# Patient Record
Sex: MALE | Race: BLACK OR AFRICAN AMERICAN | Employment: UNEMPLOYED | URBAN - METROPOLITAN AREA
[De-identification: names, ages, dates, MRNs, and addresses within clinical notes are randomized per-mention and may not be internally consistent; named-entity substitution may affect disease eponyms.]

---

## 2018-12-05 ENCOUNTER — APPOINTMENT (OUTPATIENT)
Dept: GENERAL RADIOLOGY | Age: 48
DRG: 871 | End: 2018-12-05
Attending: EMERGENCY MEDICINE
Payer: COMMERCIAL

## 2018-12-05 ENCOUNTER — HOSPITAL ENCOUNTER (INPATIENT)
Age: 48
LOS: 9 days | Discharge: HOME | DRG: 871 | End: 2018-12-14
Attending: EMERGENCY MEDICINE | Admitting: INTERNAL MEDICINE
Payer: COMMERCIAL

## 2018-12-05 DIAGNOSIS — N18.6 END STAGE RENAL DISEASE (HCC): ICD-10-CM

## 2018-12-05 DIAGNOSIS — I48.91 ATRIAL FIBRILLATION WITH RAPID VENTRICULAR RESPONSE (HCC): Primary | ICD-10-CM

## 2018-12-05 LAB
ALBUMIN SERPL-MCNC: 2.2 G/DL (ref 3.4–5)
ALBUMIN/GLOB SERPL: 0.5 {RATIO} (ref 0.8–1.7)
ALP SERPL-CCNC: 97 U/L (ref 45–117)
ALT SERPL-CCNC: 19 U/L (ref 16–61)
ANION GAP SERPL CALC-SCNC: 16 MMOL/L (ref 3–18)
AST SERPL-CCNC: 31 U/L (ref 15–37)
ATRIAL RATE: 133 BPM
BASOPHILS # BLD: 0 K/UL (ref 0–0.06)
BASOPHILS NFR BLD: 0 % (ref 0–3)
BILIRUB SERPL-MCNC: 0.5 MG/DL (ref 0.2–1)
BUN SERPL-MCNC: 99 MG/DL (ref 7–18)
BUN/CREAT SERPL: 7 (ref 12–20)
CALCIUM SERPL-MCNC: 7.2 MG/DL (ref 8.5–10.1)
CALCIUM SERPL-MCNC: 7.2 MG/DL (ref 8.5–10.1)
CALCULATED R AXIS, ECG10: -29 DEGREES
CALCULATED T AXIS, ECG11: 165 DEGREES
CHLORIDE SERPL-SCNC: 109 MMOL/L (ref 100–108)
CO2 SERPL-SCNC: 11 MMOL/L (ref 21–32)
CREAT SERPL-MCNC: 14.5 MG/DL (ref 0.6–1.3)
DIAGNOSIS, 93000: NORMAL
DIFFERENTIAL METHOD BLD: ABNORMAL
EOSINOPHIL # BLD: 0 K/UL (ref 0–0.4)
EOSINOPHIL NFR BLD: 0 % (ref 0–5)
ERYTHROCYTE [DISTWIDTH] IN BLOOD BY AUTOMATED COUNT: 15.6 % (ref 11.6–14.5)
FERRITIN SERPL-MCNC: 295 NG/ML (ref 8–388)
GLOBULIN SER CALC-MCNC: 4.2 G/DL (ref 2–4)
GLUCOSE BLD STRIP.AUTO-MCNC: 151 MG/DL (ref 70–110)
GLUCOSE SERPL-MCNC: 147 MG/DL (ref 74–99)
HBA1C MFR BLD: 6.3 % (ref 4.2–5.6)
HCT VFR BLD AUTO: 30 % (ref 36–48)
HGB BLD-MCNC: 10.2 G/DL (ref 13–16)
IRON SATN MFR SERPL: 10 %
IRON SERPL-MCNC: 20 UG/DL (ref 50–175)
LACTATE BLD-SCNC: 3.5 MMOL/L (ref 0.4–2)
LACTATE BLD-SCNC: 3.8 MMOL/L (ref 0.4–2)
LYMPHOCYTES # BLD: 0.3 K/UL (ref 0.8–3.5)
LYMPHOCYTES NFR BLD: 1 % (ref 20–51)
MAGNESIUM SERPL-MCNC: 1.7 MG/DL (ref 1.6–2.6)
MCH RBC QN AUTO: 27.9 PG (ref 24–34)
MCHC RBC AUTO-ENTMCNC: 34 G/DL (ref 31–37)
MCV RBC AUTO: 82 FL (ref 74–97)
METAMYELOCYTES NFR BLD MANUAL: 1 %
MONOCYTES # BLD: 1 K/UL (ref 0–1)
MONOCYTES NFR BLD: 4 % (ref 2–9)
NEUTS BAND NFR BLD MANUAL: 3 % (ref 0–5)
NEUTS SEG # BLD: 24.4 K/UL (ref 1.8–8)
NEUTS SEG NFR BLD: 91 % (ref 42–75)
PHOSPHATE SERPL-MCNC: 7.8 MG/DL (ref 2.5–4.9)
PLATELET # BLD AUTO: 237 K/UL (ref 135–420)
PLATELET COMMENTS,PCOM: ABNORMAL
PMV BLD AUTO: 10 FL (ref 9.2–11.8)
POTASSIUM SERPL-SCNC: 5.6 MMOL/L (ref 3.5–5.5)
PROT SERPL-MCNC: 6.4 G/DL (ref 6.4–8.2)
PTH-INTACT SERPL-MCNC: 723.8 PG/ML (ref 18.4–88)
Q-T INTERVAL, ECG07: 264 MS
QRS DURATION, ECG06: 82 MS
QTC CALCULATION (BEZET), ECG08: 455 MS
RBC # BLD AUTO: 3.66 M/UL (ref 4.7–5.5)
RBC MORPH BLD: ABNORMAL
RBC MORPH BLD: ABNORMAL
SODIUM SERPL-SCNC: 136 MMOL/L (ref 136–145)
TIBC SERPL-MCNC: 195 UG/DL (ref 250–450)
VENTRICULAR RATE, ECG03: 179 BPM
WBC # BLD AUTO: 26 K/UL (ref 4.6–13.2)

## 2018-12-05 PROCEDURE — 99285 EMERGENCY DEPT VISIT HI MDM: CPT

## 2018-12-05 PROCEDURE — 87340 HEPATITIS B SURFACE AG IA: CPT

## 2018-12-05 PROCEDURE — 83735 ASSAY OF MAGNESIUM: CPT

## 2018-12-05 PROCEDURE — 74011250636 HC RX REV CODE- 250/636: Performed by: INTERNAL MEDICINE

## 2018-12-05 PROCEDURE — 74011636637 HC RX REV CODE- 636/637: Performed by: INTERNAL MEDICINE

## 2018-12-05 PROCEDURE — 74011000258 HC RX REV CODE- 258: Performed by: EMERGENCY MEDICINE

## 2018-12-05 PROCEDURE — 86706 HEP B SURFACE ANTIBODY: CPT

## 2018-12-05 PROCEDURE — 93005 ELECTROCARDIOGRAM TRACING: CPT

## 2018-12-05 PROCEDURE — 83540 ASSAY OF IRON: CPT

## 2018-12-05 PROCEDURE — 96365 THER/PROPH/DIAG IV INF INIT: CPT

## 2018-12-05 PROCEDURE — 74011250637 HC RX REV CODE- 250/637: Performed by: INTERNAL MEDICINE

## 2018-12-05 PROCEDURE — 87040 BLOOD CULTURE FOR BACTERIA: CPT

## 2018-12-05 PROCEDURE — 86803 HEPATITIS C AB TEST: CPT

## 2018-12-05 PROCEDURE — 85025 COMPLETE CBC W/AUTO DIFF WBC: CPT

## 2018-12-05 PROCEDURE — 87186 SC STD MICRODIL/AGAR DIL: CPT

## 2018-12-05 PROCEDURE — 71045 X-RAY EXAM CHEST 1 VIEW: CPT

## 2018-12-05 PROCEDURE — 82728 ASSAY OF FERRITIN: CPT

## 2018-12-05 PROCEDURE — 86704 HEP B CORE ANTIBODY TOTAL: CPT

## 2018-12-05 PROCEDURE — 74011000250 HC RX REV CODE- 250: Performed by: EMERGENCY MEDICINE

## 2018-12-05 PROCEDURE — 83970 ASSAY OF PARATHORMONE: CPT

## 2018-12-05 PROCEDURE — 84100 ASSAY OF PHOSPHORUS: CPT

## 2018-12-05 PROCEDURE — 83605 ASSAY OF LACTIC ACID: CPT

## 2018-12-05 PROCEDURE — 65660000004 HC RM CVT STEPDOWN

## 2018-12-05 PROCEDURE — 83036 HEMOGLOBIN GLYCOSYLATED A1C: CPT

## 2018-12-05 PROCEDURE — 74011250636 HC RX REV CODE- 250/636: Performed by: EMERGENCY MEDICINE

## 2018-12-05 PROCEDURE — 82962 GLUCOSE BLOOD TEST: CPT

## 2018-12-05 PROCEDURE — 74011250637 HC RX REV CODE- 250/637: Performed by: HOSPITALIST

## 2018-12-05 PROCEDURE — 80053 COMPREHEN METABOLIC PANEL: CPT

## 2018-12-05 PROCEDURE — 87077 CULTURE AEROBIC IDENTIFY: CPT

## 2018-12-05 RX ORDER — DILTIAZEM HYDROCHLORIDE 5 MG/ML
5 INJECTION INTRAVENOUS
Status: COMPLETED | OUTPATIENT
Start: 2018-12-05 | End: 2018-12-05

## 2018-12-05 RX ORDER — MONTELUKAST SODIUM 10 MG/1
10 TABLET ORAL DAILY
Status: DISCONTINUED | OUTPATIENT
Start: 2018-12-06 | End: 2018-12-14 | Stop reason: HOSPADM

## 2018-12-05 RX ORDER — HEPARIN SODIUM 5000 [USP'U]/ML
5000 INJECTION, SOLUTION INTRAVENOUS; SUBCUTANEOUS EVERY 8 HOURS
Status: DISCONTINUED | OUTPATIENT
Start: 2018-12-05 | End: 2018-12-06

## 2018-12-05 RX ORDER — ASPIRIN 81 MG/1
81 TABLET ORAL DAILY
Status: DISCONTINUED | OUTPATIENT
Start: 2018-12-06 | End: 2018-12-14 | Stop reason: HOSPADM

## 2018-12-05 RX ORDER — MAGNESIUM SULFATE 100 %
16 CRYSTALS MISCELLANEOUS AS NEEDED
Status: DISCONTINUED | OUTPATIENT
Start: 2018-12-05 | End: 2018-12-14 | Stop reason: HOSPADM

## 2018-12-05 RX ORDER — ATORVASTATIN CALCIUM 40 MG/1
40 TABLET, FILM COATED ORAL DAILY
Status: DISCONTINUED | OUTPATIENT
Start: 2018-12-06 | End: 2018-12-14 | Stop reason: HOSPADM

## 2018-12-05 RX ORDER — ONDANSETRON 2 MG/ML
4 INJECTION INTRAMUSCULAR; INTRAVENOUS
Status: DISCONTINUED | OUTPATIENT
Start: 2018-12-05 | End: 2018-12-14 | Stop reason: HOSPADM

## 2018-12-05 RX ORDER — ACETAMINOPHEN 325 MG/1
650 TABLET ORAL
Status: DISCONTINUED | OUTPATIENT
Start: 2018-12-05 | End: 2018-12-12

## 2018-12-05 RX ORDER — SODIUM CHLORIDE 9 MG/ML
100 INJECTION, SOLUTION INTRAVENOUS
Status: DISCONTINUED | OUTPATIENT
Start: 2018-12-05 | End: 2018-12-14 | Stop reason: HOSPADM

## 2018-12-05 RX ORDER — CALCIUM CARBONATE 200(500)MG
400 TABLET,CHEWABLE ORAL
Status: DISCONTINUED | OUTPATIENT
Start: 2018-12-05 | End: 2018-12-06

## 2018-12-05 RX ORDER — DEXTROSE 50 % IN WATER (D50W) INTRAVENOUS SYRINGE
25-50 AS NEEDED
Status: DISCONTINUED | OUTPATIENT
Start: 2018-12-05 | End: 2018-12-14 | Stop reason: HOSPADM

## 2018-12-05 RX ORDER — INSULIN LISPRO 100 [IU]/ML
INJECTION, SOLUTION INTRAVENOUS; SUBCUTANEOUS
Status: DISCONTINUED | OUTPATIENT
Start: 2018-12-05 | End: 2018-12-14 | Stop reason: HOSPADM

## 2018-12-05 RX ORDER — SODIUM BICARBONATE 650 MG/1
650 TABLET ORAL 3 TIMES DAILY
Status: DISCONTINUED | OUTPATIENT
Start: 2018-12-05 | End: 2018-12-07

## 2018-12-05 RX ADMIN — DILTIAZEM HYDROCHLORIDE 5 MG: 5 INJECTION INTRAVENOUS at 14:59

## 2018-12-05 RX ADMIN — ACETAMINOPHEN 650 MG: 325 TABLET ORAL at 17:52

## 2018-12-05 RX ADMIN — ACETAMINOPHEN 650 MG: 325 TABLET ORAL at 22:13

## 2018-12-05 RX ADMIN — HEPARIN SODIUM 5000 UNITS: 5000 INJECTION INTRAVENOUS; SUBCUTANEOUS at 18:27

## 2018-12-05 RX ADMIN — SODIUM CHLORIDE 5 MG/HR: 900 INJECTION, SOLUTION INTRAVENOUS at 15:06

## 2018-12-05 RX ADMIN — INSULIN LISPRO 2 UNITS: 100 INJECTION, SOLUTION INTRAVENOUS; SUBCUTANEOUS at 22:11

## 2018-12-05 RX ADMIN — SODIUM BICARBONATE 650 MG TABLET 650 MG: at 21:49

## 2018-12-05 RX ADMIN — AZITHROMYCIN MONOHYDRATE 500 MG: 500 INJECTION, POWDER, LYOPHILIZED, FOR SOLUTION INTRAVENOUS at 16:35

## 2018-12-05 RX ADMIN — ONDANSETRON 4 MG: 2 INJECTION INTRAMUSCULAR; INTRAVENOUS at 17:51

## 2018-12-05 RX ADMIN — CALCIUM GLUCONATE 2 G: 98 INJECTION, SOLUTION INTRAVENOUS at 18:18

## 2018-12-05 RX ADMIN — SODIUM BICARBONATE 650 MG TABLET 650 MG: at 17:09

## 2018-12-05 RX ADMIN — CEFTRIAXONE SODIUM 2 G: 2 INJECTION, POWDER, FOR SOLUTION INTRAMUSCULAR; INTRAVENOUS at 16:24

## 2018-12-05 RX ADMIN — CALCIUM CARBONATE (ANTACID) CHEW TAB 500 MG 400 MG: 500 CHEW TAB at 21:00

## 2018-12-05 NOTE — ED NOTES
Dr. Kemar Anderson in to assess pt. And talk with family regarding low BP of 76/52. BP cuff repositioned-BP now 96/57 per Dr. Kemar Anderson. Order also to pause Cardizem drip due to low BP.

## 2018-12-05 NOTE — ED NOTES
Pt. C/o \"not feeling right\" BP-76/52 also c/o lower back pain. Paged Dr. Jg Braga; returned phone call. Stated to speak with ER doctor regarding Cardizem drip

## 2018-12-05 NOTE — H&P
History and Physical      NAME:  Sandee Olszewski   :   1970   MRN:   [id number]     Date/Time:  2018     CHIEF COMPLAINT: weakness and fatigue     HISTORY OF PRESENT ILLNESS:     Mr. Carmina Hernandez is a 41 y.o.   male with a PMH of ESRD s/p AVF placement but not on dialysis yet, HTN, chronic diastolic CHF, DM-2, CAD s/p stent and pulmonary HTN who presents with c/c of weakness and fatigue. He also has increased shortness of breathing on exertion. He denies chest pain or palpitation. He has no fever or chills. He has cough, non productive. Here in ED he was found to have atrial fibrillation with RVR which is new onset. He follows with Dr Ricky Veras nephrology. He had AVF placed about 2 years ago but never started on dialysis yet. Here  in ED he is started on IV cardizem drip. CXR also showed suspected infiltrate with leukocytosis and started on IV antibiotics. Cardiology has been consulted and admitted for further evaluation and management. Past Medical History:   Diagnosis Date    Abnormal nuclear cardiac imaging test 10/08/2012    Fixed anteroseptal, anteroapical defect c/w prior infarction. No ischemia. Mid & distal anteroseptal, anteroapical WMA. LVE. EF 44%.  Anemia     dr. Javier Blunt doubt amyloid or multiple myeloma. candidate for procirt if Hg <10    Benign hypertensive     Blindness of right eye 2013    legally blind    CAD (coronary artery disease)     Cardiomyopathy ejection fraction of 40%     CKD (chronic kidney disease), stage IV ([address]     to see Dr. Ricky Veras 12    Congestive heart failure ([address]     Diabetes mellitus ([address]     Diabetic retin[facility]     Diabetic retinopathy ([address]     Dr. Mary Cheng- injections    Heart failure ([facility]     History of echocardiogram 2014    LVE. EF 55% (prev 40-45% on echo of 12). No WMA. Mild-mod conc LVH. Gr 3 DDfx. Marked LAE. Mild SHANTEL.   Mild MR.    Hypertension     Pulmonary hypertension ([address]     Renal Ultrasound 1/3/12    Right kidney isoechoic w/respect to liver. Sm left-sided pleural effusion    S/P cardiac cath 10/16/2012    LM patent. pLAD 95% (3.5 x 12-mm Jackson stent, resid 0$%). LCX mild. Past Surgical History:   Procedure Laterality Date    HX CORONARY STENT PLACEMENT      one    HX LAPAROTOMY  2/2012    bowel obstruction with removal segment of small bowel. Done by Riblet.  HX LAPAROTOMY  infancy    whole in colon and had repair at that time.  HX OTHER SURGICAL  06/20/2013    left eye retna attatchment    HX RETINAL DETACHMENT REPAIR      august 2012       Social History     Tobacco Use    Smoking status: Never Smoker    Smokeless tobacco: Never Used   Substance Use Topics    Alcohol use: Yes     Alcohol/week: 2.5 oz     Types: 5 Cans of beer per week     Comment: occassionally        Family History   Problem Relation Age of Onset    Diabetes Mother     Hypertension Mother     Kidney Disease Mother     High Cholesterol Father     Diabetes Brother         pre diabetic        No Known Allergies     Prior to Admission medications    Medication Sig Start Date End Date Taking? Authorizing Provider   amLODIPine (NORVASC) 10 mg tablet TAKE ONE TABLET BY MOUTH EVERY DAY 9/8/16   Herlinda Sparks MD   glimepiride (AMARYL) 4 mg tablet TAKE 1 TABLET BY MOUTH EVERY MORNING 6/2/16   Herlinda Sparks MD   calcitRIOL (ROCALTROL) 0.25 mcg capsule  3/16/16   Provider, Amy   metolazone (ZAROXOLYN) 5 mg tablet TAKE ONE TABLET BY MOUTH EVERY FRIDAY 30 MINUTES PRIOR TO MORNING LASIX DOSE  Patient taking differently: Take 5 mg by mouth as needed. TAKE ONE TABLET BY MOUTH EVERY FRIDAY 30 MINUTES PRIOR TO MORNING LASIX DOSE 4/26/16   Mckenzie Holcomb, NP   atorvastatin (LIPITOR) 40 mg tablet Take 1 Tab by mouth daily.  4/14/16   Herlinda Sparks MD   hydrALAZINE (APRESOLINE) 100 mg tablet TAKE 1 TABLET BY MOUTH THREE TIMES A DAY 1/26/16   Tanvi Holcomb NP   carvedilol (COREG) 25 mg tablet TAKE TWO TABLETS BY MOUTH TWICE DAILY 1/26/16   Tucker Holcomb NP   b complex-vitamin c-folic acid 1mg (NAHED-MOON RX) 1 mg tablet TAKE ONE TABLET BY MOUTH EVERY DAY 1/25/16   Pily Church MD   montelukast (SINGULAIR) 10 mg tablet Take 10 mg by mouth daily. Provider, Historical   sodium bicarbonate 325 mg tablet Take 325 mg by mouth two (2) times a day. Provider, Historical   sildenafil citrate (VIAGRA) 50 mg tablet Take 1 Tab by mouth as needed. [address] 05194-507-12, lot #[id number]  Exp 12/16, 1 box, # 2 tabs 12/15/15   Teo LAI MD   furosemide (LASIX) 40 mg tablet TAKE 2 TABLETS BY MOUTH TWO TIMES A DAY 11/17/15   Francisco Malin MD   tadalafil (CIALIS) 20 mg tablet Take 1 Tab by mouth as needed. 6/17/15   Andria Agee MD   aspirin delayed-release 81 mg tablet Take 81 mg by mouth daily. Provider, Historical   EPOETIN SEBLE (PROCRIT IJ) by Injection route. Provider, Historical   cholecalciferol, vitamin D3, (VITAMIN D3) 2,000 unit Tab Take 1 Tab by mouth daily.     Provider, Historical   glucose blood VI test strips (FREESTYLE LITE STRIPS) strip Use as directed 8/15/12   Dalton Watters MD       REVIEW OF SYSTEMS:     CONSTITUTIONAL: No Fever, No chills, No weight loss, No Night sweats  HEENT:  No epistaxis, No diff in swallowing  CVS: No chest pain, No palpitations, No syncope, No peripheral edema, No PND, No orthopnea  RS: shortness of breath, No cough, No hemoptysis, No pleuritic chest pain  GI: No abd pain, No vomitting, No diarrhea, No hematemesis, No rectal bleeding, No acid reflux or heartburn  NEURO: No focal weakness, No headaches, No seizures  PSYCH: No anxiety, No depression  MUSCULOSKLETAL: No joint pain or swelling  : No hematuria or dysuria  SKIN: No rash      Physical Exam:    VITALS:    Vital signs reviewed; most recent are:    Visit Vitals  BP 99/70   Pulse (!) 101   Resp 24   SpO2 97% SpO2 Readings from Last 6 Encounters:   12/05/18 97%   06/21/16 98%   05/06/16 98%   12/15/15 96%   06/12/15 98%   05/12/15 98%        No intake or output data in the 24 hours ending 12/05/18 1708       GENERAL: Not in acute distress  HEENT: pink conjunctiva, un icteric sclera,   NECK: No lymphadenopthy or thyroid swelling, JVD not seen  LYMPH: No supraclavicular or cervical or axillary nodes on both sides  CVS: S1S2, No murmurs, No gallop or rub  RS: CTA, No wheezing or crackles  Abd: Soft, non tender, not distended, No guarding, No rigidity  NEURO:  No focal neurologic deficits   Extrm: no leg edema or swelling   Skin: No rash      Labs:  Recent Results (from the past 24 hour(s))   EKG, 12 LEAD, INITIAL    Collection Time: 12/05/18  2:02 PM   Result Value Ref Range    Ventricular Rate 179 BPM    Atrial Rate 133 BPM    QRS Duration 82 ms    Q-T Interval 264 ms    QTC Calculation (Bezet) 455 ms    Calculated R Axis -29 degrees    Calculated T Axis 165 degrees    Diagnosis       Atrial fibrillation with rapid ventricular response  Septal infarct (cited on or before 10-APR-2013)  Marked ST abnormality, possible lateral subendocardial injury  Abnormal ECG  When compared with ECG of 06-JAN-2016 18:48,  Atrial fibrillation has replaced Sinus rhythm  Vent.  rate has increased  BPM  ST now depressed in Lateral leads  T wave amplitude has decreased in Inferior leads  T wave inversion now evident in Lateral leads     CBC WITH AUTOMATED DIFF    Collection Time: 12/05/18  2:21 PM   Result Value Ref Range    WBC 26.0 (H) 4.6 - 13.2 K/uL    RBC 3.66 (L) 4.70 - 5.50 M/uL    HGB 10.2 (L) 13.0 - 16.0 g/dL    HCT 30.0 (L) 36.0 - 48.0 %    MCV 82.0 74.0 - 97.0 FL    MCH 27.9 24.0 - 34.0 PG    MCHC 34.0 31.0 - 37.0 g/dL    RDW 15.6 (H) 11.6 - 14.5 %    PLATELET 833 768 - 953 K/uL    MPV 10.0 9.2 - 11.8 FL    NEUTROPHILS 91 (H) 42 - 75 %    BAND NEUTROPHILS 3 0 - 5 %    LYMPHOCYTES 1 (L) 20 - 51 %    MONOCYTES 4 2 - 9 % EOSINOPHILS 0 0 - 5 %    BASOPHILS 0 0 - 3 %    METAMYELOCYTES 1 (H) 0 %    ABS. NEUTROPHILS 24.4 (H) 1.8 - 8.0 K/UL    ABS. LYMPHOCYTES 0.3 (L) 0.8 - 3.5 K/UL    ABS. MONOCYTES 1.0 0 - 1.0 K/UL    ABS. EOSINOPHILS 0.0 0.0 - 0.4 K/UL    ABS. BASOPHILS 0.0 0.0 - 0.06 K/UL    DF MANUAL      PLATELET COMMENTS ADEQUATE PLATELETS      RBC COMMENTS ANURAG CELLS  3+        RBC COMMENTS SCHISTOCYTES  FEW       MAGNESIUM    Collection Time: 12/05/18  2:50 PM   Result Value Ref Range    Magnesium 1.7 1.6 - 2.6 mg/dL   METABOLIC PANEL, COMPREHENSIVE    Collection Time: 12/05/18  2:50 PM   Result Value Ref Range    Sodium 136 136 - 145 mmol/L    Potassium 5.6 (H) 3.5 - 5.5 mmol/L    Chloride 109 (H) 100 - 108 mmol/L    CO2 11 (L) 21 - 32 mmol/L    Anion gap 16 3.0 - 18 mmol/L    Glucose 147 (H) 74 - 99 mg/dL    BUN 99 (H) 7.0 - 18 MG/DL    Creatinine 14.50 (H) 0.6 - 1.3 MG/DL    BUN/Creatinine ratio 7 (L) 12 - 20      GFR est AA 4 (L) >60 ml/min/1.73m2    GFR est non-AA 4 (L) >60 ml/min/1.73m2    Calcium 7.2 (L) 8.5 - 10.1 MG/DL    Bilirubin, total 0.5 0.2 - 1.0 MG/DL    ALT (SGPT) 19 16 - 61 U/L    AST (SGOT) 31 15 - 37 U/L    Alk. phosphatase 97 45 - 117 U/L    Protein, total 6.4 6.4 - 8.2 g/dL    Albumin 2.2 (L) 3.4 - 5.0 g/dL    Globulin 4.2 (H) 2.0 - 4.0 g/dL    A-G Ratio 0.5 (L) 0.8 - 1.7     POC LACTIC ACID    Collection Time: 12/05/18  4:14 PM   Result Value Ref Range    Lactic Acid (POC) 3.50 (HH) 0.40 - 2.00 mmol/L         Active Problems:    Atrial fibrillation with rapid ventricular response (HCC) (12/5/2018)        Assessment:       1. Atrial fibrillation with RVR, new onset  2. Suspected pneumonia  3. ESRD s/p AVF placement but not on dialysis yet,   4. Hyperkalemia, mild  5. HTN, controlled  6. Chronic diastolic CHF,   7. DM-2, controlled  8. CAD s/p stent   9.  H/o pulmonary HTN     Plan:       · Patient being admitted   · Continue IV Cardizem drip  · Cardiology already consulted  · Will order echo  · Nephrology also consulted,  · Ordered AVF duplex. Planning to start him on HD tomorrow  · Monitor volume status and electrolytes  · Continue antibiotics to cover for PNA. On Ceft/zithromax. Monitor cultures  · Monitor blood glucose, SSI  · Full code  · D/w patient, family and Dr Johnson Fuentes    Total time:  64 minutes             _______________________________________________________________________        Attending Physician:  Mark Cardenas MD       Copies: Car Ureña MD

## 2018-12-05 NOTE — CONSULTS
Consult Note  Consult requested by: Dr. Deborah Reynolds is a 41 y.o. male [address] who is being seen on consult for ESRD  Chief Complaint   Patient presents with    Lethargy     Admission diagnosis: <principal problem not specified>     HPI: 40 yo AA male seen in the ER with his wife and family friend. Patient c/o of increasing SOB, and cough , weakness, Nausea and decrease urine output in the last few weeks. Per wife he has not been taking any meds. He was last seen by my partner Dr. Jorge Wiggins in March 2016 and at that time he had a crea of 6. He was sent to [facility] for AVF creation ( done by Dr. Teresa Mcneill in April of 2016 and was last seen by her in 11/2016 and permission to use was given). Pt did not follow up with us and per wife he apparently has not seen any of his doctors for over a year. He does get allergy shots  Dr. Bret Arreguin office on a weekly basis. Pt denies any urinary complaints, fever, CP. Has LE edema chronic but not on meds  Also has DM 2 but on no meds. No NSAIDS  Denies any SZ, wt loss  Right eye blind  Denies RAFAEL, cigarette or illegal drug use  He does not have any regular meds other than his allergy shots. Past Medical History:   Diagnosis Date    Abnormal nuclear cardiac imaging test 10/08/2012    Fixed anteroseptal, anteroapical defect c/w prior infarction. No ischemia. Mid & distal anteroseptal, anteroapical WMA. LVE. EF 44%.  Anemia     dr. Cristela Norman doubt amyloid or multiple myeloma.  candidate for procirt if Hg <10    Benign hypertensive     Blindness of right eye 01/2013    legally blind    CAD (coronary artery disease)     Cardiomyopathy ejection fraction of 40%     CKD (chronic kidney disease), stage IV ([address]     to see Dr. Kirit Prado 12/1/12    Congestive heart failure ([address]     Diabetes mellitus ([address]     Diabetic retinopathy ([address]     Diabetic retinopathy ([address]     Dr. Birdie Reyna- injections    Heart failure ([address]     History of echocardiogram 04/22/2014    LVE. EF 55% (prev 40-45% on echo of 1/27/12). No WMA. Mild-mod conc LVH. Gr 3 DDfx. Marked LAE. Mild SHANTEL. Mild MR.    Hypertension     Pulmonary hypertension ([address]     Renal Ultrasound 1/3/12    Right kidney isoechoic w/respect to liver. Sm left-sided pleural effusion    S/P cardiac cath 10/16/2012    LM patent. pLAD 95% (3.5 x 12-mm Kirk stent, resid 0$%). LCX mild. Past Surgical History:   Procedure Laterality Date    HX CORONARY STENT PLACEMENT      one    HX LAPAROTOMY  2/2012    bowel obstruction with removal segment of small bowel. Done by Riblet.  HX LAPAROTOMY  infancy    whole in colon and had repair at that time.  HX OTHER SURGICAL  06/20/2013    left eye retna attatchment    HX RETINAL DETACHMENT REPAIR      august 2012       Social History     Socioeconomic History    Marital status:      Spouse name: Not on file    Number of children: Not on file    Years of education: Not on file    Highest education level: Not on file   Social Needs    Financial resource strain: Not on file    Food insecurity - worry: Not on file    Food insecurity - inability: Not on file   [employer] needs - medical: Not on file   [employer] needs - non-medical: Not on file   Occupational History    Not on file   Tobacco Use    Smoking status: Never Smoker    Smokeless tobacco: Never Used   Substance and Sexual Activity    Alcohol use:  Yes     Alcohol/week: 2.5 oz     Types: 5 Cans of beer per week     Comment: occassionally    Drug use: No    Sexual activity: Yes     Partners: Female     Birth control/protection: None   Other Topics Concern    Not on file   Social History Narrative    Not on file       Family History   Problem Relation Age of Onset    Diabetes Mother     Hypertension Mother     Kidney Disease Mother     High Cholesterol Father     Diabetes Brother         pre diabetic     No Known Allergies     Home Medications:     Prior to Admission Medications   Prescriptions Last Dose Informant Patient Reported? Taking? EPOETIN SEBLE (PROCRIT IJ)   Yes No   Sig: by Injection route. amLODIPine (NORVASC) 10 mg tablet   No No   Sig: TAKE ONE TABLET BY MOUTH EVERY DAY   aspirin delayed-release 81 mg tablet   Yes No   Sig: Take 81 mg by mouth daily. atorvastatin (LIPITOR) 40 mg tablet   No No   Sig: Take 1 Tab by mouth daily. b complex-vitamin c-folic acid 1mg (NAHED-MOON RX) 1 mg tablet   No No   Sig: TAKE ONE TABLET BY MOUTH EVERY DAY   calcitRIOL (ROCALTROL) 0.25 mcg capsule   Yes No   carvedilol (COREG) 25 mg tablet   No No   Sig: TAKE TWO TABLETS BY MOUTH TWICE DAILY   cholecalciferol, vitamin D3, (VITAMIN D3) 2,000 unit Tab   Yes No   Sig: Take 1 Tab by mouth daily. furosemide (LASIX) 40 mg tablet   No No   Sig: TAKE 2 TABLETS BY MOUTH TWO TIMES A DAY   glimepiride (AMARYL) 4 mg tablet   No No   Sig: TAKE 1 TABLET BY MOUTH EVERY MORNING   glucose blood VI test strips (FREESTYLE LITE STRIPS) strip   No No   Sig: Use as directed   hydrALAZINE (APRESOLINE) 100 mg tablet   No No   Sig: TAKE 1 TABLET BY MOUTH THREE TIMES A DAY   metolazone (ZAROXOLYN) 5 mg tablet   No No   Sig: TAKE ONE TABLET BY MOUTH EVERY FRIDAY 30 MINUTES PRIOR TO MORNING LASIX DOSE   Patient taking differently: Take 5 mg by mouth as needed. TAKE ONE TABLET BY MOUTH EVERY FRIDAY 30 MINUTES PRIOR TO MORNING LASIX DOSE   montelukast (SINGULAIR) 10 mg tablet   Yes No   Sig: Take 10 mg by mouth daily. sildenafil citrate (VIAGRA) 50 mg tablet   No No   Sig: Take 1 Tab by mouth as needed. [address] 88291-659-12, lot #[id number]  Exp 12/16, 1 box, # 2 tabs   sodium bicarbonate 325 mg tablet   Yes No   Sig: Take 325 mg by mouth two (2) times a day. tadalafil (CIALIS) 20 mg tablet   No No   Sig: Take 1 Tab by mouth as needed.       Facility-Administered Medications: None       Current Facility-Administered Medications   Medication Dose Route Frequency    calcium gluconate 2 g in 0.9% sodium chloride 100 mL IVPB  2 g IntraVENous ONCE    azithromycin (ZITHROMAX) 500 mg in 0.9% sodium chloride (MBP/ADV) 250 mL adv  500 mg IntraVENous NOW    dilTIAZem (CARDIZEM) 100 mg in 0.9% sodium chloride 100 mL infusion  5 mg/hr IntraVENous TITRATE     Current Outpatient Medications   Medication Sig    amLODIPine (NORVASC) 10 mg tablet TAKE ONE TABLET BY MOUTH EVERY DAY    glimepiride (AMARYL) 4 mg tablet TAKE 1 TABLET BY MOUTH EVERY MORNING    calcitRIOL (ROCALTROL) 0.25 mcg capsule     metolazone (ZAROXOLYN) 5 mg tablet TAKE ONE TABLET BY MOUTH EVERY FRIDAY 30 MINUTES PRIOR TO MORNING LASIX DOSE (Patient taking differently: Take 5 mg by mouth as needed. TAKE ONE TABLET BY MOUTH EVERY FRIDAY 30 MINUTES PRIOR TO MORNING LASIX DOSE)    atorvastatin (LIPITOR) 40 mg tablet Take 1 Tab by mouth daily.  hydrALAZINE (APRESOLINE) 100 mg tablet TAKE 1 TABLET BY MOUTH THREE TIMES A DAY    carvedilol (COREG) 25 mg tablet TAKE TWO TABLETS BY MOUTH TWICE DAILY    b complex-vitamin c-folic acid 1mg (NAHED-MOON RX) 1 mg tablet TAKE ONE TABLET BY MOUTH EVERY DAY    montelukast (SINGULAIR) 10 mg tablet Take 10 mg by mouth daily.  sodium bicarbonate 325 mg tablet Take 325 mg by mouth two (2) times a day.  sildenafil citrate (VIAGRA) 50 mg tablet Take 1 Tab by mouth as needed. [address] 17903-662-12, lot #[id number]  Exp 12/16, 1 box, # 2 tabs    furosemide (LASIX) 40 mg tablet TAKE 2 TABLETS BY MOUTH TWO TIMES A DAY    tadalafil (CIALIS) 20 mg tablet Take 1 Tab by mouth as needed.  aspirin delayed-release 81 mg tablet Take 81 mg by mouth daily.  EPOETIN SELBE (PROCRIT IJ) by Injection route.  cholecalciferol, vitamin D3, (VITAMIN D3) 2,000 unit Tab Take 1 Tab by mouth daily.  glucose blood VI test strips (FREESTYLE LITE STRIPS) strip Use as directed       Review of Systems:   Pertinent items are noted in HPI.   Data Review:    Labs: Results:       Chemistry Recent Labs     12/05/18  1450   *   NA 136   K 5.6*   *   CO2 11*   BUN 99*   CREA 14.50*   CA 7.2*   AGAP 16   BUCR 7*   AP 97   TP 6.4   ALB 2.2*   GLOB 4.2*   AGRAT 0.5*      CBC w/Diff Recent Labs     12/05/18  1421   WBC 26.0*   RBC 3.66*   HGB 10.2*   HCT 30.0*      GRANS 91*   LYMPH 1*   EOS 0      Coagulation No results for input(s): PTP, INR, APTT in the last 72 hours. No lab exists for component: INREXT    Iron/Ferritin No results for input(s): IRON in the last 72 hours. No lab exists for component: TIBCCALC   BNP No results for input(s): BNPP in the last 72 hours. Cardiac Enzymes No results for input(s): CPK, CKND1, SAMARA in the last 72 hours. No lab exists for component: CKRMB, TROIP   Liver Enzymes Recent Labs     12/05/18  1450   TP 6.4   ALB 2.2*   AP 97   SGOT 31      Thyroid Studies Lab Results   Component Value Date/Time    TSH 1.950 05/11/2015 08:55 AM           IMAGES: CXR report noted    CULTURE: Blood urine c/s pend  U/A pend  Lactic acid 3.5  EKG Afib with RVR    Physical Assessment:     Visit Vitals  BP 99/70   Pulse (!) 101   Resp 24   SpO2 97%     There were no vitals filed for this visit. No intake or output data in the 24 hours ending 12/05/18 1642    Physial Exam:  General appearance: alert, cooperative, no distress, appears weak, appears stated age  Skin: normal coloration and turgor, no rashes  HEENT: non icteric, sl pale conj  Neck: No JVD  Lungs: scattered rhonchi, no wheezing  Heart: irregular rate and rhythm, S1, S2 normal, no murmur, no rub or gallop  Abdomen: soft, non-tender. Bowel sounds normal. No masses,  no organomegaly  Extremities: + 3-4 LE  Edema, left arm AVF + bruit, no redness, tenderness or warmth. IMPRESSION AND PLAN:   ESRD most likely from DN. Patient is mildy symptomatic. Discussed with pt and wife at length. Would recommend starting HD, risk and benefits discussed.  Will get AVF duplex tomorrow to eval adequacy and since he has and elevated WBC will also need to R/O any AVF related infection  Metabolic acidosis with mild elevated K. Start po bicarb, low k, renal diet  Hypocalcemia most likely  Due to SHPT, eval phos and IPTH  Anemia from CKD, check Fe stores, start LEATHA with HD  Afib with RVR defer to cardio/primary team  Elevated WBC and LA etio ?  Empiric antibiotics after panculture     Discussed with Dr. Shanna Dutton MD  December 5, 2018

## 2018-12-05 NOTE — ED NOTES
Spoke with Dr. Paris Do regarding Cardizem drip and pt.'s low BP. Order received to place Cardizem drip on hold for now as long as heart rate is stable.

## 2018-12-05 NOTE — ED PROVIDER NOTES
EMERGENCY DEPARTMENT HISTORY AND PHYSICAL EXAM    2:01 PM      Date: 12/5/2018  Patient Name: Mercedes Day    History of Presenting Illness     Chief Complaint   Patient presents with    Lethargy          Additional History (Context):       Mercedes Day is a [address] y.o. male with PMHx significant HTN, HFpEF (LVEF 32%, Grd 3 diastolic dysfunction), HTN, DM, CAD, ESRD (has fistula but not on HD yet), Hyperlipidemia who presents to the ED via EMS for ~2wks of worsening fatigue, cough, and SOB. Reports he had \"a cold\" last week with cough w/productive sputum mixed with some small amoutns of blood. Improved somewhat but then became generally fatiuged and dyspnic on exertion. Has chronic LE edema (on Lasix) that is unchanged. No fever, no chills, no abd pn/n/v/d, no recent sick contacts. No tob/etoh/drug use. No recent hospitalizastions, no CP or palpitations. EMS called, found pt with Afib w/RVR and hypertension ~200/70. O2 w/poor pleth so they placed NRB.              PCP: Natalya Prado MD    Current Facility-Administered Medications   Medication Dose Route Frequency Provider Last Rate Last Dose    calcium gluconate 2 g in 0.9% sodium chloride 100 mL IVPB  2 g IntraVENous ONCE Ronny Lopez MD        dilTIAZem (CARDIZEM) 100 mg in 0.9% sodium chloride 100 mL infusion  0-15 mg/hr IntraVENous TITRATE Anca Mcnally MD 5 mL/hr at 12/05/18 1506 5 mg/hr at 12/05/18 1506    cefTRIAXone (ROCEPHIN) 2 g in sterile water (preservative free) 20 mL IV syringe  2 g IntraVENous NOW Anca Mcnally MD        azithromycin (ZITHROMAX) 500 mg in 0.9% sodium chloride (MBP/ADV) 250 mL adv  500 mg IntraVENous NOW Anca Mcnally MD         Current Outpatient Medications   Medication Sig Dispense Refill    amLODIPine (NORVASC) 10 mg tablet TAKE ONE TABLET BY MOUTH EVERY DAY 90 Tab 3    glimepiride (AMARYL) 4 mg tablet TAKE 1 TABLET BY MOUTH EVERY MORNING 90 Tab 3    calcitRIOL (ROCALTROL) 0.25 mcg capsule       metolazone (ZAROXOLYN) 5 mg tablet TAKE ONE TABLET BY MOUTH EVERY FRIDAY 30 MINUTES PRIOR TO MORNING LASIX DOSE (Patient taking differently: Take 5 mg by mouth as needed. TAKE ONE TABLET BY MOUTH EVERY FRIDAY 30 MINUTES PRIOR TO MORNING LASIX DOSE) 12 Tab 2    atorvastatin (LIPITOR) 40 mg tablet Take 1 Tab by mouth daily. 90 Tab 3    hydrALAZINE (APRESOLINE) 100 mg tablet TAKE 1 TABLET BY MOUTH THREE TIMES A DAY 90 Tab 6    carvedilol (COREG) 25 mg tablet TAKE TWO TABLETS BY MOUTH TWICE DAILY 360 Tab 3    b complex-vitamin c-folic acid 1mg (NAHED-MOON RX) 1 mg tablet TAKE ONE TABLET BY MOUTH EVERY DAY 30 Tab 5    montelukast (SINGULAIR) 10 mg tablet Take 10 mg by mouth daily.  sodium bicarbonate 325 mg tablet Take 325 mg by mouth two (2) times a day.  sildenafil citrate (VIAGRA) 50 mg tablet Take 1 Tab by mouth as needed. [address] 06646-600-74, lot #[id number]  Exp 12/16, 1 box, # 2 tabs 2 Tab 0    furosemide (LASIX) 40 mg tablet TAKE 2 TABLETS BY MOUTH TWO TIMES A  Tab 3    tadalafil (CIALIS) 20 mg tablet Take 1 Tab by mouth as needed. 15 Tab 1    aspirin delayed-release 81 mg tablet Take 81 mg by mouth daily.  EPOETIN SEBLE (PROCRIT IJ) by Injection route.  cholecalciferol, vitamin D3, (VITAMIN D3) 2,000 unit Tab Take 1 Tab by mouth daily.  glucose blood VI test strips (FREESTYLE LITE STRIPS) strip Use as directed 1 Package 11       Past History     Past Medical History:  Past Medical History:   Diagnosis Date    Abnormal nuclear cardiac imaging test 10/08/2012    Fixed anteroseptal, anteroapical defect c/w prior infarction. No ischemia. Mid & distal anteroseptal, anteroapical WMA. LVE. EF 44%.  Anemia     dr. Sera Adams doubt amyloid or multiple myeloma.  candidate for procirt if Hg <10    Benign hypertensive     Blindness of right eye 01/2013    legally blind    CAD (coronary artery disease)     Cardiomyopathy ejection fraction of 40%     CKD (chronic kidney disease), stage IV ([facility] [address]     to see Dr. Byron Lazcano 12/1/12    Congestive heart failure ([facility] [address] [facility]     Diabetes mellitus ([facility] [address] [facility]     Diabetic retinopathy ([facility] [address]     Diabetic retinopathy ([facility] [address]     Dr. Roberta Eden- injections    Heart failure [facility])     History of echocardiogram 04/22/2014    LVE. EF 55% (prev 40-45% on echo of 1/27/12). No WMA. Mild-mod conc LVH. Gr 3 DDfx. Marked LAE. Mild SHANTEL. Mild MR.    Hypertension     Pulmonary hypertension ([facility] [address]     Renal Ultrasound 1/3/12    Right kidney isoechoic w/respect to liver. Sm left-sided pleural effusion    S/P cardiac cath 10/16/2012    LM patent. pLAD 95% (3.5 x 12-mm [place] stent, resid 0$%). LCX mild. Past Surgical History:  Past Surgical History:   Procedure Laterality Date    HX CORONARY STENT PLACEMENT      one    HX LAPAROTOMY  2/2012    bowel obstruction with removal segment of small bowel. Done by Riblet.  HX LAPAROTOMY  infancy    whole in colon and had repair at that time.  HX OTHER SURGICAL  06/20/2013    left eye retna attatchment    HX RETINAL DETACHMENT REPAIR      august 2012       Family History:  Family History   Problem Relation Age of Onset    Diabetes Mother     Hypertension Mother     Kidney Disease Mother     High Cholesterol Father     Diabetes Brother         pre diabetic       Social History:  Social History     Tobacco Use    Smoking status: Never Smoker    Smokeless tobacco: Never Used   Substance Use Topics    Alcohol use: Yes     Alcohol/week: 2.5 oz     Types: 5 Cans of beer per week     Comment: occassionally    Drug use: No       Allergies:  No Known Allergies      Review of Systems       Review of Systems   Constitutional:        See HPI     HENT: Negative for congestion, ear pain, sore throat and trouble swallowing. Eyes: Negative for visual disturbance. Respiratory:        See HPI   Cardiovascular: Negative for chest pain, palpitations and leg swelling.    Gastrointestinal: Negative for abdominal pain, diarrhea, nausea and vomiting. Genitourinary: Negative for decreased urine volume, dysuria, frequency and urgency. Musculoskeletal: Negative for arthralgias and joint swelling. Skin: Negative for rash. Neurological: Negative for weakness, numbness and headaches. Psychiatric/Behavioral: Negative for agitation and confusion. All other systems reviewed and are negative. Physical Exam     Visit Vitals  BP 99/70   Pulse (!) 101   Resp 24   SpO2 97%         Physical Exam   Constitutional: He is oriented to person, place, and time. He appears well-developed and well-nourished. He is cooperative. No distress. On arrival here, BP ~130/70, monitor clearly Afib w/RVR ~150-180bpm, O2 97%ra, afebrile, RR ~16 nonlabored. HENT:   Head: Normocephalic and atraumatic. Mouth/Throat: Oropharynx is clear and moist. No oropharyngeal exudate. Eyes: Conjunctivae and EOM are normal. Right eye exhibits no discharge. Left eye exhibits no discharge. No scleral icterus. Neck: Normal range of motion and phonation normal. Neck supple. No JVD present. No thyromegaly present. Cardiovascular: S1 normal and S2 normal. Exam reveals no gallop and no friction rub. No murmur heard. CV irregualrly iregular. Pulmonary/Chest: Effort normal and breath sounds normal. No accessory muscle usage. No respiratory distress. He has no wheezes. He has no rhonchi. He has no rales. Abdominal: Soft. Normal appearance and bowel sounds are normal. He exhibits no distension and no pulsatile midline mass. There is no tenderness. There is no rebound and no guarding. abd w/large surgical scar(surgery as baby for ?perforation), otherwise normal.   Musculoskeletal: Normal range of motion. He exhibits edema. He exhibits no deformity. LE with 3+ Pitting edema to knees (chronic/unchaged per pt)            Lymphadenopathy:        Head (right side): No submandibular adenopathy present.      He has no cervical adenopathy. Neurological: He is alert and oriented to person, place, and time. Moves all extremities. No obvious focal deficits or facial asymmetry. Skin: Skin is warm and dry. No rash noted. Psychiatric: He has a normal mood and affect. His speech is normal and behavior is normal.   Nursing note and vitals reviewed. Diagnostic Study Results     Labs -  Recent Results (from the past 12 hour(s))   EKG, 12 LEAD, INITIAL    Collection Time: 12/05/18  2:02 PM   Result Value Ref Range    Ventricular Rate 179 BPM    Atrial Rate 133 BPM    QRS Duration 82 ms    Q-T Interval 264 ms    QTC Calculation (Bezet) 455 ms    Calculated R Axis -29 degrees    Calculated T Axis 165 degrees    Diagnosis       Atrial fibrillation with rapid ventricular response  Septal infarct (cited on or before 10-APR-2013)  Marked ST abnormality, possible lateral subendocardial injury  Abnormal ECG  When compared with ECG of 06-JAN-2016 18:48,  Atrial fibrillation has replaced Sinus rhythm  Vent. rate has increased  BPM  ST now depressed in Lateral leads  T wave amplitude has decreased in Inferior leads  T wave inversion now evident in Lateral leads     CBC WITH AUTOMATED DIFF    Collection Time: 12/05/18  2:21 PM   Result Value Ref Range    WBC 26.0 (H) 4.6 - 13.2 K/uL    RBC 3.66 (L) 4.70 - 5.50 M/uL    HGB 10.2 (L) 13.0 - 16.0 g/dL    HCT 30.0 (L) 36.0 - 48.0 %    MCV 82.0 74.0 - 97.0 FL    MCH 27.9 24.0 - 34.0 PG    MCHC 34.0 31.0 - 37.0 g/dL    RDW 15.6 (H) 11.6 - 14.5 %    PLATELET 205 305 - 984 K/uL    MPV 10.0 9.2 - 11.8 FL    NEUTROPHILS 91 (H) 42 - 75 %    BAND NEUTROPHILS 3 0 - 5 %    LYMPHOCYTES 1 (L) 20 - 51 %    MONOCYTES 4 2 - 9 %    EOSINOPHILS 0 0 - 5 %    BASOPHILS 0 0 - 3 %    METAMYELOCYTES 1 (H) 0 %    ABS. NEUTROPHILS 24.4 (H) 1.8 - 8.0 K/UL    ABS. LYMPHOCYTES 0.3 (L) 0.8 - 3.5 K/UL    ABS. MONOCYTES 1.0 0 - 1.0 K/UL    ABS. EOSINOPHILS 0.0 0.0 - 0.4 K/UL    ABS.  BASOPHILS 0.0 0.0 - 0.06 K/UL    DF MANUAL PLATELET COMMENTS ADEQUATE PLATELETS      RBC COMMENTS ANURAG CELLS  3+        RBC COMMENTS SCHISTOCYTES  FEW       MAGNESIUM    Collection Time: 12/05/18  2:50 PM   Result Value Ref Range    Magnesium 1.7 1.6 - 2.6 mg/dL   METABOLIC PANEL, COMPREHENSIVE    Collection Time: 12/05/18  2:50 PM   Result Value Ref Range    Sodium 136 136 - 145 mmol/L    Potassium 5.6 (H) 3.5 - 5.5 mmol/L    Chloride 109 (H) 100 - 108 mmol/L    CO2 11 (L) 21 - 32 mmol/L    Anion gap 16 3.0 - 18 mmol/L    Glucose 147 (H) 74 - 99 mg/dL    BUN 99 (H) 7.0 - 18 MG/DL    Creatinine 14.50 (H) 0.6 - 1.3 MG/DL    BUN/Creatinine ratio 7 (L) 12 - 20      GFR est AA 4 (L) >60 ml/min/1.73m2    GFR est non-AA 4 (L) >60 ml/min/1.73m2    Calcium 7.2 (L) 8.5 - 10.1 MG/DL    Bilirubin, total 0.5 0.2 - 1.0 MG/DL    ALT (SGPT) 19 16 - 61 U/L    AST (SGOT) 31 15 - 37 U/L    Alk. phosphatase 97 45 - 117 U/L    Protein, total 6.4 6.4 - 8.2 g/dL    Albumin 2.2 (L) 3.4 - 5.0 g/dL    Globulin 4.2 (H) 2.0 - 4.0 g/dL    A-G Ratio 0.5 (L) 0.8 - 1.7     POC LACTIC ACID    Collection Time: 12/05/18  4:14 PM   Result Value Ref Range    Lactic Acid (POC) 3.50 (HH) 0.40 - 2.00 mmol/L       Radiologic Studies -   Xr Chest Port    Result Date: 12/5/2018  EXAMINATION: Chest single view INDICATION: Shortness of breath COMPARISON: 11/2/2012 FINDINGS: Single frontal view of the chest obtained. Mildly enlarged cardiac silhouette. Slightly prominent perihilar interstitium. Mild bibasilar hazy streaky densities. Underpenetration limits evaluation. A consolidation. No evidence of pneumothorax. No acute osseous findings. IMPRESSION: Mildly enlarged cardiac silhouette with perhaps mild perihilar interstitial infiltrate or edema. Nonspecific basilar streaky densities. Radiographic follow-up recommended. Medical Decision Making   I am the first provider for this patient.     I reviewed the vital signs, available nursing notes, past medical history, past surgical history, family history and social history. Vital Signs-Reviewed the patient's vital signs. Pulse Oximetry Analysis -  97 on room air (Interpretation)    Cardiac Monitor:  Rate: 180  Rhythm:  Atrial Fibrillation w/rvr    EKG: Interpreted by the EP. Time Interpreted: 1402   Rate: 179   Rhythm: Atrial Fibrillation w/rvr   Interpretation: Afib w/rvr   Comparison: Afib is new    Records Reviewed: Nursing Notes, Old Medical Records, Previous electrocardiograms, Ambulance Run Sheet, Previous Radiology Studies and Previous Laboratory Studies (Time of Review: 2:01 PM)      Provider Notes (Medical Decision Making):   ASSESSMENT / PLAN:    Violette Ferrer is a 41 y.o. male with PMHx significant HTN, HFpEF (LVEF 58%, Grd 3 diastolic dysfunction), HTN, DM, CAD, ESRD (has fistula but not on HD yet), Hyperlipidemia who presents to the ED via EMS for ~2wks of worsening fatigue, cough, and SOB. Reports he had \"a cold\" last week with cough w/productive sputum mixed with some small amoutns of blood. Improved somewhat but then became generally fatiuged and dyspnic on exertion. Has chronic LE edema (on Lasix) that is unchanged. No fever, no chills, no abd pn/n/v/d, no recent sick contacts. No tob/etoh/drug use. No recent hospitalizastions, no CP or palpitations. EMS called, found pt with Afib w/RVR and hypertension ~200/70. O2 w/poor pleth so they placed NRB. On arrival here, BP ~130/70, monitor clearly Afib w/RVR ~150-180bpm, O2 97%ra, afebrile, RR ~16 nonlabored. HEENT benign. Lungs CTAB, no w/r/r, abd w/large surgical scar(surgery as baby for ?perforation), otherwise normal. LE with 3+ Pitting edema to knees. CV irregualrly iregular. Fistula graft in R.wrist w/good paplable thrill. Seems like new onset Afib with RVR. Mentating well. Concerned he has electrolyte distrubances with his ESRD, hyperkalemia. Could be pneumonia or pulm edeam as well alghouth lungs clear, no fever. No ACS symptoms.      -IV access  -EKG  -CBC, CMP, Mg  -CXR  -UA  -Calcium Gluconate (in case this is hyperkalemia)  -Dilt 5mg IV, then dilt ggt  -Will need admission      Annette Mejía MD  EM-IM Physician          ED Course: Progress Notes, Reevaluation, and Consults:  UPDATE 3:00 PM  -EKG w/Afib w/RVR  -K is normal  -CBC w/WBC 26  -CMP with BUN 99, Cre 14.50, K 5.6 (slight hemolysis), CO2 11    -CXR: cardiomegaly, mild interstitial edema vs infiltrate (very mild by my read)    -HR down to ~110 bpm with IV dilt ggt, mentating well, BP normal      -I believe all of pt's symtposm are related to his fluid overload and Afib with RVR. This is likely causing slight pulm edema and the acidosis (in combination to his progressive renal failure). I'm unsure exactly why his WBC is elevated but he has no fever, his productive cough from last week has essentially resolved. I do not believe this is sepsis at this time and am concerned that lots of IVF would be detrimental as CHF w/fluid overload/stretch leads to Afib and would be made worse with IVF. Pt is immune compromised due to his ESRD and DM, so will be safe and order BC x 2 and Lactate and start on Ceftriaxone and Azitrho to cover any possible CAP. -I spoke to Dr. James Baker (hospitalist) who will admit  -I spoke with DOROTHY Bishop from cardiology who will see the pt as well  -Dr James Baker is going to contact pt's Nerphologist.     Milo Al MD  EM-IM Physician         Critical Care Time:   Critical Care Time:  The services I provided to this patient were to treat and/or prevent clinically significant deterioration that could result in the failure of one or more body systems and/or organ systems due to Afib w/RVR to 190's, ESRD.     Services included the following:  -reviewing nursing notes and old charts  -vital sign assessments  -direct patient care  -medication orders and management  -interpreting and reviewing diagnostic studies/labs  -re-evaluations  -documentation time    Aggregate critical care time was 60 minutes, which includes only time during which I was engaged in work directly related to the patient's care as described above, whether I was at bedside or elsewhere in the Emergency Department. It did not include time spent performing other reported procedures or the services of residents, students, nurses, or advance practice providers. Fercho Winslow MD    4:21 PM          For Hospitalized Patients:  1. Hospitalization Decision Time:  The decision to hospitalize the patient was made by Dr. Corinna Shah at 1500 on 12/5/2018    2. Aspirin: Aspirin was not given because the patient did not present with a stroke at the time of their Emergency Department evaluation    Diagnosis     Clinical Impression:   1. Atrial fibrillation with rapid ventricular response ([facility] [address]    2. End stage renal disease ([facility] [address]        Disposition: Admit Stepdown     Follow-up Information    None             Medication List      ASK your doctor about these medications    amLODIPine 10 mg tablet  Commonly known as:  NORVASC  TAKE ONE TABLET BY MOUTH EVERY DAY     aspirin delayed-release 81 mg tablet     atorvastatin 40 mg tablet  Commonly known as:  LIPITOR  Take 1 Tab by mouth daily.      b complex-vitamin c-folic acid 1mg 1 mg tablet  Commonly known as:  NAHED-MOON RX  TAKE ONE TABLET BY MOUTH EVERY DAY     calcitRIOL 0.25 mcg capsule  Commonly known as:  ROCALTROL     carvedilol 25 mg tablet  Commonly known as:  COREG  TAKE TWO TABLETS BY MOUTH TWICE DAILY     furosemide 40 mg tablet  Commonly known as:  LASIX  TAKE 2 TABLETS BY MOUTH TWO TIMES A DAY     glimepiride 4 mg tablet  Commonly known as:  AMARYL  TAKE 1 TABLET BY MOUTH EVERY MORNING     glucose blood VI test strips strip  Commonly known as:  FREESTYLE LITE STRIPS  Use as directed     hydrALAZINE 100 mg tablet  Commonly known as:  APRESOLINE  TAKE 1 TABLET BY MOUTH THREE TIMES A DAY     metOLazone 5 mg tablet  Commonly known as:  ZAROXOLYN  TAKE ONE TABLET BY MOUTH EVERY FRIDAY 30 MINUTES PRIOR TO MORNING LASIX DOSE     PROCRIT INJECTION     sildenafil citrate 50 mg tablet  Commonly known as:  VIAGRA  Take 1 Tab by mouth as needed. [address] [id number], lot #[id number]  Exp 12/16, 1 box, # 2 tabs     SINGULAIR 10 mg tablet  Generic drug:  montelukast     sodium bicarbonate 325 mg tablet     tadalafil 20 mg tablet  Commonly known as:  CIALIS  Take 1 Tab by mouth as needed. VITAMIN D3 2,000 unit Tab  Generic drug:  cholecalciferol (vitamin D3)          _______________________________    Attestations:  Husam Fung MD acting as a scribe for and in the presence of Isabella Garcia MD      December 05, 2018 at 4:27 PM       Provider Attestation:      I personally performed the services described in the documentation, reviewed the documentation, as recorded by the scribe in my presence, and it accurately and completely records my words and actions.  December 05, 2018 at 4:27 PM - Isabella Garcia MD    _______________________________

## 2018-12-05 NOTE — PROGRESS NOTES
conducted an initial consultation and Spiritual Assessment for Jh Lowry, who is a 41 y.o.,male. Patients Primary Language is: [place]. According to the patients EMR Shinto Affiliation is: [place]. 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 His wife and another visitor were with him. Explored issues of ayde, spirituality and/or Orthodox needs while hospitalized. Listened empathically. Patient said he is looking forward to being discharged. Provided chaplaincy education. Provided information about Spiritual Care Services. Offered assurance of continued prayers on patient's behalf. Chart reviewed. The following outcomes were achieved:  Patient's wife said they are members of a [employer]. Patient processed feeling about current hospitalization. Patient and his wife expressed gratitude for the 's visit. Assessment:  Patient did not indicate any spiritual or Orthodox issues which require Spiritual Care Services interventions at this time. Patient does not have any Orthodox/cultural needs that will affect patients preferences in health care.     Plan:  Chaplains will continue to follow and will provide pastoral care on an as needed or requested basis.  recommends bedside caregivers page  on duty if patient shows signs of acute spiritual or emotional distress. Wil Eller MDiv.   Board Certified [employer] [phone]

## 2018-12-05 NOTE — ROUTINE PROCESS
TRANSFER - OUT REPORT:    Verbal report given to CHARLIE Mora on Suzi Agee  being transferred to CVT Stepdown-2307 for routine progression of care       Report consisted of patients Situation, Background, Assessment and   Recommendations(SBAR). Information from the following report(s) SBAR, ED Summary and MAR was reviewed with the receiving nurse. Lines:   Peripheral IV 12/05/18 Left Antecubital (Active)   Site Assessment Clean, dry, & intact 12/5/2018  2:24 PM   Phlebitis Assessment 0 12/5/2018  2:24 PM   Infiltration Assessment 0 12/5/2018  2:24 PM   Dressing Status Clean, dry, & intact 12/5/2018  2:24 PM   Dressing Type Tape;Transparent 12/5/2018  2:24 PM   Hub Color/Line Status Pink;Flushed;Patent 12/5/2018  2:24 PM   Action Taken Blood drawn 12/5/2018  2:24 PM        Opportunity for questions and clarification was provided.       Patient transported with:   Monitor  Registered Nurse

## 2018-12-06 ENCOUNTER — APPOINTMENT (OUTPATIENT)
Dept: ULTRASOUND IMAGING | Age: 48
DRG: 871 | End: 2018-12-06
Attending: INTERNAL MEDICINE
Payer: COMMERCIAL

## 2018-12-06 ENCOUNTER — APPOINTMENT (OUTPATIENT)
Dept: VASCULAR SURGERY | Age: 48
DRG: 871 | End: 2018-12-06
Attending: INTERNAL MEDICINE
Payer: COMMERCIAL

## 2018-12-06 LAB
AMORPH CRY URNS QL MICRO: ABNORMAL
ANION GAP SERPL CALC-SCNC: 16 MMOL/L (ref 3–18)
APPEARANCE UR: ABNORMAL
BACTERIA URNS QL MICRO: ABNORMAL /HPF
BASOPHILS # BLD: 0 K/UL (ref 0–0.06)
BASOPHILS NFR BLD: 0 % (ref 0–3)
BILIRUB UR QL: NEGATIVE
BUN SERPL-MCNC: 109 MG/DL (ref 7–18)
BUN/CREAT SERPL: 7 (ref 12–20)
CALCIUM SERPL-MCNC: 7.5 MG/DL (ref 8.5–10.1)
CHLORIDE SERPL-SCNC: 109 MMOL/L (ref 100–108)
CO2 SERPL-SCNC: 13 MMOL/L (ref 21–32)
COLOR UR: YELLOW
CREAT SERPL-MCNC: 15 MG/DL (ref 0.6–1.3)
DIFFERENTIAL METHOD BLD: ABNORMAL
EOSINOPHIL # BLD: 0 K/UL (ref 0–0.4)
EOSINOPHIL NFR BLD: 0 % (ref 0–5)
EPITH CASTS URNS QL MICRO: ABNORMAL /LPF (ref 0–5)
ERYTHROCYTE [DISTWIDTH] IN BLOOD BY AUTOMATED COUNT: 15.6 % (ref 11.6–14.5)
GLUCOSE BLD STRIP.AUTO-MCNC: 123 MG/DL (ref 70–110)
GLUCOSE BLD STRIP.AUTO-MCNC: 126 MG/DL (ref 70–110)
GLUCOSE BLD STRIP.AUTO-MCNC: 128 MG/DL (ref 70–110)
GLUCOSE SERPL-MCNC: 119 MG/DL (ref 74–99)
GLUCOSE UR STRIP.AUTO-MCNC: 100 MG/DL
GRAN CASTS URNS QL MICRO: ABNORMAL /LPF
HBV SURFACE AB SER QL IA: NEGATIVE
HBV SURFACE AB SERPL IA-ACNC: <3.1 MIU/ML
HBV SURFACE AG SER QL: <0.1 INDEX
HBV SURFACE AG SER QL: NEGATIVE
HCT VFR BLD AUTO: 27.9 % (ref 36–48)
HCV AB SER IA-ACNC: 0.08 INDEX
HCV AB SERPL QL IA: NEGATIVE
HCV COMMENT,HCGAC: NORMAL
HEP BS AB COMMENT,HBSAC: ABNORMAL
HGB BLD-MCNC: 9.5 G/DL (ref 13–16)
HGB UR QL STRIP: ABNORMAL
KETONES UR QL STRIP.AUTO: ABNORMAL MG/DL
LACTATE BLD-SCNC: 4.09 MMOL/L (ref 0.4–2)
LEUKOCYTE ESTERASE UR QL STRIP.AUTO: ABNORMAL
LYMPHOCYTES # BLD: 0.6 K/UL (ref 0.8–3.5)
LYMPHOCYTES NFR BLD: 3 % (ref 20–51)
MCH RBC QN AUTO: 27.6 PG (ref 24–34)
MCHC RBC AUTO-ENTMCNC: 34.1 G/DL (ref 31–37)
MCV RBC AUTO: 81.1 FL (ref 74–97)
MONOCYTES # BLD: 0.2 K/UL (ref 0–1)
MONOCYTES NFR BLD: 1 % (ref 2–9)
NEUTS BAND NFR BLD MANUAL: 4 % (ref 0–5)
NEUTS SEG # BLD: 20.7 K/UL (ref 1.8–8)
NEUTS SEG NFR BLD: 92 % (ref 42–75)
NITRITE UR QL STRIP.AUTO: NEGATIVE
PH UR STRIP: 5 [PH] (ref 5–8)
PLATELET # BLD AUTO: 246 K/UL (ref 135–420)
PLATELET COMMENTS,PCOM: ABNORMAL
PMV BLD AUTO: 11 FL (ref 9.2–11.8)
POTASSIUM SERPL-SCNC: 4.9 MMOL/L (ref 3.5–5.5)
PROT UR STRIP-MCNC: 300 MG/DL
RBC # BLD AUTO: 3.44 M/UL (ref 4.7–5.5)
RBC #/AREA URNS HPF: ABNORMAL /HPF (ref 0–5)
RBC MORPH BLD: ABNORMAL
RIGHT ARTERIAL PROX ANASTOMOSIS AVF EDV: 121 CM/S
RIGHT ARTERIAL PROX ANASTOMOSIS AVF PSV: 223 CM/S
RIGHT AVF AVG DIAMETER 1: 0.59 CM
RIGHT AVF AVG DIAMETER 2: 0.78 CM
RIGHT AVF AVG DIAMETER 3: 0.78 CM
RIGHT AVF AVG DIST OUTFLOW VOL FLOW: 600 ML/MIN
RIGHT AVF AVG GRAFT NAME: NORMAL
RIGHT AVF AVG MID OUTFLOW VOL FLOW: 764 ML/MIN
RIGHT AVF AVG OUTFLOW VESSEL NAME: NORMAL
RIGHT AVF AVG PROX ANAST VOL FLOW: 1077 ML/MIN
RIGHT AVF AVG PROX OUTFLOW VOL FLOW: 1424 ML/MIN
RIGHT AVG AVF DEPTH 1: 0.21 CM
RIGHT AVG AVF DEPTH 2: 0.31 CM
RIGHT AVG AVF DEPTH 3: 0.42 CM
RIGHT DIST OUTFLOW AVF EDV: 56 CM/S
RIGHT DIST OUTFLOW AVF PSV: 100 CM/S
RIGHT INFLOW ARTERY AVF EDV: 124 CM/S
RIGHT INFLOW ARTERY AVF PSV: 299 CM/S
RIGHT MID OUTFLOW AVF EDV: 63 CM/S
RIGHT MID OUTFLOW AVF PSV: 118 CM/S
RIGHT OUTFLOW VESSEL AVF EDV: 15 CM/S
RIGHT OUTFLOW VESSEL AVF PSV: 24 CM/S
RIGHT PROX OUTFLOW AVF EDV: 152 CM/S
RIGHT PROX OUTFLOW AVF PSV: 274 CM/S
SODIUM SERPL-SCNC: 138 MMOL/L (ref 136–145)
SP GR UR REFRACTOMETRY: 1.02 (ref 1–1.03)
TSH SERPL DL<=0.05 MIU/L-ACNC: 2.43 UIU/ML (ref 0.36–3.74)
UROBILINOGEN UR QL STRIP.AUTO: 0.2 EU/DL (ref 0.2–1)
WBC # BLD AUTO: 21.5 K/UL (ref 4.6–13.2)
WBC URNS QL MICRO: ABNORMAL /HPF (ref 0–5)

## 2018-12-06 PROCEDURE — 81001 URINALYSIS AUTO W/SCOPE: CPT

## 2018-12-06 PROCEDURE — 82962 GLUCOSE BLOOD TEST: CPT

## 2018-12-06 PROCEDURE — 74011250637 HC RX REV CODE- 250/637: Performed by: INTERNAL MEDICINE

## 2018-12-06 PROCEDURE — 74011000250 HC RX REV CODE- 250: Performed by: HOSPITALIST

## 2018-12-06 PROCEDURE — 74011000258 HC RX REV CODE- 258: Performed by: INTERNAL MEDICINE

## 2018-12-06 PROCEDURE — 74011000250 HC RX REV CODE- 250: Performed by: INTERNAL MEDICINE

## 2018-12-06 PROCEDURE — 87077 CULTURE AEROBIC IDENTIFY: CPT

## 2018-12-06 PROCEDURE — 74011250636 HC RX REV CODE- 250/636: Performed by: INTERNAL MEDICINE

## 2018-12-06 PROCEDURE — 93990 DOPPLER FLOW TESTING: CPT

## 2018-12-06 PROCEDURE — 74011000258 HC RX REV CODE- 258: Performed by: PHYSICIAN ASSISTANT

## 2018-12-06 PROCEDURE — 85025 COMPLETE CBC W/AUTO DIFF WBC: CPT

## 2018-12-06 PROCEDURE — 87086 URINE CULTURE/COLONY COUNT: CPT

## 2018-12-06 PROCEDURE — 74011000250 HC RX REV CODE- 250: Performed by: PHYSICIAN ASSISTANT

## 2018-12-06 PROCEDURE — 93005 ELECTROCARDIOGRAM TRACING: CPT

## 2018-12-06 PROCEDURE — 36415 COLL VENOUS BLD VENIPUNCTURE: CPT

## 2018-12-06 PROCEDURE — 65660000004 HC RM CVT STEPDOWN

## 2018-12-06 PROCEDURE — 87186 SC STD MICRODIL/AGAR DIL: CPT

## 2018-12-06 PROCEDURE — 74011250637 HC RX REV CODE- 250/637: Performed by: HOSPITALIST

## 2018-12-06 PROCEDURE — 87070 CULTURE OTHR SPECIMN AEROBIC: CPT

## 2018-12-06 PROCEDURE — 80048 BASIC METABOLIC PNL TOTAL CA: CPT

## 2018-12-06 PROCEDURE — 5A1D70Z PERFORMANCE OF URINARY FILTRATION, INTERMITTENT, LESS THAN 6 HOURS PER DAY: ICD-10-PCS | Performed by: INTERNAL MEDICINE

## 2018-12-06 PROCEDURE — 74011000258 HC RX REV CODE- 258: Performed by: HOSPITALIST

## 2018-12-06 PROCEDURE — 90935 HEMODIALYSIS ONE EVALUATION: CPT

## 2018-12-06 PROCEDURE — 84443 ASSAY THYROID STIM HORMONE: CPT

## 2018-12-06 PROCEDURE — 76705 ECHO EXAM OF ABDOMEN: CPT

## 2018-12-06 PROCEDURE — 74011250637 HC RX REV CODE- 250/637: Performed by: PHYSICIAN ASSISTANT

## 2018-12-06 RX ORDER — CALCIUM ACETATE 667 MG/1
2 CAPSULE ORAL
Status: DISCONTINUED | OUTPATIENT
Start: 2018-12-06 | End: 2018-12-08

## 2018-12-06 RX ORDER — CARVEDILOL 6.25 MG/1
6.25 TABLET ORAL 2 TIMES DAILY WITH MEALS
Status: DISCONTINUED | OUTPATIENT
Start: 2018-12-06 | End: 2018-12-07

## 2018-12-06 RX ORDER — DOXERCALCIFEROL 4 UG/2ML
1 INJECTION INTRAVENOUS
Status: DISCONTINUED | OUTPATIENT
Start: 2018-12-06 | End: 2018-12-14 | Stop reason: HOSPADM

## 2018-12-06 RX ORDER — METOPROLOL TARTRATE 5 MG/5ML
5 INJECTION INTRAVENOUS EVERY 4 HOURS
Status: DISCONTINUED | OUTPATIENT
Start: 2018-12-06 | End: 2018-12-11

## 2018-12-06 RX ADMIN — ACETAMINOPHEN 650 MG: 325 TABLET ORAL at 04:33

## 2018-12-06 RX ADMIN — SODIUM BICARBONATE 650 MG TABLET 650 MG: at 21:56

## 2018-12-06 RX ADMIN — AZITHROMYCIN MONOHYDRATE 500 MG: 500 INJECTION, POWDER, LYOPHILIZED, FOR SOLUTION INTRAVENOUS at 15:44

## 2018-12-06 RX ADMIN — CARVEDILOL 6.25 MG: 6.25 TABLET, FILM COATED ORAL at 10:15

## 2018-12-06 RX ADMIN — ATORVASTATIN CALCIUM 40 MG: 40 TABLET, FILM COATED ORAL at 08:02

## 2018-12-06 RX ADMIN — IRON SUCROSE 100 MG: 20 INJECTION, SOLUTION INTRAVENOUS at 13:05

## 2018-12-06 RX ADMIN — CALCIUM ACETATE 1334 MG: 667 CAPSULE ORAL at 16:32

## 2018-12-06 RX ADMIN — SODIUM BICARBONATE 650 MG TABLET 650 MG: at 08:02

## 2018-12-06 RX ADMIN — DOXERCALCIFEROL 1 MCG: 4 INJECTION, SOLUTION INTRAVENOUS at 13:04

## 2018-12-06 RX ADMIN — CALCIUM CARBONATE (ANTACID) CHEW TAB 500 MG 400 MG: 500 CHEW TAB at 04:12

## 2018-12-06 RX ADMIN — SODIUM BICARBONATE 650 MG TABLET 650 MG: at 15:41

## 2018-12-06 RX ADMIN — ERYTHROPOIETIN 5000 UNITS: 3000 INJECTION, SOLUTION INTRAVENOUS; SUBCUTANEOUS at 13:04

## 2018-12-06 RX ADMIN — MONTELUKAST SODIUM 10 MG: 10 TABLET, FILM COATED ORAL at 08:02

## 2018-12-06 RX ADMIN — METOPROLOL TARTRATE 5 MG: 5 INJECTION, SOLUTION INTRAVENOUS at 20:31

## 2018-12-06 RX ADMIN — HEPARIN SODIUM 5000 UNITS: 5000 INJECTION INTRAVENOUS; SUBCUTANEOUS at 00:25

## 2018-12-06 RX ADMIN — DILTIAZEM HYDROCHLORIDE 15 MG/HR: 5 INJECTION INTRAVENOUS at 21:57

## 2018-12-06 RX ADMIN — CARVEDILOL 6.25 MG: 6.25 TABLET, FILM COATED ORAL at 16:33

## 2018-12-06 RX ADMIN — ASPIRIN 81 MG: 81 TABLET, COATED ORAL at 08:02

## 2018-12-06 RX ADMIN — WATER 2 G: 1 INJECTION INTRAMUSCULAR; INTRAVENOUS; SUBCUTANEOUS at 15:37

## 2018-12-06 RX ADMIN — HEPARIN SODIUM 5000 UNITS: 5000 INJECTION INTRAVENOUS; SUBCUTANEOUS at 08:04

## 2018-12-06 RX ADMIN — DILTIAZEM HYDROCHLORIDE 15 MG/HR: 5 INJECTION INTRAVENOUS at 02:24

## 2018-12-06 RX ADMIN — METOPROLOL TARTRATE 5 MG: 5 INJECTION, SOLUTION INTRAVENOUS at 16:32

## 2018-12-06 RX ADMIN — ACETAMINOPHEN 650 MG: 325 TABLET ORAL at 20:42

## 2018-12-06 RX ADMIN — NEPHROCAP 1 CAPSULE: 1 CAP ORAL at 08:02

## 2018-12-06 NOTE — PROGRESS NOTES
Received SBAR report from Kole Wells RN. Pt Aox4, SR on monitor/Cardizem held, Room Air, Lt.arm AVF.    1902-Pt arrived on floor, Vitals taken, pt provided bed pad. .    1905-Bedside and Verbal shift change report given to [employer] (oncoming nurse) by [place] RN (offgoing nurse).  Report included the following information SBAR, Kardex, [facility] and Cardiac Rhythm SR.

## 2018-12-06 NOTE — ROUTINE PROCESS
Patient on dilt gtt @ 15, also received 5 IV metoprolol, HR still afib rvr, rate variable 140's- 170, paged cardiology team, notified hospitalist. Patient was in NSR, but converted to Afib in dialysis.

## 2018-12-06 NOTE — DIALYSIS
```````````````          ACUTE HEMODIALYSIS FLOW SHEET    HEMODIALYSIS ORDERS: Physician: ze      Dialyzer: revaclear         Duration: 2.5 hr  BFR: 300   DFR: 600   Dialysate:  Temp *37 K+   2    Ca+  2.5 Na 138 Bicarb 35   Weight:  100.3 kg    Bed Scale [x]     Unable to Obtain []      Dry weight/UF Goal: 1000 Access AVF  Needle Gauge 17    Heparin []  Bolus      Units    [] Hourly       Units    [x]None     Catheter locking solution    Pre BP:   156/83    Pulse:     93     Temperature:   97.8  Respirations: 19  Tx: NS       ml/Bolus  Other        [x] N/A   Labs: Pre        Post:        [x] N/A   Additional Orders(medications, blood products, hypotension management):       [x] N/A     [x] Time Out/Safety Check  [x] [facility] Consent Verified     CATHETER ACCESS: [x]N/A   []Right   []Left   []IJ     []Fem   [] First use X-ray verified     []Tunnel                [] Non Tunneled   []No S/S infection  []Redness  []Drainage []Cultured []Swelling []Pain   []Medical Aseptic Prep Utilized   []Dressing Changed  [] Biopatch  Date:       []Clotted   []Patent   Flows: []Good  []Poor  []Reversed   If access problem,  notified: []Yes    []N/A  Date:           GRAFT/FISTULA ACCESS:  []N/A     [x]Right     []Left     [x]UE     []LE   []AVG   [x]AVF        []Buttonhole    [x]Medical Aseptic Prep Utilized   [x]No S/S infection  []Redness  []Drainage []Cultured []Swelling []Pain    Bruit:   [x] Strong    [] Weak       Thrill :   [x] Strong    [] Weak       Needle Gauge: 17   Length: 1   If access problem,  notified: []Yes     [x]N/A  Date:        Please describe access if present and not used:       GENERAL ASSESSMENT:    LUNGS:  Rate  SaO2%    97    [x] N/A    [] Clear  [x] Coarse  [] Crackles  [] Wheezing        [] Diminished     Location : []RLL   []LLL    []RUL  []ROBERT   Cough: [x]Productive  []Dry  []N/A   Respirations:  [x]Easy  []Labored   Therapy:  [x]RA  []NC  l/min    Mask: []NRB []Venti       O2% []Ventilator  []Intubated  [] Trach  [] BiPaP   CARDIAC: [x]Regular      [] Irregular   [] Pericardial Rub  [] JVD        []  Monitored  [] Bedside  [] Remotely monitored [] N/A  Rhythm:    EDEMA: [] None  []Generalized  [x] Pitting [] 1    [x] 2    [] 3    [] 4                 [] Facial  [] Pedal  []  UE  [x] LE   SKIN:   [x] Warm  [] Hot     [] Cold   [x] Dry     [] Pale   [] Diaphoretic                  [] Flushed  [] Jaundiced  [] Cyanotic  [] Rash  [] Weeping   LOC:    [x] Alert      [x]Oriented:    [x] Person     [x] Place  [x]Time               [] Confused  [] Lethargic  [] Medicated  [] Non-responsive     GI / ABDOMEN:  [] Flat    [] Distended    [x] Soft    [] Firm   []  Obese                             [] Diarrhea  [x] Bowel Sounds  [] Nausea  [] Vomiting       / URINE ASSESSMENT:[] Voiding   [x] Oliguria  [] Anuria   []  [place]     [] Incontinent    []  Incontinent Brief      []  Bathroom Privileges     PAIN: [x] 0 []1  []2   []3   []4   []5   []6   []7   []8   []9   []10            Scale 0-10  Action/Follow Up:    MOBILITY:  [] Amb    [] Amb/Assist    [x] Bed    [] Wheelchair  [] Stretcher      All Vitals and Treatment Details on Attached [address]: [facility]          Room # 3464     [] 1st Time Acute  [] Stat  [x] Routine  [] Urgent     [] Acute Room  [x]  Bedside  [] ICU/CCU  [] ER   Isolation Precautions:  [x] Dialysis   [] Airborne   [] Contact    [] Reverse   Special Considerations:         [] Blood Consent Verified [x]N/A     ALLERGIES:   [x] NKA          Code Status:  [x] Full Code  [] DNR  [] Other           HBsAg ONLY: Date Drawn 12/05/18         [x]Negative []Positive []Unknown   HBsAb: Date 12/05/18    [x] Susceptible   [] [id number] []Not Drawn  [] Drawn     Current Labs:    Date of Labs: Today [x]        Cut and paste current labs here.                                                                                                                                   DIET: [x] Renal    [] Other     [] NPO     []  Diabetic      PRIMARY NURSE REPORT: First initial/Last name/Title      Pre Dialysis: Kelly Herrera RN    Time: 2489      EDUCATION:    [x] Patient [] Other         Knowledge Basis: []None [x]Minimal [] Substantial   Barriers to learning  [x]N/A   [] Access Care     [] S&S of infection     [] Fluid Management     []K+     [x]Procedural    []Albumin     [] Medications     [] Tx Options     [] Transplant     [] Diet     [] Other   Teaching Tools:  [x] Explain  [] Demo  [] Handouts [] Video  Patient response:   [x] Verbalized understanding  [] Teach back  [] Return demonstration [] Requires follow up   Inappropriate due to            [address] - Before each treatment:     Machine Number:                   [facility]                                   [] Unit Machine #  with centralized RO                                  [] Portable Machine #1/RO serial # [id number]                                  [] Portable Machine #2/RO serial # [id number]                                  [x] Portable Machine #4/RO serial # [id number]                                                                                                       [facility]                                  [] Portable Machine #11/RO serial # [id number]                                   [] Portable Machine #12/RO serial # [id number]                                  [] Portable Machine #13/RO serial #  [id number]      Alarm Test:  Pass time 1030         Other:         [x] RO/Machine Log Complete      Temp    37            [x]Extracorporeal Circuit Tested for integrity   Dialysate: pH  7.4 Conductivity: Meter   14     HD Machine   14                  TCD: 14  Dialyzer Lot # [id number]        Blood Tubing Lot # [id number]     Saline Lot #       CHLORINE TESTING-Before each treatment and every 4 hours    Total Chlorine: [x] less than 0.1 ppm  Time: 1030 4 Hr/2nd Check Time:    (if greater than 0.1 ppm from Primary then every 30 minutes from Secondary)     TREATMENT INITIATION - with Dialysis Precautions:   [x] All Connections Secured                 [x] Saline Line Double Clamped   [x] Venous Parameters Set                  [x] Arterial Parameters Set    [x] Prime Given  250 ml               [x]Air Foam Detector Engaged      Treatment Initiation Note: pt in stable condition upon RN arrival. Procedure explained/ all questions addressed. AVF accessed and treatment initiated without difficulty. Dr Shalom Linn at bedside     Medication Dose Volume Route Initials Dialyzer Cleared: [] Good [x] Fair  [] Poor    Blood processed:  36.4 L  UF Removed  1000 Ml    Post Wt: 99.3    kg  POst BP:   133/88       Pulse: 125      Respirations: 24  Temperature: 98.1         venofer  100mg      100ml                  Post Tx Vascular Access: AVF/AVG: Bleeding stopped Art 5 min. Zay. 5 Min             epogen      5000u 2ml                  Catheter: Locking solution: Heparin 1:1000 Art. Zay. N/A        hectorol      1mcg 0.5ml                  Post Assessment:                                    Skin:  [x] Warm  [x] Dry [] Diaphoretic    [] Flushed  [] Pale [] Cyanotic   [facility] Signatures Title Initials  Time  1286 Lungs: [x] Clear    [x] Course  [] Crackles  [] Wheezing [] Diminished   Carter Damon RN    Cardiac: [] Regular   [x] Irregular   [] Monitor  [] N/A  Rhythm:           Edema:  [] None    [] General     [] Facial   [x] Pedal    [] UE    [x] LE       Pain: [x]0  []1  []2   []3  []4   []5   []6   []7   []8   []9   []10         Post Treatment Note: HD well tolerated. 1L UF removed. No acute distress noted during or post HD treatment.      POST TREATMENT PRIMARY NURSE HANDOFF REPORT:     First initial/Last name/Title         Post Dialysis: Josemanuel Parekh RN Time:  1400     Abbreviations: AVG-arterial venous graft, AVF-arterial venous fistula, IJ-Internal Jugular, Subcl-Subclavian, Fem-Femoral, Tx-treatment, AP/HR-apical heart rate, DFR-dialysate flow rate, BFR-blood flow rate, AP-arterial pressure, -venous pressure, UF-ultrafiltrate, TMP-transmembrane pressure, Zay-Venous, Art-Arterial, RO-Reverse Osmosis

## 2018-12-06 NOTE — PROGRESS NOTES
Hemodialysis Rounding Note      Patient: Dinora Cullen               Sex: male          DOA: 12/5/2018  1:54 PM        YOB: 1970      Age:  41 y.o.        LOS:  LOS: 1 day     Subjective:     Dinora Cullen is a 41 y.o.  who presents with Atrial fibrillation with rapid ventricular response ([address] The patient is dialyzing utilizing the following method:Intermittent Hemodialysis        Complaints still coughing, no CP.      Current Facility-Administered Medications   Medication Dose Route Frequency    carvedilol (COREG) tablet 6.25 mg  6.25 mg Oral BID WITH MEALS    cefTRIAXone (ROCEPHIN) 2 g in sterile water (preservative free) 20 mL IV syringe  2 g IntraVENous Q24H    iron sucrose (VENOFER) 100 mg in 0.9% sodium chloride 100 mL IVPB  100 mg IntraVENous DIALYSIS TUE, THU & SAT    doxercalciferol (HECTOROL) 4 mcg/2 mL injection 1 mcg  1 mcg IntraVENous DIALYSIS TUE, THU & SAT    epoetin gilda (EPOGEN;PROCRIT) 5,000 Units  5,000 Units IntraVENous DIALYSIS TUE, THU & SAT    calcium acetate (PHOSLO) capsule 1,334 mg  2 Cap Oral TID WITH MEALS    dilTIAZem (CARDIZEM) 100 mg in 0.9% sodium chloride 100 mL infusion  5 mg/hr IntraVENous CONTINUOUS    aspirin delayed-release tablet 81 mg  81 mg Oral DAILY    atorvastatin (LIPITOR) tablet 40 mg  40 mg Oral DAILY    B complex-vitaminC-folic acid (NEPHROCAP) cap  1 Cap Oral DAILY    montelukast (SINGULAIR) tablet 10 mg  10 mg Oral DAILY    acetaminophen (TYLENOL) tablet 650 mg  650 mg Oral Q4H PRN    ondansetron (ZOFRAN) injection 4 mg  4 mg IntraVENous Q4H PRN    heparin (porcine) injection 5,000 Units  5,000 Units SubCUTAneous Q8H    azithromycin (ZITHROMAX) 500 mg in 0.9% sodium chloride (MBP/ADV) 250 mL adv  500 mg IntraVENous Q24H    sodium bicarbonate tablet 650 mg  650 mg Oral TID    0.9% sodium chloride infusion  100 mL/hr IntraVENous DIALYSIS PRN    insulin lispro (HUMALOG) injection   SubCUTAneous AC&HS    glucose chewable tablet 16 g  16 g Oral PRN    glucagon (GLUCAGEN) injection 1 mg  1 mg IntraMUSCular PRN    dextrose (D50W) injection syrg 12.5-25 g  25-50 mL IntraVENous PRN    influenza vaccine - (6 mos+)(PF) (FLUARIX QUAD/FLULAVAL QUAD) injection 0.5 mL  0.5 mL IntraMUSCular PRIOR TO DISCHARGE       701 - 1900  In: 272.6 [P.O.:100; I.V.:172.6]  Out: -   1901 -  07  In: 22.8 [I.V.:22.8]  Out: 0       Objective:     Blood pressure 156/83, pulse 93, temperature 97.8 °F (36.6 °C), resp. rate 19, height 5' 10\" (1.778 m), weight 100.3 kg (221 lb 3.2 oz), SpO2 97 %. Temp (24hrs), Av.2 °F (36.8 °C), Min:97.8 °F (36.6 °C), Max:98.6 °F (37 °C)      Blood Pressure: BP: 156/83  Pulse: Pulse (Heart Rate): 93  Temp:  Temp: 97.8 °F (36.6 °C)    Artificial Kidney     hours     Heparin Bolus    Blood flow rate     Dialysate rate     Arterial Access Pressure    Venous Return Pressure    Ultrafiltration Rate    Fluid Removal    Net Fluid Removal        PHYSICAL EXAM:   HEENT:  Non icteric, pale conj  NECK:  No JVD  CHEST AND LUNGS: rhonchi both lung bases  CVS:  Regular, no rub  ABDOMEN:  + BS  EXT:  + 2-3 LE edema, right arm avf    DATA REVIEW:    Labs: Results:       Chemistry Recent Labs     18  0518  1450   * 147*    136   K 4.9 5.6*   * 109*   CO2 13* 11*   * 99*   CREA 15.00* 14.50*   CA 7.5* 7.2*  7.2*   AGAP 16 16   BUCR 7* 7*   AP  --  97   TP  --  6.4   ALB  --  2.2*   GLOB  --  4.2*   AGRAT  --  0.5*   phos 7.8   CBC w/Diff Recent Labs     18  0518  1421   WBC 21.5* 26.0*   RBC 3.44* 3.66*   HGB 9.5* 10.2*   HCT 27.9* 30.0*    237   GRANS 92* 91*   LYMPH 3* 1*   EOS 0 0      Coagulation No results for input(s): PTP, INR, APTT in the last 72 hours. No lab exists for component: INREXT    Iron/Ferritin Recent Labs     18  1450   IRON 20*      BNP No results for input(s): BNPP in the last 72 hours.    Cardiac Enzymes No results for input(s): CPK, CKND1, SAMARA in the last 72 hours. No lab exists for component: CKRMB, TROIP   Liver Enzymes Recent Labs     12/05/18  1450   TP 6.4   ALB 2.2*   AP 97   SGOT 31      Thyroid Studies Lab Results   Component Value Date/Time    TSH 2.43 12/06/2018 05:19 AM          Images:  XR Results (maximum last 3): Results from [place] encounter on 12/05/18   XR CHEST PORT    Narrative EXAMINATION: Chest single view    INDICATION: Shortness of breath    COMPARISON: 11/2/2012    FINDINGS: Single frontal view of the chest obtained. Mildly enlarged cardiac  silhouette. Slightly prominent perihilar interstitium. Mild bibasilar hazy  streaky densities. Underpenetration limits evaluation. A consolidation. No  evidence of pneumothorax. No acute osseous findings. Impression IMPRESSION:    Mildly enlarged cardiac silhouette with perhaps mild perihilar interstitial  infiltrate or edema. Nonspecific basilar streaky densities. Radiographic  follow-up recommended. Results from [place] encounter on 11/02/12   XR CHEST PORT    Narrative Exam: AP view of the chest    Comparison is made to previous study October 24, 2012 and February 29, 2012    Findings: AP radiograph of the chest. The lungs are clear. The heart and  mediastinum are midline. There is no pneumothorax or consolidation. The bones  and soft tissue structures are unchanged. Impression Impression: No acute chest disease   Results from Hospital Encounter encounter on 10/24/12   XR CHEST PORT    Narrative CHEST PORTABLE    CPT CODE: 20159    HISTORY: Weakness. COMPARISON:2/29/2012, 2/28/2012, 1/25/2012 and prior studies. FINDINGS:    AP portable upright view of the chest was obtained at 1523 hrs. . The lung  fields are clear. Cardiac silhouette size is upper normal.. Mediastinal  contours are  unremarkable. There is no pneumothorax. There is no pulmonary  vascular congestion. No pleural effusion. Impression IMPRESSION:    No acute diagnostic abnormality. CT Results (maximum last 3): Results from [place] encounter on 11/25/15   CT SINUSES WO CONT    Narrative CT Sinuses Without Contrast    HISTORY: Chronic sinusitis. History sinus surgery to remove polyps. Preop for  another sinus surgery. COMPARISON: CT sinuses 1/9/15. TECHNIQUE: 2.5 mm axial scans through the paranasal sinuses followed by coronal  and sagittal reformations for better evaluation of osteomeatal units and to  minimize radiation dose. FINDINGS:     Compared to previous there is improved aeration and diminishing mucosal sinus  opacification of the left frontal and anterior ethmoid sinuses. All other  paranasal sinuses remain completely opacified. Bilateral maxillary antra are  completely opacified and the previous antrostomy windows are occluded. The  sphenoid sinuses are totally opacified and the sphenoidotomy windows are  occluded. The right ethmoid air cells are totally opacified. The left posterior  ethmoid air cells are opacified although the anterior air cells are now aerated. The right frontal sinus is totally opacified and the frontal recess is occluded. The right nasal cavity is nearly completely opacified and there is subsequent  occlusion of the superior and middle thigh. There is aeration of the left nasal  cavity although there is soft tissue within the left maxillary antral window  which protrudes and partially occludes the nasal cavity. The nasal septum is  mildly curved to the left. No sinus expansion. There is hyperostosis of the  maxillary and sphenoid sinus walls. The opacifying material is of mixed hypo-and  hyperdense attenuations. Mastoid air cells and middle ear cavities are clear. No acute fracture. No  evidence of bone destruction. Hyperdense material in the vitreous chamber of the right globe, stable. Presumably previous ocular injections. The left lens has been extracted.  No  gross orbital mass. Visualized brain is unremarkable. Impression IMPRESSION:    Pansinus opacification with improved aeration of the left frontal and anterior  ethmoid air cells when compared to 1/9/15. The remaining paranasal sinuses  remain completely opacified with hyperostosis suggesting chronic sinusitis. Evidence of prior maxillary antrostomies and sphenoidotomies however the  surgical windows are completely occluded. Differential considerations include  sinonasal polyposis and allergic fungal sinusitis. Results from [place] encounter on 01/09/15   CT SINUSES WO CONT    Narrative CT SINUSES    CPT code: 56513    History: Nasal polyps. Findings: Helical axial images of the paranasal sinuses are obtained and  reviewed with both bone and soft tissue windows. Additional coronal and  sagittal reconstructions were obtained to better delineate the orbital floor,  bony septa and the ostiomeatal complexes, which are not well demonstrated in the  axial imaging plane, as well as reduce radiation dose. There is virtually complete opacification of all paranasal sinuses and there is  significant polypoid soft tissue masses in the nasal cavities particularly  posterosuperior left in the region of the choanae, left greater than right, but  also anteriorly under the middle turbinates. There appears to have been prior  nasoantral window creation, bilaterally. The ostiomeatal complexes have  probably been resected. The bony outlines are otherwise intact but are mildly  thickened and sclerotic. The nasal septum is not significantly deviated. Impression IMPRESSION[de-identified]     Diffuse paranasal sinus disease an additional bilateral nasal polyps, as  described. Probable prior nasoantral window creation. There is some thickening  and sclerosis of the walls of the maxillary sinuses.      Results from [place] encounter on 07/20/12   CT BX GUIDED NDL    Narrative Ordering MD: Bina Kauffman Ivanan Mehta DO  Signed By: Tate Ward MD  ** FINAL **  ---------------------------------------------------------------------  Procedure Date:  07/20/2012   Accession Number:  [id number]           Order No:   [id number]         Procedure:   CTV - BIOPSY CT GUIDANCE*        CPT Code:   39861      Admit Diag:   ANEMIA IN CKD              Reason:   BONE MARROW  INTERPRETATION:  PREOPERATIVE DIAGNOSIS: Anemia. POSTOPERATIVE DIAGNOSIS: Same  ATTENDING: MARYLOU Jimenez Bare: None. PROCEDURES: CT-guided bone marrow biopsy. ANESTHESIA: 1% local lidocaine as well as intravenous moderate   sedation given and monitored per independently trained   interventional radiology nurse under my direct supervision. CONTRAST: None. COMPLICATIONS: None  DRAIN: No  CATHETER: None. EBL: Minimal.  SPECIMEN: Single core biopsy and 2 aspirates were obtained from the   right iliac crest bone marrow. Given to hematology on site. TECHNIQUE: After detailed explanation of risks and benefits of the   procedure verbal and written consent were obtained. Patient was   brought to the CT room and placed prone on the table. Timeout was   performed.  view was obtained. Target lesion was identified and   skin was marked overlying right iliac crest  1% lidocaine solution   was instilled in the skin superficial and deep subcutaneous soft   tissues overlying the biopsy region. Under direct CT fluoroscopy guidance using 11-gauge OnControl biopsy   needle was advanced down through right iliac crest periosteum. 2   aspirates and single core were obtained via single pass. Hemostasis was achieved with manual compression. Sterile dressing   was applied. There were no immediate complications. Patient   tolerated procedure fairly well. I was present and performed the   procedure. FINDINGS: CT fluoroscopic guidance demonstrated good position of the   biopsy needle. IMPRESSION:  Successful, uncomplicated CT-guided bone marrow biopsy. CULTURE:   )  Recent Labs     12/05/18  1613 12/05/18  1555   CULT CULTURE IN PROGRESS,FURTHER UPDATES TO FOLLOW CULTURE IN PROGRESS,FURTHER UPDATES TO FOLLOW     Recent Labs     12/05/18  1613 12/05/18  1555   CULT CULTURE IN PROGRESS,FURTHER UPDATES TO FOLLOW CULTURE IN PROGRESS,FURTHER UPDATES TO FOLLOW     Fe 20 sat 10%, leonel 295  A1C 6.3  IPTH 723  Hep Bs ab neg Hep Bs Ag neg   Hep C Ab neg  Hep B core Ab pend  AVF duplex no obstruction or fluid collection    Assessment/Plan:     End Stage Renal Disease:  1st HD today. Patient is tolerating dialysis treatment well. .  Additionally the patient has experienced normal dialysis treatment during dialysis. Dry weight   same. sched HD again in am    At 11:55 AM on 12/6/2018, I saw and examined patient during hemodialysis treatment. The patient was receiving hemodialysis for treatment of end stage renal disease. I have also reviewed vital signs, intake and output, lab results and recent events, and agreed with today's dialysis order.       Anemia:  Start IV venofer and Epo    Renal Metabolic Bone Disease:  Start phos binders and Hectorol                      Hypertension: controlled    Access: Fistula adequate monitoring/no changes     Bacteremia defer to Dr Jesus Holt MD  12/6/2018

## 2018-12-06 NOTE — PROGRESS NOTES
[facility] Hospitalist [facility]  Progress Note    Patient: Laura Berg Age: 41 y.o. : 1970 MR#: [id number] SSN: [id number]  Date/Time: 2018     Subjective: pt feels fine, denies any CP or SOB. C/o browish black sputum, pt thinks its blood  C/o chronic diarrhea       Assessment/Plan:   1. Atrial fibrillation with RVR, new onset: rate in 130's now, will increase Cardizem drip to 10, cardio in put noted. Anticoagulation per cardiology team.    2. Suspected pneumonia: cont Abx  3. GPC bacteremia and sepsis: possible from lung: cont Abx and repeat Cx per ID. USG per ID  4. ESRD s/p AVF placement: started on dialysis today, had 2 hrs HD today,   5. Hyperkalemia, mild: better   6. HTN, controlled: cont BB  7. Chronic diastolic CHF: compensated    8. DM-2, controlled: cont SSI  9. CAD s/p stent: cont asa, BB and statin   10. H/o pulmonary HTN  11. ? Hemoptysis: will monitor, sputum Cx and asked RN to get visual on sputum. Will d/c heparin Sq and start SCD's  D/w pt in detail   D/w ID Dr. Tobias Guy and Dr. Nicholas Dupont, renal        I spent 40 minutes with the patient in face-to-face consultation, of which greater than 50% was spent in counseling and coordination of care as described above.     Case discussed with:  [x]Patient  []Family  [x]Nursing  []Case Management  DVT Prophylaxis:  []Lovenox  []Hep SQ  []SCDs  []Coumadin   []On Heparin gtt    Objective:   VS:   Visit Vitals  /74   Pulse (!) 125   Temp 97.8 °F (36.6 °C)   Resp 23   Ht 5' 10\" (1.778 m)   Wt 100.3 kg (221 lb 3.2 oz) Comment: did not stand pt up HR now 150s was 176 HR    SpO2 96%   BMI 31.74 kg/m²      Tmax/24hrs: Temp (24hrs), Av.2 °F (36.8 °C), Min:97.8 °F (36.6 °C), Max:98.6 °F (37 °C)  IOBRIEF    Intake/Output Summary (Last 24 hours) at 2018 1356  Last data filed at 2018 0820  Gross per 24 hour   Intake 295.41 ml   Output 0 ml   Net 295.41 ml       General:  Alert, cooperative, no acute distress    Pulmonary:  CTA Bilaterally. No Wheezing/Rales. Cardiovascular: IRRegular rate and Rhythm. GI:  Soft, Non distended, Non tender. + Bowel sounds. Extremities:  No edema, cyanosis, clubbing. Psych: Good insight. Not anxious or agitated. Neurologic: Alert and oriented X 3. No acute neuro deficits.   Additional:    Medications:   Current Facility-Administered Medications   Medication Dose Route Frequency    carvedilol (COREG) tablet 6.25 mg  6.25 mg Oral BID WITH MEALS    cefTRIAXone (ROCEPHIN) 2 g in sterile water (preservative free) 20 mL IV syringe  2 g IntraVENous Q24H    iron sucrose (VENOFER) 100 mg in 0.9% sodium chloride 100 mL IVPB  100 mg IntraVENous DIALYSIS TUE, THU & SAT    doxercalciferol (HECTOROL) 4 mcg/2 mL injection 1 mcg  1 mcg IntraVENous DIALYSIS TUE, THU & SAT    epoetin gilda (EPOGEN;PROCRIT) 5,000 Units  5,000 Units IntraVENous DIALYSIS TUE, THU & SAT    calcium acetate (PHOSLO) capsule 1,334 mg  2 Cap Oral TID WITH MEALS    dilTIAZem (CARDIZEM) 100 mg in 0.9% sodium chloride 100 mL infusion  10 mg/hr IntraVENous CONTINUOUS    aspirin delayed-release tablet 81 mg  81 mg Oral DAILY    atorvastatin (LIPITOR) tablet 40 mg  40 mg Oral DAILY    B complex-vitaminC-folic acid (NEPHROCAP) cap  1 Cap Oral DAILY    montelukast (SINGULAIR) tablet 10 mg  10 mg Oral DAILY    acetaminophen (TYLENOL) tablet 650 mg  650 mg Oral Q4H PRN    ondansetron (ZOFRAN) injection 4 mg  4 mg IntraVENous Q4H PRN    heparin (porcine) injection 5,000 Units  5,000 Units SubCUTAneous Q8H    azithromycin (ZITHROMAX) 500 mg in 0.9% sodium chloride (MBP/ADV) 250 mL adv  500 mg IntraVENous Q24H    sodium bicarbonate tablet 650 mg  650 mg Oral TID    0.9% sodium chloride infusion  100 mL/hr IntraVENous DIALYSIS PRN    insulin lispro (HUMALOG) injection   SubCUTAneous AC&HS    glucose chewable tablet 16 g  16 g Oral PRN    glucagon (GLUCAGEN) injection 1 mg  1 mg IntraMUSCular PRN    dextrose (D50W) injection syrg 12.5-25 g  25-50 mL IntraVENous PRN    influenza vaccine 2018-19 (6 mos+)(PF) (FLUARIX QUAD/FLULAVAL QUAD) injection 0.5 mL  0.5 mL IntraMUSCular PRIOR TO DISCHARGE       Labs:    Recent Results (from the past 24 hour(s))   EKG, 12 LEAD, INITIAL    Collection Time: 12/05/18  2:02 PM   Result Value Ref Range    Ventricular Rate 179 BPM    Atrial Rate 133 BPM    QRS Duration 82 ms    Q-T Interval 264 ms    QTC Calculation (Bezet) 455 ms    Calculated R Axis -29 degrees    Calculated T Axis 165 degrees    Diagnosis       Atrial fibrillation with rapid ventricular response  Marked ST abnormality, possible lateral subendocardial injury  Abnormal ECG  When compared with ECG of 06-JAN-2016 18:48,  Atrial fibrillation has replaced Sinus rhythm    Confirmed by Татьяна James ([id number]) on 12/5/2018 10:18:18 PM     CBC WITH AUTOMATED DIFF    Collection Time: 12/05/18  2:21 PM   Result Value Ref Range    WBC 26.0 (H) 4.6 - 13.2 K/uL    RBC 3.66 (L) 4.70 - 5.50 M/uL    HGB 10.2 (L) 13.0 - 16.0 g/dL    HCT 30.0 (L) 36.0 - 48.0 %    MCV 82.0 74.0 - 97.0 FL    MCH 27.9 24.0 - 34.0 PG    MCHC 34.0 31.0 - 37.0 g/dL    RDW 15.6 (H) 11.6 - 14.5 %    PLATELET 757 349 - 794 K/uL    MPV 10.0 9.2 - 11.8 FL    NEUTROPHILS 91 (H) 42 - 75 %    BAND NEUTROPHILS 3 0 - 5 %    LYMPHOCYTES 1 (L) 20 - 51 %    MONOCYTES 4 2 - 9 %    EOSINOPHILS 0 0 - 5 %    BASOPHILS 0 0 - 3 %    METAMYELOCYTES 1 (H) 0 %    ABS. NEUTROPHILS 24.4 (H) 1.8 - 8.0 K/UL    ABS. LYMPHOCYTES 0.3 (L) 0.8 - 3.5 K/UL    ABS. MONOCYTES 1.0 0 - 1.0 K/UL    ABS. EOSINOPHILS 0.0 0.0 - 0.4 K/UL    ABS.  BASOPHILS 0.0 0.0 - 0.06 K/UL    DF MANUAL      PLATELET COMMENTS ADEQUATE PLATELETS      RBC COMMENTS ANURAG CELLS  3+        RBC COMMENTS SCHISTOCYTES  FEW       HEMOGLOBIN A1C W/O EAG    Collection Time: 12/05/18  2:21 PM   Result Value Ref Range    Hemoglobin A1c 6.3 (H) 4.2 - 5.6 %   MAGNESIUM    Collection Time: 12/05/18  2:50 PM   Result Value Ref Range Magnesium 1.7 1.6 - 2.6 mg/dL   METABOLIC PANEL, COMPREHENSIVE    Collection Time: 12/05/18  2:50 PM   Result Value Ref Range    Sodium 136 136 - 145 mmol/L    Potassium 5.6 (H) 3.5 - 5.5 mmol/L    Chloride 109 (H) 100 - 108 mmol/L    CO2 11 (L) 21 - 32 mmol/L    Anion gap 16 3.0 - 18 mmol/L    Glucose 147 (H) 74 - 99 mg/dL    BUN 99 (H) 7.0 - 18 MG/DL    Creatinine 14.50 (H) 0.6 - 1.3 MG/DL    BUN/Creatinine ratio 7 (L) 12 - 20      GFR est AA 4 (L) >60 ml/min/1.73m2    GFR est non-AA 4 (L) >60 ml/min/1.73m2    Calcium 7.2 (L) 8.5 - 10.1 MG/DL    Bilirubin, total 0.5 0.2 - 1.0 MG/DL    ALT (SGPT) 19 16 - 61 U/L    AST (SGOT) 31 15 - 37 U/L    Alk. phosphatase 97 45 - 117 U/L    Protein, total 6.4 6.4 - 8.2 g/dL    Albumin 2.2 (L) 3.4 - 5.0 g/dL    Globulin 4.2 (H) 2.0 - 4.0 g/dL    A-G Ratio 0.5 (L) 0.8 - 1.7     HEPATITIS C AB    Collection Time: 12/05/18  2:50 PM   Result Value Ref Range    Hepatitis C virus Ab 0.08 <0.80 Index    Hep C  virus Ab Interp. NEGATIVE  NEG      Hep C  virus Ab comment         HEP B SURFACE AG    Collection Time: 12/05/18  2:50 PM   Result Value Ref Range    Hepatitis B surface Ag <0.10 <1.00 Index    Hep B surface Ag Interp. NEGATIVE  NEG     HEP B SURFACE AB    Collection Time: 12/05/18  2:50 PM   Result Value Ref Range    Hepatitis B surface Ab <3.10 (L) >10.0 mIU/mL    Hep B surface Ab Interp. NEGATIVE  (A) POS      Hep B surface Ab comment        Samples with a  value of 10 mIU/mL or greater are considered positive (protective immunity) in accordance with the CDC guidelines.    FERRITIN    Collection Time: 12/05/18  2:50 PM   Result Value Ref Range    Ferritin 295 8 - 388 NG/ML   IRON PROFILE    Collection Time: 12/05/18  2:50 PM   Result Value Ref Range    Iron 20 (L) 50 - 175 ug/dL    TIBC 195 (L) 250 - 450 ug/dL    Iron % saturation 10 %   PTH INTACT    Collection Time: 12/05/18  2:50 PM   Result Value Ref Range    Calcium 7.2 (L) 8.5 - 10.1 MG/DL    PTH, Intact 723.8 (H) 18.4 - 88.0 pg/mL   PHOSPHORUS    Collection Time: 12/05/18  2:50 PM   Result Value Ref Range    Phosphorus 7.8 (H) 2.5 - 4.9 MG/DL   CULTURE, BLOOD    Collection Time: 12/05/18  3:55 PM   Result Value Ref Range    Special Requests: NO SPECIAL REQUESTS      GRAM STAIN        AEROBIC AND ANAEROBIC BOTTLES GRAM POSITIVE COCCI IN PAIRS IN CHAINS    GRAM STAIN        SMEAR CALLED TO AND CORRECTLY REPEATED BY: [address] CVT 0904 12/06/18 TO I.T. Culture result: CULTURE IN PROGRESS,FURTHER UPDATES TO FOLLOW     CULTURE, BLOOD    Collection Time: 12/05/18  4:13 PM   Result Value Ref Range    Special Requests: NO SPECIAL REQUESTS      GRAM STAIN        AEROBIC AND ANAEROBIC BOTTLES GRAM POSITIVE COCCI IN PAIRS IN CHAINS    GRAM STAIN        SMEAR CALLED TO AND CORRECTLY REPEATED BY: [address] CVT 0904 12/06/18 TO I.T.     Culture result: CULTURE IN PROGRESS,FURTHER UPDATES TO FOLLOW     POC LACTIC ACID    Collection Time: 12/05/18  4:14 PM   Result Value Ref Range    Lactic Acid (POC) 3.50 (HH) 0.40 - 2.00 mmol/L   POC LACTIC ACID    Collection Time: 12/05/18  6:13 PM   Result Value Ref Range    Lactic Acid (POC) 4.09 (HH) 0.40 - 2.00 mmol/L   POC LACTIC ACID    Collection Time: 12/05/18  6:24 PM   Result Value Ref Range    Lactic Acid (POC) 3.80 (HH) 0.40 - 2.00 mmol/L   GLUCOSE, POC    Collection Time: 12/05/18 10:10 PM   Result Value Ref Range    Glucose (POC) 151 (H) 70 - 110 mg/dL   URINALYSIS W/ RFLX MICROSCOPIC    Collection Time: 12/06/18  3:50 AM   Result Value Ref Range    Color YELLOW      Appearance TURBID      Specific gravity 1.017 1.005 - 1.030      pH (UA) 5.0 5.0 - 8.0      Protein 300 (A) NEG mg/dL    Glucose 100 (A) NEG mg/dL    Ketone TRACE (A) NEG mg/dL    Bilirubin NEGATIVE  NEG      Blood MODERATE (A) NEG      Urobilinogen 0.2 0.2 - 1.0 EU/dL    Nitrites NEGATIVE  NEG      Leukocyte Esterase TRACE (A) NEG     URINE MICROSCOPIC ONLY    Collection Time: 12/06/18  3:50 AM   Result Value Ref Range WBC 6 to 8 0 - 5 /hpf    RBC 2 to 4 0 - 5 /hpf    Epithelial cells 2+ 0 - 5 /lpf    Bacteria 1+ (A) NEG /hpf    Amorphous Crystals 4+ (A) NEG    Granular cast 2 to 3 NEG /lpf   METABOLIC PANEL, BASIC    Collection Time: 12/06/18  5:19 AM   Result Value Ref Range    Sodium 138 136 - 145 mmol/L    Potassium 4.9 3.5 - 5.5 mmol/L    Chloride 109 (H) 100 - 108 mmol/L    CO2 13 (L) 21 - 32 mmol/L    Anion gap 16 3.0 - 18 mmol/L    Glucose 119 (H) 74 - 99 mg/dL     (H) 7.0 - 18 MG/DL    Creatinine 15.00 (H) 0.6 - 1.3 MG/DL    BUN/Creatinine ratio 7 (L) 12 - 20      GFR est AA 4 (L) >60 ml/min/1.73m2    GFR est non-AA 4 (L) >60 ml/min/1.73m2    Calcium 7.5 (L) 8.5 - 10.1 MG/DL   CBC WITH AUTOMATED DIFF    Collection Time: 12/06/18  5:19 AM   Result Value Ref Range    WBC 21.5 (H) 4.6 - 13.2 K/uL    RBC 3.44 (L) 4.70 - 5.50 M/uL    HGB 9.5 (L) 13.0 - 16.0 g/dL    HCT 27.9 (L) 36.0 - 48.0 %    MCV 81.1 74.0 - 97.0 FL    MCH 27.6 24.0 - 34.0 PG    MCHC 34.1 31.0 - 37.0 g/dL    RDW 15.6 (H) 11.6 - 14.5 %    PLATELET 983 469 - 855 K/uL    MPV 11.0 9.2 - 11.8 FL    NEUTROPHILS 92 (H) 42 - 75 %    BAND NEUTROPHILS 4 0 - 5 %    LYMPHOCYTES 3 (L) 20 - 51 %    MONOCYTES 1 (L) 2 - 9 %    EOSINOPHILS 0 0 - 5 %    BASOPHILS 0 0 - 3 %    ABS. NEUTROPHILS 20.7 (H) 1.8 - 8.0 K/UL    ABS. LYMPHOCYTES 0.6 (L) 0.8 - 3.5 K/UL    ABS. MONOCYTES 0.2 0 - 1.0 K/UL    ABS. EOSINOPHILS 0.0 0.0 - 0.4 K/UL    ABS.  BASOPHILS 0.0 0.0 - 0.06 K/UL    DF MANUAL      PLATELET COMMENTS ADEQUATE PLATELETS      RBC COMMENTS ANISOCYTOSIS  1+        RBC COMMENTS ANURAG CELLS  2+        RBC COMMENTS OVALOCYTES  1+        RBC COMMENTS TARGET CELLS  FEW  POIKILOCYTOSIS  1+       TSH 3RD GENERATION    Collection Time: 12/06/18  5:19 AM   Result Value Ref Range    TSH 2.43 0.36 - 3.74 uIU/mL   GLUCOSE, POC    Collection Time: 12/06/18  7:24 AM   Result Value Ref Range    Glucose (POC) 123 (H) 70 - 110 mg/dL   DUPLEX HEMODIALYSIS ACCESS RIGHT    Collection Time: 12/06/18  8:45 AM   Result Value Ref Range    Right AVF Inflow Artery .0 cm/s    Right AVF Inflow Artery .0 cm/s    Right AVF Arterial Prox Anastomosis .0 cm/s    Right AVF Arterial Prox Anastomosis .0 cm/s    Right AVF Prox Outflow .0 cm/s    Right AVF Prox Outflow .0 cm/s    Right AVF Mid Outflow .0 cm/s    Right AVF Mid Outflow EDV 63.0 cm/s    Right AVF Dist Outflow .0 cm/s    Right AVF Dist Outflow EDV 56.0 cm/s    Right AVF Outflow Vessel PSV 24.0 cm/s    Right AVF Outflow Vessel EDV 15.0 cm/s    Right AVF AVG Prox Anast Vol Flow 1,077.0 mL/min    Right AVF AVG Prox Outflow Vol Flow 1,424.0 mL/min    Right AVF AVG Mid Outflow Vol Flow 764.0 mL/min    Right AVF AVG Dist Outflow Vol Flow 600.0 mL/min    Right AVG AVF Depth 1 0.21 cm    Right AVG AVF Depth 2 0.31 cm    Right AVG AVF Depth 3 0.42 cm    Right AVF AVG Diameter 1 0.59 cm    Right AVF AVG Diameter 2 0.78 cm    Right AVF AVG Diameter 3 0.78 cm    Right AVF AVG Graft Name Yahir fistula right     Right AVF AVG Outflow Vessel Name Cephalic vein upper arm    GLUCOSE, POC    Collection Time: 12/06/18 10:55 AM   Result Value Ref Range    Glucose (POC) 126 (H) 70 - 110 mg/dL       Signed By: Xiomara Jimenes MD     December 6, 2018

## 2018-12-06 NOTE — CONSULTS
Infectious Disease Consultation Note    Requested by: Dr. Tom Storey    Reason: sepsis, gram positive bacteremia    Current abx Prior abx   Ceftriaxone, azithromycin since 12/5/18      Lines:       Assessment :    41 y.o. male with a PMH of ESRD s/p AVF placement but not on dialysis yet, HTN, chronic diastolic CHF, DM-2 (last [id number]: 6.3 on 12/5/18) , CAD s/p stent and pulmonary HTN who presented to [facility] on 12/5/18 with c/c of weakness and fatigue. A.fib with rvr on admission, acute renal failure    Now with gram positive bacteremia. Highly complex clinical picture. Presentation is c/w sepsis (POA) due to gram positive bloodstream infection. Exact source not entirely clear. D/D: community acquired pneumonia versus intra abdominal source. Will need to f/u the ID of the organism to determine this. Lack of fever, no definite lobar consolidation noted on cxr is a bit atypical for pneumococcal pneumonia. Patient's immunosuppressed condition can mask the full presentation. Also, patient has chronic diarrhea, RUQ tenderness on deep palpation. I will obtain gall bladder imaging to r/o silent cholecystitis. If gram positive in blood cultures turn out to be enterococcus or strep viridans, will need ct abdomen. Recommendations:    1. Continue ceftriaxone. Change dose to 2 g iv q 24 hour. Continue azithromycin  2. Repeat cxr after HD  3. F/u id of gpc in blood cultures  4. Repeat blood cultures in am  5. Obtain RUQ usg. Advance Care planning:full code:  discussed  with patient/surrogate decision maker:Jovita Sharpe: [phone]    Thank you for consultation request. Above plan was discussed in details with patient, family,dr. Núñez. Will d/w dr Tom Storey. Please call me if any further questions or concerns. Will continue to participate in the care of this patient.     HPI:    41 y.o. male with a PMH of ESRD s/p AVF placement but not on dialysis yet, HTN, chronic diastolic CHF, DM-2 (last [id number]: 6.3 on 12/5/18) , CAD s/p stent and pulmonary HTN who presented to [facility] on 12/5/18 with c/c of weakness and fatigue. Here in ED he was found to have atrial fibrillation with RVR which is new onset. He follows with Dr Kirit Prado nephrology. He had AVF placed about 2 years ago but never started on dialysis yet. Here  in ED he is started on IV cardizem drip. CXR also showed suspected infiltrate with leukocytosis and started on IV antibiotics. Cardiology has been consulted and admitted for further evaluation and management. Patient states that he has not been feeling good for a week. Has had increased sob. About 2-3 days prior to admission, he started having sweats, cough with blood tinged sputum. Also, has some right sided abdominal discomfort. C/o diarrhea since many months. Patient denies headaches, visual disturbances, sore throat, runny nose, earaches, cp, chills, burning micturition, pain or weakness in extremities. He denies back pain/flank pain. He denies recent sick contacts. No h/o recent travel. No known h/o MRSA colonization or infection in the past.        Past Medical History:   Diagnosis Date    Abnormal nuclear cardiac imaging test 10/08/2012    Fixed anteroseptal, anteroapical defect c/w prior infarction. No ischemia. Mid & distal anteroseptal, anteroapical WMA. LVE. EF 44%.  Anemia     dr. Cristela Norman doubt amyloid or multiple myeloma. candidate for procirt if Hg <10    Benign hypertensive     Blindness of right eye 01/2013    legally blind    CAD (coronary artery disease)     Cardiomyopathy ejection fraction of 40%     CKD (chronic kidney disease), stage IV ([address]     to see Dr. Kirit Prado 12/1/12    Congestive heart failure ([address]     Diabetes mellitus ([address]     Diabetic retinopathy ([address]     Diabetic retinopathy ([address]     Dr. Birdie Reyna- injections    Heart failure ([address]     History of echocardiogram 04/22/2014    LVE. EF 55% (prev 40-45% on echo of 1/27/12). No WMA. Mild-mod conc LVH. Gr 3 DDfx. Marked LAE. Mild SHANTEL. Mild MR.    Hypertension     Pulmonary hypertension ([address]     Renal Ultrasound 1/3/12    Right kidney isoechoic w/respect to liver. Sm left-sided pleural effusion    S/P cardiac cath 10/16/2012    LM patent. pLAD 95% (3.5 x 12-mm [place] stent, resid 0$%). LCX mild. Past Surgical History:   Procedure Laterality Date    HX CORONARY STENT PLACEMENT      one    HX LAPAROTOMY  2/2012    bowel obstruction with removal segment of small bowel. Done by Riblet.  HX LAPAROTOMY  infancy    whole in colon and had repair at that time.  HX OTHER SURGICAL  06/20/2013    left eye retna attatchment    HX RETINAL DETACHMENT REPAIR      august 2012          Medication List      ASK your doctor about these medications    amLODIPine 10 mg tablet  Commonly known as:  NORVASC  TAKE ONE TABLET BY MOUTH EVERY DAY     aspirin delayed-release 81 mg tablet     atorvastatin 40 mg tablet  Commonly known as:  LIPITOR  Take 1 Tab by mouth daily. b complex-vitamin c-folic acid 1mg 1 mg tablet  Commonly known as:  NAHED-MOON RX  TAKE ONE TABLET BY MOUTH EVERY DAY     calcitRIOL 0.25 mcg capsule  Commonly known as:  ROCALTROL     carvedilol 25 mg tablet  Commonly known as:  COREG  TAKE TWO TABLETS BY MOUTH TWICE DAILY     furosemide 40 mg tablet  Commonly known as:  LASIX  TAKE 2 TABLETS BY MOUTH TWO TIMES A DAY     glimepiride 4 mg tablet  Commonly known as:  AMARYL  TAKE 1 TABLET BY MOUTH EVERY MORNING     glucose blood VI test strips strip  Commonly known as:  FREESTYLE LITE STRIPS  Use as directed     hydrALAZINE 100 mg tablet  Commonly known as:  APRESOLINE  TAKE 1 TABLET BY MOUTH THREE TIMES A DAY     metOLazone 5 mg tablet  Commonly known as:  ZAROXOLYN  TAKE ONE TABLET BY MOUTH EVERY FRIDAY 30 MINUTES PRIOR TO MORNING LASIX DOSE     PROCRIT INJECTION     sildenafil citrate 50 mg tablet  Commonly known as:  VIAGRA  Take 1 Tab by mouth as needed.  [address] [id number], lot #[id number]  Exp 12/16, 1 box, # 2 tabs     SINGULAIR 10 mg tablet  Generic drug:  montelukast     sodium bicarbonate 325 mg tablet     tadalafil 20 mg tablet  Commonly known as:  CIALIS  Take 1 Tab by mouth as needed. VITAMIN D3 2,000 unit Tab  Generic drug:  cholecalciferol (vitamin D3)            Current Facility-Administered Medications   Medication Dose Route Frequency    cefTRIAXone (ROCEPHIN) 1 g in sterile water (preservative free) 10 mL IV syringe  1 g IntraVENous Q12H    dilTIAZem (CARDIZEM) 100 mg in 0.9% sodium chloride 100 mL infusion  15 mg/hr IntraVENous CONTINUOUS    aspirin delayed-release tablet 81 mg  81 mg Oral DAILY    atorvastatin (LIPITOR) tablet 40 mg  40 mg Oral DAILY    B complex-vitaminC-folic acid (NEPHROCAP) cap  1 Cap Oral DAILY    montelukast (SINGULAIR) tablet 10 mg  10 mg Oral DAILY    acetaminophen (TYLENOL) tablet 650 mg  650 mg Oral Q4H PRN    ondansetron (ZOFRAN) injection 4 mg  4 mg IntraVENous Q4H PRN    heparin (porcine) injection 5,000 Units  5,000 Units SubCUTAneous Q8H    azithromycin (ZITHROMAX) 500 mg in 0.9% sodium chloride (MBP/ADV) 250 mL adv  500 mg IntraVENous Q24H    sodium bicarbonate tablet 650 mg  650 mg Oral TID    0.9% sodium chloride infusion  100 mL/hr IntraVENous DIALYSIS PRN    insulin lispro (HUMALOG) injection   SubCUTAneous AC&HS    glucose chewable tablet 16 g  16 g Oral PRN    glucagon (GLUCAGEN) injection 1 mg  1 mg IntraMUSCular PRN    dextrose (D50W) injection syrg 12.5-25 g  25-50 mL IntraVENous PRN    influenza vaccine 2018-19 (6 mos+)(PF) (FLUARIX QUAD/FLULAVAL QUAD) injection 0.5 mL  0.5 mL IntraMUSCular PRIOR TO DISCHARGE    calcium carbonate (TUMS) chewable tablet 400 mg [elemental]  400 mg Oral Q6H PRN       Allergies: Patient has no known allergies.     Family History   Problem Relation Age of Onset    Diabetes Mother     Hypertension Mother     Kidney Disease Mother     High Cholesterol Father     Diabetes Brother         pre diabetic Social History     Socioeconomic History    Marital status:      Spouse name: Not on file    Number of children: Not on file    Years of education: Not on file    Highest education level: Not on file   Social Needs    Financial resource strain: Not on file    Food insecurity - worry: Not on file    Food insecurity - inability: Not on file    Transportation needs - medical: Not on file   Mama needs - non-medical: Not on file   Occupational History    Not on file   Tobacco Use    Smoking status: Never Smoker    Smokeless tobacco: Never Used   Substance and Sexual Activity    Alcohol use: Yes     Alcohol/week: 2.5 oz     Types: 5 Cans of beer per week     Comment: occassionally    Drug use: No    Sexual activity: Yes     Partners: Female     Birth control/protection: None   Other Topics Concern    Not on file   Social History Narrative    Not on file     Social History     Tobacco Use   Smoking Status Never Smoker   Smokeless Tobacco Never Used        Temp (24hrs), Av.3 °F (36.8 °C), Min:98.1 °F (36.7 °C), Max:98.6 °F (37 °C)    Visit Vitals  /80   Pulse 86   Temp 98.6 °F (37 °C)   Resp 23   Ht 5' 10\" (1.778 m)   Wt 100.3 kg (221 lb 3.2 oz) Comment: did not stand pt up HR now 150s was 176 HR    SpO2 96%   BMI 31.74 kg/m²       ROS: 12 point ROS obtained in details.  Pertinent positives as mentioned in HPI, otherwise negative    Physical Exam:    GENERAL: Not in acute distress  HEENT: pink conjunctiva, un icteric sclera,   NECK: No lymphadenopthy or thyroid swelling, JVD not seen  LYMPH: No supraclavicular or cervical or axillary nodes on both sides  CVS: S1S2 regular, No gallop or rub  RS: bibasillar rales L>R, no rhonchi/wheezing  Abd: Soft,RUQ tenderness on deep palpation, not distended, No guarding, No rigidity  NEURO:  No focal neurologic deficits   Extrm: no leg edema or swelling   Skin: No rash  Psych: no suicidal or homicidal ideations           Labs: Results: Chemistry Recent Labs     12/06/18  0519 12/05/18  1450   * 147*    136   K 4.9 5.6*   * 109*   CO2 13* 11*   * 99*   CREA 15.00* 14.50*   CA 7.5* 7.2*  7.2*   AGAP 16 16   BUCR 7* 7*   AP  --  97   TP  --  6.4   ALB  --  2.2*   GLOB  --  4.2*   AGRAT  --  0.5*      CBC w/Diff Recent Labs     12/06/18  0519 12/05/18  1421   WBC 21.5* 26.0*   RBC 3.44* 3.66*   HGB 9.5* 10.2*   HCT 27.9* 30.0*    237   GRANS 92* 91*   LYMPH 3* 1*   EOS 0 0      Microbiology Recent Labs     12/05/18  1613 12/05/18  1555   CULT CULTURE IN PROGRESS,FURTHER UPDATES TO FOLLOW CULTURE IN PROGRESS,FURTHER UPDATES TO FOLLOW          RADIOLOGY:    All available imaging studies/reports in [facility] for this admission were reviewed    Dr. Ramirez [place], Infectious Disease Specialist  [phone]  December 6, 2018  9:25 AM

## 2018-12-06 NOTE — ROUTINE PROCESS
Elevated MEWs score addressed. Patient receiving empiric ABX for sepsis,. HD for renal failure, dilt gtt for afib RVR

## 2018-12-07 ENCOUNTER — APPOINTMENT (OUTPATIENT)
Dept: NON INVASIVE DIAGNOSTICS | Age: 48
DRG: 871 | End: 2018-12-07
Attending: PHYSICIAN ASSISTANT
Payer: COMMERCIAL

## 2018-12-07 ENCOUNTER — APPOINTMENT (OUTPATIENT)
Dept: GENERAL RADIOLOGY | Age: 48
DRG: 871 | End: 2018-12-07
Attending: INTERNAL MEDICINE
Payer: COMMERCIAL

## 2018-12-07 LAB
ANION GAP SERPL CALC-SCNC: 11 MMOL/L (ref 3–18)
ATRIAL RATE: 267 BPM
BACTERIA SPEC CULT: NORMAL
BASOPHILS # BLD: 0 K/UL (ref 0–0.1)
BASOPHILS NFR BLD: 0 % (ref 0–2)
BUN SERPL-MCNC: 82 MG/DL (ref 7–18)
BUN/CREAT SERPL: 7 (ref 12–20)
C DIFF GDH STL QL: NEGATIVE
C DIFF TOX A+B STL QL IA: NEGATIVE
CALCIUM SERPL-MCNC: 6.9 MG/DL (ref 8.5–10.1)
CALCULATED R AXIS, ECG10: -18 DEGREES
CALCULATED T AXIS, ECG11: 180 DEGREES
CHLORIDE SERPL-SCNC: 105 MMOL/L (ref 100–108)
CO2 SERPL-SCNC: 21 MMOL/L (ref 21–32)
CREAT SERPL-MCNC: 11.3 MG/DL (ref 0.6–1.3)
DIAGNOSIS, 93000: NORMAL
DIFFERENTIAL METHOD BLD: ABNORMAL
EOSINOPHIL # BLD: 0.2 K/UL (ref 0–0.4)
EOSINOPHIL NFR BLD: 1 % (ref 0–5)
ERYTHROCYTE [DISTWIDTH] IN BLOOD BY AUTOMATED COUNT: 15.4 % (ref 11.6–14.5)
GLUCOSE BLD STRIP.AUTO-MCNC: 103 MG/DL (ref 70–110)
GLUCOSE BLD STRIP.AUTO-MCNC: 118 MG/DL (ref 70–110)
GLUCOSE BLD STRIP.AUTO-MCNC: 121 MG/DL (ref 70–110)
GLUCOSE BLD STRIP.AUTO-MCNC: 146 MG/DL (ref 70–110)
GLUCOSE SERPL-MCNC: 118 MG/DL (ref 74–99)
HBV CORE AB SERPL QL IA: NEGATIVE
HCT VFR BLD AUTO: 26.2 % (ref 36–48)
HGB BLD-MCNC: 9 G/DL (ref 13–16)
INTERPRETATION: NORMAL
LYMPHOCYTES # BLD: 0.5 K/UL (ref 0.9–3.6)
LYMPHOCYTES NFR BLD: 4 % (ref 21–52)
MAGNESIUM SERPL-MCNC: 2 MG/DL (ref 1.6–2.6)
MCH RBC QN AUTO: 27.4 PG (ref 24–34)
MCHC RBC AUTO-ENTMCNC: 34.4 G/DL (ref 31–37)
MCV RBC AUTO: 79.9 FL (ref 74–97)
MONOCYTES # BLD: 0.9 K/UL (ref 0.05–1.2)
MONOCYTES NFR BLD: 6 % (ref 3–10)
NEUTS SEG # BLD: 13.1 K/UL (ref 1.8–8)
NEUTS SEG NFR BLD: 89 % (ref 40–73)
PHOSPHATE SERPL-MCNC: 1.1 MG/DL (ref 2.5–4.9)
PLATELET # BLD AUTO: 223 K/UL (ref 135–420)
PMV BLD AUTO: 10.1 FL (ref 9.2–11.8)
POTASSIUM SERPL-SCNC: 3.9 MMOL/L (ref 3.5–5.5)
Q-T INTERVAL, ECG07: 294 MS
QRS DURATION, ECG06: 88 MS
QTC CALCULATION (BEZET), ECG08: 395 MS
RBC # BLD AUTO: 3.28 M/UL (ref 4.7–5.5)
SERVICE CMNT-IMP: NORMAL
SODIUM SERPL-SCNC: 137 MMOL/L (ref 136–145)
VENTRICULAR RATE, ECG03: 109 BPM
WBC # BLD AUTO: 14.7 K/UL (ref 4.6–13.2)

## 2018-12-07 PROCEDURE — 74011250637 HC RX REV CODE- 250/637: Performed by: INTERNAL MEDICINE

## 2018-12-07 PROCEDURE — 83735 ASSAY OF MAGNESIUM: CPT

## 2018-12-07 PROCEDURE — 74011000250 HC RX REV CODE- 250: Performed by: INTERNAL MEDICINE

## 2018-12-07 PROCEDURE — 74011000250 HC RX REV CODE- 250: Performed by: PHYSICIAN ASSISTANT

## 2018-12-07 PROCEDURE — 84100 ASSAY OF PHOSPHORUS: CPT

## 2018-12-07 PROCEDURE — 74011250636 HC RX REV CODE- 250/636: Performed by: INTERNAL MEDICINE

## 2018-12-07 PROCEDURE — 74011250637 HC RX REV CODE- 250/637: Performed by: PHYSICIAN ASSISTANT

## 2018-12-07 PROCEDURE — 74011000258 HC RX REV CODE- 258: Performed by: PHYSICIAN ASSISTANT

## 2018-12-07 PROCEDURE — 90935 HEMODIALYSIS ONE EVALUATION: CPT

## 2018-12-07 PROCEDURE — 71045 X-RAY EXAM CHEST 1 VIEW: CPT

## 2018-12-07 PROCEDURE — 82962 GLUCOSE BLOOD TEST: CPT

## 2018-12-07 PROCEDURE — 87040 BLOOD CULTURE FOR BACTERIA: CPT

## 2018-12-07 PROCEDURE — 80048 BASIC METABOLIC PNL TOTAL CA: CPT

## 2018-12-07 PROCEDURE — 87449 NOS EACH ORGANISM AG IA: CPT

## 2018-12-07 PROCEDURE — 36415 COLL VENOUS BLD VENIPUNCTURE: CPT

## 2018-12-07 PROCEDURE — 85025 COMPLETE CBC W/AUTO DIFF WBC: CPT

## 2018-12-07 PROCEDURE — 94762 N-INVAS EAR/PLS OXIMTRY CONT: CPT

## 2018-12-07 PROCEDURE — 77030027138 HC INCENT SPIROMETER -A

## 2018-12-07 PROCEDURE — 65660000004 HC RM CVT STEPDOWN

## 2018-12-07 RX ORDER — LANOLIN ALCOHOL/MO/W.PET/CERES
3 CREAM (GRAM) TOPICAL
Status: DISCONTINUED | OUTPATIENT
Start: 2018-12-07 | End: 2018-12-14 | Stop reason: HOSPADM

## 2018-12-07 RX ORDER — CARVEDILOL 12.5 MG/1
12.5 TABLET ORAL 2 TIMES DAILY WITH MEALS
Status: DISCONTINUED | OUTPATIENT
Start: 2018-12-07 | End: 2018-12-08

## 2018-12-07 RX ORDER — DILTIAZEM HYDROCHLORIDE 5 MG/ML
INJECTION INTRAVENOUS
Status: DISPENSED
Start: 2018-12-07 | End: 2018-12-07

## 2018-12-07 RX ADMIN — CARVEDILOL 12.5 MG: 12.5 TABLET, FILM COATED ORAL at 16:09

## 2018-12-07 RX ADMIN — CARVEDILOL 6.25 MG: 6.25 TABLET, FILM COATED ORAL at 08:47

## 2018-12-07 RX ADMIN — METOPROLOL TARTRATE 5 MG: 5 INJECTION, SOLUTION INTRAVENOUS at 01:00

## 2018-12-07 RX ADMIN — CALCIUM ACETATE 1334 MG: 667 CAPSULE ORAL at 08:47

## 2018-12-07 RX ADMIN — WATER 2 G: 1 INJECTION INTRAMUSCULAR; INTRAVENOUS; SUBCUTANEOUS at 14:39

## 2018-12-07 RX ADMIN — CALCIUM ACETATE 1334 MG: 667 CAPSULE ORAL at 11:06

## 2018-12-07 RX ADMIN — METOPROLOL TARTRATE 5 MG: 5 INJECTION, SOLUTION INTRAVENOUS at 08:47

## 2018-12-07 RX ADMIN — ACETAMINOPHEN 650 MG: 325 TABLET ORAL at 23:05

## 2018-12-07 RX ADMIN — CALCIUM ACETATE 1334 MG: 667 CAPSULE ORAL at 16:09

## 2018-12-07 RX ADMIN — SODIUM BICARBONATE 650 MG TABLET 650 MG: at 08:47

## 2018-12-07 RX ADMIN — MONTELUKAST SODIUM 10 MG: 10 TABLET, FILM COATED ORAL at 08:47

## 2018-12-07 RX ADMIN — ASPIRIN 81 MG: 81 TABLET, COATED ORAL at 08:47

## 2018-12-07 RX ADMIN — ATORVASTATIN CALCIUM 40 MG: 40 TABLET, FILM COATED ORAL at 08:47

## 2018-12-07 RX ADMIN — AZITHROMYCIN MONOHYDRATE 500 MG: 500 INJECTION, POWDER, LYOPHILIZED, FOR SOLUTION INTRAVENOUS at 16:10

## 2018-12-07 RX ADMIN — ONDANSETRON 4 MG: 2 INJECTION INTRAMUSCULAR; INTRAVENOUS at 12:59

## 2018-12-07 RX ADMIN — NEPHROCAP 1 CAPSULE: 1 CAP ORAL at 08:47

## 2018-12-07 RX ADMIN — METOPROLOL TARTRATE 5 MG: 5 INJECTION, SOLUTION INTRAVENOUS at 14:43

## 2018-12-07 RX ADMIN — DILTIAZEM HYDROCHLORIDE 15 MG/HR: 5 INJECTION INTRAVENOUS at 04:55

## 2018-12-07 NOTE — DIALYSIS
ACUTE HEMODIALYSIS FLOW SHEET    HEMODIALYSIS ORDERS: Physician: Oma Lombard     Dialyzer: revaclear   Duration: 2.5 hr  BFR: 300   DFR: 600   Dialysate:  Temp 36.0 K+   3    Ca+  3 Na 138  Bicarb 35   Weight:  101.1 kg    Patient Chart [x]     Unable to Obtain []   Dry weight/UF Goal: 1000 Access   Needle Gauge 17   Heparin []  Bolus      Units    [] Hourly       Units    [x]None      Catheter locking solution    Pre BP:  136/81    Pulse:     97     Temperature:   97.4  Respirations: 16  Tx: NS       ml/Bolus  Other        [x] N/A   Labs: Pre        Post:        [x] N/A   Additional Orders(medications, blood products, hypotension management):       [x] N/A     [x] Jacque Consent Verified     CATHETER ACCESS: [x]N/A   []Right   []Left   []IJ     []Fem   [] First use X-ray verified     []Tunnel                [] Non Tunneled   []No S/S infection  []Redness  []Drainage []Cultured []Swelling []Pain   []Medical Aseptic Prep Utilized   []Dressing Changed  [] Biopatch  Date: N/A     []Clotted   []Patent   Flows: []Good  []Poor  []Reversed   If access problem,  notified: []Yes    [x]N/A  Date:           GRAFT/FISTULA ACCESS:  []N/A     [x]Right     []Left     [x]UE     []LE   []AVG   [x]AVF        []Buttonhole    [x]Medical Aseptic Prep Utilized   [x]No S/S infection  []Redness  []Drainage []Cultured []Swelling []Pain    Bruit:   [x] Strong    [] Weak       Thrill :   [x] Strong    [] Weak       Needle Gauge: 17   Length: 1   If access problem,  notified: []Yes     [x]N/A  Date:        Please describe access if present and not used:N/A       GENERAL ASSESSMENT:    LUNGS:  Rate 16 SaO2%        [x] N/A    [] Clear  [] Coarse  [] Crackles  [] Wheezing        [] Diminished     Location : []RLL   []LLL    []RUL  []ROBERT   Cough: []Productive  []Dry  [x]N/A   Respirations:  [x]Easy  []Labored   Therapy:  [x]RA  []NC  l/min    Mask: []NRB []Venti       O2%                  []Ventilator  []Intubated  [] Trach  [] BiPaP   CARDIAC: []Regular      [x] Irregular   [] Pericardial Rub  [] JVD        []  Monitored  [] Bedside  [x] Remotely monitored [] N/A  Rhythm:    EDEMA: [x] None  []Generalized  [] Pitting [] 1    [] 2    [] 3    [] 4                 [] Facial  [] Pedal  []  UE  [] LE   SKIN:   [x] Warm  [] Hot     [] Cold   [x] Dry     [] Pale   [] Diaphoretic                  [] Flushed  [] Jaundiced  [] Cyanotic  [] Rash  [] Weeping   LOC:    [x] Alert      [x]Oriented:    [x] Person     [x] Place  [x]Time               [] Confused  [] Lethargic  [] Medicated  [] Non-responsive     GI / ABDOMEN:  [] Flat    [] Distended    [x] Soft    [] Firm   []  Obese                             [] Diarrhea  [x] Bowel Sounds  [] Nausea  [] Vomiting       / URINE ASSESSMENT:[] Voiding   [x] Oliguria  [] Anuria   []  [place]     [] Incontinent    []  Incontinent Brief      []  Bathroom Privileges     PAIN: [x] 0 []1  []2   []3   []4   []5   []6   []7   []8   []9   []10            Scale 0-10  Action/Follow Up:    MOBILITY:  [] Amb    [] Amb/Assist    [x] Bed    [] Wheelchair  [] Stretcher      All Vitals and Treatment Details on Attached [address]: [facility]          Room # 0855     [] 1st Time Acute  [] Stat  [x] Routine  [] Urgent     [] Acute Room  [x]  Bedside  [] ICU/CCU  [] ER   Isolation Precautions:  [x] Dialysis   [] Airborne   [] Contact    [] Reverse   Special Considerations:         [] Blood Consent Verified [x]N/A     ALLERGIES:   [x] NKA          Code Status:  [x] Full Code  [] DNR  [] Other           HBsAg ONLY: Date Drawn 12/5/2018         [x]Negative []Positive []Unknown   HBsAb: Date 12/5/2018    [x] Susceptible   [] [id number] []Not Drawn  [] Drawn     Current Labs:    Date of Labs: Today [x]      Results for Domenic Akbar (MRN [id number]) as of 12/7/2018 12:16   Ref.  Range 12/7/2018 05:32   WBC Latest Ref Range: 4.6 - 13.2 K/uL 14.7 (H)   RBC Latest Ref Range: 4.70 - 5.50 M/uL 3.28 (L)   HGB Latest Ref Range: 13.0 - 16.0 g/dL 9.0 (L)   HCT Latest Ref Range: 36.0 - 48.0 % 26.2 (L)   MCV Latest Ref Range: 74.0 - 97.0 FL 79.9   MCH Latest Ref Range: 24.0 - 34.0 PG 27.4   MCHC Latest Ref Range: 31.0 - 37.0 g/dL 34.4   RDW Latest Ref Range: 11.6 - 14.5 % 15.4 (H)   PLATELET Latest Ref Range: 135 - 420 K/uL 223   MPV Latest Ref Range: 9.2 - 11.8 FL 10.1   NEUTROPHILS Latest Ref Range: 40 - 73 % 89 (H)   LYMPHOCYTES Latest Ref Range: 21 - 52 % 4 (L)   MONOCYTES Latest Ref Range: 3 - 10 % 6   EOSINOPHILS Latest Ref Range: 0 - 5 % 1   BASOPHILS Latest Ref Range: 0 - 2 % 0   DF Latest Units:   AUTOMATED   ABS. NEUTROPHILS Latest Ref Range: 1.8 - 8.0 K/UL 13.1 (H)   ABS. LYMPHOCYTES Latest Ref Range: 0.9 - 3.6 K/UL 0.5 (L)   ABS. MONOCYTES Latest Ref Range: 0.05 - 1.2 K/UL 0.9   ABS. EOSINOPHILS Latest Ref Range: 0.0 - 0.4 K/UL 0.2   ABS.  BASOPHILS Latest Ref Range: 0.0 - 0.1 K/UL 0.0   Sodium Latest Ref Range: 136 - 145 mmol/L 137   Potassium Latest Ref Range: 3.5 - 5.5 mmol/L 3.9   Chloride Latest Ref Range: 100 - 108 mmol/L 105   CO2 Latest Ref Range: 21 - 32 mmol/L 21   Anion gap Latest Ref Range: 3.0 - 18 mmol/L 11   Glucose Latest Ref Range: 74 - 99 mg/dL 118 (H)   BUN Latest Ref Range: 7.0 - 18 MG/DL 82 (H)   Creatinine Latest Ref Range: 0.6 - 1.3 MG/DL 11.30 (H)   BUN/Creatinine ratio Latest Ref Range: 12 - 20   7 (L)   Calcium Latest Ref Range: 8.5 - 10.1 MG/DL 6.9 (L)   Magnesium Latest Ref Range: 1.6 - 2.6 mg/dL 2.0   GFR est non-AA Latest Ref Range: >60 ml/min/1.73m2 5 (L)   GFR est AA Latest Ref Range: >60 ml/min/1.73m2 6 (L)                                                                                DIET:  [x] Renal    [] Other     [] NPO     []  Diabetic      PRIMARY NURSE REPORT: First initial/Last name/Title      Pre Dialysis: Eddie Paris RN   Time: 0930      EDUCATION:    [x] Patient [] Other         Knowledge Basis: []None [x]Minimal [] Substantial   Barriers to learning  [x]N/A   [x] Access Care [] S&S of infection     [] Fluid Management     []K+     [x]Procedural    []Albumin     [] Medications     [] Tx Options     [] Transplant     [] Diet     [] Other   Teaching Tools:  [x] Explain  [] Demo  [] Handouts [] Video  Patient response:   [x] Verbalized understanding  [] Teach back  [] Return demonstration [x] Requires follow up   Inappropriate due to            [x] Time Out/Safety Check  [x]Extracorporeal Circuit Tested for integrity       [address] - Before each treatment:     Machine Number:                   [facility]                                   [x] Portable Machine #4/RO serial # [id number]                                                         Alarm Test:  Pass time 1100         Other:         [x] RO/Machine Log Complete      Temp    36.0            Dialysate: pH  7.4  Conductivity: Meter   14.0     HD Machine   14.0                  TCD: 13.8  Dialyzer Lot # [id number]            Blood Tubing Lot # [id number]          Saline Lot #  [id number]     CHLORINE TESTING-Before each treatment and every 4 hours    Total Chlorine: [x] less than 0.1 ppm  Time: 1100  4 Hr/2nd Check Time: 1500   (if greater than 0.1 ppm from Primary then every 30 minutes from Secondary)     TREATMENT INITIATION - with Dialysis Precautions:   [x] All Connections Secured                 [x] Saline Line Double Clamped   [x] Venous Parameters Set                  [x] Arterial Parameters Set    [x] Prime Given 250ml                        [x]Air Foam Detector Engaged      Treatment Initiation Note:  Pt a&ox3 and in stable condition upon arrival to pt's room. Pt found sitting up in chair in room. Assisted to bed without difficulty. Treatment initiated without complication. During Treatment Notes: Dr. Wilmer Malik at bedside to speak to pt upon arrival.  Zofran 4mg given by floor nurse @ [phone] for c/o nausea without vomiting.        Medication Dose Volume Route Initials Dialyzer Cleared: [x] Good [] Fair  [] Poor    Blood processed:  44.0 L  UF Removed  1502Ml    Post Wt: 99.2 kg  POst BP:   154/107       Pulse: 98      Respirations: 18  Temperature: 97.0     Zofran  4mg 2ml PO SB Post Tx Vascular Access: AVF/AVG: Bleeding stopped Art 5 min. Zay. 5 Min   N/A                                   Catheter:  N/A                                 Post Assessment:                                    Skin:  [x] Warm  [x] Dry [] Diaphoretic    [] Flushed  [] Pale [] Cyanotic   [facility] Signatures Title Initials  Time Lungs: [x] Clear    [] Course  [] Crackles  [] Wheezing [] Diminished   Dillon Harps RN SB 1130 Cardiac: [] Regular   [x] Irregular   [] Monitor  [] N/A  Rhythm:           Edema:  [x] None    [] General     [] Facial   [] Pedal    [] UE    [] LE       Pain: [x]0  []1  []2   []3  []4   []5   []6   []7   []8   []9   []10         Post Treatment Note: Treatment completed without complication. 1liter removed during treatment.            POST TREATMENT PRIMARY NURSE HANDOFF REPORT:     First initial/Last name/Title         Post Dialysis: Nayely Ferrari RN Time:  1500     Abbreviations: AVG-arterial venous graft, AVF-arterial venous fistula, IJ-Internal Jugular, Subcl-Subclavian, Fem-Femoral, Tx-treatment, AP/HR-apical heart rate, DFR-dialysate flow rate, BFR-blood flow rate, AP-arterial pressure, -venous pressure, UF-ultrafiltrate, TMP-transmembrane pressure, Zay-Venous, Art-Arterial, RO-Reverse Osmosis

## 2018-12-07 NOTE — PROGRESS NOTES
Mew score 4 d/t elevated heart rate, bp, and low grade temp. Pt currently on cardizem gtt at 15ml/hr. Metoprolol 5mg IV administered. MD aware of patient's condition. Will continue to monitor.      Patient Vitals for the past 4 hrs:   Temp Pulse Resp BP SpO2   12/06/18 2000 99.3 °F (37.4 °C) (!) 136 16 (!) 132/93 93 %

## 2018-12-07 NOTE — PROGRESS NOTES
[facility] Hospitalist [facility]  Progress Note    Patient: Rafaela Mcfarlane Age: 41 y.o. : 1970 MR#: [id number] SSN: [id number]  Date/Time: 2018     Subjective: pt feels fine, denies any CP or SOB. C/o sputum improving, no blood tinged now  Still has loose stool         Assessment/Plan:   1. Atrial fibrillation with RVR, new onset: rate better today, will decrease Cardizem drip to 10, cardio in put noted. Anticoagulation per cardiology team.    2. Pneumonia: cont Abx  3. K pneumonia bacteremia and sepsis: possible from lung: cont Abx and repeat Cx per ID. USG neg  4. ESRD s/p AVF placement: started on dialysis, for HD today again,   5. Diarrhea, ? Chronic: will get C diff if more diarrhea. D/w RN   6. Hyperkalemia: better   7. HTN, controlled: cont BB  8. Chronic diastolic CHF: compensated    9. DM-2, controlled: cont SSI  10. CAD s/p stent: cont asa, BB and statin   11. H/o pulmonary HTN  12. Mild Hemoptysis: better, will monitor, off heparin Sq and cont SCD's  D/w pt and wife in detail   D/w ID Dr. Wandy Duncan, recommend C diff colitis if more diarrhea today, repeat Cx    Will get PT/OT. Case discussed with:  [x]Patient  [x]Family  [x]Nursing  []Case Management  DVT Prophylaxis:  []Lovenox  []Hep SQ  [x]SCDs  []Coumadin   []On Heparin gtt    Objective:   VS:   Visit Vitals  /87   Pulse 94   Temp 97.8 °F (36.6 °C)   Resp 24   Ht 5' 10\" (1.778 m)   Wt 101.1 kg (222 lb 14.4 oz)   SpO2 95%   BMI 31.98 kg/m²      Tmax/24hrs: Temp (24hrs), Av.5 °F (36.9 °C), Min:97.8 °F (36.6 °C), Max:99.3 °F (37.4 °C)  IOBRIEF    Intake/Output Summary (Last 24 hours) at 2018 1014  Last data filed at 2018 0840  Gross per 24 hour   Intake 1063 ml   Output 1000 ml   Net 63 ml       General:  Alert, cooperative, no acute distress    Pulmonary:  CTA Bilaterally. L base some Rales. Cardiovascular: IRRegular rate and Rhythm.   GI:  Soft, Non distended, Non tender. + Bowel sounds. Extremities:  No edema, cyanosis, clubbing. Psych: Good insight. Not anxious or agitated. Neurologic: Alert and oriented X 3. No acute neuro deficits.   Additional:    Medications:   Current Facility-Administered Medications   Medication Dose Route Frequency    dilTIAZem (CARDIZEM) 5 mg/mL injection        carvedilol (COREG) tablet 6.25 mg  6.25 mg Oral BID WITH MEALS    cefTRIAXone (ROCEPHIN) 2 g in sterile water (preservative free) 20 mL IV syringe  2 g IntraVENous Q24H    iron sucrose (VENOFER) 100 mg in 0.9% sodium chloride 100 mL IVPB  100 mg IntraVENous DIALYSIS TUE, THU & SAT    doxercalciferol (HECTOROL) 4 mcg/2 mL injection 1 mcg  1 mcg IntraVENous DIALYSIS TUE, THU & SAT    epoetin gilda (EPOGEN;PROCRIT) 5,000 Units  5,000 Units IntraVENous DIALYSIS TUE, THU & SAT    calcium acetate (PHOSLO) capsule 1,334 mg  2 Cap Oral TID WITH MEALS    metoprolol (LOPRESSOR) injection 5 mg  5 mg IntraVENous Q4H    dilTIAZem (CARDIZEM) 100 mg in 0.9% sodium chloride 100 mL infusion  10 mg/hr IntraVENous CONTINUOUS    aspirin delayed-release tablet 81 mg  81 mg Oral DAILY    atorvastatin (LIPITOR) tablet 40 mg  40 mg Oral DAILY    B complex-vitaminC-folic acid (NEPHROCAP) cap  1 Cap Oral DAILY    montelukast (SINGULAIR) tablet 10 mg  10 mg Oral DAILY    acetaminophen (TYLENOL) tablet 650 mg  650 mg Oral Q4H PRN    ondansetron (ZOFRAN) injection 4 mg  4 mg IntraVENous Q4H PRN    azithromycin (ZITHROMAX) 500 mg in 0.9% sodium chloride (MBP/ADV) 250 mL adv  500 mg IntraVENous Q24H    sodium bicarbonate tablet 650 mg  650 mg Oral TID    0.9% sodium chloride infusion  100 mL/hr IntraVENous DIALYSIS PRN    insulin lispro (HUMALOG) injection   SubCUTAneous AC&HS    glucose chewable tablet 16 g  16 g Oral PRN    glucagon (GLUCAGEN) injection 1 mg  1 mg IntraMUSCular PRN    dextrose (D50W) injection syrg 12.5-25 g  25-50 mL IntraVENous PRN    influenza vaccine 2018-19 (6 mos+)(PF) (FLUARIX QUAD/FLULAVAL QUAD) injection 0.5 mL  0.5 mL IntraMUSCular PRIOR TO DISCHARGE       Labs:    Recent Results (from the past 24 hour(s))   GLUCOSE, POC    Collection Time: 12/06/18 10:55 AM   Result Value Ref Range    Glucose (POC) 126 (H) 70 - 110 mg/dL   CULTURE, RESPIRATORY/SPUTUM/BRONCH W GRAM STAIN    Collection Time: 12/06/18  2:10 PM   Result Value Ref Range    Special Requests: NO SPECIAL REQUESTS      GRAM STAIN >25 WBC/lpf      GRAM STAIN 10-25 EPI/lpf      GRAM STAIN FEW GRAM POSITIVE COCCI IN PAIRS      GRAM STAIN FEW GRAM POSITIVE RODS      GRAM STAIN RARE GRAM NEGATIVE RODS      GRAM STAIN MUCUS PRESENT      Culture result: PENDING    EKG, 12 LEAD, SUBSEQUENT    Collection Time: 12/06/18  4:10 PM   Result Value Ref Range    Ventricular Rate 109 BPM    Atrial Rate 267 BPM    QRS Duration 88 ms    Q-T Interval 294 ms    QTC Calculation (Bezet) 395 ms    Calculated R Axis -18 degrees    Calculated T Axis 180 degrees    Diagnosis       Atrial fibrillation with rapid ventricular response  Anterior infarct (cited on or before 06-DEC-2018)  Abnormal ECG  When compared with ECG of 05-DEC-2018 14:02,  Vent.  rate has decreased BY  70 BPM  Serial changes of evolving Anterior infarct present     GLUCOSE, POC    Collection Time: 12/06/18  9:09 PM   Result Value Ref Range    Glucose (POC) 128 (H) 70 - 458 mg/dL   METABOLIC PANEL, BASIC    Collection Time: 12/07/18  5:32 AM   Result Value Ref Range    Sodium 137 136 - 145 mmol/L    Potassium 3.9 3.5 - 5.5 mmol/L    Chloride 105 100 - 108 mmol/L    CO2 21 21 - 32 mmol/L    Anion gap 11 3.0 - 18 mmol/L    Glucose 118 (H) 74 - 99 mg/dL    BUN 82 (H) 7.0 - 18 MG/DL    Creatinine 11.30 (H) 0.6 - 1.3 MG/DL    BUN/Creatinine ratio 7 (L) 12 - 20      GFR est AA 6 (L) >60 ml/min/1.73m2    GFR est non-AA 5 (L) >60 ml/min/1.73m2    Calcium 6.9 (L) 8.5 - 10.1 MG/DL   CBC WITH AUTOMATED DIFF    Collection Time: 12/07/18  5:32 AM   Result Value Ref Range    WBC 14.7 (H) 4.6 - 13.2 K/uL    RBC 3.28 (L) 4.70 - 5.50 M/uL    HGB 9.0 (L) 13.0 - 16.0 g/dL    HCT 26.2 (L) 36.0 - 48.0 %    MCV 79.9 74.0 - 97.0 FL    MCH 27.4 24.0 - 34.0 PG    MCHC 34.4 31.0 - 37.0 g/dL    RDW 15.4 (H) 11.6 - 14.5 %    PLATELET 282 210 - 136 K/uL    MPV 10.1 9.2 - 11.8 FL    NEUTROPHILS 89 (H) 40 - 73 %    LYMPHOCYTES 4 (L) 21 - 52 %    MONOCYTES 6 3 - 10 %    EOSINOPHILS 1 0 - 5 %    BASOPHILS 0 0 - 2 %    ABS. NEUTROPHILS 13.1 (H) 1.8 - 8.0 K/UL    ABS. LYMPHOCYTES 0.5 (L) 0.9 - 3.6 K/UL    ABS. MONOCYTES 0.9 0.05 - 1.2 K/UL    ABS. EOSINOPHILS 0.2 0.0 - 0.4 K/UL    ABS.  BASOPHILS 0.0 0.0 - 0.1 K/UL    DF AUTOMATED     GLUCOSE, POC    Collection Time: 12/07/18  7:40 AM   Result Value Ref Range    Glucose (POC) 121 (H) 70 - 110 mg/dL       Signed By: Karime Everett MD     December 7, 2018

## 2018-12-07 NOTE — PROGRESS NOTES
Patient with 12 beats of vtach. Patient asymptomatic. Pt converted back into afib with rvr rhythm. Patient currenlty on cardizem gtt at 15ml/hr. Dr. Garnica  updated on patient's status.

## 2018-12-07 NOTE — PROGRESS NOTES
Hemodialysis Rounding Note      Patient: Sandee Olszewski               Sex: male          DOA: 12/5/2018  1:54 PM        YOB: 1970      Age:  41 y.o.        LOS:  LOS: 2 days     Subjective:     Sandee Olszewski is a 41 y.o.  who presents with Atrial fibrillation with rapid ventricular response ([address] The patient is dialyzing utilizing the following method:Intermittent Hemodialysis        Complaints: feels better, coughing and breathing better, appetite sl better, wife at bedside. Denies any CP or SOB.  Remains on IV diltiazem    Current Facility-Administered Medications   Medication Dose Route Frequency    dilTIAZem (CARDIZEM) 5 mg/mL injection        carvedilol (COREG) tablet 12.5 mg  12.5 mg Oral BID WITH MEALS    cefTRIAXone (ROCEPHIN) 2 g in sterile water (preservative free) 20 mL IV syringe  2 g IntraVENous Q24H    iron sucrose (VENOFER) 100 mg in 0.9% sodium chloride 100 mL IVPB  100 mg IntraVENous DIALYSIS TUE, THU & SAT    doxercalciferol (HECTOROL) 4 mcg/2 mL injection 1 mcg  1 mcg IntraVENous DIALYSIS TUE, THU & SAT    epoetin gilda (EPOGEN;PROCRIT) 5,000 Units  5,000 Units IntraVENous DIALYSIS TUE, THU & SAT    calcium acetate (PHOSLO) capsule 1,334 mg  2 Cap Oral TID WITH MEALS    metoprolol (LOPRESSOR) injection 5 mg  5 mg IntraVENous Q4H    dilTIAZem (CARDIZEM) 100 mg in 0.9% sodium chloride 100 mL infusion  10 mg/hr IntraVENous CONTINUOUS    aspirin delayed-release tablet 81 mg  81 mg Oral DAILY    atorvastatin (LIPITOR) tablet 40 mg  40 mg Oral DAILY    B complex-vitaminC-folic acid (NEPHROCAP) cap  1 Cap Oral DAILY    montelukast (SINGULAIR) tablet 10 mg  10 mg Oral DAILY    acetaminophen (TYLENOL) tablet 650 mg  650 mg Oral Q4H PRN    ondansetron (ZOFRAN) injection 4 mg  4 mg IntraVENous Q4H PRN    azithromycin (ZITHROMAX) 500 mg in 0.9% sodium chloride (MBP/ADV) 250 mL adv  500 mg IntraVENous Q24H    sodium bicarbonate tablet 650 mg  650 mg Oral TID    0.9% sodium chloride infusion  100 mL/hr IntraVENous DIALYSIS PRN    insulin lispro (HUMALOG) injection   SubCUTAneous AC&HS    glucose chewable tablet 16 g  16 g Oral PRN    glucagon (GLUCAGEN) injection 1 mg  1 mg IntraMUSCular PRN    dextrose (D50W) injection syrg 12.5-25 g  25-50 mL IntraVENous PRN    influenza vaccine - (6 mos+)(PF) (FLUARIX QUAD/FLULAVAL QUAD) injection 0.5 mL  0.5 mL IntraMUSCular PRIOR TO DISCHARGE       701 - 1900  In: 332.8 [P.O.:100; I.V.:232.8]  Out: -   1901 -  07  In: 1025.7 [P.O.:780; I.V.:245.7]  Out: 1000       Objective:     Blood pressure 140/87, pulse 94, temperature 97.8 °F (36.6 °C), resp. rate 24, height 5' 10\" (1.778 m), weight 101.1 kg (222 lb 14.4 oz), SpO2 95 %.   Temp (24hrs), Av.6 °F (37 °C), Min:97.8 °F (36.6 °C), Max:99.3 °F (37.4 °C)      Blood Pressure: BP: 140/87  Pulse: Pulse (Heart Rate): 94  Temp:  Temp: 97.8 °F (36.6 °C)    Artificial Kidney Dialyzer/Set Up Inspection: Revaclear   hours Duration of Treatment (hours): 3.5 hours   Heparin Bolus    Blood flow rate Blood Flow Rate (ml/min): 250 ml/min   Dialysate rate     Arterial Access Pressure Arterial Access Pressure (mmHg): -170  Venous Return Pressure Venous Return Pressure (mmHg): 140  Ultrafiltration Rate Goal/Amount of Fluid to Remove (mL): 1000 mL  Fluid Removal Fluid Removed (mL): 1500  Net Fluid Removal NET Fluid Removed (mL): 1000 ml      PHYSICAL EXAM: alert, oriented x 3, afebrile  HEENT:  Non icteric, pale conj  NECK:  No JVD  CHEST AND LUNGS: rhonchi both lung bases  CVS: Irregular, no rub  ABDOMEN:  + BS  EXT:  + 2-3 LE edema, right arm avf + bruit    DATA REVIEW:    Labs: Results:       Chemistry Recent Labs     18  0532 18  0519 18  1450   * 119* 147*    138 136   K 3.9 4.9 5.6*    109* 109*   CO2 21 13* 11*   BUN 82* 109* 99*   CREA 11.30* 15.00* 14.50*   CA 6.9* 7.5* 7.2*  7.2*   AGAP 11 16 16   BUCR 7* 7* 7*   AP  --   --  97   TP  --   --  6.4   ALB  --   --  2.2*   GLOB  --   --  4.2*   AGRAT  --   --  0.5*   phos 7.8   CBC w/Diff Recent Labs     12/07/18  0532 12/06/18  0519 12/05/18  1421   WBC 14.7* 21.5* 26.0*   RBC 3.28* 3.44* 3.66*   HGB 9.0* 9.5* 10.2*   HCT 26.2* 27.9* 30.0*    246 237   GRANS 89* 92* 91*   LYMPH 4* 3* 1*   EOS 1 0 0      Coagulation No results for input(s): PTP, INR, APTT in the last 72 hours. No lab exists for component: INREXT, INREXT    Iron/Ferritin Recent Labs     12/05/18  1450   IRON 20*      BNP No results for input(s): BNPP in the last 72 hours. Cardiac Enzymes No results for input(s): CPK, CKND1, SAMARA in the last 72 hours. No lab exists for component: CKRMB, TROIP   Liver Enzymes Recent Labs     12/05/18  1450   TP 6.4   ALB 2.2*   AP 97   SGOT 31      Thyroid Studies Lab Results   Component Value Date/Time    TSH 2.43 12/06/2018 05:19 AM          Images:  XR Results (maximum last 3): Results from [place] encounter on 12/05/18   XR CHEST PORT    Narrative EXAMINATION: Chest single view    INDICATION: Shortness of breath    COMPARISON: 11/2/2012    FINDINGS: Single frontal view of the chest obtained. Mildly enlarged cardiac  silhouette. Slightly prominent perihilar interstitium. Mild bibasilar hazy  streaky densities. Underpenetration limits evaluation. A consolidation. No  evidence of pneumothorax. No acute osseous findings. Impression IMPRESSION:    Mildly enlarged cardiac silhouette with perhaps mild perihilar interstitial  infiltrate or edema. Nonspecific basilar streaky densities. Radiographic  follow-up recommended. Results from [place] encounter on 11/02/12   XR CHEST PORT    Narrative Exam: AP view of the chest    Comparison is made to previous study October 24, 2012 and February 29, 2012    Findings: AP radiograph of the chest. The lungs are clear. The heart and  mediastinum are midline.  There is no pneumothorax or consolidation. The bones  and soft tissue structures are unchanged. Impression Impression: No acute chest disease   Results from Hospital Encounter encounter on 10/24/12   XR CHEST PORT    Narrative CHEST PORTABLE    CPT CODE: 03512    HISTORY: Weakness. COMPARISON:2/29/2012, 2/28/2012, 1/25/2012 and prior studies. FINDINGS:    AP portable upright view of the chest was obtained at 1523 hrs. . The lung  fields are clear. Cardiac silhouette size is upper normal.. Mediastinal  contours are  unremarkable. There is no pneumothorax. There is no pulmonary  vascular congestion. No pleural effusion. Impression IMPRESSION:    No acute diagnostic abnormality. CT Results (maximum last 3): Results from [place] encounter on 11/25/15   CT SINUSES WO CONT    Narrative CT Sinuses Without Contrast    HISTORY: Chronic sinusitis. History sinus surgery to remove polyps. Preop for  another sinus surgery. COMPARISON: CT sinuses 1/9/15. TECHNIQUE: 2.5 mm axial scans through the paranasal sinuses followed by coronal  and sagittal reformations for better evaluation of osteomeatal units and to  minimize radiation dose. FINDINGS:     Compared to previous there is improved aeration and diminishing mucosal sinus  opacification of the left frontal and anterior ethmoid sinuses. All other  paranasal sinuses remain completely opacified. Bilateral maxillary antra are  completely opacified and the previous antrostomy windows are occluded. The  sphenoid sinuses are totally opacified and the sphenoidotomy windows are  occluded. The right ethmoid air cells are totally opacified. The left posterior  ethmoid air cells are opacified although the anterior air cells are now aerated. The right frontal sinus is totally opacified and the frontal recess is occluded. The right nasal cavity is nearly completely opacified and there is subsequent  occlusion of the superior and middle thigh.  There is aeration of the left nasal  cavity although there is soft tissue within the left maxillary antral window  which protrudes and partially occludes the nasal cavity. The nasal septum is  mildly curved to the left. No sinus expansion. There is hyperostosis of the  maxillary and sphenoid sinus walls. The opacifying material is of mixed hypo-and  hyperdense attenuations. Mastoid air cells and middle ear cavities are clear. No acute fracture. No  evidence of bone destruction. Hyperdense material in the vitreous chamber of the right globe, stable. Presumably previous ocular injections. The left lens has been extracted. No  gross orbital mass. Visualized brain is unremarkable. Impression IMPRESSION:    Pansinus opacification with improved aeration of the left frontal and anterior  ethmoid air cells when compared to 1/9/15. The remaining paranasal sinuses  remain completely opacified with hyperostosis suggesting chronic sinusitis. Evidence of prior maxillary antrostomies and sphenoidotomies however the  surgical windows are completely occluded. Differential considerations include  sinonasal polyposis and allergic fungal sinusitis. Results from [place] encounter on 01/09/15   CT SINUSES WO CONT    Narrative CT SINUSES    CPT code: 53325    History: Nasal polyps. Findings: Helical axial images of the paranasal sinuses are obtained and  reviewed with both bone and soft tissue windows. Additional coronal and  sagittal reconstructions were obtained to better delineate the orbital floor,  bony septa and the ostiomeatal complexes, which are not well demonstrated in the  axial imaging plane, as well as reduce radiation dose. There is virtually complete opacification of all paranasal sinuses and there is  significant polypoid soft tissue masses in the nasal cavities particularly  posterosuperior left in the region of the [place], left greater than right, but  also anteriorly under the middle turbinates.   There appears to have been prior  nasoantral window creation, bilaterally. The ostiomeatal complexes have  probably been resected. The bony outlines are otherwise intact but are mildly  thickened and sclerotic. The nasal septum is not significantly deviated. Impression IMPRESSION[de-identified]     Diffuse paranasal sinus disease an additional bilateral nasal polyps, as  described. Probable prior nasoantral window creation. There is some thickening  and sclerosis of the walls of the maxillary sinuses. Results from [place] encounter on 07/20/12   CT BX GUIDED NDL    Narrative Ordering MD: Kem Monte DO  Signed By: Srinath Irene MD  ** FINAL **  ---------------------------------------------------------------------  Procedure Date:  07/20/2012   Accession Number:  [id number]           Order No:   [id number]         Procedure:   CTV - BIOPSY CT GUIDANCE*        CPT Code:   01267      Admit Diag:   ANEMIA IN CKD              Reason:   BONE MARROW  INTERPRETATION:  PREOPERATIVE DIAGNOSIS: Anemia. POSTOPERATIVE DIAGNOSIS: Same  ATTENDING: MARYLOU Manning: None. PROCEDURES: CT-guided bone marrow biopsy. ANESTHESIA: 1% local lidocaine as well as intravenous moderate   sedation given and monitored per independently trained   interventional radiology nurse under my direct supervision. CONTRAST: None. COMPLICATIONS: None  DRAIN: No  CATHETER: None. EBL: Minimal.  SPECIMEN: Single core biopsy and 2 aspirates were obtained from the   right iliac crest bone marrow. Given to hematology on site. TECHNIQUE: After detailed explanation of risks and benefits of the   procedure verbal and written consent were obtained. Patient was   brought to the CT room and placed prone on the table. Timeout was   performed.  view was obtained.  Target lesion was identified and   skin was marked overlying right iliac crest  1% lidocaine solution   was instilled in the skin superficial and deep subcutaneous soft   tissues overlying the biopsy region. Under direct CT fluoroscopy guidance using 11-gauge OnControl biopsy   needle was advanced down through right iliac crest periosteum. 2   aspirates and single core were obtained via single pass. Hemostasis was achieved with manual compression. Sterile dressing   was applied. There were no immediate complications. Patient   tolerated procedure fairly well. I was present and performed the   procedure. FINDINGS: CT fluoroscopic guidance demonstrated good position of the   biopsy needle. IMPRESSION:  Successful, uncomplicated CT-guided bone marrow biopsy. CULTURE:   )  Recent Labs     12/06/18  1410 12/05/18  1613 12/05/18  1555   CULT PENDING AEROBIC AND ANAEROBIC BOTTLES STREPTOCOCCUS PNEUMONIAE* AEROBIC AND ANAEROBIC BOTTLES STREPTOCOCCUS PNEUMONIAE*     Recent Labs     12/06/18  1410 12/05/18  1613 12/05/18  1555   CULT PENDING AEROBIC AND ANAEROBIC BOTTLES STREPTOCOCCUS PNEUMONIAE* AEROBIC AND ANAEROBIC BOTTLES STREPTOCOCCUS PNEUMONIAE*     Fe 20 sat 10%, leonel 295  A1C 6.3  IPTH 723  Hep Bs ab neg Hep Bs Ag neg   Hep C Ab neg  Hep B core Ab neg  AVF duplex no obstruction or fluid collection    Assessment/Plan:     End Stage Renal Disease:  1st HD today. Patient is tolerating dialysis treatment well. .  Additionally the patient has experienced normal dialysis treatment during dialysis. Dry weight Estimated Weight: 100.3 kg (221 lb 1.9 oz) same. Inc time 3 H, K3 [place] 3 bath,  sched HD again in am    At 11:51 AM on 12/7/2018, I saw and examined patient during hemodialysis treatment. The patient was receiving hemodialysis for treatment of end stage renal disease. I have also reviewed vital signs, intake and output, lab results and recent events, and agreed with today's dialysis order. Anemia:  Cont IV venofer and Epo with HD.  Out pt HD sched [facility] TTS at 6 AM    Renal Metabolic Bone Disease:  Cont  phos binders with meals and IV Hectorol with HD Hypertension: controlled    Access: Fistula adequate monitoring/no changes     Strep pneumo bacteremia  defer to Dr Nicolasa Holder MD  12/7/2018

## 2018-12-07 NOTE — PROGRESS NOTES
Attempted bedside Echocardiogram 3 times, patient having dialysis each time. Will try again tomorrow.

## 2018-12-07 NOTE — PROGRESS NOTES
Infectious Disease Progress Note    Requested by: Dr. Ming Guardado    Reason: sepsis, gram positive bacteremia    Current abx Prior abx   Ceftriaxone, azithromycin since 12/5/18      Lines:       Assessment :    41 y.o. male with a PMH of ESRD s/p AVF placement but not on dialysis yet, HTN, chronic diastolic CHF, DM-2 (last FXWC8R: 6.3 on 12/5/18) , CAD s/p stent and pulmonary HTN who presented to [facility] on 12/5/18 with c/c of weakness and fatigue. A.fib with rvr on admission, acute renal failure    Now with gram positive bacteremia. Highly complex clinical picture. Presentation is c/w sepsis (POA) due to streptococcus pneumoniae bloodstream infection (positive blood culture 12/5),  bilateral community acquired pneumonia     BSI likely due to community acquired pneumonia. No evidence of cholecystitis on usg. Recommendations:    1. Continue ceftriaxone  2 g iv q 24 hour. Continue azithromycin till 12/8  2. Repeat cxr  3. Repeat blood cultures   4. F/u nephrology recommendations  5. Further management based on above test results, clinical course      Advance Care planning:full code:  discussed  with patient/surrogate decision maker:Jovita Sharpe: [phone]    Above plan was discussed in details with patient, dr. Ming Guardado. Please call me if any further questions or concerns. Will continue to participate in the care of this patient. subjective:    Patient denies headaches, visual disturbances, sore throat, runny nose, earaches, cp, chills, burning micturition, pain or weakness in extremities. He denies back pain/flank pain. Medication List      ASK your doctor about these medications    amLODIPine 10 mg tablet  Commonly known as:  NORVASC  TAKE ONE TABLET BY MOUTH EVERY DAY     aspirin delayed-release 81 mg tablet     atorvastatin 40 mg tablet  Commonly known as:  LIPITOR  Take 1 Tab by mouth daily.      b complex-vitamin c-folic acid 1mg 1 mg tablet  Commonly known as:  NAHED-MOON RX  TAKE ONE TABLET BY MOUTH EVERY DAY     calcitRIOL 0.25 mcg capsule  Commonly known as:  ROCALTROL     carvedilol 25 mg tablet  Commonly known as:  COREG  TAKE TWO TABLETS BY MOUTH TWICE DAILY     furosemide 40 mg tablet  Commonly known as:  LASIX  TAKE 2 TABLETS BY MOUTH TWO TIMES A DAY     glimepiride 4 mg tablet  Commonly known as:  AMARYL  TAKE 1 TABLET BY MOUTH EVERY MORNING     glucose blood VI test strips strip  Commonly known as:  FREESTYLE LITE STRIPS  Use as directed     hydrALAZINE 100 mg tablet  Commonly known as:  APRESOLINE  TAKE 1 TABLET BY MOUTH THREE TIMES A DAY     metOLazone 5 mg tablet  Commonly known as:  ZAROXOLYN  TAKE ONE TABLET BY MOUTH EVERY FRIDAY 30 MINUTES PRIOR TO MORNING LASIX DOSE     PROCRIT INJECTION     sildenafil citrate 50 mg tablet  Commonly known as:  VIAGRA  Take 1 Tab by mouth as needed. Natalio Chamberlain 47 [phone], lot #[id number]  Exp 12/16, 1 box, # 2 tabs     SINGULAIR 10 mg tablet  Generic drug:  montelukast     sodium bicarbonate 325 mg tablet     tadalafil 20 mg tablet  Commonly known as:  CIALIS  Take 1 Tab by mouth as needed.      VITAMIN D3 2,000 unit Tab  Generic drug:  cholecalciferol (vitamin D3)            Current Facility-Administered Medications   Medication Dose Route Frequency    carvedilol (COREG) tablet 6.25 mg  6.25 mg Oral BID WITH MEALS    cefTRIAXone (ROCEPHIN) 2 g in sterile water (preservative free) 20 mL IV syringe  2 g IntraVENous Q24H    iron sucrose (VENOFER) 100 mg in 0.9% sodium chloride 100 mL IVPB  100 mg IntraVENous DIALYSIS TUE, THU & SAT    doxercalciferol (HECTOROL) 4 mcg/2 mL injection 1 mcg  1 mcg IntraVENous DIALYSIS TUE, THU & SAT    epoetin gilda (EPOGEN;PROCRIT) 5,000 Units  5,000 Units IntraVENous DIALYSIS TUE, THU & SAT    calcium acetate (PHOSLO) capsule 1,334 mg  2 Cap Oral TID WITH MEALS    metoprolol (LOPRESSOR) injection 5 mg  5 mg IntraVENous Q4H    dilTIAZem (CARDIZEM) 100 mg in 0.9% sodium chloride 100 mL infusion  15 mg/hr IntraVENous CONTINUOUS    aspirin delayed-release tablet 81 mg  81 mg Oral DAILY    atorvastatin (LIPITOR) tablet 40 mg  40 mg Oral DAILY    B complex-vitaminC-folic acid (NEPHROCAP) cap  1 Cap Oral DAILY    montelukast (SINGULAIR) tablet 10 mg  10 mg Oral DAILY    acetaminophen (TYLENOL) tablet 650 mg  650 mg Oral Q4H PRN    ondansetron (ZOFRAN) injection 4 mg  4 mg IntraVENous Q4H PRN    azithromycin (ZITHROMAX) 500 mg in 0.9% sodium chloride (MBP/ADV) 250 mL adv  500 mg IntraVENous Q24H    sodium bicarbonate tablet 650 mg  650 mg Oral TID    0.9% sodium chloride infusion  100 mL/hr IntraVENous DIALYSIS PRN    insulin lispro (HUMALOG) injection   SubCUTAneous AC&HS    glucose chewable tablet 16 g  16 g Oral PRN    glucagon (GLUCAGEN) injection 1 mg  1 mg IntraMUSCular PRN    dextrose (D50W) injection syrg 12.5-25 g  25-50 mL IntraVENous PRN    influenza vaccine 2018- (6 mos+)(PF) (FLUARIX QUAD/FLULAVAL QUAD) injection 0.5 mL  0.5 mL IntraMUSCular PRIOR TO DISCHARGE       Allergies: Patient has no known allergies. Temp (24hrs), Av.5 °F (36.9 °C), Min:97.8 °F (36.6 °C), Max:99.3 °F (37.4 °C)    Visit Vitals  /87   Pulse 94   Temp 97.8 °F (36.6 °C)   Resp 24   Ht 5' 10\" (1.778 m)   Wt 101.1 kg (222 lb 14.4 oz)   SpO2 95%   BMI 31.98 kg/m²       ROS: 12 point ROS obtained in details.  Pertinent positives as mentioned in HPI, otherwise negative    Physical Exam:    GENERAL: Not in acute distress  HEENT: pink conjunctiva, un icteric sclera,   NECK: No lymphadenopthy or thyroid swelling, JVD not seen  LYMPH: No supraclavicular or cervical or axillary nodes on both sides  CVS: S1S2 regular, No gallop or rub  RS: CTA, no rales or wheezes  Abd: Soft,RUQ tenderness on deep palpation, not distended, No guarding, No rigidity  NEURO:  No focal neurologic deficits   Extrm: no leg edema or swelling   Skin: No rash  Psych: no suicidal or homicidal ideations           Labs: Results:   Chemistry Recent Labs 12/07/18  0532 12/06/18  0519 12/05/18  1450   * 119* 147*    138 136   K 3.9 4.9 5.6*    109* 109*   CO2 21 13* 11*   BUN 82* 109* 99*   CREA 11.30* 15.00* 14.50*   CA 6.9* 7.5* 7.2*  7.2*   AGAP 11 16 16   BUCR 7* 7* 7*   AP  --   --  97   TP  --   --  6.4   ALB  --   --  2.2*   GLOB  --   --  4.2*   AGRAT  --   --  0.5*      CBC w/Diff Recent Labs     12/07/18  0532 12/06/18  0519 12/05/18  1421   WBC 14.7* 21.5* 26.0*   RBC 3.28* 3.44* 3.66*   HGB 9.0* 9.5* 10.2*   HCT 26.2* 27.9* 30.0*    246 237   GRANS 89* 92* 91*   LYMPH 4* 3* 1*   EOS 1 0 0      Microbiology Recent Labs     12/06/18  1410 12/05/18  1613 12/05/18  1555   CULT PENDING AEROBIC AND ANAEROBIC BOTTLES STREPTOCOCCUS PNEUMONIAE* AEROBIC AND ANAEROBIC BOTTLES STREPTOCOCCUS PNEUMONIAE*          RADIOLOGY:    All available imaging studies/reports in [facility] for this admission were reviewed    Dr. Lilia Velazquez, Infectious Disease Specialist  [phone]  December 7, 2018  9:25 AM

## 2018-12-07 NOTE — PROGRESS NOTES
[facility]  -  Progress Note      Patient: Rafaela Mcfarlane MRN: [id number]  SSN: [id number]    YOB: 1970  Age: 41 y.o. Sex: male      Admit Date: 12/5/2018    Assessment:     Hospital Problems  Date Reviewed: 6/21/2016          Codes Class Noted POA    Atrial fibrillation with rapid ventricular response [facility]) ICD-10-CM: I48.91  ICD-9-CM: 427.31  12/5/2018 Unknown            -Volume overload, ESRD s/p AVF placement but not on dialysis yet, noncompliant with follow up.  -Leukocytosis, possible pneumonia, presented with cough (with hemoptysis) and cold like symptoms, also ruling out possible AVF related infection, on empiric antibiotics. -Paroxysmal atrial fibrillation, new diagnosis, presented with RVR, intermittent afib this admission, likely driven by underlying illness, currently sinus on cardizem drip.  -Coronary artery disease s/p LAD PCI with AMOL 10/2012.  -Ischemic cardiomyopathy with EF 40-45% 2012 improved to 55% 2014.  -Diabetes mellitus.  -Hypertension.  -Dyslipidemia.  -Chronic anemia.  -Legally blind.  -Noncompliance with medical regimen and follow up.     Primary cardiologist Dr Richa Ross, last office visit 2016.        Atrial fibrillation CHADSVASC2 score stroke risk:   41 y.o.                                        <65        + 0   male                                         Male     [de-identified]  CHF hx                                           Yes    +1  HTN hx                                           Yes    +1  Stroke/TIA/Thromboembolism       No    +0  Vascular disease hx                       No    + 0  Diabetes Mellitus                            Yes    +1   CHADSVASC2 score                    3      Annual Stroke Risk 3.2% - moderate-high     Plan:     Patient is much more awake today. He tolerated hemodialysis yesterday without difficulty.   I would continue carvedilol (uptitrate as tolerated -increased today to 12.5 mg twice daily) and titrate up as blood pressure tolerates. I did discuss with him about oral anticoagulation. However, because of his chronic anemia and hemoptysis, for the time being, he will continue on aspirin. Subjective:     No new complaints. Objective:      Patient Vitals for the past 8 hrs:   Temp Pulse Resp BP SpO2   12/07/18 0740 97.8 °F (36.6 °C) 94 24 140/87 95 %   12/07/18 0458 -- (!) 104 -- 108/75 --   12/07/18 0400 98.6 °F (37 °C) (!) 101 19 108/75 93 %         Patient Vitals for the past 96 hrs:   Weight   12/06/18 2156 101.1 kg (222 lb 14.4 oz)   12/05/18 2020 100.3 kg (221 lb 3.2 oz)         Intake/Output Summary (Last 24 hours) at 12/7/2018 1050  Last data filed at 12/7/2018 0840  Gross per 24 hour   Intake 1063 ml   Output 1000 ml   Net 63 ml       Physical Exam:  General:  alert, cooperative, no distress, appears stated age  Neck:  no JVD  Lungs:  clear to auscultation bilaterally  Heart:  irregularly irregular rhythm  Abdomen:  no guarding or rigidity  Extremities:  edema trace    Data Review:     Labs: Results:       Chemistry Recent Labs     12/07/18  0532 12/06/18  0519 12/05/18  1450   * 119* 147*    138 136   K 3.9 4.9 5.6*    109* 109*   CO2 21 13* 11*   BUN 82* 109* 99*   CREA 11.30* 15.00* 14.50*   CA 6.9* 7.5* 7.2*  7.2*   MG  --   --  1.7   PHOS  --   --  7.8*   AGAP 11 16 16   BUCR 7* 7* 7*   AP  --   --  97   TP  --   --  6.4   ALB  --   --  2.2*   GLOB  --   --  4.2*   AGRAT  --   --  0.5*      CBC w/Diff Recent Labs     12/07/18  0532 12/06/18  0519 12/05/18  1421   WBC 14.7* 21.5* 26.0*   RBC 3.28* 3.44* 3.66*   HGB 9.0* 9.5* 10.2*   HCT 26.2* 27.9* 30.0*    246 237   GRANS 89* 92* 91*   LYMPH 4* 3* 1*   EOS 1 0 0      Cardiac Enzymes No results found for: CPK, CK, CKMMB, CKMB, RCK3, CKMBT, CKNDX, CKND1, SAMARA, TROPT, TROIQ, SAKINA, TROPT, TNIPOC, BNP, BNPP   Coagulation No results for input(s): PTP, INR, APTT in the last 72 hours.     No lab exists for component: INREXT    Lipid Panel Lab Results   Component Value Date/Time    Cholesterol, total 122 05/11/2015 08:55 AM    HDL Cholesterol 38 (L) 05/11/2015 08:55 AM    LDL, calculated 72 05/11/2015 08:55 AM    VLDL, calculated 12 05/11/2015 08:55 AM    Triglyceride 58 05/11/2015 08:55 AM    CHOL/HDL Ratio 4.4 10/17/2012 06:05 AM      BNP No results found for: BNP, BNPP, XBNPT   Liver Enzymes Recent Labs     12/05/18  1450   TP 6.4   ALB 2.2*   AP 97   SGOT 31      Digoxin    Thyroid Studies Lab Results   Component Value Date/Time    TSH 2.43 12/06/2018 05:19 AM

## 2018-12-08 ENCOUNTER — APPOINTMENT (OUTPATIENT)
Dept: NON INVASIVE DIAGNOSTICS | Age: 48
DRG: 871 | End: 2018-12-08
Attending: PHYSICIAN ASSISTANT
Payer: COMMERCIAL

## 2018-12-08 LAB
ANION GAP SERPL CALC-SCNC: 13 MMOL/L (ref 3–18)
BACTERIA SPEC CULT: ABNORMAL
BASOPHILS # BLD: 0 K/UL (ref 0–0.1)
BASOPHILS NFR BLD: 0 % (ref 0–2)
BUN SERPL-MCNC: 54 MG/DL (ref 7–18)
BUN/CREAT SERPL: 7 (ref 12–20)
CALCIUM SERPL-MCNC: 7.7 MG/DL (ref 8.5–10.1)
CHLORIDE SERPL-SCNC: 104 MMOL/L (ref 100–108)
CO2 SERPL-SCNC: 22 MMOL/L (ref 21–32)
CREAT SERPL-MCNC: 8.07 MG/DL (ref 0.6–1.3)
DIFFERENTIAL METHOD BLD: ABNORMAL
ECHO AO ROOT DIAM: 3.99 CM
ECHO IVC SNIFF: 2.82 CM
ECHO LA AREA 4C: 24.8 CM2
ECHO LA VOL 2C: 97.25 ML (ref 18–58)
ECHO LA VOL 4C: 71.48 ML (ref 18–58)
ECHO LA VOL BP: 93.68 ML (ref 18–58)
ECHO LA VOL/BSA BIPLANE: 43.1 ML/M2 (ref 16–28)
ECHO LA VOLUME INDEX A2C: 44.74 ML/M2 (ref 16–28)
ECHO LA VOLUME INDEX A4C: 32.89 ML/M2 (ref 16–28)
ECHO LV INTERNAL DIMENSION DIASTOLIC: 5.34 CM (ref 4.2–5.9)
ECHO LV INTERNAL DIMENSION SYSTOLIC: 4.28 CM
ECHO LV IVSD: 1.6 CM (ref 0.6–1)
ECHO LV MASS 2D: 489.3 G (ref 88–224)
ECHO LV MASS INDEX 2D: 225.1 G/M2 (ref 49–115)
ECHO LV POSTERIOR WALL DIASTOLIC: 1.65 CM (ref 0.6–1)
ECHO LVOT DIAM: 2.37 CM
ECHO PULMONARY ARTERY SYSTOLIC PRESSURE (PASP): 50 MMHG
ECHO RV TAPSE: 2.1 CM (ref 1.5–2)
ECHO TV REGURGITANT MAX VELOCITY: 342.13 CM/S
ECHO TV REGURGITANT PEAK GRADIENT: 46.8 MMHG
EOSINOPHIL # BLD: 0.2 K/UL (ref 0–0.4)
EOSINOPHIL NFR BLD: 2 % (ref 0–5)
ERYTHROCYTE [DISTWIDTH] IN BLOOD BY AUTOMATED COUNT: 15.5 % (ref 11.6–14.5)
GLUCOSE BLD STRIP.AUTO-MCNC: 134 MG/DL (ref 70–110)
GLUCOSE BLD STRIP.AUTO-MCNC: 136 MG/DL (ref 70–110)
GLUCOSE BLD STRIP.AUTO-MCNC: 232 MG/DL (ref 70–110)
GLUCOSE BLD STRIP.AUTO-MCNC: 94 MG/DL (ref 70–110)
GLUCOSE BLD STRIP.AUTO-MCNC: 97 MG/DL (ref 70–110)
GLUCOSE SERPL-MCNC: 114 MG/DL (ref 74–99)
GRAM STN SPEC: ABNORMAL
HCT VFR BLD AUTO: 24.1 % (ref 36–48)
HGB BLD-MCNC: 8.1 G/DL (ref 13–16)
LYMPHOCYTES # BLD: 0.5 K/UL (ref 0.9–3.6)
LYMPHOCYTES NFR BLD: 4 % (ref 21–52)
MCH RBC QN AUTO: 27 PG (ref 24–34)
MCHC RBC AUTO-ENTMCNC: 33.6 G/DL (ref 31–37)
MCV RBC AUTO: 80.3 FL (ref 74–97)
MONOCYTES # BLD: 1 K/UL (ref 0.05–1.2)
MONOCYTES NFR BLD: 9 % (ref 3–10)
NEUTS SEG # BLD: 9.3 K/UL (ref 1.8–8)
NEUTS SEG NFR BLD: 85 % (ref 40–73)
PHOSPHATE SERPL-MCNC: 4.7 MG/DL (ref 2.5–4.9)
PLATELET # BLD AUTO: 200 K/UL (ref 135–420)
PMV BLD AUTO: 10.3 FL (ref 9.2–11.8)
POTASSIUM SERPL-SCNC: 3.6 MMOL/L (ref 3.5–5.5)
RBC # BLD AUTO: 3 M/UL (ref 4.7–5.5)
SERVICE CMNT-IMP: ABNORMAL
SERVICE CMNT-IMP: ABNORMAL
SODIUM SERPL-SCNC: 139 MMOL/L (ref 136–145)
WBC # BLD AUTO: 11.1 K/UL (ref 4.6–13.2)

## 2018-12-08 PROCEDURE — 80048 BASIC METABOLIC PNL TOTAL CA: CPT

## 2018-12-08 PROCEDURE — 74011636637 HC RX REV CODE- 636/637: Performed by: INTERNAL MEDICINE

## 2018-12-08 PROCEDURE — 74011000250 HC RX REV CODE- 250: Performed by: HOSPITALIST

## 2018-12-08 PROCEDURE — 74011250637 HC RX REV CODE- 250/637: Performed by: INTERNAL MEDICINE

## 2018-12-08 PROCEDURE — 90935 HEMODIALYSIS ONE EVALUATION: CPT

## 2018-12-08 PROCEDURE — 74011000250 HC RX REV CODE- 250: Performed by: PHYSICIAN ASSISTANT

## 2018-12-08 PROCEDURE — 74011000258 HC RX REV CODE- 258: Performed by: INTERNAL MEDICINE

## 2018-12-08 PROCEDURE — 74011000258 HC RX REV CODE- 258: Performed by: HOSPITALIST

## 2018-12-08 PROCEDURE — 65660000004 HC RM CVT STEPDOWN

## 2018-12-08 PROCEDURE — 82962 GLUCOSE BLOOD TEST: CPT

## 2018-12-08 PROCEDURE — 36415 COLL VENOUS BLD VENIPUNCTURE: CPT

## 2018-12-08 PROCEDURE — 74011250637 HC RX REV CODE- 250/637: Performed by: HOSPITALIST

## 2018-12-08 PROCEDURE — 74011000250 HC RX REV CODE- 250: Performed by: INTERNAL MEDICINE

## 2018-12-08 PROCEDURE — 93306 TTE W/DOPPLER COMPLETE: CPT

## 2018-12-08 PROCEDURE — 74011250636 HC RX REV CODE- 250/636: Performed by: INTERNAL MEDICINE

## 2018-12-08 PROCEDURE — 85025 COMPLETE CBC W/AUTO DIFF WBC: CPT

## 2018-12-08 PROCEDURE — 84100 ASSAY OF PHOSPHORUS: CPT

## 2018-12-08 RX ORDER — SODIUM,POTASSIUM PHOSPHATES 280-250MG
1 POWDER IN PACKET (EA) ORAL 2 TIMES DAILY
Status: COMPLETED | OUTPATIENT
Start: 2018-12-08 | End: 2018-12-08

## 2018-12-08 RX ORDER — CARVEDILOL 25 MG/1
25 TABLET ORAL 2 TIMES DAILY WITH MEALS
Status: DISCONTINUED | OUTPATIENT
Start: 2018-12-08 | End: 2018-12-14 | Stop reason: HOSPADM

## 2018-12-08 RX ADMIN — DILTIAZEM HYDROCHLORIDE 10 MG/HR: 5 INJECTION INTRAVENOUS at 01:18

## 2018-12-08 RX ADMIN — DILTIAZEM HYDROCHLORIDE 5 MG/HR: 5 INJECTION INTRAVENOUS at 11:57

## 2018-12-08 RX ADMIN — ATORVASTATIN CALCIUM 40 MG: 40 TABLET, FILM COATED ORAL at 09:07

## 2018-12-08 RX ADMIN — POTASSIUM & SODIUM PHOSPHATES POWDER PACK 280-160-250 MG 1 PACKET: 280-160-250 PACK at 20:46

## 2018-12-08 RX ADMIN — NEPHROCAP 1 CAPSULE: 1 CAP ORAL at 09:07

## 2018-12-08 RX ADMIN — ERYTHROPOIETIN 5000 UNITS: 3000 INJECTION, SOLUTION INTRAVENOUS; SUBCUTANEOUS at 14:07

## 2018-12-08 RX ADMIN — METOPROLOL TARTRATE 5 MG: 5 INJECTION, SOLUTION INTRAVENOUS at 15:53

## 2018-12-08 RX ADMIN — DOXERCALCIFEROL 1 MCG: 4 INJECTION, SOLUTION INTRAVENOUS at 09:07

## 2018-12-08 RX ADMIN — MONTELUKAST SODIUM 10 MG: 10 TABLET, FILM COATED ORAL at 09:07

## 2018-12-08 RX ADMIN — CARVEDILOL 25 MG: 25 TABLET, FILM COATED ORAL at 16:28

## 2018-12-08 RX ADMIN — CARVEDILOL 12.5 MG: 12.5 TABLET, FILM COATED ORAL at 09:07

## 2018-12-08 RX ADMIN — MELATONIN TAB 3 MG 3 MG: 3 TAB at 00:00

## 2018-12-08 RX ADMIN — POTASSIUM & SODIUM PHOSPHATES POWDER PACK 280-160-250 MG 1 PACKET: 280-160-250 PACK at 13:00

## 2018-12-08 RX ADMIN — INSULIN LISPRO 2 UNITS: 100 INJECTION, SOLUTION INTRAVENOUS; SUBCUTANEOUS at 22:00

## 2018-12-08 RX ADMIN — IRON SUCROSE 100 MG: 20 INJECTION, SOLUTION INTRAVENOUS at 14:02

## 2018-12-08 RX ADMIN — METOPROLOL TARTRATE 5 MG: 5 INJECTION, SOLUTION INTRAVENOUS at 20:46

## 2018-12-08 RX ADMIN — CALCIUM ACETATE 1334 MG: 667 CAPSULE ORAL at 09:07

## 2018-12-08 RX ADMIN — WATER 2 G: 1 INJECTION INTRAMUSCULAR; INTRAVENOUS; SUBCUTANEOUS at 15:53

## 2018-12-08 RX ADMIN — ONDANSETRON 4 MG: 2 INJECTION INTRAMUSCULAR; INTRAVENOUS at 20:45

## 2018-12-08 RX ADMIN — ASPIRIN 81 MG: 81 TABLET, COATED ORAL at 09:07

## 2018-12-08 RX ADMIN — AZITHROMYCIN MONOHYDRATE 500 MG: 500 INJECTION, POWDER, LYOPHILIZED, FOR SOLUTION INTRAVENOUS at 17:00

## 2018-12-08 NOTE — PROGRESS NOTES
[facility] - Progress Note  Admit Date: 12/5/2018    Assessment:     -Volume overload, ESRD, now on intermittent HD  -Sepsis with bacteremia, S. Pneumonia, suspected pneumonia on antbx  -Paroxysmal atrial fibrillation, new diagnosis, presented with RVR, intermittent afib this admission, likely driven by underlying illness. CHADSVASC2 score 3  -Coronary artery disease s/p LAD PCI with AMOL 10/2012.  -Ischemic cardiomyopathy with EF 40-45% 2012 improved to 55% 2014.  -Diabetes mellitus.  -Hypertension.  -Dyslipidemia.  -Chronic anemia.  -Legally blind.  -Noncompliance with medical regimen and follow up.     Primary cardiologist Dr Lion Bosch, last office visit 2016. Plan:     I will decrease diltiazem drip to 5 today, plan to stop tomorrow. I will follow-up echo. Maintaining SR. Subjective:     No new complaints.      Objective:      Patient Vitals for the past 8 hrs:   Temp Pulse Resp BP SpO2   12/08/18 0724 98.3 °F (36.8 °C) 80 19 (!) 142/94 95 %   12/08/18 0350 97.9 °F (36.6 °C) 94 18 140/76 93 %         Patient Vitals for the past 96 hrs:   Weight   12/08/18 0350 100.2 kg (220 lb 14.4 oz)   12/07/18 1303 100.2 kg (221 lb)   12/06/18 2156 101.1 kg (222 lb 14.4 oz)   12/05/18 2020 100.3 kg (221 lb 3.2 oz)                    Intake/Output Summary (Last 24 hours) at 12/8/2018 0841  Last data filed at 12/8/2018 0755  Gross per 24 hour   Intake 566.17 ml   Output 1000 ml   Net -433.83 ml       Physical Exam:  General:  alert, cooperative, no distress, appears stated age  Neck:  nontender  Lungs:  decreased  Heart:  regular rate and rhythm, S1, S2 normal, no murmur, click, rub or gallop  Abdomen:  abdomen is soft without significant tenderness, masses, organomegaly or guarding  Extremities:  extremities normal, atraumatic, no cyanosis or edema    Data Review:     Labs: Results:       Chemistry Recent Labs     12/08/18  0155 12/07/18  0532 12/06/18  0519 12/05/18  1450   * 118* 119* 147*    137 138 136   K 3.6 3.9 4.9 5.6*    105 109* 109*   CO2 22 21 13* 11*   BUN 54* 82* 109* 99*   CREA 8.07* 11.30* 15.00* 14.50*   CA 7.7* 6.9* 7.5* 7.2*  7.2*   MG  --  2.0  --  1.7   PHOS  --  1.1*  --  7.8*   AGAP 13 11 16 16   BUCR 7* 7* 7* 7*   AP  --   --   --  97   TP  --   --   --  6.4   ALB  --   --   --  2.2*   GLOB  --   --   --  4.2*   AGRAT  --   --   --  0.5*      CBC w/Diff Recent Labs     12/08/18  0155 12/07/18  0532 12/06/18  0519   WBC 11.1 14.7* 21.5*   RBC 3.00* 3.28* 3.44*   HGB 8.1* 9.0* 9.5*   HCT 24.1* 26.2* 27.9*    223 246   GRANS 85* 89* 92*   LYMPH 4* 4* 3*   EOS 2 1 0      Cardiac Enzymes No results found for: CPK, CK, CKMMB, CKMB, RCK3, CKMBT, CKNDX, CKND1, SAMARA, TROPT, TROIQ, SAKINA, TROPT, TNIPOC, BNP, BNPP   Coagulation No results for input(s): PTP, INR, APTT in the last 72 hours.     No lab exists for component: INREXT    Lipid Panel Lab Results   Component Value Date/Time    Cholesterol, total 122 05/11/2015 08:55 AM    HDL Cholesterol 38 (L) 05/11/2015 08:55 AM    LDL, calculated 72 05/11/2015 08:55 AM    VLDL, calculated 12 05/11/2015 08:55 AM    Triglyceride 58 05/11/2015 08:55 AM    CHOL/HDL Ratio 4.4 10/17/2012 06:05 AM      BNP No results found for: BNP, BNPP, XBNPT   Liver Enzymes Recent Labs     12/05/18  1450   TP 6.4   ALB 2.2*   AP 97   SGOT 31      Digoxin    Thyroid Studies Lab Results   Component Value Date/Time    TSH 2.43 12/06/2018 05:19 AM          Signed By: Andrew Todd MD     December 8, 2018

## 2018-12-08 NOTE — PROGRESS NOTES
[facility]  Progress Note    Patient: Carlos Kilgore Age: 41 y.o. : 1970 MR#: [id number] SSN: [id number]  Date/Time: 2018     Subjective:   Pt has no complaints. Assessment/Plan:   1. Atrial fibrillation with RVR, new onset:cardizem drip decreased today, hr now above 110's mostly, will increase coreg dosing for now, discussed w/ cardiology. cont to closely monitor. 2. Suspected pneumonia: cont Abx  3. Strep pneumo bacteremia and sepsis: likely from lung: on Abx and repeat Cx negative thus far. 4. ESRD s/p AVF placement: HD per nephrologist.  5. Hyperkalemia-resolved. 6. HTN, controlled: cont BB  7. Chronic diastolic CHF: compensated    8. DM-2, controlled: cont SSI  9. CAD s/p stent: cont asa, BB and statin   10. H/o pulmonary HTN  11. ? Hemoptysis: will monitor  12. Anemia hgb trending down, unclear if as a result of # 11, no reports of hemoptysis today. Will cont to closely monitor. Guiac stool. Lab w/u. Case discussed with:  [x]Patient  []Family  []Nursing  []Case Management  DVT Prophylaxis:  []Lovenox  []Hep SQ  [x]SCDs  []Coumadin   []On Heparin gtt    Objective:   VS:   Visit Vitals  BP (!) 155/111   Pulse (!) 135   Temp 98.7 °F (37.1 °C) (Temporal)   Resp (!) 31   Ht 5' 10\" (1.778 m)   Wt 99.8 kg (220 lb)   SpO2 97%   BMI 31.57 kg/m²      Tmax/24hrs: Temp (24hrs), Av.2 °F (36.8 °C), Min:97.6 °F (36.4 °C), Max:98.7 °F (37.1 °C)  IOBRIEF    Intake/Output Summary (Last 24 hours) at 2018 1610  Last data filed at 2018 1457  Gross per 24 hour   Intake 428.17 ml   Output 2000 ml   Net -1571.83 ml       General:  Alert, cooperative, no acute distress    Pulmonary:  CTA Bilaterally. No Wheezing/Rales. Cardiovascular: IRRegular rate and Rhythm. GI:  Soft, Non distended, Non tender. + Bowel sounds. Extremities:  No edema, cyanosis, clubbing. Psych: Good insight. Not anxious or agitated. Neurologic: Alert and oriented X 3. No acute neuro deficits.   Additional:    Medications:   Current Facility-Administered Medications   Medication Dose Route Frequency    potassium, sodium phosphates (NEUTRA-PHOS) packet 1 Packet  1 Packet Oral BID    carvedilol (COREG) tablet 25 mg  25 mg Oral BID WITH MEALS    melatonin tablet 3 mg  3 mg Oral QHS PRN    cefTRIAXone (ROCEPHIN) 2 g in sterile water (preservative free) 20 mL IV syringe  2 g IntraVENous Q24H    iron sucrose (VENOFER) 100 mg in 0.9% sodium chloride 100 mL IVPB  100 mg IntraVENous DIALYSIS TUE, THU & SAT    doxercalciferol (HECTOROL) 4 mcg/2 mL injection 1 mcg  1 mcg IntraVENous DIALYSIS TUE, THU & SAT    epoetin gilda (EPOGEN;PROCRIT) 5,000 Units  5,000 Units IntraVENous DIALYSIS TUE, THU & SAT    metoprolol (LOPRESSOR) injection 5 mg  5 mg IntraVENous Q4H    dilTIAZem (CARDIZEM) 100 mg in 0.9% sodium chloride 100 mL infusion  5 mg/hr IntraVENous CONTINUOUS    aspirin delayed-release tablet 81 mg  81 mg Oral DAILY    atorvastatin (LIPITOR) tablet 40 mg  40 mg Oral DAILY    B complex-vitaminC-folic acid (NEPHROCAP) cap  1 Cap Oral DAILY    montelukast (SINGULAIR) tablet 10 mg  10 mg Oral DAILY    acetaminophen (TYLENOL) tablet 650 mg  650 mg Oral Q4H PRN    ondansetron (ZOFRAN) injection 4 mg  4 mg IntraVENous Q4H PRN    azithromycin (ZITHROMAX) 500 mg in 0.9% sodium chloride (MBP/ADV) 250 mL adv  500 mg IntraVENous Q24H    0.9% sodium chloride infusion  100 mL/hr IntraVENous DIALYSIS PRN    insulin lispro (HUMALOG) injection   SubCUTAneous AC&HS    glucose chewable tablet 16 g  16 g Oral PRN    glucagon (GLUCAGEN) injection 1 mg  1 mg IntraMUSCular PRN    dextrose (D50W) injection syrg 12.5-25 g  25-50 mL IntraVENous PRN    influenza vaccine 2018-19 (6 mos+)(PF) (FLUARIX QUAD/FLULAVAL QUAD) injection 0.5 mL  0.5 mL IntraMUSCular PRIOR TO DISCHARGE       Labs:    Recent Results (from the past 24 hour(s))   GLUCOSE, POC    Collection Time: 12/07/18  4:22 PM   Result Value Ref Range    Glucose (POC) 103 70 - 110 mg/dL   C. DIFFICILE AG & TOXIN A/B    Collection Time: 12/07/18  5:00 PM   Result Value Ref Range    GDH ANTIGEN NEGATIVE  NEG      C. difficile toxin NEGATIVE  NEG      INTERPRETATION NEGATIVE FOR TOXIGENIC C. DIFFICILE NTXCD     GLUCOSE, POC    Collection Time: 12/07/18  9:19 PM   Result Value Ref Range    Glucose (POC) 118 (H) 70 - 524 mg/dL   METABOLIC PANEL, BASIC    Collection Time: 12/08/18  1:55 AM   Result Value Ref Range    Sodium 139 136 - 145 mmol/L    Potassium 3.6 3.5 - 5.5 mmol/L    Chloride 104 100 - 108 mmol/L    CO2 22 21 - 32 mmol/L    Anion gap 13 3.0 - 18 mmol/L    Glucose 114 (H) 74 - 99 mg/dL    BUN 54 (H) 7.0 - 18 MG/DL    Creatinine 8.07 (H) 0.6 - 1.3 MG/DL    BUN/Creatinine ratio 7 (L) 12 - 20      GFR est AA 9 (L) >60 ml/min/1.73m2    GFR est non-AA 7 (L) >60 ml/min/1.73m2    Calcium 7.7 (L) 8.5 - 10.1 MG/DL   CBC WITH AUTOMATED DIFF    Collection Time: 12/08/18  1:55 AM   Result Value Ref Range    WBC 11.1 4.6 - 13.2 K/uL    RBC 3.00 (L) 4.70 - 5.50 M/uL    HGB 8.1 (L) 13.0 - 16.0 g/dL    HCT 24.1 (L) 36.0 - 48.0 %    MCV 80.3 74.0 - 97.0 FL    MCH 27.0 24.0 - 34.0 PG    MCHC 33.6 31.0 - 37.0 g/dL    RDW 15.5 (H) 11.6 - 14.5 %    PLATELET 782 888 - 264 K/uL    MPV 10.3 9.2 - 11.8 FL    NEUTROPHILS 85 (H) 40 - 73 %    LYMPHOCYTES 4 (L) 21 - 52 %    MONOCYTES 9 3 - 10 %    EOSINOPHILS 2 0 - 5 %    BASOPHILS 0 0 - 2 %    ABS. NEUTROPHILS 9.3 (H) 1.8 - 8.0 K/UL    ABS. LYMPHOCYTES 0.5 (L) 0.9 - 3.6 K/UL    ABS. MONOCYTES 1.0 0.05 - 1.2 K/UL    ABS. EOSINOPHILS 0.2 0.0 - 0.4 K/UL    ABS.  BASOPHILS 0.0 0.0 - 0.1 K/UL    DF AUTOMATED     PHOSPHORUS    Collection Time: 12/08/18  1:55 AM   Result Value Ref Range    Phosphorus 4.7 2.5 - 4.9 MG/DL   GLUCOSE, POC    Collection Time: 12/08/18  6:14 AM   Result Value Ref Range    Glucose (POC) 94 70 - 110 mg/dL   GLUCOSE, POC    Collection Time: 12/08/18  7:23 AM   Result Value Ref Range    Glucose (POC) 97 70 - 110 mg/dL   ECHO ADULT COMPLETE    Collection Time: 12/08/18 11:11 AM   Result Value Ref Range    LA Volume 93.68 (A) 18 - 58 mL    Tapse 2.10 (A) 1.5 - 2.0 cm    Ao Root D 3.99 cm    LVIDd 5.34 4.2 - 5.9 cm    LVPWd 1.65 (A) 0.6 - 1.0 cm    LVIDs 4.28 cm    IVSd 1.60 (A) 0.6 - 1.0 cm    LVOT d 2.37 cm    Inferior Vena Cava Diameter Sniffing 2.82 cm    LA Vol 4C 71.48 (A) 18 - 58 mL    LA Vol 2C 97.25 (A) 18 - 58 mL    LA Area 4C 24.8 cm2    LV Mass .3 (A) 88 - 224 g    LV Mass AL Index 225.1 (A) 49 - 115 g/m2    Triscuspid Valve Regurgitation Peak Gradient 46.8 mmHg    TR Max Velocity 342.13 cm/s    LA Vol Index 43.10 16 - 28 ml/m2    PASP 50 mmHg    LA Vol Index 44.74 16 - 28 ml/m2    LA Vol Index 32.89 16 - 28 ml/m2   GLUCOSE, POC    Collection Time: 12/08/18 11:18 AM   Result Value Ref Range    Glucose (POC) 134 (H) 70 - 110 mg/dL       Signed By: Riley Mota MD     December 8, 2018

## 2018-12-08 NOTE — PROGRESS NOTES
Hemodialysis Rounding Note      Patient: Zeina Xie               Sex: male          DOA: 12/5/2018  1:54 PM        YOB: 1970      Age:  41 y.o.        LOS:  LOS: 3 days     Subjective:     Zeina Xie is a 41 y.o.  who presents with Atrial fibrillation with rapid ventricular response ([address] The patient is dialyzing utilizing the following method:Intermittent Hemodialysis        Complaints: feels better, coughing and breathing better, appetite sl better.  C/O some numbness in the right hand for about a month now, no pain or cyanosis    Current Facility-Administered Medications   Medication Dose Route Frequency    carvedilol (COREG) tablet 12.5 mg  12.5 mg Oral BID WITH MEALS    melatonin tablet 3 mg  3 mg Oral QHS PRN    cefTRIAXone (ROCEPHIN) 2 g in sterile water (preservative free) 20 mL IV syringe  2 g IntraVENous Q24H    iron sucrose (VENOFER) 100 mg in 0.9% sodium chloride 100 mL IVPB  100 mg IntraVENous DIALYSIS TUE, THU & SAT    doxercalciferol (HECTOROL) 4 mcg/2 mL injection 1 mcg  1 mcg IntraVENous DIALYSIS TUE, THU & SAT    epoetin gilda (EPOGEN;PROCRIT) 5,000 Units  5,000 Units IntraVENous DIALYSIS TUE, THU & SAT    metoprolol (LOPRESSOR) injection 5 mg  5 mg IntraVENous Q4H    dilTIAZem (CARDIZEM) 100 mg in 0.9% sodium chloride 100 mL infusion  5 mg/hr IntraVENous CONTINUOUS    aspirin delayed-release tablet 81 mg  81 mg Oral DAILY    atorvastatin (LIPITOR) tablet 40 mg  40 mg Oral DAILY    B complex-vitaminC-folic acid (NEPHROCAP) cap  1 Cap Oral DAILY    montelukast (SINGULAIR) tablet 10 mg  10 mg Oral DAILY    acetaminophen (TYLENOL) tablet 650 mg  650 mg Oral Q4H PRN    ondansetron (ZOFRAN) injection 4 mg  4 mg IntraVENous Q4H PRN    azithromycin (ZITHROMAX) 500 mg in 0.9% sodium chloride (MBP/ADV) 250 mL adv  500 mg IntraVENous Q24H    0.9% sodium chloride infusion  100 mL/hr IntraVENous DIALYSIS PRN    insulin lispro (HUMALOG) injection   SubCUTAneous AC&HS    glucose chewable tablet 16 g  16 g Oral PRN    glucagon (GLUCAGEN) injection 1 mg  1 mg IntraMUSCular PRN    dextrose (D50W) injection syrg 12.5-25 g  25-50 mL IntraVENous PRN    influenza vaccine - (6 mos+)(PF) (FLUARIX QUAD/FLULAVAL QUAD) injection 0.5 mL  0.5 mL IntraMUSCular PRIOR TO DISCHARGE       701 - 1900  In: 120 [P.O.:120]  Out: -   1901 -  0700  In: 778.9 [P.O.:420; I.V.:358.9]  Out: 1000       Objective:     Blood pressure (!) 142/94, pulse 80, temperature 98.3 °F (36.8 °C), resp. rate 19, height 5' 10\" (1.778 m), weight 99.8 kg (220 lb), SpO2 95 %.   Temp (24hrs), Av.9 °F (36.6 °C), Min:97.4 °F (36.3 °C), Max:98.6 °F (37 °C)      Blood Pressure: BP: (!) 142/94  Pulse: Pulse (Heart Rate): 80  Temp:  Temp: 98.3 °F (36.8 °C)    Artificial Kidney Dialyzer/Set Up Inspection: Revaclear   hours Duration of Treatment (hours): 3 hours   Heparin Bolus Heparin Bolus (units): 0 units  Blood flow rate Blood Flow Rate (ml/min): 300 ml/min   Dialysate rate     Arterial Access Pressure Arterial Access Pressure (mmHg): -230  Venous Return Pressure Venous Return Pressure (mmHg): 180  Ultrafiltration Rate Goal/Amount of Fluid to Remove (mL): 1000 mL  Fluid Removal Fluid Removed (mL): 1500  Net Fluid Removal NET Fluid Removed (mL): 1000 ml      PHYSICAL EXAM: alert, oriented x 3, afebrile  HEENT:  Non icteric, pale conj  NECK:  No JVD  CHEST AND LUNGS: rhonchi both lung bases  CVS: Irregular, no rub  ABDOMEN:  + BS  EXT:  + 2-3 LE edema, right arm avf + bruit    DATA REVIEW:    Labs: Results:       Chemistry Recent Labs     18  0155 18  0532 18  0519 18  1450   * 118* 119* 147*    137 138 136   K 3.6 3.9 4.9 5.6*    105 109* 109*   CO2 22 21 13* 11*   BUN 54* 82* 109* 99*   CREA 8.07* 11.30* 15.00* 14.50*   CA 7.7* 6.9* 7.5* 7.2*  7.2*   AGAP 13 11 16 16   BUCR 7* 7* 7* 7*   AP  --   --   --  97   TP  --   -- --  6.4   ALB  --   --   --  2.2*   GLOB  --   --   --  4.2*   AGRAT  --   --   --  0.5*   phos 7.8   CBC w/Diff Recent Labs     12/08/18  0155 12/07/18  0532 12/06/18  0519   WBC 11.1 14.7* 21.5*   RBC 3.00* 3.28* 3.44*   HGB 8.1* 9.0* 9.5*   HCT 24.1* 26.2* 27.9*    223 246   GRANS 85* 89* 92*   LYMPH 4* 4* 3*   EOS 2 1 0      Coagulation No results for input(s): PTP, INR, APTT in the last 72 hours. No lab exists for component: INREXT, INREXT    Iron/Ferritin Recent Labs     12/05/18  1450   IRON 20*      BNP No results for input(s): BNPP in the last 72 hours. Cardiac Enzymes No results for input(s): CPK, CKND1, SAMARA in the last 72 hours. No lab exists for component: CKRMB, TROIP   Liver Enzymes Recent Labs     12/05/18  1450   TP 6.4   ALB 2.2*   AP 97   SGOT 31      Thyroid Studies Lab Results   Component Value Date/Time    TSH 2.43 12/06/2018 05:19 AM          Images:  XR Results (maximum last 3): Results from [place] encounter on 12/05/18   XR CHEST PORT    Narrative EXAM: CHEST  CPT CODE: 98912    CLINICAL INDICATION/HISTORY: Pneumonia. COMPARISON: Prior December 2018. TECHNIQUE: Single AP portable view of chest at 1032. FINDINGS: Persistent bibasilar patchy and streaky densities, left greater than  right and particularly in the retrograde cardiac left base. Upper lungs are  clear. Mediastinum is not widened the heart may be mildly enlarged. The bones  and soft tissues are unremarkable. There is little interval change. Impression IMPRESSION:    Persistent basilar patchy and streaky densities, left greater than right. XR CHEST PORT    Narrative EXAMINATION: Chest single view    INDICATION: Shortness of breath    COMPARISON: 11/2/2012    FINDINGS: Single frontal view of the chest obtained. Mildly enlarged cardiac  silhouette. Slightly prominent perihilar interstitium. Mild bibasilar hazy  streaky densities. Underpenetration limits evaluation. A consolidation. No  evidence of pneumothorax. No acute osseous findings. Impression IMPRESSION:    Mildly enlarged cardiac silhouette with perhaps mild perihilar interstitial  infiltrate or edema. Nonspecific basilar streaky densities. Radiographic  follow-up recommended. Results from [place] encounter on 11/02/12   XR CHEST PORT    Narrative Exam: AP view of the chest    Comparison is made to previous study October 24, 2012 and February 29, 2012    Findings: AP radiograph of the chest. The lungs are clear. The heart and  mediastinum are midline. There is no pneumothorax or consolidation. The bones  and soft tissue structures are unchanged. Impression Impression: No acute chest disease       CT Results (maximum last 3): Results from [place] encounter on 11/25/15   CT SINUSES WO CONT    Narrative CT Sinuses Without Contrast    HISTORY: Chronic sinusitis. History sinus surgery to remove polyps. Preop for  another sinus surgery. COMPARISON: CT sinuses 1/9/15. TECHNIQUE: 2.5 mm axial scans through the paranasal sinuses followed by coronal  and sagittal reformations for better evaluation of osteomeatal units and to  minimize radiation dose. FINDINGS:     Compared to previous there is improved aeration and diminishing mucosal sinus  opacification of the left frontal and anterior ethmoid sinuses. All other  paranasal sinuses remain completely opacified. Bilateral maxillary antra are  completely opacified and the previous antrostomy windows are occluded. The  sphenoid sinuses are totally opacified and the sphenoidotomy windows are  occluded. The right ethmoid air cells are totally opacified. The left posterior  ethmoid air cells are opacified although the anterior air cells are now aerated. The right frontal sinus is totally opacified and the frontal recess is occluded. The right nasal cavity is nearly completely opacified and there is subsequent  occlusion of the superior and middle thigh. There is aeration of the left nasal  cavity although there is soft tissue within the left maxillary antral window  which protrudes and partially occludes the nasal cavity. The nasal septum is  mildly curved to the left. No sinus expansion. There is hyperostosis of the  maxillary and sphenoid sinus walls. The opacifying material is of mixed hypo-and  hyperdense attenuations. Mastoid air cells and middle ear cavities are clear. No acute fracture. No  evidence of bone destruction. Hyperdense material in the vitreous chamber of the right globe, stable. Presumably previous ocular injections. The left lens has been extracted. No  gross orbital mass. Visualized brain is unremarkable. Impression IMPRESSION:    Pansinus opacification with improved aeration of the left frontal and anterior  ethmoid air cells when compared to 1/9/15. The remaining paranasal sinuses  remain completely opacified with hyperostosis suggesting chronic sinusitis. Evidence of prior maxillary antrostomies and sphenoidotomies however the  surgical windows are completely occluded. Differential considerations include  sinonasal polyposis and allergic fungal sinusitis. Results from [place] encounter on 01/09/15   CT SINUSES WO CONT    Narrative CT SINUSES    CPT code: 14110    History: Nasal polyps. Findings: Helical axial images of the paranasal sinuses are obtained and  reviewed with both bone and soft tissue windows. Additional coronal and  sagittal reconstructions were obtained to better delineate the orbital floor,  bony septa and the ostiomeatal complexes, which are not well demonstrated in the  axial imaging plane, as well as reduce radiation dose.          There is virtually complete opacification of all paranasal sinuses and there is  significant polypoid soft tissue masses in the nasal cavities particularly  posterosuperior left in the region of the [place]anae, left greater than right, but  also anteriorly under the middle turbinates. There appears to have been prior  nasoantral window creation, bilaterally. The ostiomeatal complexes have  probably been resected. The bony outlines are otherwise intact but are mildly  thickened and sclerotic. The nasal septum is not significantly deviated. Impression IMPRESSION[de-identified]     Diffuse paranasal sinus disease an additional bilateral nasal polyps, as  described. Probable prior nasoantral window creation. There is some thickening  and sclerosis of the walls of the maxillary sinuses. Results from [place] encounter on 07/20/12   CT BX GUIDED NDL    Narrative Ordering MD: Fina Rodriguez DO  Signed By: Gelacio Marsh MD  ** FINAL **  ---------------------------------------------------------------------  Procedure Date:  07/20/2012   Accession Number:  [id number]           Order No:   [id number]         Procedure:   CTV - BIOPSY CT GUIDANCE*        CPT Code:   87409      Admit Diag:   ANEMIA IN CKD              Reason:   BONE MARROW  INTERPRETATION:  PREOPERATIVE DIAGNOSIS: Anemia. POSTOPERATIVE DIAGNOSIS: Same  ATTENDING: MARYLOU Brown: None. PROCEDURES: CT-guided bone marrow biopsy. ANESTHESIA: 1% local lidocaine as well as intravenous moderate   sedation given and monitored per independently trained   interventional radiology nurse under my direct supervision. CONTRAST: None. COMPLICATIONS: None  DRAIN: No  CATHETER: None. EBL: Minimal.  SPECIMEN: Single core biopsy and 2 aspirates were obtained from the   right iliac crest bone marrow. Given to hematology on site. TECHNIQUE: After detailed explanation of risks and benefits of the   procedure verbal and written consent were obtained. Patient was   brought to the CT room and placed prone on the table. Timeout was   performed.  view was obtained.  Target lesion was identified and   skin was marked overlying right iliac crest  1% lidocaine solution   was instilled in the skin superficial and deep subcutaneous soft   tissues overlying the biopsy region. Under direct CT fluoroscopy guidance using 11-gauge OnControl biopsy   needle was advanced down through right iliac crest periosteum. 2   aspirates and single core were obtained via single pass. Hemostasis was achieved with manual compression. Sterile dressing   was applied. There were no immediate complications. Patient   tolerated procedure fairly well. I was present and performed the   procedure. FINDINGS: CT fluoroscopic guidance demonstrated good position of the   biopsy needle. IMPRESSION:  Successful, uncomplicated CT-guided bone marrow biopsy. CULTURE:   )  Recent Labs     12/07/18  1030 12/06/18  1410 12/06/18  0350   CULT NO GROWTH AFTER 20 HOURS MODERATE NORMAL RESPIRATORY SANDIE NO GROWTH 1 DAY     Recent Labs     12/07/18  1030 12/06/18  1410 12/06/18  0350 12/05/18  1613 12/05/18  1555   CULT NO GROWTH AFTER 20 HOURS MODERATE NORMAL RESPIRATORY SANDIE NO GROWTH 1 DAY AEROBIC AND ANAEROBIC BOTTLES STREPTOCOCCUS PNEUMONIAE* AEROBIC AND ANAEROBIC BOTTLES STREPTOCOCCUS PNEUMONIAE*  For Susceptibility Refer to Culture  [id number]       Fe 20 sat 10%, leonel 295  A1C 6.3  IPTH 723  Hep Bs ab neg Hep Bs Ag neg   Hep C Ab neg  Hep B core Ab neg  AVF duplex no obstruction or fluid collection    Assessment/Plan:     End Stage Renal Disease:  3rd HD today. Patient is tolerating dialysis treatment well. .  Additionally the patient has experienced normal dialysis treatment during dialysis. Dry weight Estimated Weight: 101.1 kg (222 lb 14.2 oz) same. Inc time 3.5 H, K3 [place] 3 bath, attempt 2 Kg UF today as tolerated. At 11:00 AM on 12/8/2018, I saw and examined patient during hemodialysis treatment. The patient was receiving hemodialysis for treatment of end stage renal disease. I have also reviewed vital signs, intake and output, lab results and recent events, and agreed with today's dialysis order.       Anemia:  Cont IV venofer and Epo with HD.  Out pt HD sched [facility] TTS at 6 AM    Renal Metabolic Bone Disease:  Repeat phos, stop phoslo, replace phos  and IV Hectorol with HD                      Hypertension: controlled    Access: Fistula adequate monitoring/no changes     Strep pneumo bacteremia  defer to Dr Zoe Main MD  12/8/2018

## 2018-12-08 NOTE — DIALYSIS
ACUTE HEMODIALYSIS FLOW SHEET    HEMODIALYSIS ORDERS: Physician: ARELY Núñez     Dialyzer: roberto   Duration: 330 hr  BFR: 300   DFR: 600   Dialysate:  Temp 36.0 K+   3    Ca+  3 Na No Bicarb 35   Weight:  99.8 kg    Patient Chart [x]     Unable to Obtain []   Dry weight/UF Goal: 2000 Access AVF  Needle Gauge 17    Heparin []  Bolus      Units    [] Hourly       Units    [x]None      Catheter locking solution    Pre BP:   155/91    Pulse:     87     Temperature:   98.7  Respirations: 16  Tx: NS       ml/Bolus  Other        [x] N/A   Labs: Pre        Post:        [x] N/A   Additional Orders(medications, blood products, hypotension management):   Venofer, Epogen, Hectorol    [] N/A     [x] [facility] Consent Verified     CATHETER ACCESS: [x]N/A   []Right   []Left   []IJ     []Fem   [] First use X-ray verified     []Tunnel                [] Non Tunneled   []No S/S infection  []Redness  []Drainage []Cultured []Swelling []Pain   []Medical Aseptic Prep Utilized   []Dressing Changed  [] Biopatch  Date:       []Clotted   []Patent   Flows: []Good  []Poor  []Reversed   If access problem,  notified: []Yes    []N/A  Date:           GRAFT/FISTULA ACCESS:  []N/A     [x]Right     []Left     [x]UE     []LE   []AVG   [x]AVF        []Buttonhole    [x]Medical Aseptic Prep Utilized   [x]No S/S infection  []Redness  []Drainage []Cultured []Swelling []Pain    Bruit:   [x] Strong    [] Weak       Thrill :   [x] Strong    [] Weak       Needle Gauge: 17   Length: 1   If access problem,  notified: []Yes     []N/A  Date:        Please describe access if present and not used:       GENERAL ASSESSMENT:    LUNGS:  Rate 16 SaO2%   98     [] N/A    [] Clear  [] Coarse  [] Crackles  [] Wheezing        [] Diminished     Location : []RLL   []LLL    []RUL  []ROBERT   Cough: []Productive  []Dry  [x]N/A   Respirations:  [x]Easy  []Labored   Therapy:  [x]RA  []NC  l/min    Mask: []NRB []Venti       O2%                  []Ventilator []Intubated  [] Trach  [] BiPaP   CARDIAC: []Regular      [x] Irregular   [] Pericardial Rub  [] JVD        [x]  Monitored  [] Bedside  [] Remotely monitored [] N/A  Rhythm: atrial fib   EDEMA: [x] None  []Generalized  [] Pitting [] 1    [] 2    [] 3    [] 4                 [] Facial  [] Pedal  []  UE  [] LE   SKIN:   [x] Warm  [] Hot     [] Cold   [x] Dry     [] Pale   [] Diaphoretic                  [] Flushed  [] Jaundiced  [] Cyanotic  [] Rash  [] Weeping   LOC:    [x] Alert      [x]Oriented:    [x] Person     [x] Place  [x]Time               [] Confused  [] Lethargic  [] Medicated  [] Non-responsive     GI / ABDOMEN:  [] Flat    [] Distended    [x] Soft    [] Firm   []  Obese                             [] Diarrhea  [] Bowel Sounds  [] Nausea  [] Vomiting       / URINE ASSESSMENT:[x] Voiding   [] Oliguria  [] Anuria   []  [place]     [] Incontinent    []  Incontinent Brief      []  Bathroom Privileges     PAIN: [x] 0 []1  []2   []3   []4   []5   []6   []7   []8   []9   []10            Scale 0-10  Action/Follow Up: NA   MOBILITY:  [] Amb    [] Amb/Assist    [x] Bed    [] Wheelchair  [] Stretcher      All Vitals and Treatment Details on Attached [address]: [address]          Room # 7406     [] 1st Time Acute  [] Stat  [x] Routine  [] Urgent     [] Acute Room  [x]  Bedside  [] ICU/CCU  [] ER   Isolation Precautions:  [x] Dialysis   [] Airborne   [] Contact    [] Reverse   Special Considerations:         [] Blood Consent Verified [x]N/A     ALLERGIES:   [x] NKA          Code Status:  [x] Full Code  [] DNR  [] Other           HBsAg ONLY: Date Drawn 12/05/2018         [x]Negative []Positive []Unknown   HBsAb: Date 12/05/2018    [x] Susceptible   [] [id number] []Not Drawn  [] Drawn     Current Labs:    Date of Labs: Today [x]        Cut and paste current labs here.                                                                                  DIET:  [x] Renal    [] Other     [] NPO     [x]  Diabetic PRIMARY NURSE REPORT: First initial/Last name/Title      Pre Dialysis: Farida Hatfield RN   Time: 1100      EDUCATION:    [x] Patient [] Other         Knowledge Basis: []None [x]Minimal [] Substantial   Barriers to learning  [x]N/A   [] Access Care     [] S&S of infection     [] Fluid Management     []K+     [x]Procedural    []Albumin     [x] Medications     [] Tx Options     [] Transplant     [] Diet     [] Other   Teaching Tools:  [] Explain  [] Demo  [] Handouts [] Video  Patient response:   [x] Verbalized understanding  [] Teach back  [] Return demonstration [] Requires follow up   Inappropriate due to            [x]1050 Time Out/Safety Check  [x]Extracorporeal Circuit Tested for integrity       [address] - Before each treatment:     Machine Number:                   [facility]                                  [] Unit Machine #  with centralized RO                                  [x] Portable Machine #1/RO serial # [id number]                                  [] Portable Machine #2/RO serial # [id number]                                  [] Portable Machine #4/RO serial # [id number]                                                                                                       [facility]                                  [] Portable Machine #11/RO serial # [id number]                                   [] Portable Machine #12/RO serial # [id number]                                  [] Portable Machine #13/RO serial #  [id number]      Alarm Test:  Pass time 1108         Other:         [x] RO/Machine Log Complete      Temp    36.0             Dialysate: pH  7.4 Conductivity: Meter   14.0     HD Machine   14.0                  TCD: 13.8  Dialyzer Lot # [id number]            Blood Tubing Lot # [id number]          Saline Lot #  [id number]     CHLORINE TESTING-Before each treatment and every 4 hours    Total Chlorine: [x] less than 0.1 ppm  Time: 1100 4 Hr/2nd Check Time: NA (if greater than 0.1 ppm from Primary then every 30 minutes from Secondary)     TREATMENT INITIATION - with Dialysis Precautions:   [x] All Connections Secured                 [x] Saline Line Double Clamped   [x] Venous Parameters Set                  [x] Arterial Parameters Set    [x] Prime Given 250                          []Air Foam Detector Engaged      Treatment Initiation Note: RUE AVF cannulated with needle gauge 17, treatment initiated. During Treatment Notes:     Medication Dose Volume Route Initials Dialyzer Cleared: [x] Good [] Fair  [] Poor    Blood processed:  56.1 L  UF Removed  2000 Ml    Post Wt:     kg  POst BP:   155/111       Pulse: 128      Respirations: 16  Temperature: 97.5                                   Post Tx Vascular Access: AVF/AVG: Bleeding stopped Art 6 min. Zay. 6 Min   N/A                                   Catheter: Locking solution: Heparin 1:1000 Art. Zay. N/A                                 Post Assessment:                                    Skin:  [x] Warm  [x] Dry [] Diaphoretic    [] Flushed  [] Pale [] Cyanotic   [facility] Signatures Title Initials  Time Lungs: [x] Clear    [] Course  [] Crackles  [] Wheezing [] Diminished    RN   Cardiac: [] Regular   [x] Irregular   [] Monitor  [] N/A  Rhythm:    atial fib       Edema:  [x] None    [] General     [] Facial   [] Pedal    [] UE    [] LE       Pain: [x]0  []1  []2   []3  []4   []5   []6   []7   []8   []9   []10         Post Treatment Note:     Treatment completed, tolerated well. AVF care done. Dressing applied post hemostasis achieved. Report given to primary nurse.      POST TREATMENT PRIMARY NURSE HANDOFF REPORT:     First initial/Last name/Title         Post Dialysis: Fady Villaseñor RN Time:  6919     Abbreviations: AVG-arterial venous graft, AVF-arterial venous fistula, IJ-Internal Jugular, Subcl-Subclavian, Fem-Femoral, Tx-treatment, AP/HR-apical heart rate, DFR-dialysate flow rate, BFR-blood flow rate, AP-arterial pressure, -venous pressure, UF-ultrafiltrate, TMP-transmembrane pressure, Zay-Venous, Art-Arterial, RO-Reverse Osmosis

## 2018-12-09 LAB
ANION GAP SERPL CALC-SCNC: 7 MMOL/L (ref 3–18)
BUN SERPL-MCNC: 33 MG/DL (ref 7–18)
BUN/CREAT SERPL: 6 (ref 12–20)
CALCIUM SERPL-MCNC: 7.5 MG/DL (ref 8.5–10.1)
CHLORIDE SERPL-SCNC: 103 MMOL/L (ref 100–108)
CO2 SERPL-SCNC: 27 MMOL/L (ref 21–32)
CREAT SERPL-MCNC: 5.88 MG/DL (ref 0.6–1.3)
FERRITIN SERPL-MCNC: 430 NG/ML (ref 8–388)
FOLATE SERPL-MCNC: 4.6 NG/ML (ref 3.1–17.5)
GLUCOSE BLD STRIP.AUTO-MCNC: 136 MG/DL (ref 70–110)
GLUCOSE BLD STRIP.AUTO-MCNC: 142 MG/DL (ref 70–110)
GLUCOSE BLD STRIP.AUTO-MCNC: 190 MG/DL (ref 70–110)
GLUCOSE BLD STRIP.AUTO-MCNC: 190 MG/DL (ref 70–110)
GLUCOSE BLD STRIP.AUTO-MCNC: 192 MG/DL (ref 70–110)
GLUCOSE SERPL-MCNC: 147 MG/DL (ref 74–99)
HCT VFR BLD AUTO: 24.9 % (ref 36–48)
HGB BLD-MCNC: 8.6 G/DL (ref 13–16)
IRON SATN MFR SERPL: 20 %
IRON SERPL-MCNC: 33 UG/DL (ref 50–175)
POTASSIUM SERPL-SCNC: 3.2 MMOL/L (ref 3.5–5.5)
SODIUM SERPL-SCNC: 137 MMOL/L (ref 136–145)
TIBC SERPL-MCNC: 161 UG/DL (ref 250–450)
VIT B12 SERPL-MCNC: 1864 PG/ML (ref 211–911)

## 2018-12-09 PROCEDURE — 77030011943

## 2018-12-09 PROCEDURE — 74011000258 HC RX REV CODE- 258: Performed by: INTERNAL MEDICINE

## 2018-12-09 PROCEDURE — 74011000250 HC RX REV CODE- 250: Performed by: INTERNAL MEDICINE

## 2018-12-09 PROCEDURE — 83540 ASSAY OF IRON: CPT

## 2018-12-09 PROCEDURE — 77030034849

## 2018-12-09 PROCEDURE — 74011250637 HC RX REV CODE- 250/637: Performed by: HOSPITALIST

## 2018-12-09 PROCEDURE — 74011250637 HC RX REV CODE- 250/637: Performed by: INTERNAL MEDICINE

## 2018-12-09 PROCEDURE — 77030005538 HC CATH URETH FOL44 BARD -B

## 2018-12-09 PROCEDURE — 77030010545

## 2018-12-09 PROCEDURE — 82607 VITAMIN B-12: CPT

## 2018-12-09 PROCEDURE — 74011636637 HC RX REV CODE- 636/637: Performed by: INTERNAL MEDICINE

## 2018-12-09 PROCEDURE — 80048 BASIC METABOLIC PNL TOTAL CA: CPT

## 2018-12-09 PROCEDURE — 36415 COLL VENOUS BLD VENIPUNCTURE: CPT

## 2018-12-09 PROCEDURE — 65660000004 HC RM CVT STEPDOWN

## 2018-12-09 PROCEDURE — 82962 GLUCOSE BLOOD TEST: CPT

## 2018-12-09 PROCEDURE — 74011250636 HC RX REV CODE- 250/636: Performed by: INTERNAL MEDICINE

## 2018-12-09 PROCEDURE — 74011000250 HC RX REV CODE- 250: Performed by: PHYSICIAN ASSISTANT

## 2018-12-09 PROCEDURE — 85018 HEMOGLOBIN: CPT

## 2018-12-09 PROCEDURE — 82728 ASSAY OF FERRITIN: CPT

## 2018-12-09 RX ORDER — POTASSIUM CHLORIDE 20 MEQ/1
20 TABLET, EXTENDED RELEASE ORAL
Status: COMPLETED | OUTPATIENT
Start: 2018-12-09 | End: 2018-12-09

## 2018-12-09 RX ORDER — DILTIAZEM HYDROCHLORIDE 30 MG/1
30 TABLET, FILM COATED ORAL
Status: DISCONTINUED | OUTPATIENT
Start: 2018-12-09 | End: 2018-12-11

## 2018-12-09 RX ORDER — LISINOPRIL 5 MG/1
5 TABLET ORAL DAILY
Status: DISCONTINUED | OUTPATIENT
Start: 2018-12-10 | End: 2018-12-14 | Stop reason: HOSPADM

## 2018-12-09 RX ADMIN — ATORVASTATIN CALCIUM 40 MG: 40 TABLET, FILM COATED ORAL at 08:31

## 2018-12-09 RX ADMIN — DILTIAZEM HYDROCHLORIDE 30 MG: 30 TABLET, FILM COATED ORAL at 16:31

## 2018-12-09 RX ADMIN — CARVEDILOL 25 MG: 25 TABLET, FILM COATED ORAL at 08:31

## 2018-12-09 RX ADMIN — METOPROLOL TARTRATE 5 MG: 5 INJECTION, SOLUTION INTRAVENOUS at 11:20

## 2018-12-09 RX ADMIN — POTASSIUM CHLORIDE 20 MEQ: 20 TABLET, EXTENDED RELEASE ORAL at 14:20

## 2018-12-09 RX ADMIN — CARVEDILOL 25 MG: 25 TABLET, FILM COATED ORAL at 16:31

## 2018-12-09 RX ADMIN — MONTELUKAST SODIUM 10 MG: 10 TABLET, FILM COATED ORAL at 08:31

## 2018-12-09 RX ADMIN — DILTIAZEM HYDROCHLORIDE 10 MG/HR: 5 INJECTION INTRAVENOUS at 06:59

## 2018-12-09 RX ADMIN — MELATONIN TAB 3 MG 3 MG: 3 TAB at 21:20

## 2018-12-09 RX ADMIN — DILTIAZEM HYDROCHLORIDE 5 MG/HR: 5 INJECTION INTRAVENOUS at 23:32

## 2018-12-09 RX ADMIN — METOPROLOL TARTRATE 5 MG: 5 INJECTION, SOLUTION INTRAVENOUS at 00:26

## 2018-12-09 RX ADMIN — INSULIN LISPRO 2 UNITS: 100 INJECTION, SOLUTION INTRAVENOUS; SUBCUTANEOUS at 11:51

## 2018-12-09 RX ADMIN — ASPIRIN 81 MG: 81 TABLET, COATED ORAL at 08:32

## 2018-12-09 RX ADMIN — METOPROLOL TARTRATE 5 MG: 5 INJECTION, SOLUTION INTRAVENOUS at 16:31

## 2018-12-09 RX ADMIN — INSULIN LISPRO 2 UNITS: 100 INJECTION, SOLUTION INTRAVENOUS; SUBCUTANEOUS at 21:20

## 2018-12-09 RX ADMIN — METOPROLOL TARTRATE 5 MG: 5 INJECTION, SOLUTION INTRAVENOUS at 08:34

## 2018-12-09 RX ADMIN — METOPROLOL TARTRATE 5 MG: 5 INJECTION, SOLUTION INTRAVENOUS at 05:00

## 2018-12-09 RX ADMIN — ACETAMINOPHEN 650 MG: 325 TABLET ORAL at 01:22

## 2018-12-09 RX ADMIN — NEPHROCAP 1 CAPSULE: 1 CAP ORAL at 08:32

## 2018-12-09 RX ADMIN — WATER 2 G: 1 INJECTION INTRAMUSCULAR; INTRAVENOUS; SUBCUTANEOUS at 14:20

## 2018-12-09 RX ADMIN — DILTIAZEM HYDROCHLORIDE 30 MG: 30 TABLET, FILM COATED ORAL at 11:51

## 2018-12-09 NOTE — PROGRESS NOTES
Bedside shift change report given to Lizeth Ocampo (oncoming nurse) by Elvia Baires RN (offgoing nurse). Report included the following information SBAR, Kardex, Procedure Summary, Intake/Output, MAR and Recent Results.

## 2018-12-09 NOTE — PROGRESS NOTES
[facility] - Progress Note  Admit Date: 12/5/2018    Assessment:     -Volume overload, ESRD, now on intermittent HD  -Sepsis with bacteremia, S. Pneumonia, suspected pneumonia on antbx  -Paroxysmal atrial fibrillation, new diagnosis, presented with RVR, intermittent afib this admission, likely driven by underlying illness. CHADSVASC2 score 3  -Coronary artery disease s/p LAD PCI with AMOL 10/2012.  -Ischemic cardiomyopathy with EF 40-45% 2012 improved to 55% 2014.  -Diabetes mellitus.  -Hypertension.  -Dyslipidemia.  -Chronic anemia.  -Legally blind.  -Noncompliance with medical regimen and follow up.     Primary cardiologist Dr Lion Bosch, last office visit 2016. Plan:     A.fib rates remain elevated, back on diltiazem drip 10 mg/hr and coreg increased to 25 mg BID. I will start oral diltiazem and decrease diltiazem drip to 5 mg/hr today. Will try to uptitrate oral diltiazem if needed. Subjective:     No new complaints.      Objective:      Patient Vitals for the past 8 hrs:   Temp Pulse Resp BP SpO2   12/09/18 0709 98.6 °F (37 °C) 94 20 (!) 154/104 96 %   12/09/18 0448 98.3 °F (36.8 °C) (!) 102 19 (!) 142/93 95 %         Patient Vitals for the past 96 hrs:   Weight   12/09/18 0448 101.2 kg (223 lb)   12/08/18 1014 99.8 kg (220 lb)   12/08/18 0350 100.2 kg (220 lb 14.4 oz)   12/07/18 1303 100.2 kg (221 lb)   12/06/18 2156 101.1 kg (222 lb 14.4 oz)   12/05/18 2020 100.3 kg (221 lb 3.2 oz)                    Intake/Output Summary (Last 24 hours) at 12/9/2018 1051  Last data filed at 12/9/2018 7566  Gross per 24 hour   Intake 461 ml   Output 2000 ml   Net -1539 ml       Physical Exam:  General:  alert, cooperative, no distress, appears stated age  Neck:  nontender  Lungs:  clear to auscultation bilaterally  Heart:  irreg irreg, tachy, S1, S2 normal, no murmur, click, rub or gallop  Abdomen:  abdomen is soft without significant tenderness, masses, organomegaly or guarding  Extremities:  extremities normal, atraumatic, no cyanosis or edema    Data Review:     Labs: Results:       Chemistry Recent Labs     12/09/18 0213 12/08/18  0155 12/07/18  0532   * 114* 118*    139 137   K 3.2* 3.6 3.9    104 105   CO2 27 22 21   BUN 33* 54* 82*   CREA 5.88* 8.07* 11.30*   CA 7.5* 7.7* 6.9*   MG  --   --  2.0   PHOS  --  4.7 1.1*   AGAP 7 13 11   BUCR 6* 7* 7*      CBC w/Diff Recent Labs     12/09/18 0213 12/08/18  0155 12/07/18  0532   WBC  --  11.1 14.7*   RBC  --  3.00* 3.28*   HGB 8.6* 8.1* 9.0*   HCT 24.9* 24.1* 26.2*   PLT  --  200 223   GRANS  --  85* 89*   LYMPH  --  4* 4*   EOS  --  2 1      Cardiac Enzymes No results found for: CPK, CK, CKMMB, CKMB, RCK3, CKMBT, CKNDX, CKND1, SAMARA, TROPT, TROIQ, SAKINA, TROPT, TNIPOC, BNP, BNPP   Coagulation No results for input(s): PTP, INR, APTT in the last 72 hours. No lab exists for component: INREXT    Lipid Panel Lab Results   Component Value Date/Time    Cholesterol, total 122 05/11/2015 08:55 AM    HDL Cholesterol 38 (L) 05/11/2015 08:55 AM    LDL, calculated 72 05/11/2015 08:55 AM    VLDL, calculated 12 05/11/2015 08:55 AM    Triglyceride 58 05/11/2015 08:55 AM    CHOL/HDL Ratio 4.4 10/17/2012 06:05 AM      BNP No results found for: BNP, BNPP, XBNPT   Liver Enzymes No results for input(s): TP, ALB, TBIL, AP, SGOT, GPT in the last 72 hours.     No lab exists for component: DBIL   Digoxin    Thyroid Studies Lab Results   Component Value Date/Time    TSH 2.43 12/06/2018 05:19 AM          Signed By: Marta Jensen MD     December 9, 2018

## 2018-12-09 NOTE — PROGRESS NOTES
[facility] DAILY PROGRESS NOTE    Subjective:     Admitted for weakness and seen for ESRD/HD    Complaint: breathing better, appetite still not good, no N/V/CP or SOB, still c/o of some numbness in his right hand    Current Facility-Administered Medications   Medication Dose Route Frequency    dilTIAZem (CARDIZEM) IR tablet 30 mg  30 mg Oral TIDAC    potassium chloride (K-DUR, KLOR-CON) SR tablet 20 mEq  20 mEq Oral NOW    carvedilol (COREG) tablet 25 mg  25 mg Oral BID WITH MEALS    melatonin tablet 3 mg  3 mg Oral QHS PRN    cefTRIAXone (ROCEPHIN) 2 g in sterile water (preservative free) 20 mL IV syringe  2 g IntraVENous Q24H    iron sucrose (VENOFER) 100 mg in 0.9% sodium chloride 100 mL IVPB  100 mg IntraVENous DIALYSIS TUE, THU & SAT    doxercalciferol (HECTOROL) 4 mcg/2 mL injection 1 mcg  1 mcg IntraVENous DIALYSIS TUE, THU & SAT    epoetin gilda (EPOGEN;PROCRIT) 5,000 Units  5,000 Units IntraVENous DIALYSIS TUE, THU & SAT    metoprolol (LOPRESSOR) injection 5 mg  5 mg IntraVENous Q4H    dilTIAZem (CARDIZEM) 100 mg in 0.9% sodium chloride 100 mL infusion  5 mg/hr IntraVENous CONTINUOUS    aspirin delayed-release tablet 81 mg  81 mg Oral DAILY    atorvastatin (LIPITOR) tablet 40 mg  40 mg Oral DAILY    B complex-vitaminC-folic acid (NEPHROCAP) cap  1 Cap Oral DAILY    montelukast (SINGULAIR) tablet 10 mg  10 mg Oral DAILY    acetaminophen (TYLENOL) tablet 650 mg  650 mg Oral Q4H PRN    ondansetron (ZOFRAN) injection 4 mg  4 mg IntraVENous Q4H PRN    0.9% sodium chloride infusion  100 mL/hr IntraVENous DIALYSIS PRN    insulin lispro (HUMALOG) injection   SubCUTAneous AC&HS    glucose chewable tablet 16 g  16 g Oral PRN    glucagon (GLUCAGEN) injection 1 mg  1 mg IntraMUSCular PRN    dextrose (D50W) injection syrg 12.5-25 g  25-50 mL IntraVENous PRN    influenza vaccine 2018-19 (6 mos+)(PF) (FLUARIX QUAD/FLULAVAL QUAD) injection 0.5 mL  0.5 mL IntraMUSCular PRIOR TO DISCHARGE Objective:     Patient Vitals for the past 24 hrs:   Temp Pulse Resp BP SpO2   12/09/18 1105 98.5 °F (36.9 °C) 100 17 164/80 99 %   12/09/18 0709 98.6 °F (37 °C) 94 20 (!) 154/104 96 %   12/09/18 0448 98.3 °F (36.8 °C) (!) 102 19 (!) 142/93 95 %   12/09/18 0100 -- 99 19 (!) 158/97 96 %   12/09/18 0026 97.7 °F (36.5 °C) (!) 108 15 (!) 167/97 97 %   12/08/18 2200 -- (!) 103 21 171/89 93 %   12/08/18 2000 98 °F (36.7 °C) (!) 101 23 (!) 167/104 93 %   12/08/18 1624 97.7 °F (36.5 °C) (!) 115 16 (!) 163/104 97 %   12/08/18 1515 -- (!) 135 (!) 31 (!) 155/111 97 %   12/08/18 1500 -- (!) 128 16 (!) 151/111 99 %   12/08/18 1445 -- (!) 126 22 (!) 167/99 99 %   12/08/18 1430 -- (!) 125 20 (!) 154/119 99 %   12/08/18 1415 -- (!) 113 19 (!) 162/95 98 %   12/08/18 1400 -- (!) 117 18 (!) 166/94 99 %        Weight change: -0.454 kg ()     12/07 1901 - 12/09 0700  In: 461 [P.O.:461]  Out: 2000     Intake/Output Summary (Last 24 hours) at 12/9/2018 1355  Last data filed at 12/9/2018 7627  Gross per 24 hour   Intake 341 ml   Output 2000 ml   Net -1659 ml     Physical Exam: alert, oriented x 3 afebrile    HEENT: non icteric  Neck: No JVD  Cardiovascular: irregular no rub  C/L: + rales  Both lung bases, no wheezing  Abdomen: + BS  Ext: + 2-3 LE edema, Right arm avf + bruit    Data Review:     LABS:   Hematology:   Recent Labs     12/09/18  0213 12/08/18  0155 12/07/18  0532   WBC  --  11.1 14.7*   HGB 8.6* 8.1* 9.0*   HCT 24.9* 24.1* 26.2*     Chemistry:   Recent Labs     12/09/18  0213 12/08/18  0155 12/07/18  0532   BUN 33* 54* 82*   CREA 5.88* 8.07* 11.30*   CA 7.5* 7.7* 6.9*   K 3.2* 3.6 3.9    139 137    104 105   CO2 27 22 21   PHOS  --  4.7 1.1*   * 114* 118*        CULTURE: Repeat Blood c/s neg so far  Urine c/s neg  C diff neg        IMPRESSION AND PLAN:   ESRD next HD on Tuesday. Cont to monitor Right arm avf. ? Mild steal synd. He will need to follow up with Dr. Tone Moore as outpt .  In the meantime cont slow flow.    HTN add ACEI cont with more UF with HD  Anemia cont Fe and LEATHA with HD  SHPT cont Hectorol, recheck phos  Hypokalemia po K supp today  Hypoalb start Damien Bowman MD  12/9/2018

## 2018-12-09 NOTE — PROGRESS NOTES
Bedside and Verbal shift change report given to Alissa Jane (oncoming nurse) by Anand (offgoing nurse). Report included the following information SBAR and Recent Results.

## 2018-12-09 NOTE — PROGRESS NOTES
[facility]    Patient: Kermitt Lombard Age: 41 y.o. : 1970 MR#: [id number] SSN: [id number]  Date/Time: 2018     Subjective:   Pt stated that he walked for a short distance and felt tired. Wife at bedside. Assessment/Plan:   1. Atrial fibrillation with RVR, new onset:management per cards  2. Suspected pneumonia: cont Abx  3. Strep pneumo bacteremia and sepsis: likely from lung: on Abx and repeat Cx negative thus far. 4. ESRD s/p AVF placement: HD per nephrologist.  5. Hyperkalemia-resolved, now w/ hypokalemia  6. HTN, controlled: cont BB  7. Chronic diastolic CHF: compensated    8. DM-2, controlled: cont SSI  9. CAD s/p stent: cont asa, BB and statin   10. H/o pulmonary HTN  11. ? Hemoptysis: will monitor  12. Anemia hgb was trending down today stable unclear if as a result of # 11, no reports of hemoptysis today. Will cont to closely monitor. Guiac stool. Lab w/u. Case discussed with:  [x]Patient  []Family  []Nursing  []Case Management  DVT Prophylaxis:  []Lovenox  []Hep SQ  [x]SCDs  []Coumadin   []On Heparin gtt    Objective:   VS:   Visit Vitals  /80   Pulse 100   Temp 98.5 °F (36.9 °C)   Resp 17   Ht 5' 10\" (1.778 m)   Wt 101.2 kg (223 lb)   SpO2 99%   BMI 32.00 kg/m²      Tmax/24hrs: Temp (24hrs), Av.1 °F (36.7 °C), Min:97.7 °F (36.5 °C), Max:98.6 °F (37 °C)  IOBRIEF    Intake/Output Summary (Last 24 hours) at 2018 1423  Last data filed at 2018 1358  Gross per 24 hour   Intake 581 ml   Output 2000 ml   Net -1419 ml       General:  Alert, cooperative, no acute distress    Pulmonary:  CTA Bilaterally. No Wheezing/Rales. Cardiovascular: IRRegular rate and Rhythm. GI:  Soft, Non distended, Non tender. + Bowel sounds. Extremities:  No edema, cyanosis, clubbing. Psych: Good insight. Not anxious or agitated. Neurologic: Alert and oriented X 3. No acute neuro deficits.   Additional:    Medications:   Current Facility-Administered Medications   Medication Dose Route Frequency    dilTIAZem (CARDIZEM) IR tablet 30 mg  30 mg Oral TIDAC    [START ON 12/10/2018] lisinopril (PRINIVIL, ZESTRIL) tablet 5 mg  5 mg Oral DAILY    carvedilol (COREG) tablet 25 mg  25 mg Oral BID WITH MEALS    melatonin tablet 3 mg  3 mg Oral QHS PRN    cefTRIAXone (ROCEPHIN) 2 g in sterile water (preservative free) 20 mL IV syringe  2 g IntraVENous Q24H    iron sucrose (VENOFER) 100 mg in 0.9% sodium chloride 100 mL IVPB  100 mg IntraVENous DIALYSIS TUE, THU & SAT    doxercalciferol (HECTOROL) 4 mcg/2 mL injection 1 mcg  1 mcg IntraVENous DIALYSIS TUE, THU & SAT    epoetin gilda (EPOGEN;PROCRIT) 5,000 Units  5,000 Units IntraVENous DIALYSIS TUE, THU & SAT    metoprolol (LOPRESSOR) injection 5 mg  5 mg IntraVENous Q4H    dilTIAZem (CARDIZEM) 100 mg in 0.9% sodium chloride 100 mL infusion  5 mg/hr IntraVENous CONTINUOUS    aspirin delayed-release tablet 81 mg  81 mg Oral DAILY    atorvastatin (LIPITOR) tablet 40 mg  40 mg Oral DAILY    B complex-vitaminC-folic acid (NEPHROCAP) cap  1 Cap Oral DAILY    montelukast (SINGULAIR) tablet 10 mg  10 mg Oral DAILY    acetaminophen (TYLENOL) tablet 650 mg  650 mg Oral Q4H PRN    ondansetron (ZOFRAN) injection 4 mg  4 mg IntraVENous Q4H PRN    0.9% sodium chloride infusion  100 mL/hr IntraVENous DIALYSIS PRN    insulin lispro (HUMALOG) injection   SubCUTAneous AC&HS    glucose chewable tablet 16 g  16 g Oral PRN    glucagon (GLUCAGEN) injection 1 mg  1 mg IntraMUSCular PRN    dextrose (D50W) injection syrg 12.5-25 g  25-50 mL IntraVENous PRN    influenza vaccine 2018-19 (6 mos+)(PF) (FLUARIX QUAD/FLULAVAL QUAD) injection 0.5 mL  0.5 mL IntraMUSCular PRIOR TO DISCHARGE       Labs:    Recent Results (from the past 24 hour(s))   GLUCOSE, POC    Collection Time: 12/08/18  4:23 PM   Result Value Ref Range    Glucose (POC) 136 (H) 70 - 110 mg/dL   GLUCOSE, POC    Collection Time: 12/08/18  9:17 PM Result Value Ref Range    Glucose (POC) 232 (H) 70 - 110 mg/dL   GLUCOSE, POC    Collection Time: 12/09/18 12:16 AM   Result Value Ref Range    Glucose (POC) 190 (H) 70 - 059 mg/dL   METABOLIC PANEL, BASIC    Collection Time: 12/09/18  2:13 AM   Result Value Ref Range    Sodium 137 136 - 145 mmol/L    Potassium 3.2 (L) 3.5 - 5.5 mmol/L    Chloride 103 100 - 108 mmol/L    CO2 27 21 - 32 mmol/L    Anion gap 7 3.0 - 18 mmol/L    Glucose 147 (H) 74 - 99 mg/dL    BUN 33 (H) 7.0 - 18 MG/DL    Creatinine 5.88 (H) 0.6 - 1.3 MG/DL    BUN/Creatinine ratio 6 (L) 12 - 20      GFR est AA 13 (L) >60 ml/min/1.73m2    GFR est non-AA 10 (L) >60 ml/min/1.73m2    Calcium 7.5 (L) 8.5 - 10.1 MG/DL   IRON PROFILE    Collection Time: 12/09/18  2:13 AM   Result Value Ref Range    Iron 33 (L) 50 - 175 ug/dL    TIBC 161 (L) 250 - 450 ug/dL    Iron % saturation 20 %   FERRITIN    Collection Time: 12/09/18  2:13 AM   Result Value Ref Range    Ferritin 430 (H) 8 - 388 NG/ML   HGB & HCT    Collection Time: 12/09/18  2:13 AM   Result Value Ref Range    HGB 8.6 (L) 13.0 - 16.0 g/dL    HCT 24.9 (L) 36.0 - 48.0 %   VITAMIN B12 & FOLATE    Collection Time: 12/09/18  2:13 AM   Result Value Ref Range    Vitamin B12 1,864 (H) 211 - 911 pg/mL    Folate 4.6 3.10 - 17.50 ng/mL   GLUCOSE, POC    Collection Time: 12/09/18  7:10 AM   Result Value Ref Range    Glucose (POC) 136 (H) 70 - 110 mg/dL   GLUCOSE, POC    Collection Time: 12/09/18 11:07 AM   Result Value Ref Range    Glucose (POC) 192 (H) 70 - 110 mg/dL       Signed By: Michael Calderón MD     December 9, 2018

## 2018-12-09 NOTE — PROGRESS NOTES
Problem: Pressure Injury - Risk of  Goal: *Prevention of pressure injury  Document Chapin Scale and appropriate interventions in the flowsheet. Outcome: Progressing Towards Goal  Pressure injury prevention implemented by following:    Pressure Injury Interventions:  Sensory Interventions: Assess changes in LOC, Avoid rigorous massage over bony prominences, Minimize linen layers, Maintain/enhance activity level, Keep linens dry and wrinkle-free    Moisture Interventions: Apply protective barrier, creams and emollients, Limit adult briefs, Maintain skin hydration (lotion/cream), Minimize layers    Activity Interventions: Increase time out of bed, Pressure redistribution bed/mattress(bed type), PT/OT evaluation    Mobility Interventions: Pressure redistribution bed/mattress (bed type), HOB 30 degrees or less, PT/OT evaluation    Nutrition Interventions: Document food/fluid/supplement intake    Friction and Shear Interventions: HOB 30 degrees or less, Lift sheet, Minimize layers               Problem: Falls - Risk of  Goal: *Absence of Falls  Document Hansa Fall Risk and appropriate interventions in the flowsheet. Outcome: Progressing Towards Goal  Fall risk prevention implemented by following:    Fall Risk Interventions:  Mobility Interventions: Communicate number of staff needed for ambulation/transfer, Patient to call before getting OOB, PT Consult for mobility concerns, Strengthening exercises (ROM-active/passive)         Medication Interventions: Evaluate medications/consider consulting pharmacy, Patient to call before getting OOB, Teach patient to arise slowly    Elimination Interventions: Call light in reach, Patient to call for help with toileting needs, Toilet paper/wipes in reach, Urinal in reach           Bedside shift change report given to [address] (oncoming nurse) by Ekaterina French RN (offgoing nurse).  Report included the following information SBAR, Kardex, Procedure Summary, Intake/Output, MAR and Recent Results.

## 2018-12-10 LAB
ANION GAP SERPL CALC-SCNC: 9 MMOL/L (ref 3–18)
BACTERIA SPEC CULT: ABNORMAL
BACTERIA SPEC CULT: ABNORMAL
BUN SERPL-MCNC: 42 MG/DL (ref 7–18)
BUN/CREAT SERPL: 6 (ref 12–20)
CALCIUM SERPL-MCNC: 7.7 MG/DL (ref 8.5–10.1)
CHLORIDE SERPL-SCNC: 103 MMOL/L (ref 100–108)
CO2 SERPL-SCNC: 24 MMOL/L (ref 21–32)
CREAT SERPL-MCNC: 7.14 MG/DL (ref 0.6–1.3)
GLUCOSE BLD STRIP.AUTO-MCNC: 102 MG/DL (ref 70–110)
GLUCOSE BLD STRIP.AUTO-MCNC: 177 MG/DL (ref 70–110)
GLUCOSE BLD STRIP.AUTO-MCNC: 182 MG/DL (ref 70–110)
GLUCOSE BLD STRIP.AUTO-MCNC: 204 MG/DL (ref 70–110)
GLUCOSE SERPL-MCNC: 133 MG/DL (ref 74–99)
GRAM STN SPEC: ABNORMAL
HEMOCCULT STL QL: NEGATIVE
PHOSPHATE SERPL-MCNC: 3.8 MG/DL (ref 2.5–4.9)
POTASSIUM SERPL-SCNC: 3.3 MMOL/L (ref 3.5–5.5)
SERVICE CMNT-IMP: ABNORMAL
SODIUM SERPL-SCNC: 136 MMOL/L (ref 136–145)

## 2018-12-10 PROCEDURE — 90686 IIV4 VACC NO PRSV 0.5 ML IM: CPT | Performed by: INTERNAL MEDICINE

## 2018-12-10 PROCEDURE — 82272 OCCULT BLD FECES 1-3 TESTS: CPT

## 2018-12-10 PROCEDURE — 74011250636 HC RX REV CODE- 250/636: Performed by: INTERNAL MEDICINE

## 2018-12-10 PROCEDURE — 84100 ASSAY OF PHOSPHORUS: CPT

## 2018-12-10 PROCEDURE — 65270000029 HC RM PRIVATE

## 2018-12-10 PROCEDURE — 82962 GLUCOSE BLOOD TEST: CPT

## 2018-12-10 PROCEDURE — 74011250637 HC RX REV CODE- 250/637: Performed by: INTERNAL MEDICINE

## 2018-12-10 PROCEDURE — 90471 IMMUNIZATION ADMIN: CPT

## 2018-12-10 PROCEDURE — 74011000250 HC RX REV CODE- 250: Performed by: PHYSICIAN ASSISTANT

## 2018-12-10 PROCEDURE — 74011000250 HC RX REV CODE- 250: Performed by: INTERNAL MEDICINE

## 2018-12-10 PROCEDURE — 80048 BASIC METABOLIC PNL TOTAL CA: CPT

## 2018-12-10 PROCEDURE — 36415 COLL VENOUS BLD VENIPUNCTURE: CPT

## 2018-12-10 PROCEDURE — 74011000258 HC RX REV CODE- 258: Performed by: INTERNAL MEDICINE

## 2018-12-10 PROCEDURE — 74011636637 HC RX REV CODE- 636/637: Performed by: INTERNAL MEDICINE

## 2018-12-10 RX ADMIN — CARVEDILOL 25 MG: 25 TABLET, FILM COATED ORAL at 08:59

## 2018-12-10 RX ADMIN — DILTIAZEM HYDROCHLORIDE 30 MG: 30 TABLET, FILM COATED ORAL at 15:36

## 2018-12-10 RX ADMIN — METOPROLOL TARTRATE 5 MG: 5 INJECTION, SOLUTION INTRAVENOUS at 03:38

## 2018-12-10 RX ADMIN — CARVEDILOL 25 MG: 25 TABLET, FILM COATED ORAL at 16:20

## 2018-12-10 RX ADMIN — ATORVASTATIN CALCIUM 40 MG: 40 TABLET, FILM COATED ORAL at 08:59

## 2018-12-10 RX ADMIN — DILTIAZEM HYDROCHLORIDE 30 MG: 30 TABLET, FILM COATED ORAL at 11:46

## 2018-12-10 RX ADMIN — MONTELUKAST SODIUM 10 MG: 10 TABLET, FILM COATED ORAL at 08:59

## 2018-12-10 RX ADMIN — DILTIAZEM HYDROCHLORIDE 30 MG: 30 TABLET, FILM COATED ORAL at 08:59

## 2018-12-10 RX ADMIN — INSULIN LISPRO 4 UNITS: 100 INJECTION, SOLUTION INTRAVENOUS; SUBCUTANEOUS at 21:46

## 2018-12-10 RX ADMIN — LISINOPRIL 5 MG: 5 TABLET ORAL at 08:59

## 2018-12-10 RX ADMIN — INSULIN LISPRO 2 UNITS: 100 INJECTION, SOLUTION INTRAVENOUS; SUBCUTANEOUS at 12:22

## 2018-12-10 RX ADMIN — WATER 2 G: 1 INJECTION INTRAMUSCULAR; INTRAVENOUS; SUBCUTANEOUS at 15:36

## 2018-12-10 RX ADMIN — INFLUENZA VIRUS VACCINE 0.5 ML: 15; 15; 15; 15 SUSPENSION INTRAMUSCULAR at 09:33

## 2018-12-10 RX ADMIN — ASPIRIN 81 MG: 81 TABLET, COATED ORAL at 08:59

## 2018-12-10 RX ADMIN — INSULIN LISPRO 2 UNITS: 100 INJECTION, SOLUTION INTRAVENOUS; SUBCUTANEOUS at 16:20

## 2018-12-10 RX ADMIN — NEPHROCAP 1 CAPSULE: 1 CAP ORAL at 08:59

## 2018-12-10 NOTE — PROGRESS NOTES
[facility]  Progress Note    Patient: Zeina Xie Age: 41 y.o. : 1970 MR#: [id number] SSN: [id number]  Date/Time: 12/10/2018     Subjective:   Pt states he feels okay. No complaints. Assessment/Plan:   1. Atrial fibrillation with RVR, new onset:management per cards  2. Suspected pneumonia: cont Abx  3. Strep pneumo bacteremia and sepsis: likely from lung: on Abx and repeat Cx negative thus far. 4. ESRD s/p AVF placement: HD per nephrologist.  5. Hyperkalemia-resolved, now w/ hypokalemia  6. HTN, controlled: cont BB  7. Chronic diastolic CHF: compensated    8. DM-2, controlled: cont SSI  9. CAD s/p stent: cont asa, BB and statin   10. H/o pulmonary HTN  11. ? Hemoptysis: will monitor  12. Anemia hgb was trending down t unclear if as a result of # 11, no reports of hemoptysis today. Will cont to closely monitor. Guiac stool negative. Lab w/u. Case discussed with:  [x]Patient  []Family  []Nursing  []Case Management  DVT Prophylaxis:  []Lovenox  []Hep SQ  [x]SCDs  []Coumadin   []On Heparin gtt    Objective:   VS:   Visit Vitals  /86 (BP 1 Location: Left arm, BP Patient Position: Supine)   Pulse 93   Temp 97.3 °F (36.3 °C)   Resp 20   Ht 5' 10\" (1.778 m)   Wt 99.2 kg (218 lb 11.1 oz)   SpO2 100%   BMI 31.38 kg/m²      Tmax/24hrs: Temp (24hrs), Av.8 °F (36.6 °C), Min:97 °F (36.1 °C), Max:98.3 °F (36.8 °C)  IOBRIEF    Intake/Output Summary (Last 24 hours) at 12/10/2018 1551  Last data filed at 12/10/2018 0420  Gross per 24 hour   Intake 240 ml   Output 0 ml   Net 240 ml       General:  Alert, cooperative, no acute distress    Pulmonary:  CTA Bilaterally. No Wheezing/Rales. Cardiovascular: IRRegular rate and Rhythm. GI:  Soft, Non distended, Non tender. + Bowel sounds. Extremities:  No edema, cyanosis, clubbing. Psych: Good insight. Not anxious or agitated. Neurologic: Alert and oriented X 3.  No acute neuro deficits.   Additional:    Medications:   Current Facility-Administered Medications   Medication Dose Route Frequency    dilTIAZem (CARDIZEM) IR tablet 30 mg  30 mg Oral TIDAC    lisinopril (PRINIVIL, ZESTRIL) tablet 5 mg  5 mg Oral DAILY    carvedilol (COREG) tablet 25 mg  25 mg Oral BID WITH MEALS    melatonin tablet 3 mg  3 mg Oral QHS PRN    cefTRIAXone (ROCEPHIN) 2 g in sterile water (preservative free) 20 mL IV syringe  2 g IntraVENous Q24H    iron sucrose (VENOFER) 100 mg in 0.9% sodium chloride 100 mL IVPB  100 mg IntraVENous DIALYSIS TUE, THU & SAT    doxercalciferol (HECTOROL) 4 mcg/2 mL injection 1 mcg  1 mcg IntraVENous DIALYSIS TUE, THU & SAT    epoetin gilda (EPOGEN;PROCRIT) 5,000 Units  5,000 Units IntraVENous DIALYSIS TUE, THU & SAT    metoprolol (LOPRESSOR) injection 5 mg  5 mg IntraVENous Q4H    dilTIAZem (CARDIZEM) 100 mg in 0.9% sodium chloride 100 mL infusion  2.5 mg/hr IntraVENous CONTINUOUS    aspirin delayed-release tablet 81 mg  81 mg Oral DAILY    atorvastatin (LIPITOR) tablet 40 mg  40 mg Oral DAILY    B complex-vitaminC-folic acid (NEPHROCAP) cap  1 Cap Oral DAILY    montelukast (SINGULAIR) tablet 10 mg  10 mg Oral DAILY    acetaminophen (TYLENOL) tablet 650 mg  650 mg Oral Q4H PRN    ondansetron (ZOFRAN) injection 4 mg  4 mg IntraVENous Q4H PRN    0.9% sodium chloride infusion  100 mL/hr IntraVENous DIALYSIS PRN    insulin lispro (HUMALOG) injection   SubCUTAneous AC&HS    glucose chewable tablet 16 g  16 g Oral PRN    glucagon (GLUCAGEN) injection 1 mg  1 mg IntraMUSCular PRN    dextrose (D50W) injection syrg 12.5-25 g  25-50 mL IntraVENous PRN       Labs:    Recent Results (from the past 24 hour(s))   GLUCOSE, POC    Collection Time: 12/09/18  8:56 PM   Result Value Ref Range    Glucose (POC) 190 (H) 70 - 678 mg/dL   METABOLIC PANEL, BASIC    Collection Time: 12/10/18  5:18 AM   Result Value Ref Range    Sodium 136 136 - 145 mmol/L    Potassium 3.3 (L) 3.5 - 5.5 mmol/L    Chloride 103 100 - 108 mmol/L    CO2 24 21 - 32 mmol/L    Anion gap 9 3.0 - 18 mmol/L    Glucose 133 (H) 74 - 99 mg/dL    BUN 42 (H) 7.0 - 18 MG/DL    Creatinine 7.14 (H) 0.6 - 1.3 MG/DL    BUN/Creatinine ratio 6 (L) 12 - 20      GFR est AA 10 (L) >60 ml/min/1.73m2    GFR est non-AA 8 (L) >60 ml/min/1.73m2    Calcium 7.7 (L) 8.5 - 10.1 MG/DL   PHOSPHORUS    Collection Time: 12/10/18  5:18 AM   Result Value Ref Range    Phosphorus 3.8 2.5 - 4.9 MG/DL   GLUCOSE, POC    Collection Time: 12/10/18  7:30 AM   Result Value Ref Range    Glucose (POC) 102 70 - 110 mg/dL   OCCULT BLOOD, STOOL    Collection Time: 12/10/18  9:50 AM   Result Value Ref Range    Occult blood, stool NEGATIVE  NEG     GLUCOSE, POC    Collection Time: 12/10/18 11:50 AM   Result Value Ref Range    Glucose (POC) 182 (H) 70 - 110 mg/dL       Signed By: Oscar Gray MD     December 10, 2018

## 2018-12-10 NOTE — PROGRESS NOTES
Transfer to  [facility]:    [facility] is a transfer to [facility]. This writer reviewed previous care manager evaluation. Patient resides with his wife. Family will transport home at the time of discharge. Patient is also a new dialysis patient. There are no home health orders in place for patient at this time. This writer will continue to closely monitor for potential home health needs. This writer will continue to closely monitor for discharge planning to ensure a safe discharge home from [facility].      [facility] Manager  [facility]#[phone]

## 2018-12-10 NOTE — PROGRESS NOTES
RENAL DAILY PROGRESS NOTE            41y M with renal failure, admitted for bacteremia, seen for ESRD management. Subjective:   Complaint:     Overnight events noted  Feels okay     IMPRESSION:   CKD 4/5 now progressing to ESRD  CKD  From HTN/DM nephropathy  HTN  Anemia on iron and epo during HD. Secondary hyperparathyroid of CKD   Hypokalemia   afib  Pneumonia with bacteremia    PLAN:    NO plan for HD today, His outpatient HD has been arranged at   [place] [address] at 6 AM  abx per ID colleanival.            Current Facility-Administered Medications   Medication Dose Route Frequency    dilTIAZem (CARDIZEM) IR tablet 30 mg  30 mg Oral TIDAC    lisinopril (PRINIVIL, ZESTRIL) tablet 5 mg  5 mg Oral DAILY    carvedilol (COREG) tablet 25 mg  25 mg Oral BID WITH MEALS    melatonin tablet 3 mg  3 mg Oral QHS PRN    cefTRIAXone (ROCEPHIN) 2 g in sterile water (preservative free) 20 mL IV syringe  2 g IntraVENous Q24H    iron sucrose (VENOFER) 100 mg in 0.9% sodium chloride 100 mL IVPB  100 mg IntraVENous DIALYSIS TUE, THU & SAT    doxercalciferol (HECTOROL) 4 mcg/2 mL injection 1 mcg  1 mcg IntraVENous DIALYSIS TUE, THU & SAT    epoetin gilda (EPOGEN;PROCRIT) 5,000 Units  5,000 Units IntraVENous DIALYSIS TUE, THU & SAT    metoprolol (LOPRESSOR) injection 5 mg  5 mg IntraVENous Q4H    dilTIAZem (CARDIZEM) 100 mg in 0.9% sodium chloride 100 mL infusion  2.5 mg/hr IntraVENous CONTINUOUS    aspirin delayed-release tablet 81 mg  81 mg Oral DAILY    atorvastatin (LIPITOR) tablet 40 mg  40 mg Oral DAILY    B complex-vitaminC-folic acid (NEPHROCAP) cap  1 Cap Oral DAILY    montelukast (SINGULAIR) tablet 10 mg  10 mg Oral DAILY    acetaminophen (TYLENOL) tablet 650 mg  650 mg Oral Q4H PRN    ondansetron (ZOFRAN) injection 4 mg  4 mg IntraVENous Q4H PRN    0.9% sodium chloride infusion  100 mL/hr IntraVENous DIALYSIS PRN    insulin lispro (HUMALOG) injection   SubCUTAneous AC&HS    glucose chewable tablet 16 g  16 g Oral PRN    glucagon (GLUCAGEN) injection 1 mg  1 mg IntraMUSCular PRN    dextrose (D50W) injection syrg 12.5-25 g  25-50 mL IntraVENous PRN       Review of Symptoms: comprehensive ROS negative except above.    Objective:     Patient Vitals for the past 24 hrs:   Temp Pulse Resp BP SpO2   12/10/18 1534 97.3 °F (36.3 °C) 93 20 149/86 100 %   12/10/18 1429 -- 89 -- -- --   12/10/18 1314 -- (!) 101 -- -- --   12/10/18 1138 98 °F (36.7 °C) (!) 113 20 (!) 160/94 98 %   12/10/18 0758 98.1 °F (36.7 °C) 99 20 (!) 155/93 94 %   12/10/18 0459 98.1 °F (36.7 °C) 87 21 149/87 97 %   12/10/18 0334 97 °F (36.1 °C) (!) 102 21 153/90 97 %   12/10/18 0000 98.3 °F (36.8 °C) 99 18 127/77 99 %   12/09/18 1900 98 °F (36.7 °C) 100 18 (!) 152/92 98 %        Weight change: -0.591 kg (-4.9 oz)     12/08 1901 - 12/10 0700  In: 600 [P.O.:600]  Out: 0     Intake/Output Summary (Last 24 hours) at 12/10/2018 1600  Last data filed at 12/10/2018 0420  Gross per 24 hour   Intake 240 ml   Output 0 ml   Net 240 ml     Physical Exam:   General: comfortable, no acute distress   HEENT sclera anicteric, supple neck, no thyromegaly  CVS: S1S2 heard,  no rub  RS: + air entry b/l,   Abd: Soft, Non tender, Not distended, Positive bowel sounds, no organomegaly, no CVA / supra pubic tenderness  Neuro: non focal, awake, alert , CN II-XII are grossly intact  Extrm: no edema, no cyanosis, clubbing   Skin: no visible  Rash  Musculoskeletal: No gross joints or bone deformities   Access; right arm fistula         Data Review:     LABS:   Hematology:   Recent Labs     12/09/18 0213 12/08/18 0155   WBC  --  11.1   HGB 8.6* 8.1*   HCT 24.9* 24.1*     Chemistry:   Recent Labs     12/10/18  0518 12/09/18 0213 12/08/18 0155   BUN 42* 33* 54*   CREA 7.14* 5.88* 8.07*   CA 7.7* 7.5* 7.7*   K 3.3* 3.2* 3.6    137 139    103 104   CO2 24 27 22   PHOS 3.8  --  4.7   * 147* 114*            Procedures/imaging: see electronic medical records for all procedures, Xrays and details which were not copied into this note but were reviewed prior to creation of Plan          Assessment & Plan:     As above         Diaz Cervantes MD  12/10/2018  4:00 PM

## 2018-12-10 NOTE — PROGRESS NOTES
Problem: Pressure Injury - Risk of  Goal: *Prevention of pressure injury  Document Chapin Scale and appropriate interventions in the flowsheet. Outcome: Progressing Towards Goal  Pressure Injury Interventions:  Sensory Interventions: Assess changes in LOC    Moisture Interventions: Absorbent underpads    Activity Interventions: Increase time out of bed    Mobility Interventions: Assess need for specialty bed, Float heels, PT/OT evaluation    Nutrition Interventions: Document food/fluid/supplement intake    Friction and Shear Interventions: HOB 30 degrees or less, Minimize layers               Problem: Falls - Risk of  Goal: *Absence of Falls  Document Hansa Fall Risk and appropriate interventions in the flowsheet.   Outcome: Progressing Towards Goal  Fall Risk Interventions:  Mobility Interventions: Assess mobility with egress test, Bed/chair exit alarm         Medication Interventions: Bed/chair exit alarm, Teach patient to arise slowly, Patient to call before getting OOB    Elimination Interventions: Bed/chair exit alarm, Call light in reach, Toileting schedule/hourly rounds, Urinal in reach

## 2018-12-10 NOTE — ROUTINE PROCESS
7857 Assumed care of patient. Patient transferred from [facility]. Patient resting in bed. No distress noted. Call bell within reach, siderails up x 3, bed in lowest position, and patient instructed to use call bell for assistance. Cardizem gtt infusing at 5 mg/hr in IV to L wrist. No infiltration noted. Gtt verified with previous nurse, Robert Gutierrez RN. Tele# 6 Afib . Will continue to monitor. 0730 Bedside and Verbal shift change report given to [address] [place] [address] (oncoming nurse) by Michel Lopes RN  (offgoing nurse). Report included the following information Kardex, Intake/Output, MAR, Recent Results and Cardiac Rhythm Afib HR .

## 2018-12-10 NOTE — PROGRESS NOTES
Mews 3  No further action. Patient arrived from [facility] on Cardizem gtt at 5 mg/hr, Will continue to monitor.      12/10/18 0334   Vital Signs   Temp 97 °F (36.1 °C)   Temp Source Oral   Pulse (Heart Rate) (!) 102   Heart Rate Source Brachial   Resp Rate 21   O2 Sat (%) 97 %   Level of Consciousness Alert   /90   MAP (Calculated) 111   BP 1 Method Automatic   BP 1 Location Left arm   BP Patient Position At rest   MEWS Score 3

## 2018-12-10 NOTE — PROGRESS NOTES
[facility]  -  Progress Note      Patient: Key Perez MRN: [id number]  SSN: [id number]    YOB: 1970  Age: 41 y.o. Sex: male      Admit Date: 12/5/2018    Assessment:     Hospital Problems  Date Reviewed: 6/21/2016          Codes Class Noted POA    Atrial fibrillation with rapid ventricular response [facility]) ICD-10-CM: I48.91  ICD-9-CM: 427.31  12/5/2018 Unknown            -Volume overload, ESRD, now on intermittent HD  -Sepsis with bacteremia, S. Pneumonia, suspected pneumonia on antbx  -Paroxysmal atrial fibrillation, new diagnosis, presented with RVR, intermittent afib this admission, likely driven by underlying illness.  CHADSVASC2 score 3  -Coronary artery disease s/p LAD PCI with AMOL 10/2012.  -Ischemic cardiomyopathy with EF 40-45% 2012 improved to 55% 2014.  -Diabetes mellitus.  -Hypertension.  -Dyslipidemia.  -Chronic anemia.  -Legally blind.  -Noncompliance with medical regimen and follow up.     Primary cardiologist Dr Cristhian Coleman, last office visit 2016. Plan:     Stable  Will attempt to wean down off IV diltiazem today as rates are improving. Continue with all other medications at this time. Activity as tolerated. Hopefully can come off dilt soon. Subjective:     No new complaints. States that he feels well this morning.   No pain    Objective:      Patient Vitals for the past 8 hrs:   Temp Pulse Resp BP SpO2   12/10/18 0758 98.1 °F (36.7 °C) 99 20 (!) 155/93 94 %   12/10/18 0459 98.1 °F (36.7 °C) 87 21 149/87 97 %   12/10/18 0334 97 °F (36.1 °C) (!) 102 21 153/90 97 %         Patient Vitals for the past 96 hrs:   Weight   12/10/18 0459 99.2 kg (218 lb 11.1 oz)   12/09/18 0448 101.2 kg (223 lb)   12/08/18 1014 99.8 kg (220 lb)   12/08/18 0350 100.2 kg (220 lb 14.4 oz)   12/07/18 1303 100.2 kg (221 lb)   12/06/18 2156 101.1 kg (222 lb 14.4 oz)         Intake/Output Summary (Last 24 hours) at 12/10/2018 0942  Last data filed at 12/10/2018 0420  Gross per 24 hour   Intake 480 ml   Output 0 ml   Net 480 ml       Physical Exam:  General:  alert, cooperative, no distress, appears stated age  Neck:  nontender  Lungs:  clear to auscultation bilaterally  Heart:  regular rate and rhythm, S1, S2 normal, no murmur, click, rub or gallop  Extremities:  extremities normal, atraumatic, no cyanosis or edema    Data Review:     Labs: Results:       Chemistry Recent Labs     12/10/18  0518 12/09/18  0213 12/08/18  0155   * 147* 114*    137 139   K 3.3* 3.2* 3.6    103 104   CO2 24 27 22   BUN 42* 33* 54*   CREA 7.14* 5.88* 8.07*   CA 7.7* 7.5* 7.7*   PHOS 3.8  --  4.7   AGAP 9 7 13   BUCR 6* 6* 7*      CBC w/Diff Recent Labs     12/09/18 0213 12/08/18  0155   WBC  --  11.1   RBC  --  3.00*   HGB 8.6* 8.1*   HCT 24.9* 24.1*   PLT  --  200   GRANS  --  85*   LYMPH  --  4*   EOS  --  2      Cardiac Enzymes No results found for: CPK, CK, CKMMB, CKMB, RCK3, CKMBT, CKNDX, CKND1, SAMARA, TROPT, TROIQ, SAKINA, TROPT, TNIPOC, BNP, BNPP   Coagulation No results for input(s): PTP, INR, APTT in the last 72 hours. No lab exists for component: INREXT    Lipid Panel Lab Results   Component Value Date/Time    Cholesterol, total 122 05/11/2015 08:55 AM    HDL Cholesterol 38 (L) 05/11/2015 08:55 AM    LDL, calculated 72 05/11/2015 08:55 AM    VLDL, calculated 12 05/11/2015 08:55 AM    Triglyceride 58 05/11/2015 08:55 AM    CHOL/HDL Ratio 4.4 10/17/2012 06:05 AM      BNP No results found for: BNP, BNPP, XBNPT   Liver Enzymes No results for input(s): TP, ALB, TBIL, AP, SGOT, GPT in the last 72 hours.     No lab exists for component: DBIL   Digoxin    Thyroid Studies Lab Results   Component Value Date/Time    TSH 2.43 12/06/2018 05:19 AM

## 2018-12-10 NOTE — PROGRESS NOTES
Problem: Pressure Injury - Risk of  Goal: *Prevention of pressure injury  Document Chapin Scale and appropriate interventions in the flowsheet. Outcome: Progressing Towards Goal  Pressure Injury Interventions:  Sensory Interventions: Assess changes in LOC, Avoid rigorous massage over bony prominences, Minimize linen layers, Maintain/enhance activity level, Keep linens dry and wrinkle-free    Moisture Interventions: Absorbent underpads, Apply protective barrier, creams and emollients, Assess need for specialty bed, Check for incontinence Q2 hours and as needed, Contain wound drainage, Limit adult briefs, Maintain skin hydration (lotion/cream), Minimize layers, Moisture barrier    Activity Interventions: Increase time out of bed, Pressure redistribution bed/mattress(bed type), PT/OT evaluation    Mobility Interventions: Assess need for specialty bed, Float heels, HOB 30 degrees or less, Pressure redistribution bed/mattress (bed type), PT/OT evaluation, Turn and reposition approx. every two hours(pillow and wedges)    Nutrition Interventions: Document food/fluid/supplement intake, Discuss nutritional consult with provider    Friction and Shear Interventions: HOB 30 degrees or less, Apply protective barrier, creams and emollients, Lift team/patient mobility team, Minimize layers               Problem: Falls - Risk of  Goal: *Absence of Falls  Document Hansa Fall Risk and appropriate interventions in the flowsheet.   Outcome: Progressing Towards Goal  Fall Risk Interventions:  Mobility Interventions: Assess mobility with egress test, Bed/chair exit alarm, Communicate number of staff needed for ambulation/transfer, OT consult for ADLs, Patient to call before getting OOB, PT Consult for mobility concerns         Medication Interventions: Bed/chair exit alarm, Evaluate medications/consider consulting pharmacy, Patient to call before getting OOB, Teach patient to arise slowly    Elimination Interventions: Bed/chair exit alarm, Call light in reach, Patient to call for help with toileting needs, Toilet paper/wipes in reach, Toileting schedule/hourly rounds, Urinal in reach

## 2018-12-10 NOTE — ROUTINE PROCESS
TRANSFER - IN REPORT:    Verbal report received from Aileen Wood RN (name) on Jon Lunsford  being received from [facility] (unit) for routine progression of care      Report consisted of patients Situation, Background, Assessment and   Recommendations(SBAR). Information from the following report(s) Kardex, Intake/Output, MAR, Recent Results and Cardiac Rhythm Afib 90s-low 100s was reviewed with the receiving nurse. Opportunity for questions and clarification was provided. Assessment completed upon patients arrival to unit and care assumed.

## 2018-12-10 NOTE — PROGRESS NOTES
Infectious Disease Progress Note    Requested by: Dr. Tarah Vaca    Reason: sepsis, gram positive bacteremia    Current abx Prior abx   Ceftriaxone since 12/5/18 Azithromycin 12/5-12/9     Lines:       Assessment :    41 y.o. male with a PMH of ESRD s/p AVF placement but not on dialysis yet, HTN, chronic diastolic CHF, DM-2 (last QVFC9Q: 6.3 on 12/5/18) , CAD s/p stent and pulmonary HTN who presented to [facility] on 12/5/18 with c/c of weakness and fatigue. A.fib with rvr on admission, acute renal failure    Now with gram positive bacteremia. Highly complex clinical picture. Presentation is c/w sepsis (POA) due to streptococcus pneumoniae bloodstream infection (positive blood culture 12/5, negative blood culture 12/7),  bilateral community acquired pneumonia     BSI likely due to community acquired pneumonia. No evidence of cholecystitis on usg. Clinically better. Recommendations:    1. Continue ceftriaxone  2 g iv q 24 hour. 2. F/u nephrology recommendations  3. Will switch to po antibiotics in am if continued clinical improvement      Advance Care planning:full code:  discussed  with patient/surrogate decision maker:Jovita Sharpe: [phone]    Above plan was discussed in details with patient. Please call me if any further questions or concerns. Will continue to participate in the care of this patient. subjective:    Feels better. Patient denies headaches, visual disturbances, sore throat, runny nose, earaches, cp, chills, burning micturition, pain or weakness in extremities. He denies back pain/flank pain. Medication List      ASK your doctor about these medications    amLODIPine 10 mg tablet  Commonly known as:  NORVASC  TAKE ONE TABLET BY MOUTH EVERY DAY     aspirin delayed-release 81 mg tablet     atorvastatin 40 mg tablet  Commonly known as:  LIPITOR  Take 1 Tab by mouth daily.      b complex-vitamin c-folic acid 1mg 1 mg tablet  Commonly known as:  NAHED-MOON RX  TAKE ONE TABLET BY MOUTH EVERY DAY     calcitRIOL 0.25 mcg capsule  Commonly known as:  ROCALTROL     carvedilol 25 mg tablet  Commonly known as:  COREG  TAKE TWO TABLETS BY MOUTH TWICE DAILY     furosemide 40 mg tablet  Commonly known as:  LASIX  TAKE 2 TABLETS BY MOUTH TWO TIMES A DAY     glimepiride 4 mg tablet  Commonly known as:  AMARYL  TAKE 1 TABLET BY MOUTH EVERY MORNING     glucose blood VI test strips strip  Commonly known as:  FREESTYLE LITE STRIPS  Use as directed     hydrALAZINE 100 mg tablet  Commonly known as:  APRESOLINE  TAKE 1 TABLET BY MOUTH THREE TIMES A DAY     metOLazone 5 mg tablet  Commonly known as:  ZAROXOLYN  TAKE ONE TABLET BY MOUTH EVERY FRIDAY 30 MINUTES PRIOR TO MORNING LASIX DOSE     PROCRIT INJECTION     sildenafil citrate 50 mg tablet  Commonly known as:  VIAGRA  Take 1 Tab by mouth as needed. Natalio Chamberlain 47 [id number], lot #[id number]  Exp 12/16, 1 box, # 2 tabs     SINGULAIR 10 mg tablet  Generic drug:  montelukast     sodium bicarbonate 325 mg tablet     tadalafil 20 mg tablet  Commonly known as:  CIALIS  Take 1 Tab by mouth as needed.      VITAMIN D3 2,000 unit Tab  Generic drug:  cholecalciferol (vitamin D3)            Current Facility-Administered Medications   Medication Dose Route Frequency    dilTIAZem (CARDIZEM) IR tablet 30 mg  30 mg Oral TIDAC    lisinopril (PRINIVIL, ZESTRIL) tablet 5 mg  5 mg Oral DAILY    carvedilol (COREG) tablet 25 mg  25 mg Oral BID WITH MEALS    melatonin tablet 3 mg  3 mg Oral QHS PRN    cefTRIAXone (ROCEPHIN) 2 g in sterile water (preservative free) 20 mL IV syringe  2 g IntraVENous Q24H    iron sucrose (VENOFER) 100 mg in 0.9% sodium chloride 100 mL IVPB  100 mg IntraVENous DIALYSIS TUE, THU & SAT    doxercalciferol (HECTOROL) 4 mcg/2 mL injection 1 mcg  1 mcg IntraVENous DIALYSIS TUE, THU & SAT    epoetin gilda (EPOGEN;PROCRIT) 5,000 Units  5,000 Units IntraVENous DIALYSIS TUE, THU & SAT    metoprolol (LOPRESSOR) injection 5 mg  5 mg IntraVENous Q4H    dilTIAZem (CARDIZEM) 100 mg in 0.9% sodium chloride 100 mL infusion  2.5 mg/hr IntraVENous CONTINUOUS    aspirin delayed-release tablet 81 mg  81 mg Oral DAILY    atorvastatin (LIPITOR) tablet 40 mg  40 mg Oral DAILY    B complex-vitaminC-folic acid (NEPHROCAP) cap  1 Cap Oral DAILY    montelukast (SINGULAIR) tablet 10 mg  10 mg Oral DAILY    acetaminophen (TYLENOL) tablet 650 mg  650 mg Oral Q4H PRN    ondansetron (ZOFRAN) injection 4 mg  4 mg IntraVENous Q4H PRN    0.9% sodium chloride infusion  100 mL/hr IntraVENous DIALYSIS PRN    insulin lispro (HUMALOG) injection   SubCUTAneous AC&HS    glucose chewable tablet 16 g  16 g Oral PRN    glucagon (GLUCAGEN) injection 1 mg  1 mg IntraMUSCular PRN    dextrose (D50W) injection syrg 12.5-25 g  25-50 mL IntraVENous PRN       Allergies: Patient has no known allergies. Temp (24hrs), Av °F (36.7 °C), Min:97 °F (36.1 °C), Max:98.5 °F (36.9 °C)    Visit Vitals  BP (!) 160/94 (BP 1 Location: Left arm, BP Patient Position: At rest)   Pulse 89   Temp 98 °F (36.7 °C)   Resp 20   Ht 5' 10\" (1.778 m)   Wt 99.2 kg (218 lb 11.1 oz)   SpO2 98%   BMI 31.38 kg/m²       ROS: 12 point ROS obtained in details.  Pertinent positives as mentioned in HPI, otherwise negative    Physical Exam:    GENERAL: Not in acute distress  HEENT: pink conjunctiva, un icteric sclera,   NECK: No lymphadenopthy or thyroid swelling, JVD not seen  LYMPH: No supraclavicular or cervical or axillary nodes on both sides  CVS: S1S2 regular, No gallop or rub  RS: CTA, no rales or wheezes  Abd: Soft, resolved tenderness, not distended, No guarding, No rigidity  NEURO:  No focal neurologic deficits   Extrm: no leg edema or swelling   Skin: No rash  Psych: no suicidal or homicidal ideations           Labs: Results:   Chemistry Recent Labs     12/10/18  0518 18  0213 18  0155   * 147* 114*    137 139   K 3.3* 3.2* 3.6    103 104   CO2 24 27 22   BUN 42* 33* 54*   CREA 7.14* 5.88* 8.07*   CA 7.7* 7.5* 7.7*   AGAP 9 7 13   BUCR 6* 6* 7*      CBC w/Diff Recent Labs     12/09/18  0213 12/08/18  0155   WBC  --  11.1   RBC  --  3.00*   HGB 8.6* 8.1*   HCT 24.9* 24.1*   PLT  --  200   GRANS  --  85*   LYMPH  --  4*   EOS  --  2      Microbiology No results for input(s): CULT in the last 72 hours.        RADIOLOGY:    All available imaging studies/reports in [facility] for this admission were reviewed    Dr. Gamaliel Rivera, Infectious Disease Specialist  [phone]  December 10, 2018  9:25 AM

## 2018-12-10 NOTE — PROGRESS NOTES
MEWS score 3. Pt . He is receiving treatment for a fib. He is on a cardizem drip and also receiving PO meds. Pt is asymptomatic. He is on cardiac monitor and will continue to monitor.

## 2018-12-11 LAB
ANION GAP SERPL CALC-SCNC: 10 MMOL/L (ref 3–18)
APTT PPP: 30.3 SEC (ref 23–36.4)
BASOPHILS # BLD: 0 K/UL (ref 0–0.06)
BASOPHILS NFR BLD: 0 % (ref 0–3)
BUN SERPL-MCNC: 51 MG/DL (ref 7–18)
BUN/CREAT SERPL: 6 (ref 12–20)
CALCIUM SERPL-MCNC: 7.6 MG/DL (ref 8.5–10.1)
CHLORIDE SERPL-SCNC: 104 MMOL/L (ref 100–108)
CO2 SERPL-SCNC: 24 MMOL/L (ref 21–32)
CREAT SERPL-MCNC: 8.06 MG/DL (ref 0.6–1.3)
DIFFERENTIAL METHOD BLD: ABNORMAL
EOSINOPHIL # BLD: 0.4 K/UL (ref 0–0.4)
EOSINOPHIL NFR BLD: 3 % (ref 0–5)
ERYTHROCYTE [DISTWIDTH] IN BLOOD BY AUTOMATED COUNT: 15.6 % (ref 11.6–14.5)
GLUCOSE BLD STRIP.AUTO-MCNC: 102 MG/DL (ref 70–110)
GLUCOSE BLD STRIP.AUTO-MCNC: 104 MG/DL (ref 70–110)
GLUCOSE BLD STRIP.AUTO-MCNC: 210 MG/DL (ref 70–110)
GLUCOSE BLD STRIP.AUTO-MCNC: 270 MG/DL (ref 70–110)
GLUCOSE SERPL-MCNC: 119 MG/DL (ref 74–99)
HCT VFR BLD AUTO: 25.8 % (ref 36–48)
HGB BLD-MCNC: 8.7 G/DL (ref 13–16)
LYMPHOCYTES # BLD: 1 K/UL (ref 0.8–3.5)
LYMPHOCYTES NFR BLD: 8 % (ref 20–51)
MCH RBC QN AUTO: 27.6 PG (ref 24–34)
MCHC RBC AUTO-ENTMCNC: 33.7 G/DL (ref 31–37)
MCV RBC AUTO: 81.9 FL (ref 74–97)
MONOCYTES # BLD: 0.1 K/UL (ref 0–1)
MONOCYTES NFR BLD: 1 % (ref 2–9)
NEUTS SEG # BLD: 10.5 K/UL (ref 1.8–8)
NEUTS SEG NFR BLD: 88 % (ref 42–75)
PLATELET # BLD AUTO: 300 K/UL (ref 135–420)
PLATELET COMMENTS,PCOM: ABNORMAL
PMV BLD AUTO: 10.7 FL (ref 9.2–11.8)
POTASSIUM SERPL-SCNC: 3.3 MMOL/L (ref 3.5–5.5)
RBC # BLD AUTO: 3.15 M/UL (ref 4.7–5.5)
RBC MORPH BLD: ABNORMAL
RBC MORPH BLD: ABNORMAL
SODIUM SERPL-SCNC: 138 MMOL/L (ref 136–145)
WBC # BLD AUTO: 12 K/UL (ref 4.6–13.2)

## 2018-12-11 PROCEDURE — 74011000258 HC RX REV CODE- 258: Performed by: PHYSICIAN ASSISTANT

## 2018-12-11 PROCEDURE — 36415 COLL VENOUS BLD VENIPUNCTURE: CPT

## 2018-12-11 PROCEDURE — 74011250637 HC RX REV CODE- 250/637: Performed by: INTERNAL MEDICINE

## 2018-12-11 PROCEDURE — 74011250636 HC RX REV CODE- 250/636: Performed by: INTERNAL MEDICINE

## 2018-12-11 PROCEDURE — 74011000258 HC RX REV CODE- 258: Performed by: INTERNAL MEDICINE

## 2018-12-11 PROCEDURE — 74011000250 HC RX REV CODE- 250: Performed by: PHYSICIAN ASSISTANT

## 2018-12-11 PROCEDURE — 85025 COMPLETE CBC W/AUTO DIFF WBC: CPT

## 2018-12-11 PROCEDURE — 85730 THROMBOPLASTIN TIME PARTIAL: CPT

## 2018-12-11 PROCEDURE — 74011000250 HC RX REV CODE- 250: Performed by: INTERNAL MEDICINE

## 2018-12-11 PROCEDURE — 74011250637 HC RX REV CODE- 250/637: Performed by: PHYSICIAN ASSISTANT

## 2018-12-11 PROCEDURE — 74011636637 HC RX REV CODE- 636/637: Performed by: INTERNAL MEDICINE

## 2018-12-11 PROCEDURE — 80048 BASIC METABOLIC PNL TOTAL CA: CPT

## 2018-12-11 PROCEDURE — 65270000029 HC RM PRIVATE

## 2018-12-11 PROCEDURE — 90935 HEMODIALYSIS ONE EVALUATION: CPT

## 2018-12-11 PROCEDURE — 82962 GLUCOSE BLOOD TEST: CPT

## 2018-12-11 RX ORDER — DILTIAZEM HYDROCHLORIDE 90 MG/1
90 TABLET, FILM COATED ORAL
Status: DISCONTINUED | OUTPATIENT
Start: 2018-12-11 | End: 2018-12-14 | Stop reason: HOSPADM

## 2018-12-11 RX ORDER — HEPARIN SODIUM 1000 [USP'U]/ML
5000 INJECTION, SOLUTION INTRAVENOUS; SUBCUTANEOUS ONCE
Status: COMPLETED | OUTPATIENT
Start: 2018-12-11 | End: 2018-12-11

## 2018-12-11 RX ORDER — DILTIAZEM HYDROCHLORIDE 30 MG/1
60 TABLET, FILM COATED ORAL
Status: DISCONTINUED | OUTPATIENT
Start: 2018-12-11 | End: 2018-12-11

## 2018-12-11 RX ORDER — HEPARIN SODIUM 10000 [USP'U]/100ML
18-36 INJECTION, SOLUTION INTRAVENOUS
Status: DISCONTINUED | OUTPATIENT
Start: 2018-12-11 | End: 2018-12-13

## 2018-12-11 RX ADMIN — HEPARIN SODIUM 5000 UNITS: 1000 INJECTION INTRAVENOUS; SUBCUTANEOUS at 18:30

## 2018-12-11 RX ADMIN — ERYTHROPOIETIN 5000 UNITS: 3000 INJECTION, SOLUTION INTRAVENOUS; SUBCUTANEOUS at 15:43

## 2018-12-11 RX ADMIN — MONTELUKAST SODIUM 10 MG: 10 TABLET, FILM COATED ORAL at 08:02

## 2018-12-11 RX ADMIN — DILTIAZEM HYDROCHLORIDE 2.5 MG/HR: 5 INJECTION INTRAVENOUS at 05:32

## 2018-12-11 RX ADMIN — ASPIRIN 81 MG: 81 TABLET, COATED ORAL at 08:02

## 2018-12-11 RX ADMIN — IRON SUCROSE 100 MG: 20 INJECTION, SOLUTION INTRAVENOUS at 15:43

## 2018-12-11 RX ADMIN — DILTIAZEM HYDROCHLORIDE 60 MG: 30 TABLET, FILM COATED ORAL at 06:35

## 2018-12-11 RX ADMIN — DILTIAZEM HYDROCHLORIDE 15 MG/HR: 5 INJECTION INTRAVENOUS at 18:33

## 2018-12-11 RX ADMIN — HEPARIN SODIUM AND DEXTROSE 18 UNITS/KG/HR: 10000; 5 INJECTION INTRAVENOUS at 18:27

## 2018-12-11 RX ADMIN — METOPROLOL TARTRATE 5 MG: 5 INJECTION, SOLUTION INTRAVENOUS at 05:05

## 2018-12-11 RX ADMIN — INSULIN LISPRO 4 UNITS: 100 INJECTION, SOLUTION INTRAVENOUS; SUBCUTANEOUS at 11:55

## 2018-12-11 RX ADMIN — CARVEDILOL 25 MG: 25 TABLET, FILM COATED ORAL at 17:01

## 2018-12-11 RX ADMIN — ATORVASTATIN CALCIUM 40 MG: 40 TABLET, FILM COATED ORAL at 08:02

## 2018-12-11 RX ADMIN — METOPROLOL TARTRATE 2.5 MG: 5 INJECTION, SOLUTION INTRAVENOUS at 07:57

## 2018-12-11 RX ADMIN — WATER 2 G: 1 INJECTION INTRAMUSCULAR; INTRAVENOUS; SUBCUTANEOUS at 17:03

## 2018-12-11 RX ADMIN — INSULIN LISPRO 6 UNITS: 100 INJECTION, SOLUTION INTRAVENOUS; SUBCUTANEOUS at 06:00

## 2018-12-11 RX ADMIN — NEPHROCAP 1 CAPSULE: 1 CAP ORAL at 08:02

## 2018-12-11 RX ADMIN — DILTIAZEM HYDROCHLORIDE 90 MG: 90 TABLET, FILM COATED ORAL at 17:01

## 2018-12-11 NOTE — PROGRESS NOTES
[facility]  -  Progress Note      Patient: Sania Watters MRN: [id number]  SSN: [id number]    YOB: 1970  Age: 41 y.o. Sex: male      Admit Date: 12/5/2018    Assessment:     -Volume overload, ESRD, now on intermittent HD  -Sepsis with bacteremia, S. Pneumonia, suspected pneumonia on antbx  -Paroxysmal atrial fibrillation, new diagnosis, presented with RVR, intermittent afib this admission, likely driven by underlying illness.  CHADSVASC2 score 3  -Coronary artery disease s/p LAD PCI with AMOL 10/2012.  -Ischemic cardiomyopathy with EF 40-45% 2012 improved to 55% 2014.  -Diabetes mellitus.  -Hypertension.  -Dyslipidemia.  -Chronic anemia.  -Legally blind.  -Noncompliance with medical regimen and follow up.     Primary cardiologist Dr Luis Miguel Marlow, last office visit 2016. Plan:     -Cardizem gtts increased to 10 mg/hr this AM due to elevated rates in 130's. HR currently ~90's. Would continue current medication regimen for now, PO Cardizem increased this AM to 60 mg TID. His Coreg was held this AM due to pending HD today, continue to monitor rates closely and wean off Cardizem gtts as able. -Otherwise continue ASA, Lipitor, Lisinopril.  -Initiation of [address] has been deferred due to chronic anemia and hemoptysis. Subjective:     Pt reports pain/discomfort associated with left hand IV. Denies chest discomfort currently.     Objective:      Patient Vitals for the past 8 hrs:   Temp Pulse Resp BP SpO2   12/11/18 1024 98 °F (36.7 °C) 98 20 157/89 98 %   12/11/18 1001 -- 98 -- -- --   12/11/18 0939 98.7 °F (37.1 °C) 95 18 160/88 95 %   12/11/18 0919 98 °F (36.7 °C) 100 18 (!) 165/11 --   12/11/18 0856 98.4 °F (36.9 °C) (!) 119 16 (!) 167/110 95 %   12/11/18 0800 98.1 °F (36.7 °C) -- -- -- --   12/11/18 0750 98.1 °F (36.7 °C) (!) 135 16 (!) 172/102 93 %   12/11/18 0615 -- (!) 115 -- (!) 178/101 --   12/11/18 0610 98.3 °F (36.8 °C) -- 18 (!) 158/104 99 %   12/11/18 0551 -- (!) 116 19 -- 99 %   12/11/18 0546 -- -- 19 -- --   12/11/18 0505 -- (!) 123 -- -- --   12/11/18 0451 98.2 °F (36.8 °C) (!) 115 20 (!) 177/97 100 %         Patient Vitals for the past 96 hrs:   Weight   12/11/18 0455 217 lb 14.4 oz (98.8 kg)   12/10/18 0459 218 lb 11.1 oz (99.2 kg)   12/09/18 0448 223 lb (101.2 kg)   12/08/18 1014 220 lb (99.8 kg)   12/08/18 0350 220 lb 14.4 oz (100.2 kg)   12/07/18 1303 221 lb (100.2 kg)         Intake/Output Summary (Last 24 hours) at 12/11/2018 1121  Last data filed at 12/11/2018 1000  Gross per 24 hour   Intake 120 ml   Output --   Net 120 ml       Physical Exam:  General:  alert, cooperative, no distress, appears stated age  Neck:  No JVD  Lungs:  Wheezing primarily to bilateral bases  Heart:  irregular rhythm  Abdomen:  abdomen is soft without significant tenderness, masses, organomegaly or guarding  Extremities:  Atraumatic, 1+ edema to lower extremities    Data Review:     Labs: Results:       Chemistry Recent Labs     12/11/18  0342 12/10/18  0518 12/09/18 0213   * 133* 147*    136 137   K 3.3* 3.3* 3.2*    103 103   CO2 24 24 27   BUN 51* 42* 33*   CREA 8.06* 7.14* 5.88*   CA 7.6* 7.7* 7.5*   PHOS  --  3.8  --    AGAP 10 9 7   BUCR 6* 6* 6*      CBC w/Diff Recent Labs     12/09/18 0213   HGB 8.6*   HCT 24.9*      Lipid Panel Lab Results   Component Value Date/Time    Cholesterol, total 122 05/11/2015 08:55 AM    HDL Cholesterol 38 (L) 05/11/2015 08:55 AM    LDL, calculated 72 05/11/2015 08:55 AM    VLDL, calculated 12 05/11/2015 08:55 AM    Triglyceride 58 05/11/2015 08:55 AM    CHOL/HDL Ratio 4.4 10/17/2012 06:05 AM      Thyroid Studies Lab Results   Component Value Date/Time    TSH 2.43 12/06/2018 05:19 AM

## 2018-12-11 NOTE — PROGRESS NOTES
Bedside shift change report given to Yuki (oncoming nurse) by Esteban Ha (offgoing nurse). Report included the following information SBAR, Kardex, Intake/Output, MAR, Recent Results and Cardiac Rhythm Afib .

## 2018-12-11 NOTE — PROGRESS NOTES
Problem: Patient Education: Go to Patient Education Activity  Goal: Patient/Family Education  Outcome: Progressing Towards Goal  Patient on room air, alert and responsive with no distress noted. Tolerate medications with no adverse reactions noted. No complain verbalize. Bed in lowest position, callbell within reach and continue to monitor for safety. Problem: Falls - Risk of  Goal: *Absence of Falls  Document Hansa Fall Risk and appropriate interventions in the flowsheet.   Outcome: Progressing Towards Goal  Fall Risk Interventions:  Mobility Interventions: Assess mobility with egress test, Patient to call before getting OOB         Medication Interventions: Bed/chair exit alarm, Teach patient to arise slowly, Patient to call before getting OOB    Elimination Interventions: Bed/chair exit alarm, Patient to call for help with toileting needs

## 2018-12-11 NOTE — PROGRESS NOTES
RENAL DAILY PROGRESS NOTE            41y M with renal failure, admitted for bacteremia, seen for ESRD management. Subjective:   Complaint:     Overnight events noted  Feels okay   Examined during HD  Complaining pain in his left arm      IMPRESSION:   CKD 4/5 now progressing to ESRD  CKD  From HTN/DM nephropathy  Access; right arm myles fistula,He had some symptoms of steal syndrome, so far tolerateing HD with smaller gaze needle, will schedule outpatient follow up with surgery. HTN  Anemia on iron and epo during HD. Secondary hyperparathyroid of CKD   Hypokalemia   afib  Pneumonia with bacteremia    PLAN:    check duppler upper extremities to rule out any blood clot of left arm   His outpatient HD has been arranged at   [email or web address] [place] at 6 AM  abx per ID colleanival. Discussed with Dr. Lazarus Sager. At 11:58 AM on 2018, I saw and examined patient during hemodialysis treatment. The patient was receiving hemodialysis for treatment of  renal failure. I have also reviewed vital signs, intake and output, lab results and recent events, and agreed with today's dialysis order. HD rounding    Blood pressure 157/89, pulse 98, temperature 98 °F (36.7 °C), resp. rate 20, height 5' 10\" (1.778 m), weight 98.8 kg (217 lb 14.4 oz), SpO2 98 %.   Temp (24hrs), Av.1 °F (36.7 °C), Min:97.3 °F (36.3 °C), Max:98.7 °F (37.1 °C)      Blood Pressure: BP: 157/89  Pulse: Pulse (Heart Rate): 98  Temp:  Temp: 98 °F (36.7 °C)    Artificial Kidney Dialyzer/Set Up Inspection: Revaclear   hours Duration of Treatment (hours): 3.5 hours   Heparin Bolus Heparin Bolus (units): 0 units  Blood flow rate Blood Flow Rate (ml/min): 300 ml/min   Dialysate rate     Arterial Access Pressure Arterial Access Pressure (mmHg): -220  Venous Return Pressure Venous Return Pressure (mmHg): 170  Ultrafiltration Rate Goal/Amount of Fluid to Remove (mL): 2000 mL  Fluid Removal Fluid Removed (mL): 2500  Net Fluid Removal NET Fluid Removed (mL): 2000 ml            Current Facility-Administered Medications   Medication Dose Route Frequency    dilTIAZem (CARDIZEM) IR tablet 60 mg  60 mg Oral TIDAC    lisinopril (PRINIVIL, ZESTRIL) tablet 5 mg  5 mg Oral DAILY    carvedilol (COREG) tablet 25 mg  25 mg Oral BID WITH MEALS    melatonin tablet 3 mg  3 mg Oral QHS PRN    cefTRIAXone (ROCEPHIN) 2 g in sterile water (preservative free) 20 mL IV syringe  2 g IntraVENous Q24H    iron sucrose (VENOFER) 100 mg in 0.9% sodium chloride 100 mL IVPB  100 mg IntraVENous DIALYSIS TUE, THU & SAT    doxercalciferol (HECTOROL) 4 mcg/2 mL injection 1 mcg  1 mcg IntraVENous DIALYSIS TUE, THU & SAT    epoetin gilda (EPOGEN;PROCRIT) 5,000 Units  5,000 Units IntraVENous DIALYSIS TUE, THU & SAT    metoprolol (LOPRESSOR) injection 5 mg  5 mg IntraVENous Q4H    dilTIAZem (CARDIZEM) 100 mg in 0.9% sodium chloride 100 mL infusion  10 mg/hr IntraVENous CONTINUOUS    aspirin delayed-release tablet 81 mg  81 mg Oral DAILY    atorvastatin (LIPITOR) tablet 40 mg  40 mg Oral DAILY    B complex-vitaminC-folic acid (NEPHROCAP) cap  1 Cap Oral DAILY    montelukast (SINGULAIR) tablet 10 mg  10 mg Oral DAILY    acetaminophen (TYLENOL) tablet 650 mg  650 mg Oral Q4H PRN    ondansetron (ZOFRAN) injection 4 mg  4 mg IntraVENous Q4H PRN    0.9% sodium chloride infusion  100 mL/hr IntraVENous DIALYSIS PRN    insulin lispro (HUMALOG) injection   SubCUTAneous AC&HS    glucose chewable tablet 16 g  16 g Oral PRN    glucagon (GLUCAGEN) injection 1 mg  1 mg IntraMUSCular PRN    dextrose (D50W) injection syrg 12.5-25 g  25-50 mL IntraVENous PRN       Review of Symptoms: comprehensive ROS negative except above.    Objective:     Patient Vitals for the past 24 hrs:   Temp Pulse Resp BP SpO2   12/11/18 1024 98 °F (36.7 °C) 98 20 157/89 98 %   12/11/18 1001 -- 98 -- -- --   12/11/18 0939 98.7 °F (37.1 °C) 95 18 160/88 95 %   12/11/18 0919 98 °F (36.7 °C) 100 18 (!) 165/11 --   12/11/18 0856 98.4 °F (36.9 °C) (!) 119 16 (!) 167/110 95 %   12/11/18 0800 98.1 °F (36.7 °C) -- -- -- --   12/11/18 0750 98.1 °F (36.7 °C) (!) 135 16 (!) 172/102 93 %   12/11/18 0615 -- (!) 115 -- (!) 178/101 --   12/11/18 0610 98.3 °F (36.8 °C) -- 18 (!) 158/104 99 %   12/11/18 0551 -- (!) 116 19 -- 99 %   12/11/18 0546 -- -- 19 -- --   12/11/18 0505 -- (!) 123 -- -- --   12/11/18 0451 98.2 °F (36.8 °C) (!) 115 20 (!) 177/97 100 %   12/10/18 2344 98 °F (36.7 °C) 100 20 150/87 100 %   12/10/18 1931 97.9 °F (36.6 °C) 97 20 127/79 99 %   12/10/18 1534 97.3 °F (36.3 °C) 93 20 149/86 100 %   12/10/18 1429 -- 89 -- -- --   12/10/18 1314 -- (!) 101 -- -- --        Weight change: -0.361 kg (-12.7 oz)     12/09 1901 - 12/11 0700  In: 240 [P.O.:240]  Out: 0     Intake/Output Summary (Last 24 hours) at 12/11/2018 1156  Last data filed at 12/11/2018 1000  Gross per 24 hour   Intake 120 ml   Output --   Net 120 ml     Physical Exam:   General: comfortable, no acute distress   HEENT sclera anicteric, supple neck, no thyromegaly  CVS: S1S2 heard,  no rub  RS: + air entry b/l,   Abd: Soft, Non tender, Not distended, Positive bowel sounds, no organomegaly, no CVA / supra pubic tenderness  Neuro: non focal, awake, alert , CN II-XII are grossly intact  Extrm: no edema, no cyanosis, clubbing   Skin: no visible  Rash  Musculoskeletal: No gross joints or bone deformities   Access; right arm fistula         Data Review:     LABS:   Hematology:   Recent Labs     12/09/18 0213   HGB 8.6*   HCT 24.9*     Chemistry:   Recent Labs     12/11/18 0342 12/10/18  0518 12/09/18 0213   BUN 51* 42* 33*   CREA 8.06* 7.14* 5.88*   CA 7.6* 7.7* 7.5*   K 3.3* 3.3* 3.2*    136 137    103 103   CO2 24 24 27   PHOS  --  3.8  --    * 133* 147*            Procedures/imaging: see electronic medical records for all procedures, Xrays and details which were not copied into this note but were reviewed prior to creation of Plan          Assessment & Plan:     As above         Elsie Meza MD  12/11/2018  4:00 PM

## 2018-12-11 NOTE — PROGRESS NOTES
Received SBAR report from outgoing nurse [facility]. Patient on room air, alert and responsive with no distress noted. Patient denies pain/discomfort. Bed in lowest position, callbell within reach and continue to monitor for safety. Family at bedside.

## 2018-12-11 NOTE — PROGRESS NOTES
Pt not seen for skilled OT due to:  []  Nausea/vomiting  []  Eating  []  Pain  []  Pt lethargic  [x]  Elevated heart rate and high BP, dicussed with nursing. Will f/u later when pt is appropriate to be seen and as schedule allows. Thank you.   Rg Ureña MS, OTR/L

## 2018-12-11 NOTE — PROGRESS NOTES
I spoke with Kendal Dancer, PA and updated her on pt current vitals and status as well as told her that the PO cardizem was changed from 30 mg to 60 mg and given at 0630 this am. I also let her know that pt received 2.5 mg lopressor IV at approx 0800. Luis Enrique Rivas She agreed to continue holding PO BP meds and gave an order for the cardizem drip to be increased from 2.5 mg to 10 mg/hr. Will continue to monitor pt.

## 2018-12-11 NOTE — PROGRESS NOTES
Infectious Disease Progress Note    Requested by: Dr. Garland Kennedy    Reason: sepsis, gram positive bacteremia    Current abx Prior abx   Ceftriaxone since 12/5/18 Azithromycin 12/5-12/9     Lines:       Assessment :    41 y.o. male with a PMH of ESRD s/p AVF placement but not on dialysis yet, HTN, chronic diastolic CHF, DM-2 (last [id number]: 6.3 on 12/5/18) , CAD s/p stent and pulmonary HTN who presented to [facility] on 12/5/18 with c/c of weakness and fatigue. A.fib with rvr on admission, acute renal failure    Now with gram positive bacteremia. Highly complex clinical picture. Presentation is c/w sepsis (POA) due to streptococcus pneumoniae bloodstream infection (positive blood culture 12/5, negative blood culture 12/7),  bilateral community acquired pneumonia     BSI likely due to community acquired pneumonia. No evidence of cholecystitis on usg. Clinically better. Recommendations:    1. Continue ceftriaxone  2 g iv q 24 hour. 2. F/u nephrology recommendations  3. Will switch to po antibiotics in am if continued clinical improvement & ready for discharge      Advance Care planning:full code:  discussed  with patient/surrogate decision maker:Jovita Sharpe: [phone]    Above plan was discussed in details with patient. Please call me if any further questions or concerns. Will continue to participate in the care of this patient. subjective:    Feels better. Patient denies headaches, visual disturbances, sore throat, runny nose, earaches, cp, chills, burning micturition, pain or weakness in extremities. He denies back pain/flank pain. Medication List      ASK your doctor about these medications    amLODIPine 10 mg tablet  Commonly known as:  NORVASC  TAKE ONE TABLET BY MOUTH EVERY DAY     aspirin delayed-release 81 mg tablet     atorvastatin 40 mg tablet  Commonly known as:  LIPITOR  Take 1 Tab by mouth daily.      b complex-vitamin c-folic acid 1mg 1 mg tablet  Commonly known as:  NAHED-MOON RX  TAKE ONE TABLET BY MOUTH EVERY DAY     calcitRIOL 0.25 mcg capsule  Commonly known as:  ROCALTROL     carvedilol 25 mg tablet  Commonly known as:  COREG  TAKE TWO TABLETS BY MOUTH TWICE DAILY     furosemide 40 mg tablet  Commonly known as:  LASIX  TAKE 2 TABLETS BY MOUTH TWO TIMES A DAY     glimepiride 4 mg tablet  Commonly known as:  AMARYL  TAKE 1 TABLET BY MOUTH EVERY MORNING     glucose blood VI test strips strip  Commonly known as:  FREESTYLE LITE STRIPS  Use as directed     hydrALAZINE 100 mg tablet  Commonly known as:  APRESOLINE  TAKE 1 TABLET BY MOUTH THREE TIMES A DAY     metOLazone 5 mg tablet  Commonly known as:  ZAROXOLYN  TAKE ONE TABLET BY MOUTH EVERY FRIDAY 30 MINUTES PRIOR TO MORNING LASIX DOSE     PROCRIT INJECTION     sildenafil citrate 50 mg tablet  Commonly known as:  VIAGRA  Take 1 Tab by mouth as needed. Natalio Chamberlain 47 [id number], lot #[id number]  Exp 12/16, 1 box, # 2 tabs     SINGULAIR 10 mg tablet  Generic drug:  montelukast     sodium bicarbonate 325 mg tablet     tadalafil 20 mg tablet  Commonly known as:  CIALIS  Take 1 Tab by mouth as needed.      VITAMIN D3 2,000 unit Tab  Generic drug:  cholecalciferol (vitamin D3)            Current Facility-Administered Medications   Medication Dose Route Frequency    dilTIAZem (CARDIZEM) IR tablet 90 mg  90 mg Oral TIDAC    lisinopril (PRINIVIL, ZESTRIL) tablet 5 mg  5 mg Oral DAILY    carvedilol (COREG) tablet 25 mg  25 mg Oral BID WITH MEALS    melatonin tablet 3 mg  3 mg Oral QHS PRN    cefTRIAXone (ROCEPHIN) 2 g in sterile water (preservative free) 20 mL IV syringe  2 g IntraVENous Q24H    iron sucrose (VENOFER) 100 mg in 0.9% sodium chloride 100 mL IVPB  100 mg IntraVENous DIALYSIS TUE, THU & SAT    doxercalciferol (HECTOROL) 4 mcg/2 mL injection 1 mcg  1 mcg IntraVENous DIALYSIS TUE, THU & SAT    epoetin gilda (EPOGEN;PROCRIT) 5,000 Units  5,000 Units IntraVENous DIALYSIS TUE, THU & SAT    metoprolol (LOPRESSOR) injection 5 mg  5 mg IntraVENous Q4H    dilTIAZem (CARDIZEM) 100 mg in 0.9% sodium chloride 100 mL infusion  10 mg/hr IntraVENous CONTINUOUS    aspirin delayed-release tablet 81 mg  81 mg Oral DAILY    atorvastatin (LIPITOR) tablet 40 mg  40 mg Oral DAILY    B complex-vitaminC-folic acid (NEPHROCAP) cap  1 Cap Oral DAILY    montelukast (SINGULAIR) tablet 10 mg  10 mg Oral DAILY    acetaminophen (TYLENOL) tablet 650 mg  650 mg Oral Q4H PRN    ondansetron (ZOFRAN) injection 4 mg  4 mg IntraVENous Q4H PRN    0.9% sodium chloride infusion  100 mL/hr IntraVENous DIALYSIS PRN    insulin lispro (HUMALOG) injection   SubCUTAneous AC&HS    glucose chewable tablet 16 g  16 g Oral PRN    glucagon (GLUCAGEN) injection 1 mg  1 mg IntraMUSCular PRN    dextrose (D50W) injection syrg 12.5-25 g  25-50 mL IntraVENous PRN       Allergies: Patient has no known allergies. Temp (24hrs), Av.1 °F (36.7 °C), Min:97.3 °F (36.3 °C), Max:98.7 °F (37.1 °C)    Visit Vitals  BP (!) 201/105   Pulse (!) 110   Temp 98 °F (36.7 °C) (Oral)   Resp 16   Ht 5' 10\" (1.778 m)   Wt 98.8 kg (217 lb 14.4 oz)   SpO2 98%   BMI 31.27 kg/m²       ROS: 12 point ROS obtained in details.  Pertinent positives as mentioned in HPI, otherwise negative    Physical Exam:    GENERAL: Not in acute distress  HEENT: pink conjunctiva, un icteric sclera,   NECK: No lymphadenopthy or thyroid swelling, JVD not seen  LYMPH: No supraclavicular or cervical or axillary nodes on both sides  CVS: S1S2 regular, No gallop or rub  RS: CTA, no rales or wheezes  Abd: Soft, resolved tenderness, not distended, No guarding, No rigidity  NEURO:  No focal neurologic deficits   Extrm: no leg edema or swelling   Skin: No rash  Psych: no suicidal or homicidal ideations           Labs: Results:   Chemistry Recent Labs     18  0342 12/10/18  0518 18  0213   * 133* 147*    136 137   K 3.3* 3.3* 3.2*    103 103   CO2 24 24 27   BUN 51* 42* 33*   CREA 8.06* 7.14* 5.88*   CA 7. 6* 7.7* 7.5*   AGAP 10 9 7   BUCR 6* 6* 6*      CBC w/Diff Recent Labs     12/09/18  0213   HGB 8.6*   HCT 24.9*      Microbiology No results for input(s): CULT in the last 72 hours.        RADIOLOGY:    All available imaging studies/reports in [facility] for this admission were reviewed    Dr. Dionte Berkowitz, Infectious Disease Specialist  [phone]  December 11, 2018  9:25 AM

## 2018-12-11 NOTE — PROGRESS NOTES
Problem: Pressure Injury - Risk of  Goal: *Prevention of pressure injury  Document Chapin Scale and appropriate interventions in the flowsheet. Outcome: Progressing Towards Goal  Pressure Injury Interventions:  Sensory Interventions: Assess changes in LOC, Keep linens dry and wrinkle-free, Minimize linen layers    Moisture Interventions: Absorbent underpads    Activity Interventions: Increase time out of bed    Mobility Interventions: HOB 30 degrees or less    Nutrition Interventions: Document food/fluid/supplement intake    Friction and Shear Interventions: HOB 30 degrees or less               Problem: Falls - Risk of  Goal: *Absence of Falls  Document Hansa Fall Risk and appropriate interventions in the flowsheet.   Outcome: Progressing Towards Goal  Fall Risk Interventions:  Mobility Interventions: Bed/chair exit alarm, Patient to call before getting OOB         Medication Interventions: Teach patient to arise slowly, Patient to call before getting OOB, Bed/chair exit alarm    Elimination Interventions: Bed/chair exit alarm, Call light in reach, Urinal in reach, Toileting schedule/hourly rounds, Patient to call for help with toileting needs

## 2018-12-11 NOTE — PROGRESS NOTES
Patient's b/p increased to 177/97 and HR to 115 causing the mews score to increase to 3. Dr. Gabi Brito (cardiologist) paged and he gave telephone readback order to keep diltiazen infusing at 2.5mg/hr and increased the diltiazen IR PO med to 60mg TID. MD also notified that 5mg metop IV was also administered with no subsequent improvement in B/P and HR. Will continue to monitor.

## 2018-12-11 NOTE — PROGRESS NOTES
Spoke to Corie Main, RN in HD and notified her of current vitals and that cardizem drip was increased from 2.5 mg to 10 mg/hr (verbal telephone order w./ readback). I gave pt half of dose of IV lopressor at 0800. She stated that she is ACLS certified and was comfortable taking care of pt in HD.

## 2018-12-11 NOTE — PROGRESS NOTES
Pt not seen for skilled OT due to:  []  Nausea/vomiting  []  Eating  []  Pain  []  Pt lethargic  [x]  Off Unit for Dialysis (x2)    Thank you.   Quyen Balderas MS, OTR/L

## 2018-12-11 NOTE — DIALYSIS
```````````````          ACUTE HEMODIALYSIS FLOW SHEET    HEMODIALYSIS ORDERS: Physician: juve      Dialyzer: revaclear         Duration: 4 hr  BFR: 250   DFR: 500   Dialysate:  Temp *37 K+   3    Ca+  3 Na 138 Bicarb 35   Weight:  98.8 kg    Bed Scale [x]     Unable to Obtain []      Dry weight/UF Goal: 3000 Access AVF  Needle Gauge 16    Heparin []  Bolus      Units    [] Hourly       Units    [x]None     Catheter locking solution    Pre BP:   163/101    Pulse:     97     Temperature:   98  Respirations: 16  Tx: NS       ml/Bolus  Other        [x] N/A   Labs: Pre        Post:        [x] N/A   Additional Orders(medications, blood products, hypotension management):       [x] N/A     [x] Time Out/Safety Check  [x] [facility] Consent Verified     CATHETER ACCESS: [x]N/A   []Right   []Left   []IJ     []Fem   [] First use X-ray verified     []Tunnel                [] Non Tunneled   []No S/S infection  []Redness  []Drainage []Cultured []Swelling []Pain   []Medical Aseptic Prep Utilized   []Dressing Changed  [] Biopatch  Date:       []Clotted   [x]Patent   Flows: []Good  []Poor  []Reversed   If access problem,  notified: []Yes    []N/A  Date:           GRAFT/FISTULA ACCESS:  []N/A     [x]Right     []Left     [x]UE     []LE   []AVG   [x]AVF        []Buttonhole    [x]Medical Aseptic Prep Utilized   [x]No S/S infection  []Redness  []Drainage []Cultured []Swelling []Pain    Bruit:   [x] Strong    [] Weak       Thrill :   [x] Strong    [] Weak       Needle Gauge: 16   Length: 1   If access problem,  notified: []Yes     [x]N/A  Date:        Please describe access if present and not used:       GENERAL ASSESSMENT:    LUNGS:  Rate  SaO2%        [x] N/A    [x] Clear  [] Coarse  [] Crackles  [] Wheezing        [] Diminished     Location : []RLL   []LLL    []RUL  []ROBERT   Cough: []Productive  []Dry  [x]N/A   Respirations:  [x]Easy  []Labored   Therapy:  [x]RA  []NC  l/min    Mask: []NRB []Venti       O2% []Ventilator  []Intubated  [] Trach  [] BiPaP   CARDIAC: []Regular      [x] Irregular   [] Pericardial Rub  [] JVD        []  Monitored  [] Bedside  [] Remotely monitored [] N/A  Rhythm:    EDEMA: [] None  [x]Generalized  [] Pitting [] 1    [] 2    [] 3    [] 4                 [] Facial  [] Pedal  []  UE  [] LE   SKIN:   [x] Warm  [] Hot     [] Cold   [x] Dry     [] Pale   [] Diaphoretic                  [] Flushed  [] Jaundiced  [] Cyanotic  [] Rash  [] Weeping   LOC:    [x] Alert      [x]Oriented:    [x] Person     [x] Place  [x]Time               [] Confused  [] Lethargic  [] Medicated  [] Non-responsive     GI / ABDOMEN:  [] Flat    [] Distended    [x] Soft    [] Firm   []  Obese                             [] Diarrhea  [x] Bowel Sounds  [] Nausea  [] Vomiting       / URINE ASSESSMENT:[] Voiding   [x] Oliguria  [] Anuria   []  [place]     [] Incontinent    []  Incontinent Brief      []  Bathroom Privileges     PAIN: [x] 0 []1  []2   []3   []4   []5   []6   []7   []8   []9   []10            Scale 0-10  Action/Follow Up:    MOBILITY:  [] Amb    [] Amb/Assist    [x] Bed    [] Wheelchair  [] Stretcher      All Vitals and Treatment Details on Attached [address]: [facility]          Room # 550     [] 1st Time Acute  [] Stat  [x] Routine  [] Urgent     [x] Acute Room  []  Bedside  [] ICU/CCU  [] ER   Isolation Precautions:  [x] Dialysis   [] Airborne   [] Contact    [] Reverse   Special Considerations:         [] Blood Consent Verified [x]N/A     ALLERGIES: =  [x] NKA          Code Status:  [x] Full Code  [] DNR  [] Other           HBsAg ONLY: Date Drawn 12/05/18         [x]Negative []Positive []Unknown   HBsAb: Date 12/05/18    [x] Susceptible   [] [id number] []Not Drawn  [] Drawn     Current Labs:    Date of Labs: Today [x]        Cut and paste current labs here.                                                                                                                                   DIET:  [x] Renal    [] Other     [] NPO     []  Diabetic      PRIMARY NURSE REPORT: First initial/Last name/Title      Pre Dialysis: Lorelei Mason RN    Time: 1000      EDUCATION:    [x] Patient [] Other         Knowledge Basis: []None [x]Minimal [] Substantial   Barriers to learning  [x]N/A   [] Access Care     [] S&S of infection     [] Fluid Management     []K+     [x]Procedural    []Albumin     [] Medications     [] Tx Options     [] Transplant     [] Diet     [] Other   Teaching Tools:  [x] Explain  [] Demo  [] Handouts [] Video  Patient response:   [x] Verbalized understanding  [] Teach back  [] Return demonstration [] Requires follow up   Inappropriate due to            [address] - Before each treatment:     Machine Number:                   [facility]                                   [x] Unit Machine # 8 with centralized RO                                  [] Portable Machine #1/RO serial # [id number]                                  [] Portable Machine #2/RO serial # [id number]                                  [] Portable Machine #3/RO serial # [id number]                                                                                                       [facility]                                  [] Portable Machine #11/RO serial # [id number]                                   [] Portable Machine #12/RO serial # [id number]                                  [] Portable Machine #13/RO serial #  [id number]      Alarm Test:  Pass time 1000         Other:         [x] RO/Machine Log Complete      Temp    37            [x]Extracorporeal Circuit Tested for integrity   Dialysate: pH  7.4 Conductivity: Meter   14     HD Machine   14                  TCD: 14  Dialyzer Lot # [id number]        Blood Tubing Lot # [id number]     Saline Lot #       CHLORINE TESTING-Before each treatment and every 4 hours    Total Chlorine: [x] less than 0.1 ppm  Time: 0900 4 Hr/2nd Check Time: 1300   (if greater than 0.1 ppm from Primary then every 30 minutes from Secondary)     TREATMENT INITIATION - with Dialysis Precautions   [x] All Connections Secured                 [x] Saline Line Double Clamped   [x] Venous Parameters Set                  [x] Arterial Parameters Set    [x] Prime Given  250 ml               [x]Air Foam Detector Engaged      Treatment Initiation Note: pt arrived in stable condition via bed AVF accessed and treatment initiated without difficulty. Dr Lauren Red at bedside; Vo to increase needle size and BFR.

## 2018-12-11 NOTE — PROGRESS NOTES
Pt returned from HD w/ /81 w/ HR between 110- 130's. Pt asymptomatic. While on the phone with Eleni Pedroza pt  HR went to 140. Verbal telephone order w/ readback to increase cardizem drip to 15 mg/hr. Will continue to monitor pt.

## 2018-12-11 NOTE — PROGRESS NOTES
Orders received, chart reviewed. Consulted with nursing Dorothy Negro) to ensure pt safe to participate in PT today, and she requested due to his elevated HR that PT wait until after he is seen by cardiology. Will reattempt as schedule allows.      Thank you for this referral.     Carey Trevino, PT, DPT

## 2018-12-11 NOTE — PROGRESS NOTES
Pt not seen for skilled PT due to:  []  Nausea/vomiting  []  Eating  []  Pain  []  Pt lethargic  [x]  Off Unit for dialysis   Thank you.   Guicho Newby, PT, DPT

## 2018-12-11 NOTE — PROGRESS NOTES
Report given to Nico Cardozo RN to HD. [place] has assessed pt had given the okay for pt to go to HD with Cardizem drip at 10 mg/hr. No questions or concerns at time from HD RN.

## 2018-12-11 NOTE — PROGRESS NOTES
12/11/18 0750   Vital Signs   Temp 98.1 °F (36.7 °C)   Temp Source Oral   Pulse (Heart Rate) (!) 135   Heart Rate Source Monitor   Cardiac Rhythm A Fib   Resp Rate 16   O2 Sat (%) 93 %   Level of Consciousness Alert   BP (!) 172/102   MAP (Calculated) 125   BP 1 Method Automatic   BP 1 Location Left arm   BP Patient Position Supine   MEWS Score 4   Pain 1   Pain Scale 1 Numeric (0 - 10)   Pain Intensity 1 0   Patient Stated Pain Goal 0       DOROTHY Sawyer paged and waiting call back. Pt currently asymptomatic. Denies SOB, chest pain, dizziness, palpitations. Will continue to monitor pt.

## 2018-12-12 ENCOUNTER — APPOINTMENT (OUTPATIENT)
Dept: VASCULAR SURGERY | Age: 48
DRG: 871 | End: 2018-12-12
Attending: INTERNAL MEDICINE
Payer: COMMERCIAL

## 2018-12-12 LAB
ANION GAP SERPL CALC-SCNC: 10 MMOL/L (ref 3–18)
APTT PPP: 41.1 SEC (ref 23–36.4)
APTT PPP: 58.5 SEC (ref 23–36.4)
APTT PPP: 69.1 SEC (ref 23–36.4)
BUN SERPL-MCNC: 28 MG/DL (ref 7–18)
BUN/CREAT SERPL: 5 (ref 12–20)
CALCIUM SERPL-MCNC: 7.7 MG/DL (ref 8.5–10.1)
CHLORIDE SERPL-SCNC: 102 MMOL/L (ref 100–108)
CO2 SERPL-SCNC: 27 MMOL/L (ref 21–32)
CREAT SERPL-MCNC: 5.58 MG/DL (ref 0.6–1.3)
GLUCOSE BLD STRIP.AUTO-MCNC: 135 MG/DL (ref 70–110)
GLUCOSE BLD STRIP.AUTO-MCNC: 186 MG/DL (ref 70–110)
GLUCOSE BLD STRIP.AUTO-MCNC: 189 MG/DL (ref 70–110)
GLUCOSE BLD STRIP.AUTO-MCNC: 272 MG/DL (ref 70–110)
GLUCOSE SERPL-MCNC: 204 MG/DL (ref 74–99)
POTASSIUM SERPL-SCNC: 3.4 MMOL/L (ref 3.5–5.5)
SODIUM SERPL-SCNC: 139 MMOL/L (ref 136–145)

## 2018-12-12 PROCEDURE — 97116 GAIT TRAINING THERAPY: CPT

## 2018-12-12 PROCEDURE — 74011250636 HC RX REV CODE- 250/636: Performed by: INTERNAL MEDICINE

## 2018-12-12 PROCEDURE — 85730 THROMBOPLASTIN TIME PARTIAL: CPT

## 2018-12-12 PROCEDURE — 74011250637 HC RX REV CODE- 250/637: Performed by: INTERNAL MEDICINE

## 2018-12-12 PROCEDURE — 97165 OT EVAL LOW COMPLEX 30 MIN: CPT

## 2018-12-12 PROCEDURE — 74011000250 HC RX REV CODE- 250: Performed by: INTERNAL MEDICINE

## 2018-12-12 PROCEDURE — 97530 THERAPEUTIC ACTIVITIES: CPT

## 2018-12-12 PROCEDURE — 74011636637 HC RX REV CODE- 636/637: Performed by: INTERNAL MEDICINE

## 2018-12-12 PROCEDURE — 65270000029 HC RM PRIVATE

## 2018-12-12 PROCEDURE — 93971 EXTREMITY STUDY: CPT

## 2018-12-12 PROCEDURE — 97161 PT EVAL LOW COMPLEX 20 MIN: CPT

## 2018-12-12 PROCEDURE — 36415 COLL VENOUS BLD VENIPUNCTURE: CPT

## 2018-12-12 PROCEDURE — 80048 BASIC METABOLIC PNL TOTAL CA: CPT

## 2018-12-12 PROCEDURE — 82962 GLUCOSE BLOOD TEST: CPT

## 2018-12-12 PROCEDURE — 74011000258 HC RX REV CODE- 258: Performed by: INTERNAL MEDICINE

## 2018-12-12 PROCEDURE — 74011250637 HC RX REV CODE- 250/637: Performed by: HOSPITALIST

## 2018-12-12 RX ORDER — IBUPROFEN 600 MG/1
600 TABLET ORAL
Status: DISCONTINUED | OUTPATIENT
Start: 2018-12-12 | End: 2018-12-14 | Stop reason: HOSPADM

## 2018-12-12 RX ORDER — HEPARIN SODIUM 1000 [USP'U]/ML
40 INJECTION, SOLUTION INTRAVENOUS; SUBCUTANEOUS ONCE
Status: COMPLETED | OUTPATIENT
Start: 2018-12-12 | End: 2018-12-12

## 2018-12-12 RX ORDER — HEPARIN SODIUM 1000 [USP'U]/ML
80 INJECTION, SOLUTION INTRAVENOUS; SUBCUTANEOUS ONCE
Status: COMPLETED | OUTPATIENT
Start: 2018-12-12 | End: 2018-12-12

## 2018-12-12 RX ORDER — HEPARIN SODIUM 5000 [USP'U]/ML
40 INJECTION, SOLUTION INTRAVENOUS; SUBCUTANEOUS ONCE
Status: COMPLETED | OUTPATIENT
Start: 2018-12-12 | End: 2018-12-12

## 2018-12-12 RX ADMIN — HEPARIN SODIUM AND DEXTROSE 26 UNITS/KG/HR: 10000; 5 INJECTION INTRAVENOUS at 22:54

## 2018-12-12 RX ADMIN — HEPARIN SODIUM 7960 UNITS: 1000 INJECTION INTRAVENOUS; SUBCUTANEOUS at 10:59

## 2018-12-12 RX ADMIN — INSULIN LISPRO 2 UNITS: 100 INJECTION, SOLUTION INTRAVENOUS; SUBCUTANEOUS at 17:12

## 2018-12-12 RX ADMIN — ASPIRIN 81 MG: 81 TABLET, COATED ORAL at 08:22

## 2018-12-12 RX ADMIN — DILTIAZEM HYDROCHLORIDE 90 MG: 90 TABLET, FILM COATED ORAL at 17:12

## 2018-12-12 RX ADMIN — DILTIAZEM HYDROCHLORIDE 90 MG: 90 TABLET, FILM COATED ORAL at 08:22

## 2018-12-12 RX ADMIN — INSULIN LISPRO 6 UNITS: 100 INJECTION, SOLUTION INTRAVENOUS; SUBCUTANEOUS at 12:11

## 2018-12-12 RX ADMIN — MONTELUKAST SODIUM 10 MG: 10 TABLET, FILM COATED ORAL at 08:22

## 2018-12-12 RX ADMIN — HEPARIN SODIUM AND DEXTROSE 20 UNITS/KG/HR: 10000; 5 INJECTION INTRAVENOUS at 07:26

## 2018-12-12 RX ADMIN — CARVEDILOL 25 MG: 25 TABLET, FILM COATED ORAL at 08:22

## 2018-12-12 RX ADMIN — DILTIAZEM HYDROCHLORIDE 90 MG: 90 TABLET, FILM COATED ORAL at 11:30

## 2018-12-12 RX ADMIN — HEPARIN SODIUM 3950 UNITS: 1000 INJECTION INTRAVENOUS; SUBCUTANEOUS at 01:41

## 2018-12-12 RX ADMIN — LISINOPRIL 5 MG: 5 TABLET ORAL at 08:22

## 2018-12-12 RX ADMIN — ATORVASTATIN CALCIUM 40 MG: 40 TABLET, FILM COATED ORAL at 08:22

## 2018-12-12 RX ADMIN — NEPHROCAP 1 CAPSULE: 1 CAP ORAL at 08:22

## 2018-12-12 RX ADMIN — CARVEDILOL 25 MG: 25 TABLET, FILM COATED ORAL at 17:12

## 2018-12-12 RX ADMIN — DILTIAZEM HYDROCHLORIDE 5 MG/HR: 5 INJECTION INTRAVENOUS at 05:57

## 2018-12-12 RX ADMIN — WATER 2 G: 1 INJECTION INTRAMUSCULAR; INTRAVENOUS; SUBCUTANEOUS at 15:37

## 2018-12-12 RX ADMIN — MELATONIN TAB 3 MG 3 MG: 3 TAB at 20:34

## 2018-12-12 RX ADMIN — HEPARIN SODIUM 4000 UNITS: 5000 INJECTION INTRAVENOUS; SUBCUTANEOUS at 21:35

## 2018-12-12 RX ADMIN — INSULIN LISPRO 2 UNITS: 100 INJECTION, SOLUTION INTRAVENOUS; SUBCUTANEOUS at 21:29

## 2018-12-12 RX ADMIN — ACETAMINOPHEN 650 MG: 325 TABLET ORAL at 20:34

## 2018-12-12 NOTE — PROGRESS NOTES
Problem: Self Care Deficits Care Plan (Adult)  Goal: *Acute Goals and Plan of Care (Insert Text)  Occupational Therapy Goals  Initiated 12/12/2018 within 7 day(s). 1.  Patient will perform toilet transfers with supervision/set-up. 2.  Patient will participate in upper extremity therapeutic exercise/activities with supervision/set-up for 7-10 minutes to increase strength and endurance. 3.  Patient will utilize energy conservation techniques during functional activities with min verbal cues. Outcome: Progressing Towards Goal  Occupational Therapy EVALUATION    Patient: Masood Benson (61 y.o. male)  Date: 12/12/2018  Primary Diagnosis: Atrial fibrillation with rapid ventricular response [facility])       Precautions:  Fall    ASSESSMENT :  Pt was cleared by RN to participate in therapy. Upon entering the room, pt was seated in chair, alert, and agreeable to OT evaluation. Based on the objective data described below, the patient presents with decreased strength/endurance, decreased balance, decreased functional mobility, and increased pain in L thenar eminence affecting his performance in basic self-care/ADL tasks. Pt reported pain that starts near the (L) proximal forearm that transfers to the 1st digit, causing more pain during activities that involve  thumb flexion which has been occurring since Friday, 12/7/18. Pt demonstrated decreased L elbow and  strength (3+/5) during evaluation and requires CGA sit<>stand and functional mobility and transfers. Will defer to PT for functional balance and mobility tasks. Educated pt on the role of OT, brief anatomy of the hand/forearm, adaptive equipment (built up handles), and plan of care during pt's hospitalization stay with pt verbalizing good understanding. Provided pt with a built-up handle to be used with feeding utensils and/or pen to reduce pain during (L) thumb flexion activities with pt verbalizing he will use it.  Pt has a supportive wife and children (ages 9 and 11) to assist at home PRN. Patient will benefit from skilled intervention to address the above impairments. Patients rehabilitation potential is considered to be Good  Factors which may influence rehabilitation potential include:   []             None noted  []             Mental ability/status  [x]             Medical condition  []             Home/family situation and support systems  []             Safety awareness  []             Pain tolerance/management  []             Other:      PLAN :  Recommendations and Planned Interventions:  [x]               Self Care Training                  [x]        Therapeutic Activities  [x]               Functional Mobility Training    []        Cognitive Retraining  [x]               Therapeutic Exercises           [x]        Endurance Activities  [x]               Balance Training                   []        Neuromuscular Re-Education  []               Visual/Perceptual Training     [x]   Home Safety Training  [x]               Patient Education                 [x]        Family Training/Education  []               Other (comment):    Frequency/Duration: Patient will be followed by occupational therapy 1-2 times a week to address goals. Discharge Recommendations: Home Health for added safety  Further Equipment Recommendations for Discharge: rolling walker and shower chair     Barriers to Learning/Limitations: none  Compensate with: visual, verbal, tactile, kinesthetic cues/model     PATIENT COMPLEXITY      Eval Complexity: History: MEDIUM Complexity : Expanded review of history including physical, cognitive and psychosocial  history ; Examination: LOW Complexity : 1-3 performance deficits relating to physical, cognitive , or psychosocial skils that result in activity limitations and / or participation restrictions ;  Decision Making:LOW Complexity : No comorbidities that affect functional and no verbal or physical assistance needed to complete eval tasks Assessment: Low Complexity     G-CODES:     Self Care  Current  CJ= 20-39%   Goal  CI= 1-19%. The severity rating is based on the Level of Assistance required for Functional Mobility and ADLs. SUBJECTIVE:   Patient stated It started hurting since Friday referring to the pain in L forearm. OBJECTIVE DATA SUMMARY:     Past Medical History:   Diagnosis Date    Abnormal nuclear cardiac imaging test 10/08/2012    Fixed anteroseptal, anteroapical defect c/w prior infarction. No ischemia. Mid & distal anteroseptal, anteroapical WMA. LVE. EF 44%.  Anemia     dr. Jung Media doubt amyloid or multiple myeloma. candidate for procirt if Hg <10    Benign hypertensive     Blindness of right eye 01/2013    legally blind    CAD (coronary artery disease)     Cardiomyopathy ejection fraction of 40%     CKD (chronic kidney disease), stage IV ([facility]     to see Dr. Otero Been 12/1/12    Congestive heart failure ([facility]     Diabetes mellitus ([facility]     Diabetic retinopathy ([facility]     Diabetic retinopathy ([facility] [facility]     Dr. Phipps Josiane- injections    Heart failure ([facility]     History of echocardiogram 04/22/2014    LVE. EF 55% (prev 40-45% on echo of 1/27/12). No WMA. Mild-mod conc LVH. Gr 3 DDfx. Marked LAE. Mild SHANTEL. Mild MR.    Hypertension     Pulmonary hypertension ([facility]     Renal Ultrasound 1/3/12    Right kidney isoechoic w/respect to liver. Sm left-sided pleural effusion    S/P cardiac cath 10/16/2012    LM patent. pLAD 95% (3.5 x 12-mm [place] stent, resid 0$%). LCX mild. Past Surgical History:   Procedure Laterality Date    HX CORONARY STENT PLACEMENT      one    HX LAPAROTOMY  2/2012    bowel obstruction with removal segment of small bowel. Done by Riblet.  HX LAPAROTOMY  infancy    whole in colon and had repair at that time.     HX OTHER SURGICAL  06/20/2013    left eye retna attatchment    HX RETINAL DETACHMENT REPAIR      august 2012     Prior Level of Function/Home Situation: Pt was (I) in ADLs PTA. Pt would split IADL duties \"50/50\" with wife, stating wife does the cooking, but he can grill and vacuum. He does not drive and has two children at home (ages 5 and 6). Home Situation  Home Environment: Apartment(2nd floor)  # Steps to Enter: 25  Rails to Enter: Yes  Hand Rails : Left  One/Two Story Residence: One story  # of Interior Steps: 0  Height of Each Step (in): 3 inches  Interior Rails: None  Lift Chair Available: No  Living Alone: No  Support Systems: Child(hussein), Spouse/Significant Other/Partner  Patient Expects to be Discharged to[de-identified] Private residence  Current DME Used/Available at Home: None  Tub or Shower Type: Tub/Shower combination  []  Right hand dominant   [x]  Left hand dominant  Cognitive/Behavioral Status:  Neurologic State: Alert  Orientation Level: Oriented X4  Cognition: Appropriate decision making; Follows commands       Skin: Visible skin appeared intact    Edema: None noted    Coordination:  Coordination: Within functional limits(BUEs)    Balance:  Sitting: Intact  Sitting - Static: Good (unsupported)  Sitting - Dynamic: Good (unsupported)  Standing: Impaired; Without support  Standing - Static: Good  Standing - Dynamic : Fair    Strength:  Strength: Within functional limits(RUE; L shoulder 5/5; noted decreased strength in L elbow and  strength 3+/5 due to pain in L forearm)    Tone & Sensation:  Tone: Normal(BUEs)  Sensation: Pt reports numbness in R palm.     Range of Motion:  AROM: Within functional limits(BUEs)    Functional Mobility and Transfers for ADLs:  Bed Mobility:  Supine to Sit: Minimum assistance  Sit to Supine: Minimum assistance     Transfers:  Sit to Stand: Contact guard assistance    ADL Assessment:  Feeding: Setup;Supervision    Oral Facial Hygiene/Grooming: Setup;Supervision    Bathing: Stand-by assistance    Upper Body Dressing: Supervision    Lower Body Dressing: Supervision(increased time)    Toileting: Contact guard assistance    Pain:  Pt reports 5/10 pain or discomfort prior to treatment. (near L proximal forearm at rest)  Pt reports 5/10 pain or discomfort post treatment. Activity Tolerance:   Good    Please refer to the flowsheet for vital signs taken during this treatment. After treatment:   [x] Patient left in no apparent distress sitting up in chair  [] Patient left in no apparent distress in bed  [x] Call bell left within reach  [] Nursing notified  [] Caregiver present  [] Bed alarm activated    COMMUNICATION/EDUCATION:   [x] Home safety education was provided and the patient/caregiver indicated understanding. [x] Patient/family have participated as able in goal setting and plan of care. [x] Patient/family agree to work toward stated goals and plan of care. [] Patient understands intent and goals of therapy, but is neutral about his/her participation. [] Patient is unable to participate in goal setting and plan of care.     Thank you for this referral.  Sania Dominguez MS, OTR/L  Time Calculation: 30 mins

## 2018-12-12 NOTE — PROGRESS NOTES
RENAL DAILY PROGRESS NOTE            41y M with renal failure, admitted for bacteremia, seen for ESRD management. Subjective:   Complaint:     Overnight events noted  afebile  HR gradually improving   Still complaining pain in his left arm      IMPRESSION:   CKD 4/5 now progressing to ESRD  CKD  From HTN/DM nephropathy  Access; right arm myles fistula,He had some symptoms of steal syndrome, so far tolerateing HD with smaller gaze needle, will schedule outpatient follow up with surgery. HTN  Anemia on iron and epo during HD. Secondary hyperparathyroid of CKD   Hypokalemia   afib  Pneumonia with bacteremia    PLAN:   Plan for HD tomorrow   Awaiting for usg of left arm. His outpatient HD has been arranged at   [place] [id number] at 6 AM  abx per BEATRIZ mendoza. Discussed with Dr. Brooks Gillette.             Current Facility-Administered Medications   Medication Dose Route Frequency    dilTIAZem (CARDIZEM) IR tablet 90 mg  90 mg Oral TIDAC    heparin 25,000 units in D5W 250 ml infusion  18-36 Units/kg/hr IntraVENous TITRATE    lisinopril (PRINIVIL, ZESTRIL) tablet 5 mg  5 mg Oral DAILY    carvedilol (COREG) tablet 25 mg  25 mg Oral BID WITH MEALS    melatonin tablet 3 mg  3 mg Oral QHS PRN    iron sucrose (VENOFER) 100 mg in 0.9% sodium chloride 100 mL IVPB  100 mg IntraVENous DIALYSIS TUE, THU & SAT    doxercalciferol (HECTOROL) 4 mcg/2 mL injection 1 mcg  1 mcg IntraVENous DIALYSIS TUE, THU & SAT    epoetin gilda (EPOGEN;PROCRIT) 5,000 Units  5,000 Units IntraVENous DIALYSIS TUE, THU & SAT    aspirin delayed-release tablet 81 mg  81 mg Oral DAILY    atorvastatin (LIPITOR) tablet 40 mg  40 mg Oral DAILY    B complex-vitaminC-folic acid (NEPHROCAP) cap  1 Cap Oral DAILY    montelukast (SINGULAIR) tablet 10 mg  10 mg Oral DAILY    acetaminophen (TYLENOL) tablet 650 mg  650 mg Oral Q4H PRN    ondansetron (ZOFRAN) injection 4 mg  4 mg IntraVENous Q4H PRN    0.9% sodium chloride infusion  100 mL/hr IntraVENous DIALYSIS PRN    insulin lispro (HUMALOG) injection   SubCUTAneous AC&HS    glucose chewable tablet 16 g  16 g Oral PRN    glucagon (GLUCAGEN) injection 1 mg  1 mg IntraMUSCular PRN    dextrose (D50W) injection syrg 12.5-25 g  25-50 mL IntraVENous PRN       Review of Symptoms: comprehensive ROS negative except above.    Objective:     Patient Vitals for the past 24 hrs:   Temp Pulse Resp BP SpO2   12/12/18 1632 97.3 °F (36.3 °C) 77 19 134/87 98 %   12/12/18 1212 -- 82 -- 108/82 --   12/12/18 1111 98 °F (36.7 °C) 74 16 116/77 96 %   12/12/18 0802 97.9 °F (36.6 °C) (!) 101 20 145/71 100 %   12/12/18 0456 98 °F (36.7 °C) 78 18 141/78 98 %   12/12/18 0009 98.4 °F (36.9 °C) 86 18 115/76 97 %   12/11/18 2050 97.4 °F (36.3 °C) (!) 102 18 (!) 168/91 98 %   12/11/18 1834 -- (!) 108 18 157/86 99 %   12/11/18 1735 -- (!) 135 20 166/80 99 %   12/11/18 1653 98.7 °F (37.1 °C) (!) 110 18 194/81 98 %        Weight change: 0.661 kg (1 lb 7.3 oz)     12/10 1901 - 12/12 0700  In: 120 [P.O.:120]  Out: 3000     Intake/Output Summary (Last 24 hours) at 12/12/2018 1637  Last data filed at 12/12/2018 1202  Gross per 24 hour   Intake 240 ml   Output --   Net 240 ml     Physical Exam:   General: comfortable, no acute distress   HEENT sclera anicteric, supple neck, no thyromegaly  CVS: S1S2 heard,  no rub  RS: + air entry b/l,   Abd: Soft, Non tender, Not distended, Positive bowel sounds, no organomegaly, no CVA / supra pubic tenderness  Neuro: non focal, awake, alert , CN II-XII are grossly intact  Extrm: no edema, no cyanosis, clubbing   Skin: no visible  Rash  Musculoskeletal: No gross joints or bone deformities   Access; right arm fistula         Data Review:     LABS:   Hematology:   Recent Labs     12/11/18  1314   WBC 12.0   HGB 8.7*   HCT 25.8*     Chemistry:   Recent Labs     12/12/18  0017 12/11/18  0342 12/10/18  0518   BUN 28* 51* 42*   CREA 5.58* 8.06* 7.14*   CA 7.7* 7.6* 7.7*   K 3.4* 3.3* 3.3*    138 136  104 103   CO2 27 24 24   PHOS  --   --  3.8   * 119* 133*            Procedures/imaging: see electronic medical records for all procedures, Xrays and details which were not copied into this note but were reviewed prior to creation of Plan          Assessment & Plan:     As above         Nargis Arenas MD  12/12/2018  4:00 PM

## 2018-12-12 NOTE — PROGRESS NOTES
NUTRITION    Nutrition Consult: General Nutrition Management & Supplements     RECOMMENDATIONS / PLAN:     - Update food preferences.   - Renal diet education provided today. - Continue RD inpatient monitoring and evaluation. NUTRITION INTERVENTIONS & DIAGNOSIS:     [x] Meals/snacks: modify composition  [x] Medical food supplement therapy: Nepro Shake, TID  [x] Nutrition Education: renal diet 12/12    Nutrition Diagnosis: Increased nutrient needs (energy, protein) related to increased expenditure as evidenced by pt with ESRD on HD. Food and nutrition related knowledge deficit related to renal diet as evidenced by pt report. ASSESSMENT:     Pt with variable meal intake, dislike of some meals. Obtained pt's dinner and breakfast order, communicated with dining associate. Pt recently started on dialysis, unfamiliar with renal diet, education provided today. Consuming nepro shakes, states they are [place]. \"    Average po intake adequate to meet patients estimated nutritional needs:   [] Yes     [] No   [x] Unable to determine at this time    Diet: DIET RENAL Regular  DIET NUTRITIONAL SUPPLEMENTS All Meals; [facility] Allergies: NKFA  Current Appetite:   [] Good     [x] Fair     [] Poor     [x] Other: food preferences  Appetite/meal intake prior to admission:   [] Good     [] Fair     [] Poor     [x] Other: unknown  Feeding Limitations:  [] Swallowing difficulty    [] Chewing difficulty    [] Other:  Current Meal Intake:   Patient Vitals for the past 100 hrs:   % Diet Eaten   12/12/18 1202 100 %   12/11/18 1000 100 %   12/09/18 1358 5 %   12/09/18 0928 100 %   12/08/18 1847 90 %   12/08/18 1314 25 %     Oliguria  BM: 12/12  Skin Integrity: WDL  Edema:   [] No     [x] Yes   Pertinent Medications: Reviewed: nephrocap, SSI, venofer, zofran    Recent Labs     12/12/18  0017 12/11/18  0342 12/10/18  0518    138 136   K 3.4* 3.3* 3.3*    104 103   CO2 27 24 24   * 119* 133*   BUN 28* 51* 42* CREA 5.58* 8.06* 7.14*   CA 7.7* 7.6* 7.7*   PHOS  --   --  3.8       Intake/Output Summary (Last 24 hours) at 12/12/2018 1352  Last data filed at 12/12/2018 1202  Gross per 24 hour   Intake 240 ml   Output 3000 ml   Net -2760 ml       Anthropometrics:  Ht Readings from Last 1 Encounters:   12/08/18 5' 10\" (1.778 m)     Last 3 Recorded Weights in this Encounter    12/10/18 0459 12/11/18 0455 12/12/18 0456   Weight: 99.2 kg (218 lb 11.1 oz) 98.8 kg (217 lb 14.4 oz) 99.5 kg (219 lb 5.7 oz)     Body mass index is 31.47 kg/m². Obese Class I    Weight History: Pt unsure of weight trends PTA. Per chart hx review pt with 25 lb, 10.2% weight loss x 2.5 years PTA. Weight Metrics 12/12/2018 6/21/2016 5/6/2016 12/15/2015 6/12/2015 5/12/2015 2/12/2015   Weight 219 lb 5.7 oz 244 lb 248 lb 242 lb 232 lb 232 lb 232 lb   BMI 31.47 kg/m2 34.05 kg/m2 34.6 kg/m2 33.77 kg/m2 32.37 kg/m2 32.37 kg/m2 32.37 kg/m2        Admitting Diagnosis: Atrial fibrillation with rapid ventricular response ([facility] [address]  Pertinent PMHx: ESRD on HD, CHF, DM, CAD, HTN    Education Needs:        [] None identified  [] Identified - Not appropriate at this time  [x]  Identified and addressed - refer to education log  Learning Limitations:   [x] None identified  [] Identified    Cultural, Jew & ethnic food preferences:  [x] None identified    [] Identified and addressed     ESTIMATED NUTRITION NEEDS:     Calories: 7619-6717 kcal (30-35 kcal/kg) based on  [] Actual BW      [x] SBW: 80 kg   Protein: 64-80 gm (0.8-1 gm/kg) based on  [] Actual BW      [x] SBW   Fluid: 1000 mL + UOP     MONITORING & EVALUATION:     Nutrition Goal(s):   1. Po intake of meals will meet >75% of patient estimated nutritional needs within the next 7 days. Outcome:  [] Met/Ongoing    []  Not Met    [x] New/Initial Goal   2. Patient will increase knowledge of appropriate food choices on a renal diet within 7 days.   Outcome:  [] Met    []  Not Met    [x] New/Initial Goal Monitoring:   [x] Food and beverage intake   [x] Diet order   [x] Nutrition-focused physical findings   [x] Treatment/therapy   [] Weight   [] Enteral nutrition intake        Previous Recommendations (for follow-up assessments only):     []   Implemented       []   Not Implemented (RD to address)      [] No Longer Appropriate     [] No Recommendation Made     Discharge Planning: diabetic, renal diet + nutritional supplements as needed   [x] Participated in care planning, discharge planning, & interdisciplinary rounds as appropriate      Jose F Rivera RD   Pager: [phone]

## 2018-12-12 NOTE — PROGRESS NOTES
Received SBAR report from outgoing nurse. Patient on room air, alert and responsive with no distress noted. Patient denies pain/discomfort. Bed in lowest position, callbell within reach and continue to monitor for safety.

## 2018-12-12 NOTE — DIABETES MGMT
GLYCEMIC CONTROL PLAN OF CARE     Assessment/Recommendations:  Pt is a 41year old male with a past medical history significant for ESRD, hypertension, CHF, type 2 diabetes, and CAD. Blood glucose fluctuating, monitor need to advance to the very resistant correctional Lispro scale. Added no concentrated sweets to renal diet. Most recent blood glucose values:  12/11/2018 11:11 12/11/2018 16:51 12/11/2018 21:32 12/12/2018 08:26 12/12/2018 11:15   210 (H) 102 270 (H) 135 (H) 272 (H)     Current A1C of 6.3% is equivalent to average blood glucose of 134 mg/dl over the past 2-3 months.     Current hospital diabetes medications:   Correctional Lispro insulin 4 times daily ACHS (normal sensitivity)     Previous day's insulin requirements:   10 units of Lispro insulin     Home diabetes medications:  Glimepiride 4 mg every morning    Diet:  Renal, no concentrated sweets    Education:  ____Refer to Diabetes Education Record             __x__Education not indicated at this time      Nimco Chaudhari RD, CDE

## 2018-12-12 NOTE — PROGRESS NOTES
[facility]  -  Progress Note      Patient: Zeina Xie MRN: [id number]  SSN: [id number]    YOB: 1970  Age: 41 y.o. Sex: male      Admit Date: 12/5/2018    Assessment:     -Volume overload, ESRD, now on intermittent HD  -Sepsis with bacteremia, S. Pneumonia, suspected pneumonia on antbx  -Paroxysmal atrial fibrillation, new diagnosis, presented with RVR, intermittent afib this admission, likely driven by underlying illness.  CHADSVASC2 score 3  -Coronary artery disease s/p LAD PCI with AMOL 10/2012.  -Ischemic cardiomyopathy with EF 40-45% 2012 improved to 55% 2014.  -Diabetes mellitus.  -Hypertension.  -Dyslipidemia.  -Chronic anemia.  -Legally blind.  -Noncompliance with medical regimen and follow up.     Primary cardiologist Dr Fam Monzon, last office visit 2016. Plan:     -His PO Cardizem was increased yesterday, now at 90 mg three times daily before meals. His HR is overall better controlled. Will give next dose of PO Cardizem now (due) and stop Cardizem gtts. -Started on heparin gtts yesterday for possible CHAPITO/cardioversion, will need to continue for at least 48 hours. Subjective:     Continues to c/o left arm pain, states pain with movement.     Objective:      Patient Vitals for the past 8 hrs:   Temp Pulse Resp BP SpO2   12/12/18 1111 98 °F (36.7 °C) 74 16 116/77 96 %   12/12/18 0802 97.9 °F (36.6 °C) (!) 101 20 145/71 100 %   12/12/18 0456 98 °F (36.7 °C) 78 18 141/78 98 %         Patient Vitals for the past 96 hrs:   Weight   12/12/18 0456 219 lb 5.7 oz (99.5 kg)   12/11/18 0455 217 lb 14.4 oz (98.8 kg)   12/10/18 0459 218 lb 11.1 oz (99.2 kg)   12/09/18 0448 223 lb (101.2 kg)         Intake/Output Summary (Last 24 hours) at 12/12/2018 1201  Last data filed at 12/11/2018 1554  Gross per 24 hour   Intake --   Output 3000 ml   Net -3000 ml       Physical Exam:  General:  alert, cooperative, no distress, appears stated age  Neck:  No JVD  Lungs:  clear to auscultation bilaterally  Heart:  Irregularly irregular rhythm  Abdomen:  abdomen is soft without significant tenderness, masses, organomegaly or guarding  Extremities:  Atraumatic, trace-1+ edema     Data Review:     Labs: Results:       Chemistry Recent Labs     12/12/18  0017 12/11/18  0342 12/10/18  0518   * 119* 133*    138 136   K 3.4* 3.3* 3.3*    104 103   CO2 27 24 24   BUN 28* 51* 42*   CREA 5.58* 8.06* 7.14*   CA 7.7* 7.6* 7.7*   PHOS  --   --  3.8   AGAP 10 10 9   BUCR 5* 6* 6*      CBC w/Diff Recent Labs     12/11/18  1314   WBC 12.0   RBC 3.15*   HGB 8.7*   HCT 25.8*      GRANS 88*   LYMPH 8*   EOS 3      Coagulation Recent Labs     12/12/18  0955 12/12/18  0017   APTT 41.1* 69.1*       Lipid Panel Lab Results   Component Value Date/Time    Cholesterol, total 122 05/11/2015 08:55 AM    HDL Cholesterol 38 (L) 05/11/2015 08:55 AM    LDL, calculated 72 05/11/2015 08:55 AM    VLDL, calculated 12 05/11/2015 08:55 AM    Triglyceride 58 05/11/2015 08:55 AM    CHOL/HDL Ratio 4.4 10/17/2012 06:05 AM      Thyroid Studies Lab Results   Component Value Date/Time    TSH 2.43 12/06/2018 05:19 AM

## 2018-12-12 NOTE — PROGRESS NOTES
Bedside shift change report given to Yuki (oncoming nurse) by Jono De Paz (offgoing nurse). Report included the following information SBAR, Kardex and JAMES Cox

## 2018-12-12 NOTE — PROGRESS NOTES
Problem: Falls - Risk of  Goal: *Absence of Falls  Document Hansa Fall Risk and appropriate interventions in the flowsheet. Outcome: Progressing Towards Goal  Fall Risk Interventions:  Mobility Interventions: Bed/chair exit alarm, Patient to call before getting OOB         Medication Interventions: Patient to call before getting OOB    Elimination Interventions: Bed/chair exit alarm, Call light in reach, Patient to call for help with toileting needs             Problem: Patient Education: Go to Patient Education Activity  Goal: Patient/Family Education  Outcome: Progressing Towards Goal  Patient on room air, alert and responsive with no distress noted. Tolerated medications with no adverse reactions noted. No complain verbalize. Bed in lowest position, callbell withian reach and continue to monitor for safety.

## 2018-12-12 NOTE — PROGRESS NOTES
Problem: Mobility Impaired (Adult and Pediatric)  Goal: *Acute Goals and Plan of Care (Insert Text)  Physical Therapy Goals  Initiated 12/12/2018 and to be accomplished within 7 day(s)  1. Patient will move from supine to sit and sit to supine  in bed with independence. 2.  Patient will transfer from bed to chair and chair to bed with independence using the least restrictive device. 3.  Patient will perform sit to stand with independence. 4.  Patient will ambulate with supervision/set-up for 250 feet with the least restrictive device or hand held assistance. 5.  Patient will ascend/descend 18 stairs with 1 handrail(s) with supervision/set-up. Outcome: Progressing Towards Goal  physical Therapy EVALUATION    Patient: Vinay Izaguirre (8 y.o. male)  Date: 12/12/2018  Primary Diagnosis: Atrial fibrillation with rapid ventricular response ([address]     Precautions:   Fall(Also, pt is blind in R eye)    OBJECTIVE/ASSESSMENT :  Based on the objective data described below, the patient presents with impaired functional mobility including bed mobility, transfers, ambulation, and general activity tolerance following admission for atrial fibrillation. Patient presented today reclining in bed, agreeable to physical therapy evaluation. Patient transferred supine-sit with min A and sit -stand with min A. Pt is limited in the amount he can use his L UE to assist himself during transfers due to L UE pain. Pt ambulated 50 feet with HHA and performed stand-sit with CGA and sit-supine with min A. Pt states his wife typically walks on his right side and either holds his hand or warns him of obstacles due to his visual impairment. At conclusion of session, patient left resting comfortably in bed with call bell in reach, needs met, and nurse notified.   Education:   [x]         Bed mobility  [x]         Transfers  [x]         Ambulation  []         Assistive device management  []         Stairs  []         Body mechanics  [x] Position change  [x]         Activity pacing/energy conservation  []         Other:    Patient will benefit from skilled intervention to address the above impairments. Patients rehabilitation potential is considered to be Good  Factors which may influence rehabilitation potential include:   []         None noted  []         Mental ability/status  [x]         Medical condition  []         Home/family situation and support systems  []         Safety awareness  []         Pain tolerance/management  []         Other:      PLAN :  Recommendations and Planned Interventions:  [x]           Bed Mobility Training             [x]    Neuromuscular Re-Education  [x]           Transfer Training                   []    Orthotic/Prosthetic Training  [x]           Gait Training                          []    Modalities  [x]           Therapeutic Exercises          []    Edema Management/Control  [x]           Therapeutic Activities            [x]    Patient and Family Training/Education  []           Other (comment):    Frequency/Duration: Patient will be followed by physical therapy 1-2 times per day, 4-7 days per week to address goals. Discharge Recommendations: Home Health  Further Equipment Recommendations for Discharge: N/A     SUBJECTIVE:   Patient stated My left arm really hurts.     OBJECTIVE DATA SUMMARY:     Past Medical History:   Diagnosis Date    Abnormal nuclear cardiac imaging test 10/08/2012    Fixed anteroseptal, anteroapical defect c/w prior infarction. No ischemia. Mid & distal anteroseptal, anteroapical WMA. LVE. EF 44%.  Anemia     dr. Beverly Nichols doubt amyloid or multiple myeloma.  candidate for procirt if Hg <10    Benign hypertensive     Blindness of right eye 01/2013    legally blind    CAD (coronary artery disease)     Cardiomyopathy ejection fraction of 40%     CKD (chronic kidney disease), stage IV ([facility]     to see Dr. Coni Frye 12/1/12    Congestive heart failure ([facility]     Diabetes mellitus ([facility]     Diabetic retinopathy ([facility]     Diabetic retinopathy ([facility]     Dr. Jordan Fallon- injections    Heart failure [facility])     History of echocardiogram 04/22/2014    LVE. EF 55% (prev 40-45% on echo of 1/27/12). No WMA. Mild-mod conc LVH. Gr 3 DDfx. Marked LAE. Mild SHANTEL. Mild MR.    Hypertension     Pulmonary hypertension ([facility]     Renal Ultrasound 1/3/12    Right kidney isoechoic w/respect to liver. Sm left-sided pleural effusion    S/P cardiac cath 10/16/2012    LM patent. pLAD 95% (3.5 x 12-mm Odum stent, resid 0$%). LCX mild. Past Surgical History:   Procedure Laterality Date    HX CORONARY STENT PLACEMENT      one    HX LAPAROTOMY  2/2012    bowel obstruction with removal segment of small bowel. Done by Riblet.  HX LAPAROTOMY  infancy    whole in colon and had repair at that time.  HX OTHER SURGICAL  06/20/2013    left eye retna attatchment    HX RETINAL DETACHMENT REPAIR      august 2012     Barriers to Learning/Limitations: yes;  none  Compensate with: N/A  Prior Level of Function/Home Situation: Pt lives with wife and 2 children (ages 5 and 6) in a 1 story apartment with 18 steps to enter, 1 railing. Pt states he was independent with functional mobility in his home and usually went into the community with his wife to assist him due to his visual impairment.     Home Situation  Home Environment: Apartment  # Steps to Enter: 25  Rails to Enter: Yes  Hand Rails : Left  One/Two Story Residence: One story  # of Interior Steps: 0  Height of Each Step (in): 3 inches  Interior Rails: None  Lift Chair Available: No  Living Alone: No  Support Systems: Child(hussein), Spouse/Significant Other/Partner  Patient Expects to be Discharged to[de-identified] Private residence  Current DME Used/Available at Home: None  Critical Behavior:  Neurologic State: Alert  Psychosocial  Purposeful Interaction: Yes  Pt Identified Daily Priority: Clinical issues (comment)  [employer] Process: Nurture loving kindness;Establish trust;Teaching/learning; Attend basic human needs;Create healing environment  Caring Interventions: Reassure; Therapeutic modalities  Reassure: Therapeutic listening; Informing;Caring rounds  Therapeutic Modalities: Intentional therapeutic touch  Strength:    Strength: Generally decreased, functional  Tone & Sensation:   Tone: Normal  Sensation: Intact   Range Of Motion:  AROM: Within functional limits  Functional Mobility:  Bed Mobility:  Supine to Sit: Minimum assistance  Sit to Supine: Minimum assistance  Transfers:  Sit to Stand: Minimum assistance  Stand to Sit: Contact guard assistance  Balance:   Sitting: Intact  Standing: Impaired; Without support  Standing - Static: Good  Standing - Dynamic : Fair  Ambulation/Gait Training:  Distance (ft): 50 Feet (ft)  Assistive Device: Other (comment)(Hand held assistance)  Ambulation - Level of Assistance: Contact guard assistance  Base of Support: Widened  Speed/Christy: Pace decreased (<100 feet/min)    Pain:  Pre session: 5 ( L UE)  Post session: 5 ( LUE)  Activity Tolerance:   Fair+  Please refer to the flowsheet for vital signs taken during this treatment. After treatment:   [] Patient left in no apparent distress sitting up in chair  [] Patient left sitting on EOB  [x] Patient left in no apparent distress in bed  [] Patient declined to be OOB at this time due to   [x] Call bell left within reach  [x] Nursing notified Stephanie Morataya)  [] Caregiver present  [] Bed alarm activated  [] SCDs in place    COMMUNICATION/EDUCATION:   [x]         Fall prevention education was provided and the patient/caregiver indicated understanding. [x]         Patient/family have participated as able in goal setting and plan of care. [x]         Patient/family agree to work toward stated goals and plan of care. []         Patient understands intent and goals of therapy, but is neutral about his/her participation.   []         Patient is unable to participate in goal setting and plan of care. Thank you for this referral.  Breanne Ochoa, PT   Time Calculation: 23 mins      Mobility  Current  CJ= 20-39%   Goal  CI= 1-19%. The severity rating is based on the Level of Assistance required for Functional Mobility and ADLs.     Eval Complexity: History: MEDIUM  Complexity : 1-2 comorbidities / personal factors will impact the outcome/ POC Exam:LOW Complexity : 1-2 Standardized tests and measures addressing body structure, function, activity limitation and / or participation in recreation  Presentation: LOW Complexity : Stable, uncomplicated  Clinical Decision Making:Low Complexity   Overall Complexity:LOW

## 2018-12-12 NOTE — PROGRESS NOTES
Infectious Disease Progress Note    Requested by: Dr. Arsalan Agosto    Reason: sepsis, gram positive bacteremia    Current abx Prior abx   Ceftriaxone since 12/5/18 Azithromycin 12/5-12/9     Lines:       Assessment :    41 y.o. male with a PMH of ESRD s/p AVF placement but not on dialysis yet, HTN, chronic diastolic CHF, DM-2 (last OEXW7R: 6.3 on 12/5/18) , CAD s/p stent and pulmonary HTN who presented to [facility] on 12/5/18 with c/c of weakness and fatigue. A.fib with rvr on admission, acute renal failure    Now with gram positive bacteremia. Highly complex clinical picture. Presentation is c/w sepsis (POA) due to streptococcus pneumoniae bloodstream infection (positive blood culture 12/5, negative blood culture 12/7),  bilateral community acquired pneumonia     BSI likely due to community acquired pneumonia. No evidence of cholecystitis on usg. Clinically better. Still with a.fib with rvr on cardizem IV. Swelling, tenderness LUE likely superficial phlebitis from IV site. Recommendations:    1. Continue ceftriaxone  2 g iv q 24 hour (d 7)  2. F/u nephrology, cardiology  recommendations  3. Apply cool compresses LUE  4. F/u LUE usg    Advance Care planning:full code:  discussed  with patient/surrogate decision maker:Jovita Sharpe: [phone]    Above plan was discussed in details with patient, Rn. Please call me if any further questions or concerns. Will continue to participate in the care of this patient. subjective:    No increased cough, chest pain. C/o pain LUE. Patient denies headaches, visual disturbances, sore throat, runny nose, earaches, cp, chills, burning micturition, pain or weakness in extremities. He denies back pain/flank pain.              Medication List      ASK your doctor about these medications    amLODIPine 10 mg tablet  Commonly known as:  NORVASC  TAKE ONE TABLET BY MOUTH EVERY DAY     aspirin delayed-release 81 mg tablet     atorvastatin 40 mg tablet  Commonly known as: LIPITOR  Take 1 Tab by mouth daily. b complex-vitamin c-folic acid 1mg 1 mg tablet  Commonly known as:  NAHED-MOON RX  TAKE ONE TABLET BY MOUTH EVERY DAY     calcitRIOL 0.25 mcg capsule  Commonly known as:  ROCALTROL     carvedilol 25 mg tablet  Commonly known as:  COREG  TAKE TWO TABLETS BY MOUTH TWICE DAILY     furosemide 40 mg tablet  Commonly known as:  LASIX  TAKE 2 TABLETS BY MOUTH TWO TIMES A DAY     glimepiride 4 mg tablet  Commonly known as:  AMARYL  TAKE 1 TABLET BY MOUTH EVERY MORNING     glucose blood VI test strips strip  Commonly known as:  FREESTYLE LITE STRIPS  Use as directed     hydrALAZINE 100 mg tablet  Commonly known as:  APRESOLINE  TAKE 1 TABLET BY MOUTH THREE TIMES A DAY     metOLazone 5 mg tablet  Commonly known as:  ZAROXOLYN  TAKE ONE TABLET BY MOUTH EVERY FRIDAY 30 MINUTES PRIOR TO MORNING LASIX DOSE     PROCRIT INJECTION     sildenafil citrate 50 mg tablet  Commonly known as:  VIAGRA  Take 1 Tab by mouth as needed. [address] [address]60-146-49, lot #[id number]  Exp 12/16, 1 box, # 2 tabs     SINGULAIR 10 mg tablet  Generic drug:  montelukast     sodium bicarbonate 325 mg tablet     tadalafil 20 mg tablet  Commonly known as:  CIALIS  Take 1 Tab by mouth as needed.      VITAMIN D3 2,000 unit Tab  Generic drug:  cholecalciferol (vitamin D3)            Current Facility-Administered Medications   Medication Dose Route Frequency    dilTIAZem (CARDIZEM) IR tablet 90 mg  90 mg Oral TIDAC    heparin 25,000 units in D5W 250 ml infusion  18-36 Units/kg/hr IntraVENous TITRATE    lisinopril (PRINIVIL, ZESTRIL) tablet 5 mg  5 mg Oral DAILY    carvedilol (COREG) tablet 25 mg  25 mg Oral BID WITH MEALS    melatonin tablet 3 mg  3 mg Oral QHS PRN    cefTRIAXone (ROCEPHIN) 2 g in sterile water (preservative free) 20 mL IV syringe  2 g IntraVENous Q24H    iron sucrose (VENOFER) 100 mg in 0.9% sodium chloride 100 mL IVPB  100 mg IntraVENous DIALYSIS TUE, THU & SAT    doxercalciferol (HECTOROL) 4 mcg/2 mL injection 1 mcg  1 mcg IntraVENous DIALYSIS TUE, THU & SAT    epoetin gilda (EPOGEN;PROCRIT) 5,000 Units  5,000 Units IntraVENous DIALYSIS TUE, THU & SAT    dilTIAZem (CARDIZEM) 100 mg in 0.9% sodium chloride 100 mL infusion  5 mg/hr IntraVENous CONTINUOUS    aspirin delayed-release tablet 81 mg  81 mg Oral DAILY    atorvastatin (LIPITOR) tablet 40 mg  40 mg Oral DAILY    B complex-vitaminC-folic acid (NEPHROCAP) cap  1 Cap Oral DAILY    montelukast (SINGULAIR) tablet 10 mg  10 mg Oral DAILY    acetaminophen (TYLENOL) tablet 650 mg  650 mg Oral Q4H PRN    ondansetron (ZOFRAN) injection 4 mg  4 mg IntraVENous Q4H PRN    0.9% sodium chloride infusion  100 mL/hr IntraVENous DIALYSIS PRN    insulin lispro (HUMALOG) injection   SubCUTAneous AC&HS    glucose chewable tablet 16 g  16 g Oral PRN    glucagon (GLUCAGEN) injection 1 mg  1 mg IntraMUSCular PRN    dextrose (D50W) injection syrg 12.5-25 g  25-50 mL IntraVENous PRN       Allergies: Patient has no known allergies. Temp (24hrs), Av.1 °F (36.7 °C), Min:97.4 °F (36.3 °C), Max:98.7 °F (37.1 °C)    Visit Vitals  /71 (BP 1 Location: Left leg, BP Patient Position: At rest)   Pulse (!) 101   Temp 97.9 °F (36.6 °C)   Resp 20   Ht 5' 10\" (1.778 m)   Wt 99.5 kg (219 lb 5.7 oz)   SpO2 100%   BMI 31.47 kg/m²       ROS: 12 point ROS obtained in details. Pertinent positives as mentioned in HPI, otherwise negative    Physical Exam:    GENERAL: Not in acute distress  HEENT: pink conjunctiva, un icteric sclera,   NECK: No lymphadenopthy or thyroid swelling, JVD not seen  LYMPH: No supraclavicular or cervical or axillary nodes on both sides  CVS: S1S2 regular, No gallop or rub  RS: CTA, no rales or wheezes  Abd: Soft, resolved tenderness, not distended, No guarding, No rigidity  NEURO:  No focal neurologic deficits   Extrm: no leg edema or swelling. Tenderness left distal forearm at the site of recent IV.  No increased warmth/erythema  Skin: No rash  Psych: no suicidal or homicidal ideations           Labs: Results:   Chemistry Recent Labs     12/12/18  0017 12/11/18  0342 12/10/18  0518   * 119* 133*    138 136   K 3.4* 3.3* 3.3*    104 103   CO2 27 24 24   BUN 28* 51* 42*   CREA 5.58* 8.06* 7.14*   CA 7.7* 7.6* 7.7*   AGAP 10 10 9   BUCR 5* 6* 6*      CBC w/Diff Recent Labs     12/11/18  1314   WBC 12.0   RBC 3.15*   HGB 8.7*   HCT 25.8*      GRANS 88*   LYMPH 8*   EOS 3      Microbiology No results for input(s): CULT in the last 72 hours.        RADIOLOGY:    All available imaging studies/reports in [facility] for this admission were reviewed    Dr. Diane Camara, Infectious Disease Specialist  [phone]  December 12, 2018  9:25 AM

## 2018-12-12 NOTE — PROGRESS NOTES
Patient continues on 15mg/hr diltiazen drip continuously and his b/p decreased to 115/76 and HR decreased to 86. Pt. Is A-flutter on monitor. Dr. Kimberley Harry gave telephone readback order to stop the drip for 30 minutes then restart at 5mg/hr continuously. Sandra Torres continue to monitor.

## 2018-12-12 NOTE — PROGRESS NOTES
[facility] Hospitalist Group  Progress Note    Patient: Laura Berg Age: 41 y.o. : 1970 MR#: [id number] SSN: [id number]  Date/Time: 2018     Subjective:   Pt reports that he walked in the hallway yesterday and was able to tolerate activity. C/o distal left arm pain, attributes to bp cuff use on his arm. Assessment/Plan:   1. Atrial fibrillation with RVR, new onset:management per cards, started on heparin gtt in anticipation of possible CHAPITO and cardioversion due to difficulty in controlling rate. 2. Suspected pneumonia with bacteremia: cont Abx  3. Strep pneumo bacteremia and sepsis: likely from lung: on Abx and repeat Cx negative thus far. 4. ESRD s/p AVF placement: HD per nephrologist.  5. Hyperkalemia-resolved, now w/ hypokalemia  6. HTN, controlled: cont BB  7. Chronic diastolic CHF: compensated    8. DM-2, controlled: cont SSI  9. CAD s/p stent: cont asa, BB and statin   10. H/o pulmonary HTN  11. ? Hemoptysis: will monitor, seems to have resolved. 12.  Anemia hgb was trending down today stable unclear if as a result of # 11, no reports of hemoptysis  Will cont to closely monitor. Guiac stool. Lab w/u-->B12 nl, folate low nl. 13. C/o left arm pain, duplex ordered, results pending.            Case discussed with:  [x]Patient  []Family  [x]Nursing  []Case Management  DVT Prophylaxis:  []Lovenox  []Hep SQ  [x]SCDs  []Coumadin   [x]On Heparin gtt    Objective:   VS:   Visit Vitals  BP (!) 168/91 (BP 1 Location: Left leg, BP Patient Position: At rest)   Pulse (!) 102   Temp 97.4 °F (36.3 °C)   Resp 18   Ht 5' 10\" (1.778 m)   Wt 98.8 kg (217 lb 14.4 oz)   SpO2 98%   BMI 31.27 kg/m²      Tmax/24hrs: Temp (24hrs), Av.1 °F (36.7 °C), Min:97.4 °F (36.3 °C), Max:98.7 °F (37.1 °C)  IOBRIEF    Intake/Output Summary (Last 24 hours) at 20184  Last data filed at 2018 1554  Gross per 24 hour   Intake 120 ml   Output 3000 ml   Net -2880 ml       General: Alert, cooperative, no acute distress    Pulmonary:  CTA Bilaterally. No Wheezing/Rales. Cardiovascular: IRRegular rate and Rhythm. GI:  Soft, Non distended, Non tender. + Bowel sounds. Extremities:  No edema, cyanosis, clubbing. Left arm some tenderness to distal aspect pt w/ limited ROM of wrist joint, no swelling of wrist joint. Psych: Good insight. Not anxious or agitated. Neurologic: Alert and oriented X 3. No acute neuro deficits.   Additional:    Medications:   Current Facility-Administered Medications   Medication Dose Route Frequency    dilTIAZem (CARDIZEM) IR tablet 90 mg  90 mg Oral TIDAC    heparin 25,000 units in D5W 250 ml infusion  18-36 Units/kg/hr IntraVENous TITRATE    lisinopril (PRINIVIL, ZESTRIL) tablet 5 mg  5 mg Oral DAILY    carvedilol (COREG) tablet 25 mg  25 mg Oral BID WITH MEALS    melatonin tablet 3 mg  3 mg Oral QHS PRN    cefTRIAXone (ROCEPHIN) 2 g in sterile water (preservative free) 20 mL IV syringe  2 g IntraVENous Q24H    iron sucrose (VENOFER) 100 mg in 0.9% sodium chloride 100 mL IVPB  100 mg IntraVENous DIALYSIS TUE, THU & SAT    doxercalciferol (HECTOROL) 4 mcg/2 mL injection 1 mcg  1 mcg IntraVENous DIALYSIS TUE, THU & SAT    epoetin gilda (EPOGEN;PROCRIT) 5,000 Units  5,000 Units IntraVENous DIALYSIS TUE, THU & SAT    dilTIAZem (CARDIZEM) 100 mg in 0.9% sodium chloride 100 mL infusion  15 mg/hr IntraVENous CONTINUOUS    aspirin delayed-release tablet 81 mg  81 mg Oral DAILY    atorvastatin (LIPITOR) tablet 40 mg  40 mg Oral DAILY    B complex-vitaminC-folic acid (NEPHROCAP) cap  1 Cap Oral DAILY    montelukast (SINGULAIR) tablet 10 mg  10 mg Oral DAILY    acetaminophen (TYLENOL) tablet 650 mg  650 mg Oral Q4H PRN    ondansetron (ZOFRAN) injection 4 mg  4 mg IntraVENous Q4H PRN    0.9% sodium chloride infusion  100 mL/hr IntraVENous DIALYSIS PRN    insulin lispro (HUMALOG) injection   SubCUTAneous AC&HS    glucose chewable tablet 16 g  16 g Oral PRN    glucagon (GLUCAGEN) injection 1 mg  1 mg IntraMUSCular PRN    dextrose (D50W) injection syrg 12.5-25 g  25-50 mL IntraVENous PRN       Labs:    Recent Results (from the past 24 hour(s))   METABOLIC PANEL, BASIC    Collection Time: 12/11/18  3:42 AM   Result Value Ref Range    Sodium 138 136 - 145 mmol/L    Potassium 3.3 (L) 3.5 - 5.5 mmol/L    Chloride 104 100 - 108 mmol/L    CO2 24 21 - 32 mmol/L    Anion gap 10 3.0 - 18 mmol/L    Glucose 119 (H) 74 - 99 mg/dL    BUN 51 (H) 7.0 - 18 MG/DL    Creatinine 8.06 (H) 0.6 - 1.3 MG/DL    BUN/Creatinine ratio 6 (L) 12 - 20      GFR est AA 9 (L) >60 ml/min/1.73m2    GFR est non-AA 7 (L) >60 ml/min/1.73m2    Calcium 7.6 (L) 8.5 - 10.1 MG/DL   GLUCOSE, POC    Collection Time: 12/11/18  8:05 AM   Result Value Ref Range    Glucose (POC) 104 70 - 110 mg/dL   GLUCOSE, POC    Collection Time: 12/11/18 11:11 AM   Result Value Ref Range    Glucose (POC) 210 (H) 70 - 110 mg/dL   CBC WITH AUTOMATED DIFF    Collection Time: 12/11/18  1:14 PM   Result Value Ref Range    WBC 12.0 4.6 - 13.2 K/uL    RBC 3.15 (L) 4.70 - 5.50 M/uL    HGB 8.7 (L) 13.0 - 16.0 g/dL    HCT 25.8 (L) 36.0 - 48.0 %    MCV 81.9 74.0 - 97.0 FL    MCH 27.6 24.0 - 34.0 PG    MCHC 33.7 31.0 - 37.0 g/dL    RDW 15.6 (H) 11.6 - 14.5 %    PLATELET 903 614 - 142 K/uL    MPV 10.7 9.2 - 11.8 FL    NEUTROPHILS 88 (H) 42 - 75 %    LYMPHOCYTES 8 (L) 20 - 51 %    MONOCYTES 1 (L) 2 - 9 %    EOSINOPHILS 3 0 - 5 %    BASOPHILS 0 0 - 3 %    ABS. NEUTROPHILS 10.5 (H) 1.8 - 8.0 K/UL    ABS. LYMPHOCYTES 1.0 0.8 - 3.5 K/UL    ABS. MONOCYTES 0.1 0 - 1.0 K/UL    ABS. EOSINOPHILS 0.4 0.0 - 0.4 K/UL    ABS.  BASOPHILS 0.0 0.0 - 0.06 K/UL    DF MANUAL      PLATELET COMMENTS ADEQUATE PLATELETS      RBC COMMENTS NORMOCYTIC, NORMOCHROMIC      RBC COMMENTS POLYCHROMASIA  PRESENT       GLUCOSE, POC    Collection Time: 12/11/18  4:51 PM   Result Value Ref Range    Glucose (POC) 102 70 - 110 mg/dL   PTT    Collection Time: 12/11/18  5:30 PM   Result Value Ref Range    aPTT 30.3 23.0 - 36.4 SEC   GLUCOSE, POC    Collection Time: 12/11/18  9:32 PM   Result Value Ref Range    Glucose (POC) 270 (H) 70 - 110 mg/dL       Signed By: Ozzie Zhong MD     December 11, 2018

## 2018-12-12 NOTE — PROGRESS NOTES
Bedside shift change report given to this rn   by tram (offgoing nurse). Report included the following information SBAR, Kardex and Cardiac Rhythm afib. Pt A&OX4, pt continues heparin gtt at 20units/kg/hr with ptt scheduled for 0745 and cardizem gtt at 5mg/hr. US of left arm pending. Pt instructed to use call light when needs anything and will continue to monitor.

## 2018-12-13 LAB
APTT PPP: 75.8 SEC (ref 23–36.4)
APTT PPP: >180 SEC (ref 23–36.4)
BACTERIA SPEC CULT: NORMAL
GLUCOSE BLD STRIP.AUTO-MCNC: 121 MG/DL (ref 70–110)
GLUCOSE BLD STRIP.AUTO-MCNC: 130 MG/DL (ref 70–110)
GLUCOSE BLD STRIP.AUTO-MCNC: 131 MG/DL (ref 70–110)
GLUCOSE BLD STRIP.AUTO-MCNC: 178 MG/DL (ref 70–110)
SERVICE CMNT-IMP: NORMAL

## 2018-12-13 PROCEDURE — 74011250637 HC RX REV CODE- 250/637: Performed by: INTERNAL MEDICINE

## 2018-12-13 PROCEDURE — 74011250636 HC RX REV CODE- 250/636: Performed by: INTERNAL MEDICINE

## 2018-12-13 PROCEDURE — 36415 COLL VENOUS BLD VENIPUNCTURE: CPT

## 2018-12-13 PROCEDURE — 65270000029 HC RM PRIVATE

## 2018-12-13 PROCEDURE — 82962 GLUCOSE BLOOD TEST: CPT

## 2018-12-13 PROCEDURE — 85730 THROMBOPLASTIN TIME PARTIAL: CPT

## 2018-12-13 PROCEDURE — 74011636637 HC RX REV CODE- 636/637: Performed by: INTERNAL MEDICINE

## 2018-12-13 PROCEDURE — 74011000258 HC RX REV CODE- 258: Performed by: INTERNAL MEDICINE

## 2018-12-13 PROCEDURE — 74011250637 HC RX REV CODE- 250/637: Performed by: HOSPITALIST

## 2018-12-13 PROCEDURE — 97535 SELF CARE MNGMENT TRAINING: CPT

## 2018-12-13 RX ADMIN — ATORVASTATIN CALCIUM 40 MG: 40 TABLET, FILM COATED ORAL at 08:56

## 2018-12-13 RX ADMIN — DILTIAZEM HYDROCHLORIDE 90 MG: 90 TABLET, FILM COATED ORAL at 14:09

## 2018-12-13 RX ADMIN — DILTIAZEM HYDROCHLORIDE 90 MG: 90 TABLET, FILM COATED ORAL at 18:57

## 2018-12-13 RX ADMIN — MONTELUKAST SODIUM 10 MG: 10 TABLET, FILM COATED ORAL at 08:56

## 2018-12-13 RX ADMIN — CARVEDILOL 25 MG: 25 TABLET, FILM COATED ORAL at 17:32

## 2018-12-13 RX ADMIN — DILTIAZEM HYDROCHLORIDE 90 MG: 90 TABLET, FILM COATED ORAL at 07:30

## 2018-12-13 RX ADMIN — NEPHROCAP 1 CAPSULE: 1 CAP ORAL at 08:56

## 2018-12-13 RX ADMIN — MELATONIN TAB 3 MG 3 MG: 3 TAB at 23:08

## 2018-12-13 RX ADMIN — ERYTHROPOIETIN 8000 UNITS: 4000 INJECTION, SOLUTION INTRAVENOUS; SUBCUTANEOUS at 14:55

## 2018-12-13 RX ADMIN — IRON SUCROSE 100 MG: 20 INJECTION, SOLUTION INTRAVENOUS at 14:35

## 2018-12-13 RX ADMIN — LISINOPRIL 5 MG: 5 TABLET ORAL at 08:56

## 2018-12-13 RX ADMIN — DOXERCALCIFEROL 1 MCG: 4 INJECTION, SOLUTION INTRAVENOUS at 14:35

## 2018-12-13 RX ADMIN — ASPIRIN 81 MG: 81 TABLET, COATED ORAL at 08:56

## 2018-12-13 RX ADMIN — INSULIN LISPRO 2 UNITS: 100 INJECTION, SOLUTION INTRAVENOUS; SUBCUTANEOUS at 21:59

## 2018-12-13 RX ADMIN — IBUPROFEN 600 MG: 600 TABLET ORAL at 02:22

## 2018-12-13 NOTE — ROUTINE PROCESS
Received bedside report from [place] [address], patient was standing up in room unwrapping the protective plastic from arms. The patient had taken a shower earlier. Patient's HOB was elevated, bed in lowest position and oriented to call button.

## 2018-12-13 NOTE — PROGRESS NOTES
Problem: Self Care Deficits Care Plan (Adult)  Goal: *Acute Goals and Plan of Care (Insert Text)  Occupational Therapy Goals  Initiated 12/12/2018 within 7 day(s). 1.  Patient will perform toilet transfers with supervision/set-up. 2.  Patient will participate in upper extremity therapeutic exercise/activities with supervision/set-up for 7-10 minutes to increase strength and endurance. 3.  Patient will utilize energy conservation techniques during functional activities with min verbal cues. Outcome: Progressing Towards Goal  Occupational Therapy TREATMENT    Patient: Danika Root (74 y.o. male)  Date: 12/13/2018  Diagnosis: Atrial fibrillation with rapid ventricular response ([facility] [address] <principal problem not specified>      Precautions: Fall  Chart, occupational therapy assessment, plan of care, and goals were reviewed. ASSESSMENT:  Pt is in bed upon entry, with breakfast tray in front of him. Pt reports pain in L distal UE is improved greatly following IV removal last night. Pt was able to effectively manage adaptive utensils and self-fed w/Mod Ind (with built up handles). Pt reports no painw/ROM of the L forearm, educated on AROM TherEx for R forearm (supination/pronation), wrist flex/ext, intrinsic TherEx for L hand to improve function and FMC of L hand as pt is left handed. Pt was able to return demonstration for all of the TherEx, left comfortable in bed w/HOB at 90 degrees, eating breakfast.  Progression toward goals:  [x]          Improving appropriately and progressing toward goals  []          Improving slowly and progressing toward goals  []          Not making progress toward goals and plan of care will be adjusted     PLAN:  Patient continues to benefit from skilled intervention to address the above impairments. Continue treatment per established plan of care.   Discharge Recommendations:  Home Health  Further Equipment Recommendations for Discharge:  N/A      G-CODES:     [place] Current  CJ= 20-39%   Goal  CI= 1-19%. The severity rating is based on the Level of Assistance required for Functional Mobility and ADLs. SUBJECTIVE:   Patient stated It feels a lot better today.     OBJECTIVE DATA SUMMARY:   Cognitive/Behavioral Status:  Neurologic State: Alert  Orientation Level: Oriented X4  Cognition: Follows commands       ADL Intervention:       Feeding  Feeding Assistance: Modified independent  Cutting Food: Modified indpendent  Utensil Management: Modified independent  Food to Mouth: Modified independent    Therapeutic Exercises:   See above    Pain:  Pt reports 2/10 pain or discomfort prior to treatment.    Pt reports 2/10 pain or discomfort post treatment. Activity Tolerance:    Fair    Please refer to the flowsheet for vital signs taken during this treatment.   After treatment:   []  Patient left in no apparent distress sitting up in chair  [x]  Patient left in no apparent distress in bed  [x]  Call bell left within reach  [x]  Nursing notified  []  Caregiver present  []  Bed alarm activated    CARSON Glez  Time Calculation: 9 mins

## 2018-12-13 NOTE — PROGRESS NOTES
Problem: Mobility Impaired (Adult and Pediatric)  Goal: *Acute Goals and Plan of Care (Insert Text)  Physical Therapy Goals  Initiated 12/12/2018 and to be accomplished within 7 day(s)  1. Patient will move from supine to sit and sit to supine  in bed with independence. 2.  Patient will transfer from bed to chair and chair to bed with independence using the least restrictive device. 3.  Patient will perform sit to stand with independence. 4.  Patient will ambulate with supervision/set-up for 250 feet with the least restrictive device or hand held assistance. 5.  Patient will ascend/descend 18 stairs with 1 handrail(s) with supervision/set-up. 1216 - Pt off floor for HD. Will f/u later in day. 1302 - Pt still off floor. Will f/u at later date.

## 2018-12-13 NOTE — PROGRESS NOTES
RENAL DAILY PROGRESS NOTE            41y M with renal failure, admitted for bacteremia, seen for ESRD management. Subjective:   Complaint:     Overnight events noted  afebile  Examined during HD  Now on heparin for cardioversion  Has superficial thrombophlebitis in left arm      IMPRESSION:   CKD 4/5 now progressing to ESRD  CKD  From HTN/DM nephropathy  Access; right arm myles fistula,He had some symptoms of steal syndrome, so far tolerateing HD with smaller gaze needle, will schedule outpatient follow up with surgery. HTN  Anemia on iron and epo during HD. Secondary hyperparathyroid of CKD   Hypokalemia   afib  Pneumonia with bacteremia    PLAN:      His outpatient HD has been arranged at   [place] [id number] at 6 AM  abx per BEATRIZ mendoza. Discussed with Dr. Fernando Loredo. At 12:03 PM on 2018, I saw and examined patient during hemodialysis treatment. The patient was receiving hemodialysis for treatment of  renal failure. I have also reviewed vital signs, intake and output, lab results and recent events, and agreed with today's dialysis order. HD rounding    Blood pressure (!) (P) 187/110, pulse (!) (P) 101, temperature 97.8 °F (36.6 °C), temperature source Oral, resp. rate 18, height 5' 10\" (1.778 m), weight 96.5 kg (212 lb 12.8 oz), SpO2 98 %.   Temp (24hrs), Av.9 °F (36.6 °C), Min:97.3 °F (36.3 °C), Max:98.8 °F (37.1 °C)      Blood Pressure: BP: (!) (P) 187/110  Pulse: Pulse (Heart Rate): (!) (P) 101  Temp:  Temp: 97.8 °F (36.6 °C)    Artificial Kidney Dialyzer/Set Up Inspection: Revaclear   hours Duration of Treatment (hours): 4 hours   Heparin Bolus Heparin Bolus (units): 0 units  Blood flow rate Blood Flow Rate (ml/min): (P) 350 ml/min   Dialysate rate     Arterial Access Pressure Arterial Access Pressure (mmHg): (P) -200  Venous Return Pressure Venous Return Pressure (mmHg): (P) 160  Ultrafiltration Rate Goal/Amount of Fluid to Remove (mL): 3000 mL  Fluid Removal Fluid Removed (mL): 3600  Net Fluid Removal NET Fluid Removed (mL): 3000 ml                Current Facility-Administered Medications   Medication Dose Route Frequency    epoetin gilda (EPOGEN;PROCRIT) injection 8,000 Units  8,000 Units IntraVENous Q TUE, THU & SAT    ibuprofen (MOTRIN) tablet 600 mg  600 mg Oral Q6H PRN    dilTIAZem (CARDIZEM) IR tablet 90 mg  90 mg Oral TIDAC    heparin 25,000 units in D5W 250 ml infusion  18-36 Units/kg/hr IntraVENous TITRATE    lisinopril (PRINIVIL, ZESTRIL) tablet 5 mg  5 mg Oral DAILY    carvedilol (COREG) tablet 25 mg  25 mg Oral BID WITH MEALS    melatonin tablet 3 mg  3 mg Oral QHS PRN    iron sucrose (VENOFER) 100 mg in 0.9% sodium chloride 100 mL IVPB  100 mg IntraVENous DIALYSIS TUE, THU & SAT    doxercalciferol (HECTOROL) 4 mcg/2 mL injection 1 mcg  1 mcg IntraVENous DIALYSIS TUE, THU & SAT    aspirin delayed-release tablet 81 mg  81 mg Oral DAILY    atorvastatin (LIPITOR) tablet 40 mg  40 mg Oral DAILY    B complex-vitaminC-folic acid (NEPHROCAP) cap  1 Cap Oral DAILY    montelukast (SINGULAIR) tablet 10 mg  10 mg Oral DAILY    ondansetron (ZOFRAN) injection 4 mg  4 mg IntraVENous Q4H PRN    0.9% sodium chloride infusion  100 mL/hr IntraVENous DIALYSIS PRN    insulin lispro (HUMALOG) injection   SubCUTAneous AC&HS    glucose chewable tablet 16 g  16 g Oral PRN    glucagon (GLUCAGEN) injection 1 mg  1 mg IntraMUSCular PRN    dextrose (D50W) injection syrg 12.5-25 g  25-50 mL IntraVENous PRN       Review of Symptoms: comprehensive ROS negative except above.    Objective:     Patient Vitals for the past 24 hrs:   Temp Pulse Resp BP SpO2   12/13/18 1145 -- (!) (P) 101 -- (!) (P) 187/110 --   12/13/18 1130 -- (P) 100 -- (!) (P) 208/85 --   12/13/18 1115 -- (P) 100 -- (!) (P) 203/99 --   12/13/18 1100 -- 85 -- (!) 151/110 --   12/13/18 1045 97.8 °F (36.6 °C) (!) 101 18 150/89 --   12/13/18 0752 98.8 °F (37.1 °C) 82 17 (!) 147/98 98 %   12/13/18 0359 97.6 °F (36.4 °C) 93 18 166/80 95 %   12/13/18 0018 98.3 °F (36.8 °C) 70 18 117/79 95 %   12/12/18 1938 97.8 °F (36.6 °C) 66 18 152/89 98 %   12/12/18 1632 97.3 °F (36.3 °C) 77 19 134/87 98 %   12/12/18 1212 -- 82 -- 108/82 --        Weight change: -2.974 kg (-8.9 oz)     12/11 1901 - 12/13 0700  In: 240 [P.O.:240]  Out: -   No intake or output data in the 24 hours ending 12/13/18 1202  Physical Exam:   General: comfortable, no acute distress   HEENT sclera anicteric, supple neck, no thyromegaly  CVS: S1S2 heard,  no rub  RS: + air entry b/l,   Abd: Soft, Non tender, Not distended, Positive bowel sounds, no organomegaly, no CVA / supra pubic tenderness  Neuro: non focal, awake, alert , CN II-XII are grossly intact  Extrm: no edema, no cyanosis, clubbing   Skin: no visible  Rash  Musculoskeletal: No gross joints or bone deformities   Access; right arm fistula         Data Review:     LABS:   Hematology:   Recent Labs     12/11/18  1314   WBC 12.0   HGB 8.7*   HCT 25.8*     Chemistry:   Recent Labs     12/12/18  0017 12/11/18  0342   BUN 28* 51*   CREA 5.58* 8.06*   CA 7.7* 7.6*   K 3.4* 3.3*    138    104   CO2 27 24   * 119*            Procedures/imaging: see electronic medical records for all procedures, Xrays and details which were not copied into this note but were reviewed prior to creation of Plan          Assessment & Plan:     As above         Dayana Rust MD  12/13/2018  4:00 PM

## 2018-12-13 NOTE — PROGRESS NOTES
[facility]  Progress Note    Patient: Magnus Amezquita Age: 41 y.o. : 1970 MR#: [id number] [id number]  Date/Time: 2018     Subjective:   Pt reports improvement in swelling, less pain left arm. Assessment/Plan:   1. Atrial fibrillation with RVR, new onset:management per cards,  heparin gtt dc'd off cardizem gtt. Plans for outpt oac initiation if hgb stable and if no reports of hemoptysis noted. 2. Suspected pneumonia with bacteremia: cont Abx  3. Strep pneumo bacteremia and sepsis: likely from lung: on Abx and repeat Cx negative thus far. 4. Superficial acute occlusive upper extremity (L) thrombophlebitis, cont to apply warm compress, will use NSAIDs for pain  5. ESRD s/p AVF placement: HD per nephrologist.  6. Hyperkalemia-resolved, now w/ hypokalemia  7. HTN, controlled: cont BB  8. Chronic diastolic CHF: compensated    9. DM-2, controlled: cont SSI  10. CAD s/p stent: cont asa, BB and statin   11. H/o pulmonary HTN  12. ? Hemoptysis: will monitor, seems to have resolved. 13.  Anemia hgb was trending down for the last few days has been stable unclear if as a result of # 11, no reports of cont'd hemoptysis  Will cont to closely monitor. Guiac stool. Lab w/u-->B12 nl, folate low nl.              Case discussed with:  [x]Patient  []Family  [x]Nursing  []Case Management  DVT Prophylaxis:  []Lovenox  []Hep SQ  [x]SCDs  []Coumadin   [x]On Heparin gtt    Objective:   VS:   Visit Vitals  BP (!) 178/132   Pulse 97   Temp 98.1 °F (36.7 °C) (Oral)   Resp 18   Ht 5' 10\" (1.778 m)   Wt 96.5 kg (212 lb 12.8 oz)   SpO2 98%   BMI 30.53 kg/m²      Tmax/24hrs: Temp (24hrs), Av °F (36.7 °C), Min:97.3 °F (36.3 °C), Max:98.8 °F (37.1 °C)  IOBRIEF    Intake/Output Summary (Last 24 hours) at 2018 1546  Last data filed at 2018 1500  Gross per 24 hour   Intake --   Output 3000 ml   Net -3000 ml       General:  Alert, cooperative, no acute distress    Pulmonary:  CTA Bilaterally. No Wheezing/Rales. Cardiovascular: IRRegular rate and Rhythm. GI:  Soft, Non distended, Non tender. + Bowel sounds. Extremities:  No edema, cyanosis, clubbing. Left arm some tenderness to distal aspect pt w/ limited ROM of wrist joint, no swelling of wrist joint. Psych: Good insight. Not anxious or agitated. Neurologic: Alert and oriented X 3. No acute neuro deficits.   Additional:    Medications:   Current Facility-Administered Medications   Medication Dose Route Frequency    epoetin gilda (EPOGEN;PROCRIT) injection 8,000 Units  8,000 Units IntraVENous DIALYSIS TUE, THU & SAT    ibuprofen (MOTRIN) tablet 600 mg  600 mg Oral Q6H PRN    dilTIAZem (CARDIZEM) IR tablet 90 mg  90 mg Oral TIDAC    lisinopril (PRINIVIL, ZESTRIL) tablet 5 mg  5 mg Oral DAILY    carvedilol (COREG) tablet 25 mg  25 mg Oral BID WITH MEALS    melatonin tablet 3 mg  3 mg Oral QHS PRN    iron sucrose (VENOFER) 100 mg in 0.9% sodium chloride 100 mL IVPB  100 mg IntraVENous DIALYSIS TUE, THU & SAT    doxercalciferol (HECTOROL) 4 mcg/2 mL injection 1 mcg  1 mcg IntraVENous DIALYSIS TUE, THU & SAT    aspirin delayed-release tablet 81 mg  81 mg Oral DAILY    atorvastatin (LIPITOR) tablet 40 mg  40 mg Oral DAILY    B complex-vitaminC-folic acid (NEPHROCAP) cap  1 Cap Oral DAILY    montelukast (SINGULAIR) tablet 10 mg  10 mg Oral DAILY    ondansetron (ZOFRAN) injection 4 mg  4 mg IntraVENous Q4H PRN    0.9% sodium chloride infusion  100 mL/hr IntraVENous DIALYSIS PRN    insulin lispro (HUMALOG) injection   SubCUTAneous AC&HS    glucose chewable tablet 16 g  16 g Oral PRN    glucagon (GLUCAGEN) injection 1 mg  1 mg IntraMUSCular PRN    dextrose (D50W) injection syrg 12.5-25 g  25-50 mL IntraVENous PRN       Labs:    Recent Results (from the past 24 hour(s))   PTT    Collection Time: 12/12/18  6:14 PM   Result Value Ref Range    aPTT 58.5 (H) 23.0 - 36.4 SEC   GLUCOSE, POC    Collection Time: 12/12/18  8:42 PM   Result Value Ref Range    Glucose (POC) 189 (H) 70 - 110 mg/dL   PTT    Collection Time: 12/13/18  3:20 AM   Result Value Ref Range    aPTT >180.0 (HH) 23.0 - 36.4 SEC   GLUCOSE, POC    Collection Time: 12/13/18  7:57 AM   Result Value Ref Range    Glucose (POC) 131 (H) 70 - 110 mg/dL   PTT    Collection Time: 12/13/18  1:00 PM   Result Value Ref Range    aPTT 75.8 (H) 23.0 - 36.4 SEC       Signed By: Marilyn Carpio MD     December 13, 2018

## 2018-12-13 NOTE — PROGRESS NOTES
Infectious Disease Progress Note    Requested by: Dr. Alanna Sutton    Reason: sepsis, gram positive bacteremia    Current abx Prior abx   Ceftriaxone since 12/5/18 Azithromycin 12/5-12/9     Lines:       Assessment :    [address] y.o. male with a PMH of ESRD s/p AVF placement but not on dialysis yet, HTN, chronic diastolic CHF, DM-2 (last [id number]: 6.3 on 12/5/18) , CAD s/p stent and pulmonary HTN who presented to [facility] on 12/5/18 with c/c of weakness and fatigue. A.fib with rvr on admission, acute renal failure    Now with gram positive bacteremia. Highly complex clinical picture. Presentation is c/w sepsis (POA) due to streptococcus pneumoniae bloodstream infection (positive blood culture 12/5, negative blood culture 12/7),  bilateral community acquired pneumonia     BSI likely due to community acquired pneumonia. No evidence of cholecystitis on usg. Clinically better. Still with a.fib with rvr on cardizem IV. Swelling, tenderness LUE likely superficial phlebitis from IV site confirmed by venous duplex 12/12      Recommendations:    1. continue ceftriaxone (d8)  2. F/u nephrology, cardiology  recommendations  3. Apply cool compresses LUE  4. F/u clinically    Advance Care planning:full code:  discussed  with patient/surrogate decision maker:Jovita Sharpe: [phone]    Above plan was discussed in details with patient, Rn. Please call me if any further questions or concerns. Will continue to participate in the care of this patient. subjective:    No increased cough, chest pain. C/o pain LUE. Patient denies headaches, visual disturbances, sore throat, runny nose, earaches, cp, chills, burning micturition, pain or weakness in extremities. He denies back pain/flank pain.              Medication List      ASK your doctor about these medications    amLODIPine 10 mg tablet  Commonly known as:  NORVASC  TAKE ONE TABLET BY MOUTH EVERY DAY     aspirin delayed-release 81 mg tablet     atorvastatin 40 mg tablet  Commonly known as:  LIPITOR  Take 1 Tab by mouth daily. b complex-vitamin c-folic acid 1mg 1 mg tablet  Commonly known as:  NAHED-MOON RX  TAKE ONE TABLET BY MOUTH EVERY DAY     calcitRIOL 0.25 mcg capsule  Commonly known as:  ROCALTROL     carvedilol 25 mg tablet  Commonly known as:  COREG  TAKE TWO TABLETS BY MOUTH TWICE DAILY     furosemide 40 mg tablet  Commonly known as:  LASIX  TAKE 2 TABLETS BY MOUTH TWO TIMES A DAY     glimepiride 4 mg tablet  Commonly known as:  AMARYL  TAKE 1 TABLET BY MOUTH EVERY MORNING     glucose blood VI test strips strip  Commonly known as:  FREESTYLE LITE STRIPS  Use as directed     hydrALAZINE 100 mg tablet  Commonly known as:  APRESOLINE  TAKE 1 TABLET BY MOUTH THREE TIMES A DAY     metOLazone 5 mg tablet  Commonly known as:  ZAROXOLYN  TAKE ONE TABLET BY MOUTH EVERY FRIDAY 30 MINUTES PRIOR TO MORNING LASIX DOSE     PROCRIT INJECTION     sildenafil citrate 50 mg tablet  Commonly known as:  VIAGRA  Take 1 Tab by mouth as needed. [address] [id number], lot #[id number]  Exp 12/16, 1 box, # 2 tabs     SINGULAIR 10 mg tablet  Generic drug:  montelukast     sodium bicarbonate 325 mg tablet     tadalafil 20 mg tablet  Commonly known as:  CIALIS  Take 1 Tab by mouth as needed.      VITAMIN D3 2,000 unit Tab  Generic drug:  cholecalciferol (vitamin D3)            Current Facility-Administered Medications   Medication Dose Route Frequency    epoetin gilda (EPOGEN;PROCRIT) injection 8,000 Units  8,000 Units IntraVENous DIALYSIS TUE, THU & SAT    ibuprofen (MOTRIN) tablet 600 mg  600 mg Oral Q6H PRN    dilTIAZem (CARDIZEM) IR tablet 90 mg  90 mg Oral TIDAC    heparin 25,000 units in D5W 250 ml infusion  18-36 Units/kg/hr IntraVENous TITRATE    lisinopril (PRINIVIL, ZESTRIL) tablet 5 mg  5 mg Oral DAILY    carvedilol (COREG) tablet 25 mg  25 mg Oral BID WITH MEALS    melatonin tablet 3 mg  3 mg Oral QHS PRN    iron sucrose (VENOFER) 100 mg in 0.9% sodium chloride 100 mL IVPB  100 mg IntraVENous DIALYSIS TUE, THU & SAT    doxercalciferol (HECTOROL) 4 mcg/2 mL injection 1 mcg  1 mcg IntraVENous DIALYSIS TUE, THU & SAT    aspirin delayed-release tablet 81 mg  81 mg Oral DAILY    atorvastatin (LIPITOR) tablet 40 mg  40 mg Oral DAILY    B complex-vitaminC-folic acid (NEPHROCAP) cap  1 Cap Oral DAILY    montelukast (SINGULAIR) tablet 10 mg  10 mg Oral DAILY    ondansetron (ZOFRAN) injection 4 mg  4 mg IntraVENous Q4H PRN    0.9% sodium chloride infusion  100 mL/hr IntraVENous DIALYSIS PRN    insulin lispro (HUMALOG) injection   SubCUTAneous AC&HS    glucose chewable tablet 16 g  16 g Oral PRN    glucagon (GLUCAGEN) injection 1 mg  1 mg IntraMUSCular PRN    dextrose (D50W) injection syrg 12.5-25 g  25-50 mL IntraVENous PRN       Allergies: Patient has no known allergies. Temp (24hrs), Av.9 °F (36.6 °C), Min:97.3 °F (36.3 °C), Max:98.8 °F (37.1 °C)    Visit Vitals  BP (!) (P) 187/110   Pulse (!) (P) 101   Temp 97.8 °F (36.6 °C) (Oral)   Resp 18   Ht 5' 10\" (1.778 m)   Wt 96.5 kg (212 lb 12.8 oz)   SpO2 98%   BMI 30.53 kg/m²       ROS: 12 point ROS obtained in details. Pertinent positives as mentioned in HPI, otherwise negative    Physical Exam:    GENERAL: Not in acute distress  HEENT: pink conjunctiva, un icteric sclera,   NECK: No lymphadenopthy or thyroid swelling, JVD not seen  LYMPH: No supraclavicular or cervical or axillary nodes on both sides  CVS: S1S2 regular, No gallop or rub  RS: CTA, no rales or wheezes  Abd: Soft, resolved tenderness, not distended, No guarding, No rigidity  NEURO:  No focal neurologic deficits   Extrm: no leg edema or swelling. Tenderness left distal forearm at the site of recent IV.  No increased warmth/erythema  Skin: No rash  Psych: no suicidal or homicidal ideations           Labs: Results:   Chemistry Recent Labs     18  0017 18  0342   * 119*    138   K 3.4* 3.3*    104   CO2 27 24   BUN 28* 51*   CREA 5.58* 8.06*   CA 7.7* 7.6* AGAP 10 10   BUCR 5* 6*      CBC w/Diff Recent Labs     12/11/18  1314   WBC 12.0   RBC 3.15*   HGB 8.7*   HCT 25.8*      GRANS 88*   LYMPH 8*   EOS 3      Microbiology No results for input(s): CULT in the last 72 hours.        RADIOLOGY:    All available imaging studies/reports in [facility] for this admission were reviewed    Dr. Jack Vega, Infectious Disease Specialist  [phone]  December 13, 2018  9:25 AM

## 2018-12-13 NOTE — PROGRESS NOTES
[facility]  Progress Note    Patient: Suzi Agee Age: 41 y.o. : 1970 MR#: [id number] SSN: [id number]  Date/Time: 2018     Subjective:   Continues to C/o distal left arm pain some improvement w/ warm compress. Assessment/Plan:   1. Atrial fibrillation with RVR, new onset:management per cards, started on heparin gtt in anticipation of possible CHAPITO and cardioversion due to difficulty in controlling rate, better controlled w/ oral cardizem today. 2. Suspected pneumonia with bacteremia: cont Abx  3. Strep pneumo bacteremia and sepsis: likely from lung: on Abx and repeat Cx negative thus far. 4. Superficial acute occlusive upper extremity (L) thrombophlebitis, cont to apply warm compress, will use NSAIDs for pain  5. ESRD s/p AVF placement: HD per nephrologist.  6. Hyperkalemia-resolved, now w/ hypokalemia  7. HTN, controlled: cont BB  8. Chronic diastolic CHF: compensated    9. DM-2, controlled: cont SSI  10. CAD s/p stent: cont asa, BB and statin   11. H/o pulmonary HTN  12. ? Hemoptysis: will monitor, seems to have resolved. 13.  Anemia hgb was trending down for the last few days has been stable unclear if as a result of # 11, no reports of cont'd hemoptysis  Will cont to closely monitor. Guiac stool. Lab w/u-->B12 nl, folate low nl.              Case discussed with:  [x]Patient  []Family  [x]Nursing  []Case Management  DVT Prophylaxis:  []Lovenox  []Hep SQ  [x]SCDs  []Coumadin   [x]On Heparin gtt    Objective:   VS:   Visit Vitals  /89 (BP 1 Location: Left leg, BP Patient Position: At rest)   Pulse 66   Temp 97.8 °F (36.6 °C)   Resp 18   Ht 5' 10\" (1.778 m)   Wt 99.5 kg (219 lb 5.7 oz)   SpO2 98%   BMI 31.47 kg/m²      Tmax/24hrs: Temp (24hrs), Av.9 °F (36.6 °C), Min:97.3 °F (36.3 °C), Max:98.4 °F (36.9 °C)  IOBRIEF    Intake/Output Summary (Last 24 hours) at 2018  Last data filed at 2018 1202  Gross per 24 hour   Intake 240 ml   Output --   Net 240 ml       General:  Alert, cooperative, no acute distress    Pulmonary:  CTA Bilaterally. No Wheezing/Rales. Cardiovascular: IRRegular rate and Rhythm. GI:  Soft, Non distended, Non tender. + Bowel sounds. Extremities:  No edema, cyanosis, clubbing. Left arm some tenderness to distal aspect pt w/ limited ROM of wrist joint, no swelling of wrist joint. Psych: Good insight. Not anxious or agitated. Neurologic: Alert and oriented X 3. No acute neuro deficits.   Additional:    Medications:   Current Facility-Administered Medications   Medication Dose Route Frequency    heparin (porcine) injection 4,000 Units  40 Units/kg SubCUTAneous ONCE    dilTIAZem (CARDIZEM) IR tablet 90 mg  90 mg Oral TIDAC    heparin 25,000 units in D5W 250 ml infusion  18-36 Units/kg/hr IntraVENous TITRATE    lisinopril (PRINIVIL, ZESTRIL) tablet 5 mg  5 mg Oral DAILY    carvedilol (COREG) tablet 25 mg  25 mg Oral BID WITH MEALS    melatonin tablet 3 mg  3 mg Oral QHS PRN    iron sucrose (VENOFER) 100 mg in 0.9% sodium chloride 100 mL IVPB  100 mg IntraVENous DIALYSIS TUE, THU & SAT    doxercalciferol (HECTOROL) 4 mcg/2 mL injection 1 mcg  1 mcg IntraVENous DIALYSIS TUE, THU & SAT    epoetin gilda (EPOGEN;PROCRIT) 5,000 Units  5,000 Units IntraVENous DIALYSIS TUE, THU & SAT    aspirin delayed-release tablet 81 mg  81 mg Oral DAILY    atorvastatin (LIPITOR) tablet 40 mg  40 mg Oral DAILY    B complex-vitaminC-folic acid (NEPHROCAP) cap  1 Cap Oral DAILY    montelukast (SINGULAIR) tablet 10 mg  10 mg Oral DAILY    acetaminophen (TYLENOL) tablet 650 mg  650 mg Oral Q4H PRN    ondansetron (ZOFRAN) injection 4 mg  4 mg IntraVENous Q4H PRN    0.9% sodium chloride infusion  100 mL/hr IntraVENous DIALYSIS PRN    insulin lispro (HUMALOG) injection   SubCUTAneous AC&HS    glucose chewable tablet 16 g  16 g Oral PRN    glucagon (GLUCAGEN) injection 1 mg  1 mg IntraMUSCular PRN    dextrose (D50W) injection syrg 12.5-25 g  25-50 mL IntraVENous PRN       Labs:    Recent Results (from the past 24 hour(s))   GLUCOSE, POC    Collection Time: 12/11/18  9:32 PM   Result Value Ref Range    Glucose (POC) 270 (H) 70 - 110 mg/dL   PTT    Collection Time: 12/12/18 12:17 AM   Result Value Ref Range    aPTT 69.1 (H) 23.0 - 58.9 SEC   METABOLIC PANEL, BASIC    Collection Time: 12/12/18 12:17 AM   Result Value Ref Range    Sodium 139 136 - 145 mmol/L    Potassium 3.4 (L) 3.5 - 5.5 mmol/L    Chloride 102 100 - 108 mmol/L    CO2 27 21 - 32 mmol/L    Anion gap 10 3.0 - 18 mmol/L    Glucose 204 (H) 74 - 99 mg/dL    BUN 28 (H) 7.0 - 18 MG/DL    Creatinine 5.58 (H) 0.6 - 1.3 MG/DL    BUN/Creatinine ratio 5 (L) 12 - 20      GFR est AA 13 (L) >60 ml/min/1.73m2    GFR est non-AA 11 (L) >60 ml/min/1.73m2    Calcium 7.7 (L) 8.5 - 10.1 MG/DL   GLUCOSE, POC    Collection Time: 12/12/18  8:26 AM   Result Value Ref Range    Glucose (POC) 135 (H) 70 - 110 mg/dL   PTT    Collection Time: 12/12/18  9:55 AM   Result Value Ref Range    aPTT 41.1 (H) 23.0 - 36.4 SEC   GLUCOSE, POC    Collection Time: 12/12/18 11:15 AM   Result Value Ref Range    Glucose (POC) 272 (H) 70 - 110 mg/dL   GLUCOSE, POC    Collection Time: 12/12/18  3:40 PM   Result Value Ref Range    Glucose (POC) 186 (H) 70 - 110 mg/dL   PTT    Collection Time: 12/12/18  6:14 PM   Result Value Ref Range    aPTT 58.5 (H) 23.0 - 36.4 SEC   GLUCOSE, POC    Collection Time: 12/12/18  8:42 PM   Result Value Ref Range    Glucose (POC) 189 (H) 70 - 110 mg/dL       Signed By: Blayne Rizzo MD     December 12, 2018

## 2018-12-13 NOTE — DIALYSIS
```````````````          ACUTE HEMODIALYSIS FLOW SHEET    HEMODIALYSIS ORDERS: Physician: juve      Dialyzer: revaclear         Duration: 4 hr  BFR: 350   DFR: 500   Dialysate:  Temp *37 K+   3    Ca+  3 Na 138 Bicarb 35   Weight:  96.5 kg    Bed Scale [x]     Unable to Obtain []      Dry weight/UF Goal: 3000 Access AVF  Needle Gauge 16    Heparin []  Bolus      Units    [] Hourly       Units    [x]None     Catheter locking solution    Pre BP:   150/89    Pulse:     101     Temperature:   97.8  Respirations: 16  Tx: NS       ml/Bolus  Other        [x] N/A   Labs: Pre        Post:        [x] N/A   Additional Orders(medications, blood products, hypotension management):       [x] N/A     [x] Time Out/Safety Check  [x] [facility] Consent Verified     CATHETER ACCESS: [x]N/A   []Right   []Left   []IJ     []Fem   [] First use X-ray verified     []Tunnel                [] Non Tunneled   []No S/S infection  []Redness  []Drainage []Cultured []Swelling []Pain   []Medical Aseptic Prep Utilized   []Dressing Changed  [] Biopatch  Date:       []Clotted   [x]Patent   Flows: []Good  []Poor  []Reversed   If access problem,  notified: []Yes    []N/A  Date:           GRAFT/FISTULA ACCESS:  []N/A     [x]Right     []Left     [x]UE     []LE   []AVG   [x]AVF        []Buttonhole    [x]Medical Aseptic Prep Utilized   [x]No S/S infection  []Redness  []Drainage []Cultured []Swelling []Pain    Bruit:   [x] Strong    [] Weak       Thrill :   [x] Strong    [] Weak       Needle Gauge: 16   Length: 1   If access problem,  notified: []Yes     [x]N/A  Date:        Please describe access if present and not used:       GENERAL ASSESSMENT:    LUNGS:  Rate  SaO2%        [x] N/A    [x] Clear  [] Coarse  [] Crackles  [] Wheezing        [] Diminished     Location : []RLL   []LLL    []RUL  []ROBERT   Cough: []Productive  []Dry  [x]N/A   Respirations:  [x]Easy  []Labored   Therapy:  [x]RA  []NC  l/min    Mask: []NRB []Venti       O2% []Ventilator  []Intubated  [] Trach  [] BiPaP   CARDIAC: []Regular      [x] Irregular   [] Pericardial Rub  [] JVD        []  Monitored  [] Bedside  [] Remotely monitored [] N/A  Rhythm:    EDEMA: [] None  [x]Generalized  [] Pitting [] 1    [] 2    [] 3    [] 4                 [] Facial  [] Pedal  []  UE  [] LE   SKIN:   [x] Warm  [] Hot     [] Cold   [x] Dry     [] Pale   [] Diaphoretic                  [] Flushed  [] Jaundiced  [] Cyanotic  [] Rash  [] Weeping   LOC:    [x] Alert      [x]Oriented:    [x] Person     [x] Place  [x]Time               [] Confused  [] Lethargic  [] Medicated  [] Non-responsive     GI / ABDOMEN:  [] Flat    [] Distended    [x] Soft    [] Firm   []  Obese                             [] Diarrhea  [x] Bowel Sounds  [] Nausea  [] Vomiting       / URINE ASSESSMENT:[] Voiding   [x] Oliguria  [] Anuria   []  [place]     [] Incontinent    []  Incontinent Brief      []  Bathroom Privileges     PAIN: [x] 0 []1  []2   []3   []4   []5   []6   []7   []8   []9   []10            Scale 0-10  Action/Follow Up:    MOBILITY:  [] Amb    [] Amb/Assist    [x] Bed    [] Wheelchair  [] Stretcher      All Vitals and Treatment Details on Attached [address]: [facility]          Room # 96.5     [] 1st Time Acute  [] Stat  [x] Routine  [] Urgent     [x] Acute Room  []  Bedside  [] ICU/CCU  [] ER   Isolation Precautions:  [x] Dialysis   [] Airborne   [] Contact    [] Reverse   Special Considerations:         [] Blood Consent Verified [x]N/A     ALLERGIES:   [x] NKA          Code Status:  [x] Full Code  [] DNR  [] Other           HBsAg ONLY: Date Drawn 12/05/18         [x]Negative []Positive []Unknown   HBsAb: Date 12/05/18/    [x] Susceptible   [] [id number] []Not Drawn  [] Drawn     Current Labs:    Date of Labs: Today [x]        Cut and paste current labs here.                                                                                                                                   DIET:  [x] Renal    [] Other     [] NPO     []  Diabetic      PRIMARY NURSE REPORT: First initial/Last name/Title      Pre Dialysis: Braxton Erickson RN    Time: 0900      EDUCATION:    [x] Patient [] Other         Knowledge Basis: []None [x]Minimal [] Substantial   Barriers to learning  [x]N/A   [] Access Care     [] S&S of infection     [] Fluid Management     []K+     [x]Procedural    []Albumin     [] Medications     [] Tx Options     [] Transplant     [] Diet     [] Other   Teaching Tools:  [x] Explain  [] Demo  [] Handouts [] Video  Patient response:   [x] Verbalized understanding  [] Teach back  [] Return demonstration [] Requires follow up   Inappropriate due to            [address] - Before each treatment:     Machine Number:                   [facility]                                  [x] Unit Machine # 8 with centralized RO                                  [] Portable Machine #1/RO serial # [id number]                                  [] Portable Machine #2/RO serial # [id number]                                  [] Portable Machine #3/RO serial # [id number]                                                                                                       [address] [facility]                                  [] Portable Machine #11/RO serial # [id number]                                   [] Portable Machine #12/RO serial # [id number]                                  [] Portable Machine #13/RO serial #  [id number]      Alarm Test:  Pass time 0930         Other:         [x] RO/Machine Log Complete      Temp    37            [x]Extracorporeal Circuit Tested for integrity   Dialysate: pH  7.4 Conductivity: Meter   14     HD Machine   14                  TCD: 14  Dialyzer Lot # [id number]        Blood Tubing Lot # [id number]     Saline Lot #       CHLORINE TESTING-Before each treatment and every 4 hours    Total Chlorine: [x] less than 0.1 ppm  Time: 0900 4 Hr/2nd Check Time: 1300   (if greater than 0.1 ppm from Primary then every 30 minutes from Secondary)     TREATMENT INITIATION - with Dialysis Precautions:   [x] All Connections Secured                 [x] Saline Line Double Clamped   [x] Venous Parameters Set                  [x] Arterial Parameters Set    [x] Prime Given  250 ml               [x]Air Foam Detector Engaged      Treatment Initiation Note: pt arrived in stable condition via bed AVF accessed and treatment initiated without difficulty. Dr Tobias Guy at bedside     Medication Dose Volume Route Initials Dialyzer Cleared: [] Good [x] Fair  [] Poor    Blood processed:  72.5 L  UF Removed  3000 Ml    Post Wt: 93.5    kg  POst BP:   178/132       Pulse: 97      Respirations: 18  Temperature: 98.1     epogen      8000u      2ml                  Post Tx Vascular Access: AVF/AVG: Bleeding stopped Art 5 min. Zay. 5 Min             venofer      100mg 100ml                  Catheter: Locking solution: Heparin 1:1000 Art. Zay. N/A                                 Post Assessment:                                    Skin:  [x] Warm  [x] Dry [] Diaphoretic    [] Flushed  [] Pale [] Cyanotic   [facility] Signatures Title Initials  Time Lungs: [x] Clear    [] Course  [] Crackles  [] Wheezing [] Diminished   Alize Rodriguez RN    Cardiac: [x] Regular   [] Irregular   [] Monitor  [] N/A  Rhythm:           Edema:  [] None    [x] General     [] Facial   [] Pedal    [] UE    [] LE       Pain: [x]0  []1  []2   []3  []4   []5   []6   []7   []8   []9   []10         Post Treatment Note: HD well tolerated. 3L UF removed. No acute distress noted during or post HD treatment.      POST TREATMENT PRIMARY NURSE HANDOFF REPORT:     First initial/Last name/Title         Post Dialysis: Anamaria Colon RN Time:  3580     Abbreviations: AVG-arterial venous graft, AVF-arterial venous fistula, IJ-Internal Jugular, Subcl-Subclavian, Fem-Femoral, Tx-treatment, AP/HR-apical heart rate, DFR-dialysate flow rate, BFR-blood flow rate, AP-arterial pressure, -venous pressure, UF-ultrafiltrate, TMP-transmembrane pressure, Zay-Venous, Art-Arterial, RO-Reverse Osmosis

## 2018-12-13 NOTE — PROGRESS NOTES
[facility]  -  Progress Note      Patient: Danika Root MRN: [id number]  SSN: [id number]    YOB: 1970  Age: 41 y.o. Sex: male      Admit Date: 12/5/2018    Assessment:     -Volume overload, ESRD, now on intermittent HD  -Sepsis with bacteremia, S. Pneumonia, suspected pneumonia on antbx  -Paroxysmal atrial fibrillation, new diagnosis, presented with RVR, intermittent afib this admission, likely driven by underlying illness.  CHADSVASC2 score 3  -Coronary artery disease s/p LAD PCI with AMOL 10/2012.  -Ischemic cardiomyopathy with EF 40-45% 2012 improved to 55% 2014.  -Diabetes mellitus.  -Hypertension.  -Dyslipidemia.  -Chronic anemia.  -Legally blind.  -Noncompliance with medical regimen and follow up.     Primary cardiologist Dr Deondre Johnson, last office visit 2016. Plan:     -Continue current medication regimen. Rate overall controlled on current medication regimen of 90 mg Cardizem TID and Coreg 25 mg BID. Dose of Coreg noted to not have been given this AM.  -Will discontinue Heparin gtts. -Continue ASA. If no recurrent signs of bleeding, may reconsider oral anticoagulation as outpatinet. Subjective:     No new complaints.       Objective:      Patient Vitals for the past 8 hrs:   Temp Pulse Resp BP SpO2   12/13/18 1145 -- (!) (P) 101 -- (!) (P) 187/110 --   12/13/18 1130 -- (P) 100 -- (!) (P) 208/85 --   12/13/18 1115 -- (P) 100 -- (!) (P) 203/99 --   12/13/18 1100 -- 85 -- (!) 151/110 --   12/13/18 1045 97.8 °F (36.6 °C) (!) 101 18 150/89 --   12/13/18 0752 98.8 °F (37.1 °C) 82 17 (!) 147/98 98 %         Patient Vitals for the past 96 hrs:   Weight   12/13/18 0359 212 lb 12.8 oz (96.5 kg)   12/12/18 0456 219 lb 5.7 oz (99.5 kg)   12/11/18 0455 217 lb 14.4 oz (98.8 kg)   12/10/18 0459 218 lb 11.1 oz (99.2 kg)       No intake or output data in the 24 hours ending 12/13/18 1225    Physical Exam:  General:  alert, cooperative, no distress, appears stated age  Neck:  No JVD  Lungs:  clear to auscultation bilaterally  Heart:  Irregularly irregular rhythm  Abdomen:  abdomen is soft without significant tenderness, masses, organomegaly or guarding  Extremities:  Atraumatic, trace-1+ edema     Data Review:     Labs: Results:       Chemistry Recent Labs     12/12/18  0017 12/11/18  0342   * 119*    138   K 3.4* 3.3*    104   CO2 27 24   BUN 28* 51*   CREA 5.58* 8.06*   CA 7.7* 7.6*   AGAP 10 10   BUCR 5* 6*      CBC w/Diff Recent Labs     12/11/18  1314   WBC 12.0   RBC 3.15*   HGB 8.7*   HCT 25.8*      GRANS 88*   LYMPH 8*   EOS 3      Coagulation Recent Labs     12/13/18  0320 12/12/18  1814   APTT >180.0* 58.5*       Lipid Panel Lab Results   Component Value Date/Time    Cholesterol, total 122 05/11/2015 08:55 AM    HDL Cholesterol 38 (L) 05/11/2015 08:55 AM    LDL, calculated 72 05/11/2015 08:55 AM    VLDL, calculated 12 05/11/2015 08:55 AM    Triglyceride 58 05/11/2015 08:55 AM    CHOL/HDL Ratio 4.4 10/17/2012 06:05 AM      Thyroid Studies Lab Results   Component Value Date/Time    TSH 2.43 12/06/2018 05:19 AM

## 2018-12-14 VITALS
HEART RATE: 93 BPM | SYSTOLIC BLOOD PRESSURE: 168 MMHG | RESPIRATION RATE: 18 BRPM | HEIGHT: 70 IN | DIASTOLIC BLOOD PRESSURE: 92 MMHG | WEIGHT: 212.4 LBS | OXYGEN SATURATION: 95 % | TEMPERATURE: 98.7 F | BODY MASS INDEX: 30.41 KG/M2

## 2018-12-14 PROCEDURE — 74011250637 HC RX REV CODE- 250/637: Performed by: INTERNAL MEDICINE

## 2018-12-14 PROCEDURE — 97535 SELF CARE MNGMENT TRAINING: CPT

## 2018-12-14 PROCEDURE — 74011636637 HC RX REV CODE- 636/637: Performed by: INTERNAL MEDICINE

## 2018-12-14 RX ORDER — AMOXICILLIN AND CLAVULANATE POTASSIUM 500; 125 MG/1; MG/1
1 TABLET, FILM COATED ORAL DAILY
Status: DISCONTINUED | OUTPATIENT
Start: 2018-12-14 | End: 2018-12-14 | Stop reason: HOSPADM

## 2018-12-14 RX ORDER — GLIMEPIRIDE 4 MG/1
TABLET ORAL
Qty: 30 TAB | Refills: 1 | Status: SHIPPED | OUTPATIENT
Start: 2018-12-14 | End: 2019-02-11 | Stop reason: SDUPTHER

## 2018-12-14 RX ORDER — LISINOPRIL 5 MG/1
10 TABLET ORAL DAILY
Qty: 30 TAB | Refills: 1 | Status: SHIPPED | OUTPATIENT
Start: 2018-12-15 | End: 2019-03-07 | Stop reason: SDUPTHER

## 2018-12-14 RX ORDER — DILTIAZEM HYDROCHLORIDE 90 MG/1
90 TABLET, FILM COATED ORAL
Qty: 90 TAB | Refills: 1 | Status: SHIPPED | OUTPATIENT
Start: 2018-12-14 | End: 2019-08-13

## 2018-12-14 RX ORDER — CARVEDILOL 25 MG/1
25 TABLET ORAL 2 TIMES DAILY WITH MEALS
Qty: 60 TAB | Refills: 1 | Status: SHIPPED | OUTPATIENT
Start: 2018-12-14 | End: 2018-12-21

## 2018-12-14 RX ADMIN — LISINOPRIL 5 MG: 5 TABLET ORAL at 09:41

## 2018-12-14 RX ADMIN — CARVEDILOL 25 MG: 25 TABLET, FILM COATED ORAL at 09:41

## 2018-12-14 RX ADMIN — INSULIN LISPRO 2 UNITS: 100 INJECTION, SOLUTION INTRAVENOUS; SUBCUTANEOUS at 09:00

## 2018-12-14 RX ADMIN — ASPIRIN 81 MG: 81 TABLET, COATED ORAL at 09:41

## 2018-12-14 RX ADMIN — MONTELUKAST SODIUM 10 MG: 10 TABLET, FILM COATED ORAL at 09:41

## 2018-12-14 RX ADMIN — DILTIAZEM HYDROCHLORIDE 90 MG: 90 TABLET, FILM COATED ORAL at 09:00

## 2018-12-14 RX ADMIN — NEPHROCAP 1 CAPSULE: 1 CAP ORAL at 09:41

## 2018-12-14 RX ADMIN — ATORVASTATIN CALCIUM 40 MG: 40 TABLET, FILM COATED ORAL at 09:41

## 2018-12-14 NOTE — PROGRESS NOTES
Infectious Disease Progress Note    Requested by: Dr. Elissa Del Rio    Reason: sepsis, gram positive bacteremia    Current abx Prior abx   Ceftriaxone since 12/5/18 Azithromycin 12/5-12/9     Lines:       Assessment :    41 y.o. male with a PMH of ESRD s/p AVF placement but not on dialysis yet, HTN, chronic diastolic CHF, DM-2 (last NAAU4Z: 6.3 on 12/5/18) , CAD s/p stent and pulmonary HTN who presented to [facility] on 12/5/18 with c/c of weakness and fatigue. A.fib with rvr on admission, acute renal failure    Now with gram positive bacteremia. Highly complex clinical picture. Presentation is c/w sepsis (POA) due to streptococcus pneumoniae bloodstream infection (positive blood culture 12/5, negative blood culture 12/7),  bilateral community acquired pneumonia     BSI likely due to community acquired pneumonia. No evidence of cholecystitis on usg. Clinically better. Still with a.fib with rvr on cardizem IV. Swelling, tenderness LUE likely superficial phlebitis from IV site confirmed by venous duplex 12/12      Recommendations:    1. discontinue ceftriaxone - start po amoxicillin/clavulanate 500 mg po q 24 hour for 6 more days  2. F/u nephrology, cardiology  recommendations  3. Ok to d/c patient from [facility] planning:full code:  discussed  with patient/surrogate decision maker:Jovita Sharpe: [phone]    Above plan was discussed in details with patient, dr. Prabhakar Gauthier. Please call me if any further questions or concerns. Will continue to participate in the care of this patient. subjective:    No increased cough, chest pain. C/o pain LUE. Patient denies headaches, visual disturbances, sore throat, runny nose, earaches, cp, chills, burning micturition, pain or weakness in extremities. He denies back pain/flank pain.              Medication List      START taking these medications    dilTIAZem 90 mg tablet  Commonly known as:  CARDIZEM  Take 1 Tab by mouth Before breakfast, lunch, and dinner. lisinopril 5 mg tablet  Commonly known as:  PRINIVIL, ZESTRIL  Take 2 Tabs by mouth daily. Start taking on:  12/15/2018        CHANGE how you take these medications    * carvedilol 25 mg tablet  Commonly known as:  COREG  TAKE TWO TABLETS BY MOUTH TWICE DAILY  What changed:  Another medication with the same name was added. Make sure you understand how and when to take each. * carvedilol 25 mg tablet  Commonly known as:  COREG  Take 1 Tab by mouth two (2) times daily (with meals). What changed: You were already taking a medication with the same name, and this prescription was added. Make sure you understand how and when to take each. * This list has 2 medication(s) that are the same as other medications prescribed for you. Read the directions carefully, and ask your doctor or other care provider to review them with you. CONTINUE taking these medications    amLODIPine 10 mg tablet  Commonly known as:  NORVASC  TAKE ONE TABLET BY MOUTH EVERY DAY     aspirin delayed-release 81 mg tablet     atorvastatin 40 mg tablet  Commonly known as:  LIPITOR  Take 1 Tab by mouth daily. b complex-vitamin c-folic acid 1mg 1 mg tablet  Commonly known as:  NAHED-MOON RX  TAKE ONE TABLET BY MOUTH EVERY DAY     glimepiride 4 mg tablet  Commonly known as:  AMARYL  TAKE 1 TABLET BY MOUTH EVERY MORNING     sildenafil citrate 50 mg tablet  Commonly known as:  VIAGRA  Take 1 Tab by mouth as needed.  Natalio Chamberlain 47 [id number], lot #[id number]  Exp 12/16, 1 box, # 2 tabs     SINGULAIR 10 mg tablet  Generic drug:  montelukast     VITAMIN D3 2,000 unit Tab  Generic drug:  cholecalciferol (vitamin D3)        STOP taking these medications    furosemide 40 mg tablet  Commonly known as:  LASIX     glucose blood VI test strips strip  Commonly known as:  FREESTYLE LITE STRIPS     hydrALAZINE 100 mg tablet  Commonly known as:  APRESOLINE     metOLazone 5 mg tablet  Commonly known as:  ZAROXOLYN     sodium bicarbonate 325 mg tablet        ASK your doctor about these medications    calcitRIOL 0.25 mcg capsule  Commonly known as:  ROCALTROL     PROCRIT INJECTION     tadalafil 20 mg tablet  Commonly known as:  CIALIS  Take 1 Tab by mouth as needed.            Where to Get Your Medications      Information about where to get these medications is not yet available    Ask your nurse or doctor about these medications  · carvedilol 25 mg tablet  · dilTIAZem 90 mg tablet  · glimepiride 4 mg tablet  · lisinopril 5 mg tablet         Current Facility-Administered Medications   Medication Dose Route Frequency    amoxicillin-clavulanate (AUGMENTIN) 500-125 mg per tablet 1 Tab  1 Tab Oral DAILY    epoetin gilda (EPOGEN;PROCRIT) injection 8,000 Units  8,000 Units IntraVENous DIALYSIS TUE, THU & SAT    ibuprofen (MOTRIN) tablet 600 mg  600 mg Oral Q6H PRN    dilTIAZem (CARDIZEM) IR tablet 90 mg  90 mg Oral TIDAC    lisinopril (PRINIVIL, ZESTRIL) tablet 5 mg  5 mg Oral DAILY    carvedilol (COREG) tablet 25 mg  25 mg Oral BID WITH MEALS    melatonin tablet 3 mg  3 mg Oral QHS PRN    iron sucrose (VENOFER) 100 mg in 0.9% sodium chloride 100 mL IVPB  100 mg IntraVENous DIALYSIS TUE, THU & SAT    doxercalciferol (HECTOROL) 4 mcg/2 mL injection 1 mcg  1 mcg IntraVENous DIALYSIS TUE, THU & SAT    aspirin delayed-release tablet 81 mg  81 mg Oral DAILY    atorvastatin (LIPITOR) tablet 40 mg  40 mg Oral DAILY    B complex-vitaminC-folic acid (NEPHROCAP) cap  1 Cap Oral DAILY    montelukast (SINGULAIR) tablet 10 mg  10 mg Oral DAILY    ondansetron (ZOFRAN) injection 4 mg  4 mg IntraVENous Q4H PRN    0.9% sodium chloride infusion  100 mL/hr IntraVENous DIALYSIS PRN    insulin lispro (HUMALOG) injection   SubCUTAneous AC&HS    glucose chewable tablet 16 g  16 g Oral PRN    glucagon (GLUCAGEN) injection 1 mg  1 mg IntraMUSCular PRN    dextrose (D50W) injection syrg 12.5-25 g  25-50 mL IntraVENous PRN       Allergies: Patient has no known allergies. Temp (24hrs), Av.6 °F (37 °C), Min:98 °F (36.7 °C), Max:99.3 °F (37.4 °C)    Visit Vitals  BP (!) 168/92   Pulse 93   Temp 98.7 °F (37.1 °C)   Resp 18   Ht 5' 10\" (1.778 m)   Wt 96.3 kg (212 lb 6.4 oz)   SpO2 95%   BMI 30.48 kg/m²       ROS: 12 point ROS obtained in details. Pertinent positives as mentioned in HPI, otherwise negative    Physical Exam:    GENERAL: Not in acute distress  HEENT: pink conjunctiva, un icteric sclera,   NECK: No lymphadenopthy or thyroid swelling, JVD not seen  LYMPH: No supraclavicular or cervical or axillary nodes on both sides  CVS: S1S2 regular, No gallop or rub  RS: CTA, no rales or wheezes  Abd: Soft, resolved tenderness, not distended, No guarding, No rigidity  NEURO:  No focal neurologic deficits   Extrm: no leg edema or swelling. Tenderness left distal forearm at the site of recent IV. No increased warmth/erythema  Skin: No rash  Psych: no suicidal or homicidal ideations           Labs: Results:   Chemistry Recent Labs     18  0017   *      K 3.4*      CO2 27   BUN 28*   CREA 5.58*   CA 7.7*   AGAP 10   BUCR 5*      CBC w/Diff Recent Labs     18  1314   WBC 12.0   RBC 3.15*   HGB 8.7*   HCT 25.8*      GRANS 88*   LYMPH 8*   EOS 3      Microbiology No results for input(s): CULT in the last 72 hours.        RADIOLOGY:    All available imaging studies/reports in [facility] for this admission were reviewed    Dr. Indra Grider, Infectious Disease Specialist  [phone]  2018  9:25 AM

## 2018-12-14 NOTE — DISCHARGE SUMMARY
[facility]  Discharge Summary       Patient: Chidi Suazo Age: 41 y.o. : 1970 MR#: [id number] [id number]: [id number]  PCP on record: Maribel Alanis MD  Admit date: 2018  Discharge date: 2018    Consults:  -Dr Georgia Mcbride., -nephrology  - Dr Leonard Ann., -cardiology  - CLAUDETTE Hauser.,-ID  Procedures:  -     Significant Diagnostic Studies: -CXR 18: IMPRESSION:     Persistent basilar patchy and streaky densities, left greater than right.    - Duplex hd access right 18: The right arm Yahir fistula meets the criteria for maturity. · No evidence of perivascular collections seen. - Abd us 18: Increase echogenicity of the normal sized right kidney consistent with chronic  medical renal disease. Hepatomegaly     Limited visualization of the pancreas.   -Left upper extremity duplex 18: Acute occlusive superficial venous thrombophlebitis identified in the left cephalic vein at the antecubital fossa and proximal forearm segment.   No DVT left upper extremity.      Discharge Diagnoses: -Afib w/ RVR -rate controlled  -CAP  -S. pneumo bacteremia  -Superficial acute occlusive left upper extremity throbophlebitis  -Hyperkalemia-resolved  -Complaints of hemoptysis-resolved  -Anemia                                          Patient Active Problem List   Diagnosis Code    Benign hypertensive  I11.9    CRI (chronic renal insufficiency) N18.9    Diabetes mellitus (HCC) E11.9    Cardiomyopathy ([address] I42.9    Iron deficiency anemia D50.9    CAD (coronary artery disease) I25.10    Legally blind H54.8    Diabetic retinopathy ([facility] [address] E11.319    Severe nonproliferative diabetic retinopathy with macular edema, associated with type 2 diabetes mellitus E11.3419    Dyslipidemia E78.5    Atrial fibrillation with rapid ventricular response [facility]) I48.91       Hospital Course by Problem     41 y o male w/ pmhx ESRD s/p AVF placement but had not started HD at time of admission, among many other co morbidities presented to the ED w/ cc of weakness and fatigue, also had associated symptoms of MORTENSEN. In the ED, he was noted to have afib w/ RVR, w/ no previous history of same. Initial chest x-ray showed possible infiltrate and pt was also noted to have leukocytosis. He was started on IV abx and was admitted for further eval and management. Cardiology assisted w/ management of his afib w/ RVR. His CHADSVASC score was 3, recommendations to treat w/ ASA for now and plans for decision on long term oral anticoagulation was deferred to his outpt [facility].   His HR was very difficult to control and was thought to be driven by his S. pneumo pneumonia. He was also noted to have bacteremia as detailed below and ID was consulted to assist with his treatment. He was started on HD for the first time this admission. Pt had been lost to f/u after creation of AVF fistula, AVF duplex was done to eval adequacy of his graft, results pasted above. He close to the time of his dc, a few days before complained of left arm pain, where he had IV site placed. DVT was done, results pasted above. His thrombophlebitis was treated with NSAIDs PRN and warm compress. His symptoms improved with these measures. 1. Atrial fibrillation with RVR, new onset:management per cards, was started on heparin gtt in anticipation of possible CHAPITO and cardioversion due to difficulty in controlling rate, however CHAPITO was not done, his HR was  better controlled w/ oral cardizem before dc.  2. Suspected pneumonia with bacteremia: managed w/ IV then PO abx at time of dc as recommended by ID consultant. 3. Strep pneumo bacteremia and sepsis: likely from lung: on Abx and repeat Cx negative. 4. Superficial acute occlusive upper extremity (L) thrombophlebitis, cont to apply warm compress, will use NSAIDs for pain  5. ESRD s/p AVF placement: HD initiated during his inpt stay.  Outpt HD arranged, pt made aware, and states (per care manager) that he has ability to go to and from the HD center. .  6. Hyperkalemia-resolved, now w/ hypokalemia  7. HTN, controlled: cont BB  8. Chronic diastolic CHF: compensated    9. DM-2, controlled: cont SSI  10. CAD s/p stent: cont asa, BB and statin   11. H/o pulmonary HTN  12. ? Hemoptysis: pt states that he during the initial he had complaints of coughing up blood, this symptom has since resolved and may be related to his pneumonia. .  13. Anemia hgb was trending down for the last few days befpre dc stabo;ozed/ unclear if as a result of # 12, no reports of cont'd hemoptysis . Will need close follow up. Lab w/u-->B12 nl, folate low nl.               Today's examination of the patient revealed:     Subjective:     Objective:   VS:   Visit Vitals  BP (!) 168/92   Pulse 93   Temp 98.7 °F (37.1 °C)   Resp 18   Ht 5' 10\" (1.778 m)   Wt 96.3 kg (212 lb 6.4 oz)   SpO2 95%   BMI 30.48 kg/m²      Tmax/24hrs: Temp (24hrs), Av.6 °F (37 °C), Min:98 °F (36.7 °C), Max:99.3 °F (37.4 °C)     Input/Output:     Intake/Output Summary (Last 24 hours) at 2018 1058  Last data filed at 2018 0457  Gross per 24 hour   Intake 240 ml   Output 3000 ml   Net -2760 ml       General:  Alert, awake in nad  Cardiovascular:irregular rate rhythm    Pulmonary:  ctab  GI:  Soft, nt, nd  Extremities: no edema   Additional:      Labs:    Recent Results (from the past 24 hour(s))   PTT    Collection Time: 18  1:00 PM   Result Value Ref Range    aPTT 75.8 (H) 23.0 - 36.4 SEC   GLUCOSE, POC    Collection Time: 18  4:08 PM   Result Value Ref Range    Glucose (POC) 130 (H) 70 - 110 mg/dL   GLUCOSE, POC    Collection Time: 18  5:36 PM   Result Value Ref Range    Glucose (POC) 121 (H) 70 - 110 mg/dL   GLUCOSE, POC    Collection Time: 18  8:43 PM   Result Value Ref Range    Glucose (POC) 178 (H) 70 - 110 mg/dL     Additional Data Reviewed:     Condition:   Disposition:    []Home   []Home with Home Health   []SNF/NH   []Rehab   []Home with family   []Alternate Facility:____________________      Discharge Medications:     Current Discharge Medication List      START taking these medications    Details   !! carvedilol (COREG) 25 mg tablet Take 1 Tab by mouth two (2) times daily (with meals). Qty: 60 Tab, Refills: 1      dilTIAZem (CARDIZEM) 90 mg tablet Take 1 Tab by mouth Before breakfast, lunch, and dinner. Qty: 90 Tab, Refills: 1      lisinopril (PRINIVIL, ZESTRIL) 5 mg tablet Take 2 Tabs by mouth daily. Qty: 30 Tab, Refills: 1       !! - Potential duplicate medications found. Please discuss with provider. CONTINUE these medications which have CHANGED    Details   glimepiride (AMARYL) 4 mg tablet TAKE 1 TABLET BY MOUTH EVERY MORNING  Qty: 30 Tab, Refills: 1         CONTINUE these medications which have NOT CHANGED    Details   amLODIPine (NORVASC) 10 mg tablet TAKE ONE TABLET BY MOUTH EVERY DAY  Qty: 90 Tab, Refills: 3      calcitRIOL (ROCALTROL) 0.25 mcg capsule       atorvastatin (LIPITOR) 40 mg tablet Take 1 Tab by mouth daily. Qty: 90 Tab, Refills: 3      !! carvedilol (COREG) 25 mg tablet TAKE TWO TABLETS BY MOUTH TWICE DAILY  Qty: 360 Tab, Refills: 3      b complex-vitamin c-folic acid 1mg (NAHED-MOON RX) 1 mg tablet TAKE ONE TABLET BY MOUTH EVERY DAY  Qty: 30 Tab, Refills: 5      montelukast (SINGULAIR) 10 mg tablet Take 10 mg by mouth daily. sildenafil citrate (VIAGRA) 50 mg tablet Take 1 Tab by mouth as needed. [address]io [address] [id number], lot #[id number]  Exp 12/16, 1 box, # 2 tabs  Qty: 2 Tab, Refills: 0      tadalafil (CIALIS) 20 mg tablet Take 1 Tab by mouth as needed. Qty: 15 Tab, Refills: 1      aspirin delayed-release 81 mg tablet Take 81 mg by mouth daily. EPOETIN SEBLE (PROCRIT IJ) by Injection route. cholecalciferol, vitamin D3, (VITAMIN D3) 2,000 unit Tab Take 1 Tab by mouth daily. !! - Potential duplicate medications found. Please discuss with provider.       STOP taking these medications       metolazone (ZAROXOLYN) 5 mg tablet Comments:   Reason for Stopping:         hydrALAZINE (APRESOLINE) 100 mg tablet Comments:   Reason for Stopping:         sodium bicarbonate 325 mg tablet Comments:   Reason for Stopping:         furosemide (LASIX) 40 mg tablet Comments:   Reason for Stopping:         glucose blood VI test strips (FREESTYLE LITE STRIPS) strip Comments:   Reason for Stopping: Follow-up Appointments:   1.  Your PCP: Marietta Johnson MD, within 7-10days  2.  cardiology in 2-3 wks          >30 minutes spent coordinating this discharge (review instructions/follow-up, prescriptions, preparing report for sign off)    Signed:  Michael Calderón MD  12/14/2018  10:58 AM

## 2018-12-14 NOTE — PROGRESS NOTES
Problem: Mobility Impaired (Adult and Pediatric)  Goal: *Acute Goals and Plan of Care (Insert Text)  Physical Therapy Goals  Initiated 12/12/2018 and to be accomplished within 7 day(s)  1. Patient will move from supine to sit and sit to supine  in bed with independence. 2.  Patient will transfer from bed to chair and chair to bed with independence using the least restrictive device. 3.  Patient will perform sit to stand with independence. 4.  Patient will ambulate with supervision/set-up for 250 feet with the least restrictive device or hand held assistance. 5.  Patient will ascend/descend 18 stairs with 1 handrail(s) with supervision/set-up. 1219- Nursing in room at this time and pt has just received lunch. Will f/u later in day schedule permitting.

## 2018-12-14 NOTE — PROGRESS NOTES
Discharge:    Patient will likely discharge home today (12/14/2018). There are no home health orders in place for this discharge. His family will assist with care, as needed, post discharge. Patient's family will transport home at the time of discharge. Patient's family will also arrange for transport, to and from his ongoing dialysis appointments. There are no other care manager concerns regarding this discharge home. This writer will continue to closely monitor for discharge planning until patient has officially discharged home from [place].     Ele Alonzo [facility] Manager  [phone]

## 2018-12-14 NOTE — DISCHARGE INSTRUCTIONS
DISCHARGE SUMMARY from Nurse    PATIENT INSTRUCTIONS:    After general anesthesia or intravenous sedation, for 24 hours or while taking prescription Narcotics:  · Limit your activities  · Do not drive and operate hazardous machinery  · Do not make important personal or business decisions  · Do  not drink alcoholic beverages  · If you have not urinated within 8 hours after discharge, please contact your surgeon on call. Report the following to your surgeon:  · Excessive pain, swelling, redness or odor of or around the surgical area  · Temperature over 100.5  · Nausea and vomiting lasting longer than 4 hours or if unable to take medications  · Any signs of decreased circulation or nerve impairment to extremity: change in color, persistent  numbness, tingling, coldness or increase pain  · Any questions    What to do at Home:  Recommended activity: Activity as tolerated,     If you experience any of the following symptoms , please follow up with . *  Please give a list of your current medications to your Primary Care Provider. *  Please update this list whenever your medications are discontinued, doses are      changed, or new medications (including over-the-counter products) are added. *  Please carry medication information at all times in case of emergency situations. These are general instructions for a healthy lifestyle:    No smoking/ No tobacco products/ Avoid exposure to second hand smoke  Surgeon General's Warning:  Quitting smoking now greatly reduces serious risk to your health.     Obesity, smoking, and sedentary lifestyle greatly increases your risk for illness    A healthy diet, regular physical exercise & weight monitoring are important for maintaining a healthy lifestyle    You may be retaining fluid if you have a history of heart failure or if you experience any of the following symptoms:  Weight gain of 3 pounds or more overnight or 5 pounds in a week, increased swelling in our hands or feet or shortness of breath while lying flat in bed. Please call your doctor as soon as you notice any of these symptoms; do not wait until your next office visit. Recognize signs and symptoms of STROKE:    F-face looks uneven    A-arms unable to move or move unevenly    S-speech slurred or non-existent    T-time-call 911 as soon as signs and symptoms begin-DO NOT go       Back to bed or wait to see if you get better-TIME IS BRAIN. Warning Signs of HEART ATTACK     Call 911 if you have these symptoms:   Chest discomfort. Most heart attacks involve discomfort in the center of the chest that lasts more than a few minutes, or that goes away and comes back. It can feel like uncomfortable pressure, squeezing, fullness, or pain.  Discomfort in other areas of the upper body. Symptoms can include pain or discomfort in one or both arms, the back, neck, jaw, or stomach.  Shortness of breath with or without chest discomfort.  Other signs may include breaking out in a cold sweat, nausea, or lightheadedness. Don't wait more than five minutes to call 911 - MINUTES MATTER! Fast action can save your life. Calling 911 is almost always the fastest way to get lifesaving treatment. Emergency Medical Services staff can begin treatment when they arrive -- up to an hour sooner than if someone gets to the hospital by car. The discharge information has been reviewed with the patient. The patient verbalized understanding. Discharge medications reviewed with the patient and appropriate educational materials and side effects teaching were provided.   Patient armband removed and shredded___________________________________________________________________________________________________________________________________

## 2018-12-14 NOTE — PROGRESS NOTES
Bedside shift change report given to Stephan Velásquez (oncoming nurse) by Mara Santizo (offgoing nurse). Report included the following information SBAR, Kardex and MAR.

## 2018-12-14 NOTE — PROGRESS NOTES
VSS. BP slightly elevated. Pt had HD today 3L pulled off. No insulin needed for coverage. Heparin gtt d/c as ordered. Blood sugar < 150. Pt resting comfortable in bed. Family at bed side.

## 2018-12-14 NOTE — ROUTINE PROCESS
Received bedside report from [facility] [address], patient was resting in bed with family at bedside, [facility] elevated, bed in lowest position and oriented to call button.

## 2018-12-14 NOTE — PROGRESS NOTES
Problem: Self Care Deficits Care Plan (Adult)  Goal: *Acute Goals and Plan of Care (Insert Text)  Occupational Therapy Goals  Initiated 12/12/2018 within 7 day(s). 1.  Patient will perform toilet transfers with supervision/set-up. 2.  Patient will participate in upper extremity therapeutic exercise/activities with supervision/set-up for 7-10 minutes to increase strength and endurance. 3.  Patient will utilize energy conservation techniques during functional activities with min verbal cues. Occupational Therapy TREATMENT    Patient: Jon Lunsford (28 y.o. male)  Date: 12/14/2018  Diagnosis: Atrial fibrillation with rapid ventricular response ([facility] [address] <principal problem not specified>      Precautions: Fall  Chart, occupational therapy assessment, plan of care, and goals were reviewed. ASSESSMENT:  Pt seen with wife present in process of waiting to leave due to discharge. Pt/Wife education on  Energy Conservation tips and techniques for safety in the home. Pt/Wife education on safety with shower transfers and AE to assist with home safety. Pt wife actively involved and asked questions about EC. Pt able to report back to OT with current tips/techniques he has learned. Pt left seated in chair with wife present and all needs within reach. Progression toward goals:  [x]          Improving appropriately and progressing toward goals  []          Improving slowly and progressing toward goals  []          Not making progress toward goals and plan of care will be adjusted     PLAN:  Patient continues to benefit from skilled intervention to address the above impairments. Continue treatment per established plan of care. Discharge Recommendations:  Home Health  Further Equipment Recommendations for Discharge:  N/A      G-CODES:     Self Care  Current  CJ= 20-39%   Goal  CI= 1-19%. The severity rating is based on the Level of Assistance required for Functional Mobility and ADLs.     SUBJECTIVE: Patient stated I am ready to go home.     OBJECTIVE DATA SUMMARY:   Cognitive/Behavioral Status:  Neurologic State: Alert, Appropriate for age  Orientation Level: Oriented X4  Cognition: Appropriate decision making, Follows commands     Pain:  Pt reports 0/10 pain or discomfort prior to treatment.    Pt reports 0/10 pain or discomfort post treatment. Activity Tolerance:    Fair +    Please refer to the flowsheet for vital signs taken during this treatment. After treatment:   [x]  Patient left in no apparent distress sitting up in chair  []  Patient left in no apparent distress in bed  [x]  Call bell left within reach  []  Nursing notified  [x]  Wife present  []  Bed alarm activated    COMMUNICATION/EDUCATION:   [x] Home safety education was provided and the patient/caregiver indicated understanding. [] Patient/family have participated as able in goal setting and plan of care. [] Patient/family agree to work toward stated goals and plan of care. [] Patient understands intent and goals of therapy, but is neutral about his/her participation. [] Patient is unable to participate in goal setting and plan of care.       MARLENY Keenan/L  Time Calculation: 9 mins

## 2018-12-21 ENCOUNTER — OFFICE VISIT (OUTPATIENT)
Dept: FAMILY MEDICINE CLINIC | Age: 48
End: 2018-12-21

## 2018-12-21 ENCOUNTER — HOME HEALTH ADMISSION (OUTPATIENT)
Dept: HOME HEALTH SERVICES | Facility: HOME HEALTH | Age: 48
End: 2018-12-21
Payer: COMMERCIAL

## 2018-12-21 VITALS
DIASTOLIC BLOOD PRESSURE: 96 MMHG | HEART RATE: 88 BPM | SYSTOLIC BLOOD PRESSURE: 180 MMHG | HEIGHT: 70 IN | OXYGEN SATURATION: 89 % | TEMPERATURE: 98.7 F | RESPIRATION RATE: 13 BRPM | BODY MASS INDEX: 29.06 KG/M2 | WEIGHT: 203 LBS

## 2018-12-21 DIAGNOSIS — J18.9 COMMUNITY ACQUIRED PNEUMONIA, UNSPECIFIED LATERALITY: Primary | ICD-10-CM

## 2018-12-21 DIAGNOSIS — H54.8 LEGALLY BLIND: ICD-10-CM

## 2018-12-21 DIAGNOSIS — I42.9 CARDIOMYOPATHY, UNSPECIFIED TYPE (HCC): ICD-10-CM

## 2018-12-21 DIAGNOSIS — I48.91 ATRIAL FIBRILLATION WITH RAPID VENTRICULAR RESPONSE (HCC): ICD-10-CM

## 2018-12-21 DIAGNOSIS — N18.6 END STAGE RENAL FAILURE ON DIALYSIS (HCC): ICD-10-CM

## 2018-12-21 DIAGNOSIS — E78.5 DYSLIPIDEMIA: ICD-10-CM

## 2018-12-21 DIAGNOSIS — D63.8 ANEMIA OF CHRONIC DISEASE: ICD-10-CM

## 2018-12-21 DIAGNOSIS — R53.1 WEAKNESS GENERALIZED: ICD-10-CM

## 2018-12-21 DIAGNOSIS — Z99.2 END STAGE RENAL FAILURE ON DIALYSIS (HCC): ICD-10-CM

## 2018-12-21 DIAGNOSIS — E11.3413 SEVERE NONPROLIFERATIVE DIABETIC RETINOPATHY OF BOTH EYES WITH MACULAR EDEMA ASSOCIATED WITH TYPE 2 DIABETES MELLITUS (HCC): ICD-10-CM

## 2018-12-21 NOTE — PROGRESS NOTES
1. Have you been to the ER, urgent care clinic since your last visit? Hospitalized since your last visit? [facility] 12/5/18 A-fib    2. Have you seen or consulted any other health care providers outside of the [facility] since your last visit? Include any pap smears or colon screening.  No      Chief Complaint   Patient presents with   [facility] Follow Up     seen [facility], 12/5-12/14/18, A-fib    Shortness of Breath     times 1 week

## 2018-12-21 NOTE — PATIENT INSTRUCTIONS

## 2018-12-21 NOTE — PROGRESS NOTES
Robin Anguiano, 57 y.o.,  male    Damien Quintanilla ff-up CAP, afib RVR, ESRD ( has not been seen here since 2016)    Admit date: 12/5/2018  Discharge date: 12/14/2018     Consults:  -Dr Leila Tam., -nephrology  - Dr Romario Montoya., -cardiology  - CLAUDETTE Batista.,-ID    Reviewed discharge summary:    Significant Diagnostic Studies: -CXR 12/07/18: IMPRESSION:     Persistent basilar patchy and streaky densities, left greater than right.     - Duplex hd access right 12/06/18: The right arm Yahir fistula meets the criteria for maturity. · No evidence of perivascular collections seen.     - Abd us 12/06/18: Increase echogenicity of the normal sized right kidney consistent with chronic  medical renal disease.    Hepatomegaly     Limited visualization of the pancreas.   -Left upper extremity duplex 12/12/18: Acute occlusive superficial venous thrombophlebitis identified in the left cephalic vein at the antecubital fossa and proximal forearm segment. No DVT left upper extremity.      Discharge Diagnoses: -Afib w/ RVR -rate controlled  -CAP  -S. pneumo bacteremia  -Superficial acute occlusive left upper extremity throbophlebitis  -Hyperkalemia-resolved  -Complaints of hemoptysis-resolved  -Anemia                    Hospital Course by Problem      41 y o male w/ pmhx ESRD s/p AVF placement but had not started HD at time of admission, among many other co morbidities presented to the ED w/ cc of weakness and fatigue, also had associated symptoms of MORTENSEN. In the ED, he was noted to have afib w/ RVR, w/ no previous history of same. Initial chest x-ray showed possible infiltrate and pt was also noted to have leukocytosis. He was started on IV abx and was admitted for further eval and management. Cardiology assisted w/ management of his afib w/ RVR.  His CHADSVASC score was 3, recommendations to treat w/ ASA for now and plans for decision on long term oral anticoagulation was deferred to his outpt dream.   His HR was very difficult to control and was thought to be driven by his S. pneumo pneumonia. He was also noted to have bacteremia as detailed below and ID was consulted to assist with his treatment. He was started on HD for the first time this admission. Pt had been lost to f/u after creation of AVF fistula, AVF duplex was done to eval adequacy of his graft, results pasted above. He close to the time of his dc, a few days before complained of left arm pain, where he had IV site placed. DVT was done, results pasted above. His thrombophlebitis was treated with NSAIDs PRN and warm compress. His symptoms improved with these measures.        1. Atrial fibrillation with RVR, new onset:management per cards, was started on heparin gtt in anticipation of possible CHAPITO and cardioversion due to difficulty in controlling rate, however CHAPITO was not done, his HR was  better controlled w/ oral cardizem before dc.  2. Suspected pneumonia with bacteremia: managed w/ IV then PO abx at time of dc as recommended by ID consultant. 3. Strep pneumo bacteremia and sepsis: likely from lung: on Abx and repeat Cx negative. 4. Superficial acute occlusive upper extremity (L) thrombophlebitis, cont to apply warm compress, will use NSAIDs for pain  5. ESRD s/p AVF placement: HD initiated during his inpt stay. Outpt HD arranged, pt made aware, and states (per care manager) that he has ability to go to and from the HD center. .  6. Hyperkalemia-resolved, now w/ hypokalemia  7. HTN, controlled: cont BB  8. Chronic diastolic CHF: compensated    9. DM-2, controlled: cont SSI  10. CAD s/p stent: cont asa, BB and statin   11. H/o pulmonary HTN  12. ? Hemoptysis: pt states that he during the initial he had complaints of coughing up blood, this symptom has since resolved and may be related to his pneumonia. .  13.  Anemia hgb was trending down for the last few days befpre dc stabo;ozed/ unclear if as a result of # 12, no reports of cont'd hemoptysis .       He reports his shortness of breath has improved, completed po antibiotics last week. He denies fever, lightheadedness, syncope. Known non ischemic cardiomyopathy EF 40%  Known anemia of chronic disease- was previously on procrit treatment  Has seen nephrologist and ongoing hemodialysis   NIDDM- on amaryl, a1c 6.3   wife requesting home health for assistance/education    ROS:  See HPI, all others negative        Patient Active Problem List   Diagnosis Code    Benign hypertensive  I11.9    CRI (chronic renal insufficiency) N18.9    Diabetes mellitus ([facility] E11.9    Cardiomyopathy ([facility] [facility] I42.9    Iron deficiency anemia D50.9    CAD (coronary artery disease) I25.10    Legally blind H54.8    Diabetic retinopathy ([facility] [address] E11.319    Severe nonproliferative diabetic retinopathy with macular edema, associated with type 2 diabetes mellitus E11.3419    Dyslipidemia E78.5    Atrial fibrillation with rapid ventricular response (HCC) I48.91       Current Outpatient Medications   Medication Sig Dispense Refill    glimepiride (AMARYL) 4 mg tablet TAKE 1 TABLET BY MOUTH EVERY MORNING 30 Tab 1    dilTIAZem (CARDIZEM) 90 mg tablet Take 1 Tab by mouth Before breakfast, lunch, and dinner. 90 Tab 1    lisinopril (PRINIVIL, ZESTRIL) 5 mg tablet Take 2 Tabs by mouth daily. 30 Tab 1    amLODIPine (NORVASC) 10 mg tablet TAKE ONE TABLET BY MOUTH EVERY DAY 90 Tab 3    atorvastatin (LIPITOR) 40 mg tablet Take 1 Tab by mouth daily. 90 Tab 3    carvedilol (COREG) 25 mg tablet TAKE TWO TABLETS BY MOUTH TWICE DAILY 360 Tab 3    aspirin delayed-release 81 mg tablet Take 81 mg by mouth daily.  cholecalciferol, vitamin D3, (VITAMIN D3) 2,000 unit Tab Take 1 Tab by mouth daily.  calcitRIOL (ROCALTROL) 0.25 mcg capsule       b complex-vitamin c-folic acid 1mg (NAHED-MOON RX) 1 mg tablet TAKE ONE TABLET BY MOUTH EVERY DAY 30 Tab 5    EPOETIN SEBLE (PROCRIT IJ) by Injection route.          No Known Allergies    Past Medical History:   Diagnosis Date    Abnormal nuclear cardiac imaging test 10/08/2012    Fixed anteroseptal, anteroapical defect c/w prior infarction. No ischemia. Mid & distal anteroseptal, anteroapical WMA. LVE. EF 44%.  Anemia     dr. Genet Charles doubt amyloid or multiple myeloma. candidate for procirt if Hg <10    Benign hypertensive     Blindness of right eye 01/2013    legally blind    CAD (coronary artery disease)     Cardiomyopathy ejection fraction of 40%     CKD (chronic kidney disease), stage IV ([facility] [address]     to see Dr. Basil Headley 12/1/12    Congestive heart failure ([facility] [address]     Diabetes mellitus ([facility] [address]     Di[facility]     Diabetic retinopathy ([facility] [address]     Dr. Maribel Lopez- injections    Heart failure ([facility]     History of echocardiogram 04/22/2014    LVE. EF 55% (prev 40-45% on echo of 1/27/12). No WMA. Mild-mod conc LVH. Gr 3 DDfx. Marked LAE. Mild SHANTEL. Mild MR.    Hypertension     Pulmonary hypertension ([facility] [facility]     Renal Ultrasound 1/3/12    Right kidney isoechoic w/respect to liver. Sm left-sided pleural effusion    S/P cardiac cath 10/16/2012    LM patent. pLAD 95% (3.5 x 12-mm [place] stent, resid 0$%). LCX mild. Social History     Socioeconomic History    Marital status:      Spouse name: Not on file    Number of children: Not on file    Years of education: Not on file    Highest education level: Not on file   Social Needs    Financial resource strain: Not on file    Food insecurity - worry: Not on file    Food insecurity - inability: Not on file    Transportation needs - medical: Not on file   [employer] needs - non-medical: Not on file   Occupational History    Not on file   Tobacco Use    Smoking status: Never Smoker    Smokeless tobacco: Never Used   Substance and Sexual Activity    Alcohol use:  Yes     Alcohol/week: 2.5 oz     Types: 5 Cans of beer per week     Comment: occassionally    Drug use: No    Sexual activity: Yes     Partners: Female     Birth control/protection: None   Other Topics Concern    Not on file   Social History Narrative    Not on file       Family History   Problem Relation Age of Onset    Diabetes Mother     Hypertension Mother     Kidney Disease Mother     High Cholesterol Father     Diabetes Brother         pre diabetic         OBJECTIVE    Physical Exam:     Visit Vitals  BP (!) 180/96 (BP 1 Location: Left arm, BP Patient Position: Sitting)   Pulse 88   Temp 98.7 °F (37.1 °C) (Oral)   Resp 13   Ht 5' 10\" (1.778 m)   Wt 203 lb (92.1 kg)   SpO2 (!) 89%   BMI 29.13 kg/m²       General: alert, chronically ill-appearingh  Ears: pinna non-tender, external auditory canal patent, TM intact  Mouth/throat:tonsils non enlarged, pharynx non erythematous and no lesion  Neck: supple, no adenopathy palpated  CVS: normal rate, regular rhythm  Lungs:clear to ausculation bilaterally  Extremities: no edema, no cyanosis, MSK grossly normal  Skin: warm, no lesions, rashes noted  Psych:  mood and affect normal    ASSESSMENT/PLAN  Diagnoses and all orders for this visit:    1. Community acquired pneumonia, unspecified laterality  Clinically improved, neg BCx, completed antibiotics  Repeat CXR  -     XR CHEST PA LAT; Future  -     REFERRAL TO HOME HEALTH    2. Anemia of chronic disease  Recheck, will send results to nephrology for further management along with BP control  -     CBC WITH AUTOMATED DIFF; Future  -     METABOLIC PANEL, BASIC; Future  -     REFERRAL TO HOME HEALTH    3. Weakness generalized  -     REFERRAL TO HOME HEALTH    4. End stage renal failure on dialysis [facility])  S/p AVF  -     REFERRAL TO HOME HEALTH    5. Cardiomyopathy, unspecified type ([facility] [address]  -     [address] [facility]    6. Legally blind  -     [address] [facility]    7. Atrial fibrillation with rapid ventricular response ([facility] [address]  CHADS Vasc 3, on ASA currently  Keep appt with cards 1/ 4  -     REFERRAL TO HOME HEALTH    8.  Severe nonproliferative diabetic retinopathy of both eyes with macular edema associated with type 2 diabetes mellitus (HCC)  a1c 6.3  On amaryl    9. Dyslipidemia  On lipitor 40 mg    10. Thrombophlebitis  S/p AVF  Keep appt vasc sx 12/24/2018    Follow-up Disposition:  Return in about 3 weeks (around 1/11/2019), or if symptoms worsen or fail to improve. Patient understands plan of care. Patient has provided input and agrees with goals.

## 2018-12-24 ENCOUNTER — HOME CARE VISIT (OUTPATIENT)
Dept: HOME HEALTH SERVICES | Facility: HOME HEALTH | Age: 48
End: 2018-12-24

## 2018-12-26 ENCOUNTER — HOME CARE VISIT (OUTPATIENT)
Dept: HOME HEALTH SERVICES | Facility: HOME HEALTH | Age: 48
End: 2018-12-26

## 2018-12-27 ENCOUNTER — HOME CARE VISIT (OUTPATIENT)
Dept: HOME HEALTH SERVICES | Facility: HOME HEALTH | Age: 48
End: 2018-12-27

## 2018-12-27 ENCOUNTER — HOME CARE VISIT (OUTPATIENT)
Dept: SCHEDULING | Facility: HOME HEALTH | Age: 48
End: 2018-12-27
Payer: COMMERCIAL

## 2018-12-27 VITALS
SYSTOLIC BLOOD PRESSURE: 160 MMHG | OXYGEN SATURATION: 96 % | HEART RATE: 78 BPM | DIASTOLIC BLOOD PRESSURE: 80 MMHG | RESPIRATION RATE: 20 BRPM | TEMPERATURE: 98.4 F

## 2018-12-27 PROCEDURE — G0299 HHS/HOSPICE OF RN EA 15 MIN: HCPCS

## 2018-12-27 PROCEDURE — 400013 HH SOC

## 2018-12-27 PROCEDURE — G0151 HHCP-SERV OF PT,EA 15 MIN: HCPCS

## 2018-12-28 ENCOUNTER — HOME CARE VISIT (OUTPATIENT)
Dept: HOME HEALTH SERVICES | Facility: HOME HEALTH | Age: 48
End: 2018-12-28
Payer: COMMERCIAL

## 2018-12-28 VITALS
OXYGEN SATURATION: 96 % | RESPIRATION RATE: 20 BRPM | HEART RATE: 78 BPM | SYSTOLIC BLOOD PRESSURE: 160 MMHG | DIASTOLIC BLOOD PRESSURE: 88 MMHG | TEMPERATURE: 98.4 F

## 2018-12-31 RX ORDER — AMLODIPINE BESYLATE 10 MG/1
TABLET ORAL
Qty: 90 TAB | Refills: 3 | OUTPATIENT
Start: 2018-12-31

## 2018-12-31 NOTE — PROGRESS NOTES
Stiven Gonzalez presents today for a post-hospital follow-up. He was hospitalized from 12/5/18 through 12/14/18. He presented with complaints of weakness, fatigue, dyspnea on exertion. He was noted to be in AFIB with RVR, and had strep pneumonia and bacteremia (followed by ID), and superficial acute occlusive left upper extremity thrombophlebitis (no DVT). He was treated with IV antibiotics. Initially, his heart rate was difficult to control but prior to discharge, improved with oral cardizem being taken 3 times a day. He has ESRD but was not being dialyzed yet prior to admission. He was s/p AV fistula placement but was lost to follow-up. Hemodialysis was initiated during this hospital stay. An echo was done and it showed an EF of 40%, moderate concentric LVH, LV global hypokinesis, and PASP of 50 mmHg. Mr. Caitlyn Flood is a 57year old gentleman with a history of cardiomyopathy (diagnosed in Jan. 2012), pulmonary hypertension, hypertension, diabetes, iron deficiency anemia, and small bowel obstruction requiring a small bowel resection (1/26/12), dyslipidemia, chronic diastolic heart failure, and CAD (s/p stent in 2012). He has been lost to follow-up with other healthcare providers and has been non-compliant with medications. He admitted that he stopped taking his medications for at least a year but according to his wife, it may be as long as 18 months. He was last seen by Dr. Sandoval Armijo in June 2016. Since being discharged home, he has been feeling better. He has not noticed any rapid or irregular heart beats but also states that he was unaware of the AFIB with RVR when he presented to the hospital.  Denies chest pain, tightness, heaviness, and palpitations. Denies shortness of breath at rest, dyspnea on exertion, orthopnea and PND. Denies abdominal bloating. Denies lightheadedness, dizziness, and syncope. Admits to lower extremity edema and denies claudication.   Denies nausea, vomiting, diarrhea, melena, hematochezia. Denies hematuria, urgency, frequency. Denies fever, chills. He states that his nephrologist, Dr Josue Bray, prescribed amlodipine for him yesterday as it was listed on his discharge summary and there has been some concern regarding high blood pressures during his hemodialysis treatments. He undergoes hemodialysis on Tues-Thurs-Sat. Past Medical History:   Diagnosis Date    Abnormal nuclear cardiac imaging test 10/08/2012    Fixed anteroseptal, anteroapical defect c/w prior infarction. No ischemia. Mid & distal anteroseptal, anteroapical WMA. LVE. EF 44%.  Anemia     dr. Ruby Lara doubt amyloid or multiple myeloma. candidate for procirt if Hg <10    Benign hypertensive     Blindness of right eye 01/2013    legally blind    CAD (coronary artery disease)     Cardiomyopathy ejection fraction of 40%     CKD (chronic kidney disease), stage IV ([address]     to see Dr. Juliet Barajas 12/1/12    Congestive heart failure ([address]     Diabetes mellitus ([address]     Diabetic retinopathy ([address]     Diabetic retinopathy ([address]     Dr. Mike Farooq- injections    Heart failure ([facility]     History of echocardiogram 04/22/2014    LVE. EF 55% (prev 40-45% on echo of 1/27/12). No WMA. Mild-mod conc LVH. Gr 3 DDfx. Marked LAE. Mild SHANTEL. Mild MR.    Hypertension     Pulmonary hypertension ([address]     Renal Ultrasound 1/3/12    Right kidney isoechoic w/respect to liver. Sm left-sided pleural effusion    S/P cardiac cath 10/16/2012    LM patent. pLAD 95% (3.5 x 12-mm [facility] stent, resid 0$%). LCX mild. Past Surgical History:   Procedure Laterality Date    HX CORONARY STENT PLACEMENT      one    HX LAPAROTOMY  2/2012    bowel obstruction with removal segment of small bowel. Done by Liv.  HX LAPAROTOMY  infancy    whole in colon and had repair at that time.     HX OTHER SURGICAL  06/20/2013    left eye retna attatchment    HX RETINAL DETACHMENT REPAIR      august 2012     Family History Problem Relation Age of Onset    Diabetes Mother     Hypertension Mother     Kidney Disease Mother     High Cholesterol Father     Diabetes Brother         pre diabetic     Social History     Tobacco Use    Smoking status: Never Smoker    Smokeless tobacco: Never Used   Substance Use Topics    Alcohol use: Yes     Alcohol/week: 2.5 oz     Types: 5 Cans of beer per week     Comment: occassionally    Drug use: No       Medications:  Current Outpatient Medications   Medication Sig    hydrALAZINE (APRESOLINE) 100 mg tablet Take 100 mg by mouth three (3) times daily.  glimepiride (AMARYL) 4 mg tablet TAKE 1 TABLET BY MOUTH EVERY MORNING    dilTIAZem (CARDIZEM) 90 mg tablet Take 1 Tab by mouth Before breakfast, lunch, and dinner.  lisinopril (PRINIVIL, ZESTRIL) 5 mg tablet Take 2 Tabs by mouth daily.  amLODIPine (NORVASC) 10 mg tablet TAKE ONE TABLET BY MOUTH EVERY DAY    carvedilol (COREG) 25 mg tablet TAKE TWO TABLETS BY MOUTH TWICE DAILY    aspirin delayed-release 81 mg tablet Take 81 mg by mouth daily.  EPOETIN SEBLE (PROCRIT IJ) by Injection route. No current facility-administered medications for this visit. No Known Allergies      Physical:  Visit Vitals  /72   Pulse 71   Ht 5' 10\" (1.778 m)   Wt 92.4 kg (203 lb 12.8 oz)   SpO2 98%   BMI 29.24 kg/m²       He is down 9 pounds since his last documented hospital weight of 212 lbs. Exam:  Neck:  Supple, no JVD, no carotid bruits  CV:  Normal S1 and  S2, no murmurs, rubs, or gallops noted  Lungs:  Clear to ausculation throughout, no wheezes or rales  Abd:  Soft, non-tender, non-distended with good bowel sounds. No hepatosplenomegaly. Extremities:  1+ softly pitting lower extremity edema.       EKG:  Read by Deandre Singer MD - Sinus rhythm.  Left atrial enlargement.  Poor R-wave progression.  Nonspecific ST depression.  Nondiagnostic.  Borderline voltage criteria for LVH      LABS:  Lab Results   Component Value Date/Time    Sodium 139 12/12/2018 12:17 AM    Potassium 3.4 (L) 12/12/2018 12:17 AM    Chloride 102 12/12/2018 12:17 AM    CO2 27 12/12/2018 12:17 AM    Glucose 204 (H) 12/12/2018 12:17 AM    BUN 28 (H) 12/12/2018 12:17 AM    Creatinine 5.58 (H) 12/12/2018 12:17 AM     Lab Results   Component Value Date/Time    Cholesterol, total 122 05/11/2015 08:55 AM    HDL Cholesterol 38 (L) 05/11/2015 08:55 AM    LDL, calculated 72 05/11/2015 08:55 AM    Triglyceride 58 05/11/2015 08:55 AM    CHOL/HDL Ratio 4.4 10/17/2012 06:05 AM     No results found for: GPT     Impression/Plan:  1. Nonischemic cardiomyopathy, EF 40%  2. Chronic diastolic CHF, appears compensated  3. Essential hypertension, blood pressure controlled  4. Diabetes mellitus, recommend Hgb A1c less than 7% from cardiac standpoint  5. ESRD, now on hemodialysis  6. Anemia, followed by PCP and nephrology  7. Dyslipidemia, currently not taking a statin, no recent lipid panel. Last one done was in 2015  8. History of noncompliance, lost to cardiology follow-up since last office visit in 2016.    9.  New onset AFIB, currently maintaining sinus rhythm. Managed with diltiazem. Not on anticoagulation at this time, due to anemia. Only on ASA 81mg. CHADsVASC2 score is 3      Mr. Quin Crooks presents today for a post-hospital follow-up. He was hospitalized for AFIB with RVR, and had strep pneumonia and bacteremia (followed by ID), and superficial acute occlusive left upper extremity thrombophlebitis (no DVT). His AFIB rates were initially difficult to control but prior to discharge was managed with oral Diltiazem TID and carvedilol. His CHADsVASC score was 3. He was not started on anticoagulation due to anemia. However, he is on ASA 81mg daily. He has been lost to cardiology follow-up since 2016. He states that he had not followed up with any of his healthcare providers for some time.   He also admits to not taking any of his medications for at least one year and possibly up to 18 months. An echo done during his hospitalization showed that his EF was down to 40% which is down from 60% in 2014. Results were discussed with him and his wife. His blood pressure today is stable. He reports that he was prescribed amlodipine yesterday by nephrology as it appears that he was supposed to \"continue\" this medication based on the list of medications in [facility]. He states that he has not been on any medications for some time prior to being hospitalized and just began taking medications routinely again in the hospital and upon discharge. He is on 2 calcium channel blockers, diltiazem and amlodipine. We had a long discussion regarding the importance of taking his medications as prescribed and coming in for follow-up. He states that he now realizes the importance of compliance with medications and follow-up and realizes that the worsening of his ejection fraction, kidney function, blood pressure, etc is likely directly related to his non-compliance. He states that he will do what he is supposed to do and his wife states that she will try to make sure that he is compliant. Teaching done today re:  Atrial fibrillation. Anticoagulation discussed with him but his anemia is still being evaluated. Due to his renal disease, he is not a candidate for DOACs (except maybe for apixaban although studies did not include many patients on hemodialysis) and Coumadin therapy was discussed. He is somewhat hesitant to take Coumadin/warfarin but is willing to consider it. CHADsVASC2 score of 3 and stroke risk discussed with him and his wife. He is supposed to have a CBC done early next week and his anemia is being followed by his PCP and nephrology. His lipids were previously followed by primary care. It appears that his last lipid panel was done in 2015.   He inquired about atorvastatin which was listed on his discharge medication list.  He apparently did not receive a prescription upon discharge. Will request that he have a lipid panel and CMP done when he has his other labs done for his PCP early next week. After results are received, he will be restarted on atorvastatin as he does have history of CAD and PCI in 2012. He was advised to fast as close to 12 hours as possible. Greater than 50% of a 50 minute visit was spent in discussion, counseling, and answering questions. He will follow-up with Dr Darren Vazquez as scheduled and as needed. Naga Keating MSN, FNP-BC    Please note:  Portions of this chart were created with Dragon medical speech to text program.  Unrecognized errors may be present.

## 2018-12-31 NOTE — TELEPHONE ENCOUNTER
This patient contacted office for the following prescriptions to be filled:    Medication requested : Pt is completely out     Requested Prescriptions     Pending Prescriptions Disp Refills    amLODIPine (NORVASC) 10 mg tablet 90 Tab 3     Sig: TAKE ONE TABLET BY MOUTH EVERY DAY     PCP: Adair Sims or Print: on site rx   Mail order or Local pharmacy [phone]    Scheduled appointment if not seen by current providers in office: lov 12/21/18 ucsergio  1/29/19

## 2019-01-01 ENCOUNTER — HOME CARE VISIT (OUTPATIENT)
Dept: HOME HEALTH SERVICES | Facility: HOME HEALTH | Age: 49
End: 2019-01-01
Payer: COMMERCIAL

## 2019-01-02 ENCOUNTER — HOME CARE VISIT (OUTPATIENT)
Dept: HOME HEALTH SERVICES | Facility: HOME HEALTH | Age: 49
End: 2019-01-02
Payer: COMMERCIAL

## 2019-01-02 ENCOUNTER — HOME CARE VISIT (OUTPATIENT)
Dept: SCHEDULING | Facility: HOME HEALTH | Age: 49
End: 2019-01-02
Payer: COMMERCIAL

## 2019-01-02 VITALS
DIASTOLIC BLOOD PRESSURE: 86 MMHG | HEART RATE: 78 BPM | TEMPERATURE: 98.6 F | OXYGEN SATURATION: 97 % | RESPIRATION RATE: 17 BRPM | SYSTOLIC BLOOD PRESSURE: 156 MMHG

## 2019-01-02 PROCEDURE — G0157 HHC PT ASSISTANT EA 15: HCPCS

## 2019-01-02 NOTE — TELEPHONE ENCOUNTER
Contacted patient and verified identity using name and date of birth (2- identifiers). Patient advised to request medication from nephrologist who manages his BP. Patient voiced understanding.

## 2019-01-03 ENCOUNTER — HOME CARE VISIT (OUTPATIENT)
Dept: SCHEDULING | Facility: HOME HEALTH | Age: 49
End: 2019-01-03
Payer: COMMERCIAL

## 2019-01-03 PROCEDURE — G0300 HHS/HOSPICE OF LPN EA 15 MIN: HCPCS

## 2019-01-04 ENCOUNTER — OFFICE VISIT (OUTPATIENT)
Dept: CARDIOLOGY CLINIC | Age: 49
End: 2019-01-04

## 2019-01-04 ENCOUNTER — HOME CARE VISIT (OUTPATIENT)
Dept: SCHEDULING | Facility: HOME HEALTH | Age: 49
End: 2019-01-04
Payer: COMMERCIAL

## 2019-01-04 VITALS
BODY MASS INDEX: 29.18 KG/M2 | HEIGHT: 70 IN | WEIGHT: 203.8 LBS | HEART RATE: 71 BPM | OXYGEN SATURATION: 98 % | DIASTOLIC BLOOD PRESSURE: 72 MMHG | SYSTOLIC BLOOD PRESSURE: 136 MMHG

## 2019-01-04 DIAGNOSIS — I48.91 ATRIAL FIBRILLATION WITH RAPID VENTRICULAR RESPONSE (HCC): Primary | ICD-10-CM

## 2019-01-04 DIAGNOSIS — I25.10 CORONARY ARTERY DISEASE INVOLVING NATIVE CORONARY ARTERY OF NATIVE HEART WITHOUT ANGINA PECTORIS: ICD-10-CM

## 2019-01-04 DIAGNOSIS — I10 ESSENTIAL HYPERTENSION: ICD-10-CM

## 2019-01-04 DIAGNOSIS — N18.6 ESRD (END STAGE RENAL DISEASE) (HCC): ICD-10-CM

## 2019-01-04 PROBLEM — E11.21 TYPE 2 DIABETES WITH NEPHROPATHY (HCC): Status: ACTIVE | Noted: 2019-01-04

## 2019-01-04 PROCEDURE — G0157 HHC PT ASSISTANT EA 15: HCPCS

## 2019-01-04 RX ORDER — HYDRALAZINE HYDROCHLORIDE 100 MG/1
100 TABLET, FILM COATED ORAL 3 TIMES DAILY
COMMUNITY
End: 2019-09-17

## 2019-01-04 NOTE — PROGRESS NOTES
Rolando Brochure presents today for   Chief Complaint   Patient presents with    Follow-up     post hospital       Marlen Sharpe preferred language for health care discussion is english/other. Is someone accompanying this pt? yes    Is the patient using any DME equipment during OV? Yes, cane    Depression Screening:  PHQ over the last two weeks 1/4/2019   Little interest or pleasure in doing things Not at all   Feeling down, depressed, irritable, or hopeless Not at all   Total Score PHQ 2 0       Learning Assessment:  Learning Assessment 6/21/2016   PRIMARY LEARNER Patient   HIGHEST LEVEL OF EDUCATION - PRIMARY LEARNER  -   BARRIERS PRIMARY LEARNER -   [address] -    NEED -   LEARNER [address] -   ANSWERED BY Patient   RELATIONSHIP SELF       Abuse Screening:  No flowsheet data found. Fall Risk  Fall Risk Assessment, last 12 mths 1/4/2019   Able to walk? Yes   Fall in past 12 months? No       Pt currently taking Anticoagulant therapy?  asa    Coordination of Care:  1. Have you been to the ER, urgent care clinic since your last visit? Hospitalized since your last visit? Post hospital    2. Have you seen or consulted any other health care providers outside of the [address] [place] [address] since your last visit? Include any pap smears or colon screening.  no

## 2019-01-04 NOTE — PATIENT INSTRUCTIONS
Continue present medication regimen  Lipid panel and CMP to be done with other labs per Dr. Rebecca Krueger on 1/7/19  After lipid panel results are reviewed, will decide on Atorvastatin dose  Consideration for long-term anticoagulation discussed and being considered  Follow-up with Dr. Justina Coats as scheduled and as needed      Atrial Fibrillation: Care Instructions  Your Care Instructions    Atrial fibrillation is an irregular and often fast heartbeat. Treating this condition is important for several reasons. It can cause blood clots, which can travel from your heart to your brain and cause a stroke. If you have a fast heartbeat, you may feel lightheaded, dizzy, and weak. An irregular heartbeat can also increase your risk for heart failure. Atrial fibrillation is often the result of another heart condition, such as high blood pressure or coronary artery disease. Making changes to improve your heart condition will help you stay healthy and active. Follow-up care is a key part of your treatment and safety. Be sure to make and go to all appointments, and call your doctor if you are having problems. It's also a good idea to know your test results and keep a list of the medicines you take. How can you care for yourself at home? Medicines    · Take your medicines exactly as prescribed. Call your doctor if you think you are having a problem with your medicine. You will get more details on the specific medicines your doctor prescribes.     · If your doctor has given you a blood thinner to prevent a stroke, be sure you get instructions about how to take your medicine safely. Blood thinners can cause serious bleeding problems.     · Do not take any vitamins, over-the-counter drugs, or herbal products without talking to your doctor first.    Lifestyle changes    · Do not smoke. Smoking can increase your chance of a stroke and heart attack. If you need help quitting, talk to your doctor about stop-smoking programs and medicines.  These can increase your chances of quitting for good.     · Eat a heart-healthy diet.     · Stay at a healthy weight. Lose weight if you need to.     · Limit alcohol to 2 drinks a day for men and 1 drink a day for women. Too much alcohol can cause health problems.     · Avoid colds and flu. Get a pneumococcal vaccine shot. If you have had one before, ask your doctor whether you need another dose. Get a flu shot every year. If you must be around people with colds or flu, wash your hands often. Activity    · If your doctor recommends it, get more exercise. Walking is a good choice. Bit by bit, increase the amount you walk every day. Try for at least 30 minutes on most days of the week. You also may want to swim, bike, or do other activities. Your doctor may suggest that you join a cardiac rehabilitation program so that you can have help increasing your physical activity safely.     · Start light exercise if your doctor says it is okay. Even a small amount will help you get stronger, have more energy, and manage stress. Walking is an easy way to get exercise. Start out by walking a little more than you did in the hospital. Gradually increase the amount you walk.     · When you exercise, watch for signs that your heart is working too hard. You are pushing too hard if you cannot talk while you are exercising. If you become short of breath or dizzy or have chest pain, sit down and rest immediately.     · Check your pulse regularly. Place two fingers on the artery at the palm side of your wrist, in line with your thumb. If your heartbeat seems uneven or fast, talk to your doctor. When should you call for help? Call 911 anytime you think you may need emergency care. For example, call if:    · You have symptoms of a heart attack. These may include:  ? Chest pain or pressure, or a strange feeling in the chest.  ? Sweating. ? Shortness of breath. ? Nausea or vomiting.   ? Pain, pressure, or a strange feeling in the back, neck, jaw, or upper belly or in one or both shoulders or arms. ? Lightheadedness or sudden weakness. ? A fast or irregular heartbeat. After you call 911, the  may tell you to chew 1 adult-strength or 2 to 4 low-dose aspirin. Wait for an ambulance. Do not try to drive yourself.     · You have symptoms of a stroke. These may include:  ? Sudden numbness, tingling, weakness, or loss of movement in your face, arm, or leg, especially on only one side of your body. ? Sudden vision changes. ? Sudden trouble speaking. ? Sudden confusion or trouble understanding simple statements. ? Sudden problems with walking or balance. ? A sudden, severe headache that is different from past headaches.     · You passed out (lost consciousness).    Call your doctor now or seek immediate medical care if:    · You have new or increased shortness of breath.     · You feel dizzy or lightheaded, or you feel like you may faint.     · Your heart rate becomes irregular.     · You can feel your heart flutter in your chest or skip heartbeats. Tell your doctor if these symptoms are new or worse.    Watch closely for changes in your health, and be sure to contact your doctor if you have any problems. Where can you learn more? Go to http://caren-ar.info/. Enter U020 in the search box to learn more about \"Atrial Fibrillation: Care Instructions. \"  Current as of: December 6, 2017  Content Version: 11.8  © 9886-8288 [employer]. Care instructions adapted under license by [employer] (which disclaims liability or warranty for this information). If you have questions about a medical condition or this instruction, always ask your healthcare professional. [place] [address] any warranty or liability for your use of this information. Anticoagulants: After Your Visit  Your Care Instructions  Your doctor prescribed an anticoagulant medicine.  Anticoagulants, often called blood thinners, prevent new blood clots from forming and keep existing clots from getting larger. They do not actually thin the blood, but they make the blood take longer to clot. This lowers the risk of a blood clot moving to the lungs (pulmonary embolism) or moving to the brain and causing a stroke. Blood thinners come in two forms. Heparin is given by shot, either under your skin or through a needle in your vein, and starts working right away. Warfarin (Coumadin) comes in pill form and takes longer to work. Your doctor may have you begin taking both forms at the same time. As soon as the pills start to work, you will stop the shots but continue to take the pills. If you have a blood clot in your leg, you may need to take warfarin for several months. People who have heart conditions such as atrial fibrillation often need to take it for the rest of their lives. The right dose of this medicine is different for each person. You will need regular blood tests to see if your dose is correct. Follow-up care is a key part of your treatment and safety. Be sure to make and go to all appointments, and call your doctor if you are having problems. Itâs also a good idea to know your test results and keep a list of the medicines you take. How do you take blood thinners? · Take your medicines exactly as prescribed. Call your doctor if you think you are having a problem with your medicine. · Call your doctor if you are not sure what to do if you missed a dose of blood thinner. ¨ Your doctor can tell you exactly what to do so you do not take too much or too little blood thinner. Then you will be as safe as possible. · Some general rules for what to do if you miss a dose:  ¨ If you remember it in the same day, take the missed dose. Then go back to your regular schedule. ¨ If it is the next day, or almost time to take the next dose, do not take the missed dose. Do not double the dose to make up for the missed one.  At your next regularly scheduled time, take your normal dose. ¨ If you miss your dose for 2 or more days, call your doctor. · To help you stay on schedule, use a calendar to remind you when to take your blood thinner. When you take the medicine, note it on the calendar. · If you are going to give yourself shots, your doctor will give you instructions for how to safely inject the medicine. Follow the directions carefully. · Do not take any vitamins, over-the-counter medicines, or herbal products without talking to your doctor first.  · Avoid contact sports and other activities that could lead to injury. Make your home safe and take other measures to reduce your risk of falling. Always wear a seat belt while in a car. · Do not suddenly change your intake of vitamin Kârich foods, such as broccoli, cabbage, asparagus, lettuce, and spinach. This will help blood thinners work evenly from day to day. · Limit alcohol to 2 drinks a day for men and 1 drink a day for women. Alcohol may interfere with blood thinners. It also increases your risk of falls, which can cause bruising and bleeding. · Tell your dentist, pharmacist, and other health professionals that you take blood thinners. Wear medical alert jewelry that says you take blood thinners. You can buy this at most drugstores. When should you call for help? Call 911 anytime you think you may need emergency care. For example, call if:  · You cough up blood. · You vomit blood or what looks like coffee grounds. · You pass maroon or very bloody stools. Call your doctor now or seek immediate medical care if:  · You have new bruises or blood spots under your skin. · You have a nosebleed. · Your gums bleed when you brush your teeth. · You have blood in your urine. · Your stools are black and tarlike or have streaks of blood. · You have vaginal bleeding when you are not having your period, or heavy period bleeding.   Watch closely for changes in your health, and be sure to contact your doctor if:  · You have questions about your medicine. Where can you learn more? Go to Mamina [email or web address].be  Enter V112 in the search box to learn more about \"Anticoagulants: After Your Visit. \"   Â© 3039-6582 [employer]. Care instructions adapted under license by [facility] (which disclaims liability or warranty for this information). This care instruction is for use with your licensed healthcare professional. If you have questions about a medical condition or this instruction, always ask your healthcare professional. [address] any warranty or liability for your use of this information.   Content Version: 9.6.18257; Last Revised: July 1, 2009

## 2019-01-07 ENCOUNTER — HOME CARE VISIT (OUTPATIENT)
Dept: HOME HEALTH SERVICES | Facility: HOME HEALTH | Age: 49
End: 2019-01-07
Payer: COMMERCIAL

## 2019-01-07 VITALS
HEART RATE: 87 BPM | RESPIRATION RATE: 17 BRPM | OXYGEN SATURATION: 98 % | TEMPERATURE: 99.1 F | DIASTOLIC BLOOD PRESSURE: 84 MMHG | SYSTOLIC BLOOD PRESSURE: 160 MMHG

## 2019-01-09 ENCOUNTER — HOME CARE VISIT (OUTPATIENT)
Dept: SCHEDULING | Facility: HOME HEALTH | Age: 49
End: 2019-01-09
Payer: COMMERCIAL

## 2019-01-09 ENCOUNTER — HOME CARE VISIT (OUTPATIENT)
Dept: HOME HEALTH SERVICES | Facility: HOME HEALTH | Age: 49
End: 2019-01-09
Payer: COMMERCIAL

## 2019-01-09 PROCEDURE — G0157 HHC PT ASSISTANT EA 15: HCPCS

## 2019-01-09 PROCEDURE — G0300 HHS/HOSPICE OF LPN EA 15 MIN: HCPCS

## 2019-01-10 ENCOUNTER — HOME CARE VISIT (OUTPATIENT)
Dept: HOME HEALTH SERVICES | Facility: HOME HEALTH | Age: 49
End: 2019-01-10
Payer: COMMERCIAL

## 2019-01-10 VITALS
SYSTOLIC BLOOD PRESSURE: 122 MMHG | DIASTOLIC BLOOD PRESSURE: 72 MMHG | RESPIRATION RATE: 18 BRPM | TEMPERATURE: 96.8 F | OXYGEN SATURATION: 98 % | HEART RATE: 64 BPM

## 2019-01-11 ENCOUNTER — HOME CARE VISIT (OUTPATIENT)
Dept: HOME HEALTH SERVICES | Facility: HOME HEALTH | Age: 49
End: 2019-01-11
Payer: COMMERCIAL

## 2019-01-14 ENCOUNTER — HOME CARE VISIT (OUTPATIENT)
Dept: SCHEDULING | Facility: HOME HEALTH | Age: 49
End: 2019-01-14
Payer: COMMERCIAL

## 2019-01-14 ENCOUNTER — HOME CARE VISIT (OUTPATIENT)
Dept: HOME HEALTH SERVICES | Facility: HOME HEALTH | Age: 49
End: 2019-01-14
Payer: COMMERCIAL

## 2019-01-14 VITALS
OXYGEN SATURATION: 98 % | HEART RATE: 62 BPM | TEMPERATURE: 98.8 F | DIASTOLIC BLOOD PRESSURE: 78 MMHG | RESPIRATION RATE: 18 BRPM | SYSTOLIC BLOOD PRESSURE: 140 MMHG

## 2019-01-14 PROCEDURE — G0157 HHC PT ASSISTANT EA 15: HCPCS

## 2019-01-16 ENCOUNTER — HOME CARE VISIT (OUTPATIENT)
Dept: HOME HEALTH SERVICES | Facility: HOME HEALTH | Age: 49
End: 2019-01-16
Payer: COMMERCIAL

## 2019-01-18 ENCOUNTER — HOME CARE VISIT (OUTPATIENT)
Dept: HOME HEALTH SERVICES | Facility: HOME HEALTH | Age: 49
End: 2019-01-18
Payer: COMMERCIAL

## 2019-01-18 ENCOUNTER — HOME CARE VISIT (OUTPATIENT)
Dept: SCHEDULING | Facility: HOME HEALTH | Age: 49
End: 2019-01-18
Payer: COMMERCIAL

## 2019-01-18 PROCEDURE — G0157 HHC PT ASSISTANT EA 15: HCPCS

## 2019-01-20 VITALS
DIASTOLIC BLOOD PRESSURE: 78 MMHG | TEMPERATURE: 97.8 F | HEART RATE: 68 BPM | SYSTOLIC BLOOD PRESSURE: 138 MMHG | OXYGEN SATURATION: 98 % | RESPIRATION RATE: 18 BRPM

## 2019-01-21 ENCOUNTER — HOME CARE VISIT (OUTPATIENT)
Dept: HOME HEALTH SERVICES | Facility: HOME HEALTH | Age: 49
End: 2019-01-21
Payer: COMMERCIAL

## 2019-01-23 ENCOUNTER — HOME CARE VISIT (OUTPATIENT)
Dept: SCHEDULING | Facility: HOME HEALTH | Age: 49
End: 2019-01-23
Payer: COMMERCIAL

## 2019-01-23 PROCEDURE — G0152 HHCP-SERV OF OT,EA 15 MIN: HCPCS

## 2019-01-23 PROCEDURE — G0157 HHC PT ASSISTANT EA 15: HCPCS

## 2019-01-25 ENCOUNTER — HOME CARE VISIT (OUTPATIENT)
Dept: SCHEDULING | Facility: HOME HEALTH | Age: 49
End: 2019-01-25
Payer: COMMERCIAL

## 2019-01-25 VITALS — OXYGEN SATURATION: 97 % | DIASTOLIC BLOOD PRESSURE: 78 MMHG | HEART RATE: 77 BPM | SYSTOLIC BLOOD PRESSURE: 133 MMHG

## 2019-01-25 PROCEDURE — G0157 HHC PT ASSISTANT EA 15: HCPCS

## 2019-01-25 PROCEDURE — G0152 HHCP-SERV OF OT,EA 15 MIN: HCPCS

## 2019-01-28 ENCOUNTER — HOME CARE VISIT (OUTPATIENT)
Dept: SCHEDULING | Facility: HOME HEALTH | Age: 49
End: 2019-01-28
Payer: COMMERCIAL

## 2019-01-28 VITALS
RESPIRATION RATE: 18 BRPM | HEART RATE: 61 BPM | DIASTOLIC BLOOD PRESSURE: 80 MMHG | SYSTOLIC BLOOD PRESSURE: 131 MMHG | OXYGEN SATURATION: 98 % | TEMPERATURE: 97.5 F

## 2019-01-28 PROCEDURE — G0158 HHC OT ASSISTANT EA 15: HCPCS

## 2019-01-29 VITALS — SYSTOLIC BLOOD PRESSURE: 162 MMHG | HEART RATE: 80 BPM | DIASTOLIC BLOOD PRESSURE: 85 MMHG | OXYGEN SATURATION: 97 %

## 2019-01-29 VITALS
HEART RATE: 73 BPM | RESPIRATION RATE: 17 BRPM | SYSTOLIC BLOOD PRESSURE: 155 MMHG | DIASTOLIC BLOOD PRESSURE: 82 MMHG | OXYGEN SATURATION: 98 % | TEMPERATURE: 97.4 F

## 2019-02-01 ENCOUNTER — HOME CARE VISIT (OUTPATIENT)
Dept: HOME HEALTH SERVICES | Facility: HOME HEALTH | Age: 49
End: 2019-02-01
Payer: COMMERCIAL

## 2019-02-01 ENCOUNTER — HOME CARE VISIT (OUTPATIENT)
Dept: SCHEDULING | Facility: HOME HEALTH | Age: 49
End: 2019-02-01
Payer: COMMERCIAL

## 2019-02-01 VITALS — DIASTOLIC BLOOD PRESSURE: 70 MMHG | HEART RATE: 78 BPM | SYSTOLIC BLOOD PRESSURE: 140 MMHG | OXYGEN SATURATION: 95 %

## 2019-02-01 PROCEDURE — G0151 HHCP-SERV OF PT,EA 15 MIN: HCPCS

## 2019-02-01 PROCEDURE — G0152 HHCP-SERV OF OT,EA 15 MIN: HCPCS

## 2019-02-02 VITALS
DIASTOLIC BLOOD PRESSURE: 83 MMHG | OXYGEN SATURATION: 100 % | TEMPERATURE: 97.6 F | SYSTOLIC BLOOD PRESSURE: 145 MMHG | HEART RATE: 81 BPM

## 2019-02-04 ENCOUNTER — HOME CARE VISIT (OUTPATIENT)
Dept: SCHEDULING | Facility: HOME HEALTH | Age: 49
End: 2019-02-04
Payer: COMMERCIAL

## 2019-02-04 ENCOUNTER — HOME CARE VISIT (OUTPATIENT)
Dept: HOME HEALTH SERVICES | Facility: HOME HEALTH | Age: 49
End: 2019-02-04
Payer: COMMERCIAL

## 2019-02-04 VITALS
DIASTOLIC BLOOD PRESSURE: 85 MMHG | OXYGEN SATURATION: 98 % | HEART RATE: 81 BPM | SYSTOLIC BLOOD PRESSURE: 166 MMHG | TEMPERATURE: 97.7 F

## 2019-02-04 VITALS
RESPIRATION RATE: 18 BRPM | OXYGEN SATURATION: 97 % | DIASTOLIC BLOOD PRESSURE: 86 MMHG | SYSTOLIC BLOOD PRESSURE: 176 MMHG | HEART RATE: 61 BPM

## 2019-02-04 PROCEDURE — G0158 HHC OT ASSISTANT EA 15: HCPCS

## 2019-02-09 ENCOUNTER — HOSPITAL ENCOUNTER (OUTPATIENT)
Dept: LAB | Age: 49
Discharge: HOME | End: 2019-02-09

## 2019-02-09 LAB — XX-LABCORP SPECIMEN COL,LCBCF: NORMAL

## 2019-02-09 PROCEDURE — 99001 SPECIMEN HANDLING PT-LAB: CPT

## 2019-02-10 LAB
ALBUMIN SERPL-MCNC: 3.7 G/DL (ref 3.5–5.5)
ALBUMIN/GLOB SERPL: 0.9 {RATIO} (ref 1.2–2.2)
ALP SERPL-CCNC: 103 IU/L (ref 39–117)
ALT SERPL-CCNC: 9 IU/L (ref 0–44)
AST SERPL-CCNC: 13 IU/L (ref 0–40)
BILIRUB SERPL-MCNC: 0.4 MG/DL (ref 0–1.2)
BUN SERPL-MCNC: 12 MG/DL (ref 6–24)
BUN/CREAT SERPL: 3 (ref 9–20)
CALCIUM SERPL-MCNC: 9 MG/DL (ref 8.7–10.2)
CHLORIDE SERPL-SCNC: 97 MMOL/L (ref 96–106)
CHOLEST SERPL-MCNC: 138 MG/DL (ref 100–199)
CO2 SERPL-SCNC: 25 MMOL/L (ref 20–29)
CREAT SERPL-MCNC: 4.19 MG/DL (ref 0.76–1.27)
GLOBULIN SER CALC-MCNC: 4.1 G/DL (ref 1.5–4.5)
GLUCOSE SERPL-MCNC: 72 MG/DL (ref 65–99)
HDLC SERPL-MCNC: 38 MG/DL
INTERPRETATION, 910389: NORMAL
LDLC SERPL CALC-MCNC: 85 MG/DL (ref 0–99)
POTASSIUM SERPL-SCNC: 4.2 MMOL/L (ref 3.5–5.2)
PROT SERPL-MCNC: 7.8 G/DL (ref 6–8.5)
SODIUM SERPL-SCNC: 138 MMOL/L (ref 134–144)
SPECIMEN STATUS REPORT, ROLRST: NORMAL
TRIGL SERPL-MCNC: 76 MG/DL (ref 0–149)
VLDLC SERPL CALC-MCNC: 15 MG/DL (ref 5–40)

## 2019-02-11 NOTE — TELEPHONE ENCOUNTER
This patient contacted office for the following prescriptions to be filled:    Medication requested :   Requested Prescriptions     Pending Prescriptions Disp Refills    glimepiride (AMARYL) 4 mg tablet 30 Tab 1     Sig: TAKE 1 TABLET BY MOUTH EVERY MORNING    carvedilol (COREG) 25 mg tablet 360 Tab 3     Sig: TAKE TWO TABLETS BY MOUTH TWICE DAILY     PCP: [address] [place] [address] or Print: On Site   Mail order or Local pharmacy [address] [place] [address],   [address] [facility]    Scheduled appointment if not seen by current providers in office: LOV 12/21/2018 f/u 3/7/2019

## 2019-02-12 ENCOUNTER — OFFICE VISIT (OUTPATIENT)
Dept: CARDIOLOGY CLINIC | Age: 49
End: 2019-02-12

## 2019-02-12 VITALS
BODY MASS INDEX: 28.49 KG/M2 | SYSTOLIC BLOOD PRESSURE: 158 MMHG | DIASTOLIC BLOOD PRESSURE: 80 MMHG | OXYGEN SATURATION: 98 % | WEIGHT: 199 LBS | HEIGHT: 70 IN | HEART RATE: 78 BPM

## 2019-02-12 DIAGNOSIS — I42.9 CARDIOMYOPATHY, UNSPECIFIED TYPE (HCC): ICD-10-CM

## 2019-02-12 DIAGNOSIS — I48.91 ATRIAL FIBRILLATION WITH RAPID VENTRICULAR RESPONSE (HCC): Primary | ICD-10-CM

## 2019-02-12 RX ORDER — SILDENAFIL 50 MG/1
50 TABLET, FILM COATED ORAL AS NEEDED
Qty: 10 TAB | Refills: 3 | Status: SHIPPED | OUTPATIENT
Start: 2019-02-12 | End: 2021-01-12

## 2019-02-12 NOTE — PROGRESS NOTES
HISTORY OF PRESENT ILLNESS  Jay Merrill is a 57 y.o. male. ASSESSMENT and PLAN    Mr. Jyothi Winkler has history of diabetes mellitus, diabetic retinopathy with legally blind as, coronary artery disease without chest pains, ischemic cardiomyopathy. He presented with significant fatigue, increased shortness of breath. He had LAD stent in October of 2012 by Dr. Kristen Gaxiola. He has never had chest pains. He has history of mildly diminished LV function with ejection fraction of 40-50%. He is now on hemodialysis 3 times per week. His new baseline weight in 2019 is 199 pounds.  CAD:   Symptomatically stable.  BP:   Upper normal to mildly elevated. I will defer this to nephrology during his hemodialysis.  HR:    Stable.  CHF:   There is no evidence of decompensated CHF noted.  Weight:   His weight today is 199 pounds. Last time he was seen by me in 2016 his weight was 245 pounds. Since being started on hemodialysis, apparently, he has lost significant weight.  Cholesterol:   Target LDL <70. Lipitor 40.  Anti-platelet:   Remains on ASA. I will see him back in 6 months. Thank you. Encounter Diagnoses   Name Primary?  Atrial fibrillation with rapid ventricular response (HCC) Yes    Cardiomyopathy, unspecified type ([address]      current treatment plan is effective, no change in therapy  lab results and schedule of future lab studies reviewed with patient  reviewed diet, exercise and weight control  cardiovascular risk and specific lipid/LDL goals reviewed  use of aspirin to prevent MI and TIA's discussed        HPI  Today, Mr. Karan Johnson has no complaints of chest pains, increased shortness of breath or decreased exercise capacity. He denies any orthopnea or PND. He denies any palpitations or dizziness. Since being started on hemodialysis, he has lost about 45 pounds. He feels that he has more energy. He has the hemodialysis on Tuesday, Thursday and Saturday.     Review of Systems Respiratory: Negative for shortness of breath. Cardiovascular: Negative for chest pain, palpitations, orthopnea, claudication, leg swelling and PND. All other systems reviewed and are negative. Physical Exam   Constitutional: He is oriented to person, place, and time. He appears well-developed and well-nourished. HENT:   Head: Normocephalic. Eyes: Conjunctivae are normal.   Neck: Neck supple. No JVD present. Carotid bruit is not present. No thyromegaly present. Cardiovascular: Normal rate and regular rhythm. Pulmonary/Chest: Breath sounds normal.   Abdominal: Bowel sounds are normal.   Musculoskeletal: He exhibits no edema. Neurological: He is alert and oriented to person, place, and time. Skin: Skin is warm and dry. Nursing note and vitals reviewed. PCP: Darshana Kauffman MD    Past Medical History:   Diagnosis Date    Abnormal nuclear cardiac imaging test 10/08/2012    Fixed anteroseptal, anteroapical defect c/w prior infarction. No ischemia. Mid & distal anteroseptal, anteroapical WMA. LVE. EF 44%.  Anemia     dr. Heather Ferrara doubt amyloid or multiple myeloma. candidate for procirt if Hg <10    Benign hypertensive     Blindness of right eye 01/2013    legally blind    CAD (coronary artery disease)     Cardiomyopathy ejection fraction of 40%     CKD (chronic kidney disease), stage IV ([address]     to see Dr. Santana Leal 12/1/12    Congestive heart failure ([address]     Diabetes mellitus ([address]     Diabetic retinopathy ([address]     Diabetic retinopathy ([address]     Dr. Emiliano Resendez- injections    Heart failure ([address]     History of echocardiogram 04/22/2014    LVE. EF 55% (prev 40-45% on echo of 1/27/12). No WMA. Mild-mod conc LVH. Gr 3 DDfx. Marked LAE. Mild SHANTEL. Mild MR.    Hypertension     Pulmonary hypertension ([address]     Renal Ultrasound 1/3/12    Right kidney isoechoic w/respect to liver. Sm left-sided pleural effusion    S/P cardiac cath 10/16/2012    LM patent.   pLAD 95% (3.5 x 12-mm [place] stent, resid 0$%). LCX mild. Past Surgical History:   Procedure Laterality Date    HX CORONARY STENT PLACEMENT      one    HX LAPAROTOMY  2/2012    bowel obstruction with removal segment of small bowel. Done by Liv.  HX LAPAROTOMY  infancy    whole in colon and had repair at that time.  HX OTHER SURGICAL  06/20/2013    left eye retna attatchment    HX RETINAL DETACHMENT REPAIR      august 2012       Current Outpatient Medications   Medication Sig Dispense Refill    sildenafil citrate (VIAGRA) 50 mg tablet Take 1 Tab by mouth as needed. 10 Tab 3    hydrALAZINE (APRESOLINE) 100 mg tablet Take 100 mg by mouth three (3) times daily.  glimepiride (AMARYL) 4 mg tablet TAKE 1 TABLET BY MOUTH EVERY MORNING 30 Tab 1    dilTIAZem (CARDIZEM) 90 mg tablet Take 1 Tab by mouth Before breakfast, lunch, and dinner. 90 Tab 1    lisinopril (PRINIVIL, ZESTRIL) 5 mg tablet Take 2 Tabs by mouth daily. (Patient taking differently: Take 20 mg by mouth daily.) 30 Tab 1    carvedilol (COREG) 25 mg tablet TAKE TWO TABLETS BY MOUTH TWICE DAILY 360 Tab 3    aspirin delayed-release 81 mg tablet Take 81 mg by mouth daily.  EPOETIN SEBLE (PROCRIT IJ) by Injection route. The patient has a family history of    Social History     Tobacco Use    Smoking status: Never Smoker    Smokeless tobacco: Never Used   Substance Use Topics    Alcohol use:  Yes     Alcohol/week: 2.5 oz     Types: 5 Cans of beer per week     Comment: occassionally    Drug use: No       Lab Results   Component Value Date/Time    Cholesterol, total 138 02/09/2019 11:20 AM    HDL Cholesterol 38 (L) 02/09/2019 11:20 AM    LDL, calculated 85 02/09/2019 11:20 AM    Triglyceride 76 02/09/2019 11:20 AM    CHOL/HDL Ratio 4.4 10/17/2012 06:05 AM        BP Readings from Last 3 Encounters:   02/12/19 158/80   02/04/19 166/85   02/01/19 140/70        Pulse Readings from Last 3 Encounters:   02/12/19 78   02/04/19 81   02/01/19 78       Wt Readings from Last 3 Encounters:   02/12/19 90.3 kg (199 lb)   01/04/19 92.4 kg (203 lb 12.8 oz)   12/21/18 92.1 kg (203 lb)         EKG: unchanged from previous tracings, normal sinus rhythm, Q waves in V1 through V4.

## 2019-02-13 ENCOUNTER — HOME CARE VISIT (OUTPATIENT)
Dept: SCHEDULING | Facility: HOME HEALTH | Age: 49
End: 2019-02-13
Payer: COMMERCIAL

## 2019-02-13 PROCEDURE — G0152 HHCP-SERV OF OT,EA 15 MIN: HCPCS

## 2019-02-14 ENCOUNTER — TELEPHONE (OUTPATIENT)
Dept: FAMILY MEDICINE CLINIC | Age: 49
End: 2019-02-14

## 2019-02-14 VITALS
OXYGEN SATURATION: 98 % | SYSTOLIC BLOOD PRESSURE: 180 MMHG | DIASTOLIC BLOOD PRESSURE: 88 MMHG | HEART RATE: 88 BPM | TEMPERATURE: 97.3 F

## 2019-02-14 RX ORDER — GLIMEPIRIDE 4 MG/1
TABLET ORAL
Qty: 90 TAB | Refills: 0 | Status: SHIPPED | OUTPATIENT
Start: 2019-02-14 | End: 2019-05-15 | Stop reason: SDUPTHER

## 2019-02-14 RX ORDER — CARVEDILOL 25 MG/1
TABLET ORAL
Qty: 360 TAB | Refills: 3 | OUTPATIENT
Start: 2019-02-14

## 2019-02-14 NOTE — TELEPHONE ENCOUNTER
Fax received from [address] [place] [address] meds stating prior Shaina Melgar is required for the Amaryl. Submit a PA request  1. Go to key. [employer] and click \"Enter a Key\"  2. Patient last name: Maggi Amin      : 74      Key: [id number]  3.  Click \"start a PA\", complete the form, and \"send to plan\"

## 2019-02-15 RX ORDER — CARVEDILOL 25 MG/1
TABLET ORAL
Qty: 360 TAB | Refills: 3 | Status: SHIPPED | OUTPATIENT
Start: 2019-02-15 | End: 2019-02-19 | Stop reason: SDUPTHER

## 2019-02-15 NOTE — TELEPHONE ENCOUNTER
Patient advised to request refill of Coreg from cardiology (refill request sent to Dr. Andrea Nielsen). Patient also asked if he has ever tried Metformin. His prior authorization for Glimepiride is requiring Metformin be tried and failed first.  Per patient he has tried Metformin in the past and it did not work. Prior authorization completed via covermymeds and approved. Patient's pharmacy notified.

## 2019-02-15 NOTE — TELEPHONE ENCOUNTER
Glimepiride approved effective 2/15/19-2/15/2020. On-Site pharmacy informed of approval.  Patient identified with 2 identifiers (name and ). Patient informed medication has been approved by insurance.

## 2019-02-19 RX ORDER — CARVEDILOL 25 MG/1
TABLET ORAL
Qty: 360 TAB | Refills: 3 | Status: SHIPPED | OUTPATIENT
Start: 2019-02-19 | End: 2021-02-08 | Stop reason: ALTCHOICE

## 2019-03-07 ENCOUNTER — OFFICE VISIT (OUTPATIENT)
Dept: FAMILY MEDICINE CLINIC | Age: 49
End: 2019-03-07

## 2019-03-07 VITALS
HEIGHT: 70 IN | OXYGEN SATURATION: 98 % | TEMPERATURE: 98.1 F | DIASTOLIC BLOOD PRESSURE: 74 MMHG | RESPIRATION RATE: 16 BRPM | HEART RATE: 80 BPM | WEIGHT: 196 LBS | BODY MASS INDEX: 28.06 KG/M2 | SYSTOLIC BLOOD PRESSURE: 136 MMHG

## 2019-03-07 DIAGNOSIS — K40.90 UNILATERAL INGUINAL HERNIA WITHOUT OBSTRUCTION OR GANGRENE, RECURRENCE NOT SPECIFIED: Primary | ICD-10-CM

## 2019-03-07 DIAGNOSIS — N18.5 CHRONIC RENAL IMPAIRMENT, STAGE 5 (HCC): ICD-10-CM

## 2019-03-07 DIAGNOSIS — Z00.00 INITIAL MEDICARE ANNUAL WELLNESS VISIT: ICD-10-CM

## 2019-03-07 DIAGNOSIS — E11.3413 SEVERE NONPROLIFERATIVE DIABETIC RETINOPATHY OF BOTH EYES WITH MACULAR EDEMA ASSOCIATED WITH TYPE 2 DIABETES MELLITUS (HCC): ICD-10-CM

## 2019-03-07 DIAGNOSIS — Z71.89 ADVANCE CARE PLANNING: ICD-10-CM

## 2019-03-07 DIAGNOSIS — I25.10 CORONARY ARTERY DISEASE INVOLVING NATIVE CORONARY ARTERY OF NATIVE HEART WITHOUT ANGINA PECTORIS: ICD-10-CM

## 2019-03-07 DIAGNOSIS — H54.8 LEGALLY BLIND: ICD-10-CM

## 2019-03-07 DIAGNOSIS — I48.91 ATRIAL FIBRILLATION WITH RAPID VENTRICULAR RESPONSE (HCC): ICD-10-CM

## 2019-03-07 DIAGNOSIS — D63.8 ANEMIA OF CHRONIC DISEASE: ICD-10-CM

## 2019-03-07 DIAGNOSIS — E78.5 DYSLIPIDEMIA: ICD-10-CM

## 2019-03-07 LAB — HBA1C MFR BLD HPLC: 5.3 %

## 2019-03-07 RX ORDER — LISINOPRIL 5 MG/1
20 TABLET ORAL 2 TIMES DAILY
Qty: 30 TAB | Refills: 1 | Status: CANCELLED | OUTPATIENT
Start: 2019-03-07

## 2019-03-07 RX ORDER — AMLODIPINE BESYLATE 10 MG/1
10 TABLET ORAL DAILY
COMMUNITY
End: 2019-09-17

## 2019-03-07 RX ORDER — LISINOPRIL 5 MG/1
20 TABLET ORAL 2 TIMES DAILY
Qty: 30 TAB | Refills: 1 | Status: SHIPPED | OUTPATIENT
Start: 2019-03-07 | End: 2019-08-13 | Stop reason: SDUPTHER

## 2019-03-07 NOTE — ACP (ADVANCE CARE PLANNING)
Advance Care Planning (ACP) Provider Note - Comprehensive     Date of ACP Conversation: 03/07/19  Persons included in Conversation:  patient  Length of ACP Conversation in minutes:  16 minutes    Authorized Decision Maker (if patient is incapable of making informed decisions): This person is:  has yet to delegate            General ACP for ALL Patients with Decision Making Capacity:   Importance of advance care planning, including choosing a healthcare agent to communicate patient's healthcare decisions if patient lost the ability to make decisions, such as after a sudden illness or accident  Understanding of the healthcare agent role was assessed and information provided  Exploration of values, goals, and preferences if recovery is not expected, even with continued medical treatment in the event of: Imminent death  Severe, permanent brain injury  \"In these circumstances, what matters most to you? \"  Other: still considering      Interventions Provided:  Recommended completion of Advance Directive form after review of ACP materials and conversation with prospective healthcare agent   Recommended communicating the plan and making copies for the healthcare agent, personal physician, and others as appropriate (e.g., health system)  Recommended review of completed ACP document annually or upon change in health status

## 2019-03-07 NOTE — PROGRESS NOTES
This is an Initial Medicare Annual Wellness Exam (AWV) (Performed 12 months after IPPE or effective date of Medicare Part B enrollment, Once in a lifetime)    I have reviewed the patient's medical history in detail and updated the computerized patient record. History     Past Medical History:   Diagnosis Date    Abnormal nuclear cardiac imaging test 10/08/2012    Fixed anteroseptal, anteroapical defect c/w prior infarction. No ischemia. Mid & distal anteroseptal, anteroapical WMA. LVE. EF 44%.  Anemia     dr. Alanis Brambila doubt amyloid or multiple myeloma. candidate for procirt if Hg <10    Benign hypertensive     Blindness of right eye 01/2013    legally blind    CAD (coronary artery disease)     Cardiomyopathy ejection fraction of 40%     CKD (chronic kidney disease), stage IV ([facility]     to see Dr. Ervin Durham 12/1/12    Congestive heart failure ([facility]     Diabetes mellitus ([facility]     Diabetic retinopathy ([facility]     Diabetic retinopathy ([facility] [address]     Dr. Marleen Han- injections    Heart failure ([facility] [facility]     History of echocardiogram 04/22/2014    LVE. EF 55% (prev 40-45% on echo of 1/27/12). No WMA. Mild-mod conc LVH. Gr 3 DDfx. Marked LAE. Mild SHANTEL. Mild MR.    Hypertension     Pulmonary hypertension ([facility] [facility]     Renal Ultrasound 1/3/12    Right kidney isoechoic w/respect to liver. Sm left-sided pleural effusion    S/P cardiac cath 10/16/2012    LM patent. pLAD 95% (3.5 x 12-mm [place] stent, resid 0$%). LCX mild. Past Surgical History:   Procedure Laterality Date    HX CORONARY STENT PLACEMENT      one    HX LAPAROTOMY  2/2012    bowel obstruction with removal segment of small bowel. Done by [facility].  HX LAPAROTOMY  infancy    whole in colon and had repair at that time.     HX OTHER SURGICAL  06/20/2013    left eye retna attatchment    HX RETINAL DETACHMENT REPAIR      august 2012     Current Outpatient Medications   Medication Sig Dispense Refill    amLODIPine (NORVASC) 10 mg tablet Take  by mouth daily.  sucroferric oxyhydroxide (VELPHORO) 500 mg chew chewable tablet Take  by mouth three (3) times daily (with meals).  carvedilol (COREG) 25 mg tablet TAKE TWO TABLETS BY MOUTH TWICE DAILY 360 Tab 3    glimepiride (AMARYL) 4 mg tablet TAKE 1 TABLET BY MOUTH EVERY MORNING 90 Tab 0    sildenafil citrate (VIAGRA) 50 mg tablet Take 1 Tab by mouth as needed. 10 Tab 3    hydrALAZINE (APRESOLINE) 100 mg tablet Take 100 mg by mouth three (3) times daily.  dilTIAZem (CARDIZEM) 90 mg tablet Take 1 Tab by mouth Before breakfast, lunch, and dinner. 90 Tab 1    lisinopril (PRINIVIL, ZESTRIL) 5 mg tablet Take 2 Tabs by mouth daily. (Patient taking differently: Take 20 mg by mouth two (2) times a day.) 30 Tab 1    aspirin delayed-release 81 mg tablet Take 81 mg by mouth daily.  EPOETIN SEBLE (PROCRIT IJ) by Injection route. No Known Allergies  Family History   Problem Relation Age of Onset    Diabetes Mother     Hypertension Mother     Kidney Disease Mother     High Cholesterol Father     Diabetes Brother         pre diabetic     Social History     Tobacco Use    Smoking status: Never Smoker    Smokeless tobacco: Never Used   Substance Use Topics    Alcohol use:  Yes     Alcohol/week: 2.5 oz     Types: 5 Cans of beer per week     Comment: occassionally     Patient Active Problem List   Diagnosis Code    Benign hypertensive  I11.9    CRI (chronic renal insufficiency) N18.9    Diabetes mellitus (HCC) E11.9    Cardiomyopathy ([facility] [facility] I42.9    Iron deficiency anemia D50.9    CAD (coronary artery disease) I25.10    Legally blind H54.8    Diabetic retinopathy ([facility] [facility] E11.319    Severe nonproliferative diabetic retinopathy with macular edema, associated with type 2 diabetes mellitus E11.3419    Dyslipidemia E78.5    Atrial fibrillation with rapid ventricular response (HCC) I48.91    Type 2 diabetes with nephropathy (HCC) E11.21       Depression Risk Factor Screening:     3 most recent PHQ Screens 3/7/2019   Little interest or pleasure in doing things Not at all   Feeling down, depressed, irritable, or hopeless Not at all   Total Score PHQ 2 0     Alcohol Risk Factor Screening: You do not drink alcohol or very rarely. Functional Ability and Level of Safety:     Hearing Loss  Hearing is good. Activities of Daily Living  The home contains: no safety equipment. Patient does total self care    Fall Risk  Fall Risk Assessment, last 12 mths 1/4/2019   Able to walk? Yes   Fall in past 12 months? No       Abuse Screen  Patient is not abused    Cognitive Screening   Evaluation of Cognitive Function:  Has your family/caregiver stated any concerns about your memory: no      Patient Care Team   Patient Care Team:  Ari Santiago MD as PCP - General (Family Practice)  Floyd Miller MD as Consulting Provider (Hematology and Oncology)  Jem Alexander MD as Physician (Cardiology)  Monet Monique MD (Inactive) as Consulting Provider (Ophthalmology)  Mary Moura MD as Consulting Provider (Nephrology)  Leland Rangel MD (General Surgery)  Melissa Sosa MD (Otolaryngology)  Bee Juárez MD (Gastroenterology)  GENARO Powlel MD (Vascular Surgery)  Lucrecia Escobar MD (Vascular Surgery)    Assessment/Plan   Education and counseling provided:  Are appropriate based on today's review and evaluation  End-of-Life planning (with patient's consent)  Pneumococcal Vaccine  Influenza Vaccine  Colorectal cancer screening tests  Cardiovascular screening blood test    Diagnoses and all orders for this visit:    1. Initial Medicare annual wellness visit         Health Maintenance Due   Topic Date Due    Pneumococcal 19-64 Highest Risk (2 of 3 - PCV13) 07/28/2015    FOOT EXAM Q1  05/06/2017    MICROALBUMIN Q1  05/06/2017    MEDICARE YEARLY EXAM  12/05/2018     Jenifer Coronel, 57 y.o.,  male    SUBJECTIVE  Ff-up    ESRD-ongoing HD 3 x a week. Also with anemia of chronic disease and HTN, following Dr. Norma Taylor    CAD/Afib- no chest pains, palpitations, leg edema. Taking medications, following cardiology. NIDDM- w/ retinopathy, legally blind. does not check glucose at home. a1c 12/18 6.3. Denies hypoglycemic symptoms. C/o L groin pain and pressure. No abdominal pain, vomiting, changes in BM. Lives with wife, who assists with advance ADLs    ROS:  See HPI, all others negative        Patient Active Problem List   Diagnosis Code    Benign hypertensive  I11.9    CRI (chronic renal insufficiency) N18.9    Diabetes mellitus ([facility] [address] E11.9    Cardiomyopathy ([facility] [address] I42.9    Iron deficiency anemia D50.9    CAD (coronary artery disease) I25.10    Legally blind H54.8    Diabetic retinopathy ([facility] [address] E11.319    Severe nonproliferative diabetic retinopathy with macular edema, associated with type 2 diabetes mellitus E11.3419    Dyslipidemia E78.5    Atrial fibrillation with rapid ventricular response (HCC) I48.91    Type 2 diabetes with nephropathy (HCC) E11.21       Current Outpatient Medications   Medication Sig Dispense Refill    amLODIPine (NORVASC) 10 mg tablet Take  by mouth daily.  sucroferric oxyhydroxide (VELPHORO) 500 mg chew chewable tablet Take  by mouth three (3) times daily (with meals).  carvedilol (COREG) 25 mg tablet TAKE TWO TABLETS BY MOUTH TWICE DAILY 360 Tab 3    glimepiride (AMARYL) 4 mg tablet TAKE 1 TABLET BY MOUTH EVERY MORNING 90 Tab 0    sildenafil citrate (VIAGRA) 50 mg tablet Take 1 Tab by mouth as needed. 10 Tab 3    hydrALAZINE (APRESOLINE) 100 mg tablet Take 100 mg by mouth three (3) times daily.  dilTIAZem (CARDIZEM) 90 mg tablet Take 1 Tab by mouth Before breakfast, lunch, and dinner. 90 Tab 1    aspirin delayed-release 81 mg tablet Take 81 mg by mouth daily.  lisinopril (PRINIVIL, ZESTRIL) 5 mg tablet Take 4 Tabs by mouth two (2) times a day.  30 Tab 1    EPOETIN SEBLE (PROCRIT IJ) by Injection route. No Known Allergies    Past Medical History:   Diagnosis Date    Abnormal nuclear cardiac imaging test 10/08/2012    Fixed anteroseptal, anteroapical defect c/w prior infarction. No ischemia. Mid & distal anteroseptal, anteroapical WMA. LVE. EF 44%.  Anemia     dr. Rio Leahy doubt amyloid or multiple myeloma. candidate for procirt if Hg <10    Benign hypertensive     Blindness of right eye 01/2013    legally blind    CAD (coronary artery disease)     Cardiomyopathy ejection fraction of 40%     CKD (chronic kidney disease), stage IV ([facility] [address]     to see Dr. Gennaro Waterman 12/1/12    Congestive heart failure ([facility] [address] [facility]     Diabetes mellitus ([facility] [address] [facility]     Diabetic retinopathy ([facility] [address]     Diabetic retinopathy ([facility] [address]     Dr. Yesica Dunbar- injections    Heart failure ([facility] [address]     History of echocardiogram 04/22/2014    LVE. EF 55% (prev 40-45% on echo of 1/27/12). No WMA. Mild-mod conc LVH. Gr 3 DDfx. Marked LAE. Mild SHANTEL. Mild MR.    Hypertension     Pulmonary hypertension ([facility] [address]     Renal Ultrasound 1/3/12    Right kidney isoechoic w/respect to liver. Sm left-sided pleural effusion    S/P cardiac cath 10/16/2012    LM patent. pLAD 95% (3.5 x 12-mm [place] stent, resid 0$%). LCX mild. Social History     Socioeconomic History    Marital status:      Spouse name: Not on file    Number of children: Not on file    Years of education: Not on file    Highest education level: Not on file   Social Needs    Financial resource strain: Not on file    Food insecurity - worry: Not on file    Food insecurity - inability: Not on file    Transportation needs - medical: Not on file   [employer] needs - non-medical: Not on file   Occupational History    Not on file   Tobacco Use    Smoking status: Never Smoker    Smokeless tobacco: Never Used   Substance and Sexual Activity    Alcohol use:  Yes     Alcohol/week: 2.5 oz     Types: 5 Cans of beer per week     Comment: occassionally    Drug use: No    Sexual activity: Yes     Partners: Female     Birth control/protection: None   Other Topics Concern    Not on file   Social History Narrative    Not on file       Family History   Problem Relation Age of Onset    Diabetes Mother     Hypertension Mother     Kidney Disease Mother     High Cholesterol Father     Diabetes Brother         pre diabetic         OBJECTIVE    Physical Exam:     Visit Vitals  /74 (BP 1 Location: Left arm, BP Patient Position: Sitting)   Pulse 80   Temp 98.1 °F (36.7 °C) (Oral)   Resp 16   Ht 5' 10\" (1.778 m)   Wt 196 lb (88.9 kg)   SpO2 98%   BMI 28.12 kg/m²       General: alert, chronically ill-appearing,  in no apparent distress or pain  Head: atraumatic. Non-tender maxillary and frontal sinuses  Eyes: Lids with no discharge, no matting, conjunctivae clear and non injected, full EOMs, PERLLA  Ears: pinna non-tender, external auditory canal patent, TM intact  Mouth/throat:tonsils non enlarged, pharynx non erythematous and no lesion, nasal mucosa normal  Neck: supple, no adenopathy palpated  CVS: normal rate, regular rhythm, distinct S1 and S2  Lungs:clear to ausculation bilaterally, no crackles, wheezing or rhonchi noted  Abdomen: normoactive bowel sounds, soft, non-tender, + deformed anterior abdominal scar tissue   Groin: L inguinal hernia  Extremities: no edema, no cyanosis, MSK grossly normal  Skin: warm, no lesions, rashes noted  Psych:  mood and affect normal    Results for orders placed or performed in visit on 03/07/19   AMB POC HEMOGLOBIN A1C   Result Value Ref Range    Hemoglobin A1c (POC) 5.3 %         ASSESSMENT/PLAN  Diagnoses and all orders for this visit:    Unilateral inguinal hernia without obstruction or gangrene, recurrence not specified  -     REFERRAL TO SURGERY    Chronic renal impairment, stage 5 (HCC)  -     CBC WITH AUTOMATED DIFF; Future  -     METABOLIC PANEL, COMPREHENSIVE;  Future    Severe nonproliferative diabetic retinopathy of both eyes with macular edema associated with type 2 diabetes mellitus (HCC)  a1c 5.3, though w/ anemia  Strongly recommended to check home blood glucose levels  No hypoglycemic symptoms   If BG< 70, advised to hold ZAMORA  Cmp/cbc    Dyslipidemia  LDL 85 (2/19)  Cont lipitor    Coronary artery disease  Asymptomatic   On asa, statin, bb, ace  Following cardiology    Essentially hypertension  Controlled  Nephrology following    Legally blind     Atrial fibrillation with rapid ventricular response ([address]   rate controlled, anticoag with asa (chads vasc 3, asa advised per cards due to anemia)     Anemia of chronic disease  Following nephrology    Follow-up Disposition:  Return in about 3 months (around 6/7/2019), or if symptoms worsen or fail to improve. Patient understands plan of care. Patient has provided input and agrees with goals.

## 2019-03-07 NOTE — PATIENT INSTRUCTIONS
Medicare Wellness Visit, Male    The best way to live healthy is to have a lifestyle where you eat a well-balanced diet, exercise regularly, limit alcohol use, and quit all forms of tobacco/nicotine, if applicable. Regular preventive services are another way to keep healthy. Preventive services (vaccines, screening tests, monitoring & exams) can help personalize your care plan, which helps you manage your own care. Screening tests can find health problems at the earliest stages, when they are easiest to treat. [facility] follows the current, evidence-based guidelines published by the [facility] ([facility]) when recommending preventive services for our patients. Because we follow these guidelines, sometimes recommendations change over time as research supports it. (For example, a prostate screening blood test is no longer routinely recommended for men with no symptoms.)  Of course, you and your doctor may decide to screen more often for some diseases, based on your risk and co-morbidities (chronic disease you are already diagnosed with). Preventive services for you include:  - Medicare offers their members a free annual wellness visit, which is time for you and your primary care provider to discuss and plan for your preventive service needs. Take advantage of this benefit every year!  -All adults over age 72 should receive the recommended pneumonia vaccines. Current USPSTF guidelines recommend a series of two vaccines for the best pneumonia protection.   -All adults should have a flu vaccine yearly and an ECG.  All adults age 61 and older should receive a shingles vaccine once in their lifetime.    -All adults age 38-68 who are overweight should have a diabetes screening test once every three years.   -Other screening tests & preventive services for persons with diabetes include: an eye exam to screen for diabetic retinopathy, a kidney function test, a foot exam, and stricter control over your cholesterol.   -Cardiovascular screening for adults with routine risk involves an electrocardiogram (ECG) at intervals determined by the provider.   -Colorectal cancer screening should be done for adults age 54-65 with no increased risk factors for colorectal cancer. There are a number of acceptable methods of screening for this type of cancer. Each test has its own benefits and drawbacks. Discuss with your provider what is most appropriate for you during your annual wellness visit. The different tests include: colonoscopy (considered the best screening method), a fecal occult blood test, a fecal DNA test, and sigmoidoscopy.  -All adults born between [facility] should be screened once for Hepatitis C.  -An Abdominal Aortic Aneurysm (AAA) Screening is recommended for men age 73-68 who has ever smoked in their lifetime.      Here is a list of your current Health Maintenance items (your personalized list of preventive services) with a due date:  Health Maintenance Due   Topic Date Due    Pneumococcal Vaccine (2 of 3 - PCV13) 07/28/2015    Diabetic Foot Care  05/06/2017    Albumin Urine Test  05/06/2017    Annual Well Visit  12/05/2018

## 2019-03-08 ENCOUNTER — OFFICE VISIT (OUTPATIENT)
Dept: SURGERY | Age: 49
End: 2019-03-08

## 2019-03-08 VITALS
TEMPERATURE: 97.8 F | WEIGHT: 196 LBS | BODY MASS INDEX: 28.06 KG/M2 | RESPIRATION RATE: 16 BRPM | HEIGHT: 70 IN | SYSTOLIC BLOOD PRESSURE: 148 MMHG | OXYGEN SATURATION: 98 % | DIASTOLIC BLOOD PRESSURE: 78 MMHG | HEART RATE: 87 BPM

## 2019-03-08 DIAGNOSIS — K40.90 INGUINAL HERNIA WITHOUT OBSTRUCTION OR GANGRENE, RECURRENCE NOT SPECIFIED, UNSPECIFIED LATERALITY: Primary | ICD-10-CM

## 2019-03-08 NOTE — PROGRESS NOTES
Progress Note    Patient: Leon Brunner  MRN: [id number]  SSN: [id number]   YOB: 1970  Age: 57 y.o. Sex: male     Chief Complaint   Patient presents with    Inguinal Hernia     left inguinal hernia       HPI    Mr. Shanika Pleitez is a 82-year-old gentleman with fairly serious past medical history of renal failure heart failure, with an ejection fraction of 55% in 2014. He also has pulmonary hypertension. He is here to have have a left scrotal hernia evaluated. He said the hernia long time and he had one on the other side to but it was repaired years ago. I am concerned about his overall health and ability to tolerate a surgery. We will have him evaluated by his cardiologist and nephrologist or his ability to undergo surgery. His hernia currently while sore on occasion is not really causing him too much constitutional concerned. Past Medical History:   Diagnosis Date    Abnormal nuclear cardiac imaging test 10/08/2012    Fixed anteroseptal, anteroapical defect c/w prior infarction. No ischemia. Mid & distal anteroseptal, anteroapical WMA. LVE. EF 44%.  Anemia     dr. Fercho Miller doubt amyloid or multiple myeloma. candidate for procirt if Hg <10    Benign hypertensive     Blindness of right eye 01/2013    legally blind    CAD (coronary artery disease)     Cardiomyopathy ejection fraction of 40%     CKD (chronic kidney disease), stage IV ([address]     to see Dr. Kelly Copeland 12/1/12    Congestive heart failure ([address]     Diabetes mellitus ([address]     Diabetic retinopathy ([address]     Diabetic retinopathy ([address]     Dr. Bernardo Mayes- injections    Heart failure ([address]     History of echocardiogram 04/22/2014    LVE. EF 55% (prev 40-45% on echo of 1/27/12). No WMA. Mild-mod conc LVH. Gr 3 DDfx. Marked LAE. Mild SHANTEL. Mild MR.    Hypertension     Pulmonary hypertension ([address]     Renal Ultrasound 1/3/12    Right kidney isoechoic w/respect to liver.  Sm left-sided pleural effusion    S/P cardiac cath 10/16/2012    LM patent. pLAD 95% (3.5 x 12-mm [place] stent, resid 0$%). LCX mild. Past Surgical History:   Procedure Laterality Date    HX CORONARY STENT PLACEMENT      one    HX LAPAROTOMY  2/2012    bowel obstruction with removal segment of small bowel. Done by [facility].  HX LAPAROTOMY  infancy    whole in colon and had repair at that time.  HX OTHER SURGICAL  06/20/2013    left eye retna attatchment    HX RETINAL DETACHMENT REPAIR      august 2012     No Known Allergies  Current Outpatient Medications   Medication Sig Dispense Refill    amLODIPine (NORVASC) 10 mg tablet Take  by mouth daily.  sucroferric oxyhydroxide (VELPHORO) 500 mg chew chewable tablet Take  by mouth three (3) times daily (with meals).  lisinopril (PRINIVIL, ZESTRIL) 5 mg tablet Take 4 Tabs by mouth two (2) times a day. 30 Tab 1    carvedilol (COREG) 25 mg tablet TAKE TWO TABLETS BY MOUTH TWICE DAILY 360 Tab 3    glimepiride (AMARYL) 4 mg tablet TAKE 1 TABLET BY MOUTH EVERY MORNING 90 Tab 0    sildenafil citrate (VIAGRA) 50 mg tablet Take 1 Tab by mouth as needed. 10 Tab 3    hydrALAZINE (APRESOLINE) 100 mg tablet Take 100 mg by mouth three (3) times daily.  dilTIAZem (CARDIZEM) 90 mg tablet Take 1 Tab by mouth Before breakfast, lunch, and dinner. 90 Tab 1    aspirin delayed-release 81 mg tablet Take 81 mg by mouth daily.  EPOETIN SEBLE (PROCRIT IJ) by Injection route.        Social History     Socioeconomic History    Marital status:      Spouse name: Not on file    Number of children: Not on file    Years of education: Not on file    Highest education level: Not on file   Social Needs    Financial resource strain: Not on file    Food insecurity - worry: Not on file    Food insecurity - inability: Not on file    Transportation needs - medical: Not on file   [employer] needs - non-medical: Not on file   Occupational History    Not on file   Tobacco Use    Smoking status: Never Smoker    Smokeless tobacco: Never Used   Substance and Sexual Activity    Alcohol use: Yes     Alcohol/week: 2.5 oz     Types: 5 Cans of beer per week     Comment: occassionally    Drug use: No    Sexual activity: Yes     Partners: Female     Birth control/protection: None   Other Topics Concern    Not on file   Social History Narrative    Not on file     Family History   Problem Relation Age of Onset    Diabetes Mother     Hypertension Mother     Kidney Disease Mother     High Cholesterol Father     Diabetes Brother         pre diabetic         Review of systems:  Patient denies any reflux, emesis, abdominal pain, change in bowel habits, hematochezia, melena, fever, weight loss, fatigue chills, dermatitis, abnormal moles, change in vision, vertigo, epistaxis, dysphagia, hoarseness, chest pain, palpitations, hypertension, edema, cough, shortness of breath, wheezing, hemoptysis, snoring, hematuria, diabetes, thyroid disease, anemia, bruising, history of blood transfusion, dizziness, headache, or fainting.     Physical Examination    Well developed well nourished male in no apparent distress  Visit Vitals  /78   Pulse 87   Temp 97.8 °F (36.6 °C) (Oral)   Resp 16   Ht 5' 10\" (1.778 m)   Wt 88.9 kg (196 lb)   SpO2 98%   BMI 28.12 kg/m²      Head: normocephalic, atraumatic  Mouth: Clear, no overt lesions, oral mucosa pink and moist  Neck: supple, no masses, no adenopathy or carotid bruits, trachea midline  Resp: clear to auscultation bilaterally, no wheeze, rhonchi or rales, excursions normal and symmetrical  Cardio: Regular rate and rhythm, no murmurs, clicks, gallops or rubs, no edema or varicosities  Abdomen: soft, nontender, nondistended, normoactive bowel sounds, no hernias, no hepatosplenomegaly,   Back: Deferred  Extremeties: warm, well-perfused, no tenderness or swelling, normal gait/station  Neuro: sensation and strength grossly intact and symmetrical  Psych: alert and oriented to person, place and time  Breast exam deferred    IMPRESSION  Left scrotal hernia    PLAN  Orders Placed This Encounter    CBC WITH AUTOMATED DIFF     Standing Status:   Future     Standing Expiration Date:   7/3/2804    METABOLIC PANEL, BASIC     Standing Status:   Future     Standing Expiration Date:   3/8/2020    EKG, 12 LEAD, INITIAL     Standing Status:   Future     Standing Expiration Date:   9/7/2019     Order Specific Question:   Reason for Exam:     Answer:   preop     Preop clearance is by cardiology and nephrology follow-up  Kerry Hartman MD

## 2019-04-15 ENCOUNTER — HOSPITAL ENCOUNTER (OUTPATIENT)
Dept: LAB | Age: 49
Discharge: HOME | End: 2019-04-15
Payer: COMMERCIAL

## 2019-04-15 ENCOUNTER — HOSPITAL ENCOUNTER (OUTPATIENT)
Dept: LAB | Age: 49
Discharge: HOME | End: 2019-04-15

## 2019-04-15 DIAGNOSIS — K40.90 INGUINAL HERNIA WITHOUT OBSTRUCTION OR GANGRENE, RECURRENCE NOT SPECIFIED, UNSPECIFIED LATERALITY: ICD-10-CM

## 2019-04-15 LAB — XX-LABCORP SPECIMEN COL,LCBCF: NORMAL

## 2019-04-15 PROCEDURE — 99001 SPECIMEN HANDLING PT-LAB: CPT

## 2019-04-16 ENCOUNTER — HOSPITAL ENCOUNTER (OUTPATIENT)
Dept: LAB | Age: 49
Discharge: HOME | End: 2019-04-16
Payer: COMMERCIAL

## 2019-04-16 LAB
ATRIAL RATE: 90 BPM
BASOPHILS # BLD AUTO: 0 X10E3/UL (ref 0–0.2)
BASOPHILS NFR BLD AUTO: 1 %
BUN SERPL-MCNC: 61 MG/DL (ref 6–24)
BUN/CREAT SERPL: 5 (ref 9–20)
CALCIUM SERPL-MCNC: 8 MG/DL (ref 8.7–10.2)
CALCULATED P AXIS, ECG09: 36 DEGREES
CALCULATED R AXIS, ECG10: -12 DEGREES
CALCULATED T AXIS, ECG11: 76 DEGREES
CHLORIDE SERPL-SCNC: 93 MMOL/L (ref 96–106)
CO2 SERPL-SCNC: 24 MMOL/L (ref 20–29)
CREAT SERPL-MCNC: 12.75 MG/DL (ref 0.76–1.27)
DIAGNOSIS, 93000: NORMAL
EOSINOPHIL # BLD AUTO: 0.4 X10E3/UL (ref 0–0.4)
EOSINOPHIL NFR BLD AUTO: 8 %
ERYTHROCYTE [DISTWIDTH] IN BLOOD BY AUTOMATED COUNT: 16.8 % (ref 12.3–15.4)
GLUCOSE SERPL-MCNC: 220 MG/DL (ref 65–99)
HCT VFR BLD AUTO: 32.5 % (ref 37.5–51)
HGB BLD-MCNC: 10.4 G/DL (ref 13–17.7)
IMM GRANULOCYTES # BLD AUTO: 0 X10E3/UL (ref 0–0.1)
IMM GRANULOCYTES NFR BLD AUTO: 0 %
LYMPHOCYTES # BLD AUTO: 1.2 X10E3/UL (ref 0.7–3.1)
LYMPHOCYTES NFR BLD AUTO: 22 %
MCH RBC QN AUTO: 27.1 PG (ref 26.6–33)
MCHC RBC AUTO-ENTMCNC: 32 G/DL (ref 31.5–35.7)
MCV RBC AUTO: 85 FL (ref 79–97)
MONOCYTES # BLD AUTO: 0.5 X10E3/UL (ref 0.1–0.9)
MONOCYTES NFR BLD AUTO: 10 %
NEUTROPHILS # BLD AUTO: 3.2 X10E3/UL (ref 1.4–7)
NEUTROPHILS NFR BLD AUTO: 59 %
P-R INTERVAL, ECG05: 134 MS
PLATELET # BLD AUTO: 289 X10E3/UL (ref 150–379)
POTASSIUM SERPL-SCNC: 3.8 MMOL/L (ref 3.5–5.2)
Q-T INTERVAL, ECG07: 386 MS
QRS DURATION, ECG06: 96 MS
QTC CALCULATION (BEZET), ECG08: 472 MS
RBC # BLD AUTO: 3.84 X10E6/UL (ref 4.14–5.8)
SODIUM SERPL-SCNC: 137 MMOL/L (ref 134–144)
VENTRICULAR RATE, ECG03: 90 BPM
WBC # BLD AUTO: 5.3 X10E3/UL (ref 3.4–10.8)

## 2019-04-16 PROCEDURE — 93005 ELECTROCARDIOGRAM TRACING: CPT

## 2019-04-26 ENCOUNTER — OFFICE VISIT (OUTPATIENT)
Dept: SURGERY | Age: 49
End: 2019-04-26

## 2019-04-26 VITALS
TEMPERATURE: 97.4 F | WEIGHT: 194 LBS | HEART RATE: 72 BPM | RESPIRATION RATE: 16 BRPM | SYSTOLIC BLOOD PRESSURE: 116 MMHG | DIASTOLIC BLOOD PRESSURE: 70 MMHG | BODY MASS INDEX: 27.77 KG/M2 | OXYGEN SATURATION: 98 % | HEIGHT: 70 IN

## 2019-04-26 DIAGNOSIS — K40.90 INGUINAL HERNIA WITHOUT OBSTRUCTION OR GANGRENE, RECURRENCE NOT SPECIFIED, UNSPECIFIED LATERALITY: Primary | ICD-10-CM

## 2019-04-26 NOTE — PROGRESS NOTES
Progress Note    Patient: Sivan Schmidt  MRN: [id number]  SSN: [id number]   YOB: 1970  Age: 57 y.o. Sex: male     Chief Complaint   Patient presents with    Pre-op Exam     left inguinal hernia clearance for surgery scheduled on 5/2/2019       HPI    Mr. botello returns prior to his planned surgery. He has been cleared by nephrology as well as cardiology. He looks significantly better than the last time I saw him he has a large hernia that we need to fix. We are making arrangements so that he can have his dialysis the day before surgery without heparin and then have his surgery. I discussed all this with him as well as the risks and the benefits of the operation. Past Medical History:   Diagnosis Date    Abnormal nuclear cardiac imaging test 10/08/2012    Fixed anteroseptal, anteroapical defect c/w prior infarction. No ischemia. Mid & distal anteroseptal, anteroapical WMA. LVE. EF 44%.  Anemia     dr. Michelle Gayle doubt amyloid or multiple myeloma. candidate for procirt if Hg <10    Benign hypertensive     Blindness of right eye 01/2013    legally blind    CAD (coronary artery disease)     Cardiomyopathy ejection fraction of 40%     CKD (chronic kidney disease), stage IV ([address]     to see Dr. Patricio Feliciano 12/1/12    Congestive heart failure ([address]     Diabetes mellitus ([address]     Diabetic retinopathy ([address]     Diabetic retinopathy ([address]     Dr. Corey Espinoza- injections    Heart failure ([facility] [address]     History of echocardiogram 04/22/2014    LVE. EF 55% (prev 40-45% on echo of 1/27/12). No WMA. Mild-mod conc LVH. Gr 3 DDfx. Marked LAE. Mild SHANTEL. Mild MR.    Hypertension     Pulmonary hypertension ([address]     Renal Ultrasound 1/3/12    Right kidney isoechoic w/respect to liver. Sm left-sided pleural effusion    S/P cardiac cath 10/16/2012    LM patent. pLAD 95% (3.5 x 12-mm [place] stent, resid 0$%). LCX mild.        Past Surgical History:   Procedure Laterality Date    HX CORONARY STENT PLACEMENT      one    HX LAPAROTOMY  2/2012    bowel obstruction with removal segment of small bowel. Done by Liv.  HX LAPAROTOMY  infancy    whole in colon and had repair at that time.  HX OTHER SURGICAL  06/20/2013    left eye retna attatchment    HX RETINAL DETACHMENT REPAIR      august 2012     No Known Allergies  Current Outpatient Medications   Medication Sig Dispense Refill    amLODIPine (NORVASC) 10 mg tablet Take  by mouth daily.  sucroferric oxyhydroxide (VELPHORO) 500 mg chew chewable tablet Take  by mouth three (3) times daily (with meals).  lisinopril (PRINIVIL, ZESTRIL) 5 mg tablet Take 4 Tabs by mouth two (2) times a day. 30 Tab 1    carvedilol (COREG) 25 mg tablet TAKE TWO TABLETS BY MOUTH TWICE DAILY 360 Tab 3    glimepiride (AMARYL) 4 mg tablet TAKE 1 TABLET BY MOUTH EVERY MORNING 90 Tab 0    sildenafil citrate (VIAGRA) 50 mg tablet Take 1 Tab by mouth as needed. 10 Tab 3    hydrALAZINE (APRESOLINE) 100 mg tablet Take 100 mg by mouth three (3) times daily.  dilTIAZem (CARDIZEM) 90 mg tablet Take 1 Tab by mouth Before breakfast, lunch, and dinner. 90 Tab 1    EPOETIN SEBLE (PROCRIT IJ) by Injection route.  aspirin delayed-release 81 mg tablet Take 81 mg by mouth daily.        Social History     Socioeconomic History    Marital status:      Spouse name: Not on file    Number of children: Not on file    Years of education: Not on file    Highest education level: Not on file   Occupational History    Not on file   Social Needs    Financial resource strain: Not on file    Food insecurity:     Worry: Not on file     Inability: Not on file    Transportation needs:     Medical: Not on file     Non-medical: Not on file   Tobacco Use    Smoking status: Never Smoker    Smokeless tobacco: Never Used   Substance and Sexual Activity    Alcohol use: Not Currently     Alcohol/week: 2.5 oz     Types: 5 Cans of beer per week    Drug use: No    Sexual activity: Yes     Partners: Female     Birth control/protection: None   Lifestyle    Physical activity:     Days per week: Not on file     Minutes per session: Not on file    Stress: Not on file   Relationships    Social connections:     Talks on phone: Not on file     Gets together: Not on file     Attends [employer] service: Not on file     Active member of club or organization: Not on file     Attends meetings of clubs or organizations: Not on file     Relationship status: Not on file    Intimate partner violence:     Fear of current or ex partner: Not on file     Emotionally abused: Not on file     Physically abused: Not on file     Forced sexual activity: Not on file   Other Topics Concern    Not on file   Social History Narrative    Not on file     Family History   Problem Relation Age of Onset    Diabetes Mother     Hypertension Mother     Kidney Disease Mother     High Cholesterol Father     Diabetes Brother         pre diabetic         Review of systems:  Patient denies any reflux, emesis, abdominal pain, change in bowel habits, hematochezia, melena, fever, weight loss, fatigue chills, dermatitis, abnormal moles, change in vision, vertigo, epistaxis, dysphagia, hoarseness, chest pain, palpitations, hypertension, edema, cough, shortness of breath, wheezing, hemoptysis, snoring, hematuria, diabetes, thyroid disease, anemia, bruising, history of blood transfusion, dizziness, headache, or fainting.     Physical Examination    Well developed well nourished male in no apparent distress  Visit Vitals  /70   Pulse 72   Temp 97.4 °F (36.3 °C) (Oral)   Resp 16   Ht 5' 10\" (1.778 m)   Wt 88 kg (194 lb)   SpO2 98%   BMI 27.84 kg/m²      Head: normocephalic, atraumatic  Mouth: Clear, no overt lesions, oral mucosa pink and moist  Neck: supple, no masses, no adenopathy or carotid bruits, trachea midline  Resp: clear to auscultation bilaterally, no wheeze, rhonchi or rales, excursions normal and symmetrical  Cardio: Regular rate and rhythm, no murmurs, clicks, gallops or rubs, no edema or varicosities  Abdomen: soft, nontender, nondistended, normoactive bowel sounds, large left inguinal hernia, no hepatosplenomegaly,   Back: Deferred  Extremeties: warm, well-perfused, no tenderness or swelling, normal gait/station  Neuro: sensation and strength grossly intact and symmetrical  Psych: alert and oriented to person, place and time  Breast exam deferred    IMPRESSION  Planned left inguinal hernia    PLAN  No orders of the defined types were placed in this encounter.     As above  Kayla Davies MD

## 2019-04-26 NOTE — PROGRESS NOTES
Chief Complaint   Patient presents with    Pre-op Exam     left inguinal hernia clearance for surgery scheduled on 5/2/2019

## 2019-05-01 ENCOUNTER — ANESTHESIA EVENT (OUTPATIENT)
Dept: SURGERY | Age: 49
End: 2019-05-01
Payer: COMMERCIAL

## 2019-05-02 ENCOUNTER — HOSPITAL ENCOUNTER (OUTPATIENT)
Age: 49
Setting detail: OUTPATIENT SURGERY
Discharge: HOME | End: 2019-05-02
Payer: COMMERCIAL

## 2019-05-02 ENCOUNTER — ANESTHESIA (OUTPATIENT)
Dept: SURGERY | Age: 49
End: 2019-05-02
Payer: COMMERCIAL

## 2019-05-02 VITALS
RESPIRATION RATE: 16 BRPM | WEIGHT: 192.8 LBS | SYSTOLIC BLOOD PRESSURE: 130 MMHG | OXYGEN SATURATION: 95 % | HEIGHT: 70 IN | BODY MASS INDEX: 27.6 KG/M2 | HEART RATE: 65 BPM | DIASTOLIC BLOOD PRESSURE: 60 MMHG | TEMPERATURE: 98 F

## 2019-05-02 DIAGNOSIS — K40.90 INGUINAL HERNIA OF LEFT SIDE WITHOUT OBSTRUCTION OR GANGRENE: Primary | ICD-10-CM

## 2019-05-02 LAB
BUN BLD-MCNC: 30 MG/DL (ref 7–18)
CHLORIDE BLD-SCNC: 98 MMOL/L (ref 100–108)
GLUCOSE BLD STRIP.AUTO-MCNC: 127 MG/DL (ref 70–110)
GLUCOSE BLD STRIP.AUTO-MCNC: 86 MG/DL (ref 74–106)
GLUCOSE BLD STRIP.AUTO-MCNC: 88 MG/DL (ref 70–110)
HCT VFR BLD CALC: 32 % (ref 36–49)
HGB BLD-MCNC: 10.9 G/DL (ref 12–16)
POTASSIUM BLD-SCNC: 5.8 MMOL/L (ref 3.5–5.5)
SODIUM BLD-SCNC: 136 MMOL/L (ref 136–145)

## 2019-05-02 PROCEDURE — 74011250636 HC RX REV CODE- 250/636

## 2019-05-02 PROCEDURE — 77030031139 HC SUT VCRL2 J&J -A

## 2019-05-02 PROCEDURE — 77030013567 HC DRN WND RESERV BARD -A

## 2019-05-02 PROCEDURE — 74011000258 HC RX REV CODE- 258: Performed by: NURSE ANESTHETIST, CERTIFIED REGISTERED

## 2019-05-02 PROCEDURE — 77030002916 HC SUT ETHLN J&J -A

## 2019-05-02 PROCEDURE — 82947 ASSAY GLUCOSE BLOOD QUANT: CPT

## 2019-05-02 PROCEDURE — 77030020782 HC GWN BAIR PAWS FLX 3M -B

## 2019-05-02 PROCEDURE — 77030026438 HC STYL ET INTUB CARD -A: Performed by: ANESTHESIOLOGY

## 2019-05-02 PROCEDURE — 76210000006 HC OR PH I REC 0.5 TO 1 HR

## 2019-05-02 PROCEDURE — 76210000020 HC REC RM PH II FIRST 0.5 HR

## 2019-05-02 PROCEDURE — 77030018719 HC DRSG PTCH ANTIMIC J&J -A

## 2019-05-02 PROCEDURE — C1781 MESH (IMPLANTABLE): HCPCS

## 2019-05-02 PROCEDURE — 77030018836 HC SOL IRR NACL ICUM -A

## 2019-05-02 PROCEDURE — 76060000033 HC ANESTHESIA 1 TO 1.5 HR

## 2019-05-02 PROCEDURE — 77030008683 HC TU ET CUF COVD -A: Performed by: ANESTHESIOLOGY

## 2019-05-02 PROCEDURE — 82962 GLUCOSE BLOOD TEST: CPT

## 2019-05-02 PROCEDURE — 77030012422 HC DRN WND COVD -A

## 2019-05-02 PROCEDURE — 77030012012 HC AIRWY OP SFT TELE -A: Performed by: ANESTHESIOLOGY

## 2019-05-02 PROCEDURE — 77030032490 HC SLV COMPR SCD KNE COVD -B

## 2019-05-02 PROCEDURE — 77030012406 HC DRN WND PENRS BARD -A

## 2019-05-02 PROCEDURE — 74011250637 HC RX REV CODE- 250/637: Performed by: NURSE ANESTHETIST, CERTIFIED REGISTERED

## 2019-05-02 PROCEDURE — 74011000250 HC RX REV CODE- 250

## 2019-05-02 PROCEDURE — 76010000149 HC OR TIME 1 TO 1.5 HR

## 2019-05-02 PROCEDURE — 77030002933 HC SUT MCRYL J&J -A

## 2019-05-02 PROCEDURE — 77030018548 HC SUT ETHBND2 J&J -B

## 2019-05-02 DEVICE — MESH HERN L W1.6XL2IN INGUINAL WHT POLYPR MFIL PLUG PTCH: Type: IMPLANTABLE DEVICE | Site: INGUINAL | Status: FUNCTIONAL

## 2019-05-02 RX ORDER — ONDANSETRON 2 MG/ML
4 INJECTION INTRAMUSCULAR; INTRAVENOUS ONCE
Status: DISCONTINUED | OUTPATIENT
Start: 2019-05-02 | End: 2019-05-02 | Stop reason: HOSPADM

## 2019-05-02 RX ORDER — DEXTROSE 50 % IN WATER (D50W) INTRAVENOUS SYRINGE
25-50 AS NEEDED
Status: DISCONTINUED | OUTPATIENT
Start: 2019-05-02 | End: 2019-05-02 | Stop reason: HOSPADM

## 2019-05-02 RX ORDER — SODIUM CHLORIDE 0.9 % (FLUSH) 0.9 %
5-40 SYRINGE (ML) INJECTION EVERY 8 HOURS
Status: DISCONTINUED | OUTPATIENT
Start: 2019-05-02 | End: 2019-05-02 | Stop reason: HOSPADM

## 2019-05-02 RX ORDER — OXYCODONE AND ACETAMINOPHEN 5; 325 MG/1; MG/1
1 TABLET ORAL
Qty: 30 TAB | Refills: 0 | Status: SHIPPED | OUTPATIENT
Start: 2019-05-02 | End: 2019-05-05

## 2019-05-02 RX ORDER — INSULIN LISPRO 100 [IU]/ML
INJECTION, SOLUTION INTRAVENOUS; SUBCUTANEOUS ONCE
Status: DISCONTINUED | OUTPATIENT
Start: 2019-05-02 | End: 2019-05-02 | Stop reason: HOSPADM

## 2019-05-02 RX ORDER — PROPOFOL 10 MG/ML
INJECTION, EMULSION INTRAVENOUS AS NEEDED
Status: DISCONTINUED | OUTPATIENT
Start: 2019-05-02 | End: 2019-05-02 | Stop reason: HOSPADM

## 2019-05-02 RX ORDER — OXYCODONE AND ACETAMINOPHEN 5; 325 MG/1; MG/1
1 TABLET ORAL
Status: DISCONTINUED | OUTPATIENT
Start: 2019-05-02 | End: 2019-05-02 | Stop reason: HOSPADM

## 2019-05-02 RX ORDER — DEXAMETHASONE SODIUM PHOSPHATE 4 MG/ML
INJECTION, SOLUTION INTRA-ARTICULAR; INTRALESIONAL; INTRAMUSCULAR; INTRAVENOUS; SOFT TISSUE AS NEEDED
Status: DISCONTINUED | OUTPATIENT
Start: 2019-05-02 | End: 2019-05-02 | Stop reason: HOSPADM

## 2019-05-02 RX ORDER — SODIUM CHLORIDE 0.9 % (FLUSH) 0.9 %
5-40 SYRINGE (ML) INJECTION AS NEEDED
Status: DISCONTINUED | OUTPATIENT
Start: 2019-05-02 | End: 2019-05-02 | Stop reason: HOSPADM

## 2019-05-02 RX ORDER — LIDOCAINE HYDROCHLORIDE 20 MG/ML
INJECTION, SOLUTION EPIDURAL; INFILTRATION; INTRACAUDAL; PERINEURAL AS NEEDED
Status: DISCONTINUED | OUTPATIENT
Start: 2019-05-02 | End: 2019-05-02 | Stop reason: HOSPADM

## 2019-05-02 RX ORDER — HYDROMORPHONE HYDROCHLORIDE 2 MG/ML
0.5 INJECTION, SOLUTION INTRAMUSCULAR; INTRAVENOUS; SUBCUTANEOUS
Status: DISCONTINUED | OUTPATIENT
Start: 2019-05-02 | End: 2019-05-02 | Stop reason: HOSPADM

## 2019-05-02 RX ORDER — DEXTROSE MONOHYDRATE AND SODIUM CHLORIDE 5; .225 G/100ML; G/100ML
25 INJECTION, SOLUTION INTRAVENOUS CONTINUOUS
Status: DISCONTINUED | OUTPATIENT
Start: 2019-05-02 | End: 2019-05-02 | Stop reason: HOSPADM

## 2019-05-02 RX ORDER — FAMOTIDINE 20 MG/1
20 TABLET, FILM COATED ORAL ONCE
Status: COMPLETED | OUTPATIENT
Start: 2019-05-02 | End: 2019-05-02

## 2019-05-02 RX ORDER — ROCURONIUM BROMIDE 10 MG/ML
INJECTION, SOLUTION INTRAVENOUS AS NEEDED
Status: DISCONTINUED | OUTPATIENT
Start: 2019-05-02 | End: 2019-05-02 | Stop reason: HOSPADM

## 2019-05-02 RX ORDER — NEOSTIGMINE METHYLSULFATE 5 MG/5 ML
SYRINGE (ML) INTRAVENOUS AS NEEDED
Status: DISCONTINUED | OUTPATIENT
Start: 2019-05-02 | End: 2019-05-02 | Stop reason: HOSPADM

## 2019-05-02 RX ORDER — ONDANSETRON 2 MG/ML
INJECTION INTRAMUSCULAR; INTRAVENOUS AS NEEDED
Status: DISCONTINUED | OUTPATIENT
Start: 2019-05-02 | End: 2019-05-02 | Stop reason: HOSPADM

## 2019-05-02 RX ORDER — MIDAZOLAM HYDROCHLORIDE 1 MG/ML
INJECTION, SOLUTION INTRAMUSCULAR; INTRAVENOUS AS NEEDED
Status: DISCONTINUED | OUTPATIENT
Start: 2019-05-02 | End: 2019-05-02 | Stop reason: HOSPADM

## 2019-05-02 RX ORDER — BUPIVACAINE HYDROCHLORIDE AND EPINEPHRINE 5; 5 MG/ML; UG/ML
INJECTION, SOLUTION EPIDURAL; INTRACAUDAL; PERINEURAL AS NEEDED
Status: DISCONTINUED | OUTPATIENT
Start: 2019-05-02 | End: 2019-05-02 | Stop reason: HOSPADM

## 2019-05-02 RX ORDER — FENTANYL CITRATE 50 UG/ML
50 INJECTION, SOLUTION INTRAMUSCULAR; INTRAVENOUS
Status: DISCONTINUED | OUTPATIENT
Start: 2019-05-02 | End: 2019-05-02 | Stop reason: HOSPADM

## 2019-05-02 RX ORDER — FENTANYL CITRATE 50 UG/ML
INJECTION, SOLUTION INTRAMUSCULAR; INTRAVENOUS AS NEEDED
Status: DISCONTINUED | OUTPATIENT
Start: 2019-05-02 | End: 2019-05-02 | Stop reason: HOSPADM

## 2019-05-02 RX ORDER — MAGNESIUM SULFATE 100 %
4 CRYSTALS MISCELLANEOUS AS NEEDED
Status: DISCONTINUED | OUTPATIENT
Start: 2019-05-02 | End: 2019-05-02 | Stop reason: HOSPADM

## 2019-05-02 RX ORDER — EPHEDRINE SULFATE 50 MG/ML
INJECTION, SOLUTION INTRAVENOUS AS NEEDED
Status: DISCONTINUED | OUTPATIENT
Start: 2019-05-02 | End: 2019-05-02 | Stop reason: HOSPADM

## 2019-05-02 RX ORDER — CEFAZOLIN SODIUM 2 G/50ML
2 SOLUTION INTRAVENOUS ONCE
Status: COMPLETED | OUTPATIENT
Start: 2019-05-02 | End: 2019-05-02

## 2019-05-02 RX ORDER — GLYCOPYRROLATE 0.2 MG/ML
INJECTION INTRAMUSCULAR; INTRAVENOUS AS NEEDED
Status: DISCONTINUED | OUTPATIENT
Start: 2019-05-02 | End: 2019-05-02 | Stop reason: HOSPADM

## 2019-05-02 RX ADMIN — FENTANYL CITRATE 25 MCG: 50 INJECTION, SOLUTION INTRAMUSCULAR; INTRAVENOUS at 11:41

## 2019-05-02 RX ADMIN — FENTANYL CITRATE 25 MCG: 50 INJECTION, SOLUTION INTRAMUSCULAR; INTRAVENOUS at 11:35

## 2019-05-02 RX ADMIN — EPHEDRINE SULFATE 10 MG: 50 INJECTION, SOLUTION INTRAVENOUS at 10:44

## 2019-05-02 RX ADMIN — FENTANYL CITRATE 25 MCG: 50 INJECTION, SOLUTION INTRAMUSCULAR; INTRAVENOUS at 11:17

## 2019-05-02 RX ADMIN — PROPOFOL 150 MG: 10 INJECTION, EMULSION INTRAVENOUS at 10:20

## 2019-05-02 RX ADMIN — LIDOCAINE HYDROCHLORIDE 100 MG: 20 INJECTION, SOLUTION EPIDURAL; INFILTRATION; INTRACAUDAL; PERINEURAL at 10:19

## 2019-05-02 RX ADMIN — CEFAZOLIN 2 G: 10 INJECTION, POWDER, FOR SOLUTION INTRAVENOUS at 10:27

## 2019-05-02 RX ADMIN — GLYCOPYRROLATE 0.6 MG: 0.2 INJECTION INTRAMUSCULAR; INTRAVENOUS at 11:26

## 2019-05-02 RX ADMIN — Medication 1 MG: at 11:29

## 2019-05-02 RX ADMIN — ROCURONIUM BROMIDE 35 MG: 10 INJECTION, SOLUTION INTRAVENOUS at 10:22

## 2019-05-02 RX ADMIN — DEXTROSE MONOHYDRATE AND SODIUM CHLORIDE: 5; .225 INJECTION, SOLUTION INTRAVENOUS at 10:12

## 2019-05-02 RX ADMIN — FAMOTIDINE 20 MG: 20 TABLET ORAL at 09:54

## 2019-05-02 RX ADMIN — MIDAZOLAM HYDROCHLORIDE 1 MG: 1 INJECTION, SOLUTION INTRAMUSCULAR; INTRAVENOUS at 10:12

## 2019-05-02 RX ADMIN — EPHEDRINE SULFATE 10 MG: 50 INJECTION, SOLUTION INTRAVENOUS at 10:59

## 2019-05-02 RX ADMIN — GLYCOPYRROLATE 0.2 MG: 0.2 INJECTION INTRAMUSCULAR; INTRAVENOUS at 11:29

## 2019-05-02 RX ADMIN — DEXTROSE MONOHYDRATE AND SODIUM CHLORIDE 25 ML/HR: 5; .225 INJECTION, SOLUTION INTRAVENOUS at 09:54

## 2019-05-02 RX ADMIN — ROCURONIUM BROMIDE 10 MG: 10 INJECTION, SOLUTION INTRAVENOUS at 10:53

## 2019-05-02 RX ADMIN — FENTANYL CITRATE 100 MCG: 50 INJECTION, SOLUTION INTRAMUSCULAR; INTRAVENOUS at 10:18

## 2019-05-02 RX ADMIN — ONDANSETRON 4 MG: 2 INJECTION INTRAMUSCULAR; INTRAVENOUS at 11:16

## 2019-05-02 RX ADMIN — DEXAMETHASONE SODIUM PHOSPHATE 4 MG: 4 INJECTION, SOLUTION INTRA-ARTICULAR; INTRALESIONAL; INTRAMUSCULAR; INTRAVENOUS; SOFT TISSUE at 10:35

## 2019-05-02 RX ADMIN — FENTANYL CITRATE 25 MCG: 50 INJECTION, SOLUTION INTRAMUSCULAR; INTRAVENOUS at 10:57

## 2019-05-02 RX ADMIN — PROPOFOL 50 MG: 10 INJECTION, EMULSION INTRAVENOUS at 10:22

## 2019-05-02 RX ADMIN — Medication 3 MG: at 11:26

## 2019-05-02 NOTE — BRIEF OP NOTE
BRIEF OPERATIVE NOTE    Date of Procedure: 5/2/2019   Preoperative Diagnosis: K40.90 INGUINAL HERNIA WITHOUT OBSTRUCTION OR GANGRENE, RECURRENT NOT SPECIFIED, UNSPECIFIED LATERALITY  Postoperative Diagnosis: K40.90 INGUINAL HERNIA WITHOUT OBSTRUCTION OR GANGRENE, RECURRENT NOT SPECIFIED, UNSPECIFIED LATERALITY    Procedure(s):  LEFT INGUINAL HERNIA REPAIR WITH MESH  Surgeon(s) and Role:     Teresa Acevedo MD - Primary         Surgical Assistant:     Surgical Staff:  Circ-1: Dlieep Rosario RN  Scrub Tech-1: Henry Tate  Surg Asst-1: Brown Monks  Event Time In Time Out   Incision Start 1038    Incision Close       Anesthesia: General   Estimated Blood Loss: min  Specimens: * No specimens in log *   Findings: large indirect sliding hernia   Complications: 0  Implants:   Implant Name Type Inv.  Item Serial No.  Lot No. LRB No. Used Action   MESH CALE PLG XL 4.1X5CM --  - [id number]  MESH CALE PLG XL 4.1X5CM --   BARD [employer]EN [id number] 1 Implanted

## 2019-05-02 NOTE — DISCHARGE INSTRUCTIONS
Hernia Repair: What to Expect at [address] are likely to have pain for the next few days. You may also feel like you have the flu, and you may have a low fever and feel tired and nauseated. This is common. You should feel better after a few days and will probably feel much better in 7 days. For several weeks you may feel twinges or pulling in the hernia repair when you move. You may have some bruising on the scrotum and along the penis. This is normal. Men will need to wear a jockstrap or briefs, not boxers, for scrotal support for several days after a groin (inguinal) hernia repair. [employer] bicycle shorts may provide good support. This care sheet gives you a general idea about how long it will take for you to recover. But each person recovers at a different pace. Follow the steps below to get better as quickly as possible. How can you care for yourself at home? Activity    · Rest when you feel tired. Getting enough sleep will help you recover.     · Try to walk each day. Start by walking a little more than you did the day before. Bit by bit, increase the amount you walk. Walking boosts blood flow and helps prevent pneumonia and constipation.     · Avoid strenuous activities, such as biking, jogging, weight lifting, or aerobic exercise, until your doctor says it is okay.     · Avoid lifting anything that would make you strain. This may include heavy grocery bags and milk containers, a heavy briefcase or backpack, cat litter or dog food bags, a vacuum , or a child.     · You may drive when you are no longer taking pain medicine and can quickly move your foot from the gas pedal to the brake. You must also be able to sit comfortably for a long period of time, even if you do not plan to go far. You might get caught in traffic.     · Most people are able to return to work within 1 to 2 weeks after surgery.     · You may shower 24 to 48 hours after surgery, if your doctor okays it.  Pat the cut (incision) dry. Do not take a bath for the first 2 weeks, or until your doctor tells you it is okay.     · Your doctor will tell you when you can have sex again. Diet    · You can eat your normal diet. If your stomach is upset, try bland, low-fat foods like plain rice, broiled chicken, toast, and yogurt.     · Drink plenty of fluids (unless your doctor tells you not to).     · You may notice that your bowel movements are not regular right after your surgery. This is common. Avoid constipation and straining with bowel movements. You may want to take a fiber supplement every day. If you have not had a bowel movement after a couple of days, ask your doctor about taking a mild laxative. Medicines    · Your doctor will tell you if and when you can restart your medicines. He or she will also give you instructions about taking any new medicines.     · If you take blood thinners, such as warfarin (Coumadin), clopidogrel (Plavix), or aspirin, be sure to talk to your doctor. He or she will tell you if and when to start taking those medicines again. Make sure that you understand exactly what your doctor wants you to do.     · Be safe with medicines. Take pain medicines exactly as directed. ? If the doctor gave you a prescription medicine for pain, take it as prescribed. ? If you are not taking a prescription pain medicine, take an over-the-counter medicine such as acetaminophen (Tylenol), ibuprofen (Advil, Motrin), or naproxen (Aleve). Read and follow all instructions on the label. ? Do not take two or more pain medicines at the same time unless the doctor told you to. Many pain medicines have acetaminophen, which is Tylenol. Too much acetaminophen (Tylenol) can be harmful.     · If your doctor prescribed antibiotics, take them as directed. Do not stop taking them just because you feel better.  You need to take the full course of antibiotics.     · If you think your pain medicine is making you sick to your stomach:  ? Take your medicine after meals (unless your doctor has told you not to). ? Ask your doctor for a different pain medicine. Incision care    · If you have strips of tape on the cut (incision) the doctor made, leave the tape on for a week or until it falls off.     · If you have staples closing the cut, you will need to visit your doctor in 1 to 2 weeks to have them removed.     · Wash the area daily with warm, soapy water and pat it dry. Follow-up care is a key part of your treatment and safety. Be sure to make and go to all appointments, and call your doctor if you are having problems. It's also a good idea to know your test results and keep a list of the medicines you take. When should you call for help? Call 911 anytime you think you may need emergency care. For example, call if:    · You passed out (lost consciousness).     · You are short of breath.    Call your doctor now or seek immediate medical care if:    · You have pain that does not get better after you take pain medicine.     · You are sick to your stomach and cannot keep fluids down.     · You have signs of a blood clot in your leg (called a deep vein thrombosis), such as:  ? Pain in your calf, back of the knee, thigh, or groin. ? Redness and swelling in your leg or groin.     · You cannot pass stools or gas.     · Bright red blood has soaked through the bandage over your incision.     · You have loose stitches, or your incision comes open.     · You have signs of infection, such as:  ? Increased pain, swelling, warmth, or redness. ? Red streaks leading from the incision. ? Pus draining from the incision. ? A fever.    Watch closely for any changes in your health, and be sure to contact your doctor if you have any problems. Where can you learn more? Go to http://caren-ar.info/. Enter H479 in the search box to learn more about \"Hernia Repair: What to Expect at Home. \"  Current as of: March 27, 2018  Content Version: 11.9  © 6357-9466 [employer]. Care instructions adapted under license by "[employer] (which disclaims liability or warranty for this information). If you have questions about a medical condition or this instruction, always ask your healthcare professional. [address] any warranty or liability for your use of this information. DISCHARGE SUMMARY from Nurse    PATIENT INSTRUCTIONS:    After general anesthesia or intravenous sedation, for 24 hours or while taking prescription Narcotics:  · Limit your activities  · Do not drive and operate hazardous machinery  · Do not make important personal or business decisions  · Do  not drink alcoholic beverages  · If you have not urinated within 8 hours after discharge, please contact your surgeon on call. Report the following to your surgeon:  · Excessive pain, swelling, redness or odor of or around the surgical area  · Temperature over 100.5  · Nausea and vomiting lasting longer than 4 hours or if unable to take medications  · Any signs of decreased circulation or nerve impairment to extremity: change in color, persistent  numbness, tingling, coldness or increase pain  · Any questions    *  Please give a list of your current medications to your Primary Care Provider. *  Please update this list whenever your medications are discontinued, doses are      changed, or new medications (including over-the-counter products) are added. *  Please carry medication information at all times in case of emergency situations. These are general instructions for a healthy lifestyle:    No smoking/ No tobacco products/ Avoid exposure to second hand smoke  Surgeon General's Warning:  Quitting smoking now greatly reduces serious risk to your health.     Obesity, smoking, and sedentary lifestyle greatly increases your risk for illness    A healthy diet, regular physical exercise & weight monitoring are important for maintaining a healthy lifestyle    You may be retaining fluid if you have a history of heart failure or if you experience any of the following symptoms:  Weight gain of 3 pounds or more overnight or 5 pounds in a week, increased swelling in our hands or feet or shortness of breath while lying flat in bed. Please call your doctor as soon as you notice any of these symptoms; do not wait until your next office visit. Recognize signs and symptoms of STROKE:    F-face looks uneven    A-arms unable to move or move unevenly    S-speech slurred or non-existent    T-time-call 911 as soon as signs and symptoms begin-DO NOT go       Back to bed or wait to see if you get better-TIME IS BRAIN. Warning Signs of HEART ATTACK     Call 911 if you have these symptoms:   Chest discomfort. Most heart attacks involve discomfort in the center of the chest that lasts more than a few minutes, or that goes away and comes back. It can feel like uncomfortable pressure, squeezing, fullness, or pain.  Discomfort in other areas of the upper body. Symptoms can include pain or discomfort in one or both arms, the back, neck, jaw, or stomach.  Shortness of breath with or without chest discomfort.  Other signs may include breaking out in a cold sweat, nausea, or lightheadedness. Don't wait more than five minutes to call 911 - MINUTES MATTER! Fast action can save your life. Calling 911 is almost always the fastest way to get lifesaving treatment. Emergency Medical Services staff can begin treatment when they arrive -- up to an hour sooner than if someone gets to the hospital by car. The discharge information has been reviewed with the patient and spouse. The patient and spouse verbalized understanding.   Discharge medications reviewed with the patient and spouse and appropriate educational materials and side effects teaching were provided.   ___________________________________________________________________________________________________________________________________

## 2019-05-02 NOTE — ANESTHESIA POSTPROCEDURE EVALUATION
Procedure(s):  LEFT INGUINAL HERNIA REPAIR WITH MESH.     general    Anesthesia Post Evaluation      Multimodal analgesia: multimodal analgesia used between 6 hours prior to anesthesia start to PACU discharge  Patient location during evaluation: bedside  Patient participation: complete - patient participated  Level of consciousness: awake  Pain score: 0  Pain management: adequate  Airway patency: patent  Anesthetic complications: no  Cardiovascular status: stable  Respiratory status: acceptable  Hydration status: acceptable  Post anesthesia nausea and vomiting:  controlled      Vitals Value Taken Time   /60 5/2/2019  1:48 PM   Temp 36.7 °C (98 °F) 5/2/2019  1:48 PM   Pulse 65 5/2/2019  1:48 PM   Resp 16 5/2/2019  1:48 PM   SpO2 95 % 5/2/2019  1:48 PM

## 2019-05-02 NOTE — ANESTHESIA PREPROCEDURE EVALUATION
Relevant Problems   No relevant active problems       Anesthetic History   No history of anesthetic complications            Review of Systems / Medical History  Patient summary reviewed and pertinent labs reviewed    Pulmonary  Within defined limits                 Neuro/Psych   Within defined limits           Cardiovascular    Hypertension: well controlled        Dysrhythmias : atrial fibrillation  CAD and cardiac stents    Exercise tolerance: >4 METS     GI/Hepatic/Renal         Renal disease (Last HD-yesterday): ESRD and dialysis       Endo/Other    Diabetes: well controlled, type 2         Other Findings              Physical Exam    Airway  Mallampati: II  TM Distance: 4 - 6 cm  Neck ROM: normal range of motion   Mouth opening: Normal     Cardiovascular  Regular rate and rhythm,  S1 and S2 normal,  no murmur, click, rub, or gallop             Dental  No notable dental hx       Pulmonary  Breath sounds clear to auscultation               Abdominal  GI exam deferred       Other Findings            Anesthetic Plan    ASA: 4  Anesthesia type: general          Induction: Intravenous  Anesthetic plan and risks discussed with: Patient

## 2019-05-02 NOTE — H&P
Cliff Garza MD   Physician   Surgery   Progress Notes      Signed   Encounter Date:  4/26/2019                        []Dalila copied text    []Soledad for details      Progress Note     Patient: Bailee Bernal               MRN: [id number]         SSN: [id number]   YOB: 1970        Age: 57 y.o. Sex: male           Chief Complaint   Patient presents with    Pre-op Exam       left inguinal hernia clearance for surgery scheduled on 5/2/2019         HPI    Mr. botello returns prior to his planned surgery. He has been cleared by nephrology as well as cardiology. He looks significantly better than the last time I saw him he has a large hernia that we need to fix. We are making arrangements so that he can have his dialysis the day before surgery without heparin and then have his surgery. I discussed all this with him as well as the risks and the benefits of the operation.          Past Medical History:   Diagnosis Date    Abnormal nuclear cardiac imaging test 10/08/2012     Fixed anteroseptal, anteroapical defect c/w prior infarction. No ischemia. Mid & distal anteroseptal, anteroapical WMA. LVE. EF 44%.  Anemia       dr. Kishan Acevedo doubt amyloid or multiple myeloma. candidate for procirt if Hg <10    Benign hypertensive      Blindness of right eye 01/2013     legally blind    CAD (coronary artery disease)      Cardiomyopathy ejection fraction of 40%      CKD (chronic kidney disease), stage IV (HCC)       to see Dr. Dea Serrano 12/1/12    Congestive heart failure ([address]      Diabetes mellitus ([address]      Diabetic retinopathy ([address]      Diabetic retinopathy ([address]       Dr. Jaswinder Katz- injections    Heart failure [facility])      History of echocardiogram 04/22/2014     LVE. EF 55% (prev 40-45% on echo of 1/27/12). No WMA. Mild-mod conc LVH. Gr 3 DDfx. Marked LAE. Mild SHANTEL.   Mild MR.    Hypertension      Pulmonary hypertension ([address]      Renal Ultrasound 1/3/12     Right kidney isoechoic w/respect to liver. Sm left-sided pleural effusion    S/P cardiac cath 10/16/2012     LM patent. pLAD 95% (3.5 x 12-mm [place] stent, resid 0$%). LCX mild.              Past Surgical History:   Procedure Laterality Date    HX CORONARY STENT PLACEMENT         one    HX LAPAROTOMY   2/2012     bowel obstruction with removal segment of small bowel. Done by Riblet.  HX LAPAROTOMY   infancy     whole in colon and had repair at that time.  HX OTHER SURGICAL   06/20/2013     left eye retna attatchment    HX RETINAL DETACHMENT REPAIR         august 2012      No Known Allergies         Current Outpatient Medications   Medication Sig Dispense Refill    amLODIPine (NORVASC) 10 mg tablet Take  by mouth daily.        sucroferric oxyhydroxide (VELPHORO) 500 mg chew chewable tablet Take  by mouth three (3) times daily (with meals).        lisinopril (PRINIVIL, ZESTRIL) 5 mg tablet Take 4 Tabs by mouth two (2) times a day. 30 Tab 1    carvedilol (COREG) 25 mg tablet TAKE TWO TABLETS BY MOUTH TWICE DAILY 360 Tab 3    glimepiride (AMARYL) 4 mg tablet TAKE 1 TABLET BY MOUTH EVERY MORNING 90 Tab 0    sildenafil citrate (VIAGRA) 50 mg tablet Take 1 Tab by mouth as needed. 10 Tab 3    hydrALAZINE (APRESOLINE) 100 mg tablet Take 100 mg by mouth three (3) times daily.        dilTIAZem (CARDIZEM) 90 mg tablet Take 1 Tab by mouth Before breakfast, lunch, and dinner.  90 Tab 1    EPOETIN SEBLE (PROCRIT IJ) by Injection route.        aspirin delayed-release 81 mg tablet Take 81 mg by mouth daily.          Social History            Socioeconomic History    Marital status:        Spouse name: Not on file    Number of children: Not on file    Years of education: Not on file    Highest education level: Not on file   Occupational History    Not on file   Social Needs    Financial resource strain: Not on file    Food insecurity:       Worry: Not on file       Inability: Not on file   [employer] needs:       Medical: Not on file       Non-medical: Not on file   Tobacco Use    Smoking status: Never Smoker    Smokeless tobacco: Never Used   Substance and Sexual Activity    Alcohol use: Not Currently       Alcohol/week: 2.5 oz       Types: 5 Cans of beer per week    Drug use: No    Sexual activity: Yes       Partners: Female       Birth control/protection: None   Lifestyle    Physical activity:       Days per week: Not on file       Minutes per session: Not on file    Stress: Not on file   Relationships    Social connections:       Talks on phone: Not on file       Gets together: Not on file       Attends Voodoo service: Not on file       Active member of club or organization: Not on file       Attends meetings of clubs or organizations: Not on file       Relationship status: Not on file    Intimate partner violence:       Fear of current or ex partner: Not on file       Emotionally abused: Not on file       Physically abused: Not on file       Forced sexual activity: Not on file   Other Topics Concern    Not on file   Social History Narrative    Not on file            Family History   Problem Relation Age of Onset    Diabetes Mother      Hypertension Mother      Kidney Disease Mother      High Cholesterol Father      Diabetes Brother           pre diabetic            Review of systems:  Patient denies any reflux, emesis, abdominal pain, change in bowel habits, hematochezia, melena, fever, weight loss, fatigue chills, dermatitis, abnormal moles, change in vision, vertigo, epistaxis, dysphagia, hoarseness, chest pain, palpitations, hypertension, edema, cough, shortness of breath, wheezing, hemoptysis, snoring, hematuria, diabetes, thyroid disease, anemia, bruising, history of blood transfusion, dizziness, headache, or fainting.     Physical Examination     Well developed well nourished male in no apparent distress  Visit Vitals  /70   Pulse 72   Temp 97.4 °F (36.3 °C) (Oral)   Resp 16 Ht 5' 10\" (1.778 m)   Wt 88 kg (194 lb)   SpO2 98%   BMI 27.84 kg/m²      Head: normocephalic, atraumatic  Mouth: Clear, no overt lesions, oral mucosa pink and moist  Neck: supple, no masses, no adenopathy or carotid bruits, trachea midline  Resp: clear to auscultation bilaterally, no wheeze, rhonchi or rales, excursions normal and symmetrical  Cardio: Regular rate and rhythm, no murmurs, clicks, gallops or rubs, no edema or varicosities  Abdomen: soft, nontender, nondistended, normoactive bowel sounds, large left inguinal hernia, no hepatosplenomegaly,   Back: Deferred  Extremeties: warm, well-perfused, no tenderness or swelling, normal gait/station  Neuro: sensation and strength grossly intact and symmetrical  Psych: alert and oriented to person, place and time  Breast exam deferred     IMPRESSION  Planned left inguinal hernia     PLAN  No orders of the defined types were placed in this encounter.     As above  Quyen Ngo MD                            Electronically signed by Sheba Marino MD at 04/29/19 8323   Note Details     Author Sheba Marino MD File Time 04/29/19 0703   Author Type Physician Status Signed   Last  Sheba Marino MD Specialty Surgery       Office Visit on 4/26/2019            Detailed Report           Note shared with patient

## 2019-05-03 DIAGNOSIS — K40.90 INGUINAL HERNIA OF LEFT SIDE WITHOUT OBSTRUCTION OR GANGRENE: ICD-10-CM

## 2019-05-03 DIAGNOSIS — K40.90 INGUINAL HERNIA WITHOUT OBSTRUCTION OR GANGRENE, RECURRENCE NOT SPECIFIED, UNSPECIFIED LATERALITY: Primary | ICD-10-CM

## 2019-05-03 NOTE — OP NOTES
[address] [place] [address]   OPERATIVE REPORT    Name:  Lillian Real  MR#:   [id number]  :  1970  ACCOUNT #:  [de-identified]  DATE OF SERVICE:  2019    PREOPERATIVE DIAGNOSIS:  Large left inguinal hernia. POSTOPERATIVE DIAGNOSIS:  Large left inguinal hernia plus indirect sliding hernia. PROCEDURE PERFORMED:  Reduction repair of left inguinal hernia. SURGEON:  Bridgette Peck MD    ASSISTANT:  .    ANESTHESIA:  General.    COMPLICATIONS:  None. SPECIMENS REMOVED:  None. IMPLANTS:  Plug and patch. ESTIMATED BLOOD LOSS:  Minimal.    INDICATION:  The patient is a 51-year-old gentleman with a large left inguinal hernia for repair. PROCEDURE:  After appropriate antibiotics and sequential compression stockings, the patient was taken to the operating room, placed in a supine position on the OR table. After adequate general anesthesia, his abdomen was prepped and draped to [facility] standard. His hernia was reduced without difficulty, and standard inguinal herniorrhaphy incision was created. Bovie electrocautery was used to take down the subcutaneous tissue to the external oblique aponeurosis. The aponeurosis was opened sharply with a knife and the Metzenbaum scissors exposing the inguinal canal.  Again further reduction of his hernia was performed, and the spermatic cord was bluntly dissected and surrounded with a Penrose drain. Using sharp dissection and Bovie, the hernia sac was mobilized away from the cord, and he was noted to have a sliding hernia to include small bowel. This was reduced without difficulty, and the sac was re-peritonealized. An extra large plug was then placed in the internal ring, and a patch was used to re-create the floor of the inguinal canal.  The plug was sewn in with 0 Ethibond as was the patch.   The patch was sewn to the pubic tubercle, the transversalis fascia, shelving portion of the inguinal ligament, laterally it was wrapped around the cord closed with 0 Ethibond as well. The ends of the patch were then tucked underneath the external oblique aponeurosis. Copious irrigation was carried out and suctioned free, and the aponeurosis was closed with 3-0 Vicryl. A number 10 flat LA NENA drain was placed down into the scrotum as this was now a fairly sizable space. It was brought out through a separate stab wound just inferior to the incision. Copious irrigation of the subcutaneous was carried out, and it was closed with 3-0 Vicryl. Skin was closed with 4-0 Monocryl, and the drain was sewn in with 2-0 nylon. He tolerated the procedure well. His left testis was in his scrotum at the end of the operation. He was taken to recovery in stable condition.       Ellen Weeks MD [id number]  D:  05/02/2019 11:34  T:  05/02/2019 11:44  JOB #:  [id number]

## 2019-05-04 ENCOUNTER — HOME HEALTH ADMISSION (OUTPATIENT)
Dept: HOME HEALTH SERVICES | Facility: HOME HEALTH | Age: 49
End: 2019-05-04
Payer: COMMERCIAL

## 2019-05-05 ENCOUNTER — HOME CARE VISIT (OUTPATIENT)
Dept: HOME HEALTH SERVICES | Facility: HOME HEALTH | Age: 49
End: 2019-05-05

## 2019-05-06 ENCOUNTER — HOME CARE VISIT (OUTPATIENT)
Dept: HOME HEALTH SERVICES | Facility: HOME HEALTH | Age: 49
End: 2019-05-06

## 2019-05-09 ENCOUNTER — HOME CARE VISIT (OUTPATIENT)
Dept: SCHEDULING | Facility: HOME HEALTH | Age: 49
End: 2019-05-09
Payer: COMMERCIAL

## 2019-05-09 VITALS
OXYGEN SATURATION: 98 % | DIASTOLIC BLOOD PRESSURE: 81 MMHG | RESPIRATION RATE: 16 BRPM | SYSTOLIC BLOOD PRESSURE: 137 MMHG | HEART RATE: 87 BPM | TEMPERATURE: 98.3 F

## 2019-05-09 PROCEDURE — G0299 HHS/HOSPICE OF RN EA 15 MIN: HCPCS

## 2019-05-09 PROCEDURE — 400013 HH SOC

## 2019-05-10 ENCOUNTER — HOME CARE VISIT (OUTPATIENT)
Dept: HOME HEALTH SERVICES | Facility: HOME HEALTH | Age: 49
End: 2019-05-10
Payer: COMMERCIAL

## 2019-05-10 PROCEDURE — A6216 NON-STERILE GAUZE<=16 SQ IN: HCPCS

## 2019-05-10 PROCEDURE — A4452 WATERPROOF TAPE: HCPCS

## 2019-05-10 PROCEDURE — A6260 WOUND CLEANSER ANY TYPE/SIZE: HCPCS

## 2019-05-13 ENCOUNTER — HOME CARE VISIT (OUTPATIENT)
Dept: SCHEDULING | Facility: HOME HEALTH | Age: 49
End: 2019-05-13
Payer: COMMERCIAL

## 2019-05-13 PROCEDURE — G0300 HHS/HOSPICE OF LPN EA 15 MIN: HCPCS

## 2019-05-15 ENCOUNTER — OFFICE VISIT (OUTPATIENT)
Dept: SURGERY | Age: 49
End: 2019-05-15

## 2019-05-15 VITALS
TEMPERATURE: 97 F | DIASTOLIC BLOOD PRESSURE: 57 MMHG | BODY MASS INDEX: 28.06 KG/M2 | SYSTOLIC BLOOD PRESSURE: 104 MMHG | HEIGHT: 70 IN | RESPIRATION RATE: 18 BRPM | WEIGHT: 196 LBS | OXYGEN SATURATION: 97 % | HEART RATE: 66 BPM

## 2019-05-15 DIAGNOSIS — K40.90 INGUINAL HERNIA OF LEFT SIDE WITHOUT OBSTRUCTION OR GANGRENE: Primary | ICD-10-CM

## 2019-05-15 NOTE — PROGRESS NOTES
Chief Complaint   Patient presents with    Post OP Follow Up     left inguinal hernia repair with mesh 5/2/2019     Pt having mild constipation possibly from the pain medication.

## 2019-05-15 NOTE — PROGRESS NOTES
Progress Note    Patient: Marty Chawla  MRN: [id number]  SSN: [id number]   YOB: 1970  Age: 57 y.o. Sex: male     Chief Complaint   Patient presents with    Post OP Follow Up     left inguinal hernia repair with mesh 5/2/2019       HPI    Mr. Molly De La Torre is a 32-year-old gentleman status post a left inguinal hernia repair 2 weeks ago. He is back for his wound check. His wound looks good his drain has been removed and he is starting to approach baseline. Past Medical History:   Diagnosis Date    Abnormal nuclear cardiac imaging test 10/08/2012    Fixed anteroseptal, anteroapical defect c/w prior infarction. No ischemia. Mid & distal anteroseptal, anteroapical WMA. LVE. EF 44%.  Anemia     dr. Josh Ly doubt amyloid or multiple myeloma. candidate for procirt if Hg <10    Benign hypertensive     Blindness of right eye 01/2013    legally blind    CAD (coronary artery disease)     Cardiomyopathy ejection fraction of 40%     CKD (chronic kidney disease), stage IV ([address]     to see Dr. Kera Lopez 12/1/12    Congestive heart failure ([address]     Diabetes mellitus ([address]     Di[facility]     Diabetic retinopathy ([address]     Dr. Karla Griffin- injections    Heart failure [facility]     History of echocardiogram 04/22/2014    LVE. EF 55% (prev 40-45% on echo of 1/27/12). No WMA. Mild-mod conc LVH. Gr 3 DDfx. Marked LAE. Mild SHANTEL. Mild MR.    Hypertension     Pulmonary hypertension ([address]     Renal Ultrasound 1/3/12    Right kidney isoechoic w/respect to liver. Sm left-sided pleural effusion    S/P cardiac cath 10/16/2012    LM patent. pLAD 95% (3.5 x 12-mm [place] stent, resid 0$%). LCX mild. Past Surgical History:   Procedure Laterality Date    HX CORONARY STENT PLACEMENT      one    HX LAPAROTOMY  2/2012    bowel obstruction with removal segment of small bowel. Done by Riblet.  HX LAPAROTOMY  infancy    whole in colon and had repair at that time.     HX OTHER SURGICAL 06/20/2013    left eye retna attatchment    HX RETINAL DETACHMENT REPAIR      august 2012    REPAIR ING HERNIA,5+Y/O,REDUCIBL Left 05/02/2019    Dr. Néstor Cristobal     No Known Allergies  Current Outpatient Medications   Medication Sig Dispense Refill    lisinopril (PRINIVIL, ZESTRIL) 20 mg tablet Take 20 mg by mouth two (2) times a day.  amLODIPine (NORVASC) 10 mg tablet Take 10 mg by mouth daily.  sucroferric oxyhydroxide (VELPHORO) 500 mg chew chewable tablet Take  by mouth three (3) times daily (with meals).  carvedilol (COREG) 25 mg tablet TAKE TWO TABLETS BY MOUTH TWICE DAILY 360 Tab 3    glimepiride (AMARYL) 4 mg tablet TAKE 1 TABLET BY MOUTH EVERY MORNING 90 Tab 0    sildenafil citrate (VIAGRA) 50 mg tablet Take 1 Tab by mouth as needed. 10 Tab 3    hydrALAZINE (APRESOLINE) 100 mg tablet Take 100 mg by mouth three (3) times daily.  dilTIAZem (CARDIZEM) 90 mg tablet Take 1 Tab by mouth Before breakfast, lunch, and dinner. 90 Tab 1    aspirin delayed-release 81 mg tablet Take 81 mg by mouth daily.  lisinopril (PRINIVIL, ZESTRIL) 5 mg tablet Take 4 Tabs by mouth two (2) times a day. 30 Tab 1    EPOETIN SEBLE (PROCRIT IJ) by Injection route.        Social History     Socioeconomic History    Marital status:      Spouse name: Not on file    Number of children: Not on file    Years of education: Not on file    Highest education level: Not on file   Occupational History    Not on file   Social Needs    Financial resource strain: Not on file    Food insecurity:     Worry: Not on file     Inability: Not on file    Transportation needs:     Medical: Not on file     Non-medical: Not on file   Tobacco Use    Smoking status: Never Smoker    Smokeless tobacco: Never Used   Substance and Sexual Activity    Alcohol use: Not Currently     Alcohol/week: 2.5 oz     Types: 5 Cans of beer per week    Drug use: No    Sexual activity: Yes     Partners: Female     Birth control/protection: None   Lifestyle    Physical activity:     Days per week: Not on file     Minutes per session: Not on file    Stress: Not on file   Relationships    Social connections:     Talks on phone: Not on file     Gets together: Not on file     Attends Rastafarian service: Not on file     Active member of club or organization: Not on file     Attends meetings of clubs or organizations: Not on file     Relationship status: Not on file    Intimate partner violence:     Fear of current or ex partner: Not on file     Emotionally abused: Not on file     Physically abused: Not on file     Forced sexual activity: Not on file   Other Topics Concern    Not on file   Social History Narrative    Not on file     Family History   Problem Relation Age of Onset    Diabetes Mother     Hypertension Mother     Kidney Disease Mother     High Cholesterol Father     Diabetes Brother         pre diabetic         Review of systems:  Patient denies any reflux, emesis, abdominal pain, change in bowel habits, hematochezia, melena, fever, weight loss, fatigue chills, dermatitis, abnormal moles, change in vision, vertigo, epistaxis, dysphagia, hoarseness, chest pain, palpitations, hypertension, edema, cough, shortness of breath, wheezing, hemoptysis, snoring, hematuria, diabetes, thyroid disease, anemia, bruising, history of blood transfusion, dizziness, headache, or fainting.     Physical Examination    Well developed well nourished male in no apparent distress  Visit Vitals  /57   Pulse 66   Temp 97 °F (36.1 °C) (Oral)   Resp 18   Ht 5' 10\" (1.778 m)   Wt 88.9 kg (196 lb)   SpO2 97%   BMI 28.12 kg/m²      Head: normocephalic, atraumatic  Mouth: Clear, no overt lesions, oral mucosa pink and moist  Neck: supple, no masses, no adenopathy or carotid bruits, trachea midline  Resp: clear to auscultation bilaterally, no wheeze, rhonchi or rales, excursions normal and symmetrical  Cardio: Regular rate and rhythm, no murmurs, clicks, gallops or rubs, no edema or varicosities  Abdomen: soft, nontender, nondistended, normoactive bowel sounds, no hernias, no hepatosplenomegaly, healing left inguinal hernia wound  Back: Deferred  Extremeties: warm, well-perfused, no tenderness or swelling, normal gait/station  Neuro: sensation and strength grossly intact and symmetrical  Psych: alert and oriented to person, place and time  Breast exam deferred    IMPRESSION  Status post left inguinal hernia repair    PLAN  No orders of the defined types were placed in this encounter.     Follow-up TRESA Petty MD

## 2019-05-15 NOTE — TELEPHONE ENCOUNTER
This patient contacted office for the following prescriptions to be filled:    Medication requested :   Requested Prescriptions     Pending Prescriptions Disp Refills    glimepiride (AMARYL) 4 mg tablet 90 Tab 0     Sig: TAKE 1 TABLET BY MOUTH EVERY MORNING     PCP: liza   Pharmacy or Print: on site rx   Mail order or Local pharmacy [phone]    Scheduled appointment if not seen by current providers in office:lov 3/7/19 and 6/7/19

## 2019-05-16 RX ORDER — GLIMEPIRIDE 4 MG/1
TABLET ORAL
Qty: 90 TAB | Refills: 0 | Status: SHIPPED | OUTPATIENT
Start: 2019-05-16 | End: 2019-06-18 | Stop reason: SDUPTHER

## 2019-05-17 ENCOUNTER — HOME CARE VISIT (OUTPATIENT)
Dept: SCHEDULING | Facility: HOME HEALTH | Age: 49
End: 2019-05-17
Payer: COMMERCIAL

## 2019-05-17 PROCEDURE — G0299 HHS/HOSPICE OF RN EA 15 MIN: HCPCS

## 2019-05-22 VITALS
SYSTOLIC BLOOD PRESSURE: 133 MMHG | HEART RATE: 67 BPM | OXYGEN SATURATION: 98 % | DIASTOLIC BLOOD PRESSURE: 72 MMHG | RESPIRATION RATE: 18 BRPM

## 2019-05-27 VITALS
DIASTOLIC BLOOD PRESSURE: 80 MMHG | SYSTOLIC BLOOD PRESSURE: 130 MMHG | TEMPERATURE: 98.1 F | RESPIRATION RATE: 20 BRPM | OXYGEN SATURATION: 98 % | HEART RATE: 82 BPM

## 2019-06-18 ENCOUNTER — OFFICE VISIT (OUTPATIENT)
Dept: FAMILY MEDICINE CLINIC | Age: 49
End: 2019-06-18

## 2019-06-18 VITALS
RESPIRATION RATE: 16 BRPM | DIASTOLIC BLOOD PRESSURE: 78 MMHG | TEMPERATURE: 98.5 F | HEIGHT: 70 IN | WEIGHT: 195 LBS | OXYGEN SATURATION: 98 % | BODY MASS INDEX: 27.92 KG/M2 | HEART RATE: 84 BPM | SYSTOLIC BLOOD PRESSURE: 133 MMHG

## 2019-06-18 DIAGNOSIS — D63.8 ANEMIA OF CHRONIC DISEASE: ICD-10-CM

## 2019-06-18 DIAGNOSIS — I48.91 ATRIAL FIBRILLATION WITH RAPID VENTRICULAR RESPONSE (HCC): ICD-10-CM

## 2019-06-18 DIAGNOSIS — I25.10 CORONARY ARTERY DISEASE INVOLVING NATIVE CORONARY ARTERY OF NATIVE HEART WITHOUT ANGINA PECTORIS: ICD-10-CM

## 2019-06-18 DIAGNOSIS — N18.6 ESRD (END STAGE RENAL DISEASE) (HCC): Primary | ICD-10-CM

## 2019-06-18 DIAGNOSIS — H54.8 LEGALLY BLIND: ICD-10-CM

## 2019-06-18 DIAGNOSIS — E78.5 DYSLIPIDEMIA: ICD-10-CM

## 2019-06-18 DIAGNOSIS — E11.3419 TYPE 2 DIABETES MELLITUS WITH SEVERE NONPROLIFERATIVE RETINOPATHY AND MACULAR EDEMA, WITHOUT LONG-TERM CURRENT USE OF INSULIN, UNSPECIFIED LATERALITY (HCC): ICD-10-CM

## 2019-06-18 RX ORDER — GLIMEPIRIDE 4 MG/1
TABLET ORAL
Qty: 90 TAB | Refills: 3 | Status: SHIPPED | OUTPATIENT
Start: 2019-06-18 | End: 2019-09-17 | Stop reason: DRUGHIGH

## 2019-06-18 RX ORDER — INSULIN PUMP SYRINGE, 3 ML
EACH MISCELLANEOUS
Qty: 1 KIT | Refills: 0 | Status: SHIPPED | OUTPATIENT
Start: 2019-06-18

## 2019-06-18 NOTE — PATIENT INSTRUCTIONS

## 2019-06-18 NOTE — PROGRESS NOTES
1. Have you been to the ER, urgent care clinic since your last visit? Hospitalized since your last visit? 2. Have you seen or consulted any other health care providers outside of the [address] [place] [address] since your last visit? Include any pap smears or colon screening.  No  Dr. Adali Brink

## 2019-06-18 NOTE — PROGRESS NOTES
Merton Ormond, 57 y.o.,  male    SUBJECTIVE  Ff-up    ESRD-ongoing HD 3 x a week. Also with anemia of chronic disease and HTN, following Dr. Ellie Gutierrez    CAD/Afib- no chest pains, palpitations, leg edema. Taking medications, following cardiology. NIDDM- w/ retinopathy, legally blind. does not check glucose at home. Denies hypoglycemic symptoms. His BG levels during inguinal hernia repair during admission in May . He is more open to home glucose montioring    Lives with wife, who assists with advance ADLs    ROS:  See HPI, all others negative        Patient Active Problem List   Diagnosis Code    Benign hypertensive  I11.9    CRI (chronic renal insufficiency) N18.9    Diabetes mellitus ([address] E11.9    Cardiomyopathy ([address] I42.9    Iron deficiency anemia D50.9    CAD (coronary artery disease) I25.10    Legally blind H54.8    Diabetic retinopathy ([facility] E11.319    Severe nonproliferative diabetic retinopathy with macular edema, associated with type 2 diabetes mellitus E11.3419    Dyslipidemia E78.5    Atrial fibrillation with rapid ventricular response (HCC) I48.91    Type 2 diabetes with nephropathy (HCC) E11.21       Current Outpatient Medications   Medication Sig Dispense Refill    amLODIPine (NORVASC) 10 mg tablet Take  by mouth daily.  sucroferric oxyhydroxide (VELPHORO) 500 mg chew chewable tablet Take  by mouth three (3) times daily (with meals).  carvedilol (COREG) 25 mg tablet TAKE TWO TABLETS BY MOUTH TWICE DAILY 360 Tab 3    glimepiride (AMARYL) 4 mg tablet TAKE 1 TABLET BY MOUTH EVERY MORNING 90 Tab 0    sildenafil citrate (VIAGRA) 50 mg tablet Take 1 Tab by mouth as needed. 10 Tab 3    hydrALAZINE (APRESOLINE) 100 mg tablet Take 100 mg by mouth three (3) times daily.  dilTIAZem (CARDIZEM) 90 mg tablet Take 1 Tab by mouth Before breakfast, lunch, and dinner. 90 Tab 1    aspirin delayed-release 81 mg tablet Take 81 mg by mouth daily.       lisinopril (PRINIVIL, ZESTRIL) 5 mg tablet Take 4 Tabs by mouth two (2) times a day. 30 Tab 1    EPOETIN SEBLE (PROCRIT IJ) by Injection route. No Known Allergies    Past Medical History:   Diagnosis Date    Abnormal nuclear cardiac imaging test 10/08/2012    Fixed anteroseptal, anteroapical defect c/w prior infarction. No ischemia. Mid & distal anteroseptal, anteroapical WMA. LVE. EF 44%.  Anemia     dr. Gwyn Primrose doubt amyloid or multiple myeloma. candidate for procirt if Hg <10    Benign hypertensive     Blindness of right eye 01/2013    legally blind    CAD (coronary artery disease)     Cardiomyopathy ejection fraction of 40%     CKD (chronic kidney disease), stage IV ([facility]     to see Dr. Marlo Martines 12/1/12    Congestive heart failure ([facility]     Diabetes mellitus ([facility]     Diabetic retinopathy ([facility]     Diabetic retinopathy ([facility] [facility]     Dr. Man Hoyos- injections    Heart failure ([facility] [facility]     History of echocardiogram 04/22/2014    LVE. EF 55% (prev 40-45% on echo of 1/27/12). No WMA. Mild-mod conc LVH. Gr 3 DDfx. Marked LAE. Mild SHANTEL. Mild MR.    Hypertension     Pulmonary hypertension ([facility]     Renal Ultrasound 1/3/12    Right kidney isoechoic w/respect to liver. Sm left-sided pleural effusion    S/P cardiac cath 10/16/2012    LM patent. pLAD 95% (3.5 x 12-mm [place] stent, resid 0$%). LCX mild.          Social History     Socioeconomic History    Marital status:      Spouse name: Not on file    Number of children: Not on file    Years of education: Not on file    Highest education level: Not on file   Social Needs    Financial resource strain: Not on file    Food insecurity - worry: Not on file    Food insecurity - inability: Not on file    Transportation needs - medical: Not on file   [employer] needs - non-medical: Not on file   Occupational History    Not on file   Tobacco Use    Smoking status: Never Smoker    Smokeless tobacco: Never Used   Substance and Sexual Activity    Alcohol use: Yes     Alcohol/week: 2.5 oz     Types: 5 Cans of beer per week     Comment: occassionally    Drug use: No    Sexual activity: Yes     Partners: Female     Birth control/protection: None   Other Topics Concern    Not on file   Social History Narrative    Not on file       Family History   Problem Relation Age of Onset    Diabetes Mother     Hypertension Mother     Kidney Disease Mother     High Cholesterol Father     Diabetes Brother         pre diabetic         OBJECTIVE    Physical Exam:     Visit Vitals  Visit Vitals  /78 (BP 1 Location: Left arm, BP Patient Position: Sitting)   Pulse 84   Temp 98.5 °F (36.9 °C) (Oral)   Resp 16   Ht 5' 10\" (1.778 m)   Wt 195 lb (88.5 kg)   SpO2 98%   BMI 27.98 kg/m²         General: alert, chronically ill-appearing,  in no apparent distress or pain  CVS: normal rate, regular rhythm, distinct S1 and S2  Lungs:clear to ausculation bilaterally, no crackles, wheezing or rhonchi noted  Extremities: no edema, no cyanosis, MSK grossly normal  Skin: warm, no lesions, rashes noted  Psych:  mood and affect normal    Results for orders placed or performed in visit on 03/07/19   AMB POC HEMOGLOBIN A1C   Result Value Ref Range    Hemoglobin A1c (POC) 5.3 %         ASSESSMENT/PLAN  Diagnoses and all orders for this visit:    ESRD ([facility] [address]  On dialysis following dr. Maria Esther Calzada  -     CBC WITH AUTOMATED DIFF; Future  -     METABOLIC PANEL, COMPREHENSIVE;  Future    Severe nonproliferative diabetic retinopathy of both eyes with macular edema associated with type 2 diabetes mellitus (HCC)  a1c 5.3, though w/ anemia  On amaryl  Strongly recommended to check home blood glucose levels, glucometer ordered  No hypoglycemic symptoms   If BG< 70, advised to hold ZAMORA  Cmp/cbc/a1c in 3 months    Dyslipidemia  LDL 85 (2/19)  Cont lipitor    Coronary artery disease  Asymptomatic   On asa, statin, bb, ace  Following cardiology    Essentially hypertension  Controlled  Nephrology following    Legally blind     Atrial fibrillation with rapid ventricular response (HCC)   rate controlled, anticoag with asa (chads vasc 3, asa advised per cards due to anemia)     Anemia of chronic disease  Following nephrology    Follow-up Disposition:  Return in about 3 months, or if symptoms worsen or fail to improve. Patient understands plan of care. Patient has provided input and agrees with goals.

## 2019-08-01 ENCOUNTER — HOSPITAL ENCOUNTER (EMERGENCY)
Age: 49
Discharge: HOME | End: 2019-08-01
Attending: EMERGENCY MEDICINE
Payer: COMMERCIAL

## 2019-08-01 VITALS
RESPIRATION RATE: 14 BRPM | SYSTOLIC BLOOD PRESSURE: 100 MMHG | OXYGEN SATURATION: 100 % | DIASTOLIC BLOOD PRESSURE: 66 MMHG | TEMPERATURE: 97.7 F | HEART RATE: 84 BPM

## 2019-08-01 DIAGNOSIS — E11.21 TYPE 2 DIABETES WITH NEPHROPATHY (HCC): ICD-10-CM

## 2019-08-01 DIAGNOSIS — I95.3 HEMODIALYSIS-ASSOCIATED HYPOTENSION: Primary | ICD-10-CM

## 2019-08-01 LAB
ALBUMIN SERPL-MCNC: 3.6 G/DL (ref 3.4–5)
ALBUMIN/GLOB SERPL: 0.8 {RATIO} (ref 0.8–1.7)
ALP SERPL-CCNC: 147 U/L (ref 45–117)
ALT SERPL-CCNC: 20 U/L (ref 16–61)
ANION GAP SERPL CALC-SCNC: 8 MMOL/L (ref 3–18)
AST SERPL-CCNC: 16 U/L (ref 10–38)
BASOPHILS # BLD: 0.1 K/UL (ref 0–0.1)
BASOPHILS NFR BLD: 1 % (ref 0–2)
BILIRUB SERPL-MCNC: 0.3 MG/DL (ref 0.2–1)
BUN SERPL-MCNC: 42 MG/DL (ref 7–18)
BUN/CREAT SERPL: 5 (ref 12–20)
CALCIUM SERPL-MCNC: 8.5 MG/DL (ref 8.5–10.1)
CHLORIDE SERPL-SCNC: 98 MMOL/L (ref 100–111)
CK MB CFR SERPL CALC: 0.6 % (ref 0–4)
CK MB SERPL-MCNC: 1.6 NG/ML (ref 5–25)
CK SERPL-CCNC: 290 U/L (ref 39–308)
CO2 SERPL-SCNC: 33 MMOL/L (ref 21–32)
CREAT SERPL-MCNC: 9.33 MG/DL (ref 0.6–1.3)
DIFFERENTIAL METHOD BLD: ABNORMAL
EOSINOPHIL # BLD: 0.3 K/UL (ref 0–0.4)
EOSINOPHIL NFR BLD: 6 % (ref 0–5)
ERYTHROCYTE [DISTWIDTH] IN BLOOD BY AUTOMATED COUNT: 15.3 % (ref 11.6–14.5)
GLOBULIN SER CALC-MCNC: 4.8 G/DL (ref 2–4)
GLUCOSE SERPL-MCNC: 157 MG/DL (ref 74–99)
HCT VFR BLD AUTO: 42.9 % (ref 36–48)
HGB BLD-MCNC: 14.5 G/DL (ref 13–16)
LYMPHOCYTES # BLD: 1.5 K/UL (ref 0.9–3.6)
LYMPHOCYTES NFR BLD: 31 % (ref 21–52)
MCH RBC QN AUTO: 30.5 PG (ref 24–34)
MCHC RBC AUTO-ENTMCNC: 33.8 G/DL (ref 31–37)
MCV RBC AUTO: 90.1 FL (ref 74–97)
MONOCYTES # BLD: 0.5 K/UL (ref 0.05–1.2)
MONOCYTES NFR BLD: 10 % (ref 3–10)
NEUTS SEG # BLD: 2.5 K/UL (ref 1.8–8)
NEUTS SEG NFR BLD: 52 % (ref 40–73)
PLATELET # BLD AUTO: 212 K/UL (ref 135–420)
PMV BLD AUTO: 9.7 FL (ref 9.2–11.8)
POTASSIUM SERPL-SCNC: 4.3 MMOL/L (ref 3.5–5.5)
PROT SERPL-MCNC: 8.4 G/DL (ref 6.4–8.2)
RBC # BLD AUTO: 4.76 M/UL (ref 4.7–5.5)
SODIUM SERPL-SCNC: 139 MMOL/L (ref 136–145)
TROPONIN I SERPL-MCNC: <0.02 NG/ML (ref 0–0.04)
WBC # BLD AUTO: 4.7 K/UL (ref 4.6–13.2)

## 2019-08-01 PROCEDURE — 80053 COMPREHEN METABOLIC PANEL: CPT

## 2019-08-01 PROCEDURE — 93005 ELECTROCARDIOGRAM TRACING: CPT

## 2019-08-01 PROCEDURE — 99284 EMERGENCY DEPT VISIT MOD MDM: CPT

## 2019-08-01 PROCEDURE — 85025 COMPLETE CBC W/AUTO DIFF WBC: CPT

## 2019-08-01 PROCEDURE — 82550 ASSAY OF CK (CPK): CPT

## 2019-08-02 RX ORDER — GLIMEPIRIDE 4 MG/1
TABLET ORAL
Qty: 90 TAB | Refills: 0 | Status: SHIPPED | OUTPATIENT
Start: 2019-08-02 | End: 2019-08-13 | Stop reason: SDUPTHER

## 2019-08-02 NOTE — DISCHARGE INSTRUCTIONS
Patient Education        Low Blood Pressure: Care Instructions  Your Care Instructions    Blood pressure is a measurement of the force of the blood against the walls of the blood vessels during and after each beat of the heart. Low blood pressure is also called hypotension. It means that your blood pressure is much lower than normal. Some people, especially young, slim women, may have slightly low blood pressure without symptoms. But in many people, low blood pressure can cause symptoms such as feeling dizzy or lightheaded. When your blood pressure is too low, your heart, brain, and other organs do not get enough blood. Low blood pressure can be caused by many things, including heart problems and some medicines. Diabetes that is not under control can cause your blood pressure to drop. And so can a severe allergic reaction or infection. Another cause is dehydration, which is when your body loses too much fluid. Treatment for low blood pressure depends on the cause. Follow-up care is a key part of your treatment and safety. Be sure to make and go to all appointments, and call your doctor if you are having problems. It's also a good idea to know your test results and keep a list of the medicines you take. How can you care for yourself at home? · Drink plenty of fluids, enough so that your urine is light yellow or clear like water. If you have kidney, heart, or liver disease and have to limit fluids, talk with your doctor before you increase the amount of fluids you drink. · Be safe with medicines. Call your doctor if you think you are having a problem with your medicine. You will get more details on the specific medicines your doctor prescribes. · Stand up or get out of bed very slowly to allow your body to adjust.  · Get plenty of rest.  · Do not smoke. Smoking increases your risk of heart attack. If you need help quitting, talk to your doctor about stop-smoking programs and medicines.  These can increase your chances of quitting for good. · Limit alcohol to 2 drinks a day for men and 1 drink a day for women. Alcohol may interfere with your medicine. In addition, alcohol can make your low blood pressure worse by causing your body to lose water. When should you call for help? Call 911 anytime you think you may need emergency care. For example, call if:    · You passed out (lost consciousness).    Call your doctor now or seek immediate medical care if:    · You are dizzy or lightheaded, or you feel like you may faint.    Watch closely for changes in your health, and be sure to contact your doctor if you have any problems. Where can you learn more? Go to http://caren-ar.info/. Enter C304 in the search box to learn more about \"Low Blood Pressure: Care Instructions. \"  Current as of: July 22, 2018  Content Version: 12.1  © 9442-0212 [employer]. Care instructions adapted under license by [employer] (which disclaims liability or warranty for this information). If you have questions about a medical condition or this instruction, always ask your healthcare professional. [address] any warranty or liability for your use of this information.

## 2019-08-02 NOTE — ED PROVIDER NOTES
EMERGENCY DEPARTMENT HISTORY AND PHYSICAL EXAM      Date: 8/1/2019  Patient Name: Anupama Johnson [facility]    History of Presenting Illness     Chief Complaint   Patient presents with    Hypotension       History Provided By: Patient    Chief Complaint: Hypotension  Duration: 2 Hours  Timing:  Gradual  Location: Dialysis  Quality: Lightheaded  Severity: Severe  Modifying Factors: Worse with standing  Associated Symptoms: denies any other associated signs or symptoms      Additional History (Context): Keri Vergara is a 57 y.o. male with end-stage renal disease, diabetes, hypertension who presents following hypotension towards the conclusion of his dialysis session earlier today. Patient stated that he felt lightheaded and then his vision got blurry. Reportedly systolic blood pressure in the 60s. Patient went home and continued to feel lightheaded, blood pressure was checked it was slightly improved with systolic in the 83P. Denies chest pain or shortness of breath throughout. No longer complaining of being lightheaded. Of note, dialysis had talked about decreasing his target weight as they believe he is likely having too much volume removed. Nephrologist was not around this week to make that adjustment. PCP: Skyler Stoddard MD    Current Outpatient Medications   Medication Sig Dispense Refill    glimepiride (AMARYL) 4 mg tablet TAKE 1 TABLET BY MOUTH EVERY MORNING 90 Tab 3    Blood-Glucose Meter monitoring kit Check fasting glucose 1 Kit 0    glucose blood VI test strips (ACCU-CHEK ZAC PLUS TEST STRP) strip Use as directed 100 Strip 2    lisinopril (PRINIVIL, ZESTRIL) 20 mg tablet Take 20 mg by mouth two (2) times a day.  amLODIPine (NORVASC) 10 mg tablet Take 10 mg by mouth daily.  sucroferric oxyhydroxide (VELPHORO) 500 mg chew chewable tablet Take 500 mg by mouth three (3) times daily (with meals).       lisinopril (PRINIVIL, ZESTRIL) 5 mg tablet Take 4 Tabs by mouth two (2) times a day. 30 Tab 1    carvedilol (COREG) 25 mg tablet TAKE TWO TABLETS BY MOUTH TWICE DAILY 360 Tab 3    sildenafil citrate (VIAGRA) 50 mg tablet Take 1 Tab by mouth as needed. (Patient taking differently: Take 1 Tab by mouth daily as needed for Other (sexual performance). ) 10 Tab 3    hydrALAZINE (APRESOLINE) 100 mg tablet Take 100 mg by mouth three (3) times daily.  dilTIAZem (CARDIZEM) 90 mg tablet Take 1 Tab by mouth Before breakfast, lunch, and dinner. 90 Tab 1    aspirin delayed-release 81 mg tablet Take 81 mg by mouth daily.  EPOETIN SEBLE (PROCRIT IJ) by Injection route. Past History     Past Medical History:  Past Medical History:   Diagnosis Date    Abnormal nuclear cardiac imaging test 10/08/2012    Fixed anteroseptal, anteroapical defect c/w prior infarction. No ischemia. Mid & distal anteroseptal, anteroapical WMA. LVE. EF 44%.  Anemia     dr. Antwan Avery doubt amyloid or multiple myeloma. candidate for procirt if Hg <10    Benign hypertensive     Blindness of right eye 01/2013    legally blind    CAD (coronary artery disease)     Cardiomyopathy ejection fraction of 40%     CKD (chronic kidney disease), stage IV ([address]     to see Dr. Cici Pagan 12/1/12    Congestive heart failure ([address]     Diabetes mellitus ([address]     Diabetic retinopathy ([address]     Diabetic retinopathy ([facility]     Dr. Jens Calles- injections    Heart failure ([facility] [address] [facility]     History of echocardiogram 04/22/2014    LVE. EF 55% (prev 40-45% on echo of 1/27/12). No WMA. Mild-mod conc LVH. Gr 3 DDfx. Marked LAE. Mild SHANTEL. Mild MR.    Hypertension     Pulmonary hypertension ([address]     Renal Ultrasound 1/3/12    Right kidney isoechoic w/respect to liver. Sm left-sided pleural effusion    S/P cardiac cath 10/16/2012    LM patent. pLAD 95% (3.5 x 12-mm [place] stent, resid 0$%). LCX mild.          Past Surgical History:  Past Surgical History:   Procedure Laterality Date    HX CORONARY STENT PLACEMENT      one    HX LAPAROTOMY  2/2012    bowel obstruction with removal segment of small bowel. Done by Liv.  HX LAPAROTOMY  infancy    whole in colon and had repair at that time.  HX OTHER SURGICAL  06/20/2013    left eye retna attatchment    HX RETINAL DETACHMENT REPAIR      august 2012    REPAIR ING HERNIA,5+Y/O,REDUCIBL Left 05/02/2019    Dr. Watts Forward       Family History:  Family History   Problem Relation Age of Onset    Diabetes Mother     Hypertension Mother     Kidney Disease Mother     High Cholesterol Father     Diabetes Brother         pre diabetic       Social History:  Social History     Tobacco Use    Smoking status: Never Smoker    Smokeless tobacco: Never Used   Substance Use Topics    Alcohol use: Not Currently     Alcohol/week: 4.2 standard drinks     Types: 5 Cans of beer per week    Drug use: No       Allergies:  No Known Allergies      Review of Systems   Review of Systems   Constitutional: Negative for chills, fatigue and fever. Eyes: Negative for photophobia and visual disturbance. Respiratory: Negative for chest tightness and shortness of breath. Cardiovascular: Negative for palpitations. Gastrointestinal: Negative for abdominal pain, diarrhea, nausea and vomiting. Genitourinary: Negative for frequency and urgency. Musculoskeletal: Negative for arthralgias, back pain and gait problem. Skin: Negative for rash. Neurological: Positive for light-headedness. Negative for dizziness and headaches. Psychiatric/Behavioral: Negative for self-injury and suicidal ideas. All other systems reviewed and are negative. Physical Exam     Vitals:    08/01/19 2147 08/01/19 2215   BP: 99/61 103/69   Pulse: 90 86   Resp: 18 9   Temp: 97.7 °F (36.5 °C)    SpO2: 100%      Physical Exam   Constitutional: He is oriented to person, place, and time. He appears well-developed and well-nourished. HENT:   Head: Normocephalic and atraumatic.    Eyes: Pupils are equal, round, and reactive to light. Conjunctivae and EOM are normal.   Neck: Normal range of motion. Neck supple. Cardiovascular: Normal rate and regular rhythm. No murmur heard. Pulmonary/Chest: Effort normal and breath sounds normal.   Abdominal: Soft. Bowel sounds are normal.   Musculoskeletal: Normal range of motion. Right forearm fistula with palpable thrill. Neurological: He is alert and oriented to person, place, and time. Skin: Skin is warm and dry. Nursing note and vitals reviewed. Diagnostic Study Results     Labs -     Recent Results (from the past 12 hour(s))   CBC WITH AUTOMATED DIFF    Collection Time: 08/01/19 10:00 PM   Result Value Ref Range    WBC 4.7 4.6 - 13.2 K/uL    RBC 4.76 4.70 - 5.50 M/uL    HGB 14.5 13.0 - 16.0 g/dL    HCT 42.9 36.0 - 48.0 %    MCV 90.1 74.0 - 97.0 FL    MCH 30.5 24.0 - 34.0 PG    MCHC 33.8 31.0 - 37.0 g/dL    RDW 15.3 (H) 11.6 - 14.5 %    PLATELET 115 451 - 327 K/uL    MPV 9.7 9.2 - 11.8 FL    NEUTROPHILS 52 40 - 73 %    LYMPHOCYTES 31 21 - 52 %    MONOCYTES 10 3 - 10 %    EOSINOPHILS 6 (H) 0 - 5 %    BASOPHILS 1 0 - 2 %    ABS. NEUTROPHILS 2.5 1.8 - 8.0 K/UL    ABS. LYMPHOCYTES 1.5 0.9 - 3.6 K/UL    ABS. MONOCYTES 0.5 0.05 - 1.2 K/UL    ABS. EOSINOPHILS 0.3 0.0 - 0.4 K/UL    ABS. BASOPHILS 0.1 0.0 - 0.1 K/UL    DF AUTOMATED     METABOLIC PANEL, COMPREHENSIVE    Collection Time: 08/01/19 10:00 PM   Result Value Ref Range    Sodium 139 136 - 145 mmol/L    Potassium 4.3 3.5 - 5.5 mmol/L    Chloride 98 (L) 100 - 111 mmol/L    CO2 33 (H) 21 - 32 mmol/L    Anion gap 8 3.0 - 18 mmol/L    Glucose 157 (H) 74 - 99 mg/dL    BUN 42 (H) 7.0 - 18 MG/DL    Creatinine 9.33 (H) 0.6 - 1.3 MG/DL    BUN/Creatinine ratio 5 (L) 12 - 20      GFR est AA 7 (L) >60 ml/min/1.73m2    GFR est non-AA 6 (L) >60 ml/min/1.73m2    Calcium 8.5 8.5 - 10.1 MG/DL    Bilirubin, total 0.3 0.2 - 1.0 MG/DL    ALT (SGPT) 20 16 - 61 U/L    AST (SGOT) 16 10 - 38 U/L    Alk.  phosphatase 147 (H) 45 - 117 U/L    Protein, total 8.4 (H) 6.4 - 8.2 g/dL    Albumin 3.6 3.4 - 5.0 g/dL    Globulin 4.8 (H) 2.0 - 4.0 g/dL    A-G Ratio 0.8 0.8 - 1.7     CARDIAC PANEL,(CK, CKMB & TROPONIN)    Collection Time: 08/01/19 10:00 PM   Result Value Ref Range     39 - 308 U/L    CK - MB 1.6 <3.6 ng/ml    CK-MB Index 0.6 0.0 - 4.0 %    Troponin-I, QT <0.02 0.0 - 0.045 NG/ML   EKG, 12 LEAD, INITIAL    Collection Time: 08/01/19 10:05 PM   Result Value Ref Range    Ventricular Rate 84 BPM    Atrial Rate 84 BPM    P-R Interval 158 ms    QRS Duration 88 ms    Q-T Interval 382 ms    QTC Calculation (Bezet) 451 ms    Calculated P Axis 46 degrees    Calculated R Axis 5 degrees    Calculated T Axis 44 degrees    Diagnosis       Normal sinus rhythm  Possible Left atrial enlargement  Anteroseptal infarct (cited on or before 06-DEC-2018)  Abnormal ECG  When compared with ECG of 16-APR-2019 14:00,  No significant change was found         Radiologic Studies -   No orders to display     CT Results  (Last 48 hours)    None        CXR Results  (Last 48 hours)    None            Medical Decision Making   I am the first provider for this patient. I reviewed the vital signs, available nursing notes, past medical history, past surgical history, family history and social history. Vital Signs-Reviewed the patient's vital signs. ED Course:   ED Course as of Aug 01 2251   Thu Aug 01, 2019   2242 Potassium normal.   METABOLIC PANEL, COMPREHENSIVE(!):    Sodium 139   Potassium 4.3   Chloride 98(!)   CO2 33(!)   Anion gap 8   Glucose 157(!)   BUN 42(!)   Creatinine 9.33(!)   BUN/Creatinine ratio 5(!)   GFR est AA 7(!)   GFR est non-AA 6(!)   Calcium 8.5   Bilirubin, total 0.3   ALT (SGPT) 20   AST 16   Alk.  phosphatase 147(!)   Protein, total 8.4(!)   Albumin 3.6   Globulin 4.8(!)   A-G Ratio 0.8 [DE]   2242 Unremarkable   CARDIAC PANEL,(CK, CKMB & TROPONIN):       CK - MB 1.6   CK-MB Index 0.6   Troponin-I, Qt. <0.02 [DE]   2242 Normal   CBC WITH AUTOMATED DIFF(!):    WBC 4.7   RBC 4.76   HGB 14.5   HCT 42.9   MCV 90.1   MCH 30.5   MCHC 33.8   RDW 15.3(!)   PLATELET 962   MPV 9.7   NEUTROPHILS 52   LYMPHOCYTES 31   MONOCYTES 10   EOSINOPHILS 6(!)   BASOPHILS 1   ABS. NEUTROPHILS 2.5   ABS. LYMPHOCYTES 1.5   ABS. MONOCYTES 0.5   ABS. EOSINOPHILS 0.3   ABS. BASOPHILS 0.1   DF AUTOMATED [DE]   5848 Given reassuring EKG, normal potassium, negative cardiac enzymes, no anemia, believe patient is safe for discharge. He is no longer symptomatic. He will ensure discussion with his Nephrologist to consider changing amount of fluid removed. Return with continued symptoms. [DE]      ED Course User Index  [DE] Rajendra Gonzalez MD         Disposition:  Discharged    DISCHARGE NOTE:   Pt has been reexamined. Patient has no new complaints, changes, or physical findings. Care plan outlined and precautions discussed. Results of labs and EKG were reviewed with the patient. All medications were reviewed with the patient. All of pt's questions and concerns were addressed. Patient was instructed and agrees to follow up with primary as well as nephrologist, as well as to return to the ED upon further deterioration. Patient is ready to go home. Follow-up Information     Follow up With Specialties Details Why Contact Info    Hi Penn MD Family Practice In 2 days As needed [address]  [address] [facility]  [phone]            Current Discharge Medication List      CONTINUE these medications which have NOT CHANGED    Details   glimepiride (AMARYL) 4 mg tablet TAKE 1 TABLET BY MOUTH EVERY MORNING  Qty: 90 Tab, Refills: 3      Blood-Glucose Meter monitoring kit Check fasting glucose  Qty: 1 Kit, Refills: 0      glucose blood VI test strips (ACCU-CHEK ZAC PLUS TEST STRP) strip Use as directed  Qty: 100 Strip, Refills: 2      !! lisinopril (PRINIVIL, ZESTRIL) 20 mg tablet Take 20 mg by mouth two (2) times a day.       amLODIPine (NORVASC) 10 mg tablet Take 10 mg by mouth daily. sucroferric oxyhydroxide (VELPHORO) 500 mg chew chewable tablet Take 500 mg by mouth three (3) times daily (with meals). !! lisinopril (PRINIVIL, ZESTRIL) 5 mg tablet Take 4 Tabs by mouth two (2) times a day. Qty: 30 Tab, Refills: 1      carvedilol (COREG) 25 mg tablet TAKE TWO TABLETS BY MOUTH TWICE DAILY  Qty: 360 Tab, Refills: 3      sildenafil citrate (VIAGRA) 50 mg tablet Take 1 Tab by mouth as needed. Qty: 10 Tab, Refills: 3      hydrALAZINE (APRESOLINE) 100 mg tablet Take 100 mg by mouth three (3) times daily. dilTIAZem (CARDIZEM) 90 mg tablet Take 1 Tab by mouth Before breakfast, lunch, and dinner. Qty: 90 Tab, Refills: 1      aspirin delayed-release 81 mg tablet Take 81 mg by mouth daily. EPOETIN SEBLE (PROCRIT IJ) by Injection route. !! - Potential duplicate medications found. Please discuss with provider. Diagnosis     Clinical Impression:   1. Hemodialysis-associated hypotension    2.  Type 2 diabetes with nephropathy (HCC)

## 2019-08-02 NOTE — ED TRIAGE NOTES
Pt c/o hypotension during and post dialysis. Pt family monitored bp at home, lowest seen was 60/40's. Pt Jasse, [place], Sat dialysis, completed almost all of 4 hour dialysis today.

## 2019-08-04 LAB
ATRIAL RATE: 84 BPM
CALCULATED P AXIS, ECG09: 46 DEGREES
CALCULATED R AXIS, ECG10: 5 DEGREES
CALCULATED T AXIS, ECG11: 44 DEGREES
DIAGNOSIS, 93000: NORMAL
P-R INTERVAL, ECG05: 158 MS
Q-T INTERVAL, ECG07: 382 MS
QRS DURATION, ECG06: 88 MS
QTC CALCULATION (BEZET), ECG08: 451 MS
VENTRICULAR RATE, ECG03: 84 BPM

## 2019-08-13 ENCOUNTER — OFFICE VISIT (OUTPATIENT)
Dept: CARDIOLOGY CLINIC | Age: 49
End: 2019-08-13

## 2019-08-13 VITALS
SYSTOLIC BLOOD PRESSURE: 88 MMHG | HEIGHT: 70 IN | DIASTOLIC BLOOD PRESSURE: 60 MMHG | BODY MASS INDEX: 27.92 KG/M2 | WEIGHT: 195 LBS | HEART RATE: 94 BPM | OXYGEN SATURATION: 96 %

## 2019-08-13 DIAGNOSIS — I10 ESSENTIAL HYPERTENSION: ICD-10-CM

## 2019-08-13 DIAGNOSIS — I42.9 CARDIOMYOPATHY, UNSPECIFIED TYPE (HCC): ICD-10-CM

## 2019-08-13 DIAGNOSIS — I48.91 ATRIAL FIBRILLATION WITH RAPID VENTRICULAR RESPONSE (HCC): Primary | ICD-10-CM

## 2019-08-13 DIAGNOSIS — I25.10 CORONARY ARTERY DISEASE INVOLVING NATIVE CORONARY ARTERY OF NATIVE HEART WITHOUT ANGINA PECTORIS: ICD-10-CM

## 2019-08-13 NOTE — PROGRESS NOTES
HISTORY OF PRESENT ILLNESS  Kirit Flynn is a 57 y.o. male. ASSESSMENT and PLAN    Mr. Mary Shannon has history of diabetes mellitus, diabetic retinopathy with legally blind as, coronary artery disease without chest pains, ischemic cardiomyopathy. He presented with significant fatigue, increased shortness of breath. He had LAD stent in October of 2012 by Dr. Linnea Jones. He has never had chest pains. He has history of mildly diminished LV function with ejection fraction of 40-50%. He is now on hemodialysis 3 times per week. His new baseline weight in 2019 is 199 pounds. Kidney transplant evaluation was performed in 2019. · CAD:    Symptomatically stable. However, he is currently undergoing evaluation for renal transplantation. They would like coronary angiography in preparation for kidney transplant evaluation and listing. We will proceed with this within the next 2 to 3 weeks. · BP:    Since being started on hemodialysis, his blood pressure has been running low with some symptomatic dizziness. Therefore, his diltiazem will be discontinued. · HR:    Stable. · CHF:    There is no evidence of decompensated CHF noted. · Weight:    His weight today is 195 pounds. His new baseline weight is about 199 pounds. · Cholesterol:   Target LDL <70. Lipitor 40. · Anti-platelet:   Remains on ASA. I will see him back in 6 months. Thank you. Encounter Diagnoses   Name Primary?     Atrial fibrillation with rapid ventricular response (HCC) Yes    Cardiomyopathy, unspecified type ([address]     Coronary artery disease involving native coronary artery of native heart without angina pectoris     Essential hypertension      the following changes in treatment are made: See above  lab results and schedule of future lab studies reviewed with patient  reviewed diet, exercise and weight control  cardiovascular risk and specific lipid/LDL goals reviewed  use of aspirin to prevent MI and TIA's discussed        HPI  Today, Mr. Surekha Adkins has no complaints of chest pains. He does have occasional dizziness especially after hemodialysis. He was told that his blood pressures been running low after hemodialysis. He is currently undergoing evaluation for kidney transplantation at [facility]. They are requesting coronary angiography with his known prior history of CAD. He denies any orthopnea or PND. He denies any palpitations or dizziness. Review of Systems   Respiratory: Negative for shortness of breath. Cardiovascular: Negative for chest pain, palpitations, orthopnea, claudication, leg swelling and PND. Neurological: Positive for dizziness. All other systems reviewed and are negative. Physical Exam   Constitutional: He is oriented to person, place, and time. He appears well-developed and well-nourished. HENT:   Head: Normocephalic. Eyes: Conjunctivae are normal.   Neck: Neck supple. No JVD present. Carotid bruit is not present. No thyromegaly present. Cardiovascular: Normal rate and regular rhythm. Pulmonary/Chest: Breath sounds normal.   Abdominal: Bowel sounds are normal.   Musculoskeletal: He exhibits no edema. Neurological: He is alert and oriented to person, place, and time. Skin: Skin is warm and dry. Nursing note and vitals reviewed. PCP: Jeanna Alonzo MD    Past Medical History:   Diagnosis Date    Abnormal nuclear cardiac imaging test 10/08/2012    Fixed anteroseptal, anteroapical defect c/w prior infarction. No ischemia. Mid & distal anteroseptal, anteroapical WMA. LVE. EF 44%.  Anemia     dr. Maria Isabel William doubt amyloid or multiple myeloma.  candidate for procirt if Hg <10    Benign hypertensive     Blindness of right eye 01/2013    legally blind    CAD (coronary artery disease)     Cardiomyopathy ejection fraction of 40%     CKD (chronic kidney disease), stage IV ([address]     to see Dr. Jorge Uriarte 12/1/12    Congestive heart failure ([address]     Diabetes mellitus [facility]     Di[facility]     Diabetic retinopathy ([facility]     Dr. Roselyn Agrawal- injections    Heart failure [facility])     History of echocardiogram 04/22/2014    LVE. EF 55% (prev 40-45% on echo of 1/27/12). No WMA. Mild-mod conc LVH. Gr 3 DDfx. Marked LAE. Mild SHANTEL. Mild MR.    Hypertension     Pulmonary hypertension ([facility]     Renal Ultrasound 1/3/12    Right kidney isoechoic w/respect to liver. Sm left-sided pleural effusion    S/P cardiac cath 10/16/2012    LM patent. pLAD 95% (3.5 x 12-mm Sherman stent, resid 0$%). LCX mild. Past Surgical History:   Procedure Laterality Date    HX CORONARY STENT PLACEMENT      one    HX LAPAROTOMY  2/2012    bowel obstruction with removal segment of small bowel. Done by Riblet.  HX LAPAROTOMY  infancy    whole in colon and had repair at that time.  HX OTHER SURGICAL  06/20/2013    left eye retna attatchment    HX RETINAL DETACHMENT REPAIR      august 2012    REPAIR ING HERNIA,5+Y/O,REDUCIBL Left 05/02/2019    Dr. Walt Sol       Current Outpatient Medications   Medication Sig Dispense Refill    glimepiride (AMARYL) 4 mg tablet TAKE 1 TABLET BY MOUTH EVERY MORNING 90 Tab 3    Blood-Glucose Meter monitoring kit Check fasting glucose 1 Kit 0    glucose blood VI test strips (ACCU-CHEK ZAC PLUS TEST STRP) strip Use as directed 100 Strip 2    lisinopril (PRINIVIL, ZESTRIL) 20 mg tablet Take 20 mg by mouth two (2) times a day.  amLODIPine (NORVASC) 10 mg tablet Take 10 mg by mouth daily.  sucroferric oxyhydroxide (VELPHORO) 500 mg chew chewable tablet Take 500 mg by mouth three (3) times daily (with meals).  carvedilol (COREG) 25 mg tablet TAKE TWO TABLETS BY MOUTH TWICE DAILY 360 Tab 3    sildenafil citrate (VIAGRA) 50 mg tablet Take 1 Tab by mouth as needed. (Patient taking differently: Take 1 Tab by mouth daily as needed for Other (sexual performance). ) 10 Tab 3    hydrALAZINE (APRESOLINE) 100 mg tablet Take 100 mg by mouth three (3) times daily.  aspirin delayed-release 81 mg tablet Take 81 mg by mouth daily.  EPOETIN SEBLE (PROCRIT IJ) by Injection route.          The patient has a family history of    Social History     Tobacco Use    Smoking status: Never Smoker    Smokeless tobacco: Never Used   Substance Use Topics    Alcohol use: Not Currently     Alcohol/week: 4.2 standard drinks     Types: 5 Cans of beer per week    Drug use: No       Lab Results   Component Value Date/Time    Cholesterol, total 138 02/09/2019 11:20 AM    HDL Cholesterol 38 (L) 02/09/2019 11:20 AM    LDL, calculated 85 02/09/2019 11:20 AM    Triglyceride 76 02/09/2019 11:20 AM    CHOL/HDL Ratio 4.4 10/17/2012 06:05 AM        BP Readings from Last 3 Encounters:   08/13/19 (!) 88/60   08/01/19 100/66   06/18/19 133/78        Pulse Readings from Last 3 Encounters:   08/13/19 94   08/01/19 84   06/18/19 84       Wt Readings from Last 3 Encounters:   08/13/19 88.5 kg (195 lb)   06/18/19 88.5 kg (195 lb)   05/15/19 88.9 kg (196 lb)         EKG: unchanged from previous tracings, normal sinus rhythm, nonspecific ST and T waves changes, Q waves in V1 and V2.

## 2019-08-13 NOTE — LETTER
8/13/2019 12:11 PM 
 
 
 
Cornelia Mora 
[id number] 
1970 Insurance:  [id number] [employer] # No Auth Needed Proc Date: Wed. 8/21                Proc Time:  9:15am 
 
Performing MD : Dr. Rayne Manuel                      Procedure:Left Cath Hospital:  [facility]                                            PCP Dr. Davey Melgar 
 
Scheduled with:  Lori/EMail                                                        Date:8/13/2019 HP: 8/13      EKG: ______    Labs:______  CXR: _______  Orders: 8/13 Special Instructions:  _____________________________________________________ 
______________________________________________________________________ 
______________________________________________________________________ Date Faxed:   ______________   Pages Faxed: ___________________ The materials enclosed with this facsimile transmission are private and confidential and are the property of the sender. If you are not the intended recipient, be advised that any unauthorized use, disclosure, copying, distribution, or the taking of any action in reliance on the contents of this telecopied information is strictly prohibited. If you have received this in error, please immediately notify the sender via telephone to arrange for return of the forwarded documentation.

## 2019-08-13 NOTE — PROGRESS NOTES
Ying Webster presents today for   Chief Complaint   Patient presents with    Irregular Heart Beat     6 month follow up        Ying Webster preferred language for health care discussion is english/other. Is someone accompanying this pt? Family member     Is the patient using any DME equipment during [facility]? no    Depression Screening:  3 most recent PHQ Screens 3/7/2019   Little interest or pleasure in doing things Not at all   Feeling down, depressed, irritable, or hopeless Not at all   Total Score PHQ 2 0       Learning Assessment:  Learning Assessment 3/8/2019   PRIMARY LEARNER Patient   HIGHEST LEVEL OF EDUCATION - PRIMARY LEARNER  -   BARRIERS PRIMARY LEARNER [address] CAREGIVER -   [address] -   ANSWERED BY self   RELATIONSHIP SELF       Abuse Screening:  No flowsheet data found. Fall Risk  Fall Risk Assessment, last 12 mths 1/4/2019   Able to walk? Yes   Fall in past 12 months? No       Pt currently taking Anticoagulant therapy? ASA 81mg every day     Coordination of Care:  1. Have you been to the ER, urgent care clinic since your last visit? Hospitalized since your last visit? 8/1 for hypotension     2. Have you seen or consulted any other health care providers outside of the [address] [place] [address] since your last visit? Include any pap smears or colon screening.  no

## 2019-08-13 NOTE — PATIENT INSTRUCTIONS
Instructions    Patients Name:  Keri Vergara    1. You are scheduled to have a cardiac catheterization on August 21, 2019  at [address] Please check in at [address]  .     2. Please go to [facility] and park in the outpatient parking lot that is located around to the back of the hospital and enter through the [employer] Once you enter through the [facility] check in with the  there. The  will either give you directions or assist you in getting to the cath holding area. 3. You are not to eat or drink anything after midnight the night before your procedure. Small sips of water to take your medications is ok. 4. If you are diabetic, do not take your insulin/sugar pill the morning of the procedure. 5. MEDICATION INSTRUCTIONS:   Please take your morning medications with the following special instructions:    [x]          Please make sure to take your Blood pressure medication     6. [x]          Take your Aspirin     7. We encourage families to wait in the waiting room on the first floor while the procedure is being done. The Doctor will come out and talk with you as soon as the procedure is over. 8. There is the possibility that you may spend the night in the hospital, depending on the results of the procedure. This will be determined after the procedure is done. If angioplasty or stent is planned, you will stay at least one day. 9. If you or your family have any questions, please call our office Monday -Friday, 9:00 a.m.-4:30 p.m.,  At [phone], and ask to speak to one of the nurses.

## 2019-08-16 ENCOUNTER — DOCUMENTATION ONLY (OUTPATIENT)
Dept: CARDIOLOGY CLINIC | Age: 49
End: 2019-08-16

## 2019-08-19 ENCOUNTER — HOSPITAL ENCOUNTER (OUTPATIENT)
Dept: LAB | Age: 49
Discharge: HOME | End: 2019-08-19
Payer: COMMERCIAL

## 2019-08-19 LAB
ALBUMIN SERPL-MCNC: 3.4 G/DL (ref 3.4–5)
ALBUMIN/GLOB SERPL: 0.8 {RATIO} (ref 0.8–1.7)
ALP SERPL-CCNC: 159 U/L (ref 45–117)
ALT SERPL-CCNC: 20 U/L (ref 16–61)
ANION GAP SERPL CALC-SCNC: 9 MMOL/L (ref 3–18)
AST SERPL-CCNC: 16 U/L (ref 10–38)
BASOPHILS # BLD: 0.1 K/UL (ref 0–0.1)
BASOPHILS NFR BLD: 1 % (ref 0–2)
BILIRUB SERPL-MCNC: 0.3 MG/DL (ref 0.2–1)
BUN SERPL-MCNC: 68 MG/DL (ref 7–18)
BUN/CREAT SERPL: 5 (ref 12–20)
CALCIUM SERPL-MCNC: 9 MG/DL (ref 8.5–10.1)
CHLORIDE SERPL-SCNC: 101 MMOL/L (ref 100–111)
CO2 SERPL-SCNC: 33 MMOL/L (ref 21–32)
CREAT SERPL-MCNC: 13.4 MG/DL (ref 0.6–1.3)
DIFFERENTIAL METHOD BLD: ABNORMAL
EOSINOPHIL # BLD: 0.5 K/UL (ref 0–0.4)
EOSINOPHIL NFR BLD: 10 % (ref 0–5)
ERYTHROCYTE [DISTWIDTH] IN BLOOD BY AUTOMATED COUNT: 14.8 % (ref 11.6–14.5)
GLOBULIN SER CALC-MCNC: 4.1 G/DL (ref 2–4)
GLUCOSE SERPL-MCNC: 121 MG/DL (ref 74–99)
HCT VFR BLD AUTO: 40.7 % (ref 36–48)
HGB BLD-MCNC: 13.4 G/DL (ref 13–16)
INR PPP: 1.1 (ref 0.8–1.2)
LYMPHOCYTES # BLD: 1.4 K/UL (ref 0.9–3.6)
LYMPHOCYTES NFR BLD: 26 % (ref 21–52)
MCH RBC QN AUTO: 30 PG (ref 24–34)
MCHC RBC AUTO-ENTMCNC: 32.9 G/DL (ref 31–37)
MCV RBC AUTO: 91.1 FL (ref 74–97)
MONOCYTES # BLD: 0.3 K/UL (ref 0.05–1.2)
MONOCYTES NFR BLD: 6 % (ref 3–10)
NEUTS SEG # BLD: 3.1 K/UL (ref 1.8–8)
NEUTS SEG NFR BLD: 57 % (ref 40–73)
PLATELET # BLD AUTO: 229 K/UL (ref 135–420)
PMV BLD AUTO: 10.1 FL (ref 9.2–11.8)
POTASSIUM SERPL-SCNC: 4.5 MMOL/L (ref 3.5–5.5)
PROT SERPL-MCNC: 7.5 G/DL (ref 6.4–8.2)
PROTHROMBIN TIME: 13.4 SEC (ref 11.5–15.2)
RBC # BLD AUTO: 4.47 M/UL (ref 4.7–5.5)
SODIUM SERPL-SCNC: 143 MMOL/L (ref 136–145)
WBC # BLD AUTO: 5.4 K/UL (ref 4.6–13.2)

## 2019-08-19 PROCEDURE — 85025 COMPLETE CBC W/AUTO DIFF WBC: CPT

## 2019-08-19 PROCEDURE — 85610 PROTHROMBIN TIME: CPT

## 2019-08-19 PROCEDURE — 80053 COMPREHEN METABOLIC PANEL: CPT

## 2019-08-19 PROCEDURE — 36415 COLL VENOUS BLD VENIPUNCTURE: CPT

## 2019-08-21 ENCOUNTER — HOSPITAL ENCOUNTER (OUTPATIENT)
Age: 49
Setting detail: OUTPATIENT SURGERY
Discharge: HOME | End: 2019-08-21
Attending: INTERNAL MEDICINE | Admitting: INTERNAL MEDICINE
Payer: COMMERCIAL

## 2019-08-21 VITALS
DIASTOLIC BLOOD PRESSURE: 75 MMHG | RESPIRATION RATE: 35 BRPM | SYSTOLIC BLOOD PRESSURE: 115 MMHG | OXYGEN SATURATION: 100 % | HEART RATE: 78 BPM

## 2019-08-21 DIAGNOSIS — N18.6 END STAGE RENAL DISEASE (HCC): ICD-10-CM

## 2019-08-21 LAB — GLUCOSE BLD STRIP.AUTO-MCNC: 94 MG/DL (ref 70–110)

## 2019-08-21 PROCEDURE — C1887 CATHETER, GUIDING: HCPCS | Performed by: INTERNAL MEDICINE

## 2019-08-21 PROCEDURE — C1769 GUIDE WIRE: HCPCS | Performed by: INTERNAL MEDICINE

## 2019-08-21 PROCEDURE — 82962 GLUCOSE BLOOD TEST: CPT

## 2019-08-21 PROCEDURE — C1894 INTRO/SHEATH, NON-LASER: HCPCS | Performed by: INTERNAL MEDICINE

## 2019-08-21 PROCEDURE — 99152 MOD SED SAME PHYS/QHP 5/>YRS: CPT | Performed by: INTERNAL MEDICINE

## 2019-08-21 PROCEDURE — 74011250636 HC RX REV CODE- 250/636

## 2019-08-21 PROCEDURE — 77030013744: Performed by: INTERNAL MEDICINE

## 2019-08-21 PROCEDURE — 93458 L HRT ARTERY/VENTRICLE ANGIO: CPT | Performed by: INTERNAL MEDICINE

## 2019-08-21 PROCEDURE — 74011250636 HC RX REV CODE- 250/636: Performed by: INTERNAL MEDICINE

## 2019-08-21 PROCEDURE — C1760 CLOSURE DEV, VASC: HCPCS | Performed by: INTERNAL MEDICINE

## 2019-08-21 PROCEDURE — 77030013515 HC DEV INFL ANGI BSC -B: Performed by: INTERNAL MEDICINE

## 2019-08-21 PROCEDURE — 77030004558 HC CATH ANGI DX SUPR TORQ CARD -A: Performed by: INTERNAL MEDICINE

## 2019-08-21 PROCEDURE — 74011000258 HC RX REV CODE- 258: Performed by: INTERNAL MEDICINE

## 2019-08-21 PROCEDURE — 93571 IV DOP VEL&/PRESS C FLO 1ST: CPT | Performed by: INTERNAL MEDICINE

## 2019-08-21 PROCEDURE — 77030013797 HC KT TRNSDUC PRSSR EDWD -A: Performed by: INTERNAL MEDICINE

## 2019-08-21 PROCEDURE — 99153 MOD SED SAME PHYS/QHP EA: CPT | Performed by: INTERNAL MEDICINE

## 2019-08-21 PROCEDURE — 74011636320 HC RX REV CODE- 636/320: Performed by: INTERNAL MEDICINE

## 2019-08-21 PROCEDURE — 74011250637 HC RX REV CODE- 250/637

## 2019-08-21 RX ORDER — GUAIFENESIN 100 MG/5ML
81 LIQUID (ML) ORAL ONCE
Status: COMPLETED | OUTPATIENT
Start: 2019-08-21 | End: 2019-08-21

## 2019-08-21 RX ORDER — HEPARIN SODIUM 1000 [USP'U]/ML
INJECTION, SOLUTION INTRAVENOUS; SUBCUTANEOUS AS NEEDED
Status: DISCONTINUED | OUTPATIENT
Start: 2019-08-21 | End: 2019-08-21 | Stop reason: HOSPADM

## 2019-08-21 RX ORDER — MIDAZOLAM HYDROCHLORIDE 1 MG/ML
INJECTION, SOLUTION INTRAMUSCULAR; INTRAVENOUS AS NEEDED
Status: DISCONTINUED | OUTPATIENT
Start: 2019-08-21 | End: 2019-08-21 | Stop reason: HOSPADM

## 2019-08-21 RX ORDER — GUAIFENESIN 100 MG/5ML
LIQUID (ML) ORAL
Status: COMPLETED
Start: 2019-08-21 | End: 2019-08-21

## 2019-08-21 RX ORDER — SODIUM CHLORIDE 0.9 % (FLUSH) 0.9 %
5-40 SYRINGE (ML) INJECTION EVERY 8 HOURS
Status: DISCONTINUED | OUTPATIENT
Start: 2019-08-21 | End: 2019-08-21 | Stop reason: HOSPADM

## 2019-08-21 RX ORDER — LIDOCAINE HYDROCHLORIDE 10 MG/ML
INJECTION, SOLUTION EPIDURAL; INFILTRATION; INTRACAUDAL; PERINEURAL AS NEEDED
Status: DISCONTINUED | OUTPATIENT
Start: 2019-08-21 | End: 2019-08-21 | Stop reason: HOSPADM

## 2019-08-21 RX ORDER — BIVALIRUDIN 250 MG/5ML
INJECTION, POWDER, LYOPHILIZED, FOR SOLUTION INTRAVENOUS AS NEEDED
Status: DISCONTINUED | OUTPATIENT
Start: 2019-08-21 | End: 2019-08-21 | Stop reason: HOSPADM

## 2019-08-21 RX ORDER — FENTANYL CITRATE 50 UG/ML
INJECTION, SOLUTION INTRAMUSCULAR; INTRAVENOUS AS NEEDED
Status: DISCONTINUED | OUTPATIENT
Start: 2019-08-21 | End: 2019-08-21 | Stop reason: HOSPADM

## 2019-08-21 RX ORDER — SODIUM CHLORIDE 0.9 % (FLUSH) 0.9 %
5-40 SYRINGE (ML) INJECTION AS NEEDED
Status: DISCONTINUED | OUTPATIENT
Start: 2019-08-21 | End: 2019-08-21 | Stop reason: HOSPADM

## 2019-08-21 RX ADMIN — Medication 81 MG: at 09:30

## 2019-08-21 RX ADMIN — ASPIRIN 81 MG 81 MG: 81 TABLET ORAL at 09:30

## 2019-08-21 NOTE — Clinical Note
TRANSFER - IN REPORT:  
 
Verbal report received from: [address]. Report consisted of patient's Situation, Background, Assessment and  
Recommendations(SBAR). Opportunity for questions and clarification was provided. Assessment completed upon patient's arrival to unit and care assumed. Patient transported with a Cardiac Cath Tech / Patient Care Tech.

## 2019-08-21 NOTE — DISCHARGE INSTRUCTIONS
Cardiac Catheterization/Angiography Discharge Instructions    *Check the puncture site frequently for swelling or bleeding. If you see any bleeding, lie down and apply pressure over the area with a clean town or washcloth. Notify your doctor for any redness, swelling, drainage or oozing from the puncture site. Notify your doctor for any fever or chills. *If the leg or arm with the puncture becomes cold, numb or painful, call Dr Renard Barnes    *Activity should be limited for the next 48 hours. Climb stairs as little as possible and avoid any stooping, bending or strenuous activity for 48 hours. No heavy lifting (anything over 10 pounds) for three days. *Do not drive for 48 hours. *You may resume your usual diet. Drink more fluids than usual.    *Have a responsible person drive you home and stay with you for at least 24 hours after your heart catheterization/angiography. *You may remove the bandage from your Right and Groin  DISCHARGE SUMMARY from Nurse    PATIENT INSTRUCTIONS:    After general anesthesia or intravenous sedation, for 24 hours or while taking prescription Narcotics:  · Limit your activities  · Do not drive and operate hazardous machinery  · Do not make important personal or business decisions  · Do  not drink alcoholic beverages  · If you have not urinated within 8 hours after discharge, please contact your surgeon on call.     Report the following to your surgeon:  · Excessive pain, swelling, redness or odor of or around the surgical area  · Temperature over 100.5  · Nausea and vomiting lasting longer than 4 hours or if unable to take medications  · Any signs of decreased circulation or nerve impairment to extremity: change in color, persistent  numbness, tingling, coldness or increase pain  · Any questions    What to do at Home:  Recommended activity: Activity as tolerated and no driving for today,     These are general instructions for a healthy lifestyle:    No smoking/ No tobacco products/ Avoid exposure to second hand smoke  Surgeon General's Warning:  Quitting smoking now greatly reduces serious risk to your health. Obesity, smoking, and sedentary lifestyle greatly increases your risk for illness    A healthy diet, regular physical exercise & weight monitoring are important for maintaining a healthy lifestyle    You may be retaining fluid if you have a history of heart failure or if you experience any of the following symptoms:  Weight gain of 3 pounds or more overnight or 5 pounds in a week, increased swelling in our hands or feet or shortness of breath while lying flat in bed. Please call your doctor as soon as you notice any of these symptoms; do not wait until your next office visit. The discharge information has been reviewed with the patient. The patient verbalized understanding. Discharge medications reviewed with the patient and appropriate educational materials and side effects teaching were provided. ___________________________________________________________________________________________________________________________________ in 24 hours. You may shower in 24 hours. No tub baths, hot tubs or swimming for one week. Do not place any lotions, creams, powders, ointments over the puncture site for one week. You may place a clean band-aid over the puncture site each day for 5 days. Change this daily.

## 2019-08-21 NOTE — H&P
Addendum:    Patient with known history of CAD. He is currently being evaluated for renal transplantation. We will proceed with coronary angiography. DEDRA  8/21/2019          HISTORY OF PRESENT ILLNESS  Tim Joyner is a 57 y.o. male.     ASSESSMENT and PLAN     Mr. Charissa Sharp has history of diabetes mellitus, diabetic retinopathy with legally blind as, coronary artery disease without chest pains, ischemic cardiomyopathy. He presented with significant fatigue, increased shortness of breath. He had LAD stent in October of 2012 by Dr. Aleshia Edge. He has never had chest pains. He has history of mildly diminished LV function with ejection fraction of 40-50%. He is now on hemodialysis 3 times per week.  His new baseline weight in 2019 is 199 pounds. Kidney transplant evaluation was performed in 2019.     · CAD:    Symptomatically stable. However, he is currently undergoing evaluation for renal transplantation. They would like coronary angiography in preparation for kidney transplant evaluation and listing. We will proceed with this within the next 2 to 3 weeks. · BP:    Since being started on hemodialysis, his blood pressure has been running low with some symptomatic dizziness. Therefore, his diltiazem will be discontinued. · HR:    Stable. · CHF:    There is no evidence of decompensated CHF noted. · Weight:    His weight today is 195 pounds. His new baseline weight is about 199 pounds. · Cholesterol:   Target LDL <70. Lipitor 40. · Anti-platelet:   Remains on ASA.      I will see him back in 6 months. Thank you.            Encounter Diagnoses   Name Primary?     Atrial fibrillation with rapid ventricular response (HCC) Yes    Cardiomyopathy, unspecified type ([facility]      Coronary artery disease involving native coronary artery of native heart without angina pectoris      Essential hypertension        the following changes in treatment are made: See above  lab results and schedule of future lab studies reviewed with patient  reviewed diet, exercise and weight control  cardiovascular risk and specific lipid/LDL goals reviewed  use of aspirin to prevent MI and TIA's discussed          HPI  Today, Mr. Aminah Avelar has no complaints of chest pains. He does have occasional dizziness especially after hemodialysis. He was told that his blood pressures been running low after hemodialysis. He is currently undergoing evaluation for kidney [facility] at [facility]. They are requesting coronary angiography with his known prior history of CAD. He denies any orthopnea or PND. He denies any palpitations or dizziness.     Review of Systems   Respiratory: Negative for shortness of breath. Cardiovascular: Negative for chest pain, palpitations, orthopnea, claudication, leg swelling and PND. Neurological: Positive for dizziness. All other systems reviewed and are negative.        Physical Exam   Constitutional: He is oriented to person, place, and time. He appears well-developed and well-nourished. HENT:   Head: Normocephalic. Eyes: Conjunctivae are normal.   Neck: Neck supple. No JVD present. Carotid bruit is not present. No thyromegaly present. Cardiovascular: Normal rate and regular rhythm. Pulmonary/Chest: Breath sounds normal.   Abdominal: Bowel sounds are normal.   Musculoskeletal: He exhibits no edema. Neurological: He is alert and oriented to person, place, and time. Skin: Skin is warm and dry. Nursing note and vitals reviewed.        PCP: Miri Michel MD          Past Medical History:   Diagnosis Date    Abnormal nuclear cardiac imaging test 10/08/2012     Fixed anteroseptal, anteroapical defect c/w prior infarction. No ischemia. Mid & distal anteroseptal, anteroapical WMA. LVE. EF 44%.  Anemia       dr. Miri Noe doubt amyloid or multiple myeloma.  candidate for procirt if Hg <10    Benign hypertensive      Blindness of right eye 01/2013     legally blind    CAD (coronary artery disease)      Cardiomyopathy ejection fraction of 40%      CKD (chronic kidney disease), stage IV (HCC)       to see Dr. Nakita Lugo 12/1/12    Congestive heart failure ([facility]      Diabetes mellitus ([facility]      Diabetic retinopathy ([facility]      Diabetic retinopathy ([facility]       Dr. Tyrell Lim- injections    Heart failure [facility])      History of echocardiogram 04/22/2014     LVE. EF 55% (prev 40-45% on echo of 1/27/12). No WMA. Mild-mod conc LVH. Gr 3 DDfx. Marked LAE. Mild SHANTEL. Mild MR.    Hypertension      Pulmonary hypertension ([facility]      Renal Ultrasound 1/3/12     Right kidney isoechoic w/respect to liver. Sm left-sided pleural effusion    S/P cardiac cath 10/16/2012     LM patent. pLAD 95% (3.5 x 12-mm [place] stent, resid 0$%). LCX mild.                 Past Surgical History:   Procedure Laterality Date    HX CORONARY STENT PLACEMENT         one    HX LAPAROTOMY   2/2012     bowel obstruction with removal segment of small bowel. Done by Riblet.  HX LAPAROTOMY   infancy     whole in colon and had repair at that time.  HX OTHER SURGICAL   06/20/2013     left eye retna attatchment    HX RETINAL DETACHMENT REPAIR         august 2012    REPAIR ING HERNIA,5+Y/O,REDUCIBL Left 05/02/2019     Dr. Negin Michelle                Current Outpatient Medications   Medication Sig Dispense Refill    glimepiride (AMARYL) 4 mg tablet TAKE 1 TABLET BY MOUTH EVERY MORNING 90 Tab 3    Blood-Glucose Meter monitoring kit Check fasting glucose 1 Kit 0    glucose blood VI test strips (ACCU-CHEK ZAC PLUS TEST STRP) strip Use as directed 100 Strip 2    lisinopril (PRINIVIL, ZESTRIL) 20 mg tablet Take 20 mg by mouth two (2) times a day.        amLODIPine (NORVASC) 10 mg tablet Take 10 mg by mouth daily.        sucroferric oxyhydroxide (VELPHORO) 500 mg chew chewable tablet Take 500 mg by mouth three (3) times daily (with meals).         carvedilol (COREG) 25 mg tablet TAKE TWO TABLETS BY MOUTH TWICE DAILY 360 Tab 3  sildenafil citrate (VIAGRA) 50 mg tablet Take 1 Tab by mouth as needed. (Patient taking differently: Take 1 Tab by mouth daily as needed for Other (sexual performance). ) 10 Tab 3    hydrALAZINE (APRESOLINE) 100 mg tablet Take 100 mg by mouth three (3) times daily.        aspirin delayed-release 81 mg tablet Take 81 mg by mouth daily.        EPOETIN SEBLE (PROCRIT IJ) by Injection route.             The patient has a family history of     Social History            Tobacco Use    Smoking status: Never Smoker    Smokeless tobacco: Never Used   Substance Use Topics    Alcohol use: Not Currently       Alcohol/week: 4.2 standard drinks       Types: 5 Cans of beer per week    Drug use:  No               Lab Results   Component Value Date/Time     Cholesterol, total 138 02/09/2019 11:20 AM     HDL Cholesterol 38 (L) 02/09/2019 11:20 AM     LDL, calculated 85 02/09/2019 11:20 AM     Triglyceride 76 02/09/2019 11:20 AM     CHOL/HDL Ratio 4.4 10/17/2012 06:05 AM             BP Readings from Last 3 Encounters:   08/13/19 (!) 88/60   08/01/19 100/66   06/18/19 133/78             Pulse Readings from Last 3 Encounters:   08/13/19 94   08/01/19 84   06/18/19 84             Wt Readings from Last 3 Encounters:   08/13/19 88.5 kg (195 lb)   06/18/19 88.5 kg (195 lb)   05/15/19 88.9 kg (196 lb)            EKG: unchanged from previous tracings, normal sinus rhythm, nonspecific ST and T waves changes, Q waves in V1 and V2.

## 2019-08-21 NOTE — Clinical Note
TRANSFER - OUT REPORT:  
 
Verbal report given to: [employer]. Report consisted of patient's Situation, Background, Assessment and  
Recommendations(SBAR). Opportunity for questions and clarification was provided. Patient transported to: [address]

## 2019-09-13 ENCOUNTER — TELEPHONE (OUTPATIENT)
Dept: CARDIOLOGY CLINIC | Age: 49
End: 2019-09-13

## 2019-09-13 NOTE — TELEPHONE ENCOUNTER
Patient is having issues with blood pressure dropping still , he was stopped on norvasc over a month ago and Dr Elizabeth Penaloza stopped the cardizem on 8-13-19, patient is still having hypotenision . Patient does dialysis on tues, thurs, Saturday. Patient had a bad episode where his blood pressure dropped during dialysis and he passed out. Patient did state he is not the best with his medications. Patient states his blood pressure last night after dialysis was 86/66. Today with no medications blood pressure is 122/83. Verbal order and read back per Daren Penny, DO in absence of Dr Elizabeth Penaloza. Hold hydralazine for now. Continue coreg and lisinopril   Keep appt on 9-17-19 with Dr Elizabeth Penaloaz.     Patient aware of instructions

## 2019-09-17 ENCOUNTER — OFFICE VISIT (OUTPATIENT)
Dept: CARDIOLOGY CLINIC | Age: 49
End: 2019-09-17

## 2019-09-17 ENCOUNTER — OFFICE VISIT (OUTPATIENT)
Dept: FAMILY MEDICINE CLINIC | Age: 49
End: 2019-09-17

## 2019-09-17 VITALS
TEMPERATURE: 98.4 F | OXYGEN SATURATION: 96 % | HEIGHT: 70 IN | WEIGHT: 199 LBS | DIASTOLIC BLOOD PRESSURE: 78 MMHG | SYSTOLIC BLOOD PRESSURE: 104 MMHG | BODY MASS INDEX: 28.49 KG/M2 | RESPIRATION RATE: 16 BRPM | HEART RATE: 81 BPM

## 2019-09-17 VITALS
BODY MASS INDEX: 28.77 KG/M2 | OXYGEN SATURATION: 97 % | DIASTOLIC BLOOD PRESSURE: 84 MMHG | WEIGHT: 201 LBS | SYSTOLIC BLOOD PRESSURE: 122 MMHG | HEIGHT: 70 IN | HEART RATE: 87 BPM

## 2019-09-17 DIAGNOSIS — Z99.2 END STAGE RENAL FAILURE ON DIALYSIS (HCC): ICD-10-CM

## 2019-09-17 DIAGNOSIS — N18.6 ESRD (END STAGE RENAL DISEASE) (HCC): Primary | ICD-10-CM

## 2019-09-17 DIAGNOSIS — E78.5 DYSLIPIDEMIA: ICD-10-CM

## 2019-09-17 DIAGNOSIS — I25.10 CORONARY ARTERY DISEASE INVOLVING NATIVE CORONARY ARTERY OF NATIVE HEART WITHOUT ANGINA PECTORIS: ICD-10-CM

## 2019-09-17 DIAGNOSIS — H54.8 LEGALLY BLIND: ICD-10-CM

## 2019-09-17 DIAGNOSIS — I48.91 ATRIAL FIBRILLATION WITH RAPID VENTRICULAR RESPONSE (HCC): ICD-10-CM

## 2019-09-17 DIAGNOSIS — I42.9 CARDIOMYOPATHY, UNSPECIFIED TYPE (HCC): ICD-10-CM

## 2019-09-17 DIAGNOSIS — I48.91 ATRIAL FIBRILLATION WITH RAPID VENTRICULAR RESPONSE (HCC): Primary | ICD-10-CM

## 2019-09-17 DIAGNOSIS — N18.6 END STAGE RENAL FAILURE ON DIALYSIS (HCC): ICD-10-CM

## 2019-09-17 DIAGNOSIS — E11.3419 TYPE 2 DIABETES MELLITUS WITH SEVERE NONPROLIFERATIVE RETINOPATHY AND MACULAR EDEMA, WITHOUT LONG-TERM CURRENT USE OF INSULIN, UNSPECIFIED LATERALITY (HCC): ICD-10-CM

## 2019-09-17 DIAGNOSIS — N18.6 END STAGE RENAL DISEASE (HCC): ICD-10-CM

## 2019-09-17 DIAGNOSIS — D63.8 ANEMIA OF CHRONIC DISEASE: ICD-10-CM

## 2019-09-17 DIAGNOSIS — Z23 ENCOUNTER FOR IMMUNIZATION: ICD-10-CM

## 2019-09-17 RX ORDER — GLIMEPIRIDE 1 MG/1
1 TABLET ORAL
Qty: 90 TAB | Refills: 0 | Status: SHIPPED | OUTPATIENT
Start: 2019-09-17 | End: 2021-12-14

## 2019-09-17 NOTE — PATIENT INSTRUCTIONS
Kidney Dialysis: Care Instructions  Your Care Instructions    Dialysis is a process that filters wastes from the blood when your kidneys can no longer do the job. It is not a cure, but it can help you live longer and feel better. It is a lifesaving treatment when you have kidney failure. Normal kidneys work 24 hours a day to clean wastes from your blood. Your kidneys are not able to do this job, so a process called dialysis will do some of the work for your kidneys. You and your doctor will decide which type of dialysis you should have. Peritoneal dialysis uses the lining of your belly (peritoneum) to filter your blood. You can do it at home, on a daily basis. Hemodialysis uses a man-made filter called a dialyzer to clean your blood. Most people need to go to a hospital or clinic 3 days a week for several hours each time. Sometimes hemodialysis can be done at home. It is normal to have questions about your treatment, and you have a right to know what is happening to you. Learning about dialysis can help you take an active role in your treatment. Dialysis does not cure kidney disease, but it can help you live longer and feel better. You will need to follow your diet and treatment schedule carefully. Follow-up care is a key part of your treatment and safety. Be sure to make and go to all appointments, and call your doctor if you are having problems. It's also a good idea to know your test results and keep a list of the medicines you take. What do you need to know about peritoneal dialysis? Peritoneal dialysis uses the lining of your belly (or peritoneal membrane) to filter your blood. Before you can begin peritoneal dialysis, your doctor will need to place a thin tube called a catheter in your belly. This is the dialysis access. · Peritoneal dialysis can be done at home or in any clean place. You may be able to do it while you sleep. · You can do it by yourself.  You do not have to rely on help from others. · You can do it at the times you choose as long as you do the right number of treatments. · It has to be done every day of the week. · Some people find it hard to do all the required steps. · It increases your chance for a serious infection of the lining of the belly (peritoneum). What do you need to know about hemodialysis? Hemodialysis uses a man-made membrane called a dialyzer to clean your blood. You are connected to the dialyzer by tubes attached to your blood vessels. Before you start hemodialysis, your doctor will create a site where the blood can flow in and out of your body during your dialysis sessions. This site is called the vascular access. It may be a fistula, made by connecting an artery and a vein. Or it may be a graft, which is a tube implanted under your skin. · Hemodialysis is done mainly by trained health workers who can watch for any problems. · It allows you to be in contact with other people having dialysis. This can help provide emotional support. · You can schedule your treatments in the evenings so you can keep working. · You may be able to do home hemodialysis, which gives you more control over your schedule. · It usually needs to be done on a set schedule 3 times a week. · It can cause side effects. The most common side effects are low blood pressure and muscle cramps. These can often be treated easily. · It requires needle sticks during every treatment, which bothers some people. Others get used to it and even do the needle sticks themselves. How can you care for yourself at home? · Be sure to have all of your dialysis sessions. Do not try to shorten or skip your sessions. You have a better chance of a longer and healthier life by getting your full treatment. · Your doctor or health care team will show you the steps you need to go through each day before, during, and after dialysis. Be sure to follow these steps.  If you do not understand a step, talk to your team.  · Your doctor and dietitian will help you design menus that follow your diet. Be sure to follow your diet guidelines. ? You will need to limit fluids and certain foods that contain salt (sodium), potassium, and phosphorus. ? You may need to follow a heart-healthy diet to keep the fat (cholesterol) in your blood under control. ? You may need higher levels of protein in your diet. · Your doctor may recommend certain vitamins. But do not take any other medicine, including over-the-counter medicines, vitamins, and herbal products, without talking to your doctor first.  · Do not smoke. Smoking raises your risk of many health problems, including more kidney damage. If you need help quitting, talk to your doctor about stop-smoking programs and medicines. These can increase your chances of quitting for good. · Do not take ibuprofen (Advil, Motrin), naproxen (Aleve), or similar medicines, unless your doctor tells you to. These medicines may make kidney problems worse. When should you call for help? Call your doctor now or seek immediate medical care if:    · You have a fever.     · You are dizzy or lightheaded, or you feel like you may faint.     · You are confused or cannot think clearly.     · You have new or worse nausea or vomiting.     · You have new or more blood in your urine.     · You have new swelling.    Watch closely for changes in your health, and be sure to contact your doctor if:    · You do not get better as expected. Where can you learn more? Go to http://caren-ar.info/. Enter A761 in the search box to learn more about \"Kidney Dialysis: Care Instructions. \"  Current as of: October 31, 2018  Content Version: 12.1  © 8872-1755 [employer]. Care instructions adapted under license by [employer] (which disclaims liability or warranty for this information).  If you have questions about a medical condition or this instruction, always ask your healthcare professional. [address] any warranty or liability for your use of this information.

## 2019-09-17 NOTE — PROGRESS NOTES
Keri Vergara presents today for evaluation of complaints of both high and low blood pressures. He was concerned that his blood pressures are typically elevated before he goes to dialysis and then postdialysis, he is hypotensive but asymptomatic. He states that his  amlodipine and hydralazine have been on hold per nephrology. Mr. Chan Packer is a 57year old gentleman with a history of cardiomyopathy (diagnosed in Jan. 2012), pulmonary hypertension, hypertension, diabetes, iron deficiency anemia, and small bowel obstruction requiring a small bowel resection (1/26/12), dyslipidemia, chronic diastolic heart failure, and CAD (s/p stent in 2012). He was lost to follow-up with other healthcare providers and had previously been non-compliant with medications. He is now compliant with his medications and follow-up. He was hospitalized from 12/5/18 through 12/14/18. He presented with complaints of weakness, fatigue, dyspnea on exertion. He was noted to be in AFIB with RVR, and had strep pneumonia and bacteremia (followed by ID), and superficial acute occlusive left upper extremity thrombophlebitis (no DVT). He was treated with IV antibiotics. Initially, his heart rate was difficult to control but prior to discharge, improved with oral cardizem being taken 3 times a day. He has ESRD but was not being dialyzed yet prior to admission. He was s/p AV fistula placement but was lost to follow-up. Hemodialysis was initiated during this hospital stay. An echo was done and it showed an EF of 40%, moderate concentric LVH, LV global hypokinesis, and PASP of 50 mmHg. He saw Dr. Keyur Beyer for his routine follow-up on August 13, 2019 and during that visit, his diltiazem was discontinued due to complaints of low blood pressures postdialysis with symptomatic dizziness. He underwent cardiac catheterization on August 21, 2019 as part of his work-up for possible kidney transplant.   The cardiac catheterization showed that his prior proximal LAD stent is patent with 35% stenosis/calcification. No interventions were performed. Denies chest pain, tightness, heaviness, and palpitations. Denies shortness of breath at rest, dyspnea on exertion, orthopnea and PND. Denies abdominal bloating. Denies lightheadedness, dizziness, and syncope. Admits to mild lower extremity edema and denies claudication. Denies nausea, vomiting, diarrhea, melena, hematochezia. Denies hematuria, urgency, frequency. Denies fever, chills. He undergoes hemodialysis on Tues-Thurs-Sat. Past Medical History:   Diagnosis Date    Abnormal nuclear cardiac imaging test 10/08/2012    Fixed anteroseptal, anteroapical defect c/w prior infarction. No ischemia. Mid & distal anteroseptal, anteroapical WMA. LVE. EF 44%.  Anemia     dr. Hendrickson Buys doubt amyloid or multiple myeloma. candidate for procirt if Hg <10    Benign hypertensive     Blindness of right eye 01/2013    legally blind    CAD (coronary artery disease)     Cardiomyopathy ejection fraction of 40%     CKD (chronic kidney disease), stage IV ([address]     to see Dr. Es Johnson 12/1/12    [facility]     Diabetes mellitus [facility]     Diabetic retinopathy [facility]     Diabetic retinopathy ([facility]     Dr. Jemal Eubanks- injections    Heart failure ([facility]     History of echocardiogram 04/22/2014    LVE. EF 55% (prev 40-45% on echo of 1/27/12). No WMA. Mild-mod conc LVH. Gr 3 DDfx. Marked LAE. Mild SHANTEL. Mild MR.    Hypertension     Pulmonary hypertension ([address]     Renal Ultrasound 1/3/12    Right kidney isoechoic w/respect to liver. Sm left-sided pleural effusion    S/P cardiac cath 10/16/2012    LM patent. pLAD 95% (3.5 x 12-mm [place] stent, resid 0$%). LCX mild. Past Surgical History:   Procedure Laterality Date    HX CORONARY STENT PLACEMENT      one    HX LAPAROTOMY  2/2012    bowel obstruction with removal segment of small bowel. Done by Riblet.     HX LAPAROTOMY  infancy    whole in colon and had repair at that time.  HX OTHER SURGICAL  06/20/2013    left eye retna attatchment    HX RETINAL DETACHMENT REPAIR      august 2012    REPAIR ING HERNIA,5+Y/O,REDUCIBL Left 05/02/2019    Dr. Eri Loredo     Family History   Problem Relation Age of Onset    Diabetes Mother     Hypertension Mother     Kidney Disease Mother     High Cholesterol Father     Diabetes Brother         pre diabetic     Social History     Tobacco Use    Smoking status: Never Smoker    Smokeless tobacco: Never Used   Substance Use Topics    Alcohol use: Not Currently     Alcohol/week: 4.2 standard drinks     Types: 5 Cans of beer per week    Drug use: No       Medications:  Current Outpatient Medications   Medication Sig    glimepiride (AMARYL) 4 mg tablet TAKE 1 TABLET BY MOUTH EVERY MORNING    glucose blood VI test strips (ACCU-CHEK ZAC PLUS TEST STRP) strip Use as directed    lisinopril (PRINIVIL, ZESTRIL) 20 mg tablet Take 20 mg by mouth two (2) times a day.  sucroferric oxyhydroxide (VELPHORO) 500 mg chew chewable tablet Take 500 mg by mouth three (3) times daily (with meals).  carvedilol (COREG) 25 mg tablet TAKE TWO TABLETS BY MOUTH TWICE DAILY    sildenafil citrate (VIAGRA) 50 mg tablet Take 1 Tab by mouth as needed. (Patient taking differently: Take 1 Tab by mouth daily as needed for Other (sexual performance). )    aspirin delayed-release 81 mg tablet Take 81 mg by mouth daily.  EPOETIN SEBLE (PROCRIT IJ) by Injection route.  Blood-Glucose Meter monitoring kit Check fasting glucose     No current facility-administered medications for this visit. No Known Allergies      Physical:  Visit Vitals  /84 (BP 1 Location: Left arm, BP Patient Position: Sitting)   Pulse 87   Ht 5' 10\" (1.778 m)   Wt 91.2 kg (201 lb)   SpO2 97%   BMI 28.84 kg/m²       He is up 6 pounds since his last visit.        Exam:  Neck:  Supple, no JVD, no carotid bruits  CV:  Normal S1 and  S2, no murmurs, rubs, or gallops noted  Lungs:  Clear to ausculation throughout, no wheezes or rales  Abd:  Soft, non-tender, non-distended with good bowel sounds. No hepatosplenomegaly. Extremities:  trace lower extremity edema. EKG:        LABS:  Lab Results   Component Value Date/Time    Sodium 143 08/19/2019 02:08 PM    Potassium 4.5 08/19/2019 02:08 PM    Chloride 101 08/19/2019 02:08 PM    CO2 33 (H) 08/19/2019 02:08 PM    Glucose 121 (H) 08/19/2019 02:08 PM    BUN 68 (H) 08/19/2019 02:08 PM    Creatinine 13.40 (H) 08/19/2019 02:08 PM     Lab Results   Component Value Date/Time    Cholesterol, total 138 02/09/2019 11:20 AM    HDL Cholesterol 38 (L) 02/09/2019 11:20 AM    LDL, calculated 85 02/09/2019 11:20 AM    Triglyceride 76 02/09/2019 11:20 AM    CHOL/HDL Ratio 4.4 10/17/2012 06:05 AM     No results found for: GPT     Impression/Plan:  1. Nonischemic cardiomyopathy, EF 40%  2. Chronic diastolic CHF, appears compensated  3. Essential hypertension, blood pressure controlled  4. Diabetes mellitus, recommend Hgb A1c less than 7% from cardiac standpoint  5. ESRD, now on hemodialysis  6. Anemia, followed by PCP and nephrology  7. Dyslipidemia, currently not taking a statin  8. History of noncompliance, now improved  9. Paroxysmal AFIB, currently maintaining sinus rhythm. Not on anticoagulation at due to anemia. Only on ASA 81mg. Mr. Cris Willett presents today for evaluation of his blood pressure. He requested this appointment as he was concerned that his blood pressures were quite elevated predialysis and then low, postdialysis. He states that he is not allowed to take his blood pressure medications prior to dialysis and I explained that this accounts for the elevation in his blood pressures. Postdialysis hypotension is likely a result of the fluid that is removed during dialysis. On nondialysis days, he was instructed to take his medications as prescribed.   I will decrease his lisinopril to 10 mg twice a day and he will continue his carvedilol 25 mg twice a day. He is currently off of diltiazem, amlodipine, and hydralazine. He was instructed to take a dose of lisinopril and carvedilol in the evening if his systolic blood pressure is greater than 100 mmHg on dialysis days. I asked that he monitor his blood pressures at home and record the readings. He is to take his blood pressure before he takes his medications and again 2 to 3 hours after his medications are taken. If his blood pressures are high on nondialysis days, we may end up using his hydralazine on an as-needed basis. He will follow-up with Dr Thaddeus Nugent as scheduled and as needed. Diane Fang MSN, FNP-BC    Please note:  Portions of this chart were created with Dragon medical speech to text program.  Unrecognized errors may be present.

## 2019-09-17 NOTE — PROGRESS NOTES
Piedad Kapadia presents today for   Chief Complaint   Patient presents with    Coronary Artery Disease     follow up        Piedad Kapadia preferred language for health care discussion is english/other. Is someone accompanying this pt? no    Is the patient using any DME equipment during [address]? no    Depression Screening:  3 most recent PHQ Screens 3/7/2019   Little interest or pleasure in doing things Not at all   Feeling down, depressed, irritable, or hopeless Not at all   Total Score PHQ 2 0       Learning Assessment:  Learning Assessment 3/8/2019   PRIMARY LEARNER Patient   HIGHEST LEVEL OF EDUCATION - PRIMARY LEARNER  -   BARRIERS PRIMARY LEARNER [address] CAREGIVER -   [address] -   ANSWERED BY self   RELATIONSHIP SELF       Abuse Screening:  No flowsheet data found. Fall Risk  Fall Risk Assessment, last 12 mths 1/4/2019   Able to walk? Yes   Fall in past 12 months? No       Pt currently taking Anticoagulant therapy? ASA 81mg every day     Coordination of Care:  1. Have you been to the ER, urgent care clinic since your last visit? Hospitalized since your last visit? no    2. Have you seen or consulted any other health care providers outside of the [address] [place] [address] since your last visit? Include any pap smears or colon screening.  no

## 2019-09-17 NOTE — PROGRESS NOTES
1. Have you been to the ER, urgent care clinic since your last visit? Hospitalized since your last visit? yes     2. Have you seen or consulted any other health care providers outside of the [address] [place] [address] since your last visit? Include any pap smears or colon screening. Saw Tammy Moreira   today  [id number],  0.5 ml given IM in left deltoid. Lot # [id number], exp date 02/01/2021. Patient tolerated injection well. No adverse reaction noted.

## 2019-09-17 NOTE — PROGRESS NOTES
Kim De La Torre, 57 y.o.,  male    SUBJECTIVE  Ff-up    ESRD-ongoing HD 3 x a week. Also with anemia of chronic disease and HTN, following Dr. Yadira Castro. He is undergoing kidney transplant evaluation, cardiac cath 8/19 showing proximal LAD stent  Patent 35% stenosis    CAD/Afib- no chest pains, palpitations, leg edema. Cardiologist in making adjustments to BP medications, following cardiology Dr. Mariana Hong    NIDDM- w/ retinopathy, legally blind. Says 's. He forgot to have labs done today which included an A1c, he has anemia of chronic disease. He reports 1 episode of hypoglycemia past month during HD. Documented POC glucose 8/21 was 80    Lives with wife, who assists with advance ADLs    ROS:  See HPI, all others negative        Patient Active Problem List   Diagnosis Code    Benign hypertensive  I11.9    CRI (chronic renal insufficiency) N18.9    Diabetes mellitus ([address] E11.9    Cardiomyopathy ([address] I42.9    Iron deficiency anemia D50.9    CAD (coronary artery disease) I25.10    Legally blind H54.8    Diabetic retinopathy (HCC) E11.319    Severe nonproliferative diabetic retinopathy with macular edema, associated with type 2 diabetes mellitus E11.3419    Dyslipidemia E78.5    Atrial fibrillation with rapid ventricular response (HCC) I48.91    Type 2 diabetes with nephropathy (HCC) E11.21       Current Outpatient Medications   Medication Sig Dispense Refill    amLODIPine (NORVASC) 10 mg tablet Take  by mouth daily.  sucroferric oxyhydroxide (VELPHORO) 500 mg chew chewable tablet Take  by mouth three (3) times daily (with meals).  carvedilol (COREG) 25 mg tablet TAKE TWO TABLETS BY MOUTH TWICE DAILY 360 Tab 3    glimepiride (AMARYL) 4 mg tablet TAKE 1 TABLET BY MOUTH EVERY MORNING 90 Tab 0    sildenafil citrate (VIAGRA) 50 mg tablet Take 1 Tab by mouth as needed. 10 Tab 3    hydrALAZINE (APRESOLINE) 100 mg tablet Take 100 mg by mouth three (3) times daily.       dilTIAZem (CARDIZEM) 90 mg tablet Take 1 Tab by mouth Before breakfast, lunch, and dinner. 90 Tab 1    aspirin delayed-release 81 mg tablet Take 81 mg by mouth daily.  lisinopril (PRINIVIL, ZESTRIL) 5 mg tablet Take 4 Tabs by mouth two (2) times a day. 30 Tab 1    EPOETIN SEBLE (PROCRIT IJ) by Injection route. No Known Allergies    Past Medical History:   Diagnosis Date    Abnormal nuclear cardiac imaging test 10/08/2012    Fixed anteroseptal, anteroapical defect c/w prior infarction. No ischemia. Mid & distal anteroseptal, anteroapical WMA. LVE. EF 44%.  Anemia     dr. Ama Marin doubt amyloid or multiple myeloma. candidate for procirt if Hg <10    Benign hypertensive     Blindness of right eye 01/2013    legally blind    CAD (coronary artery disease)     Cardiomyopathy ejection fraction of 40%     CKD (chronic kidney disease), stage IV ([address]     to see Dr. Eli Fuller 12/1/12    Congestive heart failure ([address]     Diabetes mellitus ([address]     Diabetic retinopathy ([address]     Diabetic retinopathy ([address]     Dr. Grimm Shown- injections    [facility]     History of echocardiogram 04/22/2014    LVE. EF 55% (prev 40-45% on echo of 1/27/12). No WMA. Mild-mod conc LVH. Gr 3 DDfx. Marked LAE. Mild SHANTEL. Mild MR.    Hypertension     Pulmonary hypertension ([address]     Renal Ultrasound 1/3/12    Right kidney isoechoic w/respect to liver. Sm left-sided pleural effusion    S/P cardiac cath 10/16/2012    LM patent. pLAD 95% (3.5 x 12-mm [place] stent, resid 0$%). LCX mild.          Social History     Socioeconomic History    Marital status:      Spouse name: Not on file    Number of children: Not on file    Years of education: Not on file    Highest education level: Not on file   Social Needs    Financial resource strain: Not on file    Food insecurity - worry: Not on file    Food insecurity - inability: Not on file    Transportation needs - medical: Not on file   [employer] needs - non-medical: Not on file   Occupational History    Not on file   Tobacco Use    Smoking status: Never Smoker    Smokeless tobacco: Never Used   Substance and Sexual Activity    Alcohol use:  Yes     Alcohol/week: 2.5 oz     Types: 5 Cans of beer per week     Comment: occassionally    Drug use: No    Sexual activity: Yes     Partners: Female     Birth control/protection: None   Other Topics Concern    Not on file   Social History Narrative    Not on file       Family History   Problem Relation Age of Onset    Diabetes Mother     Hypertension Mother     Kidney Disease Mother     High Cholesterol Father     Diabetes Brother         pre diabetic         OBJECTIVE    Physical Exam:     Visit Vitals  Visit Vitals  /78 (BP 1 Location: Left arm, BP Patient Position: Sitting)   Pulse 81   Temp 98.4 °F (36.9 °C) (Oral)   Resp 16   Ht 5' 10\" (1.778 m)   Wt 199 lb (90.3 kg)   SpO2 96%   BMI 28.55 kg/m²         General: alert, chronically ill-appearing,  in no apparent distress or pain  CVS: normal rate, regular rhythm, distinct S1 and S2  Lungs:clear to ausculation bilaterally, no crackles, wheezing or rhonchi noted  Extremities: no edema, no cyanosis, MSK grossly normal  Skin: warm, no lesions, rashes noted  Psych:  mood and affect normal      ASSESSMENT/PLAN  Diagnoses and all orders for this visit:    [facility]RD ([facility]  On dialysis following dr. Qamar Ramos  On transplant list    Severe nonproliferative diabetic retinopathy of both eyes with macular edema associated with type 2 diabetes mellitus (HCC)  a1c 5.3, though w/ anemia  Reports 1 episode of hypoglycemia past month, will reduce amaryl to 1 mg  FBG log  If BG< 70, advised to hold ZAMORA  Cmp/cbc/a1c soon    Dyslipidemia  LDL 85 (2/19)  Cont lipitor    Coronary artery disease  S/p LAD stent  Asymptomatic   On asa, statin, bb, ace  Following cardiology    Essentially hypertension  Controlled  Nephrology following    Legally blind     Atrial fibrillation with rapid ventricular response (HCC)   rate controlled, anticoag with asa (chads vasc 3, asa advised per cards due to anemia)     Anemia of chronic disease  Following nephrology    Encounter for vaccine  PCV 13 today    Follow-up Disposition:  Return in about 6 weeks or if symptoms worsen or fail to improve. Patient understands plan of care. Patient has provided input and agrees with goals.

## 2019-09-17 NOTE — PATIENT INSTRUCTIONS
Decrease lisinopril to 10mg twice a day (take 1/2 of your 20mg tablets twice a day) on non-dialysis days but can take a dose in the evening of dialysis days if blood pressure is greater than 647 mmHg systolic (top number)  Continue Coreg 25mg twice a day on non-dialysis days but can take a dose in the evening of dialysis days if blood pressure is greater 664CV systolic (top number)  Monitor blood pressures at home before medications are taken and then 2 to 3 hours after medications are taken and record the readings  Pay attention to your pre-dialysis treatment blood pressures and post-dialysis blood pressures  Follow-up with Dr Elizabeth Penaloza as scheduled and as needed English

## 2019-09-25 RX ORDER — LISINOPRIL 10 MG/1
10 TABLET ORAL 2 TIMES DAILY
Qty: 60 TAB | Refills: 6 | Status: ON HOLD | OUTPATIENT
Start: 2019-09-25 | End: 2021-06-22 | Stop reason: SDUPTHER

## 2019-11-16 NOTE — ED NOTES
I have reviewed discharge instructions with the patient and spouse. The patient and spouse verbalized understanding. Patient armband removed and shredded  IV catheter discontinued intact. Site without signs and symptoms of complications. Dressing and pressure applied.
Carmen Da Silva)  Orthopaedic Surgery  [address] [place] [address]  Phone: [phone]  Fax: [phone]  Follow Up Time:
 used

## 2020-03-17 ENCOUNTER — OFFICE VISIT (OUTPATIENT)
Dept: CARDIOLOGY CLINIC | Age: 50
End: 2020-03-17

## 2020-03-17 VITALS
HEART RATE: 90 BPM | BODY MASS INDEX: 29.35 KG/M2 | SYSTOLIC BLOOD PRESSURE: 110 MMHG | DIASTOLIC BLOOD PRESSURE: 62 MMHG | OXYGEN SATURATION: 96 % | WEIGHT: 205 LBS | HEIGHT: 70 IN

## 2020-03-17 DIAGNOSIS — I48.91 ATRIAL FIBRILLATION WITH RAPID VENTRICULAR RESPONSE (HCC): ICD-10-CM

## 2020-03-17 DIAGNOSIS — I25.10 CORONARY ARTERY DISEASE INVOLVING NATIVE CORONARY ARTERY OF NATIVE HEART WITHOUT ANGINA PECTORIS: Primary | ICD-10-CM

## 2020-03-17 DIAGNOSIS — I42.9 CARDIOMYOPATHY, UNSPECIFIED TYPE (HCC): ICD-10-CM

## 2020-03-17 DIAGNOSIS — N18.6 END STAGE RENAL DISEASE (HCC): ICD-10-CM

## 2020-03-17 DIAGNOSIS — I10 ESSENTIAL HYPERTENSION: ICD-10-CM

## 2020-03-17 NOTE — PROGRESS NOTES
HISTORY OF PRESENT ILLNESS  Shahida Corona is a 54 y.o. male. ASSESSMENT and PLAN    Mr. Claudine Sullivan has history of diabetes mellitus, diabetic retinopathy with legally blind as, coronary artery disease without chest pains, ischemic cardiomyopathy. He presented with significant fatigue, increased shortness of breath. He had LAD stent in October of 2012 by Dr. Mae Mendoza. He has never had chest pains. He has history of mildly diminished LV function with ejection fraction of 40-50%. He is now on hemodialysis 3 times per week.  His new baseline weight in 2019 is 199 pounds. Kidney transplant evaluation was performed in 2019. He underwent coronary angiography on 8/21/2019. He underwent left and right selective coronary angiography. He had no significant disease in his RCA or circumflex. He had 35% stenosis in his LAD. For completeness, he had IFR performed which was negative at 0.93-0.95. No further coronary intervention was needed. · CAD:    Stable. From coronary standpoint, he can proceed with transplantation. · BP:   Well controlled. · HR:    Stable. · CHF:    There is no evidence of decompensated CHF noted. · Weight:    His weight today is 205 pounds. His baseline weight is 199 pounds. I have advised him not to gain any further weight. · Cholesterol:   Target LDL <70. Lipitor 40. · Anti-platelet:   Remains on ASA. As noted above, his coronary angiography back in August 2019 did not reveal any significant CAD in his right or left coronary system. From a coronary standpoint, he can proceed with renal plan cessation evaluation and management. I will see him back in 6 months. Thank you. Encounter Diagnoses   Name Primary?     Coronary artery disease involving native coronary artery of native heart without angina pectoris Yes    End stage renal disease (HCC)     Cardiomyopathy, unspecified type ([address]     Atrial fibrillation with rapid ventricular response ([address]     Essential hypertension      current treatment plan is effective, no change in therapy  lab results and schedule of future lab studies reviewed with patient  reviewed diet, exercise and weight control  cardiovascular risk and specific lipid/LDL goals reviewed  use of aspirin to prevent MI and TIA's discussed      HPI   Today, Mr. Caterina Sykes has no complaints of chest pains, increased shortness of breath or decreased exercise capacity. He is accompanied by his wife. Apparently, after his coronary angiography back in August 2019, there has been some misunderstanding or miscommunication about his results. He had both left and right coronary angiography performed showing no significant disease except for just mild disease in his LAD. This underwent IFR investigation which was also noted to be unremarkable. Coronary standpoint, he is no further revascularization procedure was needed. Review of Systems   Respiratory: Negative for shortness of breath. Cardiovascular: Negative for chest pain, palpitations, orthopnea, claudication, leg swelling and PND. All other systems reviewed and are negative. Physical Exam  Vitals signs and nursing note reviewed. Constitutional:       Appearance: Normal appearance. HENT:      Head: Normocephalic. Eyes:      Extraocular Movements: Extraocular movements intact. Conjunctiva/sclera: Conjunctivae normal.   Neck:      Musculoskeletal: No neck rigidity. Cardiovascular:      Rate and Rhythm: Normal rate and regular rhythm. Pulmonary:      Breath sounds: Normal breath sounds. Abdominal:      General: Bowel sounds are normal.      Palpations: Abdomen is soft. Musculoskeletal:         General: No swelling. Skin:     General: Skin is warm and dry. Neurological:      General: No focal deficit present. Mental Status: He is alert and oriented to person, place, and time.    Psychiatric:         Mood and Affect: Mood normal.         Behavior: Behavior normal. PCP: Cynthia Dodge MD    Past Medical History:   Diagnosis Date    Abnormal nuclear cardiac imaging test 10/08/2012    Fixed anteroseptal, anteroapical defect c/w prior infarction. No ischemia. Mid & distal anteroseptal, anteroapical WMA. LVE. EF 44%.  Anemia     dr. Magy German doubt amyloid or multiple myeloma. candidate for procirt if Hg <10    Benign hypertensive     Blindness of right eye 01/2013    legally blind    CAD (coronary artery disease)     Cardiomyopathy ejection fraction of 40%     CKD (chronic kidney disease), [facility] IV ([facility]     to see Dr. Claudia Piper 12/1/12    Congestive heart failure ([facility]     Diabetes mellitus ([facility]     Diabetic retinopathy ([facility]     Diabetic retinopathy ([facility]     Dr. Fernanda Beckwith- injections    Heart failure ([facility]     History of echocardiogram 04/22/2014    LVE. EF 55% (prev 40-45% on echo of 1/27/12). No WMA. Mild-mod conc LVH. Gr 3 DDfx. Marked LAE. Mild SHANTEL. Mild MR.    Hypertension     Pulmonary hypertension ([facility]     Renal Ultrasound 1/3/12    Right kidney isoechoic w/respect to liver. Sm left-sided pleural effusion    S/P cardiac cath 10/16/2012    LM patent. pLAD 95% (3.5 x 12-mm [place] stent, resid 0$%). LCX mild. Past Surgical History:   Procedure Laterality Date    HX CORONARY STENT PLACEMENT      one    HX LAPAROTOMY  2/2012    bowel obstruction with removal segment of small bowel. Done by Riblet.  HX LAPAROTOMY  infancy    whole in colon and had repair at that time.  HX OTHER SURGICAL  06/20/2013    left eye retna attatchment    HX RETINAL DETACHMENT REPAIR      august 2012    REPAIR ING HERNIA,5+Y/O,REDUCIBL Left 05/02/2019    Dr. Dinora Carpenter       Current Outpatient Medications   Medication Sig Dispense Refill    lisinopril (PRINIVIL, ZESTRIL) 10 mg tablet Take 1 Tab by mouth two (2) times a day. 60 Tab 6    glimepiride (AMARYL) 1 mg tablet Take 1 Tab by mouth Daily (before breakfast).  90 Tab 0    Blood-Glucose Meter monitoring kit Check fasting glucose 1 Kit 0    glucose blood VI test strips (ACCU-CHEK ZAC PLUS TEST STRP) strip Use as directed 100 Strip 2    sucroferric oxyhydroxide (VELPHORO) 500 mg chew chewable tablet Take 500 mg by mouth three (3) times daily (with meals).  carvedilol (COREG) 25 mg tablet TAKE TWO TABLETS BY MOUTH TWICE DAILY 360 Tab 3    sildenafil citrate (VIAGRA) 50 mg tablet Take 1 Tab by mouth as needed. (Patient taking differently: Take 1 Tab by mouth daily as needed for Other (sexual performance). ) 10 Tab 3    aspirin delayed-release 81 mg tablet Take 81 mg by mouth daily.  EPOETIN SEBLE (PROCRIT IJ) by Injection route.          The patient has a family history of    Social History     Tobacco Use    Smoking status: Never Smoker    Smokeless tobacco: Never Used   Substance Use Topics    Alcohol use: Not Currently     Alcohol/week: 4.2 standard drinks     Types: 5 Cans of beer per week    Drug use: No       Lab Results   Component Value Date/Time    Cholesterol, total 138 02/09/2019 11:20 AM    HDL Cholesterol 38 (L) 02/09/2019 11:20 AM    LDL, calculated 85 02/09/2019 11:20 AM    Triglyceride 76 02/09/2019 11:20 AM    CHOL/HDL Ratio 4.4 10/17/2012 06:05 AM        BP Readings from Last 3 Encounters:   03/17/20 110/62   09/17/19 104/78   09/17/19 122/84        Pulse Readings from Last 3 Encounters:   03/17/20 90   09/17/19 81   09/17/19 87       Wt Readings from Last 3 Encounters:   03/17/20 93 kg (205 lb)   09/17/19 90.3 kg (199 lb)   09/17/19 91.2 kg (201 lb)         EKG: unchanged from previous tracings, normal sinus rhythm, Q waves in V1 and V2.

## 2020-03-17 NOTE — PROGRESS NOTES
Kristi Thakkar presents today for   Chief Complaint   Patient presents with    Irregular Heart Beat     6 month follow up - no cardiac complaints        Kavya Daphneyminal Sharpe preferred language for health care discussion is english/other. Is someone accompanying this pt? Wife     Is the patient using any DME equipment during [address]? no    Depression Screening:  3 most recent PHQ Screens 3/17/2020   Little interest or pleasure in doing things Not at all   Feeling down, depressed, irritable, or hopeless Not at all   Total Score PHQ 2 0       Learning Assessment:  Learning Assessment 3/17/2020   PRIMARY LEARNER Patient   HIGHEST LEVEL OF EDUCATION - PRIMARY LEARNER  -   BARRIERS PRIMARY LEARNER -   Jessica Sullivan -   ANSWERED BY Patient   RELATIONSHIP SELF       Abuse Screening:  Abuse Screening Questionnaire 3/17/2020   Do you ever feel afraid of your partner? N   Are you in a relationship with someone who physically or mentally threatens you? N   Is it safe for you to go home? Y       Fall Risk  Fall Risk Assessment, last 12 mths 3/17/2020   Able to walk? Yes   Fall in past 12 months? No       Pt currently taking Anticoagulant therapy? ASA 81mg qd    Coordination of Care:  1. Have you been to the ER, urgent care clinic since your last visit? Hospitalized since your last visit? no    2. Have you seen or consulted any other health care providers outside of the [address] [place] [address] since your last visit? Include any pap smears or colon screening.  no

## 2021-01-12 RX ORDER — TADALAFIL 20 MG/1
20 TABLET ORAL AS NEEDED
Qty: 10 TAB | Refills: 3 | Status: SHIPPED
Start: 2021-01-12 | End: 2021-06-22

## 2021-01-20 ENCOUNTER — HOSPITAL ENCOUNTER (EMERGENCY)
Age: 51
Discharge: HOME | End: 2021-01-20
Attending: EMERGENCY MEDICINE
Payer: MEDICARE

## 2021-01-20 ENCOUNTER — APPOINTMENT (OUTPATIENT)
Dept: GENERAL RADIOLOGY | Age: 51
End: 2021-01-20
Attending: EMERGENCY MEDICINE
Payer: MEDICARE

## 2021-01-20 VITALS
HEART RATE: 91 BPM | HEIGHT: 71 IN | RESPIRATION RATE: 17 BRPM | SYSTOLIC BLOOD PRESSURE: 200 MMHG | BODY MASS INDEX: 29.01 KG/M2 | DIASTOLIC BLOOD PRESSURE: 114 MMHG | WEIGHT: 207.23 LBS | OXYGEN SATURATION: 99 % | TEMPERATURE: 98.5 F

## 2021-01-20 DIAGNOSIS — R06.6 INTRACTABLE SINGULTUS: Primary | ICD-10-CM

## 2021-01-20 PROCEDURE — 99282 EMERGENCY DEPT VISIT SF MDM: CPT

## 2021-01-20 PROCEDURE — 71045 X-RAY EXAM CHEST 1 VIEW: CPT

## 2021-01-20 PROCEDURE — 74011250636 HC RX REV CODE- 250/636: Performed by: EMERGENCY MEDICINE

## 2021-01-20 PROCEDURE — 96374 THER/PROPH/DIAG INJ IV PUSH: CPT

## 2021-01-20 RX ORDER — ONDANSETRON 2 MG/ML
4 INJECTION INTRAMUSCULAR; INTRAVENOUS
Status: DISCONTINUED | OUTPATIENT
Start: 2021-01-20 | End: 2021-01-20

## 2021-01-20 RX ORDER — ONDANSETRON 2 MG/ML
4 INJECTION INTRAMUSCULAR; INTRAVENOUS
Status: COMPLETED | OUTPATIENT
Start: 2021-01-20 | End: 2021-01-20

## 2021-01-20 RX ADMIN — ONDANSETRON 4 MG: 2 INJECTION INTRAMUSCULAR; INTRAVENOUS at 12:56

## 2021-01-20 NOTE — ED PROVIDER NOTES
EMERGENCY DEPARTMENT HISTORY AND PHYSICAL EXAM  This was created with voice recognition software and transcription errors may be present. 1:17 PM  Date: 1/20/2021  Patient Name: Escobar Aiken    History of Presenting Illness     Chief Complaint:    History Provided By:   Past medical history of CKD the Aleve on his right eye CHF diabetes heart failure who presents with hiccups since Monday. Patient denies any other symptoms no chest pain no fever no chills no cough no nausea no vomiting. HPI: Escobar Aiken is a 52 y.o. male     PCP: Hallie Stokes MD      Past History     Past Medical History:  Past Medical History:   Diagnosis Date    Abnormal nuclear cardiac imaging test 10/08/2012    Fixed anteroseptal, anteroapical defect c/w prior infarction. No ischemia. Mid & distal anteroseptal, anteroapical WMA. LVE. EF 44%.  Anemia     dr. Teixeira Carmela doubt amyloid or multiple myeloma. candidate for procirt if Hg <10    Benign hypertensive     Blindness of right eye 01/2013    legally blind    CAD (coronary artery disease)     Cardiomyopathy ejection fraction of 40%     CKD (chronic kidney disease), stage IV ([address]     to see Dr. Angelica Torrez 12/1/12    Congestive heart failure ([address]     Diabetes mellitus ([address]     Diabetic retinopathy ([address]     Diabetic retinopathy ([address]     Dr. Lauren Conception- injections    [facility] failure [facility]     History of echocardiogram 04/22/2014    LVE. EF 55% (prev 40-45% on echo of 1/27/12). No WMA. Mild-mod conc LVH. Gr 3 DDfx. Marked LAE. Mild SHANTEL. Mild MR.    Hypertension     Pulmonary hypertension ([address]     Renal Ultrasound 1/3/12    Right kidney isoechoic w/respect to liver. Sm left-sided pleural effusion    S/P cardiac cath 10/16/2012    LM patent. pLAD 95% (3.5 x 12-mm [place] stent, resid 0$%). LCX mild.          Past Surgical History:  Past Surgical History:   Procedure Laterality Date    HX CORONARY STENT PLACEMENT      one    HX LAPAROTOMY  2/2012 bowel obstruction with removal segment of small bowel. Done by [facility].  HX LAPAROTOMY  infancy    whole in colon and had repair at that time.  HX OTHER SURGICAL  06/20/2013    left eye retna attatchment    HX RETINAL DETACHMENT REPAIR      august 2012    IN REPAIR ING HERNIA,5+Y/O,REDUCIBL Left 05/02/2019    Dr. Isaac Irizarry       Family History:  Family History   Problem Relation Age of Onset    Diabetes Mother     Hypertension Mother     Kidney Disease Mother     High Cholesterol Father     Diabetes Brother         pre diabetic       Social History:  Social History     Tobacco Use    Smoking status: Never Smoker    Smokeless tobacco: Never Used   Substance Use Topics    Alcohol use: Not Currently     Alcohol/week: 4.2 standard drinks     Types: 5 Cans of beer per week    Drug use: No       Allergies:  No Known Allergies    Review of Systems     Review of Systems   All other systems reviewed and are negative. 10 point review of systems otherwise negative unless noted in HPI. Physical Exam       Physical Exam  Constitutional:       Appearance: He is well-developed. HENT:      Head: Normocephalic and atraumatic. Eyes:      Pupils: Pupils are equal, round, and reactive to light. Neck:      Musculoskeletal: Normal range of motion and neck supple. Cardiovascular:      Rate and Rhythm: Normal rate and regular rhythm. Heart sounds: Normal heart sounds. No murmur. No friction rub. Pulmonary:      Effort: Pulmonary effort is normal. No respiratory distress. Breath sounds: Normal breath sounds. No wheezing. Abdominal:      General: There is no distension. Palpations: Abdomen is soft. Tenderness: There is no abdominal tenderness. There is no guarding or rebound. Musculoskeletal: Normal range of motion. Skin:     General: Skin is warm and dry. Neurological:      Mental Status: He is alert and oriented to person, place, and time.    Psychiatric:         Behavior: Behavior normal.         Thought Content: Thought content normal.         Diagnostic Study Results     Vital Signs  EKG:  Labs:   Imaging:     Medical Decision Making     ED Course: Progress Notes, Reevaluation, and Consults:      Provider Notes (Medical Decision Making): X-rays unremarkable per radiology. Patient is now feeling better will discharge for outpatient follow-up         Diagnosis     Clinical Impression: No diagnosis found. Disposition:    Patient's Medications   Start Taking    No medications on file   Continue Taking    ASPIRIN DELAYED-RELEASE 81 MG TABLET    Take 81 mg by mouth daily. BLOOD-GLUCOSE METER MONITORING KIT    Check fasting glucose    CARVEDILOL (COREG) 25 MG TABLET    TAKE TWO TABLETS BY MOUTH TWICE DAILY    EPOETIN SEBLE (PROCRIT IJ)    by Injection route. GLIMEPIRIDE (AMARYL) 1 MG TABLET    Take 1 Tab by mouth Daily (before breakfast). GLUCOSE BLOOD VI TEST STRIPS (ACCU-CHEK ZAC PLUS TEST STRP) STRIP    Use as directed    LISINOPRIL (PRINIVIL, ZESTRIL) 10 MG TABLET    Take 1 Tab by mouth two (2) times a day. SUCROFERRIC OXYHYDROXIDE (VELPHORO) 500 MG CHEW CHEWABLE TABLET    Take 500 mg by mouth three (3) times daily (with meals). TADALAFIL (CIALIS) 20 MG TABLET    Take 1 Tab by mouth as needed for Erectile Dysfunction.    These Medications have changed    No medications on file   Stop Taking    No medications on file

## 2021-01-20 NOTE — ED TRIAGE NOTES
Pt. Presents to ED with a c/o hiccups. Patient also missed dialysis and is not taking BP medications d/t hiccups. The hiccups \"make me feel really sick. \" VSS with exception of BP.

## 2021-01-22 ENCOUNTER — HOSPITAL ENCOUNTER (INPATIENT)
Age: 51
LOS: 2 days | Discharge: HOME | DRG: 640 | End: 2021-01-24
Attending: EMERGENCY MEDICINE | Admitting: FAMILY MEDICINE
Payer: COMMERCIAL

## 2021-01-22 ENCOUNTER — APPOINTMENT (OUTPATIENT)
Dept: GENERAL RADIOLOGY | Age: 51
DRG: 640 | End: 2021-01-22
Attending: EMERGENCY MEDICINE
Payer: COMMERCIAL

## 2021-01-22 ENCOUNTER — APPOINTMENT (OUTPATIENT)
Dept: NON INVASIVE DIAGNOSTICS | Age: 51
DRG: 640 | End: 2021-01-22
Attending: PHYSICIAN ASSISTANT
Payer: COMMERCIAL

## 2021-01-22 DIAGNOSIS — I25.10 CORONARY ARTERY DISEASE INVOLVING NATIVE CORONARY ARTERY OF NATIVE HEART WITHOUT ANGINA PECTORIS: ICD-10-CM

## 2021-01-22 DIAGNOSIS — E87.5 HYPERKALEMIA: ICD-10-CM

## 2021-01-22 DIAGNOSIS — I42.9 CARDIOMYOPATHY, UNSPECIFIED TYPE (HCC): ICD-10-CM

## 2021-01-22 DIAGNOSIS — I48.91 ATRIAL FIBRILLATION WITH RVR (HCC): Primary | ICD-10-CM

## 2021-01-22 DIAGNOSIS — R07.9 CHEST PAIN, UNSPECIFIED TYPE: ICD-10-CM

## 2021-01-22 PROBLEM — I21.4 NSTEMI (NON-ST ELEVATED MYOCARDIAL INFARCTION) (HCC): Status: ACTIVE | Noted: 2021-01-22

## 2021-01-22 PROBLEM — N18.6 ESRD (END STAGE RENAL DISEASE) (HCC): Status: ACTIVE | Noted: 2021-01-22

## 2021-01-22 PROBLEM — E83.52 HYPERCALCEMIA: Status: ACTIVE | Noted: 2021-01-22

## 2021-01-22 LAB
ALBUMIN SERPL-MCNC: 2.7 G/DL (ref 3.4–5)
ALBUMIN SERPL-MCNC: 2.8 G/DL (ref 3.4–5)
ALBUMIN/GLOB SERPL: 0.7 {RATIO} (ref 0.8–1.7)
ALBUMIN/GLOB SERPL: 0.8 {RATIO} (ref 0.8–1.7)
ALP SERPL-CCNC: 82 U/L (ref 45–117)
ALP SERPL-CCNC: 83 U/L (ref 45–117)
ALT SERPL-CCNC: 16 U/L (ref 16–61)
ALT SERPL-CCNC: 19 U/L (ref 16–61)
ANION GAP SERPL CALC-SCNC: 11 MMOL/L (ref 3–18)
ANION GAP SERPL CALC-SCNC: 13 MMOL/L (ref 3–18)
ANION GAP SERPL CALC-SCNC: 9 MMOL/L (ref 3–18)
AST SERPL-CCNC: 26 U/L (ref 10–38)
AST SERPL-CCNC: 57 U/L (ref 10–38)
ATRIAL RATE: 170 BPM
ATRIAL RATE: 192 BPM
ATRIAL RATE: 241 BPM
BASOPHILS # BLD: 0 K/UL (ref 0–0.1)
BASOPHILS # BLD: 0.1 K/UL (ref 0–0.1)
BASOPHILS NFR BLD: 0 % (ref 0–2)
BASOPHILS NFR BLD: 0 % (ref 0–2)
BILIRUB DIRECT SERPL-MCNC: <0.1 MG/DL (ref 0–0.2)
BILIRUB DIRECT SERPL-MCNC: <0.1 MG/DL (ref 0–0.2)
BILIRUB SERPL-MCNC: 0.3 MG/DL (ref 0.2–1)
BILIRUB SERPL-MCNC: 0.4 MG/DL (ref 0.2–1)
BUN SERPL-MCNC: 127 MG/DL (ref 7–18)
BUN SERPL-MCNC: 127 MG/DL (ref 7–18)
BUN SERPL-MCNC: 130 MG/DL (ref 7–18)
BUN/CREAT SERPL: 6 (ref 12–20)
CALCIUM SERPL-MCNC: 13.8 MG/DL (ref 8.5–10.1)
CALCIUM SERPL-MCNC: 14.3 MG/DL (ref 8.5–10.1)
CALCIUM SERPL-MCNC: 14.5 MG/DL (ref 8.5–10.1)
CALCULATED R AXIS, ECG10: -13 DEGREES
CALCULATED R AXIS, ECG10: -14 DEGREES
CALCULATED R AXIS, ECG10: 14 DEGREES
CALCULATED T AXIS, ECG11: -45 DEGREES
CALCULATED T AXIS, ECG11: 174 DEGREES
CALCULATED T AXIS, ECG11: 92 DEGREES
CHLORIDE SERPL-SCNC: 89 MMOL/L (ref 100–111)
CHLORIDE SERPL-SCNC: 92 MMOL/L (ref 100–111)
CHLORIDE SERPL-SCNC: 92 MMOL/L (ref 100–111)
CK MB CFR SERPL CALC: 3.4 % (ref 0–4)
CK MB CFR SERPL CALC: 8 % (ref 0–4)
CK MB SERPL-MCNC: 12.7 NG/ML (ref 5–25)
CK MB SERPL-MCNC: 9 NG/ML (ref 5–25)
CK SERPL-CCNC: 158 U/L (ref 39–308)
CK SERPL-CCNC: 261 U/L (ref 39–308)
CO2 SERPL-SCNC: 30 MMOL/L (ref 21–32)
CO2 SERPL-SCNC: 30 MMOL/L (ref 21–32)
CO2 SERPL-SCNC: 33 MMOL/L (ref 21–32)
CREAT SERPL-MCNC: 21.8 MG/DL (ref 0.6–1.3)
CREAT SERPL-MCNC: 22 MG/DL (ref 0.6–1.3)
CREAT SERPL-MCNC: 22.4 MG/DL (ref 0.6–1.3)
DIAGNOSIS, 93000: NORMAL
DIFFERENTIAL METHOD BLD: ABNORMAL
DIFFERENTIAL METHOD BLD: ABNORMAL
ECHO LV INTERNAL DIMENSION DIASTOLIC: 4.32 CM (ref 4.2–5.9)
ECHO LV INTERNAL DIMENSION SYSTOLIC: 3.3 CM
ECHO LV IVSD: 1.41 CM (ref 0.6–1)
ECHO LV MASS 2D: 232.6 G (ref 88–224)
ECHO LV MASS INDEX 2D: 108.7 G/M2 (ref 49–115)
ECHO LV POSTERIOR WALL DIASTOLIC: 1.38 CM (ref 0.6–1)
ECHO TV REGURGITANT MAX VELOCITY: 219.92 CM/S
ECHO TV REGURGITANT PEAK GRADIENT: 19.35 MMHG
EOSINOPHIL # BLD: 0.1 K/UL (ref 0–0.4)
EOSINOPHIL # BLD: 0.1 K/UL (ref 0–0.4)
EOSINOPHIL NFR BLD: 1 % (ref 0–5)
EOSINOPHIL NFR BLD: 1 % (ref 0–5)
ERYTHROCYTE [DISTWIDTH] IN BLOOD BY AUTOMATED COUNT: 13.8 % (ref 11.6–14.5)
ERYTHROCYTE [DISTWIDTH] IN BLOOD BY AUTOMATED COUNT: 13.9 % (ref 11.6–14.5)
GLOBULIN SER CALC-MCNC: 3.5 G/DL (ref 2–4)
GLOBULIN SER CALC-MCNC: 4.1 G/DL (ref 2–4)
GLUCOSE SERPL-MCNC: 115 MG/DL (ref 74–99)
GLUCOSE SERPL-MCNC: 126 MG/DL (ref 74–99)
GLUCOSE SERPL-MCNC: 93 MG/DL (ref 74–99)
HCT VFR BLD AUTO: 40 % (ref 36–48)
HCT VFR BLD AUTO: 44.8 % (ref 36–48)
HGB BLD-MCNC: 13.8 G/DL (ref 13–16)
HGB BLD-MCNC: 15.4 G/DL (ref 13–16)
INR PPP: 1 (ref 0.8–1.2)
LIPASE SERPL-CCNC: 388 U/L (ref 73–393)
LIPASE SERPL-CCNC: 564 U/L (ref 73–393)
LYMPHOCYTES # BLD: 1.7 K/UL (ref 0.9–3.6)
LYMPHOCYTES # BLD: 2 K/UL (ref 0.9–3.6)
LYMPHOCYTES NFR BLD: 16 % (ref 21–52)
LYMPHOCYTES NFR BLD: 16 % (ref 21–52)
MAGNESIUM SERPL-MCNC: 3 MG/DL (ref 1.6–2.6)
MAGNESIUM SERPL-MCNC: 3 MG/DL (ref 1.6–2.6)
MCH RBC QN AUTO: 31.2 PG (ref 24–34)
MCH RBC QN AUTO: 31.6 PG (ref 24–34)
MCHC RBC AUTO-ENTMCNC: 34.4 G/DL (ref 31–37)
MCHC RBC AUTO-ENTMCNC: 34.5 G/DL (ref 31–37)
MCV RBC AUTO: 90.5 FL (ref 74–97)
MCV RBC AUTO: 91.8 FL (ref 74–97)
MONOCYTES # BLD: 0.8 K/UL (ref 0.05–1.2)
MONOCYTES # BLD: 0.9 K/UL (ref 0.05–1.2)
MONOCYTES NFR BLD: 7 % (ref 3–10)
MONOCYTES NFR BLD: 9 % (ref 3–10)
NEUTS SEG # BLD: 7.8 K/UL (ref 1.8–8)
NEUTS SEG # BLD: 9.8 K/UL (ref 1.8–8)
NEUTS SEG NFR BLD: 74 % (ref 40–73)
NEUTS SEG NFR BLD: 76 % (ref 40–73)
PLATELET # BLD AUTO: 281 K/UL (ref 135–420)
PLATELET # BLD AUTO: 283 K/UL (ref 135–420)
PMV BLD AUTO: 10.5 FL (ref 9.2–11.8)
PMV BLD AUTO: 10.6 FL (ref 9.2–11.8)
POTASSIUM SERPL-SCNC: 6.2 MMOL/L (ref 3.5–5.5)
POTASSIUM SERPL-SCNC: 6.3 MMOL/L (ref 3.5–5.5)
POTASSIUM SERPL-SCNC: 8.6 MMOL/L (ref 3.5–5.5)
PROT SERPL-MCNC: 6.3 G/DL (ref 6.4–8.2)
PROT SERPL-MCNC: 6.8 G/DL (ref 6.4–8.2)
PROTHROMBIN TIME: 12.9 SEC (ref 11.5–15.2)
Q-T INTERVAL, ECG07: 280 MS
Q-T INTERVAL, ECG07: 304 MS
Q-T INTERVAL, ECG07: 322 MS
QRS DURATION, ECG06: 100 MS
QRS DURATION, ECG06: 84 MS
QRS DURATION, ECG06: 94 MS
QTC CALCULATION (BEZET), ECG08: 408 MS
QTC CALCULATION (BEZET), ECG08: 413 MS
QTC CALCULATION (BEZET), ECG08: 475 MS
RBC # BLD AUTO: 4.42 M/UL (ref 4.7–5.5)
RBC # BLD AUTO: 4.88 M/UL (ref 4.7–5.5)
SODIUM SERPL-SCNC: 132 MMOL/L (ref 136–145)
SODIUM SERPL-SCNC: 133 MMOL/L (ref 136–145)
SODIUM SERPL-SCNC: 134 MMOL/L (ref 136–145)
TROPONIN I SERPL-MCNC: 0.9 NG/ML (ref 0–0.04)
TROPONIN I SERPL-MCNC: 4.09 NG/ML (ref 0–0.04)
TROPONIN I SERPL-MCNC: 4.95 NG/ML (ref 0–0.04)
VENTRICULAR RATE, ECG03: 128 BPM
VENTRICULAR RATE, ECG03: 147 BPM
VENTRICULAR RATE, ECG03: 99 BPM
WBC # BLD AUTO: 10.6 K/UL (ref 4.6–13.2)
WBC # BLD AUTO: 12.6 K/UL (ref 4.6–13.2)

## 2021-01-22 PROCEDURE — 80076 HEPATIC FUNCTION PANEL: CPT

## 2021-01-22 PROCEDURE — 83690 ASSAY OF LIPASE: CPT

## 2021-01-22 PROCEDURE — 96366 THER/PROPH/DIAG IV INF ADDON: CPT

## 2021-01-22 PROCEDURE — 96376 TX/PRO/DX INJ SAME DRUG ADON: CPT

## 2021-01-22 PROCEDURE — 80048 BASIC METABOLIC PNL TOTAL CA: CPT

## 2021-01-22 PROCEDURE — 82553 CREATINE MB FRACTION: CPT

## 2021-01-22 PROCEDURE — 99223 1ST HOSP IP/OBS HIGH 75: CPT | Performed by: FAMILY MEDICINE

## 2021-01-22 PROCEDURE — 93005 ELECTROCARDIOGRAM TRACING: CPT

## 2021-01-22 PROCEDURE — 85025 COMPLETE CBC W/AUTO DIFF WBC: CPT

## 2021-01-22 PROCEDURE — 65660000004 HC RM CVT STEPDOWN

## 2021-01-22 PROCEDURE — 99223 1ST HOSP IP/OBS HIGH 75: CPT | Performed by: INTERNAL MEDICINE

## 2021-01-22 PROCEDURE — 93321 DOPPLER ECHO F-UP/LMTD STD: CPT

## 2021-01-22 PROCEDURE — 74011000250 HC RX REV CODE- 250: Performed by: EMERGENCY MEDICINE

## 2021-01-22 PROCEDURE — 85610 PROTHROMBIN TIME: CPT

## 2021-01-22 PROCEDURE — 74011250637 HC RX REV CODE- 250/637: Performed by: PHYSICIAN ASSISTANT

## 2021-01-22 PROCEDURE — 74011250636 HC RX REV CODE- 250/636: Performed by: EMERGENCY MEDICINE

## 2021-01-22 PROCEDURE — 90935 HEMODIALYSIS ONE EVALUATION: CPT

## 2021-01-22 PROCEDURE — 96361 HYDRATE IV INFUSION ADD-ON: CPT

## 2021-01-22 PROCEDURE — 99285 EMERGENCY DEPT VISIT HI MDM: CPT

## 2021-01-22 PROCEDURE — 96365 THER/PROPH/DIAG IV INF INIT: CPT

## 2021-01-22 PROCEDURE — 83735 ASSAY OF MAGNESIUM: CPT

## 2021-01-22 PROCEDURE — 71045 X-RAY EXAM CHEST 1 VIEW: CPT

## 2021-01-22 RX ORDER — LIDOCAINE HYDROCHLORIDE 20 MG/ML
15 SOLUTION OROPHARYNGEAL AS NEEDED
Status: DISCONTINUED | OUTPATIENT
Start: 2021-01-22 | End: 2021-01-24

## 2021-01-22 RX ORDER — ASPIRIN 81 MG/1
81 TABLET ORAL DAILY
Status: DISCONTINUED | OUTPATIENT
Start: 2021-01-22 | End: 2021-01-24 | Stop reason: HOSPADM

## 2021-01-22 RX ORDER — MIDAZOLAM HYDROCHLORIDE 1 MG/ML
INJECTION, SOLUTION INTRAMUSCULAR; INTRAVENOUS
Status: DISCONTINUED
Start: 2021-01-22 | End: 2021-01-22 | Stop reason: WASHOUT

## 2021-01-22 RX ORDER — METOPROLOL TARTRATE 25 MG/1
25 TABLET, FILM COATED ORAL EVERY 12 HOURS
Status: DISCONTINUED | OUTPATIENT
Start: 2021-01-22 | End: 2021-01-23

## 2021-01-22 RX ORDER — FENTANYL CITRATE 50 UG/ML
50 INJECTION, SOLUTION INTRAMUSCULAR; INTRAVENOUS
Status: DISCONTINUED | OUTPATIENT
Start: 2021-01-22 | End: 2021-01-22

## 2021-01-22 RX ORDER — SODIUM BICARBONATE 1 MEQ/ML
50 SYRINGE (ML) INTRAVENOUS
Status: DISPENSED | OUTPATIENT
Start: 2021-01-22 | End: 2021-01-22

## 2021-01-22 RX ORDER — SODIUM POLYSTYRENE SULFONATE 15 G/60ML
45 SUSPENSION ORAL; RECTAL
Status: DISPENSED | OUTPATIENT
Start: 2021-01-22 | End: 2021-01-22

## 2021-01-22 RX ORDER — ALBUTEROL SULFATE 0.83 MG/ML
5 SOLUTION RESPIRATORY (INHALATION)
Status: DISPENSED | OUTPATIENT
Start: 2021-01-22 | End: 2021-01-22

## 2021-01-22 RX ORDER — FENTANYL CITRATE 50 UG/ML
INJECTION, SOLUTION INTRAMUSCULAR; INTRAVENOUS
Status: DISCONTINUED
Start: 2021-01-22 | End: 2021-01-22 | Stop reason: WASHOUT

## 2021-01-22 RX ORDER — ALBUMIN HUMAN 250 G/1000ML
SOLUTION INTRAVENOUS
Status: DISPENSED
Start: 2021-01-22 | End: 2021-01-23

## 2021-01-22 RX ORDER — DEXTROSE 50 % IN WATER (D50W) INTRAVENOUS SYRINGE
25
Status: DISPENSED | OUTPATIENT
Start: 2021-01-22 | End: 2021-01-22

## 2021-01-22 RX ORDER — MIDAZOLAM HYDROCHLORIDE 1 MG/ML
5 INJECTION, SOLUTION INTRAMUSCULAR; INTRAVENOUS ONCE
Status: DISCONTINUED | OUTPATIENT
Start: 2021-01-22 | End: 2021-01-22

## 2021-01-22 RX ORDER — SODIUM CHLORIDE 9 MG/ML
250 INJECTION, SOLUTION INTRAVENOUS
Status: DISCONTINUED | OUTPATIENT
Start: 2021-01-22 | End: 2021-01-24 | Stop reason: HOSPADM

## 2021-01-22 RX ADMIN — PERFLUTREN 2 ML: 6.52 INJECTION, SUSPENSION INTRAVENOUS at 14:25

## 2021-01-22 RX ADMIN — METOPROLOL TARTRATE 25 MG: 25 TABLET, FILM COATED ORAL at 10:46

## 2021-01-22 RX ADMIN — AMIODARONE HYDROCHLORIDE 1 MG/MIN: 1.8 INJECTION, SOLUTION INTRAVENOUS at 06:37

## 2021-01-22 RX ADMIN — LIDOCAINE HYDROCHLORIDE 15 ML: 20 SOLUTION ORAL; TOPICAL at 15:03

## 2021-01-22 RX ADMIN — Medication 81 MG: at 10:46

## 2021-01-22 RX ADMIN — AMIODARONE HYDROCHLORIDE 150 MG: 1.5 INJECTION, SOLUTION INTRAVENOUS at 06:37

## 2021-01-22 RX ADMIN — SODIUM CHLORIDE 500 ML: 900 INJECTION, SOLUTION INTRAVENOUS at 05:42

## 2021-01-22 NOTE — ED NOTES
Per provider and pharmacy, awaiting accurate results to pend before administering K+ lowering medications. Patient is on the monitor. Call light within reach. Rounds being made.

## 2021-01-22 NOTE — H&P
Hospitalist Admission Note    NAME: Escobar Aiken   :  1970   MRN:  [id number]     Date:  2021     Patient PCP: Hallie Stokes MD  ________________________________________________________________________    My assessment of this patient's clinical condition and my plan of care is as follows. Assessment / Plan:  1. Severe hyperkalemia secondary to missed hemodialysis  2. Atrial fibrillation with rapid ventricular response  3. Elevated troponin, concern for ACS  4. ESRD  5. Hypertension  6. DM2  7. Medical noncompliance      1. Admit to telemetry bed, cardiology consultation. 2. Amiodarone infusion as ordered. 3. Hemodialysis per nephrology. Difficulty with volume removal this evening due to marginal BPs. Will likely need additional session tomorrow. 4. Resume home medications as appropriate, hold antihypertensives prior to dialysis. 5. Importance of compliance with medications, hemodialysis and general treatment plan reiterated. Code Status: Full  DVT Prophylaxis: On IV heparin          Subjective:   CHIEF COMPLAINT: Weakness and malaise    HISTORY OF PRESENT ILLNESS:     Mandeep Swift is a 52 y.o.  male who presented to the emergency department for evaluation of generalized weakness and malaise. He was tachycardic on arrival to the ED and was noted to be in rapid atrial fibrillation with a heart rate in the 150s. Patient reports missing dialysis and apparently has not received any treatments in the past week and also admits to not taking Coreg as prescribed. There was some difficulty with obtaining laboratory studies but when an adequate sample was able to be drawn patient was noted to be hyperkalemic with a potassium of 8.6. This was treated in the department and repeat draw showed a level of 6.3. Follow-up labs also showed a troponin bump from 0.9 initially to 4.09.   Oxygen saturations were have been reading intermittently low in ED but these have been confirmed to be inaccurate. Patient is on hemodialysis currently and tolerating it without difficulty. There is no complaint of acute chest pain and heart rates have improved with blood pressures remaining stable. An amiodarone infusion has been initiated for management of rapid atrial fibrillation cardiology consultation has already been obtained. Patient is being referred for hospital admission for further evaluation and treatment. Past Medical History:   Diagnosis Date    Abnormal nuclear cardiac imaging test 10/08/2012    Fixed anteroseptal, anteroapical defect c/w prior infarction. No ischemia. Mid & distal anteroseptal, anteroapical WMA. LVE. EF 44%.  Anemia     dr. [employer] doubt amyloid or multiple myeloma. candidate for procirt if Hg <10    Benign hypertensive     Blindness of right eye 01/2013    legally blind    CAD (coronary artery disease)     Cardiomyopathy ejection fraction of 40%     CKD (chronic kidney disease), stage IV ([address]     to see Dr. Concha Hernandez 12/1/12    [facility]     Diabetes mellitus ([address]     Diabetic retinopathy ([facility]     Diabetic retinopathy ([address]     Dr. Francisca Gates- injections    Heart failure ([facility]     History of echocardiogram 04/22/2014    LVE. EF 55% (prev 40-45% on echo of 1/27/12). No WMA. Mild-mod conc LVH. Gr 3 DDfx. Marked LAE. Mild SHANTEL. Mild MR.    Hypertension     Pulmonary hypertension ([address]     Renal Ultrasound 1/3/12    Right kidney isoechoic w/respect to liver. Sm left-sided pleural effusion    S/P cardiac cath 10/16/2012    LM patent. pLAD 95% (3.5 x 12-mm [place] stent, resid 0$%). LCX mild. Past Surgical History:   Procedure Laterality Date    HX CORONARY STENT PLACEMENT      one    HX LAPAROTOMY  2/2012    bowel obstruction with removal segment of small bowel. Done by Liv.  HX LAPAROTOMY  infancy    whole in colon and had repair at that time.     HX OTHER SURGICAL  06/20/2013    left eye retna attatchment    HX RETINAL DETACHMENT REPAIR      august 2012    OR REPAIR ING HERNIA,5+Y/O,REDUCIBL Left 05/02/2019    Dr. Pool Mo History     Tobacco Use    Smoking status: Never Smoker    Smokeless tobacco: Never Used   Substance Use Topics    Alcohol use: Not Currently     Alcohol/week: 4.2 standard drinks     Types: 5 Cans of beer per week        Family History   Problem Relation Age of Onset    Diabetes Mother     Hypertension Mother     Kidney Disease Mother     High Cholesterol Father     Diabetes Brother         pre diabetic     No Known Allergies     Prior to Admission medications    Medication Sig Start Date End Date Taking? Authorizing Provider   tadalafiL (Cialis) 20 mg tablet Take 1 Tab by mouth as needed for Erectile Dysfunction. 1/12/21   Stephanie Holcomb NP   lisinopril (PRINIVIL, ZESTRIL) 10 mg tablet Take 1 Tab by mouth two (2) times a day. 9/25/19   Som Agustin MD   glimepiride (AMARYL) 1 mg tablet Take 1 Tab by mouth Daily (before breakfast). 9/17/19   Beto Crocker MD   Blood-Glucose Meter monitoring kit Check fasting glucose 6/18/19   Beto Crocker MD   glucose blood VI test strips (ACCU-CHEK ZAC PLUS TEST STRP) strip Use as directed 6/18/19   Beto Crocker MD   sucroferric oxyhydroxide (VELPHORO) 500 mg chew chewable tablet Take 500 mg by mouth three (3) times daily (with meals). Provider, Historical   carvedilol (COREG) 25 mg tablet TAKE TWO TABLETS BY MOUTH TWICE DAILY 2/19/19   Pepe Holcomb NP   aspirin delayed-release 81 mg tablet Take 81 mg by mouth daily. Provider, Historical   EPOETIN SEBLE (PROCRIT IJ) by Injection route.     Provider, Historical       REVIEW OF SYSTEMS:     Total of 12 systems reviewed as follows:       POSITIVE= bolded text  Negative = text not underlined  General:  fever, chills, sweats, generalized weakness, weight loss/gain,      loss of appetite, malaise/fatigue  Eyes:    blurred vision, eye pain, loss of vision, double vision  ENT:    rhinorrhea, pharyngitis   Respiratory:   cough, sputum production, SOB, M[place], wheezing, pleuritic pain   Cardiology:   chest pain, palpitations, orthopnea, PND, edema, syncope   Gastrointestinal:  abdominal pain , N/V, diarrhea, dysphagia, constipation, bleeding   Genitourinary:  frequency, urgency, dysuria, hematuria, incontinence   Muskuloskeletal :  arthralgia, myalgia, back pain  Hematology:  easy bruising, nose or gum bleeding, lymphadenopathy   Dermatological: rash, ulceration, pruritis, color change / jaundice  Endocrine:   hot flashes or polydipsia   Neurological:  headache, dizziness, confusion, focal weakness, paresthesia,     Speech difficulties, memory loss, gait difficulty  Psychological: Feelings of anxiety, depression, agitation    Objective:   VITALS:    Visit Vitals  /73   Pulse (!) 105   Temp 97.9 °F (36.6 °C)   Resp 19   Ht 5' 11\" (1.803 m)   Wt 93.9 kg (207 lb)   SpO2 (!) 81%   BMI 28.87 kg/m²       PHYSICAL EXAM:    General:    In NAD. Nontoxic-appearing. HEENT: Head NCAT. Pupils equal round sclerae anicteric, EOMI. Neck:  Supple, trachea midline. Lungs:   Clear, no wheezes. Effort nonlabored, no accessory muscle use. Chest wall:  No chest wall tenderness. Chest expansion equal and symmetrical bilaterally. Heart:   S1, S2. Irregularly irregular, mildly tachycardic. Abdomen:   Soft, NTTP. No guarding/rebound. Extremities: Warm, no edema. No evidence for ischemia. Skin:     Not pale. Not Jaundiced  No rashes   Psych:  Mood normal.  Calm, no agitation. Neurologic: Awake and alert, moves extremities spontaneously.       _______________________________________________________________________  Care Plan discussed with:    Comments   Patient X    Family      RN X    Care Manager                    Consultant:      _______________________________________________________________________  Expected  Disposition:   Home  X HH/PT/OT/RN    SNF/LTC    ARU    ________________________________________________________________________      Tests/Procedures:   CXR:  IMPRESSION  Mild pulmonary vascular congestion. LAB DATA REVIEWED:    Recent Results (from the past 24 hour(s))   CBC WITH AUTOMATED DIFF    Collection Time: 01/22/21  5:28 AM   Result Value Ref Range    WBC 10.6 4.6 - 13.2 K/uL    RBC 4.88 4.70 - 5.50 M/uL    HGB 15.4 13.0 - 16.0 g/dL    HCT 44.8 36.0 - 48.0 %    MCV 91.8 74.0 - 97.0 FL    MCH 31.6 24.0 - 34.0 PG    MCHC 34.4 31.0 - 37.0 g/dL    RDW 13.8 11.6 - 14.5 %    PLATELET 363 556 - 719 K/uL    MPV 10.5 9.2 - 11.8 FL    NEUTROPHILS 74 (H) 40 - 73 %    LYMPHOCYTES 16 (L) 21 - 52 %    MONOCYTES 9 3 - 10 %    EOSINOPHILS 1 0 - 5 %    BASOPHILS 0 0 - 2 %    ABS. NEUTROPHILS 7.8 1.8 - 8.0 K/UL    ABS. LYMPHOCYTES 1.7 0.9 - 3.6 K/UL    ABS. MONOCYTES 0.9 0.05 - 1.2 K/UL    ABS. EOSINOPHILS 0.1 0.0 - 0.4 K/UL    ABS.  BASOPHILS 0.0 0.0 - 0.1 K/UL    DF AUTOMATED     PROTHROMBIN TIME + INR    Collection Time: 01/22/21  5:28 AM   Result Value Ref Range    Prothrombin time 12.9 11.5 - 15.2 sec    INR 1.0 0.8 - 1.2     EKG, 12 LEAD, INITIAL    Collection Time: 01/22/21  5:31 AM   Result Value Ref Range    Ventricular Rate 147 BPM    Atrial Rate 192 BPM    QRS Duration 100 ms    Q-T Interval 304 ms    QTC Calculation (Bezet) 475 ms    Calculated R Axis 14 degrees    Calculated T Axis 174 degrees    Diagnosis       Atrial fibrillation with rapid ventricular response with premature   ventricular or aberrantly conducted complexes  Septal infarct (cited on or before 06-DEC-2018)  Marked ST abnormality, possible inferior subendocardial injury  Abnormal ECG  When compared with ECG of 01-AUG-2019 22:05,  Significant changes have occurred     METABOLIC PANEL, BASIC    Collection Time: 01/22/21  9:34 AM   Result Value Ref Range    Sodium 133 (L) 136 - 145 mmol/L    Potassium 8.6 (HH) 3.5 - 5.5 mmol/L    Chloride 92 (L) 100 - 111 mmol/L CO2 30 21 - 32 mmol/L    Anion gap 11 3.0 - 18 mmol/L    Glucose 93 74 - 99 mg/dL     (H) 7.0 - 18 MG/DL    Creatinine 21.80 (H) 0.6 - 1.3 MG/DL    BUN/Creatinine ratio 6 (L) 12 - 20      GFR est AA 3 (L) >60 ml/min/1.73m2    GFR est non-AA 2 (L) >60 ml/min/1.73m2    Calcium 13.8 (HH) 8.5 - 10.1 MG/DL   HEPATIC FUNCTION PANEL    Collection Time: 01/22/21  9:34 AM   Result Value Ref Range    Protein, total 6.8 6.4 - 8.2 g/dL    Albumin 2.7 (L) 3.4 - 5.0 g/dL    Globulin 4.1 (H) 2.0 - 4.0 g/dL    A-G Ratio 0.7 (L) 0.8 - 1.7      Bilirubin, total 0.4 0.2 - 1.0 MG/DL    Bilirubin, direct <0.1 0.0 - 0.2 MG/DL    Alk.  phosphatase 82 45 - 117 U/L    AST (SGOT) 57 (H) 10 - 38 U/L    ALT (SGPT) 19 16 - 61 U/L   LIPASE    Collection Time: 01/22/21  9:34 AM   Result Value Ref Range    Lipase 564 (H) 73 - 393 U/L   CARDIAC PANEL,(CK, CKMB & TROPONIN)    Collection Time: 01/22/21  9:34 AM   Result Value Ref Range    CK - MB 9.0 (H) <3.6 ng/ml    CK-MB Index 3.4 0.0 - 4.0 %     39 - 308 U/L    Troponin-I, QT 0.90 (H) 0.0 - 0.045 NG/ML   MAGNESIUM    Collection Time: 01/22/21  9:34 AM   Result Value Ref Range    Magnesium 3.0 (H) 1.6 - 2.6 mg/dL   EKG, 12 LEAD, SUBSEQUENT    Collection Time: 01/22/21 10:49 AM   Result Value Ref Range    Ventricular Rate 128 BPM    Atrial Rate 170 BPM    QRS Duration 94 ms    Q-T Interval 280 ms    QTC Calculation (Bezet) 408 ms    Calculated R Axis -13 degrees    Calculated T Axis 92 degrees    Diagnosis       Atrial fibrillation with rapid ventricular response  Minimal voltage criteria for LVH, may be normal variant  Possible Anteroseptal infarct (cited on or before 06-DEC-2018)  Abnormal ECG  When compared with ECG of 22-JAN-2021 05:31,  Questionable change in initial forces of Anterior leads  ST less depressed in Inferior leads  ST no longer depressed in Lateral leads  T wave inversion no longer evident in Lateral leads     EKG, 12 LEAD, INITIAL    Collection Time: 01/22/21 12:58 PM   Result Value Ref Range    Ventricular Rate 99 BPM    Atrial Rate 241 BPM    QRS Duration 84 ms    Q-T Interval 322 ms    QTC Calculation (Bezet) 413 ms    Calculated R Axis -14 degrees    Calculated T Axis -45 degrees    Diagnosis       Atrial fibrillation  Minimal voltage criteria for LVH, may be normal variant  Anteroseptal infarct (cited on or before 06-DEC-2018)  Abnormal ECG  When compared with ECG of 22-JAN-2021 10:49,  T wave inversion now evident in Inferior leads     METABOLIC PANEL, BASIC    Collection Time: 01/22/21  2:16 PM   Result Value Ref Range    Sodium 134 (L) 136 - 145 mmol/L    Potassium 6.3 (HH) 3.5 - 5.5 mmol/L    Chloride 92 (L) 100 - 111 mmol/L    CO2 33 (H) 21 - 32 mmol/L    Anion gap 9 3.0 - 18 mmol/L    Glucose 126 (H) 74 - 99 mg/dL     (H) 7.0 - 18 MG/DL    Creatinine 22.00 (H) 0.6 - 1.3 MG/DL    BUN/Creatinine ratio 6 (L) 12 - 20      GFR est AA 3 (L) >60 ml/min/1.73m2    GFR est non-AA 2 (L) >60 ml/min/1.73m2    Calcium 14.5 (HH) 8.5 - 10.1 MG/DL   HEPATIC FUNCTION PANEL    Collection Time: 01/22/21  2:16 PM   Result Value Ref Range    Protein, total 6.3 (L) 6.4 - 8.2 g/dL    Albumin 2.8 (L) 3.4 - 5.0 g/dL    Globulin 3.5 2.0 - 4.0 g/dL    A-G Ratio 0.8 0.8 - 1.7      Bilirubin, total 0.3 0.2 - 1.0 MG/DL    Bilirubin, direct <0.1 0.0 - 0.2 MG/DL    Alk.  phosphatase 83 45 - 117 U/L    AST (SGOT) 26 10 - 38 U/L    ALT (SGPT) 16 16 - 61 U/L   CARDIAC PANEL,(CK, CKMB & TROPONIN)    Collection Time: 01/22/21  2:16 PM   Result Value Ref Range    CK - MB 12.7 (H) <3.6 ng/ml    CK-MB Index 8.0 (H) 0.0 - 4.0 %     39 - 308 U/L    Troponin-I, QT 4.09 (HH) 0.0 - 0.045 NG/ML   MAGNESIUM    Collection Time: 01/22/21  2:16 PM   Result Value Ref Range    Magnesium 3.0 (H) 1.6 - 2.6 mg/dL   LIPASE    Collection Time: 01/22/21  2:16 PM   Result Value Ref Range    Lipase 388 73 - 393 U/L   ECHO ADULT FOLLOW-UP OR LIMITED    Collection Time: 01/22/21  2:51 PM   Result Value Ref Range    IVSd 1.41 (A) 0.6 - 1.0 cm    LVIDd 4.32 4.2 - 5.9 cm    LVIDs 3.30 cm    LVPWd 1.38 (A) 0.6 - 1.0 cm    Triscuspid Valve Regurgitation Peak Gradient 19.35 mmHg    TR Max Velocity 219.92 cm/s    LV Mass .6 88 - 224 g    LV Mass AL Index 108.7 49 - 115 g/m2       Rodríguez Mitchell MD  [address] [place] [address]      Disclaimer: Sections of this note are dictated using utilizing voice recognition software. Minor typographical errors may be present. If questions arise, please do not hesitate to contact me or call our department.

## 2021-01-22 NOTE — PROGRESS NOTES
Consulted for ESRD management. 56y M with PMH HTN, ESRD, dM, afib, admitted for afib with rvr, hyperkalemia and severe uremia. Reviewed chart, discussed with ER colleague,   Urgent HD was arranged. Discussed with nursing staff, avoid hypotension, okay with albumin during HD. Full consult to follow.

## 2021-01-22 NOTE — ED PROVIDER NOTES
[facility]  EMERGENCY DEPARTMENT HISTORY AND PHYSICAL EXAM       Date: 1/22/2021   Patient Name: Leland Messina   YOB: 1970  Medical Record Number: [id number]    HISTORY OF PRESENTING ILLNESS:     Leland Messina is a 52 y.o. male presenting with the noted PMH to the ED c/o chest pain and generalized weakness for the past several days. He describes it as heartburn and acid reflux. He says that it started with hiccups over the weekend. He is a Tuesday Thursday Saturday dialysis patient and missed his dialysis on Tuesday. He denies shortness of breath or fevers or chills or nausea or vomiting. He has a history of end-stage renal disease on dialysis as well as paroxysmal A. fib    Rest of complete systems reviewed and negative    Primary Care Provider: Boogie Lara MD   Specialist:    Past Medical History:   Past Medical History:   Diagnosis Date    Abnormal nuclear cardiac imaging test 10/08/2012    Fixed anteroseptal, anteroapical defect c/w prior infarction. No ischemia. Mid & distal anteroseptal, anteroapical WMA. LVE. EF 44%.  Anemia     dr. Karime [employer] doubt amyloid or multiple myeloma. candidate for procirt if Hg <10    Benign hypertensive     Blindness of right eye 01/2013    legally blind    CAD (coronary artery disease)     Cardiomyopathy ejection fraction of 40%     CKD (chronic kidney disease), [facility] IV ([facility]     to see Dr. Amelia Farah 12/1/12    Congestive heart failure ([facility]     Diabetes mellitus ([facility]     Diabetic retinopathy ([facility]     Diabetic retinopathy ([facility]     Dr. Maria Isabel Melendez- injections    Heart failure ([facility]     History of echocardiogram 04/22/2014    LVE. EF 55% (prev 40-45% on echo of 1/27/12). No WMA. Mild-mod conc LVH. Gr 3 DDfx. Marked LAE. Mild SHANTEL. Mild MR.    Hypertension     Pulmonary hypertension ([facility]     Renal Ultrasound 1/3/12    Right kidney isoechoic w/respect to liver.  Sm left-sided pleural effusion    S/P cardiac cath 10/16/2012    LM patent. pLAD 95% (3.5 x 12-mm [place] stent, resid 0$%). LCX mild. Past Surgical History:   Past Surgical History:   Procedure Laterality Date    HX CORONARY STENT PLACEMENT      one    HX LAPAROTOMY  2/2012    bowel obstruction with removal segment of small bowel. Done by [facility]t.  HX LAPAROTOMY  infancy    whole in colon and had repair at that time.  HX OTHER SURGICAL  06/20/2013    left eye retna attatchment    HX RETINAL DETACHMENT REPAIR      august 2012    AR REPAIR ING HERNIA,5+Y/O,REDUCIBL Left 05/02/2019    Dr. Silvia Martinez History:   Social History     Tobacco Use    Smoking status: Never Smoker    Smokeless tobacco: Never Used   Substance Use Topics    Alcohol use: Not Currently     Alcohol/week: 4.2 standard drinks     Types: 5 Cans of beer per week    Drug use: No        Allergies:   No Known Allergies     REVIEW OF SYSTEMS:  Review of Systems   Constitutional: Positive for fatigue. Negative for chills and fever. HENT: Negative for sore throat. Eyes: Negative for visual disturbance. Respiratory: Negative for cough and shortness of breath. Cardiovascular: Positive for chest pain. Gastrointestinal: Negative for abdominal pain, nausea and vomiting. Genitourinary: Negative for flank pain. Musculoskeletal: Negative for back pain. Skin: Negative for wound. Neurological: Negative for headaches. PHYSICAL EXAM:  Vitals:    01/22/21 0542 01/22/21 0600 01/22/21 0615 01/22/21 0630   BP: (!) 74/48 105/69 96/64 112/64   Pulse: (!) 150 (!) 143 (!) 146 (!) 144   Resp: 24      Temp: 97.9 °F (36.6 °C)      SpO2: 97% (!) 2%  (!) 77%       Physical Exam  Vitals signs and nursing note reviewed. Constitutional:       Comments: Looks uncomfortable   HENT:      Head: Normocephalic. Mouth/Throat:      Pharynx: Oropharynx is clear. Eyes:      Conjunctiva/sclera: Conjunctivae normal.   Neck:      Musculoskeletal: Neck supple. Cardiovascular:      Rate and Rhythm: Tachycardia present. Rhythm irregular. Pulmonary:      Effort: Pulmonary effort is normal. No respiratory distress. Breath sounds: Normal breath sounds. No wheezing. Abdominal:      Comments: Surgical scars noted without abdominal tenderness   Musculoskeletal:         General: No swelling. Skin:     General: Skin is warm and dry. Neurological:      General: No focal deficit present. Mental Status: He is oriented to person, place, and time. Mental status is at baseline. Medications   midazolam (VERSED) 1 mg/mL injection (has no administration in time range)   fentaNYL citrate (PF) 50 mcg/mL injection (has no administration in time range)   amiodarone (NEXTERONE) 360 mg in dextrose 200 mL (1.8 mg/mL) infusion (1 mg/min IntraVENous New Bag 1/22/21 1572)   sodium chloride 0.9 % bolus infusion 500 mL (500 mL IntraVENous New Bag 1/22/21 5626)   amiodarone (CORDARONE) 150 mg in dextrose 5% 100 ml bolus premix 150 mg (150 mg IntraVENous Given 1/22/21 5536)       RESULTS:    Labs -   Labs Reviewed   CBC WITH AUTOMATED DIFF - Abnormal; Notable for the following components:       Result Value    NEUTROPHILS 74 (*)     LYMPHOCYTES 16 (*)     All other components within normal limits   PROTHROMBIN TIME + INR   METABOLIC PANEL, BASIC   HEPATIC FUNCTION PANEL   LIPASE   CARDIAC PANEL,(CK, CKMB & TROPONIN)   MAGNESIUM       Radiologic Studies -  Xr Chest Port    Result Date: 1/22/2021  EXAM: CHEST ONE VIEW  portable 0531 hours CLINICAL HISTORY/INDICATION: Weakness, tachycardia, atrial fibrillation with rapid ventricular response, hypotensive, missed dialysis x1 week COMPARISON: Chest x-ray 1/20/2021. TECHNIQUE: One view obtained. FINDINGS: The cardiac and mediastinal silhouette is normal. The lungs are clear. Pulmonary vascularity is mildly cephalized. The costophrenic angles are sharply defined. No bony abnormalities are seen.      Mild pulmonary vascular congestion. MEDICAL DECISION MAKING  57-year-old male with history of end-stage renal disease on dialysis and atrial fibrillation presenting to the ED with chest pain and generalized weakness for the past several days. He missed his Tuesday dialysis session. On arrival he is in atrial fibrillation with RVR rates of 160s and is also hypotensive to 70s over 40s. He looks little uncomfortable but is mentating completely appropriately. We started IV fluids and given how hypotensive patient was, plan was for synchronized cardioversion. However, after we were called to any emergent patient who is arriving. On my return patient's blood pressure was continuing to improve to over 486 systolic. At this time we will hold off on cardioversion given that he is mentating completely appropriately. I did discuss with cardiology and they agreed to see patient, agreed with starting amiodarone at this time and reevaluation. Will turn over to Dr. Bill Hernandez pending labs and reassessment and anticipate admission for chest pain, A. fib with RVR and hypotension          Diagnosis   Clinical Impression:   1. Atrial fibrillation with RVR (HCC)    2. Chest pain, unspecified type           Follow-up Information    None         Current Discharge Medication List           in stable and improved condition. This chart was completed using Dragon, a dictation transcription service. Errors may have resulted from using this device.

## 2021-01-22 NOTE — ED NOTES
Called lab to ask if dialysis nurse could pull labs from port to get accurate levels of patient status. Verbally verified it would be done with this nurse.

## 2021-01-22 NOTE — Clinical Note
Status[de-identified] INPATIENT [101]   Type of Bed: Stepdown [17]   Inpatient Hospitalization Certified Necessary for the Following Reasons: 3. Patient receiving treatment that can only be provided in an inpatient setting (further clarification in H&P documentation)   Admitting Diagnosis: NSTEMI (non-ST elevated myocardial infarction) [facility]) [6677923]   Admitting Diagnosis: Atrial fibrillation with RVR ([facility] [[id number]]   Admitting Diagnosis: Hyperkalemia [943510]   Admitting Diagnosis: ESRD (end stage renal disease) ([facility] [[id number]]   Admitting Diagnosis: Hypercalcemia [275.42. ICD-9-CM]   Admitting Physician: Mini Simpson [[id number]]   Attending Physician: Mini Simpson [[id number]]   Estimated Length of Stay: 3-4 Midnights   Discharge Plan[de-identified] Home with Office Follow-up

## 2021-01-22 NOTE — ED TRIAGE NOTES
Patient arrived by medics for increased weakness and rapid heart rate. Pt arrives to ER in afib RVR. He is alert and oriented. He reports not having dialysis for 1 week d/t not feeling well.  He is tachycardiac and hypotension upon arrival.

## 2021-01-22 NOTE — CONSULTS
[facility] - Consult Note    Consultation request by No admitting provider for patient encounter. for advice/opinion related to evaluating No admission diagnoses are documented for this encounter. Date of  Admission: 1/22/2021  5:27 AM   Primary Care Physician:  Chucky Olivia MD     Assessment:     Patient Active Problem List   Diagnosis Code    Benign hypertensive  I11.9    CRI (chronic renal insufficiency) N18.9    Diabetes mellitus ([address] E11.9    Cardiomyopathy ([facility] [address] I42.9    Iron deficiency anemia D50.9    CAD (coronary artery disease) I25.10    Legally blind H54.8    Diabetic retinopathy ([facility] [address] E11.319    Severe nonproliferative diabetic retinopathy with macular edema, associated with type 2 diabetes mellitus E11.3419    Dyslipidemia E78.5    Atrial fibrillation with rapid ventricular response (HCC) I48.91    Type 2 diabetes with nephropathy (HCC) E11.21    Anemia of chronic disease D63.8       -Paroxysmal atrial fibrillation. Presented with weakness and not feeling well for the past week. Was in afib RVR with hypotension on arrival. Previously not anticoagulated given history of severe anemia and noncompliance. Rate controlled with coreg as outpatient. -ESRD on HD, recently missed HD due to not feeling well.  -Atypical chest pain, patient with recent GI upset (for which he has been drinking pepto most of the week) but also with pleuritic and musculoskeletal component. Patient with known CAD as noted below. Initial troponin was unremarkable. ECG with ST depressions in setting of tachycardia. -CAD. LAD PCI 2012. Cath 8/21/2019 revealed no significant disease in his RCA or circumflex. He had 35% stenosis in his LAD. For completeness, he had IFR performed which was negative at 0.93-0.95.  -Ischemic cardiomyopathy. EF 40% by echo 12/2018.  -Hypertension.  -Dyslipidemia. -Diabetes mellitus.  -Chronic anemia. -Diabetic retinopathy, legally blind.   -H/o noncompliance. Primary cardiologist Dr. Latia Young. Plan:     Independently seen and evaluated. Agree with below. Because of not feeling well after taking Coreg, he self discontinued. Will reinitiate Toprol/metoprolol in lieu of Coreg. Continue to rate control. Patient remains in atrial fibrillation with some improvement in HR on amiodarone infusion. BP has improved. Patient reports he no longer takes his coreg at home as it made him feel poorly. Will start PO metoprolol as BP allows. If rate remains elevated can start cardizem drip. Patient historically has not been on [facility] given history of severe anemia and noncompliance. Would resume ASA. Will review echocardiogram once complete. Will follow up troponin and ECG. BMP is pending at this time. Would dialyze as hemodynamics allow. Patient counseled on importance of compliance with medical regimen. History of Present Illness: This is a [address] y.o. male admitted for No admission diagnoses are documented for this encounter. .      Patient complains of:  Feeling poorly. Patient reports he has not been feeling well for the past week. He missed HD on Tuesday and Thursday. He had abdominal discomfort. He drank pepto. He had chest discomfort worse with deep breathing and positions. Patient was mildly SOB. Patient continued to feel poorly. He went to ER and was found to have afib with RVR. Patient was hypotensive. Patient was treated with IVF with improvement in BP. Patient was started on amiodarone infusion with some improvement in HR. Patient continues to have intermittent chest discomfort. Patient reports breathing stable at present. Patient denies syncope, orthopnea, NEETU. Patient admits to intermittent noncompliance with medical regimen.       Cardiac risk factors: dyslipidemia, diabetes mellitus, sedentary life style, male gender, hypertension      Review of Symptoms:  Except as stated above include:  Constitutional:  negative  Respiratory: negative  Cardiovascular:  C/o cp mild sob. Gastrointestinal: negative  Genitourinary:  negative  Musculoskeletal:  Negative  Neurological:  Negative  Dermatological:  Negative  Endocrinological: Negative  Psychological:  Negative       Past Medical History:     Past Medical History:   Diagnosis Date    Abnormal nuclear cardiac imaging test 10/08/2012    Fixed anteroseptal, anteroapical defect c/w prior infarction. No ischemia. Mid & distal anteroseptal, anteroapical WMA. LVE. EF 44%.  Anemia     dr. Wallace Newer doubt amyloid or multiple myeloma. candidate for procirt if Hg <10    Benign hypertensive     Blindness of right eye 01/2013    legally blind    CAD (coronary artery disease)     Cardiomyopathy ejection fraction of 40%     CKD (chronic kidney disease), stage IV ([facility] [address]     to see Dr. Tegan Shaw 12/1/12    Congestive heart failure ([facility] [address]     Diabetes mellitus ([facility] [address]     Diabetic retinopathy ([facility] [address]     Diabetic retinopathy ([facility] [address]     Dr. Yenny Ponce- injections    Heart failure ([facility] [address]     History of echocardiogram 04/22/2014    LVE. EF 55% (prev 40-45% on echo of 1/27/12). No WMA. Mild-mod conc LVH. Gr 3 DDfx. Marked LAE. Mild SHANTEL. Mild MR.    Hypertension     Pulmonary hypertension ([facility] [address]     Renal Ultrasound 1/3/12    Right kidney isoechoic w/respect to liver. Sm left-sided pleural effusion    S/P cardiac cath 10/16/2012    LM patent. pLAD 95% (3.5 x 12-mm [place] stent, resid 0$%). LCX mild.            Social History:     Social History     Socioeconomic History    Marital status:      Spouse name: Not on file    Number of children: Not on file    Years of education: Not on file    Highest education level: Not on file   Tobacco Use    Smoking status: Never Smoker    Smokeless tobacco: Never Used   Substance and Sexual Activity    Alcohol use: Not Currently     Alcohol/week: 4.2 standard drinks     Types: 5 Cans of beer per week    Drug use: No    Sexual activity: Yes Partners: Female     Birth control/protection: None        Family History:     Family History   Problem Relation Age of Onset    Diabetes Mother     Hypertension Mother     Kidney Disease Mother     High Cholesterol Father     Diabetes Brother         pre diabetic        Medications:   No Known Allergies     Current Facility-Administered Medications   Medication Dose Route Frequency    midazolam (VERSED) 1 mg/mL injection        fentaNYL citrate (PF) 50 mcg/mL injection        amiodarone (NEXTERONE) 360 mg in dextrose 200 mL (1.8 mg/mL) infusion  0.5-1 mg/min IntraVENous TITRATE     Current Outpatient Medications   Medication Sig    tadalafiL (Cialis) 20 mg tablet Take 1 Tab by mouth as needed for Erectile Dysfunction.  lisinopril (PRINIVIL, ZESTRIL) 10 mg tablet Take 1 Tab by mouth two (2) times a day.  glimepiride (AMARYL) 1 mg tablet Take 1 Tab by mouth Daily (before breakfast).  Blood-Glucose Meter monitoring kit Check fasting glucose    glucose blood VI test strips (ACCU-CHEK ZAC PLUS TEST STRP) strip Use as directed    sucroferric oxyhydroxide (VELPHORO) 500 mg chew chewable tablet Take 500 mg by mouth three (3) times daily (with meals).  carvedilol (COREG) 25 mg tablet TAKE TWO TABLETS BY MOUTH TWICE DAILY    aspirin delayed-release 81 mg tablet Take 81 mg by mouth daily.  EPOETIN SEBLE (PROCRIT IJ) by Injection route.          Physical Exam:     Visit Vitals  /64   Pulse (!) 144   Temp 97.9 °F (36.6 °C)   Resp 24   SpO2 (!) 77%     BP Readings from Last 3 Encounters:   01/22/21 112/64   01/20/21 (!) 200/114   03/17/20 110/62     Pulse Readings from Last 3 Encounters:   01/22/21 (!) 144   01/20/21 91   03/17/20 90     Wt Readings from Last 3 Encounters:   01/20/21 207 lb 3.7 oz (94 kg)   03/17/20 205 lb (93 kg)   09/17/19 199 lb (90.3 kg)       General:  alert, cooperative, no distress, appears stated age  Neck:  no JVD  Lungs:  clear to auscultation bilaterally  Heart: irregularly irregular rhythm  Abdomen:  abdomen is soft without significant tenderness, masses, organomegaly or guarding  Extremities:  extremities normal, atraumatic, no cyanosis or edema  Skin: Warm and dry. no hyperpigmentation, vitiligo, or suspicious lesions  Neuro: alert, oriented x3, affect appropriate  Psych: non focal     Data Review:     Recent Labs     01/22/21  0528   WBC 10.6   HGB 15.4   HCT 44.8        Recent Labs     01/22/21  0528   INR 1.0       Results for orders placed or performed during the hospital encounter of 01/22/21   EKG, 12 LEAD, INITIAL   Result Value Ref Range    Ventricular Rate 147 BPM    Atrial Rate 192 BPM    QRS Duration 100 ms    Q-T Interval 304 ms    QTC Calculation (Bezet) 475 ms    Calculated R Axis 14 degrees    Calculated T Axis 174 degrees    Diagnosis       Atrial fibrillation with rapid ventricular response with premature   ventricular or aberrantly conducted complexes  Septal infarct (cited on or before 06-DEC-2018)  Marked ST abnormality, possible inferior subendocardial injury  Abnormal ECG  When compared with ECG of 01-AUG-2019 22:05,  Significant changes have occurred     Results for orders placed or performed in visit on 09/17/19   AMB POC EKG ROUTINE W/ 12 LEADS, INTER & REP    Narrative    Read by Yana Castillo MD - Sinus rhythm. Old septal MI.        All Cardiac Markers in the last 24 hours:  No results found for: CPK, CK, CKMMB, CKMB, RCK3, CKMBT, CKNDX, CKND1, SAMARA, TROPT, TROIQ, SAKINA, TROPT, TNIPOC, BNP, BNPP    Last Lipid:    Lab Results   Component Value Date/Time    Cholesterol, total 138 02/09/2019 11:20 AM    HDL Cholesterol 38 (L) 02/09/2019 11:20 AM    LDL, calculated 85 02/09/2019 11:20 AM    Triglyceride 76 02/09/2019 11:20 AM    CHOL/HDL Ratio 4.4 10/17/2012 06:05 AM       Signed By: DOROTHY Doherty     January 22, 2021

## 2021-01-23 LAB
ANION GAP SERPL CALC-SCNC: 9 MMOL/L (ref 3–18)
APTT PPP: 30 SEC (ref 23–36.4)
APTT PPP: 30.7 SEC (ref 23–36.4)
BASOPHILS # BLD: 0 K/UL (ref 0–0.1)
BASOPHILS NFR BLD: 0 % (ref 0–2)
BUN SERPL-MCNC: 68 MG/DL (ref 7–18)
BUN/CREAT SERPL: 5 (ref 12–20)
CALCIUM SERPL-MCNC: 10 MG/DL (ref 8.5–10.1)
CHLORIDE SERPL-SCNC: 98 MMOL/L (ref 100–111)
CO2 SERPL-SCNC: 30 MMOL/L (ref 21–32)
CREAT SERPL-MCNC: 13.9 MG/DL (ref 0.6–1.3)
DIFFERENTIAL METHOD BLD: ABNORMAL
EOSINOPHIL # BLD: 0.2 K/UL (ref 0–0.4)
EOSINOPHIL NFR BLD: 3 % (ref 0–5)
ERYTHROCYTE [DISTWIDTH] IN BLOOD BY AUTOMATED COUNT: 14.3 % (ref 11.6–14.5)
EST. AVERAGE GLUCOSE BLD GHB EST-MCNC: 154 MG/DL
GLUCOSE BLD STRIP.AUTO-MCNC: 128 MG/DL (ref 70–110)
GLUCOSE BLD STRIP.AUTO-MCNC: 67 MG/DL (ref 70–110)
GLUCOSE BLD STRIP.AUTO-MCNC: 86 MG/DL (ref 70–110)
GLUCOSE SERPL-MCNC: 75 MG/DL (ref 74–99)
HBA1C MFR BLD: 7 % (ref 4.2–5.6)
HBV SURFACE AG SER QL: <0.1 INDEX
HBV SURFACE AG SER QL: NEGATIVE
HCT VFR BLD AUTO: 35.6 % (ref 36–48)
HGB BLD-MCNC: 11.8 G/DL (ref 13–16)
LYMPHOCYTES # BLD: 1.8 K/UL (ref 0.9–3.6)
LYMPHOCYTES NFR BLD: 27 % (ref 21–52)
MCH RBC QN AUTO: 30.8 PG (ref 24–34)
MCHC RBC AUTO-ENTMCNC: 33.1 G/DL (ref 31–37)
MCV RBC AUTO: 93 FL (ref 74–97)
MONOCYTES # BLD: 0.6 K/UL (ref 0.05–1.2)
MONOCYTES NFR BLD: 9 % (ref 3–10)
NEUTS SEG # BLD: 4.1 K/UL (ref 1.8–8)
NEUTS SEG NFR BLD: 61 % (ref 40–73)
PLATELET # BLD AUTO: 232 K/UL (ref 135–420)
PMV BLD AUTO: 10 FL (ref 9.2–11.8)
POTASSIUM SERPL-SCNC: 4.9 MMOL/L (ref 3.5–5.5)
RBC # BLD AUTO: 3.83 M/UL (ref 4.7–5.5)
SODIUM SERPL-SCNC: 137 MMOL/L (ref 136–145)
WBC # BLD AUTO: 6.7 K/UL (ref 4.6–13.2)

## 2021-01-23 PROCEDURE — 85025 COMPLETE CBC W/AUTO DIFF WBC: CPT

## 2021-01-23 PROCEDURE — 74011250637 HC RX REV CODE- 250/637: Performed by: INTERNAL MEDICINE

## 2021-01-23 PROCEDURE — 74011250637 HC RX REV CODE- 250/637: Performed by: HOSPITALIST

## 2021-01-23 PROCEDURE — 74011250637 HC RX REV CODE- 250/637: Performed by: PHYSICIAN ASSISTANT

## 2021-01-23 PROCEDURE — 74011250636 HC RX REV CODE- 250/636

## 2021-01-23 PROCEDURE — 99232 SBSQ HOSP IP/OBS MODERATE 35: CPT | Performed by: HOSPITALIST

## 2021-01-23 PROCEDURE — 87340 HEPATITIS B SURFACE AG IA: CPT

## 2021-01-23 PROCEDURE — 82962 GLUCOSE BLOOD TEST: CPT

## 2021-01-23 PROCEDURE — 86706 HEP B SURFACE ANTIBODY: CPT

## 2021-01-23 PROCEDURE — 80048 BASIC METABOLIC PNL TOTAL CA: CPT

## 2021-01-23 PROCEDURE — 65660000004 HC RM CVT STEPDOWN

## 2021-01-23 PROCEDURE — P9047 ALBUMIN (HUMAN), 25%, 50ML: HCPCS

## 2021-01-23 PROCEDURE — 74011250636 HC RX REV CODE- 250/636: Performed by: FAMILY MEDICINE

## 2021-01-23 PROCEDURE — 85730 THROMBOPLASTIN TIME PARTIAL: CPT

## 2021-01-23 PROCEDURE — 83036 HEMOGLOBIN GLYCOSYLATED A1C: CPT

## 2021-01-23 PROCEDURE — 90935 HEMODIALYSIS ONE EVALUATION: CPT

## 2021-01-23 RX ORDER — ALBUMIN HUMAN 250 G/1000ML
SOLUTION INTRAVENOUS
Status: COMPLETED
Start: 2021-01-23 | End: 2021-01-23

## 2021-01-23 RX ORDER — HEPARIN SODIUM 10000 [USP'U]/100ML
10-25 INJECTION, SOLUTION INTRAVENOUS
Status: DISCONTINUED | OUTPATIENT
Start: 2021-01-23 | End: 2021-01-23

## 2021-01-23 RX ORDER — MAGNESIUM SULFATE 100 %
4 CRYSTALS MISCELLANEOUS AS NEEDED
Status: DISCONTINUED | OUTPATIENT
Start: 2021-01-23 | End: 2021-01-24 | Stop reason: HOSPADM

## 2021-01-23 RX ORDER — SEVELAMER CARBONATE 800 MG/1
800 TABLET, FILM COATED ORAL
Status: DISCONTINUED | OUTPATIENT
Start: 2021-01-23 | End: 2021-01-24 | Stop reason: HOSPADM

## 2021-01-23 RX ORDER — DEXTROSE 50 % IN WATER (D50W) INTRAVENOUS SYRINGE
25-50 AS NEEDED
Status: DISCONTINUED | OUTPATIENT
Start: 2021-01-23 | End: 2021-01-24 | Stop reason: HOSPADM

## 2021-01-23 RX ORDER — CARVEDILOL 25 MG/1
25 TABLET ORAL EVERY 12 HOURS
Status: DISCONTINUED | OUTPATIENT
Start: 2021-01-23 | End: 2021-01-24 | Stop reason: HOSPADM

## 2021-01-23 RX ORDER — SUCRALFATE 1 G/1
1 TABLET ORAL ONCE
Status: COMPLETED | OUTPATIENT
Start: 2021-01-23 | End: 2021-01-23

## 2021-01-23 RX ORDER — FAMOTIDINE 20 MG/1
20 TABLET, FILM COATED ORAL 2 TIMES DAILY
Status: DISCONTINUED | OUTPATIENT
Start: 2021-01-23 | End: 2021-01-23

## 2021-01-23 RX ORDER — FAMOTIDINE 20 MG/1
20 TABLET, FILM COATED ORAL DAILY
Status: DISCONTINUED | OUTPATIENT
Start: 2021-01-24 | End: 2021-01-24

## 2021-01-23 RX ORDER — INSULIN LISPRO 100 [IU]/ML
INJECTION, SOLUTION INTRAVENOUS; SUBCUTANEOUS
Status: DISCONTINUED | OUTPATIENT
Start: 2021-01-23 | End: 2021-01-24 | Stop reason: HOSPADM

## 2021-01-23 RX ADMIN — ALBUMIN (HUMAN) 25 G: 0.25 INJECTION, SOLUTION INTRAVENOUS at 14:50

## 2021-01-23 RX ADMIN — FAMOTIDINE 20 MG: 20 TABLET, FILM COATED ORAL at 14:27

## 2021-01-23 RX ADMIN — Medication 81 MG: at 09:00

## 2021-01-23 RX ADMIN — APIXABAN 2.5 MG: 2.5 TABLET, FILM COATED ORAL at 10:13

## 2021-01-23 RX ADMIN — APIXABAN 2.5 MG: 2.5 TABLET, FILM COATED ORAL at 23:04

## 2021-01-23 RX ADMIN — CARVEDILOL 25 MG: 25 TABLET, FILM COATED ORAL at 23:04

## 2021-01-23 RX ADMIN — HEPARIN SODIUM 1000 UNITS/HR: 10000 INJECTION, SOLUTION INTRAVENOUS at 08:58

## 2021-01-23 RX ADMIN — CARVEDILOL 25 MG: 25 TABLET, FILM COATED ORAL at 10:43

## 2021-01-23 RX ADMIN — SUCRALFATE 1 G: 1 TABLET ORAL at 23:04

## 2021-01-23 RX ADMIN — METOPROLOL TARTRATE 25 MG: 25 TABLET, FILM COATED ORAL at 09:00

## 2021-01-23 NOTE — PROGRESS NOTES
[facility]  -  Progress Note      Patient: Daniel Duran MRN: [id number]  SSN: [id number]    YOB: 1970  Age: 52 y.o. Sex: male      Admit Date: 1/22/2021    Assessment:     Hospital Problems  Date Reviewed: 3/17/2020          Codes Class Noted POA    Hypercalcemia ICD-10-CM: E83.52  ICD-9-CM: 275.42  1/22/2021 Unknown        Hyperkalemia ICD-10-CM: E87.5  ICD-9-CM: 276.7  1/22/2021 Unknown        ESRD (end stage renal disease) ([facility] [address] ICD-10-CM: N18.6  ICD-9-CM: 585.6  1/22/2021 Unknown        NSTEMI (non-ST elevated myocardial infarction) [facility]) ICD-10-CM: I21.4  ICD-9-CM: 410.70  1/22/2021 Unknown        Atrial fibrillation with RVR (HCC) ICD-10-CM: I48.91  ICD-9-CM: 427.31  1/22/2021 Unknown            -Paroxysmal atrial fibrillation. Presented with weakness and not feeling well for the past week. Was in afib RVR with hypotension on arrival. Previously not anticoagulated given history of severe anemia and noncompliance. Rate controlled with coreg as outpatient. -ESRD on HD, recently missed HD due to not feeling well.  -Atypical chest pain, patient with recent GI upset (for which he has been drinking pepto most of the week) but also with pleuritic and musculoskeletal component. Patient with known CAD as noted below. Initial troponin was unremarkable. ECG with ST depressions in setting of tachycardia. -CAD. LAD PCI 2012. Cath 8/21/2019 revealed no significant disease in his RCA or circumflex.  He had 35% stenosis in his LAD.  For completeness, he had IFR performed which was negative at 0.93-0.95.  -Ischemic cardiomyopathy. EF 40% by echo 12/2018.  -Hypertension.  -Dyslipidemia. -Diabetes mellitus.  -Chronic anemia. -Diabetic retinopathy, legally blind. -H/o noncompliance.     Primary cardiologist Dr. Buffy Covarrubias. Plan:     Had a run of dialysis and K improved  Stable CV status otherwise.  Will transition to oral Coreg (stop Lopressor) which is his home med, stop amiodarone and heparin  Per Dr. Felipa Barillas the patient would like to try on anticoagulation. Will start on low dose Eliquis, stop heparin. Will need close monitoring of his H&H, any bleeding. Continue with all other meds. Subjective:     No new complaints.      Objective:      Patient Vitals for the past 8 hrs:   Temp Pulse Resp BP SpO2   01/23/21 0912 98 °F (36.7 °C) 96 18 (!) 144/92 99 %         Patient Vitals for the past 96 hrs:   Weight   01/22/21 1314 93.9 kg (207 lb)         Intake/Output Summary (Last 24 hours) at 1/23/2021 0934  Last data filed at 1/22/2021 2015  Gross per 24 hour   Intake 200 ml   Output 0 ml   Net 200 ml       Physical Exam:  General:  alert, cooperative, no distress, appears stated age  Neck:  nontender  Lungs:  clear to auscultation bilaterally  Heart:  regular rate and rhythm, S1, S2 normal, no murmur, click, rub or gallop  Abdomen:  abdomen is soft without significant tenderness, masses, organomegaly or guarding  Extremities:  extremities normal, atraumatic, no cyanosis or edema    Data Review:     Labs: Results:       Chemistry Recent Labs     01/23/21  0730 01/22/21  1600 01/22/21  1416 01/22/21  0934   GLU 75 115* 126* 93    132* 134* 133*   K 4.9 6.2* 6.3* 8.6*   CL 98* 89* 92* 92*   CO2 30 30 33* 30   BUN 68* 130* 127* 127*   CREA 13.90* 22.40* 22.00* 21.80*   CA 10.0 14.3* 14.5* 13.8*   MG  --   --  3.0* 3.0*   AGAP 9 13 9 11   BUCR 5* 6* 6* 6*   AP  --   --  83 82   TP  --   --  6.3* 6.8   ALB  --   --  2.8* 2.7*   GLOB  --   --  3.5 4.1*   AGRAT  --   --  0.8 0.7*      CBC w/Diff Recent Labs     01/23/21  0730 01/22/21  1600 01/22/21  0528   WBC 6.7 12.6 10.6   RBC 3.83* 4.42* 4.88   HGB 11.8* 13.8 15.4   HCT 35.6* 40.0 44.8    283 281   GRANS 61 76* 74*   LYMPH 27 16* 16*   EOS 3 1 1      Cardiac Enzymes Lab Results   Component Value Date/Time     01/22/2021 02:16 PM    CKMB 12.7 (H) 01/22/2021 02:16 PM    CKND1 8.0 (H) 01/22/2021 02:16 PM    TROIQ 4.95 (HH) 01/22/2021 04:00 PM    TROIQ 4.09 (HH) 01/22/2021 02:16 PM      Coagulation Recent Labs     01/23/21  0730 01/22/21  0528   PTP  --  12.9   INR  --  1.0   APTT 30.7  --        Lipid Panel Lab Results   Component Value Date/Time    Cholesterol, total 138 02/09/2019 11:20 AM    HDL Cholesterol 38 (L) 02/09/2019 11:20 AM    LDL, calculated 85 02/09/2019 11:20 AM    VLDL, calculated 15 02/09/2019 11:20 AM    Triglyceride 76 02/09/2019 11:20 AM    CHOL/HDL Ratio 4.4 10/17/2012 06:05 AM      BNP No results found for: BNP, BNPP, XBNPT   Liver Enzymes Recent Labs     01/22/21  1416   TP 6.3*   ALB 2.8*   AP 83      Digoxin    Thyroid Studies Lab Results   Component Value Date/Time    TSH 2.43 12/06/2018 05:19 AM

## 2021-01-23 NOTE — ED NOTES
TRANSFER - OUT REPORT:    Verbal report given to Lawyer Jia RN on [address]  being transferred to CVT Stepdown for routine progression of care       Report consisted of patients Situation, Background, Assessment and   Recommendations(SBAR). Information from the following report(s) SBAR, ED Summary, Intake/Output and MAR was reviewed with the receiving nurse. Lines:   Peripheral IV 01/22/21 (Active)   Site Assessment Clean, dry, & intact 01/22/21 0541   Phlebitis Assessment 0 01/22/21 0541   Infiltration Assessment 0 01/22/21 0541   Dressing Status Clean, dry, & intact 01/22/21 0541   Dressing Type 4 X 4 01/22/21 0541       Peripheral IV 01/22/21 Left Hand (Active)   Site Assessment Clean, dry, & intact 01/22/21 1506   Phlebitis Assessment 0 01/22/21 1506   Infiltration Assessment 0 01/22/21 1506   Dressing Status Clean, dry, & intact 01/22/21 1506   Dressing Type 4 X 4;Transparent 01/22/21 1506   Hub Color/Line Status Blue;Flushed;Patent 01/22/21 1506   Action Taken Wrapped 01/22/21 1506   Alcohol Cap Used Yes 01/22/21 1506        Opportunity for questions and clarification was provided.       Patient transported with:   [employer]

## 2021-01-23 NOTE — CONSULTS
Consult requested by: Clive Houston is a 52 y.o. male [address] who is being seen on consult for ESRD. Chief Complaint   Patient presents with    Rapid Heart Rate       HPI:  52 yr old with hx of ESRD,uncontrolled diabetes who comes in to ED with complaints of chest pain. Missed dialysis for a week  Hyperkalemic on admission. Had dialysis emergently  Found to be in A fib and cardiology has seen the patient  Has extensive history of diabetes,diabetic retinopathy. Patient goes to dialysis unit at [facility] on tts schedule. Last dialysis was on last saturday. I have discussed with dialysis unit staff. Past Medical History:   Diagnosis Date    Abnormal nuclear cardiac imaging test 10/08/2012    Fixed anteroseptal, anteroapical defect c/w prior infarction. No ischemia. Mid & distal anteroseptal, anteroapical WMA. LVE. EF 44%.  Anemia     dr. Abisai Hathaway doubt amyloid or multiple myeloma. candidate for procirt if Hg <10    Benign hypertensive     Blindness of right eye 01/2013    legally blind    CAD (coronary artery disease)     Cardiomyopathy ejection fraction of 40%     CKD (chronic kidney disease), stage IV ([address]     to see Dr. Neelam Velázquez 12/1/12    Congestive heart failure ([address]     Diabetes mellitus ([address]     Diabetic retinopathy ([address]     Diabetic retinopathy ([address]     Dr. Gutierrez Abarca- injections    Heart failure ([address]     History of echocardiogram 04/22/2014    LVE. EF 55% (prev 40-45% on echo of 1/27/12). No WMA. Mild-mod conc LVH. Gr 3 DDfx. Marked LAE. Mild SHANTEL. Mild MR.    Hypertension     Pulmonary hypertension ([address]     Renal Ultrasound 1/3/12    Right kidney isoechoic w/respect to liver. Sm left-sided pleural effusion    S/P cardiac cath 10/16/2012    LM patent. pLAD 95% (3.5 x 12-mm [place] stent, resid 0$%). LCX mild.         Past Surgical History:   Procedure Laterality Date    HX CORONARY STENT PLACEMENT      one    HX LAPAROTOMY  2/2012    bowel obstruction with removal segment of small bowel. Done by Riblet.  HX LAPAROTOMY  infancy    whole in colon and had repair at that time.     HX OTHER SURGICAL  06/20/2013    left eye retna attatchment    HX RETINAL DETACHMENT REPAIR      august 2012    NC REPAIR ING HERNIA,5+Y/O,REDUCIBL Left 05/02/2019    Dr. Fernie Guzman History     Socioeconomic History    Marital status:      Spouse name: Not on file    Number of children: Not on file    Years of education: Not on file    Highest education level: Not on file   Occupational History    Not on file   Social Needs    Financial resource strain: Not on file    Food insecurity     Worry: Not on file     Inability: Not on file   [employer] needs     Medical: Not on file     Non-medical: Not on file   Tobacco Use    Smoking status: Never Smoker    Smokeless tobacco: Never Used   Substance and Sexual Activity    Alcohol use: Not Currently     Alcohol/week: 4.2 standard drinks     Types: 5 Cans of beer per week    Drug use: No    Sexual activity: Yes     Partners: Female     Birth control/protection: None   Lifestyle    Physical activity     Days per week: Not on file     Minutes per session: Not on file    Stress: Not on file   Relationships    Social connections     Talks on phone: Not on file     Gets together: Not on file     Attends Zoroastrian service: Not on file     Active member of club or organization: Not on file     Attends meetings of clubs or organizations: Not on file     Relationship status: Not on file    Intimate partner violence     Fear of current or ex partner: Not on file     Emotionally abused: Not on file     Physically abused: Not on file     Forced sexual activity: Not on file   Other Topics Concern    Not on file   Social History Narrative    Not on file       Family History   Problem Relation Age of Onset    Diabetes Mother     Hypertension Mother     Kidney Disease Mother  High Cholesterol Father     Diabetes Brother         pre diabetic     No Known Allergies     Home Medications:     Prior to Admission Medications   Prescriptions Last Dose Informant Patient Reported? Taking? Blood-Glucose Meter monitoring kit   No No   Sig: Check fasting glucose   EPOETIN SEBLE (PROCRIT IJ)   Yes No   Sig: by Injection route. aspirin delayed-release 81 mg tablet   Yes No   Sig: Take 81 mg by mouth daily. carvedilol (COREG) 25 mg tablet   No No   Sig: TAKE TWO TABLETS BY MOUTH TWICE DAILY   glimepiride (AMARYL) 1 mg tablet   No No   Sig: Take 1 Tab by mouth Daily (before breakfast). glucose blood VI test strips (ACCU-CHEK ZAC PLUS TEST STRP) strip   No No   Sig: Use as directed   lisinopril (PRINIVIL, ZESTRIL) 10 mg tablet   No No   Sig: Take 1 Tab by mouth two (2) times a day. sucroferric oxyhydroxide (VELPHORO) 500 mg chew chewable tablet   Yes No   Sig: Take 500 mg by mouth three (3) times daily (with meals). tadalafiL (Cialis) 20 mg tablet   No No   Sig: Take 1 Tab by mouth as needed for Erectile Dysfunction.       Facility-Administered Medications: None       Current Facility-Administered Medications   Medication Dose Route Frequency    insulin lispro (HUMALOG) injection   SubCUTAneous AC&HS    glucose chewable tablet 16 g  4 Tab Oral PRN    glucagon (GLUCAGEN) injection 1 mg  1 mg IntraMUSCular PRN    dextrose (D50W) injection syrg 12.5-25 g  25-50 mL IntraVENous PRN    carvediloL (COREG) tablet 25 mg  25 mg Oral Q12H    apixaban (ELIQUIS) tablet 2.5 mg  2.5 mg Oral Q12H    aluminum-magnesium hydroxide (MAALOX) oral suspension 15 mL  15 mL Oral QID PRN    [START ON 1/24/2021] famotidine (PEPCID) tablet 20 mg  20 mg Oral DAILY    aspirin delayed-release tablet 81 mg  81 mg Oral DAILY    0.9% sodium chloride infusion 250 mL  250 mL IntraVENous DIALYSIS PRN    lidocaine (XYLOCAINE) 2 % viscous solution 15 mL  15 mL Mouth/Throat PRN       Review of Systems:      Complete 10-point review of systems were obtained and discussed in length  with the patient. Complete review of systems was negative/unremarkable  except as mentioned in HPI section. Data Review:    Labs: Results:       Chemistry Recent Labs     01/23/21  0730 01/22/21  1600 01/22/21  1416 01/22/21  0934   GLU 75 115* 126* 93    132* 134* 133*   K 4.9 6.2* 6.3* 8.6*   CL 98* 89* 92* 92*   CO2 30 30 33* 30   BUN 68* 130* 127* 127*   CREA 13.90* 22.40* 22.00* 21.80*   CA 10.0 14.3* 14.5* 13.8*   AGAP 9 13 9 11   BUCR 5* 6* 6* 6*   AP  --   --  83 82   TP  --   --  6.3* 6.8   ALB  --   --  2.8* 2.7*   GLOB  --   --  3.5 4.1*   AGRAT  --   --  0.8 0.7*      CBC w/Diff Recent Labs     01/23/21  0730 01/22/21  1600 01/22/21  0528   WBC 6.7 12.6 10.6   RBC 3.83* 4.42* 4.88   HGB 11.8* 13.8 15.4   HCT 35.6* 40.0 44.8    283 281   GRANS 61 76* 74*   LYMPH 27 16* 16*   EOS 3 1 1      Coagulation Recent Labs     01/23/21  0730 01/22/21  0528   PTP  --  12.9   INR  --  1.0   APTT 30.7  --        Iron/Ferritin No results for input(s): IRON in the last 72 hours. No lab exists for component: TIBCCALC   BNP No results for input(s): BNPP in the last 72 hours.    Cardiac Enzymes Recent Labs     01/22/21  1416 01/22/21  0934    261   CKND1 8.0* 3.4      Liver Enzymes Recent Labs     01/22/21  1416   TP 6.3*   ALB 2.8*   AP 83      Thyroid Studies Lab Results   Component Value Date/Time    TSH 2.43 12/06/2018 05:19 AM            Physical Assessment:     Visit Vitals  /72   Pulse 75   Temp 97.6 °F (36.4 °C)   Resp 16   Ht 5' 11\" (1.803 m)   Wt 98.2 kg (216 lb 8 oz)   SpO2 96%   BMI 30.20 kg/m²     Weight change:     Intake/Output Summary (Last 24 hours) at 1/23/2021 1648  Last data filed at 1/22/2021 2015  Gross per 24 hour   Intake --   Output 0 ml   Net 0 ml     Physical Exam  General: comfortable, no acute distress   HEENT sclera anicteric, supple neck, no thyromegaly  CVS: S1S2 heard,  no rub  RS: + air entry b/l,   Abd: Soft, Non tender, Not distended, Positive bowel sounds, no organomegaly, no CVA / supra pubic tenderness  Neuro: non focal, awake, alert , CN II-XII are grossly intact  Extrm: edema, no cyanosis, clubbing   Skin: no visible  Rash  Musculoskeletal: No gross joints or bone deformities   Access;   Procedures/imaging: see electronic medical records for all procedures, Xrays and details which were not copied into this note but were reviewed prior to creation of Plan      Impression & Plan:   IMPRESSION:    ESRD TTS dialysis,non compliant.  Hyperkalemia   Chest pain, uncontrolled a fib   Anemia   Secondary hyperparathyroidism   Diabetes   PLAN:   Resume phos binders  Dialysis done last pm. Again today with 2K bath  If patient need IV contrast for CT imaging, need to be coordinated with renal team.   Avoid Gadolinium due to its association with nephrogenic systemic fibrosis in a patients with severe ARF and ESRD. Please dose all medications for creatinine clearance <15/dialysis. Avoid blood pressure checks, blood draws, peripheral iv's on arm with access. AvoidPICC lines on either arm in order to preserve veins for dialysis access creation.              Daya Jay MD  January 23, 2021  [facility]  Office [phone]

## 2021-01-23 NOTE — PROGRESS NOTES
[facility]  [facility]    Patient: Christina Duque Age: 52 y.o. : 1970 MR#: [id number] SSN: [id number]  Date/Time: 2021     Subjective: Patient feels lot better. Complains of burning in his epigastric area, states every time he swallows food he feels slight burning in his chest.  Denies any nausea vomiting. Denies any chest pain, no shortness of breath. Assessment/Plan:   1. Severe hyperkalemia secondary to missed hemodialysis, improved postdialysis. 2. Atrial fibrillation with rapid ventricular response, rate controlled now. 3. Elevated troponin, concern for ACS  4. GERD  5. ESRD  6. Hypertension  7. DM2  8. Medical noncompliance        1. Cardiology input noted, plan to DC amiodarone drip and heparin drip and transition to p.o. beta-blocker and Eliquis per cardiology. 2. We will start Pepcid and Maalox as needed for reflux symptoms. 3. Hemodialysis per nephrology. Discussed with nephrology, plan for HD today. 4. Continue SSI  Discussed with the patient at the bedside in detail about my above plan care, discussed about risk and benefits of anticoagulation including risk of GI bleed and explained importance of compliance with medications, hemodialysis and general treatment plan. patient understood and agreed with my plan. If remains stable, possible discharge tomorrow.     Case discussed with:  [x]Patient  []Family  [x]Nursing  []Case Management  DVT Prophylaxis:  []Lovenox  []Hep SQ  []SCDs  []Coumadin   [x]Eliquis/Xarelto     Objective:   VS:   Visit Vitals  /89 (BP 1 Location: Left arm, BP Patient Position: At rest)   Pulse 81   Temp 97.6 °F (36.4 °C)   Resp 16   Ht 5' 11\" (1.803 m)   Wt 98.2 kg (216 lb 8 oz)   SpO2 96%   BMI 30.20 kg/m²      Tmax/24hrs: Temp (24hrs), Av.7 °F (36.5 °C), Min:97.1 °F (36.2 °C), Max:98.4 °F (36.9 °C)  IOBRIEF    Intake/Output Summary (Last 24 hours) at 2021 1435  Last data filed at 2021 2015  Gross per 24 hour   Intake --   Output 0 ml   Net 0 ml       General:  Alert, cooperative, no acute distress    HEENT: PERRLA, anicteric sclerae. Pulmonary:  CTA Bilaterally. No Wheezing/Rales. Cardiovascular: IRRegular rate and Rhythm. GI:  Soft, Non distended, Non tender. + Bowel sounds. Extremities:  No edema. No calf tenderness. Psych: Good insight. Not anxious or agitated. Neurologic: Alert and oriented X 3. Moves all ext.   Additional:    Medications:   Current Facility-Administered Medications   Medication Dose Route Frequency    insulin lispro (HUMALOG) injection   SubCUTAneous AC&HS    glucose chewable tablet 16 g  4 Tab Oral PRN    glucagon (GLUCAGEN) injection 1 mg  1 mg IntraMUSCular PRN    dextrose (D50W) injection syrg 12.5-25 g  25-50 mL IntraVENous PRN    carvediloL (COREG) tablet 25 mg  25 mg Oral Q12H    apixaban (ELIQUIS) tablet 2.5 mg  2.5 mg Oral Q12H    famotidine (PEPCID) tablet 20 mg  20 mg Oral BID    aluminum-magnesium hydroxide (MAALOX) oral suspension 15 mL  15 mL Oral QID PRN    aspirin delayed-release tablet 81 mg  81 mg Oral DAILY    0.9% sodium chloride infusion 250 mL  250 mL IntraVENous DIALYSIS PRN    lidocaine (XYLOCAINE) 2 % viscous solution 15 mL  15 mL Mouth/Throat PRN       Labs:    Recent Results (from the past 24 hour(s))   ECHO ADULT FOLLOW-UP OR LIMITED    Collection Time: 01/22/21  2:51 PM   Result Value Ref Range    IVSd 1.41 (A) 0.6 - 1.0 cm    LVIDd 4.32 4.2 - 5.9 cm    LVIDs 3.30 cm    LVPWd 1.38 (A) 0.6 - 1.0 cm    Triscuspid Valve Regurgitation Peak Gradient 19.35 mmHg    TR Max Velocity 219.92 cm/s    LV Mass .6 88 - 224 g    LV Mass AL Index 108.7 49 - 115 g/m2   CBC WITH AUTOMATED DIFF    Collection Time: 01/22/21  4:00 PM   Result Value Ref Range    WBC 12.6 4.6 - 13.2 K/uL    RBC 4.42 (L) 4.70 - 5.50 M/uL    HGB 13.8 13.0 - 16.0 g/dL    HCT 40.0 36.0 - 48.0 %    MCV 90.5 74.0 - 97.0 FL    MCH 31.2 24.0 - 34.0 PG    MCHC 34.5 31.0 - 37.0 g/dL    RDW 13.9 11.6 - 14.5 %    PLATELET 767 663 - 122 K/uL    MPV 10.6 9.2 - 11.8 FL    NEUTROPHILS 76 (H) 40 - 73 %    LYMPHOCYTES 16 (L) 21 - 52 %    MONOCYTES 7 3 - 10 %    EOSINOPHILS 1 0 - 5 %    BASOPHILS 0 0 - 2 %    ABS. NEUTROPHILS 9.8 (H) 1.8 - 8.0 K/UL    ABS. LYMPHOCYTES 2.0 0.9 - 3.6 K/UL    ABS. MONOCYTES 0.8 0.05 - 1.2 K/UL    ABS. EOSINOPHILS 0.1 0.0 - 0.4 K/UL    ABS. BASOPHILS 0.1 0.0 - 0.1 K/UL    DF AUTOMATED     METABOLIC PANEL, BASIC    Collection Time: 01/22/21  4:00 PM   Result Value Ref Range    Sodium 132 (L) 136 - 145 mmol/L    Potassium 6.2 (HH) 3.5 - 5.5 mmol/L    Chloride 89 (L) 100 - 111 mmol/L    CO2 30 21 - 32 mmol/L    Anion gap 13 3.0 - 18 mmol/L    Glucose 115 (H) 74 - 99 mg/dL     (H) 7.0 - 18 MG/DL    Creatinine 22.40 (H) 0.6 - 1.3 MG/DL    BUN/Creatinine ratio 6 (L) 12 - 20      GFR est AA 3 (L) >60 ml/min/1.73m2    GFR est non-AA 2 (L) >60 ml/min/1.73m2    Calcium 14.3 (HH) 8.5 - 10.1 MG/DL   TROPONIN I    Collection Time: 01/22/21  4:00 PM   Result Value Ref Range    Troponin-I, QT 4.95 (HH) 0.0 - 0.045 NG/ML   HEMOGLOBIN A1C WITH EAG    Collection Time: 01/23/21  7:30 AM   Result Value Ref Range    Hemoglobin A1c 7.0 (H) 4.2 - 5.6 %    Est. average glucose 154 mg/dL   CBC WITH AUTOMATED DIFF    Collection Time: 01/23/21  7:30 AM   Result Value Ref Range    WBC 6.7 4.6 - 13.2 K/uL    RBC 3.83 (L) 4.70 - 5.50 M/uL    HGB 11.8 (L) 13.0 - 16.0 g/dL    HCT 35.6 (L) 36.0 - 48.0 %    MCV 93.0 74.0 - 97.0 FL    MCH 30.8 24.0 - 34.0 PG    MCHC 33.1 31.0 - 37.0 g/dL    RDW 14.3 11.6 - 14.5 %    PLATELET 338 203 - 248 K/uL    MPV 10.0 9.2 - 11.8 FL    NEUTROPHILS 61 40 - 73 %    LYMPHOCYTES 27 21 - 52 %    MONOCYTES 9 3 - 10 %    EOSINOPHILS 3 0 - 5 %    BASOPHILS 0 0 - 2 %    ABS. NEUTROPHILS 4.1 1.8 - 8.0 K/UL    ABS. LYMPHOCYTES 1.8 0.9 - 3.6 K/UL    ABS. MONOCYTES 0.6 0.05 - 1.2 K/UL    ABS. EOSINOPHILS 0.2 0.0 - 0.4 K/UL    ABS.  BASOPHILS 0.0 0.0 - 0.1 K/UL    DF AUTOMATED     PTT    Collection Time: 01/23/21  7:30 AM   Result Value Ref Range    aPTT 30.7 23.0 - 89.8 SEC   METABOLIC PANEL, BASIC    Collection Time: 01/23/21  7:30 AM   Result Value Ref Range    Sodium 137 136 - 145 mmol/L    Potassium 4.9 3.5 - 5.5 mmol/L    Chloride 98 (L) 100 - 111 mmol/L    CO2 30 21 - 32 mmol/L    Anion gap 9 3.0 - 18 mmol/L    Glucose 75 74 - 99 mg/dL    BUN 68 (H) 7.0 - 18 MG/DL    Creatinine 13.90 (H) 0.6 - 1.3 MG/DL    BUN/Creatinine ratio 5 (L) 12 - 20      GFR est AA 5 (L) >60 ml/min/1.73m2    GFR est non-AA 4 (L) >60 ml/min/1.73m2    Calcium 10.0 8.5 - 10.1 MG/DL   GLUCOSE, POC    Collection Time: 01/23/21 12:00 PM   Result Value Ref Range    Glucose (POC) 86 70 - 110 mg/dL       Signed By: Demetria De La Cruz MD     January 23, 2021      Disclaimer: Sections of this note are dictated using utilizing voice recognition software. Minor typographical errors may be present. If questions arise, please do not hesitate to contact me or call our department.

## 2021-01-23 NOTE — PROGRESS NOTES
Seen and examined  Discussed with 081/142 Sushil Tothsanju is for dialysis today  Discharge tomorrow per   Full consult to follow

## 2021-01-23 NOTE — PROGRESS NOTES
ACUTE HEMODIALYSIS FLOW SHEET    HEMODIALYSIS ORDERS: Physician: Dr. Sawyer Sample: aclear   Duration: 4 hr  BFR: 400   DFR: 800   Dialysate:  Temp 36.0   K+   2    Ca+  2   Na 138   Bicarb 35   Wt Readings from Last 1 Encounters:   01/23/21 98.2 kg (216 lb 8 oz)    Patient Chart [x]   Unable to Obtain []  Dry weight/UF Goal: 3000 ml  Access RLE AVF     Heparin []  Bolus    Units    [] Hourly    Units    [x]None      Catheter locking solution    Pre BP:   118/59    Pulse:  82   Respirations: 18    Temperature:  98.4    Tx: NSS   ml/Bolus   [x] N/A   Labs: [x]  Pre  []  Post:   [] N/A   Additional Orders(medications, blood products, hypotension management): [x] Yes   [] No     [x]  [facility] Consent Verified       GRAFT/FISTULA ACCESS:   []N/A     [x]Right     []Left     []UE     [x]LE   []AVG   [x]AVF     []Buttonhole    [x]Medical Aseptic Prep Utilized   [x]No S/S infection  []Redness  []Drainage []Cultured  [] Swelling  [] Pain  Bruit:   [x] Strong    [] Weak       Thrill :   [x] Strong    [] Weak     Needle Gauge: 15   Length: 1 inch   If access problem,  notified: []Yes     [x]N/A     Please describe access if present and not used: N/A       GENERAL ASSESSMENT:    LUNGS:   SaO2%      [x] Clear  [] Coarse  [] Crackles  [] Wheezing                                                [] Diminished     Location : []RLL   []LLL    []RUL  []ROBERT   Cough: []Productive  []Dry  [x]N/A   Respirations:  [x]Easy  []Labored   Therapy:  [x]RA  []NC /min    Mask: []NRB  [] Venti    O2%                  []Ventilator  []Intubated  [] Trach  [] BiPaP   CARDIAC: []Regular      [x] Irregular   [] Pericardial Rub  [] JVD          []  Monitored  [] Bedside  [] Remotely monitored     EDEMA: [x] None  []Generalized  [] Pitting [] 1    [] 2    [] 3    [] 4                 [] Facial  [] Pedal  []  UE  [] LE   SKIN:   [x] Warm  [] Hot     [] Cold   [x] Dry     [] Pale   [] Diaphoretic                  [] Flushed  [] Jaundiced  [] Cyanotic  [] Rash  [] Weeping   LOC:    [x] Alert      [x]Oriented:    [x] Person     [x] Place  [x]Time               [] Confused  [] Lethargic  [] Medicated  [] Non-responsive  [] Non-Verbal   GI / ABDOMEN:                     [] Flat    [] Distended    [x] Soft    [] Firm   []  Obese                   [] Diarrhea  [x] Bowel Sounds  [] Nausea  [] Vomiting     / URINE ASSESSMENT:                   [x] Voiding   [] Oliguria  [] Anuria   []  [place]                  [] Incontinent  []  Incontinent Brief []  Fecal Management System     PAIN:  [x] 0 []1  []2   []3   []4   []5   []6   []7   []8   []9   []10            Scale 0-10  Action/Follow Up:    MOBILITY:  [x] Bed    [] Stretcher      All Vitals and Treatment Details on Attached [address]: [facility]          Room # 0390/36   [] 1st Time Acute      [] Stat       [x] Routine      [] Urgent     [x] Acute Room  []  Bedside  [] ICU/CCU  [] ER   Isolation Precautions:  [x] Dialysis    There are currently no Active Isolations       ALLERGIES:     No Known Allergies   Code Status:  Prior     Hepatitis Status     Lab Results   Component Value Date/Time    Hepatitis A Abs Negative 01/03/2012 01:40 PM    Hepatitis B surface Ag <0.10 12/05/2018 02:50 PM    Hepatitis B surface Ab <3.10 (L) 12/05/2018 02:50 PM    Hep B Core Ab, total NEGATIVE  12/05/2018 02:50 PM    Hepatitis C virus Ab 0.08 12/05/2018 02:50 PM        Current Labs:      Lab Results   Component Value Date/Time    WBC 6.7 01/23/2021 07:30 AM    Hemoglobin, POC 10.9 (L) 05/02/2019 10:00 AM    HGB 11.8 (L) 01/23/2021 07:30 AM    Hematocrit, POC 32 (L) 05/02/2019 10:00 AM    HCT 35.6 (L) 01/23/2021 07:30 AM    PLATELET 084 20/71/5951 07:30 AM    MCV 93.0 01/23/2021 07:30 AM     Lab Results   Component Value Date/Time    Sodium 137 01/23/2021 07:30 AM    Potassium 4.9 01/23/2021 07:30 AM    Chloride 98 (L) 01/23/2021 07:30 AM    CO2 30 01/23/2021 07:30 AM    Anion gap 9 01/23/2021 07:30 AM Glucose 75 01/23/2021 07:30 AM    BUN 68 (H) 01/23/2021 07:30 AM    Creatinine 13.90 (H) 01/23/2021 07:30 AM    BUN/Creatinine ratio 5 (L) 01/23/2021 07:30 AM    GFR est AA 5 (L) 01/23/2021 07:30 AM    GFR est non-AA 4 (L) 01/23/2021 07:30 AM    Calcium 10.0 01/23/2021 07:30 AM          DIET:  DIET CARDIAC      PRIMARY NURSE REPORT:   Pre Dialysis: ARELY Duron RN     Time: 1400        EDUCATION:    [x] Patient [] Other           Knowledge Basis: []None [x]Minimal [] Substantial   Barriers to learning  [x]N/A   [] Access Care     [] S&S of infection  [] Fluid Management  [] K+   [x] Procedural    []Albumin   [] Medications   [] Tx Options   [] Transplant   [] Diet   [] Other   Teaching Tools:  [x] Explain  [] Demo  [] Handouts [] Video  Patient response: [x] Verbalized understanding  [] Teach back  [] Return demonstration   [] Requires follow up      [x]Time Out/Safety Check  [x] Extracorporeal Circuit Tested for integrity       RO/HEMODIALYSIS MACHINE SAFETY CHECKS - Before each treatment:     Machine Number:                   [facility]                                     [x] Unit Machine # 6 with centralized RO                                                                                                                  Alarm Test:  Pass time 1412            [x] RO/Machine Log Complete    Machine Temp    36.0             Dialysate: pH  7.4    Conductivity: Meter 13.8     HD Machine  13.8      TCD: 13.7  Dialyzer Lot # [id number]     Blood Tubing Lot # [id number]    Saline Lot # [id number]     CHLORINE TESTING-Before each treatment and every 4 hours    Total Chlorine: [x] less than 0.1 ppm  Initial Time Check: 1300       4 Hr/2nd Check Time: 1700   (if greater than 0.1 ppm from Primary then every 30 minutes from Secondary)     TREATMENT INITIATION - with Dialysis Precautions:   [x] All Connections Secured              [x] Saline Line Double Clamped   [x] Venous Parameters Set               [x] Arterial Parameters Set    [x] Prime Given 250ml NSS              [x]Air Foam Detector Engaged      Treatment Initiation Note:  9146  Pt arrived to HD without any complaints. Pt A &O X 3, follows commands, no distress noted       During Treatment Notes:  1445  AVF assessed no abnormalities noted, bruit and thrill strong. AVF accessed without any difficulty, pt tolerated well. Vascular access visible with arterial and venous line connections intact. 1500  Vascular access visible with arterial and venous line connections intact. 1600 Pt sitting up in bed watching tv without any distress. Vascular access visible with arterial and venous line connections intact. 1700  Vascular access visible with arterial and venous line connections intact. 1800  Vascular access visible with arterial and venous line connections intact. Medication Dose Volume Route Time Jacque Nurse, Title   albumin 25g 100ml HD Dorothy [id number], CHARLIE     Post Assessment  Dialyzer Cleared:[] Good [x] Fair  [] Poor  Blood processed:  89.9 L  UF Removed:  3000 Ml  Post /95  Pulse  62 Resp  18   Temp 98.4    Post Tx Vascular Access: [] N/A  AVF/AVG: Bleeding stopped with  Arterial Pressure for 5 min   Venous Pressure for 5 min                Skin:[x] Warm  [x] Dry [] Diaphoretic               [] Flushed  [] Pale [] Cyanotic   Pain:  [x]0  []1 []2  []3 []4  []5  []6 []7 []8  []9  []10       Post Treatment Note:   1902  HD completed at this time, pt tolerated well. Dressing clean, dry and intact. POST TREATMENT PRIMARY NURSE HANDOFF REPORT:   Post Dialysis: ARELY Valenzuela RN                 Time:  1347       Abbreviations: AVG-arterial venous graft, AVF-arterial venous fistula, IJ-Internal Jugular, Subcl-Subclavian, Fem-Femoral, Tx-treatment, AP/HR-apical heart rate, DFR-dialysate flow rate, BFR-blood flow rate, AP-arterial pressure, -venous pressure, UF-ultrafiltrate, TMP-transmembrane pressure, Zay-Venous, Art-Arterial, RO-Reverse Osmosis

## 2021-01-23 NOTE — DIALYSIS
JACQUE        ACUTE HEMODIALYSIS FLOW SHEET      HEMODIALYSIS ORDERS: Physician: juve     Dialyzer: revaclear   Duration: 4 hr  BFR: 400   DFR: 600   Dialysate:  Temp 36-37*C  K+   2    Ca+  2 Na 138 Bicarb 35   Weight: 28.8 kg   Patient Chart [x]     Unable to Obtain []   Dry weight/UF Goal: 3000 Access AVF  Needle Gauge 15    Heparin []  Bolus       =Units    [] Hourly        =Units    [x]None      Catheter locking solution =   Pre BP:   133/83    Pulse:     94       Respirations: 16  Temperature:   97.1   Labs: Pre        Post:        [x] N/A   Additional Orders(medications, blood products, hypotension management) [x] N/A     [x] Jacque Consent Verified       GRAFT/FISTULA ACCESS:  []N/A     []Right     []Left     []UE     []LE   []AVG   []AVF        []Buttonhole    [x]Medical Aseptic Prep Utilized   [x]No S/S infection  []Redness  []Drainage []Cultured []Swelling []Pain    Bruit:   [x] Strong    [] Weak       Thrill :   [x] Strong    [] Weak       Needle Gauge: 15  Length: 1   If access problem,  notified: []Yes     [x]N/A  Date:        Please describe access if present and not used:                            GENERAL ASSESSMENT:      LUNGS:  Rate  SaO2% [] N/A    [x] Clear  [] Coarse  [] Crackles  [] Wheezing        [] Diminished     Location : []RLL   []LLL    []RUL  []ROBERT     Cough: []Productive  []Dry  [x]N/A   Respirations:  [x]Easy  []Labored     Therapy:   [x]RA  []NC  l/min    Mask: []NRB []Venti       O2%                  []Ventilator  []Intubated  [] Trach  [] BiPaP     CARDIAC: [x]Regular      [] Irregular   [] Pericardial Rub  [] JVD        []  Monitored  [] Bedside  [] Remotely monitored [] N/A  Rhythm:      EDEMA: [] None  [x]Generalized  [] Pitting [] 1    [] 2    [] 3    [] 4                 [] Facial  [] Pedal  []  UE  [] LE     SKIN:   [x] Warm  [] Hot     [] Cold   [x] Dry     [] Pale   [] Diaphoretic                  [] Flushed  [] Jaundiced  [] Cyanotic  [] Rash  [] Weeping     LOC: [x] Alert      [x]Oriented:    [x] Person     [x] Place  [x]Time               [] Confused  [] Lethargic  [] Medicated  [] Non-responsive     GI / ABDOMEN:  [] Flat    [] Distended    [x] Soft    [] Firm   []  Obese                             [] Diarrhea  [x] Bowel Sounds  [] Nausea  [] Vomiting       / URINE ASSESSMENT:[] Voiding   [x] Oliguria  [] Anuria   []  [place]     [] Incontinent    []  Incontinent Brief      []  Bathroom Privileges       PAIN: [x] 0 []1  []2   []3   []4   []5   []6   []7   []8   []9   []10              Scale 0-10  Action/Follow Up:      MOBILITY:  [] Amb    [] Amb/Assist    [x] Bed    [] Wheelchair  [] Stretcher      All Vitals and Treatment Details on Attached [address]: [facility]          Room # NILAM/NILAM      [] 1st Time Acute  [] Stat  [x] Routine  [] Urgent     [x] Acute Room  []  Bedside  [] ICU/CCU  [] ER   Isolation Precautions:   There are currently no Active Isolations      Special Considerations:         [] Blood Consent Verified [x]N/A     ALLERGIES: No Known Allergies            Code Status:Prior        Hepatitis Status:                        Lab Results   Component Value Date/Time    Hepatitis A Abs Negative 01/03/2012 01:40 PM    Hepatitis B surface Ag <0.10 12/05/2018 02:50 PM    Hepatitis B surface Ab <3.10 (L) 12/05/2018 02:50 PM    Hep B Core Ab, total NEGATIVE  12/05/2018 02:50 PM    Hepatitis C virus Ab 0.08 12/05/2018 02:50 PM                     Current Labs:   Lab Results   Component Value Date/Time    Sodium 132 (L) 01/22/2021 04:00 PM    Potassium 6.2 (HH) 01/22/2021 04:00 PM    Chloride 89 (L) 01/22/2021 04:00 PM    CO2 30 01/22/2021 04:00 PM    Anion gap 13 01/22/2021 04:00 PM    Glucose 115 (H) 01/22/2021 04:00 PM     (H) 01/22/2021 04:00 PM    Creatinine 22.40 (H) 01/22/2021 04:00 PM    BUN/Creatinine ratio 6 (L) 01/22/2021 04:00 PM    GFR est AA 3 (L) 01/22/2021 04:00 PM    GFR est non-AA 2 (L) 01/22/2021 04:00 PM    Calcium 14.3 (HH) 01/22/2021 04:00 PM      Lab Results   Component Value Date/Time    WBC 12.6 01/22/2021 04:00 PM    Hemoglobin, POC 10.9 (L) 05/02/2019 10:00 AM    HGB 13.8 01/22/2021 04:00 PM    Hematocrit, POC 32 (L) 05/02/2019 10:00 AM    HCT 40.0 01/22/2021 04:00 PM    PLATELET 001 84/08/1878 04:00 PM    MCV 90.5 01/22/2021 04:00 PM                                                                                     DIET: None       PRIMARY NURSE REPORT: First initial/Last name/Title      Pre Dialysis: Keila Tobar RN     Time: 1500      EDUCATION:    [x] Patient [] Other         Knowledge Basis: []None [x]Minimal [] Substantial   Barriers to learning  [x]N/A   [] Access Care     [] S&S of infection     [] Fluid Management     []K+     [x]Procedural    []Albumin     [] Medications     [] Tx Options     [] Transplant     [] Diet     [] Other   Teaching Tools:  [x] Explain  [] Demo  [] Handouts [] Video  Patient response:   [x] Verbalized understanding  [] Teach back  [] Return demonstration [] Requires follow up   Inappropriate due to            [x] Time Out/Safety Check  [x]Extracorporeal Circuit Tested for integrity       [address] - Before each treatment:     Machine Number:                   [facility]                                  [x] Unit Machine # 5 with centralized RO                                  [] Portable Machine #1/RO serial # [id number]                                  [] Portable Machine #2/RO serial # [id number]                                  [] Portable Machine #4/RO serial # [id number]                                                     [facility]                                  [] Portable Machine #11/RO serial # [id number]                                   [] Portable Machine #12/RO serial # [id number]                                  [] Portable Machine #13/RO serial #  [id number]      Alarm Test:  Pass time 1500               [x] RO/Machine Log Complete      Temp 36*-37*             Dialysate: pH  7.4 Conductivity: Meter   14     HD Machine   14                  TCD: 14  Dialyzer Lot # [id number]          Blood Tubing Lot # [id number]          Saline Lot #  [id number]     CHLORINE TESTING-Before each treatment and every 4 hours    Total Chlorine: [x] less than 0.1 ppm  Time: 1300 4 Hr/2nd Check Time: 1700   (if greater than 0.1 ppm from Primary then every 30 minutes from Secondary)     TREATMENT INITIATION - with Dialysis Precautions:   [x] All Connections Secured                 [x] Saline Line Double Clamped   [x] Venous Parameters Set                  [x] Arterial Parameters Set    [x] Prime Given 250ml                          [x]Air Foam Detector Engaged      Treatment Initiation Note:Pt in stable condition. AVF accessed and treatment initiated without complication. Dr Cabello Self notified of low BP; no UFG. Post Assessment:   Dialyzer Cleared: [] Good [x] Fair  [] Poor  Blood processed:  72.1 L  UF Removed  0 Ml  POst BP:   105/73       Pulse: 98        Respirations: 16  Temperature: 97.5 Lungs:     [x] Clear      [] Course         [] Crackles    [] Wheezing         [] Diminished   Post Tx Vascular Access:   AVF/AVG: Bleeding stopped   Art 5 min. Zay. 5 Min   Cardiac:   [x] Regular   [] Irregular   [] Monitor  [] N/A      Rhythm:       Catheter:   Locking solution: Heparin 1:1000   Art. Zay. N/A    Skin:   Pain:    [x] Warm  [x] Dry [] Diaphoretic    [] Flushed    [] Pale [] Cyanotic [x]0  []1  []2   []3  []4   []5   []6   []7   []8   []9   []10     Post Treatment Note:   HD well tolerated. 0L UF removed.  NAD noted during or post treatment       POST TREATMENT PRIMARY NURSE HANDOFF REPORT:     First initial/Last name/Title         Post Dialysis: Nathanael Butler RN Time:  2020     Abbreviations: AVG-arterial venous graft, AVF-arterial venous fistula, IJ-Internal Jugular, Subcl-Subclavian, Fem-Femoral, Tx-treatment, AP/HR-apical heart rate, DFR-dialysate flow rate, BFR-blood flow rate, AP-arterial pressure, -venous pressure, UF-ultrafiltrate, TMP-transmembrane pressure, Zay-Venous, Art-Arterial, RO-Reverse Osmosis

## 2021-01-24 VITALS
HEART RATE: 88 BPM | SYSTOLIC BLOOD PRESSURE: 106 MMHG | HEIGHT: 71 IN | OXYGEN SATURATION: 100 % | DIASTOLIC BLOOD PRESSURE: 64 MMHG | RESPIRATION RATE: 17 BRPM | TEMPERATURE: 97.9 F | WEIGHT: 216.5 LBS | BODY MASS INDEX: 30.31 KG/M2

## 2021-01-24 LAB
ANION GAP SERPL CALC-SCNC: 8 MMOL/L (ref 3–18)
APTT PPP: 33.7 SEC (ref 23–36.4)
BASOPHILS # BLD: 0.1 K/UL (ref 0–0.1)
BASOPHILS NFR BLD: 1 % (ref 0–2)
BUN SERPL-MCNC: 33 MG/DL (ref 7–18)
BUN/CREAT SERPL: 4 (ref 12–20)
CALCIUM SERPL-MCNC: 9.1 MG/DL (ref 8.5–10.1)
CHLORIDE SERPL-SCNC: 99 MMOL/L (ref 100–111)
CO2 SERPL-SCNC: 29 MMOL/L (ref 21–32)
CREAT SERPL-MCNC: 8.35 MG/DL (ref 0.6–1.3)
DIFFERENTIAL METHOD BLD: ABNORMAL
EOSINOPHIL # BLD: 0.2 K/UL (ref 0–0.4)
EOSINOPHIL NFR BLD: 4 % (ref 0–5)
ERYTHROCYTE [DISTWIDTH] IN BLOOD BY AUTOMATED COUNT: 14 % (ref 11.6–14.5)
GLUCOSE BLD STRIP.AUTO-MCNC: 120 MG/DL (ref 70–110)
GLUCOSE BLD STRIP.AUTO-MCNC: 89 MG/DL (ref 70–110)
GLUCOSE SERPL-MCNC: 87 MG/DL (ref 74–99)
HCT VFR BLD AUTO: 36.2 % (ref 36–48)
HGB BLD-MCNC: 12.1 G/DL (ref 13–16)
LYMPHOCYTES # BLD: 1.4 K/UL (ref 0.9–3.6)
LYMPHOCYTES NFR BLD: 29 % (ref 21–52)
MCH RBC QN AUTO: 31.1 PG (ref 24–34)
MCHC RBC AUTO-ENTMCNC: 33.4 G/DL (ref 31–37)
MCV RBC AUTO: 93.1 FL (ref 74–97)
MONOCYTES # BLD: 0.5 K/UL (ref 0.05–1.2)
MONOCYTES NFR BLD: 11 % (ref 3–10)
NEUTS SEG # BLD: 2.6 K/UL (ref 1.8–8)
NEUTS SEG NFR BLD: 55 % (ref 40–73)
PHOSPHATE SERPL-MCNC: 2.6 MG/DL (ref 2.5–4.9)
PLATELET # BLD AUTO: 224 K/UL (ref 135–420)
PMV BLD AUTO: 10.1 FL (ref 9.2–11.8)
POTASSIUM SERPL-SCNC: 3.7 MMOL/L (ref 3.5–5.5)
RBC # BLD AUTO: 3.89 M/UL (ref 4.7–5.5)
SODIUM SERPL-SCNC: 136 MMOL/L (ref 136–145)
WBC # BLD AUTO: 4.9 K/UL (ref 4.6–13.2)

## 2021-01-24 PROCEDURE — 99239 HOSP IP/OBS DSCHRG MGMT >30: CPT | Performed by: HOSPITALIST

## 2021-01-24 PROCEDURE — 74011250637 HC RX REV CODE- 250/637: Performed by: PHYSICIAN ASSISTANT

## 2021-01-24 PROCEDURE — 74011000250 HC RX REV CODE- 250: Performed by: HOSPITALIST

## 2021-01-24 PROCEDURE — 84100 ASSAY OF PHOSPHORUS: CPT

## 2021-01-24 PROCEDURE — 36415 COLL VENOUS BLD VENIPUNCTURE: CPT

## 2021-01-24 PROCEDURE — 85025 COMPLETE CBC W/AUTO DIFF WBC: CPT

## 2021-01-24 PROCEDURE — 80048 BASIC METABOLIC PNL TOTAL CA: CPT

## 2021-01-24 PROCEDURE — 82962 GLUCOSE BLOOD TEST: CPT

## 2021-01-24 PROCEDURE — 74011250637 HC RX REV CODE- 250/637: Performed by: FAMILY MEDICINE

## 2021-01-24 PROCEDURE — 74011250637 HC RX REV CODE- 250/637: Performed by: HOSPITALIST

## 2021-01-24 PROCEDURE — 85730 THROMBOPLASTIN TIME PARTIAL: CPT

## 2021-01-24 PROCEDURE — 74011250637 HC RX REV CODE- 250/637: Performed by: INTERNAL MEDICINE

## 2021-01-24 PROCEDURE — 99232 SBSQ HOSP IP/OBS MODERATE 35: CPT | Performed by: INTERNAL MEDICINE

## 2021-01-24 RX ORDER — LIDOCAINE HYDROCHLORIDE 20 MG/ML
15 SOLUTION OROPHARYNGEAL
Status: DISCONTINUED | OUTPATIENT
Start: 2021-01-24 | End: 2021-01-24 | Stop reason: HOSPADM

## 2021-01-24 RX ORDER — LIDOCAINE HYDROCHLORIDE 20 MG/ML
15 SOLUTION OROPHARYNGEAL
Qty: 100 ML | Refills: 0 | Status: SHIPPED | OUTPATIENT
Start: 2021-01-24 | End: 2021-02-08 | Stop reason: ALTCHOICE

## 2021-01-24 RX ORDER — PANTOPRAZOLE SODIUM 40 MG/1
40 TABLET, DELAYED RELEASE ORAL
Qty: 60 TAB | Refills: 0 | Status: SHIPPED | OUTPATIENT
Start: 2021-01-24 | End: 2021-03-08

## 2021-01-24 RX ORDER — PANTOPRAZOLE SODIUM 40 MG/1
40 TABLET, DELAYED RELEASE ORAL
Status: DISCONTINUED | OUTPATIENT
Start: 2021-01-24 | End: 2021-01-24 | Stop reason: HOSPADM

## 2021-01-24 RX ORDER — SEVELAMER CARBONATE 800 MG/1
800 TABLET, FILM COATED ORAL
Qty: 90 TAB | Refills: 0 | Status: SHIPPED | OUTPATIENT
Start: 2021-01-24 | End: 2021-02-22

## 2021-01-24 RX ADMIN — Medication 81 MG: at 08:15

## 2021-01-24 RX ADMIN — APIXABAN 2.5 MG: 2.5 TABLET, FILM COATED ORAL at 08:14

## 2021-01-24 RX ADMIN — PANTOPRAZOLE SODIUM 40 MG: 40 TABLET, DELAYED RELEASE ORAL at 11:44

## 2021-01-24 RX ADMIN — SEVELAMER CARBONATE 800 MG: 800 TABLET, FILM COATED ORAL at 08:15

## 2021-01-24 RX ADMIN — FAMOTIDINE 20 MG: 20 TABLET, FILM COATED ORAL at 08:15

## 2021-01-24 RX ADMIN — LIDOCAINE HYDROCHLORIDE 15 ML: 20 SOLUTION ORAL; TOPICAL at 11:44

## 2021-01-24 RX ADMIN — SEVELAMER CARBONATE 800 MG: 800 TABLET, FILM COATED ORAL at 11:44

## 2021-01-24 NOTE — PROGRESS NOTES
1930-received report from Patricia Guthrie MD of continued indigestion symptoms after and during eating with sharp pain in the throat in one particular midsternal location.  New orders for GI consult and 1g sucralfate

## 2021-01-24 NOTE — PROGRESS NOTES
[facility] - Progress Note  Admit Date: 1/22/2021    Assessment:     Hospital Problems  Date Reviewed: 3/17/2020          Codes Class Noted POA    Hypercalcemia ICD-10-CM: E83.52  ICD-9-CM: 275.42  1/22/2021 Unknown        Hyperkalemia ICD-10-CM: E87.5  ICD-9-CM: 276.7  1/22/2021 Unknown        ESRD (end stage renal disease) ([facility] ICD-10-CM: N18.6  ICD-9-CM: 585.6  1/22/2021 Unknown        NSTEMI (non-ST elevated myocardial infarction) [facility]) ICD-10-CM: I21.4  ICD-9-CM: 410.70  1/22/2021 Unknown        Atrial fibrillation with RVR ([facility]) ICD-10-CM: I48.91  ICD-9-CM: 427.31  1/22/2021 Unknown          -Paroxysmal atrial fibrillation. Presented with weakness and not feeling well for the past week. Was in afib RVR with hypotension on arrival. Previously not anticoagulated given history of severe anemia and noncompliance. Rate controlled with coreg as outpatient. -ESRD on HD, recently missed HD due to not feeling well.  -Atypical chest pain, patient with recent GI upset (for which he has been drinking pepto most of the week) but also with pleuritic and musculoskeletal component. Patient with known CAD as noted below. Initial troponin was unremarkable. ECG with ST depressions in setting of tachycardia. -CAD. LAD PCI 2012. Cath 8/21/2019 revealed no significant disease in his RCA or circumflex.  He had 35% stenosis in his LAD.  For completeness, he had IFR performed which was negative at 0.93-0.95.  -Ischemic cardiomyopathy. EF 40% by echo 12/2018, 55-60% this admission  -Hypertension.  -Dyslipidemia. -Diabetes mellitus.  -Chronic anemia. -Diabetic retinopathy, legally blind. -H/o noncompliance.     Primary cardiologist Dr. Rehan Mendoza.     Plan:     Electrolytes/ fluid status improved/ mgt by HD  CV status stable otherwise with normal LV function, continue med tx  Discussion yesterday was pt wants to be more compliant and retry Coreg and Eliquis  Aware GI consult for ongoing complaints after eating    Subjective:     No new complaints. Objective:      Patient Vitals for the past 8 hrs:   Temp Pulse Resp BP SpO2   01/24/21 0804 98.2 °F (36.8 °C) 87 13 119/83 100 %   01/24/21 0500 -- 85 28 95/68 100 %   01/24/21 0400 98.2 °F (36.8 °C) 84 20 97/70 99 %   01/24/21 0300 -- 85 17 103/69 --   01/24/21 0200 -- 84 12 109/77 99 %   01/24/21 0100 98.1 °F (36.7 °C) 84 15 108/74 99 %         Patient Vitals for the past 96 hrs:   Weight   01/23/21 1349 98.2 kg (216 lb 8 oz)   01/22/21 1314 93.9 kg (207 lb)         Intake/Output Summary (Last 24 hours) at 1/24/2021 0848  Last data filed at 1/23/2021 2000  Gross per 24 hour   Intake 250 ml   Output 3000 ml   Net -2750 ml       Physical Exam full exam deferred    Data Review:     Labs: Results:       Chemistry Recent Labs     01/24/21  0256 01/23/21  0730 01/22/21  1600 01/22/21  1416 01/22/21  0934   GLU 87 75 115* 126* 93    137 132* 134* 133*   K 3.7 4.9 6.2* 6.3* 8.6*   CL 99* 98* 89* 92* 92*   CO2 29 30 30 33* 30   BUN 33* 68* 130* 127* 127*   CREA 8.35* 13.90* 22.40* 22.00* 21.80*   CA 9.1 10.0 14.3* 14.5* 13.8*   MG  --   --   --  3.0* 3.0*   PHOS 2.6  --   --   --   --    AGAP 8 9 13 9 11   BUCR 4* 5* 6* 6* 6*   AP  --   --   --  83 82   TP  --   --   --  6.3* 6.8   ALB  --   --   --  2.8* 2.7*   GLOB  --   --   --  3.5 4.1*   AGRAT  --   --   --  0.8 0.7*      CBC w/Diff Recent Labs     01/24/21  0256 01/23/21  0730 01/22/21  1600   WBC 4.9 6.7 12.6   RBC 3.89* 3.83* 4.42*   HGB 12.1* 11.8* 13.8   HCT 36.2 35.6* 40.0    232 283   GRANS 55 61 76*   LYMPH 29 27 16*   EOS 4 3 1      Cardiac Enzymes No results found for: CPK, CK, CKMMB, CKMB, RCK3, CKMBT, CKNDX, CKND1, SAMARA, TROPT, TROIQ, SAKINA, TROPT, TNIPOC, BNP, BNPP   Coagulation Recent Labs     01/24/21  0256 01/23/21  1700 01/22/21  0528 01/22/21  0528   PTP  --   --   --  12.9   INR  --   --   --  1.0   APTT 33.7 30.0   < >  --     < > = values in this interval not displayed.        Lipid Panel Lab Results   Component Value Date/Time    Cholesterol, total 138 02/09/2019 11:20 AM    HDL Cholesterol 38 (L) 02/09/2019 11:20 AM    LDL, calculated 85 02/09/2019 11:20 AM    VLDL, calculated 15 02/09/2019 11:20 AM    Triglyceride 76 02/09/2019 11:20 AM    CHOL/HDL Ratio 4.4 10/17/2012 06:05 AM      BNP No results found for: BNP, BNPP, XBNPT   Liver Enzymes Recent Labs     01/22/21  1416   TP 6.3*   ALB 2.8*   AP 83      Digoxin    Thyroid Studies Lab Results   Component Value Date/Time    TSH 2.43 12/06/2018 05:19 AM          01/22/21   ECHO ADULT FOLLOW-UP OR LIMITED 01/22/2021 1/22/2021    Narrative · Image quality for this study was technically difficult. · Contrast used: DEFINITY. · LV: Estimated LVEF is 55 - 60%. Visually measured ejection fraction. Normal cavity size and systolic function (ejection fraction normal). Mildly to moderately increased wall thickness. · PV: Mild pulmonic valve regurgitation is present. · PA: Pulmonary arterial systolic pressure is 22 mmHg.         Signed by: Norma Crump MD         Signed By: Denise Vuong DO     January 24, 2021

## 2021-01-24 NOTE — PROGRESS NOTES
D/C order noted for today. Orders reviewed. No needs identified at this time. CM remains available if needed.      Rosario Bundy, [phone]

## 2021-01-24 NOTE — DISCHARGE SUMMARY
Discharge Summary    Patient: Tia Burciaga MRN: [id number]  CSN: [id number]    YOB: 1970  Age: 52 y.o. Sex: male    DOA: 1/22/2021 LOS:  LOS: 2 days        Disposition: Home    Discharge Date: 1/24/21    Admission Diagnosis: [facility] [I21.4]  Atrial fibrillation with RVR [facility] [I48.91]  Hyperkalemia [E87.5]  ESRD (end stage renal disease) ([facility] [N18.6]  Hypercalcemia [E83.52]    Discharge Diagnosis:    1. Severe hyperkalemia secondary to missed hemodialysis, improved post dialysis. 2. Atrial fibrillation with rapid ventricular response, rate controlled now. 3. Elevated troponin, demand ischemia  4. GERD with mild possible esophagitis  5. ESRD  6. Hypertension  7. DM2  8. Medical noncompliance    Discharge Condition: Stable      PHYSICAL EXAM  Visit Vitals  /83   Pulse 87   Temp 98.2 °F (36.8 °C)   Resp 13   Ht 5' 11\" (1.803 m)   Wt 98.2 kg (216 lb 8 oz)   SpO2 100%   BMI 30.20 kg/m²       General: Alert, cooperative, no acute distress    HEENT: PERRLA, EOMI. Anicteric sclerae. Lungs:  CTA Bilaterally. No Wheezing/Rales. Heart:             Regular rate and Rhythm. Abdomen: Soft, Non distended, Non tender. + Bowel sounds. Extremities: No edema. Psych:   Good insight. Not anxious or agitated. Neurologic:  AA, oriented X 3. Moves all ext                                 Hospital Course:   Tito Rangel is a 52 y.o.  male who presented to the emergency department for evaluation of generalized weakness and malaise. He was tachycardic on arrival to the ED and was noted to be in rapid atrial fibrillation with a heart rate in the 150s. Patient reports missing dialysis and apparently has not received any treatments in the past week and also admits to not taking Coreg as prescribed.   Patient was admitted to hospital with impression of hyperkalemia, A. fib with RVR, nephrology was consulted for stat hemodialysis given his hyperkalemia. Patient underwent hemodialysis, postdialysis his potassium normalized. Patient was started on amiodarone drip and IV heparin drip for his A. fib and cardiology was consulted. Patient's rate improved, patient was seen by cardiology and recommended to change IV amiodarone drip to Coreg and IV heparin drip to Eliquis. Patient had a mild elevated troponin, cardiology thought its demand ischemia due to his A. fib. Cardiology recommended no further intervention and cleared for discharge from the standpoint. Patient had some symptoms of acid reflux, patient was started on Pepcid and Maalox. Patient symptoms improved but did not resolve. Patient still having some pain in his throat and chest especially drinking orange juice. I suspect patient most likely has some mild degree of esophagitis, will start patient on lidocaine viscous before meals and also change Pepcid to Protonix. Patient symptoms improved with Xylocaine and Protonix. Patient was able to tolerate lunch without any discomfort. Patient did not have any signs of bleeding, no nausea or vomiting. H&H is stable. If patient's symptoms recur then we will need outpatient GI work-up. Discussed with nephrology, okay to use Xylocaine and Maalox as needed. Nephrology is okay for discharge from the standpoint. Cardiology is also okay for discharge. Discussed with the patient about discharge plan, follow-up appointments, medication changes and side effects of new medications. I also discussed with the patient about risk and benefits of anticoagulation including but not limited to bleeding related to trauma and also spontaneous bleeding related to GI tract. Also advised him about monitoring closely for signs of GI bleeding. Patient understood and agreed with the plan. Patient will be getting repeat H&H in dialysis and also with the PCP. If that if H&H drops then consider stopping Eliquis.   Called and left a message to patient's wife Jose Farooq. Procedures: None     Consults:   Dr. Kyara Vo, cardiology  [facility] nephrology,     Imaging studies:   Chest x-ray shows mild pulmonary vascular congestion. Discharge Medications:     Current Discharge Medication List      START taking these medications    Details   apixaban (ELIQUIS) 2.5 mg tablet Take 1 Tab by mouth every twelve (12) hours. Qty: 60 Tab, Refills: 0      lidocaine (XYLOCAINE) 2 % solution Take 15 mL by mouth Before breakfast, lunch, and dinner. Qty: 100 mL, Refills: 0      pantoprazole (PROTONIX) 40 mg tablet Take 1 Tab by mouth Before breakfast and dinner. Qty: 60 Tab, Refills: 0      sevelamer carbonate (RENVELA) 800 mg tab tab Take 1 Tab by mouth three (3) times daily (with meals). Qty: 90 Tab, Refills: 0      aluminum-magnesium hydroxide (MAALOX) 200-200 mg/5 mL suspension Take 15 mL by mouth four (4) times daily as needed for Indigestion. Qty: 150 mL, Refills: 0      OTHER CBC in 1 week  Dx: GERD, a Fib   Fax results to Dr. Stroud [place]: 1 Each, Refills: 0         CONTINUE these medications which have NOT CHANGED    Details   tadalafiL (Cialis) 20 mg tablet Take 1 Tab by mouth as needed for Erectile Dysfunction. Qty: 10 Tab, Refills: 3      lisinopril (PRINIVIL, ZESTRIL) 10 mg tablet Take 1 Tab by mouth two (2) times a day. Qty: 60 Tab, Refills: 6      glimepiride (AMARYL) 1 mg tablet Take 1 Tab by mouth Daily (before breakfast). Qty: 90 Tab, Refills: 0      Blood-Glucose Meter monitoring kit Check fasting glucose  Qty: 1 Kit, Refills: 0      glucose blood VI test strips (ACCU-CHEK ZAC PLUS TEST STRP) strip Use as directed  Qty: 100 Strip, Refills: 2      sucroferric oxyhydroxide (VELPHORO) 500 mg chew chewable tablet Take 500 mg by mouth three (3) times daily (with meals). carvedilol (COREG) 25 mg tablet TAKE TWO TABLETS BY MOUTH TWICE DAILY  Qty: 360 Tab, Refills: 3      aspirin delayed-release 81 mg tablet Take 81 mg by mouth daily.       EPOETIN SEBLE (PROCRIT IJ) by Injection route. · It is important that you take the medication exactly as they are prescribed. · Keep your medication in the bottles provided by the pharmacist and keep a list of the medication names, dosages, and times to be taken in your wallet. · Do not take other medications without consulting your doctor. DIET:  Renal Diet    ACTIVITY: Activity as tolerated    ADDITIONAL INFORMATION: If you experience any of the following symptoms but not limited to Fever, chills, nausea, vomiting, diarrhea, change in mentation, falling, bleeding, shortness of breath, chest pain, please call your primary care physician or return to the emergency room if you cannot get hold of your doctor:     FOLLOW UP CARE:  Dr. Hansa Glez MD in 5-7 days. Please call and set up an appointment. reinier Jewell in 2 week    Minutes spent on discharge: 40 minutes spent coordinating this discharge (review instructions/follow-up, prescriptions, preparing report for sign off)    Arabella Ramos MD  1/24/2021 9:25 AM    Disclaimer: Sections of this note are dictated using utilizing voice recognition software. Minor typographical errors may be present. If questions arise, please do not hesitate to contact me or call our department.

## 2021-01-24 NOTE — PROGRESS NOTES
RENAL DAILY PROGRESS NOTE              Subjective:       Complaint: has heart burn and chest discomfort due to this. Cannot lay flat due to this.    Overnight events noted  no nausea, vomiting, cough, seizure.     IMPRESSION:   IMPRESSION:   · ESRD TTS dialysis,non compliant.  · Hyperkalemia  · Chest pain, uncontrolled a fib  · Anemia  · Secondary hyperparathyroidism  · Diabetes   PLAN:   C/w phos binders  Dialysis per schedule  Ok for discharge later today from renal standpoint if ok with primary.                Current Facility-Administered Medications   Medication Dose Route Frequency   • pantoprazole (PROTONIX) tablet 40 mg  40 mg Oral ACB&D   • lidocaine (XYLOCAINE) 2 % viscous solution 15 mL  15 mL Mouth/Throat TIDAC   • insulin lispro (HUMALOG) injection   SubCUTAneous AC&HS   • glucose chewable tablet 16 g  4 Tab Oral PRN   • glucagon (GLUCAGEN) injection 1 mg  1 mg IntraMUSCular PRN   • dextrose (D50W) injection syrg 12.5-25 g  25-50 mL IntraVENous PRN   • carvediloL (COREG) tablet 25 mg  25 mg Oral Q12H   • apixaban (ELIQUIS) tablet 2.5 mg  2.5 mg Oral Q12H   • aluminum-magnesium hydroxide (MAALOX) oral suspension 15 mL  15 mL Oral QID PRN   • sevelamer carbonate (RENVELA) tab 800 mg  800 mg Oral TID WITH MEALS   • aspirin delayed-release tablet 81 mg  81 mg Oral DAILY   • 0.9% sodium chloride infusion 250 mL  250 mL IntraVENous DIALYSIS PRN       Review of Symptoms: comprehensive ROS negative except above.   Objective:     Patient Vitals for the past 24 hrs:   Temp Pulse Resp BP SpO2   01/24/21 1147 97.9 °F (36.6 °C) 88 17 106/64 100 %   01/24/21 0804 98.2 °F (36.8 °C) 87 13 119/83 100 %   01/24/21 0500 -- 85 28 95/68 100 %   01/24/21 0400 98.2 °F (36.8 °C) 84 20 97/70 99 %   01/24/21 0300 -- 85 17 103/69 --   01/24/21 0200 -- 84 12 109/77 99 %   01/24/21 0100 98.1 °F (36.7 °C) 84 15 108/74 99 %   01/24/21 0000 -- 86 18 -- 100 %   01/23/21 2300 -- 87 16 123/83 98 %   01/23/21 2200 -- 89 17  124/79 100 %   01/23/21 2100 -- 87 -- -- 100 %   01/23/21 2000 98.2 °F (36.8 °C) 84 16 -- 100 %   01/23/21 1902 98.4 °F (36.9 °C) 62 18 (!) 135/95 --   01/23/21 1850 -- 77 -- 97/69 --   01/23/21 1845 -- 78 -- 106/68 --   01/23/21 1830 -- 74 -- 99/67 --   01/23/21 1815 -- 77 -- 102/73 --   01/23/21 1800 -- 77 -- 114/69 --   01/23/21 1745 -- 63 -- 112/82 --   01/23/21 1730 -- 76 -- 118/82 --   01/23/21 1715 -- 73 -- (!) 119/56 --   01/23/21 1700 -- 76 -- 118/66 --   01/23/21 1645 -- 75 -- 117/72 --   01/23/21 1630 -- 76 -- 116/77 --   01/23/21 1615 -- 76 -- 109/71 --   01/23/21 1600 -- 80 -- 120/71 --   01/23/21 1545 -- 76 -- 126/77 --   01/23/21 1530 -- 78 -- 125/81 --   01/23/21 1515 -- 79 -- 111/77 --   01/23/21 1500 -- 79 -- 101/68 --   01/23/21 1445 -- 70 -- (!) 94/58 --   01/23/21 1430 98.4 °F (36.9 °C) 82 18 (!) 118/59 --   01/23/21 1350 97.6 °F (36.4 °C) 81 -- 132/89 96 %   01/23/21 1300 98.4 °F (36.9 °C) 82 16 131/81 99 %        Weight change: 4.309 kg (9 lb 8 oz)     01/22 1901 - 01/24 0700  In: 250 [P.O.:250]  Out: 3000     Intake/Output Summary (Last 24 hours) at 1/24/2021 1153  Last data filed at 1/24/2021 0914  Gross per 24 hour   Intake 370 ml   Output 3000 ml   Net -2630 ml     Physical Exam:       CVS: S1S2 heard,  no rub  RS: + air entry b/l,   Abd: Soft, Non tender  Neuro: non focal, awake, alert  Extrm: no edema  Skin: no visible  Rash  Musculoskeletal: No gross joints or bone deformities         Data Review:     LABS:   Hematology:   Recent Labs     01/24/21 0256 01/23/21  0730 01/22/21  1600 01/22/21  0528   WBC 4.9 6.7 12.6 10.6   HGB 12.1* 11.8* 13.8 15.4   HCT 36.2 35.6* 40.0 44.8     Chemistry:   Recent Labs     01/24/21 0256 01/23/21  0730 01/22/21  1600 01/22/21  1416 01/22/21  0934   BUN 33* 68* 130* 127* 127*   CREA 8.35* 13.90* 22.40* 22.00* 21.80*   CA 9.1 10.0 14.3* 14.5* 13.8*   ALB  --   --   --  2.8* 2.7*   K 3.7 4.9 6.2* 6.3* 8.6*    137 132* 134* 133*   CL 99* 98* 89* 92* 92*   CO2 29 30 30 33* 30   PHOS 2.6  --   --   --   --    GLU 87 75 115* 126* 93            Procedures/imaging: see electronic medical records for all procedures, Xrays and details which were not copied into this note but were reviewed prior to creation of Braxton Moore MD  1/24/2021  11:53 AM

## 2021-01-24 NOTE — PROGRESS NOTES
Problem: Diabetes Self-Management  Goal: *Disease process and treatment process  Description: Define diabetes and identify own type of diabetes; list 3 options for treating diabetes. Outcome: Progressing Towards Goal  Goal: *Incorporating nutritional management into lifestyle  Description: Describe effect of type, amount and timing of food on blood glucose; list 3 methods for planning meals. Outcome: Progressing Towards Goal  Goal: *Incorporating physical activity into lifestyle  Description: State effect of exercise on blood glucose levels. Outcome: Progressing Towards Goal  Goal: *Developing strategies to promote health/change behavior  Description: Define the ABC's of diabetes; identify appropriate screenings, schedule and personal plan for screenings. Outcome: Progressing Towards Goal  Goal: *Using medications safely  Description: State effect of diabetes medications on diabetes; name diabetes medication taking, action and side effects. Outcome: Progressing Towards Goal  Goal: *Monitoring blood glucose, interpreting and using results  Description: Identify recommended blood glucose targets  and personal targets. Outcome: Progressing Towards Goal  Goal: *Prevention, detection, treatment of acute complications  Description: List symptoms of hyper- and hypoglycemia; describe how to treat low blood sugar and actions for lowering  high blood glucose level. Outcome: Progressing Towards Goal  Goal: *Prevention, detection and treatment of chronic complications  Description: Define the natural course of diabetes and describe the relationship of blood glucose levels to long term complications of diabetes.   Outcome: Progressing Towards Goal  Goal: *Developing strategies to address psychosocial issues  Description: Describe feelings about living with diabetes; identify support needed and support network  Outcome: Progressing Towards Goal  Goal: *Insulin pump training  Outcome: Progressing Towards Goal  Goal: *Sick day guidelines  Outcome: Progressing Towards Goal  Goal: *Patient Specific Goal (EDIT GOAL, INSERT TEXT)  Outcome: Progressing Towards Goal     Problem: Patient Education: Go to Patient Education Activity  Goal: Patient/Family Education  Outcome: Progressing Towards Goal     Problem: Falls - Risk of  Goal: *Absence of Falls  Description: Document Aniket Saunders Fall Risk and appropriate interventions in the flowsheet.   Outcome: Progressing Towards Goal  Note: Fall Risk Interventions:  Mobility Interventions: Patient to call before getting OOB         Medication Interventions: Teach patient to arise slowly    Elimination Interventions: Call light in reach              Problem: Patient Education: Go to Patient Education Activity  Goal: Patient/Family Education  Outcome: Progressing Towards Goal

## 2021-01-25 ENCOUNTER — PATIENT OUTREACH (OUTPATIENT)
Dept: CASE MANAGEMENT | Age: 51
End: 2021-01-25

## 2021-01-25 LAB
HBV SURFACE AB SER QL IA: NEGATIVE
HBV SURFACE AB SERPL IA-ACNC: 4.18 MIU/ML
HEP BS AB COMMENT,HBSAC: ABNORMAL

## 2021-01-25 NOTE — PROGRESS NOTES
CTN attempted to contact patient via telephone call  regarding recent discharge and COVID-19 risk. There was no answer. CTN left a voicemail message requesting a return telephone call. CTN indicated this was not an emergency telephone call and provided contact phone number for follow up.

## 2021-01-26 ENCOUNTER — PATIENT OUTREACH (OUTPATIENT)
Dept: CASE MANAGEMENT | Age: 51
End: 2021-01-26

## 2021-01-26 NOTE — PROGRESS NOTES
Patient contacted regarding recent discharge and COVID-19 risk. Discussed COVID-19 related testing which was not done at this time. Test results were not done. Patient informed of results, if available? N/A     Please see laboratory testing/results for additional verification. Outreach made within 2 business days of discharge: Yes    Care Transition Nurse/ Ambulatory Care Manager/ LPN Care Coordinator contacted the patient by telephone to perform post discharge assessment. Verified name and  with patient as identifiers. Patient has following risk factors of: heart failure, diabetes and chronic kidney disease. CTN/ACM/LPN reviewed discharge instructions, medical action plan and red flags related to discharge diagnosis. Reviewed and educated them on any new and changed medications related to discharge diagnosis. Advance Care Planning:   Does patient have an Advance Directive: not noted to be on file     Education provided regarding infection prevention, and signs and symptoms of COVID-19 and when to seek medical attention with patient who verbalized understanding. Discussed exposure protocols and quarantine from [address] you at higher risk for severe illness  and given an opportunity for questions and concerns. The patient agrees to contact the COVID-19 hotline [phone] or PCP office for questions related to their healthcare. CTN/ACM/LPN provided contact information for future reference. From CDC: Are you at higher risk for severe illness?  Wash your hands often.  Avoid close contact (6 feet, which is about two arm lengths) with people who are sick.  Put distance between yourself and other people if COVID-19 is spreading in your community.  Clean and disinfect frequently touched surfaces.  Avoid all cruise travel and non-essential air travel.  Call your healthcare professional if you have concerns about COVID-19 and your underlying condition or if you are sick.     For more information on steps you can take to protect yourself, see CDC's How to Protect Yourself      Patient/family/caregiver given information for GetWell Loop and agrees to enroll yes  Patient's preferred e-mail:  N/A  Patient's preferred phone number:  [phone]  Based on Loop alert triggers, patient will be contacted by nurse care manager for worsening symptoms. Pt will be further monitored by COVID Loop Team, pending account activation by patient.  based on severity of symptoms and risk factors.

## 2021-02-01 ENCOUNTER — HOSPITAL ENCOUNTER (OUTPATIENT)
Dept: LAB | Age: 51
Discharge: HOME | End: 2021-02-01
Payer: COMMERCIAL

## 2021-02-01 LAB
ERYTHROCYTE [DISTWIDTH] IN BLOOD BY AUTOMATED COUNT: 14.2 % (ref 11.6–14.5)
HCT VFR BLD AUTO: 36.1 % (ref 36–48)
HGB BLD-MCNC: 12 G/DL (ref 13–16)
MCH RBC QN AUTO: 31.1 PG (ref 24–34)
MCHC RBC AUTO-ENTMCNC: 33.2 G/DL (ref 31–37)
MCV RBC AUTO: 93.5 FL (ref 74–97)
PLATELET # BLD AUTO: 280 K/UL (ref 135–420)
PMV BLD AUTO: 9.4 FL (ref 9.2–11.8)
RBC # BLD AUTO: 3.86 M/UL (ref 4.7–5.5)
WBC # BLD AUTO: 6 K/UL (ref 4.6–13.2)

## 2021-02-01 PROCEDURE — 85027 COMPLETE CBC AUTOMATED: CPT

## 2021-02-01 PROCEDURE — 36415 COLL VENOUS BLD VENIPUNCTURE: CPT

## 2021-02-01 NOTE — PROGRESS NOTES
Gauri Meade presents today for a post-hospital follow-up. He presented to the hospital on 1/22/21 with generalized weakness and malaise. He was tachycardic on arrival to the ED and was noted to be in rapid atrial fibrillation with a heart rate in the 150s. He apparently missed his dialysis treatments for about a week and he was also not taking his carvedilol as prescribed. He was diagnosed with hyperkalemia and Afib with RVR. He underwent urgent hemodialysis due to his hyperkalemia. His potassium level was 8.6, , and creatinine 21.8. He was also diagnosed with a non-STEMI felt to be due to demand ischemia from the AFib. His initial troponin was 0.90 and it peaked at 4.95. His potassium level normalized after undergoing hemodialysis. For his AFib, he was initially started on an amiodarone drip and heparin but was then transitioned to carvedilol and Eliquis. An echo was done and it showed an EF of 55-60%, PASP was 22 mmHg. He states that he had indigestion and was not feeling well for several days and that is why he missed his dialysis treatments. Mr. Mickie Reich is a 52year old gentleman with a history of cardiomyopathy (diagnosed in Jan. 2012), pulmonary hypertension, hypertension, diabetes, iron deficiency anemia, and small bowel obstruction requiring a small bowel resection (1/26/12), dyslipidemia, chronic diastolic heart failure, and CAD (s/p stent in 2012). He was lost to follow-up with other healthcare providers and had previously been non-compliant with medications. His last visit with Dr. Jane Triplett was in March 2020. He was hospitalized from 12/5/18 through 12/14/18. He presented with complaints of weakness, fatigue, dyspnea on exertion. He was noted to be in AFIB with RVR, and had strep pneumonia and bacteremia (followed by ID), and superficial acute occlusive left upper extremity thrombophlebitis (no DVT). He was treated with IV antibiotics.   Initially, his heart rate was difficult to control but prior to discharge, improved with oral cardizem being taken 3 times a day. He has ESRD but was not being dialyzed yet prior to admission. He was s/p AV fistula placement but was lost to follow-up. Hemodialysis was initiated during this hospital stay. An echo was done and it showed an EF of 40%, moderate concentric LVH, LV global hypokinesis, and PASP of 50 mmHg. He saw Dr. Ellie Moses for his routine follow-up on August 13, 2019 and during that visit, his diltiazem was discontinued due to complaints of low blood pressures postdialysis with symptomatic dizziness. He underwent cardiac catheterization on August 21, 2019 as part of his work-up for possible kidney transplant. The cardiac catheterization showed that his prior proximal LAD stent is patent with 35% stenosis/calcification. No interventions were performed. Since being discharged, he has been feeling better. Denies chest pain, tightness, heaviness, and palpitations. Denies shortness of breath at rest, dyspnea on exertion, orthopnea and PND. Denies abdominal bloating. Denies lightheadedness, dizziness, and syncope. Denies lower extremity edema and denies claudication. Denies nausea, vomiting, diarrhea, melena, hematochezia. Denies hematuria, urgency, frequency. Denies fever, chills. He undergoes hemodialysis on Tues-Thurs-Sat. While reviewing his medications, it was noted that he again was not taking his carvedilol. He did not restart it after coming home from the hospital He told me that he had stopped taking the carvedilol some time ago as he seemed to [facility] after taking it. He states that he recalls hearing at dialysis that his blood pressure would be low at times. Past Medical History:   Diagnosis Date    Abnormal nuclear cardiac imaging test 10/08/2012    Fixed anteroseptal, anteroapical defect c/w prior infarction. No ischemia. Mid & distal anteroseptal, anteroapical WMA. LVE. EF 44%.       Anemia     dr. Yfn Rogers doubt amyloid or multiple myeloma. candidate for procirt if Hg <10    Benign hypertensive     Blindness of right eye 01/2013    legally blind    CAD (coronary artery disease)     Cardiomyopathy ejection fraction of 40%     CKD (chronic kidney disease), [facility] IV ([facility] [address]     to see Dr. Andres Cancino 12/1/12    Congestive heart failure ([facility]     Diabetes mellitus ([facility] [address] [facility]     Diabetic retinopathy [facility]     Diabetic retinopathy ([facility] [address] [facility]     Dr. Haywood Or- injections    [facility]     History of echocardiogram 04/22/2014    LVE. EF 55% (prev 40-45% on echo of 1/27/12). No WMA. Mild-mod conc LVH. Gr 3 DDfx. Marked LAE. Mild SHANTEL. Mild MR.    Hypertension     Pulmonary hypertension ([facility]     Renal Ultrasound 1/3/12    Right kidney isoechoic w/respect to liver. Sm left-sided pleural effusion    S/P cardiac cath 10/16/2012    LM patent. pLAD 95% (3.5 x 12-mm Leon stent, resid 0$%). LCX mild. Past Surgical History:   Procedure Laterality Date    HX CORONARY STENT PLACEMENT      one    HX LAPAROTOMY  2/2012    bowel obstruction with removal segment of small bowel. Done by Liv.  HX LAPAROTOMY  infancy    whole in colon and had repair at that time.     HX OTHER SURGICAL  06/20/2013    left eye retna attatchment    HX RETINAL DETACHMENT REPAIR      august 2012    AK REPAIR ING HERNIA,5+Y/O,REDUCIBL Left 05/02/2019    Dr. Nuris Tracy     Family History   Problem Relation Age of Onset    Diabetes Mother     Hypertension Mother     Kidney Disease Mother     High Cholesterol Father     Diabetes Brother         pre diabetic     Social History     Tobacco Use    Smoking status: Never Smoker    Smokeless tobacco: Never Used   Substance Use Topics    Alcohol use: Not Currently     Alcohol/week: 4.2 standard drinks     Types: 5 Cans of beer per week    Drug use: No       Medications:  Current Outpatient Medications   Medication Sig    apixaban (ELIQUIS) 2.5 mg tablet Take 1 Tab by mouth every twelve (12) hours.  pantoprazole (PROTONIX) 40 mg tablet Take 1 Tab by mouth Before breakfast and dinner.  sevelamer carbonate (RENVELA) 800 mg tab tab Take 1 Tab by mouth three (3) times daily (with meals).  aluminum-magnesium hydroxide (MAALOX) 200-200 mg/5 mL suspension Take 15 mL by mouth four (4) times daily as needed for Indigestion.  tadalafiL (Cialis) 20 mg tablet Take 1 Tab by mouth as needed for Erectile Dysfunction.  lisinopril (PRINIVIL, ZESTRIL) 10 mg tablet Take 1 Tab by mouth two (2) times a day.  glimepiride (AMARYL) 1 mg tablet Take 1 Tab by mouth Daily (before breakfast).  Blood-Glucose Meter monitoring kit Check fasting glucose    glucose blood VI test strips (ACCU-CHEK ZAC PLUS TEST STRP) strip Use as directed    sucroferric oxyhydroxide (VELPHORO) 500 mg chew chewable tablet Take 500 mg by mouth three (3) times daily (with meals).  aspirin delayed-release 81 mg tablet Take 81 mg by mouth daily.  EPOETIN SEBLE (PROCRIT IJ) by Injection route. No current facility-administered medications for this visit. No Known Allergies      Physical:  Visit Vitals  BP (!) 170/80 (BP 1 Location: Left upper arm, BP Patient Position: Sitting, BP Cuff Size: Adult)   Pulse 87   Ht 5' 11\" (1.803 m)   Wt 96.6 kg (213 lb)   SpO2 97%   BMI 29.71 kg/m²     Medications not taken yet this morning        Exam:  Neck:  Supple, no JVD, no carotid bruits  CV:  Normal S1 and  S2, no murmurs, rubs, or gallops noted  Lungs:  Clear to ausculation throughout, no wheezes or rales  Abd:  Soft, non-tender, non-distended with good bowel sounds. No hepatosplenomegaly. Extremities:  trace lower extremity edema.       EKG:        LABS:  Lab Results   Component Value Date/Time    Sodium 136 01/24/2021 02:56 AM    Potassium 3.7 01/24/2021 02:56 AM    Chloride 99 (L) 01/24/2021 02:56 AM    CO2 29 01/24/2021 02:56 AM    Glucose 87 01/24/2021 02:56 AM    BUN 33 (H) 01/24/2021 02:56 AM    Creatinine 8.35 (H) 01/24/2021 02:56 AM     Lab Results   Component Value Date/Time    Cholesterol, total 138 02/09/2019 11:20 AM    HDL Cholesterol 38 (L) 02/09/2019 11:20 AM    LDL, calculated 85 02/09/2019 11:20 AM    Triglyceride 76 02/09/2019 11:20 AM    CHOL/HDL Ratio 4.4 10/17/2012 06:05 AM     No components found for: GPT     Impression/Plan:  1. Nonischemic cardiomyopathy, EF 55-60% (echo Jan. 2021)  2. Chronic diastolic CHF, appears compensated  3. Essential hypertension, blood pressure elevated but has not taken medications yet today  4. Diabetes mellitus, recommend Hgb A1c less than 7% from cardiac standpoint  5. ESRD, now on hemodialysis  6. Anemia, followed by PCP and nephrology  7. Dyslipidemia, currently not taking a statin  8. History of medical noncompliance  9. Paroxysmal AFIB, currently back in sinus rhythm and anticoagulated with Eliquis    Mr. Samina Branch presents today for a post-hospital follow-up. He was admitted in Jan. 2021 with hyperkalemia and AFIB with RVR. He had been non-compliant with taking his beta blocker and had also missed several hemodialysis treatments. He states that he stopped taking the carvedilol several months ago and he missed his dialysis treatments because he had indigestion and nausea and was not feeling well for a few days. I explained what his diagnoses were for his recent hospitalization. When I explained what a non-STEMI was, he states that he did not know about this diagnosis. I explained demand ischemia due to AFib with RVR and he was for lack of a better word, \"shocked\" to hear this. He was unaware that his potassium level, BUN, creatinine was quite elevated on admission and I explained this to him as well. I informed him that his electrolyte imbalance and worsening renal indices were due to missing his dialysis treatments.   We had a long discussion regarding what dialysis does, rationale for his medications, disease processes and complications (HTN, renal disease). I explained atrial fibrillation and why he is now on an oral anticoagulant. Teaching also done about Eliquis. Greater than 50% of a 50 minute visit was spent in discussion, teaching, and answering questions. He has not been taking his carvedilol and verbalized his concern that he thinks that this is the medication that makes him feel bad. Purpose of carvedilol discussed. I will restart his carvedilol at 6.25mg BID and have him return in 2 weeks for re-evaluation. If there are concerns regarding hypotension after dialysis, I asked that he not take the carvedilol before his dialysis treatments but take it about 2-3 hours after his treatment. I also asked that he take his medications at least 2 hours prior to coming to the office for his appointments so we can evaluate the effectiveness of the medications. He will follow-up with Dr Tone Roland as scheduled and as needed. Appointment to be made during his next visit. Peri Arevalo MSN, FNP-BC    Please note:  Portions of this chart were created with Dragon medical speech to text program.  Unrecognized errors may be present.

## 2021-02-08 ENCOUNTER — OFFICE VISIT (OUTPATIENT)
Dept: CARDIOLOGY CLINIC | Age: 51
End: 2021-02-08

## 2021-02-08 VITALS
HEIGHT: 71 IN | BODY MASS INDEX: 29.82 KG/M2 | DIASTOLIC BLOOD PRESSURE: 80 MMHG | WEIGHT: 213 LBS | SYSTOLIC BLOOD PRESSURE: 170 MMHG | OXYGEN SATURATION: 97 % | HEART RATE: 87 BPM

## 2021-02-08 DIAGNOSIS — N18.6 END STAGE RENAL DISEASE (HCC): ICD-10-CM

## 2021-02-08 DIAGNOSIS — I48.91 ATRIAL FIBRILLATION WITH RAPID VENTRICULAR RESPONSE (HCC): ICD-10-CM

## 2021-02-08 DIAGNOSIS — I25.10 CORONARY ARTERY DISEASE INVOLVING NATIVE CORONARY ARTERY OF NATIVE HEART WITHOUT ANGINA PECTORIS: ICD-10-CM

## 2021-02-08 DIAGNOSIS — I42.9 CARDIOMYOPATHY, UNSPECIFIED TYPE (HCC): Primary | ICD-10-CM

## 2021-02-08 PROCEDURE — 99215 OFFICE O/P EST HI 40 MIN: CPT | Performed by: NURSE PRACTITIONER

## 2021-02-08 PROCEDURE — G8427 DOCREV CUR MEDS BY ELIG CLIN: HCPCS | Performed by: NURSE PRACTITIONER

## 2021-02-08 PROCEDURE — 1111F DSCHRG MED/CURRENT MED MERGE: CPT | Performed by: NURSE PRACTITIONER

## 2021-02-08 PROCEDURE — G8419 CALC BMI OUT NRM PARAM NOF/U: HCPCS | Performed by: NURSE PRACTITIONER

## 2021-02-08 PROCEDURE — 93000 ELECTROCARDIOGRAM COMPLETE: CPT | Performed by: NURSE PRACTITIONER

## 2021-02-08 PROCEDURE — G8432 DEP SCR NOT DOC, RNG: HCPCS | Performed by: NURSE PRACTITIONER

## 2021-02-08 RX ORDER — CARVEDILOL 6.25 MG/1
6.25 TABLET ORAL 2 TIMES DAILY WITH MEALS
Qty: 60 TAB | Refills: 4 | Status: SHIPPED | OUTPATIENT
Start: 2021-02-08 | End: 2021-02-22 | Stop reason: DRUGHIGH

## 2021-02-08 NOTE — PROGRESS NOTES
Heather Holloway presents today for   Chief Complaint   Patient presents with   [facility] Follow Up     2 week Liliam Potts 67 preferred language for health care discussion is english/other. Is someone accompanying this pt? no    Is the patient using any DME equipment during OV? no    Depression Screening:  3 most recent PHQ Screens 3/17/2020   Little interest or pleasure in doing things Not at all   Feeling down, depressed, irritable, or hopeless Not at all   Total Score PHQ 2 0       Learning Assessment:  Learning Assessment 3/17/2020   PRIMARY LEARNER Patient   HIGHEST LEVEL OF EDUCATION - PRIMARY LEARNER  -   BARRIERS PRIMARY LEARNER -   Jessica Sullivan -   ANSWERED BY Patient   RELATIONSHIP SELF       Abuse Screening:  Abuse Screening Questionnaire 3/17/2020   Do you ever feel afraid of your partner? N   Are you in a relationship with someone who physically or mentally threatens you? N   Is it safe for you to go home? Y       Fall Risk  Fall Risk Assessment, last 12 mths 3/17/2020   Able to walk? Yes   Fall in past 12 months? No       Pt currently taking Anticoagulant therapy? Eliquis     Coordination of Care:  1. Have you been to the ER, urgent care clinic since your last visit? Hospitalized since your last visit? 1/22 - 1/24 for NSTEMI     2. Have you seen or consulted any other health care providers outside of the [address] [place] [address] since your last visit? Include any pap smears or colon screening.  no

## 2021-02-08 NOTE — PATIENT INSTRUCTIONS
Restart carvedilol at 6.25mg twice a day  All other medications to remain the same  Follow-up with Rebeca Flores in 2 weeks  Follow-up with Dr. Ivory Carl as scheduled and as needed

## 2021-02-09 NOTE — PROGRESS NOTES
Julia Hoyos presents today for a 2 week follow-up after being restarted on carvedilol 6.25mg BID as he did not restart his normal dose of carvedilol 25mg BID as instructed upon discharge from the hospital.  He states that he is feeling better and has been taking his medications as prescribed. Mr. Mariusz Price is a 52year old gentleman with a history of cardiomyopathy (diagnosed in Jan. 2012), pulmonary hypertension, hypertension, diabetes, iron deficiency anemia, and small bowel obstruction requiring a small bowel resection (1/26/12), dyslipidemia, chronic diastolic heart failure, and CAD (s/p stent in 2012). He was lost to follow-up with other healthcare providers and had previously been non-compliant with medications. His last visit with Dr. Bebeto Alejandro was in March 2020. He was hospitalized from 12/5/18 through 12/14/18. He presented with complaints of weakness, fatigue, dyspnea on exertion. He was noted to be in AFIB with RVR, and had strep pneumonia and bacteremia (followed by ID), and superficial acute occlusive left upper extremity thrombophlebitis (no DVT). He was treated with IV antibiotics. Initially, his heart rate was difficult to control but prior to discharge, improved with oral cardizem being taken 3 times a day. He has ESRD but was not being dialyzed yet prior to admission. He was s/p AV fistula placement but was lost to follow-up. Hemodialysis was initiated during this hospital stay. An echo was done and it showed an EF of 40%, moderate concentric LVH, LV global hypokinesis, and PASP of 50 mmHg. He saw Dr. Bebeto Alejandro for his routine follow-up on August 13, 2019 and during that visit, his diltiazem was discontinued due to complaints of low blood pressures postdialysis with symptomatic dizziness. He underwent cardiac catheterization on August 21, 2019 as part of his work-up for possible kidney transplant.   The cardiac catheterization showed that his prior proximal LAD stent is patent with 35% stenosis/calcification. No interventions were performed. He presented to the hospital on 1/22/21 with generalized weakness and malaise. He was tachycardic on arrival to the ED and was noted to be in rapid atrial fibrillation with a heart rate in the 150s. He apparently missed his dialysis treatments for about a week and he was also not taking his carvedilol as prescribed. He was diagnosed with hyperkalemia and Afib with RVR. He underwent urgent hemodialysis due to his hyperkalemia. His potassium level was 8.6, , and creatinine 21.8. He was also diagnosed with a non-STEMI felt to be due to demand ischemia from the AFib. His initial troponin was 0.90 and it peaked at 4.95. His potassium level normalized after undergoing hemodialysis. For his AFib, he was initially started on an amiodarone drip and heparin but was then transitioned to carvedilol and Eliquis. An echo was done and it showed an EF of 55-60%, PASP was 22 mmHg. He states that he had indigestion and was not feeling well for several days and that is why he missed his dialysis treatments. Denies chest pain, tightness, heaviness, and palpitations. Denies shortness of breath at rest, dyspnea on exertion, orthopnea and PND. Denies abdominal bloating. Denies lightheadedness, dizziness, and syncope. Denies lower extremity edema and denies claudication. Denies nausea, vomiting, diarrhea, melena, hematochezia. Denies hematuria, urgency, frequency. Denies fever, chills. He undergoes hemodialysis on Tues-Thurs-Sat. Past Medical History:   Diagnosis Date    Abnormal nuclear cardiac imaging test 10/08/2012    Fixed anteroseptal, anteroapical defect c/w prior infarction. No ischemia. Mid & distal anteroseptal, anteroapical WMA. LVE. EF 44%.  Anemia     dr. Emeka Bliss doubt amyloid or multiple myeloma.  candidate for procirt if Hg <10    Benign hypertensive     Blindness of right eye 01/2013    legally blind    CAD (coronary artery disease)     Cardiomyopathy ejection fraction of 40%     CKD (chronic kidney disease), stage IV ([facility]     to see Dr. Lisa Moralez 12/1/12    Congestive heart failure ([facility]     Diabetes mellitus ([facility]     Diabetic retinopathy ([facility]     Diabetic retinopathy ([facility]     Dr. Beverly Cox- injections    Heart failure ([place] [address].)     History of echocardiogram 04/22/2014    LVE. EF 55% (prev 40-45% on echo of 1/27/12). No WMA. Mild-mod conc LVH. Gr 3 DDfx. Marked LAE. Mild SHANTEL. Mild MR.    Hypertension     Pulmonary hypertension ([place] [address].)     Renal Ultrasound 1/3/12    Right kidney isoechoic w/respect to liver. Sm left-sided pleural effusion    S/P cardiac cath 10/16/2012    LM patent. pLAD 95% (3.5 x 12-mm [place] stent, resid 0$%). LCX mild. Past Surgical History:   Procedure Laterality Date    HX CORONARY STENT PLACEMENT      one    HX LAPAROTOMY  2/2012    bowel obstruction with removal segment of small bowel. Done by Riblet.  HX LAPAROTOMY  infancy    whole in colon and had repair at that time.  HX OTHER SURGICAL  06/20/2013    left eye retna attatchment    HX RETINAL DETACHMENT REPAIR      august 2012    MA REPAIR ING HERNIA,5+Y/O,REDUCIBL Left 05/02/2019    Dr. Francy Holly     Family History   Problem Relation Age of Onset    Diabetes Mother     Hypertension Mother     Kidney Disease Mother     High Cholesterol Father     Diabetes Brother         pre diabetic     Social History     Tobacco Use    Smoking status: Never Smoker    Smokeless tobacco: Never Used   Substance Use Topics    Alcohol use: Not Currently     Alcohol/week: 4.2 standard drinks     Types: 5 Cans of beer per week    Drug use: No       Medications:  Current Outpatient Medications   Medication Sig    apixaban (ELIQUIS) 2.5 mg tablet Take 1 Tab by mouth every twelve (12) hours.  carvediloL (COREG) 6.25 mg tablet Take 1 Tab by mouth two (2) times daily (with meals).     pantoprazole (PROTONIX) 40 mg tablet Take 1 Tab by mouth Before breakfast and dinner.   • aluminum-magnesium hydroxide (MAALOX) 200-200 mg/5 mL suspension Take 15 mL by mouth four (4) times daily as needed for Indigestion.   • tadalafiL (Cialis) 20 mg tablet Take 1 Tab by mouth as needed for Erectile Dysfunction.   • lisinopril (PRINIVIL, ZESTRIL) 10 mg tablet Take 1 Tab by mouth two (2) times a day.   • glimepiride (AMARYL) 1 mg tablet Take 1 Tab by mouth Daily (before breakfast).   • Blood-Glucose Meter monitoring kit Check fasting glucose   • glucose blood VI test strips (ACCU-CHEK ZAC PLUS TEST STRP) strip Use as directed   • sucroferric oxyhydroxide (VELPHORO) 500 mg chew chewable tablet Take 500 mg by mouth three (3) times daily (with meals).   • aspirin delayed-release 81 mg tablet Take 81 mg by mouth daily.     No current facility-administered medications for this visit.          No Known Allergies      Physical:  Visit Vitals  BP (!) 180/100   Pulse 83   Ht 5' 11\" (1.803 m)   Wt 97.9 kg (215 lb 12.8 oz)   SpO2 98%   BMI 30.10 kg/m²         Exam:  Neck:  Supple, no JVD, no carotid bruits  CV:  Normal S1 and  S2, no murmurs, rubs, or gallops noted  Lungs:  Clear to ausculation throughout, no wheezes or rales  Abd:  Soft, non-tender, non-distended with good bowel sounds.  No hepatosplenomegaly.    Extremities:  trace lower extremity edema.      EKG:  Not done today      LABS:  Lab Results   Component Value Date/Time    Sodium 136 01/24/2021 02:56 AM    Potassium 3.7 01/24/2021 02:56 AM    Chloride 99 (L) 01/24/2021 02:56 AM    CO2 29 01/24/2021 02:56 AM    Glucose 87 01/24/2021 02:56 AM    BUN 33 (H) 01/24/2021 02:56 AM    Creatinine 8.35 (H) 01/24/2021 02:56 AM     Lab Results   Component Value Date/Time    Cholesterol, total 138 02/09/2019 11:20 AM    HDL Cholesterol 38 (L) 02/09/2019 11:20 AM    LDL, calculated 85 02/09/2019 11:20 AM    Triglyceride 76 02/09/2019 11:20 AM    CHOL/HDL Ratio 4.4 10/17/2012 06:05 AM     No components found for: GPT  Impression/Plan:  1. Nonischemic cardiomyopathy, EF 55-60% (echo Jan. 2021)  2. Chronic diastolic CHF, appears compensated  3. Essential hypertension, blood pressure elevated but has not taken medications yet today  4. Diabetes mellitus, recommend Hgb A1c less than 7% from cardiac standpoint  5. ESRD, now on hemodialysis  6. Anemia, followed by PCP and nephrology  7. Dyslipidemia, currently not taking a statin  8. History of medical noncompliance  9. Paroxysmal AFIB, currently back in sinus rhythm and anticoagulated with Eliquis    Mr. Sunny Canela presents today for follow-up after being instructed to restart his carvedilol at 6.25mg BID because he did not restart the carvedilol as instructed upon discharge (25mg BID) from the hospital due to his concerns about post-dialysis hypotension. His blood pressure remains elevated and suboptimally controlled. He states that he has not had any problems with hypotension while at dialysis but he does not take his medications before he undergoes his treatment. He is feeling well and states that he has been taking his medications. At this time, his carvedilol will be increased to 25mg BID and he will return for follow-up in 2 weeks. He will begin the increased dose this evening. If his blood pressure remains elevated when he returns, further adjustments will be made. He will follow-up with Dr Rebeca Rodriguez as scheduled and as needed. Florencia Dobbs MSN, FNP-BC    Please note:  Portions of this chart were created with Dragon medical speech to text program.  Unrecognized errors may be present.

## 2021-02-22 ENCOUNTER — OFFICE VISIT (OUTPATIENT)
Dept: CARDIOLOGY CLINIC | Age: 51
End: 2021-02-22
Payer: COMMERCIAL

## 2021-02-22 VITALS
SYSTOLIC BLOOD PRESSURE: 180 MMHG | BODY MASS INDEX: 30.21 KG/M2 | DIASTOLIC BLOOD PRESSURE: 100 MMHG | HEART RATE: 83 BPM | OXYGEN SATURATION: 98 % | HEIGHT: 71 IN | WEIGHT: 215.8 LBS

## 2021-02-22 DIAGNOSIS — I42.9 CARDIOMYOPATHY, UNSPECIFIED TYPE (HCC): ICD-10-CM

## 2021-02-22 DIAGNOSIS — I10 ESSENTIAL HYPERTENSION: Primary | ICD-10-CM

## 2021-02-22 DIAGNOSIS — I25.10 CORONARY ARTERY DISEASE INVOLVING NATIVE CORONARY ARTERY OF NATIVE HEART WITHOUT ANGINA PECTORIS: ICD-10-CM

## 2021-02-22 DIAGNOSIS — E78.5 DYSLIPIDEMIA: ICD-10-CM

## 2021-02-22 PROCEDURE — G8427 DOCREV CUR MEDS BY ELIG CLIN: HCPCS | Performed by: NURSE PRACTITIONER

## 2021-02-22 PROCEDURE — G9231 DOC ESRD DIA TRANS PREG: HCPCS | Performed by: NURSE PRACTITIONER

## 2021-02-22 PROCEDURE — 99213 OFFICE O/P EST LOW 20 MIN: CPT | Performed by: NURSE PRACTITIONER

## 2021-02-22 PROCEDURE — G8432 DEP SCR NOT DOC, RNG: HCPCS | Performed by: NURSE PRACTITIONER

## 2021-02-22 PROCEDURE — 1111F DSCHRG MED/CURRENT MED MERGE: CPT | Performed by: NURSE PRACTITIONER

## 2021-02-22 PROCEDURE — G8417 CALC BMI ABV UP PARAM F/U: HCPCS | Performed by: NURSE PRACTITIONER

## 2021-02-22 RX ORDER — CARVEDILOL 25 MG/1
25 TABLET ORAL 2 TIMES DAILY WITH MEALS
Qty: 180 TAB | Refills: 3 | Status: ON HOLD | OUTPATIENT
Start: 2021-02-22 | End: 2021-06-22 | Stop reason: SDUPTHER

## 2021-02-22 NOTE — PATIENT INSTRUCTIONS
Increase carvedilol to 25mg twice a day.   Take 4 of your 6.25mg tablets twice a day until you run out and then begin taking the 25mg tablets and take 1 tablet twice a day  All other medications to remain the same  Follow-up with Aristides Tavares in 2 weeks   Follow-up with Dr. Tereso Romero as scheduled and as needed

## 2021-02-22 NOTE — PROGRESS NOTES
Mo Taylor presents today for   Chief Complaint   Patient presents with    Follow-up     2 week follow up after restarting  [id number] preferred language for health care discussion is english/other. Is someone accompanying this pt? no    Is the patient using any DME equipment during OV? no    Depression Screening:  3 most recent PHQ Screens 2/22/2021   Little interest or pleasure in doing things Not at all   Feeling down, depressed, irritable, or hopeless Not at all   Total Score PHQ 2 0       Learning Assessment:  Learning Assessment 3/17/2020   PRIMARY LEARNER Patient   HIGHEST LEVEL OF EDUCATION - PRIMARY LEARNER  -   BARRIERS PRIMARY LEARNER -   Jessica Sullivan -   ANSWERED BY Patient   RELATIONSHIP SELF       Abuse Screening:  Abuse Screening Questionnaire 2/22/2021   Do you ever feel afraid of your partner? N   Are you in a relationship with someone who physically or mentally threatens you? N   Is it safe for you to go home? Y       Fall Risk  Fall Risk Assessment, last 12 mths 2/22/2021   Able to walk? Yes   Fall in past 12 months? 0   Do you feel unsteady? 0   Are you worried about falling 0       Pt currently taking Anticoagulant therapy? Eliquis 2.5mg     Coordination of Care:  1. Have you been to the ER, urgent care clinic since your last visit? Hospitalized since your last visit? no    2. Have you seen or consulted any other health care providers outside of the [address] [place] [address] since your last visit? Include any pap smears or colon screening.  no

## 2021-02-25 ENCOUNTER — OFFICE VISIT (OUTPATIENT)
Dept: FAMILY MEDICINE CLINIC | Age: 51
End: 2021-02-25
Payer: COMMERCIAL

## 2021-02-25 VITALS
OXYGEN SATURATION: 95 % | RESPIRATION RATE: 16 BRPM | HEIGHT: 71 IN | WEIGHT: 205 LBS | BODY MASS INDEX: 28.7 KG/M2 | HEART RATE: 70 BPM | SYSTOLIC BLOOD PRESSURE: 102 MMHG | TEMPERATURE: 98 F | DIASTOLIC BLOOD PRESSURE: 60 MMHG

## 2021-02-25 DIAGNOSIS — E11.3413 SEVERE NONPROLIFERATIVE DIABETIC RETINOPATHY OF BOTH EYES WITH MACULAR EDEMA ASSOCIATED WITH TYPE 2 DIABETES MELLITUS (HCC): ICD-10-CM

## 2021-02-25 DIAGNOSIS — I48.91 ATRIAL FIBRILLATION WITH RVR (HCC): ICD-10-CM

## 2021-02-25 DIAGNOSIS — I25.10 CORONARY ARTERY DISEASE INVOLVING NATIVE CORONARY ARTERY OF NATIVE HEART WITHOUT ANGINA PECTORIS: Primary | ICD-10-CM

## 2021-02-25 DIAGNOSIS — N18.6 ESRD (END STAGE RENAL DISEASE) (HCC): ICD-10-CM

## 2021-02-25 DIAGNOSIS — I21.4 NSTEMI (NON-ST ELEVATED MYOCARDIAL INFARCTION) (HCC): ICD-10-CM

## 2021-02-25 DIAGNOSIS — E78.5 DYSLIPIDEMIA: ICD-10-CM

## 2021-02-25 DIAGNOSIS — H54.8 LEGALLY BLIND: ICD-10-CM

## 2021-02-25 DIAGNOSIS — I42.9 CARDIOMYOPATHY, UNSPECIFIED TYPE (HCC): ICD-10-CM

## 2021-02-25 DIAGNOSIS — D63.8 ANEMIA OF CHRONIC DISEASE: ICD-10-CM

## 2021-02-25 DIAGNOSIS — E11.21 TYPE 2 DIABETES WITH NEPHROPATHY (HCC): ICD-10-CM

## 2021-02-25 PROCEDURE — G8510 SCR DEP NEG, NO PLAN REQD: HCPCS | Performed by: FAMILY MEDICINE

## 2021-02-25 PROCEDURE — G8427 DOCREV CUR MEDS BY ELIG CLIN: HCPCS | Performed by: FAMILY MEDICINE

## 2021-02-25 PROCEDURE — G8417 CALC BMI ABV UP PARAM F/U: HCPCS | Performed by: FAMILY MEDICINE

## 2021-02-25 PROCEDURE — G9231 DOC ESRD DIA TRANS PREG: HCPCS | Performed by: FAMILY MEDICINE

## 2021-02-25 PROCEDURE — G0463 HOSPITAL OUTPT CLINIC VISIT: HCPCS | Performed by: FAMILY MEDICINE

## 2021-02-25 PROCEDURE — 2022F DILAT RTA XM EVC RTNOPTHY: CPT | Performed by: FAMILY MEDICINE

## 2021-02-25 PROCEDURE — 99214 OFFICE O/P EST MOD 30 MIN: CPT | Performed by: FAMILY MEDICINE

## 2021-02-25 PROCEDURE — 3051F HG A1C>EQUAL 7.0%<8.0%: CPT | Performed by: FAMILY MEDICINE

## 2021-02-25 NOTE — PROGRESS NOTES
1. Have you been to the ER, urgent care clinic since your last visit? Hospitalized since your last visit? No    2. Have you seen or consulted any other health care providers outside of the [address] [place] [address] since your last visit? Include any pap smears or colon screening.  No

## 2021-02-25 NOTE — PATIENT INSTRUCTIONS
A Healthy Heart: Care Instructions  Your Care Instructions     Coronary artery disease, also called heart disease, occurs when a substance called plaque builds up in the vessels that supply oxygen-rich blood to your heart muscle. This can narrow the blood vessels and reduce blood flow. A heart attack happens when blood flow is completely blocked. A high-fat diet, smoking, and other factors increase the risk of heart disease. Your doctor has found that you have a chance of having heart disease. You can do lots of things to keep your heart healthy. It may not be easy, but you can change your diet, exercise more, and quit smoking. These steps really work to lower your chance of heart disease. Follow-up care is a key part of your treatment and safety. Be sure to make and go to all appointments, and call your doctor if you are having problems. It's also a good idea to know your test results and keep a list of the medicines you take. How can you care for yourself at home? Diet    · Use less salt when you cook and eat. This helps lower your blood pressure. Taste food before salting. Add only a little salt when you think you need it. With time, your taste buds will adjust to less salt.     · Eat fewer snack items, fast foods, canned soups, and other high-salt, high-fat, processed foods.     · Read food labels and try to avoid saturated and trans fats. They increase your risk of heart disease by raising cholesterol levels.     · Limit the amount of solid fat-butter, margarine, and shortening-you eat. Use olive, peanut, or canola oil when you cook. Bake, broil, and steam foods instead of frying them.     · Eat a variety of fruit and vegetables every day. Dark green, deep orange, red, or yellow fruits and vegetables are especially good for you. Examples include spinach, carrots, peaches, and berries.     · Foods high in fiber can reduce your cholesterol and provide important vitamins and minerals.  High-fiber foods include whole-grain cereals and breads, oatmeal, beans, brown rice, citrus fruits, and apples.     · Eat lean proteins. Heart-healthy proteins include seafood, lean meats and poultry, eggs, beans, peas, nuts, seeds, and soy products.     · Limit drinks and foods with added sugar. These include candy, desserts, and soda pop. Lifestyle changes    · If your doctor recommends it, get more exercise. Walking is a good choice. Bit by bit, increase the amount you walk every day. Try for at least 30 minutes on most days of the week. You also may want to swim, bike, or do other activities.     · Do not smoke. If you need help quitting, talk to your doctor about stop-smoking programs and medicines. These can increase your chances of quitting for good. Quitting smoking may be the most important step you can take to protect your heart. It is never too late to quit.     · Limit alcohol to 2 drinks a day for men and 1 drink a day for women. Too much alcohol can cause health problems.     · Manage other health problems such as diabetes, high blood pressure, and high cholesterol. If you think you may have a problem with alcohol or drug use, talk to your doctor. Medicines    · Take your medicines exactly as prescribed. Call your doctor if you think you are having a problem with your medicine.     · If your doctor recommends aspirin, take the amount directed each day. Make sure you take aspirin and not another kind of pain reliever, such as acetaminophen (Tylenol). When should you call for help? Call 911 if you have symptoms of a heart attack. These may include:    · Chest pain or pressure, or a strange feeling in the chest.     · Sweating.     · Shortness of breath.     · Pain, pressure, or a strange feeling in the back, neck, jaw, or upper belly or in one or both shoulders or arms.     · Lightheadedness or sudden weakness.     · A fast or irregular heartbeat.    After you call 911, the  may tell you to chew 1 adult-strength or 2 to 4 low-dose aspirin. Wait for an ambulance. Do not try to drive yourself. Watch closely for changes in your health, and be sure to contact your doctor if you have any problems. Where can you learn more? Go to http://www.meyers.com/  Enter F075 in the search box to learn more about \"A Healthy Heart: Care Instructions. \"  Current as of: December 16, 2019               Content Version: 12.6  © 9140-3619 [employer]. Care instructions adapted under license by [employer] (which disclaims liability or warranty for this information). If you have questions about a medical condition or this instruction, always ask your healthcare professional. [address] any warranty or liability for your use of this information.

## 2021-02-26 NOTE — PROGRESS NOTES
Padmini Rose, 57 y.o.,  male    SUBJECTIVE  Ff-up admission 1/22- symptomatic Afib, hyperkalemia    Pt has not been seen here since 2019, says was following care with nephrologist mostly. He has ESRD on HD    Reviewed discharge summary/recent cardiology notes. Presented with generalized weakness, and reflux symptoms to ED, found to be on Afib RVR with hyperkalemia. Was reported missed HD and med noncompliance. He was dialyzed and K level has normalizes. cardizem drip initially then transitioned to po coreg and eliquis. EF 55-60% PASP 22  NSTEMI- trop elevated thought to be demand insufficiency  Known CAD previous LAD stent. Following dr. Shira Schreiber    ESRD-ongoing HD 3 x a week T/th/sat. Also with anemia of chronic disease and HTN, following Dr. Rolando Owusu. He is undergoing kidney transplant evaluation, cardiac cath 8/19 showing proximal LAD stent  Patent 35% stenosis    NIDDM- w/ retinopathy, legally blind. Says 's. 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 , Disp: 180 Tab, Rfl: 3    carvediloL (COREG) 25 mg tablet, Take 1 Tab by mouth two (2) times daily (with meals). , Disp: 180 Tab, Rfl: 3    pantoprazole (PROTONIX) 40 mg tablet, Take 1 Tab by mouth Before breakfast and dinner., Disp: 60 Tab, Rfl: 0    aluminum-magnesium hydroxide (MAALOX) 200-200 mg/5 mL suspension, Take 15 mL by mouth four (4) times daily as needed for Indigestion. , Disp: 150 mL, Rfl: 0    tadalafiL (Cialis) 20 mg tablet, Take 1 Tab by mouth as needed for Erectile Dysfunction. , Disp: 10 Tab, Rfl: 3    lisinopril (PRINIVIL, ZESTRIL) 10 mg tablet, Take 1 Tab by mouth two (2) times a day., Disp: 60 Tab, Rfl: 6    glimepiride (AMARYL) 1 mg tablet, Take 1 Tab by mouth Daily (before breakfast). , Disp: 90 Tab, Rfl: 0    Blood-Glucose Meter monitoring kit, Check fasting glucose, Disp: 1 Kit, Rfl: 0    glucose blood VI test strips (ACCU-CHEK ZAC PLUS TEST STRP) strip, Use as directed, Disp: 100 Strip, Rfl: 2    sucroferric oxyhydroxide (VELPHORO) 500 mg chew chewable tablet, Take 500 mg by mouth three (3) times daily (with meals). , Disp: , Rfl:     aspirin delayed-release 81 mg tablet, Take 81 mg by mouth daily. , Disp: , Rfl:   No Known Allergies    Past Medical History:   Diagnosis Date    Abnormal nuclear cardiac imaging test 10/08/2012    Fixed anteroseptal, anteroapical defect c/w prior infarction. No ischemia. Mid & distal anteroseptal, anteroapical WMA. LVE. EF 44%.  Anemia     dr. Mathew Cote doubt amyloid or multiple myeloma. candidate for procirt if Hg <10    Benign hypertensive     Blindness of right eye 01/2013    legally blind    CAD (coronary artery disease)     Cardiomyopathy ejection fraction of 40%     CKD (chronic kidney disease), stage IV ([facility]     to see Dr. Lisa Moralez 12/1/12    Congestive heart failure ([facility]     Diabetes mellitus ([facility]     Diabetic retinopathy ([facility]     Diabetic retinopathy ([facility]     Dr. Jetty Kenny- injections    Heart failure ([address]     History of echocardiogram 04/22/2014    LVE. EF 55% (prev 40-45% on echo of 1/27/12).   No WMA.  Mild-mod conc LVH. Gr 3 DDfx. Marked LAE. Mild SHANTEL. Mild MR.    Hypertension     Pulmonary hypertension ([address]     Renal Ultrasound 1/3/12    Right kidney isoechoic w/respect to liver. Sm left-sided pleural effusion    S/P cardiac cath 10/16/2012    LM patent. pLAD 95% (3.5 x 12-mm [place] stent, resid 0$%). LCX mild. Social History     Socioeconomic History    Marital status:      Spouse name: Not on file    Number of children: Not on file    Years of education: Not on file    Highest education level: Not on file   Social Needs    Financial resource strain: Not on file    Food insecurity - worry: Not on file    Food insecurity - inability: Not on file    Transportation needs - medical: Not on file   [employer] needs - non-medical: Not on file   Occupational History    Not on file   Tobacco Use    Smoking status: Never Smoker    Smokeless tobacco: Never Used   Substance and Sexual Activity    Alcohol use:  Yes     Alcohol/week: 2.5 oz     Types: 5 Cans of beer per week     Comment: occassionally    Drug use: No    Sexual activity: Yes     Partners: Female     Birth control/protection: None   Other Topics Concern    Not on file   Social History Narrative    Not on file       Family History   Problem Relation Age of Onset    Diabetes Mother     Hypertension Mother     Kidney Disease Mother     High Cholesterol Father     Diabetes Brother         pre diabetic         OBJECTIVE    Physical Exam:     Visit Vitals  /60 (BP 1 Location: Left upper arm, BP Patient Position: Sitting, BP Cuff Size: Adult)   Pulse 70   Temp 98 °F (36.7 °C) (Oral)   Resp 16   Ht 5' 11\" (1.803 m)   Wt 205 lb (93 kg)   SpO2 95%   BMI 28.59 kg/m²         General: alert, chronically ill-appearing,  in no apparent distress or pain  CVS: normal rate, regular rhythm, distinct S1 and S2  Lungs:clear to ausculation bilaterally, no crackles, wheezing or rhonchi noted  Extremities: no edema, no cyanosis, MSK grossly normal  Skin: warm, no lesions, rashes noted  Psych:  mood and affect normal      ASSESSMENT/PLAN  Diagnoses and all orders for this visit:    NSTEMI ([facility]  Thought to be demand insufficiency with R AFib (1/2022)  On BB, ASA  Discussed importance of med and ff-up compliance     Atrial fibrillation with rapid ventricular response (HCC)   rate controlled with coreg,  anticoag with eliquis  Following cardiology- Dr. Jose Barnett    ESRD [facility])  On dialysis following dr. 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 Plan on [address] then      Patient understands plan of care. Patient has provided input and agrees with goals.

## 2021-03-01 NOTE — PROGRESS NOTES
Nicolas Lopez presents today for a 2 week follow-up of his blood pressure after his carvedilol was increased to 25mg BID. He has tolerated the increase and states that he is feeling well. He states that they changed something in his dialysis treatment and his weight is down 12 pounds since his last visit. He also states that he has been having some trouble with reflux and he has not been eating as much as he normally eats. He has been taking Prilosec OTC which he states has helped with the \"heart burn/reflux. \" He reports compliance with his medications since his initial post-hospital follow-up with me a few weeks ago. Mr. Rachel Muñiz is a 52year old gentleman with a history of cardiomyopathy (diagnosed in Jan. 2012), pulmonary hypertension, hypertension, diabetes, iron deficiency anemia, and small bowel obstruction requiring a small bowel resection (1/26/12), dyslipidemia, chronic diastolic heart failure, and CAD (s/p stent in 2012). He was lost to follow-up with other healthcare providers and had previously been non-compliant with medications. His last visit with Dr. Wali Tracey was in March 2020. He was hospitalized from 12/5/18 through 12/14/18. He presented with complaints of weakness, fatigue, dyspnea on exertion. He was noted to be in AFIB with RVR, and had strep pneumonia and bacteremia (followed by ID), and superficial acute occlusive left upper extremity thrombophlebitis (no DVT). He was treated with IV antibiotics. Initially, his heart rate was difficult to control but prior to discharge, improved with oral cardizem being taken 3 times a day. He has ESRD but was not being dialyzed yet prior to admission. He was s/p AV fistula placement but was lost to follow-up. Hemodialysis was initiated during this hospital stay. An echo was done and it showed an EF of 40%, moderate concentric LVH, LV global hypokinesis, and PASP of 50 mmHg.      He saw Dr. Wali Tracey for his routine follow-up on August 13, 2019 and during that visit, his diltiazem was discontinued due to complaints of low blood pressures postdialysis with symptomatic dizziness. He underwent cardiac catheterization on August 21, 2019 as part of his work-up for possible kidney transplant. The cardiac catheterization showed that his prior proximal LAD stent is patent with 35% stenosis/calcification. No interventions were performed. He presented to the hospital on 1/22/21 with generalized weakness and malaise. He was tachycardic on arrival to the ED and was noted to be in rapid atrial fibrillation with a heart rate in the 150s. He apparently missed his dialysis treatments for about a week and he was also not taking his carvedilol as prescribed. He was diagnosed with hyperkalemia and Afib with RVR. He underwent urgent hemodialysis due to his hyperkalemia. His potassium level was 8.6, , and creatinine 21.8. He was also diagnosed with a non-STEMI felt to be due to demand ischemia from the AFib. His initial troponin was 0.90 and it peaked at 4.95. His potassium level normalized after undergoing hemodialysis. For his AFib, he was initially started on an amiodarone drip and heparin but was then transitioned to carvedilol and Eliquis. An echo was done and it showed an EF of 55-60%, PASP was 22 mmHg. He states that he had indigestion and was not feeling well for several days and that is why he missed his dialysis treatments. Denies chest pain, tightness, heaviness, and palpitations. Denies shortness of breath at rest, dyspnea on exertion, orthopnea and PND. Denies abdominal bloating. Denies lightheadedness, dizziness, and syncope. Denies lower extremity edema and denies claudication. Denies nausea, vomiting, diarrhea, melena, hematochezia. Admits to indigestion/reflux. Denies hematuria, urgency, frequency. Denies fever, chills. He undergoes hemodialysis on Tues-Thurs-Sat.         Past Medical History:   Diagnosis Date    Abnormal nuclear cardiac imaging test 10/08/2012    Fixed anteroseptal, anteroapical defect c/w prior infarction. No ischemia. Mid & distal anteroseptal, anteroapical WMA. LVE. EF 44%.  Anemia     dr. Wallace Newer doubt amyloid or multiple myeloma. candidate for procirt if Hg <10    Benign hypertensive     Blindness of right eye 01/2013    legally blind    CAD (coronary artery disease)     Cardiomyopathy ejection fraction of 40%     CKD (chronic kidney disease), stage IV ([address]     to see Dr. Tegan Shaw 12/1/12    [facility]     Diabetes mellitus ([address]     Diabetic retinopathy [facility]     Diabetic retinopathy ([facility]     Dr. Yenny Ponce- injections    Heart failure ([facility]     History of echocardiogram 04/22/2014    LVE. EF 55% (prev 40-45% on echo of 1/27/12). No WMA. Mild-mod conc LVH. Gr 3 DDfx. Marked LAE. Mild SHANTEL. Mild MR.    Hypertension     Pulmonary hypertension ([address]     Renal Ultrasound 1/3/12    Right kidney isoechoic w/respect to liver. Sm left-sided pleural effusion    S/P cardiac cath 10/16/2012    LM patent. pLAD 95% (3.5 x 12-mm [place] stent, resid 0$%). LCX mild. Past Surgical History:   Procedure Laterality Date    HX CORONARY STENT PLACEMENT      one    HX LAPAROTOMY  2/2012    bowel obstruction with removal segment of small bowel. Done by Riblet.  HX LAPAROTOMY  infancy    whole in colon and had repair at that time.     HX OTHER SURGICAL  06/20/2013    left eye retna attatchment    HX RETINAL DETACHMENT REPAIR      august 2012    MD REPAIR ING HERNIA,5+Y/O,REDUCIBL Left 05/02/2019    Dr. Tim Bullock     Family History   Problem Relation Age of Onset    Diabetes Mother     Hypertension Mother     Kidney Disease Mother     High Cholesterol Father     Diabetes Brother         pre diabetic     Social History     Tobacco Use    Smoking status: Never Smoker    Smokeless tobacco: Never Used   Substance Use Topics    Alcohol use: Not Currently Alcohol/week: 4.2 standard drinks     Types: 5 Cans of beer per week    Drug use: No       Medications:  Current Outpatient Medications   Medication Sig    omeprazole (PRILOSEC) 20 mg capsule Take 1 Cap by mouth daily.  apixaban (ELIQUIS) 2.5 mg tablet Take 1 Tab by mouth every twelve (12) hours.  carvediloL (COREG) 25 mg tablet Take 1 Tab by mouth two (2) times daily (with meals).  aluminum-magnesium hydroxide (MAALOX) 200-200 mg/5 mL suspension Take 15 mL by mouth four (4) times daily as needed for Indigestion.  tadalafiL (Cialis) 20 mg tablet Take 1 Tab by mouth as needed for Erectile Dysfunction.  lisinopril (PRINIVIL, ZESTRIL) 10 mg tablet Take 1 Tab by mouth two (2) times a day.  glimepiride (AMARYL) 1 mg tablet Take 1 Tab by mouth Daily (before breakfast).  Blood-Glucose Meter monitoring kit Check fasting glucose    glucose blood VI test strips (ACCU-CHEK ZAC PLUS TEST STRP) strip Use as directed    sucroferric oxyhydroxide (VELPHORO) 500 mg chew chewable tablet Take 500 mg by mouth three (3) times daily (with meals).  aspirin delayed-release 81 mg tablet Take 81 mg by mouth daily. No current facility-administered medications for this visit. No Known Allergies      Physical:  Visit Vitals  /80 (BP 1 Location: Left upper arm, BP Patient Position: Sitting, BP Cuff Size: Adult)   Pulse 83   Ht 5' 11\" (1.803 m)   Wt 92.1 kg (203 lb)   SpO2 99%   BMI 28.31 kg/m²     His weight is down 12 pounds since his last appointment      Exam:  Neck:  Supple, no JVD, no carotid bruits  CV:  Normal S1 and  S2, no murmurs, rubs, or gallops noted  Lungs:  Clear to ausculation throughout, no wheezes or rales  Abd:  Soft, non-tender, non-distended with good bowel sounds. No hepatosplenomegaly. Extremities:  No lower extremity edema.       EKG:  Not done today      LABS:  Lab Results   Component Value Date/Time    Sodium 136 01/24/2021 02:56 AM    Potassium 3.7 01/24/2021 02:56 AM Chloride 99 (L) 01/24/2021 02:56 AM    CO2 29 01/24/2021 02:56 AM    Glucose 87 01/24/2021 02:56 AM    BUN 33 (H) 01/24/2021 02:56 AM    Creatinine 8.35 (H) 01/24/2021 02:56 AM     Lab Results   Component Value Date/Time    Cholesterol, total 138 02/09/2019 11:20 AM    HDL Cholesterol 38 (L) 02/09/2019 11:20 AM    LDL, calculated 85 02/09/2019 11:20 AM    Triglyceride 76 02/09/2019 11:20 AM    CHOL/HDL Ratio 4.4 10/17/2012 06:05 AM     No components found for: GPT     Impression/Plan:  1. Nonischemic cardiomyopathy, EF 55-60% (echo Jan. 2021)  2. Chronic diastolic CHF, appears compensated  3. Essential hypertension, blood pressure now well controlled  4. Diabetes mellitus, recommend Hgb A1c less than 7% from cardiac standpoint  5. ESRD, on hemodialysis  6. Anemia, followed by PCP and nephrology  7. Dyslipidemia, currently not taking a statin  8. History of medical noncompliance  9. Paroxysmal AFIB, currently back in sinus rhythm and anticoagulated with Eliquis    Mr. Sunny Canela presents today for follow-up after his carvedilol was increased to 25mg BID for continued elevated and suboptimally controlled blood pressure. His blood pressure is well-controlled today and reports compliance with his medications since his initial post-hospital follow-up with me a few weeks ago. With each visit, compliance with his medications was reinforced and highly encouraged. Since that time, his blood pressures have improved with up titration of his carvedilol. He is now on carvedilol 25mg twice a day. He denies chest pain, shortness of breath, and palpitations. No further adjustments were made to his medications at this time. Positive reinforcement was given for compliance with his medication regimen. His weight is down 12 pounds since his last appointment but he reports that they changed something with his dialysis.       He reports that he has been having some trouble with reflux/\"heart burn\"/indigestion since last Wednesday. His appetite has been diminished since that time. He is taking Prilosec OTC which he states has helped. He reminded me that he may be having a colonoscopy done in the near future. It was postponed while we were trying to get his blood pressure better controlled. He was advised to report his reflux/heart burn to the GI specialist who will be doing the colonoscopy as well as his PCP. He states that at times, it feels like food gets stuck in his chest and he reports indigestion that is relieved by taking Prilosec. He was advised to check with nephrology about the use of Maalox/Pepto-Bismol. He will follow-up with Dr Buffy Covarrubias as scheduled and as needed. Maris James MSN, FNP-BC    Please note:  Portions of this chart were created with Dragon medical speech to text program.  Unrecognized errors may be present.

## 2021-03-08 ENCOUNTER — OFFICE VISIT (OUTPATIENT)
Dept: CARDIOLOGY CLINIC | Age: 51
End: 2021-03-08
Payer: COMMERCIAL

## 2021-03-08 VITALS
HEART RATE: 83 BPM | WEIGHT: 203 LBS | HEIGHT: 71 IN | BODY MASS INDEX: 28.42 KG/M2 | SYSTOLIC BLOOD PRESSURE: 122 MMHG | DIASTOLIC BLOOD PRESSURE: 80 MMHG | OXYGEN SATURATION: 99 %

## 2021-03-08 DIAGNOSIS — I42.9 CARDIOMYOPATHY, UNSPECIFIED TYPE (HCC): ICD-10-CM

## 2021-03-08 DIAGNOSIS — I10 ESSENTIAL HYPERTENSION: Primary | ICD-10-CM

## 2021-03-08 DIAGNOSIS — E78.5 DYSLIPIDEMIA: ICD-10-CM

## 2021-03-08 DIAGNOSIS — I25.10 CORONARY ARTERY DISEASE INVOLVING NATIVE CORONARY ARTERY OF NATIVE HEART WITHOUT ANGINA PECTORIS: ICD-10-CM

## 2021-03-08 PROCEDURE — G8432 DEP SCR NOT DOC, RNG: HCPCS | Performed by: NURSE PRACTITIONER

## 2021-03-08 PROCEDURE — G9231 DOC ESRD DIA TRANS PREG: HCPCS | Performed by: NURSE PRACTITIONER

## 2021-03-08 PROCEDURE — G8427 DOCREV CUR MEDS BY ELIG CLIN: HCPCS | Performed by: NURSE PRACTITIONER

## 2021-03-08 PROCEDURE — 99213 OFFICE O/P EST LOW 20 MIN: CPT | Performed by: NURSE PRACTITIONER

## 2021-03-08 PROCEDURE — G8417 CALC BMI ABV UP PARAM F/U: HCPCS | Performed by: NURSE PRACTITIONER

## 2021-03-08 RX ORDER — OMEPRAZOLE 20 MG/1
20 CAPSULE, DELAYED RELEASE ORAL DAILY
Qty: 1 CAP | Refills: 0
Start: 2021-03-08 | End: 2021-04-22

## 2021-03-08 NOTE — PROGRESS NOTES
Hedy Isidro presents today for   Chief Complaint   Patient presents with    Follow-up     2 week follow up        Hedy Isidro preferred language for health care discussion is english/other. Is someone accompanying this pt? no    Is the patient using any DME equipment during [address]? yes    Depression Screening:  3 most recent PHQ Screens 3/8/2021   Little interest or pleasure in doing things Not at all   Feeling down, depressed, irritable, or hopeless Not at all   Total Score PHQ 2 0       Learning Assessment:  Learning Assessment 3/17/2020   PRIMARY LEARNER Patient   HIGHEST LEVEL OF EDUCATION - PRIMARY LEARNER  -   BARRIERS PRIMARY LEARNER -   [address] -    NEED -   LEARNER [address] -   ANSWERED BY Patient   RELATIONSHIP SELF       Abuse Screening:  Abuse Screening Questionnaire 3/8/2021   Do you ever feel afraid of your partner? N   Are you in a relationship with someone who physically or mentally threatens you? N   Is it safe for you to go home? Y       Fall Risk  Fall Risk Assessment, last 12 mths 3/8/2021   Able to walk? Yes   Fall in past 12 months? 0   Do you feel unsteady? 0   Are you worried about falling 0       Pt currently taking Anticoagulant therapy? no    Coordination of Care:  1. Have you been to the ER, urgent care clinic since your last visit? Hospitalized since your last visit? no    2. Have you seen or consulted any other health care providers outside of the [address] [place] [address] since your last visit? Include any pap smears or colon screening.  no

## 2021-03-08 NOTE — PATIENT INSTRUCTIONS
Continue present medication regimen  Blood pressure now much better controlled  Follow-up with Dr Kingsley Jorge as scheduled and as needed

## 2021-03-22 ENCOUNTER — HOSPITAL ENCOUNTER (OUTPATIENT)
Dept: LAB | Age: 51
Discharge: HOME | End: 2021-03-22

## 2021-03-22 LAB — XX-LABCORP SPECIMEN COL,LCBCF: NORMAL

## 2021-03-22 PROCEDURE — 99001 SPECIMEN HANDLING PT-LAB: CPT

## 2021-03-23 LAB
ALBUMIN SERPL-MCNC: 4.3 G/DL (ref 4–5)
ALBUMIN/GLOB SERPL: 1.4 {RATIO} (ref 1.2–2.2)
ALP SERPL-CCNC: 91 IU/L (ref 39–117)
ALT SERPL-CCNC: 7 IU/L (ref 0–44)
AST SERPL-CCNC: 8 IU/L (ref 0–40)
BILIRUB SERPL-MCNC: 0.2 MG/DL (ref 0–1.2)
BUN SERPL-MCNC: 52 MG/DL (ref 6–24)
BUN/CREAT SERPL: 4 (ref 9–20)
CALCIUM SERPL-MCNC: 9.8 MG/DL (ref 8.7–10.2)
CHLORIDE SERPL-SCNC: 96 MMOL/L (ref 96–106)
CHOLEST SERPL-MCNC: 168 MG/DL (ref 100–199)
CO2 SERPL-SCNC: 25 MMOL/L (ref 20–29)
CREAT SERPL-MCNC: 11.81 MG/DL (ref 0.76–1.27)
EST. AVERAGE GLUCOSE BLD GHB EST-MCNC: 137 MG/DL
GLOBULIN SER CALC-MCNC: 3.1 G/DL (ref 1.5–4.5)
GLUCOSE SERPL-MCNC: ABNORMAL MG/DL
HBA1C MFR BLD: 6.4 % (ref 4.8–5.6)
HDLC SERPL-MCNC: 32 MG/DL
IMP & REVIEW OF LAB RESULTS: NORMAL
INTERPRETATION: NORMAL
LDLC SERPL CALC-MCNC: 116 MG/DL (ref 0–99)
PDF IMAGE, 910387: NORMAL
POTASSIUM SERPL-SCNC: ABNORMAL MMOL/L
PROT SERPL-MCNC: 7.4 G/DL (ref 6–8.5)
SODIUM SERPL-SCNC: 141 MMOL/L (ref 134–144)
TRIGL SERPL-MCNC: 106 MG/DL (ref 0–149)
VLDLC SERPL CALC-MCNC: 20 MG/DL (ref 5–40)

## 2021-04-01 ENCOUNTER — TELEPHONE (OUTPATIENT)
Dept: FAMILY MEDICINE CLINIC | Age: 51
End: 2021-04-01

## 2021-04-05 NOTE — DISCHARGE INSTRUCTIONS
Discharge Instructions    Patient: Nicolas Lopez MRN: [id number]  CSN: [id number]    YOB: 1970  Age: 52 y.o. Sex: male    DOA: 1/22/2021 LOS:  LOS: 2 days   Discharge Date:      DIET:  Renal Diet    ACTIVITY: Activity as tolerated    ADDITIONAL INFORMATION: If you experience any of the following symptoms but not limited to Fever, chills, nausea, vomiting, diarrhea, change in mentation, falling, bleeding, shortness of breath, chest pain, please call your primary care physician or return to the emergency room if you cannot get hold of your doctor:     FOLLOW UP CARE:  Dr. Paulino Vasquez MD in 5-7 days. Please call and set up an appointment.   Dr. Wali Tracey, cardio in 2 week      Violeta Buchanan MD  1/24/2021 9:24 AM Closure 3 Information: This tab is for additional flaps and grafts above and beyond our usual structured repairs.  Please note if you enter information here it will not currently bill and you will need to add the billing information manually.

## 2021-04-06 ENCOUNTER — TELEPHONE (OUTPATIENT)
Dept: CARDIOLOGY CLINIC | Age: 51
End: 2021-04-06

## 2021-04-06 NOTE — TELEPHONE ENCOUNTER
Patient is cleared for having colonoscopy. He is to hold his Eliquis for 2 days prior to procedure. Will fax letter over to Landon Smith.

## 2021-04-22 ENCOUNTER — OFFICE VISIT (OUTPATIENT)
Dept: FAMILY MEDICINE CLINIC | Age: 51
End: 2021-04-22
Payer: MEDICARE

## 2021-04-22 VITALS
TEMPERATURE: 97.8 F | OXYGEN SATURATION: 99 % | HEART RATE: 92 BPM | WEIGHT: 201.4 LBS | RESPIRATION RATE: 16 BRPM | BODY MASS INDEX: 28.19 KG/M2 | SYSTOLIC BLOOD PRESSURE: 120 MMHG | HEIGHT: 71 IN | DIASTOLIC BLOOD PRESSURE: 70 MMHG

## 2021-04-22 DIAGNOSIS — E11.3413 SEVERE NONPROLIFERATIVE DIABETIC RETINOPATHY OF BOTH EYES WITH MACULAR EDEMA ASSOCIATED WITH TYPE 2 DIABETES MELLITUS (HCC): Primary | ICD-10-CM

## 2021-04-22 DIAGNOSIS — E78.5 DYSLIPIDEMIA: ICD-10-CM

## 2021-04-22 DIAGNOSIS — Z00.00 MEDICARE ANNUAL WELLNESS VISIT, SUBSEQUENT: ICD-10-CM

## 2021-04-22 DIAGNOSIS — I10 ESSENTIAL HYPERTENSION: ICD-10-CM

## 2021-04-22 DIAGNOSIS — H54.8 LEGALLY BLIND: ICD-10-CM

## 2021-04-22 DIAGNOSIS — I48.91 ATRIAL FIBRILLATION WITH RAPID VENTRICULAR RESPONSE (HCC): ICD-10-CM

## 2021-04-22 DIAGNOSIS — I25.10 CORONARY ARTERY DISEASE INVOLVING NATIVE CORONARY ARTERY OF NATIVE HEART WITHOUT ANGINA PECTORIS: ICD-10-CM

## 2021-04-22 DIAGNOSIS — N18.6 ESRD (END STAGE RENAL DISEASE) (HCC): ICD-10-CM

## 2021-04-22 DIAGNOSIS — D63.8 ANEMIA OF CHRONIC DISEASE: ICD-10-CM

## 2021-04-22 PROCEDURE — G8510 SCR DEP NEG, NO PLAN REQD: HCPCS | Performed by: FAMILY MEDICINE

## 2021-04-22 PROCEDURE — G0463 HOSPITAL OUTPT CLINIC VISIT: HCPCS | Performed by: FAMILY MEDICINE

## 2021-04-22 PROCEDURE — G8417 CALC BMI ABV UP PARAM F/U: HCPCS | Performed by: FAMILY MEDICINE

## 2021-04-22 PROCEDURE — 2022F DILAT RTA XM EVC RTNOPTHY: CPT | Performed by: FAMILY MEDICINE

## 2021-04-22 PROCEDURE — 99214 OFFICE O/P EST MOD 30 MIN: CPT | Performed by: FAMILY MEDICINE

## 2021-04-22 PROCEDURE — 3044F HG A1C LEVEL LT 7.0%: CPT | Performed by: FAMILY MEDICINE

## 2021-04-22 PROCEDURE — G8427 DOCREV CUR MEDS BY ELIG CLIN: HCPCS | Performed by: FAMILY MEDICINE

## 2021-04-22 PROCEDURE — G0439 PPPS, SUBSEQ VISIT: HCPCS | Performed by: FAMILY MEDICINE

## 2021-04-22 PROCEDURE — G9231 DOC ESRD DIA TRANS PREG: HCPCS | Performed by: FAMILY MEDICINE

## 2021-04-22 NOTE — PROGRESS NOTES
1. Have you been to the ER, urgent care clinic since your last visit? Hospitalized since your last visit? No    2. Have you seen or consulted any other health care providers outside of the [address] [place] [address] since your last visit? Include any pap smears or colon screening. [facility] LOV: 4/22/21.     Chief Complaint   Patient presents with   [facility] Wellness Visit

## 2021-04-22 NOTE — PATIENT INSTRUCTIONS
A Healthy Heart: Care Instructions  Your Care Instructions     Coronary artery disease, also called heart disease, occurs when a substance called plaque builds up in the vessels that supply oxygen-rich blood to your heart muscle. This can narrow the blood vessels and reduce blood flow. A heart attack happens when blood flow is completely blocked. A high-fat diet, smoking, and other factors increase the risk of heart disease. Your doctor has found that you have a chance of having heart disease. You can do lots of things to keep your heart healthy. It may not be easy, but you can change your diet, exercise more, and quit smoking. These steps really work to lower your chance of heart disease. Follow-up care is a key part of your treatment and safety. Be sure to make and go to all appointments, and call your doctor if you are having problems. It's also a good idea to know your test results and keep a list of the medicines you take. How can you care for yourself at home? Diet    · Use less salt when you cook and eat. This helps lower your blood pressure. Taste food before salting. Add only a little salt when you think you need it. With time, your taste buds will adjust to less salt.     · Eat fewer snack items, fast foods, canned soups, and other high-salt, high-fat, processed foods.     · Read food labels and try to avoid saturated and trans fats. They increase your risk of heart disease by raising cholesterol levels.     · Limit the amount of solid fat-butter, margarine, and shortening-you eat. Use olive, peanut, or canola oil when you cook. Bake, broil, and steam foods instead of frying them.     · Eat a variety of fruit and vegetables every day. Dark green, deep orange, red, or yellow fruits and vegetables are especially good for you. Examples include spinach, carrots, peaches, and berries.     · Foods high in fiber can reduce your cholesterol and provide important vitamins and minerals.  High-fiber foods include whole-grain cereals and breads, oatmeal, beans, brown rice, citrus fruits, and apples.     · Eat lean proteins. Heart-healthy proteins include seafood, lean meats and poultry, eggs, beans, peas, nuts, seeds, and soy products.     · Limit drinks and foods with added sugar. These include candy, desserts, and soda pop. Lifestyle changes    · If your doctor recommends it, get more exercise. Walking is a good choice. Bit by bit, increase the amount you walk every day. Try for at least 30 minutes on most days of the week. You also may want to swim, bike, or do other activities.     · Do not smoke. If you need help quitting, talk to your doctor about stop-smoking programs and medicines. These can increase your chances of quitting for good. Quitting smoking may be the most important step you can take to protect your heart. It is never too late to quit.     · Limit alcohol to 2 drinks a day for men and 1 drink a day for women. Too much alcohol can cause health problems.     · Manage other health problems such as diabetes, high blood pressure, and high cholesterol. If you think you may have a problem with alcohol or drug use, talk to your doctor. Medicines    · Take your medicines exactly as prescribed. Call your doctor if you think you are having a problem with your medicine.     · If your doctor recommends aspirin, take the amount directed each day. Make sure you take aspirin and not another kind of pain reliever, such as acetaminophen (Tylenol). When should you call for help? Call 911 if you have symptoms of a heart attack. These may include:    · Chest pain or pressure, or a strange feeling in the chest.     · Sweating.     · Shortness of breath.     · Pain, pressure, or a strange feeling in the back, neck, jaw, or upper belly or in one or both shoulders or arms.     · Lightheadedness or sudden weakness.     · A fast or irregular heartbeat.    After you call 911, the  may tell you to chew 1 adult-strength or 2 to 4 low-dose aspirin. Wait for an ambulance. Do not try to drive yourself. Watch closely for changes in your health, and be sure to contact your doctor if you have any problems. Where can you learn more? Go to http://www.meyers.com/  Enter F075 in the search box to learn more about \"A Healthy Heart: Care Instructions. \"  Current as of: August 31, 2020               Content Version: 12.8  © 2006-2021 [employer]. Care instructions adapted under license by [employer] (which disclaims liability or warranty for this information). If you have questions about a medical condition or this instruction, always ask your healthcare professional. [place] [address] any warranty or liability for your use of this information. Medicare Wellness Visit, Male    The best way to live healthy is to have a lifestyle where you eat a well-balanced diet, exercise regularly, limit alcohol use, and quit all forms of tobacco/nicotine, if applicable. Regular preventive services are another way to keep healthy. Preventive services (vaccines, screening tests, monitoring & exams) can help personalize your care plan, which helps you manage your own care. Screening tests can find health problems at the earliest stages, when they are easiest to treat. [facility] follows the current, evidence-based guidelines published by the [place] [employer] [place] (USPSTF) when recommending preventive services for our patients. Because we follow these guidelines, sometimes recommendations change over time as research supports it. (For example, a prostate screening blood test is no longer routinely recommended for men with no symptoms). Of course, you and your doctor may decide to screen more often for some diseases, based on your risk and co-morbidities (chronic disease you are already diagnosed with).      Preventive services for you include:  - Medicare offers their members a free annual wellness visit, which is time for you and your primary care provider to discuss and plan for your preventive service needs. Take advantage of this benefit every year!  -All adults over age 72 should receive the recommended pneumonia vaccines. Current USPSTF guidelines recommend a series of two vaccines for the best pneumonia protection.   -All adults should have a flu vaccine yearly and tetanus vaccine every 10 years.  -All adults age 48 and older should receive the shingles vaccines (series of two vaccines). -All adults age 38-68 who are overweight should have a diabetes screening test once every three years.   -Other screening tests & preventive services for persons with diabetes include: an eye exam to screen for diabetic retinopathy, a kidney function test, a foot exam, and stricter control over your cholesterol.   -Cardiovascular screening for adults with routine risk involves an electrocardiogram (ECG) at intervals determined by the provider.   -Colorectal cancer screening should be done for adults age 54-65 with no increased risk factors for colorectal cancer. There are a number of acceptable methods of screening for this type of cancer. Each test has its own benefits and drawbacks. Discuss with your provider what is most appropriate for you during your annual wellness visit. The different tests include: colonoscopy (considered the best screening method), a fecal occult blood test, a fecal DNA test, and sigmoidoscopy.  -All adults born between [facility] should be screened once for Hepatitis C.  -An Abdominal Aortic Aneurysm (AAA) Screening is recommended for men age 73-68 who has ever smoked in their lifetime.      Here is a list of your current Health Maintenance items (your personalized list of preventive services) with a due date:  Health Maintenance Due   Topic Date Due    Diabetic Foot Care  05/06/2017    Albumin Urine Test  05/06/2017

## 2021-04-22 NOTE — PROGRESS NOTES
This is the Subsequent Medicare Annual Wellness Exam, performed 12 months or more after the Initial AWV or the last Subsequent AWV    I have reviewed the patient's medical history in detail and updated the computerized patient record. Assessment/Plan   Education and counseling provided:  Pneumococcal Vaccine PCV 23 completed  Influenza Vaccine- annually  Colorectal cancer screening tests- scheduled for May 10  Cardiovascular screening blood test- 3/2021      Depression Risk Factor Screening     3 most recent PHQ Screens 4/22/2021   Little interest or pleasure in doing things Not at all   Feeling down, depressed, irritable, or hopeless Not at all   Total Score PHQ 2 0       Alcohol Risk Screen    Do you average more than 2 drinks per night or 14 drinks a week: No    On any one occasion in the past three months have you have had more than 4 drinks containing alcohol:  No        Functional Ability and Level of Safety    Hearing: Hearing is good. Activities of Daily Living: The home contains: no safety equipment. Patient needs help with:  transportation, housework and managing medications      Ambulation: with no difficulty     Fall Risk:  Fall Risk Assessment, last 12 mths 3/8/2021   Able to walk? Yes   Fall in past 12 months? 0   Do you feel unsteady?  0   Are you worried about falling 0      Abuse Screen:  Patient is not abused       Cognitive Screening    Has your family/caregiver stated any concerns about your memory: no      Health Maintenance Due     Health Maintenance Due   Topic Date Due    Foot Exam Q1  05/06/2017    MICROALBUMIN Q1  05/06/2017       Patient Care Team   Patient Care Team:  Romy Monahan MD as PCP - General (Family Medicine)  Romy Monahan MD as PCP - [facility] Empaneled Provider  Klever Monet MD as Consulting Provider (Hematology and Oncology)  Ranjana Stallworth MD as Physician (Cardiology)  Renato Julio MD (Inactive) as Consulting Provider (Ophthalmology)  Krysta Lopez MD as Consulting Provider (Nephrology)  Mitch Dalton MD (General Surgery)  Nikkie Mitchell MD (Otolaryngology)  Rebecca Jara MD (Gastroenterology)  GENARO Navarro MD (Vascular Surgery)  Chelly Reynolds MD (Vascular Surgery)  Davina William MD as Physician (Surgery)  Rahul Edge. Sia Watts MD (Transplant Surgery)  Milan Orr MD (Cardiology)  Meme Yoo MD (Nephrology)    History     Patient Active Problem List   Diagnosis Code    Benign hypertensive  I11.9    CRI (chronic renal insufficiency) N18.9    Diabetes mellitus ([address] E11.9    Cardiomyopathy ([address] I42.9    Iron deficiency anemia D50.9    CAD (coronary artery disease) I25.10    Legally blind H54.8    Diabetic retinopathy ([address] E11.319    Severe nonproliferative diabetic retinopathy with macular edema, associated with type 2 diabetes mellitus E11.3419    Dyslipidemia E78.5    Atrial fibrillation with rapid ventricular response (HCC) I48.91    Type 2 diabetes with nephropathy ([address] E11.21    Anemia of chronic disease D63.8    Hypercalcemia E83.52    Hyperkalemia E87.5    ESRD (end stage renal disease) ([address] N18.6    NSTEMI (non-ST elevated myocardial infarction) ([address] I21.4    Atrial fibrillation with RVR (HCC) I48.91     Past Medical History:   Diagnosis Date    Abnormal nuclear cardiac imaging test 10/08/2012    Fixed anteroseptal, anteroapical defect c/w prior infarction. No ischemia. Mid & distal anteroseptal, anteroapical WMA. LVE. EF 44%.  Anemia     dr. Beulah Boast doubt amyloid or multiple myeloma.  candidate for procirt if Hg <10    Benign hypertensive     Blindness of right eye 01/2013    legally blind    CAD (coronary artery disease)     Cardiomyopathy ejection fraction of 40%     CKD (chronic kidney disease), stage IV ([address]     to see Dr. Becca Quan 12/1/12    Congestive heart failure ([address]     Diabetes mellitus ([address]     Diabetic retinopathy [facility])     Diabetic retinopathy ([facility]     Dr. Daphne Smith- injections    Heart failure [facility])     History of echocardiogram 04/22/2014    LVE. EF 55% (prev 40-45% on echo of 1/27/12). No WMA. Mild-mod conc LVH. Gr 3 DDfx. Marked LAE. Mild SHANTEL. Mild MR.    Hypertension     Pulmonary hypertension ([facility]     Renal Ultrasound 1/3/12    Right kidney isoechoic w/respect to liver. Sm left-sided pleural effusion    S/P cardiac cath 10/16/2012    LM patent. pLAD 95% (3.5 x 12-mm [place] stent, resid 0$%). LCX mild. Past Surgical History:   Procedure Laterality Date    HX CORONARY STENT PLACEMENT      one    HX LAPAROTOMY  2/2012    bowel obstruction with removal segment of small bowel. Done by Riblet.  HX LAPAROTOMY  infancy    whole in colon and had repair at that time.  HX OTHER SURGICAL  06/20/2013    left eye retna attatchment    HX RETINAL DETACHMENT REPAIR      august 2012    ND REPAIR ING HERNIA,5+Y/O,REDUCIBL Left 05/02/2019    Dr. Rosi Juarez     Current Outpatient Medications   Medication Sig Dispense Refill    apixaban (ELIQUIS) 2.5 mg tablet Take 1 Tab by mouth every twelve (12) hours. 180 Tab 3    carvediloL (COREG) 25 mg tablet Take 1 Tab by mouth two (2) times daily (with meals). (Patient taking differently: Take 12.5 mg by mouth two (2) times daily (with meals). ) 180 Tab 3    aluminum-magnesium hydroxide (MAALOX) 200-200 mg/5 mL suspension Take 15 mL by mouth four (4) times daily as needed for Indigestion. 150 mL 0    tadalafiL (Cialis) 20 mg tablet Take 1 Tab by mouth as needed for Erectile Dysfunction. 10 Tab 3    lisinopril (PRINIVIL, ZESTRIL) 10 mg tablet Take 1 Tab by mouth two (2) times a day. (Patient taking differently: Take 10 mg by mouth daily.) 60 Tab 6    glimepiride (AMARYL) 1 mg tablet Take 1 Tab by mouth Daily (before breakfast).  90 Tab 0    Blood-Glucose Meter monitoring kit Check fasting glucose 1 Kit 0    glucose blood VI test strips (ACCU-CHEK ZAC PLUS TEST STRP) strip Use as directed 100 Strip 2    sucroferric oxyhydroxide (VELPHORO) 500 mg chew chewable tablet Take 500 mg by mouth three (3) times daily (with meals).  aspirin delayed-release 81 mg tablet Take 81 mg by mouth daily. No Known Allergies    Family History   Problem Relation Age of Onset    Diabetes Mother     Hypertension Mother     Kidney Disease Mother     High Cholesterol Father     Diabetes Brother         pre diabetic     Social History     Tobacco Use    Smoking status: Never Smoker    Smokeless tobacco: Never Used   Substance Use Topics    Alcohol use: Not Currently     Alcohol/week: 4.2 standard drinks     Types: 5 Cans of beer per week         Lala Holly MD       Lorena Cooney, 57 y.o.,  male    SUBJECTIVE  Ff-up    ESRD-ongoing HD 3 x a week. Patient reports recent adjustment of BP meds. See list below coreg 12. 5 mg bid, lisinopril 10 mg OD    Also with anemia of chronic disease and HTN, following nephrology   He is undergoing kidney transplant evaluation, cardiac cath 8/19 showing proximal LAD stent  Patent 35% stenosis    CAD/Afib- no chest pains, palpitations, leg edema. NIDDM- w/ retinopathy, legally blind. Says 's.  Denies hypoglycemia Reviewed labs a1c 6.4  Lives with wife, who assists with advance ADLs    ROS:  See HPI, all others negative        Patient Active Problem List   Diagnosis Code    Benign hypertensive  I11.9    CRI (chronic renal insufficiency) N18.9    Diabetes mellitus ([address] E11.9    Cardiomyopathy ([address] I42.9    Iron deficiency anemia D50.9    CAD (coronary artery disease) I25.10    Legally blind H54.8    Diabetic retinopathy ([facility] E11.319    Severe nonproliferative diabetic retinopathy with macular edema, associated with type 2 diabetes mellitus E11.3419    Dyslipidemia E78.5    Atrial fibrillation with rapid ventricular response (HCC) I48.91    Type 2 diabetes with nephropathy (HCC) E11.21       Current Outpatient Medications:     apixaban (ELIQUIS) 2.5 mg tablet, Take 1 Tab by mouth every twelve (12) hours. , Disp: 180 Tab, Rfl: 3    carvediloL (COREG) 25 mg tablet, Take 1 Tab by mouth two (2) times daily (with meals). (Patient taking differently: Take 12.5 mg by mouth two (2) times daily (with meals). ), Disp: 180 Tab, Rfl: 3    aluminum-magnesium hydroxide (MAALOX) 200-200 mg/5 mL suspension, Take 15 mL by mouth four (4) times daily as needed for Indigestion. , Disp: 150 mL, Rfl: 0    tadalafiL (Cialis) 20 mg tablet, Take 1 Tab by mouth as needed for Erectile Dysfunction. , Disp: 10 Tab, Rfl: 3    lisinopril (PRINIVIL, ZESTRIL) 10 mg tablet, Take 1 Tab by mouth two (2) times a day. (Patient taking differently: Take 10 mg by mouth daily.), Disp: 60 Tab, Rfl: 6    glimepiride (AMARYL) 1 mg tablet, Take 1 Tab by mouth Daily (before breakfast). , Disp: 90 Tab, Rfl: 0    Blood-Glucose Meter monitoring kit, Check fasting glucose, Disp: 1 Kit, Rfl: 0    glucose blood VI test strips (ACCU-CHEK ZAC PLUS TEST STRP) strip, Use as directed, Disp: 100 Strip, Rfl: 2    sucroferric oxyhydroxide (VELPHORO) 500 mg chew chewable tablet, Take 500 mg by mouth three (3) times daily (with meals). , Disp: , Rfl:     aspirin delayed-release 81 mg tablet, Take 81 mg by mouth daily. , Disp: , Rfl:   No Known Allergies    Past Medical History:   Diagnosis Date    Abnormal nuclear cardiac imaging test 10/08/2012    Fixed anteroseptal, anteroapical defect c/w prior infarction. No ischemia. Mid & distal anteroseptal, anteroapical WMA. LVE. EF 44%.  Anemia     dr. Davis Abts doubt amyloid or multiple myeloma.  candidate for procirt if Hg <10    Benign hypertensive     Blindness of right eye 01/2013    legally blind    CAD (coronary artery disease)     Cardiomyopathy ejection fraction of 40%     CKD (chronic kidney disease), stage IV ([facility]     to see Dr. Jennifer Sapp 12/1/12    [facility]     Diabetes mellitus ([facility]     Di[facility]     Diabetic retinopathy ([facility]     Dr. Kennedy Gold- injections    Heart failure [facility])     History of echocardiogram 04/22/2014    LVE. EF 55% (prev 40-45% on echo of 1/27/12). No WMA. Mild-mod conc LVH. Gr 3 DDfx. Marked LAE. Mild SHANTEL. Mild MR.    Hypertension     Pulmonary hypertension ([facility]     Renal Ultrasound 1/3/12    Right kidney isoechoic w/respect to liver. Sm left-sided pleural effusion    S/P cardiac cath 10/16/2012    LM patent. pLAD 95% (3.5 x 12-mm [place] stent, resid 0$%). LCX mild. Social History     Socioeconomic History    Marital status:      Spouse name: Not on file    Number of children: Not on file    Years of education: Not on file    Highest education level: Not on file   Social Needs    Financial resource strain: Not on file    Food insecurity - worry: Not on file    Food insecurity - inability: Not on file    Transportation needs - medical: Not on file   [facility] needs - non-medical: Not on file   Occupational History    Not on file   Tobacco Use    Smoking status: Never Smoker    Smokeless tobacco: Never Used   Substance and Sexual Activity    Alcohol use:  Yes     Alcohol/week: 2.5 oz     Types: 5 Cans of beer per week     Comment: occassionally    Drug use: No    Sexual activity: Yes     Partners: Female     Birth control/protection: None   Other Topics Concern    Not on file   Social History Narrative    Not on file       Family History   Problem Relation Age of Onset    Diabetes Mother     Hypertension Mother     Kidney Disease Mother     High Cholesterol Father     Diabetes Brother         pre diabetic         OBJECTIVE    Physical Exam:     Visit Vitals  /70 (BP 1 Location: Left upper arm, BP Patient Position: Sitting, BP Cuff Size: Adult)   Pulse 92   Temp 97.8 °F (36.6 °C) (Temporal)   Resp 16   Ht 5' 11\" (1.803 m)   Wt 201 lb 6.4 oz (91.4 kg)   SpO2 99%   BMI 28.09 kg/m²         General: alert, chronically ill-appearing,  in no apparent distress or pain  CVS: normal rate, regular rhythm, distinct S1 and S2  Lungs:clear to ausculation bilaterally, no crackles, wheezing or rhonchi noted  Extremities: no edema, no cyanosis, MSK grossly normal  Skin: warm, no lesions, rashes noted  Psych:  mood and affect normal    Results for orders placed or performed during the hospital encounter of 03/22/21   [facility] SPECIMEN COL   Result Value Ref Range    [facility] SPEC[facility]LL[facility]. Specimens collected/sent to [facility]. Please direct inquiries to ([phone]). ASSESSMENT/PLAN  Diagnoses and all orders for this visit:    ESRD ([facility]  On dialysis following nephrology  On transplant list    Severe nonproliferative diabetic retinopathy of both eyes with macular edema associated with type 2 diabetes mellitus ([address]  a1c 6.4  Cont amaryl  Cont BG monitoring, ADA Diet  Pursue eye doctor ff-ups  Check a1c, cmp in 6 months    Dyslipidemia  esrd with HD  Will request guidance from nephrology/cards regarding net benefit of statin use in setting of HD    Coronary artery disease  S/p LAD stent  Asymptomatic   On asa,  bb, ace  Following cardiology    Essentially hypertension  Controlled  Nephrology following    Legally blind     Atrial fibrillation with rapid ventricular response ([facility]   rate controlled, anticoag on eliquis     Anemia of chronic disease  Following nephrology      Follow-up Disposition:  Return in about 6 months or if symptoms worsen or fail to improve. Patient understands plan of care. Patient has provided input and agrees with goals.

## 2021-04-26 DIAGNOSIS — I25.10 CORONARY ARTERY DISEASE INVOLVING NATIVE CORONARY ARTERY OF NATIVE HEART WITHOUT ANGINA PECTORIS: Primary | ICD-10-CM

## 2021-04-26 RX ORDER — ATORVASTATIN CALCIUM 40 MG/1
40 TABLET, FILM COATED ORAL DAILY
Qty: 90 TAB | Refills: 0 | Status: SHIPPED
Start: 2021-04-26 | End: 2021-12-14

## 2021-04-26 NOTE — PROGRESS NOTES
Reviewed with cardiologist and recommended statin treatment with h/o CAD  Resume lipitor 40 mg, recheck cmp/lipid panel in 3 months  LMOV- will have nurse reach out as well.

## 2021-04-27 NOTE — PROGRESS NOTES
Patient identified with 2 identifiers (name and ). Patient aware to take lipitor 40 mg at bedtime   Dr. Karuna Mcmullen and Dr. Maria A Mckeon agree that patient should be on statin. Patient aware lipitor went to on-site pharmacy.

## 2021-06-15 ENCOUNTER — OFFICE VISIT (OUTPATIENT)
Dept: CARDIOLOGY CLINIC | Age: 51
End: 2021-06-15
Payer: COMMERCIAL

## 2021-06-15 VITALS
SYSTOLIC BLOOD PRESSURE: 180 MMHG | BODY MASS INDEX: 28.14 KG/M2 | DIASTOLIC BLOOD PRESSURE: 82 MMHG | HEIGHT: 71 IN | WEIGHT: 201 LBS | OXYGEN SATURATION: 98 % | HEART RATE: 85 BPM

## 2021-06-15 DIAGNOSIS — I25.10 CORONARY ARTERY DISEASE INVOLVING NATIVE CORONARY ARTERY OF NATIVE HEART WITHOUT ANGINA PECTORIS: Primary | ICD-10-CM

## 2021-06-15 PROCEDURE — 93000 ELECTROCARDIOGRAM COMPLETE: CPT | Performed by: INTERNAL MEDICINE

## 2021-06-15 PROCEDURE — G8417 CALC BMI ABV UP PARAM F/U: HCPCS | Performed by: INTERNAL MEDICINE

## 2021-06-15 PROCEDURE — G8432 DEP SCR NOT DOC, RNG: HCPCS | Performed by: INTERNAL MEDICINE

## 2021-06-15 PROCEDURE — 99214 OFFICE O/P EST MOD 30 MIN: CPT | Performed by: INTERNAL MEDICINE

## 2021-06-15 PROCEDURE — G9231 DOC ESRD DIA TRANS PREG: HCPCS | Performed by: INTERNAL MEDICINE

## 2021-06-15 PROCEDURE — G8427 DOCREV CUR MEDS BY ELIG CLIN: HCPCS | Performed by: INTERNAL MEDICINE

## 2021-06-15 RX ORDER — SODIUM CHLORIDE 0.9 % (FLUSH) 0.9 %
5-40 SYRINGE (ML) INJECTION AS NEEDED
Status: CANCELLED | OUTPATIENT
Start: 2021-06-15

## 2021-06-15 RX ORDER — SODIUM CHLORIDE 0.9 % (FLUSH) 0.9 %
5-40 SYRINGE (ML) INJECTION EVERY 8 HOURS
Status: CANCELLED | OUTPATIENT
Start: 2021-06-15

## 2021-06-15 RX ORDER — GUAIFENESIN 100 MG/5ML
162 LIQUID (ML) ORAL
Status: CANCELLED | OUTPATIENT
Start: 2021-06-15 | End: 2021-06-15

## 2021-06-15 RX ORDER — DEXTROSE MONOHYDRATE AND SODIUM CHLORIDE 5; .45 G/100ML; G/100ML
150 INJECTION, SOLUTION INTRAVENOUS CONTINUOUS
Status: CANCELLED | OUTPATIENT
Start: 2021-06-15 | End: 2021-06-15

## 2021-06-15 RX ORDER — LORAZEPAM 2 MG/ML
1 INJECTION INTRAMUSCULAR ONCE
Status: CANCELLED | OUTPATIENT
Start: 2021-06-15 | End: 2021-06-15

## 2021-06-15 NOTE — LETTER
6/15/2021 3:28 PM 
 
 
[address] 
[id number] 
1970 Insurance:  [facility]                  Auth # _____________________ Proc Date: Mon 6/21                Proc Time:  9:15 Performing MD : Dr. Rolf Bhatti [facility]:  [facility]                                            PCP Dr. Travis Zelaya 
 
Scheduled with:  Virginia Huangg                                                        Date:6/15/2021 HP: 6/15      EKG: ______    Labs:______  CXR: _______  Orders:  6/15 Special Instructions:  _____________________________________________________ 
______________________________________________________________________ 
______________________________________________________________________ Date Faxed:   ______________   Pages Faxed: ___________________ The materials enclosed with this facsimile transmission are private and confidential and are the property of the sender. If you are not the intended recipient, be advised that any unauthorized use, disclosure, copying, distribution, or the taking of any action in reliance on the contents of this telecopied information is strictly prohibited. If you have received this in error, please immediately notify the sender via telephone to arrange for return of the forwarded documentation.

## 2021-06-15 NOTE — PATIENT INSTRUCTIONS
Instructions    Patients Name:  Lorena Cooney    1. You are scheduled to have a Left heart cath on June 21st  at 09:15am Please check in at 08:15am  .     2. Please go to [facility] and park in the outpatient parking lot that is located around to the back of the hospital and enter through the [facility]. Once you enter through the [facility] check in with the  there. The  will either give you directions or assist you in getting to the cath holding area. 3. You are not to eat or drink anything after midnight the night before your procedure. Small sips of water to take your medications is ok. 4. If you are diabetic, do not take your insulin/sugar pill the morning of the procedure. 5. MEDICATION INSTRUCTIONS:   Please take your morning medications with the following special instructions:    [x]          Please make sure to take your Blood pressure medication : Hold your Eliquis for 2 days prior to procedure. 6. We encourage families to wait in the waiting room on the first floor while the procedure is being done. The Doctor will come out and talk with you as soon as the procedure is over. 7. There is the possibility that you may spend the night in the hospital, depending on the results of the procedure. This will be determined after the procedure is done. If angioplasty or stent is planned, you will stay at least one day. 8. If you or your family have any questions, please call our office Monday -Friday, 9:00 a.m.-4:30 p.m.,  At [phone], and ask to speak to one of the nurses.

## 2021-06-15 NOTE — PROGRESS NOTES
Georgia Cordova presents today for No chief complaint on file. Oriana Vanton preferred language for health care discussion is english/other. Is someone accompanying this pt? no    Is the patient using any DME equipment during [address]? no    Depression Screening:  3 most recent PHQ Screens 6/15/2021   Little interest or pleasure in doing things Not at all   Feeling down, depressed, irritable, or hopeless Not at all   Total Score PHQ 2 0       Learning Assessment:  Learning Assessment 4/22/2021   PRIMARY LEARNER Patient   HIGHEST LEVEL OF EDUCATION - PRIMARY LEARNER  4 YEARS [address] [place] [address] CAREGIVER No   CO-LEARNER NAME -   Anel Rizvi [address] -    NEED No   LEARNER PREFERENCE PRIMARY DEMONSTRATION     -   LEARNER [address] No   ANSWERED BY patient   RELATIONSHIP SELF       Abuse Screening:  Abuse Screening Questionnaire 6/15/2021   Do you ever feel afraid of your partner? N   Are you in a relationship with someone who physically or mentally threatens you? N   Is it safe for you to go home? Y       Fall Risk  Fall Risk Assessment, last 12 mths 3/8/2021   Able to walk? Yes   Fall in past 12 months? 0   Do you feel unsteady? 0   Are you worried about falling 0       Pt currently taking Anticoagulant therapy? Eliquis 2.5mg    Coordination of Care:  1. Have you been to the ER, urgent care clinic since your last visit? Hospitalized since your last visit? no    2. Have you seen or consulted any other health care providers outside of the [address] [place] [address] since your last visit? Include any pap smears or colon screening.  no

## 2021-06-15 NOTE — PROGRESS NOTES
HISTORY OF PRESENT ILLNESS  Iker Agee is a 52 y.o. male. ASSESSMENT and PLAN    Mr. Marshall Piedra has history of diabetes mellitus, diabetic retinopathy with legally blind as, coronary artery disease without chest pains, ischemic cardiomyopathy. He presented with significant fatigue, increased shortness of breath. He had LAD stent in October of 2012 by Dr. Anne Maire Garay. He has never had chest pains. He has history of mildly diminished LV function with ejection fraction of 40-50%. He is now on hemodialysis 3 times per week.  His new baseline weight in 2019 is 199 pounds.  Kidney transplant evaluation was performed in 2019. He underwent coronary angiography on 8/21/2019. He underwent left and right selective coronary angiography. He had no significant disease in his RCA or circumflex. He had 35% stenosis in his LAD. For completeness, he had IFR performed which was negative at 0.93-0.95. No further coronary intervention was needed. · CAD:    Clinically stable. · BP:    Well controlled. · Rhythm:    Stable sinus. · CHF:    There is no evidence of decompensated CHF noted. · Weight:     His weight today is 201 pounds. This is near his baseline. · Cholesterol:   Target LDL <70. Lipitor 40. · Anti-platelet:   Remains on ASA. He was last seen in March 2020 and was felt to be low risk for renal transplant. However, there was a delay. Again he is undergoing renal transplant evaluation. They would like repeat coronary angiography. This will be done within the next 2 weeks. If there are no significant changes from previous study, no further coronary evaluation is needed. I will see him back in 6 months. Thank you. Encounter Diagnoses   Name Primary?     Coronary artery disease involving native coronary artery of native heart without angina pectoris Yes     current treatment plan is effective, no change in therapy  lab results and schedule of future lab studies reviewed with patient  reviewed diet, exercise and weight control  cardiovascular risk and specific lipid/LDL goals reviewed  use of aspirin to prevent MI and TIA's discussed      HPI   Today, Mr. Bob Palomino has no complaints of chest pains, increased shortness of breath or decreased exercise capacity. He denies any orthopnea or PND. He denies any palpitations or dizziness. For renal transplantation evaluation, he did undergo coronary angiography in August 2019 and was felt to be low risk from coronary standpoint. Because of unforeseen delays, he will have repeat coronary angiography. All questions were answered. He agrees with the plan. Review of Systems   Respiratory: Negative for shortness of breath. Cardiovascular: Negative for chest pain, palpitations, orthopnea, claudication, leg swelling and PND. All other systems reviewed and are negative. Physical Exam  Vitals and nursing note reviewed. Constitutional:       Appearance: Normal appearance. HENT:      Head: Normocephalic. Eyes:      Conjunctiva/sclera: Conjunctivae normal.   Cardiovascular:      Rate and Rhythm: Normal rate and regular rhythm. Pulmonary:      Breath sounds: Normal breath sounds. Abdominal:      Palpations: Abdomen is soft. Musculoskeletal:         General: No swelling. Cervical back: No rigidity. Skin:     General: Skin is warm and dry. Neurological:      General: No focal deficit present. Mental Status: He is alert and oriented to person, place, and time. Psychiatric:         Mood and Affect: Mood normal.         Behavior: Behavior normal.         PCP: Megan Musa MD    Past Medical History:   Diagnosis Date    Abnormal nuclear cardiac imaging test 10/08/2012    Fixed anteroseptal, anteroapical defect c/w prior infarction. No ischemia. Mid & distal anteroseptal, anteroapical WMA. LVE. EF 44%.  Anemia     dr. Rick Cerda doubt amyloid or multiple myeloma.  candidate for procirt if Hg <10    Benign hypertensive     Blindness of right eye 01/2013    legally blind    CAD (coronary artery disease)     Cardiomyopathy ejection fraction of 40%     CKD (chronic kidney disease), stage IV ([facility]     to see Dr. Shhariar Anne 12/1/12    [facility]     Diabetes mellitus ([facility]     Diabetic retinopathy ([facility]     Diabetic retinopathy ([facility]     Dr. Sean Parrish- injections    Heart failure ([facility]     History of echocardiogram 04/22/2014    LVE. EF 55% (prev 40-45% on echo of 1/27/12). No WMA. Mild-mod conc LVH. Gr 3 DDfx. Marked LAE. Mild SHANTEL. Mild MR.    Hypertension     Pulmonary hypertension ([facility]     Renal Ultrasound 1/3/12    Right kidney isoechoic w/respect to liver. Sm left-sided pleural effusion    S/P cardiac cath 10/16/2012    LM patent. pLAD 95% (3.5 x 12-mm [place] stent, resid 0$%). LCX mild. Past Surgical History:   Procedure Laterality Date    HX CORONARY STENT PLACEMENT      one    HX LAPAROTOMY  2/2012    bowel obstruction with removal segment of small bowel. Done by Riblet.  HX LAPAROTOMY  infancy    whole in colon and had repair at that time.  HX OTHER SURGICAL  06/20/2013    left eye retna attatchment    HX RETINAL DETACHMENT REPAIR      august 2012    AR REPAIR ING HERNIA,5+Y/O,REDUCIBL Left 05/02/2019    Dr. Norbert Keith       Current Outpatient Medications   Medication Sig Dispense Refill    atorvastatin (LIPITOR) 40 mg tablet Take 1 Tab by mouth daily. 90 Tab 0    apixaban (ELIQUIS) 2.5 mg tablet Take 1 Tab by mouth every twelve (12) hours. 180 Tab 3    carvediloL (COREG) 25 mg tablet Take 1 Tab by mouth two (2) times daily (with meals). (Patient taking differently: Take 12.5 mg by mouth two (2) times daily (with meals). ) 180 Tab 3    tadalafiL (Cialis) 20 mg tablet Take 1 Tab by mouth as needed for Erectile Dysfunction. 10 Tab 3    lisinopril (PRINIVIL, ZESTRIL) 10 mg tablet Take 1 Tab by mouth two (2) times a day.  (Patient taking differently: Take 10 mg by mouth daily.) 60 Tab 6    glimepiride (AMARYL) 1 mg tablet Take 1 Tab by mouth Daily (before breakfast). 90 Tab 0    Blood-Glucose Meter monitoring kit Check fasting glucose 1 Kit 0    glucose blood VI test strips (ACCU-CHEK ZAC PLUS TEST STRP) strip Use as directed 100 Strip 2    sucroferric oxyhydroxide (VELPHORO) 500 mg chew chewable tablet Take 500 mg by mouth three (3) times daily (with meals).  aspirin delayed-release 81 mg tablet Take 81 mg by mouth daily. The patient has a family history of    Social History     Tobacco Use    Smoking status: Never Smoker    Smokeless tobacco: Never Used   Substance Use Topics    Alcohol use: Not Currently     Alcohol/week: 4.2 standard drinks     Types: 5 Cans of beer per week    Drug use: No       Lab Results   Component Value Date/Time    Cholesterol, total 168 03/22/2021 12:00 AM    HDL Cholesterol 32 (L) 03/22/2021 12:00 AM    LDL, calculated 116 (H) 03/22/2021 12:00 AM    LDL, calculated 85 02/09/2019 11:20 AM    Triglyceride 106 03/22/2021 12:00 AM    CHOL/HDL Ratio 4.4 10/17/2012 06:05 AM        BP Readings from Last 3 Encounters:   06/15/21 (!) 180/82   04/22/21 120/70   03/08/21 122/80        Pulse Readings from Last 3 Encounters:   06/15/21 85   04/22/21 92   03/08/21 83       Wt Readings from Last 3 Encounters:   06/15/21 91.2 kg (201 lb)   04/22/21 91.4 kg (201 lb 6.4 oz)   03/08/21 92.1 kg (203 lb)         EKG: unchanged from previous tracings, normal sinus rhythm, Q waves in leads V1-V3.

## 2021-06-18 ENCOUNTER — HOSPITAL ENCOUNTER (OUTPATIENT)
Dept: LAB | Age: 51
Discharge: HOME | End: 2021-06-18
Payer: MEDICARE

## 2021-06-18 ENCOUNTER — HOSPITAL ENCOUNTER (OUTPATIENT)
Dept: GENERAL RADIOLOGY | Age: 51
Discharge: HOME | End: 2021-06-18
Payer: MEDICARE

## 2021-06-18 ENCOUNTER — HOSPITAL ENCOUNTER (OUTPATIENT)
Dept: LAB | Age: 51
End: 2021-06-18
Payer: MEDICARE

## 2021-06-18 DIAGNOSIS — I25.10 CORONARY ARTERY DISEASE INVOLVING NATIVE CORONARY ARTERY OF NATIVE HEART WITHOUT ANGINA PECTORIS: ICD-10-CM

## 2021-06-18 LAB
ALBUMIN SERPL-MCNC: 3.4 G/DL (ref 3.4–5)
ALBUMIN/GLOB SERPL: 0.9 {RATIO} (ref 0.8–1.7)
ALP SERPL-CCNC: 143 U/L (ref 45–117)
ALT SERPL-CCNC: 12 U/L (ref 16–61)
ANION GAP SERPL CALC-SCNC: 7 MMOL/L (ref 3–18)
AST SERPL-CCNC: 7 U/L (ref 10–38)
BASOPHILS # BLD: 0.1 K/UL (ref 0–0.1)
BASOPHILS NFR BLD: 1 % (ref 0–2)
BILIRUB SERPL-MCNC: 0.4 MG/DL (ref 0.2–1)
BUN SERPL-MCNC: 27 MG/DL (ref 7–18)
BUN/CREAT SERPL: 3 (ref 12–20)
CALCIUM SERPL-MCNC: 8.3 MG/DL (ref 8.5–10.1)
CHLORIDE SERPL-SCNC: 101 MMOL/L (ref 100–111)
CO2 SERPL-SCNC: 33 MMOL/L (ref 21–32)
CREAT SERPL-MCNC: 8.51 MG/DL (ref 0.6–1.3)
DIFFERENTIAL METHOD BLD: ABNORMAL
EOSINOPHIL # BLD: 0.2 K/UL (ref 0–0.4)
EOSINOPHIL NFR BLD: 4 % (ref 0–5)
ERYTHROCYTE [DISTWIDTH] IN BLOOD BY AUTOMATED COUNT: 14.2 % (ref 11.6–14.5)
GLOBULIN SER CALC-MCNC: 4 G/DL (ref 2–4)
GLUCOSE SERPL-MCNC: 192 MG/DL (ref 74–99)
HCT VFR BLD AUTO: 39.3 % (ref 36–48)
HGB BLD-MCNC: 12.4 G/DL (ref 13–16)
INR PPP: 1.2 (ref 0.8–1.2)
LYMPHOCYTES # BLD: 1.6 K/UL (ref 0.9–3.6)
LYMPHOCYTES NFR BLD: 31 % (ref 21–52)
MCH RBC QN AUTO: 29.7 PG (ref 24–34)
MCHC RBC AUTO-ENTMCNC: 31.6 G/DL (ref 31–37)
MCV RBC AUTO: 94 FL (ref 74–97)
MONOCYTES # BLD: 0.5 K/UL (ref 0.05–1.2)
MONOCYTES NFR BLD: 10 % (ref 3–10)
NEUTS SEG # BLD: 2.8 K/UL (ref 1.8–8)
NEUTS SEG NFR BLD: 54 % (ref 40–73)
PLATELET # BLD AUTO: 208 K/UL (ref 135–420)
PMV BLD AUTO: 10.1 FL (ref 9.2–11.8)
POTASSIUM SERPL-SCNC: 4.5 MMOL/L (ref 3.5–5.5)
PROT SERPL-MCNC: 7.4 G/DL (ref 6.4–8.2)
PROTHROMBIN TIME: 14.6 SEC (ref 11.5–15.2)
RBC # BLD AUTO: 4.18 M/UL (ref 4.35–5.65)
SODIUM SERPL-SCNC: 141 MMOL/L (ref 136–145)
WBC # BLD AUTO: 5.1 K/UL (ref 4.6–13.2)

## 2021-06-18 PROCEDURE — 85025 COMPLETE CBC W/AUTO DIFF WBC: CPT

## 2021-06-18 PROCEDURE — 85610 PROTHROMBIN TIME: CPT

## 2021-06-18 PROCEDURE — 80053 COMPREHEN METABOLIC PANEL: CPT

## 2021-06-18 PROCEDURE — 71046 X-RAY EXAM CHEST 2 VIEWS: CPT

## 2021-06-18 PROCEDURE — 36415 COLL VENOUS BLD VENIPUNCTURE: CPT

## 2021-06-21 ENCOUNTER — HOSPITAL ENCOUNTER (OUTPATIENT)
Age: 51
LOS: 1 days | Discharge: HOME | End: 2021-06-22
Attending: INTERNAL MEDICINE | Admitting: INTERNAL MEDICINE
Payer: COMMERCIAL

## 2021-06-21 DIAGNOSIS — N18.6 END STAGE RENAL DISEASE (HCC): ICD-10-CM

## 2021-06-21 DIAGNOSIS — I25.10 CORONARY ARTERY DISEASE INVOLVING NATIVE CORONARY ARTERY OF NATIVE HEART WITHOUT ANGINA PECTORIS: ICD-10-CM

## 2021-06-21 LAB
ANION GAP SERPL CALC-SCNC: 11 MMOL/L (ref 3–18)
BUN SERPL-MCNC: 58 MG/DL (ref 7–18)
BUN/CREAT SERPL: 4 (ref 12–20)
CALCIUM SERPL-MCNC: 7.7 MG/DL (ref 8.5–10.1)
CHLORIDE SERPL-SCNC: 104 MMOL/L (ref 100–111)
CO2 SERPL-SCNC: 28 MMOL/L (ref 21–32)
CREAT SERPL-MCNC: 15.6 MG/DL (ref 0.6–1.3)
ERYTHROCYTE [DISTWIDTH] IN BLOOD BY AUTOMATED COUNT: 14.3 % (ref 11.6–14.5)
EST. AVERAGE GLUCOSE BLD GHB EST-MCNC: 171 MG/DL
GLUCOSE BLD STRIP.AUTO-MCNC: 132 MG/DL (ref 70–110)
GLUCOSE BLD STRIP.AUTO-MCNC: 184 MG/DL (ref 70–110)
GLUCOSE BLD STRIP.AUTO-MCNC: 44 MG/DL (ref 70–110)
GLUCOSE BLD STRIP.AUTO-MCNC: 48 MG/DL (ref 70–110)
GLUCOSE BLD STRIP.AUTO-MCNC: 92 MG/DL (ref 70–110)
GLUCOSE SERPL-MCNC: 156 MG/DL (ref 74–99)
HBA1C MFR BLD: 7.6 % (ref 4.2–5.6)
HCT VFR BLD AUTO: 39.7 % (ref 36–48)
HGB BLD-MCNC: 12.9 G/DL (ref 13–16)
INR PPP: 1.5 (ref 0.8–1.2)
MAGNESIUM SERPL-MCNC: 2.6 MG/DL (ref 1.6–2.6)
MCH RBC QN AUTO: 30.4 PG (ref 24–34)
MCHC RBC AUTO-ENTMCNC: 32.5 G/DL (ref 31–37)
MCV RBC AUTO: 93.6 FL (ref 74–97)
PLATELET # BLD AUTO: 207 K/UL (ref 135–420)
PMV BLD AUTO: 10 FL (ref 9.2–11.8)
POTASSIUM SERPL-SCNC: 5.2 MMOL/L (ref 3.5–5.5)
PROTHROMBIN TIME: 18.2 SEC (ref 11.5–15.2)
RBC # BLD AUTO: 4.24 M/UL (ref 4.35–5.65)
SODIUM SERPL-SCNC: 143 MMOL/L (ref 136–145)
WBC # BLD AUTO: 5.4 K/UL (ref 4.6–13.2)

## 2021-06-21 PROCEDURE — 85610 PROTHROMBIN TIME: CPT

## 2021-06-21 PROCEDURE — 92928 PRQ TCAT PLMT NTRAC ST 1 LES: CPT | Performed by: INTERNAL MEDICINE

## 2021-06-21 PROCEDURE — 80048 BASIC METABOLIC PNL TOTAL CA: CPT

## 2021-06-21 PROCEDURE — C1769 GUIDE WIRE: HCPCS | Performed by: INTERNAL MEDICINE

## 2021-06-21 PROCEDURE — 93571 IV DOP VEL&/PRESS C FLO 1ST: CPT | Performed by: INTERNAL MEDICINE

## 2021-06-21 PROCEDURE — 36415 COLL VENOUS BLD VENIPUNCTURE: CPT

## 2021-06-21 PROCEDURE — 74011250636 HC RX REV CODE- 250/636: Performed by: INTERNAL MEDICINE

## 2021-06-21 PROCEDURE — 77030013519 HC DEV INFL BASIX MRTM -B: Performed by: INTERNAL MEDICINE

## 2021-06-21 PROCEDURE — 93458 L HRT ARTERY/VENTRICLE ANGIO: CPT | Performed by: INTERNAL MEDICINE

## 2021-06-21 PROCEDURE — 83735 ASSAY OF MAGNESIUM: CPT

## 2021-06-21 PROCEDURE — 74011000250 HC RX REV CODE- 250: Performed by: INTERNAL MEDICINE

## 2021-06-21 PROCEDURE — C1887 CATHETER, GUIDING: HCPCS | Performed by: INTERNAL MEDICINE

## 2021-06-21 PROCEDURE — C1874 STENT, COATED/COV W/DEL SYS: HCPCS | Performed by: INTERNAL MEDICINE

## 2021-06-21 PROCEDURE — 74011250637 HC RX REV CODE- 250/637: Performed by: PHYSICIAN ASSISTANT

## 2021-06-21 PROCEDURE — C1725 CATH, TRANSLUMIN NON-LASER: HCPCS | Performed by: INTERNAL MEDICINE

## 2021-06-21 PROCEDURE — 77030013797 HC KT TRNSDUC PRSSR EDWD -A: Performed by: INTERNAL MEDICINE

## 2021-06-21 PROCEDURE — 74011000258 HC RX REV CODE- 258: Performed by: INTERNAL MEDICINE

## 2021-06-21 PROCEDURE — 99153 MOD SED SAME PHYS/QHP EA: CPT | Performed by: INTERNAL MEDICINE

## 2021-06-21 PROCEDURE — 83036 HEMOGLOBIN GLYCOSYLATED A1C: CPT

## 2021-06-21 PROCEDURE — 82962 GLUCOSE BLOOD TEST: CPT

## 2021-06-21 PROCEDURE — 74011636637 HC RX REV CODE- 636/637: Performed by: INTERNAL MEDICINE

## 2021-06-21 PROCEDURE — 99152 MOD SED SAME PHYS/QHP 5/>YRS: CPT | Performed by: INTERNAL MEDICINE

## 2021-06-21 PROCEDURE — C1760 CLOSURE DEV, VASC: HCPCS | Performed by: INTERNAL MEDICINE

## 2021-06-21 PROCEDURE — 99218 PR INITIAL OBSERVATION CARE/DAY 30 MINUTES: CPT | Performed by: INTERNAL MEDICINE

## 2021-06-21 PROCEDURE — 77030004558 HC CATH ANGI DX SUPR TORQ CARD -A: Performed by: INTERNAL MEDICINE

## 2021-06-21 PROCEDURE — 85027 COMPLETE CBC AUTOMATED: CPT

## 2021-06-21 PROCEDURE — 77030013744: Performed by: INTERNAL MEDICINE

## 2021-06-21 PROCEDURE — 77030012468 HC VLV BLEEDBK CNTRL ABBT -B: Performed by: INTERNAL MEDICINE

## 2021-06-21 PROCEDURE — 74011000636 HC RX REV CODE- 636: Performed by: INTERNAL MEDICINE

## 2021-06-21 PROCEDURE — C1894 INTRO/SHEATH, NON-LASER: HCPCS | Performed by: INTERNAL MEDICINE

## 2021-06-21 PROCEDURE — 74011250637 HC RX REV CODE- 250/637: Performed by: INTERNAL MEDICINE

## 2021-06-21 DEVICE — XIENCE SIERRA™ EVEROLIMUS ELUTING CORONARY STENT SYSTEM 3.25 MM X 12 MM / RAPID-EXCHANGE
Type: IMPLANTABLE DEVICE | Status: FUNCTIONAL
Brand: XIENCE SIERRA™

## 2021-06-21 RX ORDER — ATORVASTATIN CALCIUM 40 MG/1
40 TABLET, FILM COATED ORAL DAILY
Status: DISCONTINUED | OUTPATIENT
Start: 2021-06-22 | End: 2021-06-22 | Stop reason: HOSPADM

## 2021-06-21 RX ORDER — HEPARIN SODIUM 1000 [USP'U]/ML
INJECTION, SOLUTION INTRAVENOUS; SUBCUTANEOUS AS NEEDED
Status: DISCONTINUED | OUTPATIENT
Start: 2021-06-21 | End: 2021-06-21 | Stop reason: HOSPADM

## 2021-06-21 RX ORDER — CARVEDILOL 12.5 MG/1
12.5 TABLET ORAL 2 TIMES DAILY WITH MEALS
Status: DISCONTINUED | OUTPATIENT
Start: 2021-06-21 | End: 2021-06-21

## 2021-06-21 RX ORDER — GLIPIZIDE 5 MG/1
2.5 TABLET ORAL DAILY
Status: DISCONTINUED | OUTPATIENT
Start: 2021-06-22 | End: 2021-06-22 | Stop reason: HOSPADM

## 2021-06-21 RX ORDER — FENTANYL CITRATE 50 UG/ML
INJECTION, SOLUTION INTRAMUSCULAR; INTRAVENOUS AS NEEDED
Status: DISCONTINUED | OUTPATIENT
Start: 2021-06-21 | End: 2021-06-21 | Stop reason: HOSPADM

## 2021-06-21 RX ORDER — INSULIN LISPRO 100 [IU]/ML
INJECTION, SOLUTION INTRAVENOUS; SUBCUTANEOUS
Status: DISCONTINUED | OUTPATIENT
Start: 2021-06-21 | End: 2021-06-22 | Stop reason: HOSPADM

## 2021-06-21 RX ORDER — DEXTROSE MONOHYDRATE AND SODIUM CHLORIDE 5; .45 G/100ML; G/100ML
150 INJECTION, SOLUTION INTRAVENOUS CONTINUOUS
Status: DISPENSED | OUTPATIENT
Start: 2021-06-21 | End: 2021-06-21

## 2021-06-21 RX ORDER — DEXTROSE 50 % IN WATER (D50W) INTRAVENOUS SYRINGE
25-50 AS NEEDED
Status: DISCONTINUED | OUTPATIENT
Start: 2021-06-21 | End: 2021-06-22 | Stop reason: HOSPADM

## 2021-06-21 RX ORDER — CLOPIDOGREL 300 MG/1
TABLET, FILM COATED ORAL AS NEEDED
Status: DISCONTINUED | OUTPATIENT
Start: 2021-06-21 | End: 2021-06-21 | Stop reason: HOSPADM

## 2021-06-21 RX ORDER — ASPIRIN 81 MG/1
81 TABLET ORAL DAILY
Status: DISCONTINUED | OUTPATIENT
Start: 2021-06-22 | End: 2021-06-21 | Stop reason: SDUPTHER

## 2021-06-21 RX ORDER — SODIUM CHLORIDE 0.9 % (FLUSH) 0.9 %
5-40 SYRINGE (ML) INJECTION EVERY 8 HOURS
Status: DISCONTINUED | OUTPATIENT
Start: 2021-06-21 | End: 2021-06-22 | Stop reason: HOSPADM

## 2021-06-21 RX ORDER — NITROGLYCERIN 400 UG/1
1 SPRAY ORAL
Status: ACTIVE | OUTPATIENT
Start: 2021-06-21 | End: 2021-06-22

## 2021-06-21 RX ORDER — LIDOCAINE HYDROCHLORIDE 10 MG/ML
INJECTION, SOLUTION EPIDURAL; INFILTRATION; INTRACAUDAL; PERINEURAL AS NEEDED
Status: DISCONTINUED | OUTPATIENT
Start: 2021-06-21 | End: 2021-06-21 | Stop reason: HOSPADM

## 2021-06-21 RX ORDER — SODIUM CHLORIDE 0.9 % (FLUSH) 0.9 %
5-40 SYRINGE (ML) INJECTION AS NEEDED
Status: DISCONTINUED | OUTPATIENT
Start: 2021-06-21 | End: 2021-06-21 | Stop reason: HOSPADM

## 2021-06-21 RX ORDER — MAGNESIUM SULFATE 100 %
4 CRYSTALS MISCELLANEOUS AS NEEDED
Status: DISCONTINUED | OUTPATIENT
Start: 2021-06-21 | End: 2021-06-22 | Stop reason: HOSPADM

## 2021-06-21 RX ORDER — SODIUM CHLORIDE 0.9 % (FLUSH) 0.9 %
5-40 SYRINGE (ML) INJECTION EVERY 8 HOURS
Status: DISCONTINUED | OUTPATIENT
Start: 2021-06-21 | End: 2021-06-21 | Stop reason: HOSPADM

## 2021-06-21 RX ORDER — CARVEDILOL 12.5 MG/1
12.5 TABLET ORAL 2 TIMES DAILY WITH MEALS
Status: DISCONTINUED | OUTPATIENT
Start: 2021-06-21 | End: 2021-06-22 | Stop reason: HOSPADM

## 2021-06-21 RX ORDER — SODIUM CHLORIDE 0.9 % (FLUSH) 0.9 %
5-40 SYRINGE (ML) INJECTION AS NEEDED
Status: DISCONTINUED | OUTPATIENT
Start: 2021-06-21 | End: 2021-06-22 | Stop reason: HOSPADM

## 2021-06-21 RX ORDER — MIDAZOLAM HYDROCHLORIDE 1 MG/ML
INJECTION, SOLUTION INTRAMUSCULAR; INTRAVENOUS AS NEEDED
Status: DISCONTINUED | OUTPATIENT
Start: 2021-06-21 | End: 2021-06-21 | Stop reason: HOSPADM

## 2021-06-21 RX ORDER — SODIUM CHLORIDE 450 MG/100ML
150 INJECTION, SOLUTION INTRAVENOUS CONTINUOUS
Status: DISPENSED | OUTPATIENT
Start: 2021-06-21 | End: 2021-06-21

## 2021-06-21 RX ORDER — ZOLPIDEM TARTRATE 5 MG/1
5 TABLET ORAL
Status: DISCONTINUED | OUTPATIENT
Start: 2021-06-21 | End: 2021-06-22 | Stop reason: HOSPADM

## 2021-06-21 RX ORDER — BIVALIRUDIN 250 MG/5ML
INJECTION, POWDER, LYOPHILIZED, FOR SOLUTION INTRAVENOUS AS NEEDED
Status: DISCONTINUED | OUTPATIENT
Start: 2021-06-21 | End: 2021-06-21 | Stop reason: HOSPADM

## 2021-06-21 RX ORDER — LORAZEPAM 2 MG/ML
1 INJECTION INTRAMUSCULAR ONCE
Status: DISCONTINUED | OUTPATIENT
Start: 2021-06-21 | End: 2021-06-21 | Stop reason: HOSPADM

## 2021-06-21 RX ORDER — ASPIRIN 81 MG/1
81 TABLET ORAL DAILY
Status: DISCONTINUED | OUTPATIENT
Start: 2021-06-21 | End: 2021-06-22 | Stop reason: HOSPADM

## 2021-06-21 RX ORDER — LISINOPRIL 10 MG/1
10 TABLET ORAL DAILY
Status: DISCONTINUED | OUTPATIENT
Start: 2021-06-21 | End: 2021-06-22 | Stop reason: HOSPADM

## 2021-06-21 RX ORDER — GUAIFENESIN 100 MG/5ML
162 LIQUID (ML) ORAL
Status: COMPLETED | OUTPATIENT
Start: 2021-06-21 | End: 2021-06-21

## 2021-06-21 RX ADMIN — LISINOPRIL 10 MG: 10 TABLET ORAL at 12:39

## 2021-06-21 RX ADMIN — CARVEDILOL 12.5 MG: 12.5 TABLET, FILM COATED ORAL at 16:37

## 2021-06-21 RX ADMIN — SODIUM CHLORIDE 150 ML: 450 INJECTION, SOLUTION INTRAVENOUS at 12:52

## 2021-06-21 RX ADMIN — Medication 10 ML: at 15:21

## 2021-06-21 RX ADMIN — INSULIN LISPRO 2 UNITS: 100 INJECTION, SOLUTION INTRAVENOUS; SUBCUTANEOUS at 16:37

## 2021-06-21 RX ADMIN — Medication 10 ML: at 22:26

## 2021-06-21 RX ADMIN — DEXTROSE MONOHYDRATE 25 G: 25 INJECTION, SOLUTION INTRAVENOUS at 22:30

## 2021-06-21 RX ADMIN — ASPIRIN 81 MG: 81 TABLET, CHEWABLE ORAL at 08:50

## 2021-06-21 RX ADMIN — Medication 81 MG: at 15:37

## 2021-06-21 NOTE — Clinical Note
Lesion 1: Guidewire inserted My initial result note hasn't gone out yet to pt; can you add neg fit test?

## 2021-06-21 NOTE — PROGRESS NOTES
Problem: Falls - Risk of  Goal: *Absence of Falls  Description: Document Abisai Andrade Fall Risk and appropriate interventions in the flowsheet. Outcome: Progressing Towards Goal  Note: Fall Risk Interventions:                                Problem: Patient Education: Go to Patient Education Activity  Goal: Patient/Family Education  Outcome: Progressing Towards Goal     Problem:  Moderate Sedation (Adult)  Goal: *Patent airway  Outcome: Progressing Towards Goal  Goal: *Adequate oxygenation  Outcome: Progressing Towards Goal  Goal: *Absence of aspiration  Outcome: Progressing Towards Goal  Goal: *Hemodynamically stable  Outcome: Progressing Towards Goal  Goal: *Optimal pain control at patient's stated goal  Outcome: Progressing Towards Goal  Goal: *Absence of nausea/vomiting  Outcome: Progressing Towards Goal  Goal: *Anxiety reduced or absent  Outcome: Progressing Towards Goal  Goal: *Absence of injury  Outcome: Progressing Towards Goal  Goal: *Level of consciousness returns to baseline  Outcome: Progressing Towards Goal  Goal: Interventions  Outcome: Progressing Towards Goal     Problem: Patient Education: Go to Patient Education Activity  Goal: Patient/Family Education  Outcome: Progressing Towards Goal

## 2021-06-21 NOTE — Clinical Note
Lesion: Located in the Proximal LAD. Stent deployed. Single technique and multiple inflations used. First inflation pressure = 16 armani; inflation time: 16 sec. Second inflation pressure: 20 armani; inflation time: 10 sec.

## 2021-06-21 NOTE — PROGRESS NOTES
1320: TRANSFER - IN REPORT:    Verbal report received from [place], RN (name) on Michelle Draper  being received [facility] routine progression of care      Report consisted of patients Situation, Background, Assessment and   Recommendations(SBAR). Information from the following report(s) SBAR was reviewed with the receiving nurse. Opportunity for questions and clarification was provided. Assessment completed upon patients arrival to unit and care assumed. 1400: Assessed patient. Pt requesting medication to help him sleep. BP elevated at 160/99. Otherwise stable and ambulating to the restroom. Pt had bowel movement. Rt groin cath site clean, dry and intact. 1523: Received verbal via [employer] that it is ok to give pt Coreg early due to elevated BP's.    1800: Patient is stable. Received order for Ambien for patient because he states he has trouble sleeping in the hospital. Given Coreg for high BP. Pt ambulatory and having no issues. 1900: Bedside and Verbal shift change report given to Fortino Worthy RN (oncoming nurse) by Augustus Godfrey RN (offgoing nurse). Report included the following information SBAR, Kardex, MAR and Cardiac Rhythm NSR.

## 2021-06-21 NOTE — PROGRESS NOTES
Reason for Admission:  End stage renal disease ([facility]) [N18.6]  CAD (coronary artery disease) [I25.10]                 RUR Score:    14%            Plan for utilizing home health:    Pt is agreeable if needed. Likelihood of Readmission:   LOW                         Transition of Care Plan:              Initial assessment completed with patient. Cognitive status of patient: oriented to time, place, person and situation. Face sheet information confirmed:  yes. The patient designates his wife [place] to participate in his discharge plan and to receive any needed information. This patient lives in a single family home with his wife. Patient is able to navigate steps as needed. Prior to hospitalization, patient was considered to be independent with ADLs/IADLS : yes . Patient has a current ACP document on file: no      Healthcare Decision Maker:   Primary Decision Maker: Alia Shaylee - Spouse - [phone]    Click here to complete [address] including selection of the Healthcare Decision Maker Relationship (ie \"Primary\")    The wife will be available to transport patient home upon discharge. The patient already has a cane medical equipment available in the home. Patient is not currently active with home health. Patient has not stayed in a skilled nursing facility or rehab. This patient is on dialysis :yes    If yes, hemodialysis patient and receives treatment on Tuesday/Thursday/Saturday at [facility] [phone]  Chair time is 6am. Pt is transported to/from dialysis by his wife and sometimes uses the HRT. List of available Home Health agencies were provided and reviewed with the patient prior to discharge. Freedom of choice signed: yes, for any accommodating agency. Currently, the discharge plan is home vs home with home health.     The patient states that he can obtain his medications from the pharmacy, and take his medications as directed. Patient's current insurance is medicare and [employer] healthkeepers. Care Management Interventions  PCP Verified by CM: Yes  Mode of Transport at Discharge:  Other (see comment) (wife to transport home)  Transition of Care Consult (CM Consult): Discharge Planning  Physical Therapy Consult: No  Occupational Therapy Consult: No  Current Support Network: Lives with Spouse  Confirm Follow Up Transport: Family  Discharge Location  Discharge Placement: Home with family assistance (pt agreeable to home health if needed)        Yuki Mishra RN BSN  Care Manager  [phone]

## 2021-06-21 NOTE — H&P
Addendum:    Patient with known history of CAD, end-stage renal disease currently undergoing renal transplant evaluation. We will proceed with coronary angiography. All questions answered. He agrees with the plan. DEDRA  6/21/2021    HISTORY OF PRESENT ILLNESS  Mckay Jordan is a 52 y.o. male.     ASSESSMENT and PLAN     Mr. Briana Saucedo has history of diabetes mellitus, diabetic retinopathy with legally blind as, coronary artery disease without chest pains, ischemic cardiomyopathy. He presented with significant fatigue, increased shortness of breath. He had LAD stent in October of 2012 by Dr. Khoa Acevedo. He has never had chest pains. He has history of mildly diminished LV function with ejection fraction of 40-50%. He is now on hemodialysis 3 times per week.  His new baseline weight in 2019 is 199 pounds.  Kidney transplant evaluation was performed in 2019. He underwent coronary angiography on 8/21/2019. Connie Aguilar underwent left and right selective coronary angiography. Connie Aguilar had no significant disease in his RCA or circumflex.  He had 35% stenosis in his LAD.  For completeness, he had IFR performed which was negative at 0.93-0. 95.  No further coronary intervention was needed.     · CAD:    Clinically stable. · BP:    Well controlled. · Rhythm:    Stable sinus. · CHF:    There is no evidence of decompensated CHF noted. · Weight:     His weight today is 201 pounds. This is near his baseline. · Cholesterol:   Target LDL <70.    Lipitor 40. · Anti-platelet:   Remains on ASA.     He was last seen in March 2020 and was felt to be low risk for renal transplant. However, there was a delay. Again he is undergoing renal transplant evaluation. They would like repeat coronary angiography. This will be done within the next 2 weeks. If there are no significant changes from previous study, no further coronary evaluation is needed.    I will see him back in 6 months.  Thank you.            Encounter Diagnoses   Name Primary?  Coronary artery disease involving native coronary artery of native heart without angina pectoris Yes      current treatment plan is effective, no change in therapy  lab results and schedule of future lab studies reviewed with patient  reviewed diet, exercise and weight control  cardiovascular risk and specific lipid/LDL goals reviewed  use of aspirin to prevent MI and TIA's discussed       HPI   Today, Mr. Bob Palomino has no complaints of chest pains, increased shortness of breath or decreased exercise capacity. He denies any orthopnea or PND. He denies any palpitations or dizziness. For renal transplantation evaluation, he did undergo coronary angiography in August 2019 and was felt to be low risk from coronary standpoint. Because of unforeseen delays, he will have repeat coronary angiography. All questions were answered. He agrees with the plan.     Review of Systems   Respiratory: Negative for shortness of breath. Cardiovascular: Negative for chest pain, palpitations, orthopnea, claudication, leg swelling and PND. All other systems reviewed and are negative.        Physical Exam  Vitals and nursing note reviewed. Constitutional:       Appearance: Normal appearance. HENT:      Head: Normocephalic. Eyes:      Conjunctiva/sclera: Conjunctivae normal.   Cardiovascular:      Rate and Rhythm: Normal rate and regular rhythm. Pulmonary:      Breath sounds: Normal breath sounds. Abdominal:      Palpations: Abdomen is soft. Musculoskeletal:         General: No swelling. Cervical back: No rigidity. Skin:     General: Skin is warm and dry. Neurological:      General: No focal deficit present. Mental Status: He is alert and oriented to person, place, and time.    Psychiatric:         Mood and Affect: Mood normal.         Behavior: Behavior normal.            PCP: Megan Musa MD          Past Medical History:   Diagnosis Date    Abnormal nuclear cardiac imaging test 10/08/2012     Fixed anteroseptal, anteroapical defect c/w prior infarction. No ischemia. Mid & distal anteroseptal, anteroapical WMA. LVE. EF 44%.  Anemia       dr. Tushar Rose doubt amyloid or multiple myeloma. candidate for procirt if Hg <10    Benign hypertensive      Blindness of right eye 01/2013     legally blind    CAD (coronary artery disease)      Cardiomyopathy ejection fraction of 40%      CKD (chronic kidney disease), stage IV (HCC)       to see Dr. Sandy Steve 12/1/12    Congestive heart failure ([address]      Diabetes mellitus ([address]      Diabetic retinopathy ([address]      Diabetic retinopathy ([address]       Dr. Quyen Tello- injections    Heart failure [facility])      History of echocardiogram 04/22/2014     LVE. EF 55% (prev 40-45% on echo of 1/27/12). No WMA. Mild-mod conc LVH. Gr 3 DDfx. Marked LAE. Mild SHANTEL. Mild MR.    Hypertension      Pulmonary hypertension ([address]      Renal Ultrasound 1/3/12     Right kidney isoechoic w/respect to liver. Sm left-sided pleural effusion    S/P cardiac cath 10/16/2012     LM patent. pLAD 95% (3.5 x 12-mm [place] stent, resid 0$%). LCX mild.                 Past Surgical History:   Procedure Laterality Date    HX CORONARY STENT PLACEMENT         one    HX LAPAROTOMY   2/2012     bowel obstruction with removal segment of small bowel. Done by Riblet.  HX LAPAROTOMY   infancy     whole in colon and had repair at that time.  HX OTHER SURGICAL   06/20/2013     left eye retna attatchment    HX RETINAL DETACHMENT REPAIR         august 2012    VT REPAIR ING HERNIA,5+Y/O,REDUCIBL Left 05/02/2019     Dr. Waldo Gomez                Current Outpatient Medications   Medication Sig Dispense Refill    atorvastatin (LIPITOR) 40 mg tablet Take 1 Tab by mouth daily. 90 Tab 0    apixaban (ELIQUIS) 2.5 mg tablet Take 1 Tab by mouth every twelve (12) hours. 180 Tab 3    carvediloL (COREG) 25 mg tablet Take 1 Tab by mouth two (2) times daily (with meals).  (Patient taking differently: Take 12.5 mg by mouth two (2) times daily (with meals). ) 180 Tab 3    tadalafiL (Cialis) 20 mg tablet Take 1 Tab by mouth as needed for Erectile Dysfunction. 10 Tab 3    lisinopril (PRINIVIL, ZESTRIL) 10 mg tablet Take 1 Tab by mouth two (2) times a day. (Patient taking differently: Take 10 mg by mouth daily.) 60 Tab 6    glimepiride (AMARYL) 1 mg tablet Take 1 Tab by mouth Daily (before breakfast). 90 Tab 0    Blood-Glucose Meter monitoring kit Check fasting glucose 1 Kit 0    glucose blood VI test strips (ACCU-CHEK ZAC PLUS TEST STRP) strip Use as directed 100 Strip 2    sucroferric oxyhydroxide (VELPHORO) 500 mg chew chewable tablet Take 500 mg by mouth three (3) times daily (with meals).        aspirin delayed-release 81 mg tablet Take 81 mg by mouth daily.             The patient has a family history of     Social History            Tobacco Use    Smoking status: Never Smoker    Smokeless tobacco: Never Used   Substance Use Topics    Alcohol use: Not Currently       Alcohol/week: 4.2 standard drinks       Types: 5 Cans of beer per week    Drug use: No               Lab Results   Component Value Date/Time     Cholesterol, total 168 03/22/2021 12:00 AM     HDL Cholesterol 32 (L) 03/22/2021 12:00 AM     LDL, calculated 116 (H) 03/22/2021 12:00 AM     LDL, calculated 85 02/09/2019 11:20 AM     Triglyceride 106 03/22/2021 12:00 AM     CHOL/HDL Ratio 4.4 10/17/2012 06:05 AM             BP Readings from Last 3 Encounters:   06/15/21 (!) 180/82   04/22/21 120/70   03/08/21 122/80             Pulse Readings from Last 3 Encounters:   06/15/21 85   04/22/21 92   03/08/21 83             Wt Readings from Last 3 Encounters:   06/15/21 91.2 kg (201 lb)   04/22/21 91.4 kg (201 lb 6.4 oz)   03/08/21 92.1 kg (203 lb)            EKG: unchanged from previous tracings, normal sinus rhythm, Q waves in leads V1-V3.

## 2021-06-21 NOTE — PROGRESS NOTES
Cath holding summary     Patient escorted to cath holding from waiting area ambulatory, alert and oriented x 4, voicing no complaints. Changed into gown and placed on monitor. NPO since MN. Lab results, med rec and H&P reviewed on chart. PIV x 2 inserted without difficulty. Family to bedside. 1007  TRANSFER - OUT REPORT:    Verbal report given to [facility] RN(name) on Jordyn Kaiser  being transferred to cath lab(unit) for ordered procedure       Report consisted of patients Situation, Background, Assessment and   Recommendations(SBAR). Information from the following report(s) SBAR, Kardex, MAR, Recent Results, Pre Procedure Checklist, Procedure Verification and Quality Measures was reviewed with the receiving nurse. Lines:   Peripheral IV 06/21/21 Left Antecubital (Active)   Site Assessment Clean, dry, & intact 06/21/21 0852   Phlebitis Assessment 0 06/21/21 0852   Infiltration Assessment 0 06/21/21 0852   Dressing Status New 06/21/21 0852   Dressing Type Tape;Transparent 06/21/21 0852   Hub Color/Line Status Pink;Flushed 06/21/21 0852       Peripheral IV 06/21/21 Left Hand (Active)   Site Assessment Clean, dry, & intact 06/21/21 0852   Phlebitis Assessment 0 06/21/21 0852   Infiltration Assessment 0 06/21/21 0852   Dressing Status New 06/21/21 0852   Dressing Type Tape;Transparent 06/21/21 0852   Hub Color/Line Status Pink;Flushed 06/21/21 [id number]        Opportunity for questions and clarification was provided. Patient transported with:   Registered Nurse    [address] [place] [address] REPORT:    Verbal report given to Zainab GUERRA(name) on [facility]  being transferred to cvt stepdown(unit) for routine post - op       Report consisted of patients Situation, Background, Assessment and   Recommendations(SBAR).      Information from the following report(s) SBAR, Kardex, Procedure Summary, MAR, Recent Results, Procedure Verification, Quality Measures and Dual Neuro Assessment was reviewed with the receiving nurse. Lines:   Peripheral IV 06/21/21 Left Antecubital (Active)   Site Assessment Clean, dry, & intact 06/21/21 0852   Phlebitis Assessment 0 06/21/21 0852   Infiltration Assessment 0 06/21/21 0852   Dressing Status New 06/21/21 0852   Dressing Type Tape;Transparent 06/21/21 0852   Hub Color/Line Status Pink;Flushed 06/21/21 0852       Peripheral IV 06/21/21 Left Hand (Active)   Site Assessment Clean, dry, & intact 06/21/21 0852   Phlebitis Assessment 0 06/21/21 0852   Infiltration Assessment 0 06/21/21 0852   Dressing Status New 06/21/21 0852   Dressing Type Tape;Transparent 06/21/21 0852   Hub Color/Line Status Pink;Flushed 06/21/21 [id number]        Opportunity for questions and clarification was provided.       Patient transported with:   Registered Nurse

## 2021-06-21 NOTE — Clinical Note
TRANSFER - OUT REPORT:     Verbal report given to: Eva Mattson RN. Report consisted of patient's Situation, Background, Assessment and   Recommendations(SBAR). Opportunity for questions and clarification was provided. Patient transported with a Cardiac Cath Tech / Patient Care Tech. Patient transported to: holding area.

## 2021-06-21 NOTE — Clinical Note
Sheath #1: Sheath: inserted. Sheath inserted/placed in the right femoral  artery. Upon evaluation of the common femoral artery stick using fluoroscopy, the access site puncture was within the safe zone.

## 2021-06-21 NOTE — Clinical Note
Contrast Dose Calculator:   Patient's age: 52.   Patient's sex: Male. Patient weight (kg) = 92. Creatinine level (mg/dL) = 8.51. Creatinine clearance (mL/min): 13.81. Contrast concentration (mg/mL) = 300. MACD = 300 mL. Max Contrast dose per Creatinine Cl calculator = 31.08 mL.

## 2021-06-21 NOTE — Clinical Note
TRANSFER - IN REPORT:     Verbal report received from: Vinny Katz RN. Report consisted of patient's Situation, Background, Assessment and   Recommendations(SBAR). Opportunity for questions and clarification was provided. Assessment completed upon patient's arrival to unit and care assumed. Patient transported with a Cardiac Cath Tech / Patient Care Tech.

## 2021-06-21 NOTE — Clinical Note
Lesion located in the Proximal LAD. Balloon inflated using single inflation technique. Lesion #1: Pressure = 15 armani; Duration = 10 sec.

## 2021-06-22 VITALS
WEIGHT: 213.3 LBS | SYSTOLIC BLOOD PRESSURE: 205 MMHG | OXYGEN SATURATION: 97 % | HEART RATE: 75 BPM | RESPIRATION RATE: 18 BRPM | DIASTOLIC BLOOD PRESSURE: 106 MMHG | HEIGHT: 70 IN | BODY MASS INDEX: 30.54 KG/M2 | TEMPERATURE: 97.1 F

## 2021-06-22 LAB
GLUCOSE BLD STRIP.AUTO-MCNC: 71 MG/DL (ref 70–110)
GLUCOSE BLD STRIP.AUTO-MCNC: 78 MG/DL (ref 70–110)

## 2021-06-22 PROCEDURE — 93005 ELECTROCARDIOGRAM TRACING: CPT

## 2021-06-22 PROCEDURE — 74011250637 HC RX REV CODE- 250/637: Performed by: PHYSICIAN ASSISTANT

## 2021-06-22 PROCEDURE — 99217 PR OBSERVATION CARE DISCHARGE MANAGEMENT: CPT | Performed by: INTERNAL MEDICINE

## 2021-06-22 PROCEDURE — 74011250637 HC RX REV CODE- 250/637: Performed by: INTERNAL MEDICINE

## 2021-06-22 PROCEDURE — 82962 GLUCOSE BLOOD TEST: CPT

## 2021-06-22 PROCEDURE — 90935 HEMODIALYSIS ONE EVALUATION: CPT

## 2021-06-22 RX ORDER — HEPARIN SODIUM 1000 [USP'U]/ML
INJECTION, SOLUTION INTRAVENOUS; SUBCUTANEOUS
Status: DISCONTINUED
Start: 2021-06-22 | End: 2021-06-22 | Stop reason: HOSPADM

## 2021-06-22 RX ORDER — LISINOPRIL 10 MG/1
10 TABLET ORAL DAILY
Qty: 30 TABLET | Refills: 5 | Status: SHIPPED | OUTPATIENT
Start: 2021-06-22 | End: 2022-04-13

## 2021-06-22 RX ORDER — CARVEDILOL 25 MG/1
12.5 TABLET ORAL 2 TIMES DAILY WITH MEALS
Qty: 60 TABLET | Refills: 5 | Status: SHIPPED
Start: 2021-06-22 | End: 2022-04-13 | Stop reason: ALTCHOICE

## 2021-06-22 RX ORDER — CLOPIDOGREL BISULFATE 75 MG/1
75 TABLET ORAL DAILY
Status: DISCONTINUED | OUTPATIENT
Start: 2021-06-22 | End: 2021-06-22 | Stop reason: HOSPADM

## 2021-06-22 RX ORDER — CLOPIDOGREL BISULFATE 75 MG/1
75 TABLET ORAL DAILY
Qty: 30 TABLET | Refills: 5 | Status: SHIPPED
Start: 2021-06-22 | End: 2021-12-14

## 2021-06-22 RX ORDER — NITROGLYCERIN 400 UG/1
1 SPRAY ORAL
Qty: 1 BOTTLE | Refills: 2 | Status: SHIPPED | OUTPATIENT
Start: 2021-06-22

## 2021-06-22 RX ADMIN — CARVEDILOL 12.5 MG: 12.5 TABLET, FILM COATED ORAL at 08:05

## 2021-06-22 RX ADMIN — Medication 10 ML: at 06:52

## 2021-06-22 RX ADMIN — ATORVASTATIN CALCIUM 40 MG: 40 TABLET, FILM COATED ORAL at 08:05

## 2021-06-22 RX ADMIN — LISINOPRIL 10 MG: 10 TABLET ORAL at 08:05

## 2021-06-22 RX ADMIN — Medication 10 ML: at 13:39

## 2021-06-22 RX ADMIN — Medication 81 MG: at 08:05

## 2021-06-22 RX ADMIN — CLOPIDOGREL BISULFATE 75 MG: 75 TABLET ORAL at 09:42

## 2021-06-22 NOTE — PROGRESS NOTES
Bedside, Verbal, and Written shift change report given to Shahid Victoria RN (oncoming nurse) by Camryn Melara RN (offgoing nurse). Report included the following information SBAR, Kardex, Intake/Output, MAR, Recent Results, and Cardiac Rhythm NSR.    0730 pt blood sugar 71, gave pt orange juice and stated he will eat his breakfast when it gets here. Will recheck. Pt up in recliner watching tv, no complaints of pain, call bell within reach. 0800 pt blood sugar 78, pt did eat all of breakfast     1000 TRANSFER - OUT REPORT:    Verbal report given to Thais Herrera (name) on [facility]  being transferred to Dialysis (unit) for ordered procedure       Report consisted of patients Situation, Background, Assessment and   Recommendations(SBAR). Information from the following report(s) SBAR, Kardex, [facility] and Recent Results was reviewed with the receiving nurse. Lines:   Peripheral IV 06/21/21 Left Antecubital (Active)   Site Assessment Clean, dry, & intact 06/22/21 0731   Phlebitis Assessment 0 06/22/21 0731   Infiltration Assessment 0 06/22/21 0731   Dressing Status Clean, dry, & intact 06/22/21 0731   Dressing Type Transparent 06/22/21 0731   Hub Color/Line Status Pink;Capped 06/22/21 0731   Action Taken Open ports on tubing capped 06/22/21 0731   Alcohol Cap Used Yes 06/22/21 0731       Peripheral IV 06/21/21 Left Hand (Active)   Site Assessment Clean, dry, & intact 06/22/21 0731   Phlebitis Assessment 0 06/22/21 0731   Infiltration Assessment 0 06/22/21 0731   Dressing Status Clean, dry, & intact 06/22/21 0731   Dressing Type Transparent 06/22/21 0731   Hub Color/Line Status Pink;Capped 06/22/21 0731   Action Taken Open ports on tubing capped 06/22/21 0731   Alcohol Cap Used Yes 06/22/21 0731        Opportunity for questions and clarification was provided. Patient transported with:   Tech    1600 pt back from dialysis. I have reviewed discharge instructions with the patient. The patient verbalized understanding.   AVS signed, IVs removed, patient belongings packed. Waiting for ride arrive.

## 2021-06-22 NOTE — PROGRESS NOTES
Bedside turnover given to Teresa Smith. SBAR,MAR,ED summary given with updates R/T the night, a chance to ask questions was given. PT is on the cardiac tele monitor in stable condition. Bed is in the lowest position with the wheels locked. Call bell and personal effects  are on the bedside table within reach.

## 2021-06-22 NOTE — PROGRESS NOTES
Bedside turnover given from Ruddy GUERRA. LASHAUN,MAR,ED summary given with updates R/T the night, a chance to ask questions was given. PT is on the cardiac tele monitor in stable condition. Bed is in the lowest position with the wheels locked. Call bell and personal effects  are on the bedside table within reach. Patient resting comfortably. Whiteboard updated.

## 2021-06-22 NOTE — PROGRESS NOTES
CLEMENTINA Dumont took patients blood sugar and it was critical at 44 & 48. Patient given D50W 12.5g. Will continue to monitor.

## 2021-06-22 NOTE — PROGRESS NOTES
HEMODIALYSIS ROUNDING NOTE      Patient: Otto Meigs               Sex: male          DOA: 6/21/2021  8:05 AM        YOB: 1970      Age:  52 y.o.        LOS:  LOS: 1 day     Subjective:     Otto Meigs is a 52 y.o.  who presents with End stage renal disease ([facility] [address] [N18.6]  CAD (coronary artery disease) [I25.10]. The patient is dialyzing utilizing the following method:Intermittent Hemodialysis    Chief Complains: Patient was seen on dialysis, denies nausea / vomiting / headache / dizziness / SOB / chest pain.   - Reviewed last 24 hrs events     Current Facility-Administered Medications   Medication Dose Route Frequency    clopidogreL (PLAVIX) tablet 75 mg  75 mg Oral DAILY    atorvastatin (LIPITOR) tablet 40 mg  40 mg Oral DAILY    glipiZIDE (GLUCOTROL) tablet 2.5 mg  2.5 mg Oral DAILY    lisinopriL (PRINIVIL, ZESTRIL) tablet 10 mg  10 mg Oral DAILY    sodium chloride (NS) flush 5-40 mL  5-40 mL IntraVENous Q8H    sodium chloride (NS) flush 5-40 mL  5-40 mL IntraVENous PRN    nitroglycerin (NITROLINGUAL) sublingual 0.4 mg/spray  1 Spray SubLINGual Q5MIN PRN    aspirin delayed-release tablet 81 mg  81 mg Oral DAILY    carvediloL (COREG) tablet 12.5 mg  12.5 mg Oral BID WITH MEALS    insulin lispro (HUMALOG) injection   SubCUTAneous AC&HS    glucose chewable tablet 16 g  4 Tablet Oral PRN    glucagon (GLUCAGEN) injection 1 mg  1 mg IntraMUSCular PRN    dextrose (D50W) injection syrg 12.5-25 g  25-50 mL IntraVENous PRN    zolpidem (AMBIEN) tablet 5 mg  5 mg Oral QHS PRN       Objective:     Visit Vitals  BP (!) 149/86   Pulse 81   Temp 98.2 °F (36.8 °C)   Resp 18   Ht 5' 10\" (1.778 m)   Wt 96.8 kg (213 lb 4.8 oz)   SpO2 97%   BMI 30.61 kg/m²       Intake/Output Summary (Last 24 hours) at 6/22/2021 1046  Last data filed at 6/22/2021 0850  Gross per 24 hour   Intake 242.93 ml   Output --   Net 242.93 ml       Physical Examination:    GEN: AAO X 3, NAD  RS: Chest is bilateral equal, no wheezing / rales / crackles  CVS: S1-S2 heard, RRR  Abdomen: Soft, Non tender, Not distended, Positive bowel sounds  Extremities: No edema, no cyanosis, skin is warm on touch  CNS: Awake & follows commands,   HEENT: Head is atraumatic, PERRLA, conjunctiva pink & non icteric. No JVD or carotid bruit      Data Review:      Labs:     Hematology:   Recent Labs     06/21/21  1528   WBC 5.4   HGB 12.9*   HCT 39.7     Chemistry:   Recent Labs     06/21/21  1528   BUN 58*   CREA 15.60*   CA 7.7*   K 5.2         CO2 28   *        Images:     XR (Most Recent). CXR reviewed by me and compared with previous CXR Results from Hospital Encounter encounter on 06/18/21    XR CHEST PA LAT    Narrative  HISTORY: Shortness of breath. EXAM: Chest.    TECHNIQUE: Two views of the chest were obtained. COMPARISON: 1/22/2021    FINDINGS: There is no pneumothorax, pneumonia or pleural effusions. Heart and  mediastinal structures are unremarkable. . Visualized bony thorax and soft  tissues are within normal limits. Impression  IMPRESSION:    1. No acute cardiopulmonary process. No change. CT (Most Recent) Results from Hospital Encounter encounter on 11/25/15    CT SINUSES WO CONT    Narrative  CT Sinuses Without Contrast    HISTORY: Chronic sinusitis. History sinus surgery to remove polyps. Preop for  another sinus surgery. COMPARISON: CT sinuses 1/9/15. TECHNIQUE: 2.5 mm axial scans through the paranasal sinuses followed by coronal  and sagittal reformations for better evaluation of osteomeatal units and to  minimize radiation dose. FINDINGS:    Compared to previous there is improved aeration and diminishing mucosal sinus  opacification of the left frontal and anterior ethmoid sinuses. All other  paranasal sinuses remain completely opacified.  Bilateral maxillary antra are  completely opacified and the previous antrostomy windows are occluded. The  sphenoid sinuses are totally opacified and the sphenoidotomy windows are  occluded. The right ethmoid air cells are totally opacified. The left posterior  ethmoid air cells are opacified although the anterior air cells are now aerated. The right frontal sinus is totally opacified and the frontal recess is occluded. The right nasal cavity is nearly completely opacified and there is subsequent  occlusion of the superior and middle thigh. There is aeration of the left nasal  cavity although there is soft tissue within the left maxillary antral window  which protrudes and partially occludes the nasal cavity. The nasal septum is  mildly curved to the left. No sinus expansion. There is hyperostosis of the  maxillary and sphenoid sinus walls. The opacifying material is of mixed hypo-and  hyperdense attenuations. Mastoid air cells and middle ear cavities are clear. No acute fracture. No  evidence of bone destruction. Hyperdense material in the vitreous chamber of the right globe, stable. Presumably previous ocular injections. The left lens has been extracted. No  gross orbital mass. Visualized brain is unremarkable. Impression  :    Pansinus opacification with improved aeration of the left frontal and anterior  ethmoid air cells when compared to 1/9/15. The remaining paranasal sinuses  remain completely opacified with hyperostosis suggesting chronic sinusitis. Evidence of prior maxillary antrostomies and sphenoidotomies however the  surgical windows are completely occluded. Differential considerations include  sinonasal polyposis and allergic fungal sinusitis. Plan / Recommendation:         End Stage Renal Disease:  Plan HD today    At 10:46 AM on 6/22/2021, I saw and examined patient during hemodialysis treatment. The patient was receiving hemodialysis for treatment of end stage renal disease.  I have also reviewed vital signs, intake and output, lab results and recent events, and agreed with today's dialysis order.   Ca low,on ca 3 bath  Access: No issue      Nicolle Walton MD  Nephrology  6/22/2021

## 2021-06-22 NOTE — PROGRESS NOTES
conducted an initial consultation and Spiritual Assessment for Derik Jacobs, who is a 52 y.o.,male. Patient's Primary Language is: [place]. According to the patient's EMR Pentecostal Affiliation is: Chaim Meehan. 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 Explored issues of ayde, belief, spirituality and Baptist/ritual needs while hospitalized. Listened empathically. Provided chaplaincy education. Provided information about Spiritual Care Services. Offered prayer and assurance of continued prayers on patient's behalf. Chart reviewed. The following outcomes where achieved:  Patient shared limited information about both their medical narrative and spiritual journey/beliefs.  confirmed Patient's Pentecostal Affiliation. Patient expressed gratitude for 's visit. Assessment:  There are no spiritual or Baptist issues which require intervention at this time.      Plan:  Chaplains will continue to follow and will provide pastoral care on an as needed/requested basis.     [facility]   [phone]

## 2021-06-22 NOTE — PROGRESS NOTES
Discharge order noted for today. Orders reviewed. Cardiology follow up appointment scheduled for Wednesday July 21st at 0930am. Observation notice provided in writing to patient and/or caregiver as well as verbal explanation of the policy. Patients who are in outpatient status also receive the Observation notice. No additional needs identified at this time.  remains available if needed. Pt to be transported home by his wife after dialysis.      Jan Mahan RN BSN  Care Manager  [phone]

## 2021-06-22 NOTE — DIALYSIS
ACUTE HEMODIALYSIS FLOW SHEET    HEMODIALYSIS ORDERS: Physician: JOELLE Montiel     Dialyzer: Revaclear   Duration: 3.5 hr   BFR: 400   DFR: 800   Dialysate:  Temp 36-37*C   K+  2.0    Ca+ 3.0   Na 138   Bicarb 32   Wt Readings from Last 1 Encounters:   06/22/21 96.8 kg (213 lb 4.8 oz)    Patient Chart [x]   Unable to Obtain []  Dry weight/UF Goal: 2000 ml    Heparin []  Bolus    Units    [] Hourly    Units    [x]None       Pre BP:   162/98   Pulse:  81   Respirations: 18   Temperature:  98.3  [x] oral  [] ax  [] esophageal   Labs: []  Pre  []  Post:   [x] N/A   Additional Orders(medications, blood products, hypotension management): [] Yes   [] No     [x]  [facility] Consent Verified       GRAFT/FISTULA ACCESS:   []N/A     [x]Right     []Left     [x]UE     []LE   []AVG   [x]AVF       [x]Medical Aseptic Prep Utilized   [x]No S/S infection  []Redness  []Drainage [] Cultured  [] Swelling  [] Pain  Bruit:   [x] Strong    [] Weak       Thrill :   [x] Strong    [] Weak     Needle Gauge: 15   Length: 1 inch   If access problem,  notified: []Yes     [x]N/A          GENERAL ASSESSMENT:    LUNGS:  Resp Rate 18   [x] Clear  [] Coarse  [] Crackles  [] Wheezing  [] Diminished                                                            [] RLL  [] LLL [] RUL  [] ROBERT         Therapy:  [x]RA  O2 Type:  []NC   []  NRB    [] BiPaP  Flow:  l/min                   [] Ventilated  [] Intubated  [] Trach     CARDIAC: [x] Regular      [] Irregular   [] Rhythm:          [] Monitored   [] Bedside   [] Remotely monitored     EDEMA: [x] None   []Generalized  [] Pitting [] 1+   [] 2 +   [] 3+    [] 4+     SKIN:   [x] Warm  [] Hot     [] Cold   [x] Dry     [] Pale   [] Diaphoretic                  [] Flushed  [] Jaundiced  [] Cyanotic     LOC:    [x] Alert      [x]Oriented:    [x] Person     [x] Place  [x]Time               [] Confused  [] Lethargic  [] Medicated  [] Non-responsive  [] Non-verbal   GI / ABDOMEN:                     [] Flat    [] Distended    [x] Soft    [] Firm   []  Obese                   [] Diarrhea   [] FMS [x] Bowel Sounds  [] Nausea  [] Vomiting                   [] NGT  [] OGT    [] PEG  [] Tube Feedings @     / URINE ASSESSMENT:                   [] Voiding    [x] Oliguria  [] Anuria                    []  [place]  [] Incontinent  []  Incontinent Brief   []  PureWick     PAIN:  [x] 0 []1  []2   []3   []4   []5   []6   []7   []8   []9   []10                MOBILITY:  [x] Bed    [] Stretcher      All Vitals and Treatment Details on Attached [address]: [id number] [facility]          Room # 3233/87    [x] Routine         [] 1st Time Acute/Chronic   [] Urgent      [] Stat            [x] Acute Room   []  Bedside    [] ICU/CCU     [] ER   Isolation Precautions:  [x] Dialysis    There are currently no Active Isolations     ALLERGIES:     No Known Allergies   Code Status:  Full Code     Hepatitis Status      Lab Results   Component Value Date/Time    Hepatitis A Abs Negative 01/03/2012 01:40 PM    Hepatitis B surface Ag <0.10 01/23/2021 05:00 PM    Hepatitis B surface Ab 4.18 (L) 01/23/2021 05:00 PM    Hep B Core Ab, total NEGATIVE  12/05/2018 02:50 PM    Hepatitis C virus Ab 0.08 12/05/2018 02:50 PM        Current Labs:      Lab Results   Component Value Date/Time    WBC 5.4 06/21/2021 03:28 PM    Hemoglobin, POC 10.9 (L) 05/02/2019 10:00 AM    HGB 12.9 (L) 06/21/2021 03:28 PM    Hematocrit, POC 32 (L) 05/02/2019 10:00 AM    HCT 39.7 06/21/2021 03:28 PM    PLATELET 043 56/02/5300 03:28 PM    MCV 93.6 06/21/2021 03:28 PM     Lab Results   Component Value Date/Time    Sodium 143 06/21/2021 03:28 PM    Potassium 5.2 06/21/2021 03:28 PM    Chloride 104 06/21/2021 03:28 PM    CO2 28 06/21/2021 03:28 PM    Anion gap 11 06/21/2021 03:28 PM    Glucose 156 (H) 06/21/2021 03:28 PM    BUN 58 (H) 06/21/2021 03:28 PM    Creatinine 15.60 (H) 06/21/2021 03:28 PM    BUN/Creatinine ratio 4 (L) 06/21/2021 03:28 PM    GFR est AA 4 (L) 06/21/2021 03:28 PM    GFR est non-AA 3 (L) 06/21/2021 03:28 PM    Calcium 7.7 (L) 06/21/2021 03:28 PM          DIET:  DIET ADULT     PRIMARY NURSE REPORT:   Pre Dialysis: Ben Mota RN    Time: 1000      EDUCATION:    [x] Patient           Knowledge Basis: []None []Minimal [x] Substantial [] Unknown  Barriers to learning  [x]N/A  [] Intubated/Trached/Ventilated  [] Sedated/Paralyzed   [] Access Care     [] S&S of infection  [] Fluid Management  [] K+   [x] Procedural    [] Medications   [] Tx Options   [] Transplant   [] Diet      Teaching Tools:  [x] Explain  [] Demo  [] Handouts [] Video  Patient response: [x] Verbalized understanding   [x] Requires follow up        [x] Time Out/Safety Check    [x] Extracorporeal Circuit Tested for integrity       RO/HEMODIALYSIS MACHINE SAFETY CHECKS - Before each treatment:        [facility]                                    [x] Unit Machine # 6 with centralized RO                                                                                                                       Alarm Test:  Pass time 1000            [x] RO/Machine Log Complete    Machine Temp    36-37*C             Dialysate: pH  7.4    Conductivity: Meter 14.0    HD Machine  13.9     TCD: 13.8  Dialyzer Lot # [id number]     Blood Tubing Lot # [id number]     Saline Lot # [id number]     CHLORINE TESTING-Before each treatment and every 4 hours    Total Chlorine: [x] less than 0.1 ppm  Initial Time Check: 0900       4 Hr/2nd Check Time: 1300   (if greater than 0.1 ppm from Primary then every 30 minutes from Secondary)     TREATMENT INITIATION - with Dialysis Precautions:   [x] All Connections Secured              [x] Saline Line Double Clamped   [x] Venous Parameters Set               [x] Arterial Parameters Set    [x] Prime Given 250ml NSS              [x]Air Foam Detector Engaged      Treatment Initiation Note:  3879 Patient arrived to HD treatment room, A&O x 4, no signs of distress  1045 Patient's LUE AVF, intact and has strong Bruit and Thrill. AVF cannulated without issue and Dialysis initiated  During Treatment Notes:  1100  Face & Vascular access visible with arterial and venous line connections intact. Pt tolerating HD.  1115  Face & Vascular access visible with arterial and venous line connections intact. Pt tolerating HD.  1130  Face & Vascular access visible with arterial and venous line connections intact. Pt tolerating HD.  1145  Face & Vascular access visible with arterial and venous line connections intact. Pt tolerating HD.  1200  Face & Vascular access visible with arterial and venous line connections intact. Pt tolerating HD.  1215  Face & Vascular access visible with arterial and venous line connections intact. Pt tolerating HD.  1230  Face & Vascular access visible with arterial and venous line connections intact. Pt tolerating HD.  1245  Face & Vascular access visible with arterial and venous line connections intact. Pt tolerating HD.  1300  Face & Vascular access visible with arterial and venous line connections intact. Pt tolerating HD.  1315  Face & Vascular access visible with arterial and venous line connections intact. Pt tolerating HD.  1330  Face & Vascular access visible with arterial and venous line connections intact. Pt tolerating HD.  1345  Face & Vascular access visible with arterial and venous line connections intact. Pt tolerating HD.  1400 Face & Vascular access visible with arterial and venous line connections intact. Pt tolerating HD.  1415 Face & Vascular access visible with arterial and venous line connections intact. Pt tolerating HD.    1430  Dialysis treatment complete.        Medication Dose Volume Route Time Jacque Nurse, Title   N/A   HD  Hung Paul RN     Post Assessment  Dialyzer Cleared:   [] Good  [x] Fair  [] Poor  Blood processed:  75.8 L  UF Removed:  2000 Ml    Post /106   Pulse  75 Resp  20  Temp 97.1 Lungs: [x] Lung sounds same as pre dialysis assessment   Post Tx Vascular Access: [] N/A  AVF/AVG: Bleeding stopped with  Arterial Pressure for 10 min   Venous Pressure for 10 min      Cardiac :[x] Regular   [] Irregular   Rhythm:  [] Monitored   [x] Not Monitored     Edema:  [x] None  [] Generalized                     Skin:[x] Warm  [x] Dry [] Diaphoretic               [] Flushed  [] Pale [] Cyanotic Pain:  [x]0  []1 []2  []3 []4  []5  []6  []7 []8    []9  []10     Post Treatment Note:   1430 Pt tolerated dialysis well. Dialysis catheter intact, patent and heplocked as noted above.      POST TREATMENT PRIMARY NURSE HANDOFF REPORT:   Post Dialysis: Marinus Canavan, RN               Time:  1       Abbreviations: AVG-arterial venous graft, AVF-arterial venous fistula, IJ-Internal Jugular, Subcl-Subclavian, Fem-Femoral, Tx-treatment, AP/HR-apical heart rate, VSS- Vital Signs Stable, CVC- Central Venous Catheter, DFR-dialysate flow rate, BFR-blood flow rate, AP-arterial pressure, -venous pressure, UF-ultrafiltrate, TMP-transmembrane pressure, Zay-Venous, Art-Arterial, RO-Reverse Osmosis

## 2021-06-22 NOTE — DISCHARGE INSTRUCTIONS
Coronary Angioplasty: What to Expect at [facility]     Coronary angioplasty is a procedure that is used to open a narrowed or blocked coronary artery. It may also be called a percutaneous coronary intervention (PCI). The doctor opened your narrowed or blocked artery by putting a thin tube, called a catheter, into your heart through a blood vessel. The catheter was inserted into the blood vessel in your groin or arm. The doctor may have placed a small tube, called a stent, in the artery. Your groin or arm may have a bruise and feel sore for a day or two after the procedure. You can do light activities around the house. But don't do anything strenuous for a day or two. This care sheet gives you a general idea about how long it will take for you to recover. But each person recovers at a different pace. Follow the steps below to get better as quickly as possible. How can you care for yourself at home? Activity    · If the doctor gave you a sedative:  ? For 24 hours, don't do anything that requires attention to detail, such as going to work, making important decisions, or signing any legal documents. It takes time for the medicine's effects to completely wear off.  ? For your safety, do not drive or operate any machinery that could be dangerous. Wait until the medicine wears off and you can think clearly and react easily.     · Do not do strenuous exercise and do not lift, pull, or push anything heavy until your doctor says it is okay. This may be for a day or two. You can walk around the house and do light activity, such as cooking.     · If the catheter was placed in your groin, try not to walk up stairs for the first couple of days.     · If the catheter was placed in your arm near your wrist, do not bend your wrist deeply for the first couple of days.  Be careful using your hand to get into and out of a chair or bed.     · Carry your stent identification card with you at all times.     · If your doctor recommends it, get more exercise. Walking is a good choice. Bit by bit, increase the amount you walk every day. Try for at least 30 minutes on most days of the week.     · If you haven't been set up with a cardiac rehab program, talk to your doctor about whether rehab is right for you. Cardiac rehab includes supervised exercise. It also includes help with diet and lifestyle changes and emotional support. Diet    · Drink plenty of fluids to help your body flush out the dye. If you have kidney, heart, or liver disease and have to limit fluids, talk with your doctor before you increase the amount of fluids you drink.     · Keep eating a heart-healthy diet that has lots of fruits, vegetables, and whole grains. If you have not been eating this way, talk to your doctor. You also may want to talk to a dietitian. This expert can help you to learn about healthy foods and plan meals. Medicines    · Your doctor will tell you if and when you can restart your medicines. Your doctor will also give you instructions about taking any new medicines.     · If you take aspirin or some other blood thinner, ask your doctor if and when to start taking it again. Make sure that you understand exactly what your doctor wants you to do.     · Your doctor will prescribe blood-thinning medicines. You will likely take aspirin plus another antiplatelet, such as clopidogrel (Plavix). It is very important that you take these medicines exactly as directed. These medicines help keep the coronary artery open and reduce your risk of a heart attack.     · Call your doctor if you think you are having a problem with your medicine. Care of the catheter site    · For 1 or 2 days, keep a bandage over the spot where the catheter was inserted. The bandage probably will fall off in this time.     · Put ice or a cold pack on the area for 10 to 20 minutes at a time to help with soreness or swelling.  Put a thin cloth between the ice and your skin.     · You may shower 24 to 48 hours after the procedure, if your doctor okays it. Pat the incision dry.     · Do not soak the catheter site until it is healed. Don't take a bath for 1 week, or until your doctor tells you it is okay.     · Watch for bleeding from the site. A small amount of blood (up to the size of a quarter) on the bandage can be normal.     · If you are bleeding, lie down and press on the area for 15 minutes to try to make it stop. If the bleeding does not stop, call your doctor or seek immediate medical care. Follow-up care is a key part of your treatment and safety. Be sure to make and go to all appointments, and call your doctor if you are having problems. It's also a good idea to know your test results and keep a list of the medicines you take. When should you call for help? Call 911 anytime you think you may need emergency care. For example, call if:    · You passed out (lost consciousness).     · You have severe trouble breathing.     · You have sudden chest pain and shortness of breath, or you cough up blood.     · You have symptoms of a heart attack, such as:  ? Chest pain or pressure. ? Sweating. ? Shortness of breath. ? Nausea or vomiting. ? Pain that spreads from the chest to the neck, jaw, or one or both shoulders or arms. ? Dizziness or lightheadedness. ? A fast or uneven pulse. After calling 911, chew 1 adult-strength aspirin. Wait for an ambulance. Do not try to drive yourself.     · You have been diagnosed with angina, and you have angina symptoms that do not go away with rest or are not getting better within 5 minutes after you take one dose of nitroglycerin. Call your doctor now or seek immediate medical care if:    · You are bleeding from the area where the catheter was put in your artery.     · You have a fast-growing, painful lump at the catheter site.     · You have signs of infection, such as:  ? Increased pain, swelling, warmth, or redness.   ? Red streaks leading from the catheter site. ? Pus draining from the catheter site. ? A fever.     · Your leg, arm, or hand is painful, looks blue, or feels cold, numb, or tingly. Watch closely for changes in your health, and be sure to contact your doctor if you have any problems. Where can you learn more? Go to http://www.gray.com/  Enter Y214 in the search box to learn more about \"Coronary Angioplasty: What to Expect at Home. \"  Current as of: August 31, 2020               Content Version: 12.8  © 2006-2021 [employer]. Care instructions adapted under license by [employer] (which disclaims liability or warranty for this information). If you have questions about a medical condition or this instruction, always ask your healthcare professional. [place] [address] any warranty or liability for your use of this information. Patient Education        Learning About Coronary Artery Disease (CAD)  What is coronary artery disease? Coronary artery disease is a condition that occurs when plaque builds up in the arteries that bring oxygen-rich blood to your heart. Plaque is a fatty substance made of cholesterol, calcium, and other substances in the blood. This process is called hardening of the arteries, or atherosclerosis. What happens when you have coronary artery disease? · Plaque may narrow the coronary arteries. Narrowed arteries cause poor blood flow. This can lead to angina symptoms such as chest pain or discomfort. If blood flow is completely blocked, you could have a heart attack. · You can slow and reduce the risk of future problems by making changes in your lifestyle. These include quitting smoking and eating heart-healthy foods. · Treatment, along with changes in your lifestyle, can help you live a longer and healthier life. How can you prevent coronary artery disease? · Do not smoke.  It may be the best thing you can do to prevent coronary artery disease. If you need help quitting, talk to your doctor about stop-smoking programs and medicines. These can increase your chances of quitting for good. · Be active. Try to do moderate activity at least 2½ hours a week. Or try to do vigorous activity at least 1¼ hours a week. You may want to walk or try other activities, such as running, swimming, cycling, or playing tennis or team sports. · Eat heart-healthy foods. Eat more fruits and vegetables and less food that contains saturated and trans fats. Limit alcohol, sodium, and sweets. · Stay at a healthy weight. Lose weight if you need to. · Manage other health problems such as diabetes, high blood pressure, and high cholesterol. How is coronary artery disease treated? · Your doctor will suggest that you make lifestyle changes. For example, your doctor may ask you to eat healthy foods, quit smoking, lose extra weight, and be more active. · You will take medicines that help prevent a heart attack. · Your doctor may suggest a procedure to open narrowed or blocked arteries. This is called angioplasty. Or your doctor may suggest using healthy blood vessels to create detours around narrowed or blocked arteries. This is called bypass surgery. Follow-up care is a key part of your treatment and safety. Be sure to make and go to all appointments, and call your doctor if you are having problems. It's also a good idea to know your test results and keep a list of the medicines you take. Where can you learn more? Go to http://www.gray.com/  Enter C643 in the search box to learn more about \"Learning About Coronary Artery Disease (CAD). \"  Current as of: August 31, 2020               Content Version: 12.8  © 9795-1558 [employer]. Care instructions adapted under license by [employer] (which disclaims liability or warranty for this information).  If you have questions about a medical condition or this instruction, always ask your healthcare professional. [place] [address] any warranty or liability for your use of this information. Patient Education        Coronary Angiogram: What to Expect at Home  Your Recovery    A coronary angiogram is a test to examine the large blood vessel of your heart (coronary artery). The doctor inserted a thin, flexible tube (catheter) into a blood vessel in your groin. In some cases, the catheter is placed in a blood vessel in the arm. Your groin or arm may have a bruise and feel sore for a day or two after the procedure. You can do light activities around the house but nothing strenuous for several days. This care sheet gives you a general idea about how long it will take for you to recover. But each person recovers at a different pace. Follow the steps below to feel better as quickly as possible. How can you care for yourself at home? Activity    · If the doctor gave you a sedative:  ? For 24 hours, don't do anything that requires attention to detail, such as going to work, making important decisions, or signing any legal documents. It takes time for the medicine's effects to completely wear off.  ? For your safety, do not drive or operate any machinery that could be dangerous. Wait until the medicine wears off and you can think clearly and react easily.     · Do not do strenuous exercise and do not lift, pull, or push anything heavy until your doctor says it is okay. This may be for a day or two. You can walk around the house and do light activity, such as cooking.     · If the catheter was placed in your groin, try not to walk up stairs for the first couple of days.     · If the catheter was placed in your arm near your wrist, do not bend your wrist deeply for the first couple of days. Be careful using your hand to get into and out of a chair or bed.     · If your doctor recommends it, get more exercise. Walking is a good choice.  Bit by bit, increase the amount you walk every day. Try for at least 30 minutes on most days of the week. Diet    · Drink plenty of fluids to help your body flush out the dye. If you have kidney, heart, or liver disease and have to limit fluids, talk with your doctor before you increase the amount of fluids you drink.     · Keep eating a heart-healthy diet that has lots of fruits, vegetables, and whole grains. If you have not been eating this way, talk to your doctor. You also may want to talk to a dietitian. This expert can help you to learn about healthy foods and plan meals. Medicines    · Your doctor will tell you if and when you can restart your medicines. He or she will also give you instructions about taking any new medicines.     · If you take aspirin or some other blood thinner, ask your doctor if and when to start taking it again. Make sure that you understand exactly what your doctor wants you to do.     · Your doctor may prescribe a blood-thinning medicine like aspirin or clopidogrel (Plavix). It is very important that you take these medicines exactly as directed in order to keep the coronary artery open and reduce your risk of a heart attack. Be safe with medicines. Call your doctor if you think you are having a problem with your medicine. Care of the catheter site    · For 1 or 2 days, keep a bandage over the spot where the catheter was inserted. The bandage probably will fall off in this time.     · Put ice or a cold pack on the area for 10 to 20 minutes at a time to help with soreness or swelling. Put a thin cloth between the ice and your skin.     · You may shower 24 to 48 hours after the procedure, if your doctor okays it. Pat the incision dry.     · Do not soak the catheter site until it is healed. Don't take a bath for 1 week, or until your doctor tells you it is okay.     · Watch for bleeding from the site.  A small amount of blood (up to the size of a quarter) on the bandage can be normal.     · If you are bleeding, lie down and press on the area for 15 minutes to try to make it stop. If the bleeding does not stop, call your doctor or seek immediate medical care. Follow-up care is a key part of your treatment and safety. Be sure to make and go to all appointments, and call your doctor if you are having problems. It's also a good idea to know your test results and keep a list of the medicines you take. When should you call for help? Call 911 anytime you think you may need emergency care. For example, call if:    · You passed out (lost consciousness).     · You have severe trouble breathing.     · You have sudden chest pain and shortness of breath, or you cough up blood.     · You have symptoms of a heart attack. These may include:  ? Chest pain or pressure, or a strange feeling in the chest.  ? Sweating. ? Shortness of breath. ? Nausea or vomiting. ? Pain, pressure, or a strange feeling in the back, neck, jaw, or upper belly, or in one or both shoulders or arms. ? Lightheadedness or sudden weakness. ? A fast or irregular heartbeat. After you call 911, the  may tel you to chew 1 adult-strength or 2 to 4 low-dose aspirin. Wait for an ambulance. Do not try to drive yourself.     · You have been diagnosed with angina, and you have symptoms that do not go away with rest or are not getting better within 5 minutes after you take a dose of nitroglycerin. Call your doctor now or seek immediate medical care if:    · You are bleeding from the area where the catheter was put in your artery.     · You have a fast-growing, painful lump at the catheter site.     · You have signs of infection, such as:  ? Increased pain, swelling, warmth, or redness. ? Red streaks leading from the catheter site. ? Pus draining from the catheter site. ? A fever.     · Your leg, arm, or hand is painful, looks blue, or feels cold, numb, or tingly.    Watch closely for changes in your health, and be sure to contact your doctor if you have any problems. Where can you learn more? Go to http://www.gray.com/  Enter D192 in the search box to learn more about \"Coronary Angiogram: What to Expect at Home. \"  Current as of: August 31, 2020               Content Version: 12.8  © 2006-2021 [employer]. Care instructions adapted under license by [employer] (which disclaims liability or warranty for this information). If you have questions about a medical condition or this instruction, always ask your healthcare professional. [place] [address] any warranty or liability for your use of this information. Patient Education        Coronary Artery Disease: Care Instructions  Your Care Instructions     The heart is a muscle, and like any muscle, it needs blood to work well. Coronary artery disease occurs when the arteries that bring oxygen-rich blood to your heart have a buildup of plaque--deposits of fats and other substances. Plaque can reduce blood flow to the heart muscle. This can cause angina symptoms such as chest pain or pressure. A heart attack can happen if blood flow is completely blocked. You can do a lot to improve your health and prevent a heart attack. Eating healthy food, not smoking, getting regular exercise, and taking your medicine are the main things you can do every day to stay healthy. Follow-up care is a key part of your treatment and safety. Be sure to make and go to all appointments, and call your doctor if you are having problems. It's also a good idea to know your test results and keep a list of the medicines you take. How can you care for yourself at home? Medicines    · Be safe with medicines. Take your medicines exactly as prescribed. Call your doctor if you think you are having a problem with your medicine.  You will get more details on the specific medicines your doctor prescribes.     · You will take medicines that lower your risk of a heart attack and lower your risk of dying early from heart disease. These medicines include:  ? Angiotensin-converting enzyme (ACE) inhibitors or angiotensin II receptor blockers (ARBs). They lower blood pressure. ? Aspirin and other blood thinners. They prevent blood clots that could cause a heart attack. ? Beta-blockers. They lower the heart's workload. ? Statins and other cholesterol medicines. They lower cholesterol.     · If your doctor has given you nitroglycerin for angina symptoms (such as chest pain or pressure) keep it with you at all times. If you have symptoms, sit down and rest, and take the first dose of nitroglycerin as directed. If your symptoms get worse or are not getting better within 5 minutes, call 911 right away. Stay on the phone. The emergency  will give you further instructions.     · Do not take any over-the-counter medicines, vitamins, or herbal products without talking to your doctor first.   Lifestyle  Ask your doctor if a cardiac rehab program is right for you. Cardiac rehab can help you make lifestyle changes. In cardiac rehab, a team of health professionals provides education and support to help you make new, healthy habits.    · Do not smoke. Avoid secondhand smoke too. Smoking can increase your risk of a heart attack or stroke. If you need help quitting, talk to your doctor about stop-smoking programs and medicines. These can increase your chances of quitting for good.     · Eat heart-healthy foods. These include vegetables, fruits, nuts, beans, lean meat, fish, and whole grains. Limit saturated fat, sodium, and alcohol. Limit drinks and foods with added sugar.     · If your doctor recommends it, get more exercise. Ask your doctor what level of exercise is safe for you. Walking is a good choice. Bit by bit, increase the amount you walk every day. Try for at least 30 minutes on most days of the week. You also may want to swim, bike, or do other activities.     · Stay at a healthy weight.  Lose weight if you need to.     · Manage other health problems. These include diabetes, high blood pressure, and high cholesterol. If you think you may have a problem with alcohol or drug use, talk to your doctor.     · If you have angina symptoms, pay attention to your symptoms. This can help you see what causes them and what is typical for you.     · Avoid colds and flu. Get a pneumococcal vaccine shot. If you have had one before, ask your doctor whether you need another dose. Get a flu vaccine every year. If you must be around people with colds or flu, wash your hands often.     · If you think you have symptoms of depression, talk to your doctor. Symptoms include feeling sad or hopeless most of the time, or losing interest in activities that used to make you happy. When should you call for help? Call 911 anytime you think you may need emergency care. For example, call if:    · You have symptoms of a heart attack. These may include:  ? Chest pain or pressure, or a strange feeling in the chest.  ? Sweating. ? Shortness of breath. ? Nausea or vomiting. ? Pain, pressure, or a strange feeling in the back, neck, jaw, or upper belly or in one or both shoulders or arms. ? Lightheadedness or sudden weakness. ? A fast or irregular heartbeat. After you call 911, the  may tell you to chew 1 adult-strength or 2 to 4 low-dose aspirin. Wait for an ambulance. Do not try to drive yourself.     · You have angina symptoms (such as chest pain or pressure) that do not go away with rest or are not getting better within 5 minutes after you take a dose of nitroglycerin.     · You passed out (lost consciousness). Call your doctor now or seek immediate medical care if:    · You are having angina symptoms, such as chest pain or pressure, more often than usual, or they are different or worse than usual.     · You have new or increased shortness of breath.     · You are dizzy or lightheaded, or you feel like you may faint. Watch closely for changes in your health, and be sure to contact your doctor if you have any problems. Where can you learn more? Go to http://www.gray.com/  Enter Y226 in the search box to learn more about \"Coronary Artery Disease: Care Instructions. \"  Current as of: August 31, 2020               Content Version: 12.8  © 2006-2021 [employer]. Care instructions adapted under license by [employer] (which disclaims liability or warranty for this information). If you have questions about a medical condition or this instruction, always ask your healthcare professional. [place] [address] any warranty or liability for your use of this information. Patient Education        Kidney Dialysis: Care Instructions  Your Care Instructions     Dialysis is a process that filters wastes from the blood when your kidneys can no longer do the job. It is not a cure, but it can help you live longer and feel better. It is a lifesaving treatment when you have kidney failure. Normal kidneys work 24 hours a day to clean wastes from your blood. Your kidneys are not able to do this job, so a process called dialysis will do some of the work for your kidneys. You and your doctor will decide which type of dialysis you should have. Peritoneal dialysis uses the lining of your belly (peritoneum) to filter your blood. You can do it at home, on a daily basis. Hemodialysis uses a man-made filter called a dialyzer to clean your blood. Most people need to go to a hospital or clinic 3 days a week for several hours each time. Sometimes hemodialysis can be done at home. It is normal to have questions about your treatment, and you have a right to know what is happening to you. Learning about dialysis can help you take an active role in your treatment. Dialysis does not cure kidney disease, but it can help you live longer and feel better.  You will need to follow your diet and treatment schedule carefully. Follow-up care is a key part of your treatment and safety. Be sure to make and go to all appointments, and call your doctor if you are having problems. It's also a good idea to know your test results and keep a list of the medicines you take. What do you need to know about peritoneal dialysis? Peritoneal dialysis uses the lining of your belly (or peritoneal membrane) to filter your blood. Before you can begin peritoneal dialysis, your doctor will need to place a thin tube called a catheter in your belly. This is the dialysis access. · Peritoneal dialysis can be done at home or in any clean place. You may be able to do it while you sleep. · You can do it by yourself. You don't have to rely on help from others. · You can do it at the times you choose as long as you do the right number of treatments. · It has to be done every day of the week. · Some people find it hard to do all the required steps. · It increases your chance for a serious infection of the lining of the belly (peritoneum). Overview  Hemodialysis uses a man-made membrane (dialyzer) to clean your blood. You're connected to the dialyzer by tubes attached to your blood vessels. Before you start dialysis, your doctor will create a site where the blood can flow in and out of your body during your sessions. · Hemodialysis is done mainly by trained health workers. They can watch for problems. · You can do it at a center where other people are doing dialysis. This can help provide emotional support. · You can schedule your treatments in the evenings and maybe at home. This gives you more control over your schedule. · It usually needs to be done on a set schedule 3 times a week. · It can cause side effects, like low blood pressure and muscle cramps. These can often be treated easily. · It requires being poked by a needle at each treatment. This bothers some people. Others get used to it and can do it themselves.   How can you care for yourself at home? · Be sure to have all of your dialysis sessions. Do not try to shorten or skip your sessions. You have a better chance of a longer and healthier life by getting your full treatment. · Your doctor or health care team will show you the steps you need to go through each day before, during, and after dialysis. Be sure to follow these steps. If you do not understand a step, talk to your team.  · Your doctor and dietitian will help you design menus that follow your diet. Be sure to follow your diet guidelines. ? You will need to limit fluids and certain foods that contain salt (sodium), potassium, and phosphorus. ? You may need to follow a heart-healthy diet to keep the fat (cholesterol) in your blood under control. ? You may need higher levels of protein in your diet. · Your doctor may recommend certain vitamins. But do not take any other medicine, including over-the-counter medicines, vitamins, and herbal products, without talking to your doctor first.  · Do not smoke. Smoking raises your risk of many health problems, including more kidney damage. If you need help quitting, talk to your doctor about stop-smoking programs and medicines. These can increase your chances of quitting for good. · Do not take ibuprofen (Advil, Motrin), naproxen (Aleve), or similar medicines, unless your doctor tells you to. These medicines may make kidney problems worse. When should you call for help? Call your doctor now or seek immediate medical care if:    · You have a fever.     · You are dizzy or lightheaded, or you feel like you may faint.     · You are confused or cannot think clearly.     · You have new or worse nausea or vomiting.     · You have new or more blood in your urine.     · You have new swelling. Watch closely for changes in your health, and be sure to contact your doctor if:    · You do not get better as expected. Where can you learn more?   Go to http://www.[employer].com/  Enter V012 in the search box to learn more about \"Kidney Dialysis: Care Instructions. \"  Current as of: December 17, 2020               Content Version: 12.8  © 2006-2021 [employer]. Care instructions adapted under license by [employer] (which disclaims liability or warranty for this information). If you have questions about a medical condition or this instruction, always ask your healthcare professional. [place] [address] any warranty or liability for your use of this information. Patient Education        Coronary Angioplasty: What to Expect at Home  Your Recovery     Coronary angioplasty is a procedure that is used to open a narrowed or blocked coronary artery. It may also be called a percutaneous coronary intervention (PCI). The doctor opened your narrowed or blocked artery by putting a thin tube, called a catheter, into your heart through a blood vessel. The catheter was inserted into the blood vessel in your groin or arm. The doctor may have placed a small tube, called a stent, in the artery. Your groin or arm may have a bruise and feel sore for a day or two after the procedure. You can do light activities around the house. But don't do anything strenuous for a day or two. This care sheet gives you a general idea about how long it will take for you to recover. But each person recovers at a different pace. Follow the steps below to get better as quickly as possible. How can you care for yourself at home? Activity    · If the doctor gave you a sedative:  ? For 24 hours, don't do anything that requires attention to detail, such as going to work, making important decisions, or signing any legal documents. It takes time for the medicine's effects to completely wear off.  ? For your safety, do not drive or operate any machinery that could be dangerous.  Wait until the medicine wears off and you can think clearly and react easily.     · Do not do strenuous exercise and do not lift, pull, or push anything heavy until your doctor says it is okay. This may be for a day or two. You can walk around the house and do light activity, such as cooking.     · If the catheter was placed in your groin, try not to walk up stairs for the first couple of days.     · If the catheter was placed in your arm near your wrist, do not bend your wrist deeply for the first couple of days. Be careful using your hand to get into and out of a chair or bed.     · Carry your stent identification card with you at all times.     · If your doctor recommends it, get more exercise. Walking is a good choice. Bit by bit, increase the amount you walk every day. Try for at least 30 minutes on most days of the week.     · If you haven't been set up with a cardiac rehab program, talk to your doctor about whether rehab is right for you. Cardiac rehab includes supervised exercise. It also includes help with diet and lifestyle changes and emotional support. Diet    · Drink plenty of fluids to help your body flush out the dye. If you have kidney, heart, or liver disease and have to limit fluids, talk with your doctor before you increase the amount of fluids you drink.     · Keep eating a heart-healthy diet that has lots of fruits, vegetables, and whole grains. If you have not been eating this way, talk to your doctor. You also may want to talk to a dietitian. This expert can help you to learn about healthy foods and plan meals. Medicines    · Your doctor will tell you if and when you can restart your medicines. Your doctor will also give you instructions about taking any new medicines.     · If you take aspirin or some other blood thinner, ask your doctor if and when to start taking it again. Make sure that you understand exactly what your doctor wants you to do.     · Your doctor will prescribe blood-thinning medicines.  You will likely take aspirin plus another antiplatelet, such as clopidogrel (Plavix). It is very important that you take these medicines exactly as directed. These medicines help keep the coronary artery open and reduce your risk of a heart attack.     · Call your doctor if you think you are having a problem with your medicine. Care of the catheter site    · For 1 or 2 days, keep a bandage over the spot where the catheter was inserted. The bandage probably will fall off in this time.     · Put ice or a cold pack on the area for 10 to 20 minutes at a time to help with soreness or swelling. Put a thin cloth between the ice and your skin.     · You may shower 24 to 48 hours after the procedure, if your doctor okays it. Pat the incision dry.     · Do not soak the catheter site until it is healed. Don't take a bath for 1 week, or until your doctor tells you it is okay.     · Watch for bleeding from the site. A small amount of blood (up to the size of a quarter) on the bandage can be normal.     · If you are bleeding, lie down and press on the area for 15 minutes to try to make it stop. If the bleeding does not stop, call your doctor or seek immediate medical care. Follow-up care is a key part of your treatment and safety. Be sure to make and go to all appointments, and call your doctor if you are having problems. It's also a good idea to know your test results and keep a list of the medicines you take. When should you call for help? Call 911 anytime you think you may need emergency care. For example, call if:    · You passed out (lost consciousness).     · You have severe trouble breathing.     · You have sudden chest pain and shortness of breath, or you cough up blood.     · You have symptoms of a heart attack, such as:  ? Chest pain or pressure. ? Sweating. ? Shortness of breath. ? Nausea or vomiting. ? Pain that spreads from the chest to the neck, jaw, or one or both shoulders or arms. ? Dizziness or lightheadedness.   ? A fast or uneven pulse.  After calling 911, chew 1 adult-strength aspirin. Wait for an ambulance. Do not try to drive yourself.     · You have been diagnosed with angina, and you have angina symptoms that do not go away with rest or are not getting better within 5 minutes after you take one dose of nitroglycerin. Call your doctor now or seek immediate medical care if:    · You are bleeding from the area where the catheter was put in your artery.     · You have a fast-growing, painful lump at the catheter site.     · You have signs of infection, such as:  ? Increased pain, swelling, warmth, or redness. ? Red streaks leading from the catheter site. ? Pus draining from the catheter site. ? A fever.     · Your leg, arm, or hand is painful, looks blue, or feels cold, numb, or tingly. Watch closely for changes in your health, and be sure to contact your doctor if you have any problems. Where can you learn more? Go to http://www.gray.com/  Enter K648 in the search box to learn more about \"Coronary Angioplasty: What to Expect at Home. \"  Current as of: August 31, 2020               Content Version: 12.8  © 5484-2176 [employer]. Care instructions adapted under license by [employer] (which disclaims liability or warranty for this information). If you have questions about a medical condition or this instruction, always ask your healthcare professional. [place] [address] any warranty or liability for your use of this information. Patient Education      Clopidogrel (Plavix) - (By mouth)   Why this medicine is used:   Helps prevent stroke, heart attack, and other heart problems.   Contact a nurse or doctor right away if you have:  · Sudden or severe headache  · Bloody vomit or vomit that looks like coffee grounds; bloody or black, tarry stools  · Bleeding that does not stop or bruises that do not heal  · Dark urine or pale stools, nausea, loss of appetite, stomach pain,  · Yellow skin or eyes     Common side effects:  · Minor bleeding or bruising  © 2017 [facility] Information is for End User's use only and may not be sold, redistributed or otherwise used for commercial purposes.

## 2021-06-22 NOTE — DISCHARGE SUMMARY
Cardiology Discharge Summary      Discharge Summary     Patient: Gualberto Ford MRN: [id number]  SSN: [id number]    YOB: 1970  Age: 52 y.o. Sex: male       Attending: Dr. Lavinia Alcala Date: 6/21/2021    Discharge Date: 6/22/2021      Admission Diagnoses: End stage renal disease ([facility] [N18.6]  CAD (coronary artery disease) [I25.10]    Discharge Diagnoses:   Patient seen and examined in dialysis unit. No particular problems overnight. No chest pain or other complaints. Discussed delay of his potential renal transplant in light of the new drug-eluting stent. He will discuss further with Dr. Robert Bradshaw in the office. Agree with plans for discharge today.   Wil Woods MD      Problem List as of 6/22/2021 Date Reviewed: 6/15/2021        Codes Class Noted - Resolved    Hypercalcemia ICD-10-CM: E83.52  ICD-9-CM: 275.42  1/22/2021 - Present        Hyperkalemia ICD-10-CM: E87.5  ICD-9-CM: 276.7  1/22/2021 - Present        ESRD (end stage renal disease) ([facility] ICD-10-CM: N18.6  ICD-9-CM: 585.6  1/22/2021 - Present        NSTEMI (non-ST elevated myocardial infarction) ([facility] ICD-10-CM: I21.4  ICD-9-CM: 410.70  1/22/2021 - Present        Atrial fibrillation with RVR (HCC) ICD-10-CM: I48.91  ICD-9-CM: 427.31  1/22/2021 - Present        Anemia of chronic disease ICD-10-CM: D63.8  ICD-9-CM: 285.29  3/7/2019 - Present        Type 2 diabetes with nephropathy ([facility] ICD-10-CM: E11.21  ICD-9-CM: 250.40, 583.81  1/4/2019 - Present        Atrial fibrillation with rapid ventricular response (HCC) ICD-10-CM: I48.91  ICD-9-CM: 427.31  12/5/2018 - Present        Dyslipidemia ICD-10-CM: E78.5  ICD-9-CM: 272.4  6/21/2016 - Present        Severe nonproliferative diabetic retinopathy with macular edema, associated with type 2 diabetes mellitus ICD-10-CM: F42.6612  ICD-9-CM: 250.50, 362.06, 362.07  5/12/2015 - Present        Legally blind ICD-10-CM: H54.8  ICD-9-CM: 369.4  10/28/2014 - Present        Diabetic retinopathy [facility]) ICD-10-CM: E11.319  ICD-9-CM: 250.50, 362.01  10/28/2014 - Present        CAD (coronary artery disease) ICD-10-CM: I25.10  ICD-9-CM: 414.00  Unknown - Present        CRI (chronic renal insufficiency) ICD-10-CM: N18.9  ICD-9-CM: 585.9  1/23/2012 - Present    Overview Signed 1/23/2012 11:33 AM by Jody Bajwa     Stage 3, Cr approx 1.8             Diabetes mellitus ([facility] ICD-10-CM: E11.9  ICD-9-CM: 250.00  1/23/2012 - Present        Cardiomyopathy ([facility] ICD-10-CM: I42.9  ICD-9-CM: 425.4  1/23/2012 - Present        Iron deficiency anemia ICD-10-CM: D50.9  ICD-9-CM: 280.9  1/23/2012 - Present        Benign hypertensive  ICD-10-CM: I11.9  ICD-9-CM: 402.10  Unknown - Present        RESOLVED: Cardiomyopathy ([facility] ICD-10-CM: I42.9  ICD-9-CM: 425.4  5/7/2012 - 10/28/2014        RESOLVED: Anasarca ICD-10-CM: R60.1  ICD-9-CM: 782.3  3/12/2012 - 10/28/2014        RESOLVED: Acute systolic congestive heart failure ([facility] ICD-10-CM: I50.21  ICD-9-CM: 428.21, 428.0  Unknown - 10/28/2014               Discharge Condition: Good    Hospital Course: Patient with known CAD and ESRD on renal transplant list presented for elective cardiac catheterization as part of renal transplant evaluation. Patent is now s/p PCI as noted below. Patient tolerated procedure well. Patient will be discharged after HD today. Patient will be on ASA 81, Plavix 75 and Eliquis for 1 month. Patient will then be on Plavix 75 and Eliquis for the next 5 months. Patient will then continue Eliquis alone after 6 months. Consults: None    Significant Diagnostic Studies: angiography:     Conclusion    · Left dominant system. · Small nondominant RCA with diffuse 70% stenosis. · Left main is patent. There is distal 25% stenosis noted. · Circumflex is dominant. No significant stenosis greater than 30% as noted. · LAD has proximal stent with ISR, 90%, IFR 0.75. This lesion was successfully stented using 3.25 mm AMOL, 12 mm length.  Post procedure IFR was 0.97.  · LVEDP was elevated at 24 mmHg. There was no gradient across the aortic valve. Left ventriculogram was not performed, to preserve contrast use. · In August 2019, his IFR was 0.93-0.95 and his proximal LAD ISR. His IFR today was 0.75. His post procedure IFR was 0.97. Disposition: home    Discharge Medications:      My Medications      START taking these medications      Instructions Each Dose to Equal Morning Noon Evening Bedtime   clopidogreL 75 mg Tab  Commonly known as: PLAVIX    Your last dose was: Your next dose is: Take 1 Tablet by mouth daily. 75 mg                 nitroglycerin 400 mcg/spray spray  Commonly known as: NITROLINGUAL    Your last dose was: Your next dose is:         1 Spray by SubLINGual route every five (5) minutes as needed for Chest Pain. 1 Spray                    CONTINUE taking these medications      Instructions Each Dose to Equal Morning Noon Evening Bedtime   apixaban 2.5 mg tablet  Commonly known as: ELIQUIS    Your last dose was: Your next dose is: Take 1 Tab by mouth every twelve (12) hours. 2.5 mg                 aspirin delayed-release 81 mg tablet    Your last dose was: Your next dose is: Take 81 mg by mouth daily. 81 mg                 atorvastatin 40 mg tablet  Commonly known as: LIPITOR    Your last dose was: Your next dose is: Take 1 Tab by mouth daily. 40 mg                 Blood-Glucose Meter monitoring kit    Your last dose was: Your next dose is:         Check fasting glucose                  carvediloL 25 mg tablet  Commonly known as: COREG    Your last dose was: Your next dose is: Take 0.5 Tablets by mouth two (2) times daily (with meals). 12.5 mg                 glimepiride 1 mg tablet  Commonly known as: AMARYL    Your last dose was: Your next dose is: Take 1 Tab by mouth Daily (before breakfast).    1 mg                 glucose blood VI test strips strip  Commonly known as: Accu-Chek Beatrice Plus test strp    Your last dose was: Your next dose is:         Use as directed                  lisinopriL 10 mg tablet  Commonly known as: Hermelinda Victoria    Your last dose was: Your next dose is: Take 1 Tablet by mouth daily. 10 mg                 Velphoro 500 mg Chew chewable tablet  Generic drug: sucroferric oxyhydroxide    Your last dose was: Your next dose is: Take 500 mg by mouth three (3) times daily (with meals).    500 mg                    STOP taking these medications    tadalafiL 20 mg tablet  Commonly known as: Cialis              Where to Get Your Medications      These medications were sent to [facility], [address] [facility]  [address] [place] [address] [address] [place] [address]    Phone: [phone]   · carvediloL 25 mg tablet  · clopidogreL 75 mg Tab  · lisinopriL 10 mg tablet  · nitroglycerin 400 mcg/spray spray         Activity: Activity as directed  Diet: Cardiac Diet  Wound Care: As directed    Follow-up Appointments   Procedures    FOLLOW UP VISIT Appointment in: One Month Dr. Zeus Polanco     Standing Status:   Standing     Number of Occurrences:   1     Order Specific Question:   Appointment in     Answer:   One Month       Signed By: Brad Green     June 22, 2021

## 2021-06-22 NOTE — PROGRESS NOTES
859 [facility] patient blood sugar per protocol. Critical result of 48 and 44. RN Rafadada Thornton notified. 3147 Following nursing intervention, pt blood sugar rechecked and found to be 92.

## 2021-06-23 LAB
ATRIAL RATE: 81 BPM
CALCULATED P AXIS, ECG09: 29 DEGREES
CALCULATED R AXIS, ECG10: -14 DEGREES
CALCULATED T AXIS, ECG11: 25 DEGREES
DIAGNOSIS, 93000: NORMAL
P-R INTERVAL, ECG05: 156 MS
Q-T INTERVAL, ECG07: 394 MS
QRS DURATION, ECG06: 68 MS
QTC CALCULATION (BEZET), ECG08: 457 MS
VENTRICULAR RATE, ECG03: 81 BPM

## 2021-06-24 ENCOUNTER — OFFICE VISIT (OUTPATIENT)
Dept: FAMILY MEDICINE CLINIC | Age: 51
End: 2021-06-24
Payer: MEDICARE

## 2021-06-24 VITALS
WEIGHT: 202 LBS | BODY MASS INDEX: 28.92 KG/M2 | SYSTOLIC BLOOD PRESSURE: 146 MMHG | OXYGEN SATURATION: 98 % | HEIGHT: 70 IN | TEMPERATURE: 98.1 F | RESPIRATION RATE: 16 BRPM | DIASTOLIC BLOOD PRESSURE: 74 MMHG | HEART RATE: 75 BPM

## 2021-06-24 DIAGNOSIS — N18.6 END STAGE RENAL FAILURE ON DIALYSIS (HCC): ICD-10-CM

## 2021-06-24 DIAGNOSIS — E11.21 TYPE 2 DIABETES WITH NEPHROPATHY (HCC): ICD-10-CM

## 2021-06-24 DIAGNOSIS — I25.10 CORONARY ARTERY DISEASE INVOLVING NATIVE CORONARY ARTERY OF NATIVE HEART WITHOUT ANGINA PECTORIS: Primary | ICD-10-CM

## 2021-06-24 DIAGNOSIS — Z99.2 END STAGE RENAL FAILURE ON DIALYSIS (HCC): ICD-10-CM

## 2021-06-24 PROCEDURE — 99496 TRANSJ CARE MGMT HIGH F2F 7D: CPT | Performed by: STUDENT IN AN ORGANIZED HEALTH CARE EDUCATION/TRAINING PROGRAM

## 2021-06-24 PROCEDURE — G8427 DOCREV CUR MEDS BY ELIG CLIN: HCPCS | Performed by: STUDENT IN AN ORGANIZED HEALTH CARE EDUCATION/TRAINING PROGRAM

## 2021-06-24 NOTE — PROGRESS NOTES
1. Have you been to the ER, urgent care clinic since your last visit? Hospitalized since your last visit? Yes June 21,2021 to June 22,2021 [facility] for CAD     2. Have you seen or consulted any other health care providers outside of the [address] [place] [address] since your last visit? Include any pap smears or colon screening.  No

## 2021-06-24 NOTE — PROGRESS NOTES
Shawna Schmidt (: 1970) is a 52 y.o. male, established patient, here for evaluation of the following chief complaint(s):  Hospital Follow Up (CAD (PCP: Dr. James Casey) ) and CHF       ASSESSMENT/PLAN:  Below is the assessment and plan developed based on review of pertinent history, physical exam, labs, studies, and medications. 1. Coronary artery disease involving native coronary artery of native heart without angina pectoris  This condition managed by the cardiologist.  Patient advised to continue medications as prescribed and take ASA 81, Plavix 75 and Eliquis for 1 month. Patient will then be on Plavix 75 and Eliquis for the next 5 months. Patient will then continue Eliquis alone after 6 months. Next appointment with cardiology, Tami Johnson NP scheduled on 2021.  2. Type 2 diabetes with nephropathy ([facility]  Currently on glimepiride (Amaryl) 1 mg/daily. Most recent A1c (2021): 7.6%. Diabetic diet recommended. Continue Amaryl as prescribed. 3. End stage renal failure on dialysis [facility])  This condition managed by the nephrologist.  Patient on monthly dialysis care and renal transplant list.     Return in about 4 months (around 10/22/2021) for routine care with PCP . SUBJECTIVE/OBJECTIVE:  PIPE Inman is a 52 y.o. male with known type 2 diabetes, CAD and ESRD on renal transplant list presenting today for follow-up after being discharged from [facility]. Date of admission: 2021  Date of discharge: 2021  The discharge summary was reviewed. The main problem requiring admission was elective cardiac catheterization as part of renal transplant evaluation. Patient tolerated procedure well.   Angiography: Conclusion   · Left dominant system. · Small nondominant RCA with diffuse 70% stenosis. · Left main is patent. There is distal 25% stenosis noted. · Circumflex is dominant. No significant stenosis greater than 30% as noted.   · LAD has proximal stent with ISR, 90%, IFR 0.75. This lesion was successfully stented using 3.25 mm AMOL, 12 mm length. Post procedure IFR was 0.97.  · LVEDP was elevated at 24 mmHg. There was no gradient across the aortic valve. Left ventriculogram was not performed, to preserve contrast use. · In August 2019, his IFR was 0.93-0.95 and his proximal LAD ISR. His IFR today was 0.75. His post procedure IFR was 0.97. Patient will be on ASA 81, Plavix 75 and Eliquis for 1 month. Patient will then be on Plavix 75 and Eliquis for the next 5 months. Patient will then continue Eliquis alone after 6 months. Next appointment with cardiology, Татьяна Tucker NP scheduled on 7/21/2021. Blood pressure elevated in the office today, patient states he presented directly from dialysis, did not have meal and did not take his medications. Specific concerns today: None. Review of Systems    Physical Exam  General:  alert, cooperative, well appearing, in no apparent distress. Skin: No rash  CV: The heart sounds are regular in rate and rhythm. There is a normal S1 and S2.    Lungs: Inspiratory and expiratory efforts are full and unlabored. Lung sounds are clear and equal to auscultation throughout all lung fields without wheezing, rales, or rhonchi. GI:  The abdomen is soft and nontender. Extremities: There is no edema. An electronic signature was used to authenticate this note.   -- Liz De Jesus PA-C

## 2021-06-24 NOTE — PATIENT INSTRUCTIONS
Coronary Artery Disease: Care Instructions  Your Care Instructions     The heart is a muscle, and like any muscle, it needs blood to work well. Coronary artery disease occurs when the arteries that bring oxygen-rich blood to your heart have a buildup of plaque--deposits of fats and other substances. Plaque can reduce blood flow to the heart muscle. This can cause angina symptoms such as chest pain or pressure. A heart attack can happen if blood flow is completely blocked. You can do a lot to improve your health and prevent a heart attack. Eating healthy food, not smoking, getting regular exercise, and taking your medicine are the main things you can do every day to stay healthy. Follow-up care is a key part of your treatment and safety. Be sure to make and go to all appointments, and call your doctor if you are having problems. It's also a good idea to know your test results and keep a list of the medicines you take. How can you care for yourself at home? Medicines    · Be safe with medicines. Take your medicines exactly as prescribed. Call your doctor if you think you are having a problem with your medicine. You will get more details on the specific medicines your doctor prescribes.     · You will take medicines that lower your risk of a heart attack and lower your risk of dying early from heart disease. These medicines include:  ? Angiotensin-converting enzyme (ACE) inhibitors or angiotensin II receptor blockers (ARBs). They lower blood pressure. ? Aspirin and other blood thinners. They prevent blood clots that could cause a heart attack. ? Beta-blockers. They lower the heart's workload. ? Statins and other cholesterol medicines. They lower cholesterol.     · If your doctor has given you nitroglycerin for angina symptoms (such as chest pain or pressure) keep it with you at all times. If you have symptoms, sit down and rest, and take the first dose of nitroglycerin as directed.  If your symptoms get worse or are not getting better within 5 minutes, call 911 right away. Stay on the phone. The emergency  will give you further instructions.     · Do not take any over-the-counter medicines, vitamins, or herbal products without talking to your doctor first.   Lifestyle  Ask your doctor if a cardiac rehab program is right for you. Cardiac rehab can help you make lifestyle changes. In cardiac rehab, a team of health professionals provides education and support to help you make new, healthy habits.    · Do not smoke. Avoid secondhand smoke too. Smoking can increase your risk of a heart attack or stroke. If you need help quitting, talk to your doctor about stop-smoking programs and medicines. These can increase your chances of quitting for good.     · Eat heart-healthy foods. These include vegetables, fruits, nuts, beans, lean meat, fish, and whole grains. Limit saturated fat, sodium, and alcohol. Limit drinks and foods with added sugar.     · If your doctor recommends it, get more exercise. Ask your doctor what level of exercise is safe for you. Walking is a good choice. Bit by bit, increase the amount you walk every day. Try for at least 30 minutes on most days of the week. You also may want to swim, bike, or do other activities.     · Stay at a healthy weight. Lose weight if you need to.     · Manage other health problems. These include diabetes, high blood pressure, and high cholesterol. If you think you may have a problem with alcohol or drug use, talk to your doctor.     · If you have angina symptoms, pay attention to your symptoms. This can help you see what causes them and what is typical for you.     · Avoid colds and flu. Get a pneumococcal vaccine shot. If you have had one before, ask your doctor whether you need another dose. Get a flu vaccine every year. If you must be around people with colds or flu, wash your hands often.     · If you think you have symptoms of depression, talk to your doctor. Symptoms include feeling sad or hopeless most of the time, or losing interest in activities that used to make you happy. When should you call for help? Call 911 anytime you think you may need emergency care. For example, call if:    · You have symptoms of a heart attack. These may include:  ? Chest pain or pressure, or a strange feeling in the chest.  ? Sweating. ? Shortness of breath. ? Nausea or vomiting. ? Pain, pressure, or a strange feeling in the back, neck, jaw, or upper belly or in one or both shoulders or arms. ? Lightheadedness or sudden weakness. ? A fast or irregular heartbeat. After you call 911, the  may tell you to chew 1 adult-strength or 2 to 4 low-dose aspirin. Wait for an ambulance. Do not try to drive yourself.     · You have angina symptoms (such as chest pain or pressure) that do not go away with rest or are not getting better within 5 minutes after you take a dose of nitroglycerin.     · You passed out (lost consciousness). Call your doctor now or seek immediate medical care if:    · You are having angina symptoms, such as chest pain or pressure, more often than usual, or they are different or worse than usual.     · You have new or increased shortness of breath.     · You are dizzy or lightheaded, or you feel like you may faint. Watch closely for changes in your health, and be sure to contact your doctor if you have any problems. Where can you learn more? Go to http://www.gray.com/  Enter J307 in the search box to learn more about \"Coronary Artery Disease: Care Instructions. \"  Current as of: August 31, 2020               Content Version: 12.8  © 0656-4990 [employer]. Care instructions adapted under license by [employer] (which disclaims liability or warranty for this information).  If you have questions about a medical condition or this instruction, always ask your healthcare professional. [employer] disclaims any warranty or liability for your use of this information. Learning About Meal Planning for Diabetes  Why plan your meals? Meal planning can be a key part of managing diabetes. Planning meals and snacks with the right balance of carbohydrate, protein, and fat can help you keep your blood sugar at the target level you set with your doctor. You don't have to eat special foods. You can eat what your family eats, including sweets once in a while. But you do have to pay attention to how often you eat and how much you eat of certain foods. You may want to work with a dietitian or a certified diabetes educator. He or she can give you tips and meal ideas and can answer your questions about meal planning. This health professional can also help you reach a healthy weight if that is one of your goals. What plan is right for you? Your dietitian or diabetes educator may suggest that you start with the plate format or carbohydrate counting. The plate format  The plate format is a simple way to help you manage how you eat. You plan meals by learning how much space each food should take on a plate. Using the plate format helps you spread carbohydrate throughout the day. It can make it easier to keep your blood sugar level within your target range. It also helps you see if you're eating healthy portion sizes. To use the plate format, you put non-starchy vegetables on half your plate. Add meat or meat substitutes on one-quarter of the plate. Put a grain or starchy vegetable (such as brown rice or a potato) on the final quarter of the plate. You can add a small piece of fruit and some low-fat or fat-free milk or yogurt, depending on your carbohydrate goal for each meal.  Here are some tips for using the plate format:  · Make sure that you are not using an oversized plate. A 9-inch plate is best. Many restaurants use larger plates. · Get used to using the plate format at home.  Then you can use it when you eat out. · Write down your questions about using the plate format. Talk to your doctor, a dietitian, or a diabetes educator about your concerns. Carbohydrate counting  With carbohydrate counting, you plan meals based on the amount of carbohydrate in each food. Carbohydrate raises blood sugar higher and more quickly than any other nutrient. It is found in desserts, breads and cereals, and fruit. It's also found in starchy vegetables such as potatoes and corn, grains such as rice and pasta, and milk and yogurt. Spreading carbohydrate throughout the day helps keep your blood sugar levels within your target range. Your daily amount depends on several things, including your weight, how active you are, which diabetes medicines you take, and what your goals are for your blood sugar levels. A registered dietitian or diabetes educator can help you plan how much carbohydrate to include in each meal and snack. A guideline for your daily amount of carbohydrate is:  · 45 to 60 grams at each meal. That's about the same as 3 to 4 carbohydrate servings. · 15 to 20 grams at each snack. That's about the same as 1 carbohydrate serving. The Nutrition Facts label on packaged foods tells you how much carbohydrate is in a serving of the food. First, look at the serving size on the food label. Is that the amount you eat in a serving? All of the nutrition information on a food label is based on that serving size. So if you eat more or less than that, you'll need to adjust the other numbers. Total carbohydrate is the next thing you need to look for on the label. If you count carbohydrate servings, one serving of carbohydrate is 15 grams. For foods that don't come with labels, such as fresh fruits and vegetables, you'll need a guide that lists carbohydrate in these foods. Ask your doctor, dietitian, or diabetes educator about books or other nutrition guides you can use.   If you take insulin, you need to know how many grams of carbohydrate are in a meal. This lets you know how much rapid-acting insulin to take before you eat. If you use an insulin pump, you get a constant rate of insulin during the day. So the pump must be programmed at meals to give you extra insulin to cover the rise in blood sugar after meals. When you know how much carbohydrate you will eat, you can take the right amount of insulin. Or, if you always use the same amount of insulin, you need to make sure that you eat the same amount of carbohydrate at meals. If you need more help to understand carbohydrate counting and food labels, ask your doctor, dietitian, or diabetes educator. How can you plan healthy meals? Here are some tips to get started:  · Plan your meals a week at a time. Don't forget to include snacks too. · Use cookbooks or online recipes to plan several main meals. Plan some quick meals for busy nights. You also can double some recipes that freeze well. Then you can save half for other busy nights when you don't have time to cook. · Make sure you have the ingredients you need for your recipes. If you're running low on basic items, put these items on your shopping list too. · List foods that you use to make breakfasts, lunches, and snacks. List plenty of fruits and vegetables. · Post this list on the refrigerator. Add to it as you think of more things you need. · Take the list to the store to do your weekly shopping. Follow-up care is a key part of your treatment and safety. Be sure to make and go to all appointments, and call your doctor if you are having problems. It's also a good idea to know your test results and keep a list of the medicines you take. Where can you learn more? Go to http://www.gray.com/  Enter B211 in the search box to learn more about \"Learning About Meal Planning for Diabetes. \"  Current as of: August 31, 2020               Content Version: 12.8  © 2948-9198 [employer], [facility].    Care instructions adapted under license by [employer] (which disclaims liability or warranty for this information). If you have questions about a medical condition or this instruction, always ask your healthcare professional. [address] any warranty or liability for your use of this information. Diabetic Renal Diet: Care Instructions  Your Care Instructions     You may already be spreading carbohydrate throughout your daily meals. When you also have kidney disease, you need to avoid foods that make your kidneys worse. Keep your blood sugar and blood pressure as near normal as you can to reduce your chance of kidney failure. Your doctor and dietitian will help you make an eating plan. It will be based on your body weight, size, and medical condition. You may need to limit salt, fluids, and protein. You also may need to limit minerals such as potassium and phosphorus. It takes planning, but there are plenty of tasty, healthy foods you can eat. Always talk with your doctor or dietitian before you make changes in your diet. Follow-up care is a key part of your treatment and safety. Be sure to make and go to all appointments, and call your doctor if you are having problems. It's also a good idea to know your test results and keep a list of the medicines you take. How can you care for yourself at home? · Work with your doctor or dietitian to create a food plan that guides your daily food choices. · Do not skip meals or go for many hours without eating. · You can use margarine, mayonnaise, and oil to add calories to your diet for energy. The healthiest oils are olive, canola, and safflower oils. · Talk to your dietitian about eating sweets, including honey and sugar. · Be safe with medicines. Take your medicines exactly as prescribed. Call your doctor if you think you are having a problem with your medicine.  You will get more details on the specific medicines your doctor prescribes. · Do not take any other medicine without talking to your doctor first. This includes over-the-counter medicines, vitamins, and herbal products. · Limit alcohol to no more than 1 drink per day. Count it as part of your fluid allowance. To get the right amount of protein  · Ask your doctor or dietitian how much protein you can have each day. You need some protein to stay healthy. · Include all sources of protein in your daily protein count. Besides meat, poultry, and fish, protein is found in milk and milk products, beans, peas, lentils, nuts, seeds, tofu, and eggs. Check for protein on the nutrition facts label found on packages of food such as bread and cereal.  To limit salt  · Do not add salt to your food. And look for \"reduced salt\" or \"low sodium\" on labels. · Do not use a salt substitute or lite salt unless your doctor says it is okay. (These products are high in potassium.)  · Avoid or use very small amounts of condiments and marinades. These include soy sauce, fish sauce, and barbecue sauce. They are high in sodium. · Avoid salted pretzels, chips, and other salted snacks. · Check food labels to become more aware of the sodium content of foods. Foods that are high in sodium include soups; many canned foods; cured, smoked, or dried meats; and many packaged foods. To control carbohydrate  · Ask your dietitian how much carbohydrate you can have. Carbohydrate foods include:  ? Whole-grain and refined breads and cereals, and some vegetables such as peas and beans. ? Fruits, milk, and milk products (except cheese). ? Candy, table sugar, and regular carbonated drinks. To limit fluids  · Know what your fluid allowance is. Fill a pitcher with that amount of water every day. If you drink another fluid (such as coffee) that day, pour an equal amount out of the pitcher. · Foods that are liquid at room temperature count as fluids.  These include ice, gelatin, ice pops, and ice cream.  To limit potassium  · Fruits that are low in potassium include blueberries and raspberries. · Vegetables that are low in potassium include cucumber and radishes. To limit phosphorus  · Follow your doctor's or dietitian's plan for your limit on milk and milk products in your diet. · Avoid nuts, peanut butter, seeds, lentils, beans, organ meats, and sardines. · Avoid cola drinks. · Avoid bran breads or bran cereals. They are high in phosphorus. Where can you learn more? Go to http://www.gray.com/  Enter [id number] in the search box to learn more about \"Diabetic Renal Diet: Care Instructions. \"  Current as of: August 31, 2020               Content Version: 12.8  © 1159-4139 Healthwise, [employer]. Care instructions adapted under license by [employer] (which disclaims liability or warranty for this information). If you have questions about a medical condition or this instruction, always ask your healthcare professional. [place] [address] any warranty or liability for your use of this information.

## 2021-07-12 ENCOUNTER — TELEPHONE (OUTPATIENT)
Dept: CARDIAC REHAB | Age: 51
End: 2021-07-12

## 2021-07-12 NOTE — TELEPHONE ENCOUNTER
Cardiac Rehab called patient, but was unable to reach or leave a message due to his phone ringing busy. Additional attempts at contact will be made.     Thank you,  Deion Ren

## 2021-08-03 PROBLEM — I48.91 ATRIAL FIBRILLATION WITH RAPID VENTRICULAR RESPONSE (HCC): Status: RESOLVED | Noted: 2018-12-05 | Resolved: 2021-08-03

## 2021-08-04 ENCOUNTER — TELEPHONE (OUTPATIENT)
Dept: CARDIAC REHAB | Age: 51
End: 2021-08-04

## 2021-08-04 NOTE — TELEPHONE ENCOUNTER
Cardiac Rehab called patient, but was unable to reach because his phone was ringing busy. Additional attempts at contact will be made.     Thank you,  Sruthi Ram

## 2021-10-01 LAB
CREATININE, EXTERNAL: 9.1
HBA1C MFR BLD HPLC: 8.3 %
LDL-C, EXTERNAL: 130
PSA, EXTERNAL: 1.06

## 2021-11-05 ENCOUNTER — TELEPHONE (OUTPATIENT)
Dept: FAMILY MEDICINE CLINIC | Age: 51
End: 2021-11-05

## 2021-11-05 NOTE — TELEPHONE ENCOUNTER
Pt called and stated his nephrologist would like for him to stop taking Glimepiride 1mg. Pt would like for you to know.

## 2021-12-14 ENCOUNTER — APPOINTMENT (OUTPATIENT)
Dept: GENERAL RADIOLOGY | Age: 51
DRG: 286 | End: 2021-12-14
Attending: STUDENT IN AN ORGANIZED HEALTH CARE EDUCATION/TRAINING PROGRAM
Payer: MEDICARE

## 2021-12-14 ENCOUNTER — HOSPITAL ENCOUNTER (INPATIENT)
Age: 51
LOS: 3 days | Discharge: HOME HEALTH | DRG: 286 | End: 2021-12-17
Attending: STUDENT IN AN ORGANIZED HEALTH CARE EDUCATION/TRAINING PROGRAM | Admitting: FAMILY MEDICINE
Payer: MEDICARE

## 2021-12-14 DIAGNOSIS — I25.10 CORONARY ARTERY DISEASE, UNSPECIFIED VESSEL OR LESION TYPE, UNSPECIFIED WHETHER ANGINA PRESENT, UNSPECIFIED WHETHER NATIVE OR TRANSPLANTED HEART: ICD-10-CM

## 2021-12-14 DIAGNOSIS — I21.4 NSTEMI (NON-ST ELEVATED MYOCARDIAL INFARCTION) (HCC): ICD-10-CM

## 2021-12-14 DIAGNOSIS — I48.91 ATRIAL FIBRILLATION WITH RAPID VENTRICULAR RESPONSE (HCC): ICD-10-CM

## 2021-12-14 PROBLEM — I48.0 PAROXYSMAL A-FIB (HCC): Status: ACTIVE | Noted: 2021-12-14

## 2021-12-14 LAB
ALBUMIN SERPL-MCNC: 3.5 G/DL (ref 3.4–5)
ALBUMIN/GLOB SERPL: 0.9 {RATIO} (ref 0.8–1.7)
ALP SERPL-CCNC: 118 U/L (ref 45–117)
ALT SERPL-CCNC: 12 U/L (ref 16–61)
ANION GAP SERPL CALC-SCNC: 13 MMOL/L (ref 3–18)
AST SERPL-CCNC: 37 U/L (ref 10–38)
BASOPHILS # BLD: 0.1 K/UL (ref 0–0.1)
BASOPHILS NFR BLD: 1 % (ref 0–2)
BILIRUB SERPL-MCNC: 0.5 MG/DL (ref 0.2–1)
BUN SERPL-MCNC: 17 MG/DL (ref 7–18)
BUN/CREAT SERPL: 3 (ref 12–20)
CALCIUM SERPL-MCNC: 9.3 MG/DL (ref 8.5–10.1)
CALCULATED R AXIS, ECG10: -2 DEGREES
CALCULATED T AXIS, ECG11: 113 DEGREES
CHLORIDE SERPL-SCNC: 98 MMOL/L (ref 100–111)
CO2 SERPL-SCNC: 25 MMOL/L (ref 21–32)
CREAT SERPL-MCNC: 5.86 MG/DL (ref 0.6–1.3)
DIAGNOSIS, 93000: NORMAL
DIFFERENTIAL METHOD BLD: ABNORMAL
EOSINOPHIL # BLD: 0.3 K/UL (ref 0–0.4)
EOSINOPHIL NFR BLD: 4 % (ref 0–5)
ERYTHROCYTE [DISTWIDTH] IN BLOOD BY AUTOMATED COUNT: 12.4 % (ref 11.6–14.5)
GLOBULIN SER CALC-MCNC: 4 G/DL (ref 2–4)
GLUCOSE SERPL-MCNC: 149 MG/DL (ref 74–99)
HCT VFR BLD AUTO: 38.8 % (ref 36–48)
HGB BLD-MCNC: 12.8 G/DL (ref 13–16)
IMM GRANULOCYTES # BLD AUTO: 0 K/UL (ref 0–0.04)
IMM GRANULOCYTES NFR BLD AUTO: 0 % (ref 0–0.5)
LYMPHOCYTES # BLD: 1.1 K/UL (ref 0.9–3.6)
LYMPHOCYTES NFR BLD: 16 % (ref 21–52)
MAGNESIUM SERPL-MCNC: 2.2 MG/DL (ref 1.6–2.6)
MCH RBC QN AUTO: 29.6 PG (ref 24–34)
MCHC RBC AUTO-ENTMCNC: 33 G/DL (ref 31–37)
MCV RBC AUTO: 89.6 FL (ref 78–100)
MONOCYTES # BLD: 0.4 K/UL (ref 0.05–1.2)
MONOCYTES NFR BLD: 7 % (ref 3–10)
NEUTS SEG # BLD: 4.8 K/UL (ref 1.8–8)
NEUTS SEG NFR BLD: 72 % (ref 40–73)
NRBC # BLD: 0 K/UL (ref 0–0.01)
NRBC BLD-RTO: 0 PER 100 WBC
PLATELET # BLD AUTO: 210 K/UL (ref 135–420)
PMV BLD AUTO: 10.9 FL (ref 9.2–11.8)
POTASSIUM SERPL-SCNC: 4.8 MMOL/L (ref 3.5–5.5)
PROT SERPL-MCNC: 7.5 G/DL (ref 6.4–8.2)
Q-T INTERVAL, ECG07: 328 MS
QRS DURATION, ECG06: 88 MS
QTC CALCULATION (BEZET), ECG08: 513 MS
RBC # BLD AUTO: 4.33 M/UL (ref 4.35–5.65)
SODIUM SERPL-SCNC: 136 MMOL/L (ref 136–145)
TROPONIN-HIGH SENSITIVITY: 29 NG/L (ref 0–78)
VENTRICULAR RATE, ECG03: 147 BPM
WBC # BLD AUTO: 6.6 K/UL (ref 4.6–13.2)

## 2021-12-14 PROCEDURE — 99223 1ST HOSP IP/OBS HIGH 75: CPT | Performed by: STUDENT IN AN ORGANIZED HEALTH CARE EDUCATION/TRAINING PROGRAM

## 2021-12-14 PROCEDURE — 99223 1ST HOSP IP/OBS HIGH 75: CPT | Performed by: FAMILY MEDICINE

## 2021-12-14 PROCEDURE — 85025 COMPLETE CBC W/AUTO DIFF WBC: CPT

## 2021-12-14 PROCEDURE — 84484 ASSAY OF TROPONIN QUANT: CPT

## 2021-12-14 PROCEDURE — 96374 THER/PROPH/DIAG INJ IV PUSH: CPT

## 2021-12-14 PROCEDURE — 71045 X-RAY EXAM CHEST 1 VIEW: CPT

## 2021-12-14 PROCEDURE — 80053 COMPREHEN METABOLIC PANEL: CPT

## 2021-12-14 PROCEDURE — 74011250637 HC RX REV CODE- 250/637: Performed by: STUDENT IN AN ORGANIZED HEALTH CARE EDUCATION/TRAINING PROGRAM

## 2021-12-14 PROCEDURE — 74011250636 HC RX REV CODE- 250/636: Performed by: STUDENT IN AN ORGANIZED HEALTH CARE EDUCATION/TRAINING PROGRAM

## 2021-12-14 PROCEDURE — 93005 ELECTROCARDIOGRAM TRACING: CPT

## 2021-12-14 PROCEDURE — 83735 ASSAY OF MAGNESIUM: CPT

## 2021-12-14 PROCEDURE — 99285 EMERGENCY DEPT VISIT HI MDM: CPT

## 2021-12-14 PROCEDURE — 65660000000 HC RM CCU STEPDOWN

## 2021-12-14 PROCEDURE — 74011000250 HC RX REV CODE- 250: Performed by: STUDENT IN AN ORGANIZED HEALTH CARE EDUCATION/TRAINING PROGRAM

## 2021-12-14 PROCEDURE — 86706 HEP B SURFACE ANTIBODY: CPT

## 2021-12-14 PROCEDURE — 74011000258 HC RX REV CODE- 258: Performed by: STUDENT IN AN ORGANIZED HEALTH CARE EDUCATION/TRAINING PROGRAM

## 2021-12-14 PROCEDURE — 87340 HEPATITIS B SURFACE AG IA: CPT

## 2021-12-14 RX ORDER — SODIUM CHLORIDE, SODIUM LACTATE, POTASSIUM CHLORIDE, CALCIUM CHLORIDE 600; 310; 30; 20 MG/100ML; MG/100ML; MG/100ML; MG/100ML
500 INJECTION, SOLUTION INTRAVENOUS ONCE
Status: COMPLETED | OUTPATIENT
Start: 2021-12-14 | End: 2021-12-14

## 2021-12-14 RX ORDER — POLYETHYLENE GLYCOL 3350 17 G/17G
17 POWDER, FOR SOLUTION ORAL DAILY PRN
Status: DISCONTINUED | OUTPATIENT
Start: 2021-12-14 | End: 2021-12-17 | Stop reason: HOSPADM

## 2021-12-14 RX ORDER — ENOXAPARIN SODIUM 100 MG/ML
30 INJECTION SUBCUTANEOUS DAILY
Status: DISCONTINUED | OUTPATIENT
Start: 2021-12-15 | End: 2021-12-15 | Stop reason: SDUPTHER

## 2021-12-14 RX ORDER — SODIUM CHLORIDE 0.9 % (FLUSH) 0.9 %
5-40 SYRINGE (ML) INJECTION EVERY 8 HOURS
Status: DISCONTINUED | OUTPATIENT
Start: 2021-12-14 | End: 2021-12-17 | Stop reason: HOSPADM

## 2021-12-14 RX ORDER — METOPROLOL TARTRATE 50 MG/1
50 TABLET ORAL ONCE
Status: COMPLETED | OUTPATIENT
Start: 2021-12-14 | End: 2021-12-14

## 2021-12-14 RX ORDER — SODIUM CHLORIDE 0.9 % (FLUSH) 0.9 %
5-40 SYRINGE (ML) INJECTION AS NEEDED
Status: DISCONTINUED | OUTPATIENT
Start: 2021-12-14 | End: 2021-12-17 | Stop reason: HOSPADM

## 2021-12-14 RX ORDER — METOPROLOL TARTRATE 5 MG/5ML
5 INJECTION INTRAVENOUS ONCE
Status: COMPLETED | OUTPATIENT
Start: 2021-12-14 | End: 2021-12-14

## 2021-12-14 RX ORDER — ACETAMINOPHEN 325 MG/1
650 TABLET ORAL
Status: DISCONTINUED | OUTPATIENT
Start: 2021-12-14 | End: 2021-12-17 | Stop reason: HOSPADM

## 2021-12-14 RX ORDER — ACETAMINOPHEN 650 MG/1
650 SUPPOSITORY RECTAL
Status: DISCONTINUED | OUTPATIENT
Start: 2021-12-14 | End: 2021-12-17 | Stop reason: HOSPADM

## 2021-12-14 RX ORDER — ONDANSETRON 2 MG/ML
4 INJECTION INTRAMUSCULAR; INTRAVENOUS
Status: DISCONTINUED | OUTPATIENT
Start: 2021-12-14 | End: 2021-12-17 | Stop reason: HOSPADM

## 2021-12-14 RX ORDER — BACLOFEN 10 MG/1
5 TABLET ORAL
Status: DISCONTINUED | OUTPATIENT
Start: 2021-12-14 | End: 2021-12-15

## 2021-12-14 RX ORDER — ONDANSETRON 4 MG/1
4 TABLET, ORALLY DISINTEGRATING ORAL
Status: DISCONTINUED | OUTPATIENT
Start: 2021-12-14 | End: 2021-12-17 | Stop reason: HOSPADM

## 2021-12-14 RX ADMIN — SODIUM CHLORIDE, SODIUM LACTATE, POTASSIUM CHLORIDE, AND CALCIUM CHLORIDE 500 ML: 600; 310; 30; 20 INJECTION, SOLUTION INTRAVENOUS at 11:05

## 2021-12-14 RX ADMIN — METOPROLOL TARTRATE 5 MG: 1 INJECTION, SOLUTION INTRAVENOUS at 10:15

## 2021-12-14 RX ADMIN — METOPROLOL TARTRATE 50 MG: 50 TABLET, FILM COATED ORAL at 10:16

## 2021-12-14 RX ADMIN — SODIUM CHLORIDE 2.5 MG/HR: 900 INJECTION, SOLUTION INTRAVENOUS at 16:29

## 2021-12-14 NOTE — Clinical Note
Contrast Dose Calculator:   Patient's age: 52.   Patient's sex: Male. Patient weight (kg) = 93. Creatinine level (mg/dL) = 7.89. Creatinine clearance (mL/min): 15.06. Contrast concentration (mg/mL) = 300. MACD = 58.94 mL. Max Contrast dose per Creatinine Cl calculator = 33.89 mL.

## 2021-12-14 NOTE — Clinical Note
Status[de-identified] INPATIENT [101]   Type of Bed: Telemetry [19]   Cardiac Monitoring Required?: Yes   Inpatient Hospitalization Certified Necessary for the Following Reasons: 3.  Patient receiving treatment that can only be provided in an inpatient setting (further clarification in H&P documentation)   Admitting Diagnosis: Paroxysmal A-fib [facility]) [[id number]]   Admitting Physician: Ruel Mondragon [[id number]]   Attending Physician: Ruel Mondragon [[id number]]   Estimated Length of Stay: 3-4 Midnights   Discharge Plan[de-identified] Home with Office Follow-up

## 2021-12-14 NOTE — ED TRIAGE NOTES
Patient BIBA c/c SOB while at dialysis. Patient reports feeling hard to take deep breaths. Denies CP, N/V. Hx of afib. Patient taking Eliquis. Patient did not finish dialysis.

## 2021-12-14 NOTE — CONSULTS
Cardiology Initial Patient Referral Note    Cardiology referral request from Dr. Estrella Nesbitt for evaluation and management/treatment of afib RVR    Date of  Admission: 12/14/2021  9:37 AM   Primary Care Physician:  Beto Crocker MD    Attending Cardiologist: Dr. Dixon De La Torre:     -Paroxysmal atrial fibrillation. Sent from HD with Afib RVR with hypotension. On Eliquis for anticoagulation. -CAD. LAD PCI 2012. Cardiac cath 6/21/2021 with findings as follows:  · Left-dominant sytem. · Small nondominant RCA with diffuse 70% stenosis. · LM is patent. There is distal 25% stenosis noted. · Circumflex is dominant. No significant stenosis > 30% as noted. · LAD has proximal stent with ISR, 90%, IFR 0.75. This lesion was successfully stented using 3.25 x 12 mm AMOL. Post-procedure IFR was 0.97.  · Plan was triple therapy (ASA 81 mg, Plavix 75 mg and Eliquis) x 1 month, then Plavix and Eliquis for next 5 months and subsequently Eliquis monotherapy. · LVEDP was elevated at 24 mmHg. There was no gradient across the aortic valve. Left ventriculogram was not performed, to preserve contrast use. -Ischemic cardiomyopathy. EF 40% by echo 12/2018, 55-60% in 01/2021. Repeat Echo 10/1/2021 ([facility]) with EF 62%, concentric LVH with moderately thickened septal wall and mildly thickened posterior wall, normal RV function and size, normal LV diastolic function, no hemodynamically significant valvular disease. -ESRD on HD T/R/S, followed by Dr. Layo Deal.  -HTN.  -DM. -Dyslipidemia.  -Chronic anemia. -Diabetic retinopathy, legally blind. -H/o noncompliance.     Primary cardiologist Dr. Jane Triplett. Plan:   Patient seen and independently examined. Agree with below with the following comments:    - Agree with below in regards to rate control-coreg with addition of diltiazem drip as needed. Last LVEF normal as noted recently in October. Mary Gomez MD, [facility] [place]    -Would resume outpatient Coreg 12.5 mg BID.  BP currently up to 133/84, hold outpatient Lisinopril for now. If HR remains elevated, can start IV Cardizem for additional rate control.  -No need for repeat Echo this admission, pt had Echo at [facility] < 3 months ago. -Continue telemetry monitoring.  -Volume management per nephrology team.  Suspect tachycardia/hypotension could be due to dehydration given that pt had minimal intake yesterday. Pt also notes that he did not take his PM medications yesterday evening (including Coreg). History of Present Illness: This is a 52 y.o. male admitted for No admission diagnoses are documented for this encounter. .    Patient complains of: chest discomfort      Jennifer Thomas is a 52 y.o. male who presented to the hospital due to hypotension with chest discomfort onset approximately 2.5-3 hours into HD session this AM.  Pt states that he had dozed off and was awakening when he started feeling a funny feeling in his chest.  He states that he alerted staff and was advised that his BP was low, and he was subsequently transported via EMS after staff consulted with the attending physician. Pt states he is currently feeling improved, denies any chest discomfort currently. He admits to having lower PO intake yesterday, only having had a cup of coffee with hash browns and tompkins for breakfast and afterwards only a bottle of water yesterday evening. He states that he has otherwise been feeling well and compliant with his HD sessions. He notes that he last took ASA on Sunday 12/12 before running out. He reports that his BP was elevated when he went to HD yesterday. He admits to missing his evening medications yesterday and notes that he holds most of his AM medications prior to HD.       Cardiac risk factors: dyslipidemia, diabetes mellitus, male gender, hypertension, known CAD      Review of Symptoms:  Except as stated above include:  Constitutional:  negative  Respiratory:  negative  Cardiovascular:  As per HPI  Gastrointestinal: negative  Genitourinary:  negative  Musculoskeletal:  Negative  Neurological:  Negative  Dermatological:  Negative  Endocrinological: Negative  Psychological:  Negative     Past Medical History:     Past Medical History:   Diagnosis Date    Abnormal nuclear cardiac imaging test 10/08/2012    Fixed anteroseptal, anteroapical defect c/w prior infarction. No ischemia. Mid & distal anteroseptal, anteroapical WMA. LVE. EF 44%.  Anemia     dr. Monsivais Dom doubt amyloid or multiple myeloma. candidate for procirt if Hg <10    Benign hypertensive     Blindness of right eye 01/2013    legally blind    CAD (coronary artery disease)     Cardiomyopathy ejection fraction of 40%     CKD (chronic kidney disease), stage IV ([facility]     to see Dr. Jayden Abdi 12/1/12    Congestive heart failure ([facility] [address]     Diabetes mellitus ([facility]     Diabetic retinopathy ([facility] [address]     Diabetic retinopathy ([facility] [address]     Dr. Moy Miramontes- injections    [facility]     History of echocardiogram 04/22/2014    LVE. EF 55% (prev 40-45% on echo of 1/27/12). No WMA. Mild-mod conc LVH. Gr 3 DDfx. Marked LAE. Mild SHANTEL. Mild MR.    Hypertension     Pulmonary hypertension ([facility] [facility]     Renal Ultrasound 1/3/12    Right kidney isoechoic w/respect to liver. Sm left-sided pleural effusion    S/P cardiac cath 10/16/2012    LM patent. pLAD 95% (3.5 x 12-mm [place] stent, resid 0$%). LCX mild.            Social History:     Social History     Socioeconomic History    Marital status:    Tobacco Use    Smoking status: Never Smoker    Smokeless tobacco: Never Used   Substance and Sexual Activity    Alcohol use: Not Currently     Alcohol/week: 4.2 standard drinks     Types: 5 Cans of beer per week    Drug use: No    Sexual activity: Yes     Partners: Female     Birth control/protection: None        Family History:     Family History   Problem Relation Age of Onset    Diabetes Mother     Hypertension Mother     Kidney Disease Mother     High Cholesterol Father     Diabetes Brother         pre diabetic        Medications:   No Known Allergies     No current facility-administered medications for this encounter. Current Outpatient Medications   Medication Sig    clopidogreL (PLAVIX) 75 mg tab Take 1 Tablet by mouth daily.  nitroglycerin (NITROLINGUAL) 400 mcg/spray spray 1 Spray by SubLINGual route every five (5) minutes as needed for Chest Pain.  lisinopriL (PRINIVIL, ZESTRIL) 10 mg tablet Take 1 Tablet by mouth daily.  carvediloL (COREG) 25 mg tablet Take 0.5 Tablets by mouth two (2) times daily (with meals).  atorvastatin (LIPITOR) 40 mg tablet Take 1 Tab by mouth daily.  apixaban (ELIQUIS) 2.5 mg tablet Take 1 Tab by mouth every twelve (12) hours.  glimepiride (AMARYL) 1 mg tablet Take 1 Tab by mouth Daily (before breakfast).  Blood-Glucose Meter monitoring kit Check fasting glucose    glucose blood VI test strips (ACCU-CHEK ZAC PLUS TEST STRP) strip Use as directed    sucroferric oxyhydroxide (VELPHORO) 500 mg chew chewable tablet Take 500 mg by mouth three (3) times daily (with meals).  aspirin delayed-release 81 mg tablet Take 81 mg by mouth daily.          Physical Exam:     Visit Vitals  /61   Pulse (!) 136   Temp 97.7 °F (36.5 °C)   Resp 18   Ht 5' 11\" (1.803 m)   Wt 93 kg (205 lb 0.4 oz)   SpO2 100%   BMI 28.60 kg/m²       TELE: AFIB    BP Readings from Last 3 Encounters:   12/14/21 112/61   06/24/21 (!) 146/74   06/22/21 (!) 205/106     Pulse Readings from Last 3 Encounters:   12/14/21 (!) 136   06/24/21 75   06/22/21 75     Wt Readings from Last 3 Encounters:   12/14/21 93 kg (205 lb 0.4 oz)   06/24/21 91.6 kg (202 lb)   06/22/21 96.8 kg (213 lb 4.8 oz)       General:  alert, cooperative, no distress, appears stated age  Neck:  supple  Lungs:  clear to auscultation bilaterally  Heart:  irregularly irregular rhythm  Abdomen:  abdomen is soft without significant tenderness, masses, organomegaly or guarding  Extremities:  atraumatic, no significant pitting edema  Skin: Warm and dry. Neuro: alert, oriented x3, affect appropriate, no involuntary movements  Psych: non focal     Data Review:     Recent Labs     12/14/21  1000   WBC 6.6   HGB 12.8*   HCT 38.8        Recent Labs     12/14/21  1000      K 4.8   CL 98*   CO2 25   *   BUN 17   CREA 5.86*   CA 9.3   MG 2.2   ALB 3.5   ALT 12*       Results for orders placed or performed during the hospital encounter of 12/14/21   EKG, 12 LEAD, INITIAL   Result Value Ref Range    Ventricular Rate 147 BPM    QRS Duration 88 ms    Q-T Interval 328 ms    QTC Calculation (Bezet) 513 ms    Calculated R Axis -2 degrees    Calculated T Axis 113 degrees    Diagnosis       Critical Test Result: High HR  Atrial fibrillation with rapid ventricular response  Minimal voltage criteria for LVH, may be normal variant ( R in aVL )  Septal infarct (cited on or before 06-DEC-2018)  Marked ST abnormality, possible inferior subendocardial injury  Abnormal ECG  When compared with ECG of 22-JUN-2021 06:46,  Atrial fibrillation has replaced Sinus rhythm  Vent. rate has increased BY  66 BPM  Questionable change in QRS duration  Questionable change in initial forces of Anteroseptal leads  Confirmed by Francisco Rhoades MD, --- ([id number]) on 12/14/2021 1:42:38 PM     Results for orders placed or performed in visit on 02/08/21   AMB POC EKG ROUTINE W/ 12 LEADS, INTER & REP    Narrative    Read by Edgard Benites MD.  Sinus rhythm. Old anteroseptal infarct.        Last Lipid:    Lab Results   Component Value Date/Time    Cholesterol, total 168 03/22/2021 12:00 AM    HDL Cholesterol 32 (L) 03/22/2021 12:00 AM    LDL, calculated 116 (H) 03/22/2021 12:00 AM    LDL, calculated 85 02/09/2019 11:20 AM    Triglyceride 106 03/22/2021 12:00 AM    CHOL/HDL Ratio 4.4 10/17/2012 06:05 AM       Cardiographics:     EKG Results     Procedure 720 Value Units Date/Time    EKG, 12 LEAD, INITIAL [[id number]] Collected: 12/14/21 0953    Order Status: Completed Updated: 12/14/21 1342     Ventricular Rate 147 BPM      QRS Duration 88 ms      Q-T Interval 328 ms      QTC Calculation (Bezet) 513 ms      Calculated R Axis -2 degrees      Calculated T Axis 113 degrees      Diagnosis --     Critical Test Result: High HR  Atrial fibrillation with rapid ventricular response  Minimal voltage criteria for LVH, may be normal variant ( R in aVL )  Septal infarct (cited on or before 06-DEC-2018)  Marked ST abnormality, possible inferior subendocardial injury  Abnormal ECG  When compared with ECG of 22-JUN-2021 06:46,  Atrial fibrillation has replaced Sinus rhythm  Vent. rate has increased BY  66 BPM  Questionable change in QRS duration  Questionable change in initial forces of Anteroseptal leads  Confirmed by Isabel Cobb MD, --- ([id number]) on 12/14/2021 1:42:38 PM          01/22/21    ECHO ADULT FOLLOW-UP OR LIMITED 01/22/2021 1/22/2021    Interpretation Summary  · Image quality for this study was technically difficult. · Contrast used: DEFINITY. · LV: Estimated LVEF is 55 - 60%. Visually measured ejection fraction. Normal cavity size and systolic function (ejection fraction normal). Mildly to moderately increased wall thickness. · PV: Mild pulmonic valve regurgitation is present. · PA: Pulmonary arterial systolic pressure is 22 mmHg. Signed by: Charles Mazariegos MD on 1/22/2021  4:24 PM        06/21/21    CARDIAC PROCEDURE 06/21/2021 6/21/2021    Conclusion  · Left dominant system. · Small nondominant RCA with diffuse 70% stenosis. · Left main is patent. There is distal 25% stenosis noted. · Circumflex is dominant. No significant stenosis greater than 30% as noted. · LAD has proximal stent with ISR, 90%, IFR 0.75. This lesion was successfully stented using 3.25 mm AMOL, 12 mm length. Post procedure IFR was 0.97.  · LVEDP was elevated at 24 mmHg. There was no gradient across the aortic valve.  Left ventriculogram was not performed, to preserve contrast use. · In August 2019, his IFR was 0.93-0.95 and his proximal LAD ISR. His IFR today was 0.75. His post procedure IFR was 0.97. Signed by: Maru Parr MD on 6/21/2021 11:39 AM      XR Results (most recent):  Results from [place] encounter on 12/14/21    XR CHEST PORT    Narrative  EXAM:  XR CHEST PORT    INDICATION:   SOB    COMPARISON: 6/18/2021. FINDINGS:  Suboptimal lordotic portable view of the chest. Within normal cardiac silhouette  given technique. Pulmonary vascular congestion. Blunted left costophrenic angle. Otherwise no significant pleural effusion. No pneumothorax. No consolidation. Intact osseous structures. Impression  Pulmonary vascular congestion and cephalization without evidence of overt  pulmonary edema. Perhaps trace left pleural effusion.         Signed By: JoseA ngel Paris PA-C     December 14, 2021

## 2021-12-14 NOTE — H&P
History and Physical    Patient: Patrick Wells MRN: [id number]  SSN: [id number]    YOB: 1970    Age: 52 y.o. Sex: male    Reva Petty MD    Subjective:      Patrick Wells is a 52 y.o. male who presents to the ED via EMS from dialysis for a \"funny feeling\" in his chest this morning. He was two hours into his dialysis when this feeling started. Unfortunately he did not finish dialysis since he was transported to the emergency room. He describes the sensation as feeling like he can't take a deep breath. He denies chest pain and shortness of breath. No diaphoresis or radiation to jaw or arm. He was placed on 2L nasal cannula during his dialysis session but is noted to be satting appropriately on room air in the ED. He reports a history of atrial fibrillation in the past but does not know what he takes for that. Also has history significant for CAD and drug-eluting stent placement. He has an appointment with his cardiologist set up this week. Past Medical History:   Diagnosis Date    Abnormal nuclear cardiac imaging test 10/08/2012    Fixed anteroseptal, anteroapical defect c/w prior infarction. No ischemia. Mid & distal anteroseptal, anteroapical WMA. LVE. EF 44%.  Anemia     dr. Chapman Senatobia doubt amyloid or multiple myeloma. candidate for procirt if Hg <10    Benign hypertensive     Blindness of right eye 01/2013    legally blind    CAD (coronary artery disease)     Cardiomyopathy ejection fraction of 40%     CKD (chronic kidney disease), stage IV ([address]     to see Dr. Concha Hernandez 12/1/12    Congestive heart failure ([address]     Diabetes mellitus ([address]     Diabetic retinopathy ([address]     Diabetic retinopathy ([address]     Dr. Francisca Gates- injections    Heart failure ([facility]     History of echocardiogram 04/22/2014    LVE. EF 55% (prev 40-45% on echo of 1/27/12). No WMA. Mild-mod conc LVH. Gr 3 DDfx. Marked LAE. Mild SHANTEL.   Mild MR.    Hypertension     Pulmonary hypertension ([address]     Renal Ultrasound 1/3/12    Right kidney isoechoic w/respect to liver. Sm left-sided pleural effusion    S/P cardiac cath 10/16/2012    LM patent. pLAD 95% (3.5 x 12-mm [place] stent, resid 0$%). LCX mild. Past Surgical History:   Procedure Laterality Date    HX CORONARY STENT PLACEMENT      one    HX LAPAROTOMY  2/2012    bowel obstruction with removal segment of small bowel. Done by Riblet.  HX LAPAROTOMY  infancy    whole in colon and had repair at that time.  HX OTHER SURGICAL  06/20/2013    left eye retna attatchment    HX RETINAL DETACHMENT REPAIR      august 2012    RI REPAIR ING HERNIA,5+Y/O,REDUCIBL Left 05/02/2019    Dr. April Rodriguez      Family History   Problem Relation Age of Onset    Diabetes Mother     Hypertension Mother     Kidney Disease Mother     High Cholesterol Father     Diabetes Brother         pre diabetic     Social History     Tobacco Use    Smoking status: Never Smoker    Smokeless tobacco: Never Used   Substance Use Topics    Alcohol use: Not Currently     Alcohol/week: 4.2 standard drinks     Types: 5 Cans of beer per week      Prior to Admission medications    Medication Sig Start Date End Date Taking? Authorizing Provider   clopidogreL (PLAVIX) 75 mg tab Take 1 Tablet by mouth daily. 6/22/21   Pauly Lucas PA   nitroglycerin (NITROLINGUAL) 400 mcg/spray spray 1 Spray by SubLINGual route every five (5) minutes as needed for Chest Pain. 6/22/21   Pauly Lucas PA   lisinopriL (PRINIVIL, ZESTRIL) 10 mg tablet Take 1 Tablet by mouth daily. 6/22/21   Pauly Lucas PA   carvediloL (COREG) 25 mg tablet Take 0.5 Tablets by mouth two (2) times daily (with meals). 6/22/21   Pauly Lucas PA   atorvastatin (LIPITOR) 40 mg tablet Take 1 Tab by mouth daily. 4/26/21   Arielle Stiles MD   apixaban (ELIQUIS) 2.5 mg tablet Take 1 Tab by mouth every twelve (12) hours.  2/22/21   Deandre Holcomb NP   glimepiride (AMARYL) 1 mg tablet Take 1 Tab by mouth Daily (before breakfast). 9/17/19   Beto Crocker MD   Blood-Glucose Meter monitoring kit Check fasting glucose 6/18/19   Beto Crocker MD   glucose blood VI test strips (ACCU-CHEK ZAC PLUS TEST STRP) strip Use as directed 6/18/19   Beto Crocker MD   sucroferric oxyhydroxide (VELPHORO) 500 mg chew chewable tablet Take 500 mg by mouth three (3) times daily (with meals). Provider, Historical   aspirin delayed-release 81 mg tablet Take 81 mg by mouth daily. Provider, Historical        No Known Allergies    Review of Systems:  positive responses in bold type   Constitutional: Negative for fever, chills, diaphoresis and unexpected weight change. HENT: Negative for ear pain, congestion, sore throat, rhinorrhea, drooling, trouble swallowing, neck pain and tinnitus. Eyes: Negative for photophobia, pain, redness and visual disturbance. Respiratory: negative for shortness of breath, cough, choking, chest tightness, wheezing or stridor. Cardiovascular: Negative for chest pain, palpitations and leg swelling. Gastrointestinal: Negative for nausea, vomiting, abdominal pain, diarrhea, constipation, blood in stool, abdominal distention and anal bleeding. Genitourinary: Negative for dysuria, urgency, frequency, hematuria, flank pain and difficulty urinating. Musculoskeletal: Negative for back pain and arthralgias. Skin: Negative for color change, rash and wound. Neurological: Negative for dizziness, seizures, syncope, speech difficulty, light-headedness or headaches. Hematological: Does not bruise/bleed easily. Psychiatric/Behavioral: Negative for suicidal ideas, hallucinations, behavioral problems, self-injury or agitation          Objective: There is no height or weight on file to calculate BMI. There were no vitals filed for this visit.      Physical Exam:  General appearance - alert, well appearing, and in no distress  Mental status - alert, oriented to person, place, and time  Neck - supple, no significant adenopathy  Chest - clear to auscultation, no wheezes, rales or rhonchi, symmetric air entry  Heart - tachycardia, S1 and S2 normal, no murmurs noted, no gallops noted, irregularly irregular rhythm  Abdomen - soft, nontender, nondistended, no masses or organomegaly  Extremities - peripheral pulses normal, no pedal edema, no clubbing or cyanosis  Skin - normal coloration and turgor, no rashes, no suspicious skin lesions noted          Hospital Problems  Date Reviewed: 6/26/2021    None          CBC:  Lab Results   Component Value Date/Time    WBC 5.4 06/21/2021 03:28 PM    HGB 12.9 (L) 06/21/2021 03:28 PM    HCT 39.7 06/21/2021 03:28 PM    PLATELET 587 94/56/2624 03:28 PM    MCV 93.6 06/21/2021 03:28 PM        CMP:  Lab Results   Component Value Date/Time    Sodium 143 06/21/2021 03:28 PM    Potassium 5.2 06/21/2021 03:28 PM    Chloride 104 06/21/2021 03:28 PM    CO2 28 06/21/2021 03:28 PM    Anion gap 11 06/21/2021 03:28 PM    Glucose 156 (H) 06/21/2021 03:28 PM    BUN 58 (H) 06/21/2021 03:28 PM    Creatinine 15.60 (H) 06/21/2021 03:28 PM    BUN/Creatinine ratio 4 (L) 06/21/2021 03:28 PM    GFR est AA 4 (L) 06/21/2021 03:28 PM    GFR est non-AA 3 (L) 06/21/2021 03:28 PM    Calcium 7.7 (L) 06/21/2021 03:28 PM    Alk.  phosphatase 143 (H) 06/18/2021 07:34 AM    Protein, total 7.4 06/18/2021 07:34 AM    Albumin 3.4 06/18/2021 07:34 AM    Globulin 4.0 06/18/2021 07:34 AM    A-G Ratio 0.9 06/18/2021 07:34 AM    ALT (SGPT) 12 (L) 06/18/2021 07:34 AM        PT/INR  Lab Results   Component Value Date/Time    INR 1.5 (H) 06/21/2021 03:28 PM    INR 1.2 06/18/2021 07:34 AM    INR 1.0 01/22/2021 05:28 AM    Prothrombin time 18.2 (H) 06/21/2021 03:28 PM    Prothrombin time 14.6 06/18/2021 07:34 AM    Prothrombin time 12.9 01/22/2021 05:28 AM            EKG:   Results for orders placed or performed in visit on 02/08/21   AMB POC EKG ROUTINE W/ 12 LEADS, INTER & REP     Status: None    Narrative    Read by Ivonne Denton, MD.  Sinus rhythm. Old anteroseptal infarct. Assessment and  Plan:     Atrial fibrillation with RVR  Metoprolol 5mg IV q5min and 50mg PO for rate control   consult cardiology  Continue to monitor BP      Signed By: Melina William     December 14, 2021      *ATTENTION:  This note has been created by a medical student for educational purposes only. Please do not refer to the content of this note for clinical decision-making, billing, or other purposes. Please see attending physicians note to obtain clinical information on this patient. *

## 2021-12-14 NOTE — H&P
History & Physical      Patient: Aminah Stapleton MRN: [id number]  CSN: [id number]    YOB: 1970  Age: 52 y.o. Sex: male      DOA: 12/14/2021    Chief Complaint:   Chief Complaint   Patient presents with    Shortness of Breath          HPI:     Aminah Stapleton is a 52 y.o. male with PMHx of atrial fib on Eliquis, ESRD on HD, type II DM with retinopathy, chronic systolic CHF, CAD, HTN, and HLD who presented to the ED with complaints of palpitations. Mr. Ari Nava was at dialysis earlier in the morning and reports that when he was close to fishing, he began developing heart palpitations and a racing heart. He reported this to staff who called EMS and stopped his dialysis treatment. He reports his symptoms have been going on for the past couple weeks and increasing in frequency. He denies any further complaints including chest pain, diaphoresis, shortness of breath, abdominal symptoms, or any other symptoms. Since arriving in the ED, he has developed a severe case of the hiccups, but again denies any further complaints. In the ED, he was found to be in A. fib with RVR with heart rate 147. Other vitals were overall reassuring. Labs were also overall reassuring but were notable for an Hgb of 12.8, glucose 149, and alk phos 118. A CXR showed pulmonary vascular congestion without pulmonary edema and a trace left pleural effusion. Mr. Ari Nava was then given IV and p.o. metoprolol and started on Cardizem drip. He is now admitted for atrial fib with RVR. Past Medical History:   Diagnosis Date    Abnormal nuclear cardiac imaging test 10/08/2012    Fixed anteroseptal, anteroapical defect c/w prior infarction. No ischemia. Mid & distal anteroseptal, anteroapical WMA. LVE. EF 44%.  Anemia     dr. Gema Olea doubt amyloid or multiple myeloma.  candidate for procirt if Hg <10    Benign hypertensive     Blindness of right eye 01/2013    legally blind    CAD (coronary artery disease)     Cardiomyopathy ejection fraction of 40%     CKD [facility]chronic kidney disease[facility] ([facility]     to see Dr. Jayden Abdi 12/1/12    [facility] ([email or web address]     Diabetes mellitus ([email or web address]     Diabetic retinopathy ([email or web address]     Diabetic retinopathy ([email or web address]     Dr. Moy Miramontes- injections    Heart failure ([email or web address]     History of echocardiogram 04/22/2014    LVE. EF 55% (prev 40-45% on echo of 1/27/12). No WMA. Mild-mod conc LVH. Gr 3 DDfx. Marked LAE. Mild SHANTEL. Mild MR.    Hypertension     Pulmonary hypertension ([email or web address]     Renal Ultrasound 1/3/12    Right kidney isoechoic w/respect to liver. Sm left-sided pleural effusion    S/P cardiac cath 10/16/2012    LM patent. pLAD 95% (3.5 x 12-mm [place] stent, resid 0$%). LCX mild. Past Surgical History:   Procedure Laterality Date    HX CORONARY STENT PLACEMENT      one    HX LAPAROTOMY  2/2012    bowel obstruction with removal segment of small bowel. Done by [facility]iblet.  HX LAPAROTOMY  infancy    whole in colon and had repair at that time.  HX OTHER SURGICAL  06/20/2013    left eye retna attatchment    HX RETINAL DETACHMENT REPAIR      august 2012    IN REPAIR ING HERNIA,5+Y/O,REDUCIBL Left 05/02/2019    Dr. Arthur Hopkins       Family History   Problem Relation Age of Onset    Diabetes Mother     Hypertension Mother     Kidney Disease Mother     High Cholesterol Father     Diabetes Brother         pre diabetic       Social History     Socioeconomic History    Marital status:    Tobacco Use    Smoking status: Never Smoker    Smokeless tobacco: Never Used   Substance and Sexual Activity    Alcohol use: Not Currently     Alcohol/week: 4.2 standard drinks     Types: 5 Cans of beer per week    Drug use: No    Sexual activity: Yes     Partners: Female     Birth control/protection: None       Prior to Admission medications    Medication Sig Start Date End Date Taking? Authorizing Provider   lisinopriL (PRINIVIL, ZESTRIL) 10 mg tablet Take 1 Tablet by mouth daily.  6/22/21 Yes Pauly Lucas PA   carvediloL (COREG) 25 mg tablet Take 0.5 Tablets by mouth two (2) times daily (with meals). 6/22/21  Yes Pauly Lucas PA   apixaban (ELIQUIS) 2.5 mg tablet Take 1 Tab by mouth every twelve (12) hours. 2/22/21  Yes Cally DEVI NP   Blood-Glucose Meter monitoring kit Check fasting glucose 6/18/19  Yes Marlo Ernst MD   glucose blood VI test strips (ACCU-CHEK ZAC PLUS TEST STRP) strip Use as directed 6/18/19  Yes Marlo Ernst MD   sucroferric oxyhydroxide (VELPHORO) 500 mg chew chewable tablet Take 500 mg by mouth three (3) times daily (with meals). Yes Provider, Historical   aspirin delayed-release 81 mg tablet Take 81 mg by mouth daily. Yes Provider, Historical   nitroglycerin (NITROLINGUAL) 400 mcg/spray spray 1 Spray by SubLINGual route every five (5) minutes as needed for Chest Pain. 6/22/21   Pauly Lucas PA       No Known Allergies      Review of Systems  GENERAL: Patient alert, awake and oriented times 3, able to communicate full sentences and not in distress. HEENT: No change in vision, sore throat or sinus congestion. NECK: No pain or stiffness. PULMONARY: No shortness of breath, cough or wheeze. Cardiovascular: Positive chest discomfort, palpitations, racing heart, no orthopnea or PND  GASTROINTESTINAL: No abdominal pain, nausea, vomiting or diarrhea, or blood per rectum. GENITOURINARY: No urinary frequency, urgency, hesitancy or dysuria. MUSCULOSKELETAL: No joint or muscle pain, no back pain, no recent trauma. DERMATOLOGIC: No rash, no itching, no lesions. ENDOCRINE: No polyuria, polydipsia, no heat or cold intolerance. No recent change in weight. HEMATOLOGICAL: No anemia or easy bruising or bleeding. NEUROLOGIC: No headache, seizures, numbness, tingling or weakness.        Physical Exam:     Physical Exam:  Visit Vitals  BP (!) 167/81   Pulse 80   Temp 98.4 °F (36.9 °C)   Resp 17   Ht 5' 11\" (1.803 m)   Wt 93 kg (205 lb 0.4 oz)   SpO2 99%   BMI 28.60 kg/m²      O2 Device: None (Room air)    Temp (24hrs), Av °F (36.7 °C), Min:97.7 °F (36.5 °C), Max:98.4 °F (36.9 °C)    No intake/output data recorded. 701 -  1900  In: 1000 [I.V.:1000]  Out: -     General:  Alert, cooperative, no distress, appears stated age. Head: Normocephalic, without obvious abnormality, atraumatic. Eyes:  Conjunctivae/corneas clear. PERRL, EOMs intact. Nose: Nares normal. No drainage or sinus tenderness. Neck: Supple, symmetrical, trachea midline, no JVD. Lungs:   Clear to auscultation bilaterally. Heart:   Irregularly irregular, tachycardic     Abdomen: Soft, non-tender. Bowel sounds normal.    Extremities: Extremities normal, atraumatic, no cyanosis or edema. Pulses: 2+ and symmetric all extremities. Skin:  No rashes or lesions   Neurologic: AAOx3, No focal motor or sensory deficit.        Labs Reviewed:    BMP:   Lab Results   Component Value Date/Time     2021 10:00 AM    K 4.8 2021 10:00 AM    CL 98 (L) 2021 10:00 AM    CO2 25 2021 10:00 AM    AGAP 13 2021 10:00 AM     (H) 2021 10:00 AM    BUN 17 2021 10:00 AM    CREA 5.86 (H) 2021 10:00 AM    GFRAA 13 (L) 2021 10:00 AM    GFRNA 10 (L) 2021 10:00 AM     CMP:   Lab Results   Component Value Date/Time     2021 10:00 AM    K 4.8 2021 10:00 AM    CL 98 (L) 2021 10:00 AM    CO2 25 2021 10:00 AM    AGAP 13 2021 10:00 AM     (H) 2021 10:00 AM    BUN 17 2021 10:00 AM    CREA 5.86 (H) 2021 10:00 AM    GFRAA 13 (L) 2021 10:00 AM    GFRNA 10 (L) 2021 10:00 AM    CA 9.3 2021 10:00 AM    MG 2.2 2021 10:00 AM    ALB 3.5 2021 10:00 AM    TP 7.5 2021 10:00 AM    GLOB 4.0 2021 10:00 AM    AGRAT 0.9 2021 10:00 AM    ALT 12 (L) 2021 10:00 AM     CBC:   Lab Results   Component Value Date/Time    WBC 6.6 12/14/2021 10:00 AM    HGB 12.8 (L) 12/14/2021 10:00 AM    HCT 38.8 12/14/2021 10:00 AM     12/14/2021 10:00 AM     All Cardiac Markers in the last 24 hours:   Lab Results   Component Value Date/Time    CPK  12/15/2021 05:46 AM     Original order canceled by laboratory, test reordered by laboratory with the purpose of combining it with other laboratory orders. CKMB  12/15/2021 05:46 AM     Original order canceled by laboratory, test reordered by laboratory with the purpose of combining it with other laboratory orders. CKND1  12/15/2021 05:46 AM     Original order canceled by laboratory, test reordered by laboratory with the purpose of combining it with other laboratory orders. Recent Glucose Results:   Lab Results   Component Value Date/Time     (H) 12/14/2021 10:00 AM     COAGS: No results found for: APTT, PTP, INR, INREXT, INREXT  Liver Panel:   Lab Results   Component Value Date/Time    ALB 3.5 12/14/2021 10:00 AM    TP 7.5 12/14/2021 10:00 AM    GLOB 4.0 12/14/2021 10:00 AM    AGRAT 0.9 12/14/2021 10:00 AM    ALT 12 (L) 12/14/2021 10:00 AM     (H) 12/14/2021 10:00 AM         Procedures/imaging: see electronic medical records for all procedures/Xrays and details which were not copied into this note but were reviewed prior to creation of [address] [place] [address] is a 52 y.o. male with PMHx of atrial fib on Eliquis, ESRD on HD, type II DM with retinopathy, chronic systolic CHF, CAD, HTN, and HLD who is now admitted for atrial fib with RVR. 1. Atrial fib with RVR  2. Intractable hiccups  3. ESRD on HD  4. Type II DM with retinopathy  5. Chronic anemia  6. Chronic systolic CHF  7. CAD  8. HTN  9. HLD  10.  Medical noncompliance    Cardiology and nephrology consulted and following  No need for repeat echo, last done less than 3 months ago  Continue Cardizem drip, set rate at 7.5 mg/h, continuous  Continue home meds-Eliquis, aspirin, carvedilol, lisinopril  Baclofen as needed hiccups  Follow-up CBC, BMP  Strict I's and O's  PT, OT    DVT prophylaxis: Eliquis   Diet: Regular    Contact: Jerman Block (sister)     [phone]   Code Status: FULL    Disposition: Admit to telemetry; >2 nights       Discussed with patient at bedside about hospital admission and my plan of care, who understood and agreed with my plan of care. Frances Nicole DO   12/15/2021      Dragon medical dictation software was used for portions of this report. Unintended errors may occur.

## 2021-12-14 NOTE — Clinical Note
TRANSFER - IN REPORT:     Verbal report received from: Shaggy Franco RN. Report consisted of patient's Situation, Background, Assessment and   Recommendations(SBAR). Opportunity for questions and clarification was provided. Assessment completed upon patient's arrival to unit and care assumed. Patient transported with a Cardiac Cath Tech / Patient Care Tech.

## 2021-12-14 NOTE — Clinical Note
TRANSFER - OUT REPORT:     Verbal report given to: Seema Moura. Report consisted of patient's Situation, Background, Assessment and   Recommendations(SBAR). Opportunity for questions and clarification was provided. Patient transported with a Cardiac Cath Tech / Patient Care Tech. Patient transported to: holding area.

## 2021-12-15 ENCOUNTER — APPOINTMENT (OUTPATIENT)
Dept: NON INVASIVE DIAGNOSTICS | Age: 51
DRG: 286 | End: 2021-12-15
Attending: PHYSICIAN ASSISTANT
Payer: MEDICARE

## 2021-12-15 LAB
ANION GAP SERPL CALC-SCNC: 9 MMOL/L (ref 3–18)
APTT PPP: 22.4 SEC (ref 23–36.4)
BASOPHILS # BLD: 0 K/UL (ref 0–0.1)
BASOPHILS # BLD: 0 K/UL (ref 0–0.1)
BASOPHILS NFR BLD: 0 % (ref 0–2)
BASOPHILS NFR BLD: 0 % (ref 0–2)
BUN SERPL-MCNC: 31 MG/DL (ref 7–18)
BUN/CREAT SERPL: 3 (ref 12–20)
CALCIUM SERPL-MCNC: 9.1 MG/DL (ref 8.5–10.1)
CHLORIDE SERPL-SCNC: 98 MMOL/L (ref 100–111)
CK MB CFR SERPL CALC: 4.4 % (ref 0–4)
CK MB CFR SERPL CALC: ABNORMAL %
CK MB SERPL-MCNC: 5.5 NG/ML (ref 5–25)
CK MB SERPL-MCNC: ABNORMAL NG/ML (ref 5–25)
CK SERPL-CCNC: 126 U/L (ref 39–308)
CK SERPL-CCNC: ABNORMAL U/L
CO2 SERPL-SCNC: 27 MMOL/L (ref 21–32)
CREAT SERPL-MCNC: 8.9 MG/DL (ref 0.6–1.3)
DIFFERENTIAL METHOD BLD: ABNORMAL
DIFFERENTIAL METHOD BLD: ABNORMAL
EOSINOPHIL # BLD: 0 K/UL (ref 0–0.4)
EOSINOPHIL # BLD: 0 K/UL (ref 0–0.4)
EOSINOPHIL NFR BLD: 0 % (ref 0–5)
EOSINOPHIL NFR BLD: 0 % (ref 0–5)
ERYTHROCYTE [DISTWIDTH] IN BLOOD BY AUTOMATED COUNT: 13.2 % (ref 11.6–14.5)
ERYTHROCYTE [DISTWIDTH] IN BLOOD BY AUTOMATED COUNT: 13.5 % (ref 11.6–14.5)
GLUCOSE SERPL-MCNC: 219 MG/DL (ref 74–99)
HBV SURFACE AB SER QL IA: POSITIVE
HBV SURFACE AB SERPL IA-ACNC: 664.19 MIU/ML
HBV SURFACE AG SER QL: <0.1 INDEX
HBV SURFACE AG SER QL: NEGATIVE
HCT VFR BLD AUTO: 37.2 % (ref 36–48)
HCT VFR BLD AUTO: 38.8 % (ref 36–48)
HEP BS AB COMMENT,HBSAC: NORMAL
HGB BLD-MCNC: 11.9 G/DL (ref 13–16)
HGB BLD-MCNC: 12.6 G/DL (ref 13–16)
IMM GRANULOCYTES # BLD AUTO: 0 K/UL (ref 0–0.04)
IMM GRANULOCYTES # BLD AUTO: 0 K/UL (ref 0–0.04)
IMM GRANULOCYTES NFR BLD AUTO: 0 % (ref 0–0.5)
IMM GRANULOCYTES NFR BLD AUTO: 0 % (ref 0–0.5)
LYMPHOCYTES # BLD: 0.8 K/UL (ref 0.9–3.6)
LYMPHOCYTES # BLD: 1 K/UL (ref 0.9–3.6)
LYMPHOCYTES NFR BLD: 11 % (ref 21–52)
LYMPHOCYTES NFR BLD: 12 % (ref 21–52)
MAGNESIUM SERPL-MCNC: 2.6 MG/DL (ref 1.6–2.6)
MCH RBC QN AUTO: 29.3 PG (ref 24–34)
MCH RBC QN AUTO: 30.3 PG (ref 24–34)
MCHC RBC AUTO-ENTMCNC: 32 G/DL (ref 31–37)
MCHC RBC AUTO-ENTMCNC: 32.5 G/DL (ref 31–37)
MCV RBC AUTO: 91.6 FL (ref 78–100)
MCV RBC AUTO: 93.3 FL (ref 78–100)
MONOCYTES # BLD: 0.5 K/UL (ref 0.05–1.2)
MONOCYTES # BLD: 0.6 K/UL (ref 0.05–1.2)
MONOCYTES NFR BLD: 6 % (ref 3–10)
MONOCYTES NFR BLD: 8 % (ref 3–10)
NEUTS SEG # BLD: 6.4 K/UL (ref 1.8–8)
NEUTS SEG # BLD: 6.5 K/UL (ref 1.8–8)
NEUTS SEG NFR BLD: 79 % (ref 40–73)
NEUTS SEG NFR BLD: 82 % (ref 40–73)
NRBC # BLD: 0 K/UL (ref 0–0.01)
NRBC # BLD: 0 K/UL (ref 0–0.01)
NRBC BLD-RTO: 0 PER 100 WBC
NRBC BLD-RTO: 0 PER 100 WBC
PLATELET # BLD AUTO: 215 K/UL (ref 135–420)
PLATELET # BLD AUTO: 220 K/UL (ref 135–420)
PMV BLD AUTO: 10.3 FL (ref 9.2–11.8)
PMV BLD AUTO: 10.3 FL (ref 9.2–11.8)
POTASSIUM SERPL-SCNC: 4.3 MMOL/L (ref 3.5–5.5)
RBC # BLD AUTO: 4.06 M/UL (ref 4.35–5.65)
RBC # BLD AUTO: 4.16 M/UL (ref 4.35–5.65)
SODIUM SERPL-SCNC: 134 MMOL/L (ref 136–145)
TROPONIN-HIGH SENSITIVITY: 2119 NG/L (ref 0–78)
WBC # BLD AUTO: 7.9 K/UL (ref 4.6–13.2)
WBC # BLD AUTO: 8.1 K/UL (ref 4.6–13.2)

## 2021-12-15 PROCEDURE — 74011000258 HC RX REV CODE- 258: Performed by: HOSPITALIST

## 2021-12-15 PROCEDURE — 74011250637 HC RX REV CODE- 250/637: Performed by: FAMILY MEDICINE

## 2021-12-15 PROCEDURE — 65270000029 HC RM PRIVATE

## 2021-12-15 PROCEDURE — 83735 ASSAY OF MAGNESIUM: CPT

## 2021-12-15 PROCEDURE — 99232 SBSQ HOSP IP/OBS MODERATE 35: CPT | Performed by: HOSPITALIST

## 2021-12-15 PROCEDURE — 74011250636 HC RX REV CODE- 250/636: Performed by: HOSPITALIST

## 2021-12-15 PROCEDURE — 85730 THROMBOPLASTIN TIME PARTIAL: CPT

## 2021-12-15 PROCEDURE — 97165 OT EVAL LOW COMPLEX 30 MIN: CPT

## 2021-12-15 PROCEDURE — 36415 COLL VENOUS BLD VENIPUNCTURE: CPT

## 2021-12-15 PROCEDURE — 74011250636 HC RX REV CODE- 250/636: Performed by: FAMILY MEDICINE

## 2021-12-15 PROCEDURE — 2709999900 HC NON-CHARGEABLE SUPPLY

## 2021-12-15 PROCEDURE — 82553 CREATINE MB FRACTION: CPT

## 2021-12-15 PROCEDURE — 74011000258 HC RX REV CODE- 258: Performed by: FAMILY MEDICINE

## 2021-12-15 PROCEDURE — 74011250636 HC RX REV CODE- 250/636: Performed by: PHYSICIAN ASSISTANT

## 2021-12-15 PROCEDURE — 80048 BASIC METABOLIC PNL TOTAL CA: CPT

## 2021-12-15 PROCEDURE — 82550 ASSAY OF CK (CPK): CPT

## 2021-12-15 PROCEDURE — 99233 SBSQ HOSP IP/OBS HIGH 50: CPT | Performed by: INTERNAL MEDICINE

## 2021-12-15 PROCEDURE — 90935 HEMODIALYSIS ONE EVALUATION: CPT

## 2021-12-15 PROCEDURE — 97162 PT EVAL MOD COMPLEX 30 MIN: CPT

## 2021-12-15 PROCEDURE — 85025 COMPLETE CBC W/AUTO DIFF WBC: CPT

## 2021-12-15 RX ORDER — HEPARIN SODIUM 1000 [USP'U]/ML
80 INJECTION, SOLUTION INTRAVENOUS; SUBCUTANEOUS ONCE
Status: COMPLETED | OUTPATIENT
Start: 2021-12-15 | End: 2021-12-15

## 2021-12-15 RX ORDER — GUAIFENESIN 100 MG/5ML
81 LIQUID (ML) ORAL DAILY
Status: DISCONTINUED | OUTPATIENT
Start: 2021-12-16 | End: 2021-12-17 | Stop reason: HOSPADM

## 2021-12-15 RX ORDER — SODIUM CHLORIDE 9 MG/ML
250 INJECTION, SOLUTION INTRAVENOUS
Status: DISCONTINUED | OUTPATIENT
Start: 2021-12-15 | End: 2021-12-17 | Stop reason: HOSPADM

## 2021-12-15 RX ORDER — CARVEDILOL 12.5 MG/1
12.5 TABLET ORAL 2 TIMES DAILY WITH MEALS
Status: DISCONTINUED | OUTPATIENT
Start: 2021-12-15 | End: 2021-12-17

## 2021-12-15 RX ORDER — CLOPIDOGREL BISULFATE 75 MG/1
300 TABLET ORAL ONCE
Status: DISCONTINUED | OUTPATIENT
Start: 2021-12-15 | End: 2021-12-15

## 2021-12-15 RX ORDER — CLOPIDOGREL BISULFATE 75 MG/1
75 TABLET ORAL DAILY
Status: DISCONTINUED | OUTPATIENT
Start: 2021-12-16 | End: 2021-12-16

## 2021-12-15 RX ORDER — ASPIRIN 81 MG/1
81 TABLET ORAL DAILY
Status: DISCONTINUED | OUTPATIENT
Start: 2021-12-15 | End: 2021-12-15

## 2021-12-15 RX ORDER — LISINOPRIL 10 MG/1
10 TABLET ORAL DAILY
Status: DISCONTINUED | OUTPATIENT
Start: 2021-12-15 | End: 2021-12-17 | Stop reason: HOSPADM

## 2021-12-15 RX ADMIN — ASPIRIN 81 MG: 81 TABLET, COATED ORAL at 08:27

## 2021-12-15 RX ADMIN — APIXABAN 2.5 MG: 2.5 TABLET, FILM COATED ORAL at 08:27

## 2021-12-15 RX ADMIN — CARVEDILOL 12.5 MG: 12.5 TABLET, FILM COATED ORAL at 17:33

## 2021-12-15 RX ADMIN — CARVEDILOL 12.5 MG: 12.5 TABLET, FILM COATED ORAL at 08:27

## 2021-12-15 RX ADMIN — HEPARIN SODIUM 18 UNITS/KG/HR: 1000 INJECTION INTRAVENOUS; SUBCUTANEOUS at 21:29

## 2021-12-15 RX ADMIN — Medication 10 ML: at 08:28

## 2021-12-15 RX ADMIN — SODIUM CHLORIDE 7.5 MG/HR: 900 INJECTION, SOLUTION INTRAVENOUS at 00:08

## 2021-12-15 RX ADMIN — HEPARIN SODIUM 7440 UNITS: 1000 INJECTION INTRAVENOUS; SUBCUTANEOUS at 21:29

## 2021-12-15 RX ADMIN — SODIUM CHLORIDE 5 MG/HR: 900 INJECTION, SOLUTION INTRAVENOUS at 16:36

## 2021-12-15 RX ADMIN — ONDANSETRON 4 MG: 4 TABLET, ORALLY DISINTEGRATING ORAL at 20:20

## 2021-12-15 RX ADMIN — Medication 10 ML: at 21:37

## 2021-12-15 RX ADMIN — LISINOPRIL 10 MG: 10 TABLET ORAL at 08:27

## 2021-12-15 RX ADMIN — BACLOFEN 5 MG: 10 TABLET ORAL at 00:08

## 2021-12-15 NOTE — PROGRESS NOTES
Problem: Mobility Impaired (Adult and Pediatric)  Goal: *Acute Goals and Plan of Care (Insert Text)  Outcome: Resolved/Met   PHYSICAL THERAPY EVALUATION AND DISCHARGE    Patient: Edith Vallejo (88 y.o. male)  Date: 12/15/2021  Primary Diagnosis: Paroxysmal A-fib ([facility] [I48.0]  Atrial fibrillation with RVR ([address] [I48.91]  Precautions: Fall  PLOF: Independent. Has cane if needed. ASSESSMENT :  Evaluated in FT6. Co-treated with OT to maximize patient safety and participation in functional mobility. Encountered patient exiting bathroom. Supervision for amb 100ft with no AD. Steady gait with decreased gait speed and wide base of support. Seated at edge of stretcher. Good seated balance. Performed standing toe taps to 4 inch box with one UE support and supervision to simulate stairs to enter home. Returned to seated at edge of stretcher. Denies mobility concerns with return home. /93. Educated on need for RN assistance with mobility d/t lines; verbalized understanding. Call bell in reach. Skilled physical therapy is not indicated at this time. PLAN :  Discharge Recommendations: None  Further Equipment Recommendations for Discharge: None; has cane if needed     SUBJECTIVE:   Patient stated I live with my sister.     OBJECTIVE DATA SUMMARY:     Past Medical History:   Diagnosis Date    Abnormal nuclear cardiac imaging test 10/08/2012    Fixed anteroseptal, anteroapical defect c/w prior infarction. No ischemia. Mid & distal anteroseptal, anteroapical WMA. LVE. EF 44%.  Anemia     dr. Devan Ignacio doubt amyloid or multiple myeloma.  candidate for procirt if Hg <10    Benign hypertensive     Blindness of right eye 01/2013    legally blind    CAD (coronary artery disease)     Cardiomyopathy ejection fraction of 40%     CKD (chronic kidney disease), stage IV ([facility]     to see Dr. Rajeev Guerra 12/1/12    Congestive heart failure ([address]     Diabetes mellitus ([address]     Diabetic retinopathy ([address]     Diabetic retinopathy ([facility]     Dr. Dima Lucia- injections    Heart failure [facility])     History of echocardiogram 04/22/2014    LVE. EF 55% (prev 40-45% on echo of 1/27/12). No WMA. Mild-mod conc LVH. Gr 3 DDfx. Marked LAE. Mild SHANTEL. Mild MR.    Hypertension     Pulmonary hypertension ([facility]     Renal Ultrasound 1/3/12    Right kidney isoechoic w/respect to liver. Sm left-sided pleural effusion    S/P cardiac cath 10/16/2012    LM patent. pLAD 95% (3.5 x 12-mm [place] stent, resid 0$%). LCX mild. Past Surgical History:   Procedure Laterality Date    HX CORONARY STENT PLACEMENT      one    HX LAPAROTOMY  2/2012    bowel obstruction with removal segment of small bowel. Done by [facility].  HX LAPAROTOMY  infancy    whole in colon and had repair at that time.     HX OTHER SURGICAL  06/20/2013    left eye retna attatchment    HX RETINAL DETACHMENT REPAIR      august 2012    GA REPAIR ING HERNIA,5+Y/O,REDUCIBL Left 05/02/2019    Dr. Josiane Westfall     Barriers to Learning/Limitations: yes;  sensory deficits-vision/hearing/speech  Compensate with: Visual Cues, Verbal Cues, Tactile Cues and Kinesthetic Cues  Home Situation:   Home Situation  Home Environment: Apartment  One/Two Story Residence: One story  Living Alone: No  Support Systems: Other Family Member(s)  Patient Expects to be Discharged to[de-identified] Apartment  Current DME Used/Available at Home: Cane, straight  Critical Behavior:  Neurologic State: Alert  Orientation Level: Oriented X4  Cognition: Follows commands     Psychosocial  Patient Behaviors: Cooperative    Strength:    Manual Muscle Testing (LE)         R     L    Hip Flexion:   5/5 5/5  Knee EXT:   5/5 5/5  Knee FLEX:   5/5 5/5  Ankle DF:   5/5 5/5  _________________________________________________   Range Of Motion:  BLE AROM WFL   Functional Mobility:  Transfers:  Sit to Stand: Modified independent  Stand to Sit: Modified independent  Balance:   Sitting: Intact  Standing: Impaired  Standing - Static: Good  Standing - Dynamic : Good  Ambulation/Gait Training:  Distance (ft): 100 Feet (ft)   Assistive Device: Walker, rolling  Ambulation - Level of Assistance: Supervision  Stairs: alternating toe taps to 4 inch box  Level of Assistance: Supervision  Rail Use: left  Number of Stairs: 5  Therapeutic Exercises:   Sit to stand x2  Pain:  Pain level pre-treatment: 0/10   Pain level post-treatment: 0/10    Activity Tolerance:   Good    After treatment:   []         Patient left in no apparent distress sitting up in chair  [x]         Patient left in no apparent distress in bed  [x]         Call bell left within reach  [x]         Nursing notified  []         Caregiver present  []         Bed alarm activated  []         SCDs applied    COMMUNICATION/EDUCATION:   [x]         Role of physical therapy in the acute care setting. [x]         Fall prevention education was provided and the patient/caregiver indicated understanding. [x]         Patient/family have participated as able in goal setting and plan of care. [x]         Patient/family agree to work toward stated goals and plan of care. []         Patient understands intent and goals of therapy, but is neutral about his/her participation. []         Patient is unable to participate in goal setting/plan of care: ongoing with therapy staff.     Thank you for this referral.  Shahid Esquivel, PT   Time Calculation: 9 mins    Eval Complexity: History: MEDIUM  Complexity : 1-2 comorbidities / personal factors will impact the outcome/ POC Exam:MEDIUM Complexity : 3 Standardized tests and measures addressing body structure, function, activity limitation and / or participation in recreation  Presentation: MEDIUM Complexity : Evolving with changing characteristics  Clinical Decision Making:Medium Complexity  Clinical judgement; ROM, MMT, functional mobility Overall Complexity:MEDIUM

## 2021-12-15 NOTE — ED NOTES
No changes noted, vital signs stable. Patient asked MD for medication for hiccups. No hiccups noted at this time medication given earlier per order.

## 2021-12-15 NOTE — CONSULTS
Consult Note  Consult requested by: Dr. Elia Do is a 52 y.o. male BLACK/ who is being seen on consult for ESRD management   Chief Complaint   Patient presents with    Shortness of Breath     Admission diagnosis: Atrial fibrillation with rapid ventricular response ([address]     56y M with PMH DM, ESRD, admitted for afib with RVR, following for ESRD management   Impression & Plan:   IMPRESSION:   ESRD, TTS , did not finished HD on 12/14   Access; left arm fistula   afib with rvr, hypotensive, now on cardizem drip, on anti coagulation   CHF, ishcemic cardiomypathy, CAD  DM, h/o DM retinopathy  Elevated troponin today    PLAN:    His troponin is trending upward day, will discuss with cardiology. Plan to arrange HD today  for better volume managment with 2K bath UF goal 2L as tolerated. Re -evaluate for HD tomorrow. If cardiology colleague okay then, HD upstair with continue cardiac monitor with dialysis. Adjust meds per ESRD status   Thank you very much for allowing me to participate in the care of this patient. We will continue to monitor with you and make recommendations as needed.         HPI:  56y M with PMH DM, ESRD, HTN, CAD was sent to ER for short of breath during dialysis. He did not finished HD yesterday. In ER he was in afib with rvr, hypotensive, no elevated trop initially. He was started on cardizem drip with better response. His troponin bumped up next day. His chest xray shows congestive changes. During my visit this morning in ER, his HR in 80s, no difficulty in oxygenation. He denies for any chest discomfort. But admit feels heavy, I offered HD today as he did not finished yesterday , agrees for dialysis today. Past Medical History:   Diagnosis Date    Abnormal nuclear cardiac imaging test 10/08/2012    Fixed anteroseptal, anteroapical defect c/w prior infarction. No ischemia. Mid & distal anteroseptal, anteroapical WMA. LVE. EF 44%.       Anemia     dr. Elisa Bustamante doubt amyloid or multiple myeloma. candidate for procirt if Hg <10    Benign hypertensive     Blindness of right eye 01/2013    legally blind    CAD (coronary artery disease)     Cardiomyopathy ejection fraction of 40%     CKD (chronic kidney disease), stage IV ([facility]     to see Dr. Krishna Lundberg 12/1/12    [facility] failure [facility]     Diabetes mellitus ([facility]     Diabetic retinopathy ([facility]     Diabetic retinopathy ([facility]     Dr. Chino Ma- injections    Heart failure ([facility]     History of echocardiogram 04/22/2014    LVE. EF 55% (prev 40-45% on echo of 1/27/12). No WMA. Mild-mod conc LVH. Gr 3 DDfx. Marked LAE. Mild SHANTEL. Mild MR.    Hypertension     Pulmonary hypertension ([facility]     Renal Ultrasound 1/3/12    Right kidney isoechoic w/respect to liver. Sm left-sided pleural effusion    S/P cardiac cath 10/16/2012    LM patent. pLAD 95% (3.5 x 12-mm Pruden stent, resid 0$%). LCX mild. Past Surgical History:   Procedure Laterality Date    HX CORONARY STENT PLACEMENT      one    HX LAPAROTOMY  2/2012    bowel obstruction with removal segment of small bowel. Done by Liv.  HX LAPAROTOMY  infancy    whole in colon and had repair at that time.     HX OTHER SURGICAL  06/20/2013    left eye retna attatchment    HX RETINAL DETACHMENT REPAIR      august 2012    OR REPAIR ING HERNIA,5+Y/O,REDUCIBL Left 05/02/2019    Dr. Chi Section History     Socioeconomic History    Marital status:      Spouse name: Not on file    Number of children: Not on file    Years of education: Not on file    Highest education level: Not on file   Occupational History    Not on file   Tobacco Use    Smoking status: Never Smoker    Smokeless tobacco: Never Used   Substance and Sexual Activity    Alcohol use: Not Currently     Alcohol/week: 4.2 standard drinks     Types: 5 Cans of beer per week    Drug use: No    Sexual activity: Yes     Partners: Female     Birth control/protection: None   Other Topics Concern    Not on file   Social History Narrative    Not on file     Social Determinants of Health     Financial Resource Strain:     Difficulty of Paying Living Expenses: Not on file   Food Insecurity:     Worried About Running Out of Food in the Last Year: Not on file    Caitlyn of Food in the Last Year: Not on file   Transportation Needs:     Lack of Transportation (Medical): Not on file    Lack of Transportation (Non-Medical): Not on file   Physical Activity:     Days of Exercise per Week: Not on file    Minutes of Exercise per Session: Not on file   Stress:     Feeling of Stress : Not on file   Social Connections:     Frequency of Communication with Friends and Family: Not on file    Frequency of Social Gatherings with Friends and Family: Not on file    Attends Mandaeism Services: Not on file    Active Member of Clubs or Organizations: Not on file    Attends Club or Organization Meetings: Not on file    Marital Status: Not on file   Intimate Partner Violence:     Fear of Current or Ex-Partner: Not on file    Emotionally Abused: Not on file    Physically Abused: Not on file    Sexually Abused: Not on file   Housing Stability:     Unable to Pay for Housing in the Last Year: Not on file    Number of [place] in the Last Year: Not on file    Unstable Housing in the Last Year: Not on file       Family History   Problem Relation Age of Onset    Diabetes Mother     Hypertension Mother     Kidney Disease Mother     High Cholesterol Father     Diabetes Brother         pre diabetic     No Known Allergies     Home Medications:     Prior to Admission Medications   Prescriptions Last Dose Informant Patient Reported? Taking? Blood-Glucose Meter monitoring kit 12/13/2021 at Unknown time  No Yes   Sig: Check fasting glucose   apixaban (ELIQUIS) 2.5 mg tablet 12/13/2021 at Unknown time  No Yes   Sig: Take 1 Tab by mouth every twelve (12) hours.    aspirin delayed-release 81 mg tablet 2021 at Unknown time  Yes Yes   Sig: Take 81 mg by mouth daily. carvediloL (COREG) 25 mg tablet 2021 at Unknown time  No Yes   Sig: Take 0.5 Tablets by mouth two (2) times daily (with meals). glucose blood VI test strips (ACCU-CHEK ZAC PLUS TEST STRP) strip 2021 at Unknown time  No Yes   Sig: Use as directed   lisinopriL (PRINIVIL, ZESTRIL) 10 mg tablet 2021 at Unknown time  No Yes   Sig: Take 1 Tablet by mouth daily. nitroglycerin (NITROLINGUAL) 400 mcg/spray spray Unknown at Unknown time  No No   Si Spray by SubLINGual route every five (5) minutes as needed for Chest Pain. sucroferric oxyhydroxide (VELPHORO) 500 mg chew chewable tablet 2021 at Unknown time  Yes Yes   Sig: Take 500 mg by mouth three (3) times daily (with meals). Facility-Administered Medications: None       Current Facility-Administered Medications   Medication Dose Route Frequency    apixaban (ELIQUIS) tablet 2.5 mg  2.5 mg Oral Q12H    aspirin delayed-release tablet 81 mg  81 mg Oral DAILY    carvediloL (COREG) tablet 12.5 mg  12.5 mg Oral BID WITH MEALS    lisinopriL (PRINIVIL, ZESTRIL) tablet 10 mg  10 mg Oral DAILY    . PHARMACY TO SUBSTITUTE PER PROTOCOL (Reordered from: sucroferric oxyhydroxide (VELPHORO) 500 mg chew chewable tablet)    Per Protocol    0.9% sodium chloride infusion 250 mL  250 mL IntraVENous DIALYSIS PRN    sodium chloride (NS) flush 5-40 mL  5-40 mL IntraVENous Q8H    sodium chloride (NS) flush 5-40 mL  5-40 mL IntraVENous PRN    acetaminophen (TYLENOL) tablet 650 mg  650 mg Oral Q6H PRN    Or    acetaminophen (TYLENOL) suppository 650 mg  650 mg Rectal Q6H PRN    polyethylene glycol (MIRALAX) packet 17 g  17 g Oral DAILY PRN    ondansetron (ZOFRAN ODT) tablet 4 mg  4 mg Oral Q8H PRN    Or    ondansetron (ZOFRAN) injection 4 mg  4 mg IntraVENous Q6H PRN    baclofen (LIORESAL) tablet 5 mg  5 mg Oral TID PRN    dilTIAZem (CARDIZEM) 100 mg in 0.9% sodium chloride (MBP/ADV) 100 mL infusion  7.5 mg/hr IntraVENous CONTINUOUS     Current Outpatient Medications   Medication Sig    lisinopriL (PRINIVIL, ZESTRIL) 10 mg tablet Take 1 Tablet by mouth daily.  carvediloL (COREG) 25 mg tablet Take 0.5 Tablets by mouth two (2) times daily (with meals).  apixaban (ELIQUIS) 2.5 mg tablet Take 1 Tab by mouth every twelve (12) hours.  Blood-Glucose Meter monitoring kit Check fasting glucose    glucose blood VI test strips (ACCU-CHEK ZAC PLUS TEST STRP) strip Use as directed    sucroferric oxyhydroxide (VELPHORO) 500 mg chew chewable tablet Take 500 mg by mouth three (3) times daily (with meals).  aspirin delayed-release 81 mg tablet Take 81 mg by mouth daily.  nitroglycerin (NITROLINGUAL) 400 mcg/spray spray 1 Spray by SubLINGual route every five (5) minutes as needed for Chest Pain. Review of Systems:      Complete 10-point review of systems were obtained and discussed in length  with the patient. Complete review of systems was negative/unremarkable  except as mentioned in HPI section. Data Review:    Labs: Results:       Chemistry Recent Labs     12/15/21  0520 12/14/21  1000   * 149*   * 136   K 4.3 4.8   CL 98* 98*   CO2 27 25   BUN 31* 17   CREA 8.90* 5.86*   CA 9.1 9.3   AGAP 9 13   BUCR 3* 3*   AP  --  118*   TP  --  7.5   ALB  --  3.5   GLOB  --  4.0   AGRAT  --  0.9      CBC w/Diff Recent Labs     12/15/21  0520 12/14/21  1000   WBC 8.1 6.6   RBC 4.06* 4.33*   HGB 11.9* 12.8*   HCT 37.2 38.8    210   GRANS 79* 72   LYMPH 12* 16*   EOS 0 4      Coagulation No results for input(s): PTP, INR, APTT, INREXT in the last 72 hours. Iron/Ferritin No results for input(s): IRON in the last 72 hours. No lab exists for component: TIBCCALC   BNP No results for input(s): BNPP in the last 72 hours.    Cardiac Enzymes Recent Labs     12/15/21  0546 12/15/21  0520   CPK Original order canceled by laboratory, test reordered by laboratory with the purpose of combining it with other laboratory orders. [place] order canceled by laboratory, test reordered by laboratory with the purpose of combining it with other laboratory orders.  4.4*      Liver Enzymes Recent Labs     12/14/21  1000   TP 7.5   ALB 3.5   *      Thyroid Studies Lab Results   Component Value Date/Time    TSH 2.43 12/06/2018 05:19 AM         EKG: afib with rvr    Physical Assessment:     Visit Vitals  BP (!) 155/133   Pulse 92   Temp 98 °F (36.7 °C)   Resp 18   Ht 5' 11\" (1.803 m)   Wt 93 kg (205 lb 0.4 oz)   SpO2 100%   BMI 28.60 kg/m²     Weight change:     Intake/Output Summary (Last 24 hours) at 12/15/2021 1008  Last data filed at 12/14/2021 1351  Gross per 24 hour   Intake 1000 ml   Output --   Net 1000 ml     Physical Exam:   General: comfortable, no acute distress   HEENT sclera anicteric, supple neck, no thyromegaly  CVS: S1S2 heard,  no rub  RS: + air entry b/l,   Abd: Soft, Non tender, Not distended,   Neuro: non focal, awake, alert , CN II-XII are grossly intact  Extrm: no  edema, no cyanosis, clubbing   Skin: no visible  Rash  Musculoskeletal: No gross joints or bone deformities     Procedures/imaging: see electronic medical records for all procedures, Xrays and details which were not copied into this note but were reviewed prior to creation of Isabelle Cruz MD  December 15, 2021  [facility]  Office [phone]

## 2021-12-15 NOTE — PROGRESS NOTES
Progress Note      56y M with PMH DM, ESRD, admitted for afib with RVR, following for ESRD management   Progress      Examined during dialysis   Cardiology colleague at bedside  Tolerating HD well     IMPRESSION:   ESRD, TTS , did not finished HD on    Access; left arm fistula   afib with rvr, hypotensive, now on cardizem drip, on anti coagulation   CHF, ishcemic cardiomypathy, CAD  DM, h/o DM retinopathy  Elevated troponin today    PLAN:    His troponin is trending upward day, will discuss with cardiology. Plan to arrange HD today  for better volume managment with 2K bath UF goal 2L as tolerated. Re -evaluate for HD tomorrow. If cardiology colleague okay then, HD upstair with continue cardiac monitor with dialysis. Adjust meds per ESRD status   On 12/15/2021, I saw and examined patient during hemodialysis treatment. The patient was receiving hemodialysis for treatment of  renal failure. I have also reviewed vital signs, intake and output, lab results and recent events, and agreed with today's dialysis order. HD rounding    Blood pressure 134/80, pulse 78, temperature 98 °F (36.7 °C), temperature source Oral, resp. rate 18, height 5' 11\" (1.803 m), weight 93 kg (205 lb 0.4 oz), SpO2 100 %.   Temp (24hrs), Av.1 °F (36.7 °C), Min:97.9 °F (36.6 °C), Max:98.4 °F (36.9 °C)      Blood Pressure: BP: 134/80  Pulse: Pulse (Heart Rate): 78  Temp:  Temp: 98 °F (36.7 °C)    Artificial Kidney Dialyzer/Set Up Inspection: [employer]acle[employer]   hours     Heparin Bolus Heparin Bolus (units): 0 units  Blood flow rate Blood Flow Rate (ml/min): 400 ml/min   Dialysate rate     Arterial Access Pressure Arterial Access Pressure (mmHg): -230  Venous Return Pressure Venous Return Pressure (mmHg): 210  Ultrafiltration Rate Goal/Amount of Fluid to Remove (mL): 2000 mL  Fluid Removal    Net Fluid Removal            Facility-Administered Medications: None       Current Facility-Administered Medications   Medication Dose Route Frequency    carvediloL (COREG) tablet 12.5 mg  12.5 mg Oral BID WITH MEALS    lisinopriL (PRINIVIL, ZESTRIL) tablet 10 mg  10 mg Oral DAILY    . PHARMACY TO SUBSTITUTE PER PROTOCOL (Reordered from: sucroferric oxyhydroxide (VELPHORO) 500 mg chew chewable tablet)    Per Protocol    0.9% sodium chloride infusion 250 mL  250 mL IntraVENous DIALYSIS PRN    clopidogreL (PLAVIX) tablet 300 mg  300 mg Oral ONCE    heparin (porcine) 1,000 unit/mL injection 7,440 Units  80 Units/kg IntraVENous ONCE    heparin 25,000 units in  ml infusion  18-36 Units/kg/hr IntraVENous TITRATE    [START ON 12/16/2021] aspirin chewable tablet 81 mg  81 mg Oral DAILY    sodium chloride (NS) flush 5-40 mL  5-40 mL IntraVENous Q8H    sodium chloride (NS) flush 5-40 mL  5-40 mL IntraVENous PRN    acetaminophen (TYLENOL) tablet 650 mg  650 mg Oral Q6H PRN    Or    acetaminophen (TYLENOL) suppository 650 mg  650 mg Rectal Q6H PRN    polyethylene glycol (MIRALAX) packet 17 g  17 g Oral DAILY PRN    ondansetron (ZOFRAN ODT) tablet 4 mg  4 mg Oral Q8H PRN    Or    ondansetron (ZOFRAN) injection 4 mg  4 mg IntraVENous Q6H PRN    dilTIAZem (CARDIZEM) 100 mg in 0.9% sodium chloride (MBP/ADV) 100 mL infusion  7.5 mg/hr IntraVENous CONTINUOUS     Current Outpatient Medications   Medication Sig    lisinopriL (PRINIVIL, ZESTRIL) 10 mg tablet Take 1 Tablet by mouth daily.  carvediloL (COREG) 25 mg tablet Take 0.5 Tablets by mouth two (2) times daily (with meals).  apixaban (ELIQUIS) 2.5 mg tablet Take 1 Tab by mouth every twelve (12) hours.  Blood-Glucose Meter monitoring kit Check fasting glucose    glucose blood VI test strips (ACCU-CHEK ZAC PLUS TEST STRP) strip Use as directed    sucroferric oxyhydroxide (VELPHORO) 500 mg chew chewable tablet Take 500 mg by mouth three (3) times daily (with meals).  aspirin delayed-release 81 mg tablet Take 81 mg by mouth daily.     nitroglycerin (NITROLINGUAL) 400 mcg/spray spray 1 Spray by SubLINGual route every five (5) minutes as needed for Chest Pain. Review of Systems:      Complete 10-point review of systems were obtained and discussed in length  with the patient. Complete review of systems was negative/unremarkable  except as mentioned in HPI section. Data Review:    Labs: Results:       Chemistry Recent Labs     12/15/21  0520 12/14/21  1000   * 149*   * 136   K 4.3 4.8   CL 98* 98*   CO2 27 25   BUN 31* 17   CREA 8.90* 5.86*   CA 9.1 9.3   AGAP 9 13   BUCR 3* 3*   AP  --  118*   TP  --  7.5   ALB  --  3.5   GLOB  --  4.0   AGRAT  --  0.9      CBC w/Diff Recent Labs     12/15/21  0520 12/14/21  1000   WBC 8.1 6.6   RBC 4.06* 4.33*   HGB 11.9* 12.8*   HCT 37.2 38.8    210   GRANS 79* 72   LYMPH 12* 16*   EOS 0 4      Coagulation No results for input(s): PTP, INR, APTT, INREXT, INREXT in the last 72 hours. Iron/Ferritin No results for input(s): IRON in the last 72 hours. No lab exists for component: TIBCCALC   BNP No results for input(s): BNPP in the last 72 hours. Cardiac Enzymes Recent Labs     12/15/21  0546 12/15/21  0520   CPK Original order canceled by laboratory, test reordered by laboratory with the purpose of combining it with other laboratory orders. [place] order canceled by laboratory, test reordered by laboratory with the purpose of combining it with other laboratory orders.  4.4*      Liver Enzymes Recent Labs     12/14/21  1000   TP 7.5   ALB 3.5   *      Thyroid Studies Lab Results   Component Value Date/Time    TSH 2.43 12/06/2018 05:19 AM         EKG: afib with rvr    Physical Assessment:     Visit Vitals  /80   Pulse 78   Temp 98 °F (36.7 °C) (Oral)   Resp 18   Ht 5' 11\" (1.803 m)   Wt 93 kg (205 lb 0.4 oz)   SpO2 100%   BMI 28.60 kg/m²     Weight change:     Intake/Output Summary (Last 24 hours) at 12/15/2021 1307  Last data filed at 12/14/2021 1351  Gross per 24 hour   Intake 500 ml   Output --   Net 500 ml Physical Exam:   General: comfortable, no acute distress   HEENT sclera anicteric, supple neck, no thyromegaly  CVS: S1S2 heard,  no rub  RS: + air entry b/l,   Abd: Soft, Non tender, Not distended,   Neuro: non focal, awake, alert , CN II-XII are grossly intact  Extrm: no  edema, no cyanosis, clubbing   Skin: no visible  Rash  Musculoskeletal: No gross joints or bone deformities     Procedures/imaging: see electronic medical records for all procedures, Xrays and details which were not copied into this note but were reviewed prior to creation of Porsche Evans MD  December 15, 2021  [facility]  Office [phone]

## 2021-12-15 NOTE — DIALYSIS
JACQUE        ACUTE HEMODIALYSIS FLOW SHEET      HEMODIALYSIS ORDERS: Physician: Debby Valentin MD     Dialyzer: revaclear   Duration: 3 hr  BFR: 400   DFR: 800   Dialysate:  Temp 36-37*C  K+   2    Ca+  2.5 Na 138 Bicarb 35   Weight:  93 kg    Patient Chart []     Unable to Obtain []   Dry weight/UF Goal: 2000   Access: R Forearm Fistula  Needle Gauge: 15g    Heparin []  Bolus      Units    [] Hourly       Units    [x]None       Pre BP:   158/95    Pulse:     87       Respirations: 18  Temperature:   98   Labs: Pre        Post:         [x] N/A   Additional Orders(medications, blood products, hypotension management):       [x] N/A     [x] Jacque Consent Verified     CATHETER ACCESS: [x]N/A       GRAFT/FISTULA ACCESS:  []N/A     [x]Right   Forearm  []Left     [x]UE     []LE   []AVG   [x]AVF        []Buttonhole    [x]Medical Aseptic Prep Utilized   [x]No S/S infection  []Redness  []Drainage []Cultured []Swelling []Pain    Bruit:   [x] Strong    [] Weak       Thrill :   [x] Strong    [] Weak       Needle Gauge: 15   Length: 1   If access problem,  notified: []Yes     [x]N/A      Please describe access if present and not used:                            GENERAL ASSESSMENT:      LUNGS:  Rate 18 SaO2%        [] N/A    [x] Clear  [] Coarse  [] Crackles  [] Wheezing        [] Diminished     Location : []RLL   []LLL    []RUL  []ROBERT     Cough: []Productive  []Dry  [x]N/A   Respirations:  [x]Easy  []Labored     Therapy:   [x]RA  []NC  l/min    Mask: []NRB []Venti       O2%                  []Ventilator  []Intubated  [] Trach  [] BiPaP     CARDIAC: [x]Regular      [] Irregular   [] Pericardial Rub  [] JVD        [x]  Monitored  [] Bedside  [] Remotely monitored [] N/A      EDEMA: [] None   [x]Generalized  [] Pitting [] 1    [] 2    [] 3    [] 4                 [] Facial  [] Pedal  []  UE  [] LE     SKIN:   [x] Warm   [] Hot     [] Cold   [x] Dry     [] Pale   [] Diaphoretic                  [] Flushed  [] Jaundiced  [] Cyanotic  [] Rash  [] Weeping     LOC:    [x] Alert      [x]Oriented:    [x] Person     [x] Place  [x]Time               [] Confused  [] Lethargic  [] Medicated  [] Non-responsive     GI / ABDOMEN:  [] Flat    [] Distended    [x] Soft    [] Firm   []  Obese                             [] Diarrhea  [x] Bowel Sounds  [] Nausea  [] Vomiting       / URINE ASSESSMENT: [] Voiding   [x] Oliguria  [] Anuria   []  [place]     [] Incontinent    []  Incontinent Brief      []  Bathroom Privileges       PAIN: [x] 0 []1  []2   []3   []4   []5   []6   []7   []8   []9   []10              Scale 0-10  Action/Follow Up:      MOBILITY:  [] Amb    [] Amb/Assist    [x] Bed    [] Wheelchair  [] Stretcher      All Vitals and Treatment Details on Attached [address] ([address]: [place]) [address]: [facility]          Room # FT6/F6      [] 1st Time Acute  [] Stat  [x] Routine  [] Urgent     [x] Acute Room  []  Bedside  [] ICU/CCU  [] ER   Isolation Precautions:   There are currently no Active Isolations      Special Considerations:         [] Blood Consent Verified [x]N/A      ALLERGIES:   No Known Allergies            Code Status:Full Code        Hepatitis Status:                        Lab Results   Component Value Date/Time    Hepatitis A Abs Negative 01/03/2012 01:40 PM    Hepatitis B surface Ag <0.10 12/14/2021 10:00 AM    Hepatitis B surface Ab 664.19 12/14/2021 10:00 AM    Hep B Core Ab, total NEGATIVE  12/05/2018 02:50 PM    Hepatitis C virus Ab 0.08 12/05/2018 02:50 PM                     Current Labs:   Lab Results   Component Value Date/Time    Sodium 134 (L) 12/15/2021 05:20 AM    Potassium 4.3 12/15/2021 05:20 AM    Chloride 98 (L) 12/15/2021 05:20 AM    CO2 27 12/15/2021 05:20 AM    Anion gap 9 12/15/2021 05:20 AM    Glucose 219 (H) 12/15/2021 05:20 AM    BUN 31 (H) 12/15/2021 05:20 AM    Creatinine 8.90 (H) 12/15/2021 05:20 AM    BUN/Creatinine ratio 3 (L) 12/15/2021 05:20 AM    GFR est AA 8 (L) 12/15/2021 05:20 AM    GFR est non-AA 6 (L) 12/15/2021 05:20 AM    Calcium 9.1 12/15/2021 05:20 AM      Lab Results   Component Value Date/Time    WBC 8.1 12/15/2021 05:20 AM    Hemoglobin, POC 10.9 (L) 05/02/2019 10:00 AM    HGB 11.9 (L) 12/15/2021 05:20 AM    Hematocrit, POC 32 (L) 05/02/2019 10:00 AM    HCT 37.2 12/15/2021 05:20 AM    PLATELET 075 45/59/0169 05:20 AM    MCV 91.6 12/15/2021 05:20 AM                                                                                     DIET:   DIET ADULT  DIET NPO       PRIMARY NURSE REPORT: First initial/Last name/Title      Pre Dialysis: TANA Rosario RN     Time: 1003      EDUCATION:    [x] Patient [] Other         Knowledge Basis: []None [x]Minimal [] Substantial   Barriers to learning  [x]N/A   [] Access Care     [] S&S of infection     [] Fluid Management     []K+     [x]Procedural    []Albumin     [] Medications     [] Tx Options     [] Transplant     [] Diet     [] Other   Teaching Tools:  [x] Explain  [x] Demo  [] Handouts [] Video  Patient response:  [x] Verbalized understanding  [] Teach back  [] Return demonstration [] Requires follow up   Inappropriate due to:            [x]Time Out/Safety Check  [x]Extracorporeal Circuit Tested for integrity       [address] - Before each treatment:     Machine Number:                   [facility]                                   [x] Unit Machine # 3 with centralized RO                                      Alarm Test:  Pass time 0830               [x] RO/Machine Log Complete      Temp   36             Dialysate: pH  7.6 Conductivity: Meter   13.5     HD Machine   13.9                  TCD: 13.9  Dialyzer Lot # [id number]            Blood Tubing Lot # [id number]          Saline Lot #  [id number]     CHLORINE TESTING-Before each treatment and every 4 hours    Total Chlorine: [x] less than 0.1 ppm  Time: 0900 4 Hr/2nd Check Time: 1300   (if greater than 0.1 ppm from Primary then every 30 minutes from Secondary)     TREATMENT INITIATION - with Dialysis Precautions:   [x] All Connections Secured                 [x] Saline Line Double Clamped   [x] Venous Parameters Set                  [x] Arterial Parameters Set    [x] Prime Given 250ml                          [x]Air Foam Detector Engaged          Treatment Initiation Note: Pt arrived to dialysis via stretcher. Requested to sit at side for stretcher for a while before stating treatment, due to not feeling well. Pt placed on cardiac monitor, showing NSR and SV rhythm. Cardizem drip going via PIV LUE.  VSS. RUE AVF assessed with strong bruit and thrill. HD initiated without difficulty with 15g x2. During Treatment Notes:  2718   Pt awake and alert. Face and access in view with connections secure. 1115   Pt awake and alert. Face and access in view with connections secure. 1130   Pt awake and alert. Face and access in view with connections secure. 1145   Pt awake and alert. Face and access in view with connections secure. 1200   Pt awake and alert. Face and access in view with connections secure. 1215   Pt awake and alert. Face and access in view with connections secure. 1230   Pt awake and alert. Face and access in view with connections secure. 1245   Pt awake and alert. Face and access in view with connections secure. 1250   TMP continuously alarming. Dialyzer replaced. (new lot # [id number])  1300   Pt awake and alert. Face and access in view with connections secure. 1315   Pt awake and alert. Face and access in view with connections secure. 1330   Pt awake and alert. Face and access in view with connections secure. 1345   Pt awake and alert. Face and access in view with connections secure. 1400   Pt awake and alert. Face and access in view with connections secure. 1415   Pt awake and alert. Face and access in view with connections secure. 1426   Treatment completed. Blood rinsed back. Pt stable.                                Post Assessment:   Dialyzer Cleared: [] Good [x] Fair  [] Poor  Blood processed:  61.8 L  UF Removed  2500 Ml  POst BP:   121/73       Pulse: 83        Respirations: 18  Temperature: 98.5 Lungs:     [x] Clear      [] Course         [] Crackles    [] Wheezing         [] Diminished   Post Tx Vascular Access:   AVF/AVG: Bleeding stopped   Art 15 min. Zay. 13 Min   Cardiac:   [x] Regular   [] Irregular   [x] Monitor  [] N/A      Rhythm: NSR       Catheter:       N/A    Skin:   Pain:   [x] Warm  [x] Dry [] Diaphoretic    [] Flushed    [] Pale [] Cyanotic [x]0  []1  []2   []3  []4   []5   []6   []7   []8   []9   []10     Post Treatment Note:  Pt tolerated treatment well. Net 2 L UF removed.      POST TREATMENT PRIMARY NURSE HANDOFF REPORT:     First initial/Last name/Title         Post Dialysis: AMY Duran RN Time:  1501     Abbreviations: AVG-arterial venous graft, AVF-arterial venous fistula, IJ-Internal Jugular, Subcl-Subclavian, Fem-Femoral, Tx-treatment, AP/HR-apical heart rate, DFR-dialysate flow rate, BFR-blood flow rate, AP-arterial pressure, -venous pressure, UF-ultrafiltrate, TMP-transmembrane pressure, Zay-Venous, Art-Arterial, RO-Reverse Osmosis

## 2021-12-15 NOTE — PROGRESS NOTES
Hospitalist Progress Note    Patient: Stephania Holt MRN: [id number]  CSN: [id number]    YOB: 1970  Age: 52 y.o. Sex: male    DOA: 12/14/2021 LOS:  LOS: 1 day          Patient seen and examined on dialysis, nursing reports no issues other than some bleeding afterward, she is holding pressure. Patient reports he has had some intermittent chest pain, which he reports describes as burning discomfort in his mid chest.  This comes and goes. He expresses understanding regarding cardiac cath planned for tomorrow, states he  had an appointment scheduled with Dr. Lubna Cheatham this week to follow-up anyway. Assessment/Plan       1. Atrial fib with RVR, paroxysmal atrial fib. Eliquis held. On heparin drip, Cardizem. Cardiology follows  2. Chest pain, elevated troponin. N.p.o. after midnight for cardiac cath tomorrow. Cardiology input appreciated. 3.  End-stage renal disease TTS per nephrology, input appreciated  4. Diabetes type 2  5. legally blind, history of diabetic retinopathy  6. CAD, status post PCI 2012. Status post PCI June 2021  7. ICMO, EF improving per recent echo. 8.  Dyslipidemia  9. Noncompliance, documented  10. Hypertension  11. DVT prophylaxis  12. Full code. Telemetry. Additional Notes:      Case discussed with:  [x]Patient  []Family  [x]Nursing  []Case Management  DVT Prophylaxis:  []Lovenox  []Hep SQ  []SCDs  []Coumadin   [x]On Heparin gtt    Vital signs/Intake and Output:  Visit Vitals  /80   Pulse 78   Temp 98 °F (36.7 °C) (Oral)   Resp 18   Ht 5' 11\" (1.803 m)   Wt 93 kg (205 lb 0.4 oz)   SpO2 100%   BMI 28.60 kg/m²     Current Shift:  No intake/output data recorded. Last three shifts:  12/13 1901 - 12/15 0700  In: 1000 [I.V.:1000]  Out: -     Awake alert and oriented x4, seen on dialysis  Normocephalic atraumatic.   Oropharynx clear  Chest wall: No tenderness to palpation  Regular rate and rhythm  Clear to auscultation bilaterally  Abdomen soft nontender nondistended normoactive bowel sounds  No edema. DP 2+ bilaterally. Left upper extremity fistula. Other than vision, no focal deficit  No rash to visible skin      Medications Reviewed      Labs: Results:       Chemistry Recent Labs     12/15/21  0520 12/14/21  1000   * 149*   * 136   K 4.3 4.8   CL 98* 98*   CO2 27 25   BUN 31* 17   CREA 8.90* 5.86*   CA 9.1 9.3   AGAP 9 13   BUCR 3* 3*   AP  --  118*   TP  --  7.5   ALB  --  3.5   GLOB  --  4.0   AGRAT  --  0.9      CBC w/Diff Recent Labs     12/15/21  0520 12/14/21  1000   WBC 8.1 6.6   RBC 4.06* 4.33*   HGB 11.9* 12.8*   HCT 37.2 38.8    210   GRANS 79* 72   LYMPH 12* 16*   EOS 0 4      Cardiac Enzymes Recent Labs     12/15/21  0546 12/15/21  0520   CPK Original order canceled by laboratory, test reordered by laboratory with the purpose of combining it with other laboratory orders. [place] order canceled by laboratory, test reordered by laboratory with the purpose of combining it with other laboratory orders. 4.4*      Coagulation No results for input(s): PTP, INR, APTT, INREXT in the last 72 hours. Lipid Panel Lab Results   Component Value Date/Time    Cholesterol, total 168 03/22/2021 12:00 AM    HDL Cholesterol 32 (L) 03/22/2021 12:00 AM    LDL, calculated 116 (H) 03/22/2021 12:00 AM    LDL, calculated 85 02/09/2019 11:20 AM    VLDL, calculated 20 03/22/2021 12:00 AM    VLDL, calculated 15 02/09/2019 11:20 AM    Triglyceride 106 03/22/2021 12:00 AM    CHOL/HDL Ratio 4.4 10/17/2012 06:05 AM      BNP No results for input(s): BNPP in the last 72 hours.    Liver Enzymes Recent Labs     12/14/21  1000   TP 7.5   ALB 3.5   *      Thyroid Studies Lab Results   Component Value Date/Time    TSH 2.43 12/06/2018 05:19 AM        Procedures/imaging: see electronic medical records for all procedures/Xrays and details which were not copied into this note but were reviewed prior to creation of Plan

## 2021-12-15 NOTE — ED NOTES
Patient assisted to ambulate to rest room and back to bed without difficulty. Patient asked for an escort since he has been feeling weak. Vital signs stable.

## 2021-12-15 NOTE — PROGRESS NOTES
OCCUPATIONAL THERAPY EVALUATION/DISCHARGE    Patient: Joanne Elliott (89 y.o. male)  Date: 12/15/2021  Primary Diagnosis: Paroxysmal A-fib (HCC) [I48.0]  Atrial fibrillation with RVR ([facility] [address] [I48.91]        Precautions:   Fall  PLOF: Pt reports being Mod Ind for ADLs and functional mobility, uses cane prn. ASSESSMENT AND RECOMMENDATIONS:  Based on the objective data described below, the patient presents with ability to perform basic ADLs and functional transfers at/near baseline level of function. Pt found in the BR, was able to maneuver back to his room in FT6 with distant supervision and additional time. Pt performed/simulated UB/LB ADLs with Mod Ind for seated and std aspects. Pt demonstrates appropriate safety awareness during all standing tasks. Skilled occupational therapy is not indicated at this time. Discharge Recommendations: Home Health for safety check  Further Equipment Recommendations for Discharge: shower chair for EC     SUBJECTIVE:   Patient stated I take my time.     OBJECTIVE DATA SUMMARY:     Past Medical History:   Diagnosis Date    Abnormal nuclear cardiac imaging test 10/08/2012    Fixed anteroseptal, anteroapical defect c/w prior infarction. No ischemia. Mid & distal anteroseptal, anteroapical WMA. LVE. EF 44%. Anemia     dr. Deion Rodríguez doubt amyloid or multiple myeloma. candidate for procirt if Hg <10    Benign hypertensive     Blindness of right eye 01/2013    legally blind    CAD (coronary artery disease)     Cardiomyopathy ejection fraction of 40%     CKD (chronic kidney disease), stage IV ([facility] [address]     to see Dr. Arturo Pena 12/1/12    Congestive heart failure ([facility] [address]     Diabetes mellitus ([facility] [address]     Diabetic retinopathy ([facility] [address]     Diabetic retinopathy ([facility] [address]     Dr. Jeffrey Montague- injections    Heart failure [facility])     History of echocardiogram 04/22/2014    LVE. EF 55% (prev 40-45% on echo of 1/27/12). No WMA. Mild-mod conc LVH. Gr 3 DDfx. Marked LAE. Mild SHANTEL. Mild MR.     Hypertension Pulmonary hypertension ([address]     Renal Ultrasound 1/3/12    Right kidney isoechoic w/respect to liver. Sm left-sided pleural effusion    S/P cardiac cath 10/16/2012    LM patent. pLAD 95% (3.5 x 12-mm [place] stent, resid 0$%). LCX mild. Past Surgical History:   Procedure Laterality Date    HX CORONARY STENT PLACEMENT      one    HX LAPAROTOMY  2/2012    bowel obstruction with removal segment of small bowel. Done by Riblet. HX LAPAROTOMY  infancy    whole in colon and had repair at that time. HX OTHER SURGICAL  06/20/2013    left eye retna attatchment    HX RETINAL DETACHMENT REPAIR      august 2012    WA REPAIR ING HERNIA,5+Y/O,REDUCIBL Left 05/02/2019    Dr. Ericka Burns     Barriers to Learning/Limitations: None  Compensate with: visual, verbal, tactile, kinesthetic cues/model    Home Situation:   Home Situation  Home Environment: Apartment  One/Two Story Residence: One story  Living Alone: No  Support Systems: Other Family Member(s)  Patient Expects to be Discharged to[de-identified] Apartment  Current DME Used/Available at Home: Cane, straight  Tub or Shower Type: Tub/Shower combination  [x]     Right hand dominant   []     Left hand dominant    Cognitive/Behavioral Status:  Neurologic State: Alert  Orientation Level: Oriented X4  Cognition: Follows commands  Safety/Judgement: Awareness of environment; Fall prevention    Skin: visible skin intact  Edema: none noted    Vision/Perceptual:       WFL  Coordination: BUE  Coordination: Within functional limits  Fine Motor Skills-Upper: Left Intact; Right Intact    Gross Motor Skills-Upper: Left Intact; Right Intact  Balance:  Sitting: Intact  Standing: Impaired  Standing - Static: Good  Standing - Dynamic : Good    Strength: BUE  Strength: Generally decreased, functional   Tone & Sensation: BUE  Tone: Normal  Sensation: Intact   Range of Motion: BUE  AROM: Generally decreased, functional   Functional Mobility and Transfers for ADLs:    Transfers:  Sit to Stand: Modified independent  Stand to Sit: Modified independent   Toilet Transfer : Modified independent    Bathroom Mobility: Modified independent  ADL Assessment:  Feeding: Independent  Oral Facial Hygiene/Grooming: Modified Independent  Bathing: Modified independent  Upper Body Dressing: Modified independent  Lower Body Dressing: Modified independent  Toileting: Modified independent   ADL Intervention:     Cognitive Retraining  Safety/Judgement: Awareness of environment; Fall prevention  Pain:  Pain level pre-treatment: 0/10   Pain level post-treatment: 0/10   Activity Tolerance:   Fair  Please refer to the flowsheet for vital signs taken during this treatment. After treatment:   []  Patient left in no apparent distress sitting up in chair  [x]  Patient left in no apparent distress sitting edge of bed  [x]  Call bell left within reach  [x]  Nursing notified  []  Caregiver present  []  Bed alarm activated    COMMUNICATION/EDUCATION:   [x]      Role of Occupational Therapy in the acute care setting  [x]      Home safety education was provided and the patient/caregiver indicated understanding. [x]      Patient/family have participated as able and agree with findings and recommendations. []      Patient is unable to participate in plan of care at this time. Thank you for this referral.  Khris Julio OTR/L  Time Calculation: 9 mins      Eval Complexity: History: LOW Complexity : Brief history review ; Examination: LOW Complexity : 1-3 performance deficits relating to physical, cognitive , or psychosocial skils that result in activity limitations and / or participation restrictions ;    Decision Making:LOW Complexity : No comorbidities that affect functional and no verbal or physical assistance needed to complete eval tasks

## 2021-12-15 NOTE — PROGRESS NOTES
Cardiology Progress Note    Admit Date: 12/14/2021  Attending Cardiologist: Dr. Garcia Proffer:     -Paroxysmal atrial fibrillation. Sent from HD with Afib RVR with hypotension. On Eliquis for anticoagulation. -CAD. LAD PCI 2012. Cardiac cath 6/21/2021 with findings as follows:  · Left-dominant sytem. · Small nondominant RCA with diffuse 70% stenosis. · LM is patent. There is distal 25% stenosis noted. · Circumflex is dominant. No significant stenosis > 30% as noted. · LAD has proximal stent with ISR, 90%, IFR 0.75. This lesion was successfully stented using 3.25 x 12 mm AMOL. Post-procedure IFR was 0.97.  ? Plan was triple therapy (ASA 81 mg, Plavix 75 mg and Eliquis) x 1 month, then Plavix and Eliquis for next 5 months and subsequently Eliquis monotherapy. · LVEDP was elevated at 24 mmHg. There was no gradient across the aortic valve. Left ventriculogram was not performed, to preserve contrast use. -Ischemic cardiomyopathy. EF 40% by echo 12/2018, 55-60% in 01/2021. Repeat Echo 10/1/2021 ([facility]) with EF 62%, concentric LVH with moderately thickened septal wall and mildly thickened posterior wall, normal RV function and size, normal LV diastolic function, no hemodynamically significant valvular disease. -ESRD on HD T/R/S, followed by Dr. Dana Molina.  -HTN.  -DM. -Dyslipidemia.  -Chronic anemia. -Diabetic retinopathy, legally blind. -H/o noncompliance.     Primary cardiologist Dr. Rebeca Rodriguez. Plan:     -Will check subsequent EKG and limited Echo due to rise in troponin.  -Pt given Eliquis this AM, will start Heparin infusion this evening pending likely cardiac catheterization tomorrow. Will load with Plavix 300 mg today, as pt reports not taking Plavix since stent placed in June. Continue ASA 81 mg. Advised pt to keep NPO after midnight for procedure.  -Continue Coreg, Lisinopril.  -Discontinue Cardizem drip as long as rates remain stable.   -Further recommendations based on test results, hospital course. Subjective:     Had an episode of vomiting last night and another episode of vomiting this morning, non-bloody emesis, no abdominal pain. Has intermittent chest discomfort with deep inspiration. Objective:      Patient Vitals for the past 8 hrs:   Temp Pulse Resp BP SpO2   12/15/21 0832 98 °F (36.7 °C) 92 18 (!) 155/133 100 %   12/15/21 0700 -- 88 17 (!) 147/92 100 %   12/15/21 0600 98.4 °F (36.9 °C) 80 17 (!) 167/81 99 %   12/15/21 0500 -- 80 18 126/82 99 %   12/15/21 0400 98.1 °F (36.7 °C) 79 15 122/76 98 %   12/15/21 0300 -- 86 17 (!) 156/91 98 %   12/15/21 0200 -- 82 15 120/73 98 %         Patient Vitals for the past 96 hrs:   Weight   12/14/21 1010 93 kg (205 lb 0.4 oz)                Current Facility-Administered Medications   Medication Dose Route Frequency Last Admin    apixaban (ELIQUIS) tablet 2.5 mg  2.5 mg Oral Q12H 2.5 mg at 12/15/21 0827    aspirin delayed-release tablet 81 mg  81 mg Oral DAILY 81 mg at 12/15/21 0827    carvediloL (COREG) tablet 12.5 mg  12.5 mg Oral BID WITH MEALS 12.5 mg at 12/15/21 0827    lisinopriL (PRINIVIL, ZESTRIL) tablet 10 mg  10 mg Oral DAILY 10 mg at 12/15/21 0827    . PHARMACY TO SUBSTITUTE PER PROTOCOL (Reordered from: sucroferric oxyhydroxide (VELPHORO) 500 mg chew chewable tablet)    Per Protocol      sodium chloride (NS) flush 5-40 mL  5-40 mL IntraVENous Q8H 10 mL at 12/15/21 0828    sodium chloride (NS) flush 5-40 mL  5-40 mL IntraVENous PRN      acetaminophen (TYLENOL) tablet 650 mg  650 mg Oral Q6H PRN      Or    acetaminophen (TYLENOL) suppository 650 mg  650 mg Rectal Q6H PRN      polyethylene glycol (MIRALAX) packet 17 g  17 g Oral DAILY PRN      ondansetron (ZOFRAN ODT) tablet 4 mg  4 mg Oral Q8H PRN      Or    ondansetron (ZOFRAN) injection 4 mg  4 mg IntraVENous Q6H PRN      baclofen (LIORESAL) tablet 5 mg  5 mg Oral TID PRN 5 mg at 12/15/21 0008    dilTIAZem (CARDIZEM) 100 mg in 0.9% sodium chloride (MBP/ADV) 100 mL infusion  7.5 mg/hr IntraVENous CONTINUOUS 7.5 mg/hr at 12/15/21 0008     Current Outpatient Medications   Medication Sig Dispense    lisinopriL (PRINIVIL, ZESTRIL) 10 mg tablet Take 1 Tablet by mouth daily. 30 Tablet    carvediloL (COREG) 25 mg tablet Take 0.5 Tablets by mouth two (2) times daily (with meals). 60 Tablet    apixaban (ELIQUIS) 2.5 mg tablet Take 1 Tab by mouth every twelve (12) hours. 180 Tab    Blood-Glucose Meter monitoring kit Check fasting glucose 1 Kit    glucose blood VI test strips (ACCU-CHEK ZAC PLUS TEST STRP) strip Use as directed 100 Strip    sucroferric oxyhydroxide (VELPHORO) 500 mg chew chewable tablet Take 500 mg by mouth three (3) times daily (with meals).  aspirin delayed-release 81 mg tablet Take 81 mg by mouth daily.  nitroglycerin (NITROLINGUAL) 400 mcg/spray spray 1 Spray by SubLINGual route every five (5) minutes as needed for Chest Pain.  1 Bottle         Intake/Output Summary (Last 24 hours) at 12/15/2021 0900  Last data filed at 12/14/2021 1351  Gross per 24 hour   Intake 1000 ml   Output --   Net 1000 ml       Physical Exam:  General:  alert, cooperative, no distress, appears stated age  Neck:  supple  Lungs:  clear to auscultation bilaterally  Heart:  regular rate and rhythm  Abdomen:  abdomen is soft without significant tenderness, masses, organomegaly or guarding  Extremities:  atraumatic, no edema    Visit Vitals  BP (!) 155/133   Pulse 92   Temp 98 °F (36.7 °C)   Resp 18   Ht 5' 11\" (1.803 m)   Wt 93 kg (205 lb 0.4 oz)   SpO2 100%   BMI 28.60 kg/m²       Data Review:     Labs: Results:       Chemistry Recent Labs     12/15/21  0520 12/14/21  1000   * 149*   * 136   K 4.3 4.8   CL 98* 98*   CO2 27 25   BUN 31* 17   CREA 8.90* 5.86*   CA 9.1 9.3   MG 2.6 2.2   AGAP 9 13   BUCR 3* 3*   AP  --  118*   TP  --  7.5   ALB  --  3.5   GLOB  --  4.0   AGRAT  --  0.9      CBC w/Diff Recent Labs     12/15/21  0520 12/14/21  1000   WBC 8.1 6.6   RBC 4.06* 4.33*   HGB 11.9* 12.8*   HCT 37.2 38.8    210   GRANS 79* 72   LYMPH 12* 16*   EOS 0 4      Cardiac Enzymes Lab Results   Component Value Date/Time    CPK  12/15/2021 05:46 AM     Original order canceled by laboratory, test reordered by laboratory with the purpose of combining it with other laboratory orders.  12/15/2021 05:20 AM    CKMB  12/15/2021 05:46 AM     Original order canceled by laboratory, test reordered by laboratory with the purpose of combining it with other laboratory orders. CKMB 5.5 (H) 12/15/2021 05:20 AM    CKND1  12/15/2021 05:46 AM     Original order canceled by laboratory, test reordered by laboratory with the purpose of combining it with other laboratory orders.     CKND1 4.4 (H) 12/15/2021 05:20 AM          Lipid Panel Lab Results   Component Value Date/Time    Cholesterol, total 168 03/22/2021 12:00 AM    HDL Cholesterol 32 (L) 03/22/2021 12:00 AM    LDL, calculated 116 (H) 03/22/2021 12:00 AM    LDL, calculated 85 02/09/2019 11:20 AM    VLDL, calculated 20 03/22/2021 12:00 AM    VLDL, calculated 15 02/09/2019 11:20 AM    Triglyceride 106 03/22/2021 12:00 AM    CHOL/HDL Ratio 4.4 10/17/2012 06:05 AM      Liver Enzymes Recent Labs     12/14/21  1000   TP 7.5   ALB 3.5   *      Thyroid Studies Lab Results   Component Value Date/Time    TSH 2.43 12/06/2018 05:19 AM          Signed By: Jay Resendez PA-C     December 15, 2021

## 2021-12-16 ENCOUNTER — APPOINTMENT (OUTPATIENT)
Dept: NON INVASIVE DIAGNOSTICS | Age: 51
DRG: 286 | End: 2021-12-16
Attending: PHYSICIAN ASSISTANT
Payer: MEDICARE

## 2021-12-16 LAB
ANION GAP SERPL CALC-SCNC: 10 MMOL/L (ref 3–18)
APTT PPP: 49.9 SEC (ref 23–36.4)
ATRIAL RATE: 88 BPM
BASOPHILS # BLD: 0 K/UL (ref 0–0.1)
BASOPHILS NFR BLD: 0 % (ref 0–2)
BUN SERPL-MCNC: 32 MG/DL (ref 7–18)
BUN/CREAT SERPL: 4 (ref 12–20)
CALCIUM SERPL-MCNC: 9.1 MG/DL (ref 8.5–10.1)
CALCULATED P AXIS, ECG09: 38 DEGREES
CALCULATED R AXIS, ECG10: -5 DEGREES
CALCULATED T AXIS, ECG11: 50 DEGREES
CHLORIDE SERPL-SCNC: 95 MMOL/L (ref 100–111)
CO2 SERPL-SCNC: 29 MMOL/L (ref 21–32)
CREAT SERPL-MCNC: 7.82 MG/DL (ref 0.6–1.3)
DIAGNOSIS, 93000: NORMAL
DIFFERENTIAL METHOD BLD: ABNORMAL
EOSINOPHIL # BLD: 0.1 K/UL (ref 0–0.4)
EOSINOPHIL NFR BLD: 1 % (ref 0–5)
ERYTHROCYTE [DISTWIDTH] IN BLOOD BY AUTOMATED COUNT: 13.3 % (ref 11.6–14.5)
GLUCOSE SERPL-MCNC: 265 MG/DL (ref 74–99)
HCT VFR BLD AUTO: 36.7 % (ref 36–48)
HGB BLD-MCNC: 12 G/DL (ref 13–16)
IMM GRANULOCYTES # BLD AUTO: 0 K/UL (ref 0–0.04)
IMM GRANULOCYTES NFR BLD AUTO: 0 % (ref 0–0.5)
INR PPP: 1.1 (ref 0.8–1.2)
LYMPHOCYTES # BLD: 1.3 K/UL (ref 0.9–3.6)
LYMPHOCYTES NFR BLD: 17 % (ref 21–52)
MCH RBC QN AUTO: 29.9 PG (ref 24–34)
MCHC RBC AUTO-ENTMCNC: 32.7 G/DL (ref 31–37)
MCV RBC AUTO: 91.5 FL (ref 78–100)
MONOCYTES # BLD: 0.8 K/UL (ref 0.05–1.2)
MONOCYTES NFR BLD: 10 % (ref 3–10)
NEUTS SEG # BLD: 5.5 K/UL (ref 1.8–8)
NEUTS SEG NFR BLD: 72 % (ref 40–73)
NRBC # BLD: 0 K/UL (ref 0–0.01)
NRBC BLD-RTO: 0 PER 100 WBC
P-R INTERVAL, ECG05: 150 MS
PLATELET # BLD AUTO: 220 K/UL (ref 135–420)
PMV BLD AUTO: 10.4 FL (ref 9.2–11.8)
POTASSIUM SERPL-SCNC: 3.8 MMOL/L (ref 3.5–5.5)
PROTHROMBIN TIME: 14.3 SEC (ref 11.5–15.2)
Q-T INTERVAL, ECG07: 376 MS
QRS DURATION, ECG06: 78 MS
QTC CALCULATION (BEZET), ECG08: 454 MS
RBC # BLD AUTO: 4.01 M/UL (ref 4.35–5.65)
SODIUM SERPL-SCNC: 134 MMOL/L (ref 136–145)
TROPONIN-HIGH SENSITIVITY: 1125 NG/L (ref 0–78)
VENTRICULAR RATE, ECG03: 88 BPM
WBC # BLD AUTO: 7.7 K/UL (ref 4.6–13.2)

## 2021-12-16 PROCEDURE — 74011250636 HC RX REV CODE- 250/636: Performed by: INTERNAL MEDICINE

## 2021-12-16 PROCEDURE — 93005 ELECTROCARDIOGRAM TRACING: CPT

## 2021-12-16 PROCEDURE — 74011250637 HC RX REV CODE- 250/637: Performed by: FAMILY MEDICINE

## 2021-12-16 PROCEDURE — 2709999900 HC NON-CHARGEABLE SUPPLY

## 2021-12-16 PROCEDURE — C1894 INTRO/SHEATH, NON-LASER: HCPCS | Performed by: INTERNAL MEDICINE

## 2021-12-16 PROCEDURE — 85730 THROMBOPLASTIN TIME PARTIAL: CPT

## 2021-12-16 PROCEDURE — 74011000258 HC RX REV CODE- 258: Performed by: HOSPITALIST

## 2021-12-16 PROCEDURE — 80048 BASIC METABOLIC PNL TOTAL CA: CPT

## 2021-12-16 PROCEDURE — B2111ZZ FLUOROSCOPY OF MULTIPLE CORONARY ARTERIES USING LOW OSMOLAR CONTRAST: ICD-10-PCS | Performed by: INTERNAL MEDICINE

## 2021-12-16 PROCEDURE — 77030013797 HC KT TRNSDUC PRSSR EDWD -A: Performed by: INTERNAL MEDICINE

## 2021-12-16 PROCEDURE — 85025 COMPLETE CBC W/AUTO DIFF WBC: CPT

## 2021-12-16 PROCEDURE — 74011250636 HC RX REV CODE- 250/636: Performed by: FAMILY MEDICINE

## 2021-12-16 PROCEDURE — 99232 SBSQ HOSP IP/OBS MODERATE 35: CPT | Performed by: HOSPITALIST

## 2021-12-16 PROCEDURE — 74011000250 HC RX REV CODE- 250: Performed by: INTERNAL MEDICINE

## 2021-12-16 PROCEDURE — 74011250637 HC RX REV CODE- 250/637

## 2021-12-16 PROCEDURE — B2151ZZ FLUOROSCOPY OF LEFT HEART USING LOW OSMOLAR CONTRAST: ICD-10-PCS | Performed by: INTERNAL MEDICINE

## 2021-12-16 PROCEDURE — 99152 MOD SED SAME PHYS/QHP 5/>YRS: CPT | Performed by: INTERNAL MEDICINE

## 2021-12-16 PROCEDURE — 85610 PROTHROMBIN TIME: CPT

## 2021-12-16 PROCEDURE — 74011000636 HC RX REV CODE- 636: Performed by: INTERNAL MEDICINE

## 2021-12-16 PROCEDURE — 93454 CORONARY ARTERY ANGIO S&I: CPT | Performed by: INTERNAL MEDICINE

## 2021-12-16 PROCEDURE — 74011250637 HC RX REV CODE- 250/637: Performed by: PHYSICIAN ASSISTANT

## 2021-12-16 PROCEDURE — 36415 COLL VENOUS BLD VENIPUNCTURE: CPT

## 2021-12-16 PROCEDURE — 84484 ASSAY OF TROPONIN QUANT: CPT

## 2021-12-16 PROCEDURE — 74011250636 HC RX REV CODE- 250/636: Performed by: HOSPITALIST

## 2021-12-16 PROCEDURE — 77030004558 HC CATH ANGI DX SUPR TORQ CARD -A: Performed by: INTERNAL MEDICINE

## 2021-12-16 PROCEDURE — 65270000029 HC RM PRIVATE

## 2021-12-16 PROCEDURE — 90935 HEMODIALYSIS ONE EVALUATION: CPT

## 2021-12-16 PROCEDURE — 4A023N7 MEASUREMENT OF CARDIAC SAMPLING AND PRESSURE, LEFT HEART, PERCUTANEOUS APPROACH: ICD-10-PCS | Performed by: INTERNAL MEDICINE

## 2021-12-16 RX ORDER — ATORVASTATIN CALCIUM 40 MG/1
40 TABLET, FILM COATED ORAL
Status: DISCONTINUED | OUTPATIENT
Start: 2021-12-16 | End: 2021-12-17

## 2021-12-16 RX ORDER — MAGNESIUM SULFATE 100 %
16 CRYSTALS MISCELLANEOUS AS NEEDED
Status: DISCONTINUED | OUTPATIENT
Start: 2021-12-16 | End: 2021-12-17 | Stop reason: HOSPADM

## 2021-12-16 RX ORDER — DEXTROSE 50 % IN WATER (D50W) INTRAVENOUS SYRINGE
25-50 AS NEEDED
Status: DISCONTINUED | OUTPATIENT
Start: 2021-12-16 | End: 2021-12-17 | Stop reason: HOSPADM

## 2021-12-16 RX ORDER — LIDOCAINE HYDROCHLORIDE 10 MG/ML
INJECTION, SOLUTION EPIDURAL; INFILTRATION; INTRACAUDAL; PERINEURAL AS NEEDED
Status: DISCONTINUED | OUTPATIENT
Start: 2021-12-16 | End: 2021-12-16 | Stop reason: HOSPADM

## 2021-12-16 RX ORDER — MIDAZOLAM HYDROCHLORIDE 1 MG/ML
INJECTION, SOLUTION INTRAMUSCULAR; INTRAVENOUS AS NEEDED
Status: DISCONTINUED | OUTPATIENT
Start: 2021-12-16 | End: 2021-12-16 | Stop reason: HOSPADM

## 2021-12-16 RX ORDER — FENTANYL CITRATE 50 UG/ML
INJECTION, SOLUTION INTRAMUSCULAR; INTRAVENOUS AS NEEDED
Status: DISCONTINUED | OUTPATIENT
Start: 2021-12-16 | End: 2021-12-16 | Stop reason: HOSPADM

## 2021-12-16 RX ORDER — SODIUM CHLORIDE 0.9 % (FLUSH) 0.9 %
5-40 SYRINGE (ML) INJECTION AS NEEDED
Status: DISCONTINUED | OUTPATIENT
Start: 2021-12-16 | End: 2021-12-17 | Stop reason: HOSPADM

## 2021-12-16 RX ORDER — HEPARIN SODIUM 200 [USP'U]/100ML
INJECTION, SOLUTION INTRAVENOUS
Status: COMPLETED | OUTPATIENT
Start: 2021-12-16 | End: 2021-12-16

## 2021-12-16 RX ORDER — GUAIFENESIN 100 MG/5ML
LIQUID (ML) ORAL
Status: COMPLETED
Start: 2021-12-16 | End: 2021-12-16

## 2021-12-16 RX ORDER — HEPARIN SODIUM 1000 [USP'U]/ML
80 INJECTION, SOLUTION INTRAVENOUS; SUBCUTANEOUS ONCE
Status: COMPLETED | OUTPATIENT
Start: 2021-12-16 | End: 2021-12-16

## 2021-12-16 RX ORDER — INSULIN LISPRO 100 [IU]/ML
INJECTION, SOLUTION INTRAVENOUS; SUBCUTANEOUS
Status: DISCONTINUED | OUTPATIENT
Start: 2021-12-16 | End: 2021-12-17 | Stop reason: HOSPADM

## 2021-12-16 RX ORDER — SODIUM CHLORIDE 0.9 % (FLUSH) 0.9 %
5-40 SYRINGE (ML) INJECTION EVERY 8 HOURS
Status: DISCONTINUED | OUTPATIENT
Start: 2021-12-16 | End: 2021-12-17 | Stop reason: HOSPADM

## 2021-12-16 RX ADMIN — Medication 10 ML: at 23:47

## 2021-12-16 RX ADMIN — Medication 10 ML: at 15:14

## 2021-12-16 RX ADMIN — APIXABAN 2.5 MG: 2.5 TABLET, FILM COATED ORAL at 17:51

## 2021-12-16 RX ADMIN — CARVEDILOL 12.5 MG: 12.5 TABLET, FILM COATED ORAL at 07:37

## 2021-12-16 RX ADMIN — SODIUM CHLORIDE 5 MG/HR: 900 INJECTION, SOLUTION INTRAVENOUS at 17:55

## 2021-12-16 RX ADMIN — ASPIRIN 81 MG CHEWABLE TABLET 81 MG: 81 TABLET CHEWABLE at 07:37

## 2021-12-16 RX ADMIN — ATORVASTATIN CALCIUM 40 MG: 40 TABLET, FILM COATED ORAL at 23:48

## 2021-12-16 RX ADMIN — Medication 10 ML: at 22:21

## 2021-12-16 RX ADMIN — Medication 10 ML: at 06:05

## 2021-12-16 RX ADMIN — CARVEDILOL 12.5 MG: 12.5 TABLET, FILM COATED ORAL at 17:51

## 2021-12-16 RX ADMIN — Medication 10 ML: at 15:15

## 2021-12-16 RX ADMIN — HEPARIN SODIUM 7440 UNITS: 1000 INJECTION INTRAVENOUS; SUBCUTANEOUS at 04:39

## 2021-12-16 NOTE — PROGRESS NOTES
Cardiology Progress Note    Admit Date: 12/14/2021  Attending Cardiologist: Dr. Carolyn Wilson:     -Paroxysmal atrial fibrillation. Sent from HD with Afib RVR with hypotension.  On Eliquis for anticoagulation. -CAD. LAD PCI 2012. Cardiac cath 6/21/2021 with findings as follows:  · Left-dominant sytem. · Small nondominant RCA with diffuse 70% stenosis. · LM is patent. Eliz Knuckles is distal 25% stenosis noted. · Circumflex is dominant.  No significant stenosis > 30% as noted. · LAD has proximal stent with ISR, 90%, IFR 0.75.  This lesion was successfully stented using 3.25 x 12 mm AMOL.  Post-procedure IFR was 0.97.  ? Plan was triple therapy (ASA 81 mg, Plavix 75 mg and Eliquis) x 1 month, then Plavix and Eliquis for next 5 months and subsequently Eliquis monotherapy. · LVEDP was elevated at 24 mmHg.  There was no gradient across the aortic valve.  Left ventriculogram was not performed, to preserve contrast use. -Ischemic cardiomyopathy. EF 40% by echo 12/2018, 55-60% in 01/2021.  Repeat Echo 10/1/2021 ([facility]) with EF 62%, concentric LVH with moderately thickened septal wall and mildly thickened posterior wall, normal RV function and size, normal LV diastolic function, no hemodynamically significant valvular disease. -ESRD on HD T/R/S, followed by Dr. Jeremias Estevez.  -HTN.  -DM. -Dyslipidemia.  -Chronic anemia. -Diabetic retinopathy, legally blind. -H/o noncompliance.     Primary cardiologist Dr. Mateus Lemus. Plan:     Addendum: Independently seen and evaluated. Agree with below. Patient has known history of CAD. He comes in with PAF. However, his troponin, high-sensitivity, was abnormal.  We will proceed with coronary angiography. His ECG and troponin elevation are not consistent with SAT.   All questions answered.    -Pt NPO for cardiac catheterization this AM, pt in agreement with proceeding as planned.  -Pt in sinus rhythm on monitor, will discontinue Cardizem infusion and obtain subsequent EKG.  -Continue ASA, Coreg.  -Will start Lipitor.  -Volume management via HD per nephrology team, appreciate assistance. Subjective:     No new complaints. States had a restful night last night. No chest pain currently, states pleuritic chest discomfort has resolved. Objective:      Patient Vitals for the past 8 hrs:   Temp Pulse Resp BP SpO2   12/16/21 0722 -- 96 26 (!) 152/86 99 %   12/16/21 0421 99 °F (37.2 °C) 91 16 132/78 98 %   12/16/21 0028 98.9 °F (37.2 °C) 88 16 138/78 98 %         Patient Vitals for the past 96 hrs:   Weight   12/14/21 1010 93 kg (205 lb 0.4 oz)                  Current Facility-Administered Medications   Medication Dose Route Frequency Last Admin    carvediloL (COREG) tablet 12.5 mg  12.5 mg Oral BID WITH MEALS 12.5 mg at 12/16/21 0737    lisinopriL (PRINIVIL, ZESTRIL) tablet 10 mg  10 mg Oral DAILY 10 mg at 12/15/21 0827    . PHARMACY TO SUBSTITUTE PER PROTOCOL (Reordered from: sucroferric oxyhydroxide (VELPHORO) 500 mg chew chewable tablet)    Per Protocol      0.9% sodium chloride infusion 250 mL  250 mL IntraVENous DIALYSIS PRN      heparin 25,000 units in  ml infusion  18-36 Units/kg/hr IntraVENous TITRATE Stopped at 12/16/21 0714    aspirin chewable tablet 81 mg  81 mg Oral DAILY 81 mg at 12/16/21 0737    clopidogreL (PLAVIX) tablet 75 mg  75 mg Oral DAILY      sodium chloride (NS) flush 5-40 mL  5-40 mL IntraVENous Q8H 10 mL at 12/16/21 0605    sodium chloride (NS) flush 5-40 mL  5-40 mL IntraVENous PRN      acetaminophen (TYLENOL) tablet 650 mg  650 mg Oral Q6H PRN      Or    acetaminophen (TYLENOL) suppository 650 mg  650 mg Rectal Q6H PRN      polyethylene glycol (MIRALAX) packet 17 g  17 g Oral DAILY PRN      ondansetron (ZOFRAN ODT) tablet 4 mg  4 mg Oral Q8H PRN 4 mg at 12/15/21 2020    Or    ondansetron (ZOFRAN) injection 4 mg  4 mg IntraVENous Q6H PRN           Intake/Output Summary (Last 24 hours) at 12/16/2021 0802  Last data filed at 12/15/2021 1426  Gross per 24 hour   Intake --   Output 2000 ml   Net -2000 ml       Physical Exam:  General:  alert, cooperative, no distress, appears stated age  Neck:  supple  Lungs:  clear to auscultation bilaterally  Heart:  regular rate and rhythm  Abdomen:  abdomen is soft without significant tenderness, masses, organomegaly or guarding  Extremities:  atraumatic, no edema    Visit Vitals  BP (!) 152/86 (BP 1 Location: Left upper arm, BP Patient Position: At rest;Reclining)   Pulse 96   Temp 99 °F (37.2 °C)   Resp 26   Ht 5' 11\" (1.803 m)   Wt 93 kg (205 lb 0.4 oz)   SpO2 99%   BMI 28.60 kg/m²       Data Review:     Labs: Results:       Chemistry Recent Labs     12/16/21  0328 12/15/21  0520 12/14/21  1000   * 219* 149*   * 134* 136   K 3.8 4.3 4.8   CL 95* 98* 98*   CO2 29 27 25   BUN 32* 31* 17   CREA 7.82* 8.90* 5.86*   CA 9.1 9.1 9.3   MG  --  2.6 2.2   AGAP 10 9 13   BUCR 4* 3* 3*   AP  --   --  118*   TP  --   --  7.5   ALB  --   --  3.5   GLOB  --   --  4.0   AGRAT  --   --  0.9      CBC w/Diff Recent Labs     12/16/21  0328 12/15/21  1912 12/15/21  0520   WBC 7.7 7.9 8.1   RBC 4.01* 4.16* 4.06*   HGB 12.0* 12.6* 11.9*   HCT 36.7 38.8 37.2    215 220   GRANS 72 82* 79*   LYMPH 17* 11* 12*   EOS 1 0 0      Coagulation Recent Labs     12/16/21  0328 12/15/21  1912   PTP 14.3  --    INR 1.1  --    APTT 49.9* 22.4*       Lipid Panel Lab Results   Component Value Date/Time    Cholesterol, total 168 03/22/2021 12:00 AM    HDL Cholesterol 32 (L) 03/22/2021 12:00 AM    LDL, calculated 116 (H) 03/22/2021 12:00 AM    LDL, calculated 85 02/09/2019 11:20 AM    VLDL, calculated 20 03/22/2021 12:00 AM    VLDL, calculated 15 02/09/2019 11:20 AM    Triglyceride 106 03/22/2021 12:00 AM    CHOL/HDL Ratio 4.4 10/17/2012 06:05 AM      Liver Enzymes Recent Labs     12/14/21  1000   TP 7.5   ALB 3.5   *      Thyroid Studies Lab Results   Component Value Date/Time    TSH 2.43 12/06/2018 05:19 AM          Signed By: Antonio Calderón PA-C     December 16, 2021

## 2021-12-16 NOTE — DIALYSIS
ACUTE HEMODIALYSIS FLOW SHEET    HEMODIALYSIS ORDERS: Physician: Dr. Alan Pelaez: [place]   Duration: 3.0 hr   BFR: 400   DFR: 800   Dialysate:  Temp 36-37*C   K+  3    Ca+ 2.5   Na 138   Bicarb 35   Wt Readings from Last 1 Encounters:   12/14/21 93 kg (205 lb 0.4 oz)    Patient Chart [x]   Unable to Obtain []  Dry weight/UF Goal: 2000 ml    Heparin []  Bolus    Units    [] Hourly    Units    [x]None       Pre BP: 141/86  Pulse: 84  Respirations: 17 Temp: 98.7  [x] Oral  [] Ax  [] Esoph   Labs: []  Pre  []  Post:   [x] N/A   Additional Orders (medications, blood products, hypotension management): [] Yes   [x] No     [x]  [facility] Consent Verified     CATHETER ACCESS:  [x]N/A   []Right   []Left   []IJ   []Fem  []Chest wall  []TransHepatic                    GRAFT/FISTULA ACCESS:   []N/A     [x]Right     []Left     []UE     [x]LE   []AVG   [x]AVF       [x]Medical Aseptic Prep Utilized   [x]No S/S infection  []Redness  []Drainage [] Cultured  [] Swelling  [] Pain  Bruit:   [x] Strong    [] Weak       Thrill :   [x] Strong    [] Weak     Needle Gauge: 15   Length: 1 inch   If access problem,  notified: []Yes     [x]N/A          GENERAL ASSESSMENT:    LUNGS:  Resp Rate 17   [x] Clear  [] Coarse  [] Crackles  [] Wheezing  [] Diminished                                                           [] RLL   [] LLL  [] RUL   [] ROBERT            Respirations:  [x]Easy  []Labored  []N/A  Cough:  []Productive  []Dry  []N/A               Therapy:  []RA   [] Ventilated   [] Intubated   [] Trach            O2 Device:  [x] NC   [] NRB  [] Trach Mask  [] BiPaP  Flow: 2  l/min                                                    CARDIAC: [x] Regular      [] Irregular   [] Rhythm: NSR          [] Monitored   [] Bedside   [] Remotely monitored       EDEMA: [x] None   []Generalized  [] Pitting [] 1+   [] 2 +   [] 3+    [] 4+        SKIN:   [] Hot     [] Cold    [x] Warm   [x] Dry    [] Diaphoretic                 [] Flushed  [] Jaundiced  [] Cyanotic  [] Pale      LOC:    [x] Alert      [x]Oriented:    [x] Person     [x] Place   [x]Time               [] Confused  [] Lethargic  [] Medicated  [] Non-responsive  [] Non-Verbal     GI / ABDOMEN:                     [] Flat    [] Distended    [x] Soft    [] Firm   []  Obese                   [] Diarrhea   [] FMS [x] Bowel Sounds  [] Nausea  [] Vomiting                   [] NGT  [] OGT  [] PEG  [] Tube Feedings @     mL/hr     / URINE ASSESSMENT:                   [] Voiding    [x] Oliguria  [] Anuria                     []  [place]   [] Incontinent  []  Incontinent Brief   []  PureWick     PAIN:  [x] 0 []1  []2   []3   []4   []5   []6   []7   []8   []9   []10                MOBILITY:  [x] Bed    [] Stretcher      All Vitals and Treatment Details on Attached [employer] [address]: [facility]          Room # CCL/PL    [x] Routine         [] 1st Time Acute/Chronic   [] Urgent      [] Stat            [x] Acute Room   []  Bedside    [] ICU/CCU     [] ER     Isolation Precautions:  [x] Dialysis    There are currently no Active Isolations     ALLERGIES:     No Known Allergies     Code Status:  Full Code     Hepatitis Status      Lab Results   Component Value Date/Time    Hepatitis A Abs Negative 01/03/2012 01:40 PM    Hepatitis B surface Ag <0.10 12/14/2021 10:00 AM    Hepatitis B surface Ab 664.19 12/14/2021 10:00 AM    Hep B Core Ab, total NEGATIVE  12/05/2018 02:50 PM    Hepatitis C virus Ab 0.08 12/05/2018 02:50 PM        Current Labs:      Lab Results   Component Value Date/Time    WBC 7.7 12/16/2021 03:28 AM    Hemoglobin, POC 10.9 (L) 05/02/2019 10:00 AM    HGB 12.0 (L) 12/16/2021 03:28 AM    Hematocrit, POC 32 (L) 05/02/2019 10:00 AM    HCT 36.7 12/16/2021 03:28 AM    PLATELET 674 71/48/6145 03:28 AM    MCV 91.5 12/16/2021 03:28 AM     Lab Results   Component Value Date/Time    Sodium 134 (L) 12/16/2021 03:28 AM    Potassium 3.8 12/16/2021 03:28 AM    Chloride 95 (L) 12/16/2021 03:28 AM CO2 29 12/16/2021 03:28 AM    Anion gap 10 12/16/2021 03:28 AM    Glucose 265 (H) 12/16/2021 03:28 AM    BUN 32 (H) 12/16/2021 03:28 AM    Creatinine 7.82 (H) 12/16/2021 03:28 AM    BUN/Creatinine ratio 4 (L) 12/16/2021 03:28 AM    GFR est AA 9 (L) 12/16/2021 03:28 AM    GFR est non-AA 7 (L) 12/16/2021 03:28 AM    Calcium 9.1 12/16/2021 03:28 AM          DIET:  DIET ADULT     PRIMARY NURSE REPORT:   Pre Dialysis: KIMBERLY Cruz RN    Time: 200      EDUCATION:    [x] Patient           Knowledge Basis: []None [x]Minimal [] Substantial [] Unknown  Barriers to learning  [x]None  [] Intubated/Trached/Ventilated  [] Sedated/Paralyzed   [] Access Care     [] S&S of infection  [] Fluid Management  [] K+   [x] Procedural    [] Medications   [] Tx Options   [] Transplant   [] Diet      Teaching Tools:  [x] Explain  [] Demo  [] Handouts [] Video  Patient response: [x] Verbalized understanding   [] Requires follow up        [x] Time Out/Safety Check    [x] Extracorporeal Circuit Tested for integrity       RO/HEMODIALYSIS MACHINE SAFETY CHECKS - Before each treatment:        [address] [place] [address]                                     [x] Unit Machine # 5 with centralized RO                                                                                                                           Alarm Test:  Pass time 2893            [x] RO/Machine Log Complete    Machine Temp    36-37*C             Dialysate: pH  7.4    Conductivity: Meter 14.0    HD Machine  13.8     TCD: 13.8  Dialyzer Lot # [id number]     Blood Tubing Lot # [id number]     Saline Lot # [id number]     CHLORINE TESTING-Before each treatment and every 4 hours    Total Chlorine: [x] less than 0.1 ppm  Initial Time Check: 0830       4 Hr/2nd Check Time: 1230   (if greater than 0.1 ppm from Primary then every 30 minutes from Secondary)     TREATMENT INITIATION - with Dialysis Precautions:   [x] All Connections Secured              [x] Saline Line Double Clamped   [x] Venous Parameters Set               [x] Arterial Parameters Set    [x] Prime Given 250ml NSS              [x]Air Foam Detector Engaged        Treatment Initiation Note:  1055--Pt arrived via bed from cathlab. Pt is alert and oriented X4 but drowsy. Pt denies any complaints. Pt is on 2L 02 and O2 sats in mid 90's. Pt placed on the cardiac monitor and Rythym is NSR with HR of 84. Pt's RLE AVF thrill is strong and bruit is present and no S/S of infection noted. 1100--HD initiated without difficulty. During Treatment Notes:  1115  Face & Vascular access visible with art and barb line connections intact. Pt tolerating dialysis. 1130  Face & Vascular access visible with art and barb line connections intact. Pt tolerating dialysis. 1145  Face & Vascular access visible with art and barb line connections intact. Pt tolerating dialysis. 1200  Face & Vascular access visible with art and barb line connections intact. Pt tolerating dialysis. 1215  Face & Vascular access visible with art and barb line connections intact. Pt tolerating dialysis. 1230  Face & Vascular access visible with art and barb line connections intact. Pt tolerating dialysis. 1245  Face & Vascular access visible with art and barb line connections intact. Pt tolerating dialysis. 1300  Face & Vascular access visible with art and barb line connections intact. Pt tolerating dialysis. 1315  Face & Vascular access visible with art and barb line connections intact. Pt tolerating dialysis. 1330  Face & Vascular access visible with art and barb line connections intact. Pt tolerating dialysis. 1345  Face & Vascular access visible with art and barb line connections intact. Pt tolerating dialysis. 1400  Face & Vascular access visible with art and barb line connections intact. Pt tolerating dialysis. 1405  Dialysis treatment complete.        Medication    Dose    Volume Route      Time       [facility] Nurse   none   CHARLIE Ramos RN HD  Amy Grijalva RN     Post Assessment  Dialyzer Cleared:   [] Good  [x] Fair  [] Poor  Blood processed:  68.0 L  UF Removed:  2000 Ml    Post BP: 110/82  Pulse: 70  Respirations: 16   Temp: 98.2  [] Oral  [] Ax  [] Esophageal   Lungs: [] Clear                [x] No change from initial assessment   Post Tx Vascular Access: [x] N/A  AVF/AVG: Bleeding stopped with  Arterial Pressure for 15 min   Venous Pressure for 15 min      Cardiac:  [] Regular   [] Irregular   Rhythm:  [] Monitored   [] Not Monitored    CVC Catheter: [x] N/A   Edema:  [] None  [] Generalized                     Skin:[x] Warm  [x] Dry [] Diaphoretic               [] Flushed  [] Pale [] Cyanotic Pain:  [x]0  []1-2  []3-4  []5-6   []7-8  []9-10         Post Treatment Note:   Pt tolerated dialysis well. Arterial and venous needles removed and pressure applied until bleeding stopped. Dry dressings applied. Bruit/Thrill present above and below dressings. POST TREATMENT PRIMARY NURSE HANDOFF REPORT:   Post Dialysis: SUSY Boss RN        Time:  6410       Abbreviations: AVG-arterial venous graft, AVF-arterial venous fistula, IJ-Internal Jugular, Subcl-Subclavian, Fem-Femoral, Tx-treatment, AP/HR-apical heart rate, VSS- Vital Signs Stable, CVC- Central Venous Catheter, DFR-dialysate flow rate, BFR-blood flow rate, AP-arterial pressure, -venous pressure, UF-ultrafiltrate, TMP-transmembrane pressure, Zay-Venous, Art-Arterial, RO-Reverse Osmosis

## 2021-12-16 NOTE — PROGRESS NOTES
0715 TRANSFER - IN REPORT:    Verbal report received from CHARLIE Clayton (name) on Jennifer Thomas  being received from [facility] (unit) for ordered procedure      Report consisted of patients Situation, Background, Assessment and   Recommendations(SBAR). Information from the following report(s) SBAR, Kardex, Intake/Output, MAR and Recent Results was reviewed with the receiving nurse. Opportunity for questions and clarification was provided. Assessment completed upon patients arrival to unit and care assumed. 6566 PA requested EKG be ran, showed STEMI - MD and PA alerted at this time - no new orders received. Patient has no complaints of pain at this time and is resting comfortable in bed. 0830 TRANSFER - OUT REPORT:    Verbal report given to Harwood Prader (name) on Jeninfer Thomas  being transferred to Cath Lab (unit) for ordered procedure       Report consisted of patients Situation, Background, Assessment and   Recommendations(SBAR). Information from the following report(s) SBAR, Kardex, Intake/Output, MAR, Recent Results and Pre Procedure Checklist was reviewed with the receiving nurse. Lines:   Peripheral IV 12/14/21 Left Antecubital (Active)   Site Assessment Clean, dry, & intact 12/16/21 0827   Phlebitis Assessment 0 12/16/21 0827   Infiltration Assessment 0 12/15/21 1945   Dressing Status Clean, dry, & intact 12/16/21 0827   Dressing Type Transparent;Tape 12/16/21 0827   Hub Color/Line Status Blue;Flushed;Patent 12/16/21 0827   Action Taken Open ports on tubing capped 12/15/21 1945   Alcohol Cap Used Yes 12/16/21 0827       Peripheral IV 12/16/21 Left;Posterior Hand (Active)        Opportunity for questions and clarification was provided.       Patient transported with:  Tech    4613 TRANSFER - IN REPORT:    Verbal report received from Pauly (name) on Jennifer Thomas  being received from Cath Lab (unit) for routine post - op      Report consisted of patients Situation, Background, Assessment and   Recommendations(SBAR). Information from the following report(s) SBAR, Procedure Summary, Intake/Output, MAR and Recent Results was reviewed with the receiving nurse. Opportunity for questions and clarification was provided. Assessment completed upon patients arrival to unit and care assumed. 6619 6 Fr sheath pulled from R groin. Pressure held for 20 mins, no bleeding or hematoma. Quik clot and Tegaderm dressing applied. Dressing clean, dry, and intact. Pt education given on the need to continue lying flat. 1030 TRANSFER - OUT REPORT:    Verbal report given to 2347 Lauzon La Valle, RN  (dialysis) and CHARLIE Mckenzie (4N) (name) on Megha Tim  being transferred to Dialysis with follow on to 466 (unit) for routine progression of care       Report consisted of patients Situation, Background, Assessment and   Recommendations(SBAR). Information from the following report(s) SBAR, Kardex, Procedure Summary, Intake/Output, MAR and Recent Results was reviewed with the receiving nurse. Lines:   Peripheral IV 12/14/21 Left Antecubital (Active)   Site Assessment Clean, dry, & intact 12/16/21 0827   Phlebitis Assessment 0 12/16/21 0827   Infiltration Assessment 0 12/15/21 1945   Dressing Status Clean, dry, & intact 12/16/21 0827   Dressing Type Transparent;Tape 12/16/21 0827   Hub Color/Line Status Blue;Flushed;Patent 12/16/21 0827   Action Taken Open ports on tubing capped 12/15/21 1945   Alcohol Cap Used Yes 12/16/21 0827       Peripheral IV 12/16/21 Left;Posterior Hand (Active)        Opportunity for questions and clarification was provided. Patient transported with:   Tech    1046 Patient transported to dialysis at this time.

## 2021-12-16 NOTE — ROUTINE PROCESS
Bedside and Verbal shift change report given to Alondra Welsh RN (oncoming nurse) by Killian Lopez RN (offgoing nurse). Report included the following information SBAR, Kardex, MAR and Recent Results.

## 2021-12-16 NOTE — PROGRESS NOTES
Bedside shift change report given to CHARLIE Clayton (oncoming nurse) by Coty Howell RN (offgoing nurse). Report included the following information SBAR, Kardex, MAR and Recent Results.

## 2021-12-16 NOTE — PROGRESS NOTES
Hospitalist Progress Note    Patient: Orlando Wilson MRN: [id number]  CSN: [id number]    YOB: 1970  Age: 52 y.o. Sex: male    DOA: 12/14/2021 LOS:  LOS: 2 days          Status post cardiac cath, no intervention done. Medical management recommended. Patient seen and examined on dialysis, found resting comfortably. He states chest discomfort has improved. Denies chest pain, cough, shortness of breath. 4:30 pm called by nsg patient in uncontrolled atrial fib. Rates 140s. Assessment/Plan       1. Atrial fib with RVR, paroxysmal atrial fib. Eliquis resumed. On coreg. cardizem gtt. Cardiology follows. 2.  Chest pain, elevated troponin. Cardiac cath noted. Medical management recommended. 3.  End-stage renal disease TTS per nephrology, input appreciated  4. Diabetes type 2. Labile blood sugars. SSI. 5. legally blind, history of diabetic retinopathy  6. CAD, status post PCI 2012. Status post PCI June 2021  7. ICMO, EF improving per recent echo. 8.  Dyslipidemia  9. Noncompliance, documented  10. Hypertension  11. DVT prophylaxis  12. Full code. Telemetry. Additional Notes:      Case discussed with:  [x]Patient  []Family  [x]Nursing  []Case Management  DVT Prophylaxis:  []Lovenox  []Hep SQ  []SCDs  []Coumadin   [x]On Heparin gtt    Vital signs/Intake and Output:  Visit Vitals  BP (!) 92/58 (BP 1 Location: Left upper arm, BP Patient Position: Semi fowlers)   Pulse (!) 102   Temp 98.5 °F (36.9 °C)   Resp 18   Ht 5' 11\" (1.803 m)   Wt 93 kg (205 lb 0.4 oz)   SpO2 96%   BMI 28.60 kg/m²     Current Shift:  12/16 0701 - 12/16 1900  In: -   Out: 2000   Last three shifts:  12/14 1901 - 12/16 0700  In: 240 [P.O.:240]  Out: 2000     Awake alert and oriented x4, seen on dialysis  Normocephalic atraumatic.   Oropharynx clear  Chest wall: No tenderness to palpation  Regular rate and rhythm  Clear to auscultation bilaterally  Abdomen soft nontender nondistended normoactive bowel sounds  No edema. DP 2+ bilaterally. Left upper extremity fistula. Other than vision, no focal deficit  No rash to visible skin      Medications Reviewed      Labs: Results:       Chemistry Recent Labs     12/16/21  0328 12/15/21  0520 12/14/21  1000   * 219* 149*   * 134* 136   K 3.8 4.3 4.8   CL 95* 98* 98*   CO2 29 27 25   BUN 32* 31* 17   CREA 7.82* 8.90* 5.86*   CA 9.1 9.1 9.3   AGAP 10 9 13   BUCR 4* 3* 3*   AP  --   --  118*   TP  --   --  7.5   ALB  --   --  3.5   GLOB  --   --  4.0   AGRAT  --   --  0.9      CBC w/Diff Recent Labs     12/16/21  0328 12/15/21  1912 12/15/21  0520   WBC 7.7 7.9 8.1   RBC 4.01* 4.16* 4.06*   HGB 12.0* 12.6* 11.9*   HCT 36.7 38.8 37.2    215 220   GRANS 72 82* 79*   LYMPH 17* 11* 12*   EOS 1 0 0      Cardiac Enzymes Recent Labs     12/15/21  0546 12/15/21  0520   CPK Original order canceled by laboratory, test reordered by laboratory with the purpose of combining it with other laboratory orders. [place] order canceled by laboratory, test reordered by laboratory with the purpose of combining it with other laboratory orders. 4.4*      Coagulation Recent Labs     12/16/21  0328 12/15/21  1912   PTP 14.3  --    INR 1.1  --    APTT 49.9* 22.4*       Lipid Panel Lab Results   Component Value Date/Time    Cholesterol, total 168 03/22/2021 12:00 AM    HDL Cholesterol 32 (L) 03/22/2021 12:00 AM    LDL, calculated 116 (H) 03/22/2021 12:00 AM    LDL, calculated 85 02/09/2019 11:20 AM    VLDL, calculated 20 03/22/2021 12:00 AM    VLDL, calculated 15 02/09/2019 11:20 AM    Triglyceride 106 03/22/2021 12:00 AM    CHOL/HDL Ratio 4.4 10/17/2012 06:05 AM      BNP No results for input(s): BNPP in the last 72 hours.    Liver Enzymes Recent Labs     12/14/21  1000   TP 7.5   ALB 3.5   *      Thyroid Studies Lab Results   Component Value Date/Time    TSH 2.43 12/06/2018 05:19 AM        Procedures/imaging: see electronic medical records for all procedures/Xrays and details which were not copied into this note but were reviewed prior to creation of Plan

## 2021-12-16 NOTE — PROGRESS NOTES
1791 - Provided patient with CHG wipes for pre-proc prep and educated purpose. Patient verbalized understanding. 3500 - Rounds made, patient has not done CHG bath yet but stated will do. Advised about procedure time and possibility of being picked up earlier than scheduled. 26 - Transport present on unit to  patient. Offered help again to do chg bath but refused. 9787 - Patient off the unit for procedure.

## 2021-12-17 ENCOUNTER — APPOINTMENT (OUTPATIENT)
Dept: NON INVASIVE DIAGNOSTICS | Age: 51
DRG: 286 | End: 2021-12-17
Attending: PHYSICIAN ASSISTANT
Payer: MEDICARE

## 2021-12-17 VITALS
TEMPERATURE: 98.8 F | SYSTOLIC BLOOD PRESSURE: 104 MMHG | HEART RATE: 85 BPM | DIASTOLIC BLOOD PRESSURE: 62 MMHG | WEIGHT: 205 LBS | RESPIRATION RATE: 18 BRPM | OXYGEN SATURATION: 97 % | BODY MASS INDEX: 28.7 KG/M2 | HEIGHT: 71 IN

## 2021-12-17 LAB
ANION GAP SERPL CALC-SCNC: 9 MMOL/L (ref 3–18)
APTT PPP: 24 SEC (ref 23–36.4)
APTT PPP: 25.2 SEC (ref 23–36.4)
BASOPHILS # BLD: 0 K/UL (ref 0–0.1)
BASOPHILS NFR BLD: 1 % (ref 0–2)
BUN SERPL-MCNC: 25 MG/DL (ref 7–18)
BUN/CREAT SERPL: 3 (ref 12–20)
CALCIUM SERPL-MCNC: 9.5 MG/DL (ref 8.5–10.1)
CHLORIDE SERPL-SCNC: 99 MMOL/L (ref 100–111)
CHOLEST SERPL-MCNC: 169 MG/DL
CO2 SERPL-SCNC: 28 MMOL/L (ref 21–32)
CREAT SERPL-MCNC: 7.68 MG/DL (ref 0.6–1.3)
DIFFERENTIAL METHOD BLD: ABNORMAL
ECHO LV EF PHYSICIAN: 50 %
ECHO LV FRACTIONAL SHORTENING: 29 % (ref 28–44)
ECHO LV INTERNAL DIMENSION DIASTOLE INDEX: 2.11 CM/M2
ECHO LV INTERNAL DIMENSION DIASTOLIC: 4.5 CM (ref 4.2–5.9)
ECHO LV INTERNAL DIMENSION SYSTOLIC INDEX: 1.5 CM/M2
ECHO LV INTERNAL DIMENSION SYSTOLIC: 3.2 CM
ECHO LV IVSD: 1.4 CM (ref 0.6–1)
ECHO LV MASS 2D: 290 G (ref 88–224)
ECHO LV MASS INDEX 2D: 136.2 G/M2 (ref 49–115)
ECHO LV POSTERIOR WALL DIASTOLIC: 1.7 CM (ref 0.6–1)
ECHO LV RELATIVE WALL THICKNESS RATIO: 0.76
EOSINOPHIL # BLD: 0.1 K/UL (ref 0–0.4)
EOSINOPHIL NFR BLD: 1 % (ref 0–5)
ERYTHROCYTE [DISTWIDTH] IN BLOOD BY AUTOMATED COUNT: 13.7 % (ref 11.6–14.5)
GLUCOSE BLD STRIP.AUTO-MCNC: 192 MG/DL (ref 70–110)
GLUCOSE BLD STRIP.AUTO-MCNC: 217 MG/DL (ref 70–110)
GLUCOSE BLD STRIP.AUTO-MCNC: 258 MG/DL (ref 70–110)
GLUCOSE SERPL-MCNC: 234 MG/DL (ref 74–99)
HCT VFR BLD AUTO: 35.9 % (ref 36–48)
HDLC SERPL-MCNC: 35 MG/DL (ref 40–60)
HDLC SERPL: 4.8 {RATIO} (ref 0–5)
HGB BLD-MCNC: 11.7 G/DL (ref 13–16)
IMM GRANULOCYTES # BLD AUTO: 0 K/UL (ref 0–0.04)
IMM GRANULOCYTES NFR BLD AUTO: 0 % (ref 0–0.5)
LDLC SERPL CALC-MCNC: 100.8 MG/DL (ref 0–100)
LIPID PROFILE,FLP: ABNORMAL
LYMPHOCYTES # BLD: 1.8 K/UL (ref 0.9–3.6)
LYMPHOCYTES NFR BLD: 32 % (ref 21–52)
MCH RBC QN AUTO: 30.2 PG (ref 24–34)
MCHC RBC AUTO-ENTMCNC: 32.6 G/DL (ref 31–37)
MCV RBC AUTO: 92.8 FL (ref 78–100)
MONOCYTES # BLD: 0.7 K/UL (ref 0.05–1.2)
MONOCYTES NFR BLD: 12 % (ref 3–10)
NEUTS SEG # BLD: 3 K/UL (ref 1.8–8)
NEUTS SEG NFR BLD: 54 % (ref 40–73)
NRBC # BLD: 0 K/UL (ref 0–0.01)
NRBC BLD-RTO: 0 PER 100 WBC
PLATELET # BLD AUTO: 215 K/UL (ref 135–420)
PMV BLD AUTO: 10.3 FL (ref 9.2–11.8)
POTASSIUM SERPL-SCNC: 3.7 MMOL/L (ref 3.5–5.5)
RBC # BLD AUTO: 3.87 M/UL (ref 4.35–5.65)
SODIUM SERPL-SCNC: 136 MMOL/L (ref 136–145)
TRIGL SERPL-MCNC: 166 MG/DL (ref ?–150)
VLDLC SERPL CALC-MCNC: 33.2 MG/DL
WBC # BLD AUTO: 5.6 K/UL (ref 4.6–13.2)

## 2021-12-17 PROCEDURE — 74011250636 HC RX REV CODE- 250/636: Performed by: HOSPITALIST

## 2021-12-17 PROCEDURE — 36415 COLL VENOUS BLD VENIPUNCTURE: CPT

## 2021-12-17 PROCEDURE — 93321 DOPPLER ECHO F-UP/LMTD STD: CPT | Performed by: INTERNAL MEDICINE

## 2021-12-17 PROCEDURE — 80048 BASIC METABOLIC PNL TOTAL CA: CPT

## 2021-12-17 PROCEDURE — 85025 COMPLETE CBC W/AUTO DIFF WBC: CPT

## 2021-12-17 PROCEDURE — 74011636637 HC RX REV CODE- 636/637: Performed by: HOSPITALIST

## 2021-12-17 PROCEDURE — 99232 SBSQ HOSP IP/OBS MODERATE 35: CPT | Performed by: INTERNAL MEDICINE

## 2021-12-17 PROCEDURE — 82962 GLUCOSE BLOOD TEST: CPT

## 2021-12-17 PROCEDURE — 93325 DOPPLER ECHO COLOR FLOW MAPG: CPT | Performed by: INTERNAL MEDICINE

## 2021-12-17 PROCEDURE — 93321 DOPPLER ECHO F-UP/LMTD STD: CPT

## 2021-12-17 PROCEDURE — 85730 THROMBOPLASTIN TIME PARTIAL: CPT

## 2021-12-17 PROCEDURE — 93308 TTE F-UP OR LMTD: CPT | Performed by: INTERNAL MEDICINE

## 2021-12-17 PROCEDURE — 99239 HOSP IP/OBS DSCHRG MGMT >30: CPT | Performed by: HOSPITALIST

## 2021-12-17 PROCEDURE — 74011250637 HC RX REV CODE- 250/637: Performed by: FAMILY MEDICINE

## 2021-12-17 PROCEDURE — 80061 LIPID PANEL: CPT

## 2021-12-17 PROCEDURE — 74011250637 HC RX REV CODE- 250/637: Performed by: PHYSICIAN ASSISTANT

## 2021-12-17 RX ORDER — ATORVASTATIN CALCIUM 40 MG/1
80 TABLET, FILM COATED ORAL
Status: DISCONTINUED | OUTPATIENT
Start: 2021-12-17 | End: 2021-12-17 | Stop reason: HOSPADM

## 2021-12-17 RX ORDER — CARVEDILOL 12.5 MG/1
12.5 TABLET ORAL ONCE
Status: COMPLETED | OUTPATIENT
Start: 2021-12-17 | End: 2021-12-17

## 2021-12-17 RX ORDER — ATORVASTATIN CALCIUM 80 MG/1
80 TABLET, FILM COATED ORAL
Qty: 30 TABLET | Refills: 0 | Status: SHIPPED | OUTPATIENT
Start: 2021-12-17 | End: 2021-12-17 | Stop reason: SDUPTHER

## 2021-12-17 RX ORDER — CARVEDILOL 25 MG/1
25 TABLET ORAL 2 TIMES DAILY WITH MEALS
Status: DISCONTINUED | OUTPATIENT
Start: 2021-12-17 | End: 2021-12-17 | Stop reason: HOSPADM

## 2021-12-17 RX ORDER — ATORVASTATIN CALCIUM 80 MG/1
80 TABLET, FILM COATED ORAL
Qty: 30 TABLET | Refills: 0 | Status: SHIPPED | OUTPATIENT
Start: 2021-12-17 | End: 2022-06-01 | Stop reason: SDUPTHER

## 2021-12-17 RX ADMIN — Medication 10 ML: at 05:21

## 2021-12-17 RX ADMIN — LISINOPRIL 10 MG: 10 TABLET ORAL at 08:39

## 2021-12-17 RX ADMIN — APIXABAN 2.5 MG: 2.5 TABLET, FILM COATED ORAL at 08:39

## 2021-12-17 RX ADMIN — Medication 10 ML: at 05:22

## 2021-12-17 RX ADMIN — Medication 4 UNITS: at 08:39

## 2021-12-17 RX ADMIN — ASPIRIN 81 MG CHEWABLE TABLET 81 MG: 81 TABLET CHEWABLE at 08:39

## 2021-12-17 RX ADMIN — CARVEDILOL 12.5 MG: 12.5 TABLET, FILM COATED ORAL at 11:31

## 2021-12-17 RX ADMIN — CARVEDILOL 12.5 MG: 12.5 TABLET, FILM COATED ORAL at 08:39

## 2021-12-17 RX ADMIN — Medication 2 UNITS: at 11:31

## 2021-12-17 RX ADMIN — PERFLUTREN 2 ML: 6.52 INJECTION, SUSPENSION INTRAVENOUS at 09:09

## 2021-12-17 NOTE — PROGRESS NOTES
Progress Note      56y M with PMH DM, ESRD, admitted for afib with RVR, following for ESRD management   Progress        IMPRESSION:   ESRD, TTS ,  Access; left arm fistula   afib with rvr,  CHF, ishcemic cardiomypathy, CAD  DM, h/o DM retinopathy  htn   PLAN:   Plan dialysis tomorrow  Adjust meds per ESRD status             Facility-Administered Medications: None       Current Facility-Administered Medications   Medication Dose Route Frequency    atorvastatin (LIPITOR) tablet 80 mg  80 mg Oral QHS    carvediloL (COREG) tablet 25 mg  25 mg Oral BID WITH MEALS    sodium chloride (NS) flush 5-40 mL  5-40 mL IntraVENous Q8H    sodium chloride (NS) flush 5-40 mL  5-40 mL IntraVENous PRN    apixaban (ELIQUIS) tablet 2.5 mg  2.5 mg Oral BID    insulin lispro (HUMALOG) injection   SubCUTAneous AC&HS    glucose chewable tablet 16 g  16 g Oral PRN    glucagon (GLUCAGEN) injection 1 mg  1 mg IntraMUSCular PRN    dextrose (D50W) injection syrg 12.5-25 g  25-50 mL IntraVENous PRN    lisinopriL (PRINIVIL, ZESTRIL) tablet 10 mg  10 mg Oral DAILY    sucroferric oxyhydroxide (VELPHORO) chewable tablet 500 mg (Patient Supplied)  500 mg Oral TID WITH MEALS    0.9% sodium chloride infusion 250 mL  250 mL IntraVENous DIALYSIS PRN    aspirin chewable tablet 81 mg  81 mg Oral DAILY    sodium chloride (NS) flush 5-40 mL  5-40 mL IntraVENous Q8H    sodium chloride (NS) flush 5-40 mL  5-40 mL IntraVENous PRN    acetaminophen (TYLENOL) tablet 650 mg  650 mg Oral Q6H PRN    Or    acetaminophen (TYLENOL) suppository 650 mg  650 mg Rectal Q6H PRN    polyethylene glycol (MIRALAX) packet 17 g  17 g Oral DAILY PRN    ondansetron (ZOFRAN ODT) tablet 4 mg  4 mg Oral Q8H PRN    Or    ondansetron (ZOFRAN) injection 4 mg  4 mg IntraVENous Q6H PRN       Review of Systems:      Complete 10-point review of systems were obtained and discussed in length  with the patient.  Complete review of systems was negative/unremarkable  except as mentioned in HPI section. Data Review:    Labs: Results:       Chemistry Recent Labs     12/17/21  0601 12/16/21  0328 12/15/21  0520   * 265* 219*    134* 134*   K 3.7 3.8 4.3   CL 99* 95* 98*   CO2 28 29 27   BUN 25* 32* 31*   CREA 7.68* 7.82* 8.90*   CA 9.5 9.1 9.1   AGAP 9 10 9   BUCR 3* 4* 3*      CBC w/Diff Recent Labs     12/17/21  0601 12/16/21 0328 12/15/21  1912   WBC 5.6 7.7 7.9   RBC 3.87* 4.01* 4.16*   HGB 11.7* 12.0* 12.6*   HCT 35.9* 36.7 38.8    220 215   GRANS 54 72 82*   LYMPH 32 17* 11*   EOS 1 1 0      Coagulation Recent Labs     12/17/21 0601 12/16/21 0328   PTP  --  14.3   INR  --  1.1   APTT 25.2 49.9*       Iron/Ferritin No results for input(s): IRON in the last 72 hours. No lab exists for component: TIBCCALC   BNP No results for input(s): BNPP in the last 72 hours. Cardiac Enzymes Recent Labs     12/15/21  0546 12/15/21  0520   CPK Original order canceled by laboratory, test reordered by laboratory with the purpose of combining it with other laboratory orders. [place] order canceled by laboratory, test reordered by laboratory with the purpose of combining it with other laboratory orders. 4.4*      Liver Enzymes No results for input(s): TP, ALB, TBIL, AP in the last 72 hours.     No lab exists for component: SGOT, GPT, DBIL   Thyroid Studies Lab Results   Component Value Date/Time    TSH 2.43 12/06/2018 05:19 AM         EKG: afib with rvr    Physical Assessment:     Visit Vitals  /62 (BP 1 Location: Left upper arm, BP Patient Position: At rest)   Pulse 80   Temp 98.8 °F (37.1 °C)   Resp 18   Ht 5' 11\" (1.803 m)   Wt 93 kg (205 lb)   SpO2 100%   BMI 28.59 kg/m²     Weight change:     Intake/Output Summary (Last 24 hours) at 12/17/2021 1246  Last data filed at 12/17/2021 1000  Gross per 24 hour   Intake 240 ml   Output 2000 ml   Net -1760 ml     Physical Exam:   General: comfortable, no acute distress   HEENT sclera anicteric, supple neck, no thyromegaly  CVS: S1S2 heard,  no rub  RS: + air entry b/l,   Abd: Soft, Non tender, Not distended,   Neuro: non focal, awake, alert , CN II-XII are grossly intact  Extrm: no  edema, no cyanosis, clubbing   Skin: no visible  Rash  Musculoskeletal: No gross joints or bone deformities     Procedures/imaging: see electronic medical records for all procedures, Xrays and details which were not copied into this note but were reviewed prior to creation of Plan          Caron Barros MD  December 17, 2021  [facility]  Office [phone]

## 2021-12-17 NOTE — PROGRESS NOTES
D/C order noted for today. Orders reviewed. Patient refused Home Health safety evaluation and DME Shower Chair. Dr. Darlene Triplett updated. Patient's sister to transport patient home today at time of discharge. No needs identified at this time. CM remains available if needed.            Megha Savage, [phone]

## 2021-12-17 NOTE — DISCHARGE SUMMARY
Discharge Summary    Patient: Yasmeen Barnes MRN: [id number]  CSN: [id number]    YOB: 1970  Age: 52 y.o.   Sex: male    DOA: 12/14/2021 LOS:  LOS: 3 days   Discharge Date:      Admission Diagnoses: Paroxysmal A-fib ([facility] [I48.0]  Atrial fibrillation with RVR ([facility] [I48.91]    Discharge Diagnoses:    Problem List as of 12/17/2021 Date Reviewed: 6/26/2021          Codes Class Noted - Resolved    Paroxysmal A-fib ([facility] ICD-10-CM: I48.0  ICD-9-CM: 427.31  12/14/2021 - Present        Hypercalcemia ICD-10-CM: E83.52  ICD-9-CM: 275.42  1/22/2021 - Present        Hyperkalemia ICD-10-CM: E87.5  ICD-9-CM: 276.7  1/22/2021 - Present        ESRD (end stage renal disease) ([facility] ICD-10-CM: N18.6  ICD-9-CM: 585.6  1/22/2021 - Present        NSTEMI (non-ST elevated myocardial infarction) ([facility] ICD-10-CM: I21.4  ICD-9-CM: 410.70  1/22/2021 - Present        Atrial fibrillation with RVR ([facility] ICD-10-CM: I48.91  ICD-9-CM: 427.31  1/22/2021 - Present        Anemia of chronic disease ICD-10-CM: D63.8  ICD-9-CM: 285.29  3/7/2019 - Present        Type 2 diabetes with nephropathy ([facility] ICD-10-CM: E11.21  ICD-9-CM: 250.40, 583.81  1/4/2019 - Present        * (Principal) Atrial fibrillation with rapid ventricular response ([facility] ICD-10-CM: I48.91  ICD-9-CM: 427.31  12/5/2018 - Present        Dyslipidemia ICD-10-CM: E78.5  ICD-9-CM: 272.4  6/21/2016 - Present        Severe nonproliferative diabetic retinopathy with macular edema, associated with type 2 diabetes mellitus ICD-10-CM: A39.5325  ICD-9-CM: 250.50, 362.06, 362.07  5/12/2015 - Present        Legally blind ICD-10-CM: H54.8  ICD-9-CM: 369.4  10/28/2014 - Present        Diabetic retinopathy ([facility] ICD-10-CM: E11.319  ICD-9-CM: 250.50, 362.01  10/28/2014 - Present        CAD (coronary artery disease) ICD-10-CM: I25.10  ICD-9-CM: 414.00  Unknown - Present        CRI (chronic renal insufficiency) ICD-10-CM: N18.9  ICD-9-CM: 585.9  1/23/2012 - Present    Overview Signed 1/23/2012 11:33 AM by Bala Rocha     Stage 3, Cr approx 1.8             Diabetes mellitus ([facility] ICD-10-CM: E11.9  ICD-9-CM: 250.00  1/23/2012 - Present        Cardiomyopathy ([facility] ICD-10-CM: I42.9  ICD-9-CM: 425.4  1/23/2012 - Present        Iron deficiency anemia ICD-10-CM: D50.9  ICD-9-CM: 280.9  1/23/2012 - Present        Benign hypertensive  ICD-10-CM: I11.9  ICD-9-CM: 402.10  Unknown - Present        RESOLVED: Cardiomyopathy ([facility] ICD-10-CM: I42.9  ICD-9-CM: 425.4  5/7/2012 - 10/28/2014        RESOLVED: Anasarca ICD-10-CM: R60.1  ICD-9-CM: 782.3  3/12/2012 - 10/28/2014        RESOLVED: Acute systolic congestive heart failure (HCC) ICD-10-CM: I50.21  ICD-9-CM: 428.21, 428.0  Unknown - 10/28/2014              Reason for Admission  52 y.o. male with PMHx of atrial fib on Eliquis, ESRD on HD, type II DM with retinopathy, chronic systolic CHF, CAD, HTN, and HLD who presented to the ED with complaints of palpitations. Mr. Jacqui Baird was at dialysis earlier in the morning and reported that when he was close to finishing, he developed heart palpitations and a racing heart. He reported this to staff who called EMS and stopped his dialysis treatment. He reported his symptoms have been going on for the past couple weeks and increasing in frequency. He denied any associated chest pain, diaphoresis, shortness of breath, abdominal symptoms, or any other symptoms. Since arriving in the ED, he developed a severe case of the hiccups, but again denies any further complaints.     In the ED, he was found to be in A. fib with RVR with heart rate 147. Other vitals were overall reassuring. Labs were also overall reassuring but were notable for an Hgb of 12.8, glucose 149, and alk phos 118. A CXR showed pulmonary vascular congestion without pulmonary edema and a trace left pleural effusion. Mr. Jacqui Baird was then given IV and p.o. metoprolol and started on Cardizem drip. He  was admitted for atrial fib with RVR.       Discharge Condition: Good    PHYSICAL EXAM   Visit Vitals  /62 (BP 1 Location: Left upper arm, BP Patient Position: At rest)   Pulse 85   Temp 98.8 °F (37.1 °C)   Resp 18   Ht 5' 11\" (1.803 m)   Wt 93 kg (205 lb)   SpO2 97%   BMI 28.59 kg/m²       Awake alert and oriented x4, seen on dialysis  Normocephalic atraumatic. Oropharynx clear  Chest wall: No tenderness to palpation  Regular rate and rhythm  Clear to auscultation bilaterally  Abdomen soft nontender nondistended normoactive bowel sounds  No edema. DP 2+ bilaterally. Left upper extremity fistula. Other than vision, no focal deficit  No rash to visible skin    Hospital Course:   1. Atrial fib with RVR, paroxysmal atrial fib. Eliquis resumed. On coreg. Status post cardizem gtt. Cardiology followed in consult  2. Chest pain, elevated troponin. Chest pain resolved. Cardiac cath noted. Medical management recommended. 3.  End-stage renal disease TTS done per nephrology. Resume outpatient dialysis. 4.  Diabetes type 2. SSI given in house. 5. legally blind, history of diabetic retinopathy. Supportive care given. 6.  CAD, status post PCI 2012. Status post PCI June 2021. Continue medical management, see #2.  7.  ICMO, EF improving per recent echo. 8.  Dyslipidemia  9. Noncompliance, documented  10. Hypertension controlled on regimen as below  11. DVT prophylaxis was given in the form of heparin drip, subsequently Eliquis. 12.  Full code. Discharge to home w/ hh. Patient agrees, all questions answered to the best of my ability.  rx sent to [facility] / Sil[facility] per patient request.     Consults: Nephrology Dr. Farshad Lockett  Cardiology Dr. Shaniqua Zhou      Significant Diagnostic Studies: labs:   Recent Results (from the past 24 hour(s))   GLUCOSE, POC    Collection Time: 12/17/21 12:19 AM   Result Value Ref Range    Glucose (POC) 258 (H) 70 - 506 mg/dL   METABOLIC PANEL, BASIC    Collection Time: 12/17/21  6:01 AM   Result Value Ref Range    Sodium 136 136 - 145 mmol/L    Potassium 3.7 3.5 - 5.5 mmol/L    Chloride 99 (L) 100 - 111 mmol/L    CO2 28 21 - 32 mmol/L    Anion gap 9 3.0 - 18 mmol/L    Glucose 234 (H) 74 - 99 mg/dL    BUN 25 (H) 7.0 - 18 MG/DL    Creatinine 7.68 (H) 0.6 - 1.3 MG/DL    BUN/Creatinine ratio 3 (L) 12 - 20      GFR est AA 9 (L) >60 ml/min/1.73m2    GFR est non-AA 8 (L) >60 ml/min/1.73m2    Calcium 9.5 8.5 - 10.1 MG/DL   CBC WITH AUTOMATED DIFF    Collection Time: 12/17/21  6:01 AM   Result Value Ref Range    WBC 5.6 4.6 - 13.2 K/uL    RBC 3.87 (L) 4.35 - 5.65 M/uL    HGB 11.7 (L) 13.0 - 16.0 g/dL    HCT 35.9 (L) 36.0 - 48.0 %    MCV 92.8 78.0 - 100.0 FL    MCH 30.2 24.0 - 34.0 PG    MCHC 32.6 31.0 - 37.0 g/dL    RDW 13.7 11.6 - 14.5 %    PLATELET 833 256 - 582 K/uL    MPV 10.3 9.2 - 11.8 FL    NRBC 0.0 0  WBC    ABSOLUTE NRBC 0.00 0.00 - 0.01 K/uL    NEUTROPHILS 54 40 - 73 %    LYMPHOCYTES 32 21 - 52 %    MONOCYTES 12 (H) 3 - 10 %    EOSINOPHILS 1 0 - 5 %    BASOPHILS 1 0 - 2 %    IMMATURE GRANULOCYTES 0 0.0 - 0.5 %    ABS. NEUTROPHILS 3.0 1.8 - 8.0 K/UL    ABS. LYMPHOCYTES 1.8 0.9 - 3.6 K/UL    ABS. MONOCYTES 0.7 0.05 - 1.2 K/UL    ABS. EOSINOPHILS 0.1 0.0 - 0.4 K/UL    ABS. BASOPHILS 0.0 0.0 - 0.1 K/UL    ABS. IMM.  GRANS. 0.0 0.00 - 0.04 K/UL    DF AUTOMATED     PTT    Collection Time: 12/17/21  6:01 AM   Result Value Ref Range    aPTT 25.2 23.0 - 36.4 SEC   LIPID PANEL    Collection Time: 12/17/21  6:01 AM   Result Value Ref Range    LIPID PROFILE          Cholesterol, total 169 <200 MG/DL    Triglyceride 166 (H) <150 MG/DL    HDL Cholesterol 35 (L) 40 - 60 MG/DL    LDL, calculated 100.8 (H) 0 - 100 MG/DL    VLDL, calculated 33.2 MG/DL    CHOL/HDL Ratio 4.8 0 - 5.0     GLUCOSE, POC    Collection Time: 12/17/21  8:08 AM   Result Value Ref Range    Glucose (POC) 217 (H) 70 - 110 mg/dL   ECHO ADULT FOLLOW-UP OR LIMITED    Collection Time: 12/17/21  9:09 AM   Result Value Ref Range    IVSd 1.4 (A) 0.6 - 1.0 cm    LVIDd 4.5 4.2 - 5.9 cm LVIDs 3.2 cm    LVPWd 1.7 (A) 0.6 - 1.0 cm    Fractional Shortening 2D 29 28 - 44 %    LVIDd Index 2.11 cm/m2    LVIDs Index 1.50 cm/m2    LV RWT Ratio 0.76     LV Mass 2D 290.0 (A) 88 - 224 g    LV Mass 2D Index 136.2 (A) 49 - 115 g/m2   GLUCOSE, POC    Collection Time: 12/17/21 11:10 AM   Result Value Ref Range    Glucose (POC) 192 (H) 70 - 110 mg/dL     IMAGING  XR Results (most recent):  Results from Hospital Encounter encounter on 12/14/21    XR CHEST PORT    Narrative  EXAM:  XR CHEST PORT    INDICATION:   SOB    COMPARISON: 6/18/2021. FINDINGS:  Suboptimal lordotic portable view of the chest. Within normal cardiac silhouette  given technique. Pulmonary vascular congestion. Blunted left costophrenic angle. Otherwise no significant pleural effusion. No pneumothorax. No consolidation. Intact osseous structures. Impression  Pulmonary vascular congestion and cephalization without evidence of overt  pulmonary edema. Perhaps trace left pleural effusion. 12/14/21    ECHO ADULT FOLLOW-UP OR LIMITED 12/17/2021 12/17/2021    Interpretation Summary    Left Ventricle: Left ventricle size is normal. Mild septal thickening. Severe posterior wall thickness. Normal wall motion. Low normal left ventricular systolic function with a visually estimated EF of 50 - 55%.   Contrast used: Definity. Signed by: Nelida Quintero MD on 12/17/2021  3:31 PM    Discharge Medications:     Current Discharge Medication List      START taking these medications    Details   atorvastatin (LIPITOR) 80 mg tablet Take 1 Tablet by mouth nightly. Qty: 30 Tablet, Refills: 0         CONTINUE these medications which have NOT CHANGED    Details   lisinopriL (PRINIVIL, ZESTRIL) 10 mg tablet Take 1 Tablet by mouth daily. Qty: 30 Tablet, Refills: 5      carvediloL (COREG) 25 mg tablet Take 0.5 Tablets by mouth two (2) times daily (with meals).   Qty: 60 Tablet, Refills: 5      apixaban (ELIQUIS) 2.5 mg tablet Take 1 Tab by mouth every twelve (12) hours. Qty: 180 Tab, Refills: 3      Blood-Glucose Meter monitoring kit Check fasting glucose  Qty: 1 Kit, Refills: 0      glucose blood VI test strips (ACCU-CHEK ZAC PLUS TEST STRP) strip Use as directed  Qty: 100 Strip, Refills: 2      sucroferric oxyhydroxide (VELPHORO) 500 mg chew chewable tablet Take 500 mg by mouth three (3) times daily (with meals). aspirin delayed-release 81 mg tablet Take 81 mg by mouth daily. nitroglycerin (NITROLINGUAL) 400 mcg/spray spray 1 Spray by SubLINGual route every five (5) minutes as needed for Chest Pain. Qty: 1 Bottle, Refills: 2             Activity: PT/OT per Home Health    Diet: Renal Diet, low Na    Wound Care: Routine catheter care    Follow-up:   Follow-up Appointments   Procedures    FOLLOW UP VISIT Appointment in: Other (Specify) 1. Alfonso Leon MD 3 - 5 days. 2. Dr. Rosalee Vines 2 - 3 weeks 3. Resume outpatient HD     1. Alfonso Leon MD 3 - 5 days. 2. Dr. Rosalee Vines 2 - 3 weeks  3. Resume outpatient HD     Standing Status:   Standing     Number of Occurrences:   1     Order Specific Question:   Appointment in     Answer:    Other (Specify)     Minutes spent on discharge: >30

## 2021-12-17 NOTE — PROGRESS NOTES
Bedside shift change report given to Pura Rangel LPN (oncoming nurse) by Mildred Odell RN (offgoing nurse). Report included the following information SBAR, Kardex, Intake/Output, MAR and Recent Results.

## 2021-12-17 NOTE — DISCHARGE INSTRUCTIONS
DISCHARGE SUMMARY from Nurse    PATIENT INSTRUCTIONS:    After general anesthesia or intravenous sedation, for 24 hours or while taking prescription Narcotics:  · Limit your activities  · Do not drive and operate hazardous machinery  · Do not make important personal or business decisions  · Do  not drink alcoholic beverages  · If you have not urinated within 8 hours after discharge, please contact your surgeon on call. Report the following to your surgeon:  · Excessive pain, swelling, redness or odor of or around the surgical area  · Temperature over 100.5  · Nausea and vomiting lasting longer than 4 hours or if unable to take medications  · Any signs of decreased circulation or nerve impairment to extremity: change in color, persistent  numbness, tingling, coldness or increase pain  · Any questions    What to do at Home:  Recommended activity: Activity as tolerated,     If you experience any of the following symptoms chest pain, shortness of breath, nausea, vomiting or fever, please follow up with your primary care physician. *  Please give a list of your current medications to your Primary Care Provider. *  Please update this list whenever your medications are discontinued, doses are      changed, or new medications (including over-the-counter products) are added. *  Please carry medication information at all times in case of emergency situations. These are general instructions for a healthy lifestyle:    No smoking/ No tobacco products/ Avoid exposure to second hand smoke  Surgeon General's Warning:  Quitting smoking now greatly reduces serious risk to your health.     Obesity, smoking, and sedentary lifestyle greatly increases your risk for illness    A healthy diet, regular physical exercise & weight monitoring are important for maintaining a healthy lifestyle    You may be retaining fluid if you have a history of heart failure or if you experience any of the following symptoms:  Weight gain of 3 pounds or more overnight or 5 pounds in a week, increased swelling in our hands or feet or shortness of breath while lying flat in bed. Please call your doctor as soon as you notice any of these symptoms; do not wait until your next office visit. Patient armband removed and shredded. The discharge information has been reviewed with the patient. The patient verbalized understanding. Discharge medications reviewed with the patient and appropriate educational materials and side effects teaching were provided.   ___________________________________________________________________________________________________________________________________

## 2021-12-17 NOTE — PROGRESS NOTES
Greg Mckeon and updated her that patient refused Home Health safety evaluation and DME order for Shower Chair.            Grace Delgado RN  Case Management [phone]

## 2021-12-17 NOTE — PROGRESS NOTES
Spoke with Patient by phone. Patient declined [address] visit,\"My sister helps me. \" writer discussed referral for shower chair,patient declined shower chair.  will continue to monitor and assist with transition of care needs.     Kodi Harvey, DUNCANN, RN  Care Management  Pager # [phone]

## 2021-12-17 NOTE — PROGRESS NOTES
Cardiology Progress Note    Admit Date: 12/14/2021  Attending Cardiologist: Dr. Carolin Grajeda:     -Paroxysmal atrial fibrillation. Sent from HD with Afib RVR with hypotension.  On Eliquis for anticoagulation. -CAD. LAD PCI 2012. Cardiac cath 6/21/2021 with findings as follows:  · Left-dominant sytem. · Small nondominant RCA with diffuse 70% stenosis. · LM is patent. Lorna Wildwood is distal 25% stenosis noted. · Circumflex is dominant.  No significant stenosis > 30% as noted. · LAD has proximal stent with ISR, 90%, IFR 0.75.  This lesion was successfully stented using 3.25 x 12 mm AMOL.  Post-procedure IFR was 0.97.  ? Plan was triple therapy (ASA 81 mg, Plavix 75 mg and Eliquis) x 1 month, then Plavix and Eliquis for next 5 months and subsequently Eliquis monotherapy. · LVEDP was elevated at 24 mmHg.  There was no gradient across the aortic valve.  Left ventriculogram was not performed, to preserve contrast use. -Cardiac cath 12/16/2021 with findings as follows:  · Left-dominant circulation. · Small nondominant RCA with diffuse disease. · LM with 30% stenosis. · LAD with ostial 30% as well as mid 45%. The previously stented proximal/mid segment is widely patent. The first diagonal branch is a small-caliber vessel with ostial 95% stenosis. This may be the culprit. · Circumflex coronary artery has diffuse 30% stenosis throughout. It is dominant. · Continue medical therapy.  -Ischemic cardiomyopathy. EF 40% by echo 12/2018, 55-60% in 01/2021.  Repeat Echo 10/1/2021 ([facility]) with EF 62%, concentric LVH with moderately thickened septal wall and mildly thickened posterior wall, normal RV function and size, normal LV diastolic function, no hemodynamically significant valvular disease. -ESRD on HD T/R/S, followed by Dr. Kati Duran.  -HTN.  -DM. -Dyslipidemia.  -Chronic anemia. -Diabetic retinopathy, legally blind. -H/o noncompliance.     Primary cardiologist Dr. Derek Faith.     Plan:     -Will discontinue Cardizem infusion. Pt briefly went into afib RVR yesterday evening after HD. Will increase Coreg dose to 25 mg BID; pt with brief hypotension post-HD but overall runs hypertensive.   -Will increase Lipitor to 80 mg, add-on lipid panel.    -Continue Eliquis, ASA 81 mg.  -Pt should follow-up in our office in 2-3 weeks for re-evaluation.  -Volume management via HD per nephrology team, appreciate assistance. I have independently evaluated and examined the patient. All relevant labs and testing data's are reviewed. Care plan discussed and updated after review. Sonya Grady MD    Subjective:     No new complaints. Hoping for discharge soon.     Objective:      Patient Vitals for the past 8 hrs:   Temp Pulse Resp BP SpO2   12/17/21 0850 -- -- -- (!) 152/86 --   12/17/21 0805 98.3 °F (36.8 °C) 82 18 (!) 142/79 98 %   12/17/21 0400 98.1 °F (36.7 °C) 86 16 122/80 --         Patient Vitals for the past 96 hrs:   Weight   12/17/21 0850 93 kg (205 lb)   12/14/21 1010 93 kg (205 lb 0.4 oz)                  Current Facility-Administered Medications   Medication Dose Route Frequency Last Admin    atorvastatin (LIPITOR) tablet 40 mg  40 mg Oral QHS 40 mg at 12/16/21 2348    sodium chloride (NS) flush 5-40 mL  5-40 mL IntraVENous Q8H 10 mL at 12/17/21 0521    sodium chloride (NS) flush 5-40 mL  5-40 mL IntraVENous PRN      apixaban (ELIQUIS) tablet 2.5 mg  2.5 mg Oral BID 2.5 mg at 12/17/21 0839    dilTIAZem (CARDIZEM) 100 mg in 0.9% sodium chloride (MBP/ADV) 100 mL infusion  5 mg/hr IntraVENous CONTINUOUS 5 mg/hr at 12/16/21 1755    insulin lispro (HUMALOG) injection   SubCUTAneous AC&HS 4 Units at 12/17/21 0839    glucose chewable tablet 16 g  16 g Oral PRN      glucagon (GLUCAGEN) injection 1 mg  1 mg IntraMUSCular PRN      dextrose (D50W) injection syrg 12.5-25 g  25-50 mL IntraVENous PRN      carvediloL (COREG) tablet 12.5 mg  12.5 mg Oral BID WITH MEALS 12.5 mg at 12/17/21 0839    lisinopriL (PRINIVIL, ZESTRIL) tablet 10 mg  10 mg Oral DAILY 10 mg at 12/17/21 0839    sucroferric oxyhydroxide (VELPHORO) chewable tablet 500 mg (Patient Supplied)  500 mg Oral TID WITH MEALS      0.9% sodium chloride infusion 250 mL  250 mL IntraVENous DIALYSIS PRN      aspirin chewable tablet 81 mg  81 mg Oral DAILY 81 mg at 12/17/21 0839    sodium chloride (NS) flush 5-40 mL  5-40 mL IntraVENous Q8H 10 mL at 12/17/21 0522    sodium chloride (NS) flush 5-40 mL  5-40 mL IntraVENous PRN      acetaminophen (TYLENOL) tablet 650 mg  650 mg Oral Q6H PRN      Or    acetaminophen (TYLENOL) suppository 650 mg  650 mg Rectal Q6H PRN      polyethylene glycol (MIRALAX) packet 17 g  17 g Oral DAILY PRN      ondansetron (ZOFRAN ODT) tablet 4 mg  4 mg Oral Q8H PRN 4 mg at 12/15/21 2020    Or    ondansetron (ZOFRAN) injection 4 mg  4 mg IntraVENous Q6H PRN           Intake/Output Summary (Last 24 hours) at 12/17/2021 1008  Last data filed at 12/17/2021 0826  Gross per 24 hour   Intake 120 ml   Output 2000 ml   Net -1880 ml       Physical Exam:  General:  alert, cooperative, no distress, appears stated age  Neck:  supple  Lungs:  clear to auscultation bilaterally  Heart:  regular rate and rhythm  Abdomen:  abdomen is soft without significant tenderness, masses, organomegaly or guarding  Extremities:  atraumatic, no edema    Visit Vitals  BP (!) 152/86   Pulse 82   Temp 98.3 °F (36.8 °C)   Resp 18   Ht 5' 11\" (1.803 m)   Wt 93 kg (205 lb)   SpO2 98%   BMI 28.59 kg/m²       Data Review:     Labs: Results:       Chemistry Recent Labs     12/17/21  0601 12/16/21  0328 12/15/21  0520   * 265* 219*    134* 134*   K 3.7 3.8 4.3   CL 99* 95* 98*   CO2 28 29 27   BUN 25* 32* 31*   CREA 7.68* 7.82* 8.90*   CA 9.5 9.1 9.1   MG  --   --  2.6   AGAP 9 10 9   BUCR 3* 4* 3*      CBC w/Diff Recent Labs     12/17/21  0601 12/16/21  0328 12/15/21  1912   WBC 5.6 7.7 7.9   RBC 3.87* 4.01* 4.16*   HGB 11.7* 12.0* 12.6*   HCT 35.9* 36.7 38.8    220 215   GRANS 54 72 82*   LYMPH 32 17* 11*   EOS 1 1 0      Coagulation Recent Labs     12/17/21  0601 12/16/21  0328   PTP  --  14.3   INR  --  1.1   APTT 25.2 49.9*       Lipid Panel Lab Results   Component Value Date/Time    Cholesterol, total 168 03/22/2021 12:00 AM    HDL Cholesterol 32 (L) 03/22/2021 12:00 AM    LDL, calculated 116 (H) 03/22/2021 12:00 AM    LDL, calculated 85 02/09/2019 11:20 AM    VLDL, calculated 20 03/22/2021 12:00 AM    VLDL, calculated 15 02/09/2019 11:20 AM    Triglyceride 106 03/22/2021 12:00 AM    CHOL/HDL Ratio 4.4 10/17/2012 06:05 AM      Thyroid Studies Lab Results   Component Value Date/Time    TSH 2.43 12/06/2018 05:19 AM          Signed By: Jay Resendez PA-C supervised    December 17, 2021

## 2021-12-17 NOTE — PROGRESS NOTES
Reason for Admission:  Paroxysmal A-fib ([facility] [I48.0]  Atrial fibrillation with RVR ([facility] [I48.91]                 RUR Score:    12%           Plan for utilizing home health:    yes                      Likelihood of Readmission:   LOW                         Transition of Care Plan:              Initial assessment completed with patient. Cognitive status of patient: oriented to time, place, person and situation. Face sheet information confirmed:  yes. The patient designates sister, Chito Alvarez to participate in his discharge plan and to receive any needed information. This patient lives in a apartment with patient and sister. Patient is able to navigate steps as needed. Prior to hospitalization, patient was considered to be independent with ADLs/IADLS : yes . Patient does not drive. Patient has a current ACP document on file: no      Healthcare Decision Maker:     Click here to complete MessageCast including selection of the Healthcare Decision Maker Relationship (ie \"Primary\")    The patient and sister will be available to transport patient home upon discharge. The patient already has [employer], medical equipment available in the home. Patient is not currently active with home health. Patient has not stayed in a skilled nursing facility or rehab. .      This patient is on dialysis :yes    If yes, hemodialysis patient and receives treatment on Tuesday/Thursday/Saturday at [address] [facility]  Chair time is 6am. Pt is transported to/from dialysis by [facility]. List of available Home Health agencies were provided and reviewed with the patient prior to discharge. Freedom of choice signed: yes, for [address] Currently, the discharge plan is Home with [address] [place], [place] [address]. The patient states that he can obtain his medications from the pharmacy, and take his medications as directed. Patient's current insurance is Primary: "[employer].  Secondary: [facility] Management Interventions  PCP Verified by CM: Yes  Mode of Transport at Discharge: Other (see comment) (sister)  Transition of Care Consult (CM Consult): Discharge [address]: Yes  Physical Therapy Consult: Yes  Occupational Therapy Consult: Yes  Support Systems: Other Family Member(s)  Confirm Follow Up Transport: Family  The Patient and/or Patient Representative was Provided with a Choice of Provider and Agrees with the Discharge Plan?: Yes  Freedom of Choice List was Provided with Basic Dialogue that Supports the Patient's Individualized Plan of Care/Goals, Treatment Preferences and Shares the Quality Data Associated with the Providers?: Yes  Discharge Location  Discharge Placement: Home with home health         will continue to monitor and assist with transition of care needs.     CHRISITANO Peres, RN  Care Management  Pager # [phone]

## 2021-12-17 NOTE — PROGRESS NOTES
Discharge teaching completed at bedside with patient. Opportunity provided for clarifying questions. All answered to patient satisfaction. IV removed, EKG leads removed . ID removed and shredded.

## 2021-12-17 NOTE — PROGRESS NOTES
Problem: Patient Education: Go to Patient Education Activity  Goal: Patient/Family Education  Outcome: Progressing Towards Goal     Problem: Patient Education: Go to Patient Education Activity  Goal: Patient/Family Education  Outcome: Progressing Towards Goal     Problem: Falls - Risk of  Goal: *Absence of Falls  Description: Document Clydene Bone Fall Risk and appropriate interventions in the flowsheet. Outcome: Progressing Towards Goal  Note: Fall Risk Interventions:            Medication Interventions: Patient to call before getting OOB,Teach patient to arise slowly                   Problem: Patient Education: Go to Patient Education Activity  Goal: Patient/Family Education  Outcome: Progressing Towards Goal     Problem:  Moderate Sedation (Adult)  Goal: *Patent airway  Outcome: Progressing Towards Goal  Goal: *Adequate oxygenation  Outcome: Progressing Towards Goal  Goal: *Absence of aspiration  Outcome: Progressing Towards Goal  Goal: *Hemodynamically stable  Outcome: Progressing Towards Goal  Goal: *Optimal pain control at patient's stated goal  Outcome: Progressing Towards Goal  Goal: *Absence of nausea/vomiting  Outcome: Progressing Towards Goal  Goal: *Anxiety reduced or absent  Outcome: Progressing Towards Goal  Goal: *Absence of injury  Outcome: Progressing Towards Goal  Goal: *Level of consciousness returns to baseline  Outcome: Progressing Towards Goal  Goal: Interventions  Outcome: Progressing Towards Goal     Problem: Patient Education: Go to Patient Education Activity  Goal: Patient/Family Education  Outcome: Progressing Towards Goal

## 2021-12-17 NOTE — PROGRESS NOTES
conducted an initial consultation and Spiritual Assessment for Maciej Mckee, who is a 52 y.o.,male. Patient's Primary Language is: [place]. According to the patient's EMR Hinduism Affiliation is: Mariluz Jaimes. The reason the Patient came to the hospital is:   Patient Active Problem List    Diagnosis Date Noted    Paroxysmal A-fib [facility]) 12/14/2021    Hypercalcemia 01/22/2021    Hyperkalemia 01/22/2021    ESRD (end stage renal disease) ([address] 01/22/2021    NSTEMI (non-ST elevated myocardial infarction) ([address] 01/22/2021    Atrial fibrillation with RVR ([address] 01/22/2021    Anemia of chronic disease 03/07/2019    Type 2 diabetes with nephropathy ([address] 01/04/2019    Atrial fibrillation with rapid ventricular response ([address] 12/05/2018    Dyslipidemia 06/21/2016    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 Explored issues of ayde, belief, spirituality and Hinduism/ritual needs while hospitalized. Listened empathically. Offered prayer and assurance of continued prayers on patient's behalf. The following outcomes where achieved:  Patient processed feeling about current hospitalization. Patient expressed gratitude for 's visit. Assessment:  Patient does not have any Hinduism/cultural needs that will affect patient's preferences in health care. There are no spiritual or Hinduism issues which require intervention at this time.      Plan:  Chaplains will continue to follow and will provide pastoral care on an as needed/requested basis.  recommends bedside caregivers page  on duty if patient shows signs of acute spiritual or emotional distress.     [address]   [phone]

## 2021-12-20 ENCOUNTER — PATIENT OUTREACH (OUTPATIENT)
Dept: CASE MANAGEMENT | Age: 51
End: 2021-12-20

## 2021-12-20 RX ORDER — MAG HYDROX/ALUMINUM HYD/SIMETH 200-200-20
SUSPENSION, ORAL (FINAL DOSE FORM) ORAL
COMMUNITY
Start: 2021-01-25

## 2021-12-20 NOTE — PROGRESS NOTES
Care Transitions Initial Call    Call within 2 business days of discharge: Yes     Patient: Megha Tim Patient : 1970 MRN: [id number]    Last Discharge [address]       Complaint Diagnosis Description Type Department Provider    21 Shortness of Breath Atrial fibrillation with rapid ventricular response ([facility] [address] . .. ED to Hosp-Admission (Discharged) (ADMIT) Salima Kruse MD; Beth Verduzco. .. Was this an external facility discharge? No Discharge Facility: N/A    Challenges to be reviewed by the provider   Additional needs identified to be addressed with provider: no  none         Method of communication with provider : chart routing    Discussed COVID-19 related testing which was not done at this time. Test results were not done. Patient informed of results, if available? N/A     Advance Care Planning:   Does patient have an Advance Directive:  not on file    Inpatient Readmission Risk score: Unplanned Readmit Risk Score: 12.2 ( )    Was this a readmission? no   Patient stated reason for the admission: N/A    Patients top risk factors for readmission: medical condition-a-fib   Interventions to address risk factors: Scheduled appointment with PCP-TYREE appt request sent and Obtained and reviewed discharge summary and/or continuity of care documents    Care Transition Nurse (CTN) contacted the patient by telephone to perform post hospital discharge assessment. Verified name and  with patient as identifiers. Provided introduction to self, and explanation of the CTN role. CTN reviewed discharge instructions, medical action plan and red flags with patient who verbalized understanding. Were discharge instructions available to patient? yes. Reviewed appropriate site of care based on symptoms and resources available to patient including: PCP, Specialist, When to call 911 and CTN.  Patient given an opportunity to ask questions and does not have any further questions or concerns at this time. The patient agrees to contact the PCP office for questions related to their healthcare. Medication reconciliation was performed with patient, who verbalizes understanding of administration of home medications. Advised obtaining a 90-day supply of all daily and as-needed medications. Patient voiced he had not received a call from pharmacy to let him know prescription is ready for . CTN contacted [facility]. Spoke with Phyllis gee. Provided 2 patient identifiers. Marielle Talavera voiced patient filled Lipitor 40 mg prescription on 12/17 at another pharmacy so insurance will not pay for Lipitor 80 mg until 1/12/22. CTN made patient aware. He voiced he has been taking tow 40 mg tabs in the evening since d/c. Referral to Pharm D needed: no         Covid Risk Education    Educated patient about risk for severe COVID-19 due to risk factors according to CDC guidelines. CTN reviewed discharge instructions, medical action plan and red flag symptoms with the patient who verbalized understanding. Discussed COVID vaccination status: yes. Education provided on COVID-19 vaccination as appropriate. Discussed exposure protocols and quarantine with CDC Guidelines. Patient was given an opportunity to verbalize any questions and concerns and agrees to contact CTN or health care provider for questions related to their healthcare. Discussed follow-up appointments. If no appointment was previously scheduled, appointment scheduling offered: yes. Is follow up appointment scheduled within 7 days of discharge? TBD.   [address] follow up appointment(s):   Future Appointments   Date Time Provider Jessica Vail   1/12/2022  2:00 PM Alexandria Grace MD [facility] BS AMB   1/20/2022  2:00 PM Jody Meredith MD [facility] BS AMB     Non-[facility] follow up appointment(s): None    Plan for follow-up call in 7-10 days based on severity of symptoms and risk factors.   Plan for next call: symptom management-s/s of a-fib and follow up appointment-scheduling of TYREE appt  CTN provided contact information for future needs. Goals Addressed                 This Visit's Progress     Attends follow-up appointments as directed. Goal: Patient will attend all appointments scheduled within the next 30 days. Ensure provider appt is scheduled within 7 business days post-discharge; 12/20: TYREE appt request sent. Patient aware of cardiology appt on 1/12/22. Confirm patient attended post-discharge provider appt   Complete post-visit call to confirm attendance and update care needs           Prevent complications post hospitalization. Goal: No admissions post 30 days from discharge of 12/17/21. Review/educate common or potential \"red flags\" of condition worsening;12/20: Reviewed/educated on red flags to monitor and report:  Palpitations, fainting, chest pain, dizziness, shortness of breath     Evaluate adherence to medications and priority barriers to resolve; 12/20: Contacted Maryan [employer] voiced patient refilled med at other pharmacy and prescription can not be refilled until 1/12/22. Patient notified. Instruct on adherence to medications as ordered and assess for therapeutic response and side-effects        Communicate visits and goals between multiple providers and services    Assess for health risk behaviors and educate patient/caregivers on reducing risk   Observe for trends in symptoms and measures, provide direction to patient, and notify provider as needed       Discuss and provide resources for ACP; 12/20: Not on file. Will address at a later time.

## 2021-12-27 ENCOUNTER — PATIENT OUTREACH (OUTPATIENT)
Dept: CASE MANAGEMENT | Age: 51
End: 2021-12-27

## 2021-12-27 NOTE — PROGRESS NOTES
Care Transitions Follow Up Call    Challenges to be reviewed by the provider   Additional needs identified to be addressed with provider: no  none           Method of communication with provider : chart routing    Care Transition Nurse (CTN) contacted the patient by telephone to follow up after admission on 21. Verified name and  with patient as identifiers. Addressed changes since last contact: none  Follow up appointment completed? no.   Was follow up appointment scheduled within 7 days of discharge? no.   Inquired about missed appt today at PCP office. Patient voiced he was not aware of appt. Next appt scheduled with PCP is 22. Advance Care Planning:   Does patient have an Advance Directive:  ACP referral placed    CTN reviewed discharge instructions, medical action plan and red flags with patient and discussed any barriers to care and/or understanding of plan of care after discharge. Discussed appropriate site of care based on symptoms and resources available to patient including: PCP, Specialist, When to call 911 and CTN. The patient agrees to contact the PCP office for questions related to their healthcare. Patients top risk factors for readmission: medical condition-A-fib   Interventions to address risk factors: Assessed for s/s of A-fib    [facility]  follow up appointment(s):   Future Appointments   Date Time Provider Jessica Vail   2022  2:00 PM Ana Cortés MD [facility] BS AMB   2022  2:00 PM Carie Reyes MD [facility] BS AMB     Non-[facility] follow up appointment(s): none    CTN provided contact information for future needs. Plan for follow-up call in 10-14 days based on severity of symptoms and risk factors. Plan for next call: symptom management-Symptoms of A-fib      Goals Addressed                 This Visit's Progress     Attends follow-up appointments as directed. On track     Goal: Patient will attend all appointments scheduled within the next 30 days.       Ensure provider appt is scheduled within 7 business days post-discharge; 12/20: TYREE appt request sent. Patient aware of cardiology appt on 1/12/22. 12/27: Patient voiced he was not made aware of appt scheduled for today. Confirm patient attended post-discharge provider appt   Complete post-visit call to confirm attendance and update care needs; 12/27: Done.  Prevent complications post hospitalization. On track     Goal: No admissions post 30 days from discharge of 12/17/21. Review/educate common or potential \"red flags\" of condition worsening;12/20: Reviewed/educated on red flags to monitor and report:  Palpitations, fainting, chest pain, dizziness, shortness of breath. 12/27: Denied s/s of A-fib. Evaluate adherence to medications and priority barriers to resolve; 12/20: Contacted Maryan Pharm tech voiced patient refilled med at other pharmacy and prescription can not be refilled until 1/12/22. Patient notified. Observe for trends in symptoms and measures, provide direction to patient, and notify provider as needed       Discuss and provide resources for ACP; 12/20: Not on file. Will address at a later time. 12/27: ACP referral placed.

## 2021-12-30 ENCOUNTER — APPOINTMENT (OUTPATIENT)
Dept: GENERAL RADIOLOGY | Age: 51
End: 2021-12-30
Attending: STUDENT IN AN ORGANIZED HEALTH CARE EDUCATION/TRAINING PROGRAM
Payer: MEDICARE

## 2021-12-30 ENCOUNTER — HOSPITAL ENCOUNTER (EMERGENCY)
Age: 51
Discharge: HOME | End: 2021-12-30
Attending: STUDENT IN AN ORGANIZED HEALTH CARE EDUCATION/TRAINING PROGRAM | Admitting: STUDENT IN AN ORGANIZED HEALTH CARE EDUCATION/TRAINING PROGRAM
Payer: MEDICARE

## 2021-12-30 VITALS
RESPIRATION RATE: 15 BRPM | WEIGHT: 205 LBS | TEMPERATURE: 98.1 F | OXYGEN SATURATION: 100 % | SYSTOLIC BLOOD PRESSURE: 135 MMHG | BODY MASS INDEX: 28.7 KG/M2 | HEART RATE: 79 BPM | DIASTOLIC BLOOD PRESSURE: 79 MMHG | HEIGHT: 71 IN

## 2021-12-30 DIAGNOSIS — I48.0 PAROXYSMAL ATRIAL FIBRILLATION WITH RAPID VENTRICULAR RESPONSE (HCC): Primary | ICD-10-CM

## 2021-12-30 PROBLEM — Z99.2 ESRD ON DIALYSIS (HCC): Status: ACTIVE | Noted: 2021-01-22

## 2021-12-30 LAB
ALBUMIN SERPL-MCNC: 3.9 G/DL (ref 3.4–5)
ALBUMIN/GLOB SERPL: 0.8 {RATIO} (ref 0.8–1.7)
ALP SERPL-CCNC: 131 U/L (ref 45–117)
ALT SERPL-CCNC: 20 U/L (ref 16–61)
ANION GAP SERPL CALC-SCNC: 14 MMOL/L (ref 3–18)
AST SERPL-CCNC: 20 U/L (ref 10–38)
BASOPHILS # BLD: 0.1 K/UL (ref 0–0.1)
BASOPHILS NFR BLD: 1 % (ref 0–2)
BILIRUB SERPL-MCNC: 0.5 MG/DL (ref 0.2–1)
BUN SERPL-MCNC: 13 MG/DL (ref 7–18)
BUN/CREAT SERPL: 3 (ref 12–20)
CALCIUM SERPL-MCNC: 10.2 MG/DL (ref 8.5–10.1)
CALCULATED R AXIS, ECG10: -16 DEGREES
CALCULATED T AXIS, ECG11: 111 DEGREES
CHLORIDE SERPL-SCNC: 97 MMOL/L (ref 100–111)
CO2 SERPL-SCNC: 23 MMOL/L (ref 21–32)
CREAT SERPL-MCNC: 4.99 MG/DL (ref 0.6–1.3)
DIAGNOSIS, 93000: NORMAL
DIFFERENTIAL METHOD BLD: ABNORMAL
EOSINOPHIL # BLD: 0.1 K/UL (ref 0–0.4)
EOSINOPHIL NFR BLD: 2 % (ref 0–5)
ERYTHROCYTE [DISTWIDTH] IN BLOOD BY AUTOMATED COUNT: 12.9 % (ref 11.6–14.5)
GLOBULIN SER CALC-MCNC: 4.7 G/DL (ref 2–4)
GLUCOSE SERPL-MCNC: 201 MG/DL (ref 74–99)
HCT VFR BLD AUTO: 42.9 % (ref 36–48)
HGB BLD-MCNC: 13.8 G/DL (ref 13–16)
IMM GRANULOCYTES # BLD AUTO: 0 K/UL (ref 0–0.04)
IMM GRANULOCYTES NFR BLD AUTO: 0 % (ref 0–0.5)
LYMPHOCYTES # BLD: 0.9 K/UL (ref 0.9–3.6)
LYMPHOCYTES NFR BLD: 16 % (ref 21–52)
MAGNESIUM SERPL-MCNC: 2.6 MG/DL (ref 1.6–2.6)
MCH RBC QN AUTO: 29.2 PG (ref 24–34)
MCHC RBC AUTO-ENTMCNC: 32.2 G/DL (ref 31–37)
MCV RBC AUTO: 90.9 FL (ref 78–100)
MONOCYTES # BLD: 0.3 K/UL (ref 0.05–1.2)
MONOCYTES NFR BLD: 5 % (ref 3–10)
NEUTS SEG # BLD: 4.3 K/UL (ref 1.8–8)
NEUTS SEG NFR BLD: 75 % (ref 40–73)
NRBC # BLD: 0 K/UL (ref 0–0.01)
NRBC BLD-RTO: 0 PER 100 WBC
PLATELET # BLD AUTO: 217 K/UL (ref 135–420)
PMV BLD AUTO: 10.2 FL (ref 9.2–11.8)
POTASSIUM SERPL-SCNC: 3.6 MMOL/L (ref 3.5–5.5)
PROT SERPL-MCNC: 8.6 G/DL (ref 6.4–8.2)
Q-T INTERVAL, ECG07: 336 MS
QRS DURATION, ECG06: 88 MS
QTC CALCULATION (BEZET), ECG08: 514 MS
RBC # BLD AUTO: 4.72 M/UL (ref 4.35–5.65)
SODIUM SERPL-SCNC: 134 MMOL/L (ref 136–145)
TROPONIN-HIGH SENSITIVITY: 48 NG/L (ref 0–78)
VENTRICULAR RATE, ECG03: 141 BPM
WBC # BLD AUTO: 5.7 K/UL (ref 4.6–13.2)

## 2021-12-30 PROCEDURE — 99231 SBSQ HOSP IP/OBS SF/LOW 25: CPT | Performed by: STUDENT IN AN ORGANIZED HEALTH CARE EDUCATION/TRAINING PROGRAM

## 2021-12-30 PROCEDURE — 93005 ELECTROCARDIOGRAM TRACING: CPT

## 2021-12-30 PROCEDURE — 99285 EMERGENCY DEPT VISIT HI MDM: CPT

## 2021-12-30 PROCEDURE — 74011250636 HC RX REV CODE- 250/636: Performed by: STUDENT IN AN ORGANIZED HEALTH CARE EDUCATION/TRAINING PROGRAM

## 2021-12-30 PROCEDURE — 83735 ASSAY OF MAGNESIUM: CPT

## 2021-12-30 PROCEDURE — 65270000029 HC RM PRIVATE

## 2021-12-30 PROCEDURE — 85025 COMPLETE CBC W/AUTO DIFF WBC: CPT

## 2021-12-30 PROCEDURE — 96365 THER/PROPH/DIAG IV INF INIT: CPT

## 2021-12-30 PROCEDURE — 96366 THER/PROPH/DIAG IV INF ADDON: CPT

## 2021-12-30 PROCEDURE — 71045 X-RAY EXAM CHEST 1 VIEW: CPT

## 2021-12-30 PROCEDURE — 80053 COMPREHEN METABOLIC PANEL: CPT

## 2021-12-30 PROCEDURE — 74011000258 HC RX REV CODE- 258: Performed by: STUDENT IN AN ORGANIZED HEALTH CARE EDUCATION/TRAINING PROGRAM

## 2021-12-30 PROCEDURE — 84484 ASSAY OF TROPONIN QUANT: CPT

## 2021-12-30 RX ORDER — CARVEDILOL 6.25 MG/1
6.25 TABLET ORAL ONCE
Status: DISCONTINUED | OUTPATIENT
Start: 2021-12-30 | End: 2021-12-30 | Stop reason: HOSPADM

## 2021-12-30 RX ORDER — SODIUM CHLORIDE 0.9 % (FLUSH) 0.9 %
5-40 SYRINGE (ML) INJECTION AS NEEDED
Status: DISCONTINUED | OUTPATIENT
Start: 2021-12-30 | End: 2021-12-30

## 2021-12-30 RX ORDER — ACETAMINOPHEN 325 MG/1
650 TABLET ORAL
Status: DISCONTINUED | OUTPATIENT
Start: 2021-12-30 | End: 2021-12-30

## 2021-12-30 RX ORDER — ATORVASTATIN CALCIUM 40 MG/1
80 TABLET, FILM COATED ORAL
Status: DISCONTINUED | OUTPATIENT
Start: 2021-12-30 | End: 2021-12-30 | Stop reason: HOSPADM

## 2021-12-30 RX ORDER — LISINOPRIL 10 MG/1
10 TABLET ORAL DAILY
Status: DISCONTINUED | OUTPATIENT
Start: 2021-12-31 | End: 2021-12-30

## 2021-12-30 RX ORDER — CARVEDILOL 12.5 MG/1
12.5 TABLET ORAL 2 TIMES DAILY WITH MEALS
Status: DISCONTINUED | OUTPATIENT
Start: 2021-12-30 | End: 2021-12-30

## 2021-12-30 RX ORDER — SODIUM CHLORIDE 0.9 % (FLUSH) 0.9 %
5-40 SYRINGE (ML) INJECTION EVERY 8 HOURS
Status: DISCONTINUED | OUTPATIENT
Start: 2021-12-30 | End: 2021-12-30

## 2021-12-30 RX ADMIN — SODIUM CHLORIDE 500 ML: 900 INJECTION, SOLUTION INTRAVENOUS at 13:32

## 2021-12-30 RX ADMIN — SODIUM CHLORIDE 5 MG/HR: 900 INJECTION, SOLUTION INTRAVENOUS at 12:01

## 2021-12-30 NOTE — CONSULTS
Consult    Patient: Norma Mcnally MRN: [id number]  SSN: [id number]    YOB: 1970  Age: 54 y.o. Sex: male      Subjective:      Norma Mcnally is a 54 y.o. male with atrial fibrillation, type 2 diabetes mellitus, coronary artery disease, hypertension, and end-stage renal disease on dialysis [facility] who is being seen for AIlene barahona with RVR. Patient was at dialysis today when he began experiencing heart palpitations. EMS was called to dialysis center and patient was noted to have a heart rate greater than 140 bpm.  He was then brought to [facility] emergency department where a diltiazem drip was initiated. A 500 mL normal saline bolus was also given. Patient's heart rate soon was controlled with diltiazem drip 7.5 mg/hr. Patient states that he had missed yesterday's dose of medications. He also did not take any morning doses today before dialysis. Patient denies feeling ill at this time and would like to go home if possible. Only positive review of symptoms on my interview was heart palpitations, which have now resolved. Hospitalist service was initially contacted for admission due to diltiazem drip. On initial assessment, patient heart rate was in the 70s to 80s. After telephone discussion with on-call cardiologist (Dr. Cheyenne Dominguez), it was determined that patient may be discharged home after receiving his home doses of medications.      Discussed with ER attending who also agrees that patient may be discharged home from the ER to avoid an unnecessary admission to the hospital.    ER course:  -EKG -atrial fibrillation with rapid ventricular response  -Portable chest x-ray -no acute findings    Notable initial labs: WBC 5.7, BUN/creatinine 13/4.99, potassium 3.6, calcium 10.2, ALT/AST 20/20, alk phos 131, high-sensitivity troponin 48    Past Medical History:   Diagnosis Date    Abnormal nuclear cardiac imaging test 10/08/2012    Fixed anteroseptal, anteroapical defect c/w prior infarction. No ischemia. Mid & distal anteroseptal, anteroapical WMA. LVE. EF 44%.  Anemia     dr. Khalida Gordillo doubt amyloid or multiple myeloma. candidate for procirt if Hg <10    Benign hypertensive     Blindness of right eye 01/2013    legally blind    CAD (coronary artery disease)     Cardiomyopathy ejection fraction of 40%     CKD (chronic kidney disease), stage IV ([facility]     to see Dr. Marline Jiang 12/1/12    Congestive heart failure ([facility]     Diabetes mellitus ([facility]     Diabetic retinopathy ([facility]     Diabetic retinopathy ([facility]     Dr. Jakob Vega- injections    Heart failure ([facility]     History of echocardiogram 04/22/2014    LVE. EF 55% (prev 40-45% on echo of 1/27/12). No WMA. Mild-mod conc LVH. Gr 3 DDfx. Marked LAE. Mild SHANTEL. Mild MR.    Hypertension     Pulmonary hypertension ([facility]     Renal Ultrasound 1/3/12    Right kidney isoechoic w/respect to liver. Sm left-sided pleural effusion    S/P cardiac cath 10/16/2012    LM patent. pLAD 95% (3.5 x 12-mm [place] stent, resid 0$%). LCX mild. Past Surgical History:   Procedure Laterality Date    HX CORONARY STENT PLACEMENT      one    HX LAPAROTOMY  2/2012    bowel obstruction with removal segment of small bowel. Done by Riblet.  HX LAPAROTOMY  infancy    whole in colon and had repair at that time.     HX OTHER SURGICAL  06/20/2013    left eye retna attatchment    HX RETINAL DETACHMENT REPAIR      august 2012    MA REPAIR ING HERNIA,5+Y/O,REDUCIBL Left 05/02/2019    Dr. Cao Linear      Family History   Problem Relation Age of Onset    Diabetes Mother     Hypertension Mother     Kidney Disease Mother     High Cholesterol Father     Diabetes Brother         pre diabetic     Social History     Tobacco Use    Smoking status: Never Smoker    Smokeless tobacco: Never Used   Substance Use Topics    Alcohol use: Not Currently     Alcohol/week: 4.2 standard drinks     Types: 5 Cans of beer per week      Current Facility-Administered Medications   Medication Dose Route Frequency Provider Last Rate Last Admin    apixaban (ELIQUIS) tablet 2.5 mg  2.5 mg Oral Q12H Shama Lantigua PA        atorvastatin (LIPITOR) tablet 80 mg  80 mg Oral QHS Shama Lantigua PA        carvediloL (COREG) tablet 12.5 mg  12.5 mg Oral BID WITH MEALS Shama Lantigua PA        dilTIAZem (CARDIZEM) 100 mg in 0.9% sodium chloride (MBP/ADV) 100 mL infusion  0-15 mg/hr IntraVENous TITRATE Shama Lantigua PA         Current Outpatient Medications   Medication Sig Dispense Refill    doxercalciferol (HECTOROL IV) 2 mcg.  doxercalciferol (HECTOROL IV) 4 mcg.  alum-mag hydroxide-simeth (Maalox Advanced) 200-200-20 mg/5 mL susp Take  by mouth.  atorvastatin (LIPITOR) 80 mg tablet Take 1 Tablet by mouth nightly. (Patient not taking: Reported on 12/20/2021) 30 Tablet 0    nitroglycerin (NITROLINGUAL) 400 mcg/spray spray 1 Spray by SubLINGual route every five (5) minutes as needed for Chest Pain. 1 Bottle 2    lisinopriL (PRINIVIL, ZESTRIL) 10 mg tablet Take 1 Tablet by mouth daily. 30 Tablet 5    carvediloL (COREG) 25 mg tablet Take 0.5 Tablets by mouth two (2) times daily (with meals). 60 Tablet 5    apixaban (ELIQUIS) 2.5 mg tablet Take 1 Tab by mouth every twelve (12) hours. 180 Tab 3    Blood-Glucose Meter monitoring kit Check fasting glucose 1 Kit 0    glucose blood VI test strips (ACCU-CHEK ZAC PLUS TEST STRP) strip Use as directed 100 Strip 2    sucroferric oxyhydroxide (VELPHORO) 500 mg chew chewable tablet Take 500 mg by mouth three (3) times daily (with meals).  aspirin delayed-release 81 mg tablet Take 81 mg by mouth daily.           No Known Allergies    Review of Systems:  A comprehensive review of systems was negative except for: Cardiovascular: positive for palpitations    Objective:     Vitals:    12/30/21 1700 12/30/21 1720 12/30/21 1725 12/30/21 1730   BP: 133/76 119/83 135/75 135/79   Pulse: 79 80 79 79   Resp: 15      Temp:       SpO2: 100% 100% 100% 100%   Weight:       Height:            Physical Exam:    General:   awake alert and oriented   Skin:   no rashes or skin lesions noted on limited exam   HEENT:  Normocephalic, atraumatic, extraocular movements grossly intact no scleral icterus or pallor; no conjunctival hemmohage; facemask in place       Lungs:   non-labored, bilaterally clear to auscultation- no crackles wheezes rales or rhonchi   Heart:  Normal rate, irregularly irregular rhythm, s1 and s2; no murmurs rubs or gallops, no edema   Abdomen:  soft, non-distended, nontender       Extremities:   Moving all 4 extremities without issue   Neurologic:  No gross focal sensory abnormalities; grossly 5/5 muscle strength to upper and lower extremities. Speech appropirate. Cranial nerves grossly intact   Psychiatric:   appropriate and interactive. Assessment:     Hospital Problems  Date Reviewed: 6/26/2021          Codes Class Noted POA    ESRD on dialysis [facility]) ICD-10-CM: N18.6, Z99.2  ICD-9-CM: 585.6, V45.11  1/22/2021 Yes        * (Principal) Atrial fibrillation with RVR ([facility] ICD-10-CM: I48.91  ICD-9-CM: 427.31  1/22/2021 Yes        Type 2 diabetes with nephropathy ([facility] ICD-10-CM: E11.21  ICD-9-CM: 250.40, 583.81  1/4/2019 Yes              Plan:     Patient hemodynamically stable. No evidence of fluid overload on exam.  Patient expressing he would like to go home. Discussed with patient and the ER physician our recommendations. All questions/concerns were answered.     Recommendations:  -Give home dose carvedilol 6.25 mg now  -Give home dose Eliquis 2.5 mg now  -Give home dose atorvastatin 80 mg now  -Turn off diltiazem drip within the hour  -Discharge home and continue regularly scheduled home medications tomorrow      Signed By: Callum Cuellar MD   Hospitalist service    December 30, 2021

## 2021-12-30 NOTE — ED PROVIDER NOTES
Patient is a 53-year-old male with history of ESRD (on thrice weekly dialysis), paroxysmal atrial fibrillation, congestive heart failure, diabetes, coronary artery disease, pulmonary hypertension, and hypertension. Patient presents with primary complaint of sudden onset of dizziness and elevated heart rate noted while patient was in dialysis. EMS was summoned to the scene upon arrival patient was noted to have a heart rate of greater than 140 bpm and had a initial blood pressure of approximately 85 mmHg systolic at which point he IV was established and patient was brought to our facility for further evaluation. At presentation patient denies any active complaints, no lightheadedness, dizziness, chest pain, shortness of breath, weakness and patient reports no recent illness, no fever/chills, cough, or syncope. Past Medical History:   Diagnosis Date    Abnormal nuclear cardiac imaging test 10/08/2012    Fixed anteroseptal, anteroapical defect c/w prior infarction. No ischemia. Mid & distal anteroseptal, anteroapical WMA. LVE. EF 44%.  Anemia     dr. Mary Milan doubt amyloid or multiple myeloma. candidate for procirt if Hg <10    Benign hypertensive     Blindness of right eye 01/2013    legally blind    CAD (coronary artery disease)     Cardiomyopathy ejection fraction of 40%     CKD (chronic kidney disease), stage IV ([address]     to see Dr. Josh Eagle 12/1/12    Congestive heart failure ([address]     Diabetes mellitus ([address]     Diabetic retinopathy ([address]     Diabetic retinopathy ([address]     Dr. Myles Ket- injections    [facility] failure [facility]     History of echocardiogram 04/22/2014    LVE. EF 55% (prev 40-45% on echo of 1/27/12). No WMA. Mild-mod conc LVH. Gr 3 DDfx. Marked LAE. Mild SHANTEL. Mild MR.    Hypertension     Pulmonary hypertension ([address]     Renal Ultrasound 1/3/12    Right kidney isoechoic w/respect to liver. Sm left-sided pleural effusion    S/P cardiac cath 10/16/2012    LM patent. pLAD 95% (3.5 x 12-mm [place] stent, resid 0$%). LCX mild. Past Surgical History:   Procedure Laterality Date    HX CORONARY STENT PLACEMENT      one    HX LAPAROTOMY  2/2012    bowel obstruction with removal segment of small bowel. Done by Liv.  HX LAPAROTOMY  infancy    whole in colon and had repair at that time.  HX OTHER SURGICAL  06/20/2013    left eye retna attatchment    HX RETINAL DETACHMENT REPAIR      august 2012    MI REPAIR ING HERNIA,5+Y/O,REDUCIBL Left 05/02/2019    Dr. Ramos Laser         Family History:   Problem Relation Age of Onset    Diabetes Mother     Hypertension Mother     Kidney Disease Mother     High Cholesterol Father     Diabetes Brother         pre diabetic       Social History     Socioeconomic History    Marital status:      Spouse name: Not on file    Number of children: Not on file    Years of education: Not on file    Highest education level: Not on file   Occupational History    Not on file   Tobacco Use    Smoking status: Never Smoker    Smokeless tobacco: Never Used   Substance and Sexual Activity    Alcohol use: Not Currently     Alcohol/week: 4.2 standard drinks     Types: 5 Cans of beer per week    Drug use: No    Sexual activity: Yes     Partners: Female     Birth control/protection: None   Other Topics Concern    Not on file   Social History Narrative    Not on file     Social Determinants of Health     Financial Resource Strain:     Difficulty of Paying Living Expenses: Not on file   Food Insecurity:     Worried About Running Out of Food in the Last Year: Not on file    Caitlyn of Food in the Last Year: Not on file   Transportation Needs:     Lack of Transportation (Medical): Not on file    Lack of Transportation (Non-Medical):  Not on file   Physical Activity:     Days of Exercise per Week: Not on file    Minutes of Exercise per Session: Not on file   Stress:     Feeling of Stress : Not on file   Social Connections:     Frequency of Communication with Friends and Family: Not on file    Frequency of Social Gatherings with Friends and Family: Not on file    Attends Restoration Services: Not on file    Active Member of Clubs or Organizations: Not on file    Attends Club or Organization Meetings: Not on file    Marital Status: Not on file   Intimate Partner Violence:     Fear of Current or Ex-Partner: Not on file    Emotionally Abused: Not on file    Physically Abused: Not on file    Sexually Abused: Not on file   Housing Stability:     Unable to Pay for Housing in the Last Year: Not on file    Number of [place] in the Last Year: Not on file    Unstable Housing in the Last Year: Not on file         ALLERGIES: Patient has no known allergies. Review of Systems   Constitutional: Negative for chills and fever. HENT: Negative for rhinorrhea and sore throat. Eyes: Negative for discharge and redness. Respiratory: Negative for cough and shortness of breath. Cardiovascular: Positive for palpitations. Negative for chest pain and leg swelling. Gastrointestinal: Negative for abdominal pain, diarrhea, nausea and vomiting. Genitourinary: Negative for difficulty urinating and dysuria. Musculoskeletal: Negative for back pain and neck pain. Skin: Negative for rash and wound. Neurological: Positive for dizziness. Negative for syncope, light-headedness and headaches. Vitals:    12/30/21 1427 12/30/21 1442 12/30/21 1457 12/30/21 1512   BP: 122/76 129/65 128/72 124/84   Pulse: (!) 112 (!) 109 (!) 120 (!) 114   Resp:       Temp:       SpO2: 97% 100% 100% 99%   Weight:       Height:                Physical Exam  Constitutional:       General: He is not in acute distress. Appearance: He is not ill-appearing, toxic-appearing or diaphoretic. HENT:      Head: Normocephalic and atraumatic. Right Ear: External ear normal.      Left Ear: External ear normal.      Nose: No congestion or rhinorrhea. Mouth/Throat:      Mouth: Mucous membranes are moist.      Pharynx: No oropharyngeal exudate or posterior oropharyngeal erythema. Eyes:      General:         Right eye: No discharge. Left eye: No discharge. Pupils: Pupils are equal, round, and reactive to light. Neck:      Vascular: No carotid bruit. Cardiovascular:      Rate and Rhythm: Normal rate and regular rhythm. Heart sounds: No murmur heard. No friction rub. No gallop. Pulmonary:      Effort: Pulmonary effort is normal. No respiratory distress. Breath sounds: No stridor. No wheezing, rhonchi or rales. Abdominal:      General: Abdomen is flat. There is no distension. Tenderness: There is no abdominal tenderness. There is no right CVA tenderness, left CVA tenderness, guarding or rebound. Musculoskeletal:         General: No swelling, tenderness, deformity or signs of injury. Cervical back: No rigidity or tenderness. Right lower leg: No edema. Left lower leg: No edema. Lymphadenopathy:      Cervical: No cervical adenopathy. Skin:     General: Skin is warm. Capillary Refill: Capillary refill takes less than 2 seconds. Coloration: Skin is not jaundiced or pale. Findings: No bruising, erythema, lesion or rash. Neurological:      General: No focal deficit present. Mental Status: He is alert and oriented to person, place, and time. Sensory: No sensory deficit. Motor: No weakness. Psychiatric:         Mood and Affect: Mood normal.          MDM  Number of Diagnoses or Management Options  Paroxysmal atrial fibrillation with rapid ventricular response (HCC)  Diagnosis management comments: Patient presents with primary complaint of heart palpitations and dizziness, patient had an episode of atrial fibrillation RVR immediately after finishing his full run of dialysis patient does have history of the same.   Patient started on diltiazem drip which responded to well though did demonstrate some transient hypotension that also responded well to judicious fluid administration. Patient remained asymptomatic, will plan to consult cardiology and admit patient.        Amount and/or Complexity of Data Reviewed  Clinical lab tests: reviewed  Tests in the radiology section of CPT®: reviewed  Tests in the medicine section of CPT®: reviewed           Critical Care  Performed by: Agatha Neff MD  Authorized by: Agatha Neff MD     Critical care provider statement:     Critical care time (minutes):  30    Critical care time was exclusive of:  Separately billable procedures and treating other patients    Critical care was necessary to treat or prevent imminent or life-threatening deterioration of the following conditions:  Cardiac failure    Critical care was time spent personally by me on the following activities:  Development of treatment plan with patient or surrogate, discussions with consultants, evaluation of patient's response to treatment, examination of patient, interpretation of cardiac output measurements, obtaining history from patient or surrogate, review of old charts, ordering and review of radiographic studies, ordering and review of laboratory studies, ordering and performing treatments and interventions and pulse oximetry    I assumed direction of critical care for this patient from another provider in my specialty: no

## 2021-12-30 NOTE — Clinical Note
Status[de-identified] INPATIENT [101]   Type of Bed: Telemetry [19]   Cardiac Monitoring Required?: Yes   Inpatient Hospitalization Certified Necessary for the Following Reasons: 3.  Patient receiving treatment that can only be provided in an inpatient setting (further clarification in H&P documentation)   Admitting Diagnosis: Paroxysmal A-fib [facility]) [[id number]]   Admitting Physician: Karolina Aguirre [[id number]]   Attending Physician: Karolina Aguirre [[id number]]   Estimated Length of Stay: 2 Midnights   Discharge Plan[de-identified] Home with Office Follow-up

## 2021-12-30 NOTE — ED NOTES
Pt seen earlier by Dr Terry Harris and admitted. Dr Blandon hospitalist saw and evaluated the patient, does not feel he warrants admission discussed with cardiology. Patient symptoms are consistent with having missed his morning dose of medications. I saw and evaluated the patient indeed he wishes to be discharged completely stable vital signs came in due to fluttering in his chest which started during his dialysis. His diagnosis was paroxysmal atrial fibrillation. He is in sinus rhythm at this time. He is happy with the plan for discharge will return to the emergency department for any new or alarming symptoms.

## 2021-12-30 NOTE — ED TRIAGE NOTES
Patient arrived by EMS with 20g noted in the left hand. Patient complained of dizziness after receiving 3 hours of dialysis. Patient states he forgot to take his medication yesterday.

## 2022-01-11 ENCOUNTER — APPOINTMENT (OUTPATIENT)
Dept: NON INVASIVE DIAGNOSTICS | Age: 52
DRG: 308 | End: 2022-01-11
Attending: PHYSICIAN ASSISTANT
Payer: MEDICARE

## 2022-01-11 ENCOUNTER — APPOINTMENT (OUTPATIENT)
Dept: GENERAL RADIOLOGY | Age: 52
DRG: 308 | End: 2022-01-11
Attending: STUDENT IN AN ORGANIZED HEALTH CARE EDUCATION/TRAINING PROGRAM
Payer: MEDICARE

## 2022-01-11 ENCOUNTER — HOSPITAL ENCOUNTER (INPATIENT)
Age: 52
LOS: 1 days | Discharge: HOME | DRG: 308 | End: 2022-01-12
Attending: STUDENT IN AN ORGANIZED HEALTH CARE EDUCATION/TRAINING PROGRAM | Admitting: HOSPITALIST
Payer: MEDICARE

## 2022-01-11 DIAGNOSIS — I48.91 ATRIAL FIBRILLATION WITH RAPID VENTRICULAR RESPONSE (HCC): ICD-10-CM

## 2022-01-11 DIAGNOSIS — N18.6 ESRD ON DIALYSIS (HCC): ICD-10-CM

## 2022-01-11 DIAGNOSIS — I48.91 ATRIAL FIBRILLATION WITH RVR (HCC): Primary | ICD-10-CM

## 2022-01-11 DIAGNOSIS — I11.9 BENIGN HYPERTENSIVE HEART DISEASE WITHOUT HEART FAILURE: ICD-10-CM

## 2022-01-11 DIAGNOSIS — I95.9 HYPOTENSION, UNSPECIFIED HYPOTENSION TYPE: ICD-10-CM

## 2022-01-11 DIAGNOSIS — Z99.2 ESRD ON DIALYSIS (HCC): ICD-10-CM

## 2022-01-11 LAB
ANION GAP SERPL CALC-SCNC: 11 MMOL/L (ref 3–18)
ATRIAL RATE: 141 BPM
BASOPHILS # BLD: 0 K/UL (ref 0–0.1)
BASOPHILS NFR BLD: 1 % (ref 0–2)
BUN SERPL-MCNC: 12 MG/DL (ref 7–18)
BUN/CREAT SERPL: 2 (ref 12–20)
CALCIUM SERPL-MCNC: 9.8 MG/DL (ref 8.5–10.1)
CALCULATED R AXIS, ECG10: -13 DEGREES
CALCULATED T AXIS, ECG11: 127 DEGREES
CHLORIDE SERPL-SCNC: 97 MMOL/L (ref 100–111)
CO2 SERPL-SCNC: 27 MMOL/L (ref 21–32)
CREAT SERPL-MCNC: 5.46 MG/DL (ref 0.6–1.3)
DIAGNOSIS, 93000: NORMAL
DIFFERENTIAL METHOD BLD: ABNORMAL
EOSINOPHIL # BLD: 0.2 K/UL (ref 0–0.4)
EOSINOPHIL NFR BLD: 5 % (ref 0–5)
ERYTHROCYTE [DISTWIDTH] IN BLOOD BY AUTOMATED COUNT: 13.2 % (ref 11.6–14.5)
GLUCOSE BLD STRIP.AUTO-MCNC: 172 MG/DL (ref 70–110)
GLUCOSE SERPL-MCNC: 144 MG/DL (ref 74–99)
HCT VFR BLD AUTO: 37 % (ref 36–48)
HGB BLD-MCNC: 12.4 G/DL (ref 13–16)
IMM GRANULOCYTES # BLD AUTO: 0 K/UL (ref 0–0.04)
IMM GRANULOCYTES NFR BLD AUTO: 0 % (ref 0–0.5)
LYMPHOCYTES # BLD: 1.2 K/UL (ref 0.9–3.6)
LYMPHOCYTES NFR BLD: 24 % (ref 21–52)
MAGNESIUM SERPL-MCNC: 2.3 MG/DL (ref 1.6–2.6)
MCH RBC QN AUTO: 30.3 PG (ref 24–34)
MCHC RBC AUTO-ENTMCNC: 33.5 G/DL (ref 31–37)
MCV RBC AUTO: 90.5 FL (ref 78–100)
MONOCYTES # BLD: 0.4 K/UL (ref 0.05–1.2)
MONOCYTES NFR BLD: 9 % (ref 3–10)
NEUTS SEG # BLD: 3 K/UL (ref 1.8–8)
NEUTS SEG NFR BLD: 62 % (ref 40–73)
NRBC # BLD: 0 K/UL (ref 0–0.01)
NRBC BLD-RTO: 0 PER 100 WBC
PLATELET # BLD AUTO: 206 K/UL (ref 135–420)
PMV BLD AUTO: 10.4 FL (ref 9.2–11.8)
POTASSIUM SERPL-SCNC: 4.1 MMOL/L (ref 3.5–5.5)
Q-T INTERVAL, ECG07: 340 MS
QRS DURATION, ECG06: 88 MS
QTC CALCULATION (BEZET), ECG08: 519 MS
RBC # BLD AUTO: 4.09 M/UL (ref 4.35–5.65)
SODIUM SERPL-SCNC: 135 MMOL/L (ref 136–145)
TROPONIN-HIGH SENSITIVITY: 40 NG/L (ref 0–78)
VENTRICULAR RATE, ECG03: 140 BPM
WBC # BLD AUTO: 4.8 K/UL (ref 4.6–13.2)

## 2022-01-11 PROCEDURE — 99220 PR INITIAL OBSERVATION CARE/DAY 70 MINUTES: CPT | Performed by: INTERNAL MEDICINE

## 2022-01-11 PROCEDURE — 96361 HYDRATE IV INFUSION ADD-ON: CPT

## 2022-01-11 PROCEDURE — 93005 ELECTROCARDIOGRAM TRACING: CPT

## 2022-01-11 PROCEDURE — 84484 ASSAY OF TROPONIN QUANT: CPT

## 2022-01-11 PROCEDURE — 71045 X-RAY EXAM CHEST 1 VIEW: CPT

## 2022-01-11 PROCEDURE — 74011250636 HC RX REV CODE- 250/636: Performed by: STUDENT IN AN ORGANIZED HEALTH CARE EDUCATION/TRAINING PROGRAM

## 2022-01-11 PROCEDURE — 74011000250 HC RX REV CODE- 250: Performed by: HOSPITALIST

## 2022-01-11 PROCEDURE — 83735 ASSAY OF MAGNESIUM: CPT

## 2022-01-11 PROCEDURE — 74011000250 HC RX REV CODE- 250

## 2022-01-11 PROCEDURE — 99232 SBSQ HOSP IP/OBS MODERATE 35: CPT | Performed by: HOSPITALIST

## 2022-01-11 PROCEDURE — 82962 GLUCOSE BLOOD TEST: CPT

## 2022-01-11 PROCEDURE — 96374 THER/PROPH/DIAG INJ IV PUSH: CPT

## 2022-01-11 PROCEDURE — 65660000000 HC RM CCU STEPDOWN

## 2022-01-11 PROCEDURE — 80048 BASIC METABOLIC PNL TOTAL CA: CPT

## 2022-01-11 PROCEDURE — 74011250637 HC RX REV CODE- 250/637: Performed by: HOSPITALIST

## 2022-01-11 PROCEDURE — 99285 EMERGENCY DEPT VISIT HI MDM: CPT

## 2022-01-11 PROCEDURE — 85025 COMPLETE CBC W/AUTO DIFF WBC: CPT

## 2022-01-11 PROCEDURE — 92960 CARDIOVERSION ELECTRIC EXT: CPT

## 2022-01-11 PROCEDURE — 74011250636 HC RX REV CODE- 250/636

## 2022-01-11 RX ORDER — SODIUM CHLORIDE 0.9 % (FLUSH) 0.9 %
5-40 SYRINGE (ML) INJECTION EVERY 8 HOURS
Status: DISCONTINUED | OUTPATIENT
Start: 2022-01-11 | End: 2022-01-12 | Stop reason: HOSPADM

## 2022-01-11 RX ORDER — ONDANSETRON 4 MG/1
4 TABLET, ORALLY DISINTEGRATING ORAL
Status: DISCONTINUED | OUTPATIENT
Start: 2022-01-11 | End: 2022-01-12 | Stop reason: HOSPADM

## 2022-01-11 RX ORDER — CARVEDILOL 12.5 MG/1
12.5 TABLET ORAL 2 TIMES DAILY WITH MEALS
Status: DISCONTINUED | OUTPATIENT
Start: 2022-01-11 | End: 2022-01-12 | Stop reason: HOSPADM

## 2022-01-11 RX ORDER — ATORVASTATIN CALCIUM 40 MG/1
80 TABLET, FILM COATED ORAL
Status: DISCONTINUED | OUTPATIENT
Start: 2022-01-11 | End: 2022-01-12 | Stop reason: HOSPADM

## 2022-01-11 RX ORDER — DILTIAZEM HYDROCHLORIDE 5 MG/ML
20 INJECTION INTRAVENOUS
Status: DISPENSED | OUTPATIENT
Start: 2022-01-11 | End: 2022-01-11

## 2022-01-11 RX ORDER — ONDANSETRON 2 MG/ML
INJECTION INTRAMUSCULAR; INTRAVENOUS
Status: COMPLETED
Start: 2022-01-11 | End: 2022-01-11

## 2022-01-11 RX ORDER — ETOMIDATE 2 MG/ML
INJECTION INTRAVENOUS
Status: COMPLETED
Start: 2022-01-11 | End: 2022-01-11

## 2022-01-11 RX ORDER — ASPIRIN 81 MG/1
81 TABLET ORAL DAILY
Status: DISCONTINUED | OUTPATIENT
Start: 2022-01-12 | End: 2022-01-12 | Stop reason: HOSPADM

## 2022-01-11 RX ORDER — ONDANSETRON 2 MG/ML
4 INJECTION INTRAMUSCULAR; INTRAVENOUS
Status: DISCONTINUED | OUTPATIENT
Start: 2022-01-11 | End: 2022-01-12 | Stop reason: HOSPADM

## 2022-01-11 RX ORDER — SODIUM CHLORIDE 0.9 % (FLUSH) 0.9 %
5-40 SYRINGE (ML) INJECTION AS NEEDED
Status: DISCONTINUED | OUTPATIENT
Start: 2022-01-11 | End: 2022-01-12 | Stop reason: HOSPADM

## 2022-01-11 RX ORDER — ACETAMINOPHEN 650 MG/1
650 SUPPOSITORY RECTAL
Status: DISCONTINUED | OUTPATIENT
Start: 2022-01-11 | End: 2022-01-12 | Stop reason: HOSPADM

## 2022-01-11 RX ORDER — AMIODARONE HYDROCHLORIDE 150 MG/3ML
INJECTION, SOLUTION INTRAVENOUS
Status: DISPENSED
Start: 2022-01-11 | End: 2022-01-11

## 2022-01-11 RX ORDER — POLYETHYLENE GLYCOL 3350 17 G/17G
17 POWDER, FOR SOLUTION ORAL DAILY PRN
Status: DISCONTINUED | OUTPATIENT
Start: 2022-01-11 | End: 2022-01-12 | Stop reason: HOSPADM

## 2022-01-11 RX ORDER — ACETAMINOPHEN 325 MG/1
650 TABLET ORAL
Status: DISCONTINUED | OUTPATIENT
Start: 2022-01-11 | End: 2022-01-12 | Stop reason: HOSPADM

## 2022-01-11 RX ADMIN — ETOMIDATE 15 MG: 20 INJECTION, SOLUTION INTRAVENOUS at 10:52

## 2022-01-11 RX ADMIN — APIXABAN 2.5 MG: 2.5 TABLET, FILM COATED ORAL at 22:43

## 2022-01-11 RX ADMIN — ONDANSETRON 4 MG: 2 INJECTION INTRAMUSCULAR; INTRAVENOUS at 11:07

## 2022-01-11 RX ADMIN — SODIUM CHLORIDE 1000 ML: 900 INJECTION, SOLUTION INTRAVENOUS at 11:23

## 2022-01-11 RX ADMIN — SODIUM CHLORIDE, PRESERVATIVE FREE 10 ML: 5 INJECTION INTRAVENOUS at 22:43

## 2022-01-11 RX ADMIN — ATORVASTATIN CALCIUM 80 MG: 40 TABLET, FILM COATED ORAL at 22:43

## 2022-01-11 RX ADMIN — AMIODARONE HYDROCHLORIDE 1 MG/MIN: 1.8 INJECTION, SOLUTION INTRAVENOUS at 11:51

## 2022-01-11 RX ADMIN — CARVEDILOL 12.5 MG: 12.5 TABLET, FILM COATED ORAL at 17:33

## 2022-01-11 NOTE — ED TRIAGE NOTES
Pt arrived via ems with reports of chest \"feeling funny\" while at dialysis  pt had 20 minutes left before completion of dialysis. Pt denies chest pain. Pt alert and oriented x 4. Heart rate 150's.   Pt hypotensive

## 2022-01-11 NOTE — ED PROVIDER NOTES
EMERGENCY DEPARTMENT HISTORY AND PHYSICAL EXAM    I have evaluated the patient at 10:06 AM      Date: 1/11/2022  Patient Name: Bebeto Ortiz    History of Presenting Illness     Chief Complaint   Patient presents with    Palpitations         History Provided By: Patient  Location/Duration/Severity/Modifying factors   This is a 61-year-old male with history of hypertension, ESRD on hemodialysis (TTS), cardiomyopathy, atrial fibrillation presenting to the emergency department for evaluation of weakness and palpitations. Patient reports that he was finishing up his dialysis session this morning when he started developing palpitations and feeling generally weak. States that this is happened to him before recently approximately 1 month ago. He was seen in this emergency department at that time and found to be in A. fib with RVR and subsequently placed on a Cardizem drip and admitted. He denies having any headache, chest pain, shortness of breath, abdominal pain, NVD, motor weakness or paresthesias. States that he was due to see his cardiologist tomorrow to be further worked up for this as well as his cardiomyopathy. Patient states that he was 20 minutes from being done with his dialysis session today          PCP: Frederic Walker MD    Current Facility-Administered Medications   Medication Dose Route Frequency Provider Last Rate Last Admin    dilTIAZem (CARDIZEM) injection 20 mg  20 mg IntraVENous NOW Ju Hooker,         amiodarone (NEXTERONE) 360 mg in dextrose 200 mL (1.8 mg/mL) 360 mg/200 mL (1.8 mg/mL) infusion             amiodarone (CORDARONE) 50 mg/mL injection              Current Outpatient Medications   Medication Sig Dispense Refill    doxercalciferol (HECTOROL IV) 2 mcg.  doxercalciferol (HECTOROL IV) 4 mcg.  alum-mag hydroxide-simeth (Maalox Advanced) 200-200-20 mg/5 mL susp Take  by mouth.  atorvastatin (LIPITOR) 80 mg tablet Take 1 Tablet by mouth nightly.  (Patient not taking: Reported on 12/20/2021) 30 Tablet 0    nitroglycerin (NITROLINGUAL) 400 mcg/spray spray 1 Spray by SubLINGual route every five (5) minutes as needed for Chest Pain. 1 Bottle 2    lisinopriL (PRINIVIL, ZESTRIL) 10 mg tablet Take 1 Tablet by mouth daily. 30 Tablet 5    carvediloL (COREG) 25 mg tablet Take 0.5 Tablets by mouth two (2) times daily (with meals). 60 Tablet 5    apixaban (ELIQUIS) 2.5 mg tablet Take 1 Tab by mouth every twelve (12) hours. 180 Tab 3    Blood-Glucose Meter monitoring kit Check fasting glucose 1 Kit 0    glucose blood VI test strips (ACCU-CHEK ZAC PLUS TEST STRP) strip Use as directed 100 Strip 2    sucroferric oxyhydroxide (VELPHORO) 500 mg chew chewable tablet Take 500 mg by mouth three (3) times daily (with meals).  aspirin delayed-release 81 mg tablet Take 81 mg by mouth daily. Past History     Past Medical History:  Past Medical History:   Diagnosis Date    Abnormal nuclear cardiac imaging test 10/08/2012    Fixed anteroseptal, anteroapical defect c/w prior infarction. No ischemia. Mid & distal anteroseptal, anteroapical WMA. LVE. EF 44%.  Anemia     dr. Estil Libman doubt amyloid or multiple myeloma. candidate for procirt if Hg <10    Benign hypertensive     Blindness of right eye 01/2013    legally blind    CAD (coronary artery disease)     Cardiomyopathy ejection fraction of 40%     CKD (chronic kidney disease), stage IV ([address]     to see Dr. Jennifer Wheeler 12/1/12    Congestive heart failure ([address]     Diabetes mellitus ([address]     Diabetic retinopathy ([address]     Diabetic retinopathy ([address]     Dr. Helena Felty- injections    [facility] failure [facility]     History of echocardiogram 04/22/2014    LVE. EF 55% (prev 40-45% on echo of 1/27/12). No WMA. Mild-mod conc LVH. Gr 3 DDfx. Marked LAE. Mild SHANTEL. Mild MR.    Hypertension     Pulmonary hypertension ([address]     Renal Ultrasound 1/3/12    Right kidney isoechoic w/respect to liver.  Sm left-sided pleural effusion    S/P cardiac cath 10/16/2012    LM patent. pLAD 95% (3.5 x 12-mm [place] stent, resid 0$%). LCX mild. Past Surgical History:  Past Surgical History:   Procedure Laterality Date    HX CORONARY STENT PLACEMENT      one    HX LAPAROTOMY  2/2012    bowel obstruction with removal segment of small bowel. Done by Riblet.  HX LAPAROTOMY  infancy    whole in colon and had repair at that time.  HX OTHER SURGICAL  06/20/2013    left eye retna attatchment    HX RETINAL DETACHMENT REPAIR      august 2012    MA REPAIR ING HERNIA,5+Y/O,REDUCIBL Left 05/02/2019    Dr. Caesar Bianchi       Family History:  Family History   Problem Relation Age of Onset    Diabetes Mother     Hypertension Mother     Kidney Disease Mother     High Cholesterol Father     Diabetes Brother         pre diabetic       Social History:  Social History     Tobacco Use    Smoking status: Never Smoker    Smokeless tobacco: Never Used   Substance Use Topics    Alcohol use: Not Currently     Alcohol/week: 4.2 standard drinks     Types: 5 Cans of beer per week    Drug use: No       Allergies:  No Known Allergies      Review of Systems       Review of Systems   Constitutional: Positive for fatigue. Negative for activity change, chills, diaphoresis and fever. HENT: Negative for congestion, ear pain and sinus pain. Eyes: Negative for photophobia, pain and visual disturbance. Respiratory: Negative for cough, chest tightness, shortness of breath, wheezing and stridor. Cardiovascular: Positive for palpitations. Negative for chest pain. Gastrointestinal: Negative for abdominal distention, abdominal pain, constipation, diarrhea, nausea and vomiting. Genitourinary: Negative for difficulty urinating, dysuria and hematuria. Musculoskeletal: Negative for back pain, joint swelling and myalgias. Skin: Negative for rash. Neurological: Positive for light-headedness.  Negative for dizziness, tremors, seizures, syncope, speech difficulty, weakness, numbness and headaches. Psychiatric/Behavioral: Negative for agitation. The patient is not nervous/anxious. Physical Exam     Visit Vitals  BP (!) 86/55   Pulse (!) 135   Temp 97.5 °F (36.4 °C)   Resp 11   Ht 5' 11\" (1.803 m)   Wt 93 kg (205 lb 0.4 oz)   SpO2 100%   BMI 28.60 kg/m²         Physical Exam  Constitutional:       General: He is not in acute distress. Appearance: He is not toxic-appearing. HENT:      Head: Normocephalic and atraumatic. Mouth/Throat:      Mouth: Mucous membranes are moist.   Eyes:      Extraocular Movements: Extraocular movements intact. Pupils: Pupils are equal, round, and reactive to light. Cardiovascular:      Rate and Rhythm: Tachycardia present. Rhythm irregular. Heart sounds: Normal heart sounds. No murmur heard. No friction rub. No gallop. Pulmonary:      Effort: Pulmonary effort is normal.      Breath sounds: Normal breath sounds. Abdominal:      General: There is no distension. Palpations: Abdomen is soft. There is no mass. Tenderness: There is no abdominal tenderness. There is no guarding. Hernia: No hernia is present. Musculoskeletal:         General: No swelling, tenderness or deformity. Cervical back: Normal range of motion and neck supple. Skin:     General: Skin is warm and dry. Capillary Refill: Capillary refill takes less than 2 seconds. Findings: No rash. Neurological:      General: No focal deficit present. Mental Status: He is alert and oriented to person, place, and time. Sensory: No sensory deficit. Motor: No weakness.    Psychiatric:         Mood and Affect: Mood normal.           Diagnostic Study Results     Labs -  Recent Results (from the past 12 hour(s))   EKG, 12 LEAD, INITIAL    Collection Time: 01/11/22 10:00 AM   Result Value Ref Range    Ventricular Rate 140 BPM    Atrial Rate 141 BPM    QRS Duration 88 ms    Q-T Interval 340 ms    QTC Calculation (Bezet) 519 ms    Calculated R Axis -13 degrees    Calculated T Axis 127 degrees    Diagnosis       Atrial fibrillation with rapid ventricular response  Minimal voltage criteria for LVH, may be normal variant  Septal infarct , age undetermined  Marked ST abnormality, possible inferior subendocardial injury  Marked ST abnormality, possible anterior subendocardial injury  Abnormal ECG  When compared with ECG of 30-DEC-2021 11:53,  ST more depressed in Inferior leads  ST now depressed in Anterior leads     CBC WITH AUTOMATED DIFF    Collection Time: 01/11/22 10:05 AM   Result Value Ref Range    WBC 4.8 4.6 - 13.2 K/uL    RBC 4.09 (L) 4.35 - 5.65 M/uL    HGB 12.4 (L) 13.0 - 16.0 g/dL    HCT 37.0 36.0 - 48.0 %    MCV 90.5 78.0 - 100.0 FL    MCH 30.3 24.0 - 34.0 PG    MCHC 33.5 31.0 - 37.0 g/dL    RDW 13.2 11.6 - 14.5 %    PLATELET 611 863 - 285 K/uL    MPV 10.4 9.2 - 11.8 FL    NRBC 0.0 0  WBC    ABSOLUTE NRBC 0.00 0.00 - 0.01 K/uL    NEUTROPHILS 62 40 - 73 %    LYMPHOCYTES 24 21 - 52 %    MONOCYTES 9 3 - 10 %    EOSINOPHILS 5 0 - 5 %    BASOPHILS 1 0 - 2 %    IMMATURE GRANULOCYTES 0 0.0 - 0.5 %    ABS. NEUTROPHILS 3.0 1.8 - 8.0 K/UL    ABS. LYMPHOCYTES 1.2 0.9 - 3.6 K/UL    ABS. MONOCYTES 0.4 0.05 - 1.2 K/UL    ABS. EOSINOPHILS 0.2 0.0 - 0.4 K/UL    ABS. BASOPHILS 0.0 0.0 - 0.1 K/UL    ABS. IMM.  GRANS. 0.0 0.00 - 0.04 K/UL    DF AUTOMATED     METABOLIC PANEL, BASIC    Collection Time: 01/11/22 10:05 AM   Result Value Ref Range    Sodium 135 (L) 136 - 145 mmol/L    Potassium 4.1 3.5 - 5.5 mmol/L    Chloride 97 (L) 100 - 111 mmol/L    CO2 27 21 - 32 mmol/L    Anion gap 11 3.0 - 18 mmol/L    Glucose 144 (H) 74 - 99 mg/dL    BUN 12 7.0 - 18 MG/DL    Creatinine 5.46 (H) 0.6 - 1.3 MG/DL    BUN/Creatinine ratio 2 (L) 12 - 20      GFR est AA 14 (L) >60 ml/min/1.73m2    GFR est non-AA 11 (L) >60 ml/min/1.73m2    Calcium 9.8 8.5 - 10.1 MG/DL   MAGNESIUM    Collection Time: 01/11/22 10:05 AM   Result Value Ref Range Magnesium 2.3 1.6 - 2.6 mg/dL   TROPONIN-HIGH SENSITIVITY    Collection Time: 01/11/22 10:05 AM   Result Value Ref Range    Troponin-High Sensitivity 40 0 - 78 ng/L       Radiologic Studies -   XR CHEST PORT   Final Result   The right costophrenic sulcus is partially excluded from the field-of-view,   limiting the study. No definite evidence of acute cardiopulmonary process, allowing for this   limitation. Medical Decision Making   I am the first provider for this patient. I reviewed the vital signs, available nursing notes, past medical history, past surgical history, family history and social history. Vital Signs-Reviewed the patient's vital signs. EKG: afib with RVR with diffuse ST depressions likely due to demand ischemia. Records Reviewed: Nursing Notes, Old Medical Records, Previous electrocardiograms, Previous Radiology Studies and Previous Laboratory Studies (Time of Review: 10:06 AM)    ED Course: Progress Notes, Reevaluation, and Consults:         Provider Notes (Medical Decision Making):   MDM  Number of Diagnoses or Management Options  Diagnosis management comments: 68-year-old male presenting to the emergency department for evaluation of palpitations and generalized weakness. He is found to be in atrial fibrillation with RVR with heart rate ranging between 130 and 150. Hemodynamically stable and alert and oriented. Will attempt Cardizem bolus and reassess. Screening lab work, cardiac enzymes, chest x-ray ordered. 1017:  Patient is hypotensive 65/47. Will hold off cardizem and attempt 500cc fluid bolus. If this does not improve his pressure, will cardiovert. 1123:  Patient's pressure remained hypotensive. Decision was made to cardiovert. This was attempted x3 without effect. Cardiology consult has been placed. 1131:  Case discussed with cardiology who will come to evaluate at bedside.   Although patient is still in A. fib with RVR his average heart rate has improved into 120s to 130s and blood pressure is also improved 118/60.        1147:  Dr. Pallavi Hernandes from cardiology has seen the patient at bedside. Her recommendations at this time are to finish the liter bolus of IV fluids and start amiodarone infusion sans bolus. Patient will be admitted for continued monitoring and management. Cardioversion, electrical    Date/Time: 1/11/2022 11:48 AM  Performed by: Dipesh Barrientos DO  Authorized by: Dipesh Barirentos DO     Consent:     Consent obtained:  Verbal    Consent given by:  Patient    Risks discussed:  Cutaneous burn, death, induced arrhythmia and pain    Alternatives discussed:  No treatment  Pre-procedure details:     Rhythm:  Atrial fibrillation    Electrode placement:  Anterior-lateral  Attempt one:     Cardioversion mode:  Synchronous    Waveform:  Monophasic    Shock (Joules):  200    Shock outcome:  No change in rhythm  Post-procedure details:     Patient status:  Alert    Patient tolerance of procedure: Tolerated well, no immediate complications        Critical Care Time:  The services I provided to this patient were to treat and/or prevent clinically significant deterioration that could result in the failure of one or more body systems and/or organ systems due to unstable A. fib with RVR. Services included the following:  -reviewing nursing notes and old charts  -vital sign assessments  -direct patient care  -medication orders and management  -interpreting and reviewing diagnostic studies/labs  -re-evaluations  -documentation time    Aggregate critical care time was 75 minutes, which includes only time during which I was engaged in work directly related to the patient's care as described above, whether I was at bedside or elsewhere in the Emergency Department. It did not include time spent performing other reported procedures or the services of residents, students, nurses, or advance practice providers.        Margarita Bolanos MD    11:51 AM Diagnosis     Clinical Impression:   1. Atrial fibrillation with RVR (HCC)        Disposition: stepdown    Follow-up Information    None          Patient's Medications   Start Taking    No medications on file   Continue Taking    ALUM-MAG HYDROXIDE-SIMETH (MAALOX ADVANCED) 200-200-20 MG/5 ML SUSP    Take  by mouth. APIXABAN (ELIQUIS) 2.5 MG TABLET    Take 1 Tab by mouth every twelve (12) hours. ASPIRIN DELAYED-RELEASE 81 MG TABLET    Take 81 mg by mouth daily. ATORVASTATIN (LIPITOR) 80 MG TABLET    Take 1 Tablet by mouth nightly. BLOOD-GLUCOSE METER MONITORING KIT    Check fasting glucose    CARVEDILOL (COREG) 25 MG TABLET    Take 0.5 Tablets by mouth two (2) times daily (with meals). DOXERCALCIFEROL (HECTOROL IV)    2 mcg. DOXERCALCIFEROL (HECTOROL IV)    4 mcg. GLUCOSE BLOOD VI TEST STRIPS (ACCU-CHEK ZAC PLUS TEST STRP) STRIP    Use as directed    LISINOPRIL (PRINIVIL, ZESTRIL) 10 MG TABLET    Take 1 Tablet by mouth daily. NITROGLYCERIN (NITROLINGUAL) 400 MCG/SPRAY SPRAY    1 Spray by SubLINGual route every five (5) minutes as needed for Chest Pain. SUCROFERRIC OXYHYDROXIDE (VELPHORO) 500 MG CHEW CHEWABLE TABLET    Take 500 mg by mouth three (3) times daily (with meals). These Medications have changed    No medications on file   Stop Taking    No medications on file     Disclaimer: Sections of this note are dictated using utilizing voice recognition software. Minor typographical errors may be present. If questions arise, please do not hesitate to contact me or call our department.

## 2022-01-11 NOTE — Clinical Note
Status[de-identified] INPATIENT [101]   Type of Bed: Stepdown [17]   Cardiac Monitoring Required?: Yes   Inpatient Hospitalization Certified Necessary for the Following Reasons: 3.  Patient receiving treatment that can only be provided in an inpatient setting (further clarification in H&P documentation)   Admitting Diagnosis: Atrial fibrillation with RVR [facility]) [[id number]]   Admitting Physician: Kamran Edge [[id number]]   Attending Physician: Kamran Edge [[id number]]   Estimated Length of Stay: 2 Midnights   Discharge Plan[de-identified] Home with Office Follow-up

## 2022-01-11 NOTE — H&P
HISTORY & PHYSICAL      Patient: Robert Maradiaga MRN: [id number]  [facility]: [id number]    YOB: 1970  Age: 54 y.o. Sex: male    DOA: 1/11/2022 LOS:  LOS: 0 days        DOA: 1/11/2022        Assessment/Plan     Active Problems:    Atrial fibrillation with RVR ([address] (1/22/2021)        Patient Active Problem List   Diagnosis Code    Benign hypertensive  I11.9    CRI (chronic renal insufficiency) N18.9    Diabetes mellitus ([facility]) E11.9    Cardiomyopathy ([address] I42.9    Iron deficiency anemia D50.9    CAD (coronary artery disease) I25.10    Legally blind H54.8    Diabetic retinopathy ([address] E11.319    Severe nonproliferative diabetic retinopathy with macular edema, associated with type 2 diabetes mellitus E11.3419    Dyslipidemia E78.5    Atrial fibrillation with rapid ventricular response ([facility]) I48.91    Type 2 diabetes with nephropathy ([facility]) E11.21    Anemia of chronic disease D63.8    Hypercalcemia E83.52    Hyperkalemia E87.5    ESRD on dialysis ([facility]) N18.6, Z99.2    NSTEMI (non-ST elevated myocardial infarction) ([facility]) I21.4    Atrial fibrillation with RVR ([facility]) I48.91    Paroxysmal A-fib ([facility]) I48.0         Plan:  1. A. fib with RVR-started on amiodarone gtt. bolus per cardiology, rate controlled, will resume Coreg. Cardiology on board, echocardiogram ordered per cardiology, defer further management to them. 2. ESRD on HD- nephrology consulted, continue hemodialysis per nephrology. 3. Hypotension-likely secondary to increased fluid removal during hemodialysis, patient given IVF with improvement of blood pressure. 4. History of A. fib- OAC with Eliquis. 5. Anemia of chronic disease secondary to ESRD-monitor H&H  DVT prophylaxis-Eliquis  Full code              HPI:     Robert Maradiaga is a 54 y.o. male who has a known history of ESRD on HD, paroxysmal A. fib, hypertension presents to the emergency room secondary to A. fib with RVR during hemodialysis.   Per patient he had approximately 20 minutes of hemodialysis when he developed a rapid heart rate. Patient was noted to be hypotensive on arrival and given IVF with improvement of blood pressure. He was also started on amiodarone gtt. bolus and cardiology consulted. Patient's heart rate and blood pressure have both improved. Cardiology evaluated patient recommended echocardiogram in a.m. and resume beta-blockers if blood pressure allows. Patient is being admitted to the hospital for further evaluation. Past Medical History:   Diagnosis Date    Abnormal nuclear cardiac imaging test 10/08/2012    Fixed anteroseptal, anteroapical defect c/w prior infarction. No ischemia. Mid & distal anteroseptal, anteroapical WMA. LVE. EF 44%.  Anemia     dr. Elsy Walton doubt amyloid or multiple myeloma. candidate for procirt if Hg <10    Benign hypertensive     Blindness of right eye 01/2013    legally blind    CAD (coronary artery disease)     Cardiomyopathy ejection fraction of 40%     CKD (chronic kidney disease), stage IV ([address]     to see Dr. Zohra Edmond 12/1/12    Congestive heart failure ([address]     Diabetes mellitus ([address]     Diabetic retinopathy ([address]     Diabetic retinopathy ([address]     Dr. Amelia Mays- injections    [facility]     History of echocardiogram 04/22/2014    LVE. EF 55% (prev 40-45% on echo of 1/27/12). No WMA. Mild-mod conc LVH. Gr 3 DDfx. Marked LAE. Mild SHANTEL. Mild MR.    Hypertension     Pulmonary hypertension ([address]     Renal Ultrasound 1/3/12    Right kidney isoechoic w/respect to liver. Sm left-sided pleural effusion    S/P cardiac cath 10/16/2012    LM patent. pLAD 95% (3.5 x 12-mm Rochelle stent, resid 0$%). LCX mild. Past Surgical History:   Procedure Laterality Date    HX CORONARY STENT PLACEMENT      one    HX LAPAROTOMY  2/2012    bowel obstruction with removal segment of small bowel. Done by Riblet.  HX LAPAROTOMY  infancy    whole in colon and had repair at that time.  HX OTHER SURGICAL  06/20/2013    left eye retna attatchment    HX RETINAL DETACHMENT REPAIR      august 2012    DC REPAIR ING HERNIA,5+Y/O,REDUCIBL Left 05/02/2019    Dr. Caesar Bianchi       Family History   Problem Relation Age of Onset    Diabetes Mother     Hypertension Mother     Kidney Disease Mother     High Cholesterol Father     Diabetes Brother         pre diabetic       Social History     Socioeconomic History    Marital status:    Tobacco Use    Smoking status: Never Smoker    Smokeless tobacco: Never Used   Substance and Sexual Activity    Alcohol use: Not Currently     Alcohol/week: 4.2 standard drinks     Types: 5 Cans of beer per week    Drug use: No    Sexual activity: Yes     Partners: Female     Birth control/protection: None       Prior to Admission medications    Medication Sig Start Date End Date Taking? Authorizing Provider   doxercalciferol (HECTOROL IV) 2 mcg. 5/29/21 5/28/22  Provider, Historical   doxercalciferol (HECTOROL IV) 4 mcg. 9/21/21 9/20/22  Provider, Historical   alum-mag hydroxide-simeth (Maalox Advanced) 546-476-95 mg/5 mL susp Take  by mouth. 1/25/21   Provider, Historical   atorvastatin (LIPITOR) 80 mg tablet Take 1 Tablet by mouth nightly. Patient not taking: Reported on 12/20/2021 12/17/21   Fanny Connelly MD   nitroglycerin (NITROLINGUAL) 400 mcg/spray spray 1 Spray by SubLINGual route every five (5) minutes as needed for Chest Pain. 6/22/21   Pauly Lucas PA   lisinopriL (PRINIVIL, ZESTRIL) 10 mg tablet Take 1 Tablet by mouth daily. 6/22/21   Pauly Lucas PA   carvediloL (COREG) 25 mg tablet Take 0.5 Tablets by mouth two (2) times daily (with meals). 6/22/21   Pauly Lucas PA   apixaban (ELIQUIS) 2.5 mg tablet Take 1 Tab by mouth every twelve (12) hours.  2/22/21   Daniel Holcomb, NP   Blood-Glucose Meter monitoring kit Check fasting glucose 6/18/19   Donna Monge MD   glucose blood VI test strips (ACCU-CHEK ZAC PLUS TEST STRP) strip Use as directed 6/18/19   Jarret Cox MD   sucroferric oxyhydroxide EDDY MUSCATINE) 500 mg chew chewable tablet Take 500 mg by mouth three (3) times daily (with meals). Provider, Historical   aspirin delayed-release 81 mg tablet Take 81 mg by mouth daily. Provider, Historical       No Known Allergies    Review of Systems:    Pertinent Positives noted in HPI. Rest all other ROS were noted to be negative. Physical Exam:      Visit Vitals  BP (!) 108/57   Pulse (!) 131   Temp 97.5 °F (36.4 °C)   Resp 18   Ht 5' 11\" (1.803 m)   Wt 93 kg (205 lb)   SpO2 100%   BMI 28.59 kg/m²       Physical Exam:    Gen: In general, this is a well nourished male in no acute distress  HEENT: Sclerae nonicteric. Oral mucous membranes moist. Dentition normal  Neck: Supple with midline trachea. CV: RRR without murmur or rub appreciated. Resp:Respirations are unlabored without use of accessory muscles. Lung fields B/L without wheezes or rhonchi. Abd: Soft, nontender, nondistended. Extrem: Extremities are warm, without cyanosis or clubbing. No pitting pretibial edema. Skin: Warm, no visible rashes. Neuro: Patient is alert, oriented, and cooperative. No obvious focal defects. Moves all 4 extremities.     Labs Reviewed:    Recent Results (from the past 24 hour(s))   EKG, 12 LEAD, INITIAL    Collection Time: 01/11/22 10:00 AM   Result Value Ref Range    Ventricular Rate 140 BPM    Atrial Rate 141 BPM    QRS Duration 88 ms    Q-T Interval 340 ms    QTC Calculation (Bezet) 519 ms    Calculated R Axis -13 degrees    Calculated T Axis 127 degrees    Diagnosis       Atrial fibrillation with rapid ventricular response  Minimal voltage criteria for LVH, may be normal variant  Septal infarct , age undetermined  Marked ST abnormality, possible inferior subendocardial injury  Marked ST abnormality, possible anterior subendocardial injury  Abnormal ECG  Confirmed by Iglesia Aguilar ([id number]) on 1/11/2022 1:02:27 PM CBC WITH AUTOMATED DIFF    Collection Time: 01/11/22 10:05 AM   Result Value Ref Range    WBC 4.8 4.6 - 13.2 K/uL    RBC 4.09 (L) 4.35 - 5.65 M/uL    HGB 12.4 (L) 13.0 - 16.0 g/dL    HCT 37.0 36.0 - 48.0 %    MCV 90.5 78.0 - 100.0 FL    MCH 30.3 24.0 - 34.0 PG    MCHC 33.5 31.0 - 37.0 g/dL    RDW 13.2 11.6 - 14.5 %    PLATELET 481 464 - 737 K/uL    MPV 10.4 9.2 - 11.8 FL    NRBC 0.0 0  WBC    ABSOLUTE NRBC 0.00 0.00 - 0.01 K/uL    NEUTROPHILS 62 40 - 73 %    LYMPHOCYTES 24 21 - 52 %    MONOCYTES 9 3 - 10 %    EOSINOPHILS 5 0 - 5 %    BASOPHILS 1 0 - 2 %    IMMATURE GRANULOCYTES 0 0.0 - 0.5 %    ABS. NEUTROPHILS 3.0 1.8 - 8.0 K/UL    ABS. LYMPHOCYTES 1.2 0.9 - 3.6 K/UL    ABS. MONOCYTES 0.4 0.05 - 1.2 K/UL    ABS. EOSINOPHILS 0.2 0.0 - 0.4 K/UL    ABS. BASOPHILS 0.0 0.0 - 0.1 K/UL    ABS. IMM. GRANS. 0.0 0.00 - 0.04 K/UL    DF AUTOMATED     METABOLIC PANEL, BASIC    Collection Time: 01/11/22 10:05 AM   Result Value Ref Range    Sodium 135 (L) 136 - 145 mmol/L    Potassium 4.1 3.5 - 5.5 mmol/L    Chloride 97 (L) 100 - 111 mmol/L    CO2 27 21 - 32 mmol/L    Anion gap 11 3.0 - 18 mmol/L    Glucose 144 (H) 74 - 99 mg/dL    BUN 12 7.0 - 18 MG/DL    Creatinine 5.46 (H) 0.6 - 1.3 MG/DL    BUN/Creatinine ratio 2 (L) 12 - 20      GFR est AA 14 (L) >60 ml/min/1.73m2    GFR est non-AA 11 (L) >60 ml/min/1.73m2    Calcium 9.8 8.5 - 10.1 MG/DL   MAGNESIUM    Collection Time: 01/11/22 10:05 AM   Result Value Ref Range    Magnesium 2.3 1.6 - 2.6 mg/dL   TROPONIN-HIGH SENSITIVITY    Collection Time: 01/11/22 10:05 AM   Result Value Ref Range    Troponin-High Sensitivity 40 0 - 78 ng/L       Imaging Reviewed:    XR Results (most recent):  Results from Hospital Encounter encounter on 01/11/22    XR CHEST PORT    Narrative  EXAM: XR CHEST PORT    INDICATION: 54 years Male. afiv rvr. ADDITIONAL HISTORY: None.     TECHNIQUE: Frontal view of the chest.    COMPARISON: Chest radiograph 12/30/2021    FINDINGS:    The right costophrenic sulcus is partially excluded from the field-of-view,  limiting the study. The cardiac silhouette is within normal limits in size. Pulmonary vasculature  appears within normal limits. No confluent airspace opacity is appreciated. No  definite evidence of pleural effusion or pneumothorax. No acute osseous abnormality appreciated. Impression  The right costophrenic sulcus is partially excluded from the field-of-view,  limiting the study. No definite evidence of acute cardiopulmonary process, allowing for this  limitation. CT Results (most recent):  Results from Hospital Encounter encounter on 11/25/15    CT SINUSES WO CONT    Narrative  CT Sinuses Without Contrast    HISTORY: Chronic sinusitis. History sinus surgery to remove polyps. Preop for  another sinus surgery. COMPARISON: CT sinuses 1/9/15. TECHNIQUE: 2.5 mm axial scans through the paranasal sinuses followed by coronal  and sagittal reformations for better evaluation of osteomeatal units and to  minimize radiation dose. FINDINGS:    Compared to previous there is improved aeration and diminishing mucosal sinus  opacification of the left frontal and anterior ethmoid sinuses. All other  paranasal sinuses remain completely opacified. Bilateral maxillary antra are  completely opacified and the previous antrostomy windows are occluded. The  sphenoid sinuses are totally opacified and the sphenoidotomy windows are  occluded. The right ethmoid air cells are totally opacified. The left posterior  ethmoid air cells are opacified although the anterior air cells are now aerated. The right frontal sinus is totally opacified and the frontal recess is occluded. The right nasal cavity is nearly completely opacified and there is subsequent  occlusion of the superior and middle thigh.  There is aeration of the left nasal  cavity although there is soft tissue within the left maxillary antral window  which protrudes and partially occludes the nasal cavity. The nasal septum is  mildly curved to the left. No sinus expansion. There is hyperostosis of the  maxillary and sphenoid sinus walls. The opacifying material is of mixed hypo-and  hyperdense attenuations. Mastoid air cells and middle ear cavities are clear. No acute fracture. No  evidence of bone destruction. Hyperdense material in the vitreous chamber of the right globe, stable. Presumably previous ocular injections. The left lens has been extracted. No  gross orbital mass. Visualized brain is unremarkable. Impression  :    Pansinus opacification with improved aeration of the left frontal and anterior  ethmoid air cells when compared to 1/9/15. The remaining paranasal sinuses  remain completely opacified with hyperostosis suggesting chronic sinusitis. Evidence of prior maxillary antrostomies and sphenoidotomies however the  surgical windows are completely occluded. Differential considerations include  sinonasal polyposis and allergic fungal sinusitis. Latia Morrell MD  1/11/2022, 2:45 PM        Disclaimer: Sections of this note are dictated using utilizing voice recognition software. Minor typographical errors may be present. If questions arise, please do not hesitate to contact me or call our department.

## 2022-01-11 NOTE — CONSULTS
Cardiology Initial Patient Referral Note    Cardiology referral request from Dr. Juan Streeter for evaluation and management/treatment of atrial fibrillation with RVR. Date of  Admission: 1/11/2022  9:45 AM   Primary Care Physician:  Sherrie Chen MD    Attending Cardiologist: Dr. Rachel Johnson:     -Paroxysmal atrial fibrillation. Sent from HD with Afib RVR and hypotension. Patient was shocked 3 times in ER for hemodynamic instability. HR remains 120s- 130s with SBP . On Eliquis for anticoagulation. On coreg for rate control. Similar recent admission. -CAD. LAD PCI 2012. Cardiac cath 6/21/2021 with findings as follows:  · Left-dominant sytem. · Small nondominant RCA with diffuse 70% stenosis. · LM is patent. Benezett Barnes is distal 25% stenosis noted. · Circumflex is dominant.  No significant stenosis > 30% as noted. · LAD has proximal stent with ISR, 90%, IFR 0.75.  This lesion was successfully stented using 3.25 x 12 mm AMOL.  Post-procedure IFR was 0.97.  ? Plan was triple therapy (ASA 81 mg, Plavix 75 mg and Eliquis) x 1 month, then Plavix and Eliquis for next 5 months and subsequently Eliquis monotherapy. · LVEDP was elevated at 24 mmHg.  There was no gradient across the aortic valve.  Left ventriculogram was not performed, to preserve contrast use. -Cardiac cath 12/16/2021 with findings as follows:  · Left-dominant circulation. · Small nondominant RCA with diffuse disease. · LM with 30% stenosis. · LAD with ostial 30% as well as mid 45%. The previously stented proximal/mid segment is widely patent. The first diagonal branch is a small-caliber vessel with ostial 95% stenosis. This may be the culprit. · Circumflex coronary artery has diffuse 30% stenosis throughout. It is dominant. · Continue medical therapy.  -Ischemic cardiomyopathy.  EF 40% by echo 12/2018 improved 50-55% in 12/2021.  -ESRD on HD T/R/S, followed by Dr. John Avila.  -Hypertension.  -Diabetes Mellitus.  -Dyslipidemia.  -Chronic anemia. -Diabetic retinopathy, legally blind. -H/o noncompliance.     Primary cardiologist Dr. Samy Flores. Plan:     Given hypotension and tachycardia will load with IV amiodarone in attempts to convert to NSR. Discussed with patient plan for short term use, side effects of liver, lung, thyroid acutely. Would continue IV hydration as tolerates. Would resume BB when BP will allow. Would continue eliquis for [address]. Will get follow up echo and ECG once HR improves for completeness. I saw, examined, and evaluated this patient and performed the substantive portion of the encounter for > 50% of the time including extensive history, physical exam, and medical decision making as discussed with patient and next-of-kin as needed. I personally reviewed the patient's labs, tests, vitals, orders, medications, updated history, and other providers assessments as well. I personally agree with the findings as stated and the plan as documented. Lenny William MD       History of Present Illness: This is a 54 y.o. male admitted for No admission diagnoses are documented for this encounter. .      Patient complains of: Tachycardia. Patient is a 54year old male with history of PAF, CAD, ICMY, HTN, DM, Lipids and ESRD on HD. Patient reports he was at the end of his HD session when he suddenly felt \"off. \" He asked the nurse to check him and he was hypotensive and tachycardic. Patient was brought to ER. He was challenged with IV fluids. BP was too low to give cardizem. Patient was ultimately shocked 3 times for hemodynamic instability. Patient remains in afib with HR 120s- 130s with SBP 80s-100s. Patient is currently asymptomatic without CP, palpitations or SOB.       Cardiac risk factors: dyslipidemia, diabetes mellitus, hypertension      Review of Symptoms:  Except as stated above include:  Constitutional:  negative  Respiratory:  negative  Cardiovascular:  negative  Gastrointestinal: Denies CP, palp, SOB  Genitourinary:  negative  Musculoskeletal:  Negative  Neurological:  Negative  Dermatological:  Negative  Endocrinological: Negative  Psychological:  Negative     Past Medical History:     Past Medical History:   Diagnosis Date    Abnormal nuclear cardiac imaging test 10/08/2012    Fixed anteroseptal, anteroapical defect c/w prior infarction. No ischemia. Mid & distal anteroseptal, anteroapical WMA. LVE. EF 44%.  Anemia     dr. Abbie Gil doubt amyloid or multiple myeloma. candidate for procirt if Hg <10    Benign hypertensive     Blindness of right eye 01/2013    legally blind    CAD (coronary artery disease)     Cardiomyopathy ejection fraction of 40%     CKD (chronic kidney disease), stage IV ([facility]     to see Dr. Baldev Vasquez 12/1/12    Congestive heart failure ([facility]     Diabetes mellitus ([facility]     Diabetic retinopathy ([facility]     Diabetic retinopathy ([facility]     Dr. Rosie Burch- injections    Heart failure ([facility]     History of echocardiogram 04/22/2014    LVE. EF 55% (prev 40-45% on echo of 1/27/12). No WMA. Mild-mod conc LVH. Gr 3 DDfx. Marked LAE. Mild SHANTEL. Mild MR.    Hypertension     Pulmonary hypertension ([address]     Renal Ultrasound 1/3/12    Right kidney isoechoic w/respect to liver. Sm left-sided pleural effusion    S/P cardiac cath 10/16/2012    LM patent. pLAD 95% (3.5 x 12-mm [place] stent, resid 0$%). LCX mild.            Social History:     Social History     Socioeconomic History    Marital status:    Tobacco Use    Smoking status: Never Smoker    Smokeless tobacco: Never Used   Substance and Sexual Activity    Alcohol use: Not Currently     Alcohol/week: 4.2 standard drinks     Types: 5 Cans of beer per week    Drug use: No    Sexual activity: Yes     Partners: Female     Birth control/protection: None        Family History:     Family History   Problem Relation Age of Onset    Diabetes Mother     Hypertension Mother     Kidney Disease Mother    [facility] High Cholesterol Father     Diabetes Brother         pre diabetic        Medications:   No Known Allergies     Current Facility-Administered Medications   Medication Dose Route Frequency    dilTIAZem (CARDIZEM) injection 20 mg  20 mg IntraVENous NOW    amiodarone (NEXTERONE) 360 mg in dextrose 200 mL (1.8 mg/mL) 360 mg/200 mL (1.8 mg/mL) infusion        amiodarone (CORDARONE) 50 mg/mL injection        sodium chloride 0.9 % bolus infusion 1,000 mL  1,000 mL IntraVENous ONCE    amiodarone (NEXTERONE) 360 mg in dextrose 200 mL (1.8 mg/mL) infusion  0.5-1 mg/min IntraVENous NOW     Current Outpatient Medications   Medication Sig    doxercalciferol (HECTOROL IV) 2 mcg.  doxercalciferol (HECTOROL IV) 4 mcg.  alum-mag hydroxide-simeth (Maalox Advanced) 200-200-20 mg/5 mL susp Take  by mouth.  atorvastatin (LIPITOR) 80 mg tablet Take 1 Tablet by mouth nightly. (Patient not taking: Reported on 12/20/2021)    nitroglycerin (NITROLINGUAL) 400 mcg/spray spray 1 Spray by SubLINGual route every five (5) minutes as needed for Chest Pain.  lisinopriL (PRINIVIL, ZESTRIL) 10 mg tablet Take 1 Tablet by mouth daily.  carvediloL (COREG) 25 mg tablet Take 0.5 Tablets by mouth two (2) times daily (with meals).  apixaban (ELIQUIS) 2.5 mg tablet Take 1 Tab by mouth every twelve (12) hours.  Blood-Glucose Meter monitoring kit Check fasting glucose    glucose blood VI test strips (ACCU-CHEK ZAC PLUS TEST STRP) strip Use as directed    sucroferric oxyhydroxide (VELPHORO) 500 mg chew chewable tablet Take 500 mg by mouth three (3) times daily (with meals).  aspirin delayed-release 81 mg tablet Take 81 mg by mouth daily.          Physical Exam:     Visit Vitals  BP (!) 86/55   Pulse (!) 135   Temp 97.5 °F (36.4 °C)   Resp 11   Ht 5' 11\" (1.803 m)   Wt 205 lb 0.4 oz (93 kg)   SpO2 100%   BMI 28.60 kg/m²       TELE: AFIB    BP Readings from Last 3 Encounters:   01/11/22 (!) 86/55   12/30/21 135/79   12/17/21 104/62     Pulse Readings from Last 3 Encounters:   01/11/22 (!) 135   12/30/21 79   12/17/21 85     Wt Readings from Last 3 Encounters:   01/11/22 205 lb 0.4 oz (93 kg)   12/30/21 205 lb (93 kg)   12/17/21 205 lb (93 kg)       General:  alert, cooperative, no distress, appears stated age  Neck:  no JVD  Lungs:  clear to auscultation bilaterally  Heart:  irregularly irregular rhythm  Abdomen:  abdomen is soft without significant tenderness, masses, organomegaly or guarding  Extremities:  extremities normal, atraumatic, no cyanosis or edema  Skin: Warm and dry. no hyperpigmentation, vitiligo, or suspicious lesions  Neuro: alert, oriented x3, affect appropriate  Psych: non focal     Data Review:     Recent Labs     01/11/22  1005   WBC 4.8   HGB 12.4*   HCT 37.0        Recent Labs     01/11/22  1005   *   K 4.1   CL 97*   CO2 27   *   BUN 12   CREA 5.46*   CA 9.8   MG 2.3       Results for orders placed or performed during the hospital encounter of 12/30/21   EKG, 12 LEAD, INITIAL   Result Value Ref Range    Ventricular Rate 141 BPM    QRS Duration 88 ms    Q-T Interval 336 ms    QTC Calculation (Bezet) 514 ms    Calculated R Axis -16 degrees    Calculated T Axis 111 degrees    Diagnosis       ** Critical Test Result: High HR  Atrial fibrillation with rapid ventricular response with premature   ventricular or aberrantly conducted complexes  Moderate voltage criteria for LVH, may be normal variant ( R in aVL , [place]   product )  Marked ST abnormality, possible inferior subendocardial injury  Abnormal ECG  When compared with ECG of 16-DEC-2021 08:06,  Atrial fibrillation has replaced Sinus rhythm  Vent. rate has increased BY  53 BPM  Confirmed by Jodie Ashford M.D., [address],  ([id number]) on 12/30/2021 11:24:12 PM     Results for orders placed or performed in visit on 02/08/21   AMB POC EKG ROUTINE W/ 12 LEADS, INTER & REP    Narrative    Read by Laurence Barreto MD.  Sinus rhythm. Old anteroseptal infarct. All Cardiac Markers in the last 24 hours:  No results found for: CPK, CK, CKMMB, CKMB, RCK3, CKMBT, CKNDX, CKND1, SAMARA, TROPT, TROIQ, SAKINA, TROPT, TNIPOC, BNP, BNPP    Last Lipid:    Lab Results   Component Value Date/Time    Cholesterol, total 169 12/17/2021 06:01 AM    HDL Cholesterol 35 (L) 12/17/2021 06:01 AM    LDL, calculated 100.8 (H) 12/17/2021 06:01 AM    Triglyceride 166 (H) 12/17/2021 06:01 AM    CHOL/HDL Ratio 4.8 12/17/2021 06:01 AM       Cardiographics:     EKG Results     Procedure 720 Value Units Date/Time    EKG, 12 LEAD, INITIAL [[id number]]     Order Status: Sent         12/14/21    ECHO ADULT FOLLOW-UP OR LIMITED 12/17/2021 12/17/2021    Interpretation Summary    Left Ventricle: Left ventricle size is normal. Mild septal thickening. Severe posterior wall thickness. Normal wall motion. Low normal left ventricular systolic function with a visually estimated EF of 50 - 55%.   Contrast used: Definity. Signed by: Carolyn Robert MD on 12/17/2021  3:31 PM        12/14/21    CARDIAC PROCEDURE 12/16/2021 12/16/2021    Conclusion  · Left dominant circulation. · Small nondominant RCA with diffuse disease. · Left main with 30% stenosis. · LAD with ostial 30% as well as mid 45%. The previously stented proximal/mid segment is widely patent. The first diagonal branch is a small caliber vessel with ostial 95% stenosis. This may be the culprit. · Circumflex coronary artery has diffuse 30% stenosis throughout. It is dominant. · Continue medical therapy. Signed by: Cali Lucia MD on 12/16/2021  9:33 AM      XR Results (most recent):  Results from [place] encounter on 01/11/22    XR CHEST PORT    Narrative  EXAM: XR CHEST PORT    INDICATION: 54 years Male. afiv rvr. ADDITIONAL HISTORY: None.     TECHNIQUE: Frontal view of the chest.    COMPARISON: Chest radiograph 12/30/2021    FINDINGS:    The right costophrenic sulcus is partially excluded from the field-of-view,  limiting the study. The cardiac silhouette is within normal limits in size. Pulmonary vasculature  appears within normal limits. No confluent airspace opacity is appreciated. No  definite evidence of pleural effusion or pneumothorax. No acute osseous abnormality appreciated. Impression  The right costophrenic sulcus is partially excluded from the field-of-view,  limiting the study. No definite evidence of acute cardiopulmonary process, allowing for this  limitation.         Signed By: DOROTHY Diez     January 11, 2022

## 2022-01-12 ENCOUNTER — PATIENT OUTREACH (OUTPATIENT)
Dept: CASE MANAGEMENT | Age: 52
End: 2022-01-12

## 2022-01-12 VITALS
SYSTOLIC BLOOD PRESSURE: 124 MMHG | HEART RATE: 84 BPM | WEIGHT: 205 LBS | RESPIRATION RATE: 18 BRPM | BODY MASS INDEX: 28.7 KG/M2 | TEMPERATURE: 97.5 F | OXYGEN SATURATION: 100 % | DIASTOLIC BLOOD PRESSURE: 72 MMHG | HEIGHT: 71 IN

## 2022-01-12 LAB
ALBUMIN SERPL-MCNC: 2.8 G/DL (ref 3.4–5)
ALBUMIN/GLOB SERPL: 0.8 {RATIO} (ref 0.8–1.7)
ALP SERPL-CCNC: 94 U/L (ref 45–117)
ALT SERPL-CCNC: 13 U/L (ref 16–61)
ANION GAP SERPL CALC-SCNC: 8 MMOL/L (ref 3–18)
AST SERPL-CCNC: 13 U/L (ref 10–38)
BASOPHILS # BLD: 0 K/UL (ref 0–0.1)
BASOPHILS NFR BLD: 0 % (ref 0–2)
BILIRUB SERPL-MCNC: 0.4 MG/DL (ref 0.2–1)
BUN SERPL-MCNC: 22 MG/DL (ref 7–18)
BUN/CREAT SERPL: 3 (ref 12–20)
CALCIUM SERPL-MCNC: 9.4 MG/DL (ref 8.5–10.1)
CHLORIDE SERPL-SCNC: 100 MMOL/L (ref 100–111)
CO2 SERPL-SCNC: 28 MMOL/L (ref 21–32)
CREAT SERPL-MCNC: 8.47 MG/DL (ref 0.6–1.3)
DIFFERENTIAL METHOD BLD: ABNORMAL
EOSINOPHIL # BLD: 0.1 K/UL (ref 0–0.4)
EOSINOPHIL NFR BLD: 1 % (ref 0–5)
ERYTHROCYTE [DISTWIDTH] IN BLOOD BY AUTOMATED COUNT: 13.9 % (ref 11.6–14.5)
GLOBULIN SER CALC-MCNC: 3.6 G/DL (ref 2–4)
GLUCOSE SERPL-MCNC: 154 MG/DL (ref 74–99)
HCT VFR BLD AUTO: 32.9 % (ref 36–48)
HGB BLD-MCNC: 10.8 G/DL (ref 13–16)
IMM GRANULOCYTES # BLD AUTO: 0 K/UL (ref 0–0.04)
IMM GRANULOCYTES NFR BLD AUTO: 0 % (ref 0–0.5)
LYMPHOCYTES # BLD: 1.6 K/UL (ref 0.9–3.6)
LYMPHOCYTES NFR BLD: 14 % (ref 21–52)
MCH RBC QN AUTO: 30.4 PG (ref 24–34)
MCHC RBC AUTO-ENTMCNC: 32.8 G/DL (ref 31–37)
MCV RBC AUTO: 92.7 FL (ref 78–100)
MONOCYTES # BLD: 0.8 K/UL (ref 0.05–1.2)
MONOCYTES NFR BLD: 7 % (ref 3–10)
NEUTS SEG # BLD: 8.6 K/UL (ref 1.8–8)
NEUTS SEG NFR BLD: 77 % (ref 40–73)
NRBC # BLD: 0 K/UL (ref 0–0.01)
NRBC BLD-RTO: 0 PER 100 WBC
PLATELET # BLD AUTO: 183 K/UL (ref 135–420)
PMV BLD AUTO: 10.6 FL (ref 9.2–11.8)
POTASSIUM SERPL-SCNC: 4 MMOL/L (ref 3.5–5.5)
PROT SERPL-MCNC: 6.4 G/DL (ref 6.4–8.2)
RBC # BLD AUTO: 3.55 M/UL (ref 4.35–5.65)
SODIUM SERPL-SCNC: 136 MMOL/L (ref 136–145)
WBC # BLD AUTO: 11.1 K/UL (ref 4.6–13.2)

## 2022-01-12 PROCEDURE — 80053 COMPREHEN METABOLIC PANEL: CPT

## 2022-01-12 PROCEDURE — 36415 COLL VENOUS BLD VENIPUNCTURE: CPT

## 2022-01-12 PROCEDURE — 74011250637 HC RX REV CODE- 250/637: Performed by: HOSPITALIST

## 2022-01-12 PROCEDURE — 85025 COMPLETE CBC W/AUTO DIFF WBC: CPT

## 2022-01-12 PROCEDURE — 99239 HOSP IP/OBS DSCHRG MGMT >30: CPT | Performed by: HOSPITALIST

## 2022-01-12 PROCEDURE — 99225 PR SBSQ OBSERVATION CARE/DAY 25 MINUTES: CPT | Performed by: INTERNAL MEDICINE

## 2022-01-12 PROCEDURE — 74011250637 HC RX REV CODE- 250/637: Performed by: INTERNAL MEDICINE

## 2022-01-12 RX ORDER — CHOLECALCIFEROL (VITAMIN D3) 125 MCG
5 CAPSULE ORAL
Status: DISCONTINUED | OUTPATIENT
Start: 2022-01-12 | End: 2022-01-12 | Stop reason: HOSPADM

## 2022-01-12 RX ADMIN — Medication 5 MG: at 00:52

## 2022-01-12 RX ADMIN — APIXABAN 2.5 MG: 2.5 TABLET, FILM COATED ORAL at 09:52

## 2022-01-12 RX ADMIN — CARVEDILOL 12.5 MG: 12.5 TABLET, FILM COATED ORAL at 09:52

## 2022-01-12 RX ADMIN — ASPIRIN 81 MG: 81 TABLET, COATED ORAL at 09:52

## 2022-01-12 NOTE — DISCHARGE INSTRUCTIONS
Discharge Instructions    Patient: Cesar Alcantar MRN: [id number]  [facility]: [id number]    YOB: 1970  Age: 54 y.o. Sex: male    DOA: 1/11/2022 LOS:  LOS: 1 day   Discharge Date:      DIET:  Diabetic Diet and Renal Diet    ACTIVITY: Activity as tolerated    ADDITIONAL INFORMATION: If you experience any of the following symptoms but not limited to Fever, chills, nausea, vomiting, diarrhea, change in mentation, falling, bleeding, shortness of breath, chest pain, please call your primary care physician or return to the emergency room if you cannot get hold of your doctor:     FOLLOW UP CARE:  Dr. Macario Fermin MD in 5-7 days. Please call and set up an appointment.    in 2 week  HD on T/Th/Sat    Pollo Andersen MD  1/12/2022 11:01 AM

## 2022-01-12 NOTE — DISCHARGE SUMMARY
[address] [place] [address] Dr Ernestina Bartlett [facility], [address]     DISCHARGE SUMMARY    Name: Sandhya Oliver MRN: [id number]   Age / Sex: 54 y.o. / male CSN: [id number]   YOB: 1970 Length of Stay: 1 days   Admit Date: 1/11/2022 Discharge Date:        PRIMARY CARE PHYSICIAN: Riana Posada MD      DISCHARGE DIAGNOSES:    Atrial fibrillation with rapid ventricular rate unstable    CONSULTS CALLED:   Cardiology      PROCEDURES DONE:   Cardioversion x3      COURSE IN [facility] [address]:   52yo male PMH known atrial fibrillation, ESRD on HD, HTN and HLD presenting to the emergency room after his cession at dialysis due to hypotension and atrial fibrillation with RVR. During his stay in the hospital due to his low blood pressures and elevated heart rate he received 3 separate cardioversion attempts with amiodarone with return to  NSR with a rate in the 80s. After these interventions patient stay was uncomplicated, remained in normal sinus rhythm for the rest of his stay. He was deemed stable for discharge. ON day of discharge patient was feeling well, able to tolerate a diet and ambulate without any issues. Patient was allowed the opportunity to ask any questions he had. He felt ready to go home. MEDICATIONS ON DISCHARGE:    Current Discharge Medication List      CONTINUE these medications which have NOT CHANGED    Details   !! doxercalciferol (HECTOROL IV) 2 mcg. !! doxercalciferol (HECTOROL IV) 4 mcg. alum-mag hydroxide-simeth (Maalox Advanced) 200-200-20 mg/5 mL susp Take  by mouth. atorvastatin (LIPITOR) 80 mg tablet Take 1 Tablet by mouth nightly. Qty: 30 Tablet, Refills: 0      nitroglycerin (NITROLINGUAL) 400 mcg/spray spray 1 Spray by SubLINGual route every five (5) minutes as needed for Chest Pain. Qty: 1 Bottle, Refills: 2      lisinopriL (PRINIVIL, ZESTRIL) 10 mg tablet Take 1 Tablet by mouth daily.   Qty: 30 Tablet, Refills: 5      carvediloL (COREG) 25 mg tablet Take 0.5 Tablets by mouth two (2) times daily (with meals). Qty: 60 Tablet, Refills: 5      apixaban (ELIQUIS) 2.5 mg tablet Take 1 Tab by mouth every twelve (12) hours. Qty: 180 Tab, Refills: 3      Blood-Glucose Meter monitoring kit Check fasting glucose  Qty: 1 Kit, Refills: 0      glucose blood VI test strips (ACCU-CHEK ZAC PLUS TEST STRP) strip Use as directed  Qty: 100 Strip, Refills: 2      sucroferric oxyhydroxide (VELPHORO) 500 mg chew chewable tablet Take 500 mg by mouth three (3) times daily (with meals). aspirin delayed-release 81 mg tablet Take 81 mg by mouth daily. !! - Potential duplicate medications found. Please discuss with provider. DISCHARGE VITAL SIGNS:  Visit Vitals  /76   Pulse 81   Temp 97.5 °F (36.4 °C)   Resp 17   Ht 5' 11\" (1.803 m)   Wt 93 kg (205 lb)   SpO2 96%   BMI 28.59 kg/m²       DISCHARGE PHYSICAL EXAMINATION:  Gen: Male sitting up in chair NAD eating breakfast  Neck: Supple with midline trachea no JVD  CV: RRR without murmur or rub appreciated. Resp: clear to auscultation  Abd: Soft, nontender, nondistended. Extrem: Extremities are warm, without cyanosis or clubbing. No pitting pretibial edema. HD fistula noted on the right forearm   Skin: Warm, no visible rashes. Neuro: A and O x3 CNII-CNXII intact, no focal deficits noted    CONDITION ON DISCHARGE: Stable. DISPOSITION:   Home with follow up. FOLLOW-UP RECOMMENDATIONS:   Follow-up Information     Follow up With Specialties Details Why Contact Info    Enrique Hannah, [address] [place] [address]  [address]  [address] [facility]  [phone]            OTHER INSTRUCTIONS:    NONE    TIME SPENT ON DISCHARGE ACTIVITIES: More than 35 minutes. Dragon medical dictation software was used for portions of this report. Unintended errors may occur.       Signed:  Aileen Osorio DO      1/12/2022

## 2022-01-12 NOTE — PROGRESS NOTES
Care Transitions Follow Up Call    Challenges to be reviewed by the provider   Additional needs identified to be addressed with provider: yes  Patient seen in ED 12/30 and 1/11 for A-Fib with RVR. Most recent ED visit underwent cardioversion X 3; returned to NSR. Method of communication with provider : chart routing    Care Transition Nurse (CTN) contacted the patient by telephone to follow up after admission on 12/14/21. No answer. Left HIPPA compliant message. Name, role and contact information provided. Requested return call. Will attempt to contact at a later time.

## 2022-01-12 NOTE — PROGRESS NOTES
Cardiology Progress Note    Admit Date: 1/11/2022      Assessment:     Hospital Problems  Date Reviewed: 6/26/2021          Codes Class Noted POA    Atrial fibrillation with RVR ([facility] [address] ICD-10-CM: I48.91  ICD-9-CM: 427.31  1/22/2021 Unknown            -Paroxysmal atrial fibrillation. Sent from HD with Afib RVR and hypotension. Patient was shocked 3 times in ER for hemodynamic instability. Patient now back in sinus rhythm. He is asymptomatic and blood pressure is stable. -CAD  · Left-dominant sytem. · Small nondominant RCA with diffuse 70% stenosis. · LM is patent. Hollace G[place] is distal 25% stenosis noted. · Circumflex is dominant.  No significant stenosis > 30% as noted. · LAD has proximal stent with ISR, 90%, IFR 0.75.  This lesion was successfully stented using 3.25 x 12 mm AMOL.  Post-procedure IFR was 0.97.  ? Plan was triple therapy (ASA 81 mg, Plavix 75 mg and Eliquis) x 1 month, then Plavix and Eliquis for next 5 months and subsequently Eliquis monotherapy. · LVEDP was elevated at 24 mmHg.  There was no gradient across the aortic valve.  Left ventriculogram was not performed, to preserve contrast use. -Cardiac cath 12/16/2021 with findings as follows:  · Left-dominant circulation. · Small nondominant RCA with diffuse disease. · LM with 30% stenosis. · LAD with ostial 30% as well as mid 45%.  The previously stented proximal/mid segment is widely patent.  The first diagonal branch is a small-caliber vessel with ostial 95% stenosis.  This may be the culprit. · Circumflex coronary artery has diffuse 30% stenosis throughout.  It is dominant. · Continue medical therapy.  -Ischemic cardiomyopathy. EF 40% by echo 12/2018 improved 50-55% in 12/2021.  -ESRD on HD T/R/S, followed by Dr. Belkys Dutton. Patient nearly completed his full run of dialysis yesterday prior to going into rapid atrial fibrillation. No plans on additional dialysis today.   -Hypertension.  -Diabetes Mellitus.  -Dyslipidemia.  -Chronic anemia. -Diabetic retinopathy, legally blind. -H/o noncompliance.     Primary cardiologist Dr. Norma Gurrola. Plan:     Stable for discharge home from a cardiac standpoint. Would continue outpatient Eliquis and carvedilol dosing. We will arrange for outpatient cardiology follow-up. Subjective:     No new complaints. Feeling back to baseline. Objective:      Patient Vitals for the past 8 hrs:   Pulse Resp BP SpO2   01/12/22 0636 81 17 -- 96 %   01/12/22 0621 81 22 -- 96 %   01/12/22 0606 82 20 -- 98 %   01/12/22 0605 -- -- 124/76 --   01/12/22 0551 81 18 -- 100 %   01/12/22 0536 81 20 -- 98 %   01/12/22 0521 80 15 -- 97 %   01/12/22 0506 86 18 -- 100 %   01/12/22 0505 84 21 (!) 100/59 100 %   01/12/22 0451 82 11 -- 99 %   01/12/22 0436 81 21 -- 99 %   01/12/22 0421 81 21 -- 99 %   01/12/22 0406 82 18 -- 100 %   01/12/22 0405 88 23 (!) 97/57 95 %   01/12/22 0351 80 14 -- 99 %   01/12/22 0336 85 20 -- 100 %   01/12/22 0321 83 14 -- 99 %   01/12/22 0306 81 15 -- 97 %   01/12/22 0305 81 20 113/65 97 %   01/12/22 0251 81 25 -- 96 %   01/12/22 0205 82 21 95/63 99 %         Patient Vitals for the past 96 hrs:   Weight   01/11/22 1306 93 kg (205 lb)   01/11/22 1044 93 kg (205 lb 0.4 oz)       TELE: normal sinus rhythm               Current Facility-Administered Medications   Medication Dose Route Frequency Last Admin    melatonin tablet 5 mg  5 mg Oral QHS 5 mg at 01/12/22 0052    apixaban (ELIQUIS) tablet 2.5 mg  2.5 mg Oral Q12H 2.5 mg at 01/12/22 3897    aspirin delayed-release tablet 81 mg  81 mg Oral DAILY 81 mg at 01/12/22 0952    atorvastatin (LIPITOR) tablet 80 mg  80 mg Oral QHS 80 mg at 01/11/22 2243    carvediloL (COREG) tablet 12.5 mg  12.5 mg Oral BID WITH MEALS 12.5 mg at 01/12/22 0952    . PHARMACY TO SUBSTITUTE PER PROTOCOL (Reordered from: sucroferric oxyhydroxide (VELPHORO) 500 mg chew chewable tablet)    Per Protocol      sodium chloride (NS) flush 5-40 mL  5-40 mL IntraVENous Q8H 10 mL at 01/11/22 2243    sodium chloride (NS) flush 5-40 mL  5-40 mL IntraVENous PRN      acetaminophen (TYLENOL) tablet 650 mg  650 mg Oral Q6H PRN      Or    acetaminophen (TYLENOL) suppository 650 mg  650 mg Rectal Q6H PRN      polyethylene glycol (MIRALAX) packet 17 g  17 g Oral DAILY PRN      ondansetron (ZOFRAN ODT) tablet 4 mg  4 mg Oral Q8H PRN      Or    ondansetron (ZOFRAN) injection 4 mg  4 mg IntraVENous Q6H PRN       Current Outpatient Medications   Medication Sig Dispense    doxercalciferol (HECTOROL IV) 2 mcg.  doxercalciferol (HECTOROL IV) 4 mcg.  alum-mag hydroxide-simeth (Maalox Advanced) 200-200-20 mg/5 mL susp Take  by mouth.  atorvastatin (LIPITOR) 80 mg tablet Take 1 Tablet by mouth nightly. (Patient not taking: Reported on 12/20/2021) 30 Tablet    nitroglycerin (NITROLINGUAL) 400 mcg/spray spray 1 Spray by SubLINGual route every five (5) minutes as needed for Chest Pain. 1 Bottle    lisinopriL (PRINIVIL, ZESTRIL) 10 mg tablet Take 1 Tablet by mouth daily. 30 Tablet    carvediloL (COREG) 25 mg tablet Take 0.5 Tablets by mouth two (2) times daily (with meals). 60 Tablet    apixaban (ELIQUIS) 2.5 mg tablet Take 1 Tab by mouth every twelve (12) hours. 180 Tab    Blood-Glucose Meter monitoring kit Check fasting glucose 1 Kit    glucose blood VI test strips (ACCU-CHEK ZAC PLUS TEST STRP) strip Use as directed 100 Strip    sucroferric oxyhydroxide (VELPHORO) 500 mg chew chewable tablet Take 500 mg by mouth three (3) times daily (with meals).  aspirin delayed-release 81 mg tablet Take 81 mg by mouth daily.         No intake or output data in the 24 hours ending 01/12/22 1000    Physical Exam:  General:  alert, cooperative, no distress, appears stated age  Neck:  no carotid bruit, no JVD  Lungs:  clear to auscultation bilaterally  Heart:  regular rate and rhythm  Abdomen:  abdomen is soft without significant tenderness, masses, organomegaly or guarding  Extremities:  extremities normal, atraumatic, no cyanosis or edema    Visit Vitals  /76   Pulse 81   Temp 97.5 °F (36.4 °C)   Resp 17   Ht 5' 11\" (1.803 m)   Wt 93 kg (205 lb)   SpO2 96%   BMI 28.59 kg/m²       Data Review:     Labs: Results:       Chemistry Recent Labs     01/12/22 0442 01/11/22  1005   * 144*    135*   K 4.0 4.1    97*   CO2 28 27   BUN 22* 12   CREA 8.47* 5.46*   CA 9.4 9.8   MG  --  2.3   AGAP 8 11   BUCR 3* 2*   AP 94  --    TP 6.4  --    ALB 2.8*  --    GLOB 3.6  --    AGRAT 0.8  --       CBC w/Diff Recent Labs     01/12/22 0442 01/11/22  1005   WBC 11.1 4.8   RBC 3.55* 4.09*   HGB 10.8* 12.4*   HCT 32.9* 37.0    206   GRANS 77* 62   LYMPH 14* 24   EOS 1 5      Cardiac Enzymes No results found for: CPK, CK, CKMMB, CKMB, RCK3, CKMBT, CKNDX, CKND1, SAMARA, TROPT, TROIQ, SAKINA, TROPT, TNIPOC, BNP, BNPP   Coagulation No results for input(s): PTP, INR, APTT, INREXT in the last 72 hours.     Lipid Panel Lab Results   Component Value Date/Time    Cholesterol, total 169 12/17/2021 06:01 AM    HDL Cholesterol 35 (L) 12/17/2021 06:01 AM    LDL, calculated 100.8 (H) 12/17/2021 06:01 AM    VLDL, calculated 33.2 12/17/2021 06:01 AM    Triglyceride 166 (H) 12/17/2021 06:01 AM    CHOL/HDL Ratio 4.8 12/17/2021 06:01 AM      BNP No results found for: BNP, BNPP, XBNPT   Liver Enzymes Recent Labs     01/12/22 0442   TP 6.4   ALB 2.8*   AP 94      Thyroid Studies Lab Results   Component Value Date/Time    TSH 2.43 12/06/2018 05:19 AM          Signed By: Guadalupe Villarreal MD     January 12, 2022

## 2022-01-12 NOTE — PROGRESS NOTES
Progress Note    Patient: Bethanie Ortiz MRN: [id number]  SSN: [id number]    YOB: 1970  Age: 54 y.o. Sex: male      Admit Date: 1/11/2022    LOS: 1 day     Subjective:     ***    Objective:     Vitals:    01/12/22 0605 01/12/22 0606 01/12/22 0621 01/12/22 0636   BP: 124/76      Pulse:  82 81 81   Resp:  20 22 17   Temp:       SpO2:  98% 96% 96%   Weight:       Height:            Intake and Output:  Current Shift: No intake/output data recorded. Last three shifts: No intake/output data recorded. Physical Exam:   {Exam, Complete:[id number]:p}    Lab/Data Review:  {LABS REVIEWED:[id number]}     ***    Assessment:     Active Problems:    Atrial fibrillation with RVR ([facility] [address] (1/22/2021)    54 y.o. male who has a known history of ESRD on HD, paroxysmal A. fib, hypertension admitted for atrial fibrillation with RVR. 1. A. fib with RVR   -Amiodarone loaded    - rate controlled, will resume Coreg.     -Cardiology following   -TTE ordered   -Currently patient is rate controlled       2. ESRD on HD nephrology consulted, continue hemodialysis per nephrology. 3. Hypotensionlikely secondary to increased fluid removal during hemodialysis, patient given IVF with improvement of blood pressure. 4. History of A. fib OAC with Eliquis.     5. Anemia of chronic disease secondary to ESRDmonitor H&H    DVT prophylaxisEliquis  Full code  Signed By: King Pavithra DO     January 12, 2022

## 2022-01-13 ENCOUNTER — PATIENT OUTREACH (OUTPATIENT)
Dept: CASE MANAGEMENT | Age: 52
End: 2022-01-13

## 2022-01-13 LAB
ATRIAL RATE: 92 BPM
CALCULATED P AXIS, ECG09: 37 DEGREES
CALCULATED R AXIS, ECG10: 9 DEGREES
CALCULATED T AXIS, ECG11: 60 DEGREES
DIAGNOSIS, 93000: NORMAL
P-R INTERVAL, ECG05: 144 MS
Q-T INTERVAL, ECG07: 384 MS
QRS DURATION, ECG06: 70 MS
QTC CALCULATION (BEZET), ECG08: 474 MS
VENTRICULAR RATE, ECG03: 92 BPM

## 2022-01-13 NOTE — PROGRESS NOTES
Follow Up Call    Challenges to be reviewed by the provider   Additional needs identified to be addressed with provider: no  none           Encounter was not routed to provider for escalation. Method of communication with provider: none. Contacted the patient by telephone to follow up after ED. No answer. Left HIPPA compliant message. Name, role and contact information provided. Requested return call. Will attempt to contact at a later time.

## 2022-01-13 NOTE — PROGRESS NOTES
Physician Progress Note      Abigail Burgess  CSN #:                  [id number]  :                       1970  ADMIT DATE:       2022 9:45 AM  DISCH DATE:        2022 12:38 PM  RESPONDING  PROVIDER #:        Louann Landau MD Kriste Barrack MD          QUERY TEXT:    Pt admitted with AFib. Pt noted to have hypotension. If possible, please document in the progress notes and discharge summary if you are evaluating and/or treating any of the following: The medical record reflects the following:  Risk Factors: 54 y.o. male complained of weakness and palpitations at dialysis, hx of afib and ESRD  Clinical Indicators: EKG: afib with RVR with diffuse ST depressions likely due to demand ischemia  Per ED Provider - found to be in atrial fibrillation with RVR with heart rate ranging between 130 and 150; Patient is hypotensive 65/47. Will hold off cardizem and attempt 500cc fluid bolus  Per  Card - Given hypotension and tachycardia will load with IV amiodarone in attempts to convert to NSR  Per H&P - Hypotension-likely secondary to increased fluid removal during hemodialysis, patient given IVF with improvement of blood pressure. BP upon admit - 65/47, 76/40, 81/55, 76/47, 84/68  Treatment: Cardioversion, IV fluid bolus, Cardiology consulted, amiodorone    Thank you, Alina German RN, [facility]  Options provided:  -- Hypovolemic Shock  -- Cardiogenic Shock  -- Other Shock  -- Shock, please specify type. -- Hypotension without Shock  -- Other - I will add my own diagnosis  -- Disagree - Not applicable / Not valid  -- Disagree - Clinically unable to determine / Unknown  -- Refer to Clinical Documentation Reviewer    PROVIDER RESPONSE TEXT:    This patient has Cardiogenic Shock.     Query created by: Rosmearie Yao on 2022 8:37 AM      Electronically signed by:  Louann Landau MD Kriste Barrack MD 2022 9:39 AM

## 2022-01-18 ENCOUNTER — PATIENT OUTREACH (OUTPATIENT)
Dept: CASE MANAGEMENT | Age: 52
End: 2022-01-18

## 2022-01-18 NOTE — PROGRESS NOTES
Follow Up Call    Challenges to be reviewed by the provider   Additional needs identified to be addressed with provider: no  none           Encounter was not routed to provider for escalation. Method of communication with provider: none. Contacted the patient by telephone to follow up after ED. Status: improved  Interventions to address identified needs: Obtained and reviewed discharge summary and/or continuity of care documents    [facility] follow up appointment(s):   Future Appointments   Date Time Provider Jessica Vail   1/20/2022  2:00 PM Paola Becerril MD [facility] BS AMB   2/16/2022 10:20 AM Jorje Sauceda MD [facility] BS AMB     Non-[facility] follow up appointment(s): [facility] for stress test as part of plan for kidney transplant  Follow up appointment completed? yes. Provided contact information for future needs. Plan for follow-up call in 7-10 days based on severity of symptoms and risk factors.   Plan for next call: symptom management-assess for a-fib     Henrry Reno RN

## 2022-01-20 ENCOUNTER — PATIENT OUTREACH (OUTPATIENT)
Dept: CASE MANAGEMENT | Age: 52
End: 2022-01-20

## 2022-01-20 NOTE — PROGRESS NOTES
Patient has graduated from the Transitions of Care Coordination  program on 1/20/22. Patient/family has the ability to self-manage at this time Care management goals have been completed. Patient was not referred to the [facility] team for further management. Goals Addressed                 This Visit's Progress     COMPLETED: Attends follow-up appointments as directed. Goal: Patient will attend all appointments scheduled within the next 30 days. Ensure provider appt is scheduled within 7 business days post-discharge; 12/20: [facility] appt request sent. Patient aware of cardiology appt on 1/12/22. 12/27: Patient voiced he was not made aware of appt scheduled for today. Confirm patient attended post-discharge provider appt   Complete post-visit call to confirm attendance and update care needs; 12/27: Done. 1/12: Attempted. 1/18: Done.  COMPLETED: Prevent complications post hospitalization. Goal: No admissions post 30 days from discharge of 12/17/21. Review/educate common or potential \"red flags\" of condition worsening;12/20: Reviewed/educated on red flags to monitor and report:  Palpitations, fainting, chest pain, dizziness, shortness of breath. 12/27: Denied s/s of A-fib. Evaluate adherence to medications and priority barriers to resolve; 12/20: Contacted [facility] voiced patient refilled med at other pharmacy and prescription can not be refilled until 1/12/22. Patient notified. 1/18: Patient voiced he received Lipitor 80 mg. Observe for trends in symptoms and measures, provide direction to patient, and notify provider as needed       Discuss and provide resources for ACP; 12/20: Not on file. Will address at a later time. 12/27: ACP referral placed. Patient has Care Transition Nurse's contact information for any further questions, concerns, or needs.   Patients upcoming visits:    Future Appointments   Date Time Provider Jessica Vail   1/20/2022 2:00 PM Charisse Ramesh MD [facility] BS AMB   2/16/2022 10:20 AM Karma Shipley MD [facility] BS AMB

## 2022-01-27 ENCOUNTER — PATIENT OUTREACH (OUTPATIENT)
Dept: CASE MANAGEMENT | Age: 52
End: 2022-01-27

## 2022-02-10 NOTE — ED PROVIDER NOTES
Subjective:      Byron Balderas is a 52 y.o. male who presents to the ED via EMS from dialysis for a \"funny feeling\" in his chest this morning. He was two hours into his dialysis when this feeling started. Unfortunately he did not finish dialysis since he was transported to the emergency room. He describes the sensation as feeling like he can't take a deep breath. He denies chest pain and shortness of breath. No diaphoresis or radiation to jaw or arm. He was placed on 2L nasal cannula during his dialysis session but is noted to be satting appropriately on room air in the ED. He reports a history of atrial fibrillation in the past but does not know what he takes for that. Also has history significant for CAD and drug-eluting stent placement. He has an appointment with his cardiologist set up this week.          Past Medical History:   Diagnosis Date    Abnormal nuclear cardiac imaging test 10/08/2012     Fixed anteroseptal, anteroapical defect c/w prior infarction. No ischemia. Mid & distal anteroseptal, anteroapical WMA. LVE. EF 44%.  Anemia       dr. Robledo Stains doubt amyloid or multiple myeloma. candidate for procirt if Hg <10    Benign hypertensive      Blindness of right eye 01/2013     legally blind    CAD (coronary artery disease)      Cardiomyopathy ejection fraction of 40%      CKD (chronic kidney disease), stage IV (HCC)       to see Dr. Tiffanie Vera 12/1/12    Congestive heart failure ([facility] [address]      Diabetes mellitus ([facility] [address]      Diabetic retinopathy ([facility] [address]      Diabetic retinopathy ([address]       Dr. Mita Koroma- injections    Heart failure [facility])      History of echocardiogram 04/22/2014     LVE. EF 55% (prev 40-45% on echo of 1/27/12). No WMA. Mild-mod conc LVH. Gr 3 DDfx. Marked LAE. Mild SHANTEL. Mild MR.    Hypertension      Pulmonary hypertension ([address]      Renal Ultrasound 1/3/12     Right kidney isoechoic w/respect to liver.  Sm left-sided pleural effusion    S/P cardiac cath 10/16/2012     LM patent. pLAD 95% (3.5 x 12-mm Mongo stent, resid 0$%). LCX mild.              Past Surgical History:   Procedure Laterality Date    HX CORONARY STENT PLACEMENT         one    HX LAPAROTOMY   2/2012     bowel obstruction with removal segment of small bowel. Done by Liv.  HX LAPAROTOMY   infancy     whole in colon and had repair at that time.  HX OTHER SURGICAL   06/20/2013     left eye retna attatchment    HX RETINAL DETACHMENT REPAIR         august 2012    AL REPAIR ING HERNIA,5+Y/O,REDUCIBL Left 05/02/2019     Dr. Rolanda La History   Problem Relation Age of Onset    Diabetes Mother      Hypertension Mother      Kidney Disease Mother      High Cholesterol Father      Diabetes Brother           pre diabetic      Social History            Tobacco Use    Smoking status: Never Smoker    Smokeless tobacco: Never Used   Substance Use Topics    Alcohol use: Not Currently       Alcohol/week: 4.2 standard drinks       Types: 5 Cans of beer per week              Prior to Admission medications    Medication Sig Start Date End Date Taking? Authorizing Provider   clopidogreL (PLAVIX) 75 mg tab Take 1 Tablet by mouth daily. 6/22/21     Pauly Lucas PA   nitroglycerin (NITROLINGUAL) 400 mcg/spray spray 1 Spray by SubLINGual route every five (5) minutes as needed for Chest Pain. 6/22/21     Pauly Lucas PA   lisinopriL (PRINIVIL, ZESTRIL) 10 mg tablet Take 1 Tablet by mouth daily. 6/22/21     Pauly Lucas PA   carvediloL (COREG) 25 mg tablet Take 0.5 Tablets by mouth two (2) times daily (with meals). 6/22/21     Pauly Lucas PA   atorvastatin (LIPITOR) 40 mg tablet Take 1 Tab by mouth daily. 4/26/21     Jameson Virgen MD   apixaban (ELIQUIS) 2.5 mg tablet Take 1 Tab by mouth every twelve (12) hours. 2/22/21     Tash Holcomb P, NP   glimepiride (AMARYL) 1 mg tablet Take 1 Tab by mouth Daily (before breakfast).  9/17/19     Jameson Virgen MD   Blood-Glucose Meter monitoring kit Check fasting glucose 6/18/19     April Polanco MD   glucose blood VI test strips (ACCU-CHEK ZAC PLUS TEST STRP) strip Use as directed 6/18/19     April Polanco MD   sucroferric oxyhydroxide (VELPHORO) 500 mg chew chewable tablet Take 500 mg by mouth three (3) times daily (with meals).       Provider, Historical   aspirin delayed-release 81 mg tablet Take 81 mg by mouth daily.       Provider, Historical         No Known Allergies     Review of Systems:  positive responses in bold type   Constitutional: Negative for fever, chills, diaphoresis and unexpected weight change. HENT: Negative for ear pain, congestion, sore throat, rhinorrhea, drooling, trouble swallowing, neck pain and tinnitus. Eyes: Negative for photophobia, pain, redness and visual disturbance. Respiratory: negative for shortness of breath, cough, choking, chest tightness, wheezing or stridor. Cardiovascular: Negative for chest pain, palpitations and leg swelling. Gastrointestinal: Negative for nausea, vomiting, abdominal pain, diarrhea, constipation, blood in stool, abdominal distention and anal bleeding. Genitourinary: Negative for dysuria, urgency, frequency, hematuria, flank pain and difficulty urinating. Musculoskeletal: Negative for back pain and arthralgias. Skin: Negative for color change, rash and wound. Neurological: Negative for dizziness, seizures, syncope, speech difficulty, light-headedness or headaches. Hematological: Does not bruise/bleed easily. Psychiatric/Behavioral: Negative for suicidal ideas, hallucinations, behavioral problems, self-injury or agitation            Objective: There is no height or weight on file to calculate BMI.   There were no vitals filed for this visit.      Physical Exam:  General appearance - alert, well appearing, and in no distress  Mental status - alert, oriented to person, place, and time  Neck - supple, no significant adenopathy  Chest - clear to auscultation, no wheezes, rales or rhonchi, symmetric air entry  Heart - tachycardia, S1 and S2 normal, no murmurs noted, no gallops noted, irregularly irregular rhythm  Abdomen - soft, nontender, nondistended, no masses or organomegaly  Extremities - peripheral pulses normal, no pedal edema, no clubbing or cyanosis  Skin - normal coloration and turgor, no rashes, no suspicious skin lesions noted             Hospital Problems  Date Reviewed: 6/26/2021     None             CBC:        Lab Results   Component Value Date/Time     WBC 5.4 06/21/2021 03:28 PM     HGB 12.9 (L) 06/21/2021 03:28 PM     HCT 39.7 06/21/2021 03:28 PM     PLATELET 002 51/58/0544 03:28 PM     MCV 93.6 06/21/2021 03:28 PM         CMP:        Lab Results   Component Value Date/Time     Sodium 143 06/21/2021 03:28 PM     Potassium 5.2 06/21/2021 03:28 PM     Chloride 104 06/21/2021 03:28 PM     CO2 28 06/21/2021 03:28 PM     Anion gap 11 06/21/2021 03:28 PM     Glucose 156 (H) 06/21/2021 03:28 PM     BUN 58 (H) 06/21/2021 03:28 PM     Creatinine 15.60 (H) 06/21/2021 03:28 PM     BUN/Creatinine ratio 4 (L) 06/21/2021 03:28 PM     GFR est AA 4 (L) 06/21/2021 03:28 PM     GFR est non-AA 3 (L) 06/21/2021 03:28 PM     Calcium 7.7 (L) 06/21/2021 03:28 PM     Alk.  phosphatase 143 (H) 06/18/2021 07:34 AM     Protein, total 7.4 06/18/2021 07:34 AM     Albumin 3.4 06/18/2021 07:34 AM     Globulin 4.0 06/18/2021 07:34 AM     A-G Ratio 0.9 06/18/2021 07:34 AM     ALT (SGPT) 12 (L) 06/18/2021 07:34 AM         PT/INR        Lab Results   Component Value Date/Time     INR 1.5 (H) 06/21/2021 03:28 PM     INR 1.2 06/18/2021 07:34 AM     INR 1.0 01/22/2021 05:28 AM     Prothrombin time 18.2 (H) 06/21/2021 03:28 PM     Prothrombin time 14.6 06/18/2021 07:34 AM     Prothrombin time 12.9 01/22/2021 05:28 AM               EKG:       Results for orders placed or performed in visit on 02/08/21   AMB POC EKG ROUTINE W/ 12 LEADS, INTER & REP     Status: None     Narrative   Read by Miriam Polanco MD.  Sinus rhythm.   Old anteroseptal infarct.            Assessment and  Plan:      Atrial fibrillation with RVR  Metoprolol 5mg IV q5min and 50mg PO for rate control   consult cardiology  Continue to monitor BP

## 2022-02-16 ENCOUNTER — OFFICE VISIT (OUTPATIENT)
Dept: CARDIOLOGY CLINIC | Age: 52
End: 2022-02-16
Payer: MEDICARE

## 2022-02-16 VITALS
WEIGHT: 206 LBS | HEART RATE: 77 BPM | OXYGEN SATURATION: 98 % | SYSTOLIC BLOOD PRESSURE: 168 MMHG | BODY MASS INDEX: 28.84 KG/M2 | HEIGHT: 71 IN | DIASTOLIC BLOOD PRESSURE: 94 MMHG

## 2022-02-16 DIAGNOSIS — I25.10 CORONARY ARTERY DISEASE INVOLVING NATIVE CORONARY ARTERY OF NATIVE HEART WITHOUT ANGINA PECTORIS: Primary | ICD-10-CM

## 2022-02-16 PROCEDURE — G8432 DEP SCR NOT DOC, RNG: HCPCS | Performed by: INTERNAL MEDICINE

## 2022-02-16 PROCEDURE — 93000 ELECTROCARDIOGRAM COMPLETE: CPT | Performed by: INTERNAL MEDICINE

## 2022-02-16 PROCEDURE — G8417 CALC BMI ABV UP PARAM F/U: HCPCS | Performed by: INTERNAL MEDICINE

## 2022-02-16 PROCEDURE — 99214 OFFICE O/P EST MOD 30 MIN: CPT | Performed by: INTERNAL MEDICINE

## 2022-02-16 PROCEDURE — G8427 DOCREV CUR MEDS BY ELIG CLIN: HCPCS | Performed by: INTERNAL MEDICINE

## 2022-02-16 RX ORDER — ASPIRIN 81 MG/1
81 TABLET ORAL DAILY
Qty: 90 TABLET | Refills: 3 | Status: SHIPPED | OUTPATIENT
Start: 2022-02-16

## 2022-02-16 NOTE — PROGRESS NOTES
HISTORY OF PRESENT ILLNESS  Meredith Ji is a 54 y.o. male. ASSESSMENT and PLAN    Mr. Eleni Moore has history of diabetes mellitus, diabetic retinopathy with legally blind as, coronary artery disease without chest pains, ischemic cardiomyopathy. He presented with significant fatigue, increased shortness of breath. He had LAD stent in October of 2012 by Dr. Ted Charlton. He has never had chest pains. He has history of mildly diminished LV function with ejection fraction of 40-50%. He is now on hemodialysis 3 times per week.  His new baseline weight in 2019 is 199 pounds.  Kidney transplant evaluation was performed in 2019. He underwent coronary angiography on 8/21/2019. [facility] underwent left and right selective coronary angiography. [facility] had no significant disease in his RCA or circumflex.  He had 35% stenosis in his LAD.  For completeness, he had IFR performed which was negative at 0.93-0. 95.  No further coronary intervention was needed. In December 2021, he underwent coronary angiography per the request of renal transplant team, which revealed diffuse mild disease in his left dominant system with the exception of the first diagonal branch with ostial 95% stenosis. Continued medical therapy is recommended. His echocardiogram on 12/17/2021 revealed normal EF 50-55%. · CAD:    Clinically stable. · PAF: Remains in sinus rhythm. · BP:    Reasonably well controlled. His antihypertensive regimen is being adjusted by his nephrologist.  · Rhythm:    Stable sinus. · CHF:    There is no evidence of decompensated CHF noted. · Weight:     His weight today is 206 pounds. His baseline weight is 200 pounds. · Cholesterol:   Target LDL <70. Lipitor 40. · Anti-platelet:   Remains on ASA. I will see him back in 6 months. Thank you. Encounter Diagnoses   Name Primary?     Coronary artery disease involving native coronary artery of native heart without angina pectoris Yes     current treatment plan is effective, no change in therapy  lab results and schedule of future lab studies reviewed with patient  reviewed diet, exercise and weight control  cardiovascular risk and specific lipid/LDL goals reviewed  use of aspirin to prevent MI and TIA's discussed     HPI   Today, Mr. Kt Quintero has no complaints of chest pains, increased shortness of breath or decreased exercise capacity. He denies any orthopnea or PND. He denies any palpitations or dizziness. He is accompanied by his father. Review of Systems   Respiratory: Negative for shortness of breath. Cardiovascular: Negative for chest pain, palpitations, orthopnea, claudication, leg swelling and PND. All other systems reviewed and are negative. Physical Exam  Vitals and nursing note reviewed. Constitutional:       Appearance: Normal appearance. HENT:      Head: Normocephalic. Eyes:      Conjunctiva/sclera: Conjunctivae normal.   Neck:      Vascular: No carotid bruit. Cardiovascular:      Rate and Rhythm: Normal rate and regular rhythm. Pulmonary:      Breath sounds: Normal breath sounds. Abdominal:      Palpations: Abdomen is soft. Musculoskeletal:         General: No swelling. Cervical back: No rigidity. Skin:     General: Skin is warm and dry. Neurological:      General: No focal deficit present. Mental Status: He is alert and oriented to person, place, and time. Psychiatric:         Mood and Affect: Mood normal.         Behavior: Behavior normal.         PCP: Riana Posada MD    Past Medical History:   Diagnosis Date    Abnormal nuclear cardiac imaging test 10/08/2012    Fixed anteroseptal, anteroapical defect c/w prior infarction. No ischemia. Mid & distal anteroseptal, anteroapical WMA. LVE. EF 44%.  Anemia     dr. Elza Ha doubt amyloid or multiple myeloma.  candidate for procirt if Hg <10    Benign hypertensive     Blindness of right eye 01/2013    legally blind    CAD (coronary artery disease)     Cardiomyopathy ejection fraction of 40%     CKD (chronic kidney disease), stage IV ([facility]     to see Dr. Nicola Chapman 12/1/12    Congestive heart failure ([facility]     Diabetes mellitus ([facility]     Diabetic retinopathy ([facility]     Diabetic retinopathy ([facility]     Dr. Shar Fowler- injections    Heart failure ([facility]     History of echocardiogram 04/22/2014    LVE. EF 55% (prev 40-45% on echo of 1/27/12). No WMA. Mild-mod conc LVH. Gr 3 DDfx. Marked LAE. Mild SHANTEL. Mild MR.    Hypertension     Pulmonary hypertension ([facility]     Renal Ultrasound 1/3/12    Right kidney isoechoic w/respect to liver. Sm left-sided pleural effusion    S/P cardiac cath 10/16/2012    LM patent. pLAD 95% (3.5 x 12-mm [place] stent, resid 0$%). LCX mild. Past Surgical History:   Procedure Laterality Date    HX CORONARY STENT PLACEMENT      one    HX LAPAROTOMY  2/2012    bowel obstruction with removal segment of small bowel. Done by Riblet.  HX LAPAROTOMY  infancy    whole in colon and had repair at that time.  HX OTHER SURGICAL  06/20/2013    left eye retna attatchment    HX RETINAL DETACHMENT REPAIR      august 2012    [place] REPAIR ING HERNIA,5+Y/O,REDUCIBL Left 05/02/2019    Dr. Lolita Sun       Current Outpatient Medications   Medication Sig Dispense Refill    aspirin delayed-release 81 mg tablet Take 1 Tablet by mouth daily. 90 Tablet 3    doxercalciferol (HECTOROL IV) 2 mcg.  doxercalciferol (HECTOROL IV) 4 mcg.  alum-mag hydroxide-simeth (Maalox Advanced) 200-200-20 mg/5 mL susp Take  by mouth.  atorvastatin (LIPITOR) 80 mg tablet Take 1 Tablet by mouth nightly. 30 Tablet 0    nitroglycerin (NITROLINGUAL) 400 mcg/spray spray 1 Spray by SubLINGual route every five (5) minutes as needed for Chest Pain. 1 Bottle 2    lisinopriL (PRINIVIL, ZESTRIL) 10 mg tablet Take 1 Tablet by mouth daily.  (Patient taking differently: Take 20 mg by mouth daily.) 30 Tablet 5    carvediloL (COREG) 25 mg tablet Take 0.5 Tablets by mouth two (2) times daily (with meals). 60 Tablet 5    apixaban (ELIQUIS) 2.5 mg tablet Take 1 Tab by mouth every twelve (12) hours. 180 Tab 3    Blood-Glucose Meter monitoring kit Check fasting glucose 1 Kit 0    glucose blood VI test strips (ACCU-CHEK ZAC PLUS TEST STRP) strip Use as directed 100 Strip 2    sucroferric oxyhydroxide (VELPHORO) 500 mg chew chewable tablet Take 500 mg by mouth three (3) times daily (with meals). The patient has a family history of    Social History     Tobacco Use    Smoking status: Never Smoker    Smokeless tobacco: Never Used   Substance Use Topics    Alcohol use: Not Currently     Alcohol/week: 4.2 standard drinks     Types: 5 Cans of beer per week    Drug use: No       Lab Results   Component Value Date/Time    Cholesterol, total 169 12/17/2021 06:01 AM    HDL Cholesterol 35 (L) 12/17/2021 06:01 AM    LDL, calculated 100.8 (H) 12/17/2021 06:01 AM    Triglyceride 166 (H) 12/17/2021 06:01 AM    CHOL/HDL Ratio 4.8 12/17/2021 06:01 AM        BP Readings from Last 3 Encounters:   02/16/22 (!) 142/98   01/12/22 124/72   12/30/21 135/79        Pulse Readings from Last 3 Encounters:   02/16/22 77   01/12/22 84   12/30/21 79       Wt Readings from Last 3 Encounters:   02/16/22 93.4 kg (206 lb)   01/11/22 93 kg (205 lb)   12/30/21 93 kg (205 lb)         EKG: normal sinus rhythm, Q waves in leads V1-V3, which are unchanged from January 2022 tracing.

## 2022-02-16 NOTE — PROGRESS NOTES
Robert Maradiaga presents today for   Chief Complaint   Patient presents with   [facility] Follow Up     AFIB       Robert Maradiaga preferred language for health care discussion is english/other. Is someone accompanying this pt? no    Is the patient using any DME equipment during [address]? no    Depression Screening:  3 most recent PHQ Screens 2/16/2022   Little interest or pleasure in doing things Not at all   Feeling down, depressed, irritable, or hopeless Not at all   Total Score PHQ 2 0       Learning Assessment:  Learning Assessment 2/16/2022   PRIMARY LEARNER Patient   HIGHEST LEVEL OF EDUCATION - PRIMARY LEARNER  -   BARRIERS PRIMARY LEARNER -   Jessica Sullivan -   ANSWERED BY patient   RELATIONSHIP SELF       Abuse Screening:  Abuse Screening Questionnaire 2/16/2022   Do you ever feel afraid of your partner? N   Are you in a relationship with someone who physically or mentally threatens you? N   Is it safe for you to go home? Y       Fall Risk  Fall Risk Assessment, last 12 mths 3/8/2021   Able to walk? Yes   Fall in past 12 months? 0   Do you feel unsteady? 0   Are you worried about falling 0           Pt currently taking Anticoagulant therapy? eliquis 2.5 mg    Pt currently taking Antiplatelet therapy ? Aspirin 81 mg      Coordination of Care:  1. Have you been to the ER, urgent care clinic since your last visit? Hospitalized since your last visit? no    2. Have you seen or consulted any other health care providers outside of the [address] [place] [address] since your last visit? Include any pap smears or colon screening.  no

## 2022-02-17 ENCOUNTER — APPOINTMENT (OUTPATIENT)
Dept: GENERAL RADIOLOGY | Age: 52
End: 2022-02-17
Attending: EMERGENCY MEDICINE
Payer: MEDICARE

## 2022-02-17 ENCOUNTER — HOSPITAL ENCOUNTER (EMERGENCY)
Age: 52
Discharge: HOME | End: 2022-02-17
Attending: EMERGENCY MEDICINE
Payer: MEDICARE

## 2022-02-17 VITALS
SYSTOLIC BLOOD PRESSURE: 135 MMHG | DIASTOLIC BLOOD PRESSURE: 81 MMHG | TEMPERATURE: 98.3 F | RESPIRATION RATE: 24 BRPM | OXYGEN SATURATION: 100 % | HEART RATE: 128 BPM

## 2022-02-17 DIAGNOSIS — I48.91 ATRIAL FIBRILLATION WITH RVR (HCC): Primary | ICD-10-CM

## 2022-02-17 DIAGNOSIS — I95.9 TRANSIENT HYPOTENSION: ICD-10-CM

## 2022-02-17 LAB
ALBUMIN SERPL-MCNC: 3.2 G/DL (ref 3.4–5)
ALBUMIN/GLOB SERPL: 0.9 {RATIO} (ref 0.8–1.7)
ALP SERPL-CCNC: 105 U/L (ref 45–117)
ALT SERPL-CCNC: 20 U/L (ref 16–61)
ANION GAP SERPL CALC-SCNC: 10 MMOL/L (ref 3–18)
AST SERPL-CCNC: 15 U/L (ref 10–38)
BASOPHILS # BLD: 0 K/UL (ref 0–0.1)
BASOPHILS NFR BLD: 1 % (ref 0–2)
BILIRUB SERPL-MCNC: 0.4 MG/DL (ref 0.2–1)
BUN SERPL-MCNC: 18 MG/DL (ref 7–18)
BUN/CREAT SERPL: 4 (ref 12–20)
CALCIUM SERPL-MCNC: 9.4 MG/DL (ref 8.5–10.1)
CALCULATED R AXIS, ECG10: -20 DEGREES
CALCULATED T AXIS, ECG11: -161 DEGREES
CHLORIDE SERPL-SCNC: 100 MMOL/L (ref 100–111)
CO2 SERPL-SCNC: 26 MMOL/L (ref 21–32)
CREAT SERPL-MCNC: 4.9 MG/DL (ref 0.6–1.3)
DIAGNOSIS, 93000: NORMAL
DIFFERENTIAL METHOD BLD: ABNORMAL
EOSINOPHIL # BLD: 0.1 K/UL (ref 0–0.4)
EOSINOPHIL NFR BLD: 3 % (ref 0–5)
ERYTHROCYTE [DISTWIDTH] IN BLOOD BY AUTOMATED COUNT: 13.5 % (ref 11.6–14.5)
GLOBULIN SER CALC-MCNC: 3.7 G/DL (ref 2–4)
GLUCOSE SERPL-MCNC: 157 MG/DL (ref 74–99)
HCT VFR BLD AUTO: 32.4 % (ref 36–48)
HGB BLD-MCNC: 10.7 G/DL (ref 13–16)
IMM GRANULOCYTES # BLD AUTO: 0 K/UL (ref 0–0.04)
IMM GRANULOCYTES NFR BLD AUTO: 0 % (ref 0–0.5)
LYMPHOCYTES # BLD: 0.7 K/UL (ref 0.9–3.6)
LYMPHOCYTES NFR BLD: 16 % (ref 21–52)
MCH RBC QN AUTO: 29.8 PG (ref 24–34)
MCHC RBC AUTO-ENTMCNC: 33 G/DL (ref 31–37)
MCV RBC AUTO: 90.3 FL (ref 78–100)
MONOCYTES # BLD: 0.5 K/UL (ref 0.05–1.2)
MONOCYTES NFR BLD: 11 % (ref 3–10)
NEUTS SEG # BLD: 3 K/UL (ref 1.8–8)
NEUTS SEG NFR BLD: 69 % (ref 40–73)
NRBC # BLD: 0 K/UL (ref 0–0.01)
NRBC BLD-RTO: 0 PER 100 WBC
PLATELET # BLD AUTO: 155 K/UL (ref 135–420)
PMV BLD AUTO: 10.9 FL (ref 9.2–11.8)
POTASSIUM SERPL-SCNC: 3.3 MMOL/L (ref 3.5–5.5)
PROT SERPL-MCNC: 6.9 G/DL (ref 6.4–8.2)
Q-T INTERVAL, ECG07: 264 MS
QRS DURATION, ECG06: 86 MS
QTC CALCULATION (BEZET), ECG08: 396 MS
RBC # BLD AUTO: 3.59 M/UL (ref 4.35–5.65)
SODIUM SERPL-SCNC: 136 MMOL/L (ref 136–145)
TROPONIN-HIGH SENSITIVITY: 101 NG/L (ref 0–78)
TROPONIN-HIGH SENSITIVITY: 40 NG/L (ref 0–78)
VENTRICULAR RATE, ECG03: 135 BPM
WBC # BLD AUTO: 4.3 K/UL (ref 4.6–13.2)

## 2022-02-17 PROCEDURE — 96360 HYDRATION IV INFUSION INIT: CPT

## 2022-02-17 PROCEDURE — 85025 COMPLETE CBC W/AUTO DIFF WBC: CPT

## 2022-02-17 PROCEDURE — 80053 COMPREHEN METABOLIC PANEL: CPT

## 2022-02-17 PROCEDURE — 74011250636 HC RX REV CODE- 250/636: Performed by: EMERGENCY MEDICINE

## 2022-02-17 PROCEDURE — 93005 ELECTROCARDIOGRAM TRACING: CPT

## 2022-02-17 PROCEDURE — 74011250637 HC RX REV CODE- 250/637: Performed by: EMERGENCY MEDICINE

## 2022-02-17 PROCEDURE — 84484 ASSAY OF TROPONIN QUANT: CPT

## 2022-02-17 PROCEDURE — 99285 EMERGENCY DEPT VISIT HI MDM: CPT

## 2022-02-17 PROCEDURE — 71045 X-RAY EXAM CHEST 1 VIEW: CPT

## 2022-02-17 RX ORDER — CARVEDILOL 25 MG/1
25 TABLET ORAL EVERY 12 HOURS
Status: DISCONTINUED | OUTPATIENT
Start: 2022-02-17 | End: 2022-02-17

## 2022-02-17 RX ORDER — CARVEDILOL 12.5 MG/1
12.5 TABLET ORAL EVERY 12 HOURS
Status: DISCONTINUED | OUTPATIENT
Start: 2022-02-17 | End: 2022-02-17 | Stop reason: HOSPADM

## 2022-02-17 RX ADMIN — SODIUM CHLORIDE 500 ML: 9 INJECTION, SOLUTION INTRAVENOUS at 12:15

## 2022-02-17 RX ADMIN — CARVEDILOL 12.5 MG: 12.5 TABLET, FILM COATED ORAL at 13:01

## 2022-02-17 NOTE — ED PROVIDER NOTES
EMERGENCY DEPARTMENT HISTORY AND PHYSICAL EXAM  This was created with voice recognition software and transcription errors may be present. 11:34 AM  Date: 2/17/2022  Patient Name: Elroy Bell    History of Presenting Illness     Chief Complaint:    History Provided By:     HPI: Elroy Bell is a 54 y.o. male past medical history of anemia hypertension blindness CKD CHF diabetes primary hypertension who presents with weakness. Patient was at dialysis today normally gets 2.7 off he states his dry weight was around 94 kg and today he was at 96.5 kg and I took off 3.5 kg. He notes he has fatigue. Noted to be hypotensive. No fevers or chills no nausea vomiting    PCP: Guicho De Leon MD      Past History     Past Medical History:  Past Medical History:   Diagnosis Date    Abnormal nuclear cardiac imaging test 10/08/2012    Fixed anteroseptal, anteroapical defect c/w prior infarction. No ischemia. Mid & distal anteroseptal, anteroapical WMA. LVE. EF 44%.  Anemia     dr. Elbert Davison doubt amyloid or multiple myeloma. candidate for procirt if Hg <10    Benign hypertensive     Blindness of right eye 01/2013    legally blind    CAD (coronary artery disease)     Cardiomyopathy ejection fraction of 40%     CKD (chronic kidney disease), stage IV ([address]     to see Dr. Carpenter Read 12/1/12    Congestive heart failure ([address]     Diabetes mellitus ([address]     Diabetic retinopathy ([address]     [facility]     Dr. Hansel Rangel- injections    Heart failure [facility]     History of echocardiogram 04/22/2014    LVE. EF 55% (prev 40-45% on echo of 1/27/12). No WMA. Mild-mod conc LVH. Gr 3 DDfx. Marked LAE. Mild SHANTEL. Mild MR.    Hypertension     Pulmonary hypertension ([address]     Renal Ultrasound 1/3/12    Right kidney isoechoic w/respect to liver. Sm left-sided pleural effusion    S/P cardiac cath 10/16/2012    LM patent. pLAD 95% (3.5 x 12-mm [place] stent, resid 0$%). LCX mild.          Past Surgical History:  Past Surgical History:   Procedure Laterality Date    HX CORONARY STENT PLACEMENT      one    HX LAPAROTOMY  2/2012    bowel obstruction with removal segment of small bowel. Done by Riblet.  HX LAPAROTOMY  infancy    whole in colon and had repair at that time.  HX OTHER SURGICAL  06/20/2013    left eye retna attatchment    HX RETINAL DETACHMENT REPAIR      august 2012    [place] REPAIR ING HERNIA,5+Y/O,REDUCIBL Left 05/02/2019    Dr. Angela Case       Family History:  Family History   Problem Relation Age of Onset    Diabetes Mother     Hypertension Mother     Kidney Disease Mother     High Cholesterol Father     Diabetes Brother         pre diabetic       Social History:  Social History     Tobacco Use    Smoking status: Never Smoker    Smokeless tobacco: Never Used   Substance Use Topics    Alcohol use: Not Currently     Alcohol/week: 4.2 standard drinks     Types: 5 Cans of beer per week    Drug use: No       Allergies:  No Known Allergies    Review of Systems     Review of Systems   Constitutional: Negative for activity change. HENT: Negative for congestion. Neurological: Negative for syncope. All other systems reviewed and are negative. 10 point review of systems otherwise negative unless noted in HPI. Physical Exam       Physical Exam  Constitutional:       Appearance: He is well-developed. HENT:      Head: Normocephalic and atraumatic. Eyes:      Pupils: Pupils are equal, round, and reactive to light. Cardiovascular:      Rate and Rhythm: Tachycardia present. Rhythm irregular. Heart sounds: Normal heart sounds. No murmur heard. No friction rub. Pulmonary:      Effort: Pulmonary effort is normal. No respiratory distress. Breath sounds: Normal breath sounds. No wheezing. Abdominal:      General: There is no distension. Palpations: Abdomen is soft. Tenderness: There is no abdominal tenderness. There is no guarding or rebound.    Musculoskeletal: General: Normal range of motion. Cervical back: Normal range of motion and neck supple. Skin:     General: Skin is warm and dry. Neurological:      Mental Status: He is alert and oriented to person, place, and time. Psychiatric:         Behavior: Behavior normal.         Thought Content: Thought content normal.         Diagnostic Study Results     Vital Signs  EKG: EKG shows A. fib at 135 with a normal axis normal intervals there is no ST elevation there is some inferolateral ST depression  Labs: Mild hyper okay patient's dialysis will not replete protein at baseline  Imaging:   IMPRESSION  1. Diffuse bilateral interstitial opacities, with airspace opacities at the  bilateral lung bases, likely reflecting pulmonary edema although other  infiltrates such as pneumonia not excluded. Recommend radiographic follow-up  within 6-8 weeks after appropriate medical therapy and imaging follow-up until  clearance. 2.  Mild cardiac silhouette enlargement. Medical Decision Making     ED Course: Progress Notes, Reevaluation, and Consults:    I will be the provider of record for this patient. Provider Notes (Medical Decision Making):   Hx of af on eliquis;   bp low but xs fluid taken off at dialysis. Given 500cc have him take his normal dose at home. No complaints. Pressures improved patient does still have some mild RVR as I said earlier he is on Eliquis. He did take his Coreg this morning we will give him a 12.5 dose to see if it improves his heart rate and if it affects his blood pressures well making him some more fluids    Discussed with nephrology states this is common occurrence for the patient. Currently his heart rate is between 115 and 130 in A. fib with RVR patient is on anticoagulation. Blood pressure 134/79  xr yesterday nsr. Patient wants to go home I think is reasonable his rates right now between 120 and 135.   Little high but will give him a second dose of his Coreg here have him take his normal dose at home and likely follow-up with cardiology in the morning     Chest x-ray noted. Patient is afebrile not hypoxic likely pulmonary edema from dialysis does not feel sick. Has a history of A. fib with RVR surrounding it inducing this. Actually feels better and very much wants to go home is hungry has been up since 4:15 AM because of dialysis and requested to leave. Think is reasonable will discuss with cardiology    Troponin I 100. Patient had no pain whatsoever likely rate related ischemia we will repeat EKG discussed with cardiology    \"In December 2021, he underwent coronary angiography per the request of renal transplant team, which revealed diffuse mild disease in his left dominant system with the exception of the first diagonal branch with ostial 95% stenosis. Continued medical therapy is recommended. \"    Diagnosis     Clinical Impression: No diagnosis found. Disposition:        Patient's Medications   Start Taking    No medications on file   Continue Taking    ALUM-MAG HYDROXIDE-SIMETH (MAALOX ADVANCED) 200-200-20 MG/5 ML SUSP    Take  by mouth. APIXABAN (ELIQUIS) 2.5 MG TABLET    Take 1 Tab by mouth every twelve (12) hours. ASPIRIN DELAYED-RELEASE 81 MG TABLET    Take 1 Tablet by mouth daily. ATORVASTATIN (LIPITOR) 80 MG TABLET    Take 1 Tablet by mouth nightly. BLOOD-GLUCOSE METER MONITORING KIT    Check fasting glucose    CARVEDILOL (COREG) 25 MG TABLET    Take 0.5 Tablets by mouth two (2) times daily (with meals). DOXERCALCIFEROL (HECTOROL IV)    2 mcg. DOXERCALCIFEROL (HECTOROL IV)    4 mcg. GLUCOSE BLOOD VI TEST STRIPS (ACCU-CHEK ZAC PLUS TEST STRP) STRIP    Use as directed    LISINOPRIL (PRINIVIL, ZESTRIL) 10 MG TABLET    Take 1 Tablet by mouth daily. NITROGLYCERIN (NITROLINGUAL) 400 MCG/SPRAY SPRAY    1 Spray by SubLINGual route every five (5) minutes as needed for Chest Pain.     SUCROFERRIC OXYHYDROXIDE (VELPHORO) 500 MG CHEW CHEWABLE TABLET Take 500 mg by mouth three (3) times daily (with meals).    These Medications have changed    No medications on file   Stop Taking    No medications on file

## 2022-02-17 NOTE — ED TRIAGE NOTES
Pt arrives to ED via EMS for evaluation of low BP. EMS report that pt had dialysis today for 3 hours and had 2.7 L taken off. EMS reports that pt's lisinopril dose was recently increased last week. Pt received 1 L of NS PTA. Pt with hx of DM, A-fib, and ESRD.

## 2022-02-28 RX ORDER — GLIMEPIRIDE 4 MG/1
TABLET ORAL
Qty: 90 TABLET | Refills: 0 | OUTPATIENT
Start: 2022-02-28

## 2022-02-28 NOTE — TELEPHONE ENCOUNTER
----- Message from [address] sent at 2/28/2022  9:02 AM EST -----  Subject: Message to Provider    QUESTIONS  Information for Provider? pt has an appt 3/7 but needs to start retaking   his medication glimperide 1 mg, and wondered if this can be called in   before his appt. Please send to [facility] on [address] [facility] [facility] [address]   in [place]  ---------------------------------------------------------------------------  --------------  [address]  What is the best way for the office to contact you? OK to leave message on   voicemail  Preferred Call Back Phone Number? [phone]  ---------------------------------------------------------------------------  --------------  SCRIPT ANSWERS  Relationship to Patient?  Self

## 2022-03-07 ENCOUNTER — OFFICE VISIT (OUTPATIENT)
Dept: FAMILY MEDICINE CLINIC | Age: 52
End: 2022-03-07
Payer: MEDICARE

## 2022-03-07 VITALS
HEART RATE: 75 BPM | BODY MASS INDEX: 28.84 KG/M2 | RESPIRATION RATE: 16 BRPM | DIASTOLIC BLOOD PRESSURE: 96 MMHG | WEIGHT: 206 LBS | HEIGHT: 71 IN | OXYGEN SATURATION: 100 % | TEMPERATURE: 97.6 F | SYSTOLIC BLOOD PRESSURE: 180 MMHG

## 2022-03-07 DIAGNOSIS — I25.10 CORONARY ARTERY DISEASE INVOLVING NATIVE CORONARY ARTERY OF NATIVE HEART WITHOUT ANGINA PECTORIS: ICD-10-CM

## 2022-03-07 DIAGNOSIS — I48.91 ATRIAL FIBRILLATION WITH RVR (HCC): ICD-10-CM

## 2022-03-07 DIAGNOSIS — H54.8 LEGALLY BLIND: ICD-10-CM

## 2022-03-07 DIAGNOSIS — E78.5 DYSLIPIDEMIA: ICD-10-CM

## 2022-03-07 DIAGNOSIS — E11.21 TYPE 2 DIABETES WITH NEPHROPATHY (HCC): ICD-10-CM

## 2022-03-07 DIAGNOSIS — I10 ESSENTIAL HYPERTENSION: ICD-10-CM

## 2022-03-07 DIAGNOSIS — I25.118 ATHEROSCLEROTIC HEART DISEASE OF NATIVE CORONARY ARTERY WITH OTHER FORMS OF ANGINA PECTORIS (HCC): ICD-10-CM

## 2022-03-07 DIAGNOSIS — N18.6 ESRD (END STAGE RENAL DISEASE) (HCC): Primary | ICD-10-CM

## 2022-03-07 DIAGNOSIS — I42.9 CARDIOMYOPATHY, UNSPECIFIED TYPE (HCC): ICD-10-CM

## 2022-03-07 DIAGNOSIS — D63.8 ANEMIA OF CHRONIC DISEASE: ICD-10-CM

## 2022-03-07 DIAGNOSIS — E11.3413 SEVERE NONPROLIFERATIVE DIABETIC RETINOPATHY OF BOTH EYES WITH MACULAR EDEMA ASSOCIATED WITH TYPE 2 DIABETES MELLITUS (HCC): ICD-10-CM

## 2022-03-07 PROCEDURE — 99214 OFFICE O/P EST MOD 30 MIN: CPT | Performed by: FAMILY MEDICINE

## 2022-03-07 RX ORDER — GLIPIZIDE 2.5 MG/1
2.5 TABLET, EXTENDED RELEASE ORAL DAILY
Qty: 90 TABLET | Refills: 0 | Status: SHIPPED | OUTPATIENT
Start: 2022-03-07

## 2022-03-07 RX ORDER — AMLODIPINE BESYLATE 5 MG/1
5 TABLET ORAL DAILY
Qty: 90 TABLET | Refills: 0 | Status: SHIPPED | OUTPATIENT
Start: 2022-03-07

## 2022-03-07 RX ORDER — BLOOD SUGAR DIAGNOSTIC
STRIP MISCELLANEOUS
Qty: 100 STRIP | Refills: 2 | Status: SHIPPED | OUTPATIENT
Start: 2022-03-07

## 2022-03-07 NOTE — PROGRESS NOTES
1. Have you been to the ER, urgent care clinic since your last visit? Hospitalized since your last visit? 02/17/2022    2. Have you seen or consulted any other health care providers outside of the [address] [place] [address] since your last visit? Include any pap smears or colon screening.        [facility] cardiology  Dr. Drake Smith

## 2022-03-07 NOTE — PROGRESS NOTES
Aniya Russoenzo, 54 y.o.,  male    SUBJECTIVE  Ff-up elevated a1c    Have not seen patient for about a year now. He says was taken out of kidney list temporarily until DM is controlled. He used to be on glipizide, however was gradually taken off of medication by nephrologist with hypoglycemic readings. He has known DM retinopathy/nephropathy-ESRD on HD. He does not check glucose levels at home, but willing to do so now. Reviewed  [facility]  labs a1c 10.8 2/22/2022. He is very much opposed to basal insulin. ESRD-ongoing HD 3 x a week. Has appt tomorrow. BP today is elevated. Does not know BP trend with HD. Also with anemia of chronic disease and HTN, following nephrology    CAD/Afib-cardiac cath 8/19 showing proximal LAD stent  Patent 35% stenosis  no chest pains, palpitations, leg edema. ROS:  See HPI, all others negative        Patient Active Problem List   Diagnosis Code    Benign hypertensive  I11.9    CRI (chronic renal insufficiency) N18.9    Diabetes mellitus ([address] E11.9    Cardiomyopathy ([facility] [address] I42.9    Iron deficiency anemia D50.9    CAD (coronary artery disease) I25.10    Legally blind H54.8    Diabetic retinopathy (HCC) E11.319    Severe nonproliferative diabetic retinopathy with macular edema, associated with type 2 diabetes mellitus E11.3419    Dyslipidemia E78.5    Atrial fibrillation with rapid ventricular response (HCC) I48.91    Type 2 diabetes with nephropathy (HCC) E11.21       Current Outpatient Medications:     glipiZIDE SR (GLUCOTROL XL) 2.5 mg CR tablet, Take 1 Tablet by mouth daily. , Disp: 90 Tablet, Rfl: 0    amLODIPine (NORVASC) 5 mg tablet, Take 1 Tablet by mouth daily. , Disp: 90 Tablet, Rfl: 0    glucose blood VI test strips (Accu-Chek Beatrice Plus test strp) strip, Use as directed, Disp: 100 Strip, Rfl: 2    Eliquis 2.5 mg tablet, TAKE 1 TABLET BY MOUTH EVERY 12 HOURS, Disp: 180 Tablet, Rfl: 3    aspirin delayed-release 81 mg tablet, Take 1 Tablet by mouth daily. , Disp: 90 Tablet, Rfl: 3    doxercalciferol (HECTOROL IV), 2 mcg., Disp: , Rfl:     doxercalciferol (HECTOROL IV), 4 mcg., Disp: , Rfl:     alum-mag hydroxide-simeth (Maalox Advanced) 200-200-20 mg/5 mL susp, Take  by mouth., Disp: , Rfl:     atorvastatin (LIPITOR) 80 mg tablet, Take 1 Tablet by mouth nightly., Disp: 30 Tablet, Rfl: 0    lisinopriL (PRINIVIL, ZESTRIL) 10 mg tablet, Take 1 Tablet by mouth daily. (Patient taking differently: Take 20 mg by mouth daily.), Disp: 30 Tablet, Rfl: 5    carvediloL (COREG) 25 mg tablet, Take 0.5 Tablets by mouth two (2) times daily (with meals). , Disp: 60 Tablet, Rfl: 5    Blood-Glucose Meter monitoring kit, Check fasting glucose, Disp: 1 Kit, Rfl: 0    sucroferric oxyhydroxide (VELPHORO) 500 mg chew chewable tablet, Take 500 mg by mouth three (3) times daily (with meals). , Disp: , Rfl:     nitroglycerin (NITROLINGUAL) 400 mcg/spray spray, 1 [place] by SubLINGual route every five (5) minutes as needed for Chest Pain., Disp: 1 Bottle, Rfl: 2  No Known Allergies    Past Medical History:   Diagnosis Date    Abnormal nuclear cardiac imaging test 10/08/2012    Fixed anteroseptal, anteroapical defect c/w prior infarction. No ischemia. Mid & distal anteroseptal, anteroapical WMA. LVE. EF 44%.  Anemia     dr. Verdin Level doubt amyloid or multiple myeloma. candidate for procirt if Hg <10    Benign hypertensive     Blindness of right eye 01/2013    legally blind    CAD (coronary artery disease)     Cardiomyopathy ejection fraction of 40%     CKD (chronic kidney disease), stage IV ([address]     to see Dr. Kalpesh Gee 12/1/12    Congestive heart failure [address] [address]     Diabetes mellitus ([address]     Diabetic retinopathy ([facility] [address]     Diabetic retinopathy ([address]     Dr. Robert Samano- injections    Heart failure ([facility] [address]     History of echocardiogram 04/22/2014    LVE. EF 55% (prev 40-45% on echo of 1/27/12). No WMA. Mild-mod conc LVH. Gr 3 DDfx. Marked LAE. Mild SHANTEL. Mild MR.    Hypertension     Pulmonary hypertension ([facility] [address]     Renal Ultrasound 1/3/12    Right kidney isoechoic w/respect to liver. Sm left-sided pleural effusion    S/P cardiac cath 10/16/2012    LM patent. pLAD 95% (3.5 x 12-mm [place] stent, resid 0$%). LCX mild. Social History     Socioeconomic History    Marital status:      Spouse name: Not on file    Number of children: Not on file    Years of education: Not on file    Highest education level: Not on file   Social Needs    Financial resource strain: Not on file    Food insecurity - worry: Not on file    Food insecurity - inability: Not on file    Transportation needs - medical: Not on file   [employer] needs - non-medical: Not on file   Occupational History    Not on file   Tobacco Use    Smoking status: Never Smoker    Smokeless tobacco: Never Used   Substance and Sexual Activity    Alcohol use:  Yes     Alcohol/week: 2.5 oz     Types: 5 Cans of beer per week     Comment: occassionally    Drug use: No    Sexual activity: Yes     Partners: Female     Birth control/protection: None   Other Topics Concern    Not on file   Social History Narrative    Not on file       Family History   Problem Relation Age of Onset    Diabetes Mother     Hypertension Mother     Kidney Disease Mother     High Cholesterol Father     Diabetes Brother         pre diabetic         OBJECTIVE    Physical Exam:     Visit Vitals  BP (!) 180/96 (BP 1 Location: Left upper arm, BP Patient Position: Sitting, BP Cuff Size: Adult)   Pulse 75   Temp 97.6 °F (36.4 °C) (Oral)   Resp 16   Ht 5' 11\" (1.803 m)   Wt 206 lb (93.4 kg)   SpO2 100%   BMI 28.73 kg/m²         General: alert, chronically ill-appearing,  in no apparent distress or pain  CVS: normal rate, regular rhythm, distinct S1 and S2  Lungs:clear to ausculation bilaterally, no crackles, wheezing or rhonchi noted  Extremities: no edema, no cyanosis, MSK grossly normal  Skin: warm, no lesions, rashes noted  Psych:  mood and affect normal    Results for orders placed or performed during the hospital encounter of 02/17/22   CBC WITH AUTOMATED DIFF   Result Value Ref Range    WBC 4.3 (L) 4.6 - 13.2 K/uL    RBC 3.59 (L) 4.35 - 5.65 M/uL    HGB 10.7 (L) 13.0 - 16.0 g/dL    HCT 32.4 (L) 36.0 - 48.0 %    MCV 90.3 78.0 - 100.0 FL    MCH 29.8 24.0 - 34.0 PG    MCHC 33.0 31.0 - 37.0 g/dL    RDW 13.5 11.6 - 14.5 %    PLATELET 079 730 - 767 K/uL    MPV 10.9 9.2 - 11.8 FL    NRBC 0.0 0  WBC    ABSOLUTE NRBC 0.00 0.00 - 0.01 K/uL    NEUTROPHILS 69 40 - 73 %    LYMPHOCYTES 16 (L) 21 - 52 %    MONOCYTES 11 (H) 3 - 10 %    EOSINOPHILS 3 0 - 5 %    BASOPHILS 1 0 - 2 %    IMMATURE GRANULOCYTES 0 0.0 - 0.5 %    ABS. NEUTROPHILS 3.0 1.8 - 8.0 K/UL    ABS. LYMPHOCYTES 0.7 (L) 0.9 - 3.6 K/UL    ABS. MONOCYTES 0.5 0.05 - 1.2 K/UL    ABS. EOSINOPHILS 0.1 0.0 - 0.4 K/UL    ABS. BASOPHILS 0.0 0.0 - 0.1 K/UL    ABS. IMM. GRANS. 0.0 0.00 - 0.04 K/UL    DF AUTOMATED     METABOLIC PANEL, COMPREHENSIVE   Result Value Ref Range    Sodium 136 136 - 145 mmol/L    Potassium 3.3 (L) 3.5 - 5.5 mmol/L    Chloride 100 100 - 111 mmol/L    CO2 26 21 - 32 mmol/L    Anion gap 10 3.0 - 18 mmol/L    Glucose 157 (H) 74 - 99 mg/dL    BUN 18 7.0 - 18 MG/DL    Creatinine 4.90 (H) 0.6 - 1.3 MG/DL    BUN/Creatinine ratio 4 (L) 12 - 20      GFR est AA 15 (L) >60 ml/min/1.73m2    GFR est non-AA 13 (L) >60 ml/min/1.73m2    Calcium 9.4 8.5 - 10.1 MG/DL    Bilirubin, total 0.4 0.2 - 1.0 MG/DL    ALT (SGPT) 20 16 - 61 U/L    AST (SGOT) 15 10 - 38 U/L    Alk.  phosphatase 105 45 - 117 U/L    Protein, total 6.9 6.4 - 8.2 g/dL    Albumin 3.2 (L) 3.4 - 5.0 g/dL    Globulin 3.7 2.0 - 4.0 g/dL    A-G Ratio 0.9 0.8 - 1.7     TROPONIN-HIGH SENSITIVITY   Result Value Ref Range    Troponin-High Sensitivity 40 0 - 78 ng/L   TROPONIN-HIGH SENSITIVITY   Result Value Ref Range    Troponin-High Sensitivity 101 (HH) 0 - 78 ng/L   EKG, 12 LEAD, INITIAL   Result Value Ref Range    Ventricular Rate 135 BPM    QRS Duration 86 ms    Q-T Interval 264 ms    QTC Calculation (Bezet) 396 ms    Calculated R Axis -20 degrees    Calculated T Axis -161 degrees    Diagnosis       Atrial fibrillation with rapid ventricular response  Moderate voltage criteria for LVH, may be normal variant ( R in aVL , [place]   product )  Septal infarct (cited on or before 11-JAN-2022)  Marked ST abnormality, possible inferior subendocardial injury  Abnormal ECG    Confirmed by Nina Fournier MD, Artis Garcia ([id number]) on 2/17/2022 11:44:03 AM           ASSESSMENT/PLAN  Diagnoses and all orders for this visit:    ESRD [facility])  On dialysis following nephrology  Was taken out of transplant list temporarily until DM is well controlled  a1c 2/2022 10.8, advised to start basal insulin pt declines  Will resume glipizide 2.5 mg, and start FBG, may increase to 5 mg in 2 weeks if FBG >120. Will need to closely monitor for hypoglycemia with hx/o  Also consistent ADA Diet, recommend DM educator/endocrinology consults    Severe nonproliferative diabetic retinopathy of both eyes with macular edema associated with type 2 diabetes mellitus ([address]  a1c 10.8  See above    Dyslipidemia  Cont statin      Coronary artery disease  S/p LAD stent  Asymptomatic   On asa,  bb, ace  Following cardiology    Essentially hypertension  Elevated today  Will add norvasc 5 mg, coreg 12.5 mg bid, lisinopril 20 mg  Will notify nephrology dr. Lopez Memory of modifications, and defer further BP management with them, has appt for HD tomorrow. Legally blind     Atrial fibrillation with rapid ventricular response (HCC)   rate controlled, anticoag on eliquis     Anemia of chronic disease  Following nephrology      Follow-up Disposition:  Return in about 1 months or if symptoms worsen or fail to improve. Patient understands plan of care. Patient has provided input and agrees with goals.

## 2022-03-10 ENCOUNTER — APPOINTMENT (OUTPATIENT)
Dept: GENERAL RADIOLOGY | Age: 52
End: 2022-03-10
Attending: PHYSICIAN ASSISTANT
Payer: MEDICARE

## 2022-03-10 ENCOUNTER — HOSPITAL ENCOUNTER (EMERGENCY)
Age: 52
Discharge: HOME | End: 2022-03-10
Attending: STUDENT IN AN ORGANIZED HEALTH CARE EDUCATION/TRAINING PROGRAM
Payer: MEDICARE

## 2022-03-10 VITALS
TEMPERATURE: 97 F | HEART RATE: 74 BPM | DIASTOLIC BLOOD PRESSURE: 68 MMHG | BODY MASS INDEX: 30.04 KG/M2 | HEIGHT: 69 IN | SYSTOLIC BLOOD PRESSURE: 137 MMHG | RESPIRATION RATE: 12 BRPM | OXYGEN SATURATION: 100 % | WEIGHT: 202.82 LBS

## 2022-03-10 DIAGNOSIS — I48.0 PAROXYSMAL A-FIB (HCC): Primary | ICD-10-CM

## 2022-03-10 LAB
ALBUMIN SERPL-MCNC: 3.1 G/DL (ref 3.4–5)
ALBUMIN/GLOB SERPL: 0.8 {RATIO} (ref 0.8–1.7)
ALP SERPL-CCNC: 106 U/L (ref 45–117)
ALT SERPL-CCNC: 20 U/L (ref 16–61)
ANION GAP SERPL CALC-SCNC: 11 MMOL/L (ref 3–18)
AST SERPL-CCNC: 16 U/L (ref 10–38)
BASOPHILS # BLD: 0 K/UL (ref 0–0.1)
BASOPHILS NFR BLD: 1 % (ref 0–2)
BILIRUB SERPL-MCNC: 0.5 MG/DL (ref 0.2–1)
BNP SERPL-MCNC: ABNORMAL PG/ML (ref 0–450)
BUN SERPL-MCNC: 17 MG/DL (ref 7–18)
BUN/CREAT SERPL: 3 (ref 12–20)
CALCIUM SERPL-MCNC: 9.4 MG/DL (ref 8.5–10.1)
CALCULATED R AXIS, ECG10: -12 DEGREES
CALCULATED T AXIS, ECG11: 163 DEGREES
CHLORIDE SERPL-SCNC: 98 MMOL/L (ref 100–111)
CO2 SERPL-SCNC: 27 MMOL/L (ref 21–32)
CREAT SERPL-MCNC: 5.37 MG/DL (ref 0.6–1.3)
DIAGNOSIS, 93000: NORMAL
DIFFERENTIAL METHOD BLD: ABNORMAL
EOSINOPHIL # BLD: 0.1 K/UL (ref 0–0.4)
EOSINOPHIL NFR BLD: 3 % (ref 0–5)
ERYTHROCYTE [DISTWIDTH] IN BLOOD BY AUTOMATED COUNT: 14 % (ref 11.6–14.5)
GLOBULIN SER CALC-MCNC: 3.9 G/DL (ref 2–4)
GLUCOSE SERPL-MCNC: 123 MG/DL (ref 74–99)
HCT VFR BLD AUTO: 33.9 % (ref 36–48)
HGB BLD-MCNC: 11.4 G/DL (ref 13–16)
IMM GRANULOCYTES # BLD AUTO: 0 K/UL (ref 0–0.04)
IMM GRANULOCYTES NFR BLD AUTO: 0 % (ref 0–0.5)
LYMPHOCYTES # BLD: 0.8 K/UL (ref 0.9–3.6)
LYMPHOCYTES NFR BLD: 17 % (ref 21–52)
MAGNESIUM SERPL-MCNC: 2.1 MG/DL (ref 1.6–2.6)
MCH RBC QN AUTO: 31 PG (ref 24–34)
MCHC RBC AUTO-ENTMCNC: 33.6 G/DL (ref 31–37)
MCV RBC AUTO: 92.1 FL (ref 78–100)
MONOCYTES # BLD: 0.4 K/UL (ref 0.05–1.2)
MONOCYTES NFR BLD: 9 % (ref 3–10)
NEUTS SEG # BLD: 3.3 K/UL (ref 1.8–8)
NEUTS SEG NFR BLD: 71 % (ref 40–73)
NRBC # BLD: 0 K/UL (ref 0–0.01)
NRBC BLD-RTO: 0 PER 100 WBC
PLATELET # BLD AUTO: 207 K/UL (ref 135–420)
PMV BLD AUTO: 10.6 FL (ref 9.2–11.8)
POTASSIUM SERPL-SCNC: 3.2 MMOL/L (ref 3.5–5.5)
PROT SERPL-MCNC: 7 G/DL (ref 6.4–8.2)
Q-T INTERVAL, ECG07: 360 MS
QRS DURATION, ECG06: 96 MS
QTC CALCULATION (BEZET), ECG08: 531 MS
RBC # BLD AUTO: 3.68 M/UL (ref 4.35–5.65)
SODIUM SERPL-SCNC: 136 MMOL/L (ref 136–145)
TROPONIN-HIGH SENSITIVITY: 46 NG/L (ref 0–78)
TSH SERPL DL<=0.05 MIU/L-ACNC: 0.91 UIU/ML (ref 0.36–3.74)
VENTRICULAR RATE, ECG03: 131 BPM
WBC # BLD AUTO: 4.7 K/UL (ref 4.6–13.2)

## 2022-03-10 PROCEDURE — 74011250637 HC RX REV CODE- 250/637: Performed by: STUDENT IN AN ORGANIZED HEALTH CARE EDUCATION/TRAINING PROGRAM

## 2022-03-10 PROCEDURE — 96374 THER/PROPH/DIAG INJ IV PUSH: CPT

## 2022-03-10 PROCEDURE — 74011250636 HC RX REV CODE- 250/636: Performed by: STUDENT IN AN ORGANIZED HEALTH CARE EDUCATION/TRAINING PROGRAM

## 2022-03-10 PROCEDURE — 84484 ASSAY OF TROPONIN QUANT: CPT

## 2022-03-10 PROCEDURE — 85025 COMPLETE CBC W/AUTO DIFF WBC: CPT

## 2022-03-10 PROCEDURE — 80053 COMPREHEN METABOLIC PANEL: CPT

## 2022-03-10 PROCEDURE — 84443 ASSAY THYROID STIM HORMONE: CPT

## 2022-03-10 PROCEDURE — 71045 X-RAY EXAM CHEST 1 VIEW: CPT

## 2022-03-10 PROCEDURE — 83735 ASSAY OF MAGNESIUM: CPT

## 2022-03-10 PROCEDURE — 74011000250 HC RX REV CODE- 250: Performed by: STUDENT IN AN ORGANIZED HEALTH CARE EDUCATION/TRAINING PROGRAM

## 2022-03-10 PROCEDURE — 96375 TX/PRO/DX INJ NEW DRUG ADDON: CPT

## 2022-03-10 PROCEDURE — 83880 ASSAY OF NATRIURETIC PEPTIDE: CPT

## 2022-03-10 PROCEDURE — 93005 ELECTROCARDIOGRAM TRACING: CPT

## 2022-03-10 PROCEDURE — 99285 EMERGENCY DEPT VISIT HI MDM: CPT

## 2022-03-10 RX ORDER — DILTIAZEM HYDROCHLORIDE 5 MG/ML
10 INJECTION INTRAVENOUS
Status: COMPLETED | OUTPATIENT
Start: 2022-03-10 | End: 2022-03-10

## 2022-03-10 RX ORDER — CARVEDILOL 12.5 MG/1
12.5 TABLET ORAL
Status: COMPLETED | OUTPATIENT
Start: 2022-03-10 | End: 2022-03-10

## 2022-03-10 RX ORDER — DILTIAZEM HYDROCHLORIDE 5 MG/ML
10 INJECTION INTRAVENOUS
Status: DISCONTINUED | OUTPATIENT
Start: 2022-03-10 | End: 2022-03-10

## 2022-03-10 RX ORDER — CALCIUM GLUCONATE 94 MG/ML
2 INJECTION, SOLUTION INTRAVENOUS ONCE
Status: COMPLETED | OUTPATIENT
Start: 2022-03-10 | End: 2022-03-10

## 2022-03-10 RX ADMIN — CALCIUM GLUCONATE 2 G: 98 INJECTION, SOLUTION INTRAVENOUS at 14:30

## 2022-03-10 RX ADMIN — CARVEDILOL 12.5 MG: 12.5 TABLET, FILM COATED ORAL at 17:00

## 2022-03-10 RX ADMIN — DILTIAZEM HYDROCHLORIDE 10 MG: 5 INJECTION INTRAVENOUS at 15:14

## 2022-03-10 NOTE — DISCHARGE INSTRUCTIONS
You presented to the emergency department for fast heart rate. You were given medication to slow your heart rate. Labs were unremarkable and chest x-ray was normal.  Please follow-up with your cardiologist and your nephrologist to figure out if you need to change your medication for better control. Please return to the emergency department if return of fast heart rate, chest pain, shortness of breath and any other concerns.

## 2022-03-10 NOTE — ED PROVIDER NOTES
EMERGENCY DEPARTMENT HISTORY AND PHYSICAL EXAM    12:23 PM      Date: 3/10/2022  Patient Name: Avani Restrepo    History of Presenting Illness     Chief Complaint   Patient presents with    Palpitations         History Provided By: Patient  Location/Duration/Severity/Modifying factors   Patient is a 82-year-old male with history of diabetes, end-stage renal disease on dialysis Tuesday Thursday Saturday, A. fib on Eliquis, hypertension presenting due to palpitations. Patient states that for the past few months he has been having off-and-on palpitations but when presenting to dialysis today they saw that his heart rate was fast so recommended that he be brought to the ED for evaluation. Patient denies any shortness of breath chest pain infectious symptoms or lower extremity edema. Patient does state that his blood pressure was elevated earlier in the week so was started on amlodipine by his outpatient provider. PCP: Jaxson Saab MD    Current Outpatient Medications   Medication Sig Dispense Refill    glipiZIDE SR (GLUCOTROL XL) 2.5 mg CR tablet Take 1 Tablet by mouth daily. 90 Tablet 0    amLODIPine (NORVASC) 5 mg tablet Take 1 Tablet by mouth daily. 90 Tablet 0    glucose blood VI test strips (Accu-Chek Betarice Plus test strp) strip Use as directed 100 Strip 2    Eliquis 2.5 mg tablet TAKE 1 TABLET BY MOUTH EVERY 12 HOURS 180 Tablet 3    aspirin delayed-release 81 mg tablet Take 1 Tablet by mouth daily. 90 Tablet 3    doxercalciferol (HECTOROL IV) 2 mcg.  doxercalciferol (HECTOROL IV) 4 mcg.  alum-mag hydroxide-simeth (Maalox Advanced) 200-200-20 mg/5 mL susp Take  by mouth.  atorvastatin (LIPITOR) 80 mg tablet Take 1 Tablet by mouth nightly. 30 Tablet 0    nitroglycerin (NITROLINGUAL) 400 mcg/spray spray 1 Spray by SubLINGual route every five (5) minutes as needed for Chest Pain. 1 Bottle 2    lisinopriL (PRINIVIL, ZESTRIL) 10 mg tablet Take 1 Tablet by mouth daily.  (Patient taking differently: Take 20 mg by mouth daily.) 30 Tablet 5    carvediloL (COREG) 25 mg tablet Take 0.5 Tablets by mouth two (2) times daily (with meals). 60 Tablet 5    Blood-Glucose Meter monitoring kit Check fasting glucose 1 Kit 0    sucroferric oxyhydroxide (VELPHORO) 500 mg chew chewable tablet Take 500 mg by mouth three (3) times daily (with meals). Past History     Past Medical History:  Past Medical History:   Diagnosis Date    Abnormal nuclear cardiac imaging test 10/08/2012    Fixed anteroseptal, anteroapical defect c/w prior infarction. No ischemia. Mid & distal anteroseptal, anteroapical WMA. LVE. EF 44%.  Anemia     dr. Ashok Akers doubt amyloid or multiple myeloma. candidate for procirt if Hg <10    Benign hypertensive     Blindness of right eye 01/2013    legally blind    CAD (coronary artery disease)     Cardiomyopathy ejection fraction of 40%     CKD (chronic kidney disease), stage IV ([address]     to see Dr. Balta Garnica 12/1/12    Congest[facility] failure ([facility] [address]     Diabetes mellitus ([address]     Diabetic retinopathy ([facility] [address]     Diabetic retinopathy ([facility] [address]     Dr. Cailin Garcia- injections    Heart failure ([facility] [address]     History of echocardiogram 04/22/2014    LVE. EF 55% (prev 40-45% on echo of 1/27/12). No WMA. Mild-mod conc LVH. Gr 3 DDfx. Marked LAE. Mild SHANTEL. Mild MR.    Hypertension     Pulmonary hypertension ([address]     Renal Ultrasound 1/3/12    Right kidney isoechoic w/respect to liver. Sm left-sided pleural effusion    S/P cardiac cath 10/16/2012    LM patent. pLAD 95% (3.5 x 12-mm [place] stent, resid 0$%). LCX mild. Past Surgical History:  Past Surgical History:   Procedure Laterality Date    HX CORONARY STENT PLACEMENT      one    HX LAPAROTOMY  2/2012    bowel obstruction with removal segment of small bowel. Done by Riblet.  HX LAPAROTOMY  infancy    whole in colon and had repair at that time.     HX OTHER SURGICAL  06/20/2013    left eye retna attatchment  HX RETINAL DETACHMENT REPAIR      august 2012    CT REPAIR ING HERNIA,5+Y/O,REDUCIBL Left 05/02/2019    Dr. Carrie Villalta       Family History:  Family History   Problem Relation Age of Onset    Diabetes Mother     Hypertension Mother     Kidney Disease Mother     High Cholesterol Father     Diabetes Brother         pre diabetic       Social History:  Social History     Tobacco Use    Smoking status: Never Smoker    Smokeless tobacco: Never Used   Substance Use Topics    Alcohol use: Not Currently     Alcohol/week: 4.2 standard drinks     Types: 5 Cans of beer per week    Drug use: No       Allergies:  No Known Allergies      Review of Systems       Review of Systems   Constitutional: Negative for activity change, fatigue and fever. Respiratory: Negative for shortness of breath. Cardiovascular: Positive for palpitations. Negative for chest pain. Gastrointestinal: Negative for abdominal pain, diarrhea, nausea and vomiting. Skin: Negative for rash. Neurological: Negative for weakness and light-headedness. All other systems reviewed and are negative. Physical Exam     Visit Vitals  BP (!) 91/57   Pulse (!) 122   Temp 97 °F (36.1 °C)   Resp 11   Ht 5' 9\" (1.753 m)   Wt 92 kg (202 lb 13.2 oz)   SpO2 92%   BMI 29.95 kg/m²         Physical Exam  Vitals and nursing note reviewed. Constitutional:       General: He is not in acute distress. Appearance: Normal appearance. He is well-developed. HENT:      Head: Normocephalic and atraumatic. Eyes:      General: No scleral icterus. Extraocular Movements: Extraocular movements intact. Conjunctiva/sclera: Conjunctivae normal.   Neck:      Thyroid: No thyromegaly. Vascular: No JVD. Trachea: No tracheal deviation. Cardiovascular:      Rate and Rhythm: Tachycardia present. Rhythm irregular. Heart sounds: Normal heart sounds. No murmur heard. No friction rub. No gallop.     Pulmonary:      Effort: Pulmonary effort is normal.      Breath sounds: Normal breath sounds. Chest:      Chest wall: No tenderness. Abdominal:      General: Bowel sounds are normal. There is no distension. Palpations: Abdomen is soft. Tenderness: There is no abdominal tenderness. Musculoskeletal:         General: No tenderness. Normal range of motion. Cervical back: Normal range of motion. Right lower leg: No edema. Left lower leg: No edema. Skin:     General: Skin is warm and dry. Neurological:      General: No focal deficit present. Mental Status: He is alert and oriented to person, place, and time. Comments: No sensory loss, Gait normal, Motor 5/5   Psychiatric:         Behavior: Behavior normal.         Thought Content: Thought content normal.         Judgment: Judgment normal.           Diagnostic Study Results     Labs -  No results found for this or any previous visit (from the past 12 hour(s)). Radiologic Studies -   XR CHEST PORT    (Results Pending)         Medical Decision Making   I am the first provider for this patient. I reviewed the vital signs, available nursing notes, past medical history, past surgical history, family history and social history. Vital Signs-Reviewed the patient's vital signs. EKG: Rate between 131 140 irregular rhythm normal QRS 96 slightly elevated QT C of 531, overall read as A. fib with RVR, EKG similar to prior on 2/17/2022    Records Reviewed: Nursing Notes (Time of Review: 12:23 PM)    ED Course: Progress Notes, Reevaluation, and Consults:    ED Course as of 03/10/22 1640   u Mar 10, 2022   1445 Patient reevaluated at 2:45 PM, states being comfortable without any complaints, heart rate still between 115-130s. Patient currently getting first gram of calcium gluconate, will give Cardizem once first gram of calcium gluconate is in to try to keep from blood pressure lowering blood patient's blood pressure has slightly increased to 429V systolic.   Plan to load attempt to control rate discharge home with follow-up with his primary care doctor and cardiologist. [TB]      ED Course User Index  [TB] Angus Banuelos MD       Provider Notes (Medical Decision Making):   MDM  Number of Diagnoses or Management Options  Paroxysmal A-fib [facility])  Diagnosis management comments: Patient is a 42-year-old male with history of diabetes, end-stage renal disease on dialysis Tuesday Thursday Saturday, A. fib on Eliquis, hypertension presenting due to palpitations. Differential includes A. fib with RVR, ACS, infectious source including pneumonia, electrolyte abnormality. Plan to evaluate with CBC BMP troponin EKG TSH and keep on cardiac monitor. Patient was slightly low BP compared to his normal range which could be due to recent start of amlodipine and possibly fluid being taken off at dialysis. If patient continues to be tachycardic will likely give calcium IV to help with low blood pressure and then give IV Cardizem. If rate can be controlled and no other abnormal findings on labs then likely discharge with follow-up with his cardiologist and primary care physician. Patient should hold amlodipine unless pressures rise rapidly throughout the week. Procedures    Critical Care Time:       Diagnosis     Clinical Impression: No diagnosis found. Disposition: Home    Follow-up Information    None          Patient's Medications   Start Taking    No medications on file   Continue Taking    ALUM-MAG HYDROXIDE-SIMETH (MAALOX ADVANCED) 200-200-20 MG/5 ML SUSP    Take  by mouth. AMLODIPINE (NORVASC) 5 MG TABLET    Take 1 Tablet by mouth daily. ASPIRIN DELAYED-RELEASE 81 MG TABLET    Take 1 Tablet by mouth daily. ATORVASTATIN (LIPITOR) 80 MG TABLET    Take 1 Tablet by mouth nightly. BLOOD-GLUCOSE METER MONITORING KIT    Check fasting glucose    CARVEDILOL (COREG) 25 MG TABLET    Take 0.5 Tablets by mouth two (2) times daily (with meals).     DOXERCALCIFEROL (HECTOROL IV)    2 mcg.    DOXERCALCIFEROL (HECTOROL IV)    4 mcg. ELIQUIS 2.5 MG TABLET    TAKE 1 TABLET BY MOUTH EVERY 12 HOURS    GLIPIZIDE SR (GLUCOTROL XL) 2.5 MG CR TABLET    Take 1 Tablet by mouth daily. GLUCOSE BLOOD VI TEST STRIPS (ACCU-CHEK ZAC PLUS TEST STRP) STRIP    Use as directed    LISINOPRIL (PRINIVIL, ZESTRIL) 10 MG TABLET    Take 1 Tablet by mouth daily. NITROGLYCERIN (NITROLINGUAL) 400 MCG/SPRAY SPRAY    1 Spray by SubLINGual route every five (5) minutes as needed for Chest Pain. SUCROFERRIC OXYHYDROXIDE (VELPHORO) 500 MG CHEW CHEWABLE TABLET    Take 500 mg by mouth three (3) times daily (with meals). These Medications have changed    No medications on file   Stop Taking    No medications on file     Disclaimer: Sections of this note are dictated using utilizing voice recognition software. Minor typographical errors may be present. If questions arise, please do not hesitate to contact me or call our department.

## 2022-03-18 PROBLEM — I48.91 ATRIAL FIBRILLATION WITH RVR (HCC): Status: ACTIVE | Noted: 2021-01-22

## 2022-03-18 PROBLEM — D63.8 ANEMIA OF CHRONIC DISEASE: Status: ACTIVE | Noted: 2019-03-07

## 2022-03-19 PROBLEM — E87.5 HYPERKALEMIA: Status: ACTIVE | Noted: 2021-01-22

## 2022-03-19 PROBLEM — E11.21 TYPE 2 DIABETES WITH NEPHROPATHY (HCC): Status: ACTIVE | Noted: 2019-01-04

## 2022-03-19 PROBLEM — I48.91 ATRIAL FIBRILLATION WITH RAPID VENTRICULAR RESPONSE (HCC): Status: ACTIVE | Noted: 2018-12-05

## 2022-03-19 PROBLEM — I48.0 PAROXYSMAL A-FIB (HCC): Status: ACTIVE | Noted: 2021-12-14

## 2022-03-20 PROBLEM — N18.6 ESRD ON DIALYSIS (HCC): Status: ACTIVE | Noted: 2021-01-22

## 2022-03-20 PROBLEM — I21.4 NSTEMI (NON-ST ELEVATED MYOCARDIAL INFARCTION) (HCC): Status: ACTIVE | Noted: 2021-01-22

## 2022-03-20 PROBLEM — E83.52 HYPERCALCEMIA: Status: ACTIVE | Noted: 2021-01-22

## 2022-03-20 PROBLEM — Z99.2 ESRD ON DIALYSIS (HCC): Status: ACTIVE | Noted: 2021-01-22

## 2022-04-12 NOTE — PROGRESS NOTES
Gilda Dandy presents today for a post-hospital follow-up of atrial fibrillation. Mr. Edyta Benavides reports that while undergoing hemodialysis on April 2, 2022, he was told by the dialysis nurses that he was in atrial fibrillation and that his heart rate was rapid. He reports that Dr. Nir Albarado was notified and his dialysis treatment was completed. After he completed treatment, he went home and then received a call from the dialysis center instructing him to go to the emergency room for further evaluation and treatment of the atrial fibrillation. He lives on the [facility] so he presented to the [facility] where he was noted to be in atrial flutter with RVR with heart rate around 150 bpm.  He underwent cardioversion in the emergency department and cardiology recommended IV amiodarone. Upon discharge from the hospital, he was instructed to take amiodarone 400 mg twice a day for 7 days then 400 mg daily for 7 days and then 200 mg daily for 14 days. However, he reports that a nurse gave him instructions to take 600 mg in the morning and 400 mg in the evening for 7 days followed by 600 mg daily for 7 days and then 400 mg daily. He follow the instructions that the nurse gave him but his prescription bottle for amiodarone clearly has the instructions that were noted in the discharge summary. He states that his carvedilol was discontinued and he was started on metoprolol tartrate 25 mg twice a day. Since his visit to the emergency room, he has not had any further episodes of atrial fibrillation or atrial flutter during hemodialysis. He states that he had one other episode earlier this year in January for which he also underwent cardioversion. He is anticoagulated with Eliquis.     Mr. Edyta Benavides is a 54year old gentleman with a history of cardiomyopathy (diagnosed in Jan. 2012), pulmonary hypertension, hypertension, diabetes, iron deficiency anemia, and small bowel obstruction requiring a small bowel resection (1/26/12), dyslipidemia, chronic diastolic heart failure, and CAD (s/p stent in 2012). He was lost to follow-up with other healthcare providers and had previously been non-compliant with medications. His last visit with Dr. Mary Estrada was in March 2020. He was hospitalized from 12/5/18 through 12/14/18. He presented with complaints of weakness, fatigue, dyspnea on exertion. He was noted to be in AFIB with RVR, and had strep pneumonia and bacteremia (followed by ID), and superficial acute occlusive left upper extremity thrombophlebitis (no DVT). He was treated with IV antibiotics. Initially, his heart rate was difficult to control but prior to discharge, improved with oral cardizem being taken 3 times a day. He has ESRD but was not being dialyzed yet prior to admission. He was s/p AV fistula placement but was lost to follow-up. Hemodialysis was initiated during this hospital stay. An echo was done and it showed an EF of 40%, moderate concentric LVH, LV global hypokinesis, and PASP of 50 mmHg. He saw Dr. Mary Estrada for his routine follow-up on August 13, 2019 and during that visit, his diltiazem was discontinued due to complaints of low blood pressures postdialysis with symptomatic dizziness. He underwent cardiac catheterization on August 21, 2019 as part of his work-up for possible kidney transplant. The cardiac catheterization showed that his prior proximal LAD stent is patent with 35% stenosis/calcification. No interventions were performed. He presented to the hospital on 1/22/21 with generalized weakness and malaise. He was tachycardic on arrival to the ED and was noted to be in rapid atrial fibrillation with a heart rate in the 150s. He apparently missed his dialysis treatments for about a week and he was also not taking his carvedilol as prescribed. He was diagnosed with hyperkalemia and Afib with RVR. He underwent urgent hemodialysis due to his hyperkalemia.   His potassium level was 8.6, , and creatinine 21.8. He was also diagnosed with a non-STEMI felt to be due to demand ischemia from the AFib. His initial troponin was 0.90 and it peaked at 4.95. His potassium level normalized after undergoing hemodialysis. For his AFib, he was initially started on an amiodarone drip and heparin but was then transitioned to carvedilol and Eliquis. An echo was done and it showed an EF of 55-60%, PASP was 22 mmHg. He states that he had indigestion and was not feeling well for several days and that is why he missed his dialysis treatments. Denies chest pain, tightness, heaviness, and palpitations. Denies shortness of breath at rest, dyspnea on exertion, orthopnea and PND. Denies abdominal bloating. Denies lightheadedness, dizziness, and syncope. Denies lower extremity edema and denies claudication. Denies nausea, vomiting, diarrhea, melena, hematochezia. Admits to indigestion/reflux. Denies hematuria, urgency, frequency. Denies fever, chills. He undergoes hemodialysis on Tues-Thurs-Sat. Past Medical History:   Diagnosis Date    Abnormal nuclear cardiac imaging test 10/08/2012    Fixed anteroseptal, anteroapical defect c/w prior infarction. No ischemia. Mid & distal anteroseptal, anteroapical WMA. LVE. EF 44%.  Anemia     dr. Nikolay Ventura doubt amyloid or multiple myeloma. candidate for procirt if Hg <10    Benign hypertensive     Blindness of right eye 01/2013    legally blind    CAD (coronary artery disease)     Cardiomyopathy ejection fraction of 40%     CKD (chronic kidney disease), stage IV ([address]     to see Dr. Edel Ricci 12/1/12    Congestive heart failure ([address]     Diabetes mellitus ([address]     Diabetic retinopathy ([address]     Diabetic retinopathy ([address]     Dr. Piero Saldana- injections    Heart failure ([facility]     History of echocardiogram 04/22/2014    LVE. EF 55% (prev 40-45% on echo of 1/27/12). No WMA. Mild-mod conc LVH. Gr 3 DDfx. Marked LAE. Mild SHANTEL. Mild MR.  Hypertension     Pulmonary hypertension [facility]     Renal Ultrasound 1/3/12    Right kidney isoechoic w/respect to liver. Sm left-sided pleural effusion    S/P cardiac cath 10/16/2012    LM patent. pLAD 95% (3.5 x 12-mm Carrie stent, resid 0$%). LCX mild. Past Surgical History:   Procedure Laterality Date    HX CORONARY STENT PLACEMENT      one    HX LAPAROTOMY  2/2012    bowel obstruction with removal segment of small bowel. Done by Riblet.  HX LAPAROTOMY  infancy    whole in colon and had repair at that time.  HX OTHER SURGICAL  06/20/2013    left eye retna attatchment    HX RETINAL DETACHMENT REPAIR      august 2012    MN REPAIR ING HERNIA,5+Y/O,REDUCIBL Left 05/02/2019    Dr. Mello Kerr     Family History   Problem Relation Age of Onset    Diabetes Mother     Hypertension Mother     Kidney Disease Mother     High Cholesterol Father     Diabetes Brother         pre diabetic     Social History     Tobacco Use    Smoking status: Never Smoker    Smokeless tobacco: Never Used   Substance Use Topics    Alcohol use: Not Currently     Alcohol/week: 4.2 standard drinks     Types: 5 Cans of beer per week    Drug use: No       Medications:  Current Outpatient Medications   Medication Sig    metoprolol tartrate (LOPRESSOR) 25 mg tablet Take 25 mg by mouth two (2) times a day.  linaGLIPtin (Tradjenta) 5 mg tablet Take 1 Tablet by mouth daily.  amiodarone (CORDARONE) 200 mg tablet TAKE 2 TABLETS BY MOUTH TWICE DAILY FOR 7 DAYS THEN TAKE 2 TABLETS BY MOUTH EVERY DAY FOR 7 DAYS THEN TAKE 1 TABLET BY MOUTH EVERY DAY FOR 14 DAYS    lisinopriL (PRINIVIL, ZESTRIL) 20 mg tablet Take 20 mg by mouth daily.  glipiZIDE SR (GLUCOTROL XL) 2.5 mg CR tablet Take 1 Tablet by mouth daily.  amLODIPine (NORVASC) 5 mg tablet Take 1 Tablet by mouth daily.     glucose blood VI test strips (Accu-Chek Beatrice Plus test strp) strip Use as directed    Eliquis 2.5 mg tablet TAKE 1 TABLET BY MOUTH EVERY 12 HOURS    aspirin delayed-release 81 mg tablet Take 1 Tablet by mouth daily.  doxercalciferol (HECTOROL IV) 2 mcg.  doxercalciferol (HECTOROL IV) 4 mcg.  alum-mag hydroxide-simeth (Maalox Advanced) 200-200-20 mg/5 mL susp Take  by mouth.  atorvastatin (LIPITOR) 80 mg tablet Take 1 Tablet by mouth nightly.  nitroglycerin (NITROLINGUAL) 400 mcg/spray spray 1 Spray by SubLINGual route every five (5) minutes as needed for Chest Pain.  Blood-Glucose Meter monitoring kit Check fasting glucose    sucroferric oxyhydroxide (VELPHORO) 500 mg chew chewable tablet Take 500 mg by mouth three (3) times daily (with meals). No current facility-administered medications for this visit. No Known Allergies      Physical:  Visit Vitals  /76 (BP 1 Location: Left upper arm, BP Patient Position: Sitting, BP Cuff Size: Adult)   Pulse 72   Ht 5' 9\" (1.753 m)   Wt 91.6 kg (202 lb)   SpO2 98%   BMI 29.83 kg/m²       His weight is down 4 pounds since his last appointment      Exam:  Neck:  Supple, no JVD, no carotid bruits  CV:  Normal S1 and  S2, no murmurs, rubs, or gallops noted  Lungs:  Clear to ausculation throughout, no wheezes or rales  Abd:  Soft, non-tender, non-distended with good bowel sounds. No hepatosplenomegaly. Extremities:  No lower extremity edema.       EKG:  Normal sinus rhythm      LABS:  Lab Results   Component Value Date/Time    Sodium 136 03/10/2022 11:40 AM    Potassium 3.2 (L) 03/10/2022 11:40 AM    Chloride 98 (L) 03/10/2022 11:40 AM    CO2 27 03/10/2022 11:40 AM    Glucose 123 (H) 03/10/2022 11:40 AM    BUN 17 03/10/2022 11:40 AM    Creatinine 5.37 (H) 03/10/2022 11:40 AM     Lab Results   Component Value Date/Time    Cholesterol, total 169 12/17/2021 06:01 AM    HDL Cholesterol 35 (L) 12/17/2021 06:01 AM    LDL, calculated 100.8 (H) 12/17/2021 06:01 AM    Triglyceride 166 (H) 12/17/2021 06:01 AM    CHOL/HDL Ratio 4.8 12/17/2021 06:01 AM     No components found for: GPT Impression/Plan:  1. Nonischemic cardiomyopathy, EF 55-60% (echo Jan. 2021)  2. Chronic diastolic CHF, appears compensated  3. Essential hypertension, blood pressure well controlled  4. Diabetes mellitus, recommend Hgb A1c less than 7% from cardiac standpoint  5. ESRD, on hemodialysis  6. Anemia, followed by PCP and nephrology  7. Dyslipidemia, currently not taking a statin  8. History of medical noncompliance  9. Paroxysmal AFIB, currently back in sinus rhythm and anticoagulated with Eliquis    Mr. Arsalan Smith presents today for follow-up of atrial fibrillation. He had another episode of paroxysmal atrial fibrillation while receiving dialysis on April 2, 2022. After he completed treatment, he went home and then received a call from the dialysis center instructing him to go to the emergency room for further evaluation and treatment of the atrial fibrillation. He lives on the [facility] so he presented to the [facility] where he was noted to be in atrial flutter with RVR with heart rate around 150 bpm.  He underwent cardioversion in the emergency department and cardiology recommended IV amiodarone. Upon discharge from the hospital, he was instructed to take amiodarone 400 mg twice a day for 7 days then 400 mg daily for 7 days and then 200 mg daily for 14 days. However, he reports that a nurse gave him instructions to take 600 mg in the morning and 400 mg in the evening for 7 days followed by 600 mg daily for 7 days and then 400 mg daily. He follow the instructions that the nurse gave him but his prescription bottle for amiodarone clearly has the instructions that were noted in the discharge summary. He states that his carvedilol was discontinued and he was started on metoprolol tartrate 25 mg twice a day.     It is unclear why he received instructions to take such large doses of amiodarone upon discharge from the hospital.  His discharge instructions clearly state that he was supposed to take 400 mg twice a day for 7 days and then 400 mg daily for 7 days and then 200 mg daily thereafter. By following the instructions given to him by the nurse, he essentially took an extra 200 mg of amiodarone daily for the first 7 days of therapy. He states that he has been taking 600 mg of amiodarone daily for the past 4 days (also has taken an extra 200 mg of amiodarone daily for the past 4 days). He was instructed to decrease his amiodarone to 200 mg daily starting tomorrow. He will remain on his metoprolol tartrate 25 mg twice a day. According to hospital records, the cardiologist who followed him at [facility] recommended that he be seen by electrophysiology to determine whether or not another form of antiarrhythmic should be used or if ablation should be considered. Overall, he states that he has been feeling well. He has not had any further episodes of atrial fibrillation or atrial flutter during hemodialysis since his hospitalization. He has not noticed any side effects from the amiodarone. He will be given a lab slip for LFTs and a TSH to be done in view of his amiodarone therapy (I called and informed him about the lab slip and let him know that he can wait until he sees Dr. Geeta Mahoney to have it drawn in case she orders other labs for him). He has been scheduled to return for follow-up with Dr. Mary Estrada in about a month. Jyotsna Pitts MSN, FNP-BC    Please note:  Portions of this chart were created with Dragon medical speech to text program.  Unrecognized errors may be present.

## 2022-04-13 ENCOUNTER — OFFICE VISIT (OUTPATIENT)
Dept: CARDIOLOGY CLINIC | Age: 52
End: 2022-04-13
Payer: MEDICARE

## 2022-04-13 VITALS
BODY MASS INDEX: 29.92 KG/M2 | HEIGHT: 69 IN | SYSTOLIC BLOOD PRESSURE: 138 MMHG | WEIGHT: 202 LBS | DIASTOLIC BLOOD PRESSURE: 76 MMHG | OXYGEN SATURATION: 98 % | HEART RATE: 72 BPM

## 2022-04-13 DIAGNOSIS — I42.9 CARDIOMYOPATHY, UNSPECIFIED TYPE (HCC): ICD-10-CM

## 2022-04-13 DIAGNOSIS — I48.0 PAROXYSMAL A-FIB (HCC): Primary | ICD-10-CM

## 2022-04-13 DIAGNOSIS — I48.91 ATRIAL FIBRILLATION WITH RAPID VENTRICULAR RESPONSE (HCC): ICD-10-CM

## 2022-04-13 DIAGNOSIS — N18.6 END STAGE RENAL DISEASE (HCC): ICD-10-CM

## 2022-04-13 DIAGNOSIS — E78.5 DYSLIPIDEMIA: ICD-10-CM

## 2022-04-13 DIAGNOSIS — I25.10 CORONARY ARTERY DISEASE INVOLVING NATIVE CORONARY ARTERY OF NATIVE HEART WITHOUT ANGINA PECTORIS: ICD-10-CM

## 2022-04-13 PROBLEM — R06.02 SHORTNESS OF BREATH: Status: ACTIVE | Noted: 2021-12-15

## 2022-04-13 PROBLEM — I15.1 HYPERTENSION SECONDARY TO OTHER RENAL DISORDERS: Status: ACTIVE | Noted: 2018-12-14

## 2022-04-13 PROBLEM — E11.59 OBESITY, DIABETES, AND HYPERTENSION SYNDROME (HCC): Status: ACTIVE | Noted: 2022-04-02

## 2022-04-13 PROBLEM — I27.20 PULMONARY HYPERTENSION (HCC): Status: ACTIVE | Noted: 2018-12-08

## 2022-04-13 PROBLEM — E88.09 HYPOALBUMINEMIA: Status: ACTIVE | Noted: 2022-04-13

## 2022-04-13 PROBLEM — N28.89 HYPERTENSION SECONDARY TO OTHER RENAL DISORDERS: Status: ACTIVE | Noted: 2018-12-14

## 2022-04-13 PROBLEM — R11.0 NAUSEA: Status: ACTIVE | Noted: 2020-11-16

## 2022-04-13 PROBLEM — I11.0 HYPERTENSIVE HEART DISEASE WITH HEART FAILURE (HCC): Status: ACTIVE | Noted: 2018-12-14

## 2022-04-13 PROBLEM — I50.32 CHRONIC DIASTOLIC HEART FAILURE (HCC): Status: ACTIVE | Noted: 2022-04-02

## 2022-04-13 PROBLEM — E66.9 OBESITY, DIABETES, AND HYPERTENSION SYNDROME (HCC): Status: ACTIVE | Noted: 2022-04-02

## 2022-04-13 PROBLEM — N25.81 SECONDARY HYPERPARATHYROIDISM OF RENAL ORIGIN (HCC): Status: ACTIVE | Noted: 2019-06-10

## 2022-04-13 PROBLEM — E55.9 VITAMIN D DEFICIENCY: Status: ACTIVE | Noted: 2022-04-13

## 2022-04-13 PROBLEM — I10 PRIMARY HYPERTENSION: Status: ACTIVE | Noted: 2022-03-21

## 2022-04-13 PROBLEM — I15.2 OBESITY, DIABETES, AND HYPERTENSION SYNDROME (HCC): Status: ACTIVE | Noted: 2022-04-02

## 2022-04-13 PROBLEM — E11.69 OBESITY, DIABETES, AND HYPERTENSION SYNDROME (HCC): Status: ACTIVE | Noted: 2022-04-02

## 2022-04-13 PROBLEM — E66.9 OBESITY (BMI 30-39.9): Status: ACTIVE | Noted: 2022-03-21

## 2022-04-13 PROCEDURE — 93000 ELECTROCARDIOGRAM COMPLETE: CPT | Performed by: NURSE PRACTITIONER

## 2022-04-13 PROCEDURE — G9231 DOC ESRD DIA TRANS PREG: HCPCS | Performed by: NURSE PRACTITIONER

## 2022-04-13 PROCEDURE — 99214 OFFICE O/P EST MOD 30 MIN: CPT | Performed by: NURSE PRACTITIONER

## 2022-04-13 PROCEDURE — G8419 CALC BMI OUT NRM PARAM NOF/U: HCPCS | Performed by: NURSE PRACTITIONER

## 2022-04-13 PROCEDURE — G8432 DEP SCR NOT DOC, RNG: HCPCS | Performed by: NURSE PRACTITIONER

## 2022-04-13 PROCEDURE — G8427 DOCREV CUR MEDS BY ELIG CLIN: HCPCS | Performed by: NURSE PRACTITIONER

## 2022-04-13 RX ORDER — LISINOPRIL 20 MG/1
20 TABLET ORAL DAILY
COMMUNITY
Start: 2022-02-10 | End: 2022-04-20

## 2022-04-13 RX ORDER — AMIODARONE HYDROCHLORIDE 200 MG/1
200 TABLET ORAL DAILY
Qty: 30 TABLET | Refills: 0 | Status: SHIPPED | OUTPATIENT
Start: 2022-04-13 | End: 2022-05-24

## 2022-04-13 RX ORDER — METOPROLOL TARTRATE 25 MG/1
25 TABLET, FILM COATED ORAL 2 TIMES DAILY
Qty: 60 TABLET | Refills: 2 | Status: SHIPPED | OUTPATIENT
Start: 2022-04-13 | End: 2022-04-26 | Stop reason: SDUPTHER

## 2022-04-13 RX ORDER — METOPROLOL TARTRATE 25 MG/1
25 TABLET, FILM COATED ORAL 2 TIMES DAILY
COMMUNITY
Start: 2022-04-03 | End: 2022-04-13 | Stop reason: SDUPTHER

## 2022-04-13 RX ORDER — LINAGLIPTIN 5 MG/1
1 TABLET, FILM COATED ORAL DAILY
COMMUNITY
Start: 2022-04-01

## 2022-04-13 RX ORDER — AMIODARONE HYDROCHLORIDE 200 MG/1
TABLET ORAL
COMMUNITY
Start: 2022-04-03 | End: 2022-04-13

## 2022-04-13 NOTE — PATIENT INSTRUCTIONS
Decrease amiodarone to 200mg once a day  Continue metoprolol tartrate 25mg twice a day  Follow-up with Dr. Kenzie Espinoza as scheduled and as needed

## 2022-04-18 ENCOUNTER — TELEPHONE (OUTPATIENT)
Dept: FAMILY MEDICINE CLINIC | Age: 52
End: 2022-04-18

## 2022-04-18 NOTE — TELEPHONE ENCOUNTER
Pt states he feels like he is having an allergic reaction to his new medication. Pt states he started itching on Saturday and he can feel raised bumps on his arms and back that are red. Please advise.

## 2022-04-20 ENCOUNTER — OFFICE VISIT (OUTPATIENT)
Dept: FAMILY MEDICINE CLINIC | Age: 52
End: 2022-04-20
Payer: MEDICARE

## 2022-04-20 VITALS
DIASTOLIC BLOOD PRESSURE: 96 MMHG | HEART RATE: 72 BPM | WEIGHT: 207 LBS | HEIGHT: 69 IN | OXYGEN SATURATION: 98 % | RESPIRATION RATE: 16 BRPM | TEMPERATURE: 98.9 F | SYSTOLIC BLOOD PRESSURE: 150 MMHG | BODY MASS INDEX: 30.66 KG/M2

## 2022-04-20 DIAGNOSIS — N18.6 ESRD (END STAGE RENAL DISEASE) (HCC): ICD-10-CM

## 2022-04-20 DIAGNOSIS — E11.21 TYPE 2 DIABETES WITH NEPHROPATHY (HCC): ICD-10-CM

## 2022-04-20 DIAGNOSIS — E11.3413 SEVERE NONPROLIFERATIVE DIABETIC RETINOPATHY OF BOTH EYES WITH MACULAR EDEMA ASSOCIATED WITH TYPE 2 DIABETES MELLITUS (HCC): ICD-10-CM

## 2022-04-20 DIAGNOSIS — I48.91 ATRIAL FIBRILLATION WITH RVR (HCC): ICD-10-CM

## 2022-04-20 DIAGNOSIS — D63.8 ANEMIA OF CHRONIC DISEASE: ICD-10-CM

## 2022-04-20 DIAGNOSIS — R21 RASH: ICD-10-CM

## 2022-04-20 DIAGNOSIS — H54.8 LEGALLY BLIND: ICD-10-CM

## 2022-04-20 DIAGNOSIS — I25.118 ATHEROSCLEROTIC HEART DISEASE OF NATIVE CORONARY ARTERY WITH OTHER FORMS OF ANGINA PECTORIS (HCC): ICD-10-CM

## 2022-04-20 DIAGNOSIS — E78.5 DYSLIPIDEMIA: ICD-10-CM

## 2022-04-20 DIAGNOSIS — E78.00 PURE HYPERCHOLESTEROLEMIA: Primary | ICD-10-CM

## 2022-04-20 DIAGNOSIS — N25.81 SECONDARY HYPERPARATHYROIDISM OF RENAL ORIGIN (HCC): ICD-10-CM

## 2022-04-20 DIAGNOSIS — I10 ESSENTIAL HYPERTENSION: ICD-10-CM

## 2022-04-20 PROCEDURE — G8417 CALC BMI ABV UP PARAM F/U: HCPCS | Performed by: FAMILY MEDICINE

## 2022-04-20 PROCEDURE — G8427 DOCREV CUR MEDS BY ELIG CLIN: HCPCS | Performed by: FAMILY MEDICINE

## 2022-04-20 PROCEDURE — G8510 SCR DEP NEG, NO PLAN REQD: HCPCS | Performed by: FAMILY MEDICINE

## 2022-04-20 PROCEDURE — G9231 DOC ESRD DIA TRANS PREG: HCPCS | Performed by: FAMILY MEDICINE

## 2022-04-20 PROCEDURE — 99214 OFFICE O/P EST MOD 30 MIN: CPT | Performed by: FAMILY MEDICINE

## 2022-04-20 PROCEDURE — 2022F DILAT RTA XM EVC RTNOPTHY: CPT | Performed by: FAMILY MEDICINE

## 2022-04-20 PROCEDURE — 3046F HEMOGLOBIN A1C LEVEL >9.0%: CPT | Performed by: FAMILY MEDICINE

## 2022-04-20 RX ORDER — TRIAMCINOLONE ACETONIDE 5 MG/G
CREAM TOPICAL 2 TIMES DAILY
Qty: 45 G | Refills: 0 | Status: SHIPPED | OUTPATIENT
Start: 2022-04-20 | End: 2022-06-01

## 2022-04-20 RX ORDER — CARVEDILOL 25 MG/1
25 TABLET ORAL 2 TIMES DAILY WITH MEALS
COMMUNITY
End: 2022-04-20

## 2022-04-20 RX ORDER — LISINOPRIL 10 MG/1
TABLET ORAL DAILY
COMMUNITY
End: 2022-06-01

## 2022-04-20 NOTE — PROGRESS NOTES
Stewart Robles, 54 y.o.,  male    SUBJECTIVE  Ff-up    DM retinopathy/nephropathy-ESRD on HD. Now following with endo dr. Katya Delatorre, recent addition of tradjenta to glipizide. Has appt with becky 5/2/2022 [facility]  labs a1c 10.8 2/22/2022. He is very much opposed to basal insulin. ESRD-ongoing HD 3 x a week    CAD/Afib-cardiac cath 8/19 showing proximal LAD stent  Patent 35% stenosis  no chest pains, palpitations, leg edema. Recent admission 4/2022 for atrial fib, cardioversion, started on amiodarone, metoprolol. Apparently some issues with dosing of amiodarone. He is asymptomatic, has TSH, LFTs pending. Has appt with dr. Helder Amaya 5/11/2022    Pruritic Upper extremity, trunk, back rash for 4 days, says shortly after using new detergent. Of note new meds from recent admission-amiodarone/BB/tradjenta. No sick contacts, has tried topical benadryl    ROS:  See HPI, all others negative        Patient Active Problem List   Diagnosis Code    Benign hypertensive  I11.9    CRI (chronic renal insufficiency) N18.9    Diabetes mellitus ([address] E11.9    Cardiomyopathy ([address] I42.9    Iron deficiency anemia D50.9    CAD (coronary artery disease) I25.10    Legally blind H54.8    Diabetic retinopathy ([facility] E11.319    Severe nonproliferative diabetic retinopathy with macular edema, associated with type 2 diabetes mellitus E11.3419    Dyslipidemia E78.5    Atrial fibrillation with rapid ventricular response (HCC) I48.91    Type 2 diabetes with nephropathy (HCC) E11.21       Current Outpatient Medications:     lisinopriL (PRINIVIL, ZESTRIL) 10 mg tablet, Take  by mouth daily. , Disp: , Rfl:     triamcinolone (ARISTOCORT) 0.5 % topical cream, Apply  to affected area two (2) times a day. use thin layer, Disp: 45 g, Rfl: 0    linaGLIPtin (Tradjenta) 5 mg tablet, Take 1 Tablet by mouth daily. , Disp: , Rfl:     metoprolol tartrate (LOPRESSOR) 25 mg tablet, Take 1 Tablet by mouth two (2) times a day., Disp: 60 Tablet, Rfl: 2    amiodarone (CORDARONE) 200 mg tablet, Take 1 Tablet by mouth daily. , Disp: 30 Tablet, Rfl: 0    glipiZIDE SR (GLUCOTROL XL) 2.5 mg CR tablet, Take 1 Tablet by mouth daily. , Disp: 90 Tablet, Rfl: 0    amLODIPine (NORVASC) 5 mg tablet, Take 1 Tablet by mouth daily. , Disp: 90 Tablet, Rfl: 0    glucose blood VI test strips (Accu-Chek Beatrice Plus test strp) strip, Use as directed, Disp: 100 Strip, Rfl: 2    Eliquis 2.5 mg tablet, TAKE 1 TABLET BY MOUTH EVERY 12 HOURS, Disp: 180 Tablet, Rfl: 3    aspirin delayed-release 81 mg tablet, Take 1 Tablet by mouth daily. , Disp: 90 Tablet, Rfl: 3    doxercalciferol (HECTOROL IV), 2 mcg., Disp: , Rfl:     doxercalciferol (HECTOROL IV), 4 mcg., Disp: , Rfl:     alum-mag hydroxide-simeth (Maalox Advanced) 200-200-20 mg/5 mL susp, Take  by mouth., Disp: , Rfl:     atorvastatin (LIPITOR) 80 mg tablet, Take 1 Tablet by mouth nightly., Disp: 30 Tablet, Rfl: 0    nitroglycerin (NITROLINGUAL) 400 mcg/spray spray, 1 [place] by SubLINGual route every five (5) minutes as needed for Chest Pain., Disp: 1 Bottle, Rfl: 2    sucroferric oxyhydroxide (VELPHORO) 500 mg chew chewable tablet, Take 500 mg by mouth three (3) times daily (with meals). , Disp: , Rfl:     Blood-Glucose Meter monitoring kit, Check fasting glucose, Disp: 1 Kit, Rfl: 0  No Known Allergies    Past Medical History:   Diagnosis Date    Abnormal nuclear cardiac imaging test 10/08/2012    Fixed anteroseptal, anteroapical defect c/w prior infarction. No ischemia. Mid & distal anteroseptal, anteroapical WMA. LVE. EF 44%.  Anemia     dr. Elke Estrada doubt amyloid or multiple myeloma.  candidate for procirt if Hg <10    Benign hypertensive     Blindness of right eye 01/2013    legally blind    CAD (coronary artery disease)     Cardiomyopathy ejection fraction of 40%     CKD (chronic kidney disease), stage IV ([facility] [address]     to see Dr. Maryuri Posey 12/1/12    [facility]     Diabetes mellitus ([facility]     Diabetic retinopathy ([facility]     Diabetic retinopathy ([facility]     Dr. Elaine Ash- injections    Heart failure [facility])     History of echocardiogram 04/22/2014    LVE. EF 55% (prev 40-45% on echo of 1/27/12). No WMA. Mild-mod conc LVH. Gr 3 DDfx. Marked LAE. Mild SHANTEL. Mild MR.    Hypertension     Pulmonary hypertension ([facility]     Renal Ultrasound 1/3/12    Right kidney isoechoic w/respect to liver. Sm left-sided pleural effusion    S/P cardiac cath 10/16/2012    LM patent. pLAD 95% (3.5 x 12-mm [place] stent, resid 0$%). LCX mild. Social History     Socioeconomic History    Marital status:      Spouse name: Not on file    Number of children: Not on file    Years of education: Not on file    Highest education level: Not on file   Social Needs    Financial resource strain: Not on file    Food insecurity - worry: Not on file    Food insecurity - inability: Not on file    Transportation needs - medical: Not on file   Qwiqq needs - non-medical: Not on file   Occupational History    Not on file   Tobacco Use    Smoking status: Never Smoker    Smokeless tobacco: Never Used   Substance and Sexual Activity    Alcohol use:  Yes     Alcohol/week: 2.5 oz     Types: 5 Cans of beer per week     Comment: occassionally    Drug use: No    Sexual activity: Yes     Partners: Female     Birth control/protection: None   Other Topics Concern    Not on file   Social History Narrative    Not on file       Family History   Problem Relation Age of Onset    Diabetes Mother     Hypertension Mother     Kidney Disease Mother     High Cholesterol Father     Diabetes Brother         pre diabetic         OBJECTIVE    Physical Exam:     Visit Vitals  BP (!) 150/96 (BP 1 Location: Left upper arm, BP Patient Position: Sitting, BP Cuff Size: Adult)   Pulse 72   Temp 98.9 °F (37.2 °C) (Temporal)   Resp 16   Ht 5' 9\" (1.753 m)   Wt 207 lb (93.9 kg)   SpO2 98%   BMI 30.57 kg/m² General: alert, chronically ill-appearing,  in no apparent distress or pain  CVS: IRIR, distinct S1 and S2  Lungs:clear to ausculation bilaterally, no crackles, wheezing or rhonchi noted  Extremities: no edema, no cyanosis, MSK grossly normal  Skin: + maculopapular rash on bilateral arms, back and trunk. No lesions in between fingers/toes/axilla  Psych:  mood and affect normal    Results for orders placed or performed during the hospital encounter of 03/10/22   CBC WITH AUTOMATED DIFF   Result Value Ref Range    WBC 4.7 4.6 - 13.2 K/uL    RBC 3.68 (L) 4.35 - 5.65 M/uL    HGB 11.4 (L) 13.0 - 16.0 g/dL    HCT 33.9 (L) 36.0 - 48.0 %    MCV 92.1 78.0 - 100.0 FL    MCH 31.0 24.0 - 34.0 PG    MCHC 33.6 31.0 - 37.0 g/dL    RDW 14.0 11.6 - 14.5 %    PLATELET 925 791 - 798 K/uL    MPV 10.6 9.2 - 11.8 FL    NRBC 0.0 0  WBC    ABSOLUTE NRBC 0.00 0.00 - 0.01 K/uL    NEUTROPHILS 71 40 - 73 %    LYMPHOCYTES 17 (L) 21 - 52 %    MONOCYTES 9 3 - 10 %    EOSINOPHILS 3 0 - 5 %    BASOPHILS 1 0 - 2 %    IMMATURE GRANULOCYTES 0 0.0 - 0.5 %    ABS. NEUTROPHILS 3.3 1.8 - 8.0 K/UL    ABS. LYMPHOCYTES 0.8 (L) 0.9 - 3.6 K/UL    ABS. MONOCYTES 0.4 0.05 - 1.2 K/UL    ABS. EOSINOPHILS 0.1 0.0 - 0.4 K/UL    ABS. BASOPHILS 0.0 0.0 - 0.1 K/UL    ABS. IMM. GRANS. 0.0 0.00 - 0.04 K/UL    DF AUTOMATED     METABOLIC PANEL, COMPREHENSIVE   Result Value Ref Range    Sodium 136 136 - 145 mmol/L    Potassium 3.2 (L) 3.5 - 5.5 mmol/L    Chloride 98 (L) 100 - 111 mmol/L    CO2 27 21 - 32 mmol/L    Anion gap 11 3.0 - 18 mmol/L    Glucose 123 (H) 74 - 99 mg/dL    BUN 17 7.0 - 18 MG/DL    Creatinine 5.37 (H) 0.6 - 1.3 MG/DL    BUN/Creatinine ratio 3 (L) 12 - 20      GFR est AA 14 (L) >60 ml/min/1.73m2    GFR est non-AA 11 (L) >60 ml/min/1.73m2    Calcium 9.4 8.5 - 10.1 MG/DL    Bilirubin, total 0.5 0.2 - 1.0 MG/DL    ALT (SGPT) 20 16 - 61 U/L    AST (SGOT) 16 10 - 38 U/L    Alk.  phosphatase 106 45 - 117 U/L    Protein, total 7.0 6.4 - 8.2 g/dL Albumin 3.1 (L) 3.4 - 5.0 g/dL    Globulin 3.9 2.0 - 4.0 g/dL    A-G Ratio 0.8 0.8 - 1.7     TROPONIN-HIGH SENSITIVITY   Result Value Ref Range    Troponin-High Sensitivity 46 0 - 78 ng/L   NT-PRO BNP   Result Value Ref Range    NT pro-BNP 30,024 (H) 0 - 450 PG/ML   MAGNESIUM   Result Value Ref Range    Magnesium 2.1 1.6 - 2.6 mg/dL   TSH 3RD GENERATION   Result Value Ref Range    TSH 0.91 0.36 - 3.74 uIU/mL   EKG, 12 LEAD, INITIAL   Result Value Ref Range    Ventricular Rate 131 BPM    QRS Duration 96 ms    Q-T Interval 360 ms    QTC Calculation (Bezet) 531 ms    Calculated R Axis -12 degrees    Calculated T Axis 163 degrees    Diagnosis       Atrial fibrillation with rapid ventricular response  Minimal voltage criteria for LVH, may be normal variant ( R in aVL )  Marked ST abnormality, possible inferior subendocardial injury  Abnormal ECG  When compared with ECG of 17-FEB-2022 11:25,  T wave inversion no longer evident in Inferior leads  Confirmed by Jonathon Crum ([id number]) on 3/10/2022 1:59:58 PM           ASSESSMENT/PLAN  Diagnoses and all orders for this visit:    ESRD ([facility]  On dialysis following nephrology  Was taken out of transplant list temporarily until DM is well controlled  a1c 2/2022 10.8  He is now following endo dr. Jan Quach, recent addition of tradjenta to glipizide    Severe nonproliferative diabetic retinopathy of both eyes with macular edema associated with type 2 diabetes mellitus ([address]  a1c 10.8  See above  -     LIPID PANEL; Future  -     METABOLIC PANEL, BASIC; Future  -     HEMOGLOBIN A1C WITH EAG;  Future  Keep appt with endo 5/2    Dyslipidemia  Cont statin    Coronary artery disease  S/p LAD stent  Asymptomatic   On asa,  bb, ace  Following cardiology    Essentially hypertension  Currently on norvasc, metoprolol, lisinopril  Nephrology dr. Rachele Atkins is managing      Legally blind     Atrial fibrillation with rapid ventricular response ([facility]   s/p cardioversion 4/2/2022  Now rate controlled, anticoag on eliquis  On amiodarone/metoprolol  Reminded on labs TSH/LFTs  Keep appt 5/11/2022     Anemia of chronic disease  Following nephrology    Secondary hyperparathyroidism of renal origin ([facility]    Type 2 diabetes with nephropathy (HCC)     Rash  Contact derm from new detergent?, sounding less like a drug rash reaction as new meds started 3 weeks ago  Start benadryl tonight and topical steroids  -     REFERRAL TO DERMATOLOGY    Other orders  -     triamcinolone (ARISTOCORT) 0.5 % topical cream; Apply  to affected area two (2) times a day. use thin layer          Follow-up Disposition:  Return in 6 weeks  or if symptoms worsen or fail to improve. plan on MWV then  Fasting labs prior    Patient understands plan of care. Patient has provided input and agrees with goals.

## 2022-04-20 NOTE — PROGRESS NOTES
1. \"Have you been to the ER, urgent care clinic since your last visit? Hospitalized since your last visit? \" Yes Where: [facility] 4/1-4/2    2. \"Have you seen or consulted any other health care providers outside of the [address] [place] [address] since your last visit? \" Yes Where: endocrinology  Dr. Javy Xiao    3. For patients aged 39-70: Has the patient had a colonoscopy / FIT/ Cologuard?  Yes - no Care Gap present

## 2022-04-26 ENCOUNTER — HOSPITAL ENCOUNTER (EMERGENCY)
Age: 52
Discharge: HOME | End: 2022-04-26
Attending: STUDENT IN AN ORGANIZED HEALTH CARE EDUCATION/TRAINING PROGRAM
Payer: MEDICARE

## 2022-04-26 ENCOUNTER — APPOINTMENT (OUTPATIENT)
Dept: GENERAL RADIOLOGY | Age: 52
End: 2022-04-26
Attending: STUDENT IN AN ORGANIZED HEALTH CARE EDUCATION/TRAINING PROGRAM
Payer: MEDICARE

## 2022-04-26 VITALS
OXYGEN SATURATION: 99 % | TEMPERATURE: 97.2 F | SYSTOLIC BLOOD PRESSURE: 114 MMHG | HEIGHT: 71 IN | DIASTOLIC BLOOD PRESSURE: 75 MMHG | HEART RATE: 92 BPM | RESPIRATION RATE: 15 BRPM | WEIGHT: 190 LBS | BODY MASS INDEX: 26.6 KG/M2

## 2022-04-26 DIAGNOSIS — I95.9 HYPOTENSION, UNSPECIFIED HYPOTENSION TYPE: Primary | ICD-10-CM

## 2022-04-26 LAB
ALBUMIN SERPL-MCNC: 3.2 G/DL (ref 3.4–5)
ALBUMIN/GLOB SERPL: 1 {RATIO} (ref 0.8–1.7)
ALP SERPL-CCNC: 62 U/L (ref 45–117)
ALT SERPL-CCNC: 16 U/L (ref 16–61)
ANION GAP SERPL CALC-SCNC: 13 MMOL/L (ref 3–18)
AST SERPL-CCNC: 15 U/L (ref 10–38)
BASOPHILS # BLD: 0 K/UL (ref 0–0.1)
BASOPHILS NFR BLD: 1 % (ref 0–2)
BILIRUB SERPL-MCNC: 0.4 MG/DL (ref 0.2–1)
BNP SERPL-MCNC: ABNORMAL PG/ML (ref 0–450)
BUN SERPL-MCNC: 32 MG/DL (ref 7–18)
BUN/CREAT SERPL: 4 (ref 12–20)
CALCIUM SERPL-MCNC: 9.5 MG/DL (ref 8.5–10.1)
CHLORIDE SERPL-SCNC: 101 MMOL/L (ref 100–111)
CO2 SERPL-SCNC: 24 MMOL/L (ref 21–32)
CREAT SERPL-MCNC: 7.33 MG/DL (ref 0.6–1.3)
DIFFERENTIAL METHOD BLD: ABNORMAL
EOSINOPHIL # BLD: 0.4 K/UL (ref 0–0.4)
EOSINOPHIL NFR BLD: 9 % (ref 0–5)
ERYTHROCYTE [DISTWIDTH] IN BLOOD BY AUTOMATED COUNT: 13.6 % (ref 11.6–14.5)
GLOBULIN SER CALC-MCNC: 3.1 G/DL (ref 2–4)
GLUCOSE BLD STRIP.AUTO-MCNC: 89 MG/DL (ref 70–110)
GLUCOSE SERPL-MCNC: 98 MG/DL (ref 74–99)
HCT VFR BLD AUTO: 34.3 % (ref 36–48)
HGB BLD-MCNC: 11.2 G/DL (ref 13–16)
IMM GRANULOCYTES # BLD AUTO: 0 K/UL (ref 0–0.04)
IMM GRANULOCYTES NFR BLD AUTO: 1 % (ref 0–0.5)
LYMPHOCYTES # BLD: 0.8 K/UL (ref 0.9–3.6)
LYMPHOCYTES NFR BLD: 18 % (ref 21–52)
MAGNESIUM SERPL-MCNC: 2 MG/DL (ref 1.6–2.6)
MCH RBC QN AUTO: 29.4 PG (ref 24–34)
MCHC RBC AUTO-ENTMCNC: 32.7 G/DL (ref 31–37)
MCV RBC AUTO: 90 FL (ref 78–100)
MONOCYTES # BLD: 0.3 K/UL (ref 0.05–1.2)
MONOCYTES NFR BLD: 6 % (ref 3–10)
NEUTS SEG # BLD: 2.9 K/UL (ref 1.8–8)
NEUTS SEG NFR BLD: 65 % (ref 40–73)
NRBC # BLD: 0 K/UL (ref 0–0.01)
NRBC BLD-RTO: 0 PER 100 WBC
PLATELET # BLD AUTO: 156 K/UL (ref 135–420)
PMV BLD AUTO: 10.4 FL (ref 9.2–11.8)
POTASSIUM SERPL-SCNC: 3.9 MMOL/L (ref 3.5–5.5)
PROT SERPL-MCNC: 6.3 G/DL (ref 6.4–8.2)
RBC # BLD AUTO: 3.81 M/UL (ref 4.35–5.65)
SODIUM SERPL-SCNC: 138 MMOL/L (ref 136–145)
TROPONIN-HIGH SENSITIVITY: 45 NG/L (ref 0–78)
WBC # BLD AUTO: 4.4 K/UL (ref 4.6–13.2)

## 2022-04-26 PROCEDURE — 85025 COMPLETE CBC W/AUTO DIFF WBC: CPT

## 2022-04-26 PROCEDURE — 74011250636 HC RX REV CODE- 250/636: Performed by: STUDENT IN AN ORGANIZED HEALTH CARE EDUCATION/TRAINING PROGRAM

## 2022-04-26 PROCEDURE — 84484 ASSAY OF TROPONIN QUANT: CPT

## 2022-04-26 PROCEDURE — 96360 HYDRATION IV INFUSION INIT: CPT

## 2022-04-26 PROCEDURE — 83735 ASSAY OF MAGNESIUM: CPT

## 2022-04-26 PROCEDURE — 99285 EMERGENCY DEPT VISIT HI MDM: CPT

## 2022-04-26 PROCEDURE — 83880 ASSAY OF NATRIURETIC PEPTIDE: CPT

## 2022-04-26 PROCEDURE — 71046 X-RAY EXAM CHEST 2 VIEWS: CPT

## 2022-04-26 PROCEDURE — 93005 ELECTROCARDIOGRAM TRACING: CPT

## 2022-04-26 PROCEDURE — 82962 GLUCOSE BLOOD TEST: CPT

## 2022-04-26 PROCEDURE — 80053 COMPREHEN METABOLIC PANEL: CPT

## 2022-04-26 RX ORDER — METOPROLOL TARTRATE 25 MG/1
25 TABLET, FILM COATED ORAL 2 TIMES DAILY
Qty: 60 TABLET | Refills: 2 | Status: SHIPPED | OUTPATIENT
Start: 2022-04-26 | End: 2022-07-24

## 2022-04-26 RX ADMIN — SODIUM CHLORIDE 1000 ML: 900 INJECTION, SOLUTION INTRAVENOUS at 12:05

## 2022-04-26 NOTE — ED PROVIDER NOTES
EMERGENCY DEPARTMENT HISTORY AND PHYSICAL EXAM      Date: 4/26/2022  Patient Name: Diandra Saldivar    History of Presenting Illness     Chief Complaint   Patient presents with    Hypotension    Vomiting       History Provided By: Patient    HPI: Diandra Saldivar, 54 y.o. male history of end-stage renal disease on dialysis, atrial fibrillation, hypertension, CAD presenting to the ED via EMS after experiencing episode of hypotension during dialysis today. Per EMS report, patient's blood pressure dropped to the 17M systolic or receiving dialysis. Patient was three fourths of the way through dialysis when this occurred. Patient denies any chest pain, shortness of breath, abdominal pain, nausea, vomiting. Says he feels mildly weak and has been having some palpitations as well. PCP: Mariela Mcclain MD    No current facility-administered medications on file prior to encounter. Current Outpatient Medications on File Prior to Encounter   Medication Sig Dispense Refill    lisinopriL (PRINIVIL, ZESTRIL) 10 mg tablet Take  by mouth daily.  triamcinolone (ARISTOCORT) 0.5 % topical cream Apply  to affected area two (2) times a day. use thin layer 45 g 0    linaGLIPtin (Tradjenta) 5 mg tablet Take 1 Tablet by mouth daily.  metoprolol tartrate (LOPRESSOR) 25 mg tablet Take 1 Tablet by mouth two (2) times a day. 60 Tablet 2    amiodarone (CORDARONE) 200 mg tablet Take 1 Tablet by mouth daily. 30 Tablet 0    glipiZIDE SR (GLUCOTROL XL) 2.5 mg CR tablet Take 1 Tablet by mouth daily. 90 Tablet 0    amLODIPine (NORVASC) 5 mg tablet Take 1 Tablet by mouth daily. 90 Tablet 0    glucose blood VI test strips (Accu-Chek Beatrice Plus test strp) strip Use as directed 100 Strip 2    Eliquis 2.5 mg tablet TAKE 1 TABLET BY MOUTH EVERY 12 HOURS 180 Tablet 3    aspirin delayed-release 81 mg tablet Take 1 Tablet by mouth daily. 90 Tablet 3    doxercalciferol (HECTOROL IV) 2 mcg.  doxercalciferol (HECTOROL IV) 4 mcg.  alum-mag hydroxide-simeth (Maalox Advanced) 200-200-20 mg/5 mL susp Take  by mouth.  atorvastatin (LIPITOR) 80 mg tablet Take 1 Tablet by mouth nightly. 30 Tablet 0    nitroglycerin (NITROLINGUAL) 400 mcg/spray spray 1 Spray by SubLINGual route every five (5) minutes as needed for Chest Pain. 1 Bottle 2    Blood-Glucose Meter monitoring kit Check fasting glucose 1 Kit 0    sucroferric oxyhydroxide (VELPHORO) 500 mg chew chewable tablet Take 500 mg by mouth three (3) times daily (with meals). Past History     Past Medical History:  Past Medical History:   Diagnosis Date    Abnormal nuclear cardiac imaging test 10/08/2012    Fixed anteroseptal, anteroapical defect c/w prior infarction. No ischemia. Mid & distal anteroseptal, anteroapical WMA. LVE. EF 44%.  Anemia     dr. Nohemi Elails doubt amyloid or multiple myeloma. candidate for procirt if Hg <10    Benign hypertensive     Blindness of right eye 01/2013    legally blind    CAD (coronary artery disease)     Cardiomyopathy ejection fraction of 40%     CKD (chronic kidney disease), stage IV ([address]     to see Dr. Sabrina Kelly 12/1/12    Congestive heart failure ([address]     Diabetes mellitus ([address]     Diabetic retinopathy ([address]     Diabetic retinopathy ([address]     Dr. Juliet Torres- injections    [facility]     History of echocardiogram 04/22/2014    LVE. EF 55% (prev 40-45% on echo of 1/27/12). No WMA. Mild-mod conc LVH. Gr 3 DDfx. Marked LAE. Mild SHANTEL. Mild MR.    Hypertension     Pulmonary hypertension ([address]     Renal Ultrasound 1/3/12    Right kidney isoechoic w/respect to liver. Sm left-sided pleural effusion    S/P cardiac cath 10/16/2012    LM patent. pLAD 95% (3.5 x 12-mm Braselton stent, resid 0$%). LCX mild. Past Surgical History:  Past Surgical History:   Procedure Laterality Date    HX CORONARY STENT PLACEMENT      one    HX LAPAROTOMY  2/2012    bowel obstruction with removal segment of small bowel.   Done by Riblet.  HX LAPAROTOMY  infancy    whole in colon and had repair at that time.  HX OTHER SURGICAL  06/20/2013    left eye retna attatchment    HX RETINAL DETACHMENT REPAIR      august 2012    GA REPAIR ING HERNIA,5+Y/O,REDUCIBL Left 05/02/2019    Dr. Jose Gresham       Family History:  Family History   Problem Relation Age of Onset    Diabetes Mother     Hypertension Mother     Kidney Disease Mother     High Cholesterol Father     Diabetes Brother         pre diabetic       Social History:  Social History     Tobacco Use    Smoking status: Never Smoker    Smokeless tobacco: Never Used   Substance Use Topics    Alcohol use: Not Currently     Alcohol/week: 4.2 standard drinks     Types: 5 Cans of beer per week    Drug use: No       Allergies:  No Known Allergies      Review of Systems   Review of Systems   Constitutional: Negative for chills, diaphoresis and fever. Eyes: Negative for visual disturbance. Respiratory: Negative for shortness of breath. Cardiovascular: Positive for palpitations. Negative for chest pain and leg swelling. Gastrointestinal: Negative for abdominal pain, diarrhea, nausea and vomiting. Skin: Negative for rash. Neurological: Positive for weakness and light-headedness. Negative for numbness. All other systems reviewed and are negative. Physical Exam   Physical Exam  Vitals and nursing note reviewed. Constitutional:       Comments: Tired appearing   HENT:      Head: Normocephalic and atraumatic. Eyes:      Extraocular Movements: Extraocular movements intact. Cardiovascular:      Rate and Rhythm: Normal rate and regular rhythm. Pulses: Normal pulses. Heart sounds: Normal heart sounds. Pulmonary:      Effort: Pulmonary effort is normal.      Breath sounds: Normal breath sounds. Abdominal:      General: Abdomen is flat. Tenderness: There is no abdominal tenderness. Musculoskeletal:      Right lower leg: No edema. Left lower leg: No edema. Skin:     General: Skin is warm and dry. Capillary Refill: Capillary refill takes less than 2 seconds. Neurological:      Mental Status: He is alert and oriented to person, place, and time.    Psychiatric:         Mood and Affect: Mood normal.         Behavior: Behavior normal.         Diagnostic Study Results     Labs -     Recent Results (from the past 12 hour(s))   GLUCOSE, POC    Collection Time: 04/26/22 10:15 AM   Result Value Ref Range    Glucose (POC) 89 70 - 110 mg/dL   EKG, 12 LEAD, INITIAL    Collection Time: 04/26/22 10:32 AM   Result Value Ref Range    Ventricular Rate 115 BPM    Atrial Rate 535 BPM    QRS Duration 90 ms    Q-T Interval 378 ms    QTC Calculation (Bezet) 522 ms    Calculated R Axis -21 degrees    Calculated T Axis 74 degrees    Diagnosis       Atrial fibrillation with rapid ventricular response with premature   ventricular or aberrantly conducted complexes  Moderate voltage criteria for LVH, may be normal variant  Septal infarct , age undetermined  Marked ST abnormality, possible inferior subendocardial injury  Abnormal ECG  When compared with ECG of 10-MAR-2022 11:31,  Non-specific change in ST segment in Anterior leads  ST less depressed in Lateral leads  T wave inversion now evident in Inferior leads     EKG, 12 LEAD, SUBSEQUENT    Collection Time: 04/26/22 10:33 AM   Result Value Ref Range    Ventricular Rate 112 BPM    Atrial Rate 127 BPM    QRS Duration 82 ms    Q-T Interval 380 ms    QTC Calculation (Bezet) 518 ms    Calculated R Axis -24 degrees    Calculated T Axis 44 degrees    Diagnosis       Atrial fibrillation with rapid ventricular response  Moderate voltage criteria for LVH, may be normal variant  Cannot rule out Septal infarct (cited on or before 26-APR-2022)  Abnormal ECG  When compared with ECG of 26-APR-2022 10:32,  T wave inversion no longer evident in Lateral leads     CBC WITH AUTOMATED DIFF    Collection Time: 04/26/22 10:50 AM   Result Value Ref Range WBC 4.4 (L) 4.6 - 13.2 K/uL    RBC 3.81 (L) 4.35 - 5.65 M/uL    HGB 11.2 (L) 13.0 - 16.0 g/dL    HCT 34.3 (L) 36.0 - 48.0 %    MCV 90.0 78.0 - 100.0 FL    MCH 29.4 24.0 - 34.0 PG    MCHC 32.7 31.0 - 37.0 g/dL    RDW 13.6 11.6 - 14.5 %    PLATELET 977 405 - 752 K/uL    MPV 10.4 9.2 - 11.8 FL    NRBC 0.0 0  WBC    ABSOLUTE NRBC 0.00 0.00 - 0.01 K/uL    NEUTROPHILS 65 40 - 73 %    LYMPHOCYTES 18 (L) 21 - 52 %    MONOCYTES 6 3 - 10 %    EOSINOPHILS 9 (H) 0 - 5 %    BASOPHILS 1 0 - 2 %    IMMATURE GRANULOCYTES 1 (H) 0.0 - 0.5 %    ABS. NEUTROPHILS 2.9 1.8 - 8.0 K/UL    ABS. LYMPHOCYTES 0.8 (L) 0.9 - 3.6 K/UL    ABS. MONOCYTES 0.3 0.05 - 1.2 K/UL    ABS. EOSINOPHILS 0.4 0.0 - 0.4 K/UL    ABS. BASOPHILS 0.0 0.0 - 0.1 K/UL    ABS. IMM. GRANS. 0.0 0.00 - 0.04 K/UL    DF AUTOMATED     METABOLIC PANEL, COMPREHENSIVE    Collection Time: 04/26/22 10:50 AM   Result Value Ref Range    Sodium 138 136 - 145 mmol/L    Potassium 3.9 3.5 - 5.5 mmol/L    Chloride 101 100 - 111 mmol/L    CO2 24 21 - 32 mmol/L    Anion gap 13 3.0 - 18 mmol/L    Glucose 98 74 - 99 mg/dL    BUN 32 (H) 7.0 - 18 MG/DL    Creatinine 7.33 (H) 0.6 - 1.3 MG/DL    BUN/Creatinine ratio 4 (L) 12 - 20      GFR est AA 10 (L) >60 ml/min/1.73m2    GFR est non-AA 8 (L) >60 ml/min/1.73m2    Calcium 9.5 8.5 - 10.1 MG/DL    Bilirubin, total 0.4 0.2 - 1.0 MG/DL    ALT (SGPT) 16 16 - 61 U/L    AST (SGOT) 15 10 - 38 U/L    Alk.  phosphatase 62 45 - 117 U/L    Protein, total 6.3 (L) 6.4 - 8.2 g/dL    Albumin 3.2 (L) 3.4 - 5.0 g/dL    Globulin 3.1 2.0 - 4.0 g/dL    A-G Ratio 1.0 0.8 - 1.7     NT-PRO BNP    Collection Time: 04/26/22 10:50 AM   Result Value Ref Range    NT pro-BNP 16,800 (H) 0 - 450 PG/ML   TROPONIN-HIGH SENSITIVITY    Collection Time: 04/26/22 10:50 AM   Result Value Ref Range    Troponin-High Sensitivity 45 0 - 78 ng/L   MAGNESIUM    Collection Time: 04/26/22 10:50 AM   Result Value Ref Range    Magnesium 2.0 1.6 - 2.6 mg/dL       Radiologic Studies -   XR CHEST PA LAT   Final Result   Mild vascular congestion        CT Results  (Last 48 hours)    None        CXR Results  (Last 48 hours)               04/26/22 1130  XR CHEST PA LAT Final result    Impression:  Mild vascular congestion       Narrative:  PA and lateral CXR:       HISTORY: Palpitation and dizziness. Comparison March 10, 2022. Cardiac silhouette is normal in size. Mild vascular cephalization. No edema. No   consolidation. No pleural effusion. Medical Decision Making   I am the first provider for this patient. I reviewed the vital signs, available nursing notes, past medical history, past surgical history, family history and social history. Vital Signs-Reviewed the patient's vital signs. Patient Vitals for the past 12 hrs:   Temp Pulse Resp BP SpO2   04/26/22 1313 -- 92 15 114/75 99 %   04/26/22 1210 -- 98 16 (!) 100/53 98 %   04/26/22 1152 -- 95 15 (!) 80/58 100 %   04/26/22 1100 -- (!) 109 15 -- 100 %   04/26/22 1035 97.2 °F (36.2 °C) (!) 133 16 100/68 99 %       EKG interpretation: (Preliminary)  Atrial fibrillation at rate of 112 bpm.  Normal QRS, . Some evidence of LVH. Normal axis    Records Reviewed: Old Medical Records    Provider Notes (Medical Decision Making):     82-year-old male with history of atrial fibrillation and ESRD on dialysis presenting to the ED after an episode of hypotension occurred during his dialysis session today. Patient currently denying any symptoms of chest pain, shortness of breath, fevers, leg swelling. He is endorsing some palpitations. Cardiac examination shows irregularly irregular rhythm, pulmonary examination unremarkable. In the ED his blood pressure dropped to as low as 80/58. He was given 1 L saline bolus and his blood pressures have been consistently 110s to 120s at the time of discharge. White count 4.4, hemoglobin 11.2. CBC essentially unremarkable.   Creatinine elevated at 7.33, however he is an HD patient troponin within normal meds at 45. BNP 16,800, however this lower than previous values in the past.    Patient's episode of hypotension was likely secondary to hypovolemia during dialysis. I like that the patient is not having any ACS as evidenced by his normal troponin. Also reassured her that patient's blood pressure improved with 1 L of normal saline, pressures have been in the 110s to 120s. Results and recommendations were relayed to the patient, as well as return precautions. He understands and is agreeable plan. ED Course:   Initial assessment performed. The patients presenting problems have been discussed, and they are in agreement with the care plan formulated and outlined with them. I have encouraged them to ask questions as they arise throughout their visit. Disposition:  Home    DISCHARGE PLAN:  1. Current Discharge Medication List        2. Follow-up Information     Follow up With Specialties Details Why Contact Info    Joey Calhoun MD Family Medicine Schedule an appointment as soon as possible for a visit   [address] [place] [address]  [phone]          3. Return to ED if worse     Diagnosis     Clinical Impression:   1. Hypotension, unspecified hypotension type        Attestations:    Bart Frias MD    Please note that this dictation was completed with [email or web address], the computer voice recognition software. Quite often unanticipated grammatical, syntax, homophones, and other interpretive errors are inadvertently transcribed by the computer software. Please disregard these errors. Please excuse any errors that have escaped final proofreading. Thank you.

## 2022-04-26 NOTE — ED TRIAGE NOTES
Pt was in dialysis with 50 minutes left and he became confused, diaphoretic, and hypotensive. 's and afib. New medication change . ..doctor stopped his carvedilol and put him on metoprolol.     Pt vomiting upon arrival.

## 2022-04-26 NOTE — DISCHARGE INSTRUCTIONS
You been evaluated today after experiencing low blood pressure at dialysis. You most likely experienced the low blood pressure because of the dialysis session and being volume down. Your blood pressure is improved in the ER with fluids. Please return to the ER if you begin to experience any chest pain, shortness of breath, worsening palpitations, increased fainting episodes, or any other concerning symptoms. Please be sure to take your atrial fibrillation medicines at home.

## 2022-04-27 LAB
ATRIAL RATE: 127 BPM
ATRIAL RATE: 535 BPM
CALCULATED R AXIS, ECG10: -21 DEGREES
CALCULATED R AXIS, ECG10: -24 DEGREES
CALCULATED T AXIS, ECG11: 44 DEGREES
CALCULATED T AXIS, ECG11: 74 DEGREES
DIAGNOSIS, 93000: NORMAL
DIAGNOSIS, 93000: NORMAL
Q-T INTERVAL, ECG07: 378 MS
Q-T INTERVAL, ECG07: 380 MS
QRS DURATION, ECG06: 82 MS
QRS DURATION, ECG06: 90 MS
QTC CALCULATION (BEZET), ECG08: 518 MS
QTC CALCULATION (BEZET), ECG08: 522 MS
VENTRICULAR RATE, ECG03: 112 BPM
VENTRICULAR RATE, ECG03: 115 BPM

## 2022-05-03 ENCOUNTER — APPOINTMENT (OUTPATIENT)
Dept: GENERAL RADIOLOGY | Age: 52
End: 2022-05-03
Attending: STUDENT IN AN ORGANIZED HEALTH CARE EDUCATION/TRAINING PROGRAM
Payer: MEDICARE

## 2022-05-03 ENCOUNTER — HOSPITAL ENCOUNTER (EMERGENCY)
Age: 52
Discharge: HOME | End: 2022-05-03
Attending: STUDENT IN AN ORGANIZED HEALTH CARE EDUCATION/TRAINING PROGRAM
Payer: MEDICARE

## 2022-05-03 VITALS
HEART RATE: 93 BPM | HEIGHT: 70 IN | DIASTOLIC BLOOD PRESSURE: 75 MMHG | WEIGHT: 210 LBS | BODY MASS INDEX: 30.06 KG/M2 | RESPIRATION RATE: 17 BRPM | OXYGEN SATURATION: 99 % | SYSTOLIC BLOOD PRESSURE: 134 MMHG | TEMPERATURE: 98.1 F

## 2022-05-03 DIAGNOSIS — I48.91 ATRIAL FIBRILLATION WITH RAPID VENTRICULAR RESPONSE (HCC): Primary | ICD-10-CM

## 2022-05-03 DIAGNOSIS — Z99.2 ANEMIA DUE TO CHRONIC KIDNEY DISEASE, ON CHRONIC DIALYSIS (HCC): ICD-10-CM

## 2022-05-03 DIAGNOSIS — N18.6 ESRD (END STAGE RENAL DISEASE) ON DIALYSIS (HCC): ICD-10-CM

## 2022-05-03 DIAGNOSIS — D72.819 LEUKOPENIA, UNSPECIFIED TYPE: ICD-10-CM

## 2022-05-03 DIAGNOSIS — D63.1 ANEMIA DUE TO CHRONIC KIDNEY DISEASE, ON CHRONIC DIALYSIS (HCC): ICD-10-CM

## 2022-05-03 DIAGNOSIS — Z99.2 ESRD (END STAGE RENAL DISEASE) ON DIALYSIS (HCC): ICD-10-CM

## 2022-05-03 DIAGNOSIS — R07.9 ACUTE CHEST PAIN: ICD-10-CM

## 2022-05-03 DIAGNOSIS — N18.6 ANEMIA DUE TO CHRONIC KIDNEY DISEASE, ON CHRONIC DIALYSIS (HCC): ICD-10-CM

## 2022-05-03 LAB
ANION GAP SERPL CALC-SCNC: 9 MMOL/L (ref 3–18)
BASOPHILS # BLD: 0.1 K/UL (ref 0–0.1)
BASOPHILS NFR BLD: 1 % (ref 0–2)
BUN SERPL-MCNC: 32 MG/DL (ref 7–18)
BUN/CREAT SERPL: 4 (ref 12–20)
CALCIUM SERPL-MCNC: 9.9 MG/DL (ref 8.5–10.1)
CHLORIDE SERPL-SCNC: 100 MMOL/L (ref 100–111)
CO2 SERPL-SCNC: 28 MMOL/L (ref 21–32)
CREAT SERPL-MCNC: 7.23 MG/DL (ref 0.6–1.3)
DIFFERENTIAL METHOD BLD: ABNORMAL
EOSINOPHIL # BLD: 0.2 K/UL (ref 0–0.4)
EOSINOPHIL NFR BLD: 6 % (ref 0–5)
ERYTHROCYTE [DISTWIDTH] IN BLOOD BY AUTOMATED COUNT: 14.3 % (ref 11.6–14.5)
GLUCOSE SERPL-MCNC: 95 MG/DL (ref 74–99)
HCT VFR BLD AUTO: 37.3 % (ref 36–48)
HGB BLD-MCNC: 12.1 G/DL (ref 13–16)
IMM GRANULOCYTES # BLD AUTO: 0 K/UL (ref 0–0.04)
IMM GRANULOCYTES NFR BLD AUTO: 0 % (ref 0–0.5)
LYMPHOCYTES # BLD: 0.7 K/UL (ref 0.9–3.6)
LYMPHOCYTES NFR BLD: 16 % (ref 21–52)
MAGNESIUM SERPL-MCNC: 2.4 MG/DL (ref 1.6–2.6)
MCH RBC QN AUTO: 29.6 PG (ref 24–34)
MCHC RBC AUTO-ENTMCNC: 32.4 G/DL (ref 31–37)
MCV RBC AUTO: 91.2 FL (ref 78–100)
MONOCYTES # BLD: 0.4 K/UL (ref 0.05–1.2)
MONOCYTES NFR BLD: 9 % (ref 3–10)
NEUTS SEG # BLD: 2.8 K/UL (ref 1.8–8)
NEUTS SEG NFR BLD: 68 % (ref 40–73)
NRBC # BLD: 0 K/UL (ref 0–0.01)
NRBC BLD-RTO: 0 PER 100 WBC
PLATELET # BLD AUTO: 187 K/UL (ref 135–420)
PMV BLD AUTO: 10.1 FL (ref 9.2–11.8)
POTASSIUM SERPL-SCNC: 4 MMOL/L (ref 3.5–5.5)
RBC # BLD AUTO: 4.09 M/UL (ref 4.35–5.65)
SODIUM SERPL-SCNC: 137 MMOL/L (ref 136–145)
TROPONIN-HIGH SENSITIVITY: 46 NG/L (ref 0–78)
TROPONIN-HIGH SENSITIVITY: 60 NG/L (ref 0–78)
WBC # BLD AUTO: 4.2 K/UL (ref 4.6–13.2)

## 2022-05-03 PROCEDURE — 96374 THER/PROPH/DIAG INJ IV PUSH: CPT

## 2022-05-03 PROCEDURE — 80048 BASIC METABOLIC PNL TOTAL CA: CPT

## 2022-05-03 PROCEDURE — 83735 ASSAY OF MAGNESIUM: CPT

## 2022-05-03 PROCEDURE — 93005 ELECTROCARDIOGRAM TRACING: CPT

## 2022-05-03 PROCEDURE — 85025 COMPLETE CBC W/AUTO DIFF WBC: CPT

## 2022-05-03 PROCEDURE — 74011000250 HC RX REV CODE- 250: Performed by: STUDENT IN AN ORGANIZED HEALTH CARE EDUCATION/TRAINING PROGRAM

## 2022-05-03 PROCEDURE — 99285 EMERGENCY DEPT VISIT HI MDM: CPT

## 2022-05-03 PROCEDURE — 71045 X-RAY EXAM CHEST 1 VIEW: CPT

## 2022-05-03 PROCEDURE — 84484 ASSAY OF TROPONIN QUANT: CPT

## 2022-05-03 PROCEDURE — 94762 N-INVAS EAR/PLS OXIMTRY CONT: CPT

## 2022-05-03 RX ORDER — METOPROLOL TARTRATE 5 MG/5ML
5 INJECTION INTRAVENOUS ONCE
Status: COMPLETED | OUTPATIENT
Start: 2022-05-03 | End: 2022-05-03

## 2022-05-03 RX ADMIN — METOPROLOL TARTRATE 5 MG: 1 INJECTION, SOLUTION INTRAVENOUS at 10:56

## 2022-05-03 NOTE — ED NOTES
Purposeful rounding completed:    Side rails up x 2:  YES  Bed in low position and wheels locked: YES  Call bell within reach: YES  Comfort addressed: YES    Toileting needs addressed: YES  Plan of care reviewed/updated with patient and or family members: YES  IV site assessed: YES  Pain assessed and addressed: YES, 0    Patient laying in semi alexander position with pillow supporting head and neck, eyes closed, listening to music from his phone. No respiratory distress observed. Skin is warm and dry to touch.

## 2022-05-03 NOTE — ED PROVIDER NOTES
EMERGENCY DEPARTMENT HISTORY AND PHYSICAL EXAM      Date: 5/3/2022  Patient Name: Chris Arellano    History of Presenting Illness     Chief Complaint   Patient presents with    Palpitations       History (Context): Chris Arellano is a 54 y.o. male with a past medical history significant for diabetes, diabetic retinopathy, hypertension, ESRD, pulmonary hypertension, CAD comes into the ED today due to palpitations. Patient states that he has a history of A. fib and currently takes Eliquis. Has not missed any any doses of his anticoagulation and was at dialysis earlier today when he began to have palpitations. Patient states similar symptoms when he was previously seen and told he had A. fib with RVR. Patient denies any lightheadedness, dizziness, dyspnea, abdominal pain, nausea, or vomiting. He does admit to slight chest pain secondary to his symptoms. He denies taking any medication outside of what was prescribed for treatment of his symptoms prior to arrival.  Patient describes his symptoms as moderate in severity. He denies any alleviating or exacerbating factors. PCP: Evita Mclaughlin MD    Current Outpatient Medications   Medication Sig Dispense Refill    metoprolol tartrate (LOPRESSOR) 25 mg tablet Take 1 Tablet by mouth two (2) times a day. 60 Tablet 2    lisinopriL (PRINIVIL, ZESTRIL) 10 mg tablet Take  by mouth daily.  triamcinolone (ARISTOCORT) 0.5 % topical cream Apply  to affected area two (2) times a day. use thin layer 45 g 0    linaGLIPtin (Tradjenta) 5 mg tablet Take 1 Tablet by mouth daily.  amiodarone (CORDARONE) 200 mg tablet Take 1 Tablet by mouth daily. 30 Tablet 0    glipiZIDE SR (GLUCOTROL XL) 2.5 mg CR tablet Take 1 Tablet by mouth daily. 90 Tablet 0    amLODIPine (NORVASC) 5 mg tablet Take 1 Tablet by mouth daily.  90 Tablet 0    glucose blood VI test strips (Accu-Chek Beatrice Plus test strp) strip Use as directed 100 Strip 2    Eliquis 2.5 mg tablet TAKE 1 TABLET BY MOUTH EVERY 12 HOURS 180 Tablet 3    aspirin delayed-release 81 mg tablet Take 1 Tablet by mouth daily. 90 Tablet 3    doxercalciferol (HECTOROL IV) 2 mcg.  doxercalciferol (HECTOROL IV) 4 mcg.  alum-mag hydroxide-simeth (Maalox Advanced) 200-200-20 mg/5 mL susp Take  by mouth.  atorvastatin (LIPITOR) 80 mg tablet Take 1 Tablet by mouth nightly. 30 Tablet 0    nitroglycerin (NITROLINGUAL) 400 mcg/spray spray 1 Spray by SubLINGual route every five (5) minutes as needed for Chest Pain. 1 Bottle 2    Blood-Glucose Meter monitoring kit Check fasting glucose 1 Kit 0    sucroferric oxyhydroxide (VELPHORO) 500 mg chew chewable tablet Take 500 mg by mouth three (3) times daily (with meals). Past History     Past Medical History:   Past Medical History:   Diagnosis Date    Abnormal nuclear cardiac imaging test 10/08/2012    Fixed anteroseptal, anteroapical defect c/w prior infarction. No ischemia. Mid & distal anteroseptal, anteroapical WMA. LVE. EF 44%.  Anemia     dr. Reyna Fan doubt amyloid or multiple myeloma. candidate for procirt if Hg <10    Benign hypertensive     Blindness of right eye 01/2013    legally blind    CAD (coronary artery disease)     Cardiomyopathy ejection fraction of 40%     CKD (chronic kidney disease), stage IV ([facility]     to see Dr. Amanda Beverly 12/1/12    Congestive heart failure ([facility]     Diabetes mellitus ([facility]     Diabetic retinopathy ([facility]     Diabetic retinopathy ([facility]     Dr. Brianne Kelley- injections    Heart failure ([facility]     History of echocardiogram 04/22/2014    LVE. EF 55% (prev 40-45% on echo of 1/27/12). No WMA. Mild-mod conc LVH. Gr 3 DDfx. Marked LAE. Mild SHANTEL. Mild MR.    Hypertension     Pulmonary hypertension ([facility]     Renal Ultrasound 1/3/12    Right kidney isoechoic w/respect to liver. Sm left-sided pleural effusion    S/P cardiac cath 10/16/2012    LM patent. pLAD 95% (3.5 x 12-mm [place] stent, resid 0$%). LCX mild. Past Surgical History:  Past Surgical History:   Procedure Laterality Date    HX CORONARY STENT PLACEMENT      one    HX LAPAROTOMY  2/2012    bowel obstruction with removal segment of small bowel. Done by Liv.  HX LAPAROTOMY  infancy    whole in colon and had repair at that time.  HX OTHER SURGICAL  06/20/2013    left eye retna attatchment    HX RETINAL DETACHMENT REPAIR      august 2012    [place] REPAIR ING HERNIA,5+Y/O,REDUCIBL Left 05/02/2019    Dr. Bill Chung       Family History:  Family History   Problem Relation Age of Onset    Diabetes Mother     Hypertension Mother     Kidney Disease Mother     High Cholesterol Father     Diabetes Brother         pre diabetic       Social History:   Social History     Tobacco Use    Smoking status: Never Smoker    Smokeless tobacco: Never Used   Substance Use Topics    Alcohol use: Not Currently     Alcohol/week: 4.2 standard drinks     Types: 5 Cans of beer per week    Drug use: No       Allergies:  No Known Allergies    PMH, PSH, family history, social history, allergies reviewed with the patient with significant items noted above. Review of Systems   Review of Systems   Constitutional: Negative for chills and fever. HENT: Negative for sore throat. Eyes: Negative for visual disturbance. Negative recent vision problems   Respiratory: Negative for shortness of breath. Cardiovascular: Positive for chest pain and palpitations. Gastrointestinal: Negative for abdominal pain, diarrhea and nausea. Genitourinary: Negative for difficulty urinating. Musculoskeletal: Negative for myalgias. Skin: Negative for rash. Neurological: Negative for headaches. Negative altered level of consciousness   All other systems reviewed and are negative.       Physical Exam     Vitals:    05/03/22 1056 05/03/22 1100 05/03/22 1115 05/03/22 1117   BP: 121/89 128/70 (!) 149/67    Pulse: (!) 122 93 97    Resp:  15 16    Temp:  98.3 °F (36.8 °C) SpO2:  100% 99% 100%   Weight:       Height:           Physical Exam  Vitals and nursing note reviewed. Constitutional:       General: He is not in acute distress. Appearance: Normal appearance. He is not ill-appearing or toxic-appearing. HENT:      Head: Normocephalic and atraumatic. Mouth/Throat:      Mouth: Mucous membranes are moist.   Eyes:      General: No scleral icterus. Conjunctiva/sclera: Conjunctivae normal.   Cardiovascular:      Rate and Rhythm: Tachycardia present. Rhythm irregular. Pulmonary:      Effort: Pulmonary effort is normal. No respiratory distress. Abdominal:      General: There is no distension. Palpations: Abdomen is soft. Tenderness: There is no abdominal tenderness. Musculoskeletal:         General: No deformity. Normal range of motion. Cervical back: Normal range of motion and neck supple. Comments: AVF to the RUE   Skin:     General: Skin is warm and dry. Findings: No rash. Neurological:      General: No focal deficit present. Mental Status: He is alert and oriented to person, place, and time. Mental status is at baseline. Psychiatric:         Mood and Affect: Mood normal.         Behavior: Behavior normal.         Diagnostic Study Results     Labs -     Recent Results (from the past 12 hour(s))   CBC WITH AUTOMATED DIFF    Collection Time: 05/03/22 10:39 AM   Result Value Ref Range    WBC 4.2 (L) 4.6 - 13.2 K/uL    RBC 4.09 (L) 4.35 - 5.65 M/uL    HGB 12.1 (L) 13.0 - 16.0 g/dL    HCT 37.3 36.0 - 48.0 %    MCV 91.2 78.0 - 100.0 FL    MCH 29.6 24.0 - 34.0 PG    MCHC 32.4 31.0 - 37.0 g/dL    RDW 14.3 11.6 - 14.5 %    PLATELET 899 616 - 009 K/uL    MPV 10.1 9.2 - 11.8 FL    NRBC 0.0 0  WBC    ABSOLUTE NRBC 0.00 0.00 - 0.01 K/uL    NEUTROPHILS 68 40 - 73 %    LYMPHOCYTES 16 (L) 21 - 52 %    MONOCYTES 9 3 - 10 %    EOSINOPHILS 6 (H) 0 - 5 %    BASOPHILS 1 0 - 2 %    IMMATURE GRANULOCYTES 0 0.0 - 0.5 %    ABS.  NEUTROPHILS 2.8 1.8 - 8.0 K/UL    ABS. LYMPHOCYTES 0.7 (L) 0.9 - 3.6 K/UL    ABS. MONOCYTES 0.4 0.05 - 1.2 K/UL    ABS. EOSINOPHILS 0.2 0.0 - 0.4 K/UL    ABS. BASOPHILS 0.1 0.0 - 0.1 K/UL    ABS. IMM.  GRANS. 0.0 0.00 - 0.04 K/UL    DF AUTOMATED     METABOLIC PANEL, BASIC    Collection Time: 05/03/22 10:39 AM   Result Value Ref Range    Sodium 137 136 - 145 mmol/L    Potassium 4.0 3.5 - 5.5 mmol/L    Chloride 100 100 - 111 mmol/L    CO2 28 21 - 32 mmol/L    Anion gap 9 3.0 - 18 mmol/L    Glucose 95 74 - 99 mg/dL    BUN 32 (H) 7.0 - 18 MG/DL    Creatinine 7.23 (H) 0.6 - 1.3 MG/DL    BUN/Creatinine ratio 4 (L) 12 - 20      GFR est AA 10 (L) >60 ml/min/1.73m2    GFR est non-AA 8 (L) >60 ml/min/1.73m2    Calcium 9.9 8.5 - 10.1 MG/DL   TROPONIN-HIGH SENSITIVITY    Collection Time: 05/03/22 10:39 AM   Result Value Ref Range    Troponin-High Sensitivity 60 0 - 78 ng/L   MAGNESIUM    Collection Time: 05/03/22 10:39 AM   Result Value Ref Range    Magnesium 2.4 1.6 - 2.6 mg/dL   EKG, 12 LEAD, INITIAL    Collection Time: 05/03/22 11:06 AM   Result Value Ref Range    Ventricular Rate 95 BPM    Atrial Rate 250 BPM    QRS Duration 94 ms    Q-T Interval 336 ms    QTC Calculation (Bezet) 422 ms    Calculated R Axis -12 degrees    Calculated T Axis 69 degrees    Diagnosis       Atrial flutter with variable AV block  Minimal voltage criteria for LVH, may be normal variant  Anteroseptal infarct (cited on or before 26-APR-2022)  ACUTE MI  Abnormal ECG  When compared with ECG of 26-APR-2022 10:33,  Atrial flutter has replaced Atrial fibrillation  Questionable change in initial forces of Anterior leads  ST elevation has replaced ST depression in Anterior leads  Nonspecific T wave abnormality no longer evident in Inferior leads     TROPONIN-HIGH SENSITIVITY    Collection Time: 05/03/22  1:35 PM   Result Value Ref Range    Troponin-High Sensitivity 46 0 - 78 ng/L      Labs Reviewed   CBC WITH AUTOMATED DIFF - Abnormal; Notable for the following components: Result Value    WBC 4.2 (*)     RBC 4.09 (*)     HGB 12.1 (*)     LYMPHOCYTES 16 (*)     EOSINOPHILS 6 (*)     ABS. LYMPHOCYTES 0.7 (*)     All other components within normal limits   METABOLIC PANEL, BASIC - Abnormal; Notable for the following components:    BUN 32 (*)     Creatinine 7.23 (*)     BUN/Creatinine ratio 4 (*)     GFR est AA 10 (*)     GFR est non-AA 8 (*)     All other components within normal limits   TROPONIN-HIGH SENSITIVITY   MAGNESIUM   TROPONIN-HIGH SENSITIVITY       Radiologic Studies -   XR CHEST PORT   Final Result   Exam limitations as above. No radiographic evidence of acute cardiopulmonary process. CT Results  (Last 48 hours)    None        CXR Results  (Last 48 hours)               05/03/22 1007  XR CHEST PORT Final result    Impression:  Exam limitations as above. No radiographic evidence of acute cardiopulmonary process. Narrative:  EXAM: XR CHEST PORT       CLINICAL INDICATION/HISTORY: 54 years Male. palpitations, eval for   abnormalities. Additional History: None       TECHNIQUE: Frontal view of the chest       COMPARISON: 4/26/2022. FINDINGS:       Limited assessment of the lung apices secondary to apical lordotic positioning. Multiple lines project over the patient. The cardiac silhouette is at the upper limits of normal in size, other   appearance likely exaggerated by technique. Pulmonary vasculature appears within   normal limits. No confluent airspace opacity is appreciated. No definite evidence of pleural   effusion or pneumothorax. No acute osseous abnormality appreciated. The laboratory results, imaging results, and other diagnostic exams were reviewed in the EMR. Medical Decision Making   I am the first provider for this patient. I reviewed the vital signs, available nursing notes, past medical history, past surgical history, family history and social history.     Vital Signs-Reviewed the patient's vital signs. ED EKG interpretation:  Rhythm: atrial fib; and irregular. Rate (approx.): 95; Axis: normal; P wave: NA; QRS interval: normal ; ST/T wave: non-specific changes; Other findings: abnormal ekg. This EKG was interpreted by Hetal Gutierrez D.O. Records Reviewed: Personally, on initial evaluation    MDM:   Ismael Belle presents with complaint of palpitations and chest pain  DDX includes but is not limited to: A. fib with RVR, electrolyte abnormality, ACS    Patient overall well-appearing, no acute distress, and vital signs grossly within normal limits with exception to tachycardia. Will obtain lab work and imaging for further evaluation of patients complaint. Will provide patient a dose of IV metoprolol for further treatment of his symptoms. Will continue to monitor and evaluate patient while in the ED. Orders as below:  Orders Placed This Encounter    XR CHEST PORT    CBC WITH AUTOMATED DIFF    BASIC METABOLIC PANEL    TROPONIN-HIGH SENSITIVITY    MAGNESIUM    TROPONIN-HIGH SENSITIVITY    EKG, 12 LEAD, INITIAL    metoprolol (LOPRESSOR) injection 5 mg        ED Course:   ED Course as of 05/03/22 1435   Tue May 03, 2022   1411 Patient's repeat troponin downtrending. Patient without any further evidence of A. fib with RVR. Will discharge patient home with return precautions and follow-up recommendations. Patient verbalized understanding and is without any further questions. [DV]      ED Course User Index  [DV] Sharan Douglas DO           Procedures:  Procedures        Diagnosis and Disposition     CLINICAL IMPRESSION:  1. Atrial fibrillation with rapid ventricular response ([address]    2. ESRD (end stage renal disease) on dialysis ([address]    3. Acute chest pain    4.  Anemia due to chronic kidney disease, on chronic dialysis (HCC)    5. Leukopenia, unspecified type      Current Discharge Medication List          Disposition: Home    Patient condition at time of disposition: Stable    DISCHARGE NOTE:   Pt has been reexamined. Patient has no new complaints, changes, or physical findings. Care plan outlined and precautions discussed. Results were reviewed with the patient. All medications were reviewed with the patient. All of pt's questions and concerns were addressed. Alarm symptoms and return precautions associated with chief complaint and evaluation were reviewed with the patient in detail. The patient demonstrated adequate understanding. Patient was instructed to follow up with PCP, Cardiology as well as strict return precautions to the ED upon further deterioration. Patient is ready to go home. The patient is happy with this plan          Dragon Disclaimer     Please note that this dictation was completed with Zafin, the computer voice recognition software. Quite often unanticipated grammatical, syntax, homophones, and other interpretive errors are inadvertently transcribed by the computer software. Please disregard these errors. Please excuse any errors that have escaped final proofreading. Trinidad BOBBY.

## 2022-05-03 NOTE — ED NOTES
Patient discharged to home. All personal belongings given to patient. IV taken out. Refused wheel chair when offered. Ambulated to ER exit doors. Will be picked up family/friends.

## 2022-05-03 NOTE — ED NOTES
Assumed care of patient. Laying in semi alexander position with pillow supporting head and neck. Eyes open, skin is warm and dry to touch, quiet, calm. Denies pain, shortness of breath, palpitations. No respiratory distress observed. Cardiac, SpO2 and blood pressure monitored. Will continue to monitor.

## 2022-05-03 NOTE — ED NOTES
Purposeful rounding completed:    Side rails up x 2:  YES  Bed in low position and wheels locked: YES  Call bell within reach: YES  Comfort addressed: YES    Toileting needs addressed: YES  Plan of care reviewed/updated with patient and or family members: YES  IV site assessed: YES  Pain assessed and addressed: YES, 0    Patient laying in semi alexander position with pillow supporting head and neck, watching TV. No respiratory distress observed. Skin is warm and dry to touch.

## 2022-05-03 NOTE — ED TRIAGE NOTES
Brought in by EMS via stretcher. Patient receiving dialysis when he felt dizzy. Dialysis stopped early.   BP at 0900:  98/33   BP at 0911: 73/46,   BP en route to hospital: 104/78, 102, 100%

## 2022-05-04 LAB
ATRIAL RATE: 250 BPM
CALCULATED R AXIS, ECG10: -12 DEGREES
CALCULATED T AXIS, ECG11: 69 DEGREES
DIAGNOSIS, 93000: NORMAL
Q-T INTERVAL, ECG07: 336 MS
QRS DURATION, ECG06: 94 MS
QTC CALCULATION (BEZET), ECG08: 422 MS
VENTRICULAR RATE, ECG03: 95 BPM

## 2022-05-10 ENCOUNTER — PATIENT OUTREACH (OUTPATIENT)
Dept: CASE MANAGEMENT | Age: 52
End: 2022-05-10

## 2022-05-10 NOTE — PROGRESS NOTES
Complex Case Management      Date/Time:  5/10/2022 10:19 AM    Method of communication with patient:phone    [address] [facility] ([facility]) contacted the patient by telephone to perform Ambulatory Care Coordination. Verified name and  (PHI) with patient as identifiers. Provided introduction to self, and explanation of the Ambulatory Care Manager's role. Reviewed most recent clinic visit w/ patient who verbalized understanding. Patient given an opportunity to ask questions. Top Challenges reviewed with the patient   1. Hx of Pulm HTN, CHF, DM2, CKD on dialysis TThSat, CAD, HTN  2. Pt referred through [facility]  3. Pt w/ multiple recent ED visits as well as an admission all relating to A-Fib w/ RVR. Has upcoming appt w/ card next week. Will discuss ablation. 4. Pt states doing ok right now. Appreciates outreach. 5. No other questions/needs at this time. The patient agrees to contact the PCP office or the [facility] for questions related to their healthcare. Provided contact information for future reference. Disease Specific:   N/A    Home Health Active: No    DME Active: No    Barriers to care? lack of knowledge about disease, utilization of services    Advance Care Planning:   Does patient have an Advance Directive:  not on file; education provided     Medication(s):   Medication reconciliation was not performed with patient, who verbalizes understanding of administration of home medications. There were no barriers to obtaining medications identified at this time. Referral to Pharm D needed: no     Current Outpatient Medications   Medication Sig    metoprolol tartrate (LOPRESSOR) 25 mg tablet Take 1 Tablet by mouth two (2) times a day.  lisinopriL (PRINIVIL, ZESTRIL) 10 mg tablet Take  by mouth daily.  triamcinolone (ARISTOCORT) 0.5 % topical cream Apply  to affected area two (2) times a day. use thin layer    linaGLIPtin (Tradjenta) 5 mg tablet Take 1 Tablet by mouth daily.     amiodarone (CORDARONE) 200 mg tablet Take 1 Tablet by mouth daily.  glipiZIDE SR (GLUCOTROL XL) 2.5 mg CR tablet Take 1 Tablet by mouth daily.  amLODIPine (NORVASC) 5 mg tablet Take 1 Tablet by mouth daily.  glucose blood VI test strips (Accu-Chek Beatrice Plus test strp) strip Use as directed    Eliquis 2.5 mg tablet TAKE 1 TABLET BY MOUTH EVERY 12 HOURS    aspirin delayed-release 81 mg tablet Take 1 Tablet by mouth daily.  doxercalciferol (HECTOROL IV) 2 mcg.  doxercalciferol (HECTOROL IV) 4 mcg.  alum-mag hydroxide-simeth (Maalox Advanced) 200-200-20 mg/5 mL susp Take  by mouth.  atorvastatin (LIPITOR) 80 mg tablet Take 1 Tablet by mouth nightly.  nitroglycerin (NITROLINGUAL) 400 mcg/spray spray 1 Spray by SubLINGual route every five (5) minutes as needed for Chest Pain.  Blood-Glucose Meter monitoring kit Check fasting glucose    sucroferric oxyhydroxide (VELPHORO) 500 mg chew chewable tablet Take 500 mg by mouth three (3) times daily (with meals). No current facility-administered medications for this visit.        [facility] follow up appointment(s):   Future Appointments   Date Time Provider Jessica [place]   5/17/2022  1:40 PM Apolinar Nolen MD [facility]   6/1/2022 11:30 AM Deya Agee MD [facility]   8/17/2022 10:00 AM Robe LAI MD [facility]        Non-[facility] follow up appointment(s):     Goals Addressed                 This Visit's Progress     Attends follow up appointments on schedule        5/10/22 Patient will attend all scheduled appointments through 8/10/22       Knowledge and adherence of prescribed medication (ie. action, side effects, missed dose, etc.).        5/10/22 Pt will take all medications prescribed to be evaluated on each outreach through 8/10/22

## 2022-05-14 ENCOUNTER — HOSPITAL ENCOUNTER (EMERGENCY)
Age: 52
Discharge: HOME | End: 2022-05-14
Attending: EMERGENCY MEDICINE
Payer: MEDICARE

## 2022-05-14 VITALS
DIASTOLIC BLOOD PRESSURE: 64 MMHG | SYSTOLIC BLOOD PRESSURE: 108 MMHG | HEART RATE: 118 BPM | TEMPERATURE: 97.8 F | RESPIRATION RATE: 13 BRPM | OXYGEN SATURATION: 100 %

## 2022-05-14 DIAGNOSIS — I95.3 HEMODIALYSIS-ASSOCIATED HYPOTENSION: Primary | ICD-10-CM

## 2022-05-14 LAB
ALBUMIN SERPL-MCNC: 4.1 G/DL (ref 3.4–5)
ALBUMIN/GLOB SERPL: 0.9 {RATIO} (ref 0.8–1.7)
ALP SERPL-CCNC: 86 U/L (ref 45–117)
ALT SERPL-CCNC: 25 U/L (ref 16–61)
ANION GAP SERPL CALC-SCNC: 14 MMOL/L (ref 3–18)
AST SERPL-CCNC: 20 U/L (ref 10–38)
ATRIAL RATE: 107 BPM
BASOPHILS # BLD: 0 K/UL (ref 0–0.1)
BASOPHILS NFR BLD: 1 % (ref 0–2)
BILIRUB SERPL-MCNC: 0.6 MG/DL (ref 0.2–1)
BUN SERPL-MCNC: 23 MG/DL (ref 7–18)
BUN/CREAT SERPL: 4 (ref 12–20)
CALCIUM SERPL-MCNC: 10.9 MG/DL (ref 8.5–10.1)
CALCULATED R AXIS, ECG10: -27 DEGREES
CALCULATED T AXIS, ECG11: 92 DEGREES
CHLORIDE SERPL-SCNC: 98 MMOL/L (ref 100–111)
CO2 SERPL-SCNC: 25 MMOL/L (ref 21–32)
CREAT SERPL-MCNC: 5.96 MG/DL (ref 0.6–1.3)
DIAGNOSIS, 93000: NORMAL
DIFFERENTIAL METHOD BLD: ABNORMAL
EOSINOPHIL # BLD: 0.2 K/UL (ref 0–0.4)
EOSINOPHIL NFR BLD: 4 % (ref 0–5)
ERYTHROCYTE [DISTWIDTH] IN BLOOD BY AUTOMATED COUNT: 14.1 % (ref 11.6–14.5)
GLOBULIN SER CALC-MCNC: 4.5 G/DL (ref 2–4)
GLUCOSE SERPL-MCNC: 101 MG/DL (ref 74–99)
HCT VFR BLD AUTO: 42 % (ref 36–48)
HGB BLD-MCNC: 13.7 G/DL (ref 13–16)
IMM GRANULOCYTES # BLD AUTO: 0 K/UL (ref 0–0.04)
IMM GRANULOCYTES NFR BLD AUTO: 0 % (ref 0–0.5)
LYMPHOCYTES # BLD: 0.6 K/UL (ref 0.9–3.6)
LYMPHOCYTES NFR BLD: 11 % (ref 21–52)
MCH RBC QN AUTO: 29.7 PG (ref 24–34)
MCHC RBC AUTO-ENTMCNC: 32.6 G/DL (ref 31–37)
MCV RBC AUTO: 91.1 FL (ref 78–100)
MONOCYTES # BLD: 0.4 K/UL (ref 0.05–1.2)
MONOCYTES NFR BLD: 7 % (ref 3–10)
NEUTS SEG # BLD: 4.3 K/UL (ref 1.8–8)
NEUTS SEG NFR BLD: 77 % (ref 40–73)
NRBC # BLD: 0 K/UL (ref 0–0.01)
NRBC BLD-RTO: 0 PER 100 WBC
PLATELET # BLD AUTO: 207 K/UL (ref 135–420)
PMV BLD AUTO: 10.2 FL (ref 9.2–11.8)
POTASSIUM SERPL-SCNC: 4.1 MMOL/L (ref 3.5–5.5)
PROT SERPL-MCNC: 8.6 G/DL (ref 6.4–8.2)
Q-T INTERVAL, ECG07: 326 MS
QRS DURATION, ECG06: 100 MS
QTC CALCULATION (BEZET), ECG08: 447 MS
RBC # BLD AUTO: 4.61 M/UL (ref 4.35–5.65)
SODIUM SERPL-SCNC: 137 MMOL/L (ref 136–145)
VENTRICULAR RATE, ECG03: 113 BPM
WBC # BLD AUTO: 5.6 K/UL (ref 4.6–13.2)

## 2022-05-14 PROCEDURE — 99284 EMERGENCY DEPT VISIT MOD MDM: CPT

## 2022-05-14 PROCEDURE — 85025 COMPLETE CBC W/AUTO DIFF WBC: CPT

## 2022-05-14 PROCEDURE — 93005 ELECTROCARDIOGRAM TRACING: CPT

## 2022-05-14 PROCEDURE — 74011250636 HC RX REV CODE- 250/636: Performed by: EMERGENCY MEDICINE

## 2022-05-14 PROCEDURE — 80053 COMPREHEN METABOLIC PANEL: CPT

## 2022-05-14 RX ADMIN — SODIUM CHLORIDE 500 ML: 9 INJECTION, SOLUTION INTRAVENOUS at 11:54

## 2022-05-14 NOTE — ED NOTES
Received patient from EMS stretcher. Per EMS, patient was at dialysis and finished his chair time when he became hypotensive. He was given 250mL of NS. Patient is running uncontrolled A-Fib on monitor with HR ranging 100-133.

## 2022-05-14 NOTE — ED PROVIDER NOTES
EMERGENCY DEPARTMENT HISTORY AND PHYSICAL EXAM  This was created with voice recognition software and transcription errors may be present. 11:30 AM  Date: 5/14/2022  Patient Name: Haily Alonzo    History of Presenting Illness     Chief Complaint:    History Provided By:     HPI: Haliy Alonzo is a 54 y.o. male past medical history of anemia hypertension blindness in his right eye CKD on dialysis who presents with shortness of breath and tachycardia. Patient states he went to dialysis this morning had 3 L taken off and was lightheaded and feeling poorly so they put 250 back that initially made the symptoms go away but now he presents with still lightheadedness tachycardic with a heart rate in the 120s some blood pressures in the 80s. Chest pain shortness of breath nausea or vomiting    PCP: Gail Gaines MD      Past History     Past Medical History:  Past Medical History:   Diagnosis Date    Abnormal nuclear cardiac imaging test 10/08/2012    Fixed anteroseptal, anteroapical defect c/w prior infarction. No ischemia. Mid & distal anteroseptal, anteroapical WMA. LVE. EF 44%.  Anemia     dr. Amos Saab doubt amyloid or multiple myeloma. candidate for procirt if Hg <10    Benign hypertensive     Blindness of right eye 01/2013    legally blind    CAD (coronary artery disease)     Cardiomyopathy ejection fraction of 40%     CKD (chronic kidney disease), stage IV ([facility]     to see Dr. Alexia Deluca 12/1/12    Congestive heart failure ([facility]     Diabetes mellitus ([facility]     Diabetic retinopathy ([facility]     Diabetic retinopathy ([facility]     Dr. Dalton Lagunas- injections    Heart failure ([facility]     History of echocardiogram 04/22/2014    LVE. EF 55% (prev 40-45% on echo of 1/27/12). No WMA. Mild-mod conc LVH. Gr 3 DDfx. Marked LAE. Mild SHANTEL. Mild MR.    Hypertension     Pulmonary hypertension ([facility]     Renal Ultrasound 1/3/12    Right kidney isoechoic w/respect to liver.  Sm left-sided pleural effusion    S/P cardiac cath 10/16/2012    LM patent. pLAD 95% (3.5 x 12-mm [place] stent, resid 0$%). LCX mild. Past Surgical History:  Past Surgical History:   Procedure Laterality Date    HX CORONARY STENT PLACEMENT      one    HX LAPAROTOMY  2/2012    bowel obstruction with removal segment of small bowel. Done by Riblet.  HX LAPAROTOMY  infancy    whole in colon and had repair at that time.  HX OTHER SURGICAL  06/20/2013    left eye retna attatchment    HX RETINAL DETACHMENT REPAIR      august 2012    [place] REPAIR ING HERNIA,5+Y/O,REDUCIBL Left 05/02/2019    Dr. Ruiz [facility]       Family History:  Family History   Problem Relation Age of Onset    Diabetes Mother     Hypertension Mother     Kidney Disease Mother     High Cholesterol Father     Diabetes Brother         pre diabetic       Social History:  Social History     Tobacco Use    Smoking status: Never Smoker    Smokeless tobacco: Never Used   Substance Use Topics    Alcohol use: Not Currently     Alcohol/week: 4.2 standard drinks     Types: 5 Cans of beer per week    Drug use: No       Allergies:  No Known Allergies    Review of Systems     Review of Systems   Constitutional: Negative for activity change. HENT: Negative for congestion. Respiratory: Negative for apnea. All other systems reviewed and are negative. 10 point review of systems otherwise negative unless noted in HPI. Physical Exam       Physical Exam  Constitutional:       Appearance: He is well-developed. HENT:      Head: Normocephalic and atraumatic. Eyes:      Pupils: Pupils are equal, round, and reactive to light. Cardiovascular:      Rate and Rhythm: Tachycardia present. Rhythm irregular. Heart sounds: Normal heart sounds. No murmur heard. No friction rub. Pulmonary:      Effort: Pulmonary effort is normal. No respiratory distress. Breath sounds: Normal breath sounds. No wheezing. Abdominal:      General: There is no distension.       Palpations: Abdomen is soft. Tenderness: There is no abdominal tenderness. There is no guarding or rebound. Musculoskeletal:         General: Normal range of motion. Cervical back: Normal range of motion and neck supple. Skin:     General: Skin is warm and dry. Neurological:      Mental Status: He is alert and oriented to person, place, and time. Psychiatric:         Behavior: Behavior normal.         Thought Content: Thought content normal.         Diagnostic Study Results     Vital Signs  EKG: A. fib with  normal axis normal intervals no ST elevation or depression no hypertrophy  Labs:   Imaging:     Medical Decision Making     ED Course: Progress Notes, Reevaluation, and Consults:    I will be the provider of record for this patient. Provider Notes (Medical Decision Making):   Patient presents for hypertension tachycardia likely secondary to volume because just got out of dialysis with a took out 3 L but only put back to 50. Will give a small bolus of fluid and reassess. At this point I think is likely compensatory tachycardia rather than a pathologic tachycardia    Unremarkable patient's heart rate is now improved at rest he is in the 80s his blood pressure is 116 he feels better will discharge and discussed with nephrology       Diagnosis     Clinical Impression: No diagnosis found. Disposition:        Patient's Medications   Start Taking    No medications on file   Continue Taking    ALUM-MAG HYDROXIDE-SIMETH (MAALOX ADVANCED) 200-200-20 MG/5 ML SUSP    Take  by mouth. AMIODARONE (CORDARONE) 200 MG TABLET    Take 1 Tablet by mouth daily. AMLODIPINE (NORVASC) 5 MG TABLET    Take 1 Tablet by mouth daily. ASPIRIN DELAYED-RELEASE 81 MG TABLET    Take 1 Tablet by mouth daily. ATORVASTATIN (LIPITOR) 80 MG TABLET    Take 1 Tablet by mouth nightly. BLOOD-GLUCOSE METER MONITORING KIT    Check fasting glucose    DOXERCALCIFEROL (HECTOROL IV)    2 mcg.     DOXERCALCIFEROL (HECTOROL IV) 4 mcg.    ELIQUIS 2.5 MG TABLET    TAKE 1 TABLET BY MOUTH EVERY 12 HOURS    GLIPIZIDE SR (GLUCOTROL XL) 2.5 MG CR TABLET    Take 1 Tablet by mouth daily. GLUCOSE BLOOD VI TEST STRIPS (ACCU-CHEK ZAC PLUS TEST STRP) STRIP    Use as directed    LINAGLIPTIN (TRADJENTA) 5 MG TABLET    Take 1 Tablet by mouth daily. LISINOPRIL (PRINIVIL, ZESTRIL) 10 MG TABLET    Take  by mouth daily. METOPROLOL TARTRATE (LOPRESSOR) 25 MG TABLET    Take 1 Tablet by mouth two (2) times a day. NITROGLYCERIN (NITROLINGUAL) 400 MCG/SPRAY SPRAY    1 Spray by SubLINGual route every five (5) minutes as needed for Chest Pain. SUCROFERRIC OXYHYDROXIDE (VELPHORO) 500 MG CHEW CHEWABLE TABLET    Take 500 mg by mouth three (3) times daily (with meals). TRIAMCINOLONE (ARISTOCORT) 0.5 % TOPICAL CREAM    Apply  to affected area two (2) times a day.  use thin layer   These Medications have changed    No medications on file   Stop Taking    No medications on file

## 2022-05-16 ENCOUNTER — TRANSCRIBE ORDER (OUTPATIENT)
Dept: REGISTRATION | Age: 52
End: 2022-05-16

## 2022-05-16 ENCOUNTER — HOSPITAL ENCOUNTER (OUTPATIENT)
Dept: LAB | Age: 52
Discharge: HOME | End: 2022-05-16
Payer: MEDICARE

## 2022-05-16 DIAGNOSIS — E78.5 DYSLIPIDEMIA: ICD-10-CM

## 2022-05-16 DIAGNOSIS — E78.5 HYPERLIPEMIA: ICD-10-CM

## 2022-05-16 DIAGNOSIS — I48.0 PAROXYSMAL A-FIB (HCC): ICD-10-CM

## 2022-05-16 DIAGNOSIS — I48.0 PAROXYSMAL ATRIAL FIBRILLATION (HCC): Primary | ICD-10-CM

## 2022-05-16 DIAGNOSIS — E11.21 DIABETIC GLOMERULOPATHY (HCC): ICD-10-CM

## 2022-05-16 DIAGNOSIS — E11.21 TYPE 2 DIABETES WITH NEPHROPATHY (HCC): ICD-10-CM

## 2022-05-16 LAB
ALBUMIN SERPL-MCNC: 3.4 G/DL (ref 3.4–5)
ALBUMIN/GLOB SERPL: 0.9 {RATIO} (ref 0.8–1.7)
ALP SERPL-CCNC: 71 U/L (ref 45–117)
ALT SERPL-CCNC: 19 U/L (ref 16–61)
ANION GAP SERPL CALC-SCNC: 8 MMOL/L (ref 3–18)
AST SERPL-CCNC: 9 U/L (ref 10–38)
BILIRUB DIRECT SERPL-MCNC: <0.1 MG/DL (ref 0–0.2)
BILIRUB SERPL-MCNC: 0.3 MG/DL (ref 0.2–1)
BUN SERPL-MCNC: 73 MG/DL (ref 7–18)
BUN/CREAT SERPL: 5 (ref 12–20)
CALCIUM SERPL-MCNC: 10.5 MG/DL (ref 8.5–10.1)
CHLORIDE SERPL-SCNC: 103 MMOL/L (ref 100–111)
CHOLEST SERPL-MCNC: 173 MG/DL
CO2 SERPL-SCNC: 30 MMOL/L (ref 21–32)
CREAT SERPL-MCNC: 13.3 MG/DL (ref 0.6–1.3)
EST. AVERAGE GLUCOSE BLD GHB EST-MCNC: 148 MG/DL
GLOBULIN SER CALC-MCNC: 3.8 G/DL (ref 2–4)
GLUCOSE SERPL-MCNC: 82 MG/DL (ref 74–99)
HBA1C MFR BLD: 6.8 % (ref 4.2–5.6)
HDLC SERPL-MCNC: 52 MG/DL (ref 40–60)
HDLC SERPL: 3.3 {RATIO} (ref 0–5)
LDLC SERPL CALC-MCNC: 109.2 MG/DL (ref 0–100)
LIPID PROFILE,FLP: ABNORMAL
POTASSIUM SERPL-SCNC: 5 MMOL/L (ref 3.5–5.5)
PROT SERPL-MCNC: 7.2 G/DL (ref 6.4–8.2)
SODIUM SERPL-SCNC: 141 MMOL/L (ref 136–145)
T4 FREE SERPL-MCNC: 1.1 NG/DL (ref 0.7–1.5)
TRIGL SERPL-MCNC: 59 MG/DL (ref ?–150)
TSH SERPL DL<=0.05 MIU/L-ACNC: 1.44 UIU/ML (ref 0.36–3.74)
VLDLC SERPL CALC-MCNC: 11.8 MG/DL

## 2022-05-16 PROCEDURE — 80076 HEPATIC FUNCTION PANEL: CPT

## 2022-05-16 PROCEDURE — 84439 ASSAY OF FREE THYROXINE: CPT

## 2022-05-16 PROCEDURE — 84443 ASSAY THYROID STIM HORMONE: CPT

## 2022-05-16 PROCEDURE — 80061 LIPID PANEL: CPT

## 2022-05-16 PROCEDURE — 83036 HEMOGLOBIN GLYCOSYLATED A1C: CPT

## 2022-05-16 PROCEDURE — 36415 COLL VENOUS BLD VENIPUNCTURE: CPT

## 2022-05-16 PROCEDURE — 80048 BASIC METABOLIC PNL TOTAL CA: CPT

## 2022-05-17 ENCOUNTER — OFFICE VISIT (OUTPATIENT)
Dept: CARDIOLOGY CLINIC | Age: 52
End: 2022-05-17
Payer: MEDICARE

## 2022-05-17 VITALS
OXYGEN SATURATION: 98 % | HEART RATE: 72 BPM | BODY MASS INDEX: 29.06 KG/M2 | HEIGHT: 70 IN | SYSTOLIC BLOOD PRESSURE: 136 MMHG | WEIGHT: 203 LBS | DIASTOLIC BLOOD PRESSURE: 86 MMHG

## 2022-05-17 DIAGNOSIS — E78.5 DYSLIPIDEMIA: ICD-10-CM

## 2022-05-17 DIAGNOSIS — I48.91 ATRIAL FIBRILLATION WITH RAPID VENTRICULAR RESPONSE (HCC): ICD-10-CM

## 2022-05-17 DIAGNOSIS — I25.10 CORONARY ARTERY DISEASE INVOLVING NATIVE CORONARY ARTERY OF NATIVE HEART WITHOUT ANGINA PECTORIS: Primary | ICD-10-CM

## 2022-05-17 DIAGNOSIS — I48.0 PAROXYSMAL A-FIB (HCC): ICD-10-CM

## 2022-05-17 DIAGNOSIS — I10 ESSENTIAL HYPERTENSION: ICD-10-CM

## 2022-05-17 PROCEDURE — G8417 CALC BMI ABV UP PARAM F/U: HCPCS | Performed by: INTERNAL MEDICINE

## 2022-05-17 PROCEDURE — G9231 DOC ESRD DIA TRANS PREG: HCPCS | Performed by: INTERNAL MEDICINE

## 2022-05-17 PROCEDURE — G8432 DEP SCR NOT DOC, RNG: HCPCS | Performed by: INTERNAL MEDICINE

## 2022-05-17 PROCEDURE — G8427 DOCREV CUR MEDS BY ELIG CLIN: HCPCS | Performed by: INTERNAL MEDICINE

## 2022-05-17 PROCEDURE — 93000 ELECTROCARDIOGRAM COMPLETE: CPT | Performed by: INTERNAL MEDICINE

## 2022-05-17 PROCEDURE — 99214 OFFICE O/P EST MOD 30 MIN: CPT | Performed by: INTERNAL MEDICINE

## 2022-05-17 NOTE — PROGRESS NOTES
HISTORY OF PRESENT ILLNESS  Rossy Olmos is a 54 y.o. male. ASSESSMENT and PLAN    Mr. Teresa Gillette has history of diabetes mellitus, diabetic retinopathy with legally blind as, coronary artery disease without chest pains, ischemic cardiomyopathy. He presented with significant fatigue, increased shortness of breath. He had LAD stent in October of 2012 by Dr. Carlos Gates. He has never had chest pains. He has history of mildly diminished LV function with ejection fraction of 40-50%. He is now on hemodialysis 3 times per week.  His new baseline weight in 2019 is 199 pounds.  Kidney transplant evaluation was performed in 2019. He underwent coronary angiography on 8/21/2019. Beatriz Copeland underwent left and right selective coronary angiography. Beatriz Copeland had no significant disease in his RCA or circumflex.  He had 35% stenosis in his LAD.  For completeness, he had IFR performed which was negative at 0.93-0. 95.  No further coronary intervention was needed. In December 2021, he underwent coronary angiography per the request of renal transplant team, which revealed diffuse mild disease in his left dominant system with the exception of the first diagonal branch with ostial 95% stenosis. Continued medical therapy is recommended. His echocardiogram on 12/17/2021 revealed normal EF 50-55%. In the winter 2021/2022, he was diagnosed with PAF. He had tolerated this reasonably well until around March 2022, when he began to experience rapid PAF whenever he went on dialysis. He has been sent to the emergency room 4 times since February 2022 until May 2022. Whenever he goes on hemodialysis and develops PAF with RVR, he is sent from the dialysis unit to the emergency room automatically. · CAD:    Symptomatically stable. · BP:    Well controlled at 136/86.   · PAF:    Currently in sinus rhythm 72 bpm.  However, already in May 2022, he has gone to the emergency room twice sent by hemodialysis unit with PAF and RVR despite the fact that he was asymptomatic. · CHF:    There is no evidence of decompensated CHF noted. · Weight:     His weight today is 203 pounds. His baseline weight is 200 pounds. · Cholesterol:   Target LDL <70. Lipitor 40. · Anti-platelet:   Remains on ASA. He is quite frustrated by the fact that he has episodes of PAF and directly sent to the emergency room disrupting his hemodialysis schedule. He would like to have consideration for A. fib ablation. I will refer him to our electrophysiologist.  I will see him back in 6 months. Thank you. Encounter Diagnoses   Name Primary?  Coronary artery disease involving native coronary artery of native heart without angina pectoris Yes    Dyslipidemia     Atrial fibrillation with rapid ventricular response (HCC)     Paroxysmal A-fib (HCC)     Essential hypertension      current treatment plan is effective, no change in therapy  lab results and schedule of future lab studies reviewed with patient  reviewed diet, exercise and weight control  cardiovascular risk and specific lipid/LDL goals reviewed  use of aspirin to prevent MI and TIA's discussed      HPI   Today, Mr. Amy Hamilton has no complaints of chest pains, increased shortness of breath, or decreased exercise capacity. His biggest issue appears to be his frustration from having frequent episodes of PAF and RVR especially when he is on hemodialysis. His hemodialysis is then disrupted and patient is sent directly to the emergency room by the hemodialysis unit nurses. In April 2022, he was seen at [facility] where he had direct cardioversion. He has had numerous recurrent episodes of PAF with RVR prompting going to the emergency room via paramedics. Review of Systems   Respiratory: Negative for shortness of breath. Cardiovascular: Positive for palpitations. Negative for chest pain, orthopnea, claudication, leg swelling and PND. All other systems reviewed and are negative.       Physical Exam  Vitals and nursing note reviewed. Constitutional:       Appearance: Normal appearance. HENT:      Head: Normocephalic. Eyes:      Conjunctiva/sclera: Conjunctivae normal.   Neck:      Vascular: No carotid bruit. Cardiovascular:      Rate and Rhythm: Normal rate and regular rhythm. Heart sounds: Murmur heard. Crescendo decrescendo systolic murmur is present with a grade of 1/6. Pulmonary:      Breath sounds: Normal breath sounds. Abdominal:      Palpations: Abdomen is soft. Musculoskeletal:         General: No swelling. Cervical back: No rigidity. Skin:     General: Skin is warm and dry. Neurological:      General: No focal deficit present. Mental Status: He is alert and oriented to person, place, and time. Psychiatric:         Mood and Affect: Mood normal.         Behavior: Behavior normal.         PCP: Nancy Blackmon MD    Past Medical History:   Diagnosis Date    Abnormal nuclear cardiac imaging test 10/08/2012    Fixed anteroseptal, anteroapical defect c/w prior infarction. No ischemia. Mid & distal anteroseptal, anteroapical WMA. LVE. EF 44%.  Anemia     dr. Elizabeth Sweeney doubt amyloid or multiple myeloma. candidate for procirt if Hg <10    Benign hypertensive     Blindness of right eye 01/2013    legally blind    CAD (coronary artery disease)     Cardiomyopathy ejection fraction of 40%     CKD (chronic kidney disease), stage IV ([address]     to see Dr. Ene Morales 12/1/12    Congestive heart failure ([facility]     Diabetes mellitus ([address]     Diabetic retinopathy ([facility]     Diabetic retinopathy ([address]     Dr. Edgar Antony- injections    Heart failure ([facility]     History of echocardiogram 04/22/2014    LVE. EF 55% (prev 40-45% on echo of 1/27/12). No WMA. Mild-mod conc LVH. Gr 3 DDfx. Marked LAE. Mild SHANTEL. Mild MR.    Hypertension     Pulmonary hypertension ([address]     Renal Ultrasound 1/3/12    Right kidney isoechoic w/respect to liver.  Sm left-sided pleural effusion    S/P cardiac cath 10/16/2012    LM patent. pLAD 95% (3.5 x 12-mm [place] stent, resid 0$%). LCX mild. Past Surgical History:   Procedure Laterality Date    HX CORONARY STENT PLACEMENT      one    HX LAPAROTOMY  2/2012    bowel obstruction with removal segment of small bowel. Done by Liv.  HX LAPAROTOMY  infancy    whole in colon and had repair at that time.  HX OTHER SURGICAL  06/20/2013    left eye retna attatchment    HX RETINAL DETACHMENT REPAIR      august 2012    [place] REPAIR ING HERNIA,5+Y/O,REDUCIBL Left 05/02/2019    Dr. Sergey Fregoso       Current Outpatient Medications   Medication Sig Dispense Refill    metoprolol tartrate (LOPRESSOR) 25 mg tablet Take 1 Tablet by mouth two (2) times a day. 60 Tablet 2    lisinopriL (PRINIVIL, ZESTRIL) 10 mg tablet Take  by mouth daily.  triamcinolone (ARISTOCORT) 0.5 % topical cream Apply  to affected area two (2) times a day. use thin layer 45 g 0    linaGLIPtin (Tradjenta) 5 mg tablet Take 1 Tablet by mouth daily.  amiodarone (CORDARONE) 200 mg tablet Take 1 Tablet by mouth daily. 30 Tablet 0    glipiZIDE SR (GLUCOTROL XL) 2.5 mg CR tablet Take 1 Tablet by mouth daily. 90 Tablet 0    amLODIPine (NORVASC) 5 mg tablet Take 1 Tablet by mouth daily. 90 Tablet 0    glucose blood VI test strips (Accu-Chek Beatrice Plus test strp) strip Use as directed 100 Strip 2    Eliquis 2.5 mg tablet TAKE 1 TABLET BY MOUTH EVERY 12 HOURS 180 Tablet 3    aspirin delayed-release 81 mg tablet Take 1 Tablet by mouth daily. 90 Tablet 3    doxercalciferol (HECTOROL IV) 2 mcg.  doxercalciferol (HECTOROL IV) 4 mcg.  alum-mag hydroxide-simeth (Maalox Advanced) 200-200-20 mg/5 mL susp Take  by mouth.  atorvastatin (LIPITOR) 80 mg tablet Take 1 Tablet by mouth nightly. 30 Tablet 0    nitroglycerin (NITROLINGUAL) 400 mcg/spray spray 1 Spray by SubLINGual route every five (5) minutes as needed for Chest Pain.  1 Bottle 2    Blood-Glucose Meter monitoring kit Check fasting glucose 1 Kit 0    sucroferric oxyhydroxide (VELPHORO) 500 mg chew chewable tablet Take 500 mg by mouth three (3) times daily (with meals).          The patient has a family history of    Social History     Tobacco Use    Smoking status: Never Smoker    Smokeless tobacco: Never Used   Substance Use Topics    Alcohol use: Not Currently     Alcohol/week: 4.2 standard drinks     Types: 5 Cans of beer per week    Drug use: No       Lab Results   Component Value Date/Time    Cholesterol, total 173 05/16/2022 08:04 AM    HDL Cholesterol 52 05/16/2022 08:04 AM    LDL, calculated 109.2 (H) 05/16/2022 08:04 AM    Triglyceride 59 05/16/2022 08:04 AM    CHOL/HDL Ratio 3.3 05/16/2022 08:04 AM        BP Readings from Last 3 Encounters:   05/17/22 136/86   05/14/22 108/64   05/03/22 134/75        Pulse Readings from Last 3 Encounters:   05/17/22 72   05/14/22 (!) 118   05/03/22 93       Wt Readings from Last 3 Encounters:   05/17/22 92.1 kg (203 lb)   05/03/22 95.3 kg (210 lb)   04/26/22 86.2 kg (190 lb)         EKG: unchanged from previous tracings, normal sinus rhythm, Q waves in leads V1 and V2.

## 2022-05-18 ENCOUNTER — PATIENT OUTREACH (OUTPATIENT)
Dept: CASE MANAGEMENT | Age: 52
End: 2022-05-18

## 2022-05-18 NOTE — PROGRESS NOTES
Patient attended appointments with his cardiologist, Dr. Camilo Espinal on 5/17/22, Ambulatory Care Manager reviewed EMR and will follow up on next scheduled outreach.

## 2022-05-27 ENCOUNTER — PATIENT OUTREACH (OUTPATIENT)
Dept: CASE MANAGEMENT | Age: 52
End: 2022-05-27

## 2022-06-01 ENCOUNTER — TRANSCRIBE ORDER (OUTPATIENT)
Dept: CARDIAC CATH/INVASIVE PROCEDURES | Age: 52
End: 2022-06-01

## 2022-06-01 ENCOUNTER — OFFICE VISIT (OUTPATIENT)
Dept: FAMILY MEDICINE CLINIC | Age: 52
End: 2022-06-01
Payer: MEDICARE

## 2022-06-01 ENCOUNTER — OFFICE VISIT (OUTPATIENT)
Dept: CARDIOLOGY CLINIC | Age: 52
End: 2022-06-01
Payer: MEDICARE

## 2022-06-01 VITALS
WEIGHT: 207 LBS | BODY MASS INDEX: 29.63 KG/M2 | HEIGHT: 70 IN | DIASTOLIC BLOOD PRESSURE: 86 MMHG | SYSTOLIC BLOOD PRESSURE: 184 MMHG | HEART RATE: 70 BPM | OXYGEN SATURATION: 98 %

## 2022-06-01 VITALS
OXYGEN SATURATION: 100 % | DIASTOLIC BLOOD PRESSURE: 86 MMHG | WEIGHT: 207.4 LBS | HEART RATE: 65 BPM | BODY MASS INDEX: 29.69 KG/M2 | RESPIRATION RATE: 16 BRPM | HEIGHT: 70 IN | SYSTOLIC BLOOD PRESSURE: 140 MMHG | TEMPERATURE: 97.2 F

## 2022-06-01 DIAGNOSIS — I42.9 CARDIOMYOPATHY, UNSPECIFIED TYPE (HCC): ICD-10-CM

## 2022-06-01 DIAGNOSIS — I48.91 ATRIAL FIBRILLATION WITH RVR (HCC): ICD-10-CM

## 2022-06-01 DIAGNOSIS — I25.10 CORONARY ARTERY DISEASE INVOLVING NATIVE CORONARY ARTERY OF NATIVE HEART WITHOUT ANGINA PECTORIS: ICD-10-CM

## 2022-06-01 DIAGNOSIS — I48.91 A-FIB (HCC): Primary | ICD-10-CM

## 2022-06-01 DIAGNOSIS — I10 PRIMARY HYPERTENSION: ICD-10-CM

## 2022-06-01 DIAGNOSIS — E78.5 DYSLIPIDEMIA: ICD-10-CM

## 2022-06-01 DIAGNOSIS — Z00.00 MEDICARE ANNUAL WELLNESS VISIT, SUBSEQUENT: Primary | ICD-10-CM

## 2022-06-01 DIAGNOSIS — I10 ESSENTIAL HYPERTENSION: ICD-10-CM

## 2022-06-01 DIAGNOSIS — E11.21 TYPE 2 DIABETES WITH NEPHROPATHY (HCC): ICD-10-CM

## 2022-06-01 DIAGNOSIS — E11.9 CONTROLLED TYPE 2 DIABETES MELLITUS WITHOUT COMPLICATION, WITHOUT LONG-TERM CURRENT USE OF INSULIN (HCC): ICD-10-CM

## 2022-06-01 DIAGNOSIS — I50.32 CHRONIC DIASTOLIC HEART FAILURE (HCC): ICD-10-CM

## 2022-06-01 DIAGNOSIS — I27.20 PULMONARY HYPERTENSION (HCC): ICD-10-CM

## 2022-06-01 DIAGNOSIS — Z99.2 ESRD ON DIALYSIS (HCC): ICD-10-CM

## 2022-06-01 DIAGNOSIS — I48.0 PAROXYSMAL A-FIB (HCC): Primary | ICD-10-CM

## 2022-06-01 DIAGNOSIS — I48.0 PAROXYSMAL A-FIB (HCC): ICD-10-CM

## 2022-06-01 DIAGNOSIS — D63.8 ANEMIA OF CHRONIC DISEASE: ICD-10-CM

## 2022-06-01 DIAGNOSIS — N18.6 END STAGE RENAL DISEASE (HCC): ICD-10-CM

## 2022-06-01 DIAGNOSIS — N18.6 ESRD ON DIALYSIS (HCC): ICD-10-CM

## 2022-06-01 DIAGNOSIS — I25.10 CORONARY ARTERY DISEASE INVOLVING NATIVE HEART WITHOUT ANGINA PECTORIS, UNSPECIFIED VESSEL OR LESION TYPE: ICD-10-CM

## 2022-06-01 PROCEDURE — G8510 SCR DEP NEG, NO PLAN REQD: HCPCS | Performed by: INTERNAL MEDICINE

## 2022-06-01 PROCEDURE — 2022F DILAT RTA XM EVC RTNOPTHY: CPT | Performed by: INTERNAL MEDICINE

## 2022-06-01 PROCEDURE — 2022F DILAT RTA XM EVC RTNOPTHY: CPT | Performed by: FAMILY MEDICINE

## 2022-06-01 PROCEDURE — G8417 CALC BMI ABV UP PARAM F/U: HCPCS | Performed by: FAMILY MEDICINE

## 2022-06-01 PROCEDURE — G0439 PPPS, SUBSEQ VISIT: HCPCS | Performed by: FAMILY MEDICINE

## 2022-06-01 PROCEDURE — G8427 DOCREV CUR MEDS BY ELIG CLIN: HCPCS | Performed by: FAMILY MEDICINE

## 2022-06-01 PROCEDURE — 3044F HG A1C LEVEL LT 7.0%: CPT | Performed by: INTERNAL MEDICINE

## 2022-06-01 PROCEDURE — G9231 DOC ESRD DIA TRANS PREG: HCPCS | Performed by: FAMILY MEDICINE

## 2022-06-01 PROCEDURE — G8417 CALC BMI ABV UP PARAM F/U: HCPCS | Performed by: INTERNAL MEDICINE

## 2022-06-01 PROCEDURE — G8510 SCR DEP NEG, NO PLAN REQD: HCPCS | Performed by: FAMILY MEDICINE

## 2022-06-01 PROCEDURE — 99215 OFFICE O/P EST HI 40 MIN: CPT | Performed by: INTERNAL MEDICINE

## 2022-06-01 PROCEDURE — 99214 OFFICE O/P EST MOD 30 MIN: CPT | Performed by: FAMILY MEDICINE

## 2022-06-01 PROCEDURE — G8427 DOCREV CUR MEDS BY ELIG CLIN: HCPCS | Performed by: INTERNAL MEDICINE

## 2022-06-01 PROCEDURE — 3044F HG A1C LEVEL LT 7.0%: CPT | Performed by: FAMILY MEDICINE

## 2022-06-01 PROCEDURE — G9231 DOC ESRD DIA TRANS PREG: HCPCS | Performed by: INTERNAL MEDICINE

## 2022-06-01 RX ORDER — ATORVASTATIN CALCIUM 80 MG/1
80 TABLET, FILM COATED ORAL
Qty: 30 TABLET | Refills: 0 | Status: CANCELLED | OUTPATIENT
Start: 2022-06-01

## 2022-06-01 RX ORDER — LISINOPRIL 20 MG/1
20 TABLET ORAL DAILY
COMMUNITY
Start: 2022-05-08

## 2022-06-01 RX ORDER — SODIUM CHLORIDE 0.9 % (FLUSH) 0.9 %
5-40 SYRINGE (ML) INJECTION AS NEEDED
Status: CANCELLED | OUTPATIENT
Start: 2022-06-01

## 2022-06-01 RX ORDER — ATORVASTATIN CALCIUM 80 MG/1
80 TABLET, FILM COATED ORAL
Qty: 90 TABLET | Refills: 3 | Status: SHIPPED | OUTPATIENT
Start: 2022-06-01

## 2022-06-01 RX ORDER — GLIPIZIDE 2.5 MG/1
2.5 TABLET, EXTENDED RELEASE ORAL DAILY
Qty: 90 TABLET | Refills: 0 | Status: CANCELLED | OUTPATIENT
Start: 2022-06-01

## 2022-06-01 RX ORDER — SODIUM CHLORIDE 0.9 % (FLUSH) 0.9 %
5-40 SYRINGE (ML) INJECTION EVERY 8 HOURS
Status: CANCELLED | OUTPATIENT
Start: 2022-06-01

## 2022-06-01 RX ORDER — AMLODIPINE BESYLATE 5 MG/1
5 TABLET ORAL DAILY
Qty: 90 TABLET | Refills: 0 | Status: CANCELLED | OUTPATIENT
Start: 2022-06-01

## 2022-06-01 NOTE — PROGRESS NOTES
Stanley Moreau presents today for No chief complaint on file. Stanley Moreau preferred language for health care discussion is english/other. Is someone accompanying this pt? no    Is the patient using any DME equipment during [address]? no    Depression Screening:  3 most recent PHQ Screens 6/1/2022   Little interest or pleasure in doing things Not at all   Feeling down, depressed, irritable, or hopeless Not at all   Total Score PHQ 2 0       Learning Assessment:  Learning Assessment 2/16/2022   PRIMARY LEARNER Patient   HIGHEST LEVEL OF EDUCATION - PRIMARY LEARNER  -   BARRIERS PRIMARY LEARNER -   Jessica Sullivan -   ANSWERED BY patient   RELATIONSHIP SELF       Abuse Screening:  Abuse Screening Questionnaire 4/20/2022   Do you ever feel afraid of your partner? N   Are you in a relationship with someone who physically or mentally threatens you? N   Is it safe for you to go home? Y       Fall Risk  Fall Risk Assessment, last 12 mths 4/20/2022   Able to walk? Yes   Fall in past 12 months? 0   Do you feel unsteady? 1   Are you worried about falling 0   Is TUG test greater than 12 seconds? 0   Is the gait abnormal? 0       Pt currently taking Anticoagulant therapy? ASA 81mg every day     Coordination of Care:  1. Have you been to the ER, urgent care clinic since your last visit? Hospitalized since your last visit? no    2. Have you seen or consulted any other health care providers outside of the [address] [place] [address] since your last visit? Include any pap smears or colon screening.  no

## 2022-06-01 NOTE — PROGRESS NOTES
1. \"Have you been to the ER, urgent care clinic since your last visit? Hospitalized since your last visit? \" Multiple ED visits     2. \"Have you seen or consulted any other health care providers outside of the [address] [place] [address] since your last visit? \" Yes When: Endocrinology Dr. Tompkins Span 5/02/22 and Dr. Marielos Jung    3. For patients aged 39-70: Has the patient had a colonoscopy / FIT/ Cologuard?  Yes - no Care Gap present 5/10/21      Chief Complaint   Patient presents with    Annual Wellness Visit    Restless Leg Syndrome     wonders if he has RLS since leg jumps/twitching

## 2022-06-01 NOTE — PATIENT INSTRUCTIONS
Learning About Carbohydrate (Carb) Counting and Eating Out When You Have Diabetes  Why plan your meals? Meal planning can be a key part of managing diabetes. Planning meals and snacks with the right balance of carbohydrate, protein, and fat can help you keep your blood sugar at the target level you set with your doctor. You don't have to eat special foods. You can eat what your family eats, including sweets once in a while. But you do have to pay attention to how often you eat and how much you eat of certain foods. You may want to work with a dietitian or a diabetes educator. They can give you tips and meal ideas and can answer your questions about meal planning. This health professional can also help you reach a healthy weight if that is one of your goals. What should you know about eating carbs? Managing the amount of carbohydrate (carbs) you eat is an important part of healthy meals when you have diabetes. Carbohydrate is found in many foods. · Learn which foods have carbs. And learn the amounts of carbs in different foods. ? Bread, cereal, pasta, and rice have about 15 grams of carbs in a serving. A serving is 1 slice of bread (1 ounce), ½ cup of cooked cereal, or 1/3 cup of cooked pasta or rice. ? Fruits have 15 grams of carbs in a serving. A serving is 1 small fresh fruit, such as an apple or orange; ½ of a banana; ½ cup of cooked or canned fruit; ½ cup of fruit juice; 1 cup of melon or raspberries; or 2 tablespoons of dried fruit. ? Milk and no-sugar-added yogurt have 15 grams of carbs in a serving. A serving is 1 cup of milk or 3/4 cup (6 oz) of no-sugar-added yogurt. ? Starchy vegetables have 15 grams of carbs in a serving. A serving is ½ cup of mashed potatoes or sweet potato; 1 cup winter squash; ½ of a small baked potato; ½ cup of cooked beans; or ½ cup cooked corn or green peas.   · Learn how much carbs to eat each day and at each meal. A dietitian or certified diabetes educator can teach you how to keep track of the amount of carbs you eat. This is called carbohydrate counting. · If you are not sure how to count carbohydrate grams, use the plate method to plan meals. It is a quick way to make sure that you have a balanced meal. It also can help you manage the amount of carbohydrate you eat at meals. ? Divide your plate by types of foods. Put non-starchy vegetables on half the plate, meat or other protein food on one-quarter of the plate, and a grain or starchy vegetable in the final quarter of the plate. To this you can add a small piece of fruit and 1 cup of milk or yogurt, depending on how many carbs you are supposed to eat at a meal.  · Try to eat about the same amount of carbs at each meal. Do not \"save up\" your daily allowance of carbs to eat at one meal.  · Proteins have very little or no carbs. Examples of proteins are beef, chicken, turkey, fish, eggs, tofu, cheese, cottage cheese, and peanut butter. How can you eat out and still eat healthy? · Learn to estimate the serving sizes of foods that have carbohydrate. If you measure food at home, it will be easier to estimate the amount in a serving of restaurant food. · If the meal you order has too much carbohydrate (such as potatoes, corn, or baked beans), ask to have a low-carbohydrate food instead. Ask for a salad or non-starchy vegetables like broccoli, cauliflower, green beans, or peppers. · If you eat more carbohydrate at a meal than you had planned, take a walk or do other exercise. This will help lower your blood sugar. What are some tips for eating healthy? · Limit saturated fat, such as the fat from meat and dairy products. This is a healthy choice because people who have diabetes are at higher risk of heart disease. So choose lean cuts of meat and nonfat or low-fat dairy products. Use olive or canola oil instead of butter or shortening when cooking. · Don't skip meals.  Your blood sugar may drop too low if you skip meals and take insulin or certain medicines for diabetes. · Check with your doctor before you drink alcohol. Alcohol can cause your blood sugar to drop too low. Alcohol can also cause a bad reaction if you take certain diabetes medicines. Follow-up care is a key part of your treatment and safety. Be sure to make and go to all appointments, and call your doctor if you are having problems. It's also a good idea to know your test results and keep a list of the medicines you take. Where can you learn more? Go to http://www.meyers.com/  Enter I147 in the search box to learn more about \"Learning About Carbohydrate (Carb) Counting and Eating Out When You Have Diabetes. \"  Current as of: September 8, 2021               Content Version: 13.2  © 2006-2022 [employer]. Care instructions adapted under license by [employer] (which disclaims liability or warranty for this information). If you have questions about a medical condition or this instruction, always ask your healthcare professional. [place] [address] any warranty or liability for your use of this information.

## 2022-06-01 NOTE — ADDENDUM NOTE
Addended by: Cameron Jimenes on: 6/1/2022 11:03 AM     Modules accepted: Jose, SmartSet
Addended by: Phil Gray on: 6/1/2022 12:56 PM     Modules accepted: Orders
Ankle fracture, right, closed, initial encounter

## 2022-06-01 NOTE — PROGRESS NOTES
History of Present Illness:  54 YOM referred by Dr. Sadie Cerda for evaluation of possible ablation. He has been on dialysis about three years and is in the process of getting listed for transplant. She has a history of diabetes and hypertension. He was having symptoms suggestive of atrial fibrillation about a year ago, but has had multiple ER visits with atrial fibrillation and increased rates, interfering with his ability to have dialysis. He has a right AV fistula in place. He will get diaphoretic with the episodes, but is not having any syncope. He takes all medications without issues. He is currently not listed for transplant. Impression:  1. Paroxysmal symptomatic a-fib with increasing episodes over the past year, interfering with dialysis. He gets diaphoretic. He has not had syncope. 2. Chronic Eliquis use. 3. Recent initiation of Amiodarone April 2022. 4. ESRD, on hemodialysis for three years, awaiting transplant listing. 5. Non critical CAD, medically treated, with heart cath December 2021 with ostial diagonal 95% stenosis, medically treated. 6. Echo December 2021 with normal EF at 55%. 7. Diabetes with recent increase in hemoglobin A1c, now improving. 8. HTN. Plan:  Given the significant episodes interfering with dialysis and plans for renal transplant, we talked about ablation. Risks, benefits and alternatives discussed. All questions answered. I quoted an 80% chance for success. He does have at least three months of uninterrupted anticoagulation post ablation and he understands this, but he would like to proceed. I will make arrangements. Past Medical History:   Diagnosis Date    Abnormal nuclear cardiac imaging test 10/08/2012    Fixed anteroseptal, anteroapical defect c/w prior infarction. No ischemia. Mid & distal anteroseptal, anteroapical WMA. LVE. EF 44%.  Anemia     dr. Elizabeth Sweeney doubt amyloid or multiple myeloma.  candidate for procirt if Hg <10    Benign hypertensive     Blindness of right eye 01/2013    legally blind    CAD (coronary artery disease)     Cardiomyopathy ejection fraction of 40%     CKD (chronic kidney disease), stage IV ([facility] [place] [facility]     to see Dr. Zach Lowry 12/1/12    Congestive heart failure ([facility] [place] [facility]     Diabetes mellitus ([facility] [place] [facility]     Diabetic retinopathy ([facility] [place] [facility]     Diabetic retinopathy ([facility] [place] [facility]     Dr. Garcia Ward- injections    Heart failure ([place] [address]     History of echocardiogram 04/22/2014    LVE. EF 55% (prev 40-45% on echo of 1/27/12). No WMA. Mild-mod conc LVH. Gr 3 DDfx. Marked LAE. Mild SHANTEL. Mild MR.    Hypertension     Pulmonary hypertension ([place] [address]     Renal Ultrasound 1/3/12    Right kidney isoechoic w/respect to liver. Sm left-sided pleural effusion    S/P cardiac cath 10/16/2012    LM patent. pLAD 95% (3.5 x 12-mm [place] stent, resid 0$%). LCX mild. Current Outpatient Medications   Medication Sig Dispense Refill    amiodarone (CORDARONE) 200 mg tablet TAKE 1 TABLET BY MOUTH DAILY 30 Tablet 6    metoprolol tartrate (LOPRESSOR) 25 mg tablet Take 1 Tablet by mouth two (2) times a day. 60 Tablet 2    triamcinolone (ARISTOCORT) 0.5 % topical cream Apply  to affected area two (2) times a day. use thin layer 45 g 0    linaGLIPtin (Tradjenta) 5 mg tablet Take 1 Tablet by mouth daily.  glipiZIDE SR (GLUCOTROL XL) 2.5 mg CR tablet Take 1 Tablet by mouth daily. 90 Tablet 0    amLODIPine (NORVASC) 5 mg tablet Take 1 Tablet by mouth daily. 90 Tablet 0    glucose blood VI test strips (Accu-Chek Beatrice Plus test strp) strip Use as directed 100 Strip 2    Eliquis 2.5 mg tablet TAKE 1 TABLET BY MOUTH EVERY 12 HOURS 180 Tablet 3    aspirin delayed-release 81 mg tablet Take 1 Tablet by mouth daily. 90 Tablet 3    doxercalciferol (HECTOROL IV) 4 mcg.  alum-mag hydroxide-simeth (Maalox Advanced) 200-200-20 mg/5 mL susp Take  by mouth.  atorvastatin (LIPITOR) 80 mg tablet Take 1 Tablet by mouth nightly.  30 Tablet 0    nitroglycerin (NITROLINGUAL) 400 mcg/spray spray 1 Spray by SubLINGual route every five (5) minutes as needed for Chest Pain. 1 Bottle 2    Blood-Glucose Meter monitoring kit Check fasting glucose 1 Kit 0    sucroferric oxyhydroxide (VELPHORO) 500 mg chew chewable tablet Take 500 mg by mouth three (3) times daily (with meals).  lisinopriL (PRINIVIL, ZESTRIL) 20 mg tablet Take 20 mg by mouth daily. Social History   reports that he has never smoked. He has never used smokeless tobacco.   reports previous alcohol use of about 4.2 standard drinks of alcohol per week. Family History  family history includes Diabetes in his brother and mother; High Cholesterol in his father; Hypertension in his mother; Kidney Disease in his mother. Review of Systems  Except as stated above include:  Constitutional: Negative for fever, chills and malaise/fatigue. HEENT: No congestion or recent URI. Gastrointestinal: No nausea, vomiting, abdominal pain, bloody stools. Pulmonary:  Negative except as stated above. Cardiac:  Negative except as stated above. Musculoskeletal: Negative except as stated above. Neurological:  No localized symptoms. Skin:  Negative except as stated above. Psych:  Negative except as stated above. Endocrine:  Negative except as stated above. PHYSICAL EXAM  BP Readings from Last 3 Encounters:   06/01/22 (!) 206/120   05/17/22 136/86   05/14/22 108/64     Pulse Readings from Last 3 Encounters:   06/01/22 70   05/17/22 72   05/14/22 (!) 118     Wt Readings from Last 3 Encounters:   06/01/22 93.9 kg (207 lb)   05/17/22 92.1 kg (203 lb)   05/03/22 95.3 kg (210 lb)     General:   Well developed, well groomed. Head/Neck:   No obvious jugular venous distention     No obvious carotid pulsations. No evidence of xanthelasma. Lungs:   No respiratory distress. Clear bilaterally. Heart:  Regular rate and rhythm. Normal S1/S2.       Palpation grossly normal.    No significant murmurs, rubs or gallops. Abdomen:   Non-acute abdomen. No obvious pulsations. Extremities:   Intact peripheral pulses. No significant edema. Neurological:   Alert and oriented to person, place, time. No focal neurological deficit visually. Skin:   No obvious rash    Blood Pressure Metric:  Monitor recommended and adjustments stated if needed.

## 2022-06-01 NOTE — PATIENT INSTRUCTIONS
If you have not heard from the central scheduler to schedule your testing in 48 hours, please call [phone]- For [facility]          Patient  EP Instructions      Please have Lab Work completed about 7 days prior to procedure. 1. You are scheduled to have a CHAPITO/Atrial Fibrillation Ablation on  June 28, 2022 , at 08:00 a.m. Please check in at 06:30 a.m.     2. Please go to [facility] and [place] in the outpatient parking lot that is located around to the back of the hospital and enter through the [facility]. Once you enter through the [facility] check in with the  there. The  will either give you directions or assist you in getting to the cath holding area. 3.  You are not to eat or drink anything after midnight the night before your procedure. 4. Please continue to take your medications with a small sip of water on the morning of the procedure with the following exceptions:  Hold (Do Not Take) Eliquis for 24 hours prior to procedure. 5. If you are diabetic, do not take your insulin/sugar pill the morning of the procedure- Tradgenta and Glipizide. 6. We encourage families to wait in the waiting room on the first floor while the procedure is being done. The Doctor will come out and talk with you as soon as the procedure is over. 7. There is the possibility that you may spend the night in the hospital, depending on the results of the procedure. This will be determined after the procedure is done. 8.   If you or your family have any questions, please call our office Monday-Friday 9:00am         -4:30 pm , at [phone], and ask to speak to one of the nurses. Please follow up in office in 8 weeks post procedure. `

## 2022-06-01 NOTE — LETTER
6/1/2022 10:00 AM    Car Sharpe  [id number]  1970        Insurance:  [employer] [place] # _____________________      Proc Date: Rikkien Lie 6/28                Proc Time:  8:00      Performing MD : Dr. Corinne Lambert                      Procedure:CHAPITO/Afib                                         Scheduled with:  Lucero Schmitz                                               Date:6/1/2022          HP: 6/1      EKG: ______    Labs:______  CXR: _______  Orders:  6/1          Special Instructions:  _____________________________________________________  ______________________________________________________________________  ______________________________________________________________________          The materials enclosed with this facsimile transmission are private and confidential and are the property of the sender. If you are not the intended recipient, be advised that any unauthorized use, disclosure, copying, distribution, or the taking of any action in reliance on the contents of this telecopied information is strictly prohibited. If you have received this in error, please immediately notify the sender via telephone to arrange for return of the forwarded documentation.

## 2022-06-01 NOTE — TELEPHONE ENCOUNTER
This pharmacy faxed over request for the following prescriptions to be filled:    Medication requested :   Requested Prescriptions     Pending Prescriptions Disp Refills    glipiZIDE SR (GLUCOTROL XL) 2.5 mg CR tablet 90 Tablet 0     Sig: Take 1 Tablet by mouth daily. PCP: Mic  Pharmacy or Print: [id number]  Mail order or Local pharmacy [facility]     Scheduled appointment if not seen by current providers in office: LOV 6/1/2022   No f/u up Scheduled at this time.   Due 6/1/2023

## 2022-06-01 NOTE — PROGRESS NOTES
Past Medical History:   Diagnosis Date    Abnormal nuclear cardiac imaging test 10/08/2012    Fixed anteroseptal, anteroapical defect c/w prior infarction. No ischemia. Mid & distal anteroseptal, anteroapical WMA. LVE. EF 44%.  Anemia     dr. Cedeno Body doubt amyloid or multiple myeloma. candidate for procirt if Hg <10    Benign hypertensive     Blindness of right eye 01/2013    legally blind    CAD (coronary artery disease)     Cardiomyopathy ejection fraction of 40%     CKD (chronic kidney disease), [facility] IV ([facility]     to see Dr. Darnell Fus 12/1/12    Congestive heart failure ([facility]     [facility]     Di[facility]     Diabetic [facility]     Dr. Constantin Pop- injections    [facility]     History of echocardiogram 04/22/2014    LVE. EF 55% (prev 40-45% on echo of 1/27/12). No WMA. Mild-mod conc LVH. Gr 3 DDfx. Marked LAE. Mild SHANTEL. Mild MR.    Hypertension     Pulmonary hypertension ([facility]     Renal Ultrasound 1/3/12    Right kidney isoechoic w/respect to liver. Sm left-sided pleural effusion    S/P cardiac cath 10/16/2012    LM patent. pLAD 95% (3.5 x 12-mm [place] stent, resid 0$%). LCX mild. Current Outpatient Medications   Medication Sig Dispense Refill    amiodarone (CORDARONE) 200 mg tablet TAKE 1 TABLET BY MOUTH DAILY 30 Tablet 6    metoprolol tartrate (LOPRESSOR) 25 mg tablet Take 1 Tablet by mouth two (2) times a day. 60 Tablet 2    triamcinolone (ARISTOCORT) 0.5 % topical cream Apply  to affected area two (2) times a day. use thin layer 45 g 0    linaGLIPtin (Tradjenta) 5 mg tablet Take 1 Tablet by mouth daily.  glipiZIDE SR (GLUCOTROL XL) 2.5 mg CR tablet Take 1 Tablet by mouth daily. 90 Tablet 0    amLODIPine (NORVASC) 5 mg tablet Take 1 Tablet by mouth daily.  90 Tablet 0    glucose blood VI test strips (Accu-Chek Beatrice Plus test strp) strip Use as directed 100 Strip 2    Eliquis 2.5 mg tablet TAKE 1 TABLET BY MOUTH EVERY 12 HOURS 180 Tablet 3    aspirin delayed-release 81 mg tablet Take 1 Tablet by mouth daily. 90 Tablet 3    doxercalciferol (HECTOROL IV) 4 mcg.  alum-mag hydroxide-simeth (Maalox Advanced) 200-200-20 mg/5 mL susp Take  by mouth.  atorvastatin (LIPITOR) 80 mg tablet Take 1 Tablet by mouth nightly. 30 Tablet 0    nitroglycerin (NITROLINGUAL) 400 mcg/spray spray 1 Spray by SubLINGual route every five (5) minutes as needed for Chest Pain. 1 Bottle 2    Blood-Glucose Meter monitoring kit Check fasting glucose 1 Kit 0    sucroferric oxyhydroxide (VELPHORO) 500 mg chew chewable tablet Take 500 mg by mouth three (3) times daily (with meals).  lisinopriL (PRINIVIL, ZESTRIL) 20 mg tablet Take 20 mg by mouth daily. Social History   reports that he has never smoked. He has never used smokeless tobacco.   reports previous alcohol use of about 4.2 standard drinks of alcohol per week. Family History  family history includes Diabetes in his brother and mother; High Cholesterol in his father; Hypertension in his mother; Kidney Disease in his mother. Review of Systems  Except as stated above include:  Constitutional: Negative for fever, chills and malaise/fatigue. HEENT: No congestion or recent URI. Gastrointestinal: No nausea, vomiting, abdominal pain, bloody stools. Pulmonary:  Negative except as stated above. Cardiac:  Negative except as stated above. Musculoskeletal: Negative except as stated above. Neurological:  No localized symptoms. Skin:  Negative except as stated above. Psych:  Negative except as stated above. Endocrine:  Negative except as stated above.     PHYSICAL EXAM  BP Readings from Last 3 Encounters:   06/01/22 (!) 206/120   05/17/22 136/86   05/14/22 108/64     Pulse Readings from Last 3 Encounters:   06/01/22 70   05/17/22 72   05/14/22 (!) 118     Wt Readings from Last 3 Encounters:   06/01/22 93.9 kg (207 lb)   05/17/22 92.1 kg (203 lb)   05/03/22 95.3 kg (210 lb) General:   Well developed, well groomed. Head/Neck:   No obvious jugular venous distention     No obvious carotid pulsations. No evidence of xanthelasma. Lungs:   No respiratory distress. Clear bilaterally. Heart:  Regular rate and rhythm. Normal S1/S2. Palpation grossly normal.    No significant murmurs, rubs or gallops. Abdomen:   Non-acute abdomen. No obvious pulsations. Extremities:   Intact peripheral pulses. No significant edema. Neurological:   Alert and oriented to person, place, time. No focal neurological deficit visually. Skin:   No obvious rash    Blood Pressure Metric:  Monitor recommended and adjustments stated if needed.

## 2022-06-02 ENCOUNTER — PATIENT OUTREACH (OUTPATIENT)
Dept: CASE MANAGEMENT | Age: 52
End: 2022-06-02

## 2022-06-02 RX ORDER — GLIPIZIDE 2.5 MG/1
2.5 TABLET, EXTENDED RELEASE ORAL DAILY
Qty: 90 TABLET | Refills: 0 | OUTPATIENT
Start: 2022-06-02

## 2022-06-06 NOTE — TELEPHONE ENCOUNTER
CC:  Maria Amike Frost is here today for TCM, vaginal itch and a rash.    Medications: medications verified, no change  Refills needed today?  NO  denies Latex allergy or sensitivity  Patient would like communication of their results via:      Cell Phone:   Telephone Information:   Mobile [phone]     Okay to leave a message containing results? Yes  Tobacco history: verified  Health Maintenance Due   Topic Date Due   • DTaP/Tdap/Td Vaccine (1 - Tdap) 06/02/2017           Health Maintenance Summary     Topic Due On Due Status Completed On    MAMMOGRAM - BREAST CANCER SCREENING Feb 22, 2020 Not Due Feb 22, 2018    Colorectal Cancer Screening - Colonoscopy Mar 8, 2028 Not Due Mar 8, 2018    Pap Smear - Cervical Cancer Screening  Dec 22, 2020 Not Due Dec 22, 2015    Immunization - TDAP Pregnancy  Hidden     IMMUNIZATION - DTaP/Tdap/Td Jun 2, 2017 Overdue Jun 1, 2017    Immunization-Influenza Sep 1, 2018 Not Due     Depression Screening Yoni 15, 2019 Not Due Yoni 15, 2018    Hepatitis C Screening  Completed Apr 4, 2018          Patient is due for topics as listed above, she wishes to discuss with provider .             This pharmacy faxed over request for the following prescriptions to be filled:    Medication requested :   Requested Prescriptions     Pending Prescriptions Disp Refills    amLODIPine (NORVASC) 5 mg tablet 90 Tablet 0     Sig: Take 1 Tablet by mouth daily. PCP: liza  Pharmacy or Print: [facility]   Mail order or Local pharmacy [id number] [place]     Scheduled appointment if not seen by current providers in office:  LOV 6/1/2022 No f/u up Scheduled at this time.   Due 6/1/2023

## 2022-06-07 RX ORDER — AMLODIPINE BESYLATE 5 MG/1
5 TABLET ORAL DAILY
Qty: 90 TABLET | Refills: 0 | OUTPATIENT
Start: 2022-06-07

## 2022-06-09 ENCOUNTER — APPOINTMENT (OUTPATIENT)
Dept: GENERAL RADIOLOGY | Age: 52
End: 2022-06-09
Attending: STUDENT IN AN ORGANIZED HEALTH CARE EDUCATION/TRAINING PROGRAM
Payer: MEDICARE

## 2022-06-09 ENCOUNTER — HOSPITAL ENCOUNTER (EMERGENCY)
Age: 52
Discharge: HOME | End: 2022-06-09
Attending: STUDENT IN AN ORGANIZED HEALTH CARE EDUCATION/TRAINING PROGRAM
Payer: MEDICARE

## 2022-06-09 VITALS
RESPIRATION RATE: 13 BRPM | TEMPERATURE: 96.9 F | HEART RATE: 75 BPM | DIASTOLIC BLOOD PRESSURE: 97 MMHG | OXYGEN SATURATION: 99 % | SYSTOLIC BLOOD PRESSURE: 180 MMHG

## 2022-06-09 DIAGNOSIS — R55 SYNCOPE, UNSPECIFIED SYNCOPE TYPE: Primary | ICD-10-CM

## 2022-06-09 LAB
ANION GAP SERPL CALC-SCNC: 10 MMOL/L (ref 3–18)
ATRIAL RATE: 111 BPM
BASOPHILS # BLD: 0 K/UL (ref 0–0.1)
BASOPHILS NFR BLD: 1 % (ref 0–2)
BNP SERPL-MCNC: ABNORMAL PG/ML (ref 0–450)
BUN SERPL-MCNC: 32 MG/DL (ref 7–18)
BUN/CREAT SERPL: 5 (ref 12–20)
CALCIUM SERPL-MCNC: 6.3 MG/DL (ref 8.5–10.1)
CALCULATED R AXIS, ECG10: -27 DEGREES
CALCULATED T AXIS, ECG11: 99 DEGREES
CHLORIDE SERPL-SCNC: 115 MMOL/L (ref 100–111)
CO2 SERPL-SCNC: 19 MMOL/L (ref 21–32)
CREAT SERPL-MCNC: 6.35 MG/DL (ref 0.6–1.3)
DIAGNOSIS, 93000: NORMAL
DIFFERENTIAL METHOD BLD: ABNORMAL
EOSINOPHIL # BLD: 0.2 K/UL (ref 0–0.4)
EOSINOPHIL NFR BLD: 5 % (ref 0–5)
ERYTHROCYTE [DISTWIDTH] IN BLOOD BY AUTOMATED COUNT: 15 % (ref 11.6–14.5)
GLUCOSE SERPL-MCNC: 66 MG/DL (ref 74–99)
HCT VFR BLD AUTO: 30.6 % (ref 36–48)
HGB BLD-MCNC: 10.1 G/DL (ref 13–16)
IMM GRANULOCYTES # BLD AUTO: 0 K/UL (ref 0–0.04)
IMM GRANULOCYTES NFR BLD AUTO: 0 % (ref 0–0.5)
LACTATE BLD-SCNC: 2.76 MMOL/L (ref 0.4–2)
LYMPHOCYTES # BLD: 0.6 K/UL (ref 0.9–3.6)
LYMPHOCYTES NFR BLD: 16 % (ref 21–52)
MAGNESIUM SERPL-MCNC: 1.8 MG/DL (ref 1.6–2.6)
MCH RBC QN AUTO: 29.7 PG (ref 24–34)
MCHC RBC AUTO-ENTMCNC: 33 G/DL (ref 31–37)
MCV RBC AUTO: 90 FL (ref 78–100)
MONOCYTES # BLD: 0.3 K/UL (ref 0.05–1.2)
MONOCYTES NFR BLD: 8 % (ref 3–10)
NEUTS SEG # BLD: 2.5 K/UL (ref 1.8–8)
NEUTS SEG NFR BLD: 69 % (ref 40–73)
NRBC # BLD: 0 K/UL (ref 0–0.01)
NRBC BLD-RTO: 0 PER 100 WBC
PLATELET # BLD AUTO: 151 K/UL (ref 135–420)
PMV BLD AUTO: 10 FL (ref 9.2–11.8)
POTASSIUM SERPL-SCNC: 3.8 MMOL/L (ref 3.5–5.5)
Q-T INTERVAL, ECG07: 400 MS
QRS DURATION, ECG06: 90 MS
QTC CALCULATION (BEZET), ECG08: 544 MS
RBC # BLD AUTO: 3.4 M/UL (ref 4.35–5.65)
SODIUM SERPL-SCNC: 144 MMOL/L (ref 136–145)
TROPONIN-HIGH SENSITIVITY: 35 NG/L (ref 0–78)
VENTRICULAR RATE, ECG03: 111 BPM
WBC # BLD AUTO: 3.6 K/UL (ref 4.6–13.2)

## 2022-06-09 PROCEDURE — 83735 ASSAY OF MAGNESIUM: CPT

## 2022-06-09 PROCEDURE — 99285 EMERGENCY DEPT VISIT HI MDM: CPT

## 2022-06-09 PROCEDURE — 96365 THER/PROPH/DIAG IV INF INIT: CPT

## 2022-06-09 PROCEDURE — 74011250637 HC RX REV CODE- 250/637: Performed by: STUDENT IN AN ORGANIZED HEALTH CARE EDUCATION/TRAINING PROGRAM

## 2022-06-09 PROCEDURE — 80048 BASIC METABOLIC PNL TOTAL CA: CPT

## 2022-06-09 PROCEDURE — 83880 ASSAY OF NATRIURETIC PEPTIDE: CPT

## 2022-06-09 PROCEDURE — 71045 X-RAY EXAM CHEST 1 VIEW: CPT

## 2022-06-09 PROCEDURE — 74011250636 HC RX REV CODE- 250/636: Performed by: STUDENT IN AN ORGANIZED HEALTH CARE EDUCATION/TRAINING PROGRAM

## 2022-06-09 PROCEDURE — 83605 ASSAY OF LACTIC ACID: CPT

## 2022-06-09 PROCEDURE — 96366 THER/PROPH/DIAG IV INF ADDON: CPT

## 2022-06-09 PROCEDURE — 85025 COMPLETE CBC W/AUTO DIFF WBC: CPT

## 2022-06-09 PROCEDURE — 84484 ASSAY OF TROPONIN QUANT: CPT

## 2022-06-09 PROCEDURE — 96375 TX/PRO/DX INJ NEW DRUG ADDON: CPT

## 2022-06-09 PROCEDURE — 93005 ELECTROCARDIOGRAM TRACING: CPT

## 2022-06-09 RX ORDER — MIDODRINE HYDROCHLORIDE 5 MG/1
10 TABLET ORAL ONCE
Status: COMPLETED | OUTPATIENT
Start: 2022-06-09 | End: 2022-06-09

## 2022-06-09 RX ORDER — MAGNESIUM SULFATE HEPTAHYDRATE 40 MG/ML
2 INJECTION, SOLUTION INTRAVENOUS
Status: COMPLETED | OUTPATIENT
Start: 2022-06-09 | End: 2022-06-09

## 2022-06-09 RX ORDER — ONDANSETRON 2 MG/ML
4 INJECTION INTRAMUSCULAR; INTRAVENOUS
Status: COMPLETED | OUTPATIENT
Start: 2022-06-09 | End: 2022-06-09

## 2022-06-09 RX ADMIN — MIDODRINE HYDROCHLORIDE 10 MG: 5 TABLET ORAL at 10:47

## 2022-06-09 RX ADMIN — SODIUM CHLORIDE 1000 ML: 900 INJECTION, SOLUTION INTRAVENOUS at 11:05

## 2022-06-09 RX ADMIN — ONDANSETRON 4 MG: 2 INJECTION INTRAMUSCULAR; INTRAVENOUS at 11:31

## 2022-06-09 RX ADMIN — MAGNESIUM SULFATE 2 G: 2 INJECTION INTRAVENOUS at 11:31

## 2022-06-09 NOTE — DISCHARGE INSTRUCTIONS
You presented to the emergency department due to fainting episode/weakness while in dialysis. This could be due to too much fluid being taken off during dialysis, you are given some fluid via IV which brought up your blood pressure and you were also given medication to help with your blood pressure. Please follow-up with your primary care doctor. If you have return of symptoms you can always come back to the emergency department.

## 2022-06-12 NOTE — ED PROVIDER NOTES
EMERGENCY DEPARTMENT HISTORY AND PHYSICAL EXAM    9:29 AM      Date: 6/9/2022  Patient Name: Wolf Roblero    History of Presenting Illness     Chief Complaint   Patient presents with    Fatigue         History Provided By: Patient  Location/Duration/Severity/Modifying factors   Patient is a 82-year-old male with end-stage renal disease on dialysis, paroxysmal A. fib with RVR, coronary artery disease presenting due to weakness/presyncope while undergoing dialysis. Report from EMS and patient is a getting dialysis patient felt weak and felt like he was going to pass out so brought to the emergency department for evaluation. Patient was slightly hypotensive upon first evaluation in emergency department and tachycardic. Patient denies any shortness of breath chest pain loss of sensation or strength in his extremities. PCP: Alejandra Galindo MD    Current Outpatient Medications   Medication Sig Dispense Refill    lisinopriL (PRINIVIL, ZESTRIL) 20 mg tablet Take 20 mg by mouth daily.  atorvastatin (LIPITOR) 80 mg tablet Take 1 Tablet by mouth nightly. 90 Tablet 3    amiodarone (CORDARONE) 200 mg tablet TAKE 1 TABLET BY MOUTH DAILY 30 Tablet 6    metoprolol tartrate (LOPRESSOR) 25 mg tablet Take 1 Tablet by mouth two (2) times a day. 60 Tablet 2    linaGLIPtin (Tradjenta) 5 mg tablet Take 1 Tablet by mouth daily.  glipiZIDE SR (GLUCOTROL XL) 2.5 mg CR tablet Take 1 Tablet by mouth daily. 90 Tablet 0    amLODIPine (NORVASC) 5 mg tablet Take 1 Tablet by mouth daily. 90 Tablet 0    glucose blood VI test strips (Accu-Chek Beatrice Plus test strp) strip Use as directed 100 Strip 2    Eliquis 2.5 mg tablet TAKE 1 TABLET BY MOUTH EVERY 12 HOURS 180 Tablet 3    aspirin delayed-release 81 mg tablet Take 1 Tablet by mouth daily. 90 Tablet 3    doxercalciferol (HECTOROL IV) 4 mcg.  alum-mag hydroxide-simeth (Maalox Advanced) 200-200-20 mg/5 mL susp Take  by mouth.       nitroglycerin (NITROLINGUAL) 400 mcg/spray spray 1 Spray by SubLINGual route every five (5) minutes as needed for Chest Pain. 1 Bottle 2    Blood-Glucose Meter monitoring kit Check fasting glucose 1 Kit 0    sucroferric oxyhydroxide (VELPHORO) 500 mg chew chewable tablet Take 500 mg by mouth three (3) times daily (with meals). Past History     Past Medical History:  Past Medical History:   Diagnosis Date    Abnormal nuclear cardiac imaging test 10/08/2012    Fixed anteroseptal, anteroapical defect c/w prior infarction. No ischemia. Mid & distal anteroseptal, anteroapical WMA. LVE. EF 44%.  Anemia     dr. Bre Rodriguez doubt amyloid or multiple myeloma. candidate for procirt if Hg <10    Benign hypertensive     Blindness of right eye 01/2013    legally blind    CAD (coronary artery disease)     Cardiomyopathy ejection fraction of 40%     CKD (chronic kidney disease), stage IV ([address]     to see Dr. Danie Samano 12/1/12    Congestive heart failure [facility] [address]     Diabetes mellitus ([facility] [address] [facility]     Diabetic retinopathy ([facility] [address] [facility]     Diabetic retinopathy ([facility] [address] [facility]     Dr. Gordy Hitchcock- injections    Heart failure ([facility] [address] [facility]     History of echocardiogram 04/22/2014    LVE. EF 55% (prev 40-45% on echo of 1/27/12). No WMA. Mild-mod conc LVH. Gr 3 DDfx. Marked LAE. Mild SHANTEL. Mild MR.    Hypertension     Pulmonary hypertension ([address]     Renal Ultrasound 1/3/12    Right kidney isoechoic w/respect to liver. Sm left-sided pleural effusion    S/P cardiac cath 10/16/2012    LM patent. pLAD 95% (3.5 x 12-mm Brandenburg stent, resid 0$%). LCX mild. Past Surgical History:  Past Surgical History:   Procedure Laterality Date    HX CORONARY STENT PLACEMENT      one    HX LAPAROTOMY  2/2012    bowel obstruction with removal segment of small bowel. Done by Liv.  HX LAPAROTOMY  infancy    whole in colon and had repair at that time.     HX OTHER SURGICAL  06/20/2013    left eye retna attatchment    HX RETINAL DETACHMENT REPAIR      august 2012    CT REPAIR ING HERNIA,5+Y/O,REDUCIBL Left 05/02/2019    Dr. Sona Lr       Family History:  Family History   Problem Relation Age of Onset    Diabetes Mother     Hypertension Mother     Kidney Disease Mother     High Cholesterol Father     Diabetes Brother         pre diabetic       Social History:  Social History     Tobacco Use    Smoking status: Never Smoker    Smokeless tobacco: Never Used   Substance Use Topics    Alcohol use: Not Currently     Alcohol/week: 4.2 standard drinks     Types: 5 Cans of beer per week    Drug use: No       Allergies:  No Known Allergies      Review of Systems       Review of Systems   Constitutional: Positive for fatigue. Negative for chills and fever. HENT: Negative for congestion and rhinorrhea. Eyes: Negative for visual disturbance. Respiratory: Negative for shortness of breath. Cardiovascular: Negative for chest pain and palpitations. Gastrointestinal: Negative for abdominal pain, diarrhea, nausea and vomiting. Musculoskeletal: Negative for back pain. Skin: Negative for rash. Neurological: Positive for weakness and light-headedness. All other systems reviewed and are negative. Physical Exam     Visit Vitals  BP (!) 180/97   Pulse 75   Temp 96.9 °F (36.1 °C)   Resp 13   SpO2 99%         Physical Exam  Vitals and nursing note reviewed. Constitutional:       General: He is not in acute distress. Appearance: He is well-developed. He is ill-appearing. HENT:      Head: Normocephalic and atraumatic. Right Ear: External ear normal.      Left Ear: External ear normal.      Nose: Nose normal.   Eyes:      Extraocular Movements: Extraocular movements intact. Cardiovascular:      Rate and Rhythm: Tachycardia present. Heart sounds: Normal heart sounds. No murmur heard. No friction rub. No gallop. Pulmonary:      Effort: Pulmonary effort is normal.      Breath sounds: Normal breath sounds. Chest:      Chest wall: No tenderness. Abdominal:      General: Bowel sounds are normal. There is no distension. Palpations: Abdomen is soft. Tenderness: There is no abdominal tenderness. Musculoskeletal:         General: No tenderness. Normal range of motion. Cervical back: Normal range of motion. Skin:     General: Skin is warm and dry. Neurological:      General: No focal deficit present. Mental Status: He is alert and oriented to person, place, and time. Sensory: No sensory deficit. Motor: No weakness. Coordination: Coordination normal.   Psychiatric:         Behavior: Behavior normal.         Thought Content: Thought content normal.         Judgment: Judgment normal.           Diagnostic Study Results     Labs -  No results found for this or any previous visit (from the past 12 hour(s)). Radiologic Studies -   XR CHEST PORT   Final Result   No acute cardiopulmonary findings. Medical Decision Making   I am the first provider for this patient. I reviewed the vital signs, available nursing notes, past medical history, past surgical history, family history and social history. Vital Signs-Reviewed the patient's vital signs. EKG:     Records Reviewed: Nursing Notes (Time of Review: 9:29 AM)    ED Course: Progress Notes, Reevaluation, and Consults:    ED Course as of 06/12/22 0929   Thu Jun 09, 2022   1409 Patient given some IV fluids and midodrine which brought up his blood pressure. Patient heart rate was stable now at a rate of 68 but was roughly around 110 at the highest during course in emergency department. Patient never complained of any chest pain or shortness of breath. BNP is roughly at his baseline. Lactic acid slightly elevated but from looking in notes he normally is a lot higher than this. Potassium is 3.8 so did not get enough dialysis today. X-ray is clear. Attempted to reach Dr. Kateryna Sandoval but could not get in contact with him.   Patient states that he would like to go home so we will discharge patient as he is currently stable in the emergency department and believe that some of his symptoms could have been due to fluid being taken off during dialysis. [TB]      ED Course User Index  [TB] Regina Mendes MD       Provider Notes (Medical Decision Making):   MDM  Number of Diagnoses or Management Options  Syncope, unspecified syncope type  Diagnosis management comments: Patient is a 62-year-old male with end-stage renal disease on dialysis, paroxysmal A. fib with RVR, coronary artery disease presenting due to weakness/presyncope while undergoing dialysis. Patient presenting with weakness presyncope tachycardia and hypotension possibly due to too much fluid being taken off while at dialysis. Differential includes A. fib with RVR/arrhythmia, ACS, infection, less likely stroke without any focal deficits or symptoms of numbness or weakness. Plan to assess with CBC BMP EKG chest x-ray. We will attempt to give patient back some fluids to see if that will bring up blood pressure and decrease tachycardia. If labs normal and can resolve patient symptoms with fluids then likely will discharge home. Procedures    Critical Care Time:       Diagnosis     Clinical Impression:   1. Syncope, unspecified syncope type        Disposition: Home    Follow-up Information     Follow up With Specialties Details Why Contact Info    Paramjit Mane MD Family Medicine Schedule an appointment as soon as possible for a visit   [facility]  [phone]             Discharge Medication List as of 6/9/2022  2:15 PM      CONTINUE these medications which have NOT CHANGED    Details   lisinopriL (PRINIVIL, ZESTRIL) 20 mg tablet Take 20 mg by mouth daily. , Historical Med      atorvastatin (LIPITOR) 80 mg tablet Take 1 Tablet by mouth nightly., Normal, Disp-90 Tablet, R-3      amiodarone (CORDARONE) 200 mg tablet TAKE 1 TABLET BY MOUTH DAILY, Normal, Disp-30 Tablet, R-6      metoprolol tartrate (LOPRESSOR) 25 mg tablet Take 1 Tablet by mouth two (2) times a day., Normal, Disp-60 Tablet, R-2      linaGLIPtin (Tradjenta) 5 mg tablet Take 1 Tablet by mouth daily. , Historical Med      glipiZIDE SR (GLUCOTROL XL) 2.5 mg CR tablet Take 1 Tablet by mouth daily. , Normal, Disp-90 Tablet, R-0      amLODIPine (NORVASC) 5 mg tablet Take 1 Tablet by mouth daily. , Normal, Disp-90 Tablet, R-0      glucose blood VI test strips (Accu-Chek Beatrice Plus test strp) strip Use as directed, Normal, Disp-100 Strip, R-2      Eliquis 2.5 mg tablet TAKE 1 TABLET BY MOUTH EVERY 12 HOURS, Normal, Disp-180 Tablet, R-3      aspirin delayed-release 81 mg tablet Take 1 Tablet by mouth daily. , Normal, Disp-90 Tablet, R-3      doxercalciferol (HECTOROL IV) 4 mcg., Historical Med      alum-mag hydroxide-simeth (Maalox Advanced) 200-200-20 mg/5 mL susp Take  by mouth., Historical Med      nitroglycerin (NITROLINGUAL) 400 mcg/spray spray 1 Spray by SubLINGual route every five (5) minutes as needed for Chest Pain., Normal, Disp-1 Bottle, R-2      Blood-Glucose Meter monitoring kit Check fasting glucose, Normal, Disp-1 Kit, R-0      sucroferric oxyhydroxide (VELPHORO) 500 mg chew chewable tablet Take 500 mg by mouth three (3) times daily (with meals). , Historical Med           Disclaimer: Sections of this note are dictated using utilizing voice recognition software. Minor typographical errors may be present. If questions arise, please do not hesitate to contact me or call our department.

## 2022-06-13 ENCOUNTER — HOSPITAL ENCOUNTER (EMERGENCY)
Age: 52
Discharge: HOME | End: 2022-06-13
Attending: STUDENT IN AN ORGANIZED HEALTH CARE EDUCATION/TRAINING PROGRAM
Payer: MEDICARE

## 2022-06-13 ENCOUNTER — TELEPHONE (OUTPATIENT)
Dept: CARDIOLOGY CLINIC | Age: 52
End: 2022-06-13

## 2022-06-13 ENCOUNTER — APPOINTMENT (OUTPATIENT)
Dept: GENERAL RADIOLOGY | Age: 52
End: 2022-06-13
Attending: STUDENT IN AN ORGANIZED HEALTH CARE EDUCATION/TRAINING PROGRAM
Payer: MEDICARE

## 2022-06-13 VITALS
OXYGEN SATURATION: 97 % | TEMPERATURE: 98.3 F | HEART RATE: 96 BPM | RESPIRATION RATE: 18 BRPM | SYSTOLIC BLOOD PRESSURE: 134 MMHG | DIASTOLIC BLOOD PRESSURE: 96 MMHG

## 2022-06-13 DIAGNOSIS — D64.9 NORMOCYTIC ANEMIA: ICD-10-CM

## 2022-06-13 DIAGNOSIS — E86.1 HYPOVOLEMIA: ICD-10-CM

## 2022-06-13 DIAGNOSIS — R53.1 WEAKNESS: Primary | ICD-10-CM

## 2022-06-13 DIAGNOSIS — N18.6 ESRD (END STAGE RENAL DISEASE) ON DIALYSIS (HCC): ICD-10-CM

## 2022-06-13 DIAGNOSIS — Z99.2 ESRD (END STAGE RENAL DISEASE) ON DIALYSIS (HCC): ICD-10-CM

## 2022-06-13 LAB
ALBUMIN SERPL-MCNC: 3.5 G/DL (ref 3.4–5)
ALBUMIN/GLOB SERPL: 1 {RATIO} (ref 0.8–1.7)
ALP SERPL-CCNC: 99 U/L (ref 45–117)
ALT SERPL-CCNC: 33 U/L (ref 16–61)
ANION GAP SERPL CALC-SCNC: 13 MMOL/L (ref 3–18)
AST SERPL-CCNC: 19 U/L (ref 10–38)
ATRIAL RATE: 120 BPM
BASOPHILS # BLD: 0 K/UL (ref 0–0.1)
BASOPHILS NFR BLD: 1 % (ref 0–2)
BILIRUB SERPL-MCNC: 0.7 MG/DL (ref 0.2–1)
BNP SERPL-MCNC: ABNORMAL PG/ML (ref 0–450)
BUN SERPL-MCNC: 32 MG/DL (ref 7–18)
BUN/CREAT SERPL: 4 (ref 12–20)
CALCIUM SERPL-MCNC: 8.7 MG/DL (ref 8.5–10.1)
CALCULATED R AXIS, ECG10: -25 DEGREES
CALCULATED T AXIS, ECG11: 113 DEGREES
CHLORIDE SERPL-SCNC: 99 MMOL/L (ref 100–111)
CO2 SERPL-SCNC: 26 MMOL/L (ref 21–32)
CREAT SERPL-MCNC: 8.54 MG/DL (ref 0.6–1.3)
DIAGNOSIS, 93000: NORMAL
DIFFERENTIAL METHOD BLD: ABNORMAL
EOSINOPHIL # BLD: 0.2 K/UL (ref 0–0.4)
EOSINOPHIL NFR BLD: 4 % (ref 0–5)
ERYTHROCYTE [DISTWIDTH] IN BLOOD BY AUTOMATED COUNT: 14.8 % (ref 11.6–14.5)
GLOBULIN SER CALC-MCNC: 3.6 G/DL (ref 2–4)
GLUCOSE SERPL-MCNC: 108 MG/DL (ref 74–99)
HCT VFR BLD AUTO: 37.4 % (ref 36–48)
HGB BLD-MCNC: 12.4 G/DL (ref 13–16)
IMM GRANULOCYTES # BLD AUTO: 0 K/UL (ref 0–0.04)
IMM GRANULOCYTES NFR BLD AUTO: 1 % (ref 0–0.5)
LYMPHOCYTES # BLD: 0.9 K/UL (ref 0.9–3.6)
LYMPHOCYTES NFR BLD: 17 % (ref 21–52)
MAGNESIUM SERPL-MCNC: 2.3 MG/DL (ref 1.6–2.6)
MCH RBC QN AUTO: 29.7 PG (ref 24–34)
MCHC RBC AUTO-ENTMCNC: 33.2 G/DL (ref 31–37)
MCV RBC AUTO: 89.7 FL (ref 78–100)
MONOCYTES # BLD: 0.5 K/UL (ref 0.05–1.2)
MONOCYTES NFR BLD: 10 % (ref 3–10)
NEUTS SEG # BLD: 3.8 K/UL (ref 1.8–8)
NEUTS SEG NFR BLD: 69 % (ref 40–73)
NRBC # BLD: 0 K/UL (ref 0–0.01)
NRBC BLD-RTO: 0 PER 100 WBC
PLATELET # BLD AUTO: 200 K/UL (ref 135–420)
PMV BLD AUTO: 10.7 FL (ref 9.2–11.8)
POTASSIUM SERPL-SCNC: 3.6 MMOL/L (ref 3.5–5.5)
PROT SERPL-MCNC: 7.1 G/DL (ref 6.4–8.2)
Q-T INTERVAL, ECG07: 316 MS
QRS DURATION, ECG06: 84 MS
QTC CALCULATION (BEZET), ECG08: 446 MS
RBC # BLD AUTO: 4.17 M/UL (ref 4.35–5.65)
SODIUM SERPL-SCNC: 138 MMOL/L (ref 136–145)
TROPONIN-HIGH SENSITIVITY: 46 NG/L (ref 0–78)
VENTRICULAR RATE, ECG03: 120 BPM
WBC # BLD AUTO: 5.5 K/UL (ref 4.6–13.2)

## 2022-06-13 PROCEDURE — 83880 ASSAY OF NATRIURETIC PEPTIDE: CPT

## 2022-06-13 PROCEDURE — 99285 EMERGENCY DEPT VISIT HI MDM: CPT

## 2022-06-13 PROCEDURE — 96360 HYDRATION IV INFUSION INIT: CPT

## 2022-06-13 PROCEDURE — 93005 ELECTROCARDIOGRAM TRACING: CPT

## 2022-06-13 PROCEDURE — 83735 ASSAY OF MAGNESIUM: CPT

## 2022-06-13 PROCEDURE — 74011250637 HC RX REV CODE- 250/637: Performed by: STUDENT IN AN ORGANIZED HEALTH CARE EDUCATION/TRAINING PROGRAM

## 2022-06-13 PROCEDURE — 85025 COMPLETE CBC W/AUTO DIFF WBC: CPT

## 2022-06-13 PROCEDURE — 84484 ASSAY OF TROPONIN QUANT: CPT

## 2022-06-13 PROCEDURE — 80053 COMPREHEN METABOLIC PANEL: CPT

## 2022-06-13 PROCEDURE — 74011250636 HC RX REV CODE- 250/636: Performed by: STUDENT IN AN ORGANIZED HEALTH CARE EDUCATION/TRAINING PROGRAM

## 2022-06-13 PROCEDURE — 71045 X-RAY EXAM CHEST 1 VIEW: CPT

## 2022-06-13 RX ORDER — MIDODRINE HYDROCHLORIDE 5 MG/1
5 TABLET ORAL ONCE
Status: COMPLETED | OUTPATIENT
Start: 2022-06-13 | End: 2022-06-13

## 2022-06-13 RX ADMIN — MIDODRINE HYDROCHLORIDE 5 MG: 5 TABLET ORAL at 11:43

## 2022-06-13 RX ADMIN — SODIUM CHLORIDE 500 ML: 900 INJECTION, SOLUTION INTRAVENOUS at 11:13

## 2022-06-13 NOTE — ED NOTES
Patient discharged by Dr. Tasha Bowling. No s/s of distress. Patient left unit ambulating independently.

## 2022-06-13 NOTE — DISCHARGE INSTRUCTIONS
Please continue with your original dialysis schedule. Please ensure you follow-up with primary care doctor, cardiology, and nephrology.

## 2022-06-13 NOTE — ED PROVIDER NOTES
73-year-old gentleman with history of ESRD on HD TTS, A. fib, cardiomyopathy, CAD, and DM, presenting with weakness after receiving dialysis this morning. Patient states that he missed his Saturday dialysis, and this morning was weighing 97 kg, went into dialysis and they attempted to take off extra fluid. Patient states his dry weight is normally around 93 kg. Patient states that after dialysis today he was at 92 kg. Patient states that this is happened to him before, stating that he sometimes gets too much fluid pulled off, and then becomes very weak and presyncopal/syncopal.  Patient was here last week for similar presentation. Patient endorses some mild shortness of breath, denies any chest pain or palpitations. Patient denies any fevers or chills. States that other than weakness, he feels at his baseline. Past Medical History:   Diagnosis Date    Abnormal nuclear cardiac imaging test 10/08/2012    Fixed anteroseptal, anteroapical defect c/w prior infarction. No ischemia. Mid & distal anteroseptal, anteroapical WMA. LVE. EF 44%.  Anemia     dr. Roberto Andersen doubt amyloid or multiple myeloma. candidate for procirt if Hg <10    Benign hypertensive     Blindness of right eye 01/2013    legally blind    CAD (coronary artery disease)     Cardiomyopathy ejection fraction of 40%     CKD (chronic kidney disease), stage IV ([address]     to see Dr. Ian Gonzalez 12/1/12    Congestive heart failure ([address]     Diabetes mellitus ([address]     Diabetic retinopathy ([address]     Diabetic retinopathy ([address]     Dr. Guaman Poster- injections    Heart failure ([address]     History of echocardiogram 04/22/2014    LVE. EF 55% (prev 40-45% on echo of 1/27/12). No WMA. Mild-mod conc LVH. Gr 3 DDfx. Marked LAE. Mild SHANTEL. Mild MR.    Hypertension     Pulmonary hypertension ([address]     Renal Ultrasound 1/3/12    Right kidney isoechoic w/respect to liver.  Sm left-sided pleural effusion    S/P cardiac cath 10/16/2012    LM patent. pLAD 95% (3.5 x 12-mm Christine stent, resid 0$%). LCX mild. Past Surgical History:   Procedure Laterality Date    HX CORONARY STENT PLACEMENT      one    HX LAPAROTOMY  2/2012    bowel obstruction with removal segment of small bowel. Done by Liv.  HX LAPAROTOMY  infancy    whole in colon and had repair at that time.  HX OTHER SURGICAL  06/20/2013    left eye retna attatchment    HX RETINAL DETACHMENT REPAIR      august 2012    ND REPAIR ING HERNIA,5+Y/O,REDUCIBL Left 05/02/2019    Dr. Emily Guadarrama         Family History:   Problem Relation Age of Onset    Diabetes Mother     Hypertension Mother     Kidney Disease Mother     High Cholesterol Father     Diabetes Brother         pre diabetic       Social History     Socioeconomic History    Marital status:      Spouse name: Not on file    Number of children: Not on file    Years of education: Not on file    Highest education level: Not on file   Occupational History    Not on file   Tobacco Use    Smoking status: Never Smoker    Smokeless tobacco: Never Used   Substance and Sexual Activity    Alcohol use: Not Currently     Alcohol/week: 4.2 standard drinks     Types: 5 Cans of beer per week    Drug use: No    Sexual activity: Yes     Partners: Female     Birth control/protection: None   Other Topics Concern    Not on file   Social History Narrative    Not on file     Social Determinants of Health     Financial Resource Strain:     Difficulty of Paying Living Expenses: Not on file   Food Insecurity:     Worried About Running Out of Food in the Last Year: Not on file    Caitlyn of Food in the Last Year: Not on file   Transportation Needs:     Lack of Transportation (Medical): Not on file    Lack of Transportation (Non-Medical):  Not on file   Physical Activity:     Days of Exercise per Week: Not on file    Minutes of Exercise per Session: Not on file   Stress:     Feeling of Stress : Not on file   Social Connections:     Frequency of Communication with Friends and Family: Not on file    Frequency of Social Gatherings with Friends and Family: Not on file    Attends Jewish Services: Not on file    Active Member of Clubs or Organizations: Not on file    Attends Club or Organization Meetings: Not on file    Marital Status: Not on file   Intimate Partner Violence:     Fear of Current or Ex-Partner: Not on file    Emotionally Abused: Not on file    Physically Abused: Not on file    Sexually Abused: Not on file   Housing Stability:     Unable to Pay for Housing in the Last Year: Not on file    Number of [place] in the Last Year: Not on file    Unstable Housing in the Last Year: Not on file         ALLERGIES: Patient has no known allergies. Review of Systems   Constitutional: Positive for fatigue. Negative for chills and fever. HENT: Negative for congestion, rhinorrhea and sore throat. Respiratory: Positive for shortness of breath. Negative for cough and wheezing. Cardiovascular: Negative for chest pain, palpitations and leg swelling. Gastrointestinal: Negative for abdominal pain, constipation, diarrhea, nausea and vomiting. Genitourinary: Negative for dysuria, frequency and hematuria. Skin: Negative for rash. Neurological: Positive for weakness and light-headedness. Negative for dizziness, seizures, syncope and headaches. Psychiatric/Behavioral: Negative for confusion. Vitals:    06/13/22 1218 06/13/22 1233 06/13/22 1248 06/13/22 1257   BP: 119/85 128/84 (!) 134/96 (!) 134/96   Pulse: (!) 107 (!) 107 98 96   Resp: 17 16 23 18   Temp:       SpO2: 99% 98% 97% 97%            Physical Exam  Vitals and nursing note reviewed. Constitutional:       Appearance: He is normal weight. HENT:      Head: Normocephalic and atraumatic. Mouth/Throat:      Mouth: Mucous membranes are moist.      Pharynx: Oropharynx is clear.    Eyes:      Extraocular Movements: Extraocular movements intact. Pupils: Pupils are equal, round, and reactive to light. Cardiovascular:      Rate and Rhythm: Tachycardia present. Rhythm irregular. Heart sounds: No murmur heard. No friction rub. No gallop. Pulmonary:      Effort: Pulmonary effort is normal.      Breath sounds: Normal breath sounds. No stridor. No wheezing or rales. Abdominal:      General: There is no distension. Palpations: Abdomen is soft. Tenderness: There is no abdominal tenderness. There is no guarding. Musculoskeletal:         General: No swelling. Normal range of motion. Cervical back: Normal range of motion. Skin:     Findings: No rash. Neurological:      Mental Status: He is alert. Psychiatric:         Mood and Affect: Mood normal. Mood is not anxious. Speech: Speech normal.         Behavior: Behavior normal.          MDM  Number of Diagnoses or Management Options  ESRD (end stage renal disease) on dialysis ([facility]  Hypovolemia  Normocytic anemia  Weakness  Diagnosis management comments: Concerns for weakness and fatigue secondary to hypovolemia after aggressive dialysis session removing 5 kg of fluid. Differential also includes ACS, A. fib with RVR, CHF exacerbation. Lower suspicion for infection, while patient is afebrile and has no infectious symptoms. Patient's tachycardia is largely ranging from low 100s to 110s, more concern for physiologic tachycardia in a patient with atrial fibrillation, as opposed to true A. fib with RVR. We will hold off on any further rate control. Plan for initial bolus of 500 cc, then will reassess. Plan for labs, chest x-ray, and EKG. EKG reveals A. fib with RVR, rate 120, narrow QRS, normal axis, very mild increase in ST depression in leads II and III, however no reciprocal elevations, and otherwise consistent with prior EKGs on comparison. 1120  Patient's MAP is down to 54, patient continues to mentate well. Plan for dose of midodrine.     [address] reveal a very mild normocytic anemia at 12.4, otherwise unremarkable CBC. Electrolytes within normal limits, significantly elevated creatinine and decreased GFR, consistent with ESRD. Elevated proBNP at 27,000, not significantly different from prior lab values. High-sensitivity troponin within normal limits. Chest x-ray with no acute abnormalities, stable mildly enlarged cardiac silhouette, and pulmonary vascular congestion. 1230  Patient's blood pressure and heart rate have significantly improved, patient also endorses feeling significantly better. Patient is currently sitting up in bed eating a tray of food. Patient states that he feels back to his baseline and ready to go home. [phone]  Patient continues to remain hemodynamically stable and is now asymptomatic. Concerns for weakness secondary hypovolemia and dehydration. Plan for patient to follow-up with his primary care doctor and cardiologist.  Patient states that he is already scheduled for an A. fib ablation at the end of this month. Plan for patient to continue with his normal dialysis schedule and be more gentle with removing fluids. Discussed strict return precautions. All questions answered. Patient felt safe going home and was discharged in stable condition.          Procedures

## 2022-06-13 NOTE — ED NOTES
Received patient from EMS stretcher. Per EMS, patient had finished chair time at dialysis and began to feel altered. Per patient, he doesn't remember how low his BP got. Patient connected to monitor and running A-Fib with HR in 110's. Patient is alert and oriented x4. Patient given 500mL bolus.

## 2022-06-14 ENCOUNTER — PATIENT OUTREACH (OUTPATIENT)
Dept: CASE MANAGEMENT | Age: 52
End: 2022-06-14

## 2022-06-14 NOTE — PROGRESS NOTES
Complex Case Management      Date/Time:  2022 10:19 AM    Method of communication with patient:phone    [address] [facility] ([facility]) contacted the patient by telephone to perform Ambulatory Care Coordination. Verified name and  (PHI) with patient as identifiers. Provided introduction to self, and explanation of the Ambulatory Care Manager's role. Reviewed most recent clinic visit w/ patient who verbalized understanding. Patient given an opportunity to ask questions. Top Challenges reviewed with the patient   1. Hx of Pulm HTN, CHF, DM2, CKD on dialysis TThSat, CAD, HTN  2. Scheduled for ablation soon. 3. 2 recent ED visits after dialysis relating to weakness. States both most likely due to to much fluid being taken off resulting in low pressures. 4. Pt states doing ok right now, just a little tired. Just finished today's dialysis. 5. No other questions/needs at this time. The patient agrees to contact the PCP office or the [facility] for questions related to their healthcare. Provided contact information for future reference. Disease Specific:   N/A    Home Health Active: No    DME Active: No    Barriers to care? lack of knowledge about disease, utilization of services    Advance Care Planning:   Does patient have an Advance Directive:  not on file; education provided     Medication(s):   Medication reconciliation was not performed with patient, who verbalizes understanding of administration of home medications. There were no barriers to obtaining medications identified at this time. Referral to Pharm D needed: no     Current Outpatient Medications   Medication Sig    lisinopriL (PRINIVIL, ZESTRIL) 20 mg tablet Take 20 mg by mouth daily.  atorvastatin (LIPITOR) 80 mg tablet Take 1 Tablet by mouth nightly.  amiodarone (CORDARONE) 200 mg tablet TAKE 1 TABLET BY MOUTH DAILY    metoprolol tartrate (LOPRESSOR) 25 mg tablet Take 1 Tablet by mouth two (2) times a day.     linaGLIPtin (Tradjenta) 5 mg tablet Take 1 Tablet by mouth daily.  glipiZIDE SR (GLUCOTROL XL) 2.5 mg CR tablet Take 1 Tablet by mouth daily.  amLODIPine (NORVASC) 5 mg tablet Take 1 Tablet by mouth daily.  glucose blood VI test strips (Accu-Chek Beatrice Plus test strp) strip Use as directed    Eliquis 2.5 mg tablet TAKE 1 TABLET BY MOUTH EVERY 12 HOURS    aspirin delayed-release 81 mg tablet Take 1 Tablet by mouth daily.  doxercalciferol (HECTOROL IV) 4 mcg.  alum-mag hydroxide-simeth (Maalox Advanced) 200-200-20 mg/5 mL susp Take  by mouth.  nitroglycerin (NITROLINGUAL) 400 mcg/spray spray 1 Spray by SubLINGual route every five (5) minutes as needed for Chest Pain.  Blood-Glucose Meter monitoring kit Check fasting glucose    sucroferric oxyhydroxide (VELPHORO) 500 mg chew chewable tablet Take 500 mg by mouth three (3) times daily (with meals). No current facility-administered medications for this visit. [facility] follow up appointment(s):   Future Appointments   Date Time Provider Jessica Vail   6/28/2022  6:00 PM [facility] RM 1 [id number] [facility]   11/9/2022 10:00 AM Francisco Marcial MD [facility]        Non-[facility] follow up appointment(s):     Goals Addressed                 This Visit's Progress     Attends follow up appointments on schedule   On track     5/10/22 Patient will attend all scheduled appointments through 8/10/22       Knowledge and adherence of prescribed medication (ie. action, side effects, missed dose, etc.).    On track     5/10/22 Pt will take all medications prescribed to be evaluated on each outreach through 8/10/22

## 2022-06-14 NOTE — TELEPHONE ENCOUNTER
Pt had called yesterday morning right after his dialysis, was actually waiting for his ride, and said that during his dialysis, his HR had dropped to 38 according to his renal doctor and nurse, and he was advised to call his cardiologist to get an appt to come into office. Told pt that there were no open appts for Ry Obrien NP and Dr Kaley Sanderson was on vacation this week. Told pt that given that his HR had dropped so low, nurse told him that he needed to go to an ED so that he could get an EKG done to see what his heart is doing, and that they could also do lab work etc. Pt agreed with plan and said that he would go to an ER once his transportation got there. Then this am pt called because he said that he started to feel weak while he had been waiting for ride, and that he ended up having to go to [facility] ED via ambulance (had planned on going to ED near his home but didn't make it). He said that he was told that dialysis had been to aggressive and that in the ED his BP was low and they gave him some IV fluids. Reveiwed ED notes. Pt stating that he was at home, was feeling better. Did tell him that he needed to share all of this with his dialysis doctor because they may not want to dialyse as much fluid off the next time. Also tried to educate pt on his BP medications, in that he should perhaps try to stagger them a couple of hours apart from each other so as not to drop his HR or BP too much too fast. Pt gave verbal understanding of the above. Pt is scheduled for afib ablation on the 28. Pt deneid feeling like he needed to come into office for visit. He understands that for those emergent episodes that he has had in past, that the ED is the best place to go in those situations. Pt denied any other questions. Content with call.

## 2022-06-24 ENCOUNTER — HOSPITAL ENCOUNTER (OUTPATIENT)
Dept: CT IMAGING | Age: 52
Discharge: HOME | End: 2022-06-24
Attending: INTERNAL MEDICINE
Payer: MEDICARE

## 2022-06-24 ENCOUNTER — HOSPITAL ENCOUNTER (OUTPATIENT)
Dept: LAB | Age: 52
Discharge: HOME | End: 2022-06-24
Payer: MEDICARE

## 2022-06-24 DIAGNOSIS — I48.0 PAROXYSMAL A-FIB (HCC): ICD-10-CM

## 2022-06-24 LAB
ALBUMIN SERPL-MCNC: 3.6 G/DL (ref 3.4–5)
ALBUMIN/GLOB SERPL: 0.9 {RATIO} (ref 0.8–1.7)
ALP SERPL-CCNC: 88 U/L (ref 45–117)
ALT SERPL-CCNC: 18 U/L (ref 16–61)
ANION GAP SERPL CALC-SCNC: 7 MMOL/L (ref 3–18)
AST SERPL-CCNC: 16 U/L (ref 10–38)
BASOPHILS # BLD: 0.1 K/UL (ref 0–0.1)
BASOPHILS NFR BLD: 1 % (ref 0–2)
BILIRUB SERPL-MCNC: 0.4 MG/DL (ref 0.2–1)
BUN SERPL-MCNC: 41 MG/DL (ref 7–18)
BUN/CREAT SERPL: 4 (ref 12–20)
CALCIUM SERPL-MCNC: 10.3 MG/DL (ref 8.5–10.1)
CHLORIDE SERPL-SCNC: 103 MMOL/L (ref 100–111)
CO2 SERPL-SCNC: 31 MMOL/L (ref 21–32)
CREAT SERPL-MCNC: 9.62 MG/DL (ref 0.6–1.3)
DIFFERENTIAL METHOD BLD: ABNORMAL
EOSINOPHIL # BLD: 0.2 K/UL (ref 0–0.4)
EOSINOPHIL NFR BLD: 3 % (ref 0–5)
ERYTHROCYTE [DISTWIDTH] IN BLOOD BY AUTOMATED COUNT: 14.8 % (ref 11.6–14.5)
GLOBULIN SER CALC-MCNC: 3.9 G/DL (ref 2–4)
GLUCOSE SERPL-MCNC: 79 MG/DL (ref 74–99)
HCT VFR BLD AUTO: 40.7 % (ref 36–48)
HGB BLD-MCNC: 12.8 G/DL (ref 13–16)
IMM GRANULOCYTES # BLD AUTO: 0 K/UL (ref 0–0.04)
IMM GRANULOCYTES NFR BLD AUTO: 0 % (ref 0–0.5)
INR PPP: 1 (ref 0.8–1.2)
LYMPHOCYTES # BLD: 1 K/UL (ref 0.9–3.6)
LYMPHOCYTES NFR BLD: 20 % (ref 21–52)
MCH RBC QN AUTO: 29.2 PG (ref 24–34)
MCHC RBC AUTO-ENTMCNC: 31.4 G/DL (ref 31–37)
MCV RBC AUTO: 92.9 FL (ref 78–100)
MONOCYTES # BLD: 0.6 K/UL (ref 0.05–1.2)
MONOCYTES NFR BLD: 11 % (ref 3–10)
NEUTS SEG # BLD: 3.2 K/UL (ref 1.8–8)
NEUTS SEG NFR BLD: 64 % (ref 40–73)
NRBC # BLD: 0 K/UL (ref 0–0.01)
NRBC BLD-RTO: 0 PER 100 WBC
PLATELET # BLD AUTO: 216 K/UL (ref 135–420)
PMV BLD AUTO: 10.5 FL (ref 9.2–11.8)
POTASSIUM SERPL-SCNC: 4.7 MMOL/L (ref 3.5–5.5)
PROT SERPL-MCNC: 7.5 G/DL (ref 6.4–8.2)
PROTHROMBIN TIME: 13.5 SEC (ref 11.5–15.2)
RBC # BLD AUTO: 4.38 M/UL (ref 4.35–5.65)
SODIUM SERPL-SCNC: 141 MMOL/L (ref 136–145)
WBC # BLD AUTO: 5 K/UL (ref 4.6–13.2)

## 2022-06-24 PROCEDURE — 85025 COMPLETE CBC W/AUTO DIFF WBC: CPT

## 2022-06-24 PROCEDURE — 80053 COMPREHEN METABOLIC PANEL: CPT

## 2022-06-24 PROCEDURE — 85610 PROTHROMBIN TIME: CPT

## 2022-06-24 PROCEDURE — 74011000636 HC RX REV CODE- 636: Performed by: INTERNAL MEDICINE

## 2022-06-24 PROCEDURE — 36415 COLL VENOUS BLD VENIPUNCTURE: CPT

## 2022-06-24 PROCEDURE — 75572 CT HRT W/3D IMAGE: CPT

## 2022-06-24 RX ADMIN — IOPAMIDOL 125 ML: 755 INJECTION, SOLUTION INTRAVENOUS at 13:15

## 2022-06-27 NOTE — H&P
I have discussed indications, procedures, risks, limitations, and follow up associated with transesophageal echo, electrophysiology study, intracardiac echo, transseptal heart cath, intracardiac mapping, and catheter ablation for atrial fibrillation. Risks included acute respiratory failure, neurovascular injury, soft tissue injury, infection, bleeding, DVT, radiation exposure, atrial septal defect, iv contrast nephropathy, IV contrast allergic reaction, atrial septal defect, perforation, tamponade, potential need for emergent heart surgery, pulmonary vein stenosis that may or may not be amenable to stent placement, phrenic nerve injury/diaphragmatic paralysis that may or may not recover with time, heart block and need for permanent pacing, esophageal injury, dysmotility, or death associated with atrial-esophageal fistula formation, MI, CVA, and death. Reviewed that some of these complications may not be apparent at the time of the procedure. The patient acknowledged these risks and would like to proceed with atrial fibrillation ablation. History of Present Illness:  54 YOM referred by Dr. Kenzie Espinoza for evaluation of possible ablation. He has been on dialysis about three years and is in the process of getting listed for transplant. She has a history of diabetes and hypertension. He was having symptoms suggestive of atrial fibrillation about a year ago, but has had multiple ER visits with atrial fibrillation and increased rates, interfering with his ability to have dialysis. He has a right AV fistula in place. He will get diaphoretic with the episodes, but is not having any syncope. He takes all medications without issues. He is currently not listed for transplant.     Impression:  1. Paroxysmal symptomatic a-fib with increasing episodes over the past year, interfering with dialysis. He gets diaphoretic. He has not had syncope. 2. Chronic Eliquis use.   3. Recent initiation of Amiodarone April 2022.  4. ESRD, on hemodialysis for three years, awaiting transplant listing. 5. Non critical CAD, medically treated, with heart cath December 2021 with ostial diagonal 95% stenosis, medically treated. 6. Echo December 2021 with normal EF at 55%. 7. Diabetes with recent increase in hemoglobin A1c, now improving. 8. HTN.    Plan:  Given the significant episodes interfering with dialysis and plans for renal transplant, we talked about ablation. Risks, benefits and alternatives discussed. All questions answered. I quoted an 80% chance for success. He does have at least three months of uninterrupted anticoagulation post ablation and he understands this, but he would like to proceed. I will make arrangements.             Past Medical History:   Diagnosis Date    Abnormal nuclear cardiac imaging test 10/08/2012     Fixed anteroseptal, anteroapical defect c/w prior infarction. No ischemia. Mid & distal anteroseptal, anteroapical WMA. LVE. EF 44%.  Anemia       dr. Power Salcedo doubt amyloid or multiple myeloma. candidate for procirt if Hg <10    Benign hypertensive      Blindness of right eye 01/2013     legally blind    CAD (coronary artery disease)      Cardiomyopathy ejection fraction of 40%      CKD (chronic kidney disease), stage IV (HCC)       to see Dr. Durga Escamilla 12/1/12    Congestive heart failure ([address]      Diabetes mellitus ([address]      Diabetic retinopathy ([address]      Diabetic retinopathy ([address]       Dr. Farrah Menezes- injections    Heart failure [facility])      History of echocardiogram 04/22/2014     LVE. EF 55% (prev 40-45% on echo of 1/27/12). No WMA. Mild-mod conc LVH. Gr 3 DDfx. Marked LAE. Mild SHANTEL. Mild MR.    Hypertension      Pulmonary hypertension ([address]      Renal Ultrasound 1/3/12     Right kidney isoechoic w/respect to liver. Sm left-sided pleural effusion    S/P cardiac cath 10/16/2012     LM patent. pLAD 95% (3.5 x 12-mm [place] stent, resid 0$%).   LCX mild.           Current Outpatient Medications   Medication Sig Dispense Refill    amiodarone (CORDARONE) 200 mg tablet TAKE 1 TABLET BY MOUTH DAILY 30 Tablet 6    metoprolol tartrate (LOPRESSOR) 25 mg tablet Take 1 Tablet by mouth two (2) times a day. 60 Tablet 2    triamcinolone (ARISTOCORT) 0.5 % topical cream Apply  to affected area two (2) times a day. use thin layer 45 g 0    linaGLIPtin (Tradjenta) 5 mg tablet Take 1 Tablet by mouth daily.        glipiZIDE SR (GLUCOTROL XL) 2.5 mg CR tablet Take 1 Tablet by mouth daily. 90 Tablet 0    amLODIPine (NORVASC) 5 mg tablet Take 1 Tablet by mouth daily. 90 Tablet 0    glucose blood VI test strips (Accu-Chek Beatrice Plus test strp) strip Use as directed 100 Strip 2    Eliquis 2.5 mg tablet TAKE 1 TABLET BY MOUTH EVERY 12 HOURS 180 Tablet 3    aspirin delayed-release 81 mg tablet Take 1 Tablet by mouth daily. 90 Tablet 3    doxercalciferol (HECTOROL IV) 4 mcg.        alum-mag hydroxide-simeth (Maalox Advanced) 200-200-20 mg/5 mL susp Take  by mouth.        atorvastatin (LIPITOR) 80 mg tablet Take 1 Tablet by mouth nightly. 30 Tablet 0    nitroglycerin (NITROLINGUAL) 400 mcg/spray spray 1 Spray by SubLINGual route every five (5) minutes as needed for Chest Pain. 1 Bottle 2    Blood-Glucose Meter monitoring kit Check fasting glucose 1 Kit 0    sucroferric oxyhydroxide (VELPHORO) 500 mg chew chewable tablet Take 500 mg by mouth three (3) times daily (with meals).        lisinopriL (PRINIVIL, ZESTRIL) 20 mg tablet Take 20 mg by mouth daily.             Social History   reports that he has never smoked.  He has never used smokeless tobacco.   reports previous alcohol use of about 4.2 standard drinks of alcohol per week.     Family History  family history includes Diabetes in his brother and mother; High Cholesterol in his father; Hypertension in his mother; Kidney Disease in his mother.     Review of Systems  Except as stated above include:  Constitutional: Negative for fever, chills and malaise/fatigue. HEENT: No congestion or recent URI. Gastrointestinal: No nausea, vomiting, abdominal pain, bloody stools. Pulmonary:  Negative except as stated above. Cardiac:  Negative except as stated above. Musculoskeletal: Negative except as stated above. Neurological:  No localized symptoms. Skin:  Negative except as stated above. Psych:  Negative except as stated above. Endocrine:  Negative except as stated above.     PHYSICAL EXAM      BP Readings from Last 3 Encounters:   06/01/22 (!) 206/120   05/17/22 136/86   05/14/22 108/64          Pulse Readings from Last 3 Encounters:   06/01/22 70   05/17/22 72   05/14/22 (!) 118          Wt Readings from Last 3 Encounters:   06/01/22 93.9 kg (207 lb)   05/17/22 92.1 kg (203 lb)   05/03/22 95.3 kg (210 lb)      General:          Well developed, well groomed. Head/Neck:     No obvious jugular venous distention                           No obvious carotid pulsations. No evidence of xanthelasma. Lungs:             No respiratory distress. Clear bilaterally. Heart:              Regular rate and rhythm. Normal S1/S2. Palpation grossly normal.                          No significant murmurs, rubs or gallops. Abdomen:        Non-acute abdomen. No obvious pulsations. Extremities:     Intact peripheral pulses. No significant edema. Neurological:   Alert and oriented to person, place, time. No focal neurological deficit visually.   Skin:                No obvious rash     Blood Pressure Metric:  Monitor recommended and adjustments stated if needed.         Electronically signed by Jovana Duran MD at 06/01/22 1011

## 2022-06-28 ENCOUNTER — HOSPITAL ENCOUNTER (OUTPATIENT)
Age: 52
Setting detail: OUTPATIENT SURGERY
Discharge: HOME | End: 2022-06-28
Attending: INTERNAL MEDICINE | Admitting: INTERNAL MEDICINE
Payer: MEDICARE

## 2022-06-28 ENCOUNTER — HOSPITAL ENCOUNTER (OUTPATIENT)
Dept: NON INVASIVE DIAGNOSTICS | Age: 52
Discharge: HOME | End: 2022-06-28
Payer: MEDICARE

## 2022-06-28 ENCOUNTER — ANESTHESIA (OUTPATIENT)
Dept: CARDIAC CATH/INVASIVE PROCEDURES | Age: 52
End: 2022-06-28
Payer: MEDICARE

## 2022-06-28 ENCOUNTER — ANESTHESIA EVENT (OUTPATIENT)
Dept: CARDIAC CATH/INVASIVE PROCEDURES | Age: 52
End: 2022-06-28
Payer: MEDICARE

## 2022-06-28 VITALS
WEIGHT: 203 LBS | BODY MASS INDEX: 29.06 KG/M2 | SYSTOLIC BLOOD PRESSURE: 134 MMHG | HEIGHT: 70 IN | DIASTOLIC BLOOD PRESSURE: 96 MMHG

## 2022-06-28 VITALS
RESPIRATION RATE: 11 BRPM | WEIGHT: 203 LBS | OXYGEN SATURATION: 93 % | SYSTOLIC BLOOD PRESSURE: 165 MMHG | HEIGHT: 70 IN | TEMPERATURE: 96.8 F | DIASTOLIC BLOOD PRESSURE: 86 MMHG | HEART RATE: 70 BPM | BODY MASS INDEX: 29.06 KG/M2

## 2022-06-28 DIAGNOSIS — I48.91 A-FIB (HCC): ICD-10-CM

## 2022-06-28 DIAGNOSIS — Z98.890 H/O CARDIAC RADIOFREQUENCY ABLATION: Primary | ICD-10-CM

## 2022-06-28 DIAGNOSIS — I48.0 PAROXYSMAL ATRIAL FIBRILLATION (HCC): ICD-10-CM

## 2022-06-28 LAB
ACT BLD: 231 SECS (ref 79–138)
ACT BLD: 260 SECS (ref 79–138)
ACT BLD: 312 SECS (ref 79–138)

## 2022-06-28 PROCEDURE — C1894 INTRO/SHEATH, NON-LASER: HCPCS | Performed by: INTERNAL MEDICINE

## 2022-06-28 PROCEDURE — 77030019896 HC KT ARTERIAL LN TELE -B: Performed by: INTERNAL MEDICINE

## 2022-06-28 PROCEDURE — C1769 GUIDE WIRE: HCPCS | Performed by: INTERNAL MEDICINE

## 2022-06-28 PROCEDURE — 77030013797 HC KT TRNSDUC PRSSR EDWD -A: Performed by: INTERNAL MEDICINE

## 2022-06-28 PROCEDURE — C1760 CLOSURE DEV, VASC: HCPCS | Performed by: INTERNAL MEDICINE

## 2022-06-28 PROCEDURE — C1759 CATH, INTRA ECHOCARDIOGRAPHY: HCPCS | Performed by: INTERNAL MEDICINE

## 2022-06-28 PROCEDURE — 74011250636 HC RX REV CODE- 250/636: Performed by: ANESTHESIOLOGY

## 2022-06-28 PROCEDURE — 93656 COMPRE EP EVAL ABLTJ ATR FIB: CPT | Performed by: INTERNAL MEDICINE

## 2022-06-28 PROCEDURE — 77030018729 HC ELECTRD DEFIB PAD CARD -B: Performed by: INTERNAL MEDICINE

## 2022-06-28 PROCEDURE — 96374 THER/PROPH/DIAG INJ IV PUSH: CPT

## 2022-06-28 PROCEDURE — C1892 INTRO/SHEATH,FIXED,PEEL-AWAY: HCPCS | Performed by: INTERNAL MEDICINE

## 2022-06-28 PROCEDURE — 74011250637 HC RX REV CODE- 250/637: Performed by: INTERNAL MEDICINE

## 2022-06-28 PROCEDURE — 2709999900 HC NON-CHARGEABLE SUPPLY: Performed by: INTERNAL MEDICINE

## 2022-06-28 PROCEDURE — 77030016570 HC BLNKT BAIR HGGR 3M -B: Performed by: INTERNAL MEDICINE

## 2022-06-28 PROCEDURE — C1730 CATH, EP, 19 OR FEW ELECT: HCPCS | Performed by: INTERNAL MEDICINE

## 2022-06-28 PROCEDURE — 74011250636 HC RX REV CODE- 250/636: Performed by: INTERNAL MEDICINE

## 2022-06-28 PROCEDURE — 85347 COAGULATION TIME ACTIVATED: CPT

## 2022-06-28 PROCEDURE — 36620 INSERTION CATHETER ARTERY: CPT | Performed by: ANESTHESIOLOGY

## 2022-06-28 PROCEDURE — 74011000250 HC RX REV CODE- 250: Performed by: INTERNAL MEDICINE

## 2022-06-28 PROCEDURE — 77030012468 HC VLV BLEEDBK CNTRL ABBT -B: Performed by: INTERNAL MEDICINE

## 2022-06-28 PROCEDURE — 77030030261 HC CBL UMB ELEC DISP MEDT -C: Performed by: INTERNAL MEDICINE

## 2022-06-28 PROCEDURE — 76060000036 HC ANESTHESIA 2.5 TO 3 HR: Performed by: INTERNAL MEDICINE

## 2022-06-28 PROCEDURE — 00537 ANES CARDIAC EP PROCEDURES: CPT | Performed by: ANESTHESIOLOGY

## 2022-06-28 PROCEDURE — C1733 CATH, EP, OTHR THAN COOL-TIP: HCPCS | Performed by: INTERNAL MEDICINE

## 2022-06-28 PROCEDURE — 85347 COAGULATION TIME ACTIVATED: CPT | Performed by: INTERNAL MEDICINE

## 2022-06-28 PROCEDURE — 74011000250 HC RX REV CODE- 250: Performed by: ANESTHESIOLOGY

## 2022-06-28 PROCEDURE — 77030035291 HC TBNG PMP SMARTABLATE J&J -B: Performed by: INTERNAL MEDICINE

## 2022-06-28 PROCEDURE — 74011000636 HC RX REV CODE- 636: Performed by: INTERNAL MEDICINE

## 2022-06-28 PROCEDURE — 92978 ENDOLUMINL IVUS OCT C 1ST: CPT | Performed by: INTERNAL MEDICINE

## 2022-06-28 RX ORDER — PROPOFOL 10 MG/ML
INJECTION, EMULSION INTRAVENOUS AS NEEDED
Status: DISCONTINUED | OUTPATIENT
Start: 2022-06-28 | End: 2022-06-28 | Stop reason: HOSPADM

## 2022-06-28 RX ORDER — DEXTROSE MONOHYDRATE 100 MG/ML
0-250 INJECTION, SOLUTION INTRAVENOUS AS NEEDED
Status: DISCONTINUED | OUTPATIENT
Start: 2022-06-28 | End: 2022-06-28 | Stop reason: HOSPADM

## 2022-06-28 RX ORDER — INSULIN LISPRO 100 [IU]/ML
INJECTION, SOLUTION INTRAVENOUS; SUBCUTANEOUS ONCE
Status: DISCONTINUED | OUTPATIENT
Start: 2022-06-28 | End: 2022-06-28 | Stop reason: HOSPADM

## 2022-06-28 RX ORDER — HEPARIN SODIUM 1000 [USP'U]/ML
INJECTION, SOLUTION INTRAVENOUS; SUBCUTANEOUS AS NEEDED
Status: DISCONTINUED | OUTPATIENT
Start: 2022-06-28 | End: 2022-06-28 | Stop reason: HOSPADM

## 2022-06-28 RX ORDER — ONDANSETRON 2 MG/ML
INJECTION INTRAMUSCULAR; INTRAVENOUS AS NEEDED
Status: DISCONTINUED | OUTPATIENT
Start: 2022-06-28 | End: 2022-06-28 | Stop reason: HOSPADM

## 2022-06-28 RX ORDER — MIDAZOLAM HYDROCHLORIDE 1 MG/ML
INJECTION, SOLUTION INTRAMUSCULAR; INTRAVENOUS AS NEEDED
Status: DISCONTINUED | OUTPATIENT
Start: 2022-06-28 | End: 2022-06-28 | Stop reason: HOSPADM

## 2022-06-28 RX ORDER — MAGNESIUM SULFATE 100 %
4 CRYSTALS MISCELLANEOUS AS NEEDED
Status: DISCONTINUED | OUTPATIENT
Start: 2022-06-28 | End: 2022-06-28 | Stop reason: HOSPADM

## 2022-06-28 RX ORDER — FENTANYL CITRATE 50 UG/ML
INJECTION, SOLUTION INTRAMUSCULAR; INTRAVENOUS AS NEEDED
Status: DISCONTINUED | OUTPATIENT
Start: 2022-06-28 | End: 2022-06-28 | Stop reason: HOSPADM

## 2022-06-28 RX ORDER — EPHEDRINE SULFATE/0.9% NACL/PF 25 MG/5 ML
SYRINGE (ML) INTRAVENOUS AS NEEDED
Status: DISCONTINUED | OUTPATIENT
Start: 2022-06-28 | End: 2022-06-28 | Stop reason: HOSPADM

## 2022-06-28 RX ORDER — OXYCODONE AND ACETAMINOPHEN 5; 325 MG/1; MG/1
1 TABLET ORAL
Qty: 12 TABLET | Refills: 0 | Status: SHIPPED | OUTPATIENT
Start: 2022-06-28 | End: 2022-07-01

## 2022-06-28 RX ORDER — LIDOCAINE HYDROCHLORIDE 10 MG/ML
INJECTION, SOLUTION EPIDURAL; INFILTRATION; INTRACAUDAL; PERINEURAL AS NEEDED
Status: DISCONTINUED | OUTPATIENT
Start: 2022-06-28 | End: 2022-06-28 | Stop reason: HOSPADM

## 2022-06-28 RX ORDER — SODIUM CHLORIDE 0.9 % (FLUSH) 0.9 %
5-40 SYRINGE (ML) INJECTION EVERY 8 HOURS
Status: DISCONTINUED | OUTPATIENT
Start: 2022-06-28 | End: 2022-06-28 | Stop reason: HOSPADM

## 2022-06-28 RX ORDER — SUCCINYLCHOLINE CHLORIDE 20 MG/ML
INJECTION INTRAMUSCULAR; INTRAVENOUS AS NEEDED
Status: DISCONTINUED | OUTPATIENT
Start: 2022-06-28 | End: 2022-06-28 | Stop reason: HOSPADM

## 2022-06-28 RX ORDER — SODIUM CHLORIDE 0.9 % (FLUSH) 0.9 %
5-40 SYRINGE (ML) INJECTION AS NEEDED
Status: DISCONTINUED | OUTPATIENT
Start: 2022-06-28 | End: 2022-06-28 | Stop reason: HOSPADM

## 2022-06-28 RX ORDER — PROTAMINE SULFATE 10 MG/ML
INJECTION, SOLUTION INTRAVENOUS AS NEEDED
Status: DISCONTINUED | OUTPATIENT
Start: 2022-06-28 | End: 2022-06-28 | Stop reason: HOSPADM

## 2022-06-28 RX ORDER — OXYCODONE AND ACETAMINOPHEN 5; 325 MG/1; MG/1
1 TABLET ORAL
Status: DISCONTINUED | OUTPATIENT
Start: 2022-06-28 | End: 2022-06-28 | Stop reason: HOSPADM

## 2022-06-28 RX ADMIN — FENTANYL CITRATE 100 MCG: 50 INJECTION, SOLUTION INTRAMUSCULAR; INTRAVENOUS at 07:52

## 2022-06-28 RX ADMIN — PROPOFOL 150 MG: 10 INJECTION, EMULSION INTRAVENOUS at 07:58

## 2022-06-28 RX ADMIN — SUCCINYLCHOLINE CHLORIDE 100 MG: 20 INJECTION, SOLUTION INTRAMUSCULAR; INTRAVENOUS at 07:58

## 2022-06-28 RX ADMIN — MIDAZOLAM HYDROCHLORIDE 2 MG: 2 INJECTION, SOLUTION INTRAMUSCULAR; INTRAVENOUS at 07:52

## 2022-06-28 RX ADMIN — PROPOFOL 50 MG: 10 INJECTION, EMULSION INTRAVENOUS at 09:22

## 2022-06-28 RX ADMIN — FENTANYL CITRATE 50 MCG: 50 INJECTION, SOLUTION INTRAMUSCULAR; INTRAVENOUS at 08:01

## 2022-06-28 RX ADMIN — OXYCODONE AND ACETAMINOPHEN 1 TABLET: 325; 5 TABLET ORAL at 14:00

## 2022-06-28 RX ADMIN — Medication 1400 MCG: at 09:07

## 2022-06-28 RX ADMIN — ONDANSETRON 4 MG: 2 INJECTION INTRAMUSCULAR; INTRAVENOUS at 08:56

## 2022-06-28 RX ADMIN — PROPOFOL 50 MG: 10 INJECTION, EMULSION INTRAVENOUS at 09:18

## 2022-06-28 NOTE — ANESTHESIA PROCEDURE NOTES
Arterial Line Placement    Start time: 6/28/2022 8:24 AM  End time: 6/28/2022 8:26 AM  Performed by: Bobby Elias MD  Authorized by: Bobby Elias MD     Pre-Procedure  Indications:  Arterial pressure monitoring  Preanesthetic Checklist: patient identified, risks and benefits discussed, anesthesia consent, site marked, patient being monitored, timeout performed and patient being monitored      Procedure:   Location:  Radial artery  Number of attempts:  3    Assessment:   Post-procedure:  Line secured and sterile dressing applied  Patient Tolerance:  Patient tolerated the procedure well with no immediate complications

## 2022-06-28 NOTE — ANESTHESIA POSTPROCEDURE EVALUATION
Procedure(s):  ABLATION A-FIB  W COMPLETE EP STUDY. general    Anesthesia Post Evaluation      Multimodal analgesia: multimodal analgesia used between 6 hours prior to anesthesia start to PACU discharge  Patient location during evaluation: bedside  Patient participation: complete - patient participated  Level of consciousness: awake  Pain management: adequate  Airway patency: patent  Anesthetic complications: no  Cardiovascular status: stable  Respiratory status: acceptable  Hydration status: acceptable  Post anesthesia nausea and vomiting:  controlled  Final Post Anesthesia Temperature Assessment:  Normothermia (36.0-37.5 degrees C)      INITIAL Post-op Vital signs: No vitals data found for the desired time range.

## 2022-06-28 NOTE — DISCHARGE INSTRUCTIONS
Disposition:  When discharged to home will need follow-up in my office 4 weeks. Please call my office at [phone] if any questions or concerns. Restrictions:  No driving for at least 24 hours after surgery. No heavy lifting > 10 lbs or prolonged standing, walking, or running x 1 week. OK to shower today over incision and remove dressing. Monitor groin for any signs of bleeding and please contact office at [phone] if any issues. There may be some mild bruising which is not unusual.  If any new symptoms develop, such as chest pain, dyspnea, dizziness, please contact office at [phone] immediately. ABLATION DISCHARGE INSTRUCTIONS    It is normal to feel tired the first couple days. Take it easy and follow the physicians instructions. CHECK THE CATHETER INSERTION SITE DAILY:  You may shower 24 hours after the procedure, remove the bandage during showering. Wash with soap and water and pat dry. Gentle cleaning of the site with soap and water is sufficient, cover with a dry clean dressing or bandage. Do not apply creams or powders to the area. Do not sit in a bathtub or pool of water for 7 days or until wound has completely healed. Temporary bruising and discomfort is normal and may last a few weeks. You may have a  formation of a small lump at the site which may last up to 6 weeks. CALL THE PHYSICIAN:  If the site becomes red, swollen or feels warm to the touch  If there is bleeding or drainage or if there is unusual pain at the groin or down the leg. If there is any bleeding, lie down, apply pressure or have someone apply pressure with a clean cloth until the bleeding stops. If the bleeding continues, call 911 to be transported to the hospital.  DO NOT DRIVE YOURSELF, [facility]. Activity:      For the first 24-48 hours or as instructed by the physician:  No lifting, pushing or pulling over 10 pounds and no straining the insertion site.  Do not life grocery bags or the garbage can, do not run the vacuum  or  for 7 days. Start with short walks as in the hospital and gradually increase as tolerated each day. It is recommended to walk 30 minutes 5-7 days per week. Follow your physicians instructions on activity. Avoid walking outside in extremes of heat or cold. Walk inside when it is cold and windy or hot and humid. Things to keep in mind:  No driving for at least 24 hours, or as designated by your physician. Limit the number of times you go up and down the stairs  Take rests and pace yourself with activity. Be careful and do not strain with bowel movements. Medications: Take all medications as prescribed  Call your physician if you have any questions  Keep an updated list of your medications with you at all times and give a list to your physician and pharmacist    Signs and Symptoms:  Be cautious of symptoms of angina or recurrent symptoms such as chest discomfort, unusual shortness of breath or fatigue, palpitations. After Care: Follow up with your physician as instructed. Follow a heart healthy diet with proper portion control, daily stress management, daily exercise, blood pressure and cholesterol control , and smoking cessation.

## 2022-06-28 NOTE — Clinical Note
"Date of Service:9/12/2019      DIAGNOSIS:   This is a 68 y.o. female with a history of stage 0 Tis (3 cm) NxMx, grade 3, ER -, [place] - DCIS of the left breast.    TREATMENT:   1. Left partial mastectomy without sentinel node biopsy on 11/4/2015. Rachel Saldana M.D. Surgical Oncology  2. reexcision left breast 12/16/2015. Rachel Saldana, Surgical Oncology.  3. Radiation therapy from 2/2016  To 3/2016. Lucy Arevalo M.D. Radiation Oncology       HISTORY OF PRESENT ILLNESS:   Archana Ward is a 68 y.o. female comes in for oncological follow up. She denies any changes to her breasts over the pasts year aside from some dry skin over her left breast. She denies any nipple retraction, skin dimpling or new masses. She denies any fevers, chills, night sweats or weight loss. There is no new family history.     IMAGING:   MMG today negative    MEDICATIONS/ALLERGIES:     No Known Allergies    PHYSICAL EXAM:     BP (!) 167/78   Pulse 84   Ht 5' 2" (1.575 m)   Wt 66.7 kg (147 lb 0.8 oz)   LMP  (LMP Unknown)   BMI 26.90 kg/m²   General: The patient appears well and is in no acute distress.   Neuro: Alert & oriented x3.  Breasts: The exam was done with the patient seated and supine. Left breast - well healed scar. within normal limits. No palpable masses and no abnormal skin or nipple findings. No supraclavicular or axillary lymphadenopathy on the left side. Some hyperpigmentation from radiation, good cosmesis. Some volume loss from radiation  Right breast - within normal limits. No palpable masses or nipple findings. Left breast smaller. Area of dry skin over left breast surgical scar. No supraclavicular or axillary lymphadenopathy on the right side.   Extremities: No clubbing or cyanosis. Bilateral full arm range of motion without  lymphedema.     ASSESSMENT:   This is a 68 y.o. female without evidence of recurrence by exam, history or imaging.       PLAN:   1. Continue to follow up with me  2. Continue monthly self breast exams " Intracardiac echocardiography catheter inserted. and call the clinic with any changes or problems.  3. Mammogram in September 2020 .  4. Return to clinic in 1 year. The patient is in agreement with the plan. Questions were encouraged and answered to patient's satisfaction. Archana will call our office with any questions or concerns.

## 2022-06-28 NOTE — Clinical Note
TRANSFER - IN REPORT:     Verbal report received from: [facility]. Report consisted of patient's Situation, Background, Assessment and   Recommendations(SBAR). Opportunity for questions and clarification was provided. Assessment completed upon patient's arrival to unit and care assumed.

## 2022-06-28 NOTE — PROGRESS NOTES
TRANSFER - OUT REPORT:    Verbal report given to [address] [place] [address] Milder  being transferred to EP lab for ordered procedure       Report consisted of patients Situation, Background, Assessment and   Recommendations(SBAR). Information from the following report(s) SBAR, Procedure Summary and MAR was reviewed with the receiving nurse. Lines:       Opportunity for questions and clarification was provided.       Patient transported with:   [employer]

## 2022-06-28 NOTE — ANESTHESIA PREPROCEDURE EVALUATION
Relevant Problems   RESPIRATORY SYSTEM   (+) Shortness of breath      CARDIOVASCULAR   (+) Atrial fibrillation with RVR (HCC)   (+) Atrial fibrillation with rapid ventricular response (HCC)   (+) CAD (coronary artery disease)   (+) Hypertension secondary to other renal disorders   (+) NSTEMI (non-ST elevated myocardial infarction) [facility]   (+) Paroxysmal A-fib (HCC)   (+) Primary hypertension      RENAL FAILURE   (+) CRI (chronic renal insufficiency)   (+) ESRD on dialysis ([facility]      ENDOCRINE   (+) Diabetes mellitus (HCC)   (+) Type 2 diabetes with nephropathy (HCC)      HEMATOLOGY   (+) Anemia of chronic disease   (+) Iron deficiency anemia       Anesthetic History   No history of anesthetic complications            Review of Systems / Medical History  Patient summary reviewed and pertinent labs reviewed    Pulmonary  Within defined limits                 Neuro/Psych   Within defined limits           Cardiovascular    Hypertension: well controlled        Dysrhythmias : atrial fibrillation  CAD and cardiac stents    Exercise tolerance: >4 METS     GI/Hepatic/Renal         Renal disease (Last HD-yesterday): ESRD and dialysis       Endo/Other    Diabetes: well controlled, type 2         Other Findings              Physical Exam    Airway  Mallampati: II  TM Distance: 4 - 6 cm  Neck ROM: normal range of motion   Mouth opening: Normal     Cardiovascular  Regular rate and rhythm,  S1 and S2 normal,  no murmur, click, rub, or gallop             Dental  No notable dental hx       Pulmonary  Breath sounds clear to auscultation               Abdominal  GI exam deferred       Other Findings            Anesthetic Plan    ASA: 4  Anesthesia type: general    Monitoring Plan: Arterial line      Induction: Intravenous  Anesthetic plan and risks discussed with: Patient

## 2022-06-28 NOTE — PROGRESS NOTES
TRANSFER - IN REPORT:    Verbal report received from nAel Clemens 855, FRANSICO(name) on [employer]  being received from EP Lab(unit) for routine post - op      Report consisted of patients Situation, Background, Assessment and   Recommendations(SBAR). Information from the following report(s) SBAR, Procedure Summary, Intake/Output, MAR and Cardiac Rhythm NSR in 70's was reviewed with the receiving nurse. Opportunity for questions and clarification was provided. Assessment completed upon patients arrival to unit and care assumed.

## 2022-06-28 NOTE — Clinical Note
TRANSFER - OUT REPORT:     Verbal report given to: [employer]. Report consisted of patient's Situation, Background, Assessment and   Recommendations(SBAR). Opportunity for questions and clarification was provided.

## 2022-06-29 ENCOUNTER — PATIENT OUTREACH (OUTPATIENT)
Dept: CASE MANAGEMENT | Age: 52
End: 2022-06-29

## 2022-06-29 NOTE — PROGRESS NOTES
Patient attended appointments with his cardiologist , Dr. Carmen Mccurdy. on 6/29/22, Ambulatory Care Manager reviewed EMR and will follow up on next scheduled outreach.

## 2022-06-30 NOTE — PROGRESS NOTES
Per last office note:  He is quite frustrated by the fact that he has episodes of PAF and directly sent to the emergency room disrupting his hemodialysis schedule. He would like to have consideration for A. fib ablation.   I will refer him to our electrophysiologist.

## 2022-07-01 NOTE — TELEPHONE ENCOUNTER
Spoke with Amarjit Queen whom confirmed since this encounter he has spoken with endo and  medications.

## 2022-07-12 ENCOUNTER — PATIENT OUTREACH (OUTPATIENT)
Dept: CASE MANAGEMENT | Age: 52
End: 2022-07-12

## 2022-07-12 NOTE — PROGRESS NOTES
Complex Case Management      Date/Time:  2022 10:19 AM    Method of communication with patient:phone    [facility] ([facility]) contacted the patient by telephone to perform Ambulatory Care Coordination. Verified name and  (PHI) with patient as identifiers. Provided introduction to self, and explanation of the Ambulatory Care Manager's role. Reviewed most recent clinic visit w/ patient who verbalized understanding. Patient given an opportunity to ask questions. Top Challenges reviewed with the patient   1. Hx of Pulm HTN, CHF, DM2, CKD on dialysis TThSat, CAD, HTN  2. Recent ablation for tx of a-fib. States no incidents of afib since procedure. 3. Pt states doing ok right now, just a little tired. Just finished today's dialysis. 4. No other questions/needs at this time. The patient agrees to contact the PCP office or the [facility] for questions related to their healthcare. Provided contact information for future reference. Disease Specific:   N/A    Home Health Active: No    DME Active: No    Barriers to care? lack of knowledge about disease, utilization of services    Advance Care Planning:   Does patient have an Advance Directive:  not on file; education provided     Medication(s):   Medication reconciliation was not performed with patient, who verbalizes understanding of administration of home medications. There were no barriers to obtaining medications identified at this time. Referral to Pharm D needed: no     Current Outpatient Medications   Medication Sig    lisinopriL (PRINIVIL, ZESTRIL) 20 mg tablet Take 20 mg by mouth daily.  atorvastatin (LIPITOR) 80 mg tablet Take 1 Tablet by mouth nightly.  amiodarone (CORDARONE) 200 mg tablet TAKE 1 TABLET BY MOUTH DAILY    metoprolol tartrate (LOPRESSOR) 25 mg tablet Take 1 Tablet by mouth two (2) times a day.  linaGLIPtin (Tradjenta) 5 mg tablet Take 1 Tablet by mouth daily.     glipiZIDE SR (GLUCOTROL XL) 2.5 mg CR tablet Take 1 Tablet by mouth daily.  amLODIPine (NORVASC) 5 mg tablet Take 1 Tablet by mouth daily.  glucose blood VI test strips (Accu-Chek Beatrice Plus test strp) strip Use as directed    Eliquis 2.5 mg tablet TAKE 1 TABLET BY MOUTH EVERY 12 HOURS    aspirin delayed-release 81 mg tablet Take 1 Tablet by mouth daily.  doxercalciferol (HECTOROL IV) 4 mcg.  alum-mag hydroxide-simeth (Maalox Advanced) 200-200-20 mg/5 mL susp Take  by mouth.  nitroglycerin (NITROLINGUAL) 400 mcg/spray spray 1 Spray by SubLINGual route every five (5) minutes as needed for Chest Pain.  Blood-Glucose Meter monitoring kit Check fasting glucose    sucroferric oxyhydroxide (VELPHORO) 500 mg chew chewable tablet Take 500 mg by mouth three (3) times daily (with meals). No current facility-administered medications for this visit. [facility] follow up appointment(s):   Future Appointments   Date Time Provider Jessica Vail   7/28/2022 11:00 AM Danielito Holcomb, NP [facility]   8/22/2022  2:00 PM Hallie Stokes MD [facility]   11/9/2022 10:00 AM Lana Oliver MD [facility]        Non-[facility] follow up appointment(s):     Goals Addressed                 This Visit's Progress     Attends follow up appointments on schedule   On track     5/10/22 Patient will attend all scheduled appointments through 8/10/22       Knowledge and adherence of prescribed medication (ie. action, side effects, missed dose, etc.).    On track     5/10/22 Pt will take all medications prescribed to be evaluated on each outreach through 8/10/22

## 2022-07-24 RX ORDER — METOPROLOL TARTRATE 25 MG/1
TABLET, FILM COATED ORAL
Qty: 180 TABLET | Refills: 3 | Status: SHIPPED | OUTPATIENT
Start: 2022-07-24

## 2022-08-05 NOTE — Clinical Note
Post images taken. No effusion noted. The PA I completed for brand clonidine patch was denied. Note reads:    [id number] case has been terminated. The requested medication is supplied by a  or distributor that does not participate in the Medicare Coverage Gap Discount Program. Drugs supplied by this  or distributor are therefore not covered under your Medicare Part D benefit. Please contact your pharmacy to re-submit your claim using an alternative . Reviewed by: Kemar Major. Ph.

## 2022-08-16 ENCOUNTER — HOSPITAL ENCOUNTER (EMERGENCY)
Age: 52
Discharge: HOME HEALTH | End: 2022-08-16
Attending: EMERGENCY MEDICINE
Payer: MEDICARE

## 2022-08-16 ENCOUNTER — APPOINTMENT (OUTPATIENT)
Dept: GENERAL RADIOLOGY | Age: 52
End: 2022-08-16
Attending: PHYSICIAN ASSISTANT
Payer: MEDICARE

## 2022-08-16 VITALS
SYSTOLIC BLOOD PRESSURE: 124 MMHG | TEMPERATURE: 97.4 F | HEART RATE: 70 BPM | RESPIRATION RATE: 12 BRPM | OXYGEN SATURATION: 96 % | DIASTOLIC BLOOD PRESSURE: 79 MMHG

## 2022-08-16 DIAGNOSIS — E16.2 HYPOGLYCEMIA: ICD-10-CM

## 2022-08-16 DIAGNOSIS — R42 DIZZINESS: Primary | ICD-10-CM

## 2022-08-16 DIAGNOSIS — I95.3 HEMODIALYSIS-ASSOCIATED HYPOTENSION: ICD-10-CM

## 2022-08-16 LAB
ANION GAP SERPL CALC-SCNC: 12 MMOL/L (ref 3–18)
BASOPHILS # BLD: 0 K/UL (ref 0–0.1)
BASOPHILS NFR BLD: 1 % (ref 0–2)
BUN SERPL-MCNC: 39 MG/DL (ref 7–18)
BUN/CREAT SERPL: 4 (ref 12–20)
CALCIUM SERPL-MCNC: 8.4 MG/DL (ref 8.5–10.1)
CALCULATED R AXIS, ECG10: -10 DEGREES
CALCULATED T AXIS, ECG11: 102 DEGREES
CHLORIDE SERPL-SCNC: 100 MMOL/L (ref 100–111)
CO2 SERPL-SCNC: 24 MMOL/L (ref 21–32)
CREAT SERPL-MCNC: 9.42 MG/DL (ref 0.6–1.3)
DIAGNOSIS, 93000: NORMAL
DIFFERENTIAL METHOD BLD: ABNORMAL
EOSINOPHIL # BLD: 0.1 K/UL (ref 0–0.4)
EOSINOPHIL NFR BLD: 2 % (ref 0–5)
ERYTHROCYTE [DISTWIDTH] IN BLOOD BY AUTOMATED COUNT: 14.8 % (ref 11.6–14.5)
GLUCOSE BLD STRIP.AUTO-MCNC: 75 MG/DL (ref 70–110)
GLUCOSE BLD STRIP.AUTO-MCNC: 83 MG/DL (ref 70–110)
GLUCOSE SERPL-MCNC: 129 MG/DL (ref 74–99)
HCT VFR BLD AUTO: 33.7 % (ref 36–48)
HGB BLD-MCNC: 11.4 G/DL (ref 13–16)
IMM GRANULOCYTES # BLD AUTO: 0 K/UL (ref 0–0.04)
IMM GRANULOCYTES NFR BLD AUTO: 0 % (ref 0–0.5)
LYMPHOCYTES # BLD: 0.6 K/UL (ref 0.9–3.6)
LYMPHOCYTES NFR BLD: 10 % (ref 21–52)
MAGNESIUM SERPL-MCNC: 2 MG/DL (ref 1.6–2.6)
MCH RBC QN AUTO: 29.9 PG (ref 24–34)
MCHC RBC AUTO-ENTMCNC: 33.8 G/DL (ref 31–37)
MCV RBC AUTO: 88.5 FL (ref 78–100)
MONOCYTES # BLD: 0.4 K/UL (ref 0.05–1.2)
MONOCYTES NFR BLD: 7 % (ref 3–10)
NEUTS SEG # BLD: 4.8 K/UL (ref 1.8–8)
NEUTS SEG NFR BLD: 80 % (ref 40–73)
NRBC # BLD: 0 K/UL (ref 0–0.01)
NRBC BLD-RTO: 0 PER 100 WBC
PLATELET # BLD AUTO: 102 K/UL (ref 135–420)
PMV BLD AUTO: 10.2 FL (ref 9.2–11.8)
POTASSIUM SERPL-SCNC: 3.6 MMOL/L (ref 3.5–5.5)
Q-T INTERVAL, ECG07: 406 MS
QRS DURATION, ECG06: 90 MS
QTC CALCULATION (BEZET), ECG08: 533 MS
RBC # BLD AUTO: 3.81 M/UL (ref 4.35–5.65)
SODIUM SERPL-SCNC: 136 MMOL/L (ref 136–145)
TROPONIN-HIGH SENSITIVITY: 31 NG/L (ref 0–78)
VENTRICULAR RATE, ECG03: 104 BPM
WBC # BLD AUTO: 6.1 K/UL (ref 4.6–13.2)

## 2022-08-16 PROCEDURE — 82962 GLUCOSE BLOOD TEST: CPT

## 2022-08-16 PROCEDURE — 93005 ELECTROCARDIOGRAM TRACING: CPT

## 2022-08-16 PROCEDURE — 85025 COMPLETE CBC W/AUTO DIFF WBC: CPT

## 2022-08-16 PROCEDURE — 80048 BASIC METABOLIC PNL TOTAL CA: CPT

## 2022-08-16 PROCEDURE — 84484 ASSAY OF TROPONIN QUANT: CPT

## 2022-08-16 PROCEDURE — 99285 EMERGENCY DEPT VISIT HI MDM: CPT

## 2022-08-16 PROCEDURE — 83735 ASSAY OF MAGNESIUM: CPT

## 2022-08-16 PROCEDURE — 71045 X-RAY EXAM CHEST 1 VIEW: CPT

## 2022-08-16 NOTE — ED NOTES
Assumed care of pt in rm 8. Pt arrived via EMS. Per EMS pt was at dialysis and became dizzy. Pt noted with BP of 76/46 and a BS of 54- oral glucose given. Current BS 83. Hx noted. Pt's oral temp 94.2- rectal temp 95.1- pt placed on warming blanket. MD at bedside, Pt presents with EJ to left neck and NS bolus infusing. EKG completed and given to Dr. Jake Guardado to sign.  Pt placed on full cardiac monitor, call bell within in reach

## 2022-08-16 NOTE — DISCHARGE INSTRUCTIONS
We have evaluated you today for dizziness, low blood sugar, and low blood pressure after dialysis today. We performed a thorough lab work evaluation which does not show any significant electrolyte abnormalities or signs of infection. You have chronic anemia likely said issue with your kidney disease. Your chest x-ray looks improved from the last one we have on file, no significant cardiac or lung issues today. Her EKG shows atrial fibrillation with a slow rate, you are on appropriate medications for this. Your temperature was low, we have warmed you up and we feel this is likely secondary/related to you having a low blood pressure today. Your blood sugar initially when he came in was 55, he received IV sugar, dextrose, which brought it back up into the low 100s. We have rechecked her sugar multiple times and it has been stable. Recommend that you eat a good meal and check your blood sugar regularly. Follow-up with your primary care physician to discuss further management needs of this. Overall your work-up today is unremarkable for any acute changes that require emergency treatment or admission to the hospital.  Recommend that you get your dialysis as scheduled. If you have any new or worsening symptoms especially fever, chills, shortness of breath, chest pain, or if you pass out please return to the ER for further evaluation.

## 2022-08-16 NOTE — ED PROVIDER NOTES
EMERGENCY DEPARTMENT HISTORY AND PHYSICAL EXAM      Date: 2022  Patient Name: Jennifer Thomas    History of Presenting Illness     Chief Complaint   Patient presents with    Dizziness         History Provided By: Patient/EMS     Chief Complaint: Dizziness, low blood sugar  Duration: About 1 hour  Timing: Was finishing dialysis run  Location: [facility]  Quality: Dizziness, lightheadedness, \"brain fog\" and  Severity: Moderate  Modifying Factors: None  Associated Symptoms: Lightheadedness      History (Context): Jennifer Thomas is a 54 y.o. male with a past medical history significant for A. fib status post ablation, CAD, HFrEF, diabetic retinopathy with loss of right eye retinal, ESRD, diabetes comes into the ED today due to feeling weakness, dizziness, altered sensorium while finishing his dialysis run today. Patient reports that he gets dialysis Tuesday/Thursday/ normally, has been on the schedule for 4 years and as well, missed his Saturday dialysis run due to having to attend the  of his brother. States that otherwise he has been feeling well, was finishing dialysis run when he began to feel dizziness and felt a lightheaded/altered sensorium centrally where he does not feel well. Denies any chest pain, shortness of breath, nausea or vomiting, numbness or tingling, inability to move any extremities. EMS was called, on their arrival pressure was 76/46 with a blood glucose of 55. An EJ was started and he was given D10 prior to arrival.    Reports that he takes medications as below including Tradjenta and glipizide for diabetes. States his A. fib on Eliquis improved after his ablation but has not had his follow-up with his cardiologist yet.     PCP: Sidney Hickman MD    Current Facility-Administered Medications   Medication Dose Route Frequency Provider Last Rate Last Admin    sodium chloride 0.9 % bolus infusion 250 mL  250 mL IntraVENous ONCE Zofia Scott DO         Current Outpatient Medications   Medication Sig Dispense Refill    metoprolol tartrate (LOPRESSOR) 25 mg tablet TAKE 1 TABLET BY MOUTH TWICE DAILY 180 Tablet 3    lisinopriL (PRINIVIL, ZESTRIL) 20 mg tablet Take 20 mg by mouth daily. atorvastatin (LIPITOR) 80 mg tablet Take 1 Tablet by mouth nightly. 90 Tablet 3    amiodarone (CORDARONE) 200 mg tablet TAKE 1 TABLET BY MOUTH DAILY 30 Tablet 6    linaGLIPtin (Tradjenta) 5 mg tablet Take 1 Tablet by mouth daily. glipiZIDE SR (GLUCOTROL XL) 2.5 mg CR tablet Take 1 Tablet by mouth daily. 90 Tablet 0    amLODIPine (NORVASC) 5 mg tablet Take 1 Tablet by mouth daily. 90 Tablet 0    glucose blood VI test strips (Accu-Chek Beatrice Plus test strp) strip Use as directed 100 Strip 2    Eliquis 2.5 mg tablet TAKE 1 TABLET BY MOUTH EVERY 12 HOURS 180 Tablet 3    aspirin delayed-release 81 mg tablet Take 1 Tablet by mouth daily. 90 Tablet 3    doxercalciferol (HECTOROL IV) 4 mcg. alum-mag hydroxide-simeth (Maalox Advanced) 200-200-20 mg/5 mL susp Take  by mouth. nitroglycerin (NITROLINGUAL) 400 mcg/spray spray 1 Spray by SubLINGual route every five (5) minutes as needed for Chest Pain. 1 Bottle 2    Blood-Glucose Meter monitoring kit Check fasting glucose 1 Kit 0    sucroferric oxyhydroxide (VELPHORO) 500 mg chew chewable tablet Take 500 mg by mouth three (3) times daily (with meals). Past History     Past Medical History:   Past Medical History:   Diagnosis Date    Abnormal nuclear cardiac imaging test 10/08/2012    Fixed anteroseptal, anteroapical defect c/w prior infarction. No ischemia. Mid & distal anteroseptal, anteroapical WMA. LVE. EF 44%. Anemia     dr. Abisai Hathaway doubt amyloid or multiple myeloma.  candidate for procirt if Hg <10    Benign hypertensive     Blindness of right eye 01/2013    legally blind    CAD (coronary artery disease)     Cardiomyopathy ejection fraction of 40%     CKD (chronic kidney disease), stage IV ([facility]     to see Dr. Neelam Velázquez 12/1/12 Congestive heart failure (HCC)     Diabetes mellitus ([facility] [address]     [facility]     Diabetic retinopathy [facility]     Dr. Marius Burkitt- injections    Heart failure [facility])     History of echocardiogram 04/22/2014    LVE. EF 55% (prev 40-45% on echo of 1/27/12). No WMA. Mild-mod conc LVH. Gr 3 DDfx. Marked LAE. Mild SHANTEL. Mild MR. Hypertension     Pulmonary hypertension ([facility] [address]     Renal Ultrasound 1/3/12    Right kidney isoechoic w/respect to liver. Sm left-sided pleural effusion    S/P cardiac cath 10/16/2012    LM patent. pLAD 95% (3.5 x 12-mm [place] stent, resid 0$%). LCX mild. Past Surgical History:  Past Surgical History:   Procedure Laterality Date    HX CORONARY STENT PLACEMENT      one    HX LAPAROTOMY  2/2012    bowel obstruction with removal segment of small bowel. Done by Riblet. HX LAPAROTOMY  infancy    whole in colon and had repair at that time. HX OTHER SURGICAL  06/20/2013    left eye retna attatchment    HX RETINAL DETACHMENT REPAIR      august 2012    KS REPAIR ING HERNIA,5+Y/O,REDUCIBL Left 05/02/2019    Dr. Isaac Irizarry       Family History:  Family History   Problem Relation Age of Onset    Diabetes Mother     Hypertension Mother     Kidney Disease Mother     High Cholesterol Father     Diabetes Brother         pre diabetic       Social History:   Social History     Tobacco Use    Smoking status: Never    Smokeless tobacco: Never   Substance Use Topics    Alcohol use: Not Currently     Alcohol/week: 4.2 standard drinks     Types: 5 Cans of beer per week    Drug use: No       Allergies:  No Known Allergies    PMH, PSH, family history, social history, allergies reviewed with the patient with significant items noted above. Review of Systems   Review of Systems   Constitutional:  Negative for chills and fever. HENT:  Negative for sore throat. Eyes:  Negative for visual disturbance. Negative recent vision problems   Respiratory:  Negative for shortness of breath. Cardiovascular:  Negative for chest pain. Gastrointestinal:  Negative for abdominal pain, diarrhea and nausea. Genitourinary:  Negative for difficulty urinating. Musculoskeletal:  Negative for myalgias. Skin:  Negative for rash. Neurological:  Positive for weakness and light-headedness. Negative for headaches. Negative altered level of consciousness   Psychiatric/Behavioral:  Negative for self-injury and suicidal ideas. All other systems reviewed and are negative. Physical Exam     Vitals:    08/16/22 1300 08/16/22 1330 08/16/22 1400 08/16/22 1430   BP: 131/78 135/73 122/69 124/79   Pulse: 71 70 72 70   Resp:       Temp:       SpO2: 96% 97% 96%        Physical Exam  Vitals and nursing note reviewed. Constitutional:       General: He is not in acute distress. Appearance: Normal appearance. HENT:      Head: Normocephalic and atraumatic. Mouth/Throat:      Mouth: Mucous membranes are moist.   Eyes:      General: No scleral icterus. Conjunctiva/sclera: Conjunctivae normal.   Cardiovascular:      Rate and Rhythm: Normal rate and regular rhythm. Pulmonary:      Effort: Pulmonary effort is normal.      Breath sounds: Normal breath sounds. Abdominal:      General: There is no distension. Palpations: Abdomen is soft. Tenderness: There is no abdominal tenderness. Musculoskeletal:         General: No deformity. Normal range of motion. Cervical back: Normal range of motion and neck supple. Skin:     General: Skin is warm and dry. Findings: No rash. Neurological:      General: No focal deficit present. Mental Status: He is alert and oriented to person, place, and time. Mental status is at baseline.    Psychiatric:         Mood and Affect: Mood normal.         Behavior: Behavior normal.       Diagnostic Study Results     Labs -     Recent Results (from the past 12 hour(s))   GLUCOSE, POC    Collection Time: 08/16/22 11:05 AM   Result Value Ref Range Glucose (POC) 83 70 - 110 mg/dL   EKG, 12 LEAD, INITIAL    Collection Time: 08/16/22 11:08 AM   Result Value Ref Range    Ventricular Rate 104 BPM    QRS Duration 90 ms    Q-T Interval 406 ms    QTC Calculation (Bezet) 533 ms    Calculated R Axis -10 degrees    Calculated T Axis 102 degrees    Diagnosis       Atrial fibrillation with rapid ventricular response  Minimal voltage criteria for LVH, may be normal variant  Septal infarct (cited on or before 14-MAY-2022)  Nonspecific ST abnormality  Abnormal ECG  When compared with ECG of 13-JUN-2022 10:50,  No significant change was found  Confirmed by Lonnell Kussmaul, MD, [facility] ([id number]) on 8/16/2022 82:17:00 PM     METABOLIC PANEL, BASIC    Collection Time: 08/16/22 12:10 PM   Result Value Ref Range    Sodium 136 136 - 145 mmol/L    Potassium 3.6 3.5 - 5.5 mmol/L    Chloride 100 100 - 111 mmol/L    CO2 24 21 - 32 mmol/L    Anion gap 12 3.0 - 18 mmol/L    Glucose 129 (H) 74 - 99 mg/dL    BUN 39 (H) 7.0 - 18 MG/DL    Creatinine 9.42 (H) 0.6 - 1.3 MG/DL    BUN/Creatinine ratio 4 (L) 12 - 20      GFR est AA 7 (L) >60 ml/min/1.73m2    GFR est non-AA 6 (L) >60 ml/min/1.73m2    Calcium 8.4 (L) 8.5 - 10.1 MG/DL   MAGNESIUM    Collection Time: 08/16/22 12:10 PM   Result Value Ref Range    Magnesium 2.0 1.6 - 2.6 mg/dL   TROPONIN-HIGH SENSITIVITY    Collection Time: 08/16/22 12:10 PM   Result Value Ref Range    Troponin-High Sensitivity 31 0 - 78 ng/L   CBC WITH AUTOMATED DIFF    Collection Time: 08/16/22  1:00 PM   Result Value Ref Range    WBC 6.1 4.6 - 13.2 K/uL    RBC 3.81 (L) 4.35 - 5.65 M/uL    HGB 11.4 (L) 13.0 - 16.0 g/dL    HCT 33.7 (L) 36.0 - 48.0 %    MCV 88.5 78.0 - 100.0 FL    MCH 29.9 24.0 - 34.0 PG    MCHC 33.8 31.0 - 37.0 g/dL    RDW 14.8 (H) 11.6 - 14.5 %    PLATELET 014 (L) 763 - 420 K/uL    MPV 10.2 9.2 - 11.8 FL    NRBC 0.0 0  WBC    ABSOLUTE NRBC 0.00 0.00 - 0.01 K/uL    NEUTROPHILS 80 (H) 40 - 73 %    LYMPHOCYTES 10 (L) 21 - 52 %    MONOCYTES 7 3 - 10 % EOSINOPHILS 2 0 - 5 %    BASOPHILS 1 0 - 2 %    IMMATURE GRANULOCYTES 0 0.0 - 0.5 %    ABS. NEUTROPHILS 4.8 1.8 - 8.0 K/UL    ABS. LYMPHOCYTES 0.6 (L) 0.9 - 3.6 K/UL    ABS. MONOCYTES 0.4 0.05 - 1.2 K/UL    ABS. EOSINOPHILS 0.1 0.0 - 0.4 K/UL    ABS. BASOPHILS 0.0 0.0 - 0.1 K/UL    ABS. IMM. GRANS. 0.0 0.00 - 0.04 K/UL    DF AUTOMATED     GLUCOSE, POC    Collection Time: 08/16/22  1:39 PM   Result Value Ref Range    Glucose (POC) 75 70 - 110 mg/dL      Labs Reviewed   METABOLIC PANEL, BASIC - Abnormal; Notable for the following components:       Result Value    Glucose 129 (*)     BUN 39 (*)     Creatinine 9.42 (*)     BUN/Creatinine ratio 4 (*)     GFR est AA 7 (*)     GFR est non-AA 6 (*)     Calcium 8.4 (*)     All other components within normal limits   CBC WITH AUTOMATED DIFF - Abnormal; Notable for the following components:    RBC 3.81 (*)     HGB 11.4 (*)     HCT 33.7 (*)     RDW 14.8 (*)     PLATELET 123 (*)     NEUTROPHILS 80 (*)     LYMPHOCYTES 10 (*)     ABS. LYMPHOCYTES 0.6 (*)     All other components within normal limits   MAGNESIUM   TROPONIN-HIGH SENSITIVITY   GLUCOSE, POC   GLUCOSE, POC       Radiologic Studies -   XR CHEST PORT   Final Result      No radiographic finding for an acute cardiopulmonary process. Improved vascular   congestion. CT Results  (Last 48 hours)      None          CXR Results  (Last 48 hours)                 08/16/22 1207  XR CHEST PORT Final result    Impression:      No radiographic finding for an acute cardiopulmonary process. Improved vascular   congestion. Narrative:  Portable Chest       CPT CODE: 54342       HISTORY: Dizziness. FINDINGS:        Comparison 6/13/2022; 2/17/2022. Wires overlie the patient. Heart size and mediastinal contours within normal limits. Bronchovascular   markings are within normal limits. No focal consolidation, effusion, or   pneumothorax. No acute osseous abnormality.                    The laboratory results, imaging results, and other diagnostic exams were reviewed in the EMR. Medical Decision Making   I am the first provider for this patient. I reviewed the vital signs, available nursing notes, past medical history, past surgical history, family history and social history. Vital Signs-Reviewed the patient's vital signs. ED EKG interpretation:  Rhythm: atrial fib; and irregular. Rate (approx.): 104; Axis: normal; P wave: Absent, a fib; QRS interval: normal ; ST/T wave: normal; Other findings: Minimally prolonged QTC at 533, likely secondary to A. fib, improved since last EKG. This EKG was interpreted by Nora Macdonald DO. Records Reviewed: Personally, on initial evaluation    MDM:   Giuliano Jacinto presents with complaint of dizziness, weakness, hypoglycemia  DDX includes but is not limited to: Metabolic derangement, encephalopathy,    \Will obtain lab work and imaging for further evaluation of patients complaint. Will continue to monitor and evaluate patient while in the ED. Orders as below:  Orders Placed This Encounter    XR CHEST PORT    METABOLIC PANEL, BASIC    MAGNESIUM    TROPONIN-HIGH SENSITIVITY    CBC WITH AUTOMATED DIFF    POC GLUCOSE    GLUCOSE, POC    GLUCOSE, POC    EKG, 12 LEAD, INITIAL    INSERT PERIPHERAL IV ONE TIME STAT    sodium chloride 0.9 % bolus infusion 250 mL        ED Course:   ED Course as of 08/16/22 1932 Tue Aug 16, 2022   1100 Patient arrives via EMS from dialysis, weakness, dizziness, not feeling well. Sugar was 50s at dialysis with a blood pressure of 76/46. He was given D10 in route. On arrival fingerstick blood sugar was 85, pressures improved to 101/58 with a MAP 67. History as above, significant for diabetes, HFrEF 44% EF, ESRD and A. fib on Eliquis. On my evaluation vital signs improved with a blood pressure low 100s/50s, 98% on room air, he is somewhat hypothermic at 95.1 degrees via rectal after numerous oral and axillary temperatures were taken.   He is alert, oriented x3, normal neurologic exam with no focal deficits. He appears somewhat tired but not somnolent or obtunded. He has A. fib on the monitor, auscultation reveals the same with no significant murmur. Lung sounds clear to auscultation. Abdomen soft nontender. Otherwise reassuring exam.    Suspect symptoms are due to missing dialysis run with a catch-up that may have been overshot somewhat and low blood sugar today. Also considering arrhythmia, ACS, PE, fluid overload, overdose, metabolic derangement. Will order labs and chest x-ray, EKG for evaluation. He was placed on a forced air warmer for temperature management, suspect with a gentle fluid bolus of 250 cc normal saline, warming, blood sugar management will be able to take him off the soon. [ZS]   1108 EKG, 12 LEAD, INITIAL  EKG reviewed per chart. A. fib, rates in the low 100s, improved since his last EKG. Otherwise nonischemic appearing. [ZS]   1235 XR CHEST PORT  Reviewed, no acute process on chest x-ray. Radiology impression is the same. Improved vascular congestion [ZS]   7669 METABOLIC PANEL, BASIC(!):    Sodium 136   Potassium 3.6   Chloride 100   CO2 24   Anion gap 12   Glucose 129(!)   BUN 39(!)   Creatinine 9.42(!)   BUN/Creatinine ratio 4(!)   GFR est AA 7(!)   GFR est non-AA 6(!)   Calcium 8.4(!)  BMP with an elevated BUN and creatinine, creatinine is elevated quite a bit and is less consistent with patient having just gotten dialysis. I do note that he has a upward trend overall on available labs, suspect the trend we are seeing is secondary to him being in the ED for symptomatic complaints similar to today. [ZS]   1300 TROPONIN-HIGH SENSITIVITY:    Troponin-High Sensitivity 31  Normal troponin, feel cardiac pathology unlikely at this time [ZS]   1300 MAGNESIUM:    Magnesium 2.0  Normal magnesium [ZS]   1328 CBC is without leukocytosis. Has mild anemia, baseline for him given chronic kidney disease.   No acute needs. Patient reevaluated, reports no significant symptoms at this time. States he feels better. Will recheck fingerstick blood sugar and if normal anticipate discharge. [ZS]   4521 JBZDANA fingerstick blood sugar 75, patient still feels well. Will discharge now [ZS]      ED Course User Index  [ZS] Izzy Wheeler DO           Procedures:  Procedures        Diagnosis and Disposition     CLINICAL IMPRESSION:  1. Dizziness    2. Hypoglycemia    3. Hemodialysis-associated hypotension      Current Discharge Medication List          Disposition: Home    Patient condition at time of disposition: Improved    DISCHARGE NOTE:   Pt has been reexamined. Patient has no new complaints, changes, or physical findings. Care plan outlined and precautions discussed. Results were reviewed with the patient. All medications were reviewed with the patient. All of pt's questions and concerns were addressed. Alarm symptoms and return precautions associated with chief complaint and evaluation were reviewed with the patient in detail. The patient demonstrated adequate understanding. Patient was instructed to follow up with PCP and neprhologist, as well as strict return precautions to the ED upon further deterioration. Patient is ready to go home. The patient is happy with this plan        Dragon Disclaimer     Please note that this dictation was completed with [employer], the computer voice recognition software. Quite often unanticipated grammatical, syntax, homophones, and other interpretive errors are inadvertently transcribed by the computer software. Please disregard these errors. Please excuse any errors that have escaped final proofreading.       Fred Estrada, [facility] Emergency Medicine  [id number]

## 2022-08-19 ENCOUNTER — PATIENT OUTREACH (OUTPATIENT)
Dept: CASE MANAGEMENT | Age: 52
End: 2022-08-19

## 2022-08-19 NOTE — PROGRESS NOTES
Complex Case Management      Date/Time:  2022 10:19 AM    Method of communication with patient:phone    [facility] ([facility]) contacted the patient by telephone to perform Ambulatory Care Coordination. Verified name and  (PHI) with patient as identifiers. Provided introduction to self, and explanation of the Ambulatory Care Manager's role. Reviewed most recent clinic visit w/ patient who verbalized understanding. Patient given an opportunity to ask questions. Top Challenges reviewed with the patient   N/a     Hx of Pulm HTN, CHF, DM2, CKD on dialysis TThSat, CAD, HTN  Recent ED visit. Low blood sugars/bp/body temp after dialysis. Resolved w/ fluids/dextrose. Pt states doing ok right now. No other questions/needs at this time. The patient agrees to contact the PCP office or the [address] [place] [address] for questions related to their healthcare. Provided contact information for future reference. Disease Specific:   N/A    Home Health Active: No    DME Active: No    Barriers to care? lack of knowledge about disease, utilization of services    Advance Care Planning:   Does patient have an Advance Directive:  not on file; education provided     Medication(s):   Medication reconciliation was not performed with patient, who verbalizes understanding of administration of home medications. There were no barriers to obtaining medications identified at this time. Referral to Pharm D needed: no     Current Outpatient Medications   Medication Sig    metoprolol tartrate (LOPRESSOR) 25 mg tablet TAKE 1 TABLET BY MOUTH TWICE DAILY    lisinopriL (PRINIVIL, ZESTRIL) 20 mg tablet Take 20 mg by mouth daily. atorvastatin (LIPITOR) 80 mg tablet Take 1 Tablet by mouth nightly. amiodarone (CORDARONE) 200 mg tablet TAKE 1 TABLET BY MOUTH DAILY    linaGLIPtin (Tradjenta) 5 mg tablet Take 1 Tablet by mouth daily. glipiZIDE SR (GLUCOTROL XL) 2.5 mg CR tablet Take 1 Tablet by mouth daily.     amLODIPine (NORVASC) 5 mg tablet Take 1 Tablet by mouth daily. glucose blood VI test strips (Accu-Chek Beatrice Plus test strp) strip Use as directed    Eliquis 2.5 mg tablet TAKE 1 TABLET BY MOUTH EVERY 12 HOURS    aspirin delayed-release 81 mg tablet Take 1 Tablet by mouth daily. doxercalciferol (HECTOROL IV) 4 mcg. alum-mag hydroxide-simeth (Maalox Advanced) 200-200-20 mg/5 mL susp Take  by mouth. nitroglycerin (NITROLINGUAL) 400 mcg/spray spray 1 Spray by SubLINGual route every five (5) minutes as needed for Chest Pain. Blood-Glucose Meter monitoring kit Check fasting glucose    sucroferric oxyhydroxide (VELPHORO) 500 mg chew chewable tablet Take 500 mg by mouth three (3) times daily (with meals). No current facility-administered medications for this visit.        [facility] follow up appointment(s):   Future Appointments   Date Time Provider Jessica Vail   8/30/2022  3:00 PM Ledy Holcomb [facility] AM[facility]   9/6/2022  2:30 PM Sarha Davis MD [facility] BS AMB   11/9/2022 10:00 AM Lakeshia LAI MD [facility] BS AMB        Non-[facility] follow up appointment(s):      Goals Addressed                   This Visit's Progress     Attends follow up appointments on schedule        8/19/22 Patient will attend all scheduled appointments through 11/19/22       Knowledge and adherence of prescribed medication (ie. action, side effects, missed dose, etc.).        8/19/22 Pt will take all medications prescribed to be evaluated on each outreach through 11/19/22

## 2022-08-25 NOTE — PROGRESS NOTES
Desmond Rivera presents today for a post-hospital follow-up after undergoing AFIB/PVI ablation using [address] on 6/28/22, performed by Dr. Jody Gallegos. He had a CHAPITO done pre-procedure and his EF was 50-55%, normal wall motion, and no KELLI thrombus. Post procedure, he was restarted on Eliquis 2.5mg BID and instructed to remain on amiodarone 200mg daily for 3 months. He was seen by Dr. Jody Gallegos on 6/1/22 after being referred to him for consideration of AFIB ablation. He had symptomatic paroxysmal atrial fibrillation which sometimes prevented him from undergoing dialysis due to elevated heart rates. He called the office on 6/13/22 to report that during dialysis, his heart rate dropped to 38 beats per minute and he was instructed to follow-up with cardiology. He ended up in the ER and they were concerned about weakness secondary hypovolemia. He stated that sometimes when too much fluid is pulled off during dialysis, he feels weak and feels like he is going to pass out. He went to the ER on 8/16/22 with complaints of dizziness, weakness, altered sensorium while at the end of his dialysis treatment. His blood glucose level was in the 50's. Upon arrival to the ED, his glucose level was in the 70's and 80's. His troponin was negative. Mr. Bhargavi Sterling is a 54year old gentleman with a history of cardiomyopathy (diagnosed in Jan. 2012), pulmonary hypertension, hypertension, diabetes, iron deficiency anemia, atrial fibrillation, and small bowel obstruction requiring a small bowel resection (1/26/12), dyslipidemia, chronic diastolic heart failure, and CAD (s/p stent in 2012). He was lost to follow-up with other healthcare providers and had previously been non-compliant with medications. His last visit with Dr. Gigi Miller was in May 17, 2022. He was hospitalized from 12/5/18 through 12/14/18. He presented with complaints of weakness, fatigue, dyspnea on exertion.   He was noted to be in AFIB with RVR, and had strep pneumonia and bacteremia (followed by ID), and superficial acute occlusive left upper extremity thrombophlebitis (no DVT). He was treated with IV antibiotics. Initially, his heart rate was difficult to control but prior to discharge, improved with oral cardizem being taken 3 times a day. He has ESRD but was not being dialyzed yet prior to admission. He was s/p AV fistula placement but was lost to follow-up. Hemodialysis was initiated during this hospital stay. An echo was done and it showed an EF of 40%, moderate concentric LVH, LV global hypokinesis, and PASP of 50 mmHg. He saw Dr. Ivory Carl for his routine follow-up on August 13, 2019 and during that visit, his diltiazem was discontinued due to complaints of low blood pressures postdialysis with symptomatic dizziness. He underwent cardiac catheterization on August 21, 2019 as part of his work-up for possible kidney transplant. The cardiac catheterization showed that his prior proximal LAD stent is patent with 35% stenosis/calcification. No interventions were performed. He presented to the hospital on 1/22/21 with generalized weakness and malaise. He was tachycardic on arrival to the ED and was noted to be in rapid atrial fibrillation with a heart rate in the 150s. He apparently missed his dialysis treatments for about a week and he was also not taking his carvedilol as prescribed. He was diagnosed with hyperkalemia and Afib with RVR. He underwent urgent hemodialysis due to his hyperkalemia. His potassium level was 8.6, , and creatinine 21.8. He was also diagnosed with a non-STEMI felt to be due to demand ischemia from the AFib. His initial troponin was 0.90 and it peaked at 4.95. His potassium level normalized after undergoing hemodialysis. For his AFib, he was initially started on an amiodarone drip and heparin but was then transitioned to carvedilol and Eliquis.  An echo was done and it showed an EF of 55-60%, PASP was 22 mmHg. He states that he had indigestion and was not feeling well for several days and that is why he missed his dialysis treatments. In Jan 2022, he underwent cardioversion for atrial fibrillation. Mr. Yoko Ag reports that while undergoing hemodialysis on April 2, 2022, he was told by the dialysis nurses that he was in atrial fibrillation and that his heart rate was rapid. He reports that Dr. Jerry Laura was notified and his dialysis treatment was completed. After he completed treatment, he went home and then received a call from the dialysis center instructing him to go to the emergency room for further evaluation and treatment of the atrial fibrillation. He lives on the [facility] so he presented to the [facility] where he was noted to be in atrial flutter with RVR with heart rate around 150 bpm.  He underwent cardioversion in the emergency department and cardiology recommended IV amiodarone. Upon discharge from the hospital, he was instructed to take amiodarone 400 mg twice a day for 7 days then 400 mg daily for 7 days and then 200 mg daily for 14 days. However, he reports that a nurse gave him instructions to take 600 mg in the morning and 400 mg in the evening for 7 days followed by 600 mg daily for 7 days and then 400 mg daily. He follow the instructions that the nurse gave him but his prescription bottle for amiodarone clearly has the instructions that were noted in the discharge summary. He states that his carvedilol was discontinued and he was started on metoprolol tartrate 25 mg twice a day. He states that he is feeling \"great. \"  He has not noticed any rapid heart beats. Denies chest pain, tightness, heaviness, and palpitations. Denies shortness of breath at rest, dyspnea on exertion, orthopnea and PND. Denies abdominal bloating. Denies lightheadedness, dizziness, and syncope. Denies lower extremity edema and denies claudication.   Denies nausea, vomiting, diarrhea, melena, hematochezia. Admits to indigestion/reflux. Denies hematuria, urgency, frequency. Denies fever, chills. He undergoes hemodialysis on Tues-Thurs-Sat. Past Medical History:   Diagnosis Date    Abnormal nuclear cardiac imaging test 10/08/2012    Fixed anteroseptal, anteroapical defect c/w prior infarction. No ischemia. Mid & distal anteroseptal, anteroapical WMA. LVE. EF 44%. Anemia     dr. Charlie Young doubt amyloid or multiple myeloma. candidate for procirt if Hg <10    Benign hypertensive     Blindness of right eye 01/2013    legally blind    CAD (coronary artery disease)     Cardiomyopathy ejection fraction of 40%     CKD (chronic kidney disease), stage IV ([address]     to see Dr. Mandy Miller 12/1/12    Congestive heart failure ([address]     Diabetes mellitus ([address]     Diabetic retinopathy ([address]     Diabetic retinopathy ([facility]     Dr. Lisa Horn- injections    Heart failure [facility])     History of echocardiogram 04/22/2014    LVE. EF 55% (prev 40-45% on echo of 1/27/12). No WMA. Mild-mod conc LVH. Gr 3 DDfx. Marked LAE. Mild SHANTEL. Mild MR. Hypertension     Pulmonary hypertension ([address]     Renal Ultrasound 1/3/12    Right kidney isoechoic w/respect to liver. Sm left-sided pleural effusion    S/P cardiac cath 10/16/2012    LM patent. pLAD 95% (3.5 x 12-mm Draper stent, resid 0$%). LCX mild. Past Surgical History:   Procedure Laterality Date    HX CORONARY STENT PLACEMENT      one    HX LAPAROTOMY  2/2012    bowel obstruction with removal segment of small bowel. Done by Riblet. HX LAPAROTOMY  infancy    whole in colon and had repair at that time.     HX OTHER SURGICAL  06/20/2013    left eye retna attatchment    HX RETINAL DETACHMENT REPAIR      august 2012    SD REPAIR ING HERNIA,5+Y/O,REDUCIBL Left 05/02/2019    Dr. Shahid Narvaez     Family History   Problem Relation Age of Onset    Diabetes Mother     Hypertension Mother     Kidney Disease Mother     High Cholesterol Father     Diabetes Brother pre diabetic     Social History     Tobacco Use    Smoking status: Never    Smokeless tobacco: Never   Substance Use Topics    Alcohol use: Not Currently     Alcohol/week: 4.2 standard drinks     Types: 5 Cans of beer per week    Drug use: No       Medications:  Current Outpatient Medications   Medication Sig    metoprolol tartrate (LOPRESSOR) 25 mg tablet TAKE 1 TABLET BY MOUTH TWICE DAILY    lisinopriL (PRINIVIL, ZESTRIL) 20 mg tablet Take 20 mg by mouth daily. atorvastatin (LIPITOR) 80 mg tablet Take 1 Tablet by mouth nightly. amiodarone (CORDARONE) 200 mg tablet TAKE 1 TABLET BY MOUTH DAILY    linaGLIPtin (Tradjenta) 5 mg tablet Take 1 Tablet by mouth daily. glipiZIDE SR (GLUCOTROL XL) 2.5 mg CR tablet Take 1 Tablet by mouth daily. amLODIPine (NORVASC) 5 mg tablet Take 1 Tablet by mouth daily. glucose blood VI test strips (Accu-Chek Beatrice Plus test strp) strip Use as directed    Eliquis 2.5 mg tablet TAKE 1 TABLET BY MOUTH EVERY 12 HOURS    aspirin delayed-release 81 mg tablet Take 1 Tablet by mouth daily. doxercalciferol (HECTOROL IV) 4 mcg. alum-mag hydroxide-simeth (Maalox Advanced) 200-200-20 mg/5 mL susp Take  by mouth. nitroglycerin (NITROLINGUAL) 400 mcg/spray spray 1 Spray by SubLINGual route every five (5) minutes as needed for Chest Pain. Blood-Glucose Meter monitoring kit Check fasting glucose    sucroferric oxyhydroxide (VELPHORO) 500 mg chew chewable tablet Take 500 mg by mouth three (3) times daily (with meals). No current facility-administered medications for this visit.        No Known Allergies      Physical:  Visit Vitals  /76 (BP 1 Location: Left upper arm, BP Patient Position: Sitting, BP Cuff Size: Adult)   Pulse 76   Ht 5' 10\" (1.778 m)   Wt 88.9 kg (196 lb)   SpO2 97%   BMI 28.12 kg/m²       His weight is down 11 pounds since his last appointment      Exam:  Neck:  Supple, no JVD, no carotid bruits  CV:  Normal S1 and  S2, no murmurs, rubs, or gallops noted  Lungs:  Clear to ausculation throughout, no wheezes or rales  Abd:  Soft, non-tender, non-distended with good bowel sounds. No hepatosplenomegaly. Extremities:  No lower extremity edema. EKG:  Normal sinus rhythm      LABS:  Lab Results   Component Value Date/Time    Sodium 136 08/16/2022 12:10 PM    Potassium 3.6 08/16/2022 12:10 PM    Chloride 100 08/16/2022 12:10 PM    CO2 24 08/16/2022 12:10 PM    Glucose 129 (H) 08/16/2022 12:10 PM    BUN 39 (H) 08/16/2022 12:10 PM    Creatinine 9.42 (H) 08/16/2022 12:10 PM     Lab Results   Component Value Date/Time    Cholesterol, total 173 05/16/2022 08:04 AM    HDL Cholesterol 52 05/16/2022 08:04 AM    LDL, calculated 109.2 (H) 05/16/2022 08:04 AM    Triglyceride 59 05/16/2022 08:04 AM    CHOL/HDL Ratio 3.3 05/16/2022 08:04 AM     No components found for: GPT     Impression/Plan:  1. Nonischemic cardiomyopathy, EF 50-55% (CHAPITO done on 6/28/22)  2. Chronic diastolic CHF, appears compensated  3. Essential hypertension, blood pressure well controlled  4. Diabetes mellitus, recommend Hgb A1c less than 7% from cardiac standpoint  5. ESRD, on hemodialysis  6. Anemia, followed by PCP and nephrology  7. Dyslipidemia, currently not taking a statin  8. History of medical noncompliance  9. Paroxysmal AFIB, currently back in sinus rhythm, s/p AFIB/PVI ablation on 6/28/22, and anticoagulated with Eliquis    Mr. Chris Mitchell presents today for follow-up after undergoing AFIB ablation. He states that he is feeling \"great. \"  He has been compliant with his medications. He is to remain on amiodarone 200mg daily for 3 months post-ablation. He has not noticed any irregular or rapid heart beats. He offers no complaints of chest pain, shortness of breath. He went to the ER on 8/16/22 for complaints of  complaints of dizziness, weakness, altered sensorium while at the end of his dialysis treatment. His blood glucose level was in the 50's.   Upon arrival to the ED, his glucose level was in the 70's and 80's. His symptoms were felt to be due hypoglycemia and his dialysis treatment and was discharged home after being evaluated. His EKG today shows normal sinus rhythm, blood pressure is controlled, and he does not exhibit any signs of volume overload at this time. He has been scheduled to return for follow-up with Dr. Ester Lovelace in 2 months for his post-procedure follow-up. Time:  30 minutes      Daniel Dickey MSN, FNP-BC    Please note:  Portions of this chart were created with Dragon medical speech to text program.  Unrecognized errors may be present.

## 2022-08-26 ENCOUNTER — PATIENT OUTREACH (OUTPATIENT)
Dept: CASE MANAGEMENT | Age: 52
End: 2022-08-26

## 2022-08-26 NOTE — PROGRESS NOTES
Complex Case Management      Date/Time:  2022 10:19 AM    Method of communication with patient:phone    [address] [facility] ([facility]) contacted the patient by telephone to perform Ambulatory Care Coordination. Verified name and  (PHI) with patient as identifiers. Provided introduction to self, and explanation of the Ambulatory Care Manager's role. Reviewed most recent clinic visit w/ patient who verbalized understanding. Patient given an opportunity to ask questions. Top Challenges reviewed with the patient   N/a     Hx of Pulm HTN, CHF, DM2, CKD on dialysis TThSat, CAD, HTN  Pt states doing ok right now, no repeat incidents after dialysis. Reminded patient of upcoming card appt. Pt requests reminder call Monday. No other questions/needs at this time. The patient agrees to contact the PCP office or the [address] [place] [address] for questions related to their healthcare. Provided contact information for future reference. Disease Specific:   N/A    Home Health Active: No    DME Active: No    Barriers to care? lack of knowledge about disease, utilization of services    Advance Care Planning:   Does patient have an Advance Directive:  not on file; education provided     Medication(s):   Medication reconciliation was not performed with patient, who verbalizes understanding of administration of home medications. There were no barriers to obtaining medications identified at this time. Referral to Pharm D needed: no     Current Outpatient Medications   Medication Sig    metoprolol tartrate (LOPRESSOR) 25 mg tablet TAKE 1 TABLET BY MOUTH TWICE DAILY    lisinopriL (PRINIVIL, ZESTRIL) 20 mg tablet Take 20 mg by mouth daily. atorvastatin (LIPITOR) 80 mg tablet Take 1 Tablet by mouth nightly. amiodarone (CORDARONE) 200 mg tablet TAKE 1 TABLET BY MOUTH DAILY    linaGLIPtin (Tradjenta) 5 mg tablet Take 1 Tablet by mouth daily.     glipiZIDE SR (GLUCOTROL XL) 2.5 mg CR tablet Take 1 Tablet by mouth daily.    amLODIPine (NORVASC) 5 mg tablet Take 1 Tablet by mouth daily. glucose blood VI test strips (Accu-Chek Beatrice Plus test strp) strip Use as directed    Eliquis 2.5 mg tablet TAKE 1 TABLET BY MOUTH EVERY 12 HOURS    aspirin delayed-release 81 mg tablet Take 1 Tablet by mouth daily. doxercalciferol (HECTOROL IV) 4 mcg. alum-mag hydroxide-simeth (Maalox Advanced) 200-200-20 mg/5 mL susp Take  by mouth. nitroglycerin (NITROLINGUAL) 400 mcg/spray spray 1 Spray by SubLINGual route every five (5) minutes as needed for Chest Pain. Blood-Glucose Meter monitoring kit Check fasting glucose    sucroferric oxyhydroxide (VELPHORO) 500 mg chew chewable tablet Take 500 mg by mouth three (3) times daily (with meals). No current facility-administered medications for this visit. [facility] follow up appointment(s):   Future Appointments   Date Time Provider Jessica Vail   8/30/2022  3:00 PM Cherie Holcomb NP [facility] BS AMB   9/6/2022  2:30 PM Sera Baez MD [facility]   11/9/2022 10:00 AM Regina LAI MD [facility]        Non-[facility] follow up appointment(s):      Goals Addressed                   This Visit's Progress     Attends follow up appointments on schedule   On track     8/19/22 Patient will attend all scheduled appointments through 11/19/22       Knowledge and adherence of prescribed medication (ie. action, side effects, missed dose, etc.).    On track     8/19/22 Pt will take all medications prescribed to be evaluated on each outreach through 11/19/22

## 2022-08-29 ENCOUNTER — PATIENT OUTREACH (OUTPATIENT)
Dept: CASE MANAGEMENT | Age: 52
End: 2022-08-29

## 2022-08-29 NOTE — PROGRESS NOTES
Attempted to contact patient for Complex Care follow up. Requested reminder prior to card appt tomorrow. No answer, left message with contact information provided. Will attempt to contact at a later time.

## 2022-08-30 ENCOUNTER — OFFICE VISIT (OUTPATIENT)
Dept: CARDIOLOGY CLINIC | Age: 52
End: 2022-08-30
Payer: MEDICARE

## 2022-08-30 VITALS
BODY MASS INDEX: 28.06 KG/M2 | OXYGEN SATURATION: 97 % | HEART RATE: 76 BPM | HEIGHT: 70 IN | DIASTOLIC BLOOD PRESSURE: 76 MMHG | WEIGHT: 196 LBS | SYSTOLIC BLOOD PRESSURE: 138 MMHG

## 2022-08-30 DIAGNOSIS — I48.0 PAROXYSMAL ATRIAL FIBRILLATION (HCC): Primary | ICD-10-CM

## 2022-08-30 DIAGNOSIS — I50.32 CHRONIC DIASTOLIC HEART FAILURE (HCC): ICD-10-CM

## 2022-08-30 DIAGNOSIS — Z98.890 H/O CARDIAC RADIOFREQUENCY ABLATION: ICD-10-CM

## 2022-08-30 DIAGNOSIS — I25.10 CORONARY ARTERY DISEASE INVOLVING NATIVE CORONARY ARTERY OF NATIVE HEART WITHOUT ANGINA PECTORIS: ICD-10-CM

## 2022-08-30 PROCEDURE — 93000 ELECTROCARDIOGRAM COMPLETE: CPT | Performed by: INTERNAL MEDICINE

## 2022-08-30 PROCEDURE — G8427 DOCREV CUR MEDS BY ELIG CLIN: HCPCS | Performed by: NURSE PRACTITIONER

## 2022-08-30 PROCEDURE — G8432 DEP SCR NOT DOC, RNG: HCPCS | Performed by: NURSE PRACTITIONER

## 2022-08-30 PROCEDURE — 99214 OFFICE O/P EST MOD 30 MIN: CPT | Performed by: NURSE PRACTITIONER

## 2022-08-30 PROCEDURE — G9231 DOC ESRD DIA TRANS PREG: HCPCS | Performed by: NURSE PRACTITIONER

## 2022-08-30 PROCEDURE — G8417 CALC BMI ABV UP PARAM F/U: HCPCS | Performed by: NURSE PRACTITIONER

## 2022-08-30 NOTE — PATIENT INSTRUCTIONS
Continue present medication regimen  Follow-up with Dr. Erin Jacob as scheduled and as needed  Follow-up with Dr. Kinjal Chopra as scheduled and as needed

## 2022-08-30 NOTE — PROGRESS NOTES
Alexandra Velez presents today for   Chief Complaint   Patient presents with    Follow-up     4 weeks post ablation        Alexandra Velez preferred language for health care discussion is english/other. Is someone accompanying this pt? no    Is the patient using any DME equipment during [address]? no    Depression Screening:  3 most recent PHQ Screens 6/1/2022   Little interest or pleasure in doing things Not at all   Feeling down, depressed, irritable, or hopeless Not at all   Total Score PHQ 2 0       Learning Assessment:  Learning Assessment 6/1/2022   PRIMARY LEARNER Patient   HIGHEST LEVEL OF EDUCATION - PRIMARY LEARNER  2 YEARS Robert PRIMARY LEARNER NONE   CO-LEARNER CAREGIVER No   CO-LEARNER NAME -   Anel Rizvi [address] -    NEED No   LEARNER PREFERENCE PRIMARY LISTENING     READING     DEMONSTRATION   LEARNER PREFERENCE CO-LEARNER -   LEARNING SPECIAL TOPICS No   ANSWERED BY patient   RELATIONSHIP SELF       Abuse Screening:  Abuse Screening Questionnaire 6/1/2022   Do you ever feel afraid of your partner? N   Are you in a relationship with someone who physically or mentally threatens you? N   Is it safe for you to go home? Y       Fall Risk  Fall Risk Assessment, last 12 mths 4/20/2022   Able to walk? Yes   Fall in past 12 months? 0   Do you feel unsteady? 1   Are you worried about falling 0   Is TUG test greater than 12 seconds? 0   Is the gait abnormal? 0       Pt currently taking Anticoagulant therapy? Eliquis     Coordination of Care:  1. Have you been to the ER, urgent care clinic since your last visit? Hospitalized since your last visit? 8/16 for dizziness; 6/28 for ablation     2. Have you seen or consulted any other health care providers outside of the [address] [place] [address] since your last visit? Include any pap smears or colon screening.  no

## 2022-09-21 ENCOUNTER — PATIENT OUTREACH (OUTPATIENT)
Dept: CASE MANAGEMENT | Age: 52
End: 2022-09-21

## 2022-10-03 ENCOUNTER — PATIENT OUTREACH (OUTPATIENT)
Dept: CASE MANAGEMENT | Age: 52
End: 2022-10-03

## 2022-10-03 NOTE — PROGRESS NOTES
Complex Case Management      Date/Time:  10/3/2022 10:19 AM    Method of communication with patient:phone    [address] [facility] ([facility]) contacted the patient by telephone to perform Ambulatory Care Coordination. Verified name and  (PHI) with patient as identifiers. Provided introduction to self, and explanation of the Ambulatory Care Manager's role. Reviewed most recent clinic visit w/ patient who verbalized understanding. Patient given an opportunity to ask questions. Top Challenges reviewed with the patient   N/a     Hx of Pulm HTN, CHF, DM2, CKD on dialysis TThSat, CAD, HTN  Pt states doing ok right now. Recently found his A1C has come below 6.0 which means he can be listed for kidney transplant. Is working w/ the transplant center at [facility]. No other questions/needs at this time. The patient agrees to contact the PCP office or the [facility] for questions related to their healthcare. Provided contact information for future reference. Disease Specific:   N/A    Home Health Active: No    DME Active: No    Barriers to care? lack of knowledge about disease, utilization of services    Advance Care Planning:   Does patient have an Advance Directive:  not on file; education provided     Medication(s):   Medication reconciliation was not performed with patient, who verbalizes understanding of administration of home medications. There were no barriers to obtaining medications identified at this time. Referral to Pharm D needed: no     Current Outpatient Medications   Medication Sig    metoprolol tartrate (LOPRESSOR) 25 mg tablet TAKE 1 TABLET BY MOUTH TWICE DAILY    lisinopriL (PRINIVIL, ZESTRIL) 20 mg tablet Take 20 mg by mouth daily. atorvastatin (LIPITOR) 80 mg tablet Take 1 Tablet by mouth nightly. amiodarone (CORDARONE) 200 mg tablet TAKE 1 TABLET BY MOUTH DAILY    linaGLIPtin (Tradjenta) 5 mg tablet Take 1 Tablet by mouth daily.     glipiZIDE SR (GLUCOTROL XL) 2.5 mg CR tablet Take 1 Tablet by mouth daily. amLODIPine (NORVASC) 5 mg tablet Take 1 Tablet by mouth daily. glucose blood VI test strips (Accu-Chek Beatrice Plus test strp) strip Use as directed    Eliquis 2.5 mg tablet TAKE 1 TABLET BY MOUTH EVERY 12 HOURS    aspirin delayed-release 81 mg tablet Take 1 Tablet by mouth daily. alum-mag hydroxide-simeth (Maalox Advanced) 200-200-20 mg/5 mL susp Take  by mouth. nitroglycerin (NITROLINGUAL) 400 mcg/spray spray 1 Spray by SubLINGual route every five (5) minutes as needed for Chest Pain. Blood-Glucose Meter monitoring kit Check fasting glucose    sucroferric oxyhydroxide (VELPHORO) 500 mg chew chewable tablet Take 500 mg by mouth three (3) times daily (with meals). No current facility-administered medications for this visit. [facility] follow up appointment(s):   Future Appointments   Date Time Provider Jessica Vail   10/12/2022 11:00 AM Tarsha Jensen MD [facility]   11/3/2022  3:20 PM Jimenez Diaz MD [facility] BS AMB   11/9/2022 10:00 AM José Miguel Olivo MD [facility] BS AMB        Non-[facility] follow up appointment(s):      Goals Addressed                   This Visit's Progress     Attends follow up appointments on schedule   On track     8/19/22 Patient will attend all scheduled appointments through 11/19/22       Knowledge and adherence of prescribed medication (ie. action, side effects, missed dose, etc.).    On track     8/19/22 Pt will take all medications prescribed to be evaluated on each outreach through 11/19/22

## 2022-10-12 ENCOUNTER — OFFICE VISIT (OUTPATIENT)
Dept: FAMILY MEDICINE CLINIC | Age: 52
End: 2022-10-12
Payer: MEDICARE

## 2022-10-12 VITALS
OXYGEN SATURATION: 98 % | WEIGHT: 198 LBS | BODY MASS INDEX: 28.35 KG/M2 | HEART RATE: 71 BPM | RESPIRATION RATE: 16 BRPM | DIASTOLIC BLOOD PRESSURE: 70 MMHG | HEIGHT: 70 IN | SYSTOLIC BLOOD PRESSURE: 130 MMHG | TEMPERATURE: 98.8 F

## 2022-10-12 DIAGNOSIS — E11.3419: Primary | ICD-10-CM

## 2022-10-12 DIAGNOSIS — I25.10 CORONARY ARTERY DISEASE INVOLVING NATIVE CORONARY ARTERY OF NATIVE HEART WITHOUT ANGINA PECTORIS: ICD-10-CM

## 2022-10-12 DIAGNOSIS — E78.5 DYSLIPIDEMIA: ICD-10-CM

## 2022-10-12 DIAGNOSIS — D63.8 ANEMIA OF CHRONIC DISEASE: ICD-10-CM

## 2022-10-12 DIAGNOSIS — N18.6 ESRD ON DIALYSIS (HCC): ICD-10-CM

## 2022-10-12 DIAGNOSIS — I48.0 PAROXYSMAL A-FIB (HCC): ICD-10-CM

## 2022-10-12 DIAGNOSIS — Z99.2 ESRD ON DIALYSIS (HCC): ICD-10-CM

## 2022-10-12 PROCEDURE — G8432 DEP SCR NOT DOC, RNG: HCPCS | Performed by: FAMILY MEDICINE

## 2022-10-12 PROCEDURE — 2022F DILAT RTA XM EVC RTNOPTHY: CPT | Performed by: FAMILY MEDICINE

## 2022-10-12 PROCEDURE — G8427 DOCREV CUR MEDS BY ELIG CLIN: HCPCS | Performed by: FAMILY MEDICINE

## 2022-10-12 PROCEDURE — G9231 DOC ESRD DIA TRANS PREG: HCPCS | Performed by: FAMILY MEDICINE

## 2022-10-12 PROCEDURE — G8417 CALC BMI ABV UP PARAM F/U: HCPCS | Performed by: FAMILY MEDICINE

## 2022-10-12 PROCEDURE — 3044F HG A1C LEVEL LT 7.0%: CPT | Performed by: FAMILY MEDICINE

## 2022-10-12 PROCEDURE — 99214 OFFICE O/P EST MOD 30 MIN: CPT | Performed by: FAMILY MEDICINE

## 2022-10-12 RX ORDER — CINACALCET 30 MG/1
30 TABLET, FILM COATED ORAL
COMMUNITY
Start: 2022-06-27

## 2022-10-12 RX ORDER — ERYTHROMYCIN AND BENZOYL PEROXIDE 30; 50 MG/G; MG/G
GEL TOPICAL 4 TIMES DAILY
COMMUNITY

## 2022-10-12 NOTE — PROGRESS NOTES
Chief Complaint   Patient presents with    Anemia    Cholesterol Problem    Coronary Artery Disease     Hx of angina     Diabetes    Hypertension    Irregular Heart Beat     A-fib    Other     ESR/CKD      1. \"Have you been to the ER, urgent care clinic since your last visit? Hospitalized since your last visit? \" Yes 08- [facility] ED for dizziness    2. \"Have you seen or consulted any other health care providers outside of the [address] [place] [address] since your last visit? \"  Nephrology for ESR      3. For patients aged 39-70: Has the patient had a colonoscopy / FIT/ Cologuard? Yes - no Care Gap present      If the patient is female:    4. For patients aged 41-77: Has the patient had a mammogram within the past 2 years? NA - based on age or sex      11. For patients aged 21-65: Has the patient had a pap smear? NA - based on age or sex       Physician order obtained. Patient completed adult immunization consent form. Allergies, contraindications and recommendations reviewed with patient. Seasonal influenza vaccine administered IM right deltoid. Patient tolerated well. Patient remained in office for 15 minutes after injection and no adverse reactions were noted.      Lot # [id number]  Exp  Date: 06-  [facility] # [id number]

## 2022-10-13 NOTE — PROGRESS NOTES
Mariusz Mehta, 54 y.o.,  male    SUBJECTIVE  Ff-up    ESRD-on HD. Also with anemia of chronic disease and HTN, following nephrology  CAD/Afib- s/p ablation 6/2022. no chest pains, palpitations, leg edema. cardiac cath 8/19 showing proximal LAD stent  Patent 35% stenosis. NIDDM- w/ retinopathy, legally blind. Says 's. Dfollowing endo dr. Joey Day, and ophthalmology. On tradjenta/ glipizide  Recent ED visit for foreign body sensation on R eye, neg rupal, given antibx eye drops, reports not much improved, advised to ff-up with his ophthalmologist.     Lives with wife, who assists with advance ADLs    ROS:  See HPI, all others negative        Patient Active Problem List   Diagnosis Code    Benign hypertensive  I11.9    CRI (chronic renal insufficiency) N18.9    Diabetes mellitus (HCC) E11.9    Cardiomyopathy ([address] I42.9    Iron deficiency anemia D50.9    CAD (coronary artery disease) I25.10    Legally blind H54.8    Diabetic retinopathy ([facility] E11.319    Severe nonproliferative diabetic retinopathy with macular edema, associated with type 2 diabetes mellitus E11.3419    Dyslipidemia E78.5    Atrial fibrillation with rapid ventricular response (HCC) I48.91    Type 2 diabetes with nephropathy ([facility]) E11.21       Current Outpatient Medications:     cinacalcet (SENSIPAR) 30 mg tablet, Take 30 mg by mouth. TAKE 1 TABLET(30MG) BY MOUTH 3 TIMES WEEKLY: MONDAY, WEDNESDAY, AND FRIDAY IN THE EVENING., Disp: , Rfl:     benzoyl peroxide-erythromycin (BENZAMYCIN) 3-5 % topical gel, Apply  to affected area four (4) times daily. Apply to right eye, Disp: , Rfl:     metoprolol tartrate (LOPRESSOR) 25 mg tablet, TAKE 1 TABLET BY MOUTH TWICE DAILY, Disp: 180 Tablet, Rfl: 3    lisinopriL (PRINIVIL, ZESTRIL) 20 mg tablet, Take 20 mg by mouth daily. , Disp: , Rfl:     atorvastatin (LIPITOR) 80 mg tablet, Take 1 Tablet by mouth nightly., Disp: 90 Tablet, Rfl: 3    amiodarone (CORDARONE) 200 mg tablet, TAKE 1 TABLET BY MOUTH DAILY, Disp: 30 Tablet, Rfl: 6    linaGLIPtin (Tradjenta) 5 mg tablet, Take 1 Tablet by mouth daily. , Disp: , Rfl:     glipiZIDE SR (GLUCOTROL XL) 2.5 mg CR tablet, Take 1 Tablet by mouth daily. , Disp: 90 Tablet, Rfl: 0    amLODIPine (NORVASC) 5 mg tablet, Take 1 Tablet by mouth daily. , Disp: 90 Tablet, Rfl: 0    Eliquis 2.5 mg tablet, TAKE 1 TABLET BY MOUTH EVERY 12 HOURS, Disp: 180 Tablet, Rfl: 3    aspirin delayed-release 81 mg tablet, Take 1 Tablet by mouth daily. , Disp: 90 Tablet, Rfl: 3    sucroferric oxyhydroxide (VELPHORO) 500 mg chew chewable tablet, Take 500 mg by mouth three (3) times daily (with meals). , Disp: , Rfl:     glucose blood VI test strips (Accu-Chek Beatrice Plus test strp) strip, Use as directed, Disp: 100 Strip, Rfl: 2    alum-mag hydroxide-simeth (Maalox Advanced) 200-200-20 mg/5 mL susp, Take  by mouth., Disp: , Rfl:     nitroglycerin (NITROLINGUAL) 400 mcg/spray spray, 1 [place] by SubLINGual route every five (5) minutes as needed for Chest Pain., Disp: 1 Bottle, Rfl: 2    Blood-Glucose Meter monitoring kit, Check fasting glucose, Disp: 1 Kit, Rfl: 0  No Known Allergies    Past Medical History:   Diagnosis Date    Abnormal nuclear cardiac imaging test 10/08/2012    Fixed anteroseptal, anteroapical defect c/w prior infarction. No ischemia. Mid & distal anteroseptal, anteroapical WMA. LVE. EF 44%. Anemia     dr. Mary Milan doubt amyloid or multiple myeloma. candidate for procirt if Hg <10    Benign hypertensive     Blindness of right eye 01/2013    legally blind    CAD (coronary artery disease)     Cardiomyopathy ejection fraction of 40%     CKD (chronic kidney disease), stage IV ([address]     to see Dr. Josh Eagle 12/1/12    Congestive heart failure ([address]     Diabetes mellitus ([address]     Diabetic retinopathy ([address]     Diabetic retinopathy ([address]     Dr. Shameka Montilla- injections    Heart failure [facility])     History of echocardiogram 04/22/2014    LVE. EF 55% (prev 40-45% on echo of 1/27/12). No WMA. Mild-mod conc LVH. Gr 3 DDfx. Marked LAE. Mild SHANTEL. Mild MR. Hypertension     Pulmonary hypertension ([address]     Renal Ultrasound 1/3/12    Right kidney isoechoic w/respect to liver. Sm left-sided pleural effusion    S/P cardiac cath 10/16/2012    LM patent. pLAD 95% (3.5 x 12-mm [place] stent, resid 0$%). LCX mild. Social History     Socioeconomic History    Marital status:      Spouse name: Not on file    Number of children: Not on file    Years of education: Not on file    Highest education level: Not on file   Social Needs    Financial resource strain: Not on file    Food insecurity - worry: Not on file    Food insecurity - inability: Not on file    Transportation needs - medical: Not on file    Transportation needs - non-medical: Not on file   Occupational History    Not on file   Tobacco Use    Smoking status: Never Smoker    Smokeless tobacco: Never Used   Substance and Sexual Activity    Alcohol use:  Yes     Alcohol/week: 2.5 oz     Types: 5 Cans of beer per week     Comment: occassionally    Drug use: No    Sexual activity: Yes     Partners: Female     Birth control/protection: None   Other Topics Concern    Not on file   Social History Narrative    Not on file       Family History   Problem Relation Age of Onset    Diabetes Mother     Hypertension Mother     Kidney Disease Mother     High Cholesterol Father     Diabetes Brother         pre diabetic         OBJECTIVE    Physical Exam:     Visit Vitals  /70 (BP 1 Location: Left arm, BP Patient Position: Sitting, BP Cuff Size: Large adult)   Pulse 71   Temp 98.8 °F (37.1 °C) (Temporal)   Resp 16   Ht 5' 10\" (1.778 m)   Wt 198 lb (89.8 kg)   SpO2 98%   BMI 28.41 kg/m²         General: alert, chronically ill-appearing,  in no apparent distress or pain  CVS: normal rate, regular rhythm, distinct S1 and S2  Lungs:clear to ausculation bilaterally, no crackles, wheezing or rhonchi noted  Extremities: no edema, no cyanosis, MSK grossly normal  Skin: warm, no lesions, rashes noted  Psych:  mood and affect normal    Results for orders placed or performed during the hospital encounter of 82/81/97   METABOLIC PANEL, BASIC   Result Value Ref Range    Sodium 136 136 - 145 mmol/L    Potassium 3.6 3.5 - 5.5 mmol/L    Chloride 100 100 - 111 mmol/L    CO2 24 21 - 32 mmol/L    Anion gap 12 3.0 - 18 mmol/L    Glucose 129 (H) 74 - 99 mg/dL    BUN 39 (H) 7.0 - 18 MG/DL    Creatinine 9.42 (H) 0.6 - 1.3 MG/DL    BUN/Creatinine ratio 4 (L) 12 - 20      GFR est AA 7 (L) >60 ml/min/1.73m2    GFR est non-AA 6 (L) >60 ml/min/1.73m2    Calcium 8.4 (L) 8.5 - 10.1 MG/DL   MAGNESIUM   Result Value Ref Range    Magnesium 2.0 1.6 - 2.6 mg/dL   TROPONIN-HIGH SENSITIVITY   Result Value Ref Range    Troponin-High Sensitivity 31 0 - 78 ng/L   CBC WITH AUTOMATED DIFF   Result Value Ref Range    WBC 6.1 4.6 - 13.2 K/uL    RBC 3.81 (L) 4.35 - 5.65 M/uL    HGB 11.4 (L) 13.0 - 16.0 g/dL    HCT 33.7 (L) 36.0 - 48.0 %    MCV 88.5 78.0 - 100.0 FL    MCH 29.9 24.0 - 34.0 PG    MCHC 33.8 31.0 - 37.0 g/dL    RDW 14.8 (H) 11.6 - 14.5 %    PLATELET 089 (L) 287 - 420 K/uL    MPV 10.2 9.2 - 11.8 FL    NRBC 0.0 0  WBC    ABSOLUTE NRBC 0.00 0.00 - 0.01 K/uL    NEUTROPHILS 80 (H) 40 - 73 %    LYMPHOCYTES 10 (L) 21 - 52 %    MONOCYTES 7 3 - 10 %    EOSINOPHILS 2 0 - 5 %    BASOPHILS 1 0 - 2 %    IMMATURE GRANULOCYTES 0 0.0 - 0.5 %    ABS. NEUTROPHILS 4.8 1.8 - 8.0 K/UL    ABS. LYMPHOCYTES 0.6 (L) 0.9 - 3.6 K/UL    ABS. MONOCYTES 0.4 0.05 - 1.2 K/UL    ABS. EOSINOPHILS 0.1 0.0 - 0.4 K/UL    ABS. BASOPHILS 0.0 0.0 - 0.1 K/UL    ABS. IMM.  GRANS. 0.0 0.00 - 0.04 K/UL    DF AUTOMATED     GLUCOSE, POC   Result Value Ref Range    Glucose (POC) 83 70 - 110 mg/dL   GLUCOSE, POC   Result Value Ref Range    Glucose (POC) 75 70 - 110 mg/dL   EKG, 12 LEAD, INITIAL   Result Value Ref Range    Ventricular Rate 104 BPM    QRS Duration 90 ms    Q-T Interval 406 ms    QTC Calculation (Bezet) 533 ms Calculated R Axis -10 degrees    Calculated T Axis 102 degrees    Diagnosis       Atrial fibrillation with rapid ventricular response  Minimal voltage criteria for LVH, may be normal variant  Septal infarct (cited on or before 14-MAY-2022)  Nonspecific ST abnormality  Abnormal ECG  When compared with ECG of 13-JUN-2022 10:50,  No significant change was found  Confirmed by Cash Ricketts MD, Reji Rolon ([id number]) on 8/16/2022 12:35:22 PM           ASSESSMENT/PLAN  Diagnoses and all orders for this visit:    ESRD ([address]  On dialysis following nephrology  On transplant list    Severe nonproliferative diabetic retinopathy of both eyes with macular edema associated with type 2 diabetes mellitus ([address]  Controlled  Back on transplant list   Following endo dr. Carlos Corrigan  Following ophtha    Dyslipidemia  esrd with HD  LDL goal <70  Not at goal, Pt reports inconsistent statin use, cont lipitor 80 mg  Monitoring    Coronary artery disease  S/p LAD stent  Asymptomatic   On asa,  bb, ace  Following cardiology    Essentially hypertension  Controlled  Nephrology following    Legally blind     Atrial fibrillation with rapid ventricular response ([facility]   S/p ablation 2022  rate controlled, anticoag on eliquis     Anemia of chronic disease  Following nephrology      Follow-up Disposition:  Return in about 6 months or if symptoms worsen or fail to improve. Patient understands plan of care. Patient has provided input and agrees with goals.

## 2022-10-25 ENCOUNTER — PATIENT OUTREACH (OUTPATIENT)
Dept: CASE MANAGEMENT | Age: 52
End: 2022-10-25

## 2022-11-03 ENCOUNTER — OFFICE VISIT (OUTPATIENT)
Dept: CARDIOLOGY CLINIC | Age: 52
End: 2022-11-03
Payer: MEDICARE

## 2022-11-03 VITALS
BODY MASS INDEX: 28.49 KG/M2 | WEIGHT: 199 LBS | DIASTOLIC BLOOD PRESSURE: 60 MMHG | SYSTOLIC BLOOD PRESSURE: 98 MMHG | HEIGHT: 70 IN | OXYGEN SATURATION: 97 % | HEART RATE: 63 BPM

## 2022-11-03 DIAGNOSIS — Z98.890 H/O CARDIAC RADIOFREQUENCY ABLATION: ICD-10-CM

## 2022-11-03 DIAGNOSIS — I42.9 CARDIOMYOPATHY, UNSPECIFIED TYPE (HCC): ICD-10-CM

## 2022-11-03 DIAGNOSIS — I50.32 CHRONIC DIASTOLIC HEART FAILURE (HCC): ICD-10-CM

## 2022-11-03 DIAGNOSIS — E11.9 CONTROLLED TYPE 2 DIABETES MELLITUS WITHOUT COMPLICATION, WITHOUT LONG-TERM CURRENT USE OF INSULIN (HCC): ICD-10-CM

## 2022-11-03 DIAGNOSIS — N18.6 END STAGE RENAL DISEASE (HCC): ICD-10-CM

## 2022-11-03 DIAGNOSIS — I25.10 CORONARY ARTERY DISEASE INVOLVING NATIVE CORONARY ARTERY OF NATIVE HEART WITHOUT ANGINA PECTORIS: ICD-10-CM

## 2022-11-03 DIAGNOSIS — I48.0 PAROXYSMAL ATRIAL FIBRILLATION (HCC): Primary | ICD-10-CM

## 2022-11-03 PROCEDURE — 2022F DILAT RTA XM EVC RTNOPTHY: CPT | Performed by: INTERNAL MEDICINE

## 2022-11-03 PROCEDURE — G8427 DOCREV CUR MEDS BY ELIG CLIN: HCPCS | Performed by: INTERNAL MEDICINE

## 2022-11-03 PROCEDURE — 3078F DIAST BP <80 MM HG: CPT | Performed by: INTERNAL MEDICINE

## 2022-11-03 PROCEDURE — 3074F SYST BP LT 130 MM HG: CPT | Performed by: INTERNAL MEDICINE

## 2022-11-03 PROCEDURE — 3044F HG A1C LEVEL LT 7.0%: CPT | Performed by: INTERNAL MEDICINE

## 2022-11-03 PROCEDURE — G8417 CALC BMI ABV UP PARAM F/U: HCPCS | Performed by: INTERNAL MEDICINE

## 2022-11-03 PROCEDURE — 99215 OFFICE O/P EST HI 40 MIN: CPT | Performed by: INTERNAL MEDICINE

## 2022-11-03 PROCEDURE — 93000 ELECTROCARDIOGRAM COMPLETE: CPT | Performed by: INTERNAL MEDICINE

## 2022-11-03 PROCEDURE — G8432 DEP SCR NOT DOC, RNG: HCPCS | Performed by: INTERNAL MEDICINE

## 2022-11-03 PROCEDURE — G9231 DOC ESRD DIA TRANS PREG: HCPCS | Performed by: INTERNAL MEDICINE

## 2022-11-03 NOTE — PROGRESS NOTES
History of Present Illness:  54 YOM here for follow up. He was initially referred by Dr. Javy Echevarria for evaluation for atrial fibrillation ablation with multiple ER visits for increased rates during dialysis. He had been given Amiodarone and was still having breakthrough episodes. He underwent PVI with cryo 06/28/22. There was good isolation. He has done quite well since then without any new chest pain, dyspnea, PND, orthopnea or edema. He is going to dialysis without limitation. He has ongoing workup for transplant listing, waiting for approval from cardiology. Impression:  Paroxysmal symptomatic atrial fibrillation with increasing episodes over past year and a half, interfering with dialysis. Placed on Amiodarone April 2022 with still breakthrough episodes. He underwent PVI with cryo June 28, 2022 and has done well since then. Chronic Eliquis use. ESRD, on hemodialysis for a little over three years, awaiting transplant listing. Non critical CAD, medically treated with heart cath December 2021 with ostial diagonal 95% stenosis, medically treated. Echo December 2021 with normal EF, 55%. Hx of diabetes with recent increase in hemoglobin A1c, but improving. HTN, controlled. Plan: Since his PVI and initiation of Amiodarone in the spring, he has done well. He is able to tolerate dialysis without issues. He is awaiting clearance for transplant listing. From an electrophysiology standpoint, he can be weaned down from the Amiodarone and likely 100 mg daily and will need annual LFTs, thyroid evaluation and PFTs. If he has no recurrence, he can stop the Eliquis. I will defer to his primary cardiologist.  It may be reasonable to get an event monitor for 30 days. As long as his primary cardiologist agrees, it is reasonable to proceed to transplant listing from my standpoint.       If he does have more atrial fibrillation and requires long-term anticoagulation, given his need for future transplant evaluation/surgery it may be reasonable to consider him for watchman and then he could be anticoagulated for 6 months and then proceed to transplant. Again I will defer to his primary cardiologist.    I will be available for any further cardiac electrophysiology needs. Past Medical History:   Diagnosis Date    Abnormal nuclear cardiac imaging test 10/08/2012    Fixed anteroseptal, anteroapical defect c/w prior infarction. No ischemia. Mid & distal anteroseptal, anteroapical WMA. LVE. EF 44%. Anemia     dr. Estil Libman doubt amyloid or multiple myeloma. candidate for procirt if Hg <10    Benign hypertensive     Blindness of right eye 01/2013    legally blind    CAD (coronary artery disease)     Cardiomyopathy ejection fraction of 40%     CKD (chronic kidney disease), stage IV ([address]     to see Dr. Jennifer Wheeler 12/1/12    Congestive heart failure ([address]     Diabetes mellitus ([address]     Diabetic retinopathy ([address]     Diabetic retinopathy ([address]     Dr. Helena Felty- injections    Heart failure [facility])     History of echocardiogram 04/22/2014    LVE. EF 55% (prev 40-45% on echo of 1/27/12). No WMA. Mild-mod conc LVH. Gr 3 DDfx. Marked LAE. Mild SHANTEL. Mild MR. Hypertension     Pulmonary hypertension ([address]     Renal Ultrasound 1/3/12    Right kidney isoechoic w/respect to liver. Sm left-sided pleural effusion    S/P cardiac cath 10/16/2012    LM patent. pLAD 95% (3.5 x 12-mm [place] stent, resid 0$%). LCX mild. Current Outpatient Medications   Medication Sig Dispense Refill    cinacalcet (SENSIPAR) 30 mg tablet Take 30 mg by mouth. TAKE 1 TABLET(30MG) BY MOUTH 3 TIMES WEEKLY: MONDAY, WEDNESDAY, AND FRIDAY IN THE EVENING.      benzoyl peroxide-erythromycin (BENZAMYCIN) 3-5 % topical gel Apply  to affected area four (4) times daily.  Apply to right eye      metoprolol tartrate (LOPRESSOR) 25 mg tablet TAKE 1 TABLET BY MOUTH TWICE DAILY 180 Tablet 3    lisinopriL (PRINIVIL, ZESTRIL) 20 mg tablet Take 20 mg by mouth daily.      atorvastatin (LIPITOR) 80 mg tablet Take 1 Tablet by mouth nightly. 90 Tablet 3    amiodarone (CORDARONE) 200 mg tablet TAKE 1 TABLET BY MOUTH DAILY 30 Tablet 6    linaGLIPtin (Tradjenta) 5 mg tablet Take 1 Tablet by mouth daily. glipiZIDE SR (GLUCOTROL XL) 2.5 mg CR tablet Take 1 Tablet by mouth daily. 90 Tablet 0    amLODIPine (NORVASC) 5 mg tablet Take 1 Tablet by mouth daily. 90 Tablet 0    glucose blood VI test strips (Accu-Chek Beatrice Plus test strp) strip Use as directed 100 Strip 2    Eliquis 2.5 mg tablet TAKE 1 TABLET BY MOUTH EVERY 12 HOURS 180 Tablet 3    aspirin delayed-release 81 mg tablet Take 1 Tablet by mouth daily. 90 Tablet 3    alum-mag hydroxide-simeth (MYLANTA) 200-200-20 mg/5 mL susp Take  by mouth. nitroglycerin (NITROLINGUAL) 400 mcg/spray spray 1 Spray by SubLINGual route every five (5) minutes as needed for Chest Pain. 1 Bottle 2    Blood-Glucose Meter monitoring kit Check fasting glucose 1 Kit 0    sucroferric oxyhydroxide (VELPHORO) 500 mg chew chewable tablet Take 500 mg by mouth three (3) times daily (with meals). Social History   reports that he has never smoked. He has never used smokeless tobacco.   reports that he does not currently use alcohol after a past usage of about 4.2 standard drinks per week. Family History  family history includes Diabetes in his brother and mother; High Cholesterol in his father; Hypertension in his mother; Kidney Disease in his mother. Review of Systems  Except as stated above include:  Constitutional: Negative for fever, chills and malaise/fatigue. HEENT: No congestion or recent URI. Gastrointestinal: No nausea, vomiting, abdominal pain, bloody stools. Pulmonary:  Negative except as stated above. Cardiac:  Negative except as stated above. Musculoskeletal: Negative except as stated above. Neurological:  No localized symptoms. Skin:  Negative except as stated above.   Psych:  Negative except as stated above.  Endocrine:  Negative except as stated above. PHYSICAL EXAM  BP Readings from Last 3 Encounters:   11/03/22 98/60   10/12/22 130/70   08/30/22 138/76     Pulse Readings from Last 3 Encounters:   11/03/22 63   10/12/22 71   08/30/22 76     Wt Readings from Last 3 Encounters:   11/03/22 90.3 kg (199 lb)   10/12/22 89.8 kg (198 lb)   08/30/22 88.9 kg (196 lb)     General:   Well developed, well groomed. Head/Neck:   No obvious jugular venous distention     No obvious carotid pulsations. No evidence of xanthelasma. Lungs:   No respiratory distress. Clear bilaterally. Heart:  Regular rate and rhythm. Normal S1/S2. Palpation grossly normal.    No significant murmurs, rubs or gallops. Abdomen:   Non-acute abdomen. No obvious pulsations. Extremities:   Intact peripheral pulses. No significant edema. Neurological:   Alert and oriented to person, place, time. No focal neurological deficit visually. Skin:   No obvious rash    Blood Pressure Metric:  Monitor recommended and adjustments stated if needed.

## 2022-11-03 NOTE — PROGRESS NOTES
Shital Rodriguez presents today for   Chief Complaint   Patient presents with    Follow-up     Post ablation        Shital Rodriguez preferred language for health care discussion is english/other. Is someone accompanying this pt? no    Is the patient using any DME equipment during [address]? no    Depression Screening:  3 most recent PHQ Screens 6/1/2022   Little interest or pleasure in doing things Not at all   Feeling down, depressed, irritable, or hopeless Not at all   Total Score PHQ 2 0       Learning Assessment:  Learning Assessment 10/12/2022   PRIMARY LEARNER Patient   HIGHEST LEVEL OF EDUCATION - PRIMARY LEARNER  4 YEARS [address] [employer] LEARNER OTHER   CO-LEARNER CAREGIVER No   CO-LEARNER NAME -   Anel Rizvi [address] -    NEED -   LEARNER PREFERENCE PRIMARY DEMONSTRATION     LISTENING     PICTURES     VIDEOS     OTHER (COMMENT)   LEARNER [address] -   ANSWERED BY Abbi Rivas   RELATIONSHIP SELF       Abuse Screening:  Abuse Screening Questionnaire 6/1/2022   Do you ever feel afraid of your partner? N   Are you in a relationship with someone who physically or mentally threatens you? N   Is it safe for you to go home? Y       Fall Risk  Fall Risk Assessment, last 12 mths 4/20/2022   Able to walk? Yes   Fall in past 12 months? 0   Do you feel unsteady? 1   Are you worried about falling 0   Is TUG test greater than 12 seconds? 0   Is the gait abnormal? 0       Pt currently taking Anticoagulant therapy? ASA 81mg and Eliquis     Coordination of Care:  1. Have you been to the ER, urgent care clinic since your last visit? Hospitalized since your last visit? 6/28 for ablation     2. Have you seen or consulted any other health care providers outside of the [address] [place] [address] since your last visit? Include any pap smears or colon screening.  no

## 2022-11-03 NOTE — PATIENT INSTRUCTIONS
Make sure you are taking Metoprolol 25 mg twice a day  Make sure you are not taking Carvedilol (Coreg)

## 2022-11-09 ENCOUNTER — OFFICE VISIT (OUTPATIENT)
Dept: CARDIOLOGY CLINIC | Age: 52
End: 2022-11-09
Payer: MEDICARE

## 2022-11-09 VITALS — WEIGHT: 200 LBS | OXYGEN SATURATION: 98 % | HEIGHT: 70 IN | BODY MASS INDEX: 28.63 KG/M2

## 2022-11-09 DIAGNOSIS — I42.9 CARDIOMYOPATHY, UNSPECIFIED TYPE (HCC): ICD-10-CM

## 2022-11-09 DIAGNOSIS — I50.32 CHRONIC DIASTOLIC HEART FAILURE (HCC): ICD-10-CM

## 2022-11-09 DIAGNOSIS — I25.10 CORONARY ARTERY DISEASE INVOLVING NATIVE CORONARY ARTERY OF NATIVE HEART WITHOUT ANGINA PECTORIS: Primary | ICD-10-CM

## 2022-11-09 DIAGNOSIS — I48.0 PAROXYSMAL ATRIAL FIBRILLATION (HCC): ICD-10-CM

## 2022-11-09 DIAGNOSIS — Z98.890 H/O CARDIAC RADIOFREQUENCY ABLATION: ICD-10-CM

## 2022-11-09 PROCEDURE — G8417 CALC BMI ABV UP PARAM F/U: HCPCS | Performed by: INTERNAL MEDICINE

## 2022-11-09 PROCEDURE — 93000 ELECTROCARDIOGRAM COMPLETE: CPT | Performed by: INTERNAL MEDICINE

## 2022-11-09 PROCEDURE — G8510 SCR DEP NEG, NO PLAN REQD: HCPCS | Performed by: INTERNAL MEDICINE

## 2022-11-09 PROCEDURE — G9231 DOC ESRD DIA TRANS PREG: HCPCS | Performed by: INTERNAL MEDICINE

## 2022-11-09 PROCEDURE — 99214 OFFICE O/P EST MOD 30 MIN: CPT | Performed by: INTERNAL MEDICINE

## 2022-11-09 PROCEDURE — G8427 DOCREV CUR MEDS BY ELIG CLIN: HCPCS | Performed by: INTERNAL MEDICINE

## 2022-11-09 RX ORDER — AMIODARONE HYDROCHLORIDE 200 MG/1
200 TABLET ORAL DAILY
Qty: 30 TABLET | Refills: 6 | Status: SHIPPED | OUTPATIENT
Start: 2022-11-09 | End: 2022-11-21

## 2022-11-09 NOTE — PROGRESS NOTES
Quentin Agee presents today for   Chief Complaint   Patient presents with    Follow-up     6 month follow up          Quentin Agee preferred language for health care discussion is english/other. Is someone accompanying this pt? Yes, dad     Is the patient using any DME equipment during OV? no    Depression Screening:  3 most recent PHQ Screens 11/9/2022   Little interest or pleasure in doing things Not at all   Feeling down, depressed, irritable, or hopeless Not at all   Total Score PHQ 2 0       Learning Assessment:  Learning Assessment 10/12/2022   PRIMARY LEARNER Patient   HIGHEST LEVEL OF EDUCATION - PRIMARY LEARNER  2 YEARS [employer] LEARNER OTHER   CO-LEARNER CAREGIVER No   CO-LEARNER NAME -   Anel Rizvi 950 -    NEED -   LEARNER PREFERENCE PRIMARY DEMONSTRATION     LISTENING     PICTURES     VIDEOS     OTHER (COMMENT)   LEARNER Irasema 11 -   ANSWERED BY Estevan Dukes   RELATIONSHIP SELF       Abuse Screening:  Abuse Screening Questionnaire 11/9/2022   Do you ever feel afraid of your partner? N   Are you in a relationship with someone who physically or mentally threatens you? N   Is it safe for you to go home? Y       Fall Risk  Fall Risk Assessment, last 12 mths 4/20/2022   Able to walk? Yes   Fall in past 12 months? 0   Do you feel unsteady? 1   Are you worried about falling 0   Is TUG test greater than 12 seconds? 0   Is the gait abnormal? 0       Pt currently taking Anticoagulant therapy? Elquis 2.5mg twice a day     Coordination of Care:  1. Have you been to the ER, urgent care clinic since your last visit? Hospitalized since your last visit? no    2. Have you seen or consulted any other health care providers outside of the [address] [place] [address] since your last visit? Include any pap smears or colon screening.  no

## 2022-11-09 NOTE — PROGRESS NOTES
HISTORY OF PRESENT ILLNESS  Ernestina Holbrook is a 54 y.o. male. ASSESSMENT and PLAN    Mr. Janay Montes De Oca has history of diabetes mellitus, diabetic retinopathy legally blind, coronary artery disease without chest pains, ischemic cardiomyopathy. He presented with significant fatigue, increased shortness of breath. He had LAD stent in October of 2012 by Dr. Luellen Phoenix. He has never had chest pains. He has history of mildly diminished LV function with ejection fraction of 40-50%. He is on hemodialysis 3 times per week. His new baseline weight in 2019 is 199 pounds. Kidney transplant evaluation was performed in 2019. He underwent coronary angiography on 8/21/2019. He underwent left and right selective coronary angiography. He had no significant disease in his RCA or circumflex. He had 35% stenosis in his LAD. For completeness, he had IFR performed which was negative at 0.93-0.95. No further coronary intervention was needed. In December 2021, he underwent coronary angiography per the request of renal transplant team, which revealed diffuse mild disease in his left dominant system with the exception of the first diagonal branch with ostial 95% stenosis. Continued medical therapy is recommended. His echocardiogram on 12/17/2021 revealed normal EF 50-55%. In the winter 2021/2022, he was diagnosed with PAF. He had tolerated this reasonably well until around March 2022, when he began to experience rapid PAF whenever he went on dialysis. He has been sent to the emergency room 4 times since February 2022 until May 2022. Whenever he goes on hemodialysis and develops PAF with RVR, he is sent from the dialysis unit to the emergency room automatically. In the summer 2022, he had PAF ablation. CAD:   Clinically stable. BP:   Well controlled at 136/86. PAF:   Post ablation. Remains in sinus rhythm at 69 bpm.  CHF:    There is no evidence of decompensated CHF noted. Weight:    His weight today is 200 pounds. This is at baseline. Cholesterol:   Target LDL <70. Lipitor 40. Anti-platelet:   Remains on ASA, Eliquis. He had his PAF ablation. He remains in sinus rhythm. EP recommended decreasing his amiodarone to 100 mg daily; EP also recommended annual LFT, PFT, and thyroid panel. If he remains out of PAF, his Eliquis can be discontinued per EP. From cardiac standpoint, he should be able to tolerate renal transplantation. I will see him back in 6 months. Thank you. Encounter Diagnoses   Name Primary? Coronary artery disease involving native coronary artery of native heart without angina pectoris Yes    Paroxysmal atrial fibrillation (HCC)     Chronic diastolic heart failure (HCC)     Cardiomyopathy, unspecified type ([facility]     H/O cardiac radiofrequency ablation      the following changes in treatment are made: See above  lab results and schedule of future lab studies reviewed with patient  reviewed diet, exercise and weight control  cardiovascular risk and specific lipid/LDL goals reviewed  use of aspirin to prevent MI and TIA's discussed    Follow-up  Pertinent negatives include no chest pain and no shortness of breath. Today, Mr. Mi Kelly has no complaints of chest pains, increased shortness of breath or decreased exercise capacity. He has not felt any recent palpitation episodes. He denies any chest pains, or increased MORTENSNE. He has been tolerating his hemodialysis without difficulty. Review of Systems   Respiratory:  Negative for shortness of breath. Cardiovascular:  Negative for chest pain, palpitations, orthopnea, claudication, leg swelling and PND. All other systems reviewed and are negative. Physical Exam  Vitals and nursing note reviewed. Constitutional:       Appearance: Normal appearance. HENT:      Head: Normocephalic. Eyes:      Conjunctiva/sclera: Conjunctivae normal.   Cardiovascular:      Rate and Rhythm: Normal rate and regular rhythm.    Pulmonary:      Breath sounds: Normal breath sounds. Abdominal:      Palpations: Abdomen is soft. Musculoskeletal:         General: No swelling. Cervical back: No rigidity. Skin:     General: Skin is warm and dry. Neurological:      General: No focal deficit present. Mental Status: He is alert and oriented to person, place, and time. Psychiatric:         Mood and Affect: Mood normal.         Behavior: Behavior normal.       PCP: Elaine Kohli MD    Past Medical History:   Diagnosis Date    Abnormal nuclear cardiac imaging test 10/08/2012    Fixed anteroseptal, anteroapical defect c/w prior infarction. No ischemia. Mid & distal anteroseptal, anteroapical WMA. LVE. EF 44%. Anemia     dr. Jessica Nguyễn doubt amyloid or multiple myeloma. candidate for procirt if Hg <10    Benign hypertensive     Blindness of right eye 01/2013    legally blind    CAD (coronary artery disease)     Cardiomyopathy ejection fraction of 40%     CKD (chronic kidney disease), stage IV ([address]     to see Dr. Shen Every 12/1/12    Congestive heart failure ([address]     Diabetes mellitus ([address]     Diabetic retinopathy ([address]     Diabetic retinopathy ([address]     Dr. Ballard Face- injections    Heart failure [facility])     History of echocardiogram 04/22/2014    LVE. EF 55% (prev 40-45% on echo of 1/27/12). No WMA. Mild-mod conc LVH. Gr 3 DDfx. Marked LAE. Mild SHANTEL. Mild MR. Hypertension     Pulmonary hypertension ([address]     Renal Ultrasound 1/3/12    Right kidney isoechoic w/respect to liver. Sm left-sided pleural effusion    S/P cardiac cath 10/16/2012    LM patent. pLAD 95% (3.5 x 12-mm [place] stent, resid 0$%). LCX mild. Past Surgical History:   Procedure Laterality Date    HX CORONARY STENT PLACEMENT      one    HX LAPAROTOMY  2/2012    bowel obstruction with removal segment of small bowel. Done by Liv. HX LAPAROTOMY  infancy    whole in colon and had repair at that time.     HX OTHER SURGICAL  06/20/2013    left eye retna attatchment    HX RETINAL DETACHMENT REPAIR      august 2012    DE REPAIR ING HERNIA,5+Y/O,REDUCIBL Left 05/02/2019    Dr. Ana Laura Pro       Current Outpatient Medications   Medication Sig Dispense Refill    cinacalcet (SENSIPAR) 30 mg tablet Take 30 mg by mouth. TAKE 1 TABLET(30MG) BY MOUTH 3 TIMES WEEKLY: MONDAY, WEDNESDAY, AND FRIDAY IN THE EVENING.      benzoyl peroxide-erythromycin (BENZAMYCIN) 3-5 % topical gel Apply  to affected area four (4) times daily. Apply to right eye      metoprolol tartrate (LOPRESSOR) 25 mg tablet TAKE 1 TABLET BY MOUTH TWICE DAILY 180 Tablet 3    lisinopriL (PRINIVIL, ZESTRIL) 20 mg tablet Take 20 mg by mouth daily. atorvastatin (LIPITOR) 80 mg tablet Take 1 Tablet by mouth nightly. 90 Tablet 3    amiodarone (CORDARONE) 200 mg tablet TAKE 1 TABLET BY MOUTH DAILY 30 Tablet 6    linaGLIPtin (Tradjenta) 5 mg tablet Take 1 Tablet by mouth daily. glipiZIDE SR (GLUCOTROL XL) 2.5 mg CR tablet Take 1 Tablet by mouth daily. 90 Tablet 0    amLODIPine (NORVASC) 5 mg tablet Take 1 Tablet by mouth daily. 90 Tablet 0    glucose blood VI test strips (Accu-Chek Beatrice Plus test strp) strip Use as directed 100 Strip 2    Eliquis 2.5 mg tablet TAKE 1 TABLET BY MOUTH EVERY 12 HOURS 180 Tablet 3    aspirin delayed-release 81 mg tablet Take 1 Tablet by mouth daily. 90 Tablet 3    alum-mag hydroxide-simeth (MYLANTA) 200-200-20 mg/5 mL susp Take  by mouth. nitroglycerin (NITROLINGUAL) 400 mcg/spray spray 1 Spray by SubLINGual route every five (5) minutes as needed for Chest Pain. 1 Bottle 2    Blood-Glucose Meter monitoring kit Check fasting glucose 1 Kit 0    sucroferric oxyhydroxide (VELPHORO) 500 mg chew chewable tablet Take 500 mg by mouth three (3) times daily (with meals).          The patient has a family history of    Social History     Tobacco Use    Smoking status: Never    Smokeless tobacco: Never   Substance Use Topics    Alcohol use: Not Currently     Alcohol/week: 4.2 standard drinks     Types: 5 Cans of beer per week    Drug use: No       Lab Results   Component Value Date/Time    Cholesterol, total 173 05/16/2022 08:04 AM    HDL Cholesterol 52 05/16/2022 08:04 AM    LDL, calculated 109.2 (H) 05/16/2022 08:04 AM    Triglyceride 59 05/16/2022 08:04 AM    CHOL/HDL Ratio 3.3 05/16/2022 08:04 AM        BP Readings from Last 3 Encounters:   11/03/22 98/60   10/12/22 130/70   08/30/22 138/76        Pulse Readings from Last 3 Encounters:   11/03/22 63   10/12/22 71   08/30/22 76       Wt Readings from Last 3 Encounters:   11/09/22 90.7 kg (200 lb)   11/03/22 90.3 kg (199 lb)   10/12/22 89.8 kg (198 lb)         EKG: normal sinus rhythm, Q waves in leads V1 and V2.

## 2022-11-09 NOTE — LETTER
11/9/2022 10:50 AM        Mr. Laura Bush  [address] [address]      Mr. Paulino Miller was seen in our office on November 9, 2022 for cardiac clearance. From a cardiac standpoint, he is low to intermediate risk for renal transplant surgery. Please feel free to contact our office if you have any questions regarding this patient.               Sincerely,              Chi Gamez MD

## 2022-11-21 RX ORDER — AMIODARONE HYDROCHLORIDE 200 MG/1
200 TABLET ORAL DAILY
Qty: 30 TABLET | Refills: 6 | Status: SHIPPED | OUTPATIENT
Start: 2022-11-21 | End: 2022-11-22

## 2022-11-22 RX ORDER — AMIODARONE HYDROCHLORIDE 200 MG/1
200 TABLET ORAL DAILY
Qty: 30 TABLET | Refills: 6 | Status: ON HOLD | OUTPATIENT
Start: 2022-11-22

## 2022-11-25 ENCOUNTER — PATIENT OUTREACH (OUTPATIENT)
Dept: CASE MANAGEMENT | Age: 52
End: 2022-11-25

## 2022-11-25 NOTE — PROGRESS NOTES
Patient has graduated from the Complex Case Management  program on 11/25/22. Patient/family has the ability to self-manage at this time. Care management goals have been completed. No further Ambulatory Care Manager follow up scheduled. Goals Addressed                   This Visit's Progress     COMPLETED: Attends follow up appointments on schedule        8/19/22 Patient will attend all scheduled appointments through 11/19/22       COMPLETED: Knowledge and adherence of prescribed medication (ie. action, side effects, missed dose, etc.).        8/19/22 Pt will take all medications prescribed to be evaluated on each outreach through 11/19/22              Patient has Ambulatory Care Manager's contact information for any further questions, concerns, or needs.   Patients upcoming visits:    Future Appointments   Date Time Provider Jessica V[place]   4/12/2023 10:00 AM Paola Becerril MD [facility] BS AMB   5/10/2023 10:20 AM Jorje Sauceda MD [facility] BS AMB

## 2022-11-29 ENCOUNTER — APPOINTMENT (OUTPATIENT)
Dept: GENERAL RADIOLOGY | Age: 52
DRG: 246 | End: 2022-11-29
Attending: PHYSICIAN ASSISTANT
Payer: MEDICARE

## 2022-11-29 ENCOUNTER — HOSPITAL ENCOUNTER (INPATIENT)
Age: 52
LOS: 3 days | Discharge: HOME HEALTH | DRG: 246 | End: 2022-12-02
Attending: STUDENT IN AN ORGANIZED HEALTH CARE EDUCATION/TRAINING PROGRAM | Admitting: INTERNAL MEDICINE
Payer: MEDICARE

## 2022-11-29 DIAGNOSIS — Z99.2 ESRD ON DIALYSIS (HCC): ICD-10-CM

## 2022-11-29 DIAGNOSIS — N18.6 ESRD ON DIALYSIS (HCC): ICD-10-CM

## 2022-11-29 DIAGNOSIS — I21.4 NSTEMI (NON-ST ELEVATED MYOCARDIAL INFARCTION) (HCC): Primary | ICD-10-CM

## 2022-11-29 DIAGNOSIS — I25.10 CORONARY ARTERY DISEASE INVOLVING NATIVE HEART, UNSPECIFIED VESSEL OR LESION TYPE, UNSPECIFIED WHETHER ANGINA PRESENT: ICD-10-CM

## 2022-11-29 DIAGNOSIS — I48.91 ATRIAL FIBRILLATION, UNSPECIFIED TYPE (HCC): ICD-10-CM

## 2022-11-29 LAB
ALBUMIN SERPL-MCNC: 2.9 G/DL (ref 3.4–5)
ALBUMIN SERPL-MCNC: 3.1 G/DL (ref 3.4–5)
ALBUMIN/GLOB SERPL: 0.7 {RATIO} (ref 0.8–1.7)
ALBUMIN/GLOB SERPL: 0.9 {RATIO} (ref 0.8–1.7)
ALP SERPL-CCNC: 69 U/L (ref 45–117)
ALP SERPL-CCNC: 75 U/L (ref 45–117)
ALT SERPL-CCNC: 16 U/L (ref 16–61)
ALT SERPL-CCNC: 17 U/L (ref 16–61)
ANION GAP BLD CALC-SCNC: 6 MMOL/L (ref 10–20)
ANION GAP SERPL CALC-SCNC: 12 MMOL/L (ref 3–18)
ANION GAP SERPL CALC-SCNC: 14 MMOL/L (ref 3–18)
APTT PPP: 127.6 SEC (ref 23–36.4)
AST SERPL-CCNC: 13 U/L (ref 10–38)
AST SERPL-CCNC: 18 U/L (ref 10–38)
BASE EXCESS BLD CALC-SCNC: 3.7 MMOL/L
BASOPHILS # BLD: 0 K/UL (ref 0–0.1)
BASOPHILS # BLD: 0 K/UL (ref 0–0.1)
BASOPHILS NFR BLD: 0 % (ref 0–2)
BASOPHILS NFR BLD: 0 % (ref 0–2)
BILIRUB SERPL-MCNC: 0.4 MG/DL (ref 0.2–1)
BILIRUB SERPL-MCNC: 0.5 MG/DL (ref 0.2–1)
BUN SERPL-MCNC: 52 MG/DL (ref 7–18)
BUN SERPL-MCNC: 56 MG/DL (ref 7–18)
BUN/CREAT SERPL: 5 (ref 12–20)
BUN/CREAT SERPL: 5 (ref 12–20)
CA-I BLD-MCNC: 1.05 MMOL/L (ref 1.12–1.32)
CALCIUM SERPL-MCNC: 9 MG/DL (ref 8.5–10.1)
CALCIUM SERPL-MCNC: 9.1 MG/DL (ref 8.5–10.1)
CALCULATED R AXIS, ECG10: -17 DEGREES
CALCULATED T AXIS, ECG11: 84 DEGREES
CHLORIDE BLD-SCNC: 103 MMOL/L (ref 98–107)
CHLORIDE SERPL-SCNC: 102 MMOL/L (ref 100–111)
CHLORIDE SERPL-SCNC: 103 MMOL/L (ref 100–111)
CO2 BLD-SCNC: 28 MMOL/L (ref 19–24)
CO2 SERPL-SCNC: 23 MMOL/L (ref 21–32)
CO2 SERPL-SCNC: 25 MMOL/L (ref 21–32)
CREAT BLD-MCNC: 7.36 MG/DL (ref 0.6–1.3)
CREAT SERPL-MCNC: 11.3 MG/DL (ref 0.6–1.3)
CREAT SERPL-MCNC: 12.3 MG/DL (ref 0.6–1.3)
DIAGNOSIS, 93000: NORMAL
DIFFERENTIAL METHOD BLD: ABNORMAL
DIFFERENTIAL METHOD BLD: ABNORMAL
EOSINOPHIL # BLD: 0.1 K/UL (ref 0–0.4)
EOSINOPHIL # BLD: 0.1 K/UL (ref 0–0.4)
EOSINOPHIL NFR BLD: 1 % (ref 0–5)
EOSINOPHIL NFR BLD: 2 % (ref 0–5)
ERYTHROCYTE [DISTWIDTH] IN BLOOD BY AUTOMATED COUNT: 15.5 % (ref 11.6–14.5)
ERYTHROCYTE [DISTWIDTH] IN BLOOD BY AUTOMATED COUNT: 15.7 % (ref 11.6–14.5)
EST. AVERAGE GLUCOSE BLD GHB EST-MCNC: 117 MG/DL
FERRITIN SERPL-MCNC: 791 NG/ML (ref 8–388)
FOLATE SERPL-MCNC: 4.7 NG/ML (ref 3.1–17.5)
GLOBULIN SER CALC-MCNC: 3.6 G/DL (ref 2–4)
GLOBULIN SER CALC-MCNC: 3.9 G/DL (ref 2–4)
GLUCOSE BLD STRIP.AUTO-MCNC: 118 MG/DL (ref 70–110)
GLUCOSE BLD STRIP.AUTO-MCNC: 70 MG/DL (ref 70–110)
GLUCOSE BLD STRIP.AUTO-MCNC: 81 MG/DL (ref 70–110)
GLUCOSE BLD-MCNC: 82 MG/DL (ref 65–100)
GLUCOSE SERPL-MCNC: 74 MG/DL (ref 74–99)
GLUCOSE SERPL-MCNC: 78 MG/DL (ref 74–99)
HBA1C MFR BLD: 5.7 % (ref 4.2–5.6)
HCO3 BLD-SCNC: 28.6 MMOL/L (ref 22–26)
HCT VFR BLD AUTO: 29.5 % (ref 36–48)
HCT VFR BLD AUTO: 31.8 % (ref 36–48)
HGB BLD-MCNC: 10.5 G/DL (ref 13–16)
HGB BLD-MCNC: 9.9 G/DL (ref 13–16)
IMM GRANULOCYTES # BLD AUTO: 0 K/UL (ref 0–0.04)
IMM GRANULOCYTES # BLD AUTO: 0 K/UL (ref 0–0.04)
IMM GRANULOCYTES NFR BLD AUTO: 0 % (ref 0–0.5)
IMM GRANULOCYTES NFR BLD AUTO: 1 % (ref 0–0.5)
IRON SATN MFR SERPL: 34 % (ref 20–50)
IRON SERPL-MCNC: 66 UG/DL (ref 50–175)
LACTATE BLD-SCNC: 1.45 MMOL/L (ref 0.4–2)
LYMPHOCYTES # BLD: 0.6 K/UL (ref 0.9–3.6)
LYMPHOCYTES # BLD: 0.8 K/UL (ref 0.9–3.6)
LYMPHOCYTES NFR BLD: 11 % (ref 21–52)
LYMPHOCYTES NFR BLD: 8 % (ref 21–52)
MAGNESIUM SERPL-MCNC: 2.2 MG/DL (ref 1.6–2.6)
MCH RBC QN AUTO: 30.7 PG (ref 24–34)
MCH RBC QN AUTO: 30.8 PG (ref 24–34)
MCHC RBC AUTO-ENTMCNC: 33 G/DL (ref 31–37)
MCHC RBC AUTO-ENTMCNC: 33.6 G/DL (ref 31–37)
MCV RBC AUTO: 91.9 FL (ref 78–100)
MCV RBC AUTO: 93 FL (ref 78–100)
MONOCYTES # BLD: 0.6 K/UL (ref 0.05–1.2)
MONOCYTES # BLD: 0.7 K/UL (ref 0.05–1.2)
MONOCYTES NFR BLD: 8 % (ref 3–10)
MONOCYTES NFR BLD: 9 % (ref 3–10)
NEUTS SEG # BLD: 5.6 K/UL (ref 1.8–8)
NEUTS SEG # BLD: 6.2 K/UL (ref 1.8–8)
NEUTS SEG NFR BLD: 79 % (ref 40–73)
NEUTS SEG NFR BLD: 80 % (ref 40–73)
NRBC # BLD: 0 K/UL (ref 0–0.01)
NRBC # BLD: 0 K/UL (ref 0–0.01)
NRBC BLD-RTO: 0 PER 100 WBC
NRBC BLD-RTO: 0 PER 100 WBC
PCO2 BLDV: 43.8 MMHG (ref 41–51)
PH BLDV: 7.42 [PH] (ref 7.32–7.42)
PLATELET # BLD AUTO: 149 K/UL (ref 135–420)
PLATELET # BLD AUTO: 168 K/UL (ref 135–420)
PMV BLD AUTO: 10.2 FL (ref 9.2–11.8)
PMV BLD AUTO: 9.4 FL (ref 9.2–11.8)
PO2 BLDV: 23 MMHG (ref 25–40)
POTASSIUM BLD-SCNC: 6.6 MMOL/L (ref 3.5–5.1)
POTASSIUM SERPL-SCNC: 4.3 MMOL/L (ref 3.5–5.5)
POTASSIUM SERPL-SCNC: 4.7 MMOL/L (ref 3.5–5.5)
PROT SERPL-MCNC: 6.7 G/DL (ref 6.4–8.2)
PROT SERPL-MCNC: 6.8 G/DL (ref 6.4–8.2)
Q-T INTERVAL, ECG07: 380 MS
QRS DURATION, ECG06: 94 MS
QTC CALCULATION (BEZET), ECG08: 507 MS
RBC # BLD AUTO: 3.21 M/UL (ref 4.35–5.65)
RBC # BLD AUTO: 3.42 M/UL (ref 4.35–5.65)
SAO2 % BLD: 42 %
SERVICE CMNT-IMP: ABNORMAL
SERVICE CMNT-IMP: ABNORMAL
SODIUM BLD-SCNC: 136 MMOL/L (ref 136–145)
SODIUM SERPL-SCNC: 139 MMOL/L (ref 136–145)
SODIUM SERPL-SCNC: 140 MMOL/L (ref 136–145)
SPECIMEN SITE: ABNORMAL
TIBC SERPL-MCNC: 197 UG/DL (ref 250–450)
TROPONIN-HIGH SENSITIVITY: 1146 NG/L (ref 0–78)
TROPONIN-HIGH SENSITIVITY: 1301 NG/L (ref 0–78)
TROPONIN-HIGH SENSITIVITY: 323 NG/L (ref 0–78)
TROPONIN-HIGH SENSITIVITY: 59 NG/L (ref 0–78)
TSH SERPL DL<=0.05 MIU/L-ACNC: 0.95 UIU/ML (ref 0.36–3.74)
VENTRICULAR RATE, ECG03: 107 BPM
VIT B12 SERPL-MCNC: 529 PG/ML (ref 211–911)
WBC # BLD AUTO: 7.1 K/UL (ref 4.6–13.2)
WBC # BLD AUTO: 7.7 K/UL (ref 4.6–13.2)

## 2022-11-29 PROCEDURE — 74011250636 HC RX REV CODE- 250/636: Performed by: EMERGENCY MEDICINE

## 2022-11-29 PROCEDURE — 96376 TX/PRO/DX INJ SAME DRUG ADON: CPT

## 2022-11-29 PROCEDURE — 82728 ASSAY OF FERRITIN: CPT

## 2022-11-29 PROCEDURE — 82947 ASSAY GLUCOSE BLOOD QUANT: CPT

## 2022-11-29 PROCEDURE — G0378 HOSPITAL OBSERVATION PER HR: HCPCS

## 2022-11-29 PROCEDURE — 74011250637 HC RX REV CODE- 250/637: Performed by: HOSPITALIST

## 2022-11-29 PROCEDURE — 85730 THROMBOPLASTIN TIME PARTIAL: CPT

## 2022-11-29 PROCEDURE — 85025 COMPLETE CBC W/AUTO DIFF WBC: CPT

## 2022-11-29 PROCEDURE — 83540 ASSAY OF IRON: CPT

## 2022-11-29 PROCEDURE — 65270000046 HC RM TELEMETRY

## 2022-11-29 PROCEDURE — 83036 HEMOGLOBIN GLYCOSYLATED A1C: CPT

## 2022-11-29 PROCEDURE — 36415 COLL VENOUS BLD VENIPUNCTURE: CPT

## 2022-11-29 PROCEDURE — 82962 GLUCOSE BLOOD TEST: CPT

## 2022-11-29 PROCEDURE — 99223 1ST HOSP IP/OBS HIGH 75: CPT | Performed by: INTERNAL MEDICINE

## 2022-11-29 PROCEDURE — 99285 EMERGENCY DEPT VISIT HI MDM: CPT

## 2022-11-29 PROCEDURE — 93005 ELECTROCARDIOGRAM TRACING: CPT

## 2022-11-29 PROCEDURE — 84443 ASSAY THYROID STIM HORMONE: CPT

## 2022-11-29 PROCEDURE — 96365 THER/PROPH/DIAG IV INF INIT: CPT

## 2022-11-29 PROCEDURE — 74011250637 HC RX REV CODE- 250/637: Performed by: EMERGENCY MEDICINE

## 2022-11-29 PROCEDURE — 74011000250 HC RX REV CODE- 250: Performed by: INTERNAL MEDICINE

## 2022-11-29 PROCEDURE — 83735 ASSAY OF MAGNESIUM: CPT

## 2022-11-29 PROCEDURE — 71045 X-RAY EXAM CHEST 1 VIEW: CPT

## 2022-11-29 PROCEDURE — 83880 ASSAY OF NATRIURETIC PEPTIDE: CPT

## 2022-11-29 PROCEDURE — 74011250637 HC RX REV CODE- 250/637: Performed by: INTERNAL MEDICINE

## 2022-11-29 PROCEDURE — 80053 COMPREHEN METABOLIC PANEL: CPT

## 2022-11-29 PROCEDURE — 82607 VITAMIN B-12: CPT

## 2022-11-29 PROCEDURE — 84484 ASSAY OF TROPONIN QUANT: CPT

## 2022-11-29 RX ORDER — ASPIRIN 81 MG/1
81 TABLET ORAL DAILY
Status: DISCONTINUED | OUTPATIENT
Start: 2022-11-30 | End: 2022-12-01 | Stop reason: SDUPTHER

## 2022-11-29 RX ORDER — SODIUM CHLORIDE 0.9 % (FLUSH) 0.9 %
5-40 SYRINGE (ML) INJECTION EVERY 8 HOURS
Status: DISCONTINUED | OUTPATIENT
Start: 2022-11-29 | End: 2022-12-02 | Stop reason: HOSPADM

## 2022-11-29 RX ORDER — MAGNESIUM SULFATE 100 %
4 CRYSTALS MISCELLANEOUS AS NEEDED
Status: DISCONTINUED | OUTPATIENT
Start: 2022-11-29 | End: 2022-12-02 | Stop reason: HOSPADM

## 2022-11-29 RX ORDER — PROMETHAZINE HYDROCHLORIDE 12.5 MG/1
12.5 TABLET ORAL
Status: DISCONTINUED | OUTPATIENT
Start: 2022-11-29 | End: 2022-12-01

## 2022-11-29 RX ORDER — POLYETHYLENE GLYCOL 3350 17 G/17G
17 POWDER, FOR SOLUTION ORAL DAILY PRN
Status: DISCONTINUED | OUTPATIENT
Start: 2022-11-29 | End: 2022-12-02 | Stop reason: HOSPADM

## 2022-11-29 RX ORDER — GABAPENTIN 100 MG/1
100 CAPSULE ORAL
Status: COMPLETED | OUTPATIENT
Start: 2022-11-29 | End: 2022-11-29

## 2022-11-29 RX ORDER — ONDANSETRON 2 MG/ML
4 INJECTION INTRAMUSCULAR; INTRAVENOUS
Status: DISCONTINUED | OUTPATIENT
Start: 2022-11-29 | End: 2022-12-02 | Stop reason: HOSPADM

## 2022-11-29 RX ORDER — AMIODARONE HYDROCHLORIDE 200 MG/1
200 TABLET ORAL DAILY
Status: DISCONTINUED | OUTPATIENT
Start: 2022-11-30 | End: 2022-12-02 | Stop reason: HOSPADM

## 2022-11-29 RX ORDER — HEPARIN SODIUM 10000 [USP'U]/100ML
10-25 INJECTION, SOLUTION INTRAVENOUS
Status: DISCONTINUED | OUTPATIENT
Start: 2022-11-29 | End: 2022-12-01

## 2022-11-29 RX ORDER — AMLODIPINE BESYLATE 5 MG/1
5 TABLET ORAL DAILY
Status: DISCONTINUED | OUTPATIENT
Start: 2022-11-29 | End: 2022-12-02 | Stop reason: HOSPADM

## 2022-11-29 RX ORDER — ACETAMINOPHEN 325 MG/1
650 TABLET ORAL
Status: DISCONTINUED | OUTPATIENT
Start: 2022-11-29 | End: 2022-12-02 | Stop reason: HOSPADM

## 2022-11-29 RX ORDER — HYDRALAZINE HYDROCHLORIDE 25 MG/1
25 TABLET, FILM COATED ORAL
Status: DISCONTINUED | OUTPATIENT
Start: 2022-11-29 | End: 2022-12-02 | Stop reason: HOSPADM

## 2022-11-29 RX ORDER — INSULIN LISPRO 100 [IU]/ML
INJECTION, SOLUTION INTRAVENOUS; SUBCUTANEOUS
Status: DISCONTINUED | OUTPATIENT
Start: 2022-11-29 | End: 2022-12-02 | Stop reason: HOSPADM

## 2022-11-29 RX ORDER — SODIUM CHLORIDE 0.9 % (FLUSH) 0.9 %
5-40 SYRINGE (ML) INJECTION AS NEEDED
Status: DISCONTINUED | OUTPATIENT
Start: 2022-11-29 | End: 2022-12-02 | Stop reason: HOSPADM

## 2022-11-29 RX ORDER — METOPROLOL TARTRATE 25 MG/1
25 TABLET, FILM COATED ORAL 2 TIMES DAILY
Status: DISCONTINUED | OUTPATIENT
Start: 2022-11-29 | End: 2022-12-02 | Stop reason: HOSPADM

## 2022-11-29 RX ORDER — GUAIFENESIN 100 MG/5ML
324 LIQUID (ML) ORAL
Status: COMPLETED | OUTPATIENT
Start: 2022-11-29 | End: 2022-11-29

## 2022-11-29 RX ORDER — AMIODARONE HYDROCHLORIDE 200 MG/1
200 TABLET ORAL DAILY
Status: DISCONTINUED | OUTPATIENT
Start: 2022-11-30 | End: 2022-11-29

## 2022-11-29 RX ORDER — HEPARIN SODIUM 1000 [USP'U]/ML
4000 INJECTION, SOLUTION INTRAVENOUS; SUBCUTANEOUS ONCE
Status: COMPLETED | OUTPATIENT
Start: 2022-11-29 | End: 2022-11-29

## 2022-11-29 RX ORDER — ATORVASTATIN CALCIUM 40 MG/1
80 TABLET, FILM COATED ORAL
Status: DISCONTINUED | OUTPATIENT
Start: 2022-11-29 | End: 2022-12-02 | Stop reason: HOSPADM

## 2022-11-29 RX ADMIN — HEPARIN SODIUM 10 UNITS/KG/HR: 10000 INJECTION, SOLUTION INTRAVENOUS at 17:18

## 2022-11-29 RX ADMIN — AMLODIPINE BESYLATE 5 MG: 5 TABLET ORAL at 23:37

## 2022-11-29 RX ADMIN — SODIUM CHLORIDE, PRESERVATIVE FREE 10 ML: 5 INJECTION INTRAVENOUS at 17:59

## 2022-11-29 RX ADMIN — HEPARIN SODIUM 4000 UNITS: 1000 INJECTION INTRAVENOUS; SUBCUTANEOUS at 16:48

## 2022-11-29 RX ADMIN — METOPROLOL TARTRATE 25 MG: 25 TABLET, FILM COATED ORAL at 18:21

## 2022-11-29 RX ADMIN — SODIUM CHLORIDE, PRESERVATIVE FREE 10 ML: 5 INJECTION INTRAVENOUS at 22:16

## 2022-11-29 RX ADMIN — SODIUM CHLORIDE 250 ML: 9 INJECTION, SOLUTION INTRAVENOUS at 13:17

## 2022-11-29 RX ADMIN — SODIUM ZIRCONIUM CYCLOSILICATE 10 G: 10 POWDER, FOR SUSPENSION ORAL at 22:11

## 2022-11-29 RX ADMIN — GABAPENTIN 100 MG: 100 CAPSULE ORAL at 23:37

## 2022-11-29 RX ADMIN — ASPIRIN 81 MG CHEWABLE TABLET 324 MG: 81 TABLET CHEWABLE at 16:47

## 2022-11-29 RX ADMIN — ATORVASTATIN CALCIUM 80 MG: 40 TABLET, FILM COATED ORAL at 22:11

## 2022-11-29 NOTE — H&P
History & Physical    Patient: Laura Bush MRN: [id number]  CSN: [id number]    YOB: 1970  Age: 54 y.o. Sex: male      DOA: 11/29/2022  CC: \"not feeling well\"    PCP: Louise Barnes MD       HPI:     Laura Bush is a 54 y.o. male with medical co-morbidities including HTN, CAD with stent, ischemic cardiomyopathy, paroxysmal AFIB with post ablation, hyperlipidemia, ESRD on HD, presented to the ER due to hypotension during his dialysis. He reported that he missed his regularly scheduled HD and it was planned for him to have more fluid off-loaded today. He reported that he took more oral fluid during the Thanksgiving holiday and he felt bloated. About 2 hours in his dialysis, he reported feeling of epigastric tightness and \"sweating\" in his epigastric area. He denied chest pain but he reported chest palpitation that radiated up to his left neck. He denied shortness of breath. His SBP was reported to be in the lower 90's. He expressed he felt dizzy when his heart rate is fast. No recent sick contact. In the ER, he was found with AFIB rate controlled. His blood pressure was elevated. Initial HS-troponin 59, then uptrend 323. He as started on Heparin gtt due to his elevated cardiac risk. AVG was without pathology. CXR with vascular congestion. Review of Systems  GENERAL: No fever, No chill, + malaise   HEENT: No change in vision,  no sore throat or sinus congestion. NECK: No pain or stiffness. PULMONARY: No shortness of breath, no cough or wheeze. Cardiovascular: no pnd / orthopnea, no Chest Pain  GASTROINTESTINAL: No abd pain, No nausea/vomiting, No diarrhea, No melena or bright red blood per rectum. GENITOURINARY: No urinary frequency  MUSCULOSKELETAL: No joint or muscle pain, no back pain, no recent trauma. DERMATOLOGIC: No rash, no itching, no lesions. ENDOCRINE: No polyuria, polydipsia. No recent change in weight.    HEMATOLOGICAL: No easy bruising or bleeding. NEUROLOGIC: No headache, No seizures, No generalized weakness         Past Medical History:   Diagnosis Date    Abnormal nuclear cardiac imaging test 10/08/2012    Fixed anteroseptal, anteroapical defect c/w prior infarction. No ischemia. Mid & distal anteroseptal, anteroapical WMA. LVE. EF 44%. Anemia     dr. Israel Solano doubt amyloid or multiple myeloma. candidate for procirt if Hg <10    Benign hypertensive     Blindness of right eye 01/2013    legally blind    CAD (coronary artery disease)     Cardiomyopathy ejection fraction of 40%     CKD (chronic kidney disease), stage IV ([address]     to see Dr. Chiki Roberts 12/1/12    Congestive heart failure ([address]     Diabetes mellitus ([address]     Diabetic retinopathy ([address]     Diabetic retinopathy ([address]     Dr. Annabelle Rodrigues- injections    Heart failure [facility])     History of echocardiogram 04/22/2014    LVE. EF 55% (prev 40-45% on echo of 1/27/12). No WMA. Mild-mod conc LVH. Gr 3 DDfx. Marked LAE. Mild SHANTEL. Mild MR. Hypertension     Pulmonary hypertension ([address]     Renal Ultrasound 1/3/12    Right kidney isoechoic w/respect to liver. Sm left-sided pleural effusion    S/P cardiac cath 10/16/2012    LM patent. pLAD 95% (3.5 x 12-mm [place] stent, resid 0$%). LCX mild. Past Surgical History:   Procedure Laterality Date    HX CORONARY STENT PLACEMENT      one    HX LAPAROTOMY  2/2012    bowel obstruction with removal segment of small bowel. Done by Riblet. HX LAPAROTOMY  infancy    whole in colon and had repair at that time.     HX OTHER SURGICAL  06/20/2013    left eye retna attatchment    HX RETINAL DETACHMENT REPAIR      august 2012    MO REPAIR ING HERNIA,5+Y/O,REDUCIBL Left 05/02/2019    Dr. Melissa Rodriguez       Family History   Problem Relation Age of Onset    Diabetes Mother     Hypertension Mother     Kidney Disease Mother     High Cholesterol Father     Diabetes Brother         pre diabetic       Social History     Socioeconomic History    Marital status:    Tobacco Use    Smoking status: Never    Smokeless tobacco: Never   Substance and Sexual Activity    Alcohol use: Not Currently     Alcohol/week: 4.2 standard drinks     Types: 5 Cans of beer per week    Drug use: No    Sexual activity: Yes     Partners: Female     Birth control/protection: None       Prior to Admission medications    Medication Sig Start Date End Date Taking? Authorizing Provider   amiodarone (CORDARONE) 200 mg tablet TAKE 1 TABLET BY MOUTH DAILY 11/22/22   Emilee Holcomb NP   cinacalcet (SENSIPAR) 30 mg tablet Take 30 mg by mouth. TAKE 1 TABLET(30MG) BY MOUTH 3 TIMES WEEKLY: MONDAY, WEDNESDAY, AND FRIDAY IN THE EVENING. 6/27/22   Vera Grimm MD   benzoyl peroxide-erythromycin [facility]) 3-5 % topical gel Apply  to affected area four (4) times daily. Apply to right eye    Provider, Historical   metoprolol tartrate (LOPRESSOR) 25 mg tablet TAKE 1 TABLET BY MOUTH TWICE DAILY 7/24/22   Francisco Sarkar MD   lisinopriL (PRINIVIL, ZESTRIL) 20 mg tablet Take 20 mg by mouth daily. 5/8/22   Provider, Historical   atorvastatin (LIPITOR) 80 mg tablet Take 1 Tablet by mouth nightly. 6/1/22   Tarsha Jensen MD   linaGLIPtin (Tradjenta) 5 mg tablet Take 1 Tablet by mouth daily. 4/1/22   Provider, Historical   glipiZIDE SR (GLUCOTROL XL) 2.5 mg CR tablet Take 1 Tablet by mouth daily. 3/7/22   Tarsha Jensen MD   amLODIPine (NORVASC) 5 mg tablet Take 1 Tablet by mouth daily. 3/7/22   Tarsha Jensen MD   glucose blood VI test strips (Accu-Chek Beatrice Plus test strp) strip Use as directed 3/7/22   Tarsha Jensen MD   Eliquis 2.5 mg tablet TAKE 1 TABLET BY MOUTH EVERY 12 HOURS 3/4/22   Emilee Holcomb NP   aspirin delayed-release 81 mg tablet Take 1 Tablet by mouth daily.  2/16/22   José Miguel Olivo MD   alum-mag hydroxide-[facility]) 200-200-20 mg/5 mL susp Take  by mouth. 1/25/21   Provider, Historical   nitroglycerin (NITROLINGUAL) 400 mcg/spray spray 1 Spray by SubLINGual route every five (5) minutes as needed for Chest Pain. 6/22/21   Pauly Lucas PA   Blood-Glucose Meter monitoring kit Check fasting glucose 6/18/19   Joanne Luevano MD   sucroferric oxyhydroxide EDDY MUSCATINE) 500 mg chew chewable tablet Take 500 mg by mouth three (3) times daily (with meals). Provider, Historical       No Known Allergies           Physical Exam:      Visit Vitals  BP (!) 164/89 (BP 1 Location: Left upper arm, BP Patient Position: At rest;Sitting)   Pulse 74   Temp 97.9 °F (36.6 °C)   Resp 14   Ht 5' 11\" (1.803 m)   Wt 97 kg (213 lb 13.5 oz)   SpO2 100%   BMI 29.83 kg/m²       Physical Exam:  Tele: afib  General:  Cooperative, Not in acute distress, speaks in short sentence while in bed  HEENT: PERRL, EOMI, supple neck, ++ JVD, dry oral mucosa  Cardiovascular: S1S2 irregular, no rub/gallop   Pulmonary: air entry bilaterally, no wheezing, ++ crackle  GI:  Soft, non tender, non distended, +bs, no guarding   Extremities:  + pedal edema, +distal pulses appreciated   Neuro: AOx3, moving all extremities, no gross deficit. Lab/Data Review:  Labs: Results:       Chemistry Recent Labs     11/29/22  1205   GLU 74      K 4.3      CO2 25   BUN 52*   CREA 11.30*   CA 9.1   AGAP 12   BUCR 5*   AP 75   TP 6.7   ALB 3.1*   GLOB 3.6   AGRAT 0.9      CBC w/Diff Recent Labs     11/29/22  1205   WBC 7.7   RBC 3.42*   HGB 10.5*   HCT 31.8*      GRANS 80*   LYMPH 8*   EOS 2      Coagulation No results for input(s): PTP, INR, APTT, INREXT in the last 72 hours. Iron/Ferritin No results for input(s): IRON in the last 72 hours. No lab exists for component: TIBCCALC   BNP No results for input(s): BNPP in the last 72 hours. Cardiac Enzymes No results for input(s): CPK, CKND1, SAMARA in the last 72 hours.     No lab exists for component: CKRMB, TROIP   Liver Enzymes Recent Labs     11/29/22  1205   TP 6.7   ALB 3.1*   AP 75      Thyroid Studies Lab Results   Component Value Date/Time    TSH 1.44 05/16/2022 08:03 AM          All Micro Results       None            Imaging Reviewed:  XR Results (most recent):  Results from Hospital Encounter encounter on 11/29/22    XR CHEST PORT    Narrative  EXAM:  XR CHEST PORT    INDICATION:   lightheaded    COMPARISON: 8/16/2022. FINDINGS:  Mildly enlarged cardiac silhouette. Pulmonary vascular congestion. No  pneumothorax, pleural effusion or consolidation. Intact osseous structures. Impression  Enlarged cardiac silhouette with pulmonary vascular congestion. EKG Results       Procedure 720 Value Units Date/Time    EKG, 12 LEAD, INITIAL [[id number]] Collected: 11/29/22 1212    Order Status: Completed Updated: 11/29/22 1622     Ventricular Rate 107 BPM      QRS Duration 94 ms      Q-T Interval 380 ms      QTC Calculation (Bezet) 507 ms      Calculated R Axis -17 degrees      Calculated T Axis 84 degrees      Diagnosis --     Atrial fibrillation with rapid ventricular response  Moderate voltage criteria for LVH, may be normal variant ( R in aVL , Moo   product )  Marked ST abnormality, possible inferior subendocardial injury  Abnormal ECG    Confirmed by Betsy Lakhani MD, Con Quivers ([id number]) on 11/29/2022 4:22:12 PM                  Assessment:   Active Problems:    NSTEMI with uptrend HS-troponin. He has significant CAD history with stent previously. CAD with stent   Ischemic cardiomyopathy   Paroxysmal AFIB with rate controlled now. Previously with ablation. On amiodarone and metoprolol chronically. On DOAC for elevated thromboembolic risk associates with elevated [id number]   Pulmonary hypertension   Hyperlipidemia   ESRD on HD  Hyperkalemia, likely lab error, will need to repeat BMP  Anemia of ESRD   Type 2 DM   Blindness of the right eye       Plan:     Admitted to telemetry, will trend cardiac enzyme.    Cont Heparin gtt, ASA, Atorvastatin 80mg daily, Metoprolol BID, lisinopril per guideline   Cont amlodipine for goal SBP<140  Cont amiodarone for now  Echo follow up  Cardiology consulted.    Stopped Tradjenta, Glipizide while inpt, can use ISS for now   Nephrology consult follow up  Recheck BMP, if elevated K, the treated medically   NPO after night   Daily labs  ICS        Risk of deterioration:  []Low    [x]Moderate  []High     Prophylaxis:  []Lovenox  []Coumadin  [x]Hep gtt []SCDs  []H2B/PPI     Disposition:  []Home w/ Family   [x] PT,OT,RN   []SNF/LTC   []SAH/Rehab     Discussed Code Status:         [x]Full Code      []DNR         ___________________________________________________     Care Plan discussed with:    [x]Patient   []Family    []ED Care Manager  [x]ED Doc   [x]Specialist :  Total Time Coordinating Admission:  70   minutes    []Total Critical Care Time:       Michel Stallworth MD  11/29/2022, 5:57 PM

## 2022-11-29 NOTE — ED NOTES
Attempting to contact lab for results from labs drawn at 1718. RN and provider aware of delay in results.

## 2022-11-29 NOTE — ED NOTES
TRANSFER - OUT REPORT:    Verbal report given to Alden Askew RN on Malik Walsh  being transferred to  for routine progression of care       Report consisted of patients Situation, Background, Assessment and   Recommendations(SBAR). Information from the following report(s) SBAR, Kardex, ED Summary, Intake/Output, MAR, and Recent Results was reviewed with the receiving nurse. Lines:   Peripheral IV 11/29/22 Left Forearm (Active)   Site Assessment Clean, dry, & intact 11/29/22 1209   Phlebitis Assessment 0 11/29/22 1209   Infiltration Assessment 0 11/29/22 1209   Dressing Status Clean, dry, & intact 11/29/22 1209   Hub Color/Line Status Green 11/29/22 1209       Peripheral IV 11/29/22 Left Hand (Active)        Opportunity for questions and clarification was provided.       Patient transported with:   Monitor  Registered Nurse

## 2022-11-29 NOTE — Clinical Note
TRANSFER - OUT REPORT:     Verbal report given to: Dario [employer] Mode RN. Report consisted of patient's Situation, Background, Assessment and   Recommendations(SBAR). Opportunity for questions and clarification was provided. Patient transported with a Registered Nurse. Patient transported to: holding area.

## 2022-11-29 NOTE — ED PROVIDER NOTES
EMERGENCY DEPARTMENT HISTORY AND PHYSICAL EXAM    Date: 11/29/2022  Patient Name: Kiarra Rodríguez    History of Presenting Illness     Chief Complaint   Patient presents with    Hypotension    Dizziness         History Provided By: Patient    Additional History (Context): Kiarra Rodríguez is a 54 y.o. male with hypertension and ESRD on HD  who presents with complaint of feeling dizzy today. He was only 2 hours into his dialysis session when he became symptomatic. Denies any chest pain or syncope. Feels better now. Hasn't had his eliquis today as he normally on dialysis days takes the meds after his sessions. He says he is never felt dizzy before with his A. fib. He denies any tobacco alcohol or illicits. He makes urine very infrequently. EMS report BP from dialysis center was 91/54mmHg. PCP: Sreedhar Wan MD    Current Facility-Administered Medications   Medication Dose Route Frequency Provider Last Rate Last Admin    heparin (porcine) 25,000 units in 0.45% saline 250 ml infusion  10-25 Units/kg/hr IntraVENous TITRATE Nimco Chavarria PA 9.7 mL/hr at 11/29/22 1649 10 Units/kg/hr at 11/29/22 1649     Current Outpatient Medications   Medication Sig Dispense Refill    amiodarone (CORDARONE) 200 mg tablet TAKE 1 TABLET BY MOUTH DAILY 30 Tablet 6    cinacalcet (SENSIPAR) 30 mg tablet Take 30 mg by mouth. TAKE 1 TABLET(30MG) BY MOUTH 3 TIMES WEEKLY: MONDAY, WEDNESDAY, AND FRIDAY IN THE EVENING.      benzoyl peroxide-erythromycin (BENZAMYCIN) 3-5 % topical gel Apply  to affected area four (4) times daily. Apply to right eye      metoprolol tartrate (LOPRESSOR) 25 mg tablet TAKE 1 TABLET BY MOUTH TWICE DAILY 180 Tablet 3    lisinopriL (PRINIVIL, ZESTRIL) 20 mg tablet Take 20 mg by mouth daily. atorvastatin (LIPITOR) 80 mg tablet Take 1 Tablet by mouth nightly. 90 Tablet 3    linaGLIPtin (Tradjenta) 5 mg tablet Take 1 Tablet by mouth daily.       glipiZIDE SR (GLUCOTROL XL) 2.5 mg CR tablet Take 1 Tablet by mouth daily. 90 Tablet 0    amLODIPine (NORVASC) 5 mg tablet Take 1 Tablet by mouth daily. 90 Tablet 0    glucose blood VI test strips (Accu-Chek Beatrice Plus test strp) strip Use as directed 100 Strip 2    Eliquis 2.5 mg tablet TAKE 1 TABLET BY MOUTH EVERY 12 HOURS 180 Tablet 3    aspirin delayed-release 81 mg tablet Take 1 Tablet by mouth daily. 90 Tablet 3    alum-mag hydroxide-simeth (MYLANTA) 200-200-20 mg/5 mL susp Take  by mouth. nitroglycerin (NITROLINGUAL) 400 mcg/spray spray 1 Spray by SubLINGual route every five (5) minutes as needed for Chest Pain. 1 Bottle 2    Blood-Glucose Meter monitoring kit Check fasting glucose 1 Kit 0    sucroferric oxyhydroxide (VELPHORO) 500 mg chew chewable tablet Take 500 mg by mouth three (3) times daily (with meals). Past History     Past Medical History:  Past Medical History:   Diagnosis Date    Abnormal nuclear cardiac imaging test 10/08/2012    Fixed anteroseptal, anteroapical defect c/w prior infarction. No ischemia. Mid & distal anteroseptal, anteroapical WMA. LVE. EF 44%. Anemia     dr. Elza Ha doubt amyloid or multiple myeloma. candidate for procirt if Hg <10    Benign hypertensive     Blindness of right eye 01/2013    legally blind    CAD (coronary artery disease)     Cardiomyopathy ejection fraction of 40%     CKD (chronic kidney disease), stage IV ([address]     to see Dr. Jayesh Torres 12/1/12    Congestive heart failure ([facility] [address]     Diabetes mellitus ([address]     Diabetic retinopathy ([address]     Diabetic retinopathy ([address]     Dr. Bhagat José- [facility]    Heart failure [facility])     History of echocardiogram 04/22/2014    LVE. EF 55% (prev 40-45% on echo of 1/27/12). No WMA. Mild-mod conc LVH. Gr 3 DDfx. Marked LAE. Mild SHANTEL. Mild MR. Hypertension     Pulmonary hypertension ([address]     Renal Ultrasound 1/3/12    Right kidney isoechoic w/respect to liver. Sm left-sided pleural effusion    S/P cardiac cath 10/16/2012    LM patent.   pLAD 95% (3.5 x 12-mm [place] stent, resid 0$%). LCX mild. Past Surgical History:  Past Surgical History:   Procedure Laterality Date    HX CORONARY STENT PLACEMENT      one    HX LAPAROTOMY  2/2012    bowel obstruction with removal segment of small bowel. Done by Liv. HX LAPAROTOMY  infancy    whole in colon and had repair at that time. HX OTHER SURGICAL  06/20/2013    left eye retna attatchment    HX RETINAL DETACHMENT REPAIR      august 2012    WA REPAIR ING HERNIA,5+Y/O,REDUCIBL Left 05/02/2019    Dr. Nash Romero       Family History:  Family History   Problem Relation Age of Onset    Diabetes Mother     Hypertension Mother     Kidney Disease Mother     High Cholesterol Father     Diabetes Brother         pre diabetic       Social History:  Social History     Tobacco Use    Smoking status: Never    Smokeless tobacco: Never   Substance Use Topics    Alcohol use: Not Currently     Alcohol/week: 4.2 standard drinks     Types: 5 Cans of beer per week    Drug use: No       Allergies:  No Known Allergies      Review of Systems   Review of Systems   Constitutional:  Negative for diaphoresis. HENT: Negative. Eyes: Negative. Respiratory:  Negative for shortness of breath. Cardiovascular:  Negative for chest pain. Gastrointestinal:  Negative for abdominal pain. Endocrine: Negative. Genitourinary: Negative. Musculoskeletal: Negative. Skin: Negative. Allergic/Immunologic: Negative. Neurological:  Positive for dizziness. Hematological: Negative. Psychiatric/Behavioral: Negative. All Other Systems Negative  Physical Exam     Vitals:    11/29/22 1200 11/29/22 1702   BP: 112/69 (!) 164/89   Pulse: 96 74   Resp: 20 14   Temp: 97.9 °F (36.6 °C)    SpO2: 96% 100%   Weight: 97 kg (213 lb 13.5 oz)    Height: 5' 11\" (1.803 m)      Physical Exam  Vitals and nursing note reviewed. Constitutional:       General: He is not in acute distress. Appearance: He is well-developed.  He is not ill-appearing, toxic-appearing or diaphoretic. HENT:      Head: Normocephalic and atraumatic. Neck:      Thyroid: No thyromegaly. Vascular: No carotid bruit. Trachea: No tracheal deviation. Cardiovascular:      Rate and Rhythm: Tachycardia present. Rhythm irregular. Pulses: Normal pulses. Heart sounds: Normal heart sounds. No murmur heard. No friction rub. No gallop. Pulmonary:      Effort: Pulmonary effort is normal. No respiratory distress. Breath sounds: Normal breath sounds. No stridor. No wheezing or rales. Chest:      Chest wall: No tenderness. Abdominal:      General: There is no distension. Palpations: Abdomen is soft. There is no mass. Tenderness: There is no abdominal tenderness. There is no guarding or rebound. Musculoskeletal:         General: Normal range of motion. Cervical back: Normal range of motion and neck supple. Skin:     General: Skin is warm and dry. Coloration: Skin is not pale. Neurological:      Mental Status: He is alert and oriented to person, place, and time. Psychiatric:         Speech: Speech normal.         Behavior: Behavior normal.         Thought Content: Thought content normal.         Judgment: Judgment normal.          Diagnostic Study Results     Labs -     Recent Results (from the past 12 hour(s))   CBC WITH AUTOMATED DIFF    Collection Time: 11/29/22 12:05 PM   Result Value Ref Range    WBC 7.7 4.6 - 13.2 K/uL    RBC 3.42 (L) 4.35 - 5.65 M/uL    HGB 10.5 (L) 13.0 - 16.0 g/dL    HCT 31.8 (L) 36.0 - 48.0 %    MCV 93.0 78.0 - 100.0 FL    MCH 30.7 24.0 - 34.0 PG    MCHC 33.0 31.0 - 37.0 g/dL    RDW 15.5 (H) 11.6 - 14.5 %    PLATELET 457 630 - 621 K/uL    MPV 9.4 9.2 - 11.8 FL    NRBC 0.0 0  WBC    ABSOLUTE NRBC 0.00 0.00 - 0.01 K/uL    NEUTROPHILS 80 (H) 40 - 73 %    LYMPHOCYTES 8 (L) 21 - 52 %    MONOCYTES 9 3 - 10 %    EOSINOPHILS 2 0 - 5 %    BASOPHILS 0 0 - 2 %    IMMATURE GRANULOCYTES 1 (H) 0.0 - 0.5 %    ABS. NEUTROPHILS 6.2 1.8 - 8.0 K/UL    ABS. LYMPHOCYTES 0.6 (L) 0.9 - 3.6 K/UL    ABS. MONOCYTES 0.7 0.05 - 1.2 K/UL    ABS. EOSINOPHILS 0.1 0.0 - 0.4 K/UL    ABS. BASOPHILS 0.0 0.0 - 0.1 K/UL    ABS. IMM. GRANS. 0.0 0.00 - 0.04 K/UL    DF AUTOMATED     METABOLIC PANEL, COMPREHENSIVE    Collection Time: 11/29/22 12:05 PM   Result Value Ref Range    Sodium 139 136 - 145 mmol/L    Potassium 4.3 3.5 - 5.5 mmol/L    Chloride 102 100 - 111 mmol/L    CO2 25 21 - 32 mmol/L    Anion gap 12 3.0 - 18 mmol/L    Glucose 74 74 - 99 mg/dL    BUN 52 (H) 7.0 - 18 MG/DL    Creatinine 11.30 (H) 0.6 - 1.3 MG/DL    BUN/Creatinine ratio 5 (L) 12 - 20      eGFR 5 (L) >60 ml/min/1.73m2    Calcium 9.1 8.5 - 10.1 MG/DL    Bilirubin, total 0.5 0.2 - 1.0 MG/DL    ALT (SGPT) 16 16 - 61 U/L    AST (SGOT) 13 10 - 38 U/L    Alk.  phosphatase 75 45 - 117 U/L    Protein, total 6.7 6.4 - 8.2 g/dL    Albumin 3.1 (L) 3.4 - 5.0 g/dL    Globulin 3.6 2.0 - 4.0 g/dL    A-G Ratio 0.9 0.8 - 1.7     MAGNESIUM    Collection Time: 11/29/22 12:05 PM   Result Value Ref Range    Magnesium 2.2 1.6 - 2.6 mg/dL   TROPONIN-HIGH SENSITIVITY    Collection Time: 11/29/22 12:05 PM   Result Value Ref Range    Troponin-High Sensitivity 59 0 - 78 ng/L   EKG, 12 LEAD, INITIAL    Collection Time: 11/29/22 12:12 PM   Result Value Ref Range    Ventricular Rate 107 BPM    QRS Duration 94 ms    Q-T Interval 380 ms    QTC Calculation (Bezet) 507 ms    Calculated R Axis -17 degrees    Calculated T Axis 84 degrees    Diagnosis       Atrial fibrillation with rapid ventricular response  Moderate voltage criteria for LVH, may be normal variant ( R in aVL , [employer]   product )  Marked ST abnormality, possible inferior subendocardial injury  Abnormal ECG    Confirmed by Barry Short MD, Maci Draft ([id number]) on 11/29/2022 4:22:12 PM     GLUCOSE, POC    Collection Time: 11/29/22 12:16 PM   Result Value Ref Range    Glucose (POC) 70 70 - 110 mg/dL   TROPONIN-HIGH SENSITIVITY    Collection Time: 11/29/22  2:45 PM   Result Value Ref Range    Troponin-High Sensitivity 323 (HH) 0 - 78 ng/L       Radiologic Studies -   XR CHEST PORT   Final Result      Enlarged cardiac silhouette with pulmonary vascular congestion. CT Results  (Last 48 hours)      None          CXR Results  (Last 48 hours)                 11/29/22 1229  XR CHEST PORT Final result    Impression:      Enlarged cardiac silhouette with pulmonary vascular congestion. Narrative:  EXAM:  XR CHEST PORT       INDICATION:   lightheaded       COMPARISON: 8/16/2022. FINDINGS:   Mildly enlarged cardiac silhouette. Pulmonary vascular congestion. No   pneumothorax, pleural effusion or consolidation. Intact osseous structures. Medical Decision Making   I am the first provider for this patient. I reviewed the vital signs, available nursing notes, past medical history, past surgical history, family history and social history. Vital Signs-Reviewed the patient's vital signs. Records Reviewed: Nursing Notes    Procedures:  Procedures    Provider Notes (Medical Decision Making): History of prior CAD with stent. Patient dizzy today had a bump in his troponin from baseline after an episode of feeling dizzy and is now back in A. fib. Has not had his Eliquis today. Will admit to the hospitalist service for observation. Spoke with cardiology who wants pt started on heparin bolus and drip. Giving asa. Admitting to hospitalist.    MED RECONCILIATION:  Current Facility-Administered Medications   Medication Dose Route Frequency    heparin (porcine) 25,000 units in 0.45% saline 250 ml infusion  10-25 Units/kg/hr IntraVENous TITRATE     Current Outpatient Medications   Medication Sig    amiodarone (CORDARONE) 200 mg tablet TAKE 1 TABLET BY MOUTH DAILY    cinacalcet (SENSIPAR) 30 mg tablet Take 30 mg by mouth.  TAKE 1 TABLET(30MG) BY MOUTH 3 TIMES WEEKLY: MONDAY, WEDNESDAY, AND FRIDAY IN THE EVENING.    benzoyl peroxide-erythromycin (BENZAMYCIN) 3-5 % topical gel Apply  to affected area four (4) times daily. Apply to right eye    metoprolol tartrate (LOPRESSOR) 25 mg tablet TAKE 1 TABLET BY MOUTH TWICE DAILY    lisinopriL (PRINIVIL, ZESTRIL) 20 mg tablet Take 20 mg by mouth daily. atorvastatin (LIPITOR) 80 mg tablet Take 1 Tablet by mouth nightly. linaGLIPtin (Tradjenta) 5 mg tablet Take 1 Tablet by mouth daily. glipiZIDE SR (GLUCOTROL XL) 2.5 mg CR tablet Take 1 Tablet by mouth daily. amLODIPine (NORVASC) 5 mg tablet Take 1 Tablet by mouth daily. glucose blood VI test strips (Accu-Chek Beatrice Plus test strp) strip Use as directed    Eliquis 2.5 mg tablet TAKE 1 TABLET BY MOUTH EVERY 12 HOURS    aspirin delayed-release 81 mg tablet Take 1 Tablet by mouth daily. alum-mag hydroxide-simeth (MYLANTA) 200-200-20 mg/5 mL susp Take  by mouth. nitroglycerin (NITROLINGUAL) 400 mcg/spray spray 1 Spray by SubLINGual route every five (5) minutes as needed for Chest Pain. Blood-Glucose Meter monitoring kit Check fasting glucose    sucroferric oxyhydroxide (VELPHORO) 500 mg chew chewable tablet Take 500 mg by mouth three (3) times daily (with meals). Disposition:  admit  Follow-up Information    None         Current Discharge Medication List            Core Measures:    Critical Care Time:   Critical Care Time:   I have spent 35 minutes of critical care time involved in lab review, consultations with specialist, family decision-making, and documentation. During this entire length of time I was immediately available to the patient. Critical Care: The reason for providing this level of medical care for this critically ill patient was due a critical illness that impaired one or more vital organ systems such that there was a high probability of imminent or life threatening deterioration in the patients condition.  This care involved high complexity decision making to assess, manipulate, and support vital system functions, to treat this degreee vital organ system failure and to prevent further life threatening deterioration of the patients condition. Critical care time exclusive of any billable procedures. Diagnosis     Clinical Impression:   1. NSTEMI (non-ST elevated myocardial infarction) ([facility] [address]    2. Atrial fibrillation, unspecified type ([facility] [address]    3.  ESRD on dialysis [facility])

## 2022-11-29 NOTE — ED NOTES
RN called lab due to lack of results, pt has been here almost 6 hours. Lab stated they had samples but were unable to run them. This tech did initiate another [facility] and [employer] ([phone]). CHEM8 ran at this time.

## 2022-11-29 NOTE — ED NOTES
EPOC CHEM8 resulted, Dr Baljeet Wills was bedside and results were shown to him. K+ critical value of 6.6 mmol/L. ED RN made aware.

## 2022-11-29 NOTE — ED NOTES
Patient arrived vis EMS from dialysis per EMS the patient became hypotensive and light-headed. Dialysis stopped and patient bp returned to baseline.  Only 300ml was report during treatment

## 2022-11-29 NOTE — Clinical Note
TRANSFER - IN REPORT:     Verbal report received from: [facility] [employer]. Report consisted of patient's Situation, Background, Assessment and   Recommendations(SBAR). Opportunity for questions and clarification was provided. Assessment completed upon patient's arrival to unit and care assumed. Patient transported with a Registered Nurse.

## 2022-11-29 NOTE — Clinical Note
Patient Class[de-identified] OBSERVATION [104]   Type of Bed: Telemetry [19]   Cardiac Monitoring Required?: Yes   Reason for Observation: elevated troponin with dizziness   Admitting Diagnosis: NSTEMI (non-ST elevated myocardial infarction) [facility]) [[id number]]   Admitting Diagnosis: ESRD on dialysis [facility]) [[id number]]   Admitting Physician: Jay Mc [[id number]]   Attending Physician: Rogerio Raman

## 2022-11-30 ENCOUNTER — APPOINTMENT (OUTPATIENT)
Dept: NON INVASIVE DIAGNOSTICS | Age: 52
DRG: 246 | End: 2022-11-30
Attending: INTERNAL MEDICINE
Payer: MEDICARE

## 2022-11-30 LAB
ANION GAP SERPL CALC-SCNC: 8 MMOL/L (ref 3–18)
ANION GAP SERPL CALC-SCNC: 9 MMOL/L (ref 3–18)
APTT PPP: 52.3 SEC (ref 23–36.4)
APTT PPP: 93.1 SEC (ref 23–36.4)
ATRIAL RATE: 73 BPM
BASOPHILS # BLD: 0.1 K/UL (ref 0–0.1)
BASOPHILS NFR BLD: 1 % (ref 0–2)
BNP SERPL-MCNC: ABNORMAL PG/ML (ref 0–450)
BUN SERPL-MCNC: 60 MG/DL (ref 7–18)
BUN SERPL-MCNC: 72 MG/DL (ref 7–18)
BUN/CREAT SERPL: 5 (ref 12–20)
BUN/CREAT SERPL: 5 (ref 12–20)
CALCIUM SERPL-MCNC: 9 MG/DL (ref 8.5–10.1)
CALCIUM SERPL-MCNC: 9.5 MG/DL (ref 8.5–10.1)
CALCULATED P AXIS, ECG09: 44 DEGREES
CALCULATED R AXIS, ECG10: -14 DEGREES
CALCULATED T AXIS, ECG11: 41 DEGREES
CHLORIDE SERPL-SCNC: 103 MMOL/L (ref 100–111)
CHLORIDE SERPL-SCNC: 104 MMOL/L (ref 100–111)
CO2 SERPL-SCNC: 29 MMOL/L (ref 21–32)
CO2 SERPL-SCNC: 29 MMOL/L (ref 21–32)
CREAT SERPL-MCNC: 13 MG/DL (ref 0.6–1.3)
CREAT SERPL-MCNC: 14.3 MG/DL (ref 0.6–1.3)
DIAGNOSIS, 93000: NORMAL
DIFFERENTIAL METHOD BLD: ABNORMAL
ECHO AO ROOT DIAM: 3.3 CM
ECHO AO ROOT INDEX: 1.52 CM/M2
ECHO LV E' LATERAL VELOCITY: 6 CM/S
ECHO LV E' SEPTAL VELOCITY: 5 CM/S
ECHO LV FRACTIONAL SHORTENING: 41 % (ref 28–44)
ECHO LV INTERNAL DIMENSION DIASTOLE INDEX: 2.26 CM/M2
ECHO LV INTERNAL DIMENSION DIASTOLIC: 4.9 CM (ref 4.2–5.9)
ECHO LV INTERNAL DIMENSION SYSTOLIC INDEX: 1.34 CM/M2
ECHO LV INTERNAL DIMENSION SYSTOLIC: 2.9 CM
ECHO LV IVSD: 1.8 CM (ref 0.6–1)
ECHO LV MASS 2D: 378.4 G (ref 88–224)
ECHO LV MASS INDEX 2D: 174.4 G/M2 (ref 49–115)
ECHO LV POSTERIOR WALL DIASTOLIC: 1.6 CM (ref 0.6–1)
ECHO LV RELATIVE WALL THICKNESS RATIO: 0.65
ECHO LVOT AREA: 3.8 CM2
ECHO LVOT DIAM: 2.2 CM
ECHO MV A VELOCITY: 0.9 M/S
ECHO MV E DECELERATION TIME (DT): 156.8 MS
ECHO MV E VELOCITY: 1.09 M/S
ECHO MV E/A RATIO: 1.21
ECHO MV E/E' LATERAL: 18.17
ECHO MV E/E' RATIO (AVERAGED): 19.98
ECHO MV E/E' SEPTAL: 21.8
EOSINOPHIL # BLD: 0.3 K/UL (ref 0–0.4)
EOSINOPHIL NFR BLD: 6 % (ref 0–5)
ERYTHROCYTE [DISTWIDTH] IN BLOOD BY AUTOMATED COUNT: 15.9 % (ref 11.6–14.5)
GLUCOSE BLD STRIP.AUTO-MCNC: 127 MG/DL (ref 70–110)
GLUCOSE BLD STRIP.AUTO-MCNC: 63 MG/DL (ref 70–110)
GLUCOSE BLD STRIP.AUTO-MCNC: 70 MG/DL (ref 70–110)
GLUCOSE BLD STRIP.AUTO-MCNC: 74 MG/DL (ref 70–110)
GLUCOSE SERPL-MCNC: 103 MG/DL (ref 74–99)
GLUCOSE SERPL-MCNC: 68 MG/DL (ref 74–99)
HBV SURFACE AB SER QL IA: POSITIVE
HBV SURFACE AB SERPL IA-ACNC: 176.47 MIU/ML
HBV SURFACE AG SER QL: 0.91 INDEX
HBV SURFACE AG SER QL: NEGATIVE
HCT VFR BLD AUTO: 26.7 % (ref 36–48)
HEP BS AB COMMENT,HBSAC: NORMAL
HGB BLD-MCNC: 8.6 G/DL (ref 13–16)
IMM GRANULOCYTES # BLD AUTO: 0 K/UL (ref 0–0.04)
IMM GRANULOCYTES NFR BLD AUTO: 0 % (ref 0–0.5)
LYMPHOCYTES # BLD: 1.5 K/UL (ref 0.9–3.6)
LYMPHOCYTES NFR BLD: 27 % (ref 21–52)
MAGNESIUM SERPL-MCNC: 2.5 MG/DL (ref 1.6–2.6)
MCH RBC QN AUTO: 30.2 PG (ref 24–34)
MCHC RBC AUTO-ENTMCNC: 32.2 G/DL (ref 31–37)
MCV RBC AUTO: 93.7 FL (ref 78–100)
MONOCYTES # BLD: 0.7 K/UL (ref 0.05–1.2)
MONOCYTES NFR BLD: 13 % (ref 3–10)
NEUTS SEG # BLD: 3 K/UL (ref 1.8–8)
NEUTS SEG NFR BLD: 54 % (ref 40–73)
NRBC # BLD: 0 K/UL (ref 0–0.01)
NRBC BLD-RTO: 0 PER 100 WBC
P-R INTERVAL, ECG05: 162 MS
PLATELET # BLD AUTO: 138 K/UL (ref 135–420)
PMV BLD AUTO: 9.9 FL (ref 9.2–11.8)
POTASSIUM SERPL-SCNC: 4.5 MMOL/L (ref 3.5–5.5)
POTASSIUM SERPL-SCNC: 4.8 MMOL/L (ref 3.5–5.5)
Q-T INTERVAL, ECG07: 418 MS
QRS DURATION, ECG06: 88 MS
QTC CALCULATION (BEZET), ECG08: 460 MS
RBC # BLD AUTO: 2.85 M/UL (ref 4.35–5.65)
SODIUM SERPL-SCNC: 141 MMOL/L (ref 136–145)
SODIUM SERPL-SCNC: 141 MMOL/L (ref 136–145)
VENTRICULAR RATE, ECG03: 73 BPM
WBC # BLD AUTO: 5.6 K/UL (ref 4.6–13.2)

## 2022-11-30 PROCEDURE — 93308 TTE F-UP OR LMTD: CPT

## 2022-11-30 PROCEDURE — 82962 GLUCOSE BLOOD TEST: CPT

## 2022-11-30 PROCEDURE — 80048 BASIC METABOLIC PNL TOTAL CA: CPT

## 2022-11-30 PROCEDURE — 74011000250 HC RX REV CODE- 250: Performed by: INTERNAL MEDICINE

## 2022-11-30 PROCEDURE — 74011250637 HC RX REV CODE- 250/637: Performed by: INTERNAL MEDICINE

## 2022-11-30 PROCEDURE — 85730 THROMBOPLASTIN TIME PARTIAL: CPT

## 2022-11-30 PROCEDURE — 74011250636 HC RX REV CODE- 250/636: Performed by: HOSPITALIST

## 2022-11-30 PROCEDURE — 97165 OT EVAL LOW COMPLEX 30 MIN: CPT

## 2022-11-30 PROCEDURE — 65270000046 HC RM TELEMETRY

## 2022-11-30 PROCEDURE — 99223 1ST HOSP IP/OBS HIGH 75: CPT | Performed by: INTERNAL MEDICINE

## 2022-11-30 PROCEDURE — 99232 SBSQ HOSP IP/OBS MODERATE 35: CPT | Performed by: INTERNAL MEDICINE

## 2022-11-30 PROCEDURE — 83735 ASSAY OF MAGNESIUM: CPT

## 2022-11-30 PROCEDURE — 97161 PT EVAL LOW COMPLEX 20 MIN: CPT

## 2022-11-30 PROCEDURE — 74011250637 HC RX REV CODE- 250/637: Performed by: HOSPITALIST

## 2022-11-30 PROCEDURE — 36415 COLL VENOUS BLD VENIPUNCTURE: CPT

## 2022-11-30 PROCEDURE — 5A1D70Z PERFORMANCE OF URINARY FILTRATION, INTERMITTENT, LESS THAN 6 HOURS PER DAY: ICD-10-PCS | Performed by: INTERNAL MEDICINE

## 2022-11-30 PROCEDURE — 87340 HEPATITIS B SURFACE AG IA: CPT

## 2022-11-30 PROCEDURE — 85025 COMPLETE CBC W/AUTO DIFF WBC: CPT

## 2022-11-30 PROCEDURE — 90935 HEMODIALYSIS ONE EVALUATION: CPT

## 2022-11-30 PROCEDURE — 86706 HEP B SURFACE ANTIBODY: CPT

## 2022-11-30 PROCEDURE — 74011250636 HC RX REV CODE- 250/636: Performed by: EMERGENCY MEDICINE

## 2022-11-30 PROCEDURE — 94761 N-INVAS EAR/PLS OXIMETRY MLT: CPT

## 2022-11-30 PROCEDURE — 93005 ELECTROCARDIOGRAM TRACING: CPT

## 2022-11-30 RX ORDER — HEPARIN SODIUM 1000 [USP'U]/ML
3000 INJECTION, SOLUTION INTRAVENOUS; SUBCUTANEOUS ONCE
Status: COMPLETED | OUTPATIENT
Start: 2022-11-30 | End: 2022-11-30

## 2022-11-30 RX ORDER — SODIUM CHLORIDE 9 MG/ML
250 INJECTION, SOLUTION INTRAVENOUS
Status: DISCONTINUED | OUTPATIENT
Start: 2022-11-30 | End: 2022-12-02 | Stop reason: HOSPADM

## 2022-11-30 RX ADMIN — SODIUM CHLORIDE, PRESERVATIVE FREE 10 ML: 5 INJECTION INTRAVENOUS at 21:55

## 2022-11-30 RX ADMIN — SODIUM CHLORIDE, PRESERVATIVE FREE 10 ML: 5 INJECTION INTRAVENOUS at 15:06

## 2022-11-30 RX ADMIN — METOPROLOL TARTRATE 25 MG: 25 TABLET, FILM COATED ORAL at 09:42

## 2022-11-30 RX ADMIN — AMIODARONE HYDROCHLORIDE 200 MG: 200 TABLET ORAL at 09:42

## 2022-11-30 RX ADMIN — SODIUM ZIRCONIUM CYCLOSILICATE 10 G: 10 POWDER, FOR SUSPENSION ORAL at 09:42

## 2022-11-30 RX ADMIN — METOPROLOL TARTRATE 25 MG: 25 TABLET, FILM COATED ORAL at 17:45

## 2022-11-30 RX ADMIN — HEPARIN SODIUM 3000 UNITS: 1000 INJECTION INTRAVENOUS; SUBCUTANEOUS at 02:04

## 2022-11-30 RX ADMIN — HEPARIN SODIUM 12 UNITS/KG/HR: 10000 INJECTION, SOLUTION INTRAVENOUS at 18:43

## 2022-11-30 RX ADMIN — ASPIRIN 81 MG: 81 TABLET, COATED ORAL at 09:42

## 2022-11-30 RX ADMIN — ATORVASTATIN CALCIUM 80 MG: 40 TABLET, FILM COATED ORAL at 21:56

## 2022-11-30 NOTE — PROGRESS NOTES
Comprehensive Nutrition Assessment    Type and Reason for Visit: Initial, Positive nutrition screen    Nutrition Recommendations/Plan:   Add oral nutrition supplement to optimize nutrition intake opportunity: Nepro with Carb Steady (each provides 425 kcal, 19g protein) BID    Liberalize current diet to increase CHO restriction to (4-CHO) and add potassium/phosphorus restriction as tolerated by patient   Monitor PO intake, compliance of oral supplement, weight, labs, and plan of care during admission. Malnutrition Assessment:  Malnutrition Status: At risk for malnutrition (specify) (r/t varied PO intake and dialysis dependence) (11/30/22 1236)      Nutrition History and Allergies:   Past medical history: anemia, blindness of right eye, CKD 4, CHF, T2DM with retinopathy, pulmonsry HTN, HTN, CAD with stent, ischemic cardiomyopathy, paroxysmal AFIB with post ablation, hyperlipidemia, ESRD on HD. NKFA. Weight history per chart review:  CBW: 11/30/22 : 96.6 kg (213 lb), 30 DAYS: 10/12/22 : 89.8 kg (198 lb), 90 DAYS: 08/30/22 : 88.9 kg (196 lb), 180 DAYS: 05/17/22 : 92.1 kg (203 lb). Weight stable x 1 year. Nutrition Assessment:    Pt admitted after being presented to the ER due to hypotension during his dialysis. HD scheduled continued per MD. At the time of visit, pt was NPO status but has resumed diet. Per chart review, Plan for tentative ischemic workup tomorrow morning-NPO after MN. Current lab shows elevated BUN (72) and Creatinine (14.30). Positive nutrition screen noted, MST: unsure. Per chart review, no weight loss noted. Nutrition Related Findings:    Last BM 11/28. Output: 0mL (urine-PTA). Pertinent Medications:  humalog, miralax, promethazine, lipitor, metoprolol, amlodipine.  Wound Type: None     Current Nutrition Intake & Therapies:  Average Meal Intake: Unable to assess  Average Supplement Intake: None ordered  DIET NPO Sips of Water with Meds  ADULT DIET Regular; 3 carb choices (45 gm/meal)    Anthropometric Measures:  Height: 5' 11\" (180.3 cm)  Ideal Body Weight (IBW): 172 lbs (78 kg)  Admission Body Weight: 212 lb 15.4 oz  Current Body Wt:  96.6 kg (212 lb 15.4 oz), 123.8 % IBW. Not specified  Current BMI (kg/m2): 29.7  Usual Body Weight: 90.7 kg (200 lb)  % Weight Change (Calculated): 6.5  Weight Adjustment: No adjustment  BMI Category: Overweight (BMI 25.0-29. 9)    Estimated Daily Nutrient Needs:  Energy Requirements Based On: Kcal/kg (25-30)  Weight Used for Energy Requirements: Current  Energy (kcal/day): 7530-5845  Weight Used for Protein Requirements: Current (1.0-1.2)  Protein (g/day):   Method Used for Fluid Requirements: Standard renal  Fluid (ml/day): 750-1500    Nutrition Diagnosis:   Increased nutrient needs related to increased demand for energy/nutrients as evidenced by dialysis    Nutrition Interventions:   Food and/or Nutrient Delivery: Continue current diet, Start oral nutrition supplement  Nutrition Education/Counseling: Education not indicated, No recommendations at this time  Coordination of Nutrition Care: Continue to monitor while inpatient       Goals:     Goals: Meet at least 75% of estimated needs, by next RD assessment       Nutrition Monitoring and Evaluation:   Behavioral-Environmental Outcomes: None identified  Food/Nutrient Intake Outcomes: Food and nutrient intake  Physical Signs/Symptoms Outcomes: Biochemical data, Nutrition focused physical findings, Meal time behavior    Discharge Planning:    Continue current diet, Continue oral nutrition supplement    Brie Contreras MA, RDN, LD   Contact: [phone]

## 2022-11-30 NOTE — PROGRESS NOTES
INTERVENTION:  HEMODYNAMIC STABILIZATION  MAINTAIN BP WNL WHILE ON HD. INTERVENTION:  FLUID MANAGEMENT  WILL ATTEMPT 2500 ML TOTAL FLUID REMOVAL AS TOLERATED. INTERVENTION:  METABOLIC/ELECTROLYTE MANAGEMENT  2.0 POTASSIUM 2.5 CALCIUM DIALYSATE USED WITH HD TODAY. INTERVENTION:  HEMODIALYSIS ACCESS SITE MANAGEMENT  RIGHT FOREARM AVF ACCESSED WITH 15G NEEDLE USING ASEPTIC TECHNIQUE. GOAL:  SIGNS AND SYMPTOMS OF LISTED POTENTIAL PROBLEMS WILL BE ABSENT OR MANAGEABLE. OUTCOME:  PROGRESSING. HD PLANNED FOR 3.5 HOURS TODAY.          Problem: Chronic Renal Failure  Goal: *Fluid and electrolytes stabilized  Outcome: Progressing Towards Goal     Problem: Patient Education: Go to Patient Education Activity  Goal: Patient/Family Education  Outcome: Progressing Towards Goal

## 2022-11-30 NOTE — PROGRESS NOTES
Problem: Self Care Deficits Care Plan (Adult)  Goal: *Acute Goals and Plan of Care (Insert Text)  Description: Occupational Therapy Goals  Prior Level of Function: independent with ADLs, lives with sister     Outcome: Resolved/Met       OCCUPATIONAL THERAPY EVALUATION/DISCHARGE    Patient: Kiarra Rodríguez (9 y.o. male)  Date: 11/30/2022  Primary Diagnosis: NSTEMI (non-ST elevated myocardial infarction) ([facility] [address] [I21.4]  ESRD on dialysis ([facility] [place] [N18.6, Z99.2]  Atrial fibrillation with RVR ([facility] [place] [I48.91]      ASSESSMENT AND RECOMMENDATIONS:  Pt cleared by nursing to participate. Pt long sitting in bed with transport at bedside ready to bring pt to dialysis. OT witnessed pt ambulating around unit with PT without assistance and per PT no report of SOB. Pt completed bed mobility independently and able to reach feet to don/doff socks at EOB without assistance. Based on the objective data described below, the patient presents with deficits requiring acute skilled OT services. Skilled occupational therapy is not indicated at this time. Further Equipment Recommendations for Discharge: NA    AMPAC: At this time and based on an AM-PAC score of 24/24, no further OT is recommended upon discharge due to pt at baseline. Recommend patient returns to prior setting with prior services. This [facility] score should be considered in conjunction with interdisciplinary team recommendations to determine the most appropriate discharge setting. Patient's social support, diagnosis, medical stability, and prior level of function should also be taken into consideration. SUBJECTIVE:   Patient stated Do you need me to stand up?     OBJECTIVE DATA SUMMARY:     Past Medical History:   Diagnosis Date    Abnormal nuclear cardiac imaging test 10/08/2012    Fixed anteroseptal, anteroapical defect c/w prior infarction. No ischemia. Mid & distal anteroseptal, anteroapical WMA. LVE. EF 44%.       Anemia     dr. Emily Florez doubt amyloid or multiple myeloma. candidate for procirt if Hg <10    Benign hypertensive     Blindness of right eye 01/2013    legally blind    CAD (coronary artery disease)     Cardiomyopathy ejection fraction of 40%     CKD (chronic kidney disease), stage IV ([facility] [address]     to see Dr. Tanya Tipton 12/1/12    Congestive heart failure ([facility] [address]     Diabetes mellitus ([facility] [address]     Diabetic retinopathy ([facility] [address]     Diabetic retinopathy ([facility] [address]     Dr. Rita Baker- injections    Heart failure [facility])     History of echocardiogram 04/22/2014    LVE. EF 55% (prev 40-45% on echo of 1/27/12). No WMA. Mild-mod conc LVH. Gr 3 DDfx. Marked LAE. Mild SHANTEL. Mild MR. Hypertension     Pulmonary hypertension ([facility] [address]     Renal Ultrasound 1/3/12    Right kidney isoechoic w/respect to liver. Sm left-sided pleural effusion    S/P cardiac cath 10/16/2012    LM patent. pLAD 95% (3.5 x 12-mm [place] stent, resid 0$%). LCX mild. Past Surgical History:   Procedure Laterality Date    HX CORONARY STENT PLACEMENT      one    HX LAPAROTOMY  2/2012    bowel obstruction with removal segment of small bowel. Done by Riblet. HX LAPAROTOMY  infancy    whole in colon and had repair at that time.     HX OTHER SURGICAL  06/20/2013    left eye retna attatchment    HX RETINAL DETACHMENT REPAIR      august 2012    MO REPAIR ING HERNIA,5+Y/O,REDUCIBL Left 05/02/2019    Dr. Fan Pump     Barriers to Learning/Limitations: None  Compensate with: visual, verbal, tactile, kinesthetic cues/model    Home Situation:   Home Situation  Home Environment: Apartment  # Steps to Enter: 5  One/Two Story Residence: Split level  # of Interior Steps: 10  Height of Each Step (in): 4 inches  Interior Rails: Both  Lift Chair Available: No  Living Alone: No  Support Systems: Parent(s), Other Family Member(s)  Patient Expects to be Discharged to[de-identified] Home  Current DME Used/Available at Home: None  [x]     Right hand dominant   []     Left hand dominant    Cognitive/Behavioral Status:  Neurologic State: Alert  Orientation Level: Oriented X4        Vision/Perceptual:    Tracking:  (pt blind in right eye at baseline)              Coordination: BUE  Coordination: Within functional limits        Balance:  Sitting: Intact  Standing: Intact    Strength: BUE  Strength: Within functional limits     Tone & Sensation: BUE    Tone: Normal  Sensation: Intact         Range of Motion: BUE  AROM: Within functional limits           Functional Mobility and Transfers for ADLs:  Bed Mobility:     Supine to Sit: Independent  Sit to Supine: Independent  Scooting: Independent  Transfers:  Sit to Stand: Independent  Stand to Sit: Independent       ADL Intervention:   Lower Body Dressing Assistance  Socks: Independent  Leg Crossed Method Used: No  Position Performed: Seated edge of bed      Pain:  Pain level pre-treatment: 0/10   Pain level post-treatment: 0/10   Pain Intervention(s): Medication (see MAR); Rest, Ice, Repositioning  Response to intervention: Nurse notified, See doc flow    Activity Tolerance:   Good  Please refer to the flowsheet for vital signs taken during this treatment. After treatment:   []  Patient left in no apparent distress sitting up in chair  [x]  Patient left in no apparent distress in bed  [x]  Call bell left within reach  []  Nursing notified  []  Caregiver present  []  Bed alarm activated    COMMUNICATION/EDUCATION:   [x]      Role of Occupational Therapy in the acute care setting  []      Home safety education was provided and the patient/caregiver indicated understanding. [x]      Patient/family have participated as able and agree with findings and recommendations. []      Patient is unable to participate in plan of care at this time. Thank you for this referral.  [facility]  Time Calculation: 5 mins      Eval Complexity: History: LOW Complexity : Brief history review ;    Examination: LOW Complexity : 1-3 performance deficits relating to physical, cognitive , or psychosocial skils that result in activity limitations and / or participation restrictions ; Decision Making:LOW Complexity : No comorbidities that affect functional and no verbal or physical assistance needed to complete eval tasks     [place] AM-PAC® Daily Activity Inpatient Short Form (6-Clicks)*    How much HELP from another person does the patient currently need    (If the patient hasn't done an activity recently, how much help from another person do you think he/she would need if he/she tried?)   Total (Total A or Dep)   A Lot  (Mod to Max A)   A Little (Sup or Min A)   None (Mod I to I)   Putting on and taking off regular lower body clothing? [] 1 [] 2 [] 3 [x] 4   2. Bathing (including washing, rinsing,      drying)? [] 1 [] 2 [] 3 [x] 4   3. Toileting, which includes using toilet, bedpan or urinal?   [] 1 [] 2 [] 3 [x] 4   4. Putting on and taking off regular upper body clothing? [] 1 [] 2 [] 3 [x] 4   5. Taking care of personal grooming such as brushing teeth? [] 1 [] 2 [] 3 [x] 4   6. Eating meals?    [] 1 [] 2 [] 3 [x] 4

## 2022-11-30 NOTE — PROGRESS NOTES
Progress  Note    82-year-old male with past medical history of diabetes, hypertension ESRD coronary artery disease admitted with chest pain, following for ESRD management. Subjective      Examined during dialysis     IMPRESSION:   ESRD  schedule, did not finish dialysis completed yesterday  Access left arm AV fistula  Hyperkalemia  NSTEMI, dizziness during dialysis, blood pressure was on lower side  Heart failure with preserved ejection fraction and reduced ejection fraction, history of pulmonary hypertension chest x-ray shows congestive changes on this admission  Paroxysmal atrial fibrillation, on anticoagulation   PLAN:   Plan for arrange dialysis this morning with 2K bath UF goal 2.5 to 3 L as tolerated. Case discussed with cardiology colleague. Continue heparin per cardiology colleague. Adjust medication per ESRD status. On 2022, I saw and examined patient during hemodialysis treatment. The patient was receiving hemodialysis for treatment of  renal failure. I have also reviewed vital signs, intake and output, lab results and recent events, and agreed with today's dialysis order. HD rounding    Blood pressure (!) 166/85, pulse 76, temperature 99.1 °F (37.3 °C), temperature source Temporal, resp. rate 18, height 5' 11\" (1.803 m), weight 96.6 kg (213 lb), SpO2 98 %.   Temp (24hrs), Av.3 °F (36.8 °C), Min:97.9 °F (36.6 °C), Max:99.1 °F (37.3 °C)      Blood Pressure: BP: (!) 166/85  Pulse: Pulse (Heart Rate): 76  Temp:  Temp: 99.1 °F (37.3 °C)    Artificial Kidney Dialyzer/Set Up Inspection: Revaclear   hours Duration of Treatment (hours): 3.5 hours   Heparin Bolus    Blood flow rate Blood Flow Rate (ml/min): 350 ml/min   Dialysate rate     Arterial Access Pressure Arterial Access Pressure (mmHg): -15  Venous Return Pressure Venous Return Pressure (mmHg): 130  Ultrafiltration Rate Goal/Amount of Fluid to Remove (mL): 2500 mL  Fluid Removal Fluid Removed (mL): 2500  Net Fluid Removal NET Fluid Removed (mL): 2000 ml          Current Facility-Administered Medications   Medication Dose Route Frequency    0.9% sodium chloride infusion 250 mL  250 mL IntraVENous DIALYSIS PRN    heparin (porcine) 25,000 units in 0.45% saline 250 ml infusion  10-25 Units/kg/hr IntraVENous TITRATE    insulin lispro (HUMALOG) injection   SubCUTAneous AC&HS    glucose chewable tablet 16 g  4 Tablet Oral PRN    glucagon (GLUCAGEN) injection 1 mg  1 mg IntraMUSCular PRN    sodium chloride (NS) flush 5-40 mL  5-40 mL IntraVENous Q8H    sodium chloride (NS) flush 5-40 mL  5-40 mL IntraVENous PRN    acetaminophen (TYLENOL) tablet 650 mg  650 mg Oral Q6H PRN    Or    acetaminophen (TYLENOL) suppository 650 mg  650 mg Rectal Q6H PRN    polyethylene glycol (MIRALAX) packet 17 g  17 g Oral DAILY PRN    promethazine (PHENERGAN) tablet 12.5 mg  12.5 mg Oral Q6H PRN    Or    ondansetron (ZOFRAN) injection 4 mg  4 mg IntraVENous Q6H PRN    aspirin delayed-release tablet 81 mg  81 mg Oral DAILY    atorvastatin (LIPITOR) tablet 80 mg  80 mg Oral QHS    metoprolol tartrate (LOPRESSOR) tablet 25 mg  25 mg Oral BID    sodium zirconium cyclosilicate (LOKELMA) powder packet 10 g  10 g Oral TID WITH MEALS    amiodarone (CORDARONE) tablet 200 mg  200 mg Oral DAILY    amLODIPine (NORVASC) tablet 5 mg  5 mg Oral DAILY    hydrALAZINE (APRESOLINE) tablet 25 mg  25 mg Oral Q6H PRN       Review of Systems:     Complete 10-point review of systems were obtained and discussed in length  with the patient. Complete review of systems was negative/unremarkable  except as mentioned in HPI section.   Data Review:    Labs: Results:       Chemistry Recent Labs     11/30/22  0727 11/29/22  2157 11/29/22  1806 11/29/22  1205   GLU 68* 103* 78 74    141 140 139   K 4.5 4.8 4.7 4.3    103 103 102   CO2 29 29 23 25   BUN 72* 60* 56* 52*   CREA 14.30* 13.00* 12.30* 11.30*   CA 9.0 9.5 9.0 9.1   AGAP 8 9 14 12   BUCR 5* 5* 5* 5*   AP  --   -- 69 75   TP  --   --  6.8 6.7   ALB  --   --  2.9* 3.1*   GLOB  --   --  3.9 3.6   AGRAT  --   --  0.7* 0.9      CBC w/Diff Recent Labs     11/30/22  0727 11/29/22  1630 11/29/22  1205   WBC 5.6 7.1 7.7   RBC 2.85* 3.21* 3.42*   HGB 8.6* 9.9* 10.5*   HCT 26.7* 29.5* 31.8*    168 149   GRANS 54 79* 80*   LYMPH 27 11* 8*   EOS 6* 1 2      Coagulation Recent Labs     11/30/22  0727 11/29/22  2157   APTT 93.1* 52.3*       Iron/Ferritin Recent Labs     11/29/22  1718   IRON 66      BNP No results for input(s): BNPP in the last 72 hours. Cardiac Enzymes No results for input(s): CPK, CKND1, SAMARA in the last 72 hours. No lab exists for component: CKRMB, TROIP   Liver Enzymes Recent Labs     11/29/22  1806   TP 6.8   ALB 2.9*   AP 69      Thyroid Studies Lab Results   Component Value Date/Time    TSH 0.95 11/29/2022 05:18 PM         EKG: unchanged from previous tracings. Physical Assessment:   Visit Vitals  BP (!) 166/85   Pulse 76   Temp 99.1 °F (37.3 °C) (Temporal)   Resp 18   Ht 5' 11\" (1.803 m)   Wt 96.6 kg (213 lb)   SpO2 98%   BMI 29.71 kg/m²     Weight change:     Intake/Output Summary (Last 24 hours) at 11/30/2022 1538  Last data filed at 11/30/2022 1430  Gross per 24 hour   Intake --   Output 2000 ml   Net -2000 ml     Physical Exam:   General: comfortable, no acute distress   HEENT sclera anicteric, supple neck, no thyromegaly  CVS: S1S2 heard,  no rub  RS: + air entry b/l,   Abd: Soft, Non tender,   Neuro: non focal, awake, alert , CN II-XII are grossly intact  Extrm: NO edema, no cyanosis, clubbing   Skin: no visible  Rash  Musculoskeletal: No gross joints or bone deformities     Procedures/imaging: see electronic medical records for all procedures, Xrays and details which were not copied into this note but were reviewed prior to creation of Plan      Thank you very much for allowing me to participate in the care of this patient.     We will continue to monitor with you and make recommendations as needed.            Claus Munroe MD  November 30, 2022  [facility]  Office [phone]

## 2022-11-30 NOTE — CONSULTS
Cardiology Initial Patient Referral Note    Cardiology referral request from Gabriele DE LA CRUZ for evaluation and management/treatment of elevated troponin    Date of  Admission: 11/29/2022 12:07 PM   Primary Care Physician:  Ly Kelley MD    Attending Cardiologist: Dr. Abraham Marsh      Assessment:     -Dizziness with associated diaphoresis during HD. SBP reportedly in the low 90s at dialysis. -NSTEMI, troponin peaked at 1301 and down rending. Initial ECG AF, LVH with repolarization, worsening lateral ST changes.   -Hyperkalemia, K+ 6.6 on admission . -a/c HFpEF, CXR with vascular congestion.   -Paroxysmal symptomatic atrial fibrillation, s/p ablation (06/2022). Initially in AFib on admission, converted to SR. On Eliquis/ASA, amiodarone and lopressor as outpatient. -CAD, s/p prox-mLAD AMOL 06/2021. No h/o of angina. Cardiac cath 12/16/2021 with findings as follows: medical mgmt. Small nondominant RCA with diffuse disease. LM with 30% stenosis. LAD with ostial 30% as well as mid 45%. The previously stented proximal/mid segment is widely patent. The first diagonal branch is a small-caliber vessel with ostial 95% stenosis, likely culprit. Circumflex coronary artery has diffuse 30% stenosis throughout. It is dominant.  -Cardiomyopathy, initially dx in 2012  EF 40% by echo 12/2018 improved 50-55% in 12/2021.  -Severe pulmonary hypertension, s/p RHC 2012. The mean right atrial pressure was 10 mmHg with a prominent X-descent noted in the right atrial pressure tracing. Right ventricular pressure was 72/15 mmHg. Pulmonary artery pressure was 72/28 mmHg with a mean pulmonary artery pressure of 50 mmHg. Mean pulmonary capillary wedge pressure was 32 mmHg with an A-wave of 30 mmHg and a V-wave of 44 mmHg. The prominent V-wave is likely due to severe noncompliance in the left ventricle. Left ventricular pressure was 165/32 mmHg with a left ventricular end-diastolic pressure of 32 mmHg.   Aortic pressure was 165/90 mmHg. There was concordance of the left ventricular and right ventricular simultaneous pressure tracings consistent with a restrictive cardiomyopathy. Cardiac output by thermodilution was 4.5 liters minute. Pulmonary vascular resistance was 2 Wood units. -ESRD on HD T/R/S  -HTN. -DM  -Dyslipidemia.  -Chronic anemia. -Diabetic retinopathy, legally blind.  -Hx noncompliance. Primary cardiologist Dr. Erin Vargas. Plan:       I saw, evaluated, interviewed and examined the patient personally. 57-year-old male with a history of CAD, coronary stent, end-stage renal disease on dialysis, paroxysmal A. fib and ischemic cardiomyopathy. Patient came to hospital after having some dizziness and blurring of the vision and hypotension after receiving dialysis. According to patient he was in usual state of health. His blood pressure before starting dialysis yesterday was elevated. During dialysis he started feeling diaphoretic and felt like his vision got blurry. He was not able to read his text. His blood pressure was in 90s. He was sent to [facility] department where he was found to have elevation in troponin. Currently patient does not have any chest pain or chest tightness. He did not have chest pain yesterday. Hemodynamically stable. Blood pressure stable. No evidence of significant fluid overload. No significant edema. No rales. Abdomen is soft  Pertinent labs noted. EKG showed sinus rhythm. EKG yesterday showed atrial fibrillation. Recommendation:  Patient's elevated troponins is most likely because of patient's episode of hypotension and atrial fibrillation. He is back in sinus rhythm and his blood pressure is normal.  His EKG is not significantly changed from his previous EKG while he was in sinus rhythm as well as in A. fib. Patient does have a known CAD and had a cardiac catheterization in 12/2021. Patient does have CAD however medical management was recommended .   I do not suspect his presentation is likely from ACS. Continue aspirin, statin. Continue beta-blocker and amiodarone  Echo ordered  Patient's potassium was 6.6 upon presentation and nephrology team would like to dialyze patient today as he did not get dialysis yesterday. Discussed with patient regarding coronary evaluation. Medical management versus cardiac cath versus stress test discussed. Patient is concerned because of his brother  of ?? Cardiac disease recently. He is leaning toward considering cardiac catheterization however would like to think about it and discuss with primary cardiologist tomorrow. We will keep n.p.o. for tentative coronary evaluation tomorrow        Significant time spent in reviewing the case, multiple EMR databases, physician notes, reviewing pertinent labs and imaging studies  I spent significant amount of time for medical decision making and updated history, and other providers assessments as well. I personally agree with the findings as stated and the plan as documented. I saw, examined, and evaluated this patient and performed the substantive portion of the encounter for > 50% of the time including extensive history, physical exam, assessment and plan    Joy Krause MD       Continued on heparin gtt, Eliquis on hold, Last dose taken on . Continue ASA, statin. Plan for tentative ischemic workup tomorrow morning. Please keep NPO after MN. Patient scheduled for HD today. Hyperkalemia mgmt per nephrology. Converted to SR. Continue BB, amiodarone. Repeat ECG pending. Echo to measure LVEF and assess for regional wall motion abnormalities pending. Volume mgmt per nephrology. Further recommendations pending hospital course/test results. History of Present Illness: This is a 54 y.o. male admitted for NSTEMI (non-ST elevated myocardial infarction) ([facility] [I21.4]  [facility] [N18.6, Z99.2]  Atrial fibrillation with RVR ([facility] [I48.91].     Patient complains of: dizziness, diaphoresis   Bethanie Ortiz is a 54 y.o. male, pmhx as stated above, who we are seeing for elevated troponin. Patient states he missed two HD sessions last week because of the Thanksgiving. His last HD session was scheduled for Saturday, but because of transportation issues he was not able to attend. He reports gaining a few pounds and increased fatigue up until Monday. During HD, they were planning to take off extra fluid d/t his missed HD. About 3 hrs into the sessions, he started to feel dizzy and sweaty. He was told his BP was \"low\" and they stopped dialysis. Patient states prior to his last heart caths, he never had any CP or unusual symptoms. He is unclear what prompted his cardiologist to pursue heart catheterizations. Denies CP, SOB, N/V/D, near syncope or syncope, orthopnea, PND, LE edema. Cardiac risk factors: dyslipidemia, diabetes mellitus, male gender, hypertension  Review of Symptoms:  Except as stated above include:  Constitutional:  negative  Respiratory:  negative  Cardiovascular:  negative  Gastrointestinal: negative  Genitourinary:  negative  Musculoskeletal:  Negative  Neurological:  Negative  Dermatological:  Negative  Endocrinological: Negative  Psychological:  Negative    A comprehensive review of systems was negative except for that written in the HPI. Past Medical History:     Past Medical History:   Diagnosis Date    Abnormal nuclear cardiac imaging test 10/08/2012    Fixed anteroseptal, anteroapical defect c/w prior infarction. No ischemia. Mid & distal anteroseptal, anteroapical WMA. LVE. EF 44%. Anemia     dr. Kingsley Bentley doubt amyloid or multiple myeloma.  candidate for procirt if Hg <10    Benign hypertensive     Blindness of right eye 01/2013    legally blind    CAD (coronary artery disease)     Cardiomyopathy ejection fraction of 40%     CKD (chronic kidney disease), stage IV ([address]     to see Dr. Oliver Johnson 12/1/12    Congestive heart failure ([address] Diabetes mellitus ([facility]     Diabetic retinopathy ([facility]     Diabetic retinopathy ([facility]     Dr. Rosie Burch- injections    Heart failure [facility])     History of echocardiogram 04/22/2014    LVE. EF 55% (prev 40-45% on echo of 1/27/12). No WMA. Mild-mod conc LVH. Gr 3 DDfx. Marked LAE. Mild SHANTEL. Mild MR. Hypertension     [facility]     Renal Ultrasound 1/3/12    Right kidney isoechoic w/respect to liver. Sm left-sided pleural effusion    S/P cardiac cath 10/16/2012    LM patent. pLAD 95% (3.5 x 12-mm [place] stent, resid 0$%). LCX mild. Social History:     Social History     Socioeconomic History    Marital status:    Tobacco Use    Smoking status: Never    Smokeless tobacco: Never   Substance and Sexual Activity    Alcohol use: Not Currently     Alcohol/week: 4.2 standard drinks     Types: 5 Cans of beer per week    Drug use: No    Sexual activity: Yes     Partners: Female     Birth control/protection: None     Social Determinants of Health     Food Insecurity: No Food Insecurity    Worried About Running Out of Food in the Last Year: Never true    Ran Out of Food in the Last Year: Never true   Transportation Needs: Unmet Transportation Needs    Lack of Transportation (Medical): Yes    Lack of Transportation (Non-Medical): No   Physical Activity: Inactive    Days of Exercise per Week: 0 days    Minutes of Exercise per Session: 0 min   Stress: No Stress Concern Present    Feeling of Stress : Only a little   Social Connections:  Moderately Integrated    Frequency of Communication with Friends and Family: More than three times a week    Frequency of Social Gatherings with Friends and Family: More than three times a week    Attends Faith Services: More than 4 times per year    Active Member of [employer] or Organizations: Yes    Attends Club or Organization Meetings: 1 to 4 times per year    Marital Status:    Housing Stability: Unknown    Unable to Pay for Housing in the Last Year: No    Unstable Housing in the Last Year: No        Family History:     Family History   Problem Relation Age of Onset    Diabetes Mother     Hypertension Mother     Kidney Disease Mother     High Cholesterol Father     Diabetes Brother         pre diabetic        Medications:   No Known Allergies     Current Facility-Administered Medications   Medication Dose Route Frequency    heparin (porcine) 25,000 units in 0.45% saline 250 ml infusion  10-25 Units/kg/hr IntraVENous TITRATE    insulin lispro (HUMALOG) injection   SubCUTAneous AC&HS    glucose chewable tablet 16 g  4 Tablet Oral PRN    glucagon (GLUCAGEN) injection 1 mg  1 mg IntraMUSCular PRN    sodium chloride (NS) flush 5-40 mL  5-40 mL IntraVENous Q8H    sodium chloride (NS) flush 5-40 mL  5-40 mL IntraVENous PRN    acetaminophen (TYLENOL) tablet 650 mg  650 mg Oral Q6H PRN    Or    acetaminophen (TYLENOL) suppository 650 mg  650 mg Rectal Q6H PRN    polyethylene glycol (MIRALAX) packet 17 g  17 g Oral DAILY PRN    promethazine (PHENERGAN) tablet 12.5 mg  12.5 mg Oral Q6H PRN    Or    ondansetron (ZOFRAN) injection 4 mg  4 mg IntraVENous Q6H PRN    aspirin delayed-release tablet 81 mg  81 mg Oral DAILY    atorvastatin (LIPITOR) tablet 80 mg  80 mg Oral QHS    metoprolol tartrate (LOPRESSOR) tablet 25 mg  25 mg Oral BID    sodium zirconium cyclosilicate (LOKELMA) powder packet 10 g  10 g Oral TID WITH MEALS    amiodarone (CORDARONE) tablet 200 mg  200 mg Oral DAILY    amLODIPine (NORVASC) tablet 5 mg  5 mg Oral DAILY    hydrALAZINE (APRESOLINE) tablet 25 mg  25 mg Oral Q6H PRN         Physical Exam:   Visit Vitals  BP (!) 165/82 (BP 1 Location: Left upper arm, BP Patient Position: At rest;Supine;Semi fowlers)   Pulse 75   Temp 98.8 °F (37.1 °C)   Resp 18   Ht 5' 11\" (1.803 m)   Wt 97 kg (213 lb 13.5 oz)   SpO2 98%   BMI 29.83 kg/m²       TELE: normal sinus rhythm    BP Readings from Last 3 Encounters:   11/30/22 (!) 165/82   11/03/22 98/60   10/12/22 130/70     Pulse Readings from Last 3 Encounters:   11/30/22 75   11/03/22 63   10/12/22 71     Wt Readings from Last 3 Encounters:   11/29/22 97 kg (213 lb 13.5 oz)   11/09/22 90.7 kg (200 lb)   11/03/22 90.3 kg (199 lb)       General:  alert, cooperative, no distress, appears stated age  Neck:  no JVD  Lungs:  CTA B/L   Heart:  regular rate and rhythm, S1, S2 normal, no murmur, click, rub or gallop  Abdomen:  abdomen is soft without significant tenderness, masses, organomegaly or guarding  Extremities:  extremities normal, atraumatic, no cyanosis or edema  Skin: Warm and dry.  no hyperpigmentation, vitiligo, or suspicious lesions  Neuro: alert, oriented x3, affect appropriate, no focal neurological deficits, moves all extremities well, no involuntary movements  Psych: non focal     Data Review:     Recent Labs     11/29/22  1630 11/29/22  1205   WBC 7.1 7.7   HGB 9.9* 10.5*   HCT 29.5* 31.8*    149     Recent Labs     11/29/22  2157 11/29/22  1806 11/29/22  1205    140 139   K 4.8 4.7 4.3    103 102   CO2 29 23 25   * 78 74   BUN 60* 56* 52*   CREA 13.00* 12.30* 11.30*   CA 9.5 9.0 9.1   MG  --   --  2.2   ALB  --  2.9* 3.1*   ALT  --  17 16       Results for orders placed or performed during the hospital encounter of 11/29/22   EKG, 12 LEAD, INITIAL   Result Value Ref Range    Ventricular Rate 107 BPM    QRS Duration 94 ms    Q-T Interval 380 ms    QTC Calculation (Bezet) 507 ms    Calculated R Axis -17 degrees    Calculated T Axis 84 degrees    Diagnosis       Atrial fibrillation with rapid ventricular response  Moderate voltage criteria for LVH, may be normal variant ( R in aVL , Elmer   product )  Marked ST abnormality, possible inferior subendocardial injury  Abnormal ECG    Confirmed by Betsy Lakhani MD, Con Quivers ([id number]) on 11/29/2022 4:22:12 PM     Results for orders placed or performed in visit on 11/03/22   AMB POC EKG ROUTINE W/ 12 LEADS, INTER & REP    Impression    Normal sinus, normal intervals, normal ST segment/T-wave. All Cardiac Markers in the last 24 hours:  No results found for: CPK, CK, CKMMB, CKMB, RCK3, CKMBT, CKNDX, CKND1, SAMARA, TROPT, TROIQ, SAKINA, TROPT, TNIPOC, BNP, BNPP    Last Lipid:    Lab Results   Component Value Date/Time    Cholesterol, total 173 05/16/2022 08:04 AM    HDL Cholesterol 52 05/16/2022 08:04 AM    LDL, calculated 109.2 (H) 05/16/2022 08:04 AM    Triglyceride 59 05/16/2022 08:04 AM    CHOL/HDL Ratio 3.3 05/16/2022 08:04 AM       Cardiographics:     EKG Results       Procedure 720 Value Units Date/Time    EKG, 12 LEAD, SUBSEQUENT [[id number]]     Order Status: Sent     EKG, 12 LEAD, INITIAL [[id number]] Collected: 11/29/22 1212    Order Status: Completed Updated: 11/29/22 1622     Ventricular Rate 107 BPM      QRS Duration 94 ms      Q-T Interval 380 ms      QTC Calculation (Bezet) 507 ms      Calculated R Axis -17 degrees      Calculated T Axis 84 degrees      Diagnosis --     Atrial fibrillation with rapid ventricular response  Moderate voltage criteria for LVH, may be normal variant ( R in aVL , Moo   product )  Marked ST abnormality, possible inferior subendocardial injury  Abnormal ECG    Confirmed by Bob Mtz MD, Desiree Amezcua ([id number]) on 11/29/2022 4:22:12 PM            06/28/22    ECHO [place]APITO W OR WO CONTRAST 06/28/2022 6/28/2022    Interpretation Summary    Left Atrium: Normal sized appendage. No left atrial appendage thrombus noted. Left Ventricle: Low normal left ventricular systolic function with a visually estimated EF of 50 - 55%. Normal wall motion. Signed by: Reida Kayser, MD on 6/28/2022 10:21 AM        12/14/21    CARDIAC PROCEDURE 12/16/2021 12/16/2021    Conclusion  · Left dominant circulation. · Small nondominant RCA with diffuse disease. · Left main with 30% stenosis. · LAD with ostial 30% as well as mid 45%. The previously stented proximal/mid segment is widely patent.  The first diagonal branch is a small caliber vessel with ostial 95% stenosis. This may be the culprit. · Circumflex coronary artery has diffuse 30% stenosis throughout. It is dominant. · Continue medical therapy. Signed by: Henrique Guevara MD on 12/16/2021  9:33 AM      XR Results (most recent):  Results from [place] encounter on 11/29/22    XR CHEST PORT    Narrative  EXAM:  XR CHEST PORT    INDICATION:   lightheaded    COMPARISON: 8/16/2022. FINDINGS:  Mildly enlarged cardiac silhouette. Pulmonary vascular congestion. No  pneumothorax, pleural effusion or consolidation. Intact osseous structures. Impression  Enlarged cardiac silhouette with pulmonary vascular congestion.         Signed By: David Lewis PA-C     November 30, 2022

## 2022-11-30 NOTE — PROGRESS NOTES
PHYSICAL THERAPY EVALUATION AND DISCHARGE    Patient: Constance Jimenes (36 y.o. male)  Date: 11/30/2022  Primary Diagnosis: NSTEMI (non-ST elevated myocardial infarction) ([facility] [I21.4]  [facility] [N18.6, Z99.2]  Atrial fibrillation with RVR ([facility] [I48.91]       Precautions: standard     PLOF:Independent. Lives with sister    ASSESSMENT :  Based on the objective data described below, the patient is independent with functional mobility. He has no difficulty ambulating in the hallway. Steady gait. Denies SOB or chest pain with mobility. Patient does not require further skilled intervention at this level of care. PLAN :  Recommendations and Planned Interventions:    No formal PT needs identified at this time. Further Equipment Recommendations for Discharge: N/A    AMPAC:   At this time and based on an AM-PAC score of 24/24 (or **/20 if omitting stairs), no further PT is recommended upon discharge due to (i.e. patient at baseline functional statusetc). Recommend patient returns to prior setting with prior services. This AMPAC score should be considered in conjunction with interdisciplinary team recommendations to determine the most appropriate discharge setting. Patient's social support, diagnosis, medical stability, and prior level of function should also be taken into consideration. SUBJECTIVE:   Patient stated I was going to walk around last night .     OBJECTIVE DATA SUMMARY:     Past Medical History:   Diagnosis Date    Abnormal nuclear cardiac imaging test 10/08/2012    Fixed anteroseptal, anteroapical defect c/w prior infarction. No ischemia. Mid & distal anteroseptal, anteroapical WMA. LVE. EF 44%. Anemia     dr. Oscar Youssef doubt amyloid or multiple myeloma.  candidate for procirt if Hg <10    Benign hypertensive     Blindness of right eye 01/2013    legally blind    CAD (coronary artery disease)     Cardiomyopathy ejection fraction of 40%     CKD (chronic kidney disease), stage IV [facility])     to see Dr. Xiomara Burnett 12/1/12    [facility]     Diabetes mellitus ([facility]     Diabetic retinopathy ([facility]     Diabetic retinopathy ([facility]     Dr. Cruzito Atwood- injections    Heart failure [facility])     History of echocardiogram 04/22/2014    LVE. EF 55% (prev 40-45% on echo of 1/27/12). No WMA. Mild-mod conc LVH. Gr 3 DDfx. Marked LAE. Mild SHANTEL. Mild MR. Hypertension     Pulmonary hypertension ([facility]     Renal Ultrasound 1/3/12    Right kidney isoechoic w/respect to liver. Sm left-sided pleural effusion    S/P cardiac cath 10/16/2012    LM patent. pLAD 95% (3.5 x 12-mm [place] stent, resid 0$%). LCX mild. Past Surgical History:   Procedure Laterality Date    HX CORONARY STENT PLACEMENT      one    HX LAPAROTOMY  2/2012    bowel obstruction with removal segment of small bowel. Done by Riblet. HX LAPAROTOMY  infancy    whole in colon and had repair at that time. HX OTHER SURGICAL  06/20/2013    left eye retna attatchment    HX RETINAL DETACHMENT REPAIR      august 2012    TN REPAIR ING HERNIA,5+Y/O,REDUCIBL Left 05/02/2019    Dr. Geeta Valadez     Barriers to Learning/Limitations: None  Compensate with: N/A  Home Situation:   Home Situation  Home Environment: Apartment  # Steps to Enter: 5  One/Two Story Residence: Split level  # of Interior Steps: 10  Height of Each Step (in): 4 inches  Interior Rails: Both  Lift Chair Available: No  Living Alone: No  Support Systems: Parent(s), Other Family Member(s)  Patient Expects to be Discharged to[de-identified] Home  Current DME Used/Available at Home: None  Critical Behavior:              Psychosocial  Patient Behaviors: Calm; Cooperative                 Strength:    Strength:  Within functional limits                    Tone & Sensation:   Tone: Normal              Sensation: Intact               Range Of Motion:  AROM: Within functional limits                 Functional Mobility:  Bed Mobility:     Supine to Sit: Independent  Sit to Supine: Independent Transfers:  Sit to Stand: Independent  Stand to Sit: Independent                Balance:   Sitting: Intact  Standing: Intact       Ambulation/Gait Training:        Gait Description (WDL): Within defined limits        Pain:  Pain level pre-treatment: 0/10   Pain level post-treatment: 0/10  Pain Intervention(s): Medication (see MAR); Rest, Ice, Repositioning   Response to intervention: Nurse notified, See doc flow    Activity Tolerance:   Good  Please refer to the flowsheet for vital signs taken during this treatment. After treatment:   []         Patient left in no apparent distress sitting up in chair  [x]         Patient left in no apparent distress in bed  [x]         Call bell left within reach  [x]         Nursing notified  []         Caregiver present  []         Bed alarm activated  []         SCDs applied    COMMUNICATION/EDUCATION:   [x]         Role of Physical Therapy in the acute care setting. []         Fall prevention education was provided and the patient/caregiver indicated understanding. []         Patient/family have participated as able in goal setting and plan of care. []         Patient/family agree to work toward stated goals and plan of care. []         Patient understands intent and goals of therapy, but is neutral about his/her participation. []         Patient is unable to participate in goal setting/plan of care: ongoing with therapy staff.  []         Other:     Thank you for this referral.  Magnus Polanco, PT   Time Calculation: 9 mins      Eval Complexity: History: LOW Complexity : Zero comorbidities / personal factors that will impact the outcome / POCExam:LOW Complexity : 1-2 Standardized tests and measures addressing body structure, function, activity limitation and / or participation in recreation  Presentation: LOW Complexity : Stable, uncomplicated  Clinical Decision Making:Low Complexity    Overall Complexity:LOW     [employer] AM-PAC® Basic Mobility Inpatient Short Form (6-Clicks) Version 2    How much HELP from another person does the patient currently need    (If the patient hasn't done an activity recently, how much help from another person do you think he/she would need if he/she tried?)   Total (Total A or Dep)   A Lot  (Mod to Max A)   A Little (Sup or Min A)   None (Mod I to I)   Turning from your back to your side while in a flat bed without using bedrails? [] 1 [] 2 [] 3 [x] 4   2. Moving from lying on your back to sitting on the side of a flat bed without using bedrails? [] 1 [] 2 [] 3 [x] 4   3. Moving to and from a bed to a chair (including a wheelchair)? [] 1 [] 2 [] 3 [x] 4   4. Standing up from a chair using your arms (e.g., wheelchair, or bedside chair)? [] 1 [] 2 [] 3 [x] 4   5. Walking in hospital room? [] 1 [] 2 [] 3 [x] 4   6. Climbing 3-5 steps with a railing?+   [] 1 [] 2 [] 3 [x] 4   +If stair climbing cannot be assessed, skip item #6. Sum responses from items 1-5.

## 2022-11-30 NOTE — PROGRESS NOTES
Received pre HD report from  ROLLY Gayle RN. Pt in bed, A+O x4, no s/s of distress noted, on a heparin gtt. Accessed AVF Right forearm w/15G needle per protocol. Tx initiated at 1100. AVF flowing with ease. For hemodynamic stability UF goal 2500 ml. Offered assistance with repositioning every 2 hours. Vascular access visible at all times throughout entire duration of treatment and line connections remain intact throughout entire duration of treatment. Tx completed at 1430, tolerated well 2L removed. De-accessed per protocol. Clot time 5 minutes for arterial, and 5 minutes for venous. Unit nurse given report.                       ACUTE HEMODIALYSIS FLOW SHEET      HEMODIALYSIS ORDERS: Physician:  Janny Monahan     Dialyzer: Revaclear   Duration: 3.5   BFR: 350  DFR: 600   Dialysate:  Temp 36-37*C   K+  2    Ca+ 2.5   Na 138  Bicarb 35   Wt Readings from Last 1 Encounters:        Patient Chart [x]   Unable to Obtain []  Dry weight/UF Goal:2500   Heparin []  Bolus    Units    [] Hourly    Units    [x]None       Pre BP  163/85    Pulse: 75 Respirations: 18 Temp: 98.2 [x] Tempoal  [] Ax  [] Esoph   Labs: []  Pre  []  Post:   [x] N/A   Additional Orders (medications, blood products, hypotension management): [] Yes   [x] No       [x]   [facility] Consent Verified            GRAFT/FISTULA ACCESS:   []N/A     [x]Right     []Left     [x]UE     []LE   []AVG   [x]AVF       [x]Medical Aseptic Prep Utilized   [x]No S/S infection  []Redness  []Drainage [] Cultured  [] Swelling  [] Pain  Bruit:   [x] Strong    [] Weak       Thrill :   [x] Strong    [] Weak     Needle Gauge: 15   Length: 1 inch   If access problem,  notified: []Yes     [x]N/A                GENERAL ASSESSMENT:    LUNGS:  Resp Rate 18   [x] Clear  [] Coarse  [] Crackles  [] Wheezing  [] Diminished                                                           [] RLL   [] LLL  [] RUL   [] ROBERT            Respirations:  [x]Easy  []Labored  [x]N/A  Cough:  []Productive  []Dry []N/A               Therapy:  [x]RA   [] Ventilated   [] Intubated   [] Trach            O2 Device:  [] NC   [] NRB  [] Trach Mask  [] BiPaP  Flow:   l/min                                                    CARDIAC: [x] Regular      [] Irregular   [] Rhythm:          [] Monitored   [] Bedside   [] Remotely monitored       EDEMA: [x] None   []Generalized  [] Pitting [] 1+   [] 2 +   [] 3+    [] 4+        SKIN:   [] Hot     [] Cold    [x] Warm   [] Dry    [] Diaphoretic                 [] Flushed  [] Jaundiced  [] Cyanotic  [] Pale      LOC:    [x] Alert      [x]Oriented:    [x] Person     [x] Place   [x]Time               [] Confused  [] Lethargic  [] Medicated  [] Non-responsive  [] Non-Verbal     GI / ABDOMEN:                     [] Flat    [] Distended    [x] Soft    [] Firm   []  Obese                   [] Diarrhea   [] FMS [] Bowel Sounds  [] Nausea  [] Vomiting                   [] NGT  [] OGT  [] PEG  [] Tube Feedings @     mL/hr     / URINE ASSESSMENT:                   [] Voiding    [] Oliguria  [x] Anuria                     []  [place]   [] Incontinent  []  Incontinent Brief   []  PureWick     PAIN:  [x] 0 []1  []2   []3   []4   []5   []6   []7   []8   []9   []10                MOBILITY:  [x] Bed    [] Stretcher      All Vitals and Treatment Details on Attached [address]: [facility]          Room # 566/55    [] Routine         [x] 1st Time Acute/Chronic   [] Urgent      [] Stat              [x] Acute Room   []  Bedside    [] ICU/CCU     [] ER     Isolation Precautions:  [x] Dialysis    There are currently no Active Isolations     ALLERGIES:     No Known Allergies     Code Status:  Full Code     Hepatitis Status      Lab Results   Component Value Date/Time    Hepatitis A Abs Negative 01/03/2012 01:40 PM    Hepatitis B surface Ag <0.10 12/14/2021 10:00 AM    Hepatitis B surface Ab 664.19 12/14/2021 10:00 AM    Hep B Core Ab, total NEGATIVE 12/05/2018 02:50 PM    Hepatitis C virus Ab 0.08 12/05/2018 02:50 PM        Current Labs:      Lab Results   Component Value Date/Time    WBC 5.6 11/30/2022 07:27 AM    Hemoglobin, POC 10.9 (L) 05/02/2019 10:00 AM    HGB 8.6 (L) 11/30/2022 07:27 AM    Hematocrit, POC 32 (L) 05/02/2019 10:00 AM    HCT 26.7 (L) 11/30/2022 07:27 AM    PLATELET 324 94/27/7655 07:27 AM    MCV 93.7 11/30/2022 07:27 AM     Lab Results   Component Value Date/Time    Sodium 141 11/30/2022 07:27 AM    Potassium 4.5 11/30/2022 07:27 AM    Chloride 104 11/30/2022 07:27 AM    CO2 29 11/30/2022 07:27 AM    Anion gap 8 11/30/2022 07:27 AM    Glucose 68 (L) 11/30/2022 07:27 AM    BUN 72 (H) 11/30/2022 07:27 AM    Creatinine 14.30 (H) 11/30/2022 07:27 AM    BUN/Creatinine ratio 5 (L) 11/30/2022 07:27 AM    GFR est AA 7 (L) 08/16/2022 12:10 PM    GFR est non-AA 6 (L) 08/16/2022 12:10 PM    Calcium 9.0 11/30/2022 07:27 AM          DIET:  DIET ADULT  DIET ADULT ORAL NUTRITION SUPPLEMENT  DIET NPO     PRIMARY NURSE REPORT:   Pre Dialysis: Libby Hewitt RN    Time: 80    EDUCATION:    [x] Patient           Knowledge Basis: []None []Minimal [x] Substantial [] Unknown  Barriers to learning  [x]None  [] Intubated/Trached/Ventilated  [] Sedated/Paralyzed   [x] Access Care     [] S&S of infection  [] Fluid Management  [] K+   [x] Procedural    [] Medications   [] Tx Options   [] Transplant   [] Diet      Teaching Tools:  [x] Explain  [] Demo  [] Handouts [] Video  Patient response: [x] Verbalized understanding   [] Requires follow up        [x] Time Out/Safety Check    [x] Extracorporeal Circuit Tested for integrity       RO/HEMODIALYSIS MACHINE SAFETY CHECKS - Before each treatment:        [facility]                                    [x] Unit Machine # 5 with centralized RO                                  [] Portable Machine #1/RO serial # [id number]                                  [] Portable Machine #2/RO serial # [id number]                                  [] Portable Machine #4/RO serial # [id number] [] Portable Machine #3/RO serial # [id number]                                                                                                       Alarm Test:  Pass time 1009         [x] RO/Machine Log Complete    Machine Temp    36-37*C             Dialysate: pH  7.4    Conductivity: Meter 14.0    HD Machine  13.9    TCD: 13.7  Dialyzer Lot # [id number]    Blood Tubing Lot # [id number]    Saline Lot #[id number]     CHLORINE TESTING-Before each treatment and every 4 hours    Total Chlorine: [x] less than 0.1 ppm  Initial Time Check: 1035     4 Hr/2nd Check Time: 1435   (if greater than 0.1 ppm from Primary then every 30 minutes from Secondary)     TREATMENT INITIATION - with Dialysis Precautions:   [x] All Connections Secured              [x] Saline Line Double Clamped   [x] Venous Parameters Set               [x] Arterial Parameters Set    [x] Prime Given 250ml NSS              [x]Air Foam Detector Engaged                   Medication    Dose    Volume Route      Time       Jacque Nurse      HD  Erika Smith,  CHARLIE      HD         HD                  Post Assessment  Dialyzer Cleared:   [] Good  [x] Fair  [] Poor  Blood processed:  70.3 L  UF Removed:  2000 Ml    Post BP: 166/85  Pulse: 76  Respirations: 18   Temp: 99.1  [x] Temporal  [] Ax  [] Esophageal   Lungs: [] Clear                [x] No change from initial assessment   Post Tx Vascular Access: [] N/A  AVF/AVG: Bleeding stopped with  Arterial Pressure for 5 min   Venous Pressure for 5 min      Cardiac:  [x] Regular   [] Irregular   Rhythm:  [] Monitored   [] Not Monitored    CVC Catheter: [x] N/A  Locking solution: Heparin 1:1000 U  Arterial port   ml   Venous port   ml   Edema:  [x] None  [] Generalized                     Skin:[x] Warm  [] Dry [] Diaphoretic               [] Flushed  [] Pale [] Cyanotic Pain:  [x]0  []1-2  []3-4  []5-6   []7-8  []9-10           Post Treatment Note:   See above note             POST TREATMENT PRIMARY NURSE HANDOFF REPORT:   Post Dialysis: Jennifer Matthews RN        Time:  9277         Abbreviations: AVG-arterial venous graft, AVF-arterial venous fistula, IJ-Internal Jugular, Subcl-Subclavian, Fem-Femoral, Tx-treatment, AP/HR-apical heart rate, VSS- Vital Signs Stable, CVC- Central Venous Catheter, DFR-dialysate flow rate, BFR-blood flow rate, AP-arterial pressure, -venous pressure, UF-ultrafiltrate, TMP-transmembrane pressure, Zay-Venous, Art-Arterial, RO-Reverse Osmosis

## 2022-11-30 NOTE — PROGRESS NOTES
Received patient at shift change with Heparin drip running not based off a current PTT result. This patient was brought by ER  to floor @ 1855 11/29/22. Stat PTT draw placed which resulted @0125 am see chart. Patient is not in any distress at this time.  Will updated receiving nurse with SBAR,

## 2022-11-30 NOTE — PROGRESS NOTES
Pt arrived via transport alert and oriented. Placed on tele monitor and vitals taken. Heparin gtt started in the ED with no PTT resulted. Called and spoke to phamacy; rec stat PTT before adjusting dose. Will continue to monitor.

## 2022-11-30 NOTE — PROGRESS NOTES
Problem: Patient Education: Go to Patient Education Activity  Goal: Patient/Family Education  Outcome: Progressing Towards Goal     Problem: Pain  Goal: *Control of Pain  Outcome: Progressing Towards Goal     Problem: General Medical Care Plan  Goal: *Labs within defined limits  Outcome: Progressing Towards Goal

## 2022-11-30 NOTE — CONSULTS
Consult Note  Consult requested by: Dr. Miriam De Dios is a 54 y.o. male BLACK/ who is being seen on consult for ESRD management   Chief Complaint   Patient presents with    Hypotension    Dizziness     Admission diagnosis: dizziness   43-year-old male with past medical history of diabetes, hypertension ESRD coronary artery disease admitted with chest pain, following for ESRD management. Impression & Plan:   IMPRESSION:   ESRD Tuesday Thursday Saturday schedule, did not finish dialysis completed yesterday  Access left arm AV fistula  Hyperkalemia  NSTEMI, dizziness during dialysis, blood pressure was on lower side  Heart failure with preserved ejection fraction and reduced ejection fraction, history of pulmonary hypertension chest x-ray shows congestive changes on this admission  Paroxysmal atrial fibrillation, on anticoagulation   PLAN:   Plan for arrange dialysis this morning with 2K bath UF goal 2.5 to 3 L as tolerated. Case discussed with cardiology colleague. Continue heparin per cardiology colleague. Adjust medication per ESRD status. HPI: 43-year-old male with past medical history of hypertension, diabetes, ESRD came to the emergency room from dialysis admitted for dizziness and chest pain. He was receiving dialysis his blood pressure dropped feels dizziness and chest pain  He did not finish his dialysis completely. His work-up in the emergency room shows NSTEMI, started on heparin and evaluated by cardiology colleague. He also had A. fib with RVR, received metoprolol in the ER. He does not have any symptom or evidence of sepsis. His left shows hyperkalemia, started on Lokelma. I saw him this morning on the floor, denies for any chest pain short of breath or dizziness. His lab is pending this morning. He did not receive dialysis as well as he has hyperkalemia on arrival I recommend hemodialysis today he agreed with that.   Past Medical History:   Diagnosis Date Abnormal nuclear cardiac imaging test 10/08/2012    Fixed anteroseptal, anteroapical defect c/w prior infarction. No ischemia. Mid & distal anteroseptal, anteroapical WMA. LVE. EF 44%. Anemia     dr. Kareem John doubt amyloid or multiple myeloma. candidate for procirt if Hg <10    Benign hypertensive     Blindness of right eye 01/2013    legally blind    CAD (coronary artery disease)     Cardiomyopathy ejection fraction of 40%     CKD (chronic kidney disease), stage IV ([address]     to see Dr. Casper 12/1/12    Congestive heart failure ([facility]     Diabetes mellitus ([facility]     Diabetic retinopathy ([facility]     Diabetic retinopathy ([facility]     Dr. Mitzi Burleson- injections    Heart failure [facility])     History of echocardiogram 04/22/2014    LVE. EF 55% (prev 40-45% on echo of 1/27/12). No WMA. Mild-mod conc LVH. Gr 3 DDfx. Marked LAE. Mild SHANTEL. Mild MR. Hypertension     Pulmonary hypertension ([address]     Renal Ultrasound 1/3/12    Right kidney isoechoic w/respect to liver. Sm left-sided pleural effusion    S/P cardiac cath 10/16/2012    LM patent. pLAD 95% (3.5 x 12-mm [place] stent, resid 0$%). LCX mild. Past Surgical History:   Procedure Laterality Date    HX CORONARY STENT PLACEMENT      one    HX LAPAROTOMY  2/2012    bowel obstruction with removal segment of small bowel. Done by Riblet. HX LAPAROTOMY  infancy    whole in colon and had repair at that time.     HX OTHER SURGICAL  06/20/2013    left eye retna attatchment    HX RETINAL DETACHMENT REPAIR      august 2012    DC REPAIR ING HERNIA,5+Y/O,REDUCIBL Left 05/02/2019    Dr. Moise Rosado History     Socioeconomic History    Marital status:      Spouse name: Not on file    Number of children: Not on file    Years of education: Not on file    Highest education level: Not on file   Occupational History    Not on file   Tobacco Use    Smoking status: Never    Smokeless tobacco: Never   Substance and Sexual Activity    Alcohol use: Not Currently Alcohol/week: 4.2 standard drinks     Types: 5 Cans of beer per week    Drug use: No    Sexual activity: Yes     Partners: Female     Birth control/protection: None   Other Topics Concern    Not on file   Social History Narrative    Not on file     Social Determinants of Health     Financial Resource Strain: Not on file   Food Insecurity: No Food Insecurity    Worried About Running Out of Food in the Last Year: Never true    Ran Out of Food in the Last Year: Never true   Transportation Needs: Unmet Transportation Needs    Lack of Transportation (Medical): Yes    Lack of Transportation (Non-Medical): No   Physical Activity: Inactive    Days of Exercise per Week: 0 days    Minutes of Exercise per Session: 0 min   Stress: No Stress Concern Present    Feeling of Stress : Only a little   Social Connections: Moderately Integrated    Frequency of Communication with Friends and Family: More than three times a week    Frequency of Social Gatherings with Friends and Family: More than three times a week    Attends Gnosticist Services: More than 4 times per year    Active Member of [employer] or Organizations: Yes    Attends Club or Organization Meetings: 1 to 4 times per year    Marital Status:    Intimate Partner Violence: Not At Risk    Fear of Current or Ex-Partner: No    Emotionally Abused: No    Physically Abused: No    Sexually Abused: No   Housing Stability: Unknown    Unable to Pay for Housing in the Last Year: No    Number of Places Lived in the Last Year: Not on file    Unstable Housing in the Last Year: No       Family History   Problem Relation Age of Onset    Diabetes Mother     Hypertension Mother     Kidney Disease Mother     High Cholesterol Father     Diabetes Brother         pre diabetic     No Known Allergies     Home Medications:     Prior to Admission Medications   Prescriptions Last Dose Informant Patient Reported? Taking?    Blood-Glucose Meter monitoring kit   No No   Sig: Check fasting glucose Eliquis 2.5 mg tablet 2022  No Yes   Sig: TAKE 1 TABLET BY MOUTH EVERY 12 HOURS   alum-mag hydroxide-simeth (MYLANTA) 200-200-20 mg/5 mL susp   Yes Yes   Sig: Take  by mouth. amLODIPine (NORVASC) 5 mg tablet 2022 at 900  No Yes   Sig: Take 1 Tablet by mouth daily. amiodarone (CORDARONE) 200 mg tablet 2022  No Yes   Sig: TAKE 1 TABLET BY MOUTH DAILY   aspirin delayed-release 81 mg tablet 2022 at 900  No Yes   Sig: Take 1 Tablet by mouth daily. atorvastatin (LIPITOR) 80 mg tablet 2022  No Yes   Sig: Take 1 Tablet by mouth nightly. benzoyl peroxide-erythromycin (BENZAMYCIN) 3-5 % topical gel 10/29/2022  Yes Yes   Sig: Apply  to affected area four (4) times daily. Apply to right eye   cinacalcet (SENSIPAR) 30 mg tablet 2022 at 900  Yes Yes   Sig: Take 30 mg by mouth. TAKE 1 TABLET(30MG) BY MOUTH 3 TIMES WEEKLY: MONDAY, WEDNESDAY, AND FRIDAY IN THE EVENING. glipiZIDE SR (GLUCOTROL XL) 2.5 mg CR tablet 2022  No Yes   Sig: Take 1 Tablet by mouth daily. glucose blood VI test strips (Accu-Chek Beatrice Plus test strp) strip 2022  No Yes   Sig: Use as directed   linaGLIPtin (Tradjenta) 5 mg tablet 2022  Yes Yes   Sig: Take 1 Tablet by mouth daily. lisinopriL (PRINIVIL, ZESTRIL) 20 mg tablet   Yes No   Sig: Take 20 mg by mouth daily. metoprolol tartrate (LOPRESSOR) 25 mg tablet 2022 at 900  No Yes   Sig: TAKE 1 TABLET BY MOUTH TWICE DAILY   nitroglycerin (NITROLINGUAL) 400 mcg/spray spray   No No   Si Spray by SubLINGual route every five (5) minutes as needed for Chest Pain. sucroferric oxyhydroxide (VELPHORO) 500 mg chew chewable tablet Not Taking  Yes No   Sig: Take 500 mg by mouth three (3) times daily (with meals).    Patient not taking: Reported on 2022      Facility-Administered Medications: None       Current Facility-Administered Medications   Medication Dose Route Frequency    0.9% sodium chloride infusion 250 mL  250 mL IntraVENous DIALYSIS PRN    heparin (porcine) 25,000 units in 0.45% saline 250 ml infusion  10-25 Units/kg/hr IntraVENous TITRATE    insulin lispro (HUMALOG) injection   SubCUTAneous AC&HS    glucose chewable tablet 16 g  4 Tablet Oral PRN    glucagon (GLUCAGEN) injection 1 mg  1 mg IntraMUSCular PRN    sodium chloride (NS) flush 5-40 mL  5-40 mL IntraVENous Q8H    sodium chloride (NS) flush 5-40 mL  5-40 mL IntraVENous PRN    acetaminophen (TYLENOL) tablet 650 mg  650 mg Oral Q6H PRN    Or    acetaminophen (TYLENOL) suppository 650 mg  650 mg Rectal Q6H PRN    polyethylene glycol (MIRALAX) packet 17 g  17 g Oral DAILY PRN    promethazine (PHENERGAN) tablet 12.5 mg  12.5 mg Oral Q6H PRN    Or    ondansetron (ZOFRAN) injection 4 mg  4 mg IntraVENous Q6H PRN    aspirin delayed-release tablet 81 mg  81 mg Oral DAILY    atorvastatin (LIPITOR) tablet 80 mg  80 mg Oral QHS    metoprolol tartrate (LOPRESSOR) tablet 25 mg  25 mg Oral BID    sodium zirconium cyclosilicate (LOKELMA) powder packet 10 g  10 g Oral TID WITH MEALS    amiodarone (CORDARONE) tablet 200 mg  200 mg Oral DAILY    amLODIPine (NORVASC) tablet 5 mg  5 mg Oral DAILY    hydrALAZINE (APRESOLINE) tablet 25 mg  25 mg Oral Q6H PRN       Review of Systems:     Complete 10-point review of systems were obtained and discussed in length  with the patient. Complete review of systems was negative/unremarkable  except as mentioned in HPI section.   Data Review:    Labs: Results:       Chemistry Recent Labs     11/30/22  0727 11/29/22  2157 11/29/22  1806 11/29/22  1205   GLU 68* 103* 78 74    141 140 139   K 4.5 4.8 4.7 4.3    103 103 102   CO2 29 29 23 25   BUN 72* 60* 56* 52*   CREA 14.30* 13.00* 12.30* 11.30*   CA 9.0 9.5 9.0 9.1   AGAP 8 9 14 12   BUCR 5* 5* 5* 5*   AP  --   --  69 75   TP  --   --  6.8 6.7   ALB  --   --  2.9* 3.1*   GLOB  --   --  3.9 3.6   AGRAT  --   --  0.7* 0.9      CBC w/Diff Recent Labs     11/30/22  0727 11/29/22  1630 11/29/22  1205   WBC 5.6 7.1 7.7   RBC 2.85* 3.21* 3.42*   HGB 8.6* 9.9* 10.5*   HCT 26.7* 29.5* 31.8*    168 149   GRANS 54 79* 80*   LYMPH 27 11* 8*   EOS 6* 1 2      Coagulation Recent Labs     11/30/22  0727 11/29/22  2157   APTT 93.1* 52.3*       Iron/Ferritin Recent Labs     11/29/22  1718   IRON 66      BNP No results for input(s): BNPP in the last 72 hours. Cardiac Enzymes No results for input(s): CPK, CKND1, SAMARA in the last 72 hours. No lab exists for component: CKRMB, TROIP   Liver Enzymes Recent Labs     11/29/22  1806   TP 6.8   ALB 2.9*   AP 69      Thyroid Studies Lab Results   Component Value Date/Time    TSH 0.95 11/29/2022 05:18 PM         EKG: unchanged from previous tracings. Physical Assessment:   Visit Vitals  BP (!) 161/83   Pulse 76   Temp 98.3 °F (36.8 °C)   Resp 19   Ht 5' 11\" (1.803 m)   Wt 96.6 kg (213 lb)   SpO2 98%   BMI 29.71 kg/m²     Weight change:   No intake or output data in the 24 hours ending 11/30/22 1025  Physical Exam:   General: comfortable, no acute distress   HEENT sclera anicteric, supple neck, no thyromegaly  CVS: S1S2 heard,  no rub  RS: + air entry b/l,   Abd: Soft, Non tender,   Neuro: non focal, awake, alert , CN II-XII are grossly intact  Extrm: NO edema, no cyanosis, clubbing   Skin: no visible  Rash  Musculoskeletal: No gross joints or bone deformities     Procedures/imaging: see electronic medical records for all procedures, Xrays and details which were not copied into this note but were reviewed prior to creation of Plan      Thank you very much for allowing me to participate in the care of this patient. We will continue to monitor with you and make recommendations as needed.            Marcela Barnes MD  November 30, 2022  [facility]  Office [phone]

## 2022-11-30 NOTE — PROGRESS NOTES
Admission assessment completed patient c/o of CNA and myself he had restless leg. Writer asked what did he take for this ? Patient stated he called Primary MD awaiting a appointment . Writer offer tynelol patient stated that it didn't feel like pain more like cramps/ spasms. Patient also stated he only had his metoprolol today no other medication because of dialysis. Writer paged on call hospitalist see orders. Will updated receiving nurse of patient status.

## 2022-12-01 LAB
ANION GAP SERPL CALC-SCNC: 9 MMOL/L (ref 3–18)
APTT PPP: 42.2 SEC (ref 23–36.4)
BASOPHILS # BLD: 0.1 K/UL (ref 0–0.1)
BASOPHILS NFR BLD: 1 % (ref 0–2)
BUN SERPL-MCNC: 40 MG/DL (ref 7–18)
BUN/CREAT SERPL: 4 (ref 12–20)
CALCIUM SERPL-MCNC: 10.4 MG/DL (ref 8.5–10.1)
CHLORIDE SERPL-SCNC: 101 MMOL/L (ref 100–111)
CO2 SERPL-SCNC: 29 MMOL/L (ref 21–32)
CREAT SERPL-MCNC: 9.66 MG/DL (ref 0.6–1.3)
DIFFERENTIAL METHOD BLD: ABNORMAL
EOSINOPHIL # BLD: 0.3 K/UL (ref 0–0.4)
EOSINOPHIL NFR BLD: 6 % (ref 0–5)
ERYTHROCYTE [DISTWIDTH] IN BLOOD BY AUTOMATED COUNT: 15.6 % (ref 11.6–14.5)
GLUCOSE BLD STRIP.AUTO-MCNC: 112 MG/DL (ref 70–110)
GLUCOSE BLD STRIP.AUTO-MCNC: 84 MG/DL (ref 70–110)
GLUCOSE SERPL-MCNC: 82 MG/DL (ref 74–99)
HCT VFR BLD AUTO: 29.3 % (ref 36–48)
HGB BLD-MCNC: 9.5 G/DL (ref 13–16)
IMM GRANULOCYTES # BLD AUTO: 0 K/UL (ref 0–0.04)
IMM GRANULOCYTES NFR BLD AUTO: 0 % (ref 0–0.5)
LYMPHOCYTES # BLD: 1.1 K/UL (ref 0.9–3.6)
LYMPHOCYTES NFR BLD: 21 % (ref 21–52)
MAGNESIUM SERPL-MCNC: 2.3 MG/DL (ref 1.6–2.6)
MCH RBC QN AUTO: 30.4 PG (ref 24–34)
MCHC RBC AUTO-ENTMCNC: 32.4 G/DL (ref 31–37)
MCV RBC AUTO: 93.6 FL (ref 78–100)
MONOCYTES # BLD: 0.7 K/UL (ref 0.05–1.2)
MONOCYTES NFR BLD: 14 % (ref 3–10)
NEUTS SEG # BLD: 3.1 K/UL (ref 1.8–8)
NEUTS SEG NFR BLD: 58 % (ref 40–73)
NRBC # BLD: 0 K/UL (ref 0–0.01)
NRBC BLD-RTO: 0 PER 100 WBC
PLATELET # BLD AUTO: 154 K/UL (ref 135–420)
PMV BLD AUTO: 10.3 FL (ref 9.2–11.8)
POTASSIUM SERPL-SCNC: 4.3 MMOL/L (ref 3.5–5.5)
RBC # BLD AUTO: 3.13 M/UL (ref 4.35–5.65)
SODIUM SERPL-SCNC: 139 MMOL/L (ref 136–145)
WBC # BLD AUTO: 5.2 K/UL (ref 4.6–13.2)

## 2022-12-01 PROCEDURE — 4A023N7 MEASUREMENT OF CARDIAC SAMPLING AND PRESSURE, LEFT HEART, PERCUTANEOUS APPROACH: ICD-10-PCS | Performed by: INTERNAL MEDICINE

## 2022-12-01 PROCEDURE — C1760 CLOSURE DEV, VASC: HCPCS | Performed by: INTERNAL MEDICINE

## 2022-12-01 PROCEDURE — 90935 HEMODIALYSIS ONE EVALUATION: CPT

## 2022-12-01 PROCEDURE — 74011000636 HC RX REV CODE- 636: Performed by: INTERNAL MEDICINE

## 2022-12-01 PROCEDURE — 94761 N-INVAS EAR/PLS OXIMETRY MLT: CPT

## 2022-12-01 PROCEDURE — 74011250636 HC RX REV CODE- 250/636: Performed by: INTERNAL MEDICINE

## 2022-12-01 PROCEDURE — C1769 GUIDE WIRE: HCPCS | Performed by: INTERNAL MEDICINE

## 2022-12-01 PROCEDURE — 027034Z DILATION OF CORONARY ARTERY, ONE ARTERY WITH DRUG-ELUTING INTRALUMINAL DEVICE, PERCUTANEOUS APPROACH: ICD-10-PCS | Performed by: INTERNAL MEDICINE

## 2022-12-01 PROCEDURE — 92928 PRQ TCAT PLMT NTRAC ST 1 LES: CPT | Performed by: INTERNAL MEDICINE

## 2022-12-01 PROCEDURE — B2111ZZ FLUOROSCOPY OF MULTIPLE CORONARY ARTERIES USING LOW OSMOLAR CONTRAST: ICD-10-PCS | Performed by: INTERNAL MEDICINE

## 2022-12-01 PROCEDURE — 77030004558 HC CATH ANGI DX SUPR TORQ CARD -A: Performed by: INTERNAL MEDICINE

## 2022-12-01 PROCEDURE — 74011000250 HC RX REV CODE- 250: Performed by: INTERNAL MEDICINE

## 2022-12-01 PROCEDURE — C1725 CATH, TRANSLUMIN NON-LASER: HCPCS | Performed by: INTERNAL MEDICINE

## 2022-12-01 PROCEDURE — 83735 ASSAY OF MAGNESIUM: CPT

## 2022-12-01 PROCEDURE — 74011000258 HC RX REV CODE- 258: Performed by: INTERNAL MEDICINE

## 2022-12-01 PROCEDURE — 85025 COMPLETE CBC W/AUTO DIFF WBC: CPT

## 2022-12-01 PROCEDURE — 99232 SBSQ HOSP IP/OBS MODERATE 35: CPT | Performed by: HOSPITALIST

## 2022-12-01 PROCEDURE — 74011250637 HC RX REV CODE- 250/637: Performed by: INTERNAL MEDICINE

## 2022-12-01 PROCEDURE — 85730 THROMBOPLASTIN TIME PARTIAL: CPT

## 2022-12-01 PROCEDURE — B2151ZZ FLUOROSCOPY OF LEFT HEART USING LOW OSMOLAR CONTRAST: ICD-10-PCS | Performed by: INTERNAL MEDICINE

## 2022-12-01 PROCEDURE — 99153 MOD SED SAME PHYS/QHP EA: CPT | Performed by: INTERNAL MEDICINE

## 2022-12-01 PROCEDURE — 74011250637 HC RX REV CODE- 250/637: Performed by: HOSPITALIST

## 2022-12-01 PROCEDURE — 77030013519 HC DEV INFL BASIX MRTM -B: Performed by: INTERNAL MEDICINE

## 2022-12-01 PROCEDURE — C1894 INTRO/SHEATH, NON-LASER: HCPCS | Performed by: INTERNAL MEDICINE

## 2022-12-01 PROCEDURE — 77030012468 HC VLV BLEEDBK CNTRL ABBT -B: Performed by: INTERNAL MEDICINE

## 2022-12-01 PROCEDURE — 93458 L HRT ARTERY/VENTRICLE ANGIO: CPT | Performed by: INTERNAL MEDICINE

## 2022-12-01 PROCEDURE — 80048 BASIC METABOLIC PNL TOTAL CA: CPT

## 2022-12-01 PROCEDURE — 77030013797 HC KT TRNSDUC PRSSR EDWD -A: Performed by: INTERNAL MEDICINE

## 2022-12-01 PROCEDURE — 36415 COLL VENOUS BLD VENIPUNCTURE: CPT

## 2022-12-01 PROCEDURE — 82962 GLUCOSE BLOOD TEST: CPT

## 2022-12-01 PROCEDURE — C1887 CATHETER, GUIDING: HCPCS | Performed by: INTERNAL MEDICINE

## 2022-12-01 PROCEDURE — 65660000004 HC RM CVT STEPDOWN

## 2022-12-01 PROCEDURE — 99152 MOD SED SAME PHYS/QHP 5/>YRS: CPT | Performed by: INTERNAL MEDICINE

## 2022-12-01 RX ORDER — CLOPIDOGREL BISULFATE 75 MG/1
75 TABLET ORAL DAILY
Status: DISCONTINUED | OUTPATIENT
Start: 2022-12-02 | End: 2022-12-02 | Stop reason: HOSPADM

## 2022-12-01 RX ORDER — NITROGLYCERIN 400 UG/1
1 SPRAY ORAL
Status: ACTIVE | OUTPATIENT
Start: 2022-12-01 | End: 2022-12-02

## 2022-12-01 RX ORDER — CLOPIDOGREL 300 MG/1
TABLET, FILM COATED ORAL AS NEEDED
Status: DISCONTINUED | OUTPATIENT
Start: 2022-12-01 | End: 2022-12-01 | Stop reason: HOSPADM

## 2022-12-01 RX ORDER — HEPARIN SODIUM 200 [USP'U]/100ML
INJECTION, SOLUTION INTRAVENOUS
Status: COMPLETED | OUTPATIENT
Start: 2022-12-01 | End: 2022-12-01

## 2022-12-01 RX ORDER — ASPIRIN 81 MG/1
81 TABLET ORAL DAILY
Status: DISCONTINUED | OUTPATIENT
Start: 2022-12-02 | End: 2022-12-02 | Stop reason: HOSPADM

## 2022-12-01 RX ORDER — HEPARIN SODIUM 1000 [USP'U]/ML
INJECTION, SOLUTION INTRAVENOUS; SUBCUTANEOUS AS NEEDED
Status: DISCONTINUED | OUTPATIENT
Start: 2022-12-01 | End: 2022-12-01 | Stop reason: HOSPADM

## 2022-12-01 RX ORDER — FENTANYL CITRATE 50 UG/ML
INJECTION, SOLUTION INTRAMUSCULAR; INTRAVENOUS AS NEEDED
Status: DISCONTINUED | OUTPATIENT
Start: 2022-12-01 | End: 2022-12-01 | Stop reason: HOSPADM

## 2022-12-01 RX ORDER — HEPARIN SODIUM 1000 [USP'U]/ML
3000 INJECTION, SOLUTION INTRAVENOUS; SUBCUTANEOUS ONCE
Status: COMPLETED | OUTPATIENT
Start: 2022-12-01 | End: 2022-12-01

## 2022-12-01 RX ORDER — CLOPIDOGREL 300 MG/1
600 TABLET, FILM COATED ORAL ONCE
Status: DISCONTINUED | OUTPATIENT
Start: 2022-12-01 | End: 2022-12-01 | Stop reason: SDUPTHER

## 2022-12-01 RX ORDER — SODIUM CHLORIDE 0.9 % (FLUSH) 0.9 %
5-40 SYRINGE (ML) INJECTION AS NEEDED
Status: DISCONTINUED | OUTPATIENT
Start: 2022-12-01 | End: 2022-12-02 | Stop reason: HOSPADM

## 2022-12-01 RX ORDER — LIDOCAINE HYDROCHLORIDE 10 MG/ML
INJECTION, SOLUTION EPIDURAL; INFILTRATION; INTRACAUDAL; PERINEURAL AS NEEDED
Status: DISCONTINUED | OUTPATIENT
Start: 2022-12-01 | End: 2022-12-01 | Stop reason: HOSPADM

## 2022-12-01 RX ORDER — MIDAZOLAM HYDROCHLORIDE 1 MG/ML
INJECTION, SOLUTION INTRAMUSCULAR; INTRAVENOUS AS NEEDED
Status: DISCONTINUED | OUTPATIENT
Start: 2022-12-01 | End: 2022-12-01 | Stop reason: HOSPADM

## 2022-12-01 RX ORDER — SODIUM CHLORIDE 450 MG/100ML
150 INJECTION, SOLUTION INTRAVENOUS CONTINUOUS
Status: DISPENSED | OUTPATIENT
Start: 2022-12-01 | End: 2022-12-01

## 2022-12-01 RX ORDER — SODIUM CHLORIDE 0.9 % (FLUSH) 0.9 %
5-40 SYRINGE (ML) INJECTION EVERY 8 HOURS
Status: DISCONTINUED | OUTPATIENT
Start: 2022-12-01 | End: 2022-12-02 | Stop reason: HOSPADM

## 2022-12-01 RX ORDER — BIVALIRUDIN 250 MG/5ML
INJECTION, POWDER, LYOPHILIZED, FOR SOLUTION INTRAVENOUS AS NEEDED
Status: DISCONTINUED | OUTPATIENT
Start: 2022-12-01 | End: 2022-12-01 | Stop reason: HOSPADM

## 2022-12-01 RX ADMIN — ASPIRIN 81 MG: 81 TABLET, COATED ORAL at 08:12

## 2022-12-01 RX ADMIN — HYDRALAZINE HYDROCHLORIDE 25 MG: 25 TABLET, FILM COATED ORAL at 01:09

## 2022-12-01 RX ADMIN — ATORVASTATIN CALCIUM 80 MG: 40 TABLET, FILM COATED ORAL at 21:16

## 2022-12-01 RX ADMIN — SODIUM CHLORIDE, PRESERVATIVE FREE 10 ML: 5 INJECTION INTRAVENOUS at 17:07

## 2022-12-01 RX ADMIN — SODIUM CHLORIDE, PRESERVATIVE FREE 10 ML: 5 INJECTION INTRAVENOUS at 17:06

## 2022-12-01 RX ADMIN — SODIUM CHLORIDE, PRESERVATIVE FREE 10 ML: 5 INJECTION INTRAVENOUS at 21:16

## 2022-12-01 RX ADMIN — SODIUM CHLORIDE, PRESERVATIVE FREE 10 ML: 5 INJECTION INTRAVENOUS at 06:00

## 2022-12-01 RX ADMIN — HEPARIN SODIUM 3000 UNITS: 1000 INJECTION INTRAVENOUS; SUBCUTANEOUS at 08:13

## 2022-12-01 RX ADMIN — METOPROLOL TARTRATE 25 MG: 25 TABLET, FILM COATED ORAL at 21:16

## 2022-12-01 NOTE — PROGRESS NOTES
Bedside shift report received from Carmelina Olson Rn  Pt is a/ox4, no complaints of pain at this time,  Pt is on heparin gtt, 2 nurse check completed. Pt had a pTT of 42.2 at 0408 am, rate was not changed according to protocol. Pt was also to receive bolus dose of heparin, 3000 unit bolus given this morning by day shift nurse, per pharmacy it was ok to give. Order placed for STAT pTT. No active signs of bleeding, no distress noted, will continue to monitor.

## 2022-12-01 NOTE — PROGRESS NOTES
[facility] Hospitalist Group  Progress Note    Patient: Catia Akhtar Age: 54 y.o. : 1970 MR#: [id number] SSN: [id number]  Date/Time: 2022     C/C: Hypotension during dialysis      Subjective:   HPI : 44-year-old male past medical history of hypertensive CAD ischemic cardiomyopathy paroxysmal atrial fibrillation s/p ablation hyperlipidemia end-stage renal disease on hemodialysis brought to emergency room due to hypotension during dialysis. He is noncompliant and misses hemodialysis. Patient and hyperkalemia patient received hemodialysis today. Patient seen and examined in Cath Lab, he is status post cardiac cath and drug-eluting stent. Patient states he did not have dialysis this morning. Systolic blood pressure noted 177. Patient denies chest pain, shortness of breath. Inquires whether or not the stent will interfere with his renal transplant planning. He denies personal use of tobacco, states he is exposed to secondhand smoke from his sister with whom he resides. His nephew visits also and smokes tobacco and marijuana in the apartment. Patient states he spends as much time outside as he can. Assessment/Plan:     1. Acute hypotension dizziness diaphoresis, in the setting of NSTEMI.  2 NSTEMI, status post drug-eluting stent. Cardiology input appreciated. Continue aspirin, statin, beta-blocker, Eliquis, amiodarone. Avoid secondhand smoke exposure. 3 hyperkalemia resolved, nephrology input appreciated.  4 HFpEF- 50- 55%. Diastolic dysfunction on echo 2022. Volume management with dialysis. 5 paroxysmal atrial fibrillation. Resume Eliquis postprocedure. On amiodarone, beta-blocker  6 CAD see #2.  7 ESRD. Dialysis per nephrology. 8.  Anemia of CKD. 9.  Secondhand smoke exposure. Patient states he will discuss with his sister. 10.  DVT prophylaxis  11. Full code. Anticipate home when ready. I discussed case with Dr. Lorraine Gallagher.     Discussed on IDT.    Case discussed with:  [x]Patient  [] Family  [x]Nursing  [x]Case Management       Objective:       General:  Alert, cooperative, no acute distress. On bedrest, seen in Cath Lab. HEENT: No facial asymmetry, RADHA Galo, External ears - WNL    Cardiovascular: S1S2 - regular , No Murmur   Pulmonary: CTA bilateral anterior and infra axillary fields. GI:  soft, non-tender nondistended nabs. Extremities:  No edema; 2+ dorsalis pedis pulses bilaterally. Dressing to right groin, no hematoma. Left arm AV fistula. Neuro: Alert and oriented X 4. On bedrest, grossly no deficit.   No rash to visible skin      DVT Prophylaxis:  []Lovenox  []Hep SQ  []SCDs  []Coumadin   []On Heparin gtt    [] Eliquis [] Xarelto     Vitals:         Atrial fibrillation Visit Vitals  BP (!) 179/98   Pulse 71   Temp 97.9 °F (36.6 °C) (Axillary)   Resp 16   Ht 5' 11\" (1.803 m)   Wt 96.6 kg (212 lb 15.4 oz)   SpO2 98%   BMI 29.70 kg/m²      Tmax/24hrs: Temp (24hrs), Av.3 °F (36.8 °C), Min:97.9 °F (36.6 °C), Max:98.6 °F (37 °C)        Medications:   Current Facility-Administered Medications   Medication Dose Route Frequency    sodium chloride (NS) flush 5-40 mL  5-40 mL IntraVENous Q8H    sodium chloride (NS) flush 5-40 mL  5-40 mL IntraVENous PRN    nitroglycerin (NITROLINGUAL) sublingual 0.4 mg/spray  1 Spray SubLINGual Q5MIN PRN    [START ON 2022] aspirin delayed-release tablet 81 mg  81 mg Oral DAILY    [START ON 2022] clopidogreL (PLAVIX) tablet 75 mg  75 mg Oral DAILY    0.9% sodium chloride infusion 250 mL  250 mL IntraVENous DIALYSIS PRN    heparin (porcine) 25,000 units in 0.45% saline 250 ml infusion  10-25 Units/kg/hr IntraVENous TITRATE    insulin lispro (HUMALOG) injection   SubCUTAneous AC&HS    glucose chewable tablet 16 g  4 Tablet Oral PRN    glucagon (GLUCAGEN) injection 1 mg  1 mg IntraMUSCular PRN    sodium chloride (NS) flush 5-40 mL  5-40 mL IntraVENous Q8H    sodium chloride (NS) flush 5-40 mL  5-40 mL IntraVENous PRN    acetaminophen (TYLENOL) tablet 650 mg  650 mg Oral Q6H PRN    Or    acetaminophen (TYLENOL) suppository 650 mg  650 mg Rectal Q6H PRN    polyethylene glycol (MIRALAX) packet 17 g  17 g Oral DAILY PRN    promethazine (PHENERGAN) tablet 12.5 mg  12.5 mg Oral Q6H PRN    Or    ondansetron (ZOFRAN) injection 4 mg  4 mg IntraVENous Q6H PRN    atorvastatin (LIPITOR) tablet 80 mg  80 mg Oral QHS    metoprolol tartrate (LOPRESSOR) tablet 25 mg  25 mg Oral BID    amiodarone (CORDARONE) tablet 200 mg  200 mg Oral DAILY    amLODIPine (NORVASC) tablet 5 mg  5 mg Oral DAILY    hydrALAZINE (APRESOLINE) tablet 25 mg  25 mg Oral Q6H PRN       Labs:    Recent Labs     12/01/22  0408 11/30/22  0727 11/29/22  1630   WBC 5.2 5.6 7.1   HGB 9.5* 8.6* 9.9*   HCT 29.3* 26.7* 29.5*    138 168     Recent Labs     12/01/22  0408 11/30/22  0727 11/29/22  2157 11/29/22  1806 11/29/22  1205    141 141 140 139   K 4.3 4.5 4.8 4.7 4.3    104 103 103 102   CO2 29 29 29 23 25   GLU 82 68* 103* 78 74   BUN 40* 72* 60* 56* 52*   CREA 9.66* 14.30* 13.00* 12.30* 11.30*   CA 10.4* 9.0 9.5 9.0 9.1   MG 2.3 2.5  --   --  2.2   ALB  --   --   --  2.9* 3.1*   ALT  --   --   --  17 16               Disclaimer: Sections of this note are dictated utilizing voice recognition software, which may have resulted in some phonetic based errors in grammar and contents. Even though attempts were made to correct all the mistakes, some may have been missed, and remained in the body of the document. If questions arise, please contact our department.     Signed By: Myla Morales MD     December 1, 2022

## 2022-12-01 NOTE — PROGRESS NOTES
Cath holding summary     Patient escorted to cath holding from inpatient #[id number], alert and oriented x 4, voicing no complaints. Placed on monitor. NPO since MN. Lab results, med rec and H&P reviewed on chart. PIV x 2 inserted PTA.      1215  TRANSFER - OUT REPORT:  Verbal report given to [facility] (name) on Bethanie Ortiz  being transferred to Cath Lab (unit) for ordered procedure     Report consisted of patients Situation, Background, Assessment and   Recommendations(SBAR). Information from the following report(s) SBAR, Intake/Output, MAR, Recent Results, and Pre Procedure Checklist was reviewed with the receiving nurse. Lines:   Venous Access Device ARTERIOVENOUS FISTULA (AVF) RIGHT FOREARM Arm, right (Active)   Central Line Being Utilized Yes 11/30/22 1915   Criteria for Appropriate Use Dialysis/apheresis 11/30/22 1915   Site Assessment Clean, dry, & intact 11/30/22 1915   Dressing Status Clean, dry, & intact 11/30/22 1915   Dressing Type 4 X 4;Gauze 11/30/22 1915   Action Taken Dressing reinforced 11/30/22 1500   Access Medial Site Assessment Bruit heard; Thrill felt 11/30/22 1915   Date Accessed (Medial Site) 11/30/22 11/30/22 1915   Access Time (Medial Site) 1130 11/30/22 1500   Access Needle Size (Site #1) 15 G 11/30/22 1500   Access Needle Length (Medial Site) 1 inch 11/30/22 1110   Positive Blood Return (Medial Site) Yes 11/30/22 1110   Action Taken (Medial Site) Alcohol;Flushed 11/30/22 1110   Access Lateral Site Assessment Bruit heard; Thrill felt 11/30/22 1110   Date Accessed (Lateral Site) 11/30/22 11/30/22 1110   Access Time (Lateral Site) 1058 11/30/22 1110   Access Needle Size (Lateral Site) 15 G 11/30/22 1110   Access Needle Length (Lateral Site) 1 inch 11/30/22 1110   Positive Blood Return (Lateral Site) Yes 11/30/22 1110   Action Taken (Lateral Site) Alcohol;Flushed 11/30/22 1110       Peripheral IV 11/29/22 Left Forearm (Active)   Site Assessment Clean, dry, & intact 11/30/22 1915 Phlebitis Assessment 0 11/30/22 1915   Infiltration Assessment 0 11/30/22 1915   Dressing Status Clean, dry, & intact 11/30/22 1915   Dressing Type Transparent;Tape 11/30/22 1915   Hub Color/Line Status Green;Flushed;Patent;Capped 11/30/22 1915   Action Taken Open ports on tubing capped 11/30/22 1915   Alcohol Cap Used Yes 11/30/22 1915       Peripheral IV 11/29/22 Left Hand (Active)   Site Assessment Clean, dry, & intact 11/30/22 1915   Phlebitis Assessment 0 11/30/22 1915   Infiltration Assessment 0 11/30/22 1915   Dressing Status Clean, dry, & intact 11/30/22 1915   Dressing Type Transparent;Tape 11/30/22 1915   Hub Color/Line Status Blue;Flushed;Patent;Capped 11/30/22 1915   Action Taken Open ports on tubing capped 11/30/22 1915   Alcohol Cap Used Yes 11/30/22 1915   Opportunity for questions and clarification was provided. Patient transported with:   Registered Nurse    [phone]  TRANSFER - IN REPORT:    Verbal report received from [facility] (name) on Manuela Cowden  being received from Cath Lab (unit) for ordered procedure    Report consisted of patients Situation, Background, Assessment and   Recommendations(SBAR). Information from the following report(s) SBAR, Procedure Summary, MAR, and Quality Measures was reviewed with the receiving nurse. Opportunity for questions and clarification was provided. Assessment completed upon patients arrival to unit and care assumed. Access to right femoral a. With dressing applied during procedure. Partially saturated and very small hematoma noted to site. Manual pressure x10 minutes was applied and a new dressing was placed over site. Denies pain and is in NAD. Placed on cardiac monitor, VSS. Bedrest x6 hours ([id number]). 1520  Patient transported to dialysis and dialysis unit aware. Verbal report, also, given to CHARLIE Nolan caring for patient in inpatient room 201.

## 2022-12-01 NOTE — PROGRESS NOTES
Post procedure report received from [facility] BONNY CRUZ RN  Pt will be transport from cath holding to dialysis

## 2022-12-01 NOTE — PROGRESS NOTES
Problem: Falls - Risk of  Goal: *Absence of Falls  Description: Document Mare Lilly Fall Risk and appropriate interventions in the flowsheet.   Outcome: Progressing Towards Goal  Note: Fall Risk Interventions:            Medication Interventions: Teach patient to arise slowly, Patient to call before getting OOB, Evaluate medications/consider consulting pharmacy                   Problem: Patient Education: Go to Patient Education Activity  Goal: Patient/Family Education  Outcome: Progressing Towards Goal     Problem: Pain  Goal: *Control of Pain  Outcome: Progressing Towards Goal     Problem: General Medical Care Plan  Goal: *Vital signs within specified parameters  Outcome: Progressing Towards Goal  Goal: *Labs within defined limits  Outcome: Progressing Towards Goal  Goal: *Skin integrity maintained  Outcome: Progressing Towards Goal  Goal: *Fluid volume balance  Outcome: Progressing Towards Goal     Problem: Patient Education: Go to Patient Education Activity  Goal: Patient/Family Education  Outcome: Progressing Towards Goal     Problem: Patient Education: Go to Patient Education Activity  Goal: Patient/Family Education  Outcome: Progressing Towards Goal     Problem: Patient Education: Go to Patient Education Activity  Goal: Patient/Family Education  Outcome: Progressing Towards Goal

## 2022-12-01 NOTE — PROGRESS NOTES
Report was received from cathlab CHARLIE Corrigan; patient will be ready for transport to HD suite after an hour.

## 2022-12-01 NOTE — PROGRESS NOTES
Cardiology Progress Note    Admit Date: 11/29/2022  Attending Cardiologist: Dr. Dalila Santillan:     Hospital Problems  Date Reviewed: 11/9/2022            Codes Class Noted POA    ESRD on dialysis [facility]) ICD-10-CM: N18.6, Z99.2  ICD-9-CM: 585.6, V45.11  1/22/2021 Unknown        NSTEMI (non-ST elevated myocardial infarction) [facility]) ICD-10-CM: I21.4  ICD-9-CM: 410.70  1/22/2021 Unknown        Atrial fibrillation with RVR ([facility] [address] ICD-10-CM: I48.91  ICD-9-CM: 427.31  1/22/2021 Unknown           -Dizziness with associated diaphoresis during HD. SBP reportedly in the low 90s at dialysis. -NSTEMI, troponin peaked at 1301 and down rending. Initial ECG AF, LVH with repolarization, worsening lateral ST changes.   -Hyperkalemia, K+ 6.6 on admission. Improved. -a/c HFpEF, CXR with vascular congestion.   -Paroxysmal symptomatic atrial fibrillation, s/p ablation (06/2022). Initially in AFib on admission, converted to SR. On Eliquis/ASA, amiodarone and lopressor as outpatient. -CAD, s/p prox-mLAD AMOL 06/2021. No h/o of angina. Cardiac cath 12/16/2021 with findings as follows: medical mgmt. Small nondominant RCA with diffuse disease. LM with 30% stenosis. LAD with ostial 30% as well as mid 45%. The previously stented proximal/mid segment is widely patent. The first diagonal branch is a small-caliber vessel with ostial 95% stenosis, likely culprit. Circumflex coronary artery has diffuse 30% stenosis throughout. It is dominant.  -Cardiomyopathy, initially dx in 2012  EF 40% by echo 12/2018 improved 50-55% in 12/2021.  -Severe pulmonary hypertension, s/p RHC 2012. The mean right atrial pressure was 10 mmHg with a prominent X-descent noted in the right atrial pressure tracing. Right ventricular pressure was 72/15 mmHg. Pulmonary artery pressure was 72/28 mmHg with a mean pulmonary artery pressure of 50 mmHg. Mean pulmonary capillary wedge pressure was 32 mmHg with an A-wave of 30 mmHg and a V-wave of 44 mmHg. The prominent V-wave is likely due to severe noncompliance in the left ventricle. Left ventricular pressure was 165/32 mmHg with a left ventricular end-diastolic pressure of 32 mmHg. Aortic pressure was 165/90 mmHg. There was concordance of the left ventricular and right ventricular simultaneous pressure tracings consistent with a restrictive cardiomyopathy. Cardiac output by thermodilution was 4.5 liters minute. Pulmonary vascular resistance was 2 Wood units. -ESRD on HD T/R/S  -HTN. -DM  -Dyslipidemia.  -Chronic anemia. -Diabetic retinopathy, legally blind.  -Hx noncompliance. Primary cardiologist Dr. Dione Rush. Plan:     Addendum: Independently seen and evaluated. Agree with below. We will proceed with coronary angiography as outlined below. All questions answered. He agrees with the plan. NPO for LHC this morning with tentative PCI. Plan for HD after his cath. Continued on heparin gtt, plan to d/c post procedure and resume Eliquis. Continue ASA, statin, amiodarone, BB. Further recommendations pending heart cath. Subjective:     No new complaints.  Wants to proceed with heart cath this am.     Objective:      Patient Vitals for the past 8 hrs:   Temp Pulse Resp BP SpO2   12/01/22 0814 98.2 °F (36.8 °C) 72 17 (!) 178/91 98 %   12/01/22 0800 -- 73 -- -- --   12/01/22 0325 98.5 °F (36.9 °C) 75 18 (!) 169/88 99 %         Patient Vitals for the past 96 hrs:   Weight   11/30/22 1915 96.6 kg (212 lb 15.4 oz)   11/30/22 0752 96.6 kg (213 lb)   11/29/22 1200 97 kg (213 lb 13.5 oz)       TELE: NSR               Current Facility-Administered Medications   Medication Dose Route Frequency Last Admin    0.9% sodium chloride infusion 250 mL  250 mL IntraVENous DIALYSIS PRN      heparin (porcine) 25,000 units in 0.45% saline 250 ml infusion  10-25 Units/kg/hr IntraVENous TITRATE 14 Units/kg/hr at 12/01/22 0752    insulin lispro (HUMALOG) injection   SubCUTAneous AC&HS      glucose chewable tablet 16 g  4 Tablet Oral PRN      glucagon (GLUCAGEN) injection 1 mg  1 mg IntraMUSCular PRN      sodium chloride (NS) flush 5-40 mL  5-40 mL IntraVENous Q8H 10 mL at 12/01/22 0600    sodium chloride (NS) flush 5-40 mL  5-40 mL IntraVENous PRN      acetaminophen (TYLENOL) tablet 650 mg  650 mg Oral Q6H PRN      Or    acetaminophen (TYLENOL) suppository 650 mg  650 mg Rectal Q6H PRN      polyethylene glycol (MIRALAX) packet 17 g  17 g Oral DAILY PRN      promethazine (PHENERGAN) tablet 12.5 mg  12.5 mg Oral Q6H PRN      Or    ondansetron (ZOFRAN) injection 4 mg  4 mg IntraVENous Q6H PRN      aspirin delayed-release tablet 81 mg  81 mg Oral DAILY 81 mg at 12/01/22 0812    atorvastatin (LIPITOR) tablet 80 mg  80 mg Oral QHS 80 mg at 11/30/22 2156    metoprolol tartrate (LOPRESSOR) tablet 25 mg  25 mg Oral BID 25 mg at 11/30/22 1745    amiodarone (CORDARONE) tablet 200 mg  200 mg Oral DAILY 200 mg at 11/30/22 0942    amLODIPine (NORVASC) tablet 5 mg  5 mg Oral DAILY 5 mg at 11/29/22 2337    hydrALAZINE (APRESOLINE) tablet 25 mg  25 mg Oral Q6H PRN 25 mg at 12/01/22 0109         Intake/Output Summary (Last 24 hours) at 12/1/2022 6546  Last data filed at 11/30/2022 1628  Gross per 24 hour   Intake 480 ml   Output 2000 ml   Net -1520 ml       Physical Exam:  General:  alert, cooperative, no distress, appears stated age  Neck:  no JVD  Lungs:  clear to auscultation bilaterally  Heart:  regular rate and rhythm, S1, S2 normal, no murmur, click, rub or gallop  Abdomen:  abdomen is soft without significant tenderness, masses, organomegaly or guarding  Extremities:  extremities normal, atraumatic, no cyanosis or edema      Visit Vitals  BP (!) 178/91 (BP 1 Location: Left upper arm, BP Patient Position: Semi fowlers; At rest)   Pulse 72   Temp 98.2 °F (36.8 °C)   Resp 17   Ht 5' 11\" (1.803 m)   Wt 96.6 kg (212 lb 15.4 oz)   SpO2 98%   BMI 29.70 kg/m²       Data Review:     Labs: Results:       Chemistry Recent Labs     12/01/22  4744 11/30/22  0727 11/29/22  2157 11/29/22  1806 11/29/22  1205   GLU 82 68* 103* 78 74    141 141 140 139   K 4.3 4.5 4.8 4.7 4.3    104 103 103 102   CO2 29 29 29 23 25   BUN 40* 72* 60* 56* 52*   CREA 9.66* 14.30* 13.00* 12.30* 11.30*   CA 10.4* 9.0 9.5 9.0 9.1   MG 2.3 2.5  --   --  2.2   AGAP 9 8 9 14 12   BUCR 4* 5* 5* 5* 5*   AP  --   --   --  69 75   TP  --   --   --  6.8 6.7   ALB  --   --   --  2.9* 3.1*   GLOB  --   --   --  3.9 3.6   AGRAT  --   --   --  0.7* 0.9      CBC w/Diff Recent Labs     12/01/22  0408 11/30/22  0727 11/29/22  1630   WBC 5.2 5.6 7.1   RBC 3.13* 2.85* 3.21*   HGB 9.5* 8.6* 9.9*   HCT 29.3* 26.7* 29.5*    138 168   GRANS 58 54 79*   LYMPH 21 27 11*   EOS 6* 6* 1      Cardiac Enzymes No results found for: CPK, CK, CKMMB, CKMB, RCK3, CKMBT, CKNDX, CKND1, SAMARA, TROPT, TROIQ, SAKINA, TROPT, TNIPOC, BNP, BNPP   Coagulation Recent Labs     12/01/22  0408 11/30/22 0727   APTT 42.2* 93.1*       Lipid Panel Lab Results   Component Value Date/Time    Cholesterol, total 173 05/16/2022 08:04 AM    HDL Cholesterol 52 05/16/2022 08:04 AM    LDL, calculated 109.2 (H) 05/16/2022 08:04 AM    VLDL, calculated 11.8 05/16/2022 08:04 AM    Triglyceride 59 05/16/2022 08:04 AM    CHOL/HDL Ratio 3.3 05/16/2022 08:04 AM      BNP No results found for: BNP, BNPP, XBNPT   Liver Enzymes Recent Labs     11/29/22 1806   TP 6.8   ALB 2.9*   AP 69      Thyroid Studies Lab Results   Component Value Date/Time    TSH 0.95 11/29/2022 05:18 PM          Signed By: Ephraim Tang PA-C     December 1, 2022

## 2022-12-01 NOTE — PROGRESS NOTES
ACUTE HEMODIALYSIS FLOW SHEET      HEMODIALYSIS ORDERS: Physician: Charlotte Serum    1524 Received patient from [facility], fully awake and reactive. Right groin punctured site is soft on palpation, with dressing dry and intact. [facility] but can keep his HOB at moderate fowlers. Denies any discomdort.       [x] Consent verified   Dialyzer: Revaclear   Duration: 3   BFR: 350  DFR: 600   Dialysate:  Temp 36-37*C   K+  2    Ca+ 2.5   Na 138  Bicarb 35      Patient Chart [x]   Unable to Obtain []  Dry weight/UF Goal:2000   Heparin []  Bolus    Units    [] Hourly    Units    [x]None       Pre BP  177/102    Pulse: 75 Respirations: 20 Temp: 97.9 [] Temporal  [x] Ax  [] Esoph   Labs:   Pre  []  Post:   [x] N/A   Additional Orders (medications, blood products, hypotension management): [] Yes   [x] No     CATHETER ACCESS:  [x]N/A   []Right   []Left   []IJ   []Fem  []Chest wall  []TransHepatic   [] First use X-ray verified     []Tunnel    [] Non Tunneled   []No S/S infection  []Redness  []Drainage []Cultured []Swelling []Pain   []Medical Aseptic Prep Utilized   []Dressing Changed  [] Biopatch  Date:    []Clotted   []Patent   Flows: []Good  []Poor  []Reversed   If access problem,  notified: []Yes    [x]N/A        GRAFT/FISTULA ACCESS:   []N/A     [x]Right     []Left     [x]UE     []LE   []AVG   [x]AVF       []Medical Aseptic Prep Utilized   [x]No S/S infection  []Redness  []Drainage [] Cultured  [] Swelling  [] Pain  Bruit:   [x] Strong    [] Weak       Thrill :   [x] Strong    [] Weak     Needle Gauge: 15   Length: 1 inch   If access problem,  notified: []Yes     [x]N/A                GENERAL ASSESSMENT:    LUNGS:  Resp Rate 20   [x] Clear  [] Coarse  [] Crackles  [] Wheezing  [] Diminished                                                           [] RLL   [] LLL  [x] RUL   [x] ROBERT            Respirations:  [x]Easy  []Labored  []N/A  Cough:  []Productive  []Dry  []N/A Therapy:  [x]RA   [] Ventilated   [] Intubated   [] Trach            O2 Device:  [] NC   [] NRB  [] Trach Mask  [] BiPaP  Flow:   l/min                                                    CARDIAC: [x] Regular  radially  [] Irregular   [] Rhythm:          [] Monitored   [] Bedside   [] Remotely monitored       EDEMA: [x] None   []Generalized  [] Pitting [] 1+   [] 2 +   [] 3+    [] 4+        SKIN:   [] Hot     [] Cold    [x] Warm   [x] Dry    [] Diaphoretic                 [] Flushed  [] Jaundiced  [] Cyanotic  [] Pale      LOC:    [x] Alert      [x]Oriented:    [x] Person     [x] Place   [x]Time               [] Confused  [] Lethargic  [] Medicated  [] Non-responsive  [] Non-Verbal     GI / ABDOMEN:                     [] Flat    [] Distended    [x] Soft    [] Firm   []  Obese                   [] Diarrhea   [] FMS [x] Bowel Sounds audible [] Nausea  [] Vomiting                   [] NGT  [] OGT  [] PEG  [] Tube Feedings @     mL/hr     / URINE ASSESSMENT:                   [x] Voiding     [] Oliguria  [] Anuria                     []  [place]   [] Incontinent  []  Incontinent Brief   []  PureWick     PAIN:  [x] 0 []1  []2   []3   []4   []5   []6   []7   []8   []9   []10                MOBILITY:  [x] Bed    [] Stretcher      All Vitals and Treatment Details on Attached [address]: [address] [facility]          Room # CCL/PL    [x] Routine         [] 1st Time Acute/Chronic   [] Urgent      [] Stat              [x] Acute Room   []  Bedside    [] ICU/CCU     [] ER     Isolation Precautions:  [x] Dialysis    There are currently no Active Isolations     ALLERGIES:     No Known Allergies     Code Status:  Full Code     Hepatitis Status      Lab Results   Component Value Date/Time    Hepatitis A Abs Negative 01/03/2012 01:40 PM    Hepatitis B surface Ag 0.91 11/30/2022 12:30 PM    Hepatitis B surface Ab 176.47 11/30/2022 12:30 PM    Hep B Core Ab, total NEGATIVE 12/05/2018 02:50 PM    Hepatitis C virus Ab 0.08 12/05/2018 02:50 PM Current Labs:      Lab Results   Component Value Date/Time    WBC 5.2 12/01/2022 04:08 AM    Hemoglobin, POC 10.9 (L) 05/02/2019 10:00 AM    HGB 9.5 (L) 12/01/2022 04:08 AM    Hematocrit, POC 32 (L) 05/02/2019 10:00 AM    HCT 29.3 (L) 12/01/2022 04:08 AM    PLATELET 994 26/10/1657 04:08 AM    MCV 93.6 12/01/2022 04:08 AM     Lab Results   Component Value Date/Time    Sodium 139 12/01/2022 04:08 AM    Potassium 4.3 12/01/2022 04:08 AM    Chloride 101 12/01/2022 04:08 AM    CO2 29 12/01/2022 04:08 AM    Anion gap 9 12/01/2022 04:08 AM    Glucose 82 12/01/2022 04:08 AM    BUN 40 (H) 12/01/2022 04:08 AM    Creatinine 9.66 (H) 12/01/2022 04:08 AM    BUN/Creatinine ratio 4 (L) 12/01/2022 04:08 AM    GFR est AA 7 (L) 08/16/2022 12:10 PM    GFR est non-AA 6 (L) 08/16/2022 12:10 PM    Calcium 10.4 (H) 12/01/2022 04:08 AM          DIET:  DIET ADULT ORAL NUTRITION SUPPLEMENT  DIET ADULT     PRIMARY NURSE REPORT:   Pre Dialysis: Amador Nice RN    Time: 3407    EDUCATION:    [x] Patient           Knowledge Basis: []None []Minimal [x] Substantial [] Unknown  Barriers to learning  [x]None  [] Intubated/Trached/Ventilated  [] Sedated/Paralyzed   [x] Access Care     [] S&S of infection  [] Fluid Management  [x] K+   [] Procedural    [] Medications   [] Tx Options   [] Transplant   [] Diet      Teaching Tools:  [x] Explain  [] Demo  [] Handouts [] Video  Patient response: [x] Verbalized understanding   [] Requires follow up        [x] Time Out/Safety Check    [x] Extracorporeal Circuit Tested for integrity       RO/HEMODIALYSIS MACHINE SAFETY CHECKS - Before each treatment:        [address] [place] [address]                                     [x] Unit Machine # 8 with centralized RO                                  [] Portable Machine #1/RO serial # [id number]                                  [] Portable Machine #2/RO serial # [id number]                                  [] Portable Machine #4/RO serial # [id number] [] Portable Machine #3/RO serial # [id number]                                                                                                       Alarm Test:  Pass time 1526         [x] RO/Machine Log Complete    Machine Temp    36-37*C             Dialysate: pH  7.4    Conductivity: Meter 14.0    HD Machine  14    TCD: 13.7  Dialyzer Lot # [id number]    Blood Tubing Lot # [id number]    Saline Lot #[id number]     CHLORINE TESTING-Before each treatment and every 4 hours    Total Chlorine: [x] less than 0.1 ppm  Initial Time Check: 1517     4 Hr/2nd Check Time: 1917   (if greater than 0.1 ppm from Primary then every 30 minutes from Secondary)     TREATMENT INITIATION - with Dialysis Precautions:   [x] All Connections Secured              [x] Saline Line Double Clamped   [x] Venous Parameters Set               [x] Arterial Parameters Set    [x] Prime Given 250ml NSS              [x]Air Foam Detector Engaged          Treatment Initiation Note:  2019 Successfully initiated HD treatment after aseptically accessing AVF on right lower arm, wearing required PPEs. During Treatment Notes:    1540  Face & Vascular access visible with art and barb line connections intact. Pt tolerating dialysis. 1545  Face & Vascular access visible with art and barb line connections intact. Pt tolerating dialysis. 1600 Face & Vascular access visible with art and barb line connections intact. Pt tolerating dialysis. 1615 Face & Vascular access visible with art and barb line connections intact. Pt tolerating dialysis. 1630  Face & Vascular access visible with art and barb line connections intact. Pt tolerating dialysis. 1645 Face & Vascular access visible with art and barb line connections intact. Pt tolerating dialysis. 1700 Face & Vascular access visible with art and barb line connections intact. Pt tolerating dialysis. 1715 Face & Vascular access visible with art and barb line connections intact. Pt tolerating dialysis.   1730  Face & Vascular access visible with art and barb line connections intact. Pt tolerating dialysis. 1745  Face & Vascular access visible with art and barb line connections intact. Pt tolerating dialysis. 1800 Face & Vascular access visible with art and barb line connections intact. Pt tolerating dialysis. 1815 Face & Vascular access visible with art and barb line connections intact. Pt tolerating dialysis. 1830 Face & Vascular access visible with art and barb line connections intact. Pt tolerating dialysis. 1841  Dialysis treatment complete. Medication    Dose    Volume Route      Time       [facility] Nurse   No meds due   HD  ARELY Michaels RN      HD         HD                  Post Assessment  Dialyzer Cleared:   [x] Good  [] Fair  [] Poor  Blood processed:  57.4 L  UF Removed:  2000 ml    Post BP: 182/101  Pulse: 72  Respirations: 16   Temp: 98  [] Temporal  [x] Ax  [] Esophageal   Lungs: [] Clear                [x] No change from initial assessment   Post Tx Vascular Access: [] N/A  AVF/AVG: Bleeding stopped with  Arterial Pressure for 5 min   Venous Pressure for 5 min      Cardiac:  [x] Regular   [] Irregular   Rhythm:  [] Monitored   [] Not Monitored    CVC Catheter: [x] N/A  Locking solution: Heparin 1:1000 U  Arterial port  ml   Venous port  ml   Edema:  [x] None  [] Generalized                     Skin:[x] Warm  [x] Dry [] Diaphoretic               [] Flushed  [] Pale [] Cyanotic Pain:  [x]0  []1-2  []3-4  []5-6   []7-8  []9-10           Post Treatment Note:   8462 Aseptically de-accessed AVF, wearing required PPEs. Patient is awake, calm, appears comfortable. Denies any discomfort.               POST TREATMENT PRIMARY NURSE HANDOFF REPORT:   Post Dialysis: Bronson Babb RN        Time:  5433         Abbreviations: AVG-arterial venous graft, AVF-arterial venous fistula, IJ-Internal Jugular, Subcl-Subclavian, Fem-Femoral, Tx-treatment, AP/HR-apical heart rate, VSS- Vital Signs Stable, CVC- Central Venous Catheter, DFR-dialysate flow rate, BFR-blood flow rate, AP-arterial pressure, -venous pressure, UF-ultrafiltrate, TMP-transmembrane pressure, Zay-Venous, Art-Arterial, RO-Reverse Osmosis

## 2022-12-02 ENCOUNTER — HOME HEALTH ADMISSION (OUTPATIENT)
Dept: HOME HEALTH SERVICES | Facility: HOME HEALTH | Age: 52
End: 2022-12-02

## 2022-12-02 VITALS
RESPIRATION RATE: 20 BRPM | BODY MASS INDEX: 29.81 KG/M2 | HEIGHT: 71 IN | OXYGEN SATURATION: 97 % | HEART RATE: 66 BPM | DIASTOLIC BLOOD PRESSURE: 92 MMHG | SYSTOLIC BLOOD PRESSURE: 167 MMHG | TEMPERATURE: 97.8 F | WEIGHT: 212.96 LBS

## 2022-12-02 LAB
ANION GAP SERPL CALC-SCNC: 5 MMOL/L (ref 3–18)
ATRIAL RATE: 71 BPM
BASOPHILS # BLD: 0.1 K/UL (ref 0–0.1)
BASOPHILS NFR BLD: 1 % (ref 0–2)
BUN SERPL-MCNC: 28 MG/DL (ref 7–18)
BUN/CREAT SERPL: 3 (ref 12–20)
CALCIUM SERPL-MCNC: 10 MG/DL (ref 8.5–10.1)
CALCULATED P AXIS, ECG09: 31 DEGREES
CALCULATED R AXIS, ECG10: -20 DEGREES
CALCULATED T AXIS, ECG11: 47 DEGREES
CHLORIDE SERPL-SCNC: 102 MMOL/L (ref 100–111)
CO2 SERPL-SCNC: 33 MMOL/L (ref 21–32)
CREAT SERPL-MCNC: 8.33 MG/DL (ref 0.6–1.3)
DIAGNOSIS, 93000: NORMAL
DIFFERENTIAL METHOD BLD: ABNORMAL
EOSINOPHIL # BLD: 0.3 K/UL (ref 0–0.4)
EOSINOPHIL NFR BLD: 6 % (ref 0–5)
ERYTHROCYTE [DISTWIDTH] IN BLOOD BY AUTOMATED COUNT: 15.4 % (ref 11.6–14.5)
GLUCOSE BLD STRIP.AUTO-MCNC: 103 MG/DL (ref 70–110)
GLUCOSE BLD STRIP.AUTO-MCNC: 159 MG/DL (ref 70–110)
GLUCOSE SERPL-MCNC: 109 MG/DL (ref 74–99)
HCT VFR BLD AUTO: 28.2 % (ref 36–48)
HGB BLD-MCNC: 9.3 G/DL (ref 13–16)
IMM GRANULOCYTES # BLD AUTO: 0 K/UL (ref 0–0.04)
IMM GRANULOCYTES NFR BLD AUTO: 0 % (ref 0–0.5)
LYMPHOCYTES # BLD: 0.9 K/UL (ref 0.9–3.6)
LYMPHOCYTES NFR BLD: 20 % (ref 21–52)
MAGNESIUM SERPL-MCNC: 2.2 MG/DL (ref 1.6–2.6)
MCH RBC QN AUTO: 30.5 PG (ref 24–34)
MCHC RBC AUTO-ENTMCNC: 33 G/DL (ref 31–37)
MCV RBC AUTO: 92.5 FL (ref 78–100)
MONOCYTES # BLD: 0.7 K/UL (ref 0.05–1.2)
MONOCYTES NFR BLD: 18 % (ref 3–10)
NEUTS SEG # BLD: 2.3 K/UL (ref 1.8–8)
NEUTS SEG NFR BLD: 55 % (ref 40–73)
NRBC # BLD: 0 K/UL (ref 0–0.01)
NRBC BLD-RTO: 0 PER 100 WBC
P-R INTERVAL, ECG05: 160 MS
PLATELET # BLD AUTO: 164 K/UL (ref 135–420)
PMV BLD AUTO: 10.2 FL (ref 9.2–11.8)
POTASSIUM SERPL-SCNC: 3.8 MMOL/L (ref 3.5–5.5)
Q-T INTERVAL, ECG07: 430 MS
QRS DURATION, ECG06: 92 MS
QTC CALCULATION (BEZET), ECG08: 467 MS
RBC # BLD AUTO: 3.05 M/UL (ref 4.35–5.65)
SODIUM SERPL-SCNC: 140 MMOL/L (ref 136–145)
VENTRICULAR RATE, ECG03: 71 BPM
WBC # BLD AUTO: 4.2 K/UL (ref 4.6–13.2)

## 2022-12-02 PROCEDURE — 74011636637 HC RX REV CODE- 636/637: Performed by: INTERNAL MEDICINE

## 2022-12-02 PROCEDURE — 99239 HOSP IP/OBS DSCHRG MGMT >30: CPT | Performed by: HOSPITALIST

## 2022-12-02 PROCEDURE — 74011000250 HC RX REV CODE- 250: Performed by: INTERNAL MEDICINE

## 2022-12-02 PROCEDURE — 99232 SBSQ HOSP IP/OBS MODERATE 35: CPT | Performed by: INTERNAL MEDICINE

## 2022-12-02 PROCEDURE — 82962 GLUCOSE BLOOD TEST: CPT

## 2022-12-02 PROCEDURE — 74011250637 HC RX REV CODE- 250/637: Performed by: INTERNAL MEDICINE

## 2022-12-02 PROCEDURE — 85025 COMPLETE CBC W/AUTO DIFF WBC: CPT

## 2022-12-02 PROCEDURE — 83735 ASSAY OF MAGNESIUM: CPT

## 2022-12-02 PROCEDURE — 93005 ELECTROCARDIOGRAM TRACING: CPT

## 2022-12-02 PROCEDURE — 80048 BASIC METABOLIC PNL TOTAL CA: CPT

## 2022-12-02 PROCEDURE — 36415 COLL VENOUS BLD VENIPUNCTURE: CPT

## 2022-12-02 PROCEDURE — 74011250637 HC RX REV CODE- 250/637: Performed by: HOSPITALIST

## 2022-12-02 RX ORDER — CLOPIDOGREL BISULFATE 75 MG/1
75 TABLET ORAL DAILY
Qty: 30 TABLET | Refills: 0 | Status: SHIPPED | OUTPATIENT
Start: 2022-12-03

## 2022-12-02 RX ADMIN — SODIUM CHLORIDE, PRESERVATIVE FREE 10 ML: 5 INJECTION INTRAVENOUS at 08:03

## 2022-12-02 RX ADMIN — ASPIRIN 81 MG: 81 TABLET, COATED ORAL at 08:44

## 2022-12-02 RX ADMIN — METOPROLOL TARTRATE 25 MG: 25 TABLET, FILM COATED ORAL at 08:45

## 2022-12-02 RX ADMIN — CLOPIDOGREL BISULFATE 75 MG: 75 TABLET ORAL at 08:44

## 2022-12-02 RX ADMIN — AMIODARONE HYDROCHLORIDE 200 MG: 200 TABLET ORAL at 08:44

## 2022-12-02 RX ADMIN — Medication 2 UNITS: at 12:24

## 2022-12-02 RX ADMIN — AMLODIPINE BESYLATE 5 MG: 5 TABLET ORAL at 08:43

## 2022-12-02 NOTE — PROGRESS NOTES
Cardiology Progress Note    Admit Date: 11/29/2022  Attending Cardiologist: Dr. Kaley Trivedi      Assessment:       -Dizziness with associated diaphoresis during HD. SBP reportedly in the low 90s at dialysis. -NSTEMI, troponin peaked at 1301 and down rending. Initial ECG AF, LVH with repolarization, worsening lateral ST changes.   -Hyperkalemia, K+ 6.6 on admission. Improved. -a/c HFpEF, CXR with vascular congestion.   -Paroxysmal symptomatic atrial fibrillation, s/p ablation (06/2022). Initially in AFib on admission, converted to SR. On Eliquis/ASA, amiodarone and lopressor as outpatient. -CAD, s/p prox-mLAD AMOL 06/2021. No h/o of angina. Cardiac cath 12/16/2021 with findings as follows: medical mgmt. Small nondominant RCA with diffuse disease. LM with 30% stenosis. LAD with ostial 30% as well as mid 45%. The previously stented proximal/mid segment is widely patent. The first diagonal branch is a small-caliber vessel with ostial 95% stenosis, likely culprit. Circumflex coronary artery has diffuse 30% stenosis throughout. It is dominant.  -Cardiomyopathy, initially dx in 2012  EF 40% by echo 12/2018 improved 50-55% in 12/2021.  -Severe pulmonary hypertension, s/p RHC 2012. The mean right atrial pressure was 10 mmHg with a prominent X-descent noted in the right atrial pressure tracing. Right ventricular pressure was 72/15 mmHg. Pulmonary artery pressure was 72/28 mmHg with a mean pulmonary artery pressure of 50 mmHg. Mean pulmonary capillary wedge pressure was 32 mmHg with an A-wave of 30 mmHg and a V-wave of 44 mmHg. The prominent V-wave is likely due to severe noncompliance in the left ventricle. Left ventricular pressure was 165/32 mmHg with a left ventricular end-diastolic pressure of 32 mmHg. Aortic pressure was 165/90 mmHg. There was concordance of the left ventricular and right ventricular simultaneous pressure tracings consistent with a restrictive cardiomyopathy.   Cardiac output by thermodilution was 4.5 liters minute. Pulmonary vascular resistance was 2 Wood units. -ESRD on HD T/R/S  -HTN. -DM  -Dyslipidemia.  -Chronic anemia. -Diabetic retinopathy, legally blind.  -Hx noncompliance. Primary cardiologist Dr. Ame Marie. Plan:       I saw, evaluated, interviewed and examined the patient personally. Denies any chest pain or chest tightness. Tolerated procedure well without any complication  Hemodynamically stable. No access site hematoma or bleeding  Triple therapy as mentioned below per preference of primary cardiologist Dr. Medina De La Rosa statin, amiodarone, beta-blocker  We will be available as needed. Please call with question    Abraham Marsh MD       Stable for discharge today from a CV standpoint. Discussed importance of compliance with DAPT. First month of plavix sent to the [facility] pharmacy. Will need triple tx for one month, given recent cardiac stent. After one month, will d/c ASA and continue plavix and Eliquis only. After 6 months, plan to continue Eliquis only. Continue statin, amiodarone, BB. Follow up with Dr. Ame Marie in 2-3 weeks in the office. Subjective:     No new complaints. Denies Cp or SOB. Right groin stable w/o hematoma.      Objective:      Patient Vitals for the past 8 hrs:   Temp Pulse Resp BP SpO2   12/02/22 1135 97.8 °F (36.6 °C) 66 20 (!) 167/92 97 %   12/02/22 0724 -- -- -- (!) 183/91 --   12/02/22 0718 98.2 °F (36.8 °C) 69 20 (!) 181/92 99 %           Patient Vitals for the past 96 hrs:   Weight   11/30/22 1915 96.6 kg (212 lb 15.4 oz)   11/30/22 0752 96.6 kg (213 lb)   11/29/22 1200 97 kg (213 lb 13.5 oz)         TELE: NSR               Current Facility-Administered Medications   Medication Dose Route Frequency Last Admin    sodium chloride (NS) flush 5-40 mL  5-40 mL IntraVENous Q8H 10 mL at 12/02/22 0803    sodium chloride (NS) flush 5-40 mL  5-40 mL IntraVENous PRN      nitroglycerin (NITROLINGUAL) sublingual 0.4 mg/spray  1 Spray SubLINGual Q5MIN PRN      aspirin delayed-release tablet 81 mg  81 mg Oral DAILY 81 mg at 12/02/22 0844    clopidogreL (PLAVIX) tablet 75 mg  75 mg Oral DAILY 75 mg at 12/02/22 0844    0.9% sodium chloride infusion 250 mL  250 mL IntraVENous DIALYSIS PRN      insulin lispro (HUMALOG) injection   SubCUTAneous AC&HS 2 Units at 12/02/22 1224    glucose chewable tablet 16 g  4 Tablet Oral PRN      glucagon (GLUCAGEN) injection 1 mg  1 mg IntraMUSCular PRN      sodium chloride (NS) flush 5-40 mL  5-40 mL IntraVENous Q8H 10 mL at 12/02/22 0803    sodium chloride (NS) flush 5-40 mL  5-40 mL IntraVENous PRN      acetaminophen (TYLENOL) tablet 650 mg  650 mg Oral Q6H PRN      Or    acetaminophen (TYLENOL) suppository 650 mg  650 mg Rectal Q6H PRN      polyethylene glycol (MIRALAX) packet 17 g  17 g Oral DAILY PRN      ondansetron (ZOFRAN) injection 4 mg  4 mg IntraVENous Q6H PRN      atorvastatin (LIPITOR) tablet 80 mg  80 mg Oral QHS 80 mg at 12/01/22 2116    metoprolol tartrate (LOPRESSOR) tablet 25 mg  25 mg Oral BID 25 mg at 12/02/22 0845    amiodarone (CORDARONE) tablet 200 mg  200 mg Oral DAILY 200 mg at 12/02/22 0844    amLODIPine (NORVASC) tablet 5 mg  5 mg Oral DAILY 5 mg at 12/02/22 0843    hydrALAZINE (APRESOLINE) tablet 25 mg  25 mg Oral Q6H PRN 25 mg at 12/01/22 0109         Intake/Output Summary (Last 24 hours) at 12/2/2022 1318  Last data filed at 12/2/2022 5200  Gross per 24 hour   Intake 360 ml   Output 2000 ml   Net -1640 ml         Physical Exam:  General:  alert, cooperative, no distress, appears stated age  Neck:  no JVD  Lungs:  clear to auscultation bilaterally  Heart:  regular rate and rhythm, S1, S2 normal, no murmur, click, rub or gallop  Abdomen:  abdomen is soft without significant tenderness, masses, organomegaly or guarding  Extremities:  extremities normal, atraumatic, no cyanosis or edema      Visit Vitals  BP (!) 167/92   Pulse 66   Temp 97.8 °F (36.6 °C)   Resp 20   Ht 5' 11\" (1.803 m)   Wt 96.6 kg (212 lb 15.4 oz)   SpO2 97%   BMI 29.70 kg/m²       Data Review:     Labs: Results:       Chemistry Recent Labs     12/02/22 0429 12/01/22 0408 11/30/22 0727 11/29/22 2157 11/29/22  1806   * 82 68*   < > 78    139 141   < > 140   K 3.8 4.3 4.5   < > 4.7    101 104   < > 103   CO2 33* 29 29   < > 23   BUN 28* 40* 72*   < > 56*   CREA 8.33* 9.66* 14.30*   < > 12.30*   CA 10.0 10.4* 9.0   < > 9.0   MG 2.2 2.3 2.5  --   --    AGAP 5 9 8   < > 14   BUCR 3* 4* 5*   < > 5*   AP  --   --   --   --  69   TP  --   --   --   --  6.8   ALB  --   --   --   --  2.9*   GLOB  --   --   --   --  3.9   AGRAT  --   --   --   --  0.7*    < > = values in this interval not displayed.         CBC w/Diff Recent Labs     12/02/22 0429 12/01/22 0408 11/30/22 0727   WBC 4.2* 5.2 5.6   RBC 3.05* 3.13* 2.85*   HGB 9.3* 9.5* 8.6*   HCT 28.2* 29.3* 26.7*    154 138   GRANS 55 58 54   LYMPH 20* 21 27   EOS 6* 6* 6*        Cardiac Enzymes No results found for: CPK, CK, CKMMB, CKMB, RCK3, CKMBT, CKNDX, CKND1, SAMARA, TROPT, TROIQ, SAKINA, TROPT, TNIPOC, BNP, BNPP   Coagulation Recent Labs     12/01/22 0408 11/30/22 0727   APTT 42.2* 93.1*         Lipid Panel Lab Results   Component Value Date/Time    Cholesterol, total 173 05/16/2022 08:04 AM    HDL Cholesterol 52 05/16/2022 08:04 AM    LDL, calculated 109.2 (H) 05/16/2022 08:04 AM    VLDL, calculated 11.8 05/16/2022 08:04 AM    Triglyceride 59 05/16/2022 08:04 AM    CHOL/HDL Ratio 3.3 05/16/2022 08:04 AM      BNP No results found for: BNP, BNPP, XBNPT   Liver Enzymes Recent Labs     11/29/22 1806   TP 6.8   ALB 2.9*   AP 69        Thyroid Studies Lab Results   Component Value Date/Time    TSH 0.95 11/29/2022 05:18 PM          Signed By: Km Roberson PA-C     December 2, 2022

## 2022-12-02 NOTE — PROGRESS NOTES
Progress  Note    70-year-old male with past medical history of diabetes, hypertension ESRD coronary artery disease admitted with chest pain, following for ESRD management. Subjective      Overnight event noted  S/p cardiac stent placement   Received HD yesterday     IMPRESSION:   ESRD Tuesday Thursday Saturday schedule, did not finish dialysis completed yesterday  Access left arm AV fistula  Hyperkalemia  NSTEMI, dizziness during dialysis, blood pressure was on lower side  Heart failure with preserved ejection fraction and reduced ejection fraction, history of pulmonary hypertension chest x-ray shows congestive changes on this admission  Paroxysmal atrial fibrillation, on anticoagulation   PLAN:   If patient remain here tomorrow then, Plan for arrange dialysis tomorrow  with 2K bath UF goal 2.5 to 3 L as tolerated. Now on aspirin and plavix, discussed with him to update kidney transplant team about stent placement. Adjust medication per ESRD status.                Current Facility-Administered Medications   Medication Dose Route Frequency    sodium chloride (NS) flush 5-40 mL  5-40 mL IntraVENous Q8H    sodium chloride (NS) flush 5-40 mL  5-40 mL IntraVENous PRN    nitroglycerin (NITROLINGUAL) sublingual 0.4 mg/spray  1 Spray SubLINGual Q5MIN PRN    aspirin delayed-release tablet 81 mg  81 mg Oral DAILY    clopidogreL (PLAVIX) tablet 75 mg  75 mg Oral DAILY    0.9% sodium chloride infusion 250 mL  250 mL IntraVENous DIALYSIS PRN    insulin lispro (HUMALOG) injection   SubCUTAneous AC&HS    glucose chewable tablet 16 g  4 Tablet Oral PRN    glucagon (GLUCAGEN) injection 1 mg  1 mg IntraMUSCular PRN    sodium chloride (NS) flush 5-40 mL  5-40 mL IntraVENous Q8H    sodium chloride (NS) flush 5-40 mL  5-40 mL IntraVENous PRN    acetaminophen (TYLENOL) tablet 650 mg  650 mg Oral Q6H PRN    Or    acetaminophen (TYLENOL) suppository 650 mg  650 mg Rectal Q6H PRN    polyethylene glycol (MIRALAX) packet 17 g  17 g Oral DAILY PRN    ondansetron (ZOFRAN) injection 4 mg  4 mg IntraVENous Q6H PRN    atorvastatin (LIPITOR) tablet 80 mg  80 mg Oral QHS    metoprolol tartrate (LOPRESSOR) tablet 25 mg  25 mg Oral BID    amiodarone (CORDARONE) tablet 200 mg  200 mg Oral DAILY    amLODIPine (NORVASC) tablet 5 mg  5 mg Oral DAILY    hydrALAZINE (APRESOLINE) tablet 25 mg  25 mg Oral Q6H PRN       Review of Systems:     Complete 10-point review of systems were obtained and discussed in length  with the patient. Complete review of systems was negative/unremarkable  except as mentioned in HPI section. Data Review:    Labs: Results:       Chemistry Recent Labs     12/02/22 0429 12/01/22 0408 11/30/22 0727 11/29/22 2157 11/29/22  1806 11/29/22  1205   * 82 68*   < > 78 74    139 141   < > 140 139   K 3.8 4.3 4.5   < > 4.7 4.3    101 104   < > 103 102   CO2 33* 29 29   < > 23 25   BUN 28* 40* 72*   < > 56* 52*   CREA 8.33* 9.66* 14.30*   < > 12.30* 11.30*   CA 10.0 10.4* 9.0   < > 9.0 9.1   AGAP 5 9 8   < > 14 12   BUCR 3* 4* 5*   < > 5* 5*   AP  --   --   --   --  69 75   TP  --   --   --   --  6.8 6.7   ALB  --   --   --   --  2.9* 3.1*   GLOB  --   --   --   --  3.9 3.6   AGRAT  --   --   --   --  0.7* 0.9    < > = values in this interval not displayed. CBC w/Diff Recent Labs     12/02/22 0429 12/01/22 0408 11/30/22 0727   WBC 4.2* 5.2 5.6   RBC 3.05* 3.13* 2.85*   HGB 9.3* 9.5* 8.6*   HCT 28.2* 29.3* 26.7*    154 138   GRANS 55 58 54   LYMPH 20* 21 27   EOS 6* 6* 6*      Coagulation Recent Labs     12/01/22  0408 11/30/22  0727   APTT 42.2* 93.1*       Iron/Ferritin Recent Labs     11/29/22  1718   IRON 66      BNP No results for input(s): BNPP in the last 72 hours. Cardiac Enzymes No results for input(s): CPK, CKND1, SAMARA in the last 72 hours.     No lab exists for component: CKRMB, TROIP   Liver Enzymes Recent Labs     11/29/22  1806   TP 6.8   ALB 2.9*   AP 69      Thyroid Studies Lab Results Component Value Date/Time    TSH 0.95 11/29/2022 05:18 PM         EKG: unchanged from previous tracings. Physical Assessment:   Visit Vitals  BP (!) 183/91   Pulse 69   Temp 98.2 °F (36.8 °C)   Resp 20   Ht 5' 11\" (1.803 m)   Wt 96.6 kg (212 lb 15.4 oz)   SpO2 99%   BMI 29.70 kg/m²     Weight change:     Intake/Output Summary (Last 24 hours) at 12/2/2022 1005  Last data filed at 12/2/2022 2194  Gross per 24 hour   Intake 360 ml   Output 2000 ml   Net -1640 ml     Physical Exam:   General: comfortable, no acute distress   HEENT sclera anicteric, supple neck, no thyromegaly  CVS: S1S2 heard,  no rub  RS: + air entry b/l,   Abd: Soft, Non tender,   Neuro: non focal, awake, alert , CN II-XII are grossly intact  Extrm: NO edema, no cyanosis, clubbing   Skin: no visible  Rash  Musculoskeletal: No gross joints or bone deformities     Procedures/imaging: see electronic medical records for all procedures, Xrays and details which were not copied into this note but were reviewed prior to creation of Plan      Thank you very much for allowing me to participate in the care of this patient. We will continue to monitor with you and make recommendations as needed.            Daniel Yen MD  December 2, 2022  [facility]  Office [phone]

## 2022-12-02 NOTE — PROGRESS NOTES
Reason for Admission:  NSTEMI (non-ST elevated myocardial infarction) ([facility] [I21.4]  ESRD on dialysis ([facility] [N18.6, Z99.2]  Atrial fibrillation with RVR ([facility] [I48.91]                 RUR Score:    17            Plan for utilizing home health:    no                       Likelihood of Readmission:   Moderate                         Do you (patient/family) have any concerns for transition/discharge?  no    Transition of Care Plan:       Initial assessment completed with patient. Cognitive status of patient: oriented to time, place, person and situation. Face sheet information confirmed:  yes. The patient designates his sister Angelo Myers and father Ravin Cohu to participate in his discharge plan and to receive any needed information. This patient lives in a apartment with sister. Patient is able to navigate steps as needed. Prior to hospitalization, patient was considered to be independent with ADLs/IADLS : yes . Patient has a current ACP document on file: no      Healthcare Decision Maker:     Click here to complete [address] including selection of the Healthcare Decision Maker Relationship (ie \"Primary\")    The sister will be available to transport patient home upon discharge. The patient already has [employer], medical equipment available in the home. Patient is not currently active with home health. Patient has not stayed in a skilled nursing facility or rehab. Was  stay within last 60 days : no. This patient is on dialysis :Yes    If yes, hemodialysis patient and receives treatment on Tuesday/Thursday/Saturday at [address] [facility]  Chair time is 6am. Pt is transported to/from dialysis by insurance cab. Currently, the discharge plan is Home. The patient states that he can obtain his medications from the pharmacy, and take his medications as directed. Patient's current insurance is [facility]      Care Management Interventions  PCP Verified by CM:  Yes  Palliative Care Criteria Met (RRAT>21 & CHF Dx)?: No  Mode of Transport at Discharge:  Other (see comment) (family)  Transition of Care Consult (CM Consult): Discharge Planning  Physical Therapy Consult: Yes  Occupational Therapy Consult: Yes  Support Systems: Other Family Member(s), Parent(s)  Confirm Follow Up Transport: Family  Discharge Location  Patient Expects to be Discharged to[de-identified] Home with family assistance        CHRISTIANO Robb RN  Care Management  Pager: [phone]

## 2022-12-02 NOTE — DISCHARGE SUMMARY
Discharge Summary    Patient: Shital Rodriguez MRN: [id number]  CSN: [id number]    YOB: 1970  Age: 54 y.o. Sex: male    DOA: 11/29/2022 LOS:  LOS: 3 days   Discharge Date:      Admission Diagnoses: NSTEMI (non-ST elevated myocardial infarction) ([place] [address] [I21.4]  ESRD on dialysis ([place] [address] [N18.6, Z99.2]  Atrial fibrillation with RVR ([place] [address] [I48.91]    Discharge Diagnoses:    Problem List as of 12/2/2022 Date Reviewed: 11/9/2022            Codes Class Noted - Resolved    Hyperlipidemia ICD-10-CM: E78.5  ICD-9-CM: 272.4  4/13/2022 - Present        Hypoalbuminemia ICD-10-CM: E88.09  ICD-9-CM: 273.8  4/13/2022 - Present    Overview Signed 4/13/2022  3:05 PM by Manjinder Olivas of this note might be different from the original.  Albumin  12/15/2018  2.7             Vitamin D deficiency ICD-10-CM: E55.9  ICD-9-CM: 268.9  4/13/2022 - Present        Chronic diastolic heart failure ([place] [address] ICD-10-CM: I50.32  ICD-9-CM: 428.32  4/2/2022 - Present        Obesity, diabetes, and hypertension syndrome ([place] [address] ICD-10-CM: E11.69, E66.9, E11.59, I15.2  ICD-9-CM: 250.80, 401.9, 278.00, 250.70  4/2/2022 - Present        Obesity (BMI 30-39. 9) ICD-10-CM: E66.9  ICD-9-CM: 278.00  3/21/2022 - Present    Overview Signed 4/13/2022  3:05 PM by Tran Watt CMA     Last Assessment & Plan:   Formatting of this note might be different from the original.  Adiposity - uncontrolled. Lose weight by diet and exercise. Ideal BMI is 18-25. Suggestions to the managing provider. Primary hypertension ICD-10-CM: I10  ICD-9-CM: 401.9  3/21/2022 - Present    Overview Signed 4/13/2022  3:05 PM by Manjinder Olivas of this note might be different from the original.  On medication  Last Assessment & Plan:   Formatting of this note might be different from the original.  Hypertension-uncontrolled. He has hypotension. Manage as appropriate. Suggestions to the managing provider.              Shortness of breath ICD-10-CM: R06.02  ICD-9-CM: 786.05  12/15/2021 - Present        Paroxysmal A-fib (HCC) ICD-10-CM: I48.0  ICD-9-CM: 427.31  12/14/2021 - Present        Hypercalcemia ICD-10-CM: E83.52  ICD-9-CM: 275.42  1/22/2021 - Present        Hyperkalemia ICD-10-CM: E87.5  ICD-9-CM: 276.7  1/22/2021 - Present        ESRD on dialysis [facility]) ICD-10-CM: N18.6, Z99.2  ICD-9-CM: 585.6, V45.11  1/22/2021 - Present        NSTEMI (non-ST elevated myocardial infarction) ([facility] ICD-10-CM: I21.4  ICD-9-CM: 410.70  1/22/2021 - Present        Atrial fibrillation with RVR (HCC) ICD-10-CM: I48.91  ICD-9-CM: 427.31  1/22/2021 - Present        Nausea ICD-10-CM: R11.0  ICD-9-CM: 787.02  11/16/2020 - Present        Secondary hyperparathyroidism of renal origin ([facility] ICD-10-CM: N25.81  ICD-9-CM: 588.81  6/10/2019 - Present        Anemia of chronic disease ICD-10-CM: D63.8  ICD-9-CM: 285.29  3/7/2019 - Present        Type 2 diabetes with nephropathy ([facility] ICD-10-CM: E11.21  ICD-9-CM: 250.40, 583.81  1/4/2019 - Present        Hypertension secondary to other renal disorders ICD-10-CM: I15.1, N28.89  ICD-9-CM: 405.91, 593.89  12/14/2018 - Present        Hypertensive heart disease with heart failure (HCC) ICD-10-CM: I11.0  ICD-9-CM: 402.91, 428.9  12/14/2018 - Present        Pulmonary hypertension ([facility] ICD-10-CM: I27.20  ICD-9-CM: 416.8  12/8/2018 - Present    Overview Signed 4/13/2022  3:05 PM by Ruben Printers of this note might be different from the original.  Echo done at [facility] ([address] [place] [address] & [facility])             Atrial fibrillation with rapid ventricular response ([facility] ICD-10-CM: I48.91  ICD-9-CM: 427.31  12/5/2018 - Present        Arteriovenous fistula stenosis ([facility] ICD-10-CM: V93.499B  ICD-9-CM: 996.74  10/4/2016 - Present        Dyslipidemia ICD-10-CM: E78.5  ICD-9-CM: 272.4  6/21/2016 - Present        Severe nonproliferative diabetic retinopathy with macular edema associated with type 2 diabetes mellitus ([facility] ICD-10-CM: M46.6475  ICD-9-CM: 250.50, 362.06, 362.07  5/12/2015 - Present        Legally blind ICD-10-CM: H54.8  ICD-9-CM: 369.4  10/28/2014 - Present        Diabetic retinopathy ([facility] ICD-10-CM: E11.319  ICD-9-CM: 250.50, 362.01  10/28/2014 - Present        CAD (coronary artery disease) ICD-10-CM: I25.10  ICD-9-CM: 414.00  Unknown - Present        CRI (chronic renal insufficiency) ICD-10-CM: N18.9  ICD-9-CM: 585.9  1/23/2012 - Present    Overview Signed 1/23/2012 11:33 AM by Renzo Macedo     Stage 3, Cr approx 1.8             Diabetes mellitus ([facility] ICD-10-CM: E11.9  ICD-9-CM: 250.00  1/23/2012 - Present        Cardiomyopathy ([facility] ICD-10-CM: I42.9  ICD-9-CM: 425.4  1/23/2012 - Present        Iron deficiency anemia ICD-10-CM: D50.9  ICD-9-CM: 280.9  1/23/2012 - Present        Benign hypertensive  ICD-10-CM: I11.9  ICD-9-CM: 402.10  Unknown - Present        RESOLVED: Cardiomyopathy ([facility] ICD-10-CM: I42.9  ICD-9-CM: 425.4  5/7/2012 - 10/28/2014        RESOLVED: Anasarca ICD-10-CM: R60.1  ICD-9-CM: 782.3  3/12/2012 - 10/28/2014        RESOLVED: Acute systolic congestive heart failure (HCC) ICD-10-CM: I50.21  ICD-9-CM: 428.21, 428.0  Unknown - 10/28/2014           Reason for Admission  54 y.o. male with medical co-morbidities including HTN, CAD with stent, ischemic cardiomyopathy, paroxysmal AFIB with post ablation, hyperlipidemia, ESRD on HD, presented to the ER due to hypotension during his dialysis. He reported that he missed his regularly scheduled HD and it was planned for him to have more fluid off-loaded today. He reported that he took more oral fluid during the Thanksgiving holiday and he felt bloated. About 2 hours into his dialysis, he reported feeling of epigastric tightness and \"sweating\" in his epigastric area. He denied chest pain but he reported chest palpitation that radiated up to his left neck. He denied shortness of breath. His SBP was reported to be in the lower 90's.   He expressed he felt dizzy when his heart rate is fast. No recent sick contact. In the ER, he was found with AFIB rate controlled. His blood pressure was elevated. Initial HS-troponin 59, then uptrend 323. He as started on Heparin gtt due to his elevated cardiac risk. CXR with vascular congestion. Discharge Condition: Good    PHYSICAL EXAM   Visit Vitals  BP (!) 167/92   Pulse 66   Temp 97.8 °F (36.6 °C)   Resp 20   Ht 5' 11\" (1.803 m)   Wt 96.6 kg (212 lb 15.4 oz)   SpO2 97%   BMI 29.70 kg/m²     General:  Alert, cooperative, no acute distress. Found sitting up in chair, speaking in full sentences on room air. HEENT: No facial asymmetry, RADHA Galo, External ears - WNL    Cardiovascular: S1S2 - regular , No Murmur   Pulmonary: CTA bilateral anterior and infra axillary fields. GI:  soft, non-tender nondistended nabs. Extremities:  No edema; 2+ dorsalis pedis pulses bilaterally. Dressing to right groin, no hematoma. Left arm AV fistula. Neuro: Alert and oriented X 4. No focal deficit. Ambulating independently without difficulty. No rash to visible skin    Hospital Course:   1. Acute hypotension dizziness diaphoresis, in the setting of NSTEMI. Resolving. 2. NSTEMI, status post drug-eluting stent. Cardiology input appreciated. Continue aspirin, statin, beta-blocker, Eliquis, amiodarone. Avoid secondhand smoke exposure. Will need triple tx for one month, given recent cardiac stent. After one month, will d/c ASA and continue plavix and Eliquis only. After 6 months, plan to continue Eliquis only. 3. hyperkalemia resolved, nephrology input appreciated. 4. HFpEF- 50- 55%. Diastolic dysfunction on echo 11/30/2022. Volume management with dialysis. 5. paroxysmal atrial fibrillation. Resume Eliquis postprocedure. On amiodarone, beta-blocker  6. CAD see #2.  7. ESRD. Resume outpatient dialysis. 8.  Anemia of CKD. 9.  Secondhand smoke exposure. Patient states he will discuss with his sister.   10.  DVT prophylaxis was given in the form of heparin drip, then NOAC was resumed. 11.  Full code. Patient is not homebound and no home health care needs were identified. Discharge to home. Patient agrees, all questions answered to the best my ability. Consults: Cardiology and Nephrology    Procedures  Cath 12/1/2022 Dr. Skylar Kay, mid AV circumflex 95% stenosis stented to residual 0% using 2.5 mm AMOL. Left ventriculography. Significant Diagnostic Studies: labs:   Recent Results (from the past 24 hour(s))   GLUCOSE, POC    Collection Time: 12/01/22  9:15 PM   Result Value Ref Range    Glucose (POC) 112 (H) 70 - 766 mg/dL   METABOLIC PANEL, BASIC    Collection Time: 12/02/22  4:29 AM   Result Value Ref Range    Sodium 140 136 - 145 mmol/L    Potassium 3.8 3.5 - 5.5 mmol/L    Chloride 102 100 - 111 mmol/L    CO2 33 (H) 21 - 32 mmol/L    Anion gap 5 3.0 - 18 mmol/L    Glucose 109 (H) 74 - 99 mg/dL    BUN 28 (H) 7.0 - 18 MG/DL    Creatinine 8.33 (H) 0.6 - 1.3 MG/DL    BUN/Creatinine ratio 3 (L) 12 - 20      eGFR 7 (L) >60 ml/min/1.73m2    Calcium 10.0 8.5 - 10.1 MG/DL   MAGNESIUM    Collection Time: 12/02/22  4:29 AM   Result Value Ref Range    Magnesium 2.2 1.6 - 2.6 mg/dL   CBC WITH AUTOMATED DIFF    Collection Time: 12/02/22  4:29 AM   Result Value Ref Range    WBC 4.2 (L) 4.6 - 13.2 K/uL    RBC 3.05 (L) 4.35 - 5.65 M/uL    HGB 9.3 (L) 13.0 - 16.0 g/dL    HCT 28.2 (L) 36.0 - 48.0 %    MCV 92.5 78.0 - 100.0 FL    MCH 30.5 24.0 - 34.0 PG    MCHC 33.0 31.0 - 37.0 g/dL    RDW 15.4 (H) 11.6 - 14.5 %    PLATELET 459 764 - 343 K/uL    MPV 10.2 9.2 - 11.8 FL    NRBC 0.0 0  WBC    ABSOLUTE NRBC 0.00 0.00 - 0.01 K/uL    NEUTROPHILS 55 40 - 73 %    LYMPHOCYTES 20 (L) 21 - 52 %    MONOCYTES 18 (H) 3 - 10 %    EOSINOPHILS 6 (H) 0 - 5 %    BASOPHILS 1 0 - 2 %    IMMATURE GRANULOCYTES 0 0.0 - 0.5 %    ABS. NEUTROPHILS 2.3 1.8 - 8.0 K/UL    ABS. LYMPHOCYTES 0.9 0.9 - 3.6 K/UL    ABS. MONOCYTES 0.7 0.05 - 1.2 K/UL    ABS. EOSINOPHILS 0.3 0.0 - 0.4 K/UL    ABS. BASOPHILS 0.1 0.0 - 0.1 K/UL    ABS. IMM. GRANS. 0.0 0.00 - 0.04 K/UL    DF AUTOMATED     EKG, 12 LEAD, SUBSEQUENT    Collection Time: 12/02/22  6:32 AM   Result Value Ref Range    Ventricular Rate 71 BPM    Atrial Rate 71 BPM    P-R Interval 160 ms    QRS Duration 92 ms    Q-T Interval 430 ms    QTC Calculation (Bezet) 467 ms    Calculated P Axis 31 degrees    Calculated R Axis -20 degrees    Calculated T Axis 47 degrees    Diagnosis       Normal sinus rhythm  Moderate voltage criteria for LVH, may be normal variant  Cannot rule out Septal infarct (cited on or before 02-DEC-2022)  Abnormal ECG  When compared with ECG of 30-NOV-2022 09:34,  No significant change was found  Confirmed by Tati Obrien ([id number]) on 12/2/2022 11:33:55 AM     GLUCOSE, POC    Collection Time: 12/02/22  7:22 AM   Result Value Ref Range    Glucose (POC) 103 70 - 110 mg/dL   GLUCOSE, POC    Collection Time: 12/02/22 11:37 AM   Result Value Ref Range    Glucose (POC) 159 (H) 70 - 110 mg/dL       IMAGING  XR Results (most recent):  Results from Hospital Encounter encounter on 11/29/22    XR CHEST PORT    Narrative  EXAM:  XR CHEST PORT    INDICATION:   lightheaded    COMPARISON: 8/16/2022. FINDINGS:  Mildly enlarged cardiac silhouette. Pulmonary vascular congestion. No  pneumothorax, pleural effusion or consolidation. Intact osseous structures. Impression  Enlarged cardiac silhouette with pulmonary vascular congestion.     EKG Results       Procedure 720 Value Units Date/Time    EKG, 12 LEAD, SUBSEQUENT [[id number]] Collected: 12/02/22 0632    Order Status: Completed Updated: 12/02/22 1134     Ventricular Rate 71 BPM      Atrial Rate 71 BPM      P-R Interval 160 ms      QRS Duration 92 ms      Q-T Interval 430 ms      QTC Calculation (Bezet) 467 ms      Calculated P Axis 31 degrees      Calculated R Axis -20 degrees      Calculated T Axis 47 degrees      Diagnosis --     Normal sinus rhythm  Moderate voltage criteria for LVH, may be normal variant  Cannot rule out Septal infarct (cited on or before 02-DEC-2022)  Abnormal ECG  When compared with ECG of 30-NOV-2022 09:34,  No significant change was found  Confirmed by Tiffany Perez ([id number]) on 12/2/2022 11:33:55 AM      EKG, 12 LEAD, SUBSEQUENT [[id number]] Collected: 11/30/22 0934    Order Status: Completed Updated: 11/30/22 1651     Ventricular Rate 73 BPM      Atrial Rate 73 BPM      P-R Interval 162 ms      QRS Duration 88 ms      Q-T Interval 418 ms      QTC Calculation (Bezet) 460 ms      Calculated P Axis 44 degrees      Calculated R Axis -14 degrees      Calculated T Axis 41 degrees      Diagnosis --     Normal sinus rhythm  Poor R Wave Progression  Abnormal ECG  When compared with ECG of 29-NOV-2022 12:12,  Sinus rhythm has replaced Atrial fibrillation  T wave inversion no longer evident in Inferior leads  T wave inversion no longer evident in Lateral leads  Confirmed by Cash Ricketts MD, Reji Rolon ([id number]) on 11/30/2022 4:51:03 PM      EKG, 12 LEAD, INITIAL [[id number]] Collected: 11/29/22 1212    Order Status: Completed Updated: 11/29/22 1622     Ventricular Rate 107 BPM      QRS Duration 94 ms      Q-T Interval 380 ms      QTC Calculation (Bezet) 507 ms      Calculated R Axis -17 degrees      Calculated T Axis 84 degrees      Diagnosis --     Atrial fibrillation with rapid ventricular response  Moderate voltage criteria for LVH, may be normal variant ( R in aVL , [employer]   product )  Marked ST abnormality, possible inferior subendocardial injury  Abnormal ECG    Confirmed by Sony Worrell MD, Sally Kilpatrick ([id number]) on 11/29/2022 4:22:12 PM            11/29/22    ECHO ADULT FOLLOW-UP OR LIMITED 11/30/2022 11/30/2022    Interpretation Summary    Left Ventricle: Normal left ventricular systolic function with a visually estimated EF of 55 - 60%. Left ventricle size is normal. Moderately increased wall thickness. Findings consistent with moderate concentric hypertrophy.  Normal wall motion. Diastolic dysfunction present with increased LAP with normal LV EF. No valvular heart disease. Signed by: Maikel Juarez MD on 11/30/2022 10:59 AM      Discharge Medications:     Current Discharge Medication List        START taking these medications    Details   clopidogreL (PLAVIX) 75 mg tab Take 1 Tablet by mouth daily. Qty: 30 Tablet, Refills: 0           CONTINUE these medications which have NOT CHANGED    Details   amiodarone (CORDARONE) 200 mg tablet TAKE 1 TABLET BY MOUTH DAILY  Qty: 30 Tablet, Refills: 6      cinacalcet (SENSIPAR) 30 mg tablet Take 30 mg by mouth. TAKE 1 TABLET(30MG) BY MOUTH 3 TIMES WEEKLY: MONDAY, WEDNESDAY, AND FRIDAY IN THE EVENING.      metoprolol tartrate (LOPRESSOR) 25 mg tablet TAKE 1 TABLET BY MOUTH TWICE DAILY  Qty: 180 Tablet, Refills: 3    Comments: **Patient requests 90 days supply**      atorvastatin (LIPITOR) 80 mg tablet Take 1 Tablet by mouth nightly. Qty: 90 Tablet, Refills: 3      amLODIPine (NORVASC) 5 mg tablet Take 1 Tablet by mouth daily. Qty: 90 Tablet, Refills: 0      glucose blood VI test strips (Accu-Chek Beatrice Plus test strp) strip Use as directed  Qty: 100 Strip, Refills: 2    Associated Diagnoses: Type 2 diabetes with nephropathy (HCC)      Eliquis 2.5 mg tablet TAKE 1 TABLET BY MOUTH EVERY 12 HOURS  Qty: 180 Tablet, Refills: 3      aspirin delayed-release 81 mg tablet Take 1 Tablet by mouth daily. Qty: 90 Tablet, Refills: 3      lisinopriL (PRINIVIL, ZESTRIL) 20 mg tablet Take 20 mg by mouth daily. nitroglycerin (NITROLINGUAL) 400 mcg/spray spray 1 Spray by SubLINGual route every five (5) minutes as needed for Chest Pain.   Qty: 1 Bottle, Refills: 2      Blood-Glucose Meter monitoring kit Check fasting glucose  Qty: 1 Kit, Refills: 0           STOP taking these medications       benzoyl peroxide-erythromycin (BENZAMYCIN) 3-5 % topical gel Comments:   Reason for Stopping:         linaGLIPtin (Tradjenta) 5 mg tablet Comments:   Reason for Stopping:         glipiZIDE SR (GLUCOTROL XL) 2.5 mg CR tablet Comments:   Reason for Stopping:         alum-mag hydroxide-simeth (MYLANTA) 200-200-20 mg/5 mL susp Comments:   Reason for Stopping:         sucroferric oxyhydroxide (VELPHORO) 500 mg chew chewable tablet Comments:   Reason for Stopping:               Activity: Activity as tolerated    Diet: Renal Diet    Wound Care: As directed    Follow-up:   Follow-up Appointments   Procedures    FOLLOW UP VISIT Appointment in: Other ([facility]) 1. Lety Harris MD 3 - 5 days 2. Dr Yayo Fletcher 3 - 4 weeks. 3. Resume previous outpatient dialysis schedule. 1. Lety Harris MD 3 - 5 days  2. Dr Yayo Fletcher 3 - 4 weeks. 3. Resume previous outpatient dialysis schedule. Standing Status:   Standing     Number of Occurrences:   1     Order Specific Question:   Appointment in     Answer:    Other (Specify)       Minutes spent on discharge: >30

## 2022-12-02 NOTE — PROGRESS NOTES
Bedside and Verbal shift change report given to GUME Guevara RN (oncoming nurse) by Ssuhma Menjivar RN (offgoing nurse). Report included the following information SBAR, Kardex, Intake/Output, Recent Results, and Cardiac Rhythm NSR . Wound Prevention Checklist    Patient: Shoaib Duran (55 y.o. male)  Date: 12/1/2022  Diagnosis: NSTEMI (non-ST elevated myocardial infarction) ([facility] [I21.4]  [facility] [N18.6, Z99.2]  Atrial fibrillation with RVR ([facility] [I48.91] <principal problem not specified>    Precautions:         []  Heel prevention boots placed on patient    []  Patient turned q2h during shift    []  Lift team ordered    [x]  Patient on [place] bed/Specialty bed    []  Each Wound is documented during shift (Stage, Color, drainage, odor, measurements, and dressings)    [x]  Dual skin check done with Claudia Ward RN      Assumed care of patient. He sitting up in recliner chair. A&Ox4. Patient connected to cardiac monitoring. Shift assessment completed. Assessed patient for safety precautions. 2116 administered pm medications. Blood glucose rechecked. Result of 112. Patient humalog insulin held. 2250 patient still sitting in recliner chair watching tv. Pt reports that he prefers to sit in recliner at this time. 0000 reassessment of patient. He is sitting in recliner chair resting. No complaints at this time. 0140 patient assisted to bed per his request. No complaints. 0400 patient reassessment. No changes,    Bedside and Verbal shift change report given to Roslyn Reyes RN (oncoming nurse) by [employer], RN (offgoing nurse). Report included the following information SBAR, Kardex, Intake/Output, Recent Results, and Cardiac Rhythm NSR .

## 2022-12-02 NOTE — PROGRESS NOTES
H2H order noted and placed in queue to [facility]. Notified Dylan. Discharge order noted for today. Pt has been accepted to [facility] agency. Met with patient and are agreeable to the transition plan today. Transport has been arranged through [facility]. Patient's discharge summary and home health  orders have been forwarded home health  agency. Updated bedside RN, Raz,  to the transition plan.   Discharge information has been documented on the AVS.               DUNCAN VelaN RN  Care Management  Pager: [phone]

## 2022-12-02 NOTE — HOME CARE
Received [facility] referral.  Discharge noted for today. Spoke with patient via phone. Verified demographics; explained [facility] services. Patient agrees. Does state has dialysis on Tue/Thr/Sat. Referral has been noted and arranged and sent to central intake for completion.      ---   Hermes Esparza LPN  [facility] - INPATIENT Liaison

## 2022-12-02 NOTE — PROGRESS NOTES
Bedside and Verbal shift change report given to José Artis RN (oncoming nurse) by [employer], RN (offgoing nurse). Report included the following information SBAR, Kardex, Intake/Output, MAR, and Cardiac Rhythm NSR .     Wound Prevention Checklist    Patient: Gerald Lanier (5 y.o. male)  Date: 12/2/2022  Diagnosis: NSTEMI (non-ST elevated myocardial infarction) ([facility] [I21.4]  ESRD on dialysis ([facility] [N18.6, Z99.2]  Atrial fibrillation with RVR ([facility] [I48.91] <principal problem not specified>    Precautions:         []  Heel prevention boots placed on patient    []  Patient turned q2h during shift    []  Lift team ordered    []  Patient on [place] bed/Specialty bed    [x]  Each Wound is documented during shift (Stage, Color, drainage, odor, measurements, and dressings)right groin from cardiac cath    [x]  Dual skin check done with CHARLIE Rios RN

## 2022-12-05 ENCOUNTER — OFFICE VISIT (OUTPATIENT)
Dept: FAMILY MEDICINE CLINIC | Age: 52
End: 2022-12-05
Payer: MEDICARE

## 2022-12-05 ENCOUNTER — TELEPHONE (OUTPATIENT)
Dept: FAMILY MEDICINE CLINIC | Age: 52
End: 2022-12-05

## 2022-12-05 VITALS
BODY MASS INDEX: 28.98 KG/M2 | OXYGEN SATURATION: 99 % | SYSTOLIC BLOOD PRESSURE: 150 MMHG | RESPIRATION RATE: 16 BRPM | WEIGHT: 207 LBS | HEIGHT: 71 IN | DIASTOLIC BLOOD PRESSURE: 80 MMHG | TEMPERATURE: 98.9 F | HEART RATE: 68 BPM

## 2022-12-05 DIAGNOSIS — D63.8 ANEMIA OF CHRONIC DISEASE: ICD-10-CM

## 2022-12-05 DIAGNOSIS — I21.4 NSTEMI (NON-ST ELEVATED MYOCARDIAL INFARCTION) (HCC): Primary | ICD-10-CM

## 2022-12-05 DIAGNOSIS — Z99.2 ESRD ON DIALYSIS (HCC): ICD-10-CM

## 2022-12-05 DIAGNOSIS — I48.0 PAROXYSMAL A-FIB (HCC): ICD-10-CM

## 2022-12-05 DIAGNOSIS — E78.5 DYSLIPIDEMIA: ICD-10-CM

## 2022-12-05 DIAGNOSIS — I50.32 CHRONIC DIASTOLIC HEART FAILURE (HCC): ICD-10-CM

## 2022-12-05 DIAGNOSIS — I27.20 PULMONARY HYPERTENSION (HCC): ICD-10-CM

## 2022-12-05 DIAGNOSIS — E11.21 TYPE 2 DIABETES WITH NEPHROPATHY (HCC): ICD-10-CM

## 2022-12-05 DIAGNOSIS — I48.91 ATRIAL FIBRILLATION WITH RVR (HCC): ICD-10-CM

## 2022-12-05 DIAGNOSIS — N18.6 ESRD ON DIALYSIS (HCC): ICD-10-CM

## 2022-12-05 DIAGNOSIS — I25.10 CORONARY ARTERY DISEASE INVOLVING NATIVE CORONARY ARTERY OF NATIVE HEART WITHOUT ANGINA PECTORIS: ICD-10-CM

## 2022-12-05 DIAGNOSIS — G25.81 RESTLESS LEG: ICD-10-CM

## 2022-12-05 DIAGNOSIS — E11.3419: ICD-10-CM

## 2022-12-05 PROCEDURE — 99496 TRANSJ CARE MGMT HIGH F2F 7D: CPT | Performed by: FAMILY MEDICINE

## 2022-12-05 PROCEDURE — G8427 DOCREV CUR MEDS BY ELIG CLIN: HCPCS | Performed by: FAMILY MEDICINE

## 2022-12-05 RX ORDER — MIDODRINE HYDROCHLORIDE 2.5 MG/1
2.5 TABLET ORAL
Qty: 30 TABLET | Refills: 2 | Status: SHIPPED | OUTPATIENT
Start: 2022-12-05 | End: 2023-01-04

## 2022-12-05 NOTE — PROGRESS NOTES
Chief Complaint   Patient presents with    [address] [place] [address] follow up for ESRD on  dialysis, afib, and NSTEMI (non-ST elevated myocardial infarction)       Restless Leg Syndrome     Has concerns about restless syndrome         1. \"Have you been to the ER, urgent care clinic since your last visit? Hospitalized since your last visit? \" Yes 11- to 12- [address] [place] [address] for ESRD on  dialysis, afib, and NSTEMI (non-ST elevated myocardial infarction)     2. \"Have you seen or consulted any other health care providers outside of the [address] [place] [address] since your last visit? \" No     3. For patients aged 39-70: Has the patient had a colonoscopy / FIT/ Cologuard? Yes - no Care Gap present      If the patient is female:    4. For patients aged 41-77: Has the patient had a mammogram within the past 2 years? NA - based on age or sex      11. For patients aged 21-65: Has the patient had a pap smear?  NA - based on age or sex     Cardiology follow up with Dr. Alejandra Guerra on January 11,2023 @ 10:00 am     Endocrinology follow up with Dr. Yulisa Maher on February 17,2023 @ 9:45 am

## 2022-12-05 NOTE — TELEPHONE ENCOUNTER
Care Transitions Initial Follow Up Call    Outreach made within 2 business days of discharge: Yes    Patient: Gerald Lanier Patient : 1970   MRN: [id number]  Reason for Admission: ESRD  Discharge Date: 22       Spoke with: Patient saw provider in office this morning    Discharge department/facility:  [facility]      Scheduled appointment with PCP within 7-14 days    Follow Up  Future Appointments   Date Time Provider Jessica Vail   2023 10:00 AM Lanny Awad MD [facility] BS AMB   2023 10:15 AM Rosa Paredes MD [facility] AMB   2023 10:00 AM Rosa Paredes MD [facility] AMB   5/10/2023 10:20 AM Lanny Awad MD [facility] BS AMB     Patient saw provider [id number]. All questions addressed during appt.      Leroy Serra RN

## 2022-12-05 NOTE — PROGRESS NOTES
Bridget Pena, 54 y.o.,  male    SUBJECTIVE  Ff-up hospital admission NSTEMI    Date of admission 11/29/2022-12/1/2022    Present with diaphoresis and sob while on HD, ED eval NSTEMI, trop HS 39>323. Cardiac cath 12/1 mid AV 95% occusion stented, added Plavix to ASA. He has known Afib/RVR on amiodarone, eliquis and BB  No longer with sob. CXR showing pulm congestion, Echo EF 55-60 %   Hx of CAD/Afib- s/p ablation 6/2022. no chest pains, palpitations, leg edema. cardiac cath 8/19 showing proximal LAD stent  Patent 35% stenosis. ESRD-on HD. Also with anemia of chronic disease and HTN, following nephrology    NIDDM- w/ retinopathy, legally blind. Says 's. Dfollowing endo dr. Amie Jiang, and ophthalmology. On tradjenta/ glipizide    C/o restless legs, says trial gabapentin during admission without relief. Wants to try something else. Electrolyte/mg level wnl upon discharge    Lives with wife, who assists with advance ADLs    ROS:  See HPI, all others negative        Patient Active Problem List   Diagnosis Code    Benign hypertensive  I11.9    CRI (chronic renal insufficiency) N18.9    Diabetes mellitus (HCC) E11.9    Cardiomyopathy ([address] I42.9    Iron deficiency anemia D50.9    CAD (coronary artery disease) I25.10    Legally blind H54.8    Diabetic retinopathy ([facility] E11.319    Severe nonproliferative diabetic retinopathy with macular edema, associated with type 2 diabetes mellitus E11.3419    Dyslipidemia E78.5    Atrial fibrillation with rapid ventricular response (HCC) I48.91    Type 2 diabetes with nephropathy (HCC) E11.21       Current Outpatient Medications:     midodrine (PROAMATINE) 2.5 mg tablet, Take 1 Tablet by mouth nightly for 30 days. , Disp: 30 Tablet, Rfl: 2    clopidogreL (PLAVIX) 75 mg tab, Take 1 Tablet by mouth daily. , Disp: 30 Tablet, Rfl: 0    amiodarone (CORDARONE) 200 mg tablet, TAKE 1 TABLET BY MOUTH DAILY, Disp: 30 Tablet, Rfl: 6    cinacalcet (SENSIPAR) 30 mg tablet, Take 30 mg by mouth. TAKE 1 TABLET(30MG) BY MOUTH 3 TIMES WEEKLY: MONDAY, WEDNESDAY, AND FRIDAY IN THE EVENING., Disp: , Rfl:     metoprolol tartrate (LOPRESSOR) 25 mg tablet, TAKE 1 TABLET BY MOUTH TWICE DAILY, Disp: 180 Tablet, Rfl: 3    lisinopriL (PRINIVIL, ZESTRIL) 20 mg tablet, Take 20 mg by mouth daily. , Disp: , Rfl:     atorvastatin (LIPITOR) 80 mg tablet, Take 1 Tablet by mouth nightly., Disp: 90 Tablet, Rfl: 3    amLODIPine (NORVASC) 5 mg tablet, Take 1 Tablet by mouth daily. , Disp: 90 Tablet, Rfl: 0    glucose blood VI test strips (Accu-Chek Beatrice Plus test strp) strip, Use as directed, Disp: 100 Strip, Rfl: 2    Eliquis 2.5 mg tablet, TAKE 1 TABLET BY MOUTH EVERY 12 HOURS, Disp: 180 Tablet, Rfl: 3    aspirin delayed-release 81 mg tablet, Take 1 Tablet by mouth daily. , Disp: 90 Tablet, Rfl: 3    Blood-Glucose Meter monitoring kit, Check fasting glucose, Disp: 1 Kit, Rfl: 0    nitroglycerin (NITROLINGUAL) 400 mcg/spray spray, 1 [place] by SubLINGual route every five (5) minutes as needed for Chest Pain., Disp: 1 Bottle, Rfl: 2  No Known Allergies    Past Medical History:   Diagnosis Date    Abnormal nuclear cardiac imaging test 10/08/2012    Fixed anteroseptal, anteroapical defect c/w prior infarction. No ischemia. Mid & distal anteroseptal, anteroapical WMA. LVE. EF 44%. Anemia     dr. Billy Pay doubt amyloid or multiple myeloma. candidate for procirt if Hg <10    Benign hypertensive     Blindness of right eye 01/2013    legally blind    CAD (coronary artery disease)     Cardiomyopathy ejection fraction of 40%     CKD (chronic kidney disease), stage IV ([address]     to see Dr. Anthony Ornelas 12/1/12    Congestive heart failure ([address]     Diabetes mellitus ([address]     Diabetic retinopathy ([address]     Diabetic retinopathy ([facility]     Dr. Mary Mendez- injections    Heart failure [facility])     History of echocardiogram 04/22/2014    LVE. EF 55% (prev 40-45% on echo of 1/27/12). No WMA. Mild-mod conc LVH. Gr 3 DDfx. Marked LAE. Mild SHANTEL. Mild MR. Hypertension     Pulmonary hypertension ([facility]     Renal Ultrasound 1/3/12    Right kidney isoechoic w/respect to liver. Sm left-sided pleural effusion    S/P cardiac cath 10/16/2012    LM patent. pLAD 95% (3.5 x 12-mm [place] stent, resid 0$%). LCX mild. Social History     Socioeconomic History    Marital status:      Spouse name: Not on file    Number of children: Not on file    Years of education: Not on file    Highest education level: Not on file   Social Needs    Financial resource strain: Not on file    Food insecurity - worry: Not on file    Food insecurity - inability: Not on file    Transportation needs - medical: Not on file    Transportation needs - non-medical: Not on file   Occupational History    Not on file   Tobacco Use    Smoking status: Never Smoker    Smokeless tobacco: Never Used   Substance and Sexual Activity    Alcohol use:  Yes     Alcohol/week: 2.5 oz     Types: 5 Cans of beer per week     Comment: occassionally    Drug use: No    Sexual activity: Yes     Partners: Female     Birth control/protection: None   Other Topics Concern    Not on file   Social History Narrative    Not on file       Family History   Problem Relation Age of Onset    Diabetes Mother     Hypertension Mother     Kidney Disease Mother     High Cholesterol Father     Diabetes Brother         pre diabetic         OBJECTIVE    Physical Exam:     Visit Vitals  BP (!) 150/80 (BP 1 Location: Left arm, BP Patient Position: Sitting, BP Cuff Size: Large adult)   Pulse 68   Temp 98.9 °F (37.2 °C) (Temporal)   Resp 16   Ht 5' 11\" (1.803 m)   Wt 207 lb (93.9 kg)   SpO2 99%   BMI 28.87 kg/m²         General: alert, chronically ill-appearing,  in no apparent distress or pain  CVS: normal rate, regular rhythm, distinct S1 and S2  Lungs:clear to ausculation bilaterally, no crackles, wheezing or rhonchi noted  Extremities: no edema, no cyanosis, MSK grossly normal  Skin: warm, no lesions, rashes noted  Psych: mood and affect normal    Results for orders placed or performed during the hospital encounter of 11/29/22   CBC WITH AUTOMATED DIFF   Result Value Ref Range    WBC 7.7 4.6 - 13.2 K/uL    RBC 3.42 (L) 4.35 - 5.65 M/uL    HGB 10.5 (L) 13.0 - 16.0 g/dL    HCT 31.8 (L) 36.0 - 48.0 %    MCV 93.0 78.0 - 100.0 FL    MCH 30.7 24.0 - 34.0 PG    MCHC 33.0 31.0 - 37.0 g/dL    RDW 15.5 (H) 11.6 - 14.5 %    PLATELET 429 590 - 056 K/uL    MPV 9.4 9.2 - 11.8 FL    NRBC 0.0 0  WBC    ABSOLUTE NRBC 0.00 0.00 - 0.01 K/uL    NEUTROPHILS 80 (H) 40 - 73 %    LYMPHOCYTES 8 (L) 21 - 52 %    MONOCYTES 9 3 - 10 %    EOSINOPHILS 2 0 - 5 %    BASOPHILS 0 0 - 2 %    IMMATURE GRANULOCYTES 1 (H) 0.0 - 0.5 %    ABS. NEUTROPHILS 6.2 1.8 - 8.0 K/UL    ABS. LYMPHOCYTES 0.6 (L) 0.9 - 3.6 K/UL    ABS. MONOCYTES 0.7 0.05 - 1.2 K/UL    ABS. EOSINOPHILS 0.1 0.0 - 0.4 K/UL    ABS. BASOPHILS 0.0 0.0 - 0.1 K/UL    ABS. IMM. GRANS. 0.0 0.00 - 0.04 K/UL    DF AUTOMATED     METABOLIC PANEL, COMPREHENSIVE   Result Value Ref Range    Sodium 139 136 - 145 mmol/L    Potassium 4.3 3.5 - 5.5 mmol/L    Chloride 102 100 - 111 mmol/L    CO2 25 21 - 32 mmol/L    Anion gap 12 3.0 - 18 mmol/L    Glucose 74 74 - 99 mg/dL    BUN 52 (H) 7.0 - 18 MG/DL    Creatinine 11.30 (H) 0.6 - 1.3 MG/DL    BUN/Creatinine ratio 5 (L) 12 - 20      eGFR 5 (L) >60 ml/min/1.73m2    Calcium 9.1 8.5 - 10.1 MG/DL    Bilirubin, total 0.5 0.2 - 1.0 MG/DL    ALT (SGPT) 16 16 - 61 U/L    AST (SGOT) 13 10 - 38 U/L    Alk.  phosphatase 75 45 - 117 U/L    Protein, total 6.7 6.4 - 8.2 g/dL    Albumin 3.1 (L) 3.4 - 5.0 g/dL    Globulin 3.6 2.0 - 4.0 g/dL    A-G Ratio 0.9 0.8 - 1.7     MAGNESIUM   Result Value Ref Range    Magnesium 2.2 1.6 - 2.6 mg/dL   TROPONIN-HIGH SENSITIVITY   Result Value Ref Range    Troponin-High Sensitivity 59 0 - 78 ng/L   TROPONIN-HIGH SENSITIVITY   Result Value Ref Range    Troponin-High Sensitivity 323 (HH) 0 - 78 ng/L   CBC WITH AUTOMATED DIFF   Result Value Ref Range WBC 7.1 4.6 - 13.2 K/uL    RBC 3.21 (L) 4.35 - 5.65 M/uL    HGB 9.9 (L) 13.0 - 16.0 g/dL    HCT 29.5 (L) 36.0 - 48.0 %    MCV 91.9 78.0 - 100.0 FL    MCH 30.8 24.0 - 34.0 PG    MCHC 33.6 31.0 - 37.0 g/dL    RDW 15.7 (H) 11.6 - 14.5 %    PLATELET 852 891 - 710 K/uL    MPV 10.2 9.2 - 11.8 FL    NRBC 0.0 0  WBC    ABSOLUTE NRBC 0.00 0.00 - 0.01 K/uL    NEUTROPHILS 79 (H) 40 - 73 %    LYMPHOCYTES 11 (L) 21 - 52 %    MONOCYTES 8 3 - 10 %    EOSINOPHILS 1 0 - 5 %    BASOPHILS 0 0 - 2 %    IMMATURE GRANULOCYTES 0 0.0 - 0.5 %    ABS. NEUTROPHILS 5.6 1.8 - 8.0 K/UL    ABS. LYMPHOCYTES 0.8 (L) 0.9 - 3.6 K/UL    ABS. MONOCYTES 0.6 0.05 - 1.2 K/UL    ABS. EOSINOPHILS 0.1 0.0 - 0.4 K/UL    ABS. BASOPHILS 0.0 0.0 - 0.1 K/UL    ABS. IMM.  GRANS. 0.0 0.00 - 0.04 K/UL    DF AUTOMATED     PTT   Result Value Ref Range    aPTT 127.6 (H) 23.0 - 36.4 SEC   NT-PRO BNP   Result Value Ref Range    NT pro-BNP 21,164 (H) 0 - 450 PG/ML   TROPONIN-HIGH SENSITIVITY   Result Value Ref Range    Troponin-High Sensitivity 1,146 (HH) 0 - 78 ng/L   HEMOGLOBIN A1C WITH EAG   Result Value Ref Range    Hemoglobin A1c 5.7 (H) 4.2 - 5.6 %    Est. average glucose 117 mg/dL   TSH 3RD GENERATION   Result Value Ref Range    TSH 0.95 0.36 - 3.74 uIU/mL   FERRITIN   Result Value Ref Range    Ferritin 791 (H) 8 - 388 NG/ML   VITAMIN B12 & FOLATE   Result Value Ref Range    Vitamin B12 529 211 - 911 pg/mL    Folate 4.7 3.10 - 17.50 ng/mL   IRON PROFILE   Result Value Ref Range    Iron 66 50 - 175 ug/dL    TIBC 197 (L) 250 - 450 ug/dL    Iron % saturation 34 20 - 50 %   TROPONIN-HIGH SENSITIVITY   Result Value Ref Range    Troponin-High Sensitivity 1,301 (HH) 0 - 78 ng/L   METABOLIC PANEL, COMPREHENSIVE   Result Value Ref Range    Sodium 140 136 - 145 mmol/L    Potassium 4.7 3.5 - 5.5 mmol/L    Chloride 103 100 - 111 mmol/L    CO2 23 21 - 32 mmol/L    Anion gap 14 3.0 - 18 mmol/L    Glucose 78 74 - 99 mg/dL    BUN 56 (H) 7.0 - 18 MG/DL    Creatinine 12.30 (H) 0.6 - 1.3 MG/DL    BUN/Creatinine ratio 5 (L) 12 - 20      eGFR 5 (L) >60 ml/min/1.73m2    Calcium 9.0 8.5 - 10.1 MG/DL    Bilirubin, total 0.4 0.2 - 1.0 MG/DL    ALT (SGPT) 17 16 - 61 U/L    AST (SGOT) 18 10 - 38 U/L    Alk. phosphatase 69 45 - 117 U/L    Protein, total 6.8 6.4 - 8.2 g/dL    Albumin 2.9 (L) 3.4 - 5.0 g/dL    Globulin 3.9 2.0 - 4.0 g/dL    A-G Ratio 0.7 (L) 0.8 - 1.7     PTT   Result Value Ref Range    aPTT 52.3 (H) 23.0 - 10.2 SEC   METABOLIC PANEL, BASIC   Result Value Ref Range    Sodium 141 136 - 145 mmol/L    Potassium 4.8 3.5 - 5.5 mmol/L    Chloride 103 100 - 111 mmol/L    CO2 29 21 - 32 mmol/L    Anion gap 9 3.0 - 18 mmol/L    Glucose 103 (H) 74 - 99 mg/dL    BUN 60 (H) 7.0 - 18 MG/DL    Creatinine 13.00 (H) 0.6 - 1.3 MG/DL    BUN/Creatinine ratio 5 (L) 12 - 20      eGFR 4 (L) >60 ml/min/1.73m2    Calcium 9.5 8.5 - 10.1 MG/DL   CBC WITH AUTOMATED DIFF   Result Value Ref Range    WBC 5.6 4.6 - 13.2 K/uL    RBC 2.85 (L) 4.35 - 5.65 M/uL    HGB 8.6 (L) 13.0 - 16.0 g/dL    HCT 26.7 (L) 36.0 - 48.0 %    MCV 93.7 78.0 - 100.0 FL    MCH 30.2 24.0 - 34.0 PG    MCHC 32.2 31.0 - 37.0 g/dL    RDW 15.9 (H) 11.6 - 14.5 %    PLATELET 105 082 - 358 K/uL    MPV 9.9 9.2 - 11.8 FL    NRBC 0.0 0  WBC    ABSOLUTE NRBC 0.00 0.00 - 0.01 K/uL    NEUTROPHILS 54 40 - 73 %    LYMPHOCYTES 27 21 - 52 %    MONOCYTES 13 (H) 3 - 10 %    EOSINOPHILS 6 (H) 0 - 5 %    BASOPHILS 1 0 - 2 %    IMMATURE GRANULOCYTES 0 0.0 - 0.5 %    ABS. NEUTROPHILS 3.0 1.8 - 8.0 K/UL    ABS. LYMPHOCYTES 1.5 0.9 - 3.6 K/UL    ABS. MONOCYTES 0.7 0.05 - 1.2 K/UL    ABS. EOSINOPHILS 0.3 0.0 - 0.4 K/UL    ABS. BASOPHILS 0.1 0.0 - 0.1 K/UL    ABS. IMM.  GRANS. 0.0 0.00 - 0.04 K/UL    DF AUTOMATED     METABOLIC PANEL, BASIC   Result Value Ref Range    Sodium 141 136 - 145 mmol/L    Potassium 4.5 3.5 - 5.5 mmol/L    Chloride 104 100 - 111 mmol/L    CO2 29 21 - 32 mmol/L    Anion gap 8 3.0 - 18 mmol/L    Glucose 68 (L) 74 - 99 mg/dL    BUN 72 (H) 7.0 - 18 MG/DL    Creatinine 14.30 (H) 0.6 - 1.3 MG/DL    BUN/Creatinine ratio 5 (L) 12 - 20      eGFR 4 (L) >60 ml/min/1.73m2    Calcium 9.0 8.5 - 10.1 MG/DL   MAGNESIUM   Result Value Ref Range    Magnesium 2.5 1.6 - 2.6 mg/dL   PTT   Result Value Ref Range    aPTT 93.1 (H) 23.0 - 36.4 SEC   HEP B SURFACE AB   Result Value Ref Range    Hepatitis B surface Ab 176.47 >10.0 mIU/mL    Hep B surface Ab Interp. Positive POS      Hep B surface Ab comment        Samples with a  value of 10 mIU/mL or greater are considered positive (protective immunity) in accordance with the CDC guidelines. HEP B SURFACE AG   Result Value Ref Range    Hepatitis B surface Ag 0.91 <1.00 Index    Hep B surface Ag Interp. Negative NEG     PTT   Result Value Ref Range    aPTT 42.2 (H) 23.0 - 36.4 SEC   CBC WITH AUTOMATED DIFF   Result Value Ref Range    WBC 5.2 4.6 - 13.2 K/uL    RBC 3.13 (L) 4.35 - 5.65 M/uL    HGB 9.5 (L) 13.0 - 16.0 g/dL    HCT 29.3 (L) 36.0 - 48.0 %    MCV 93.6 78.0 - 100.0 FL    MCH 30.4 24.0 - 34.0 PG    MCHC 32.4 31.0 - 37.0 g/dL    RDW 15.6 (H) 11.6 - 14.5 %    PLATELET 545 040 - 087 K/uL    MPV 10.3 9.2 - 11.8 FL    NRBC 0.0 0  WBC    ABSOLUTE NRBC 0.00 0.00 - 0.01 K/uL    NEUTROPHILS 58 40 - 73 %    LYMPHOCYTES 21 21 - 52 %    MONOCYTES 14 (H) 3 - 10 %    EOSINOPHILS 6 (H) 0 - 5 %    BASOPHILS 1 0 - 2 %    IMMATURE GRANULOCYTES 0 0.0 - 0.5 %    ABS. NEUTROPHILS 3.1 1.8 - 8.0 K/UL    ABS. LYMPHOCYTES 1.1 0.9 - 3.6 K/UL    ABS. MONOCYTES 0.7 0.05 - 1.2 K/UL    ABS. EOSINOPHILS 0.3 0.0 - 0.4 K/UL    ABS. BASOPHILS 0.1 0.0 - 0.1 K/UL    ABS. IMM.  GRANS. 0.0 0.00 - 0.04 K/UL    DF AUTOMATED     METABOLIC PANEL, BASIC   Result Value Ref Range    Sodium 139 136 - 145 mmol/L    Potassium 4.3 3.5 - 5.5 mmol/L    Chloride 101 100 - 111 mmol/L    CO2 29 21 - 32 mmol/L    Anion gap 9 3.0 - 18 mmol/L    Glucose 82 74 - 99 mg/dL    BUN 40 (H) 7.0 - 18 MG/DL    Creatinine 9.66 (H) 0.6 - 1.3 MG/DL    BUN/Creatinine ratio 4 (L) 12 - 20      eGFR 6 (L) >60 ml/min/1.73m2    Calcium 10.4 (H) 8.5 - 10.1 MG/DL   MAGNESIUM   Result Value Ref Range    Magnesium 2.3 1.6 - 2.6 mg/dL   METABOLIC PANEL, BASIC   Result Value Ref Range    Sodium 140 136 - 145 mmol/L    Potassium 3.8 3.5 - 5.5 mmol/L    Chloride 102 100 - 111 mmol/L    CO2 33 (H) 21 - 32 mmol/L    Anion gap 5 3.0 - 18 mmol/L    Glucose 109 (H) 74 - 99 mg/dL    BUN 28 (H) 7.0 - 18 MG/DL    Creatinine 8.33 (H) 0.6 - 1.3 MG/DL    BUN/Creatinine ratio 3 (L) 12 - 20      eGFR 7 (L) >60 ml/min/1.73m2    Calcium 10.0 8.5 - 10.1 MG/DL   MAGNESIUM   Result Value Ref Range    Magnesium 2.2 1.6 - 2.6 mg/dL   CBC WITH AUTOMATED DIFF   Result Value Ref Range    WBC 4.2 (L) 4.6 - 13.2 K/uL    RBC 3.05 (L) 4.35 - 5.65 M/uL    HGB 9.3 (L) 13.0 - 16.0 g/dL    HCT 28.2 (L) 36.0 - 48.0 %    MCV 92.5 78.0 - 100.0 FL    MCH 30.5 24.0 - 34.0 PG    MCHC 33.0 31.0 - 37.0 g/dL    RDW 15.4 (H) 11.6 - 14.5 %    PLATELET 946 523 - 064 K/uL    MPV 10.2 9.2 - 11.8 FL    NRBC 0.0 0  WBC    ABSOLUTE NRBC 0.00 0.00 - 0.01 K/uL    NEUTROPHILS 55 40 - 73 %    LYMPHOCYTES 20 (L) 21 - 52 %    MONOCYTES 18 (H) 3 - 10 %    EOSINOPHILS 6 (H) 0 - 5 %    BASOPHILS 1 0 - 2 %    IMMATURE GRANULOCYTES 0 0.0 - 0.5 %    ABS. NEUTROPHILS 2.3 1.8 - 8.0 K/UL    ABS. LYMPHOCYTES 0.9 0.9 - 3.6 K/UL    ABS. MONOCYTES 0.7 0.05 - 1.2 K/UL    ABS. EOSINOPHILS 0.3 0.0 - 0.4 K/UL    ABS. BASOPHILS 0.1 0.0 - 0.1 K/UL    ABS. IMM.  GRANS. 0.0 0.00 - 0.04 K/UL    DF AUTOMATED     GLUCOSE, POC   Result Value Ref Range    Glucose (POC) 70 70 - 110 mg/dL   GLUCOSE, POC   Result Value Ref Range    Glucose (POC) 81 70 - 110 mg/dL   BLOOD GAS,CHEM8,LACTIC ACID POC   Result Value Ref Range    Calcium, ionized (POC) 1.05 (L) 1.12 - 1.32 mmol/L    Base excess (POC) 3.7 mmol/L    HCO3 (POC) 28.6 (H) 22 - 26 MMOL/L    CO2, POC 28 (H) 19 - 24 MMOL/L    O2 SAT 42 %    Sample source VENOUS BLOOD      Performed by Royer Pitts (POC) 136 136 - 145 mmol/L    Potassium (POC) 6.6 (HH) 3.5 - 5.1 mmol/L    Glucose (POC) 82 65 - 100 mg/dL    Creatinine (POC) 7.36 (H) 0.6 - 1.3 mg/dL    Lactic Acid (POC) 1.45 0.40 - 2.00 mmol/L    Chloride (POC) 103 98 - 107 mmol/L    Anion gap, POC 6 (L) 10 - 20      pH, venous (POC) 7.42 7.32 - 7.42      pCO2, venous (POC) 43.8 41 - 51 MMHG    pO2, venous (POC) 23 (L) 25 - 40 mmHg    Critical value read back KHOURY    GLUCOSE, POC   Result Value Ref Range    Glucose (POC) 118 (H) 70 - 110 mg/dL   GLUCOSE, POC   Result Value Ref Range    Glucose (POC) 70 70 - 110 mg/dL   GLUCOSE, POC   Result Value Ref Range    Glucose (POC) 63 (L) 70 - 110 mg/dL   GLUCOSE, POC   Result Value Ref Range    Glucose (POC) 74 70 - 110 mg/dL   GLUCOSE, POC   Result Value Ref Range    Glucose (POC) 127 (H) 70 - 110 mg/dL   GLUCOSE, POC   Result Value Ref Range    Glucose (POC) 84 70 - 110 mg/dL   GLUCOSE, POC   Result Value Ref Range    Glucose (POC) 112 (H) 70 - 110 mg/dL   GLUCOSE, POC   Result Value Ref Range    Glucose (POC) 103 70 - 110 mg/dL   GLUCOSE, POC   Result Value Ref Range    Glucose (POC) 159 (H) 70 - 110 mg/dL   EKG, 12 LEAD, INITIAL   Result Value Ref Range    Ventricular Rate 107 BPM    QRS Duration 94 ms    Q-T Interval 380 ms    QTC Calculation (Bezet) 507 ms    Calculated R Axis -17 degrees    Calculated T Axis 84 degrees    Diagnosis       Atrial fibrillation with rapid ventricular response  Moderate voltage criteria for LVH, may be normal variant ( R in aVL , [employer]   product )  Marked ST abnormality, possible inferior subendocardial injury  Abnormal ECG    Confirmed by Leif Reilly MD, [place] ([id number]) on 11/29/2022 4:22:12 PM     EKG, 12 LEAD, SUBSEQUENT   Result Value Ref Range    Ventricular Rate 73 BPM    Atrial Rate 73 BPM    P-R Interval 162 ms    QRS Duration 88 ms    Q-T Interval 418 ms    QTC Calculation (Bezet) 460 ms    Calculated P Axis 44 degrees    Calculated R Axis -14 degrees    Calculated T Axis 41 degrees    Diagnosis       Normal sinus rhythm  Poor R Wave Progression  Abnormal ECG  When compared with ECG of 29-NOV-2022 12:12,  Sinus rhythm has replaced Atrial fibrillation  T wave inversion no longer evident in Inferior leads  T wave inversion no longer evident in Lateral leads  Confirmed by Lana Erazo MD, Franklyn Acuna ([id number]) on 11/30/2022 4:51:03 PM     EKG, 12 LEAD, SUBSEQUENT   Result Value Ref Range    Ventricular Rate 71 BPM    Atrial Rate 71 BPM    P-R Interval 160 ms    QRS Duration 92 ms    Q-T Interval 430 ms    QTC Calculation (Bezet) 467 ms    Calculated P Axis 31 degrees    Calculated R Axis -20 degrees    Calculated T Axis 47 degrees    Diagnosis       Normal sinus rhythm  Moderate voltage criteria for LVH, may be normal variant  Cannot rule out Septal infarct (cited on or before 02-DEC-2022)  Abnormal ECG  When compared with ECG of 30-NOV-2022 09:34,  No significant change was found  Confirmed by Idalia Nunez ([id number]) on 12/2/2022 11:33:55 AM     ECHO ADULT FOLLOW-UP OR LIMITED   Result Value Ref Range    IVSd 1.8 (A) 0.6 - 1.0 cm    LVIDd 4.9 4.2 - 5.9 cm    LVIDs 2.9 cm    LVOT Diameter 2.2 cm    LVPWd 1.6 (A) 0.6 - 1.0 cm    MV A Velocity 0.90 m/s    MV E Wave Deceleration Time 156.8 ms    MV E Velocity 1.09 m/s    LV E' Lateral Velocity 6 cm/s    LV E' Septal Velocity 5 cm/s    Aortic Root 3.3 cm    Fractional Shortening 2D 41 28 - 44 %    LVIDd Index 2.26 cm/m2    LVIDs Index 1.34 cm/m2    LV RWT Ratio 0.65     LV Mass 2D 378.4 (A) 88 - 224 g    LV Mass 2D Index 174.4 (A) 49 - 115 g/m2    MV E/A 1.21     E/E' Ratio (Averaged) 19.98     E/E' Lateral 18.17     E/E' Septal 21.80     LVOT Area 3.8 cm2    Ao Root Index 1.52 cm/m2         ASSESSMENT/PLAN  Diagnoses and all orders for this visit:  NSTEMI (non-ST elevated myocardial infarction) ([address]  S/p mid AV stent 12/1/2022  On DAPT, lipitor, BB, ace  -     METABOLIC PANEL, COMPREHENSIVE; Future  -     LIPID PANEL;  Future  Keep appt with cardiology 1/11/2023    Type 2 diabetes with nephropathy (HCC)  -     METABOLIC PANEL, COMPREHENSIVE; Future  -     LIPID PANEL; Future  -     HEMOGLOBIN A1C WITH EAG; Future    Restless leg  New onset  Refractory to low dose gabapentin  Will try midodrine 25 mg qhs      ESRD on dialysis ([facility] [address]    Anemia of chronic disease    Coronary artery disease involving native coronary artery of native heart without angina pectoris  See #1  Clinically improved    Paroxysmal A-fib (HCC)  S/p ablation  Rate controlled on BB/amiodarone  , anticoag with eliquis     Dyslipidemia  LDL goal<70  Cont lipitor    Chronic diastolic heart failure (HCC)  On BB/ace    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 Beatrice Vera (ff-up 2/17/2023)  Following ophtha      Essentially hypertension  Controlled  Nephrology following    Legally blind      Follow-up Disposition:  Keep appt in April or sooner prn      Patient understands plan of care. Patient has provided input and agrees with goals.

## 2022-12-09 ENCOUNTER — TELEPHONE (OUTPATIENT)
Dept: FAMILY MEDICINE CLINIC | Age: 52
End: 2022-12-09

## 2022-12-09 NOTE — TELEPHONE ENCOUNTER
Adela Adair with [facility] [address] called with a question on one of his medications. Adela Adair was not sure if the midodrine was correct. She stated she has never seen the midodrine used for restless leg and last time he took it, it spiked his BP. She stated patient did stopped taking it upon discharge. She did not know if the Doc meant Mirapex instead?

## 2022-12-11 ENCOUNTER — HOME CARE VISIT (OUTPATIENT)
Dept: SCHEDULING | Facility: HOME HEALTH | Age: 52
End: 2022-12-11

## 2022-12-11 PROCEDURE — G0299 HHS/HOSPICE OF RN EA 15 MIN: HCPCS

## 2022-12-12 RX ORDER — PRAMIPEXOLE DIHYDROCHLORIDE 0.12 MG/1
0.12 TABLET ORAL
Qty: 30 TABLET | Refills: 1 | Status: SHIPPED | OUTPATIENT
Start: 2022-12-12

## 2022-12-12 RX ORDER — PRAMIPEXOLE DIHYDROCHLORIDE 0.12 MG/1
0.12 TABLET ORAL
Qty: 30 TABLET | Refills: 1 | Status: SHIPPED | OUTPATIENT
Start: 2022-12-12 | End: 2022-12-12 | Stop reason: SDUPTHER

## 2022-12-12 NOTE — TELEPHONE ENCOUNTER
[phone] (home) [phone] (work)  Spoke to patient, stop midodrine, he mentions he went to the hospital for elevated BP. They stopped medication and reports BP to be normal range. I was intending to start Mirapex, for RLS. I apologized profusely from error on my end. He is feeling better.  Has appt with me 12/14

## 2022-12-13 NOTE — HOME HEALTH
DIAGNOSIS MI  VITAL SIGNS:  B/P:  160/88  Pulse: 75   Respirations: 16  Temperature: 98.1  O2 Sat: 98%  Oxygen   NA Liters   NA Frequency  Weight:  200lb    Lung Sounds:  Lung sounds are clear and equal bilaterally, no wheezing, rales, crackles noted. Dyspnea: Patient had no dyspnea on visit at this time. Orthopnea:  2 pillow is patient's baseline for sleeping. Edema:  NA      Scale present in home: yes  Action taken if no scale:  NA    MEDICATION RECONCILIATION performed between discharge medication list and home medications:      Medications patient has in the home that are not on the discharge list (specify): no    Medications on the discharge medication list that are not in the home (specify):  yes-  Patient not taking mirapex. Medications with discrepancies in dose/frequency or other issue (specify): yes- pt had dose of norvasc, eliquis and lopressor changed at [facility] last week- updated. potential severe interaction noted for plavix and eliquis. Luis Bangura MD (name) contacted:  .by case communication (fax or phone). Nurse Navigator RICHARD (name) contacted   NA (fax or phone). Response regarding medication issues:  NA, completed on the weekend, office is closed at the time of assessment. Financial Issues: patient does not have any financial issues at this time. Transportation issues:   patient has transportation as needed for appointments and other activities, no issues. Family/Friend support: patient has adequate family/friend support at this time. OXYGEN safety issues noted this visit:   n/a   Tanks stored inappropriately  n/a   No Oxygen in Use - sign on door n/a   Evidence of smoking or open flame  n/a    TEACHING PROVIDED THIS VISIT (specify):  MI  Reviewed hospital provided teaching material on MI disease process. Reviewed stoplight instructions N/A    Diet teaching (specify):   Instructed patient/caregiver on heart healthy diet-low cholesterol, low fat and low salt. Patient notified to restrict sodium intake to 2 g/day, instructed patient/caregiver to reduce sodium if weight begins to increase, on tips to limit sodium (avoiding foods high in sodium, getting rid of salt shaker, using herbs/spices, using fresh foods, avoiding salt substitutes which may contain potassium, buying foods labeled low-sodium or sodium free), reading food labels, and making changes slowly to give taste buds time to adjust. Instructed patient to reduce intake of saturated and fatty acids (butter, creams, red meats, fried foods, margarines, high fat baked goods, shortening, cheeses, etc) and to increase daily fresh fruits and vegetables and whole grains, encouraged to avoid canned and processed foods as much as possible. Patient instructed to weight self daily and record on calendar   yes    Teaching done concerning new medications (specify): sevelamer- reviewed side effects, purposes, dosage, frequency. Teaching related to medication discrepancies (specify):  patient made aware of potential severe interaction between plavix and eliquis. patient aware to take all medications to next appointment for review due to a few dosage changes. TEACH BACK QUESTIONS:  What stoplight zone are you in today? green    What should you do if you have difficulty breathing? deep breathing exercises, relaxation techniques, use incentive spirometer. What warning signs should you report to your doctor?  weight gain of more than 2 lbs overnight, 5 lbs in 1 week; swelling in legs/feet; chest pain; cough and congestion; shortness of breath; poor appetite. What is the name of your inhaler or nebulizer treatment? NA    When should you use it? NA    What can you do to prevent your breathing from getting worse? perform deep breathing exercises, use incentive spirometer, relaxation techniques.     What physical activity should you be doing at home?   activity as tolerated, trying to get physical activity 4-5 x weekly, 30 minutes daily. stopping activity if causing shortness of breath or chest pain, dizziness or weakness.     Learner:   patient and caregiver (sister)    Patient/caregiver understanding/response to instructions:  Gina MARISCAL MD appointment date:  12/14/22    With whom:  Stas Cedeño MD    Patient aware of MD name and phone number:  yes    Problems or concerns reported to nurse navigator:  deniz    PCP Name:  Stas Cedeño MD  Fax Number:   [phone]

## 2022-12-13 NOTE — CASE COMMUNICATION
Patient was seen for [address] [place] [address]. one time visit after hospitalization on 12/11/22 for MI. Medications reconciled and medication list updated in chart. Pt had dose of norvasc, eliquis and lopressor changed at [facility] last week- updated. Patient not taking mirapex. Pt now taking sevelamer. Upon reviewing medications, the following potential for severe interactions were noted: plavix and eliquis    H2H visit completed and in chart  review, please review and contact at #[phone] or via case communication with any questions or concerns. Patient has follow up scheduled on 12/14/22. Thank you!

## 2022-12-19 ENCOUNTER — OFFICE VISIT (OUTPATIENT)
Dept: FAMILY MEDICINE CLINIC | Age: 52
End: 2022-12-19
Payer: MEDICARE

## 2022-12-19 VITALS
DIASTOLIC BLOOD PRESSURE: 70 MMHG | HEIGHT: 70 IN | OXYGEN SATURATION: 100 % | SYSTOLIC BLOOD PRESSURE: 100 MMHG | RESPIRATION RATE: 16 BRPM | BODY MASS INDEX: 29.38 KG/M2 | HEART RATE: 71 BPM | TEMPERATURE: 98.6 F | WEIGHT: 205.2 LBS

## 2022-12-19 DIAGNOSIS — I48.91 ATRIAL FIBRILLATION WITH RVR (HCC): Primary | ICD-10-CM

## 2022-12-19 DIAGNOSIS — E11.3419: ICD-10-CM

## 2022-12-19 DIAGNOSIS — I21.4 NSTEMI (NON-ST ELEVATED MYOCARDIAL INFARCTION) (HCC): ICD-10-CM

## 2022-12-19 DIAGNOSIS — N18.6 ESRD ON DIALYSIS (HCC): ICD-10-CM

## 2022-12-19 DIAGNOSIS — D63.8 ANEMIA OF CHRONIC DISEASE: ICD-10-CM

## 2022-12-19 DIAGNOSIS — E11.21 TYPE 2 DIABETES WITH NEPHROPATHY (HCC): ICD-10-CM

## 2022-12-19 DIAGNOSIS — I10 PRIMARY HYPERTENSION: ICD-10-CM

## 2022-12-19 DIAGNOSIS — Z99.2 ESRD ON DIALYSIS (HCC): ICD-10-CM

## 2022-12-19 DIAGNOSIS — E78.5 DYSLIPIDEMIA: ICD-10-CM

## 2022-12-19 DIAGNOSIS — G25.81 RESTLESS LEG: ICD-10-CM

## 2022-12-19 DIAGNOSIS — I50.32 CHRONIC DIASTOLIC HEART FAILURE (HCC): ICD-10-CM

## 2022-12-19 DIAGNOSIS — I25.10 CORONARY ARTERY DISEASE INVOLVING NATIVE CORONARY ARTERY OF NATIVE HEART WITHOUT ANGINA PECTORIS: ICD-10-CM

## 2022-12-19 DIAGNOSIS — H54.8 LEGALLY BLIND: ICD-10-CM

## 2022-12-19 DIAGNOSIS — N18.6 ESRD (END STAGE RENAL DISEASE) (HCC): ICD-10-CM

## 2022-12-19 PROCEDURE — G8432 DEP SCR NOT DOC, RNG: HCPCS | Performed by: FAMILY MEDICINE

## 2022-12-19 PROCEDURE — 3078F DIAST BP <80 MM HG: CPT | Performed by: FAMILY MEDICINE

## 2022-12-19 PROCEDURE — G8417 CALC BMI ABV UP PARAM F/U: HCPCS | Performed by: FAMILY MEDICINE

## 2022-12-19 PROCEDURE — G8427 DOCREV CUR MEDS BY ELIG CLIN: HCPCS | Performed by: FAMILY MEDICINE

## 2022-12-19 PROCEDURE — 3017F COLORECTAL CA SCREEN DOC REV: CPT | Performed by: FAMILY MEDICINE

## 2022-12-19 PROCEDURE — 99214 OFFICE O/P EST MOD 30 MIN: CPT | Performed by: FAMILY MEDICINE

## 2022-12-19 PROCEDURE — 1111F DSCHRG MED/CURRENT MED MERGE: CPT | Performed by: FAMILY MEDICINE

## 2022-12-19 PROCEDURE — 3074F SYST BP LT 130 MM HG: CPT | Performed by: FAMILY MEDICINE

## 2022-12-19 PROCEDURE — G9231 DOC ESRD DIA TRANS PREG: HCPCS | Performed by: FAMILY MEDICINE

## 2022-12-19 PROCEDURE — 3044F HG A1C LEVEL LT 7.0%: CPT | Performed by: FAMILY MEDICINE

## 2022-12-19 PROCEDURE — 2022F DILAT RTA XM EVC RTNOPTHY: CPT | Performed by: FAMILY MEDICINE

## 2022-12-19 RX ORDER — SEVELAMER CARBONATE 800 MG/1
TABLET, FILM COATED ORAL
COMMUNITY
Start: 2022-12-08

## 2022-12-19 RX ORDER — MIDODRINE HYDROCHLORIDE 2.5 MG/1
2.5 TABLET ORAL
COMMUNITY
Start: 2022-12-05 | End: 2022-12-19 | Stop reason: CLARIF

## 2022-12-19 RX ORDER — PRAMIPEXOLE DIHYDROCHLORIDE 0.12 MG/1
0.12 TABLET ORAL 2 TIMES DAILY
Qty: 60 TABLET | Refills: 1 | Status: SHIPPED | OUTPATIENT
Start: 2022-12-19

## 2022-12-19 NOTE — PROGRESS NOTES
Alia Cunningham, 50 y.o.,  male    SUBJECTIVE  Ff-up hospital admission A fib RVR    Date of admission 12/6-12/8/2022  Communication with our office within 48 hours of discharge- no      Afib/RVR s/p ablation 6/2022. On amiodarone, eliquis and BB. Presented with palpitations while on HD, and was brought to ED, reviewed note Trop elevated but lower than previous, RVR 's, BB dose increased to lopressor 50 mg BID from 25 mg bid and norvasc dose increase to 10 mg from 5 mg    CAD- Of note, recently discharged nov for  NSTEMI s/p PCI 11/200 and 8/2019. On DAPT/Lipitor, BB    ESRD-on HD T/Th/S. Also with anemia of chronic disease and HTN, following nephrology    NIDDM- w/ retinopathy, legally blind. Says 's. Dfollowing endo dr. Justina Stearns, and ophthalmology. On tradjenta/ glipizide    restless legs- responding to mirapex qhs and says needing to take a dose during the day as well. Of note on last visit, I mistakenly put in for midodrine instead of mirapax, but pharmacy DID NOT fill this so he did not take midodrine. He wants to increase mirapex dose to BID.  Refractory to  gabapentin    Lives with wife, who assists with advance ADLs    ROS:  See HPI, all others negative        Patient Active Problem List   Diagnosis Code    Benign hypertensive  I11.9    CRI (chronic renal insufficiency) N18.9    Diabetes mellitus (HCC) E11.9    Cardiomyopathy ([address] I42.9    Iron deficiency anemia D50.9    CAD (coronary artery disease) I25.10    Legally blind H54.8    Diabetic retinopathy ([facility] E11.319    Severe nonproliferative diabetic retinopathy with macular edema, associated with type 2 diabetes mellitus E11.3419    Dyslipidemia E78.5    Atrial fibrillation with rapid ventricular response (HCC) I48.91    Type 2 diabetes with nephropathy (HCC) E11.21       Current Outpatient Medications:     sevelamer carbonate (RENVELA) 800 mg tab tab, , Disp: , Rfl:     pramipexole (MIRAPEX) 0.125 mg tablet, Take 1 Tablet by mouth two (2) times a day., Disp: 60 Tablet, Rfl: 1    amLODIPine (NORVASC) 10 mg tablet, Take 10 mg by mouth daily. , Disp: , Rfl:     metoprolol tartrate (LOPRESSOR) 50 mg tablet, Take 50 mg by mouth two (2) times a day., Disp: , Rfl:     sevelamer (RENAGEL) 800 mg tablet, Take 1,600 mg by mouth three (3) times daily (with meals). , Disp: , Rfl:     apixaban (ELIQUIS) 5 mg tablet, Take 5 mg by mouth two (2) times a day., Disp: , Rfl:     glipiZIDE SR (GLUCOTROL XL) 2.5 mg CR tablet, Take 2.5 mg by mouth daily. , Disp: , Rfl:     linaGLIPtin (TRADJENTA) 5 mg tablet, Take 5 mg by mouth daily. , Disp: , Rfl:     clopidogreL (PLAVIX) 75 mg tab, Take 1 Tablet by mouth daily. , Disp: 30 Tablet, Rfl: 0    amiodarone (CORDARONE) 200 mg tablet, TAKE 1 TABLET BY MOUTH DAILY, Disp: 30 Tablet, Rfl: 6    lisinopriL (PRINIVIL, ZESTRIL) 20 mg tablet, Take 20 mg by mouth daily. , Disp: , Rfl:     atorvastatin (LIPITOR) 80 mg tablet, Take 1 Tablet by mouth nightly., Disp: 90 Tablet, Rfl: 3    glucose blood VI test strips (Accu-Chek Beatrice Plus test strp) strip, Use as directed, Disp: 100 Strip, Rfl: 2    aspirin delayed-release 81 mg tablet, Take 1 Tablet by mouth daily. , Disp: 90 Tablet, Rfl: 3    nitroglycerin (NITROLINGUAL) 400 mcg/spray spray, 1 [place] by SubLINGual route every five (5) minutes as needed for Chest Pain. (Patient taking differently: 1 Spray by SubLINGual route every five (5) minutes as needed for Chest Pain. place 1 tablet under tongue every 5 minutes as needed x 3; call 911 if chest pain unrelieved after 3 doses in 15 minutes), Disp: 1 Bottle, Rfl: 2    Blood-Glucose Meter monitoring kit, Check fasting glucose, Disp: 1 Kit, Rfl: 0    cinacalcet (SENSIPAR) 30 mg tablet, Take 30 mg by mouth every Monday, Wednesday, Friday. TAKE 1 TABLET(30MG) BY MOUTH 3 TIMES WEEKLY: MONDAY, WEDNESDAY, AND FRIDAY IN THE EVENING.  (Patient not taking: Reported on 12/19/2022), Disp: , Rfl:     Eliquis 2.5 mg tablet, TAKE 1 TABLET BY MOUTH EVERY 12 HOURS (Patient not taking: No sig reported), Disp: 180 Tablet, Rfl: 3  No Known Allergies    Past Medical History:   Diagnosis Date    Abnormal nuclear cardiac imaging test 10/08/2012    Fixed anteroseptal, anteroapical defect c/w prior infarction. No ischemia. Mid & distal anteroseptal, anteroapical WMA. LVE. EF 44%. Anemia     dr. Alberto Winters doubt amyloid or multiple myeloma. candidate for procirt if Hg <10    Benign hypertensive     Blindness of right eye 01/2013    legally blind    CAD (coronary artery disease)     Cardiomyopathy ejection fraction of 40%     CKD (chronic kidney disease), stage IV ([address]     to see Dr. Jasiel Hyde 12/1/12    Congestive heart failure ([facility] [address]     Diabetes mellitus ([facility] [address]     Diabetic retinopathy ([facility] [address]     Diabetic retinopathy ([facility] [address]     Dr. Christian Otto- injections    Heart failure [facility])     History of echocardiogram 04/22/2014    LVE. EF 55% (prev 40-45% on echo of 1/27/12). No WMA. Mild-mod conc LVH. Gr 3 DDfx. Marked LAE. Mild SHANTEL. Mild MR. Hypertension     Pulmonary hypertension ([facility] [address]     Renal Ultrasound 1/3/12    Right kidney isoechoic w/respect to liver. Sm left-sided pleural effusion    S/P cardiac cath 10/16/2012    LM patent. pLAD 95% (3.5 x 12-mm [place] stent, resid 0$%). LCX mild. Social History     Socioeconomic History    Marital status:      Spouse name: Not on file    Number of children: Not on file    Years of education: Not on file    Highest education level: Not on file   Social Needs    Financial resource strain: Not on file    Food insecurity - worry: Not on file    Food insecurity - inability: Not on file    Transportation needs - medical: Not on file    Transportation needs - non-medical: Not on file   Occupational History    Not on file   Tobacco Use    Smoking status: Never Smoker    Smokeless tobacco: Never Used   Substance and Sexual Activity    Alcohol use:  Yes     Alcohol/week: 2.5 oz     Types: 5 Cans of beer per week     Comment: occassionally    Drug use: No    Sexual activity: Yes     Partners: Female     Birth control/protection: None   Other Topics Concern    Not on file   Social History Narrative    Not on file       Family History   Problem Relation Age of Onset    Diabetes Mother     Hypertension Mother     Kidney Disease Mother     High Cholesterol Father     Diabetes Brother         pre diabetic         OBJECTIVE    Physical Exam:     Visit Vitals  /70 (BP 1 Location: Left upper arm, BP Patient Position: Sitting, BP Cuff Size: Adult)   Pulse 71   Temp 98.6 °F (37 °C) (Temporal)   Resp 16   Ht 5' 10\" (1.778 m)   Wt 205 lb 3.2 oz (93.1 kg)   SpO2 100%   BMI 29.44 kg/m²         General: alert, chronically ill-appearing,  in no apparent distress or pain  CVS: normal rate, regular rhythm, distinct S1 and S2  Lungs:clear to ausculation bilaterally, no crackles, wheezing or rhonchi noted  Extremities: no edema, no cyanosis, MSK grossly normal  Skin: warm, no lesions, rashes noted  Psych:  mood and affect normal    Results for orders placed or performed during the hospital encounter of 11/29/22   CBC WITH AUTOMATED DIFF   Result Value Ref Range    WBC 7.7 4.6 - 13.2 K/uL    RBC 3.42 (L) 4.35 - 5.65 M/uL    HGB 10.5 (L) 13.0 - 16.0 g/dL    HCT 31.8 (L) 36.0 - 48.0 %    MCV 93.0 78.0 - 100.0 FL    MCH 30.7 24.0 - 34.0 PG    MCHC 33.0 31.0 - 37.0 g/dL    RDW 15.5 (H) 11.6 - 14.5 %    PLATELET 649 368 - 282 K/uL    MPV 9.4 9.2 - 11.8 FL    NRBC 0.0 0  WBC    ABSOLUTE NRBC 0.00 0.00 - 0.01 K/uL    NEUTROPHILS 80 (H) 40 - 73 %    LYMPHOCYTES 8 (L) 21 - 52 %    MONOCYTES 9 3 - 10 %    EOSINOPHILS 2 0 - 5 %    BASOPHILS 0 0 - 2 %    IMMATURE GRANULOCYTES 1 (H) 0.0 - 0.5 %    ABS. NEUTROPHILS 6.2 1.8 - 8.0 K/UL    ABS. LYMPHOCYTES 0.6 (L) 0.9 - 3.6 K/UL    ABS. MONOCYTES 0.7 0.05 - 1.2 K/UL    ABS. EOSINOPHILS 0.1 0.0 - 0.4 K/UL    ABS. BASOPHILS 0.0 0.0 - 0.1 K/UL    ABS. IMM.  GRANS. 0.0 0.00 - 0.04 K/UL    DF AUTOMATED     METABOLIC PANEL, COMPREHENSIVE   Result Value Ref Range    Sodium 139 136 - 145 mmol/L    Potassium 4.3 3.5 - 5.5 mmol/L    Chloride 102 100 - 111 mmol/L    CO2 25 21 - 32 mmol/L    Anion gap 12 3.0 - 18 mmol/L    Glucose 74 74 - 99 mg/dL    BUN 52 (H) 7.0 - 18 MG/DL    Creatinine 11.30 (H) 0.6 - 1.3 MG/DL    BUN/Creatinine ratio 5 (L) 12 - 20      eGFR 5 (L) >60 ml/min/1.73m2    Calcium 9.1 8.5 - 10.1 MG/DL    Bilirubin, total 0.5 0.2 - 1.0 MG/DL    ALT (SGPT) 16 16 - 61 U/L    AST (SGOT) 13 10 - 38 U/L    Alk. phosphatase 75 45 - 117 U/L    Protein, total 6.7 6.4 - 8.2 g/dL    Albumin 3.1 (L) 3.4 - 5.0 g/dL    Globulin 3.6 2.0 - 4.0 g/dL    A-G Ratio 0.9 0.8 - 1.7     MAGNESIUM   Result Value Ref Range    Magnesium 2.2 1.6 - 2.6 mg/dL   TROPONIN-HIGH SENSITIVITY   Result Value Ref Range    Troponin-High Sensitivity 59 0 - 78 ng/L   TROPONIN-HIGH SENSITIVITY   Result Value Ref Range    Troponin-High Sensitivity 323 (HH) 0 - 78 ng/L   CBC WITH AUTOMATED DIFF   Result Value Ref Range    WBC 7.1 4.6 - 13.2 K/uL    RBC 3.21 (L) 4.35 - 5.65 M/uL    HGB 9.9 (L) 13.0 - 16.0 g/dL    HCT 29.5 (L) 36.0 - 48.0 %    MCV 91.9 78.0 - 100.0 FL    MCH 30.8 24.0 - 34.0 PG    MCHC 33.6 31.0 - 37.0 g/dL    RDW 15.7 (H) 11.6 - 14.5 %    PLATELET 553 757 - 124 K/uL    MPV 10.2 9.2 - 11.8 FL    NRBC 0.0 0  WBC    ABSOLUTE NRBC 0.00 0.00 - 0.01 K/uL    NEUTROPHILS 79 (H) 40 - 73 %    LYMPHOCYTES 11 (L) 21 - 52 %    MONOCYTES 8 3 - 10 %    EOSINOPHILS 1 0 - 5 %    BASOPHILS 0 0 - 2 %    IMMATURE GRANULOCYTES 0 0.0 - 0.5 %    ABS. NEUTROPHILS 5.6 1.8 - 8.0 K/UL    ABS. LYMPHOCYTES 0.8 (L) 0.9 - 3.6 K/UL    ABS. MONOCYTES 0.6 0.05 - 1.2 K/UL    ABS. EOSINOPHILS 0.1 0.0 - 0.4 K/UL    ABS. BASOPHILS 0.0 0.0 - 0.1 K/UL    ABS. IMM.  GRANS. 0.0 0.00 - 0.04 K/UL    DF AUTOMATED     PTT   Result Value Ref Range    aPTT 127.6 (H) 23.0 - 36.4 SEC   NT-PRO BNP   Result Value Ref Range    NT pro-BNP 21,164 (H) 0 - 450 PG/ML   TROPONIN-HIGH SENSITIVITY Result Value Ref Range    Troponin-High Sensitivity 1,146 (HH) 0 - 78 ng/L   HEMOGLOBIN A1C WITH EAG   Result Value Ref Range    Hemoglobin A1c 5.7 (H) 4.2 - 5.6 %    Est. average glucose 117 mg/dL   TSH 3RD GENERATION   Result Value Ref Range    TSH 0.95 0.36 - 3.74 uIU/mL   FERRITIN   Result Value Ref Range    Ferritin 791 (H) 8 - 388 NG/ML   VITAMIN B12 & FOLATE   Result Value Ref Range    Vitamin B12 529 211 - 911 pg/mL    Folate 4.7 3.10 - 17.50 ng/mL   IRON PROFILE   Result Value Ref Range    Iron 66 50 - 175 ug/dL    TIBC 197 (L) 250 - 450 ug/dL    Iron % saturation 34 20 - 50 %   TROPONIN-HIGH SENSITIVITY   Result Value Ref Range    Troponin-High Sensitivity 1,301 (HH) 0 - 78 ng/L   METABOLIC PANEL, COMPREHENSIVE   Result Value Ref Range    Sodium 140 136 - 145 mmol/L    Potassium 4.7 3.5 - 5.5 mmol/L    Chloride 103 100 - 111 mmol/L    CO2 23 21 - 32 mmol/L    Anion gap 14 3.0 - 18 mmol/L    Glucose 78 74 - 99 mg/dL    BUN 56 (H) 7.0 - 18 MG/DL    Creatinine 12.30 (H) 0.6 - 1.3 MG/DL    BUN/Creatinine ratio 5 (L) 12 - 20      eGFR 5 (L) >60 ml/min/1.73m2    Calcium 9.0 8.5 - 10.1 MG/DL    Bilirubin, total 0.4 0.2 - 1.0 MG/DL    ALT (SGPT) 17 16 - 61 U/L    AST (SGOT) 18 10 - 38 U/L    Alk.  phosphatase 69 45 - 117 U/L    Protein, total 6.8 6.4 - 8.2 g/dL    Albumin 2.9 (L) 3.4 - 5.0 g/dL    Globulin 3.9 2.0 - 4.0 g/dL    A-G Ratio 0.7 (L) 0.8 - 1.7     PTT   Result Value Ref Range    aPTT 52.3 (H) 23.0 - 16.9 SEC   METABOLIC PANEL, BASIC   Result Value Ref Range    Sodium 141 136 - 145 mmol/L    Potassium 4.8 3.5 - 5.5 mmol/L    Chloride 103 100 - 111 mmol/L    CO2 29 21 - 32 mmol/L    Anion gap 9 3.0 - 18 mmol/L    Glucose 103 (H) 74 - 99 mg/dL    BUN 60 (H) 7.0 - 18 MG/DL    Creatinine 13.00 (H) 0.6 - 1.3 MG/DL    BUN/Creatinine ratio 5 (L) 12 - 20      eGFR 4 (L) >60 ml/min/1.73m2    Calcium 9.5 8.5 - 10.1 MG/DL   CBC WITH AUTOMATED DIFF   Result Value Ref Range    WBC 5.6 4.6 - 13.2 K/uL    RBC 2.85 (L) 4.35 - 5.65 M/uL    HGB 8.6 (L) 13.0 - 16.0 g/dL    HCT 26.7 (L) 36.0 - 48.0 %    MCV 93.7 78.0 - 100.0 FL    MCH 30.2 24.0 - 34.0 PG    MCHC 32.2 31.0 - 37.0 g/dL    RDW 15.9 (H) 11.6 - 14.5 %    PLATELET 363 582 - 094 K/uL    MPV 9.9 9.2 - 11.8 FL    NRBC 0.0 0  WBC    ABSOLUTE NRBC 0.00 0.00 - 0.01 K/uL    NEUTROPHILS 54 40 - 73 %    LYMPHOCYTES 27 21 - 52 %    MONOCYTES 13 (H) 3 - 10 %    EOSINOPHILS 6 (H) 0 - 5 %    BASOPHILS 1 0 - 2 %    IMMATURE GRANULOCYTES 0 0.0 - 0.5 %    ABS. NEUTROPHILS 3.0 1.8 - 8.0 K/UL    ABS. LYMPHOCYTES 1.5 0.9 - 3.6 K/UL    ABS. MONOCYTES 0.7 0.05 - 1.2 K/UL    ABS. EOSINOPHILS 0.3 0.0 - 0.4 K/UL    ABS. BASOPHILS 0.1 0.0 - 0.1 K/UL    ABS. IMM. GRANS. 0.0 0.00 - 0.04 K/UL    DF AUTOMATED     METABOLIC PANEL, BASIC   Result Value Ref Range    Sodium 141 136 - 145 mmol/L    Potassium 4.5 3.5 - 5.5 mmol/L    Chloride 104 100 - 111 mmol/L    CO2 29 21 - 32 mmol/L    Anion gap 8 3.0 - 18 mmol/L    Glucose 68 (L) 74 - 99 mg/dL    BUN 72 (H) 7.0 - 18 MG/DL    Creatinine 14.30 (H) 0.6 - 1.3 MG/DL    BUN/Creatinine ratio 5 (L) 12 - 20      eGFR 4 (L) >60 ml/min/1.73m2    Calcium 9.0 8.5 - 10.1 MG/DL   MAGNESIUM   Result Value Ref Range    Magnesium 2.5 1.6 - 2.6 mg/dL   PTT   Result Value Ref Range    aPTT 93.1 (H) 23.0 - 36.4 SEC   HEP B SURFACE AB   Result Value Ref Range    Hepatitis B surface Ab 176.47 >10.0 mIU/mL    Hep B surface Ab Interp. Positive POS      Hep B surface Ab comment        Samples with a  value of 10 mIU/mL or greater are considered positive (protective immunity) in accordance with the CDC guidelines. HEP B SURFACE AG   Result Value Ref Range    Hepatitis B surface Ag 0.91 <1.00 Index    Hep B surface Ag Interp.  Negative NEG     PTT   Result Value Ref Range    aPTT 42.2 (H) 23.0 - 36.4 SEC   CBC WITH AUTOMATED DIFF   Result Value Ref Range    WBC 5.2 4.6 - 13.2 K/uL    RBC 3.13 (L) 4.35 - 5.65 M/uL    HGB 9.5 (L) 13.0 - 16.0 g/dL    HCT 29.3 (L) 36.0 - 48.0 %    MCV 93.6 78.0 - 100.0 FL    MCH 30.4 24.0 - 34.0 PG    MCHC 32.4 31.0 - 37.0 g/dL    RDW 15.6 (H) 11.6 - 14.5 %    PLATELET 219 588 - 768 K/uL    MPV 10.3 9.2 - 11.8 FL    NRBC 0.0 0  WBC    ABSOLUTE NRBC 0.00 0.00 - 0.01 K/uL    NEUTROPHILS 58 40 - 73 %    LYMPHOCYTES 21 21 - 52 %    MONOCYTES 14 (H) 3 - 10 %    EOSINOPHILS 6 (H) 0 - 5 %    BASOPHILS 1 0 - 2 %    IMMATURE GRANULOCYTES 0 0.0 - 0.5 %    ABS. NEUTROPHILS 3.1 1.8 - 8.0 K/UL    ABS. LYMPHOCYTES 1.1 0.9 - 3.6 K/UL    ABS. MONOCYTES 0.7 0.05 - 1.2 K/UL    ABS. EOSINOPHILS 0.3 0.0 - 0.4 K/UL    ABS. BASOPHILS 0.1 0.0 - 0.1 K/UL    ABS. IMM.  GRANS. 0.0 0.00 - 0.04 K/UL    DF AUTOMATED     METABOLIC PANEL, BASIC   Result Value Ref Range    Sodium 139 136 - 145 mmol/L    Potassium 4.3 3.5 - 5.5 mmol/L    Chloride 101 100 - 111 mmol/L    CO2 29 21 - 32 mmol/L    Anion gap 9 3.0 - 18 mmol/L    Glucose 82 74 - 99 mg/dL    BUN 40 (H) 7.0 - 18 MG/DL    Creatinine 9.66 (H) 0.6 - 1.3 MG/DL    BUN/Creatinine ratio 4 (L) 12 - 20      eGFR 6 (L) >60 ml/min/1.73m2    Calcium 10.4 (H) 8.5 - 10.1 MG/DL   MAGNESIUM   Result Value Ref Range    Magnesium 2.3 1.6 - 2.6 mg/dL   METABOLIC PANEL, BASIC   Result Value Ref Range    Sodium 140 136 - 145 mmol/L    Potassium 3.8 3.5 - 5.5 mmol/L    Chloride 102 100 - 111 mmol/L    CO2 33 (H) 21 - 32 mmol/L    Anion gap 5 3.0 - 18 mmol/L    Glucose 109 (H) 74 - 99 mg/dL    BUN 28 (H) 7.0 - 18 MG/DL    Creatinine 8.33 (H) 0.6 - 1.3 MG/DL    BUN/Creatinine ratio 3 (L) 12 - 20      eGFR 7 (L) >60 ml/min/1.73m2    Calcium 10.0 8.5 - 10.1 MG/DL   MAGNESIUM   Result Value Ref Range    Magnesium 2.2 1.6 - 2.6 mg/dL   CBC WITH AUTOMATED DIFF   Result Value Ref Range    WBC 4.2 (L) 4.6 - 13.2 K/uL    RBC 3.05 (L) 4.35 - 5.65 M/uL    HGB 9.3 (L) 13.0 - 16.0 g/dL    HCT 28.2 (L) 36.0 - 48.0 %    MCV 92.5 78.0 - 100.0 FL    MCH 30.5 24.0 - 34.0 PG    MCHC 33.0 31.0 - 37.0 g/dL    RDW 15.4 (H) 11.6 - 14.5 %    PLATELET 498 511 - 420 K/uL    MPV 10.2 9.2 - 11.8 FL    NRBC 0.0 0  WBC    ABSOLUTE NRBC 0.00 0.00 - 0.01 K/uL    NEUTROPHILS 55 40 - 73 %    LYMPHOCYTES 20 (L) 21 - 52 %    MONOCYTES 18 (H) 3 - 10 %    EOSINOPHILS 6 (H) 0 - 5 %    BASOPHILS 1 0 - 2 %    IMMATURE GRANULOCYTES 0 0.0 - 0.5 %    ABS. NEUTROPHILS 2.3 1.8 - 8.0 K/UL    ABS. LYMPHOCYTES 0.9 0.9 - 3.6 K/UL    ABS. MONOCYTES 0.7 0.05 - 1.2 K/UL    ABS. EOSINOPHILS 0.3 0.0 - 0.4 K/UL    ABS. BASOPHILS 0.1 0.0 - 0.1 K/UL    ABS. IMM.  GRANS. 0.0 0.00 - 0.04 K/UL    DF AUTOMATED     GLUCOSE, POC   Result Value Ref Range    Glucose (POC) 70 70 - 110 mg/dL   GLUCOSE, POC   Result Value Ref Range    Glucose (POC) 81 70 - 110 mg/dL   BLOOD GAS,CHEM8,LACTIC ACID POC   Result Value Ref Range    Calcium, ionized (POC) 1.05 (L) 1.12 - 1.32 mmol/L    Base excess (POC) 3.7 mmol/L    HCO3 (POC) 28.6 (H) 22 - 26 MMOL/L    CO2, POC 28 (H) 19 - 24 MMOL/L    O2 SAT 42 %    Sample source VENOUS BLOOD      Performed by [facility]     Sodium (POC) 136 136 - 145 mmol/L    Potassium (POC) 6.6 (HH) 3.5 - 5.1 mmol/L    Glucose (POC) 82 65 - 100 mg/dL    Creatinine (POC) 7.36 (H) 0.6 - 1.3 mg/dL    Lactic Acid (POC) 1.45 0.40 - 2.00 mmol/L    Chloride (POC) 103 98 - 107 mmol/L    Anion gap, POC 6 (L) 10 - 20      pH, venous (POC) 7.42 7.32 - 7.42      pCO2, venous (POC) 43.8 41 - 51 MMHG    pO2, venous (POC) 23 (L) 25 - 40 mmHg    Critical value read back KHOURY    GLUCOSE, POC   Result Value Ref Range    Glucose (POC) 118 (H) 70 - 110 mg/dL   GLUCOSE, POC   Result Value Ref Range    Glucose (POC) 70 70 - 110 mg/dL   GLUCOSE, POC   Result Value Ref Range    Glucose (POC) 63 (L) 70 - 110 mg/dL   GLUCOSE, POC   Result Value Ref Range    Glucose (POC) 74 70 - 110 mg/dL   GLUCOSE, POC   Result Value Ref Range    Glucose (POC) 127 (H) 70 - 110 mg/dL   GLUCOSE, POC   Result Value Ref Range    Glucose (POC) 84 70 - 110 mg/dL   GLUCOSE, POC   Result Value Ref Range    Glucose (POC) 112 (H) 70 - 110 mg/dL   GLUCOSE, POC   Result Value Ref Range    Glucose (POC) 103 70 - 110 mg/dL   GLUCOSE, POC   Result Value Ref Range    Glucose (POC) 159 (H) 70 - 110 mg/dL   EKG, 12 LEAD, INITIAL   Result Value Ref Range    Ventricular Rate 107 BPM    QRS Duration 94 ms    Q-T Interval 380 ms    QTC Calculation (Bezet) 507 ms    Calculated R Axis -17 degrees    Calculated T Axis 84 degrees    Diagnosis       Atrial fibrillation with rapid ventricular response  Moderate voltage criteria for LVH, may be normal variant ( R in aVL , Moo   product )  Marked ST abnormality, possible inferior subendocardial injury  Abnormal ECG    Confirmed by Miguel Cruz MD, Berto ([id number]) on 11/29/2022 4:22:12 PM     EKG, 12 LEAD, SUBSEQUENT   Result Value Ref Range    Ventricular Rate 73 BPM    Atrial Rate 73 BPM    P-R Interval 162 ms    QRS Duration 88 ms    Q-T Interval 418 ms    QTC Calculation (Bezet) 460 ms    Calculated P Axis 44 degrees    Calculated R Axis -14 degrees    Calculated T Axis 41 degrees    Diagnosis       Normal sinus rhythm  Poor R Wave Progression  Abnormal ECG  When compared with ECG of 29-NOV-2022 12:12,  Sinus rhythm has replaced Atrial fibrillation  T wave inversion no longer evident in Inferior leads  T wave inversion no longer evident in Lateral leads  Confirmed by Carlita Ward MD, Trino Pettit ([id number]) on 11/30/2022 4:51:03 PM     EKG, 12 LEAD, SUBSEQUENT   Result Value Ref Range    Ventricular Rate 71 BPM    Atrial Rate 71 BPM    P-R Interval 160 ms    QRS Duration 92 ms    Q-T Interval 430 ms    QTC Calculation (Bezet) 467 ms    Calculated P Axis 31 degrees    Calculated R Axis -20 degrees    Calculated T Axis 47 degrees    Diagnosis       Normal sinus rhythm  Moderate voltage criteria for LVH, may be normal variant  Cannot rule out Septal infarct (cited on or before 02-DEC-2022)  Abnormal ECG  When compared with ECG of 30-NOV-2022 09:34,  No significant change was found  Confirmed by Pankaj Thrasher ([phone]) on 12/2/2022 11:33:55 AM     ECHO ADULT FOLLOW-UP OR LIMITED   Result Value Ref Range    IVSd 1.8 (A) 0.6 - 1.0 cm    LVIDd 4.9 4.2 - 5.9 cm    LVIDs 2.9 cm    LVOT Diameter 2.2 cm    LVPWd 1.6 (A) 0.6 - 1.0 cm    MV A Velocity 0.90 m/s    MV E Wave Deceleration Time 156.8 ms    MV E Velocity 1.09 m/s    LV E' Lateral Velocity 6 cm/s    LV E' Septal Velocity 5 cm/s    Aortic Root 3.3 cm    Fractional Shortening 2D 41 28 - 44 %    LVIDd Index 2.26 cm/m2    LVIDs Index 1.34 cm/m2    LV RWT Ratio 0.65     LV Mass 2D 378.4 (A) 88 - 224 g    LV Mass 2D Index 174.4 (A) 49 - 115 g/m2    MV E/A 1.21     E/E' Ratio (Averaged) 19.98     E/E' Lateral 18.17     E/E' Septal 21.80     LVOT Area 3.8 cm2    Ao Root Index 1.52 cm/m2         ASSESSMENT/PLAN  Diagnoses and all orders for this visit:  Paroxysmal A-fib (HCC)  S/p ablation  Symptomatic w/ recent admission, increased BB dose  Trop levels trending down  Rate controlled on BB/amiodarone  anticoag with eliquis  Keep appt with [facility] in [place]    NSTEMI (non-ST elevated myocardial infarction) ([facility]  S/p mid AV stent 12/1/2022  S/p LAD PCI 2019  On DAPT, lipitor, BB, ace  Keep appt with cardiology 1/11/2023    Type 2 diabetes with nephropathy (HCC)  Controlled  On oral medications, following endo    Restless leg  Responding to mirapex, agree on to increase from qhs to BID  Refractory to low dose gabapentin      ESRD on dialysis ([facility] [address] [facility]  On HD  Following nephrology    Anemia of chronic disease    Coronary artery disease involving native coronary artery of native heart without angina pectoris  See #1  Clinically improved       Dyslipidemia  LDL goal<70  Cont lipitor  Fasting labs prior to next visit    Chronic diastolic heart failure (HCC)  On BB/ace    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 Zackery Egan (ff-up 2/17/2023)  Following ophtha      Essentially hypertension  Controlled  Nephrology following    Legally blind      Follow-up Disposition: In 2 months or sooner prn      Patient understands plan of care. Patient has provided input and agrees with goals.

## 2022-12-19 NOTE — PROGRESS NOTES
1. Have you been to the ER, urgent care clinic since your last visit? Hospitalized since your last visit? [facility] 12/06/22 - 12/08/22    2. Have you seen or consulted any other health care providers outside of the [address] [place] [address] since your last visit? Include any pap smears or colon screening.  No    Chief Complaint   Patient presents with    Hospital Follow Up     [facility] 12/06/22 - 12/08/22 - elevated troponin

## 2022-12-27 ENCOUNTER — TELEPHONE (OUTPATIENT)
Dept: CARDIOLOGY CLINIC | Age: 52
End: 2022-12-27

## 2022-12-27 NOTE — TELEPHONE ENCOUNTER
Patient called this morning while at dialysis. He stated that Dr. Sharon Ku had done a cath on 12/6/22. Patient was started on Plavix at that time. He said since then a couple times after dialysis, he will remove the bandage after 6-7 hours and he is still bleeding. He also had been experiencing blood coming from his nose when he would blow his nose. Reviewed with Dr. Sharon Ku;  Verbal order and read back per Gabby Anguiano MD  Patient can stop ASA 81mg after 1/6/23 and stay on his Plavix and Eliquis. Spoke to patient and patient agreed to changes.

## 2023-01-04 RX ORDER — AMLODIPINE BESYLATE 10 MG/1
10 TABLET ORAL DAILY
Qty: 90 TABLET | Refills: 3 | Status: SHIPPED | OUTPATIENT
Start: 2023-01-04

## 2023-01-04 RX ORDER — LISINOPRIL 20 MG/1
20 TABLET ORAL DAILY
Qty: 90 TABLET | Refills: 3 | Status: SHIPPED | OUTPATIENT
Start: 2023-01-04

## 2023-01-04 RX ORDER — CLOPIDOGREL BISULFATE 75 MG/1
75 TABLET ORAL DAILY
Qty: 90 TABLET | Refills: 3 | Status: SHIPPED | OUTPATIENT
Start: 2023-01-04

## 2023-01-04 RX ORDER — METOPROLOL TARTRATE 50 MG/1
50 TABLET ORAL 2 TIMES DAILY
Qty: 180 TABLET | Refills: 3 | Status: SHIPPED | OUTPATIENT
Start: 2023-01-04

## 2023-01-11 ENCOUNTER — OFFICE VISIT (OUTPATIENT)
Dept: CARDIOLOGY CLINIC | Age: 53
End: 2023-01-11
Payer: MEDICARE

## 2023-01-11 VITALS
OXYGEN SATURATION: 98 % | SYSTOLIC BLOOD PRESSURE: 132 MMHG | DIASTOLIC BLOOD PRESSURE: 76 MMHG | HEIGHT: 70 IN | HEART RATE: 64 BPM | BODY MASS INDEX: 28.63 KG/M2 | WEIGHT: 200 LBS

## 2023-01-11 DIAGNOSIS — I25.10 CORONARY ARTERY DISEASE INVOLVING NATIVE CORONARY ARTERY OF NATIVE HEART WITHOUT ANGINA PECTORIS: Primary | ICD-10-CM

## 2023-01-11 DIAGNOSIS — I50.32 CHRONIC DIASTOLIC HEART FAILURE (HCC): ICD-10-CM

## 2023-01-11 DIAGNOSIS — I42.9 CARDIOMYOPATHY, UNSPECIFIED TYPE (HCC): ICD-10-CM

## 2023-01-11 DIAGNOSIS — I11.9 BENIGN HYPERTENSIVE HEART DISEASE WITHOUT HEART FAILURE: ICD-10-CM

## 2023-01-11 DIAGNOSIS — I48.0 PAROXYSMAL ATRIAL FIBRILLATION (HCC): ICD-10-CM

## 2023-01-11 PROCEDURE — 3078F DIAST BP <80 MM HG: CPT | Performed by: INTERNAL MEDICINE

## 2023-01-11 PROCEDURE — G8432 DEP SCR NOT DOC, RNG: HCPCS | Performed by: INTERNAL MEDICINE

## 2023-01-11 PROCEDURE — 3017F COLORECTAL CA SCREEN DOC REV: CPT | Performed by: INTERNAL MEDICINE

## 2023-01-11 PROCEDURE — G8427 DOCREV CUR MEDS BY ELIG CLIN: HCPCS | Performed by: INTERNAL MEDICINE

## 2023-01-11 PROCEDURE — 99214 OFFICE O/P EST MOD 30 MIN: CPT | Performed by: INTERNAL MEDICINE

## 2023-01-11 PROCEDURE — G8417 CALC BMI ABV UP PARAM F/U: HCPCS | Performed by: INTERNAL MEDICINE

## 2023-01-11 PROCEDURE — 93000 ELECTROCARDIOGRAM COMPLETE: CPT | Performed by: INTERNAL MEDICINE

## 2023-01-11 PROCEDURE — 3075F SYST BP GE 130 - 139MM HG: CPT | Performed by: INTERNAL MEDICINE

## 2023-01-11 NOTE — PROGRESS NOTES
Magdy Baca presents today for   Chief Complaint   Patient presents with    Follow-up     Post Stent follow up       Shortness of Breath     Occasionally       Dizziness     Ususally after dialysis          Magdy Baca preferred language for health care discussion is english/other.     Is someone accompanying this pt? no    Is the patient using any DME equipment during [address]? no    Depression Screening:  3 most recent PHQ Screens 12/19/2022   Little interest or pleasure in doing things Not at all   Feeling down, depressed, irritable, or hopeless Nearly every day   Total Score PHQ 2 3   Trouble falling or staying asleep, or sleeping too much Several days   Feeling tired or having little energy Several days   Poor appetite, weight loss, or overeating Not at all   Feeling bad about yourself - or that you are a failure or have let yourself or your family down More than half the days   Trouble concentrating on things such as school, work, reading, or watching TV Not at all   Moving or speaking so slowly that other people could have noticed; or the opposite being so fidgety that others notice Not at all   Thoughts of being better off dead, or hurting yourself in some way Not at all   PHQ 9 Score 7   How difficult have these problems made it for you to do your work, take care of your home and get along with others Somewhat difficult       Learning Assessment:  Learning Assessment 10/12/2022   PRIMARY LEARNER Patient   HIGHEST LEVEL OF EDUCATION - PRIMARY LEARNER  2 YEARS OF COLLEGE   BARRIERS PRIMARY LEARNER OTHER   CO-LEARNER CAREGIVER No   CO-LEARNER NAME -   Anel Shravan Riz[address] -    NEED -   LEARNER PREFERENCE PRIMARY DEMONSTRATION     LISTENING     PICTURES     VIDEOS     OTHER (COMMENT)   LEARNER [address] -   ANSWERED BY [id number] [address] Screening:  Abuse Screening Questionnaire 11/9/2022   Do you ever feel afraid of your partner? N   Are you in a relationship with someone who physically or mentally threatens you? N   Is it safe for you to go home? Y       Fall Risk  Fall Risk Assessment, last 12 mths 4/20/2022   Able to walk? Yes   Fall in past 12 months? 0   Do you feel unsteady? 1   Are you worried about falling 0   Is TUG test greater than 12 seconds? 0   Is the gait abnormal? 0       Pt currently taking Anticoagulant therapy? Eliquis 5mg twice a day     Coordination of Care:  1. Have you been to the ER, urgent care clinic since your last visit? Hospitalized since your last visit? Yes [facility] 11/29/22-12/2/22  [facility] 12/6/22-12/8/22     2. Have you seen or consulted any other health care providers outside of the [address] [place] [address] since your last visit? Include any pap smears or colon screening.  no

## 2023-01-11 NOTE — PROGRESS NOTES
HISTORY OF PRESENT ILLNESS  Ventura Amaro is a 50 y.o. male. ASSESSMENT and PLAN    Mr. Scout Cummings has history of diabetes mellitus, diabetic retinopathy legally blind, coronary artery disease without chest pains, ischemic cardiomyopathy. He presented with significant fatigue, increased shortness of breath. He had LAD stent in October of 2012 by Dr. Airam Ortiz. He has never had chest pains. He has history of mildly diminished LV function with ejection fraction of 40-50%. He is on hemodialysis 3 times per week. His new baseline weight in 2019 is 199 pounds. Kidney transplant evaluation was performed in 2019. He underwent coronary angiography on 8/21/2019 without significant disease in his RCA or circumflex. He had 35% stenosis in his LAD with IFR which was negative at 0.93-0.95. No further coronary intervention was needed. In December 2021, he underwent coronary angiography per the request of renal transplant team, which revealed diffuse mild disease in his left dominant system with the exception of the first diagonal branch with ostial 95% stenosis. Continued medical therapy is recommended. His echocardiogram on 12/17/2021 revealed normal EF 50-55%. In the winter 2021/2022, he was diagnosed with PAF. He had tolerated this reasonably well until around March 2022, when he began to experience rapid PAF whenever he went on dialysis. He has been sent to the emergency room 4 times since February 2022 until May 2022. Whenever he goes on hemodialysis and develops PAF with RVR, he is sent from the dialysis unit to the emergency room automatically. In the summer 2022, he had PAF ablation. In December 2022, he was admitted to [facility] with A. fib/RVR but abnormal troponin. His coronary angiography revealed 95% mid circumflex lesion which was stented to residual 0% using AMOL. 6 days after his stent, during dialysis, he had recurrent episodes of AF/RVR. He was admitted to [facility].   He had echocardiogram performed during that hospitalization on 12/6/2022 as well as in 12/7/2022. His EF was noted to be 45%. Obviously, his attempted PAF ablation is unsuccessful. He will continue on his current amiodarone. CAD:    Clinically stable. BP:    Well controlled at 132/76. Rhythm:    Sinus rhythm at 64 bpm.  CHF:    There is no evidence of decompensated CHF noted. Weight:     His weight today is 200 pounds. This is at baseline. Cholesterol:   Target LDL <70. Lipitor 40. Anti-platelet:   Remains on ASA, and Eliquis. From cardiac standpoint, he should be able to tolerate renal transplantation. I will see him back in 6 months. Thank you. Encounter Diagnoses   Name Primary? Coronary artery disease involving native coronary artery of native heart without angina pectoris Yes    Paroxysmal atrial fibrillation (HCC)     Chronic diastolic heart failure (HCC)     Cardiomyopathy, unspecified type ([address]     Benign hypertensive       the following changes in treatment are made: See above  lab results and schedule of future lab studies reviewed with patient  reviewed diet, exercise and weight control  cardiovascular risk and specific lipid/LDL goals reviewed  use of aspirin to prevent MI and TIA's discussed    Follow-up  Associated symptoms include shortness of breath. Pertinent negatives include no chest pain. Shortness of Breath  Pertinent negatives include no PND, no orthopnea, no chest pain, no leg swelling and no claudication. Dizziness  Associated symptoms include shortness of breath. Pertinent negatives include no chest pain. Today, Mr. Kt Quintero has no complaints of chest pains, increased shortness of breath or decreased exercise capacity. He has baseline MORTENSEN which is without change. Whenever he has hemodialysis, he becomes a little dizzy likely from intravascular depletion while on dialysis. He denies any orthopnea or PND. He denies any palpitation sensation.     Review of Systems Respiratory:  Positive for shortness of breath. Cardiovascular:  Negative for chest pain, palpitations, orthopnea, claudication, leg swelling and PND. Neurological:  Positive for dizziness. All other systems reviewed and are negative. Physical Exam  Vitals and nursing note reviewed. Constitutional:       Appearance: Normal appearance. HENT:      Head: Normocephalic. Eyes:      Conjunctiva/sclera: Conjunctivae normal.   Neck:      Vascular: No carotid bruit. Cardiovascular:      Rate and Rhythm: Normal rate and regular rhythm. Pulmonary:      Breath sounds: Normal breath sounds. Abdominal:      Palpations: Abdomen is soft. Musculoskeletal:         General: No swelling. Cervical back: No rigidity. Skin:     General: Skin is warm and dry. Neurological:      General: No focal deficit present. Mental Status: He is alert and oriented to person, place, and time. Psychiatric:         Mood and Affect: Mood normal.         Behavior: Behavior normal.       PCP: Rosa Paredes MD    Past Medical History:   Diagnosis Date    Abnormal nuclear cardiac imaging test 10/08/2012    Fixed anteroseptal, anteroapical defect c/w prior infarction. No ischemia. Mid & distal anteroseptal, anteroapical WMA. LVE. EF 44%. Anemia     dr. Abby Fernandes doubt amyloid or multiple myeloma. candidate for procirt if Hg <10    Benign hypertensive     Blindness of right eye 01/2013    legally blind    CAD (coronary artery disease)     Cardiomyopathy ejection fraction of 40%     CKD (chronic kidney disease), stage IV ([address]     to see Dr. Migel Langford 12/1/12    Congestive heart failure ([facility]     Diabetes mellitus ([address]     Diabetic retinopathy ([address]     Diabetic retinopathy ([address]     Dr. Jordyn Reddy- injections    Heart failure [facility])     History of echocardiogram 04/22/2014    LVE. EF 55% (prev 40-45% on echo of 1/27/12). No WMA. Mild-mod conc LVH. Gr 3 DDfx. Marked LAE. Mild SHANTEL. Mild MR.     Hypertension Pulmonary hypertension ([address]     Renal Ultrasound 1/3/12    Right kidney isoechoic w/respect to liver. Sm left-sided pleural effusion    S/P cardiac cath 10/16/2012    LM patent. pLAD 95% (3.5 x 12-mm [place] stent, resid 0$%). LCX mild. Past Surgical History:   Procedure Laterality Date    HX CORONARY STENT PLACEMENT      one    HX LAPAROTOMY  2/2012    bowel obstruction with removal segment of small bowel. Done by Riblet. HX LAPAROTOMY  infancy    whole in colon and had repair at that time. HX OTHER SURGICAL  06/20/2013    left eye retna attatchment    HX RETINAL DETACHMENT REPAIR      august 2012    OH RPR 1ST INGUN HRNA AGE 5 YRS/> REDUCIBLE Left 05/02/2019    Dr. Celestina Lopez       Current Outpatient Medications   Medication Sig Dispense Refill    clopidogreL (PLAVIX) 75 mg tab Take 1 Tablet by mouth daily. 90 Tablet 3    amLODIPine (NORVASC) 10 mg tablet Take 1 Tablet by mouth daily. 90 Tablet 3    metoprolol tartrate (LOPRESSOR) 50 mg tablet Take 1 Tablet by mouth two (2) times a day. 180 Tablet 3    lisinopriL (PRINIVIL, ZESTRIL) 20 mg tablet Take 1 Tablet by mouth daily. 90 Tablet 3    sevelamer carbonate (RENVELA) 800 mg tab tab       pramipexole (MIRAPEX) 0.125 mg tablet Take 1 Tablet by mouth two (2) times a day. 60 Tablet 1    sevelamer (RENAGEL) 800 mg tablet Take 1,600 mg by mouth three (3) times daily (with meals). apixaban (ELIQUIS) 5 mg tablet Take 5 mg by mouth two (2) times a day. glipiZIDE SR (GLUCOTROL XL) 2.5 mg CR tablet Take 2.5 mg by mouth daily. linaGLIPtin (TRADJENTA) 5 mg tablet Take 5 mg by mouth daily. amiodarone (CORDARONE) 200 mg tablet TAKE 1 TABLET BY MOUTH DAILY 30 Tablet 6    cinacalcet (SENSIPAR) 30 mg tablet Take 30 mg by mouth every Monday, Wednesday, Friday. TAKE 1 TABLET(30MG) BY MOUTH 3 TIMES WEEKLY: MONDAY, WEDNESDAY, AND FRIDAY IN THE EVENING. atorvastatin (LIPITOR) 80 mg tablet Take 1 Tablet by mouth nightly.  90 Tablet 3    glucose blood VI test strips (Accu-Chek Beatrice Plus test strp) strip Use as directed 100 Strip 2    nitroglycerin (NITROLINGUAL) 400 mcg/spray spray 1 Spray by SubLINGual route every five (5) minutes as needed for Chest Pain.  (Patient taking differently: 1 Spray by SubLINGual route every five (5) minutes as needed for Chest Pain. place 1 tablet under tongue every 5 minutes as needed x 3; call 911 if chest pain unrelieved after 3 doses in 15 minutes) 1 Bottle 2    Blood-Glucose Meter monitoring kit Check fasting glucose 1 Kit 0    Eliquis 2.5 mg tablet TAKE 1 TABLET BY MOUTH EVERY 12 HOURS (Patient not taking: No sig reported) 180 Tablet 3       The patient has a family history of    Social History     Tobacco Use    Smoking status: Never    Smokeless tobacco: Never   Substance Use Topics    Alcohol use: Not Currently     Alcohol/week: 4.2 standard drinks     Types: 5 Cans of beer per week    Drug use: No       Lab Results   Component Value Date/Time    Cholesterol, total 173 05/16/2022 08:04 AM    HDL Cholesterol 52 05/16/2022 08:04 AM    LDL, calculated 109.2 (H) 05/16/2022 08:04 AM    Triglyceride 59 05/16/2022 08:04 AM    CHOL/HDL Ratio 3.3 05/16/2022 08:04 AM        BP Readings from Last 3 Encounters:   01/11/23 132/76   12/19/22 100/70   12/05/22 (!) 150/80        Pulse Readings from Last 3 Encounters:   01/11/23 64   12/19/22 71   12/05/22 68       Wt Readings from Last 3 Encounters:   01/11/23 90.7 kg (200 lb)   12/19/22 93.1 kg (205 lb 3.2 oz)   12/05/22 93.9 kg (207 lb)         EKG: unchanged from previous tracings, normal sinus rhythm, Q waves in leads V1 and V2.

## 2023-02-13 ENCOUNTER — TELEPHONE (OUTPATIENT)
Age: 53
End: 2023-02-13

## 2023-02-14 RX ORDER — PRAMIPEXOLE DIHYDROCHLORIDE 0.12 MG/1
TABLET ORAL
Qty: 180 TABLET | Refills: 0 | Status: SHIPPED | OUTPATIENT
Start: 2023-02-14 | End: 2023-02-14 | Stop reason: SDUPTHER

## 2023-02-14 RX ORDER — PRAMIPEXOLE DIHYDROCHLORIDE 0.12 MG/1
TABLET ORAL
Qty: 180 TABLET | Refills: 0 | Status: SHIPPED | OUTPATIENT
Start: 2023-02-14

## 2023-02-14 RX ORDER — PRAMIPEXOLE DIHYDROCHLORIDE 0.12 MG/1
TABLET ORAL
COMMUNITY
Start: 2023-01-19 | End: 2023-02-14 | Stop reason: SDUPTHER

## 2023-02-16 NOTE — Clinical Note
Diagnostic wire removed. Guidewire tip is intact.  Wire type: In-Q. Patient is coming in for labs on 3/6. CMP is expected 3/20, ok to draw early?   Also, please enter any other labs needed. Thank you

## 2023-02-21 ENCOUNTER — HOSPITAL ENCOUNTER (OUTPATIENT)
Facility: HOSPITAL | Age: 53
Discharge: HOME | End: 2023-02-24
Payer: MEDICARE

## 2023-02-21 LAB
ALBUMIN SERPL-MCNC: 3.4 G/DL (ref 3.4–5)
ALBUMIN/GLOB SERPL: 0.9 (ref 0.8–1.7)
ALP SERPL-CCNC: 79 U/L (ref 45–117)
ALT SERPL-CCNC: 21 U/L (ref 16–61)
ANION GAP SERPL CALC-SCNC: 5 MMOL/L (ref 3–18)
AST SERPL-CCNC: 15 U/L (ref 10–38)
BILIRUB SERPL-MCNC: 0.4 MG/DL (ref 0.2–1)
BUN SERPL-MCNC: 49 MG/DL (ref 7–18)
BUN/CREAT SERPL: 4 (ref 12–20)
CALCIUM SERPL-MCNC: 9.9 MG/DL (ref 8.5–10.1)
CHLORIDE SERPL-SCNC: 99 MMOL/L (ref 100–111)
CHOLEST SERPL-MCNC: 155 MG/DL
CO2 SERPL-SCNC: 32 MMOL/L (ref 21–32)
CREAT SERPL-MCNC: 11 MG/DL (ref 0.6–1.3)
EST. AVERAGE GLUCOSE BLD GHB EST-MCNC: 123 MG/DL
GLOBULIN SER CALC-MCNC: 3.8 G/DL (ref 2–4)
GLUCOSE SERPL-MCNC: 75 MG/DL (ref 74–99)
HBA1C MFR BLD: 5.9 % (ref 4.2–5.6)
HDLC SERPL-MCNC: 53 MG/DL (ref 40–60)
HDLC SERPL: 2.9 (ref 0–5)
LDLC SERPL CALC-MCNC: 87.4 MG/DL (ref 0–100)
LIPID PANEL: NORMAL
POTASSIUM SERPL-SCNC: 4.3 MMOL/L (ref 3.5–5.5)
PROT SERPL-MCNC: 7.2 G/DL (ref 6.4–8.2)
SODIUM SERPL-SCNC: 136 MMOL/L (ref 136–145)
TRIGL SERPL-MCNC: 73 MG/DL
VLDLC SERPL CALC-MCNC: 14.6 MG/DL

## 2023-02-21 PROCEDURE — 36415 COLL VENOUS BLD VENIPUNCTURE: CPT

## 2023-02-21 PROCEDURE — 80061 LIPID PANEL: CPT

## 2023-02-21 PROCEDURE — 83036 HEMOGLOBIN GLYCOSYLATED A1C: CPT

## 2023-02-21 PROCEDURE — 80053 COMPREHEN METABOLIC PANEL: CPT

## 2023-02-23 ENCOUNTER — TELEPHONE (OUTPATIENT)
Age: 53
End: 2023-02-23

## 2023-02-23 ENCOUNTER — HOSPITAL ENCOUNTER (OUTPATIENT)
Facility: HOSPITAL | Age: 53
Discharge: HOME | End: 2023-02-23
Payer: MEDICARE

## 2023-02-23 ENCOUNTER — OFFICE VISIT (OUTPATIENT)
Age: 53
End: 2023-02-23
Payer: MEDICARE

## 2023-02-23 VITALS
WEIGHT: 203 LBS | HEART RATE: 70 BPM | HEIGHT: 70 IN | RESPIRATION RATE: 16 BRPM | BODY MASS INDEX: 29.06 KG/M2 | OXYGEN SATURATION: 98 % | DIASTOLIC BLOOD PRESSURE: 78 MMHG | TEMPERATURE: 97.2 F | SYSTOLIC BLOOD PRESSURE: 150 MMHG

## 2023-02-23 DIAGNOSIS — I48.91 ATRIAL FIBRILLATION WITH RVR (HCC): ICD-10-CM

## 2023-02-23 DIAGNOSIS — I10 PRIMARY HYPERTENSION: ICD-10-CM

## 2023-02-23 DIAGNOSIS — E78.5 DYSLIPIDEMIA: ICD-10-CM

## 2023-02-23 DIAGNOSIS — Z99.2 DEPENDENCE ON RENAL DIALYSIS (HCC): ICD-10-CM

## 2023-02-23 DIAGNOSIS — Z99.2 ESRD ON DIALYSIS (HCC): ICD-10-CM

## 2023-02-23 DIAGNOSIS — E11.21 TYPE 2 DIABETES MELLITUS WITH DIABETIC NEPHROPATHY, WITHOUT LONG-TERM CURRENT USE OF INSULIN (HCC): ICD-10-CM

## 2023-02-23 DIAGNOSIS — N18.6 ESRD ON DIALYSIS (HCC): ICD-10-CM

## 2023-02-23 DIAGNOSIS — M25.512 ACUTE PAIN OF LEFT SHOULDER: ICD-10-CM

## 2023-02-23 DIAGNOSIS — M54.2 NECK PAIN: ICD-10-CM

## 2023-02-23 DIAGNOSIS — D63.8 ANEMIA OF CHRONIC DISEASE: ICD-10-CM

## 2023-02-23 DIAGNOSIS — G25.81 RESTLESS LEG: ICD-10-CM

## 2023-02-23 DIAGNOSIS — I25.118 ATHEROSCLEROTIC HEART DISEASE OF NATIVE CORONARY ARTERY WITH OTHER FORMS OF ANGINA PECTORIS (HCC): ICD-10-CM

## 2023-02-23 DIAGNOSIS — E11.3419: ICD-10-CM

## 2023-02-23 DIAGNOSIS — M25.512 ACUTE PAIN OF LEFT SHOULDER: Primary | ICD-10-CM

## 2023-02-23 DIAGNOSIS — I11.0 HYPERTENSIVE HEART DISEASE WITH HEART FAILURE (HCC): ICD-10-CM

## 2023-02-23 DIAGNOSIS — H54.8 LEGALLY BLIND: ICD-10-CM

## 2023-02-23 DIAGNOSIS — N25.81 SECONDARY HYPERPARATHYROIDISM OF RENAL ORIGIN (HCC): ICD-10-CM

## 2023-02-23 DIAGNOSIS — E11.21 TYPE 2 DIABETES WITH NEPHROPATHY (HCC): ICD-10-CM

## 2023-02-23 PROCEDURE — 73030 X-RAY EXAM OF SHOULDER: CPT

## 2023-02-23 PROCEDURE — 72050 X-RAY EXAM NECK SPINE 4/5VWS: CPT

## 2023-02-23 PROCEDURE — 3074F SYST BP LT 130 MM HG: CPT | Performed by: FAMILY MEDICINE

## 2023-02-23 PROCEDURE — 3044F HG A1C LEVEL LT 7.0%: CPT | Performed by: FAMILY MEDICINE

## 2023-02-23 PROCEDURE — 99214 OFFICE O/P EST MOD 30 MIN: CPT | Performed by: FAMILY MEDICINE

## 2023-02-23 PROCEDURE — 3078F DIAST BP <80 MM HG: CPT | Performed by: FAMILY MEDICINE

## 2023-02-23 RX ORDER — LIDOCAINE 50 MG/G
1 PATCH TOPICAL DAILY
Qty: 30 PATCH | Refills: 0 | Status: SHIPPED | OUTPATIENT
Start: 2023-02-23 | End: 2023-03-25

## 2023-02-23 RX ORDER — ASPIRIN 81 MG/1
TABLET, COATED ORAL
COMMUNITY
Start: 2022-11-18

## 2023-02-23 RX ORDER — MIDODRINE HYDROCHLORIDE 2.5 MG/1
TABLET ORAL
COMMUNITY
Start: 2022-12-05 | End: 2023-02-23

## 2023-02-23 RX ORDER — ACETAMINOPHEN 500 MG
500 TABLET ORAL EVERY 6 HOURS PRN
COMMUNITY

## 2023-02-23 RX ORDER — SUCROFERRIC OXYHYDROXIDE 500 MG/1
TABLET, CHEWABLE ORAL
COMMUNITY
Start: 2023-02-09

## 2023-02-23 RX ORDER — PRAMIPEXOLE DIHYDROCHLORIDE 0.12 MG/1
0.12 TABLET ORAL 2 TIMES DAILY
Qty: 180 TABLET | Refills: 0 | Status: SHIPPED | OUTPATIENT
Start: 2023-02-23

## 2023-02-23 SDOH — ECONOMIC STABILITY: INCOME INSECURITY: HOW HARD IS IT FOR YOU TO PAY FOR THE VERY BASICS LIKE FOOD, HOUSING, MEDICAL CARE, AND HEATING?: SOMEWHAT HARD

## 2023-02-23 SDOH — ECONOMIC STABILITY: HOUSING INSECURITY
IN THE LAST 12 MONTHS, WAS THERE A TIME WHEN YOU DID NOT HAVE A STEADY PLACE TO SLEEP OR SLEPT IN A SHELTER (INCLUDING NOW)?: NO

## 2023-02-23 SDOH — ECONOMIC STABILITY: FOOD INSECURITY: WITHIN THE PAST 12 MONTHS, YOU WORRIED THAT YOUR FOOD WOULD RUN OUT BEFORE YOU GOT MONEY TO BUY MORE.: NEVER TRUE

## 2023-02-23 SDOH — ECONOMIC STABILITY: FOOD INSECURITY: WITHIN THE PAST 12 MONTHS, THE FOOD YOU BOUGHT JUST DIDN'T LAST AND YOU DIDN'T HAVE MONEY TO GET MORE.: NEVER TRUE

## 2023-02-23 ASSESSMENT — PATIENT HEALTH QUESTIONNAIRE - PHQ9
SUM OF ALL RESPONSES TO PHQ QUESTIONS 1-9: 9
SUM OF ALL RESPONSES TO PHQ QUESTIONS 1-9: 9
2. FEELING DOWN, DEPRESSED OR HOPELESS: 3
10. IF YOU CHECKED OFF ANY PROBLEMS, HOW DIFFICULT HAVE THESE PROBLEMS MADE IT FOR YOU TO DO YOUR WORK, TAKE CARE OF THINGS AT HOME, OR GET ALONG WITH OTHER PEOPLE: 1
SUM OF ALL RESPONSES TO PHQ9 QUESTIONS 1 & 2: 3
1. LITTLE INTEREST OR PLEASURE IN DOING THINGS: 0
SUM OF ALL RESPONSES TO PHQ QUESTIONS 1-9: 9
SUM OF ALL RESPONSES TO PHQ QUESTIONS 1-9: 9
6. FEELING BAD ABOUT YOURSELF - OR THAT YOU ARE A FAILURE OR HAVE LET YOURSELF OR YOUR FAMILY DOWN: 0
8. MOVING OR SPEAKING SO SLOWLY THAT OTHER PEOPLE COULD HAVE NOTICED. OR THE OPPOSITE, BEING SO FIGETY OR RESTLESS THAT YOU HAVE BEEN MOVING AROUND A LOT MORE THAN USUAL: 0
4. FEELING TIRED OR HAVING LITTLE ENERGY: 3
5. POOR APPETITE OR OVEREATING: 0
7. TROUBLE CONCENTRATING ON THINGS, SUCH AS READING THE NEWSPAPER OR WATCHING TELEVISION: 0
9. THOUGHTS THAT YOU WOULD BE BETTER OFF DEAD, OR OF HURTING YOURSELF: 0
3. TROUBLE FALLING OR STAYING ASLEEP: 3

## 2023-02-23 NOTE — PROGRESS NOTES
Ahsan Benavides, 50 y.o.,  male    SUBJECTIVE  Ff-up     Afib/RVR s/p ablation 6/2022. CAD 12/2022 s/p stent. On  amiodarone, eliquis, DAPT and BB. Following cardiology Dr. Adrianne Wang, per note could tolerate renal transplant    ESRD-on HD T/Th/S. Also with anemia of chronic disease and HTN, following nephrology    NIDDM- w/ retinopathy, legally blind. Says 's. , and ophthalmology. On tradjenta/ glipizide. Denies hypoglycemia, reviewed a1c 5.9. following endo dr. Esperanza Delaney    HL- on lipitor, says skips doses at times, reviewed labs LDL 87  restless legs- responding to mirapex Refractory to  gabapentin    Past month c/o neck, L shoulder pain, seen in ED twice, given lidoderm patch with some relief. Continues to have pain with reduced over head reach.  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, associated with type 2 diabetes mellitus E11.3419    Dyslipidemia E78.5    Atrial fibrillation with rapid ventricular response (HCC) I48.91    Type 2 diabetes with nephropathy ([facility]) E11.21       Current Outpatient Medications:     acetaminophen (TYLENOL) 500 MG tablet, Take 500 mg by mouth every 6 hours as needed for Pain, Disp: , Rfl:     pramipexole (MIRAPEX) 0.125 MG tablet, Take 1 tablet by mouth 2 times daily, Disp: 180 tablet, Rfl: 0    Iron Sucrose (VENOFER IV), 50 mg, Disp: , Rfl:     Alum & Mag Hydroxide-Simeth (MAALOX ADVANCED PO), Take by mouth, Disp: , Rfl:     ASPIRIN LOW DOSE 81 MG EC tablet, TAKE 1 TABLET BY MOUTH DAILY. , Disp: , Rfl:     VELPHORO 500 MG CHEW chewable tablet, CHEW AND SWALLOW 2 TABLETS THREE TIMES DAILY WITH MEALS, Disp: , Rfl:     lidocaine (LIDODERM) 5 %, Place 1 patch onto the skin daily 12 hours on, 12 hours off., Disp: 30 patch, Rfl: 0    amiodarone (CORDARONE) 200 MG tablet, Take 200 mg by mouth daily, Disp: , Rfl:     amLODIPine (NORVASC) 10 MG tablet, Take 10 mg by mouth daily, Disp: , Rfl:     apixaban (ELIQUIS) 2.5 MG TABS tablet, [The details of the medication are not available because there are pending changes by a home health clinician.], Disp: , Rfl:     atorvastatin (LIPITOR) 80 MG tablet, Take 80 mg by mouth nightly, Disp: , Rfl:     clopidogrel (PLAVIX) 75 MG tablet, Take 75 mg by mouth daily, Disp: , Rfl:     glipiZIDE (GLUCOTROL XL) 2.5 MG extended release tablet, Take 2.5 mg by mouth daily, Disp: , Rfl:     linagliptin (TRADJENTA) 5 MG tablet, Take 5 mg by mouth daily, Disp: , Rfl:     lisinopril (PRINIVIL;ZESTRIL) 20 MG tablet, Take 20 mg by mouth daily, Disp: , Rfl:     metoprolol tartrate (LOPRESSOR) 50 MG tablet, Take 50 mg by mouth 2 times daily, Disp: , Rfl:     nitroGLYCERIN (NITROLINGUAL) 0.4 MG/SPRAY 0.4 mg spray, Place 1 spray under the tongue, Disp: , Rfl:     pramipexole (MIRAPEX) 0.125 MG tablet, TAKE 1 TABLET BY MOUTH TWICE DAILY, Disp: 180 tablet, Rfl: 0    cinacalcet (SENSIPAR) 30 MG tablet, Take 30 mg by mouth (Patient not taking: Reported on 2/23/2023), Disp: , Rfl:     sevelamer (RENVELA) 800 MG tablet, ceived the following from [facility]: Outside name: sevelamer carbonate (RENVELA) 800 mg tab tab (Patient not taking: Reported on 2/23/2023), Disp: , Rfl:     sevelamer hcl (RENAGEL) 800 MG tablet, Take 1,600 mg by mouth 3 times daily (with meals) (Patient not taking: Reported on 2/23/2023), Disp: , Rfl:     No Known Allergies    Past Medical History:   Diagnosis Date    Abnormal nuclear cardiac imaging test 10/08/2012    Fixed anteroseptal, anteroapical defect c/w prior infarction. No ischemia. Mid & distal anteroseptal, anteroapical WMA. LVE. EF 44%. Anemia     dr. Anita Zapata doubt amyloid or multiple myeloma. candidate for procirt if Hg <10    Benign hypertensive     Blindness of right eye 01/2013    legally blind    CAD (coronary artery disease)     Cardiomyopathy ejection fraction of 40%     CKD (chronic kidney disease), stage IV ([address]     to see Dr. Kathryn Milner 12/1/12    Congestive heart failure ([address]     Diabetes mellitus ([address]     Diabetic retinopathy ([address]     Diabetic retinopathy ([address]     Dr. Nina Massed- injections    Heart failure [facility])     History of echocardiogram 04/22/2014    LVE. EF 55% (prev 40-45% on echo of 1/27/12). No WMA. Mild-mod conc LVH. Gr 3 DDfx. Marked LAE. Mild NIGEL. Mild MR. Hypertension     Pulmonary hypertension ([address]     Renal Ultrasound 1/3/12    Right kidney isoechoic w/respect to liver. Sm left-sided pleural effusion    S/P cardiac cath 10/16/2012    LM patent. pLAD 95% (3.5 x 12-mm [place] stent, resid 0$%). LCX mild. Social History     Socioeconomic History    Marital status:      Spouse name: Not on file    Number of children: Not on file    Years of education: Not on file    Highest education level: Not on file   Social Needs    Financial resource strain: Not on file    Food insecurity - worry: Not on file    Food insecurity - inability: Not on file    Transportation needs - medical: Not on file    Transportation needs - non-medical: Not on file   Occupational History    Not on file   Tobacco Use    Smoking status: Never Smoker    Smokeless tobacco: Never Used   Substance and Sexual Activity    Alcohol use:  Yes     Alcohol/week: 2.5 oz     Types: 5 Cans of beer per week     Comment: occassionally    Drug use: No    Sexual activity: Yes     Partners: Female     Birth control/protection: None   Other Topics Concern    Not on file   Social History Narrative    Not on file Family History   Problem Relation Age of Onset    Diabetes Mother     Hypertension Mother     Kidney Disease Mother     High Cholesterol Father     Diabetes Brother         pre diabetic         OBJECTIVE    Physical Exam:     Visit Vitals  BP (!) 150/78 (Site: Left Upper Arm, Position: Sitting, Cuff Size: Large Adult)   Pulse 70   Temp 97.2 °F (36.2 °C) (Temporal)   Resp 16   Ht 5' 10\" (1.778 m)   Wt 203 lb (92.1 kg)   SpO2 98%   BMI 29.13 kg/m²         General: alert, chronically ill-appearing,  in no apparent distress or pain  Neck: FROM, no tenderness  CVS: normal rate, regular rhythm, distinct S1 and S2  Lungs:clear to ausculation bilaterally, no crackles, wheezing or rhonchi noted  Extremities:L shoulder limited abduction to 90 deg, motor, sensation intact, good   Skin: warm, no lesions, rashes noted  Psych:  mood and affect normal  CMP:   Lab Results   Component Value Date/Time     02/21/2023 10:17 AM    K 4.3 02/21/2023 10:17 AM    CL 99 02/21/2023 10:17 AM    CO2 32 02/21/2023 10:17 AM    BUN 49 02/21/2023 10:17 AM    CREATININE 11.00 02/21/2023 10:17 AM    GLUCOSE 75 02/21/2023 10:17 AM    CALCIUM 9.9 02/21/2023 10:17 AM    PROT 7.2 02/21/2023 10:17 AM    LABALBU 3.4 02/21/2023 10:17 AM    BILITOT 0.4 02/21/2023 10:17 AM    AST 15 02/21/2023 10:17 AM    ALT 21 02/21/2023 10:17 AM        CBC:   Lab Results   Component Value Date/Time    WBC 4.2 12/02/2022 04:29 AM    RBC 3.05 12/02/2022 04:29 AM    HGB 9.3 12/02/2022 04:29 AM    HCT 28.2 12/02/2022 04:29 AM    MCV 92.5 12/02/2022 04:29 AM    MCH 30.5 12/02/2022 04:29 AM    MCHC 33.0 12/02/2022 04:29 AM    RDW 15.4 12/02/2022 04:29 AM     12/02/2022 04:29 AM    MPV 10.2 12/02/2022 04:29 AM        Lipids   Lab Results   Component Value Date/Time    CHOL 155 02/21/2023 10:17 AM    TRIG 73 02/21/2023 10:17 AM    LDLCALC 87.4 02/21/2023 10:17 AM    LABVLDL 14.6 02/21/2023 10:17 AM    CHOLHDLRATIO 2.9 02/21/2023 10:17 AM Imaging results last 24 hrs :No results found. Imaging results impression onlyXR SHOULDER LEFT (MIN 2 VIEWS)    Result Date: 2/23/2023  Normal study. No orders to display       A1c:   Hemoglobin A1C   Date Value Ref Range Status   02/21/2023 5.9 (H) 4.2 - 5.6 % Final     Comment:     (NOTE)  HbA1C Interpretive Ranges  <5.7              Normal  5.7 - 6.4         Consider Prediabetes  >6.5              Consider Diabetes     11/29/2022 5.7 (H) 4.2 - 5.6 % Final     Comment:     (NOTE)  HbA1C Interpretive Ranges  <5.7              Normal  5.7 - 6.4         Consider Prediabetes  >6.5              Consider Diabetes     05/16/2022 6.8 (H) 4.2 - 5.6 % Final     Comment:     (NOTE)  HbA1C Interpretive Ranges  <5.7              Normal  5.7 - 6.4         Consider Prediabetes  >6.5              Consider Diabetes           ASSESSMENT/PLAN  Diagnoses and all orders for this visit:  Paroxysmal A-fib ([facility]  S/p ablation, unsuccessful  Rate controlled on BB/amiodarone  anticoag with eliquis  Following cards, dr Jackson Brooks    NSTEMI (non-ST elevated myocardial infarction) [facility])  S/p mid AV stent 12/1/2022  S/p LAD PCI 2019  On DAPT, lipitor, BB, ace  following with cardiology  LDL not at goal, per pt skips doses of lipitor, advised compliance, monitoring  CMP/LIPID panel prior to next visit  -     Lipid Panel; Future  -     Comprehensive Metabolic Panel;  Future  Type 2 diabetes with nephropathy (HCC)  Controlled  On oral medications, following endo    Restless leg  Responding to mirapex BID  Refractory to low dose gabapentin      ESRD on dialysis ([facility]  On HD  Following nephrology    Anemia of chronic disease    Coronary artery disease involving native coronary artery of native heart without angina pectoris  See #1  Clinically improved       Dyslipidemia  LDL goal<70  Cont lipitor  Fasting labs prior to next visit    Chronic diastolic heart failure ([facility]  On BB/ace    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 Lara (ff-up 2/17/2023)  Following ophtha      Essentially hypertension  Controlled  Nephrology following    Legally blind    Acute pain of left shoulder  Suspect Rotator cuff  r/o cervical radiculopathy   -     XR SHOULDER LEFT (MIN 2 VIEWS); Future  -     XR CERVICAL SPINE (4-5 VIEWS); Future  -     [facility] George MD, Orthopedic Surgery(General/Arthroscopic/Shoulder & Elbow), [place] ([address]    Dependence on renal dialysis (HCC)    Type 2 diabetes mellitus with diabetic nephropathy, without long-term current use of insulin (HCC)      Secondary hyperparathyroidism of renal origin (HCC)    Atherosclerotic heart disease of native coronary artery with other forms of angina pectoris (HCC)    Neck pain  -     XR CERVICAL SPINE (4-5 VIEWS); Future    Other orders  -     pramipexole (MIRAPEX) 0.125 MG tablet; Take 1 tablet by mouth 2 times daily  -     lidocaine (LIDODERM) 5 %; Place 1 patch onto the skin daily 12 hours on, 12 hours off.      Follow-up Disposition:  In 4 months or sooner prn, plan on MWV then      Patient understands plan of care. Patient has provided input and agrees with goals.

## 2023-02-23 NOTE — PROGRESS NOTES
1. \"Have you been to the ER, urgent care clinic since your last visit? Hospitalized since your last visit? \" Yes When: [facility] ED 1/24/23 and 1/31/23    2. \"Have you seen or consulted any other health care providers outside of the [address] [place] [address] since your last visit? \" Yes When: Endocrinology Dr. Jossie Toney: 2/15/23. 3. For patients aged 39-70: Has the patient had a colonoscopy / FIT/ Cologuard? Yes - no Care Gap present      Chief Complaint   Patient presents with    Other     LROM in left arm. Restless leg.       Chronic Kidney Disease     ESRD - dialysis    Cholesterol Problem    Diabetes    Coronary Artery Disease    Atrial Fibrillation

## 2023-02-23 NOTE — TELEPHONE ENCOUNTER
[employer] is calling stating a prior authorization is needed for Lidocaine 5 % Patches. Please call [phone].

## 2023-02-23 NOTE — PATIENT INSTRUCTIONS
FINANCIAL RESOURCES    [facility] Financial Assistance  What they offer: The [employer] Program helps uninsured patients who do not qualify for government-sponsored health insurance and cannot afford to pay for their medical care. Insured patient may also qualify for assistance based on family income, family size, and medical needs. Phone Number: [phone]  How to apply for the [employer] Program:            #    Option 1: To apply for financial assistance, a patient (or their family or other provider) should fill out the Financial Assistance Application. Copies of the Financial Assistance Application and the FAP may be obtained for free by calling the [facility] customer service department at [phone]. #    Option 2:  The Financial Assistance Application and policy may be obtained for free by downloading a copy from the GeoPage:                     #   http://Finderly.Compliance 11/. com/patient-resources/financial-assistance                     #   Applications are available in several languages on the website    The Chuguobang. Rocket Fuel. org  What they offer: The Rosas Program can assist you if youre uninsured and have been diagnosed with chronic, debilitating, or life-threatening disease. Phone Number: [phone]  Website: www. Adaptive Biote[employer]ologies    UTILITIES    CommonHelp  What they offer: Partnership with the [place] [address]. Assist with   finding and applying for government funded programs and benefits. You can also update  your benefits or report changes through [employer].   Website: Rollbase (acquired by Progress Software).Triangulate  Phone Number: 005-5UMSYAL ([phone])      Kermit 99 they offer: Krista Fernandez is [facility] energy assistance program of last resort for                 anyone who faces financial hardships from unemployment or family crisis. o Phone Number: [phone]        o Address: Noel Shukla, [address] [facility]        o Website: Compassoft/[place]/billing/billing-options/energyshare     Energy Assistance Programs (EAP) - Department of           o What they offer: EAP assists low income households in meeting their immediate home energy needs. o Phone Number: [phone] or contact your local department of         o Website: https://Hopster TVner.ActiveO/     Good Rx   o What they offer: Good Rx tracks prescription drug prices and provides free drug coupons for discounts on medications. o Website: EveryScape/    NeedyMeds   o What they offer: NeedyMeds offers free information on medications and healthcare cost savings programs including prescription assistance programs, coupons, and discount programs. o Website: PaymentBack.Macoscope org/   o Helpline: 449.111.7664     RX Assist   o What they offer: Information about free and low-cost medicine programs. o Website: https://Sequel Pharmaceuticals/    [employer] $4 Prescription Program   o What they offer: Prescription Program includes up to a 30-day supply for $4 and a 90-day supply for $10 of some covered generic drugs at commonly prescribed dosages   o Website: Sindy      The Co-Pay Relief Program  What they offer: The Co-Pay Relief Program provides you with direct prescription co-payment assistance, if you are an insured [place] citizen and financially and medically qualify, including Medicare Part D beneficiaries who require assistance with their prescription drug co-payments. Phone toll-free: [phone]  Website: www.National Medical Solutions. 48domain    The Partnership for Prescription Assistance   What they offer:   The Partnership for Prescription Assistance can help you if you lack Prescription coverage to get the medicines you need through the public or private program that is right for you. Phone toll-free: 4-967.254.6216  Website:  www. pparx. [address]  o    What they offer: The [facility] gives you and your family access to a free Prescription Drug Card program and savings of up to 75% at more than 50,000 national and The [employer]. Phone toll-free: 6-989.273.7328  Website: www.FirstCry.com    additional resources? Call 211 or Find Help: https://www. EventBrowsr.comp.org/ TRANSPORTATION RESOURCES    Senior Services of [address] [place] [address]  What they offer: Positionly of [address] [facility] offers fixed routes, medical transportation, and on-demand response transit. Accepts donations  Fare: $1.00 each way  Phone Number: [phone]  Website: http://Epigenomics AG. org    Principal Financial  What they offer: In compliance with the [address] [facility], [employer] [facility] provides a shared ride, advanced reservation, origin to destination service for disabled individuals who are unable to use regular fixed route public transportation services because of their disabilities. Phone Number: [phone]  Apply online: http://Medabil/. com/TransitAgencyChoice. aspx or http://stevie.oncgnostics GmbH/. com    Medicare Advantage Plans  Some plans may provide transportation. Members should contact the Member Services or Transportation number on the back of their insurance card for more information or to schedule transportation. Medicaid Plans - [address] [facility]) Plus     Each health plan has options for transportation services. Contact your insurance provider to schedule transportation. Transportation or Member Services contact information is listed on the back of the insurance card.      Jonathan Ramirez46 [facility]   Phone: 3-116.464.4072   Website:  Real Food Real Kitchens    o [facility]   Phone: 9-719.826.5745   Website: Makaylagl     o [facility] Complete Care   Phone: 1-141.401.8713   Website: https://www. Jenn Rivera. [facility] [address]  Phone: 6-573.915.3110 or 8-443.940.6127   Website: Ra    o [employer]   Phone: 2-193.362.3938   Website: http://www.GOWEX/     o [employer]   Phone: 7-186.179.2072   Website: Ignyta    Need additional resources? Call 211 or visit Find Help:  https://www. findhelp.org/

## 2023-02-25 PROBLEM — G25.81 RESTLESS LEG: Status: ACTIVE | Noted: 2023-02-25

## 2023-03-01 DIAGNOSIS — R29.898 ARM WEAKNESS: Primary | ICD-10-CM

## 2023-03-01 DIAGNOSIS — M48.02 CERVICAL STENOSIS OF SPINAL CANAL: ICD-10-CM

## 2023-03-02 ENCOUNTER — TELEPHONE (OUTPATIENT)
Age: 53
End: 2023-03-02

## 2023-03-02 NOTE — TELEPHONE ENCOUNTER
Pt called stating he has some erectile dysfunction and he is not sure if it is associated with him being on dialysis or not. Pt states he used to take Cialis some time ago but the doctor that prescribed it for him moved away. Pt also called urology and they told him to follow up with his pcp first. Please advise.

## 2023-03-05 DIAGNOSIS — N52.9 ERECTILE DYSFUNCTION, UNSPECIFIED ERECTILE DYSFUNCTION TYPE: Primary | ICD-10-CM

## 2023-03-06 NOTE — TELEPHONE ENCOUNTER
Patient identified with 2 identifiers (name and ). Patient aware of Dr. Jade Kerns recommendations: Referral to urology placed for this concern   Also recommend to call his cardiologist if he cleared to take any ED pills cialis/viagra   Patient verbalizes understanding.    Information for [facility] [place] has been given to patient

## 2023-03-21 ENCOUNTER — HOSPITAL ENCOUNTER (OUTPATIENT)
Facility: HOSPITAL | Age: 53
Discharge: HOME | End: 2023-03-24
Payer: MEDICARE

## 2023-03-21 DIAGNOSIS — R29.898 ARM WEAKNESS: ICD-10-CM

## 2023-03-21 PROCEDURE — 72141 MRI NECK SPINE W/O DYE: CPT

## 2023-03-23 DIAGNOSIS — M48.02 CERVICAL STENOSIS OF SPINE: Primary | ICD-10-CM

## 2023-03-23 DIAGNOSIS — R93.7 BONE MARROW EDEMA: ICD-10-CM

## 2023-03-23 NOTE — PROGRESS NOTES
Also add neurosurgery consult dr. Joe Logan for evaluation of this cord compression and bone swelling

## 2023-03-31 RX ORDER — APIXABAN 2.5 MG/1
TABLET, FILM COATED ORAL
Qty: 180 TABLET | Refills: 3 | Status: SHIPPED | OUTPATIENT
Start: 2023-03-31

## 2023-04-05 ENCOUNTER — OFFICE VISIT (OUTPATIENT)
Age: 53
End: 2023-04-05
Payer: MEDICARE

## 2023-04-05 VITALS
HEART RATE: 64 BPM | BODY MASS INDEX: 28.49 KG/M2 | SYSTOLIC BLOOD PRESSURE: 102 MMHG | WEIGHT: 199 LBS | DIASTOLIC BLOOD PRESSURE: 70 MMHG | HEIGHT: 70 IN | OXYGEN SATURATION: 98 %

## 2023-04-05 DIAGNOSIS — Z01.811 PRE-OP CHEST EXAM: ICD-10-CM

## 2023-04-05 DIAGNOSIS — I48.0 PAROXYSMAL ATRIAL FIBRILLATION (HCC): ICD-10-CM

## 2023-04-05 DIAGNOSIS — I25.10 ATHEROSCLEROSIS OF NATIVE CORONARY ARTERY WITHOUT ANGINA PECTORIS, UNSPECIFIED WHETHER NATIVE OR TRANSPLANTED HEART: Primary | ICD-10-CM

## 2023-04-05 DIAGNOSIS — I42.9 CARDIOMYOPATHY, UNSPECIFIED TYPE (HCC): ICD-10-CM

## 2023-04-05 DIAGNOSIS — I11.9 HYPERTENSIVE HEART DISEASE WITHOUT HEART FAILURE: ICD-10-CM

## 2023-04-05 DIAGNOSIS — I50.32 CHRONIC DIASTOLIC (CONGESTIVE) HEART FAILURE (HCC): ICD-10-CM

## 2023-04-05 PROCEDURE — 99214 OFFICE O/P EST MOD 30 MIN: CPT | Performed by: INTERNAL MEDICINE

## 2023-04-05 PROCEDURE — 3074F SYST BP LT 130 MM HG: CPT | Performed by: INTERNAL MEDICINE

## 2023-04-05 PROCEDURE — 93000 ELECTROCARDIOGRAM COMPLETE: CPT | Performed by: INTERNAL MEDICINE

## 2023-04-05 PROCEDURE — 3078F DIAST BP <80 MM HG: CPT | Performed by: INTERNAL MEDICINE

## 2023-04-05 RX ORDER — AMLODIPINE BESYLATE 5 MG/1
5 TABLET ORAL DAILY
COMMUNITY
Start: 2023-02-19

## 2023-04-05 ASSESSMENT — PATIENT HEALTH QUESTIONNAIRE - PHQ9
2. FEELING DOWN, DEPRESSED OR HOPELESS: 0
SUM OF ALL RESPONSES TO PHQ QUESTIONS 1-9: 0
SUM OF ALL RESPONSES TO PHQ QUESTIONS 1-9: 0
SUM OF ALL RESPONSES TO PHQ9 QUESTIONS 1 & 2: 0
1. LITTLE INTEREST OR PLEASURE IN DOING THINGS: 0
SUM OF ALL RESPONSES TO PHQ QUESTIONS 1-9: 0
SUM OF ALL RESPONSES TO PHQ QUESTIONS 1-9: 0

## 2023-04-05 NOTE — PROGRESS NOTES
Sri Gaona presents today for   Chief Complaint   Patient presents with    Surgical Clearance     Clearance for kidney transplant with no set date but at [facility] unsure of who will be performing surgery. Sri Gaona preferred language for health care discussion is english/other. Is someone accompanying this pt? NO    Is the patient using any DME equipment during OV? NO    Depression Screening:  Depression: Not at risk    PHQ-2 Score: 0        Learning Assessment:  Who is the primary learner? Patient    What is the preferred language for health care of the primary learner? ENGLISH    How does the primary learner prefer to learn new concepts? DEMONSTRATION    Answered By patient    Relationship to Learner SELF           Pt currently taking Anticoagulant therapy? ELIQUIS 2.5 MG EVERY 12 HOURS     Pt currently taking Antiplatelet therapy ? ASA 81 MG 1X DAILY AND PLAVIX 75 MG 1X DAILY       Coordination of Care:  1. Have you been to the ER, urgent care clinic since your last visit? Hospitalized since your last visit? YES    2. Have you seen or consulted any other health care providers outside of the [address] [place] [address] since your last visit? Include any pap smears or colon screening.  NO
cath 10/16/2012    LM patent. pLAD 95% (3.5 x 12-mm [place] stent, resid 0$%). LCX mild. Past Surgical History:   Procedure Laterality Date    CORONARY ANGIOPLASTY WITH STENT PLACEMENT      one    LAPAROTOMY  infancy    whole in colon and had repair at that time. LAPAROTOMY  2/2012    bowel obstruction with removal segment of small bowel. Done by Erick. OTHER SURGICAL HISTORY  06/20/2013    left eye retna attatchment    REPAIR ING HERNIA,5+Y/O,REDUCIBL Left 05/02/2019    Dr. Early Minus      august 2012       Current Outpatient Medications   Medication Sig Dispense Refill    amLODIPine (NORVASC) 5 MG tablet Take 1 tablet by mouth daily      ELIQUIS 2.5 MG TABS tablet TAKE 1 TABLET BY MOUTH EVERY 12 HOURS 180 tablet 3    acetaminophen (TYLENOL) 500 MG tablet Take 1 tablet by mouth every 6 hours as needed for Pain      pramipexole (MIRAPEX) 0.125 MG tablet Take 1 tablet by mouth 2 times daily 180 tablet 0    Iron Sucrose (VENOFER IV) 50 mg as needed      Alum & Mag Hydroxide-Simeth (MAALOX ADVANCED PO) Take by mouth as needed      ASPIRIN LOW DOSE 81 MG EC tablet TAKE 1 TABLET BY MOUTH DAILY.       VELPHORO 500 MG CHEW chewable tablet CHEW AND SWALLOW 2 TABLETS THREE TIMES DAILY WITH MEALS      amiodarone (CORDARONE) 200 MG tablet Take 1 tablet by mouth daily      atorvastatin (LIPITOR) 80 MG tablet Take 1 tablet by mouth nightly      clopidogrel (PLAVIX) 75 MG tablet Take 1 tablet by mouth daily      glipiZIDE (GLUCOTROL XL) 2.5 MG extended release tablet Take 1 tablet by mouth daily      linagliptin (TRADJENTA) 5 MG tablet Take 1 tablet by mouth daily      lisinopril (PRINIVIL;ZESTRIL) 20 MG tablet Take 1 tablet by mouth daily      metoprolol tartrate (LOPRESSOR) 50 MG tablet Take 1 tablet by mouth 2 times daily      nitroGLYCERIN (NITROLINGUAL) 0.4 MG/SPRAY 0.4 mg spray Place 1 spray under the tongue every 5 minutes as needed      cinacalcet (SENSIPAR) 30 MG tablet Take 30

## 2023-04-24 ENCOUNTER — OFFICE VISIT (OUTPATIENT)
Age: 53
End: 2023-04-24
Payer: MEDICARE

## 2023-04-24 VITALS
HEART RATE: 60 BPM | WEIGHT: 200 LBS | BODY MASS INDEX: 28.63 KG/M2 | TEMPERATURE: 97.9 F | HEIGHT: 70 IN | OXYGEN SATURATION: 96 %

## 2023-04-24 DIAGNOSIS — M48.02 CERVICAL SPINAL STENOSIS: Primary | ICD-10-CM

## 2023-04-24 DIAGNOSIS — M47.812 CERVICAL SPONDYLOSIS: ICD-10-CM

## 2023-04-24 PROCEDURE — 99204 OFFICE O/P NEW MOD 45 MIN: CPT | Performed by: PHYSICAL MEDICINE & REHABILITATION

## 2023-04-24 RX ORDER — LIDOCAINE 50 MG/G
PATCH TOPICAL
COMMUNITY
Start: 2023-01-31 | End: 2023-04-24 | Stop reason: ALTCHOICE

## 2023-04-24 RX ORDER — LIDOCAINE 50 MG/G
1 PATCH TOPICAL EVERY 24 HOURS
Qty: 30 PATCH | Refills: 3 | Status: SHIPPED | OUTPATIENT
Start: 2023-04-24 | End: 2023-05-24

## 2023-04-24 RX ORDER — ASPIRIN 81 MG/1
1 TABLET ORAL
COMMUNITY
Start: 2018-12-15 | End: 2023-04-24

## 2023-04-24 RX ORDER — CARVEDILOL 25 MG/1
25 TABLET ORAL DAILY
COMMUNITY
Start: 2022-03-02 | End: 2023-04-24

## 2023-04-24 RX ORDER — AMLODIPINE BESYLATE 5 MG/1
5 TABLET ORAL DAILY
COMMUNITY
Start: 2022-03-08 | End: 2023-04-24

## 2023-04-24 RX ORDER — LISINOPRIL 20 MG/1
1 TABLET ORAL
COMMUNITY
Start: 2022-02-10 | End: 2023-04-24

## 2023-04-24 RX ORDER — NITROGLYCERIN 400 UG/1
1 SPRAY ORAL
COMMUNITY
Start: 2021-06-22 | End: 2023-04-24

## 2023-04-24 RX ORDER — CLOPIDOGREL BISULFATE 75 MG/1
TABLET ORAL
COMMUNITY
Start: 2022-12-29 | End: 2023-04-24 | Stop reason: SDUPTHER

## 2023-04-24 RX ORDER — AMIODARONE HYDROCHLORIDE 200 MG/1
2 TABLET ORAL
COMMUNITY
Start: 2022-04-04 | End: 2023-04-24

## 2023-04-24 RX ORDER — SEVELAMER CARBONATE 800 MG/1
2 TABLET, FILM COATED ORAL
COMMUNITY
Start: 2022-12-13 | End: 2023-04-24

## 2023-04-24 RX ORDER — ATORVASTATIN CALCIUM 80 MG/1
1 TABLET, FILM COATED ORAL
COMMUNITY
Start: 2022-01-13 | End: 2023-04-24

## 2023-04-24 ASSESSMENT — PATIENT HEALTH QUESTIONNAIRE - PHQ9
SUM OF ALL RESPONSES TO PHQ QUESTIONS 1-9: 1
1. LITTLE INTEREST OR PLEASURE IN DOING THINGS: 0
2. FEELING DOWN, DEPRESSED OR HOPELESS: 1
SUM OF ALL RESPONSES TO PHQ9 QUESTIONS 1 & 2: 1

## 2023-04-24 NOTE — PROGRESS NOTES
Leann Gamez presents today for   Chief Complaint   Patient presents with    Back Pain    Shoulder Pain       Is someone accompanying this pt? no    Is the patient using any DME equipment during OV? no    Depression Screening:  No flowsheet data found. Learning Assessment:  No flowsheet data found. Abuse Screening:  No flowsheet data found. Fall Risk  No flowsheet data found. OPIOID RISK TOOL  No flowsheet data found. Coordination of Care:  1. Have you been to the ER, urgent care clinic since your last visit? no  Hospitalized since your last visit? no    2. Have you seen or consulted any other health care providers outside of the [address] [place] [address] since your last visit? no Include any pap smears or colon screening.  no
(90.7 kg)   BMI 28.70 kg/m²    General: ???????  male Body mass index is 28.7 kg/m². without any acute distress   Psychiatric: ?  Alert and oriented x 3 with normal mood    HEENT: ???????? Atraumatic   Respiratory:   Breathing non-labored and non dyspneic   CV: ???????????????? Peripheral pulses intact, no peripheral edema   Skin: ????????????? No rashes       Neurologic: ?? Sensation: normal and grossly intact thebilateral, upper extremity(s), lower extremity(s)   Strength: 5/5 in the bilateral, upper extremity(s), lower extremity(s) except slight weakness in left arm triceps extension may be due to dialysis fisutal    Reflexes: reveals 2+ symmetric DTRs throughout UE, patella  Gait: normal and tandem gait   Upper tract signs: Babinski down going, Mccullough's+    Musculoskeletal: Cervical Exam   Alignment: Normal  Atrophy: None     Tenderness to Palpation:   Cervical paraspinals: Positive  Cervical spinous processes: Negative  Upper thoracic paraspinals: Negative  Upper thoracic spinous processes: Negative  Upper trapezius:  Positive    Cervical ROM: Abnormal restricted extension, lateral bend 10 degrees, rotation 20 degrees  Shoulder ROM: No reproduction of pain with movement     Special Tests    Spurlings Maneuver: Positive neck pain -non radiating     Hoffmans: Positive  Hawkin's Test: Negative  Neer's Test: Negative  Empty Can Test: Negative           Assessment:   - cervical spondylosis C3/4, C4/5  - cervical spinal stenosis moderate to severe C3/4, C4/5    Plan:      -Physical therapy -  referral to PT for range of motion  -Medications - lidocaine patch   -consider gabapentin 100mg- but will need to check with nephrologist first  -Diagnostics/Imaging - reviewed MRI cervical spine   -Injections - NA   -Lifestyle - Discussed activities to avoid   -Education - The patient's diagnosis, prognosis and treatment options were discussed today.  All questions were answered.    -Coordination of Care- had been

## 2023-04-27 PROBLEM — E44.0 MODERATE PROTEIN-CALORIE MALNUTRITION (HCC): Status: ACTIVE | Noted: 2023-02-12

## 2023-04-27 PROBLEM — E87.70 FLUID OVERLOAD, UNSPECIFIED: Status: ACTIVE | Noted: 2023-02-12

## 2023-04-27 PROBLEM — E83.30 DISORDER OF PHOSPHORUS METABOLISM, UNSPECIFIED: Status: ACTIVE | Noted: 2023-02-12

## 2023-05-05 ENCOUNTER — TELEPHONE (OUTPATIENT)
Age: 53
End: 2023-05-05

## 2023-05-08 DIAGNOSIS — M48.02 CERVICAL SPINAL STENOSIS: Primary | ICD-10-CM

## 2023-05-08 RX ORDER — GABAPENTIN 100 MG/1
100 CAPSULE ORAL NIGHTLY
Qty: 30 CAPSULE | Refills: 0 | Status: SHIPPED | OUTPATIENT
Start: 2023-05-08 | End: 2023-06-07

## 2023-05-08 NOTE — TELEPHONE ENCOUNTER
I called and spoke to the pt. The pt was identified using 2 pt identifiers. He was notified of the refill of neurontin that was sent to his pharmacy. The pt verbalized understanding and states that he picked up the medication over the weekend.

## 2023-05-10 ENCOUNTER — OFFICE VISIT (OUTPATIENT)
Age: 53
End: 2023-05-10
Payer: MEDICARE

## 2023-05-10 ENCOUNTER — PREP FOR PROCEDURE (OUTPATIENT)
Age: 53
End: 2023-05-10

## 2023-05-10 VITALS
DIASTOLIC BLOOD PRESSURE: 88 MMHG | OXYGEN SATURATION: 100 % | HEIGHT: 71 IN | HEART RATE: 66 BPM | WEIGHT: 203 LBS | SYSTOLIC BLOOD PRESSURE: 138 MMHG | BODY MASS INDEX: 28.42 KG/M2

## 2023-05-10 DIAGNOSIS — I11.9 HYPERTENSIVE HEART DISEASE WITHOUT HEART FAILURE: ICD-10-CM

## 2023-05-10 DIAGNOSIS — I25.10 ATHEROSCLEROSIS OF NATIVE CORONARY ARTERY WITHOUT ANGINA PECTORIS, UNSPECIFIED WHETHER NATIVE OR TRANSPLANTED HEART: Primary | ICD-10-CM

## 2023-05-10 DIAGNOSIS — I48.0 PAROXYSMAL ATRIAL FIBRILLATION (HCC): ICD-10-CM

## 2023-05-10 DIAGNOSIS — I50.32 CHRONIC DIASTOLIC (CONGESTIVE) HEART FAILURE (HCC): ICD-10-CM

## 2023-05-10 DIAGNOSIS — I42.9 CARDIOMYOPATHY, UNSPECIFIED TYPE (HCC): ICD-10-CM

## 2023-05-10 PROCEDURE — 99214 OFFICE O/P EST MOD 30 MIN: CPT | Performed by: INTERNAL MEDICINE

## 2023-05-10 PROCEDURE — 93000 ELECTROCARDIOGRAM COMPLETE: CPT | Performed by: INTERNAL MEDICINE

## 2023-05-10 PROCEDURE — 3079F DIAST BP 80-89 MM HG: CPT | Performed by: INTERNAL MEDICINE

## 2023-05-10 PROCEDURE — 3075F SYST BP GE 130 - 139MM HG: CPT | Performed by: INTERNAL MEDICINE

## 2023-05-10 ASSESSMENT — PATIENT HEALTH QUESTIONNAIRE - PHQ9
1. LITTLE INTEREST OR PLEASURE IN DOING THINGS: 0
SUM OF ALL RESPONSES TO PHQ QUESTIONS 1-9: 0
2. FEELING DOWN, DEPRESSED OR HOPELESS: 0
SUM OF ALL RESPONSES TO PHQ9 QUESTIONS 1 & 2: 0
SUM OF ALL RESPONSES TO PHQ QUESTIONS 1-9: 0

## 2023-05-10 NOTE — PROGRESS NOTES
Perry Iron presents today for   Chief Complaint   Patient presents with    Follow-up     H&P update        Perry Iron preferred language for health care discussion is english/other. Is someone accompanying this pt? no    Is the patient using any DME equipment during OV? no    Depression Screening:  Depression: Not at risk    PHQ-2 Score: 0        Learning Assessment:  Who is the primary learner? Patient    What is the preferred language for health care of the primary learner? ENGLISH    How does the primary learner prefer to learn new concepts? DEMONSTRATION    Answered By patient    Relationship to Learner SELF           Pt currently taking Anticoagulant therapy? Eliquis 2.5 mg every 12 hours     Pt currently taking Antiplatelet therapy ? Plavix 75 mg 1x daily       Coordination of Care:  1. Have you been to the ER, urgent care clinic since your last visit? Hospitalized since your last visit? no    2. Have you seen or consulted any other health care providers outside of the [address] [place] [address] since your last visit? Include any pap smears or colon screening.  no

## 2023-05-10 NOTE — PROGRESS NOTES
HISTORY OF PRESENT ILLNESS  Reginaldo Whalen  50 y.o. male     Chief Complaint   Patient presents with    Follow-up     H&P update        ASSESSMENT and PLAN    The primary encounter diagnosis was Atherosclerosis of native coronary artery without angina pectoris, unspecified whether native or transplanted heart. Diagnoses of Paroxysmal atrial fibrillation (HCC), Chronic diastolic (congestive) heart failure ([address] Cardiomyopathy, unspecified type ([address] and Hypertensive heart disease without heart failure were also pertinent to this visit. Mr. Reginaldo Whalen has history of diabetes mellitus, diabetic retinopathy legally blind, coronary artery disease without chest pains, ischemic cardiomyopathy. He presented with significant fatigue, increased shortness of breath. He had LAD stent in October of 2012 by Dr. Corinne Crete. He has never had chest pains. He has history of mildly diminished LV function with ejection fraction of 40-50%. He is on hemodialysis 3 times per week. His new baseline weight in 2019 is 199 pounds. Kidney transplant evaluation was performed in 2019. He underwent coronary angiography on 8/21/2019 without significant disease in his RCA or circumflex. He had 35% stenosis in his LAD with IFR which was negative at 0.93-0.95. No further coronary intervention was needed. In December 2021, he underwent coronary angiography per the request of renal transplant team, which revealed diffuse mild disease in his left dominant system with the exception of the first diagonal branch with ostial 95% stenosis. Continued medical therapy is recommended. His echocardiogram on 12/17/2021 revealed normal EF 50-55%. In the winter 2021/2022, he was diagnosed with PAF. He had tolerated this reasonably well until around March 2022, when he began to experience rapid PAF whenever he went on dialysis. He has been sent to the emergency room 4 times since February 2022 until May 2022.   Whenever he goes on hemodialysis and no

## 2023-05-10 NOTE — PATIENT INSTRUCTIONS
Catheterization Instructions       You are scheduled to have a Left Heart Catheterization  on June 8, 2023  at 0915 am.    Please check in at [facility] 51 am .      [facility] and park in the outpatient parking lot that is located around to the back of the hospital and enter through the [facility] building. Once you enter through the [facility] check in with the  there. The  will either give you directions or assist you in getting to the cath holding area. [facility] [address], [address])     You are not to eat or drink anything after midnight the night before your procedure. Small sips of water to take your medications is ok. If you are diabetic, do not take your insulin/sugar pill the morning of the procedure. MEDICATION INSTRUCTIONS:   Please take your morning medications with the following special instructions:           [x]          Please make sure to take your Blood pressure medication. Hold Glucotrol the morning of your procedure. [x]          Hold (DO NOT TAKE) your Eliquis starting  June 7, 2023. We encourage families to wait in the waiting room on the first floor while the procedure is being done. The Doctor will come out and talk with you as soon as the procedure is over. You will need someone to drive you home after you have been discharged from the hospital.     There is the possibility that you may spend the night in the hospital, depending on the results of the procedure. This will be determined after the procedure is done. If angioplasty or stent is planned, you will stay at least one day. If you or your family have any questions, please call our office Monday -Friday, 8:30 a.m.-4:30 p.m.,  at [phone], and ask to speak to one of the nurses.

## 2023-05-11 ENCOUNTER — HOSPITAL ENCOUNTER (OUTPATIENT)
Facility: HOSPITAL | Age: 53
Setting detail: RECURRING SERIES
Discharge: HOME | End: 2023-05-14
Payer: MEDICARE

## 2023-05-11 PROCEDURE — 97530 THERAPEUTIC ACTIVITIES: CPT

## 2023-05-11 PROCEDURE — 97110 THERAPEUTIC EXERCISES: CPT

## 2023-05-11 PROCEDURE — 97162 PT EVAL MOD COMPLEX 30 MIN: CPT

## 2023-05-12 RX ORDER — SODIUM CHLORIDE 0.9 % (FLUSH) 0.9 %
5-40 SYRINGE (ML) INJECTION EVERY 12 HOURS SCHEDULED
Status: CANCELLED | OUTPATIENT
Start: 2023-05-12

## 2023-05-12 RX ORDER — SODIUM CHLORIDE 0.9 % (FLUSH) 0.9 %
5-40 SYRINGE (ML) INJECTION PRN
Status: CANCELLED | OUTPATIENT
Start: 2023-05-12

## 2023-05-12 RX ORDER — SODIUM CHLORIDE 9 MG/ML
INJECTION, SOLUTION INTRAVENOUS PRN
Status: CANCELLED | OUTPATIENT
Start: 2023-05-12

## 2023-05-14 ENCOUNTER — HOSPITAL ENCOUNTER (EMERGENCY)
Facility: HOSPITAL | Age: 53
Discharge: HOME | End: 2023-05-14
Attending: EMERGENCY MEDICINE
Payer: MEDICARE

## 2023-05-14 VITALS
HEIGHT: 70 IN | OXYGEN SATURATION: 100 % | TEMPERATURE: 97.1 F | RESPIRATION RATE: 18 BRPM | HEART RATE: 72 BPM | SYSTOLIC BLOOD PRESSURE: 158 MMHG | WEIGHT: 203 LBS | DIASTOLIC BLOOD PRESSURE: 92 MMHG | BODY MASS INDEX: 29.06 KG/M2

## 2023-05-14 DIAGNOSIS — S46.819A STRAIN OF TRAPEZIUS MUSCLE, UNSPECIFIED LATERALITY, INITIAL ENCOUNTER: Primary | ICD-10-CM

## 2023-05-14 PROCEDURE — 99283 EMERGENCY DEPT VISIT LOW MDM: CPT

## 2023-05-14 PROCEDURE — 6370000000 HC RX 637 (ALT 250 FOR IP): Performed by: EMERGENCY MEDICINE

## 2023-05-14 RX ORDER — DIAZEPAM 5 MG/1
5 TABLET ORAL EVERY 6 HOURS PRN
Status: DISCONTINUED | OUTPATIENT
Start: 2023-05-14 | End: 2023-05-14 | Stop reason: HOSPADM

## 2023-05-14 RX ADMIN — DIAZEPAM 5 MG: 5 TABLET ORAL at 05:36

## 2023-05-14 RX ADMIN — DICLOFENAC SODIUM 4 G: 10 GEL TOPICAL at 05:36

## 2023-05-14 ASSESSMENT — LIFESTYLE VARIABLES
HOW MANY STANDARD DRINKS CONTAINING ALCOHOL DO YOU HAVE ON A TYPICAL DAY: PATIENT DOES NOT DRINK
HOW OFTEN DO YOU HAVE A DRINK CONTAINING ALCOHOL: NEVER

## 2023-05-16 ENCOUNTER — HOSPITAL ENCOUNTER (OUTPATIENT)
Facility: HOSPITAL | Age: 53
Setting detail: RECURRING SERIES
Discharge: HOME | End: 2023-05-19
Payer: MEDICARE

## 2023-05-16 PROCEDURE — 97112 NEUROMUSCULAR REEDUCATION: CPT

## 2023-05-16 PROCEDURE — 97110 THERAPEUTIC EXERCISES: CPT

## 2023-05-16 PROCEDURE — 97530 THERAPEUTIC ACTIVITIES: CPT

## 2023-05-16 RX ORDER — PRAMIPEXOLE DIHYDROCHLORIDE 0.12 MG/1
0.12 TABLET ORAL 2 TIMES DAILY
Qty: 180 TABLET | Refills: 1 | Status: SHIPPED | OUTPATIENT
Start: 2023-05-16

## 2023-05-16 NOTE — PROGRESS NOTES
Details if applicable:               36  [facility] Totals Reminder: bill using total billable min of TIMED therapeutic procedures (example: do not include dry needle or estim unattended, both untimed codes, in totals to left)  8-22 min = 1 unit; 23-37 min = 2 units; 38-52 min = 3 units; 53-67 min = 4 units; 68-82 min = 5 units   Total Total     TOTAL TREATMENT TIME:        40     [x]  Patient Education billed concurrently with other procedures   [x] Review HEP    [] Progressed/Changed HEP, detail:    [] Other detail:       Objective Information/Functional Measures/Assessment  Pt enters gym with neck pillow around neck, pt lethargic,        Pretest/Post  Adductor Drop Test: right:  NT   left: NT  Lower trunk rotation (inches): Right:17.5/16.5 left: 17/16.5  HGIR: right: 65/90 Left: 80/90  Horizontal abduction: right: left:     Assessment: Patient tolerated therapy session well as there were no adverse reactions today. Implemented plan of care per chart. Pt had a severly difficult time with keeping QP position with keeping PPT and SA and required constant tactile cuing. Pt with a tendency to leg head fall into flexion and required cuing to keep head upright. Pt did well with engaging hamstrings in  the 90-90 position. Pt is progressing with therapy as indicated by pt tolerating increase in exercise repetitions and resistance. Although showing progress patient would benefit from continuation of skilled physical therapy to address the remaining limitations.       Patient will continue to benefit from skilled PT / OT services to modify and progress therapeutic interventions, analyze and address functional mobility deficits, analyze and address ROM deficits, analyze and address strength deficits, analyze and address soft tissue restrictions, analyze and cue for proper movement patterns, analyze and modify for postural abnormalities, and instruct in home and community integration to address functional deficits and attain

## 2023-05-18 ENCOUNTER — HOSPITAL ENCOUNTER (OUTPATIENT)
Facility: HOSPITAL | Age: 53
Setting detail: RECURRING SERIES
Discharge: HOME | End: 2023-05-21
Payer: MEDICARE

## 2023-05-18 PROCEDURE — 97112 NEUROMUSCULAR REEDUCATION: CPT

## 2023-05-18 PROCEDURE — 97140 MANUAL THERAPY 1/> REGIONS: CPT

## 2023-05-18 PROCEDURE — 97110 THERAPEUTIC EXERCISES: CPT

## 2023-05-22 ENCOUNTER — TELEPHONE (OUTPATIENT)
Age: 53
End: 2023-05-22

## 2023-05-22 NOTE — TELEPHONE ENCOUNTER
Pt called requesting a follow up from the emergency room. Pt was seen at [facility] on 5/14/2023 for neck and upper back pain and was treated with Voltaren gel and Valium. Pt was discharged home but was [facility] on 5/16/2023 for the same issue because he was still in pain and the medication given was not working. Pt was given two injections while in the ED for his pain. Pt states he is still in some pain and would like to be seen on a Tuesday or Thursday after 11. Please advise.

## 2023-05-23 ENCOUNTER — TELEPHONE (OUTPATIENT)
Age: 53
End: 2023-05-23

## 2023-05-23 NOTE — TELEPHONE ENCOUNTER
Patient called stating he took prescribed medication twice and both times it bothered him, stated he as been in the ER 3 times since taking the medication and the ER advised him to request a new medication.  He is still in a lot of pain, not able to walk.     5/21 [facility]   5/16 [facility]  5/14 [facility]     (Having a hard time understanding patient, expressed this with no change)    Please advise patient: [phone]

## 2023-05-23 NOTE — TELEPHONE ENCOUNTER
I spoke to patient who continues to have pain, he was referred to Spine surgery 3/2023 and then again when I saw him but has not heard yet    Per notes in referral  Records faxed to [facility] to schedule, [phone], fax [phone]

## 2023-05-23 NOTE — TELEPHONE ENCOUNTER
I called and spoke to the pt. The pt was identified using 2 pt identifiers. He confirmed that the medication that bothered him was the gabapentin. I asked him what type of symptoms he had when he took the medication. He states that it made him sleepy at first, then when he woke up the pain was way more intense. This happened every time he took the medication. He describes it as an intense burning pain. The pt is also in dialysis. He has not taken the medication any more and is still hurting. He wants to know if there are any other suggestions for pain control. Please advise.

## 2023-05-23 NOTE — TELEPHONE ENCOUNTER
Patient called back. Patient has not tried reaching out to Dr. Anaid Salter office to get sooner appointment and he is not taking Gabapentin due to side effects. Reviewed chart with Dr. Keturah Gomes and she advised he needs to reach out to specialist to get sooner appointment and let them know that he is not taking the gabapentin. Patient verbalized understanding and was provided with Dr. Anaid Salter office number.

## 2023-05-25 NOTE — TELEPHONE ENCOUNTER
Records re-faxed to [address] [place] [address],[address]. I reached unidentified voice mail and left message stating referral has been faxed twice by us. He should attempt to self-schedule tunde/Andrea next week by calling [phone]. I left my direct phone number in case he encounters further issues.

## 2023-05-26 ENCOUNTER — TELEPHONE (OUTPATIENT)
Age: 53
End: 2023-05-26

## 2023-05-26 NOTE — TELEPHONE ENCOUNTER
Patient does need to be seen by [address] [place] [address] for Cervical surgery evaluation. Patient has declined the office appointments. Patient is being called to clarify and re-tell the patient the importance of having an appointment.

## 2023-05-26 NOTE — TELEPHONE ENCOUNTER
[address] [facility],[address], called and need clarification if patient need to be seen by the office patient called and denied the first request and another referral was sent yesterday. If patient needs to be seen they will call and get patient scheduled.         [phone]

## 2023-05-30 ENCOUNTER — TELEPHONE (OUTPATIENT)
Age: 53
End: 2023-05-30

## 2023-05-30 ENCOUNTER — HOSPITAL ENCOUNTER (OUTPATIENT)
Facility: HOSPITAL | Age: 53
Setting detail: RECURRING SERIES
Discharge: HOME | End: 2023-06-02
Payer: MEDICARE

## 2023-05-30 PROCEDURE — 97530 THERAPEUTIC ACTIVITIES: CPT

## 2023-05-30 PROCEDURE — 97112 NEUROMUSCULAR REEDUCATION: CPT

## 2023-05-30 PROCEDURE — 97140 MANUAL THERAPY 1/> REGIONS: CPT

## 2023-05-30 NOTE — PROGRESS NOTES
PHYSICAL / OCCUPATIONAL THERAPY - DAILY TREATMENT NOTE (updated )    Patient Name: Chasidy Aragon    Date: 2023    : 1970  Insurance: Payor: [facility] MEDICARE / Plan: Jarred  / Product Type: *No Product type* /      Patient  verified Yes     Visit #   Current / Total 4 16   Time   In / Out 6:00  7:05   Pain   In / Out 3-4/10 3-4   Subjective Functional Status/Changes: Reports that he has been under muscle relaxers so hasn't done the exercises as much. Reports that he felt fine after last therapy session. Changes to:  Meds, Allergies, Med Hx, Sx Hx? If yes, update Summary List yes       TREATMENT AREA =  Neck pain [M54.2]    OBJECTIVE    Modalities Rationale:     decrease pain and increase tissue extensibility to improve patient's ability to progress to PLOF and address remaining functional goals. min [] Estim Unattended, type/location:                                      []  w/ice    []  w/heat    min [] Estim Attended, type/location:                                     []  w/US     []  w/ice    []  w/heat    []  TENS insruct      min []  Mechanical Traction: type/lbs                   []  pro   []  sup   []  int   []  cont    []  before manual    []  after manual    min []  Ultrasound, settings/location:      min []  Iontophoresis w/ dexamethasone, location:                                               []  take home patch       []  in clinic     10   min  unbilled []  Ice     [x]  Heat    location/position: MH to cervical spine    min []  Paraffin,  details:     min []  Vasopneumatic Device, press/temp:     min []  Nury Macleod / Brooks Oar:     If using vaso (only need to measure limb vaso being performed on)      pre-treatment girth :       post-treatment girth :       measured at (landmark location) :      min []  Other:    Skin assessment post-treatment (if applicable):    []  intact    []  redness- no adverse reaction                 []redness - adverse reaction:

## 2023-06-05 ENCOUNTER — HOSPITAL ENCOUNTER (OUTPATIENT)
Facility: HOSPITAL | Age: 53
Discharge: HOME | End: 2023-06-08
Payer: MEDICARE

## 2023-06-05 DIAGNOSIS — I25.10 ATHEROSCLEROSIS OF NATIVE CORONARY ARTERY WITHOUT ANGINA PECTORIS, UNSPECIFIED WHETHER NATIVE OR TRANSPLANTED HEART: ICD-10-CM

## 2023-06-05 DIAGNOSIS — I42.9 CARDIOMYOPATHY, UNSPECIFIED TYPE (HCC): ICD-10-CM

## 2023-06-05 DIAGNOSIS — Z01.811 PRE-OP CHEST EXAM: ICD-10-CM

## 2023-06-05 LAB
ALBUMIN SERPL-MCNC: 3.3 G/DL (ref 3.4–5)
ALBUMIN/GLOB SERPL: 0.9 (ref 0.8–1.7)
ALP SERPL-CCNC: 97 U/L (ref 45–117)
ALT SERPL-CCNC: 62 U/L (ref 16–61)
ANION GAP SERPL CALC-SCNC: 6 MMOL/L (ref 3–18)
AST SERPL-CCNC: 25 U/L (ref 10–38)
BILIRUB SERPL-MCNC: 0.6 MG/DL (ref 0.2–1)
BUN SERPL-MCNC: 36 MG/DL (ref 7–18)
BUN/CREAT SERPL: 5 (ref 12–20)
CALCIUM SERPL-MCNC: 9.3 MG/DL (ref 8.5–10.1)
CHLORIDE SERPL-SCNC: 98 MMOL/L (ref 100–111)
CO2 SERPL-SCNC: 32 MMOL/L (ref 21–32)
CREAT SERPL-MCNC: 7.09 MG/DL (ref 0.6–1.3)
ERYTHROCYTE [DISTWIDTH] IN BLOOD BY AUTOMATED COUNT: 14.6 % (ref 11.6–14.5)
GLOBULIN SER CALC-MCNC: 3.8 G/DL (ref 2–4)
GLUCOSE SERPL-MCNC: 219 MG/DL (ref 74–99)
HCT VFR BLD AUTO: 30.1 % (ref 36–48)
HGB BLD-MCNC: 9.8 G/DL (ref 13–16)
INR PPP: 1 (ref 0.8–1.2)
MCH RBC QN AUTO: 30.8 PG (ref 24–34)
MCHC RBC AUTO-ENTMCNC: 32.6 G/DL (ref 31–37)
MCV RBC AUTO: 94.7 FL (ref 78–100)
NRBC # BLD: 0 K/UL (ref 0–0.01)
NRBC BLD-RTO: 0 PER 100 WBC
PLATELET # BLD AUTO: 199 K/UL (ref 135–420)
PMV BLD AUTO: 11.1 FL (ref 9.2–11.8)
POTASSIUM SERPL-SCNC: 3.4 MMOL/L (ref 3.5–5.5)
PROT SERPL-MCNC: 7.1 G/DL (ref 6.4–8.2)
PROTHROMBIN TIME: 13.2 SEC (ref 11.5–15.2)
RBC # BLD AUTO: 3.18 M/UL (ref 4.35–5.65)
SODIUM SERPL-SCNC: 136 MMOL/L (ref 136–145)
WBC # BLD AUTO: 6.2 K/UL (ref 4.6–13.2)

## 2023-06-05 PROCEDURE — 36415 COLL VENOUS BLD VENIPUNCTURE: CPT

## 2023-06-05 PROCEDURE — 85027 COMPLETE CBC AUTOMATED: CPT

## 2023-06-05 PROCEDURE — 85610 PROTHROMBIN TIME: CPT

## 2023-06-05 PROCEDURE — 71046 X-RAY EXAM CHEST 2 VIEWS: CPT

## 2023-06-05 PROCEDURE — 80053 COMPREHEN METABOLIC PANEL: CPT

## 2023-06-06 ENCOUNTER — TELEPHONE (OUTPATIENT)
Facility: HOSPITAL | Age: 53
End: 2023-06-06

## 2023-06-06 NOTE — TELEPHONE ENCOUNTER
No show to appointment. Noted jeff has cardiology for left cath encounter in computer. LM and requested patient call us back to adjust schedule as necessary.

## 2023-06-08 ENCOUNTER — HOSPITAL ENCOUNTER (OUTPATIENT)
Facility: HOSPITAL | Age: 53
Setting detail: OUTPATIENT SURGERY
Discharge: HOME | End: 2023-06-08
Attending: INTERNAL MEDICINE | Admitting: INTERNAL MEDICINE
Payer: MEDICARE

## 2023-06-08 VITALS
HEIGHT: 70 IN | DIASTOLIC BLOOD PRESSURE: 85 MMHG | OXYGEN SATURATION: 98 % | SYSTOLIC BLOOD PRESSURE: 166 MMHG | WEIGHT: 200.62 LBS | RESPIRATION RATE: 15 BRPM | HEART RATE: 63 BPM | BODY MASS INDEX: 28.72 KG/M2

## 2023-06-08 DIAGNOSIS — N18.6 ESRD (END STAGE RENAL DISEASE) (HCC): ICD-10-CM

## 2023-06-08 DIAGNOSIS — I25.10 ATHEROSCLEROSIS OF NATIVE CORONARY ARTERY WITHOUT ANGINA PECTORIS, UNSPECIFIED WHETHER NATIVE OR TRANSPLANTED HEART: ICD-10-CM

## 2023-06-08 LAB
ANION GAP SERPL CALC-SCNC: 7 MMOL/L (ref 3–18)
BUN SERPL-MCNC: 35 MG/DL (ref 7–18)
BUN/CREAT SERPL: 4 (ref 12–20)
CALCIUM SERPL-MCNC: 10 MG/DL (ref 8.5–10.1)
CHLORIDE SERPL-SCNC: 98 MMOL/L (ref 100–111)
CO2 SERPL-SCNC: 29 MMOL/L (ref 21–32)
CREAT SERPL-MCNC: 8.91 MG/DL (ref 0.6–1.3)
ECHO BSA: 2.12 M2
ERYTHROCYTE [DISTWIDTH] IN BLOOD BY AUTOMATED COUNT: 15.4 % (ref 11.6–14.5)
GLUCOSE SERPL-MCNC: 358 MG/DL (ref 74–99)
HCT VFR BLD AUTO: 31.1 % (ref 36–48)
HGB BLD-MCNC: 10.5 G/DL (ref 13–16)
MCH RBC QN AUTO: 31.3 PG (ref 24–34)
MCHC RBC AUTO-ENTMCNC: 33.8 G/DL (ref 31–37)
MCV RBC AUTO: 92.8 FL (ref 78–100)
NRBC # BLD: 0 K/UL (ref 0–0.01)
NRBC BLD-RTO: 0 PER 100 WBC
NT PRO BNP: ABNORMAL PG/ML (ref 0–900)
PLATELET # BLD AUTO: 252 K/UL (ref 135–420)
PMV BLD AUTO: 10.4 FL (ref 9.2–11.8)
POTASSIUM SERPL-SCNC: 4 MMOL/L (ref 3.5–5.5)
RBC # BLD AUTO: 3.35 M/UL (ref 4.35–5.65)
SODIUM SERPL-SCNC: 134 MMOL/L (ref 136–145)
WBC # BLD AUTO: 7.6 K/UL (ref 4.6–13.2)

## 2023-06-08 PROCEDURE — 76000 FLUOROSCOPY <1 HR PHYS/QHP: CPT | Performed by: INTERNAL MEDICINE

## 2023-06-08 PROCEDURE — 80048 BASIC METABOLIC PNL TOTAL CA: CPT

## 2023-06-08 PROCEDURE — C1887 CATHETER, GUIDING: HCPCS | Performed by: INTERNAL MEDICINE

## 2023-06-08 PROCEDURE — 99152 MOD SED SAME PHYS/QHP 5/>YRS: CPT | Performed by: INTERNAL MEDICINE

## 2023-06-08 PROCEDURE — C1713 ANCHOR/SCREW BN/BN,TIS/BN: HCPCS | Performed by: INTERNAL MEDICINE

## 2023-06-08 PROCEDURE — 99153 MOD SED SAME PHYS/QHP EA: CPT | Performed by: INTERNAL MEDICINE

## 2023-06-08 PROCEDURE — 99203 OFFICE O/P NEW LOW 30 MIN: CPT | Performed by: INTERNAL MEDICINE

## 2023-06-08 PROCEDURE — 93458 L HRT ARTERY/VENTRICLE ANGIO: CPT | Performed by: INTERNAL MEDICINE

## 2023-06-08 PROCEDURE — 83880 ASSAY OF NATRIURETIC PEPTIDE: CPT

## 2023-06-08 PROCEDURE — C1894 INTRO/SHEATH, NON-LASER: HCPCS | Performed by: INTERNAL MEDICINE

## 2023-06-08 PROCEDURE — 85027 COMPLETE CBC AUTOMATED: CPT

## 2023-06-08 PROCEDURE — 6360000002 HC RX W HCPCS: Performed by: INTERNAL MEDICINE

## 2023-06-08 PROCEDURE — 2500000003 HC RX 250 WO HCPCS: Performed by: INTERNAL MEDICINE

## 2023-06-08 PROCEDURE — 6370000000 HC RX 637 (ALT 250 FOR IP)

## 2023-06-08 PROCEDURE — 6360000004 HC RX CONTRAST MEDICATION: Performed by: INTERNAL MEDICINE

## 2023-06-08 PROCEDURE — 6370000000 HC RX 637 (ALT 250 FOR IP): Performed by: INTERNAL MEDICINE

## 2023-06-08 PROCEDURE — 2709999900 HC NON-CHARGEABLE SUPPLY: Performed by: INTERNAL MEDICINE

## 2023-06-08 RX ORDER — NITROGLYCERIN 20 MG/100ML
INJECTION INTRAVENOUS PRN
Status: DISCONTINUED | OUTPATIENT
Start: 2023-06-08 | End: 2023-06-08 | Stop reason: HOSPADM

## 2023-06-08 RX ORDER — IODIXANOL 320 MG/ML
INJECTION, SOLUTION INTRAVASCULAR PRN
Status: DISCONTINUED | OUTPATIENT
Start: 2023-06-08 | End: 2023-06-08 | Stop reason: HOSPADM

## 2023-06-08 RX ORDER — ASPIRIN 81 MG/1
81 TABLET, CHEWABLE ORAL ONCE
Status: COMPLETED | OUTPATIENT
Start: 2023-06-08 | End: 2023-06-08

## 2023-06-08 RX ORDER — SODIUM CHLORIDE 9 MG/ML
INJECTION, SOLUTION INTRAVENOUS PRN
Status: DISCONTINUED | OUTPATIENT
Start: 2023-06-08 | End: 2023-06-08 | Stop reason: HOSPADM

## 2023-06-08 RX ORDER — ASPIRIN 81 MG/1
TABLET, CHEWABLE ORAL
Status: COMPLETED
Start: 2023-06-08 | End: 2023-06-08

## 2023-06-08 RX ORDER — SODIUM CHLORIDE 0.9 % (FLUSH) 0.9 %
5-40 SYRINGE (ML) INJECTION EVERY 12 HOURS SCHEDULED
Status: DISCONTINUED | OUTPATIENT
Start: 2023-06-08 | End: 2023-06-08 | Stop reason: HOSPADM

## 2023-06-08 RX ORDER — ACETAMINOPHEN 325 MG/1
650 TABLET ORAL ONCE
Status: COMPLETED | OUTPATIENT
Start: 2023-06-08 | End: 2023-06-08

## 2023-06-08 RX ORDER — FENTANYL CITRATE 50 UG/ML
INJECTION, SOLUTION INTRAMUSCULAR; INTRAVENOUS PRN
Status: DISCONTINUED | OUTPATIENT
Start: 2023-06-08 | End: 2023-06-08 | Stop reason: HOSPADM

## 2023-06-08 RX ORDER — MIDAZOLAM HYDROCHLORIDE 1 MG/ML
INJECTION INTRAMUSCULAR; INTRAVENOUS PRN
Status: DISCONTINUED | OUTPATIENT
Start: 2023-06-08 | End: 2023-06-08 | Stop reason: HOSPADM

## 2023-06-08 RX ORDER — SODIUM CHLORIDE 0.9 % (FLUSH) 0.9 %
5-40 SYRINGE (ML) INJECTION PRN
Status: DISCONTINUED | OUTPATIENT
Start: 2023-06-08 | End: 2023-06-08 | Stop reason: HOSPADM

## 2023-06-08 RX ADMIN — ASPIRIN 81 MG: 81 TABLET, CHEWABLE ORAL at 08:13

## 2023-06-08 RX ADMIN — ASPIRIN 81 MG CHEWABLE TABLET 81 MG: 81 TABLET CHEWABLE at 08:13

## 2023-06-08 RX ADMIN — ACETAMINOPHEN 325MG 650 MG: 325 TABLET ORAL at 13:30

## 2023-06-08 ASSESSMENT — PAIN DESCRIPTION - DESCRIPTORS: DESCRIPTORS: THROBBING

## 2023-06-08 ASSESSMENT — PAIN DESCRIPTION - ORIENTATION: ORIENTATION: RIGHT

## 2023-06-08 ASSESSMENT — PAIN SCALES - GENERAL: PAINLEVEL_OUTOF10: 5

## 2023-06-08 ASSESSMENT — PAIN DESCRIPTION - LOCATION: LOCATION: SHOULDER

## 2023-06-08 NOTE — H&P
Expiration Date:   11/10/2023       Order Specific Question:   Daily Coumadin Dose? Answer:   pre op    EKG 12 Lead       Order Specific Question:   Reason for Exam?       Answer: Other         HPI  Mr. Cheryle Coulter has no complaints of chest pains, increased dyspnea exertion. He denies any changes in his activities. He is currently undergoing evaluation for renal transplantation. His last coronary stent was in December 2022. He denies any orthopnea or PND. Denies any palpitations or dizziness. Review of Systems  14 point review of system is negative unless mentioned in HPI     Physical Exam  Vitals and nursing note reviewed. Constitutional:       Appearance: Normal appearance. HENT:      Head: Normocephalic. Eyes:      Conjunctiva/sclera: Conjunctivae normal.   Neck:      Vascular: No carotid bruit. Cardiovascular:      Rate and Rhythm: Normal rate and regular rhythm. Pulmonary:      Breath sounds: Normal breath sounds. Abdominal:      Palpations: Abdomen is soft. Musculoskeletal:         General: No swelling. Cervical back: No rigidity. Skin:     General: Skin is warm and dry. Neurological:      General: No focal deficit present. Mental Status: He is alert and oriented to person, place, and time. Psychiatric:         Mood and Affect: Mood normal.         Behavior: Behavior normal.            PCP: Puja Charles MD     Past Medical History        Past Medical History:   Diagnosis Date    Abnormal nuclear cardiac imaging test 10/08/2012     Fixed anteroseptal, anteroapical defect c/w prior infarction. No ischemia. Mid & distal anteroseptal, anteroapical WMA. LVE. EF 44%. Anemia       dr. Ariella Hunter doubt amyloid or multiple myeloma.  candidate for procirt if Hg <10    Benign hypertensive cardiomyopathy ([address]      Blindness of right eye 01/2013     legally blind    CAD (coronary artery disease)      Cardiomyopathy ([address]      CKD (chronic kidney disease), stage IV ([address]

## 2023-06-08 NOTE — PRE SEDATION
Sedation Plan  ASA: class 2 - patient with mild systemic disease     Mallampati class: II - soft palate, uvula, fauces visible. Sedation plan: moderate (conscious sedation)    Risks, benefits, and alternatives discussed with patient.

## 2023-06-08 NOTE — DISCHARGE INSTRUCTIONS
HEART CATHETERIZATION/ANGIOGRAPHY DISCHARGE INSTRUCTIONS    Check puncture site frequently for swelling or bleeding. If there is any bleeding, lie down and apply pressure over the area with a clean towel or washcloth. Notify your doctor for any redness, swelling, drainage, or oozing from the puncture site. Notify your doctor for any fever or chills. If the extremity becomes cold, numb, or painful go to the Emergency Room. Activity should be limited for the next 48 hours. Climb stairs as little as possible and avoid any stooping, bending, or strenuous activity for 48 hours. No heavy lifting (anything over 8 pounds) for 5 days. You may resume your usual diet. Drink more fluids than usual.  Have a responsible person drive you home and stay with you for at least 24 hours after your heart catheterization/angiography. You may remove bandage from your Right Groin in 24 hours. You may shower in 24 hours. No tub baths, hot tubs, or swimming for 1 week. Do not place any lotions, creams, powders, or ointments over puncture site for 1 week. You may place a clean band-aid over the puncture site each day for 5 days. Change daily. If you take Metformin, do not take it for 48 hours. Ask your nurse when to restart any blood thinners. How can you care for yourself at home? Activity  Do not do strenuous exercise and do not lift, pull, or push anything heavy until your doctor says it is okay. This may be for a day or two. You can walk around the house and do light activity, such as cooking. You may shower 24 to 48 hours after the procedure, if your doctor okays it. Pat the incision dry. Do not take a bath for 1 week, or until your doctor tells you it is okay. If the catheter was placed in your groin, try not to walk up stairs for the first couple of days. If the catheter was placed in your arm near your wrist, do not bend your wrist deeply for the first couple of days.  Be careful using your hand to get into and out of a

## 2023-06-08 NOTE — PROGRESS NOTES
Cath holding summary     Patient escorted to cath holding from waiting area ambulatory, alert and oriented x 4, voicing no complaints. Changed into gown and placed on monitor. NPO since MN. Lab results, med rec and H&P reviewed on chart. PIV x 2 inserted without difficulty. Family at bedside. 0845  TRANSFER - OUT REPORT:    Verbal report given to Kanika Lai (name) on [facility] Course  being transferred to Cath Lab   (unit) for ordered procedure  Report consisted of patients Situation, Background, Assessment and   Recommendations(SBAR). Information from the following report(s) SBAR, Intake/Output, MAR, and Pre Procedure Checklist was reviewed with the receiving nurse. Lines:    Opportunity for questions and clarification was provided. Patient transported with:  Tech    0930  TRANSFER - IN REPORT:    Verbal report received from [address] [place] [address],[address] (name) on [facility] Course  being received from Cath Lab (unit) for ordered procedure   Report consisted of patients Situation, Background, Assessment and   Recommendations(SBAR). Information from the following report(s) SBAR, Procedure Summary, Intake/Output, and MAR was reviewed with the receiving nurse. Opportunity for questions and clarification was provided. Assessment completed upon patients arrival to unit and care assumed. Sheath in place upon arrival and pulled by [facility]. Manual pressure applied and quikclot/tegaderm covering site. CDI. No bleeding or hematoma noted. 1500  AVS Discharge instructions reviewed with patient and copy given to patient. All questions answered. Patient verbalized understanding to all discharge instructions. PIV removed. Procedural site within normal limits. No hematoma or bleeding noted from procedural and PIV site. No pain noted at discharge. Patient discharged with support person in stable condition. Escorted out to vehicle for transport home.

## 2023-06-14 RX ORDER — ATORVASTATIN CALCIUM 80 MG/1
80 TABLET, FILM COATED ORAL
Qty: 90 TABLET | Refills: 3 | Status: SHIPPED | OUTPATIENT
Start: 2023-06-14

## 2023-06-15 ENCOUNTER — HOSPITAL ENCOUNTER (OUTPATIENT)
Facility: HOSPITAL | Age: 53
Setting detail: RECURRING SERIES
Discharge: HOME | End: 2023-06-18
Payer: MEDICARE

## 2023-06-15 PROCEDURE — 97112 NEUROMUSCULAR REEDUCATION: CPT

## 2023-06-15 PROCEDURE — 97530 THERAPEUTIC ACTIVITIES: CPT

## 2023-06-15 PROCEDURE — 97110 THERAPEUTIC EXERCISES: CPT

## 2023-06-19 ENCOUNTER — OFFICE VISIT (OUTPATIENT)
Age: 53
End: 2023-06-19
Payer: MEDICARE

## 2023-06-19 VITALS
SYSTOLIC BLOOD PRESSURE: 135 MMHG | OXYGEN SATURATION: 100 % | RESPIRATION RATE: 18 BRPM | TEMPERATURE: 97.3 F | HEART RATE: 71 BPM | DIASTOLIC BLOOD PRESSURE: 82 MMHG | HEIGHT: 70 IN | WEIGHT: 201 LBS | BODY MASS INDEX: 28.77 KG/M2

## 2023-06-19 DIAGNOSIS — M47.812 CERVICAL SPONDYLOSIS: ICD-10-CM

## 2023-06-19 DIAGNOSIS — M48.02 CERVICAL SPINAL STENOSIS: Primary | ICD-10-CM

## 2023-06-19 PROCEDURE — 3079F DIAST BP 80-89 MM HG: CPT | Performed by: PHYSICAL MEDICINE & REHABILITATION

## 2023-06-19 PROCEDURE — 3075F SYST BP GE 130 - 139MM HG: CPT | Performed by: PHYSICAL MEDICINE & REHABILITATION

## 2023-06-19 PROCEDURE — 99213 OFFICE O/P EST LOW 20 MIN: CPT | Performed by: PHYSICAL MEDICINE & REHABILITATION

## 2023-06-19 RX ORDER — DIAZEPAM 5 MG/1
TABLET ORAL
COMMUNITY
Start: 2023-05-22

## 2023-06-19 NOTE — PROGRESS NOTES
Sherice Left presents today for   Chief Complaint   Patient presents with    Neck Pain    Shoulder Pain     Bilateral      Back Problem    Pain       Is someone accompanying this pt? no    Is the patient using any DME equipment during OV? no    Depression Screening:  No flowsheet data found. Learning Assessment:  No flowsheet data found. Abuse Screening:  No flowsheet data found. Fall Risk  No flowsheet data found. OPIOID RISK TOOL  No flowsheet data found. Coordination of Care:  1. Have you been to the ER, urgent care clinic since your last visit? no  Hospitalized since your last visit? no    2. Have you seen or consulted any other health care providers outside of the [address] [place] [address] since your last visit? no Include any pap smears or colon screening.  no
signal changes are demonstrated at   C4-5 with loss of anterior and mid C4 and C5 vertebral body height. C4 and C5   vertebral body signal is mildly diminished on T1 and increased on T2-weighted   sequences. Similar signal changes are demonstrated in the pedicles and articular   masses bilaterally. There are also mild type I discogenic endplate signal   changes at C3-4. Bone signal is otherwise normal and the other vertebral body   heights are normal.       C2-C3: No herniation or stenosis. C3-C4: Mild disc space narrowing. Mild-moderate diffuse disc bulging   predominating in the central and bilateral paracentral regions. No substantial   facet arthrosis. Moderate-severe appearance of canal stenosis with moderate cord   compression. Moderate bilateral foraminal narrowing, greater on the left. C4-C5: Severe disc space narrowing with moderate diffuse disc bulging. Moderate   joint space narrowing of the bilateral C4-5 facet joints is shown as are small   bilateral joint effusions. There is moderate to severe canal stenosis with   moderate cord compression. Moderate bilateral foraminal stenosis is shown. Past Medical History:   Past Medical History:   Diagnosis Date    Abnormal nuclear cardiac imaging test 10/08/2012    Fixed anteroseptal, anteroapical defect c/w prior infarction. No ischemia. Mid & distal anteroseptal, anteroapical WMA. LVE. EF 44%. Anemia     dr. Irina Carvajal doubt amyloid or multiple myeloma. candidate for procirt if Hg <10    Benign hypertensive cardiomyopathy ([address]     Blindness of right eye 01/2013    legally blind    CAD (coronary artery disease)     Cardiomyopathy ([address]     CKD (chronic kidney disease), stage IV ([address]     to see Dr. Darylene Keller 12/1/12    Congestive heart failure ([address]     Diabetes mellitus ([address]     Diabetic retinopathy ([address]     Dr. Catherine frances    Diabetic retinopathy ([address]     Heart failure ([address]     History of echocardiogram 04/22/2014    LVE.   EF

## 2023-06-19 NOTE — PATIENT INSTRUCTIONS
Encounter Start Encounter End   6/22/2023  9:00 AM 6/22/2023  9:15 AM     Providers    Hussain Ramirez MD  Neurosurgery  NPI: [id number]  [address] [address]  [address] [facility] [address]     Phone: [phone]  Fax: [phone]

## 2023-06-20 ENCOUNTER — HOSPITAL ENCOUNTER (OUTPATIENT)
Facility: HOSPITAL | Age: 53
Setting detail: RECURRING SERIES
End: 2023-06-20
Payer: MEDICARE

## 2023-06-21 ENCOUNTER — HOSPITAL ENCOUNTER (OUTPATIENT)
Facility: HOSPITAL | Age: 53
Setting detail: RECURRING SERIES
Discharge: HOME | End: 2023-06-24
Payer: MEDICARE

## 2023-06-21 PROCEDURE — 97140 MANUAL THERAPY 1/> REGIONS: CPT

## 2023-06-21 PROCEDURE — 97112 NEUROMUSCULAR REEDUCATION: CPT

## 2023-06-21 PROCEDURE — 97530 THERAPEUTIC ACTIVITIES: CPT

## 2023-06-21 NOTE — PROGRESS NOTES
PHYSICAL / OCCUPATIONAL THERAPY - DAILY TREATMENT NOTE (updated )    Patient Name: Sherice Left    Date: 2023    : 1970  Insurance: Payor: [facility] MEDICARE / Plan: Shine Louis / Product Type: *No Product type* /      Patient  verified Yes     Visit #   Current / Total 7 16   Time   In / Out 12:42  1:47   Pain   In / Out 0 0   Subjective Functional Status/Changes: Reports that he is sore. Reports that he hasn't had much problem with the neck but the arms having been killing him. Reports he sees the specialist tomorrow. Changes to: Allergies, Med Hx, Sx Hx?   no       TREATMENT AREA =  Neck pain [M54.2]    OBJECTIVE    Modalities Rationale:     decrease pain and increase tissue extensibility to improve patient's ability to progress to PLOF and address remaining functional goals. min [] Estim Unattended, type/location:                                      []  w/ice    []  w/heat    min [] Estim Attended, type/location:                                     []  w/US     []  w/ice    []  w/heat    []  TENS insruct      min []  Mechanical Traction: type/lbs                   []  pro   []  sup   []  int   []  cont    []  before manual    []  after manual    min []  Ultrasound, settings/location:      min []  Iontophoresis w/ dexamethasone, location:                                               []  take home patch       []  in clinic       10 min  unbilled []  Ice     [x]  Heat    location/position: MH to cervical spine    min []  Paraffin,  details:     min []  Vasopneumatic Device, press/temp:     min []  June Soto / Arlyn Flanagan:     If using vaso (only need to measure limb vaso being performed on)      pre-treatment girth :       post-treatment girth :       measured at (landmark location) :      min []  Other:    Skin assessment post-treatment (if applicable):    []  intact    []  redness- no adverse reaction                 []redness - adverse reaction:         Therapeutic

## 2023-06-22 ENCOUNTER — APPOINTMENT (OUTPATIENT)
Facility: HOSPITAL | Age: 53
End: 2023-06-22
Payer: MEDICARE

## 2023-06-27 ENCOUNTER — OFFICE VISIT (OUTPATIENT)
Age: 53
End: 2023-06-27
Payer: MEDICARE

## 2023-06-27 ENCOUNTER — HOSPITAL ENCOUNTER (OUTPATIENT)
Facility: HOSPITAL | Age: 53
Setting detail: RECURRING SERIES
Discharge: HOME | End: 2023-06-30
Payer: MEDICARE

## 2023-06-27 VITALS
DIASTOLIC BLOOD PRESSURE: 80 MMHG | SYSTOLIC BLOOD PRESSURE: 170 MMHG | TEMPERATURE: 97.2 F | HEART RATE: 73 BPM | OXYGEN SATURATION: 97 % | RESPIRATION RATE: 16 BRPM | BODY MASS INDEX: 28.55 KG/M2 | WEIGHT: 199.4 LBS | HEIGHT: 70 IN

## 2023-06-27 DIAGNOSIS — M54.12 CERVICAL RADICULOPATHY: ICD-10-CM

## 2023-06-27 DIAGNOSIS — E11.3419: ICD-10-CM

## 2023-06-27 DIAGNOSIS — I25.118 ATHEROSCLEROTIC HEART DISEASE OF NATIVE CORONARY ARTERY WITH OTHER FORMS OF ANGINA PECTORIS (HCC): ICD-10-CM

## 2023-06-27 DIAGNOSIS — Z00.00 MEDICARE ANNUAL WELLNESS VISIT, SUBSEQUENT: Primary | ICD-10-CM

## 2023-06-27 DIAGNOSIS — N25.81 SECONDARY HYPERPARATHYROIDISM OF RENAL ORIGIN (HCC): ICD-10-CM

## 2023-06-27 DIAGNOSIS — E11.21 TYPE 2 DIABETES WITH NEPHROPATHY (HCC): ICD-10-CM

## 2023-06-27 DIAGNOSIS — I48.91 ATRIAL FIBRILLATION WITH RVR (HCC): ICD-10-CM

## 2023-06-27 DIAGNOSIS — N18.6 ESRD ON DIALYSIS (HCC): ICD-10-CM

## 2023-06-27 DIAGNOSIS — E78.5 HYPERLIPIDEMIA, UNSPECIFIED HYPERLIPIDEMIA TYPE: ICD-10-CM

## 2023-06-27 DIAGNOSIS — Z99.2 ESRD ON DIALYSIS (HCC): ICD-10-CM

## 2023-06-27 DIAGNOSIS — I48.0 PAROXYSMAL A-FIB (HCC): ICD-10-CM

## 2023-06-27 DIAGNOSIS — I11.0 HYPERTENSIVE HEART DISEASE WITH HEART FAILURE (HCC): ICD-10-CM

## 2023-06-27 DIAGNOSIS — I50.32 CHRONIC DIASTOLIC HEART FAILURE (HCC): ICD-10-CM

## 2023-06-27 PROBLEM — E44.0 MODERATE PROTEIN-CALORIE MALNUTRITION (HCC): Status: RESOLVED | Noted: 2023-02-12 | Resolved: 2023-06-27

## 2023-06-27 PROBLEM — D68.69 SECONDARY HYPERCOAGULABLE STATE (HCC): Status: ACTIVE | Noted: 2023-06-27

## 2023-06-27 PROCEDURE — 3044F HG A1C LEVEL LT 7.0%: CPT | Performed by: FAMILY MEDICINE

## 2023-06-27 PROCEDURE — G0439 PPPS, SUBSEQ VISIT: HCPCS | Performed by: FAMILY MEDICINE

## 2023-06-27 PROCEDURE — 3079F DIAST BP 80-89 MM HG: CPT | Performed by: FAMILY MEDICINE

## 2023-06-27 PROCEDURE — 97140 MANUAL THERAPY 1/> REGIONS: CPT

## 2023-06-27 PROCEDURE — 97530 THERAPEUTIC ACTIVITIES: CPT

## 2023-06-27 PROCEDURE — 3077F SYST BP >= 140 MM HG: CPT | Performed by: FAMILY MEDICINE

## 2023-06-27 PROCEDURE — 97112 NEUROMUSCULAR REEDUCATION: CPT

## 2023-06-27 PROCEDURE — 99214 OFFICE O/P EST MOD 30 MIN: CPT | Performed by: FAMILY MEDICINE

## 2023-06-27 RX ORDER — AMLODIPINE BESYLATE 10 MG/1
10 TABLET ORAL DAILY
COMMUNITY
Start: 2023-06-23

## 2023-06-27 ASSESSMENT — PATIENT HEALTH QUESTIONNAIRE - PHQ9
SUM OF ALL RESPONSES TO PHQ9 QUESTIONS 1 & 2: 1
SUM OF ALL RESPONSES TO PHQ QUESTIONS 1-9: 1
SUM OF ALL RESPONSES TO PHQ QUESTIONS 1-9: 1
2. FEELING DOWN, DEPRESSED OR HOPELESS: 1
1. LITTLE INTEREST OR PLEASURE IN DOING THINGS: 0
SUM OF ALL RESPONSES TO PHQ QUESTIONS 1-9: 1
SUM OF ALL RESPONSES TO PHQ QUESTIONS 1-9: 1

## 2023-06-27 ASSESSMENT — LIFESTYLE VARIABLES
HOW OFTEN DO YOU HAVE A DRINK CONTAINING ALCOHOL: NEVER
HOW MANY STANDARD DRINKS CONTAINING ALCOHOL DO YOU HAVE ON A TYPICAL DAY: PATIENT DOES NOT DRINK

## 2023-06-28 RX ORDER — AMIODARONE HYDROCHLORIDE 200 MG/1
200 TABLET ORAL DAILY
Qty: 90 TABLET | Refills: 2 | Status: SHIPPED | OUTPATIENT
Start: 2023-06-28

## 2023-06-29 ENCOUNTER — HOSPITAL ENCOUNTER (OUTPATIENT)
Facility: HOSPITAL | Age: 53
Setting detail: RECURRING SERIES
End: 2023-06-29
Payer: MEDICARE

## 2023-06-29 PROBLEM — M48.02 DEGENERATIVE CERVICAL SPINAL STENOSIS: Status: ACTIVE | Noted: 2023-06-22

## 2023-06-29 PROBLEM — M47.812 CERVICAL SPONDYLOSIS: Status: ACTIVE | Noted: 2023-06-22

## 2023-06-29 PROCEDURE — 97530 THERAPEUTIC ACTIVITIES: CPT

## 2023-06-29 PROCEDURE — 97112 NEUROMUSCULAR REEDUCATION: CPT

## 2023-06-29 PROCEDURE — 97110 THERAPEUTIC EXERCISES: CPT

## 2023-07-12 LAB — CREATININE, EXTERNAL: 4.8

## 2023-07-13 ENCOUNTER — TELEPHONE (OUTPATIENT)
Facility: HOSPITAL | Age: 53
End: 2023-07-13

## 2023-07-13 NOTE — TELEPHONE ENCOUNTER
No show to appointment. Freddie answered. Says he is recovering from kidney transplant last week, but is not feeling up the therapy this session. Reports he was cleared by his surgeons to return to therapy, but is having difficulty walking post-op.

## 2023-07-26 ENCOUNTER — TELEPHONE (OUTPATIENT)
Facility: HOSPITAL | Age: 53
End: 2023-07-26

## 2023-07-26 NOTE — TELEPHONE ENCOUNTER
Called pt, no answer / no VM box available to leave message.  Called pt to see if wanted to continue therapy or to DC

## 2023-08-10 ENCOUNTER — APPOINTMENT (OUTPATIENT)
Facility: HOSPITAL | Age: 53
End: 2023-08-10
Payer: MEDICARE

## 2023-08-10 ENCOUNTER — HOSPITAL ENCOUNTER (EMERGENCY)
Facility: HOSPITAL | Age: 53
Discharge: HOME | End: 2023-08-10
Attending: EMERGENCY MEDICINE
Payer: MEDICARE

## 2023-08-10 VITALS
TEMPERATURE: 97.5 F | SYSTOLIC BLOOD PRESSURE: 131 MMHG | RESPIRATION RATE: 17 BRPM | HEIGHT: 71 IN | BODY MASS INDEX: 25.2 KG/M2 | DIASTOLIC BLOOD PRESSURE: 76 MMHG | OXYGEN SATURATION: 100 % | HEART RATE: 78 BPM | WEIGHT: 180 LBS

## 2023-08-10 DIAGNOSIS — R55 VASOVAGAL SYNCOPE: Primary | ICD-10-CM

## 2023-08-10 DIAGNOSIS — N17.9 AKI (ACUTE KIDNEY INJURY) (HCC): ICD-10-CM

## 2023-08-10 DIAGNOSIS — Z94.0 RENAL TRANSPLANT RECIPIENT: ICD-10-CM

## 2023-08-10 LAB
ALBUMIN SERPL-MCNC: 2.4 G/DL (ref 3.4–5)
ALBUMIN/GLOB SERPL: 0.9 (ref 0.8–1.7)
ALP SERPL-CCNC: 89 U/L (ref 45–117)
ALT SERPL-CCNC: 26 U/L (ref 16–61)
ANION GAP SERPL CALC-SCNC: 12 MMOL/L (ref 3–18)
APPEARANCE UR: CLEAR
APTT PPP: <20 SEC (ref 23–36.4)
AST SERPL-CCNC: 20 U/L (ref 10–38)
BACTERIA URNS QL MICRO: ABNORMAL /HPF
BASE DEFICIT BLD-SCNC: 8.1 MMOL/L
BASOPHILS # BLD: 0 K/UL (ref 0–0.1)
BASOPHILS NFR BLD: 1 % (ref 0–2)
BILIRUB SERPL-MCNC: 0.6 MG/DL (ref 0.2–1)
BILIRUB UR QL: NEGATIVE
BUN SERPL-MCNC: 37 MG/DL (ref 7–18)
BUN/CREAT SERPL: 19 (ref 12–20)
CALCIUM SERPL-MCNC: 9.9 MG/DL (ref 8.5–10.1)
CHLORIDE SERPL-SCNC: 101 MMOL/L (ref 100–111)
CO2 SERPL-SCNC: 17 MMOL/L (ref 21–32)
COLOR UR: YELLOW
CREAT SERPL-MCNC: 2 MG/DL (ref 0.6–1.3)
DIFFERENTIAL METHOD BLD: ABNORMAL
EKG ATRIAL RATE: 71 BPM
EKG ATRIAL RATE: 74 BPM
EKG DIAGNOSIS: NORMAL
EKG DIAGNOSIS: NORMAL
EKG P AXIS: 48 DEGREES
EKG P AXIS: 50 DEGREES
EKG P-R INTERVAL: 152 MS
EKG P-R INTERVAL: 164 MS
EKG Q-T INTERVAL: 434 MS
EKG Q-T INTERVAL: 460 MS
EKG QRS DURATION: 104 MS
EKG QRS DURATION: 92 MS
EKG QTC CALCULATION (BAZETT): 481 MS
EKG QTC CALCULATION (BAZETT): 499 MS
EKG R AXIS: -19 DEGREES
EKG R AXIS: -27 DEGREES
EKG T AXIS: 150 DEGREES
EKG T AXIS: 179 DEGREES
EKG VENTRICULAR RATE: 71 BPM
EKG VENTRICULAR RATE: 74 BPM
EOSINOPHIL # BLD: 0 K/UL (ref 0–0.4)
EOSINOPHIL NFR BLD: 0 % (ref 0–5)
EPITH CASTS URNS QL MICRO: NEGATIVE /LPF (ref 0–5)
ERYTHROCYTE [DISTWIDTH] IN BLOOD BY AUTOMATED COUNT: 14.1 % (ref 11.6–14.5)
EST. AVERAGE GLUCOSE BLD GHB EST-MCNC: 154 MG/DL
GAS FLOW.O2 O2 DELIVERY SYS: ABNORMAL
GLOBULIN SER CALC-MCNC: 2.7 G/DL (ref 2–4)
GLUCOSE BLD STRIP.AUTO-MCNC: 377 MG/DL (ref 70–110)
GLUCOSE SERPL-MCNC: 351 MG/DL (ref 74–99)
GLUCOSE UR STRIP.AUTO-MCNC: >1000 MG/DL
HBA1C MFR BLD: 7 % (ref 4.2–5.6)
HCO3 BLD-SCNC: 15 MMOL/L (ref 22–26)
HCT VFR BLD AUTO: 34.4 % (ref 36–48)
HGB BLD-MCNC: 11.7 G/DL (ref 13–16)
HGB UR QL STRIP: ABNORMAL
IMM GRANULOCYTES # BLD AUTO: 0.1 K/UL (ref 0–0.04)
IMM GRANULOCYTES NFR BLD AUTO: 1 % (ref 0–0.5)
INR PPP: 1.5 (ref 0.9–1.1)
KETONES UR QL STRIP.AUTO: 40 MG/DL
LEUKOCYTE ESTERASE UR QL STRIP.AUTO: NEGATIVE
LIPASE SERPL-CCNC: 185 U/L (ref 73–393)
LYMPHOCYTES # BLD: 0.2 K/UL (ref 0.9–3.6)
LYMPHOCYTES NFR BLD: 4 % (ref 21–52)
MAGNESIUM SERPL-MCNC: 1.3 MG/DL (ref 1.6–2.6)
MCH RBC QN AUTO: 30.5 PG (ref 24–34)
MCHC RBC AUTO-ENTMCNC: 34 G/DL (ref 31–37)
MCV RBC AUTO: 89.8 FL (ref 78–100)
MONOCYTES # BLD: 0.7 K/UL (ref 0.05–1.2)
MONOCYTES NFR BLD: 11 % (ref 3–10)
NEUTS SEG # BLD: 4.9 K/UL (ref 1.8–8)
NEUTS SEG NFR BLD: 83 % (ref 40–73)
NITRITE UR QL STRIP.AUTO: NEGATIVE
NRBC # BLD: 0 K/UL (ref 0–0.01)
NRBC BLD-RTO: 0 PER 100 WBC
O2/TOTAL GAS SETTING VFR VENT: 21 %
PCO2 BLD: 23.8 MMHG (ref 35–45)
PH BLD: 7.41 (ref 7.35–7.45)
PH UR STRIP: 5.5 (ref 5–8)
PLATELET # BLD AUTO: 198 K/UL (ref 135–420)
PMV BLD AUTO: 9.4 FL (ref 9.2–11.8)
PO2 BLD: 112 MMHG (ref 80–100)
POTASSIUM SERPL-SCNC: 5.3 MMOL/L (ref 3.5–5.5)
PROT SERPL-MCNC: 5.1 G/DL (ref 6.4–8.2)
PROT UR STRIP-MCNC: ABNORMAL MG/DL
PROTHROMBIN TIME: 18.5 SEC (ref 11.9–14.7)
RBC # BLD AUTO: 3.83 M/UL (ref 4.35–5.65)
RBC #/AREA URNS HPF: ABNORMAL /HPF (ref 0–5)
SAO2 % BLD: 98.6 % (ref 92–97)
SERVICE CMNT-IMP: ABNORMAL
SODIUM SERPL-SCNC: 130 MMOL/L (ref 136–145)
SP GR UR REFRACTOMETRY: 1.02 (ref 1–1.03)
SPECIMEN TYPE: ABNORMAL
TROPONIN I SERPL HS-MCNC: 131 NG/L (ref 0–78)
TROPONIN I SERPL HS-MCNC: 147 NG/L (ref 0–78)
UROBILINOGEN UR QL STRIP.AUTO: 0.2 EU/DL (ref 0.2–1)
WBC # BLD AUTO: 5.9 K/UL (ref 4.6–13.2)
WBC URNS QL MICRO: ABNORMAL /HPF (ref 0–5)

## 2023-08-10 PROCEDURE — 85610 PROTHROMBIN TIME: CPT

## 2023-08-10 PROCEDURE — 2580000003 HC RX 258: Performed by: EMERGENCY MEDICINE

## 2023-08-10 PROCEDURE — 76705 ECHO EXAM OF ABDOMEN: CPT

## 2023-08-10 PROCEDURE — 85730 THROMBOPLASTIN TIME PARTIAL: CPT

## 2023-08-10 PROCEDURE — 80197 ASSAY OF TACROLIMUS: CPT

## 2023-08-10 PROCEDURE — 2580000003 HC RX 258: Performed by: STUDENT IN AN ORGANIZED HEALTH CARE EDUCATION/TRAINING PROGRAM

## 2023-08-10 PROCEDURE — 83735 ASSAY OF MAGNESIUM: CPT

## 2023-08-10 PROCEDURE — 93005 ELECTROCARDIOGRAM TRACING: CPT | Performed by: EMERGENCY MEDICINE

## 2023-08-10 PROCEDURE — 80053 COMPREHEN METABOLIC PANEL: CPT

## 2023-08-10 PROCEDURE — 81001 URINALYSIS AUTO W/SCOPE: CPT

## 2023-08-10 PROCEDURE — 96360 HYDRATION IV INFUSION INIT: CPT

## 2023-08-10 PROCEDURE — 36556 INSERT NON-TUNNEL CV CATH: CPT

## 2023-08-10 PROCEDURE — 93005 ELECTROCARDIOGRAM TRACING: CPT | Performed by: STUDENT IN AN ORGANIZED HEALTH CARE EDUCATION/TRAINING PROGRAM

## 2023-08-10 PROCEDURE — 83036 HEMOGLOBIN GLYCOSYLATED A1C: CPT

## 2023-08-10 PROCEDURE — 99285 EMERGENCY DEPT VISIT HI MDM: CPT

## 2023-08-10 PROCEDURE — 83690 ASSAY OF LIPASE: CPT

## 2023-08-10 PROCEDURE — 82962 GLUCOSE BLOOD TEST: CPT

## 2023-08-10 PROCEDURE — 71250 CT THORAX DX C-: CPT

## 2023-08-10 PROCEDURE — 85025 COMPLETE CBC W/AUTO DIFF WBC: CPT

## 2023-08-10 PROCEDURE — 84484 ASSAY OF TROPONIN QUANT: CPT

## 2023-08-10 PROCEDURE — 71045 X-RAY EXAM CHEST 1 VIEW: CPT

## 2023-08-10 PROCEDURE — 70450 CT HEAD/BRAIN W/O DYE: CPT

## 2023-08-10 PROCEDURE — 96361 HYDRATE IV INFUSION ADD-ON: CPT

## 2023-08-10 PROCEDURE — 82803 BLOOD GASES ANY COMBINATION: CPT

## 2023-08-10 RX ORDER — SODIUM CHLORIDE, SODIUM LACTATE, POTASSIUM CHLORIDE, AND CALCIUM CHLORIDE .6; .31; .03; .02 G/100ML; G/100ML; G/100ML; G/100ML
2000 INJECTION, SOLUTION INTRAVENOUS ONCE
Status: COMPLETED | OUTPATIENT
Start: 2023-08-10 | End: 2023-08-10

## 2023-08-10 RX ORDER — 0.9 % SODIUM CHLORIDE 0.9 %
1000 INTRAVENOUS SOLUTION INTRAVENOUS ONCE
Status: COMPLETED | OUTPATIENT
Start: 2023-08-10 | End: 2023-08-10

## 2023-08-10 RX ORDER — SODIUM CHLORIDE 9 MG/ML
INJECTION, SOLUTION INTRAVENOUS CONTINUOUS
Status: DISCONTINUED | OUTPATIENT
Start: 2023-08-10 | End: 2023-08-10 | Stop reason: HOSPADM

## 2023-08-10 RX ORDER — MAGNESIUM SULFATE IN WATER 40 MG/ML
2000 INJECTION, SOLUTION INTRAVENOUS PRN
Status: DISCONTINUED | OUTPATIENT
Start: 2023-08-10 | End: 2023-08-10 | Stop reason: HOSPADM

## 2023-08-10 RX ORDER — POTASSIUM CHLORIDE 7.45 MG/ML
10 INJECTION INTRAVENOUS PRN
Status: DISCONTINUED | OUTPATIENT
Start: 2023-08-10 | End: 2023-08-10 | Stop reason: HOSPADM

## 2023-08-10 RX ORDER — DEXTROSE AND SODIUM CHLORIDE 5; .45 G/100ML; G/100ML
INJECTION, SOLUTION INTRAVENOUS CONTINUOUS PRN
Status: DISCONTINUED | OUTPATIENT
Start: 2023-08-10 | End: 2023-08-10 | Stop reason: HOSPADM

## 2023-08-10 RX ADMIN — SODIUM CHLORIDE 1000 ML: 9 INJECTION, SOLUTION INTRAVENOUS at 07:07

## 2023-08-10 RX ADMIN — SODIUM CHLORIDE, POTASSIUM CHLORIDE, SODIUM LACTATE AND CALCIUM CHLORIDE 2000 ML: 600; 310; 30; 20 INJECTION, SOLUTION INTRAVENOUS at 11:48

## 2023-08-10 NOTE — ED NOTES
3 Rns have attempted to get IV access on pt; all have been unsuccessful. RRT tech called for access help and Dr. Domonique Sue has been informed.       Jose Jaime RN  08/10/23 7716

## 2023-08-10 NOTE — ED NOTES
Patient resting on the stretcher at this time. Chest rise and fall noted. VSS. No further complaints at this time. Will continue to monitor according to facility protocol.        Alena Cowden, RN  08/10/23 5640

## 2023-08-10 NOTE — ED NOTES
Transfer center notified request to consult transplant team at [facility] general.        Jessi Bond RN  08/10/23 0354

## 2023-08-10 NOTE — ED NOTES
Medication given per [facility] order. Education regarding medication provided. Tolerated well with no complaints. Instructed patient to utilize the call light if he/she needs anything further. Will continue to monitor.        Analia Roth RN  08/10/23 4593

## 2023-08-10 NOTE — ED TRIAGE NOTES
Patient to ED with c/o hyperglycemia. Patient was discharged from [facility] a couple hours ago and checked his blood sugar at home and it was elevated. Patient is experiencing tachypnea in triage and is hypotensive. Patient to be roomed in ED bed 5. Charge RN made aware.

## 2023-08-10 NOTE — ED NOTES
Report given to oncoming RN. Patient information discussed and reviewed per facility protocol. Outstanding orders reviewed (if applicable). No applicable questions at this time. Will sign off from patient.        Brooke Ashton RN  08/10/23 2683

## 2023-08-10 NOTE — ED PROVIDER NOTES
ED Signout Note    8:41 AM: Patient care assumed from Dr. Neo Jamison , ED provider. Pt complaint(s), current treatment plan, progression and available diagnostic results have been discussed thoroughly. The patient was seen and evaluated on my shift. Rounding occurred: Yes  Intended Disposition: Pending discussion with transplant team  Pending diagnostic reports and/or labs (please list): Discussion with transplant team    Signed out to me by Dr. Neo Jamison pending discussion with transplant team. Patient's troponin returned elevated with no further EKG changes, patient's I was able to discuss the case with them, patient was recently admitted for similar creatinine elevations at outside hospital and improved with IV fluids. They will be able to see the patient tomorrow, recommended 2 additional liters of lactated Ringer's. Patient remains feeling well and requesting discharge throughout ED stay. Will discharge with follow-up with transplant team tomorrow.      Electronically signed by  Rochelle Morales MD  Emergency Physician  8/10/23, 1:30 PM       Nicky Ness MD  08/10/23 6071
myeloma. candidate for procirt if Hg <10    Benign hypertensive cardiomyopathy ([address])     Blindness of right eye 01/2013    legally blind    CAD (coronary artery disease)     Cardiomyopathy ([address])     CKD (chronic kidney disease), stage IV ([address])     to see Dr. Jarrell Feliz 12/1/12    Congestive heart failure ([address])     Diabetes mellitus ([address])     Diabetic retinopathy ([address])     Dr. Sourav Pace- injections    Diabetic retinopathy ([address])     Heart failure ([address])     History of echocardiogram 04/22/2014    LVE. EF 55% (prev 40-45% on echo of 1/27/12). No WMA. Mild-mod conc LVH. Gr 3 DDfx. Marked LAE. Mild NIGEL. Mild MR. Hypertension     Pulmonary hypertension (HCC)     Renal abscess 1/3/12    Right kidney isoechoic w/respect to liver. Sm left-sided pleural effusion    S/P cardiac cath 10/16/2012    LM patent. pLAD 95% (3.5 x 12-mm Sandborn stent, resid 0$%). LCX mild. Past Surgical History:  Past Surgical History:   Procedure Laterality Date    CARDIAC PROCEDURE N/A 6/8/2023    Left heart cath performed by Breanne Chan MD at [address] [place] [address]  N/A 6/8/2023    Coronary angiography performed by Breanne Chan MD at [address] [place] [address]  N/A 6/8/2023    Left ventriculography performed by Breanne Chan MD at [address] [place] [address],[address]      one    LAPAROTOMY  infancy    whole in colon and had repair at that time. LAPAROTOMY  2/2012    bowel obstruction with removal segment of small bowel. Done by Riblet.     OTHER SURGICAL HISTORY  06/20/2013    left eye retna attatchment    REPAIR ING HERNIA,5+Y/O,REDUCIBL Left 05/02/2019    Dr. Aleatha Burkitt      august 2012       Family History:  Family History   Problem Relation Age of Onset    High Cholesterol Father     Diabetes Brother         pre diabetic    Kidney Disease Mother     Hypertension Mother     Diabetes Mother        Social History:  Social History     Tobacco Use

## 2023-08-10 NOTE — ED NOTES
Paperwork read with patient making note of where to  prescriptions (if applicable). IV taken out (if applicable). Armband removed and disposed of in shredder. Patient has no further questions at this time. Will discharge from system.         Jessa Munoz RN  08/10/23 1537

## 2023-08-13 LAB — TACROLIMUS BLD-MCNC: 12.8 NG/ML (ref 2–20)

## 2023-08-23 ENCOUNTER — CARE COORDINATION (OUTPATIENT)
Facility: CLINIC | Age: 53
End: 2023-08-23

## 2023-09-25 ENCOUNTER — CARE COORDINATION (OUTPATIENT)
Facility: CLINIC | Age: 53
End: 2023-09-25

## 2023-09-25 NOTE — CARE COORDINATION
Ambulatory Care Coordination Note  2023    Patient Current Location:  Home: [address] [place], [place] [id number]    ACM contacted the patient by telephone. Verified name and  with patient as identifiers. Provided introduction to self, and explanation of the ACM role. ACM: Hao Greer RN    Challenges to be reviewed by the provider   Additional needs identified to be addressed with provider: No  none               Method of communication with provider: none. Hx of Pulm HTN, CHF, DM2, Recent renal TXP, CAD, HTN  CCM after recent ED visit leading to admission for DKA  Pt states doing well now. Aware of upcoming appts. No other questions/needs at this time. Offered patient enrollment in the Remote Patient Monitoring (RPM) program for in-home monitoring: NA. Ambulatory Care Coordination Assessment    Care Coordination Protocol  Week 1 - Initial Assessment     Do you have all of your prescriptions and are they filled?: Yes  Barriers to medication adherence: None  Are you able to afford your medications?: Yes  How often do you have trouble taking your medications the way you have been told to take them?: I always take them as prescribed. Do you have Home O2 Therapy?: No      Is patient able to live independently?: Yes     Current Housing: Private Residence  Who do you live with?: Partner/Spouse/SO, Alone  Are you an active caregiver in your home?: No                 Suggested Interventions and Community Resources                 Goals        Medication Management      I will take my medication as directed. I will notify my provider of any problems with medications, like adverse effects or side effects. I will notify my provider/Care Coordinator if I am unable to afford my medications. I will notify my provider for advice before I stop taking any of my medication.     Barriers: none  Plan for overcoming my barriers: N/A  Confidence: 10/10  Anticipated Goal Completion Date:

## 2023-10-04 ENCOUNTER — CARE COORDINATION (OUTPATIENT)
Facility: CLINIC | Age: 53
End: 2023-10-04

## 2023-10-04 SDOH — ECONOMIC STABILITY: HOUSING INSECURITY: IN THE LAST 12 MONTHS, HOW MANY PLACES HAVE YOU LIVED?: 2

## 2023-10-04 SDOH — ECONOMIC STABILITY: INCOME INSECURITY: IN THE LAST 12 MONTHS, WAS THERE A TIME WHEN YOU WERE NOT ABLE TO PAY THE MORTGAGE OR RENT ON TIME?: NO

## 2023-10-04 ASSESSMENT — SOCIAL DETERMINANTS OF HEALTH (SDOH): HOW HARD IS IT FOR YOU TO PAY FOR THE VERY BASICS LIKE FOOD, HOUSING, MEDICAL CARE, AND HEATING?: NOT HARD AT ALL

## 2023-10-04 NOTE — CARE COORDINATION
Ambulatory Care Coordination Note  10/4/2023    Patient Current Location:  Home: [address] [place], [place] [id number]     ACM contacted the patient by telephone. Verified name and  with patient as identifiers. Provided introduction to self, and explanation of the ACM role. Challenges to be reviewed by the provider   Additional needs identified to be addressed with provider: No  none               Method of communication with provider: none. ACM: Rich Tomlin RN    Hx of Pulm HTN, CHF, DM2, Recent renal TXP, CAD, HTN  CCM after recent ED visit leading to admission for DKA  Pt states doing well now. Assisted w/ scheduling f/u appt w/ PCP. Discussed steps for BS financial assistance. No other questions/needs at this time. Offered patient enrollment in the Remote Patient Monitoring (RPM) program for in-home monitoring: NA. Lab Results       None                 Goals Addressed                   This Visit's Progress     Conditions and Symptoms   On track     I will schedule office visits, as directed by my provider. I will keep my appointment or reschedule if I have to cancel. I will notify my provider of any barriers to my plan of care. I will notify my provider of any symptoms that indicate a worsening of my condition. Barriers: none  Plan for overcoming my barriers: N/A  Confidence: 10/10  Anticipated Goal Completion Date: 23         Medication Management   On track     I will take my medication as directed. I will notify my provider of any problems with medications, like adverse effects or side effects. I will notify my provider/Care Coordinator if I am unable to afford my medications. I will notify my provider for advice before I stop taking any of my medication.     Barriers: none  Plan for overcoming my barriers: N/A  Confidence: 10/10  Anticipated Goal Completion Date: 23       Reduce Risk of Hospitalization   On track     I will take appropriate steps to

## 2023-10-19 ENCOUNTER — CARE COORDINATION (OUTPATIENT)
Facility: CLINIC | Age: 53
End: 2023-10-19

## 2023-10-26 ENCOUNTER — CARE COORDINATION (OUTPATIENT)
Facility: CLINIC | Age: 53
End: 2023-10-26

## 2023-10-26 NOTE — CARE COORDINATION
Ambulatory Care Coordination Note  10/26/2023    Patient Current Location:  Home: [address] [place], [place] [id number]     ACM contacted the patient by telephone. Verified name and  with patient as identifiers. Provided introduction to self, and explanation of the ACM role. Challenges to be reviewed by the provider   Additional needs identified to be addressed with provider: No  none               Method of communication with provider: none. ACM: Danell Najjar, RN    Hx of Pulm HTN, CHF, DM2, Recent renal TXP, CAD, HTN  CCM after recent ED visit leading to admission for DKA  Pt states doing well now. Aware of upcoming txp appt w/ renal scheduled for tomorrow. No other questions/needs at this time. Offered patient enrollment in the Remote Patient Monitoring (RPM) program for in-home monitoring: NA. Lab Results       None            Care Coordination Interventions    Referral from Primary Care Provider: No  Suggested Interventions and Community Resources          Goals Addressed                   This Visit's Progress     Conditions and Symptoms   On track     I will schedule office visits, as directed by my provider. I will keep my appointment or reschedule if I have to cancel. I will notify my provider of any barriers to my plan of care. I will notify my provider of any symptoms that indicate a worsening of my condition. Barriers: none  Plan for overcoming my barriers: N/A  Confidence: 10/10  Anticipated Goal Completion Date: 23         Medication Management   On track     I will take my medication as directed. I will notify my provider of any problems with medications, like adverse effects or side effects. I will notify my provider/Care Coordinator if I am unable to afford my medications. I will notify my provider for advice before I stop taking any of my medication.     Barriers: none  Plan for overcoming my barriers: N/A  Confidence: 10/10  Anticipated Goal Completion

## 2023-11-03 ENCOUNTER — CARE COORDINATION (OUTPATIENT)
Facility: CLINIC | Age: 53
End: 2023-11-03

## 2023-11-14 ENCOUNTER — CARE COORDINATION (OUTPATIENT)
Facility: CLINIC | Age: 53
End: 2023-11-14

## 2023-11-14 RX ORDER — PRAMIPEXOLE DIHYDROCHLORIDE 0.12 MG/1
0.12 TABLET ORAL 2 TIMES DAILY
Qty: 180 TABLET | Refills: 0 | Status: SHIPPED | OUTPATIENT
Start: 2023-11-14 | End: 2023-12-06

## 2023-12-01 ENCOUNTER — CARE COORDINATION (OUTPATIENT)
Facility: CLINIC | Age: 53
End: 2023-12-01

## 2023-12-06 ENCOUNTER — OFFICE VISIT (OUTPATIENT)
Age: 53
End: 2023-12-06
Payer: MEDICARE

## 2023-12-06 VITALS
OXYGEN SATURATION: 99 % | WEIGHT: 182 LBS | DIASTOLIC BLOOD PRESSURE: 76 MMHG | SYSTOLIC BLOOD PRESSURE: 132 MMHG | HEIGHT: 70 IN | BODY MASS INDEX: 26.05 KG/M2 | HEART RATE: 75 BPM

## 2023-12-06 DIAGNOSIS — I50.32 CHRONIC DIASTOLIC (CONGESTIVE) HEART FAILURE (HCC): ICD-10-CM

## 2023-12-06 DIAGNOSIS — N18.6 ESRD (END STAGE RENAL DISEASE) (HCC): ICD-10-CM

## 2023-12-06 DIAGNOSIS — I42.9 CARDIOMYOPATHY, UNSPECIFIED TYPE (HCC): ICD-10-CM

## 2023-12-06 DIAGNOSIS — I25.10 ATHEROSCLEROSIS OF NATIVE CORONARY ARTERY WITHOUT ANGINA PECTORIS, UNSPECIFIED WHETHER NATIVE OR TRANSPLANTED HEART: Primary | ICD-10-CM

## 2023-12-06 DIAGNOSIS — I11.9 HYPERTENSIVE HEART DISEASE WITHOUT HEART FAILURE: ICD-10-CM

## 2023-12-06 DIAGNOSIS — I48.0 PAROXYSMAL ATRIAL FIBRILLATION (HCC): ICD-10-CM

## 2023-12-06 PROCEDURE — 3078F DIAST BP <80 MM HG: CPT | Performed by: INTERNAL MEDICINE

## 2023-12-06 PROCEDURE — 99214 OFFICE O/P EST MOD 30 MIN: CPT | Performed by: INTERNAL MEDICINE

## 2023-12-06 PROCEDURE — 3075F SYST BP GE 130 - 139MM HG: CPT | Performed by: INTERNAL MEDICINE

## 2023-12-06 PROCEDURE — 93000 ELECTROCARDIOGRAM COMPLETE: CPT | Performed by: INTERNAL MEDICINE

## 2023-12-06 RX ORDER — PANTOPRAZOLE SODIUM 40 MG/1
40 TABLET, DELAYED RELEASE ORAL DAILY
COMMUNITY

## 2023-12-06 RX ORDER — MIRTAZAPINE 15 MG/1
15 TABLET, FILM COATED ORAL NIGHTLY
COMMUNITY

## 2023-12-06 ASSESSMENT — PATIENT HEALTH QUESTIONNAIRE - PHQ9
SUM OF ALL RESPONSES TO PHQ QUESTIONS 1-9: 0
SUM OF ALL RESPONSES TO PHQ QUESTIONS 1-9: 0
1. LITTLE INTEREST OR PLEASURE IN DOING THINGS: 0
2. FEELING DOWN, DEPRESSED OR HOPELESS: 0
SUM OF ALL RESPONSES TO PHQ9 QUESTIONS 1 & 2: 0
SUM OF ALL RESPONSES TO PHQ QUESTIONS 1-9: 0
SUM OF ALL RESPONSES TO PHQ QUESTIONS 1-9: 0

## 2023-12-06 NOTE — PROGRESS NOTES
Crispin Scott presents today for   Chief Complaint   Patient presents with    Follow-up     6 months       Crispin Scott preferred language for health care discussion is english/other. Is someone accompanying this pt? no    Is the patient using any DME equipment during OV? no    Depression Screening:  Depression: Not at risk (6/27/2023)    PHQ-2     PHQ-2 Score: 1        Learning Assessment:  Who is the primary learner? Patient    What is the preferred language for health care of the primary learner? ENGLISH    How does the primary learner prefer to learn new concepts? DEMONSTRATION    Answered By patient    Relationship to Learner SELF           Pt currently taking Anticoagulant therapy? no    Pt currently taking Antiplatelet therapy ? eliquis      Coordination of Care:  1. Have you been to the ER, urgent care clinic since your last visit? Hospitalized since your last visit? no    2. Have you seen or consulted any other health care providers outside of the [address] [place] [address] since your last visit? Include any pap smears or colon screening.  no
daily      metoprolol tartrate (LOPRESSOR) 50 MG tablet Take 1 tablet by mouth 2 times daily       No current facility-administered medications for this visit. The patient has a family history of    Social History     Tobacco Use    Smoking status: Never    Smokeless tobacco: Never   Vaping Use    Vaping Use: Never used   Substance Use Topics    Alcohol use: Not Currently     Alcohol/week: 4.2 standard drinks of alcohol    Drug use: No       Lab Results   Component Value Date/Time    CHOL 155 02/21/2023 10:17 AM    HDL 53 02/21/2023 10:17 AM        BP Readings from Last 3 Encounters:   12/06/23 132/76   08/10/23 131/76   06/27/23 (!) 170/80        Pulse Readings from Last 3 Encounters:   12/06/23 75   08/10/23 78   06/27/23 73       Wt Readings from Last 3 Encounters:   12/06/23 82.6 kg (182 lb)   08/11/23 81.6 kg (180 lb)   08/10/23 81.6 kg (180 lb)         EKG: normal sinus rhythm, Q waves in leads V1, V2, unchanged from previous tracings.      Junior Warner MD   12/6/2023

## 2023-12-11 ENCOUNTER — CARE COORDINATION (OUTPATIENT)
Facility: CLINIC | Age: 53
End: 2023-12-11

## 2023-12-26 ENCOUNTER — TELEPHONE (OUTPATIENT)
Age: 53
End: 2023-12-26

## 2023-12-26 NOTE — TELEPHONE ENCOUNTER
[facility] faxed requested cardiac clearance for EGD with Propofol and instructions on plavix and eliquis. Procedure is scheduled for 3-4-24    Verbal order and read back per Sunil Myers MD

## 2024-01-05 ENCOUNTER — CARE COORDINATION (OUTPATIENT)
Facility: CLINIC | Age: 54
End: 2024-01-05

## 2024-01-05 SDOH — ECONOMIC STABILITY: FOOD INSECURITY: WITHIN THE PAST 12 MONTHS, THE FOOD YOU BOUGHT JUST DIDN'T LAST AND YOU DIDN'T HAVE MONEY TO GET MORE.: NEVER TRUE

## 2024-01-05 SDOH — ECONOMIC STABILITY: FOOD INSECURITY: WITHIN THE PAST 12 MONTHS, YOU WORRIED THAT YOUR FOOD WOULD RUN OUT BEFORE YOU GOT MONEY TO BUY MORE.: NEVER TRUE

## 2024-01-05 NOTE — CARE COORDINATION
Patient has graduated from the Complex Care program on 1/5/24.  Patient/family has the ability to self-manage at this time.  Care management goals have been completed. No further Ambulatory Care Manager follow up scheduled.     Goals Addressed                   This Visit's Progress     COMPLETED: Conditions and Symptoms        I will schedule office visits, as directed by my provider.  I will keep my appointment or reschedule if I have to cancel.  I will notify my provider of any barriers to my plan of care.  I will notify my provider of any symptoms that indicate a worsening of my condition.    Barriers: none  Plan for overcoming my barriers: N/A  Confidence: 10/10  Anticipated Goal Completion Date: 12/25/23         COMPLETED: Medication Management        I will take my medication as directed.  I will notify my provider of any problems with medications, like adverse effects or side effects.  I will notify my provider/Care Coordinator if I am unable to afford my medications.  I will notify my provider for advice before I stop taking any of my medication.    Barriers: none  Plan for overcoming my barriers: N/A  Confidence: 10/10  Anticipated Goal Completion Date: 12/25/23       COMPLETED: Reduce Risk of Hospitalization        I will take appropriate steps to reduce my risk of Hospitalization to include:  Take all of medications as prescribed  Visit w/ all of my recommended specialists.  Visit w/ my PCP as recommended.  Avoid activities that can trigger my chronic conditions.    Barriers: none  Plan for overcoming my barriers: N/A  Confidence: 10/10  Anticipated Goal Completion Date: 12/25/23                Patient has Ambulatory Care Manager's contact information for any further questions, concerns, or needs.  Patients upcoming visits:    Future Appointments   Date Time Provider Department Center   2/9/2024  1:00 PM Isma Mullins MD [facility] Sched   3/7/2024  9:20 AM Woodrow Anthony PA-C [facility] Sched

## 2024-01-15 RX ORDER — APIXABAN 2.5 MG/1
TABLET, FILM COATED ORAL
Qty: 180 TABLET | Refills: 3 | Status: SHIPPED | OUTPATIENT
Start: 2024-01-15

## 2024-01-29 ENCOUNTER — TELEPHONE (OUTPATIENT)
Age: 54
End: 2024-01-29

## 2024-01-29 NOTE — TELEPHONE ENCOUNTER
[facility] faxed request for cardiac clearance for urological procedure and instructions on plavix and eliquis. Procedure is scheduled for 2/19/24. Will discuss with Dr. Myers.

## 2024-01-30 NOTE — TELEPHONE ENCOUNTER
Verbal order and read back per Sunil Myers MD  Patient can proceed at low to moderate risk from a cardiac standpoint. Hold eliquis 48 hours prior to procedure.   This has been fully explained to the patient, who indicates understanding. Cardiac clearance letter faxed back to [facility].

## 2024-03-04 RX ORDER — AMIODARONE HYDROCHLORIDE 200 MG/1
200 TABLET ORAL DAILY
Qty: 90 TABLET | Refills: 3 | Status: SHIPPED | OUTPATIENT
Start: 2024-03-04

## 2024-03-13 ENCOUNTER — TELEPHONE (OUTPATIENT)
Age: 54
End: 2024-03-13

## 2024-03-13 NOTE — TELEPHONE ENCOUNTER
Received fax from [facility] for cardiac approval for holding eliquis prior to EGD on 6-3-24.    Verbal order and read back per Sunil Myers MD  Ok to hold eliquis 2 days prior to procedure     Faxed form back to provider

## 2024-03-15 NOTE — TELEPHONE ENCOUNTER
Patient called stating that he has run out of eliquis. He stated that he takes: eliquis 2.5 mg 2 tablets, twice daily.   We had on file that the patient was taking eliquis 2.5 mg 1 tablet, twice daily.   This was discussed with MARGO Lai.   Updated patient's medication list to reflect current dosage.

## 2024-04-02 RX ORDER — METOPROLOL TARTRATE 50 MG/1
50 TABLET, FILM COATED ORAL 2 TIMES DAILY
Qty: 180 TABLET | Refills: 3 | Status: SHIPPED | OUTPATIENT
Start: 2024-04-02

## 2024-04-30 NOTE — PROGRESS NOTES
1920  Bedside turnover given to me by CHARLIE Mora. Pt is on the cardiac telemetry monitor, heart rate 162 bp 139/73 pernell hospitalist.  Hillary Arrieta cardizem was resumed at 5 ml/hr after checking pt blood pressure. Pt doesn't have any symptoms of increased heart rate, he is sitting in bed watching tv he denies pain and denies shortness of breath, spoke with Dr. Meredith Mohamud received telephone order to start at 5 ml  1940 heart rate 169 cardizem increase to 10 ml after checking blood pressure and speaking with Dr. Meredith Mohamud given telephone verbal order  2006 heart rate 170 paged hospitalist spoke to Dr. Mary Lou Schulte received order to increase cardizem to 15 ml and changed patient dose, will continue to monitor patient's blood pressure and monitor for symptoms. Pt denies pain and denies needs at this time. 2115 wife and sons at bedside pt is calm and cooperative no needs no pain at this time  2230 in bed resting quietly asked for something to help him to sleep  0020 patient urinated in the urinal and I sent down a sample to the lab  0115 pt complained of pain in his back , repositioned him and gave him some ice water and offered pudding and jello  0640 on bedside commode had a bowel movement. 0230 sleeping in bed,  Assisted out of bed to the bedside commode and then assisted back into bed. Placed back on the cardiac telemetry monitor. Pt slept until 0655 am   0700 report at bedside given to [facility]. Pt is in bed on cardiac telemetry monitor in stable condition. Denies chest pain and denies shortness of breath. 1% lidocaine

## 2024-05-06 NOTE — NURSE NAVIGATOR
Reason for Admission:  Atrial fibrillation with rapid ventricular response (HCC)                 RRAT Score:    12            Plan for utilizing home health: To be determined                      Likelihood of Readmission:   Moderate                         Do you (patient/family) have any concerns for transition/discharge?  no    Transition of Care Plan:       Initial assessment completed with patient. Face sheet information confirmed:  yes. This patient lives in a apartment with wife. Patient is not able to navigate steps as needed. The apartment is on the 2nd floor and he is finding it harder and harder to navigate stairs. Prior to admission, patient was considered to be independent : yes . Cognitive status of patient:   oriented to time, place, person and situation   Patient has a current ACP document on file: no  The wife will be available to transport patient home upon discharge. The patient does not have any DME in his home at this time. Patient is not currently active with home health. Patient has not ever stayed in a skilled nursing facility or rehab. This patient is on dialysis :yes - new, starting treatment during this admission. If yes, hemodialysis patient - treatment location and dates to be determined. Currently, the discharge plan is Home with dialysis on out patient basis. Care Management Interventions  PCP Verified by CM: Yes(last seen May 2016)  Palliative Care Criteria Met (RRAT>21 & CHF Dx)?: No  Mode of Transport at Discharge: Self  Discharge Durable Medical Equipment: No  Physical Therapy Consult: No  Occupational Therapy Consult: No  Current Support Network: Lives with Spouse  Confirm Follow Up Transport: Family  Plan discussed with Pt/Family/Caregiver: Yes  Discharge Location  Discharge Placement: Home        Arthurine Mooring.  Mecca ALVARADO, RN  Care Management  [phone] 06-May-2024 20:37

## 2024-05-16 ENCOUNTER — TELEPHONE (OUTPATIENT)
Age: 54
End: 2024-05-16

## 2024-05-16 NOTE — TELEPHONE ENCOUNTER
----- Message from Sherry Sam sent at 5/16/2024 12:23 PM EDT -----  Subject: Appointment Request    Reason for Call: Established Patient Appointment needed: Routine Pre-Op    QUESTIONS    Reason for appointment request? Available appointments did not meet   patient need     Additional Information for Provider? Patient is needing a pre-op appt for   penile implant for surgery on 6/17 by Dr. Isma Mullins at [facility]. [facility] will be taking care of all the   pre-op testing blood work and EKG and all he will need is the H&P. They   said the appt can be up to a week before the surgery. Please call [facility]   [facility] to schedule at Ext [phone]  ---------------------------------------------------------------------------  --------------  CALL BACK INFO  [phone]; Do not leave any message, patient will call back for answer  ---------------------------------------------------------------------------  --------------  SCRIPT ANSWERS

## 2024-06-07 NOTE — TELEPHONE ENCOUNTER
Reviewed patients chart. Patient was last seen in the office 06/27/2023.   Patient has seen Brooke Roland NP [facility] 01/09/2024 for establish care.  Left detailed message for [facility] that Dr. Duggan is no longer patients PCP

## 2024-06-11 ENCOUNTER — OFFICE VISIT (OUTPATIENT)
Age: 54
End: 2024-06-11
Payer: MEDICARE

## 2024-06-11 VITALS
HEART RATE: 68 BPM | SYSTOLIC BLOOD PRESSURE: 124 MMHG | WEIGHT: 192 LBS | BODY MASS INDEX: 27.49 KG/M2 | DIASTOLIC BLOOD PRESSURE: 76 MMHG | OXYGEN SATURATION: 97 % | HEIGHT: 70 IN

## 2024-06-11 DIAGNOSIS — I42.9 CARDIOMYOPATHY, UNSPECIFIED TYPE (HCC): ICD-10-CM

## 2024-06-11 DIAGNOSIS — I25.10 ATHEROSCLEROSIS OF NATIVE CORONARY ARTERY OF NATIVE HEART WITHOUT ANGINA PECTORIS: ICD-10-CM

## 2024-06-11 DIAGNOSIS — I48.0 PAROXYSMAL ATRIAL FIBRILLATION (HCC): ICD-10-CM

## 2024-06-11 DIAGNOSIS — I25.10 ATHEROSCLEROSIS OF NATIVE CORONARY ARTERY WITHOUT ANGINA PECTORIS, UNSPECIFIED WHETHER NATIVE OR TRANSPLANTED HEART: ICD-10-CM

## 2024-06-11 DIAGNOSIS — I50.32 CHRONIC DIASTOLIC (CONGESTIVE) HEART FAILURE (HCC): Primary | ICD-10-CM

## 2024-06-11 PROCEDURE — 3074F SYST BP LT 130 MM HG: CPT | Performed by: INTERNAL MEDICINE

## 2024-06-11 PROCEDURE — 93000 ELECTROCARDIOGRAM COMPLETE: CPT | Performed by: INTERNAL MEDICINE

## 2024-06-11 PROCEDURE — 99214 OFFICE O/P EST MOD 30 MIN: CPT | Performed by: INTERNAL MEDICINE

## 2024-06-11 PROCEDURE — 3078F DIAST BP <80 MM HG: CPT | Performed by: INTERNAL MEDICINE

## 2024-06-11 ASSESSMENT — ANXIETY QUESTIONNAIRES
IF YOU CHECKED OFF ANY PROBLEMS ON THIS QUESTIONNAIRE, HOW DIFFICULT HAVE THESE PROBLEMS MADE IT FOR YOU TO DO YOUR WORK, TAKE CARE OF THINGS AT HOME, OR GET ALONG WITH OTHER PEOPLE: NOT DIFFICULT AT ALL
GAD7 TOTAL SCORE: 0
3. WORRYING TOO MUCH ABOUT DIFFERENT THINGS: NOT AT ALL
2. NOT BEING ABLE TO STOP OR CONTROL WORRYING: NOT AT ALL
7. FEELING AFRAID AS IF SOMETHING AWFUL MIGHT HAPPEN: NOT AT ALL
4. TROUBLE RELAXING: NOT AT ALL
IF YOU CHECKED OFF ANY PROBLEMS ON THIS QUESTIONNAIRE, HOW DIFFICULT HAVE THESE PROBLEMS MADE IT FOR YOU TO DO YOUR WORK, TAKE CARE OF THINGS AT HOME, OR GET ALONG WITH OTHER PEOPLE: NOT DIFFICULT AT ALL
7. FEELING AFRAID AS IF SOMETHING AWFUL MIGHT HAPPEN: NOT AT ALL
4. TROUBLE RELAXING: NOT AT ALL
3. WORRYING TOO MUCH ABOUT DIFFERENT THINGS: NOT AT ALL
1. FEELING NERVOUS, ANXIOUS, OR ON EDGE: NOT AT ALL
GAD7 TOTAL SCORE: 0
2. NOT BEING ABLE TO STOP OR CONTROL WORRYING: NOT AT ALL
5. BEING SO RESTLESS THAT IT IS HARD TO SIT STILL: NOT AT ALL
6. BECOMING EASILY ANNOYED OR IRRITABLE: NOT AT ALL
5. BEING SO RESTLESS THAT IT IS HARD TO SIT STILL: NOT AT ALL
6. BECOMING EASILY ANNOYED OR IRRITABLE: NOT AT ALL
1. FEELING NERVOUS, ANXIOUS, OR ON EDGE: NOT AT ALL

## 2024-06-11 ASSESSMENT — PATIENT HEALTH QUESTIONNAIRE - PHQ9
1. LITTLE INTEREST OR PLEASURE IN DOING THINGS: NOT AT ALL
SUM OF ALL RESPONSES TO PHQ QUESTIONS 1-9: 0
2. FEELING DOWN, DEPRESSED OR HOPELESS: NOT AT ALL
SUM OF ALL RESPONSES TO PHQ9 QUESTIONS 1 & 2: 0
SUM OF ALL RESPONSES TO PHQ QUESTIONS 1-9: 0

## 2024-06-11 NOTE — PROGRESS NOTES
HISTORY OF PRESENT ILLNESS  Angelo Mireles  53 y.o. male     Chief Complaint   Patient presents with    Follow-up     6 month       ASSESSMENT and PLAN    The primary encounter diagnosis was Chronic diastolic (congestive) heart failure (HCC). Diagnoses of Atherosclerosis of native coronary artery without angina pectoris, unspecified whether native or transplanted heart, Paroxysmal atrial fibrillation (HCC), Cardiomyopathy, unspecified type (HCC), and Atherosclerosis of native coronary artery of native heart without angina pectoris were also pertinent to this visit.    . Angelo Mireles has history of diabetes mellitus, diabetic retinopathy legally blind, coronary artery disease without chest pains, ischemic cardiomyopathy. He presented with significant fatigue, increased shortness of breath and had LAD stent in October of 2012 by Dr. Phillip Alfred. He has never had chest pains. He has history of mildly diminished LV function with ejection fraction of 40-50%.  In 2019, he was on HD with baseline weight of 199 pounds.  Kidney transplant evaluation was performed in 2019.  He underwent coronary angiography on 8/21/2019 without significant disease in his RCA or circumflex.  He had 35% stenosis in his LAD with IFR which was negative at 0.93-0.95.  No further coronary intervention was needed.  In December 2021, he underwent coronary angiography per the request of renal transplant team, which revealed diffuse mild disease in his left dominant system with the exception of the first diagonal branch with ostial 95% stenosis.  Continued medical therapy is recommended.  His echocardiogram on 12/17/2021 revealed normal EF 50-55%.  In the winter 2021/2022, he was diagnosed with PAF.  He had tolerated this reasonably well until around March 2022, when he began to experience rapid PAF whenever he went on dialysis.  Because of frequent visits to the ED with rapid PAF, in the summer 2022, he had PAF ablation.  In December 2022, he was

## 2024-06-11 NOTE — PROGRESS NOTES
.Angelo Mireles presents today for   Chief Complaint   Patient presents with    Follow-up     6 month       Angelo Mireles preferred language for health care discussion is english/other.    Is someone accompanying this pt? no    Is the patient using any DME equipment during OV? no    Depression Screening:  Depression: Not at risk (12/6/2023)    PHQ-2     PHQ-2 Score: 0        Learning Assessment:  Who is the primary learner? Patient    What is the preferred language for health care of the primary learner? ENGLISH    How does the primary learner prefer to learn new concepts? DEMONSTRATION    Answered By patient    Relationship to Learner SELF           Pt currently taking Anticoagulant therapy? Eliquis 5 mg twice a day    Pt currently taking Antiplatelet therapy ? no      Coordination of Care:  1. Have you been to the ER, urgent care clinic since your last visit? Hospitalized since your last visit? no    2. Have you seen or consulted any other health care providers outside of the [facility] since your last visit? Include any pap smears or colon screening. no

## 2024-07-05 ENCOUNTER — TELEPHONE (OUTPATIENT)
Facility: CLINIC | Age: 54
End: 2024-07-05

## 2024-07-05 NOTE — TELEPHONE ENCOUNTER
This pharmacy faxed over request for the following prescriptions to be filled:    Medication requested : ATORVASTATIN  PCP:   Pharmacy or Print:  PHARMACY   Mail order or Local pharmacy [facility] [address]    Scheduled appointment if not seen by current providers in office: [facility] APPT 07/01/2024

## 2024-07-18 RX ORDER — ATORVASTATIN CALCIUM 80 MG/1
80 TABLET, FILM COATED ORAL
Qty: 90 TABLET | Refills: 3 | Status: SHIPPED | OUTPATIENT
Start: 2024-07-18

## 2025-01-13 ENCOUNTER — TELEPHONE (OUTPATIENT)
Age: 55
End: 2025-01-13

## 2025-03-09 ENCOUNTER — APPOINTMENT (OUTPATIENT)
Facility: HOSPITAL | Age: 55
DRG: 004 | End: 2025-03-09
Payer: MEDICARE

## 2025-03-09 ENCOUNTER — HOSPITAL ENCOUNTER (INPATIENT)
Facility: HOSPITAL | Age: 55
LOS: 23 days | Discharge: NURSING, REHAB OR LONG-TERM CARE | DRG: 004 | End: 2025-04-01
Attending: EMERGENCY MEDICINE | Admitting: HOSPITALIST
Payer: MEDICARE

## 2025-03-09 ENCOUNTER — APPOINTMENT (OUTPATIENT)
Facility: HOSPITAL | Age: 55
DRG: 004 | End: 2025-03-09
Attending: INTERNAL MEDICINE
Payer: MEDICARE

## 2025-03-09 DIAGNOSIS — J96.01 ACUTE RESPIRATORY FAILURE WITH HYPOXIA: Primary | ICD-10-CM

## 2025-03-09 DIAGNOSIS — R06.02 SHORTNESS OF BREATH: ICD-10-CM

## 2025-03-09 DIAGNOSIS — E87.20 METABOLIC ACIDOSIS: ICD-10-CM

## 2025-03-09 DIAGNOSIS — I47.20 VENTRICULAR TACHYCARDIA (HCC): ICD-10-CM

## 2025-03-09 DIAGNOSIS — J81.0 ACUTE PULMONARY EDEMA (HCC): ICD-10-CM

## 2025-03-09 DIAGNOSIS — R78.81 BACTEREMIA: ICD-10-CM

## 2025-03-09 DIAGNOSIS — I46.9 CARDIAC ARREST: ICD-10-CM

## 2025-03-09 DIAGNOSIS — R65.10 SIRS (SYSTEMIC INFLAMMATORY RESPONSE SYNDROME) (HCC): ICD-10-CM

## 2025-03-09 DIAGNOSIS — E87.3 ACUTE RESPIRATORY ALKALOSIS: ICD-10-CM

## 2025-03-09 DIAGNOSIS — J10.1 INFLUENZA A: ICD-10-CM

## 2025-03-09 PROBLEM — J18.9 PNEUMONIA: Status: ACTIVE | Noted: 2025-03-09

## 2025-03-09 PROBLEM — J96.00 ACUTE RESPIRATORY FAILURE (HCC): Status: ACTIVE | Noted: 2025-03-09

## 2025-03-09 PROBLEM — I10 HTN (HYPERTENSION): Status: ACTIVE | Noted: 2025-03-09

## 2025-03-09 PROBLEM — D84.9 IMMUNOSUPPRESSED STATUS: Status: ACTIVE | Noted: 2025-03-09

## 2025-03-09 PROBLEM — Z94.0 RENAL TRANSPLANT, STATUS POST: Status: ACTIVE | Noted: 2025-03-09

## 2025-03-09 PROBLEM — N17.9 ACUTE KIDNEY INJURY SUPERIMPOSED ON CKD: Status: ACTIVE | Noted: 2025-03-09

## 2025-03-09 PROBLEM — N18.9 ACUTE KIDNEY INJURY SUPERIMPOSED ON CKD: Status: ACTIVE | Noted: 2025-03-09

## 2025-03-09 LAB
ALBUMIN SERPL-MCNC: 2.2 G/DL (ref 3.4–5)
ALBUMIN SERPL-MCNC: 2.5 G/DL (ref 3.4–5)
ALBUMIN/GLOB SERPL: 0.8 (ref 0.8–1.7)
ALBUMIN/GLOB SERPL: 1.2 (ref 0.8–1.7)
ALP SERPL-CCNC: 108 U/L (ref 45–117)
ALP SERPL-CCNC: 81 U/L (ref 45–117)
ALT SERPL-CCNC: 1116 U/L (ref 16–61)
ALT SERPL-CCNC: 840 U/L (ref 16–61)
AMORPH CRY URNS QL MICRO: ABNORMAL
AMPHET UR QL SCN: NEGATIVE
ANION GAP BLD CALC-SCNC: ABNORMAL MMOL/L (ref 10–20)
ANION GAP SERPL CALC-SCNC: 16 MMOL/L (ref 3–18)
ANION GAP SERPL CALC-SCNC: 19 MMOL/L (ref 3–18)
ANION GAP SERPL CALC-SCNC: 22 MMOL/L (ref 3–18)
ANION GAP SERPL CALC-SCNC: 23 MMOL/L (ref 3–18)
APPEARANCE UR: ABNORMAL
ARTERIAL PATENCY WRIST A: ABNORMAL
ARTERIAL PATENCY WRIST A: POSITIVE
AST SERPL-CCNC: 130 U/L (ref 10–38)
AST SERPL-CCNC: 1615 U/L (ref 10–38)
B PERT DNA SPEC QL NAA+PROBE: NOT DETECTED
B-OH-BUTYR SERPL-SCNC: >4.42 MMOL/L
BACTERIA URNS QL MICRO: ABNORMAL /HPF
BARBITURATES UR QL SCN: NEGATIVE
BASE DEFICIT BLD-SCNC: 12.7 MMOL/L
BASE DEFICIT BLD-SCNC: 13.2 MMOL/L
BASE DEFICIT BLD-SCNC: 15 MMOL/L
BASE DEFICIT BLD-SCNC: 8.8 MMOL/L
BASOPHILS # BLD: 0 K/UL (ref 0–0.1)
BASOPHILS NFR BLD: 0 % (ref 0–2)
BDY SITE: ABNORMAL
BENZODIAZ UR QL: NEGATIVE
BILIRUB SERPL-MCNC: 1.8 MG/DL (ref 0.2–1)
BILIRUB SERPL-MCNC: 1.8 MG/DL (ref 0.2–1)
BILIRUB UR QL: NEGATIVE
BORDETELLA PARAPERTUSSIS BY PCR: NOT DETECTED
BUN SERPL-MCNC: 38 MG/DL (ref 7–18)
BUN SERPL-MCNC: 41 MG/DL (ref 7–18)
BUN SERPL-MCNC: 43 MG/DL (ref 7–18)
BUN SERPL-MCNC: 44 MG/DL (ref 7–18)
BUN/CREAT SERPL: 13 (ref 12–20)
BUN/CREAT SERPL: 14 (ref 12–20)
BUN/CREAT SERPL: 14 (ref 12–20)
BUN/CREAT SERPL: 15 (ref 12–20)
C PNEUM DNA SPEC QL NAA+PROBE: NOT DETECTED
CA-I BLD-MCNC: 1.13 MMOL/L (ref 1.15–1.33)
CA-I SERPL-SCNC: 0.98 MMOL/L (ref 1.12–1.32)
CALCIUM SERPL-MCNC: 7.1 MG/DL (ref 8.5–10.1)
CALCIUM SERPL-MCNC: 7.1 MG/DL (ref 8.5–10.1)
CALCIUM SERPL-MCNC: 7.5 MG/DL (ref 8.5–10.1)
CALCIUM SERPL-MCNC: 8.6 MG/DL (ref 8.5–10.1)
CANNABINOIDS UR QL SCN: NEGATIVE
CHLORIDE BLD-SCNC: 106 MMOL/L (ref 98–107)
CHLORIDE SERPL-SCNC: 100 MMOL/L (ref 100–111)
CHLORIDE SERPL-SCNC: 101 MMOL/L (ref 100–111)
CHLORIDE SERPL-SCNC: 104 MMOL/L (ref 100–111)
CHLORIDE SERPL-SCNC: 98 MMOL/L (ref 100–111)
CK SERPL-CCNC: 510 U/L (ref 39–308)
CO2 BLD-SCNC: 8 MMOL/L (ref 22–29)
CO2 SERPL-SCNC: 20 MMOL/L (ref 21–32)
CO2 SERPL-SCNC: 22 MMOL/L (ref 21–32)
CO2 SERPL-SCNC: 23 MMOL/L (ref 21–32)
CO2 SERPL-SCNC: 9 MMOL/L (ref 21–32)
COCAINE UR QL SCN: NEGATIVE
COLOR UR: ABNORMAL
CREAT BLD-MCNC: 2.16 MG/DL (ref 0.6–1.3)
CREAT SERPL-MCNC: 2.47 MG/DL (ref 0.6–1.3)
CREAT SERPL-MCNC: 2.85 MG/DL (ref 0.6–1.3)
CREAT SERPL-MCNC: 3.16 MG/DL (ref 0.6–1.3)
CREAT SERPL-MCNC: 3.52 MG/DL (ref 0.6–1.3)
D DIMER PPP FEU-MCNC: 1.83 UG/ML(FEU)
DIFFERENTIAL METHOD BLD: ABNORMAL
ECHO AO ROOT DIAM: 3.9 CM
ECHO AO ROOT INDEX: 1.89 CM/M2
ECHO AV AREA PEAK VELOCITY: 4.5 CM2
ECHO AV AREA VTI: 6.1 CM2
ECHO AV AREA/BSA PEAK VELOCITY: 2.2 CM2/M2
ECHO AV AREA/BSA VTI: 3 CM2/M2
ECHO AV MEAN GRADIENT: 3 MMHG
ECHO AV MEAN VELOCITY: 0.8 M/S
ECHO AV PEAK GRADIENT: 4 MMHG
ECHO AV PEAK VELOCITY: 1 M/S
ECHO AV VELOCITY RATIO: 0.9
ECHO AV VTI: 10.9 CM
ECHO BSA: 2.08 M2
ECHO EST RA PRESSURE: 15 MMHG
ECHO LA DIAMETER INDEX: 1.84 CM/M2
ECHO LA DIAMETER: 3.8 CM
ECHO LA TO AORTIC ROOT RATIO: 0.97
ECHO LA VOL A-L A2C: 41 ML (ref 18–58)
ECHO LA VOL A-L A4C: 65 ML (ref 18–58)
ECHO LA VOL BP: 52 ML (ref 18–58)
ECHO LA VOL MOD A2C: 39 ML (ref 18–58)
ECHO LA VOL MOD A4C: 63 ML (ref 18–58)
ECHO LA VOL/BSA BIPLANE: 25 ML/M2 (ref 16–34)
ECHO LA VOLUME AREA LENGTH: 55 ML
ECHO LA VOLUME INDEX A-L A2C: 20 ML/M2 (ref 16–34)
ECHO LA VOLUME INDEX A-L A4C: 32 ML/M2 (ref 16–34)
ECHO LA VOLUME INDEX AREA LENGTH: 27 ML/M2 (ref 16–34)
ECHO LA VOLUME INDEX MOD A2C: 19 ML/M2 (ref 16–34)
ECHO LA VOLUME INDEX MOD A4C: 31 ML/M2 (ref 16–34)
ECHO LV E' LATERAL VELOCITY: 6.42 CM/S
ECHO LV E' SEPTAL VELOCITY: 4.7 CM/S
ECHO LV EDV A2C: 31 ML
ECHO LV EDV A4C: 62 ML
ECHO LV EDV BP: 45 ML (ref 67–155)
ECHO LV EDV INDEX A4C: 30 ML/M2
ECHO LV EDV INDEX BP: 22 ML/M2
ECHO LV EDV NDEX A2C: 15 ML/M2
ECHO LV EF PHYSICIAN: 45 %
ECHO LV EJECTION FRACTION A2C: 36 %
ECHO LV EJECTION FRACTION A2C: 41 %
ECHO LV EJECTION FRACTION A4C: 38 %
ECHO LV EJECTION FRACTION A4C: 52 %
ECHO LV EJECTION FRACTION BIPLANE: 46 % (ref 55–100)
ECHO LV ESV A2C: 20 ML
ECHO LV ESV A4C: 30 ML
ECHO LV ESV BP: 24 ML (ref 22–58)
ECHO LV ESV INDEX A2C: 10 ML/M2
ECHO LV ESV INDEX A4C: 15 ML/M2
ECHO LV ESV INDEX BP: 12 ML/M2
ECHO LV FRACTIONAL SHORTENING: 23 % (ref 28–44)
ECHO LV GLOBAL LONGITUDINAL STRAIN (GLS): -4.4 %
ECHO LV GLOBAL LONGITUDINAL STRAIN (GLS): -4.6 %
ECHO LV GLOBAL LONGITUDINAL STRAIN (GLS): -5.2 %
ECHO LV GLOBAL LONGITUDINAL STRAIN (GLS): -6.5 %
ECHO LV INTERNAL DIMENSION DIASTOLE INDEX: 2.33 CM/M2
ECHO LV INTERNAL DIMENSION DIASTOLIC: 4.8 CM (ref 4.2–5.9)
ECHO LV INTERNAL DIMENSION SYSTOLIC INDEX: 1.8 CM/M2
ECHO LV INTERNAL DIMENSION SYSTOLIC: 3.7 CM
ECHO LV IVSD: 0.8 CM (ref 0.6–1)
ECHO LV MASS 2D: 147.8 G (ref 88–224)
ECHO LV MASS INDEX 2D: 71.7 G/M2 (ref 49–115)
ECHO LV POSTERIOR WALL DIASTOLIC: 1 CM (ref 0.6–1)
ECHO LV RELATIVE WALL THICKNESS RATIO: 0.42
ECHO LVOT AREA: 4.9 CM2
ECHO LVOT AV VTI INDEX: 1.24
ECHO LVOT DIAM: 2.5 CM
ECHO LVOT MEAN GRADIENT: 2 MMHG
ECHO LVOT PEAK GRADIENT: 4 MMHG
ECHO LVOT PEAK VELOCITY: 0.9 M/S
ECHO LVOT STROKE VOLUME INDEX: 32.2 ML/M2
ECHO LVOT SV: 66.2 ML
ECHO LVOT VTI: 13.5 CM
ECHO MV A VELOCITY: 0.65 M/S
ECHO MV AREA VTI: 4.9 CM2
ECHO MV E DECELERATION TIME (DT): 155.5 MS
ECHO MV E VELOCITY: 0.67 M/S
ECHO MV E/A RATIO: 1.03
ECHO MV E/E' LATERAL: 10.44
ECHO MV E/E' RATIO (AVERAGED): 12.35
ECHO MV E/E' SEPTAL: 14.26
ECHO MV LVOT VTI INDEX: 0.99
ECHO MV MAX VELOCITY: 0.6 M/S
ECHO MV MEAN GRADIENT: 1 MMHG
ECHO MV MEAN VELOCITY: 0.4 M/S
ECHO MV PEAK GRADIENT: 2 MMHG
ECHO MV VTI: 13.4 CM
ECHO PV MAX VELOCITY: 0.8 M/S
ECHO PV MEAN GRADIENT: 1 MMHG
ECHO PV MEAN VELOCITY: 0.5 M/S
ECHO PV PEAK GRADIENT: 3 MMHG
ECHO RA END SYSTOLIC VOLUME APICAL 4 CHAMBER INDEX BSA: 24 ML/M2
ECHO RA VOLUME: 49 ML
ECHO RIGHT VENTRICULAR SYSTOLIC PRESSURE (RVSP): 41 MMHG
ECHO RV FREE WALL PEAK S': 12.6 CM/S
ECHO RV INTERNAL DIMENSION: 3.8 CM
ECHO RV TAPSE: 2.2 CM (ref 1.7–?)
ECHO RVOT MEAN GRADIENT: 1 MMHG
ECHO RVOT PEAK GRADIENT: 2 MMHG
ECHO RVOT PEAK VELOCITY: 0.7 M/S
ECHO RVOT VTI: 9.4 CM
ECHO TV REGURGITANT MAX VELOCITY: 2.54 M/S
ECHO TV REGURGITANT PEAK GRADIENT: 26 MMHG
EKG ATRIAL RATE: 121 BPM
EKG ATRIAL RATE: 135 BPM
EKG DIAGNOSIS: NORMAL
EKG P AXIS: 2 DEGREES
EKG P AXIS: 46 DEGREES
EKG P-R INTERVAL: 154 MS
EKG P-R INTERVAL: 174 MS
EKG Q-T INTERVAL: 260 MS
EKG Q-T INTERVAL: 386 MS
EKG Q-T INTERVAL: 394 MS
EKG QRS DURATION: 106 MS
EKG QRS DURATION: 144 MS
EKG QRS DURATION: 70 MS
EKG QTC CALCULATION (BAZETT): 390 MS
EKG QTC CALCULATION (BAZETT): 559 MS
EKG QTC CALCULATION (BAZETT): 589 MS
EKG R AXIS: -47 DEGREES
EKG R AXIS: -70 DEGREES
EKG R AXIS: -8 DEGREES
EKG T AXIS: 83 DEGREES
EKG T AXIS: 89 DEGREES
EKG T AXIS: 93 DEGREES
EKG VENTRICULAR RATE: 121 BPM
EKG VENTRICULAR RATE: 135 BPM
EKG VENTRICULAR RATE: 140 BPM
EOSINOPHIL # BLD: 0 K/UL (ref 0–0.4)
EOSINOPHIL NFR BLD: 0 % (ref 0–5)
EPITH CASTS URNS QL MICRO: ABNORMAL /LPF (ref 0–5)
ERYTHROCYTE [DISTWIDTH] IN BLOOD BY AUTOMATED COUNT: 15.2 % (ref 11.6–14.5)
ERYTHROCYTE [DISTWIDTH] IN BLOOD BY AUTOMATED COUNT: 15.3 % (ref 11.6–14.5)
FIO2 ON VENT: 50 %
FLUAV H1 2009 PAND RNA SPEC QL NAA+PROBE: DETECTED
FLUAV RNA SPEC QL NAA+PROBE: DETECTED
FLUBV RNA SPEC QL NAA+PROBE: NOT DETECTED
FLUBV RNA SPEC QL NAA+PROBE: NOT DETECTED
GAS FLOW.O2 O2 DELIVERY SYS: ABNORMAL
GAS FLOW.O2 SETTING OXYMISER: 22 BPM
GAS FLOW.O2 SETTING OXYMISER: 26 BPM
GAS FLOW.O2 SETTING OXYMISER: 26 BPM
GLOBULIN SER CALC-MCNC: 1.9 G/DL (ref 2–4)
GLOBULIN SER CALC-MCNC: 3.2 G/DL (ref 2–4)
GLUCOSE BLD STRIP.AUTO-MCNC: 346 MG/DL (ref 70–110)
GLUCOSE BLD STRIP.AUTO-MCNC: 349 MG/DL (ref 70–110)
GLUCOSE BLD STRIP.AUTO-MCNC: 352 MG/DL (ref 70–110)
GLUCOSE BLD STRIP.AUTO-MCNC: 358 MG/DL (ref 70–110)
GLUCOSE BLD STRIP.AUTO-MCNC: 376 MG/DL (ref 70–110)
GLUCOSE BLD STRIP.AUTO-MCNC: 393 MG/DL (ref 70–110)
GLUCOSE BLD STRIP.AUTO-MCNC: 397 MG/DL (ref 70–110)
GLUCOSE BLD STRIP.AUTO-MCNC: 430 MG/DL (ref 70–110)
GLUCOSE BLD STRIP.AUTO-MCNC: 450 MG/DL (ref 70–110)
GLUCOSE BLD STRIP.AUTO-MCNC: 454 MG/DL (ref 70–110)
GLUCOSE BLD STRIP.AUTO-MCNC: 467 MG/DL (ref 70–110)
GLUCOSE BLD-MCNC: 465 MG/DL (ref 74–99)
GLUCOSE SERPL-MCNC: 391 MG/DL (ref 74–99)
GLUCOSE SERPL-MCNC: 421 MG/DL (ref 74–99)
GLUCOSE SERPL-MCNC: 449 MG/DL (ref 74–99)
GLUCOSE SERPL-MCNC: 495 MG/DL (ref 74–99)
GLUCOSE UR STRIP.AUTO-MCNC: >1000 MG/DL
HADV DNA SPEC QL NAA+PROBE: NOT DETECTED
HCO3 BLD-SCNC: 16.6 MMOL/L (ref 21–28)
HCO3 BLD-SCNC: 18 MMOL/L (ref 21–28)
HCO3 BLD-SCNC: 18.8 MMOL/L (ref 21–28)
HCO3 BLD-SCNC: 8.3 MMOL/L (ref 21–28)
HCOV 229E RNA SPEC QL NAA+PROBE: NOT DETECTED
HCOV HKU1 RNA SPEC QL NAA+PROBE: NOT DETECTED
HCOV NL63 RNA SPEC QL NAA+PROBE: NOT DETECTED
HCOV OC43 RNA SPEC QL NAA+PROBE: NOT DETECTED
HCT VFR BLD AUTO: 40.2 % (ref 36–48)
HCT VFR BLD AUTO: 46.2 % (ref 36–48)
HGB BLD-MCNC: 12.3 G/DL (ref 13–16)
HGB BLD-MCNC: 15.2 G/DL (ref 13–16)
HGB UR QL STRIP: ABNORMAL
HMPV RNA SPEC QL NAA+PROBE: NOT DETECTED
HPIV1 RNA SPEC QL NAA+PROBE: NOT DETECTED
HPIV2 RNA SPEC QL NAA+PROBE: NOT DETECTED
HPIV3 RNA SPEC QL NAA+PROBE: NOT DETECTED
HPIV4 RNA SPEC QL NAA+PROBE: NOT DETECTED
IMM GRANULOCYTES # BLD AUTO: 0 K/UL
IMM GRANULOCYTES NFR BLD AUTO: 0 %
INR PPP: 2.2 (ref 0.9–1.1)
KETONES UR QL STRIP.AUTO: 15 MG/DL
L PNEUMO AG UR QL IA: NEGATIVE
LACTATE BLD-SCNC: 5.89 MMOL/L (ref 0.4–2)
LACTATE BLD-SCNC: 6.23 MMOL/L (ref 0.4–2)
LACTATE SERPL-SCNC: 11.9 MMOL/L (ref 0.4–2)
LACTATE SERPL-SCNC: 13.3 MMOL/L (ref 0.4–2)
LEUKOCYTE ESTERASE UR QL STRIP.AUTO: NEGATIVE
LIPASE SERPL-CCNC: 31 U/L (ref 13–75)
LYMPHOCYTES # BLD: 0.22 K/UL (ref 0.9–3.6)
LYMPHOCYTES NFR BLD: 2 % (ref 21–52)
Lab: NORMAL
M PNEUMO DNA SPEC QL NAA+PROBE: NOT DETECTED
MAGNESIUM SERPL-MCNC: 2 MG/DL (ref 1.6–2.6)
MCH RBC QN AUTO: 29.9 PG (ref 24–34)
MCH RBC QN AUTO: 30 PG (ref 24–34)
MCHC RBC AUTO-ENTMCNC: 30.6 G/DL (ref 31–37)
MCHC RBC AUTO-ENTMCNC: 32.9 G/DL (ref 31–37)
MCV RBC AUTO: 91.3 FL (ref 78–100)
MCV RBC AUTO: 97.8 FL (ref 78–100)
METAMYELOCYTES NFR BLD MANUAL: 6 %
METHADONE UR QL: NEGATIVE
MONOCYTES # BLD: 1.22 K/UL (ref 0.05–1.2)
MONOCYTES NFR BLD: 11 % (ref 3–10)
NEUTS BAND NFR BLD MANUAL: 57 % (ref 0–5)
NEUTS SEG # BLD: 8.99 K/UL (ref 1.8–8)
NEUTS SEG NFR BLD: 24 % (ref 40–73)
NITRITE UR QL STRIP.AUTO: NEGATIVE
NRBC # BLD: 0 K/UL (ref 0–0.01)
NRBC # BLD: 0.08 K/UL (ref 0–0.01)
NRBC BLD-RTO: 0 PER 100 WBC
NRBC BLD-RTO: 1.1 PER 100 WBC
NT PRO BNP: 4241 PG/ML (ref 0–900)
NT PRO BNP: ABNORMAL PG/ML (ref 0–900)
O2/TOTAL GAS SETTING VFR VENT: 100 %
OPIATES UR QL: NEGATIVE
PCO2 BLD: 13.8 MMHG (ref 35–48)
PCO2 BLD: 34.2 MMHG (ref 35–48)
PCO2 BLD: 71.1 MMHG (ref 35–48)
PCO2 BLD: 75 MMHG (ref 35–48)
PCP UR QL: NEGATIVE
PEEP RESPIRATORY: 5 CMH2O
PEEP RESPIRATORY: 8
PH BLD: 6.99 (ref 7.35–7.45)
PH BLD: 7.03 (ref 7.35–7.45)
PH BLD: 7.29 (ref 7.35–7.45)
PH BLD: 7.39 (ref 7.35–7.45)
PH UR STRIP: 5.5 (ref 5–8)
PHOSPHATE SERPL-MCNC: 4.5 MG/DL (ref 2.5–4.9)
PLATELET # BLD AUTO: 127 K/UL (ref 135–420)
PLATELET # BLD AUTO: 133 K/UL (ref 135–420)
PLATELET COMMENT: ABNORMAL
PMV BLD AUTO: 12.5 FL (ref 9.2–11.8)
PMV BLD AUTO: 13.8 FL (ref 9.2–11.8)
PO2 BLD: 101 MMHG (ref 83–108)
PO2 BLD: 53 MMHG (ref 83–108)
PO2 BLD: 68 MMHG (ref 83–108)
PO2 BLD: 83 MMHG (ref 83–108)
POTASSIUM BLD-SCNC: 4.3 MMOL/L (ref 3.5–5.1)
POTASSIUM SERPL-SCNC: 2.8 MMOL/L (ref 3.5–5.5)
POTASSIUM SERPL-SCNC: 3.3 MMOL/L (ref 3.5–5.5)
POTASSIUM SERPL-SCNC: 3.9 MMOL/L (ref 3.5–5.5)
POTASSIUM SERPL-SCNC: 4.4 MMOL/L (ref 3.5–5.5)
PROCALCITONIN SERPL-MCNC: 100.86 NG/ML
PROCALCITONIN SERPL-MCNC: 41.09 NG/ML
PROT SERPL-MCNC: 4.1 G/DL (ref 6.4–8.2)
PROT SERPL-MCNC: 5.7 G/DL (ref 6.4–8.2)
PROT UR STRIP-MCNC: 100 MG/DL
PROTHROMBIN TIME: 24.6 SEC (ref 11.9–14.9)
RBC # BLD AUTO: 4.11 M/UL (ref 4.35–5.65)
RBC # BLD AUTO: 5.06 M/UL (ref 4.35–5.65)
RBC #/AREA URNS HPF: ABNORMAL /HPF (ref 0–5)
RBC MORPH BLD: ABNORMAL
RSV RNA SPEC QL NAA+PROBE: NOT DETECTED
RV+EV RNA SPEC QL NAA+PROBE: NOT DETECTED
S PNEUM AG UR QL: NEGATIVE
SAO2 % BLD: 80 % (ref 92–97)
SAO2 % BLD: 83.6 % (ref 92–97)
SAO2 % BLD: 88.9 % (ref 92–97)
SAO2 % BLD: 98 % (ref 94–98)
SARS-COV-2 RNA RESP QL NAA+PROBE: NOT DETECTED
SARS-COV-2 RNA RESP QL NAA+PROBE: NOT DETECTED
SERVICE CMNT-IMP: ABNORMAL
SODIUM BLD-SCNC: 129 MMOL/L (ref 136–145)
SODIUM SERPL-SCNC: 132 MMOL/L (ref 136–145)
SODIUM SERPL-SCNC: 140 MMOL/L (ref 136–145)
SODIUM SERPL-SCNC: 142 MMOL/L (ref 136–145)
SODIUM SERPL-SCNC: 143 MMOL/L (ref 136–145)
SOURCE: ABNORMAL
SP GR UR REFRACTOMETRY: 1.02 (ref 1–1.03)
SPECIMEN SITE: ABNORMAL
SPECIMEN TYPE: ABNORMAL
TROPONIN I SERPL HS-MCNC: 170 NG/L (ref 0–78)
TROPONIN I SERPL HS-MCNC: 296 NG/L (ref 0–78)
TROPONIN I SERPL HS-MCNC: 42 NG/L (ref 0–78)
TSH SERPL DL<=0.05 MIU/L-ACNC: 0.24 UIU/ML (ref 0.36–3.74)
UROBILINOGEN UR QL STRIP.AUTO: 1 EU/DL (ref 0.2–1)
VENTILATION MODE VENT: ABNORMAL
VT SETTING VENT: 1220
VT SETTING VENT: 420 ML
VT SETTING VENT: 450 ML
VT SETTING VENT: 510 ML
WBC # BLD AUTO: 11.1 K/UL (ref 4.6–13.2)
WBC # BLD AUTO: 7.5 K/UL (ref 4.6–13.2)
WBC URNS QL MICRO: ABNORMAL /HPF (ref 0–5)

## 2025-03-09 PROCEDURE — 81001 URINALYSIS AUTO W/SCOPE: CPT

## 2025-03-09 PROCEDURE — 2000000000 HC ICU R&B

## 2025-03-09 PROCEDURE — 2500000003 HC RX 250 WO HCPCS: Performed by: EMERGENCY MEDICINE

## 2025-03-09 PROCEDURE — 6360000004 HC RX CONTRAST MEDICATION: Performed by: HOSPITALIST

## 2025-03-09 PROCEDURE — 96374 THER/PROPH/DIAG INJ IV PUSH: CPT

## 2025-03-09 PROCEDURE — 83880 ASSAY OF NATRIURETIC PEPTIDE: CPT

## 2025-03-09 PROCEDURE — 71275 CT ANGIOGRAPHY CHEST: CPT

## 2025-03-09 PROCEDURE — 87449 NOS EACH ORGANISM AG IA: CPT

## 2025-03-09 PROCEDURE — 84443 ASSAY THYROID STIM HORMONE: CPT

## 2025-03-09 PROCEDURE — 6360000002 HC RX W HCPCS: Performed by: EMERGENCY MEDICINE

## 2025-03-09 PROCEDURE — 99291 CRITICAL CARE FIRST HOUR: CPT

## 2025-03-09 PROCEDURE — 80053 COMPREHEN METABOLIC PANEL: CPT

## 2025-03-09 PROCEDURE — 83735 ASSAY OF MAGNESIUM: CPT

## 2025-03-09 PROCEDURE — 82803 BLOOD GASES ANY COMBINATION: CPT

## 2025-03-09 PROCEDURE — 6370000000 HC RX 637 (ALT 250 FOR IP): Performed by: EMERGENCY MEDICINE

## 2025-03-09 PROCEDURE — 51702 INSERT TEMP BLADDER CATH: CPT

## 2025-03-09 PROCEDURE — 2580000003 HC RX 258: Performed by: EMERGENCY MEDICINE

## 2025-03-09 PROCEDURE — 82330 ASSAY OF CALCIUM: CPT

## 2025-03-09 PROCEDURE — 82962 GLUCOSE BLOOD TEST: CPT

## 2025-03-09 PROCEDURE — 93306 TTE W/DOPPLER COMPLETE: CPT | Performed by: INTERNAL MEDICINE

## 2025-03-09 PROCEDURE — 2500000003 HC RX 250 WO HCPCS: Performed by: INTERNAL MEDICINE

## 2025-03-09 PROCEDURE — 82010 KETONE BODYS QUAN: CPT

## 2025-03-09 PROCEDURE — 93005 ELECTROCARDIOGRAM TRACING: CPT | Performed by: INTERNAL MEDICINE

## 2025-03-09 PROCEDURE — 0D9670Z DRAINAGE OF STOMACH WITH DRAINAGE DEVICE, VIA NATURAL OR ARTIFICIAL OPENING: ICD-10-PCS | Performed by: HOSPITALIST

## 2025-03-09 PROCEDURE — 74018 RADEX ABDOMEN 1 VIEW: CPT

## 2025-03-09 PROCEDURE — 3E043XZ INTRODUCTION OF VASOPRESSOR INTO CENTRAL VEIN, PERCUTANEOUS APPROACH: ICD-10-PCS | Performed by: INTERNAL MEDICINE

## 2025-03-09 PROCEDURE — 92950 HEART/LUNG RESUSCITATION CPR: CPT

## 2025-03-09 PROCEDURE — 2580000003 HC RX 258: Performed by: INTERNAL MEDICINE

## 2025-03-09 PROCEDURE — 85025 COMPLETE CBC W/AUTO DIFF WBC: CPT

## 2025-03-09 PROCEDURE — 83690 ASSAY OF LIPASE: CPT

## 2025-03-09 PROCEDURE — 82947 ASSAY GLUCOSE BLOOD QUANT: CPT

## 2025-03-09 PROCEDURE — 84145 PROCALCITONIN (PCT): CPT

## 2025-03-09 PROCEDURE — 94660 CPAP INITIATION&MGMT: CPT

## 2025-03-09 PROCEDURE — 87077 CULTURE AEROBIC IDENTIFY: CPT

## 2025-03-09 PROCEDURE — 87086 URINE CULTURE/COLONY COUNT: CPT

## 2025-03-09 PROCEDURE — 6370000000 HC RX 637 (ALT 250 FOR IP): Performed by: HOSPITALIST

## 2025-03-09 PROCEDURE — 93010 ELECTROCARDIOGRAM REPORT: CPT | Performed by: INTERNAL MEDICINE

## 2025-03-09 PROCEDURE — 99223 1ST HOSP IP/OBS HIGH 75: CPT | Performed by: INTERNAL MEDICINE

## 2025-03-09 PROCEDURE — 87636 SARSCOV2 & INF A&B AMP PRB: CPT

## 2025-03-09 PROCEDURE — 93306 TTE W/DOPPLER COMPLETE: CPT

## 2025-03-09 PROCEDURE — 36600 WITHDRAWAL OF ARTERIAL BLOOD: CPT

## 2025-03-09 PROCEDURE — 84439 ASSAY OF FREE THYROXINE: CPT

## 2025-03-09 PROCEDURE — 71045 X-RAY EXAM CHEST 1 VIEW: CPT

## 2025-03-09 PROCEDURE — 99292 CRITICAL CARE ADDL 30 MIN: CPT

## 2025-03-09 PROCEDURE — 82550 ASSAY OF CK (CPK): CPT

## 2025-03-09 PROCEDURE — 74177 CT ABD & PELVIS W/CONTRAST: CPT

## 2025-03-09 PROCEDURE — 87154 CUL TYP ID BLD PTHGN 6+ TRGT: CPT

## 2025-03-09 PROCEDURE — 0BH17EZ INSERTION OF ENDOTRACHEAL AIRWAY INTO TRACHEA, VIA NATURAL OR ARTIFICIAL OPENING: ICD-10-PCS | Performed by: EMERGENCY MEDICINE

## 2025-03-09 PROCEDURE — 31500 INSERT EMERGENCY AIRWAY: CPT

## 2025-03-09 PROCEDURE — 6360000002 HC RX W HCPCS: Performed by: INTERNAL MEDICINE

## 2025-03-09 PROCEDURE — 70450 CT HEAD/BRAIN W/O DYE: CPT

## 2025-03-09 PROCEDURE — 84100 ASSAY OF PHOSPHORUS: CPT

## 2025-03-09 PROCEDURE — 3E033XZ INTRODUCTION OF VASOPRESSOR INTO PERIPHERAL VEIN, PERCUTANEOUS APPROACH: ICD-10-PCS | Performed by: INTERNAL MEDICINE

## 2025-03-09 PROCEDURE — 6360000002 HC RX W HCPCS: Performed by: HOSPITALIST

## 2025-03-09 PROCEDURE — 85027 COMPLETE CBC AUTOMATED: CPT

## 2025-03-09 PROCEDURE — 80307 DRUG TEST PRSMV CHEM ANLYZR: CPT

## 2025-03-09 PROCEDURE — 84295 ASSAY OF SERUM SODIUM: CPT

## 2025-03-09 PROCEDURE — 96375 TX/PRO/DX INJ NEW DRUG ADDON: CPT

## 2025-03-09 PROCEDURE — 84484 ASSAY OF TROPONIN QUANT: CPT

## 2025-03-09 PROCEDURE — 85379 FIBRIN DEGRADATION QUANT: CPT

## 2025-03-09 PROCEDURE — 36415 COLL VENOUS BLD VENIPUNCTURE: CPT

## 2025-03-09 PROCEDURE — P9045 ALBUMIN (HUMAN), 5%, 250 ML: HCPCS | Performed by: EMERGENCY MEDICINE

## 2025-03-09 PROCEDURE — 5A1955Z RESPIRATORY VENTILATION, GREATER THAN 96 CONSECUTIVE HOURS: ICD-10-PCS | Performed by: EMERGENCY MEDICINE

## 2025-03-09 PROCEDURE — 0202U NFCT DS 22 TRGT SARS-COV-2: CPT

## 2025-03-09 PROCEDURE — 02HV33Z INSERTION OF INFUSION DEVICE INTO SUPERIOR VENA CAVA, PERCUTANEOUS APPROACH: ICD-10-PCS | Performed by: EMERGENCY MEDICINE

## 2025-03-09 PROCEDURE — 87040 BLOOD CULTURE FOR BACTERIA: CPT

## 2025-03-09 PROCEDURE — 5A0935A ASSISTANCE WITH RESPIRATORY VENTILATION, LESS THAN 24 CONSECUTIVE HOURS, HIGH NASAL FLOW/VELOCITY: ICD-10-PCS | Performed by: EMERGENCY MEDICINE

## 2025-03-09 PROCEDURE — 6370000000 HC RX 637 (ALT 250 FOR IP): Performed by: INTERNAL MEDICINE

## 2025-03-09 PROCEDURE — 85014 HEMATOCRIT: CPT

## 2025-03-09 PROCEDURE — 87186 SC STD MICRODIL/AGAR DIL: CPT

## 2025-03-09 PROCEDURE — 2500000003 HC RX 250 WO HCPCS: Performed by: HOSPITALIST

## 2025-03-09 PROCEDURE — 2700000000 HC OXYGEN THERAPY PER DAY

## 2025-03-09 PROCEDURE — 94002 VENT MGMT INPAT INIT DAY: CPT

## 2025-03-09 PROCEDURE — 93356 MYOCRD STRAIN IMG SPCKL TRCK: CPT | Performed by: INTERNAL MEDICINE

## 2025-03-09 PROCEDURE — 83605 ASSAY OF LACTIC ACID: CPT

## 2025-03-09 PROCEDURE — 85610 PROTHROMBIN TIME: CPT

## 2025-03-09 PROCEDURE — 5A12012 PERFORMANCE OF CARDIAC OUTPUT, SINGLE, MANUAL: ICD-10-PCS | Performed by: EMERGENCY MEDICINE

## 2025-03-09 PROCEDURE — 36556 INSERT NON-TUNNEL CV CATH: CPT

## 2025-03-09 PROCEDURE — 93005 ELECTROCARDIOGRAM TRACING: CPT | Performed by: EMERGENCY MEDICINE

## 2025-03-09 PROCEDURE — 84132 ASSAY OF SERUM POTASSIUM: CPT

## 2025-03-09 RX ORDER — ONDANSETRON 4 MG/1
4 TABLET, ORALLY DISINTEGRATING ORAL EVERY 8 HOURS PRN
Status: DISCONTINUED | OUTPATIENT
Start: 2025-03-09 | End: 2025-04-01 | Stop reason: HOSPADM

## 2025-03-09 RX ORDER — PROPOFOL 10 MG/ML
5-50 INJECTION, EMULSION INTRAVENOUS ONCE
Status: COMPLETED | OUTPATIENT
Start: 2025-03-09 | End: 2025-03-09

## 2025-03-09 RX ORDER — MIDAZOLAM HYDROCHLORIDE 1 MG/ML
1-10 INJECTION, SOLUTION INTRAVENOUS CONTINUOUS
Status: DISCONTINUED | OUTPATIENT
Start: 2025-03-09 | End: 2025-03-09

## 2025-03-09 RX ORDER — FUROSEMIDE 10 MG/ML
80 INJECTION INTRAMUSCULAR; INTRAVENOUS
Status: DISCONTINUED | OUTPATIENT
Start: 2025-03-09 | End: 2025-03-09

## 2025-03-09 RX ORDER — POLYETHYLENE GLYCOL 3350 17 G/17G
17 POWDER, FOR SOLUTION ORAL DAILY PRN
Status: DISCONTINUED | OUTPATIENT
Start: 2025-03-09 | End: 2025-04-01 | Stop reason: HOSPADM

## 2025-03-09 RX ORDER — DILTIAZEM HYDROCHLORIDE 5 MG/ML
0.25 INJECTION INTRAVENOUS
Status: DISCONTINUED | OUTPATIENT
Start: 2025-03-09 | End: 2025-03-09

## 2025-03-09 RX ORDER — ALBUTEROL SULFATE 0.83 MG/ML
2.5 SOLUTION RESPIRATORY (INHALATION) EVERY 4 HOURS PRN
Status: DISCONTINUED | OUTPATIENT
Start: 2025-03-09 | End: 2025-04-01 | Stop reason: HOSPADM

## 2025-03-09 RX ORDER — SODIUM CHLORIDE 0.9 % (FLUSH) 0.9 %
5-40 SYRINGE (ML) INJECTION EVERY 12 HOURS SCHEDULED
Status: DISCONTINUED | OUTPATIENT
Start: 2025-03-09 | End: 2025-04-01 | Stop reason: HOSPADM

## 2025-03-09 RX ORDER — NOREPINEPHRINE BITARTRATE 0.06 MG/ML
1-100 INJECTION, SOLUTION INTRAVENOUS CONTINUOUS
Status: DISCONTINUED | OUTPATIENT
Start: 2025-03-09 | End: 2025-03-23

## 2025-03-09 RX ORDER — MIDAZOLAM HYDROCHLORIDE 2 MG/2ML
2 INJECTION, SOLUTION INTRAMUSCULAR; INTRAVENOUS ONCE
Status: COMPLETED | OUTPATIENT
Start: 2025-03-09 | End: 2025-03-09

## 2025-03-09 RX ORDER — FENTANYL CITRATE 50 UG/ML
25 INJECTION, SOLUTION INTRAMUSCULAR; INTRAVENOUS
Refills: 0 | Status: DISCONTINUED | OUTPATIENT
Start: 2025-03-09 | End: 2025-04-01 | Stop reason: HOSPADM

## 2025-03-09 RX ORDER — OSELTAMIVIR PHOSPHATE 30 MG/1
30 CAPSULE ORAL 2 TIMES DAILY
Status: DISPENSED | OUTPATIENT
Start: 2025-03-10 | End: 2025-03-14

## 2025-03-09 RX ORDER — 0.9 % SODIUM CHLORIDE 0.9 %
1000 INTRAVENOUS SOLUTION INTRAVENOUS ONCE
Status: DISCONTINUED | OUTPATIENT
Start: 2025-03-09 | End: 2025-03-09

## 2025-03-09 RX ORDER — FENTANYL CITRATE-0.9 % NACL/PF 10 MCG/ML
25-200 PLASTIC BAG, INJECTION (ML) INTRAVENOUS CONTINUOUS
Refills: 0 | Status: DISCONTINUED | OUTPATIENT
Start: 2025-03-09 | End: 2025-03-13

## 2025-03-09 RX ORDER — INDOMETHACIN 25 MG/1
50 CAPSULE ORAL
Status: COMPLETED | OUTPATIENT
Start: 2025-03-09 | End: 2025-03-09

## 2025-03-09 RX ORDER — ROCURONIUM BROMIDE 10 MG/ML
1 INJECTION, SOLUTION INTRAVENOUS
Status: COMPLETED | OUTPATIENT
Start: 2025-03-09 | End: 2025-03-09

## 2025-03-09 RX ORDER — MIDAZOLAM HYDROCHLORIDE 1 MG/ML
1-10 INJECTION, SOLUTION INTRAVENOUS CONTINUOUS
Status: DISCONTINUED | OUTPATIENT
Start: 2025-03-09 | End: 2025-03-13

## 2025-03-09 RX ORDER — SODIUM CHLORIDE 9 MG/ML
INJECTION, SOLUTION INTRAVENOUS PRN
Status: DISCONTINUED | OUTPATIENT
Start: 2025-03-09 | End: 2025-04-01 | Stop reason: HOSPADM

## 2025-03-09 RX ORDER — SODIUM CHLORIDE 0.9 % (FLUSH) 0.9 %
5-40 SYRINGE (ML) INJECTION PRN
Status: DISCONTINUED | OUTPATIENT
Start: 2025-03-09 | End: 2025-04-01 | Stop reason: HOSPADM

## 2025-03-09 RX ORDER — POTASSIUM CHLORIDE 7.45 MG/ML
10 INJECTION INTRAVENOUS PRN
Status: DISCONTINUED | OUTPATIENT
Start: 2025-03-09 | End: 2025-03-11 | Stop reason: ALTCHOICE

## 2025-03-09 RX ORDER — SODIUM CHLORIDE, SODIUM LACTATE, POTASSIUM CHLORIDE, AND CALCIUM CHLORIDE .6; .31; .03; .02 G/100ML; G/100ML; G/100ML; G/100ML
1000 INJECTION, SOLUTION INTRAVENOUS ONCE
Status: COMPLETED | OUTPATIENT
Start: 2025-03-09 | End: 2025-03-09

## 2025-03-09 RX ORDER — ONDANSETRON 2 MG/ML
4 INJECTION INTRAMUSCULAR; INTRAVENOUS EVERY 6 HOURS PRN
Status: DISCONTINUED | OUTPATIENT
Start: 2025-03-09 | End: 2025-04-01 | Stop reason: HOSPADM

## 2025-03-09 RX ORDER — EPINEPHRINE IN SOD CHLOR,ISO 1 MG/10 ML
SYRINGE (ML) INTRAVENOUS DAILY PRN
Status: COMPLETED | OUTPATIENT
Start: 2025-03-09 | End: 2025-03-09

## 2025-03-09 RX ORDER — VANCOMYCIN 1.25 G/250ML
25 INJECTION, SOLUTION INTRAVENOUS ONCE
Status: DISCONTINUED | OUTPATIENT
Start: 2025-03-09 | End: 2025-03-09

## 2025-03-09 RX ORDER — MAGNESIUM SULFATE IN WATER 40 MG/ML
2000 INJECTION, SOLUTION INTRAVENOUS PRN
Status: DISCONTINUED | OUTPATIENT
Start: 2025-03-09 | End: 2025-04-01 | Stop reason: HOSPADM

## 2025-03-09 RX ORDER — ACETAMINOPHEN 325 MG/1
650 TABLET ORAL EVERY 6 HOURS PRN
Status: DISCONTINUED | OUTPATIENT
Start: 2025-03-09 | End: 2025-04-01 | Stop reason: HOSPADM

## 2025-03-09 RX ORDER — ACETAMINOPHEN 650 MG/1
650 SUPPOSITORY RECTAL EVERY 6 HOURS PRN
Status: DISCONTINUED | OUTPATIENT
Start: 2025-03-09 | End: 2025-04-01 | Stop reason: HOSPADM

## 2025-03-09 RX ORDER — IOPAMIDOL 755 MG/ML
100 INJECTION, SOLUTION INTRAVASCULAR
Status: COMPLETED | OUTPATIENT
Start: 2025-03-09 | End: 2025-03-09

## 2025-03-09 RX ORDER — FENTANYL CITRATE-0.9 % NACL/PF 10 MCG/ML
25-200 PLASTIC BAG, INJECTION (ML) INTRAVENOUS CONTINUOUS
Refills: 0 | Status: DISCONTINUED | OUTPATIENT
Start: 2025-03-09 | End: 2025-03-09 | Stop reason: RX

## 2025-03-09 RX ORDER — CALCIUM GLUCONATE 20 MG/ML
2000 INJECTION, SOLUTION INTRAVENOUS ONCE
Status: COMPLETED | OUTPATIENT
Start: 2025-03-09 | End: 2025-03-09

## 2025-03-09 RX ORDER — ACETAMINOPHEN 650 MG/1
650 SUPPOSITORY RECTAL
Status: COMPLETED | OUTPATIENT
Start: 2025-03-09 | End: 2025-03-09

## 2025-03-09 RX ORDER — SODIUM BICARBONATE IN D5W 150/1000ML
PLASTIC BAG, INJECTION (ML) INTRAVENOUS CONTINUOUS
Status: DISCONTINUED | OUTPATIENT
Start: 2025-03-09 | End: 2025-03-09 | Stop reason: RX

## 2025-03-09 RX ORDER — POTASSIUM CHLORIDE 7.45 MG/ML
10 INJECTION INTRAVENOUS PRN
Status: DISCONTINUED | OUTPATIENT
Start: 2025-03-09 | End: 2025-03-09

## 2025-03-09 RX ORDER — DEXTROSE MONOHYDRATE AND SODIUM CHLORIDE 5; .45 G/100ML; G/100ML
INJECTION, SOLUTION INTRAVENOUS CONTINUOUS PRN
Status: DISCONTINUED | OUTPATIENT
Start: 2025-03-09 | End: 2025-04-01 | Stop reason: HOSPADM

## 2025-03-09 RX ORDER — OSELTAMIVIR PHOSPHATE 75 MG/1
75 CAPSULE ORAL 2 TIMES DAILY
Status: DISCONTINUED | OUTPATIENT
Start: 2025-03-09 | End: 2025-03-09

## 2025-03-09 RX ORDER — ALBUMIN HUMAN 50 G/1000ML
25 SOLUTION INTRAVENOUS
Status: COMPLETED | OUTPATIENT
Start: 2025-03-09 | End: 2025-03-09

## 2025-03-09 RX ORDER — MIDAZOLAM HYDROCHLORIDE 1 MG/ML
1-10 INJECTION, SOLUTION INTRAVENOUS CONTINUOUS
Status: DISCONTINUED | OUTPATIENT
Start: 2025-03-09 | End: 2025-03-09 | Stop reason: RX

## 2025-03-09 RX ORDER — CHLORHEXIDINE GLUCONATE ORAL RINSE 1.2 MG/ML
15 SOLUTION DENTAL 2 TIMES DAILY
Status: DISCONTINUED | OUTPATIENT
Start: 2025-03-09 | End: 2025-04-01 | Stop reason: HOSPADM

## 2025-03-09 RX ORDER — ENOXAPARIN SODIUM 100 MG/ML
40 INJECTION SUBCUTANEOUS DAILY
Status: DISCONTINUED | OUTPATIENT
Start: 2025-03-09 | End: 2025-03-09

## 2025-03-09 RX ORDER — FENTANYL CITRATE-0.9 % NACL/PF 10 MCG/ML
25-200 PLASTIC BAG, INJECTION (ML) INTRAVENOUS CONTINUOUS
Refills: 0 | Status: DISCONTINUED | OUTPATIENT
Start: 2025-03-09 | End: 2025-03-09

## 2025-03-09 RX ORDER — SODIUM CHLORIDE 9 MG/ML
INJECTION, SOLUTION INTRAVENOUS CONTINUOUS
Status: DISCONTINUED | OUTPATIENT
Start: 2025-03-09 | End: 2025-03-11

## 2025-03-09 RX ORDER — ETOMIDATE 2 MG/ML
0.3 INJECTION INTRAVENOUS
Status: COMPLETED | OUTPATIENT
Start: 2025-03-09 | End: 2025-03-09

## 2025-03-09 RX ORDER — OSELTAMIVIR PHOSPHATE 75 MG/1
75 CAPSULE ORAL ONCE
Status: COMPLETED | OUTPATIENT
Start: 2025-03-09 | End: 2025-03-09

## 2025-03-09 RX ORDER — ENOXAPARIN SODIUM 100 MG/ML
40 INJECTION SUBCUTANEOUS DAILY
Status: DISCONTINUED | OUTPATIENT
Start: 2025-03-09 | End: 2025-03-15

## 2025-03-09 RX ORDER — POTASSIUM CHLORIDE 29.8 MG/ML
20 INJECTION INTRAVENOUS PRN
Status: DISCONTINUED | OUTPATIENT
Start: 2025-03-09 | End: 2025-03-09

## 2025-03-09 RX ORDER — METOPROLOL TARTRATE 1 MG/ML
2.5 INJECTION, SOLUTION INTRAVENOUS ONCE
Status: COMPLETED | OUTPATIENT
Start: 2025-03-09 | End: 2025-03-09

## 2025-03-09 RX ADMIN — WATER 40 MG: 1 INJECTION INTRAMUSCULAR; INTRAVENOUS; SUBCUTANEOUS at 21:50

## 2025-03-09 RX ADMIN — ALBUMIN (HUMAN) 25 G: 12.5 INJECTION, SOLUTION INTRAVENOUS at 09:58

## 2025-03-09 RX ADMIN — SODIUM BICARBONATE 50 MEQ: 84 INJECTION, SOLUTION INTRAVENOUS at 09:54

## 2025-03-09 RX ADMIN — FENTANYL CITRATE 25 MCG: 50 INJECTION INTRAMUSCULAR; INTRAVENOUS at 15:43

## 2025-03-09 RX ADMIN — IOPAMIDOL 100 ML: 755 INJECTION, SOLUTION INTRAVENOUS at 11:52

## 2025-03-09 RX ADMIN — ACETAMINOPHEN 650 MG: 325 TABLET ORAL at 16:13

## 2025-03-09 RX ADMIN — POTASSIUM CHLORIDE 10 MEQ: 7.45 INJECTION INTRAVENOUS at 21:23

## 2025-03-09 RX ADMIN — FENTANYL CITRATE 25 MCG/HR: 0.05 INJECTION, SOLUTION INTRAMUSCULAR; INTRAVENOUS at 16:59

## 2025-03-09 RX ADMIN — NOREPINEPHRINE BITARTRATE 5 MCG/MIN: 64 SOLUTION INTRAVENOUS at 07:52

## 2025-03-09 RX ADMIN — ACETAMINOPHEN 650 MG: 650 SUPPOSITORY RECTAL at 06:53

## 2025-03-09 RX ADMIN — ROCURONIUM BROMIDE 73 MG: 10 INJECTION INTRAVENOUS at 07:37

## 2025-03-09 RX ADMIN — POTASSIUM CHLORIDE 10 MEQ: 7.45 INJECTION INTRAVENOUS at 20:12

## 2025-03-09 RX ADMIN — CISATRACURIUM BESYLATE 2 MCG/KG/MIN: 200 INJECTION, SOLUTION INTRAVENOUS at 17:11

## 2025-03-09 RX ADMIN — CEFEPIME 1000 MG: 1 INJECTION, POWDER, FOR SOLUTION INTRAMUSCULAR; INTRAVENOUS at 14:05

## 2025-03-09 RX ADMIN — METOPROLOL TARTRATE 2.5 MG: 5 INJECTION INTRAVENOUS at 16:21

## 2025-03-09 RX ADMIN — SODIUM CHLORIDE, PRESERVATIVE FREE 10 ML: 5 INJECTION INTRAVENOUS at 22:16

## 2025-03-09 RX ADMIN — VASOPRESSIN 0.03 UNITS/MIN: 20 INJECTION INTRAVENOUS at 12:06

## 2025-03-09 RX ADMIN — CALCIUM GLUCONATE 2000 MG: 20 INJECTION, SOLUTION INTRAVENOUS at 16:25

## 2025-03-09 RX ADMIN — SODIUM BICARBONATE 50 MEQ: 84 INJECTION, SOLUTION INTRAVENOUS at 09:55

## 2025-03-09 RX ADMIN — VANCOMYCIN HYDROCHLORIDE 2000 MG: 10 INJECTION, POWDER, LYOPHILIZED, FOR SOLUTION INTRAVENOUS at 09:15

## 2025-03-09 RX ADMIN — EPINEPHRINE 17 MCG/MIN: 1 INJECTION INTRAMUSCULAR; INTRAVENOUS; SUBCUTANEOUS at 17:55

## 2025-03-09 RX ADMIN — CHLORHEXIDINE GLUCONATE, 0.12% ORAL RINSE 15 ML: 1.2 SOLUTION DENTAL at 15:55

## 2025-03-09 RX ADMIN — WATER 2000 MG: 1 INJECTION INTRAMUSCULAR; INTRAVENOUS; SUBCUTANEOUS at 06:52

## 2025-03-09 RX ADMIN — PROPOFOL 20 MCG/KG/MIN: 10 INJECTION, EMULSION INTRAVENOUS at 07:43

## 2025-03-09 RX ADMIN — EPINEPHRINE 1 MG: 0.1 INJECTION, SOLUTION ENDOTRACHEAL; INTRACARDIAC; INTRAVENOUS at 08:30

## 2025-03-09 RX ADMIN — ETOMIDATE 26.4 MG: 2 INJECTION, SOLUTION INTRAVENOUS at 07:36

## 2025-03-09 RX ADMIN — NOREPINEPHRINE BITARTRATE 30 MCG/MIN: 64 SOLUTION INTRAVENOUS at 17:54

## 2025-03-09 RX ADMIN — SODIUM BICARBONATE: 84 INJECTION, SOLUTION INTRAVENOUS at 16:40

## 2025-03-09 RX ADMIN — MIDAZOLAM 2 MG: 1 INJECTION INTRAMUSCULAR; INTRAVENOUS at 16:21

## 2025-03-09 RX ADMIN — SODIUM CHLORIDE 7.9 UNITS/HR: 9 INJECTION, SOLUTION INTRAVENOUS at 13:07

## 2025-03-09 RX ADMIN — ENOXAPARIN SODIUM 40 MG: 100 INJECTION SUBCUTANEOUS at 15:55

## 2025-03-09 RX ADMIN — SODIUM BICARBONATE 50 MEQ: 84 INJECTION, SOLUTION INTRAVENOUS at 09:57

## 2025-03-09 RX ADMIN — SODIUM BICARBONATE: 84 INJECTION, SOLUTION INTRAVENOUS at 09:44

## 2025-03-09 RX ADMIN — SODIUM CHLORIDE, PRESERVATIVE FREE 40 MG: 5 INJECTION INTRAVENOUS at 15:55

## 2025-03-09 RX ADMIN — SODIUM CHLORIDE, PRESERVATIVE FREE 10 ML: 5 INJECTION INTRAVENOUS at 12:22

## 2025-03-09 RX ADMIN — WATER 40 MG: 1 INJECTION INTRAMUSCULAR; INTRAVENOUS; SUBCUTANEOUS at 12:53

## 2025-03-09 RX ADMIN — VASOPRESSIN 0.04 UNITS/MIN: 20 INJECTION INTRAVENOUS at 19:34

## 2025-03-09 RX ADMIN — POTASSIUM CHLORIDE 10 MEQ: 7.45 INJECTION INTRAVENOUS at 22:43

## 2025-03-09 RX ADMIN — MIDAZOLAM 2 MG/HR: 5 INJECTION INTRAMUSCULAR; INTRAVENOUS at 16:27

## 2025-03-09 RX ADMIN — AMIODARONE HYDROCHLORIDE 150 MG: 1.5 INJECTION, SOLUTION INTRAVENOUS at 09:04

## 2025-03-09 RX ADMIN — EPINEPHRINE 2 MCG/MIN: 1 INJECTION INTRAMUSCULAR; INTRAVENOUS; SUBCUTANEOUS at 09:34

## 2025-03-09 RX ADMIN — CHLORHEXIDINE GLUCONATE, 0.12% ORAL RINSE 15 ML: 1.2 SOLUTION DENTAL at 22:15

## 2025-03-09 RX ADMIN — SODIUM CHLORIDE, POTASSIUM CHLORIDE, SODIUM LACTATE AND CALCIUM CHLORIDE 1000 ML: 600; 310; 30; 20 INJECTION, SOLUTION INTRAVENOUS at 12:22

## 2025-03-09 RX ADMIN — DOXYCYCLINE 100 MG: 100 INJECTION, POWDER, LYOPHILIZED, FOR SOLUTION INTRAVENOUS at 12:43

## 2025-03-09 RX ADMIN — OSELTAMIVIR PHOSPHATE 75 MG: 75 CAPSULE ORAL at 22:15

## 2025-03-09 ASSESSMENT — PAIN SCALES - GENERAL
PAINLEVEL_OUTOF10: 0

## 2025-03-09 ASSESSMENT — PAIN - FUNCTIONAL ASSESSMENT: PAIN_FUNCTIONAL_ASSESSMENT: 0-10

## 2025-03-09 ASSESSMENT — PULMONARY FUNCTION TESTS
PIF_VALUE: 28
PIF_VALUE: 15
PIF_VALUE: 27
PIF_VALUE: 25
PIF_VALUE: 26

## 2025-03-09 NOTE — ED NOTES
0944- No pulse palpable, CPR initiated   0945- Bicarb  0946- Pulse palpable, ventricular tachycardia on the monitor

## 2025-03-09 NOTE — ED NOTES
Report given to oncoming Uri.     Patient information discussed and reviewed per facility protocol.     Outstanding orders reviewed (if applicable).    No applicable questions at this time.     Will sign off from patient.

## 2025-03-09 NOTE — ED TRIAGE NOTES
Patient brought in by EMS for shortness of breath. When EMS arrived patient was in the low 80s upon arrival. Patient A&Ox4. Patient has history of kidney transplant. EMS reports glucose in the 400s upon arrival as well.

## 2025-03-09 NOTE — ED NOTES
0824 - This nurse at bedside; patient did not have a pulse, PEA on the monitor, Compressions initated and code blue button pressed.   0825- Epi   0828- No pulse, PEA  0828- Bicarb   0830- Epi   0832- No pulse, PEA   0833- Epi and bicarb   0834- No pulse, PEA   0836- Epi   0836- No pulse, PEA   0838- Pulse, sinus tachycardia

## 2025-03-09 NOTE — ED NOTES
0917- No pulse able to be palpated, CPR initated  0917- Epi  0919- Pulse able to be palpated, Ventricular Tachycardia   0921- Bicarb   0928- Push dose of epi 20mcg  0933- Push dose of epi 20mcg  0938- Push dose of epi 20mcg  0942- Push dose of epi 20mcg

## 2025-03-09 NOTE — ED PROVIDER NOTES
EMERGENCY DEPARTMENT CONTINUING CARE NOTE      THIS NOTE IS DOCUMENTATION OF CARE PROVIDED AFTER CARE OF THE PATIENT WAS TRANSFERRED TO ME. PLEASE SEE THE NOTE BY THE INITIAL ED PROVIDER FOR DETAILS SURROUNDING THE H&P AND INITIAL MEDICAL DECISION MAKING.    Patient Name: Angelo Mireles  MRN: [id number]  YOB: 1970  Provider: Payam Winters MD  PCP: Brooke Roland FNP   Date of transfer of care: 3/9/25  ED Bed: 1    Diagnostic Study Results     LABS:  Recent Results (from the past 12 hours)   EKG 12 Lead    Collection Time: 03/09/25  8:46 AM   Result Value Ref Range    Ventricular Rate 140 BPM    QRS Duration 144 ms    Q-T Interval 386 ms    QTc Calculation (Bazett) 589 ms    R Axis -70 degrees    T Axis 89 degrees    Diagnosis       Poor data quality, interpretation may be adversely affected  Wide QRS tachycardia  Left axis deviation  Right bundle branch block  Left ventricular hypertrophy with repolarization abnormality ( R in aVL )  Anterior infarct present in 2024 and earlier  Abnormal ECG  When compared with ECG of 09-MAR-2025 06:35,  Wide QRS tachycardia has replaced Sinus rhythm  Confirmed by MD Alberto, Isai ([id number]) on 3/9/2025 5:37:44 PM     POC G3: BLOOD GASES INCLUDES CALC. TCO2, HCO3, BASE EXCESS, SO2    Collection Time: 03/09/25 10:40 AM   Result Value Ref Range    DEVICE ADULT VENT      FIO2 100 %    POC pH 6.99 (LL) 7.35 - 7.45      POC pCO2 75.0 (H) 35.0 - 48.0 MMHG    POC PO2 68 (L) 83 - 108 MMHG    POC HCO3 18.0 (L) 21 - 28 MMOL/L    POC O2 SAT 80.0 (L) 92 - 97 %    Base Deficit (POC) 15.0 mmol/L    Mode ASSIST CONTROL      POC TIDAL VOLUME 450 ml    Sed Rate 22 bpm    POC PEEP 5 cmH2O    POC Dilan's Test Positive      Site LEFT RADIAL      Specimen type: ARTERIAL      Performed by: Michael Tellez    Procalcitonin    Collection Time: 03/09/25 12:04 PM   Result Value Ref Range    Procalcitonin 100.86 ng/mL   Lipase    Collection Time: 03/09/25 12:04 PM   Result Value Ref Range    Lipase 31

## 2025-03-09 NOTE — ED PROVIDER NOTES
EMERGENCY DEPARTMENT HISTORY AND PHYSICAL EXAM      Date: 3/9/2025  Patient Name: Angelo Mireles      History of Presenting Illness     Chief Complaint   Patient presents with    Shortness of Breath       Location/Duration/Severity/Modifying factors   Chief Complaint   Patient presents with    Shortness of Breath       HPI:  Angelo Mireles is a 54 y.o. male with PMH significant for atrial fibrillation on amiodarone, dyslipidemia, insulin-dependent diabetes mellitus, prior history of end-stage renal disease status post kidney transplant, CAD presents with 2 days of shortness of breath, constant feeling with chest discomfort.  Denies productive cough, fevers at home.  No longer on dialysis after kidney transplant.  Given by EMS, saturating in the 70s, alternate between 80s to 90s while on nonrebreather at flush rate.  Patient quit tachypneic, tachycardic, febrile to 101F.    PCP: Brooke Roland FNP    Current Facility-Administered Medications   Medication Dose Route Frequency Provider Last Rate Last Admin    vancomycin (VANCOCIN) 2000 mg in 0.9% sodium chloride 500 mL IVPB  2,000 mg IntraVENous Once Armond Jimenez DO        norepinephrine (LEVOPHED) 16 mg in sodium chloride 0.9 % 250 mL infusion (premix)  1-100 mcg/min IntraVENous Continuous Armond Jimenez DO 4.7 mL/hr at 03/09/25 0752 5 mcg/min at 03/09/25 0752    albumin human 5% IV solution 25 g  25 g IntraVENous NOW Armond Jimenez DO        propofol bolus 88 mg  1 mg/kg IntraVENous Once Armond Jimenez DO        furosemide (LASIX) injection 80 mg  80 mg IntraVENous NOW Armond Jimneez DO         Current Outpatient Medications   Medication Sig Dispense Refill    atorvastatin (LIPITOR) 80 MG tablet Take 1 tablet by mouth nightly 90 tablet 3    BELATACEPT IV Infuse intravenously every 28 days      Continuous Glucose Sensor (FREESTYLE LARISA 2 SENSOR) MISC USE TO CHECK BLOOD SUGAR DAILY AND CHANGE EVERY 14 DAYS      NOVOLOG FLEXPEN 100 UNIT/ML injection pen Inject

## 2025-03-09 NOTE — CARE COORDINATION
03/09/25 1420   Service Assessment   Patient Orientation Unable to Assess   Cognition Other (see comment)  (post arrest x 3)   History Provided By Other (see comment)  (brother Mateus)   Primary Caregiver Self   Support Systems Family Members;Parent  (brothers Mateus and Mehul, sister Jessi)   PCP Verified by CM Yes   Last Visit to PCP Within last 3 months   Prior Functional Level Independent in ADLs/IADLs   Current Functional Level Assistance with the following:;Bathing;Dressing;Toileting;Feeding;Cooking;Housework;Shopping;Mobility   Can patient return to prior living arrangement Unknown at present   Ability to make needs known: Unable   Family able to assist with home care needs: Other (comment)  (TBD)   Social/Functional History   Lives With Family   Type of Home House   Home Layout One level   Home Equipment None   Active  No   Patient's  Info brother   Occupation Unemployed   Discharge Planning   Living Arrangements Family Members   Current Services Prior To Admission None   Potential Assistance Needed N/A   Patient expects to be discharged to: Unknown   Services At/After Discharge   Transition of Care Consult (CM Consult) Discharge Planning   Condition of Participation: Discharge Planning   The Plan for Transition of Care is related to the following treatment goals: TBD, patient is critially ill     Care manager met with brother Mateus at bedside; patient is on vent and not responsive at this time.  Brother stated that patient had been living with him, in good health up until a couple of days ago when post exposure to their father who was recovering from COVID, he began to have elevated blood glucose but refused to go to hospital - today, was very SOB and brother called EMS.  Care manager also met and offered support to sister Mireya in waiting room; she stated they lost a brother two years ago to the flu and she was very upset that they waited to have him come into hospital and now he was so

## 2025-03-09 NOTE — ED NOTES
0951- No pulse palpable, CPR iniated   0952- Epi and bicarb  0953- Patient has palpable pulses, ventricular tachycardia on the monitor   0954- Bicarb   0955- Bicarb  0957- Bicarb

## 2025-03-09 NOTE — ED NOTES
ED TO INPATIENT SBAR HANDOFF     Patient Name: Angelo Mireles   Preferred Name: Angelo  : 1970  54 y.o.             Family/Caregiver Present: no   Code Status Order: Full Code  PO Status:[unfilled]  Telemetry Order:   C-SSRS: Risk of Suicide: No Risk  Sitter no   Restraints:    Sepsis Risk Score      Situation:  Chief Complaint   Patient presents with    Shortness of Breath     Brief Description of Patient's Condition:   Mental Status: INTUBATED  Arrived from: Home  Abnormal labs:   Abnormal Labs Reviewed   COVID-19 & INFLUENZA COMBO - Abnormal; Notable for the following components:       Result Value    Rapid Influenza A By PCR Detected (*)     All other components within normal limits   CBC WITH AUTO DIFFERENTIAL - Abnormal; Notable for the following components:    RDW 15.3 (*)     Platelets 127 (*)     MPV 13.8 (*)     Neutrophils % 24 (*)     Band Neutrophils 57 (*)     Lymphocytes % 2 (*)     Monocytes % 11 (*)     Metamyelocytes 6 (*)     Neutrophils Absolute 8.99 (*)     Lymphocytes Absolute 0.22 (*)     Monocytes Absolute 1.22 (*)     All other components within normal limits   COMPREHENSIVE METABOLIC PANEL - Abnormal; Notable for the following components:    Sodium 132 (*)     CO2 9 (*)     Anion Gap 23 (*)     Glucose 391 (*)     BUN 38 (*)     Creatinine 2.47 (*)     Est, Glom Filt Rate 31 (*)     Total Bilirubin 1.8 (*)      (*)      (*)     Total Protein 5.7 (*)     Albumin 2.5 (*)     All other components within normal limits   BRAIN NATRIURETIC PEPTIDE - Abnormal; Notable for the following components:    NT Pro-BNP 4,241 (*)     All other components within normal limits   D-DIMER, QUANTITATIVE - Abnormal; Notable for the following components:    D-Dimer, Quant 1.83 (*)     All other components within normal limits   POCT BLOOD GAS & ELECTROLYTES - Abnormal; Notable for the following components:    POC pCO2 13.8 (*)     POC Ionized Calcium 1.13 (*)     POC HCO3 8.3 (*)     POC TCO2 8

## 2025-03-09 NOTE — H&P
Hospitalist History & Physical    Patient: Angelo Mireles MRN: [id number]  CSN: [id number]    YOB: 1970  Age: 54 y.o.  Sex: male      DOA: 3/9/2025  Primary Care Provider:  Brooke Roland FNP      Assessment/Plan     Active Hospital Problems    Diagnosis     Cardiac arrest (HCC) [I46.9]     Acute respiratory failure (HCC) [J96.00]     Lactic acidosis [E87.20]     Influenza A [J10.1]     Pneumonia [J18.9]     Immunosuppressed status [D84.9]     Renal transplant, status post [Z94.0]     Acute kidney injury superimposed on CKD [N17.9, N18.9]     HTN (hypertension) [I10]     Paroxysmal A-fib (HCC) [I48.0]     Type 2 diabetes with nephropathy (HCC) [E11.21]     Diabetic retinopathy (HCC) [E11.319]     Cardiomyopathy (HCC) [I42.9]        Admit to ICU    CRITICAL CARE PLAN    Resp   Acute respiratory failure with hypoxia  Intubated in ER  See vent orders, VAP bundle. HOB>30 degrees. Bronchodilators. Follow sputum cx. Daily CXR.  Follow ABG  CTA chest with no PE, severe multifocal pneumonia worst in the lower lungs bilaterally.  Started on Solu-Medrol    ID   Follow up blood and urine cx.  Influenza A positive  CTA chest with multifocal pneumonia in lower lungs bilaterally  Follow respiratory culture, respiratory viral panel, urine strep pneumo and Legionella antigen.  Bacterial versus influenza pneumonia versus both  ANTIBIOTICS vancomycin, cefepime and doxycycline.  ID consulted    [facility]   Bradycardia and PEA arrest x 2.  Episode of V. tach during second cardiac arrest.  Septic versus cardiogenic shock  Received amiodarone, amiodarone drip per cardiology.  On multiple pressors including Levophed, vasopressin, epinephrine.  Wean as tolerated  IV fluids  EKG with ST elevation in anteroseptal leads.  Discussed with Dr. GLENN Dominguez who reports that he had similar findings in previous EKGs.  Trend troponins  Follow echo    Heme/onc   Follow H&H, plts. INR.    Renal   Trend BUN, Cr, follow I/O, alarcon in place.

## 2025-03-10 ENCOUNTER — APPOINTMENT (OUTPATIENT)
Facility: HOSPITAL | Age: 55
DRG: 004 | End: 2025-03-10
Payer: MEDICARE

## 2025-03-10 ENCOUNTER — APPOINTMENT (OUTPATIENT)
Facility: HOSPITAL | Age: 55
DRG: 004 | End: 2025-03-10
Attending: INTERNAL MEDICINE
Payer: MEDICARE

## 2025-03-10 LAB
ACB COMPLEX DNA BLD POS QL NAA+NON-PROBE: NOT DETECTED
ACCESSION NUMBER, LLC1M: ABNORMAL
ALBUMIN SERPL-MCNC: 1.7 G/DL (ref 3.4–5)
ALBUMIN/GLOB SERPL: 0.6 (ref 0.8–1.7)
ALP SERPL-CCNC: 67 U/L (ref 45–117)
ALT SERPL-CCNC: 822 U/L (ref 16–61)
ANION GAP SERPL CALC-SCNC: 11 MMOL/L (ref 3–18)
ANION GAP SERPL CALC-SCNC: 11 MMOL/L (ref 3–18)
ANION GAP SERPL CALC-SCNC: 13 MMOL/L (ref 3–18)
ANION GAP SERPL CALC-SCNC: 5 MMOL/L (ref 3–18)
ANION GAP SERPL CALC-SCNC: 6 MMOL/L (ref 3–18)
AST SERPL-CCNC: 771 U/L (ref 10–38)
B FRAGILIS DNA BLD POS QL NAA+NON-PROBE: NOT DETECTED
BASE DEFICIT BLD-SCNC: 0.3 MMOL/L
BASOPHILS # BLD: 0 K/UL (ref 0–0.1)
BASOPHILS NFR BLD: 0 % (ref 0–2)
BDY SITE: ABNORMAL
BILIRUB SERPL-MCNC: 2.2 MG/DL (ref 0.2–1)
BIOFIRE TEST COMMENT: ABNORMAL
BUN SERPL-MCNC: 36 MG/DL (ref 7–18)
BUN SERPL-MCNC: 38 MG/DL (ref 7–18)
BUN SERPL-MCNC: 38 MG/DL (ref 7–18)
BUN SERPL-MCNC: 39 MG/DL (ref 7–18)
BUN SERPL-MCNC: 40 MG/DL (ref 7–18)
BUN/CREAT SERPL: 16 (ref 12–20)
BUN/CREAT SERPL: 17 (ref 12–20)
BUN/CREAT SERPL: 18 (ref 12–20)
C ALBICANS DNA BLD POS QL NAA+NON-PROBE: NOT DETECTED
C AURIS DNA BLD POS QL NAA+NON-PROBE: NOT DETECTED
C GATTII+NEOFOR DNA BLD POS QL NAA+N-PRB: NOT DETECTED
C GLABRATA DNA BLD POS QL NAA+NON-PROBE: NOT DETECTED
C KRUSEI DNA BLD POS QL NAA+NON-PROBE: NOT DETECTED
C PARAP DNA BLD POS QL NAA+NON-PROBE: NOT DETECTED
C TROPICLS DNA BLD POS QL NAA+NON-PROBE: NOT DETECTED
CA-I SERPL-SCNC: 0.98 MMOL/L (ref 1.12–1.32)
CA-I SERPL-SCNC: 0.99 MMOL/L (ref 1.12–1.32)
CA-I SERPL-SCNC: 1.04 MMOL/L (ref 1.12–1.32)
CALCIUM SERPL-MCNC: 7.7 MG/DL (ref 8.5–10.1)
CALCIUM SERPL-MCNC: 7.7 MG/DL (ref 8.5–10.1)
CALCIUM SERPL-MCNC: 7.8 MG/DL (ref 8.5–10.1)
CALCIUM SERPL-MCNC: 8.2 MG/DL (ref 8.5–10.1)
CALCIUM SERPL-MCNC: 8.9 MG/DL (ref 8.5–10.1)
CHLORIDE SERPL-SCNC: 100 MMOL/L (ref 100–111)
CHLORIDE SERPL-SCNC: 100 MMOL/L (ref 100–111)
CHLORIDE SERPL-SCNC: 104 MMOL/L (ref 100–111)
CHLORIDE SERPL-SCNC: 106 MMOL/L (ref 100–111)
CHLORIDE SERPL-SCNC: 106 MMOL/L (ref 100–111)
CK SERPL-CCNC: 376 U/L (ref 39–308)
CK SERPL-CCNC: 381 U/L (ref 39–308)
CK SERPL-CCNC: 464 U/L (ref 39–308)
CO2 SERPL-SCNC: 26 MMOL/L (ref 21–32)
CO2 SERPL-SCNC: 27 MMOL/L (ref 21–32)
CO2 SERPL-SCNC: 29 MMOL/L (ref 21–32)
CO2 SERPL-SCNC: 31 MMOL/L (ref 21–32)
CO2 SERPL-SCNC: 33 MMOL/L (ref 21–32)
CREAT SERPL-MCNC: 2.06 MG/DL (ref 0.6–1.3)
CREAT SERPL-MCNC: 2.12 MG/DL (ref 0.6–1.3)
CREAT SERPL-MCNC: 2.41 MG/DL (ref 0.6–1.3)
CREAT SERPL-MCNC: 2.46 MG/DL (ref 0.6–1.3)
CREAT SERPL-MCNC: 2.48 MG/DL (ref 0.6–1.3)
DIFFERENTIAL METHOD BLD: ABNORMAL
E CLOAC COMP DNA BLD POS NAA+NON-PROBE: NOT DETECTED
E COLI DNA BLD POS QL NAA+NON-PROBE: NOT DETECTED
E FAECALIS DNA BLD POS QL NAA+NON-PROBE: NOT DETECTED
E FAECIUM DNA BLD POS QL NAA+NON-PROBE: NOT DETECTED
ENTEROBACTERALES DNA BLD POS NAA+N-PRB: NOT DETECTED
EOSINOPHIL # BLD: 0 K/UL (ref 0–0.4)
EOSINOPHIL NFR BLD: 0 % (ref 0–5)
ERYTHROCYTE [DISTWIDTH] IN BLOOD BY AUTOMATED COUNT: 15.1 % (ref 11.6–14.5)
EST. AVERAGE GLUCOSE BLD GHB EST-MCNC: 183 MG/DL
GAS FLOW.O2 O2 DELIVERY SYS: ABNORMAL
GAS FLOW.O2 SETTING OXYMISER: 20 BPM
GLOBULIN SER CALC-MCNC: 2.9 G/DL (ref 2–4)
GLUCOSE BLD STRIP.AUTO-MCNC: 109 MG/DL (ref 70–110)
GLUCOSE BLD STRIP.AUTO-MCNC: 121 MG/DL (ref 70–110)
GLUCOSE BLD STRIP.AUTO-MCNC: 132 MG/DL (ref 70–110)
GLUCOSE BLD STRIP.AUTO-MCNC: 143 MG/DL (ref 70–110)
GLUCOSE BLD STRIP.AUTO-MCNC: 215 MG/DL (ref 70–110)
GLUCOSE BLD STRIP.AUTO-MCNC: 236 MG/DL (ref 70–110)
GLUCOSE BLD STRIP.AUTO-MCNC: 248 MG/DL (ref 70–110)
GLUCOSE BLD STRIP.AUTO-MCNC: 27 MG/DL (ref 70–110)
GLUCOSE BLD STRIP.AUTO-MCNC: 275 MG/DL (ref 70–110)
GLUCOSE BLD STRIP.AUTO-MCNC: 318 MG/DL (ref 70–110)
GLUCOSE BLD STRIP.AUTO-MCNC: 344 MG/DL (ref 70–110)
GLUCOSE BLD STRIP.AUTO-MCNC: 381 MG/DL (ref 70–110)
GLUCOSE BLD STRIP.AUTO-MCNC: 50 MG/DL (ref 70–110)
GLUCOSE BLD STRIP.AUTO-MCNC: 54 MG/DL (ref 70–110)
GLUCOSE BLD STRIP.AUTO-MCNC: 56 MG/DL (ref 70–110)
GLUCOSE BLD STRIP.AUTO-MCNC: 67 MG/DL (ref 70–110)
GLUCOSE BLD STRIP.AUTO-MCNC: 71 MG/DL (ref 70–110)
GLUCOSE BLD STRIP.AUTO-MCNC: 80 MG/DL (ref 70–110)
GLUCOSE BLD STRIP.AUTO-MCNC: 89 MG/DL (ref 70–110)
GLUCOSE BLD STRIP.AUTO-MCNC: 90 MG/DL (ref 70–110)
GLUCOSE BLD STRIP.AUTO-MCNC: 95 MG/DL (ref 70–110)
GLUCOSE SERPL-MCNC: 180 MG/DL (ref 74–99)
GLUCOSE SERPL-MCNC: 32 MG/DL (ref 74–99)
GLUCOSE SERPL-MCNC: 366 MG/DL (ref 74–99)
GLUCOSE SERPL-MCNC: 385 MG/DL (ref 74–99)
GLUCOSE SERPL-MCNC: 72 MG/DL (ref 74–99)
GP B STREP DNA BLD POS QL NAA+NON-PROBE: NOT DETECTED
HAEM INFLU DNA BLD POS QL NAA+NON-PROBE: NOT DETECTED
HBA1C MFR BLD: 8 % (ref 4.2–5.6)
HCO3 BLD-SCNC: 24.9 MMOL/L (ref 21–28)
HCT VFR BLD AUTO: 41.8 % (ref 36–48)
HGB BLD-MCNC: 14.1 G/DL (ref 13–16)
IMM GRANULOCYTES # BLD AUTO: 0 K/UL
IMM GRANULOCYTES NFR BLD AUTO: 0 %
K OXYTOCA DNA BLD POS QL NAA+NON-PROBE: NOT DETECTED
KLEBSIELLA SP DNA BLD POS QL NAA+NON-PRB: NOT DETECTED
KLEBSIELLA SP DNA BLD POS QL NAA+NON-PRB: NOT DETECTED
L MONOCYTOG DNA BLD POS QL NAA+NON-PROBE: NOT DETECTED
LACTATE SERPL-SCNC: 10.3 MMOL/L (ref 0.4–2)
LACTATE SERPL-SCNC: 4.4 MMOL/L (ref 0.4–2)
LACTATE SERPL-SCNC: 6.1 MMOL/L (ref 0.4–2)
LACTATE SERPL-SCNC: 7.9 MMOL/L (ref 0.4–2)
LACTATE SERPL-SCNC: 8.6 MMOL/L (ref 0.4–2)
LYMPHOCYTES # BLD: 0.22 K/UL (ref 0.9–3.6)
LYMPHOCYTES NFR BLD: 4 % (ref 21–52)
MAGNESIUM SERPL-MCNC: 1.6 MG/DL (ref 1.6–2.6)
MCH RBC QN AUTO: 29.9 PG (ref 24–34)
MCHC RBC AUTO-ENTMCNC: 33.7 G/DL (ref 31–37)
MCV RBC AUTO: 88.7 FL (ref 78–100)
MECA+MECC+MREJ ISLT/SPM QL: NOT DETECTED
METAMYELOCYTES NFR BLD MANUAL: 4 %
MONOCYTES # BLD: 0.05 K/UL (ref 0.05–1.2)
MONOCYTES NFR BLD: 1 % (ref 3–10)
N MEN DNA BLD POS QL NAA+NON-PROBE: NOT DETECTED
NEUTS BAND NFR BLD MANUAL: 17 % (ref 0–5)
NEUTS SEG # BLD: 4.91 K/UL (ref 1.8–8)
NEUTS SEG NFR BLD: 74 % (ref 40–73)
NRBC # BLD: 0.05 K/UL (ref 0–0.01)
NRBC BLD-RTO: 0.9 PER 100 WBC
O2/TOTAL GAS SETTING VFR VENT: 100 %
P AERUGINOSA DNA BLD POS NAA+NON-PROBE: NOT DETECTED
PCO2 BLD: 41.5 MMHG (ref 35–48)
PEEP RESPIRATORY: 10 CMH2O
PH BLD: 7.39 (ref 7.35–7.45)
PLATELET # BLD AUTO: 82 K/UL (ref 135–420)
PLATELET COMMENT: ABNORMAL
PMV BLD AUTO: 12.6 FL (ref 9.2–11.8)
PO2 BLD: 71 MMHG (ref 83–108)
POTASSIUM SERPL-SCNC: 3.4 MMOL/L (ref 3.5–5.5)
POTASSIUM SERPL-SCNC: 4 MMOL/L (ref 3.5–5.5)
POTASSIUM SERPL-SCNC: 4.1 MMOL/L (ref 3.5–5.5)
POTASSIUM SERPL-SCNC: 4.3 MMOL/L (ref 3.5–5.5)
POTASSIUM SERPL-SCNC: 4.8 MMOL/L (ref 3.5–5.5)
PROT SERPL-MCNC: 4.6 G/DL (ref 6.4–8.2)
PROTEUS SP DNA BLD POS QL NAA+NON-PROBE: NOT DETECTED
RBC # BLD AUTO: 4.71 M/UL (ref 4.35–5.65)
RBC MORPH BLD: ABNORMAL
RESISTANT GENE TARGETS: ABNORMAL
S AUREUS DNA BLD POS QL NAA+NON-PROBE: DETECTED
S AUREUS+CONS DNA BLD POS NAA+NON-PROBE: DETECTED
S EPIDERMIDIS DNA BLD POS QL NAA+NON-PRB: NOT DETECTED
S LUGDUNENSIS DNA BLD POS QL NAA+NON-PRB: NOT DETECTED
S MALTOPHILIA DNA BLD POS QL NAA+NON-PRB: NOT DETECTED
S MARCESCENS DNA BLD POS NAA+NON-PROBE: NOT DETECTED
S PNEUM DNA BLD POS QL NAA+NON-PROBE: NOT DETECTED
S PYO DNA BLD POS QL NAA+NON-PROBE: NOT DETECTED
SALMONELLA DNA BLD POS QL NAA+NON-PROBE: NOT DETECTED
SAO2 % BLD: 93.7 % (ref 92–97)
SERVICE CMNT-IMP: ABNORMAL
SODIUM SERPL-SCNC: 138 MMOL/L (ref 136–145)
SODIUM SERPL-SCNC: 139 MMOL/L (ref 136–145)
SODIUM SERPL-SCNC: 141 MMOL/L (ref 136–145)
SODIUM SERPL-SCNC: 144 MMOL/L (ref 136–145)
SODIUM SERPL-SCNC: 146 MMOL/L (ref 136–145)
SPECIMEN TYPE: ABNORMAL
STREPTOCOCCUS DNA BLD POS NAA+NON-PROBE: NOT DETECTED
T4 FREE SERPL-MCNC: 1.9 NG/DL (ref 0.7–1.5)
TROPONIN I SERPL HS-MCNC: 146 NG/L (ref 0–78)
TROPONIN I SERPL HS-MCNC: 176 NG/L (ref 0–78)
TROPONIN I SERPL HS-MCNC: 241 NG/L (ref 0–78)
VENTILATION MODE VENT: ABNORMAL
VT SETTING VENT: 510 ML
WBC # BLD AUTO: 5.4 K/UL (ref 4.6–13.2)

## 2025-03-10 PROCEDURE — 2580000003 HC RX 258: Performed by: HOSPITALIST

## 2025-03-10 PROCEDURE — 6360000002 HC RX W HCPCS: Performed by: HOSPITALIST

## 2025-03-10 PROCEDURE — 83605 ASSAY OF LACTIC ACID: CPT

## 2025-03-10 PROCEDURE — 93970 EXTREMITY STUDY: CPT

## 2025-03-10 PROCEDURE — 99222 1ST HOSP IP/OBS MODERATE 55: CPT

## 2025-03-10 PROCEDURE — 82962 GLUCOSE BLOOD TEST: CPT

## 2025-03-10 PROCEDURE — 36600 WITHDRAWAL OF ARTERIAL BLOOD: CPT

## 2025-03-10 PROCEDURE — 2500000003 HC RX 250 WO HCPCS: Performed by: EMERGENCY MEDICINE

## 2025-03-10 PROCEDURE — 6370000000 HC RX 637 (ALT 250 FOR IP): Performed by: HOSPITALIST

## 2025-03-10 PROCEDURE — 6360000002 HC RX W HCPCS: Performed by: INTERNAL MEDICINE

## 2025-03-10 PROCEDURE — 6370000000 HC RX 637 (ALT 250 FOR IP): Performed by: INTERNAL MEDICINE

## 2025-03-10 PROCEDURE — 2580000003 HC RX 258: Performed by: INTERNAL MEDICINE

## 2025-03-10 PROCEDURE — 82550 ASSAY OF CK (CPK): CPT

## 2025-03-10 PROCEDURE — 80053 COMPREHEN METABOLIC PANEL: CPT

## 2025-03-10 PROCEDURE — 83036 HEMOGLOBIN GLYCOSYLATED A1C: CPT

## 2025-03-10 PROCEDURE — 2500000003 HC RX 250 WO HCPCS: Performed by: HOSPITALIST

## 2025-03-10 PROCEDURE — 94003 VENT MGMT INPAT SUBQ DAY: CPT

## 2025-03-10 PROCEDURE — 2000000000 HC ICU R&B

## 2025-03-10 PROCEDURE — 85025 COMPLETE CBC W/AUTO DIFF WBC: CPT

## 2025-03-10 PROCEDURE — 2500000003 HC RX 250 WO HCPCS: Performed by: INTERNAL MEDICINE

## 2025-03-10 PROCEDURE — 71045 X-RAY EXAM CHEST 1 VIEW: CPT

## 2025-03-10 PROCEDURE — 83735 ASSAY OF MAGNESIUM: CPT

## 2025-03-10 PROCEDURE — 84484 ASSAY OF TROPONIN QUANT: CPT

## 2025-03-10 PROCEDURE — 82803 BLOOD GASES ANY COMBINATION: CPT

## 2025-03-10 PROCEDURE — 80048 BASIC METABOLIC PNL TOTAL CA: CPT

## 2025-03-10 PROCEDURE — 2580000003 HC RX 258: Performed by: EMERGENCY MEDICINE

## 2025-03-10 PROCEDURE — 82330 ASSAY OF CALCIUM: CPT

## 2025-03-10 PROCEDURE — 2580000003 HC RX 258

## 2025-03-10 RX ORDER — CALCIUM GLUCONATE 20 MG/ML
2000 INJECTION, SOLUTION INTRAVENOUS ONCE
Status: COMPLETED | OUTPATIENT
Start: 2025-03-10 | End: 2025-03-10

## 2025-03-10 RX ORDER — DEXTROSE MONOHYDRATE AND SODIUM CHLORIDE 5; .45 G/100ML; G/100ML
INJECTION, SOLUTION INTRAVENOUS CONTINUOUS
Status: DISCONTINUED | OUTPATIENT
Start: 2025-03-10 | End: 2025-03-11

## 2025-03-10 RX ORDER — INSULIN LISPRO 100 [IU]/ML
0-4 INJECTION, SOLUTION INTRAVENOUS; SUBCUTANEOUS EVERY 4 HOURS
Status: DISCONTINUED | OUTPATIENT
Start: 2025-03-10 | End: 2025-03-14

## 2025-03-10 RX ORDER — SODIUM CHLORIDE 450 MG/100ML
INJECTION, SOLUTION INTRAVENOUS CONTINUOUS
Status: DISCONTINUED | OUTPATIENT
Start: 2025-03-10 | End: 2025-03-10

## 2025-03-10 RX ORDER — DEXTROSE MONOHYDRATE 100 MG/ML
INJECTION, SOLUTION INTRAVENOUS
Status: COMPLETED
Start: 2025-03-10 | End: 2025-03-10

## 2025-03-10 RX ADMIN — VASOPRESSIN 0.04 UNITS/MIN: 20 INJECTION INTRAVENOUS at 21:32

## 2025-03-10 RX ADMIN — VASOPRESSIN 0.04 UNITS/MIN: 20 INJECTION INTRAVENOUS at 04:15

## 2025-03-10 RX ADMIN — POTASSIUM CHLORIDE 10 MEQ: 7.45 INJECTION INTRAVENOUS at 05:12

## 2025-03-10 RX ADMIN — SODIUM CHLORIDE, PRESERVATIVE FREE 10 ML: 5 INJECTION INTRAVENOUS at 09:00

## 2025-03-10 RX ADMIN — WATER 40 MG: 1 INJECTION INTRAMUSCULAR; INTRAVENOUS; SUBCUTANEOUS at 11:54

## 2025-03-10 RX ADMIN — CHLORHEXIDINE GLUCONATE, 0.12% ORAL RINSE 15 ML: 1.2 SOLUTION DENTAL at 21:04

## 2025-03-10 RX ADMIN — POTASSIUM CHLORIDE 10 MEQ: 7.45 INJECTION INTRAVENOUS at 00:53

## 2025-03-10 RX ADMIN — CISATRACURIUM BESYLATE 1.5 MCG/KG/MIN: 200 INJECTION, SOLUTION INTRAVENOUS at 21:37

## 2025-03-10 RX ADMIN — NOREPINEPHRINE BITARTRATE 16 MCG/MIN: 64 SOLUTION INTRAVENOUS at 04:56

## 2025-03-10 RX ADMIN — VANCOMYCIN HYDROCHLORIDE 1000 MG: 1 INJECTION, POWDER, LYOPHILIZED, FOR SOLUTION INTRAVENOUS at 10:25

## 2025-03-10 RX ADMIN — SODIUM CHLORIDE: 4.5 INJECTION, SOLUTION INTRAVENOUS at 10:38

## 2025-03-10 RX ADMIN — DEXTROSE MONOHYDRATE 125 ML: 100 INJECTION, SOLUTION INTRAVENOUS at 16:36

## 2025-03-10 RX ADMIN — SODIUM BICARBONATE: 84 INJECTION, SOLUTION INTRAVENOUS at 08:03

## 2025-03-10 RX ADMIN — POTASSIUM CHLORIDE 10 MEQ: 7.45 INJECTION INTRAVENOUS at 15:20

## 2025-03-10 RX ADMIN — POTASSIUM CHLORIDE 10 MEQ: 7.45 INJECTION INTRAVENOUS at 03:21

## 2025-03-10 RX ADMIN — POTASSIUM CHLORIDE 10 MEQ: 7.45 INJECTION INTRAVENOUS at 16:40

## 2025-03-10 RX ADMIN — DEXTROSE MONOHYDRATE 125 ML: 100 INJECTION, SOLUTION INTRAVENOUS at 16:54

## 2025-03-10 RX ADMIN — CALCIUM GLUCONATE 2000 MG: 20 INJECTION, SOLUTION INTRAVENOUS at 16:51

## 2025-03-10 RX ADMIN — ACETAMINOPHEN 650 MG: 325 TABLET ORAL at 06:47

## 2025-03-10 RX ADMIN — SODIUM CHLORIDE 3.3 UNITS/HR: 9 INJECTION, SOLUTION INTRAVENOUS at 14:17

## 2025-03-10 RX ADMIN — POTASSIUM CHLORIDE 10 MEQ: 7.45 INJECTION INTRAVENOUS at 02:09

## 2025-03-10 RX ADMIN — CALCIUM GLUCONATE 2000 MG: 20 INJECTION, SOLUTION INTRAVENOUS at 09:23

## 2025-03-10 RX ADMIN — CEFEPIME 1000 MG: 1 INJECTION, POWDER, FOR SOLUTION INTRAMUSCULAR; INTRAVENOUS at 14:21

## 2025-03-10 RX ADMIN — ACETAMINOPHEN 650 MG: 325 TABLET ORAL at 21:04

## 2025-03-10 RX ADMIN — CEFEPIME 1000 MG: 1 INJECTION, POWDER, FOR SOLUTION INTRAMUSCULAR; INTRAVENOUS at 02:24

## 2025-03-10 RX ADMIN — DOXYCYCLINE 100 MG: 100 INJECTION, POWDER, LYOPHILIZED, FOR SOLUTION INTRAVENOUS at 13:11

## 2025-03-10 RX ADMIN — CALCIUM GLUCONATE 2000 MG: 20 INJECTION, SOLUTION INTRAVENOUS at 02:34

## 2025-03-10 RX ADMIN — DEXTROSE AND SODIUM CHLORIDE: 5; .45 INJECTION, SOLUTION INTRAVENOUS at 15:47

## 2025-03-10 RX ADMIN — SODIUM CHLORIDE, PRESERVATIVE FREE 10 ML: 5 INJECTION INTRAVENOUS at 21:04

## 2025-03-10 RX ADMIN — POTASSIUM CHLORIDE 10 MEQ: 7.45 INJECTION INTRAVENOUS at 06:19

## 2025-03-10 RX ADMIN — MIDAZOLAM 4 MG/HR: 5 INJECTION INTRAMUSCULAR; INTRAVENOUS at 13:44

## 2025-03-10 RX ADMIN — SODIUM CHLORIDE, PRESERVATIVE FREE 40 MG: 5 INJECTION INTRAVENOUS at 09:00

## 2025-03-10 RX ADMIN — POTASSIUM CHLORIDE 10 MEQ: 7.45 INJECTION INTRAVENOUS at 14:18

## 2025-03-10 RX ADMIN — SODIUM CHLORIDE 35.3 UNITS/HR: 9 INJECTION, SOLUTION INTRAVENOUS at 08:03

## 2025-03-10 RX ADMIN — SODIUM BICARBONATE: 84 INJECTION, SOLUTION INTRAVENOUS at 00:02

## 2025-03-10 RX ADMIN — WATER 40 MG: 1 INJECTION INTRAMUSCULAR; INTRAVENOUS; SUBCUTANEOUS at 04:16

## 2025-03-10 RX ADMIN — VASOPRESSIN 0.04 UNITS/MIN: 20 INJECTION INTRAVENOUS at 11:53

## 2025-03-10 RX ADMIN — DOXYCYCLINE 100 MG: 100 INJECTION, POWDER, LYOPHILIZED, FOR SOLUTION INTRAVENOUS at 01:11

## 2025-03-10 RX ADMIN — WATER 40 MG: 1 INJECTION INTRAMUSCULAR; INTRAVENOUS; SUBCUTANEOUS at 21:04

## 2025-03-10 RX ADMIN — SODIUM CHLORIDE 20.2 UNITS/HR: 9 INJECTION, SOLUTION INTRAVENOUS at 12:09

## 2025-03-10 RX ADMIN — OSELTAMIVIR PHOSPHATE 30 MG: 30 CAPSULE ORAL at 21:06

## 2025-03-10 RX ADMIN — PHYTONADIONE 10 MG: 10 INJECTION, EMULSION INTRAMUSCULAR; INTRAVENOUS; SUBCUTANEOUS at 11:55

## 2025-03-10 RX ADMIN — FENTANYL CITRATE 50 MCG/HR: 0.05 INJECTION, SOLUTION INTRAMUSCULAR; INTRAVENOUS at 10:41

## 2025-03-10 RX ADMIN — CHLORHEXIDINE GLUCONATE, 0.12% ORAL RINSE 15 ML: 1.2 SOLUTION DENTAL at 09:00

## 2025-03-10 ASSESSMENT — PULMONARY FUNCTION TESTS
PIF_VALUE: 25
PIF_VALUE: 23
PIF_VALUE: 24

## 2025-03-10 ASSESSMENT — PAIN SCALES - GENERAL
PAINLEVEL_OUTOF10: 0

## 2025-03-10 NOTE — ACP (ADVANCE CARE PLANNING)
Palliative Team Advance Care Planning (ACP) Conversation    Date of Conversation: 03/10/25    Individuals present for the conversation: Brother Mateus, Sister Jessi, 16yo son     ACP documents on file prior to discussion:  -None    Previously completed document/s not on file: Unknown, or patient / participant is uncertain.    Healthcare Decision Maker:   Father, Roldan, who is currently in rehab facility. ICU Nurse reports father has not been made aware of Angelo's hospitalization/prognosis. Patient has two living brothers, Jae Mireles and Neil Mireles and a sister, Jessi Mireles.  Attempted phone call with brotherMateus but unable to speak with him due to poor phone connection. Angelo has two minor children and is .     Conversation Summary:1045  Seen today in room 104 along with Orly Ahn NP .  Lying supine, on vent support, sedated and paralyzed. Current vent settings include 100% FiO2 with 10 PEEP. Respirations even and unlabored.  Pain no s/s of pain or discomfort.      1530 This nurse along with Orly Ahn NP met with patients brother, Mateus, sister, Jessi and 16yo son.      Mateus and Jessi confirmed their father, Roldan is in a rehab facility recovering from Covid. He has limited knowledge of his son, Angelo's condition. Family asks they have time to tell patients father about Angelo and his diagnosis/prognosis.  They request that Palliative team not contact patient father.     They understand patient will remain full code. Family also stated in past conversations it was discussed that patient desired all life sustaining treatments.     Came to the ED 3/9/25 from home per EMS for SOB and chest pain. He was placed on BiPAP initially but continued to have respiratory distress and was then placed on ventilator. He is FLU A POSITIVE.    PMH significant for S/P Renal transplant. Hx of Afib, Cardiomyopathy, diabetic retinopathy DM2 with neuropathy, CKD, HTN and R eye blindness. FLU A.

## 2025-03-10 NOTE — WOUND CARE
ICU Rounding  Wound care nurse rounded on patient for skin issues.  Patient has a Aditya score of 13.  Does patient have any pressure injury?no per primary nurse Hannah ARAGON     Skin Care & Pressure Relief Recommendations  Minimize layers of linen  Pads under patient to optimize support surface  Turn/reposition approximately every 2 hours  Use pillow wedges to maintain positioning but do not put pillow directly over wounds  Manage incontinence   Promote continence; Skin Protective lotion/cream to buttocks and sacrum daily and as needed with incontinence care  Offload heels pillows    Consult wound care if any wounds noted during admission.  Wound Care nurse will continue to follow during ICU admission.

## 2025-03-11 ENCOUNTER — APPOINTMENT (OUTPATIENT)
Facility: HOSPITAL | Age: 55
DRG: 004 | End: 2025-03-11
Payer: MEDICARE

## 2025-03-11 PROBLEM — R78.81 BACTEREMIA: Status: ACTIVE | Noted: 2025-03-11

## 2025-03-11 LAB
ALBUMIN SERPL-MCNC: 1.6 G/DL (ref 3.4–5)
ALBUMIN/GLOB SERPL: 0.7 (ref 0.8–1.7)
ALP SERPL-CCNC: 76 U/L (ref 45–117)
ALT SERPL-CCNC: 605 U/L (ref 16–61)
ANION GAP SERPL CALC-SCNC: 8 MMOL/L (ref 3–18)
AST SERPL-CCNC: 372 U/L (ref 10–38)
BACTERIA SPEC CULT: NORMAL
BASE EXCESS BLD CALC-SCNC: 1.6 MMOL/L
BASOPHILS # BLD: 0 K/UL (ref 0–0.1)
BASOPHILS NFR BLD: 0 % (ref 0–2)
BDY SITE: ABNORMAL
BILIRUB SERPL-MCNC: 2.5 MG/DL (ref 0.2–1)
BUN SERPL-MCNC: 42 MG/DL (ref 7–18)
BUN/CREAT SERPL: 21 (ref 12–20)
CA-I SERPL-SCNC: 1 MMOL/L (ref 1.12–1.32)
CA-I SERPL-SCNC: 1.02 MMOL/L (ref 1.12–1.32)
CALCIUM SERPL-MCNC: 7.4 MG/DL (ref 8.5–10.1)
CHLORIDE SERPL-SCNC: 102 MMOL/L (ref 100–111)
CO2 SERPL-SCNC: 27 MMOL/L (ref 21–32)
CREAT SERPL-MCNC: 1.99 MG/DL (ref 0.6–1.3)
DIFFERENTIAL METHOD BLD: ABNORMAL
ECHO BSA: 2.08 M2
EOSINOPHIL # BLD: 0 K/UL (ref 0–0.4)
EOSINOPHIL NFR BLD: 0 % (ref 0–5)
ERYTHROCYTE [DISTWIDTH] IN BLOOD BY AUTOMATED COUNT: 15.5 % (ref 11.6–14.5)
GAS FLOW.O2 O2 DELIVERY SYS: ABNORMAL
GAS FLOW.O2 SETTING OXYMISER: 20 BPM
GLOBULIN SER CALC-MCNC: 2.2 G/DL (ref 2–4)
GLUCOSE BLD STRIP.AUTO-MCNC: 162 MG/DL (ref 70–110)
GLUCOSE BLD STRIP.AUTO-MCNC: 215 MG/DL (ref 70–110)
GLUCOSE BLD STRIP.AUTO-MCNC: 261 MG/DL (ref 70–110)
GLUCOSE BLD STRIP.AUTO-MCNC: 270 MG/DL (ref 70–110)
GLUCOSE BLD STRIP.AUTO-MCNC: 287 MG/DL (ref 70–110)
GLUCOSE BLD STRIP.AUTO-MCNC: 303 MG/DL (ref 70–110)
GLUCOSE BLD STRIP.AUTO-MCNC: 321 MG/DL (ref 70–110)
GLUCOSE SERPL-MCNC: 330 MG/DL (ref 74–99)
HCO3 BLD-SCNC: 27.2 MMOL/L (ref 21–28)
HCT VFR BLD AUTO: 36.7 % (ref 36–48)
HGB BLD-MCNC: 12.4 G/DL (ref 13–16)
IMM GRANULOCYTES # BLD AUTO: 0 K/UL
IMM GRANULOCYTES NFR BLD AUTO: 0 %
INR PPP: 1.3 (ref 0.9–1.1)
LACTATE SERPL-SCNC: 3.6 MMOL/L (ref 0.4–2)
LACTATE SERPL-SCNC: 3.8 MMOL/L (ref 0.4–2)
LACTATE SERPL-SCNC: 4 MMOL/L (ref 0.4–2)
LACTATE SERPL-SCNC: 4.9 MMOL/L (ref 0.4–2)
LACTATE SERPL-SCNC: 5.1 MMOL/L (ref 0.4–2)
LYMPHOCYTES # BLD: 0.24 K/UL (ref 0.9–3.6)
LYMPHOCYTES NFR BLD: 2 % (ref 21–52)
MAGNESIUM SERPL-MCNC: 1.5 MG/DL (ref 1.6–2.6)
MAGNESIUM SERPL-MCNC: 1.9 MG/DL (ref 1.6–2.6)
MCH RBC QN AUTO: 30.2 PG (ref 24–34)
MCHC RBC AUTO-ENTMCNC: 33.8 G/DL (ref 31–37)
MCV RBC AUTO: 89.3 FL (ref 78–100)
MONOCYTES # BLD: 0.48 K/UL (ref 0.05–1.2)
MONOCYTES NFR BLD: 4 % (ref 3–10)
NEUTS BAND NFR BLD MANUAL: 5 % (ref 0–5)
NEUTS SEG # BLD: 11.18 K/UL (ref 1.8–8)
NEUTS SEG NFR BLD: 89 % (ref 40–73)
NRBC # BLD: 0.03 K/UL (ref 0–0.01)
NRBC BLD-RTO: 0.3 PER 100 WBC
O2/TOTAL GAS SETTING VFR VENT: 90 %
PCO2 BLD: 45.3 MMHG (ref 35–48)
PEEP RESPIRATORY: 10 CMH2O
PH BLD: 7.39 (ref 7.35–7.45)
PHOSPHATE SERPL-MCNC: 2.5 MG/DL (ref 2.5–4.9)
PLATELET # BLD AUTO: 65 K/UL (ref 135–420)
PMV BLD AUTO: 14.7 FL (ref 9.2–11.8)
PO2 BLD: 105 MMHG (ref 83–108)
POTASSIUM SERPL-SCNC: 4.8 MMOL/L (ref 3.5–5.5)
PROT SERPL-MCNC: 3.8 G/DL (ref 6.4–8.2)
PROTHROMBIN TIME: 16.6 SEC (ref 11.9–14.9)
RBC # BLD AUTO: 4.11 M/UL (ref 4.35–5.65)
RBC MORPH BLD: ABNORMAL
SAO2 % BLD: 97.9 % (ref 92–97)
SERVICE CMNT-IMP: ABNORMAL
SERVICE CMNT-IMP: NORMAL
SERVICE CMNT-IMP: NORMAL
SODIUM SERPL-SCNC: 137 MMOL/L (ref 136–145)
SPECIMEN TYPE: ABNORMAL
VANCOMYCIN SERPL-MCNC: 6.1 UG/ML (ref 5–40)
VENTILATION MODE VENT: ABNORMAL
VT SETTING VENT: 450 ML
WBC # BLD AUTO: 11.9 K/UL (ref 4.6–13.2)

## 2025-03-11 PROCEDURE — 2580000003 HC RX 258: Performed by: INTERNAL MEDICINE

## 2025-03-11 PROCEDURE — 94003 VENT MGMT INPAT SUBQ DAY: CPT

## 2025-03-11 PROCEDURE — 36415 COLL VENOUS BLD VENIPUNCTURE: CPT

## 2025-03-11 PROCEDURE — 2500000003 HC RX 250 WO HCPCS: Performed by: EMERGENCY MEDICINE

## 2025-03-11 PROCEDURE — 36600 WITHDRAWAL OF ARTERIAL BLOOD: CPT

## 2025-03-11 PROCEDURE — 83735 ASSAY OF MAGNESIUM: CPT

## 2025-03-11 PROCEDURE — 2500000003 HC RX 250 WO HCPCS: Performed by: INTERNAL MEDICINE

## 2025-03-11 PROCEDURE — 2580000003 HC RX 258: Performed by: HOSPITALIST

## 2025-03-11 PROCEDURE — 6360000002 HC RX W HCPCS: Performed by: INTERNAL MEDICINE

## 2025-03-11 PROCEDURE — 6370000000 HC RX 637 (ALT 250 FOR IP): Performed by: INTERNAL MEDICINE

## 2025-03-11 PROCEDURE — 2500000003 HC RX 250 WO HCPCS: Performed by: HOSPITALIST

## 2025-03-11 PROCEDURE — 87040 BLOOD CULTURE FOR BACTERIA: CPT

## 2025-03-11 PROCEDURE — 85610 PROTHROMBIN TIME: CPT

## 2025-03-11 PROCEDURE — 6370000000 HC RX 637 (ALT 250 FOR IP): Performed by: HOSPITALIST

## 2025-03-11 PROCEDURE — 71045 X-RAY EXAM CHEST 1 VIEW: CPT

## 2025-03-11 PROCEDURE — 82962 GLUCOSE BLOOD TEST: CPT

## 2025-03-11 PROCEDURE — 85025 COMPLETE CBC W/AUTO DIFF WBC: CPT

## 2025-03-11 PROCEDURE — 80202 ASSAY OF VANCOMYCIN: CPT

## 2025-03-11 PROCEDURE — 82803 BLOOD GASES ANY COMBINATION: CPT

## 2025-03-11 PROCEDURE — 84100 ASSAY OF PHOSPHORUS: CPT

## 2025-03-11 PROCEDURE — 6360000002 HC RX W HCPCS: Performed by: HOSPITALIST

## 2025-03-11 PROCEDURE — 2000000000 HC ICU R&B

## 2025-03-11 PROCEDURE — 83605 ASSAY OF LACTIC ACID: CPT

## 2025-03-11 PROCEDURE — 80053 COMPREHEN METABOLIC PANEL: CPT

## 2025-03-11 PROCEDURE — 82330 ASSAY OF CALCIUM: CPT

## 2025-03-11 RX ORDER — SODIUM CHLORIDE 450 MG/100ML
INJECTION, SOLUTION INTRAVENOUS CONTINUOUS
Status: DISCONTINUED | OUTPATIENT
Start: 2025-03-11 | End: 2025-03-12

## 2025-03-11 RX ORDER — POTASSIUM CHLORIDE 7.45 MG/ML
10 INJECTION INTRAVENOUS PRN
Status: DISCONTINUED | OUTPATIENT
Start: 2025-03-11 | End: 2025-04-01 | Stop reason: HOSPADM

## 2025-03-11 RX ORDER — MAGNESIUM SULFATE IN WATER 40 MG/ML
2000 INJECTION, SOLUTION INTRAVENOUS ONCE
Status: COMPLETED | OUTPATIENT
Start: 2025-03-11 | End: 2025-03-11

## 2025-03-11 RX ORDER — POTASSIUM CHLORIDE 1500 MG/1
40 TABLET, EXTENDED RELEASE ORAL PRN
Status: DISCONTINUED | OUTPATIENT
Start: 2025-03-11 | End: 2025-04-01 | Stop reason: HOSPADM

## 2025-03-11 RX ORDER — CALCIUM GLUCONATE 20 MG/ML
2000 INJECTION, SOLUTION INTRAVENOUS ONCE
Status: COMPLETED | OUTPATIENT
Start: 2025-03-11 | End: 2025-03-11

## 2025-03-11 RX ADMIN — VASOPRESSIN 0.04 UNITS/MIN: 20 INJECTION INTRAVENOUS at 23:44

## 2025-03-11 RX ADMIN — CALCIUM GLUCONATE 2000 MG: 20 INJECTION, SOLUTION INTRAVENOUS at 11:36

## 2025-03-11 RX ADMIN — WATER 40 MG: 1 INJECTION INTRAMUSCULAR; INTRAVENOUS; SUBCUTANEOUS at 11:38

## 2025-03-11 RX ADMIN — DOXYCYCLINE 100 MG: 100 INJECTION, POWDER, LYOPHILIZED, FOR SOLUTION INTRAVENOUS at 15:48

## 2025-03-11 RX ADMIN — WATER 40 MG: 1 INJECTION INTRAMUSCULAR; INTRAVENOUS; SUBCUTANEOUS at 20:02

## 2025-03-11 RX ADMIN — INSULIN LISPRO 1 UNITS: 100 INJECTION, SOLUTION INTRAVENOUS; SUBCUTANEOUS at 05:00

## 2025-03-11 RX ADMIN — INSULIN LISPRO 2 UNITS: 100 INJECTION, SOLUTION INTRAVENOUS; SUBCUTANEOUS at 13:19

## 2025-03-11 RX ADMIN — DEXTROSE AND SODIUM CHLORIDE: 5; .45 INJECTION, SOLUTION INTRAVENOUS at 01:32

## 2025-03-11 RX ADMIN — VASOPRESSIN 0.04 UNITS/MIN: 20 INJECTION INTRAVENOUS at 08:08

## 2025-03-11 RX ADMIN — NOREPINEPHRINE BITARTRATE 4 MCG/MIN: 64 SOLUTION INTRAVENOUS at 05:57

## 2025-03-11 RX ADMIN — SODIUM CHLORIDE, PRESERVATIVE FREE 40 MG: 5 INJECTION INTRAVENOUS at 08:22

## 2025-03-11 RX ADMIN — MIDAZOLAM 4 MG/HR: 5 INJECTION INTRAMUSCULAR; INTRAVENOUS at 18:27

## 2025-03-11 RX ADMIN — SODIUM CHLORIDE, PRESERVATIVE FREE 10 ML: 5 INJECTION INTRAVENOUS at 20:36

## 2025-03-11 RX ADMIN — DOXYCYCLINE 100 MG: 100 INJECTION, POWDER, LYOPHILIZED, FOR SOLUTION INTRAVENOUS at 01:42

## 2025-03-11 RX ADMIN — OSELTAMIVIR PHOSPHATE 30 MG: 30 CAPSULE ORAL at 09:01

## 2025-03-11 RX ADMIN — INSULIN LISPRO 3 UNITS: 100 INJECTION, SOLUTION INTRAVENOUS; SUBCUTANEOUS at 08:58

## 2025-03-11 RX ADMIN — OSELTAMIVIR PHOSPHATE 30 MG: 30 CAPSULE ORAL at 20:36

## 2025-03-11 RX ADMIN — WATER 40 MG: 1 INJECTION INTRAMUSCULAR; INTRAVENOUS; SUBCUTANEOUS at 04:59

## 2025-03-11 RX ADMIN — SODIUM CHLORIDE: 4.5 INJECTION, SOLUTION INTRAVENOUS at 09:12

## 2025-03-11 RX ADMIN — INSULIN LISPRO 3 UNITS: 100 INJECTION, SOLUTION INTRAVENOUS; SUBCUTANEOUS at 23:11

## 2025-03-11 RX ADMIN — VASOPRESSIN 0.04 UNITS/MIN: 20 INJECTION INTRAVENOUS at 15:26

## 2025-03-11 RX ADMIN — INSULIN LISPRO 2 UNITS: 100 INJECTION, SOLUTION INTRAVENOUS; SUBCUTANEOUS at 19:59

## 2025-03-11 RX ADMIN — FENTANYL CITRATE 75 MCG/HR: 0.05 INJECTION, SOLUTION INTRAMUSCULAR; INTRAVENOUS at 02:50

## 2025-03-11 RX ADMIN — MAGNESIUM SULFATE HEPTAHYDRATE 2000 MG: 40 INJECTION, SOLUTION INTRAVENOUS at 09:12

## 2025-03-11 RX ADMIN — VANCOMYCIN HYDROCHLORIDE 1000 MG: 1 INJECTION, POWDER, LYOPHILIZED, FOR SOLUTION INTRAVENOUS at 09:06

## 2025-03-11 RX ADMIN — CEFEPIME 1000 MG: 1 INJECTION, POWDER, FOR SOLUTION INTRAMUSCULAR; INTRAVENOUS at 11:37

## 2025-03-11 RX ADMIN — ACETAMINOPHEN 650 MG: 325 TABLET ORAL at 14:51

## 2025-03-11 RX ADMIN — CEFEPIME 1000 MG: 1 INJECTION, POWDER, FOR SOLUTION INTRAMUSCULAR; INTRAVENOUS at 01:50

## 2025-03-11 RX ADMIN — SODIUM CHLORIDE, PRESERVATIVE FREE 10 ML: 5 INJECTION INTRAVENOUS at 08:22

## 2025-03-11 RX ADMIN — FENTANYL CITRATE 75 MCG/HR: 0.05 INJECTION, SOLUTION INTRAMUSCULAR; INTRAVENOUS at 13:54

## 2025-03-11 RX ADMIN — CHLORHEXIDINE GLUCONATE, 0.12% ORAL RINSE 15 ML: 1.2 SOLUTION DENTAL at 20:02

## 2025-03-11 RX ADMIN — CHLORHEXIDINE GLUCONATE, 0.12% ORAL RINSE 15 ML: 1.2 SOLUTION DENTAL at 08:22

## 2025-03-11 RX ADMIN — SODIUM CHLORIDE 75 ML/HR: 4.5 INJECTION, SOLUTION INTRAVENOUS at 22:43

## 2025-03-11 RX ADMIN — VANCOMYCIN HYDROCHLORIDE 1000 MG: 1 INJECTION, POWDER, LYOPHILIZED, FOR SOLUTION INTRAVENOUS at 20:36

## 2025-03-11 RX ADMIN — INSULIN LISPRO 2 UNITS: 100 INJECTION, SOLUTION INTRAVENOUS; SUBCUTANEOUS at 17:52

## 2025-03-11 ASSESSMENT — PULMONARY FUNCTION TESTS
PIF_VALUE: 21
PIF_VALUE: 21
PIF_VALUE: 22
PIF_VALUE: 18
PIF_VALUE: 21
PIF_VALUE: 25

## 2025-03-11 ASSESSMENT — PAIN SCALES - GENERAL
PAINLEVEL_OUTOF10: 0

## 2025-03-11 NOTE — ACP (ADVANCE CARE PLANNING)
Advance Care Planning       Palliative Team Advance Care Planning (ACP) Conversation        Date of Conversation: 03/11/25      Individuals present for the conversation: Patient's father (Roldan MirelesSr.) via phone, patient's father's wife via phone, patient's ex-sister-in-law (Yaritza Mireles) via phone, Orly Ahn NP (Palliative) and this writer.       Healthcare Decision Maker:      Primary Decision Maker (Active): Roldan Mireles Sr. - Parent - [phone]       Conversation Summary:      This writer, along with Orly Ahn NP, with the Palliative Care team; visited with patient today to offer support. Patient, at this time, remains intubated (PEEP 10, FiO2 70). There were no family members at the bedside. The Palliative Care team spoke with the ICU team regarding patient's family. A time was set for today, at 12PM, to phone patient's father (Roldan Avendano.) to offer support.     The Palliative Care team phoned RoldanSr. and his wife answered the phone. She put Roldan Avendano. On speaker phone. Roldan Avendano is patient's legal next-of-kin and default healthcare decision maker. Roldan Avendano suggested that Yaritza be included as the decision maker. Roldan was informed that he is the legal next-of-kin and that he can consult with Yaritza for any and all decisions that need to be made; but the final decision, moving forward, would need to come from Roldan Sr. Roldan Avendano acknowledged this but still wanted the Palliative Care team to reach out to Yaritza.     The Palliative Care team phoned Yaritza. She reported that she has become the unofficial family spokesperson for all decisions that need to made. Yaritza was informed of the legal aspect of needing to keep Roldan Avendano as the decision maker. Yaritza fully understands. Yaritza will continue to work closely with Roldan Avendano for all medical decisions that need to be made. She will assist Roldan Avendano with making sure he fully understands what is going on with patient, medically

## 2025-03-11 NOTE — CARE COORDINATION
SW requested Cms to initiate LTACH referrals as a proactive measure. CM team will continue to follow to support pt is transition of care plan.    Marie Campos MSW  Case Management Department

## 2025-03-12 ENCOUNTER — APPOINTMENT (OUTPATIENT)
Facility: HOSPITAL | Age: 55
DRG: 004 | End: 2025-03-12
Payer: MEDICARE

## 2025-03-12 LAB
ALBUMIN SERPL-MCNC: 1.5 G/DL (ref 3.4–5)
ALBUMIN/GLOB SERPL: 0.5 (ref 0.8–1.7)
ALP SERPL-CCNC: 87 U/L (ref 45–117)
ALT SERPL-CCNC: 460 U/L (ref 16–61)
ANION GAP SERPL CALC-SCNC: 7 MMOL/L (ref 3–18)
AST SERPL-CCNC: 186 U/L (ref 10–38)
BACTERIA SPEC CULT: ABNORMAL
BACTERIA SPEC CULT: ABNORMAL
BASE DEFICIT BLD-SCNC: 0.6 MMOL/L
BASOPHILS # BLD: 0 K/UL (ref 0–0.1)
BASOPHILS NFR BLD: 0 % (ref 0–2)
BDY SITE: ABNORMAL
BILIRUB SERPL-MCNC: 1.8 MG/DL (ref 0.2–1)
BUN SERPL-MCNC: 57 MG/DL (ref 7–18)
BUN/CREAT SERPL: 26 (ref 12–20)
CA-I SERPL-SCNC: 1.04 MMOL/L (ref 1.12–1.32)
CALCIUM SERPL-MCNC: 7.9 MG/DL (ref 8.5–10.1)
CHLORIDE SERPL-SCNC: 105 MMOL/L (ref 100–111)
CK SERPL-CCNC: 783 U/L (ref 39–308)
CO2 SERPL-SCNC: 26 MMOL/L (ref 21–32)
CREAT SERPL-MCNC: 2.23 MG/DL (ref 0.6–1.3)
DIFFERENTIAL METHOD BLD: ABNORMAL
EOSINOPHIL # BLD: 0 K/UL (ref 0–0.4)
EOSINOPHIL NFR BLD: 0 % (ref 0–5)
ERYTHROCYTE [DISTWIDTH] IN BLOOD BY AUTOMATED COUNT: 15.7 % (ref 11.6–14.5)
GAS FLOW.O2 O2 DELIVERY SYS: ABNORMAL
GAS FLOW.O2 SETTING OXYMISER: 20 BPM
GLOBULIN SER CALC-MCNC: 3.1 G/DL (ref 2–4)
GLUCOSE BLD STRIP.AUTO-MCNC: 208 MG/DL (ref 70–110)
GLUCOSE BLD STRIP.AUTO-MCNC: 217 MG/DL (ref 70–110)
GLUCOSE BLD STRIP.AUTO-MCNC: 218 MG/DL (ref 70–110)
GLUCOSE BLD STRIP.AUTO-MCNC: 233 MG/DL (ref 70–110)
GLUCOSE BLD STRIP.AUTO-MCNC: 240 MG/DL (ref 70–110)
GLUCOSE BLD STRIP.AUTO-MCNC: 282 MG/DL (ref 70–110)
GLUCOSE SERPL-MCNC: 287 MG/DL (ref 74–99)
GRAM STN SPEC: ABNORMAL
HCO3 BLD-SCNC: 24.7 MMOL/L (ref 21–28)
HCT VFR BLD AUTO: 33.9 % (ref 36–48)
HGB BLD-MCNC: 11.3 G/DL (ref 13–16)
IMM GRANULOCYTES # BLD AUTO: 0 K/UL
IMM GRANULOCYTES NFR BLD AUTO: 0 %
INR PPP: 1.2 (ref 0.9–1.1)
LACTATE SERPL-SCNC: 2.6 MMOL/L (ref 0.4–2)
LACTATE SERPL-SCNC: 2.7 MMOL/L (ref 0.4–2)
LACTATE SERPL-SCNC: 3.1 MMOL/L (ref 0.4–2)
LACTATE SERPL-SCNC: 3.7 MMOL/L (ref 0.4–2)
LACTATE SERPL-SCNC: 3.8 MMOL/L (ref 0.4–2)
LYMPHOCYTES # BLD: 0.13 K/UL (ref 0.9–3.6)
LYMPHOCYTES NFR BLD: 1 % (ref 21–52)
MAGNESIUM SERPL-MCNC: 2.1 MG/DL (ref 1.6–2.6)
MCH RBC QN AUTO: 30.1 PG (ref 24–34)
MCHC RBC AUTO-ENTMCNC: 33.3 G/DL (ref 31–37)
MCV RBC AUTO: 90.2 FL (ref 78–100)
METAMYELOCYTES NFR BLD MANUAL: 6 %
MONOCYTES # BLD: 0.13 K/UL (ref 0.05–1.2)
MONOCYTES NFR BLD: 1 % (ref 3–10)
NEUTS BAND NFR BLD MANUAL: 14 % (ref 0–5)
NEUTS SEG # BLD: 11.59 K/UL (ref 1.8–8)
NEUTS SEG NFR BLD: 78 % (ref 40–73)
NRBC # BLD: 0 K/UL (ref 0–0.01)
NRBC BLD-RTO: 0 PER 100 WBC
NT PRO BNP: ABNORMAL PG/ML (ref 0–900)
O2/TOTAL GAS SETTING VFR VENT: 60 %
PCO2 BLD: 42.5 MMHG (ref 35–48)
PEEP RESPIRATORY: 10 CMH2O
PH BLD: 7.37 (ref 7.35–7.45)
PHOSPHATE SERPL-MCNC: 2.1 MG/DL (ref 2.5–4.9)
PLATELET # BLD AUTO: 67 K/UL (ref 135–420)
PLATELET COMMENT: ABNORMAL
PMV BLD AUTO: 13.5 FL (ref 9.2–11.8)
PO2 BLD: 112 MMHG (ref 83–108)
POTASSIUM SERPL-SCNC: 4.1 MMOL/L (ref 3.5–5.5)
PROCALCITONIN SERPL-MCNC: 130.84 NG/ML
PROT SERPL-MCNC: 4.6 G/DL (ref 6.4–8.2)
PROTHROMBIN TIME: 14.9 SEC (ref 11.9–14.9)
RBC # BLD AUTO: 3.76 M/UL (ref 4.35–5.65)
RBC MORPH BLD: ABNORMAL
RBC MORPH BLD: ABNORMAL
SAO2 % BLD: 98.2 % (ref 92–97)
SERVICE CMNT-IMP: ABNORMAL
SODIUM SERPL-SCNC: 138 MMOL/L (ref 136–145)
SPECIMEN TYPE: ABNORMAL
VENTILATION MODE VENT: ABNORMAL
VT SETTING VENT: 450 ML
WBC # BLD AUTO: 12.6 K/UL (ref 4.6–13.2)

## 2025-03-12 PROCEDURE — 71045 X-RAY EXAM CHEST 1 VIEW: CPT

## 2025-03-12 PROCEDURE — 2500000003 HC RX 250 WO HCPCS: Performed by: INTERNAL MEDICINE

## 2025-03-12 PROCEDURE — 83880 ASSAY OF NATRIURETIC PEPTIDE: CPT

## 2025-03-12 PROCEDURE — 94003 VENT MGMT INPAT SUBQ DAY: CPT

## 2025-03-12 PROCEDURE — 6370000000 HC RX 637 (ALT 250 FOR IP): Performed by: INTERNAL MEDICINE

## 2025-03-12 PROCEDURE — 2000000000 HC ICU R&B

## 2025-03-12 PROCEDURE — 85610 PROTHROMBIN TIME: CPT

## 2025-03-12 PROCEDURE — 2500000003 HC RX 250 WO HCPCS: Performed by: HOSPITALIST

## 2025-03-12 PROCEDURE — 80053 COMPREHEN METABOLIC PANEL: CPT

## 2025-03-12 PROCEDURE — 85025 COMPLETE CBC W/AUTO DIFF WBC: CPT

## 2025-03-12 PROCEDURE — 83735 ASSAY OF MAGNESIUM: CPT

## 2025-03-12 PROCEDURE — 83605 ASSAY OF LACTIC ACID: CPT

## 2025-03-12 PROCEDURE — 84100 ASSAY OF PHOSPHORUS: CPT

## 2025-03-12 PROCEDURE — 82803 BLOOD GASES ANY COMBINATION: CPT

## 2025-03-12 PROCEDURE — 2580000003 HC RX 258: Performed by: INTERNAL MEDICINE

## 2025-03-12 PROCEDURE — 84145 PROCALCITONIN (PCT): CPT

## 2025-03-12 PROCEDURE — 82550 ASSAY OF CK (CPK): CPT

## 2025-03-12 PROCEDURE — 6360000002 HC RX W HCPCS: Performed by: INTERNAL MEDICINE

## 2025-03-12 PROCEDURE — 2580000003 HC RX 258: Performed by: HOSPITALIST

## 2025-03-12 PROCEDURE — 99231 SBSQ HOSP IP/OBS SF/LOW 25: CPT

## 2025-03-12 PROCEDURE — 36600 WITHDRAWAL OF ARTERIAL BLOOD: CPT

## 2025-03-12 PROCEDURE — 6360000002 HC RX W HCPCS: Performed by: HOSPITALIST

## 2025-03-12 PROCEDURE — 82330 ASSAY OF CALCIUM: CPT

## 2025-03-12 PROCEDURE — 82962 GLUCOSE BLOOD TEST: CPT

## 2025-03-12 PROCEDURE — 6370000000 HC RX 637 (ALT 250 FOR IP): Performed by: HOSPITALIST

## 2025-03-12 RX ADMIN — WATER 40 MG: 1 INJECTION INTRAMUSCULAR; INTRAVENOUS; SUBCUTANEOUS at 20:12

## 2025-03-12 RX ADMIN — WATER 40 MG: 1 INJECTION INTRAMUSCULAR; INTRAVENOUS; SUBCUTANEOUS at 11:54

## 2025-03-12 RX ADMIN — INSULIN LISPRO 1 UNITS: 100 INJECTION, SOLUTION INTRAVENOUS; SUBCUTANEOUS at 11:53

## 2025-03-12 RX ADMIN — INSULIN LISPRO 2 UNITS: 100 INJECTION, SOLUTION INTRAVENOUS; SUBCUTANEOUS at 05:56

## 2025-03-12 RX ADMIN — SODIUM CHLORIDE, PRESERVATIVE FREE 40 MG: 5 INJECTION INTRAVENOUS at 08:30

## 2025-03-12 RX ADMIN — NAFCILLIN SODIUM 2000 MG: 2 INJECTION, POWDER, LYOPHILIZED, FOR SOLUTION INTRAMUSCULAR; INTRAVENOUS at 20:13

## 2025-03-12 RX ADMIN — OSELTAMIVIR PHOSPHATE 30 MG: 30 CAPSULE ORAL at 08:32

## 2025-03-12 RX ADMIN — INSULIN LISPRO 1 UNITS: 100 INJECTION, SOLUTION INTRAVENOUS; SUBCUTANEOUS at 22:56

## 2025-03-12 RX ADMIN — FENTANYL CITRATE 75 MCG/HR: 0.05 INJECTION, SOLUTION INTRAMUSCULAR; INTRAVENOUS at 04:27

## 2025-03-12 RX ADMIN — MIDAZOLAM 4 MG/HR: 5 INJECTION INTRAMUSCULAR; INTRAVENOUS at 15:44

## 2025-03-12 RX ADMIN — NAFCILLIN SODIUM 2000 MG: 2 INJECTION, POWDER, LYOPHILIZED, FOR SOLUTION INTRAMUSCULAR; INTRAVENOUS at 16:54

## 2025-03-12 RX ADMIN — CHLORHEXIDINE GLUCONATE, 0.12% ORAL RINSE 15 ML: 1.2 SOLUTION DENTAL at 20:15

## 2025-03-12 RX ADMIN — INSULIN LISPRO 1 UNITS: 100 INJECTION, SOLUTION INTRAVENOUS; SUBCUTANEOUS at 19:41

## 2025-03-12 RX ADMIN — NAFCILLIN SODIUM 2000 MG: 2 INJECTION, POWDER, LYOPHILIZED, FOR SOLUTION INTRAMUSCULAR; INTRAVENOUS at 11:56

## 2025-03-12 RX ADMIN — VANCOMYCIN HYDROCHLORIDE 1000 MG: 1 INJECTION, POWDER, LYOPHILIZED, FOR SOLUTION INTRAVENOUS at 08:27

## 2025-03-12 RX ADMIN — WATER 40 MG: 1 INJECTION INTRAMUSCULAR; INTRAVENOUS; SUBCUTANEOUS at 02:57

## 2025-03-12 RX ADMIN — VASOPRESSIN 0.04 UNITS/MIN: 20 INJECTION INTRAVENOUS at 08:24

## 2025-03-12 RX ADMIN — CHLORHEXIDINE GLUCONATE, 0.12% ORAL RINSE 15 ML: 1.2 SOLUTION DENTAL at 08:33

## 2025-03-12 RX ADMIN — INSULIN LISPRO 2 UNITS: 100 INJECTION, SOLUTION INTRAVENOUS; SUBCUTANEOUS at 02:56

## 2025-03-12 RX ADMIN — NAFCILLIN SODIUM 2000 MG: 2 INJECTION, POWDER, LYOPHILIZED, FOR SOLUTION INTRAMUSCULAR; INTRAVENOUS at 23:55

## 2025-03-12 RX ADMIN — OSELTAMIVIR PHOSPHATE 30 MG: 30 CAPSULE ORAL at 20:30

## 2025-03-12 RX ADMIN — ACETAMINOPHEN 650 MG: 325 TABLET ORAL at 11:55

## 2025-03-12 RX ADMIN — SODIUM CHLORIDE, PRESERVATIVE FREE 10 ML: 5 INJECTION INTRAVENOUS at 10:08

## 2025-03-12 RX ADMIN — CEFEPIME 1000 MG: 1 INJECTION, POWDER, FOR SOLUTION INTRAMUSCULAR; INTRAVENOUS at 00:10

## 2025-03-12 RX ADMIN — VASOPRESSIN 0.04 UNITS/MIN: 20 INJECTION INTRAVENOUS at 18:19

## 2025-03-12 RX ADMIN — FENTANYL CITRATE 75 MCG/HR: 0.05 INJECTION, SOLUTION INTRAMUSCULAR; INTRAVENOUS at 17:14

## 2025-03-12 RX ADMIN — SODIUM CHLORIDE, PRESERVATIVE FREE 10 ML: 5 INJECTION INTRAVENOUS at 20:15

## 2025-03-12 RX ADMIN — ACETAMINOPHEN 650 MG: 325 TABLET ORAL at 05:58

## 2025-03-12 RX ADMIN — INSULIN LISPRO 1 UNITS: 100 INJECTION, SOLUTION INTRAVENOUS; SUBCUTANEOUS at 16:54

## 2025-03-12 RX ADMIN — NAFCILLIN SODIUM 2000 MG: 2 INJECTION, POWDER, LYOPHILIZED, FOR SOLUTION INTRAMUSCULAR; INTRAVENOUS at 08:23

## 2025-03-12 ASSESSMENT — PAIN SCALES - GENERAL
PAINLEVEL_OUTOF10: 0

## 2025-03-12 ASSESSMENT — PULMONARY FUNCTION TESTS
PIF_VALUE: 21
PIF_VALUE: 22
PIF_VALUE: 22
PIF_VALUE: 20
PIF_VALUE: 21

## 2025-03-13 ENCOUNTER — APPOINTMENT (OUTPATIENT)
Facility: HOSPITAL | Age: 55
DRG: 004 | End: 2025-03-13
Payer: MEDICARE

## 2025-03-13 LAB
ALBUMIN SERPL-MCNC: 1.5 G/DL (ref 3.4–5)
ALBUMIN/GLOB SERPL: 0.5 (ref 0.8–1.7)
ALP SERPL-CCNC: 93 U/L (ref 45–117)
ALT SERPL-CCNC: 331 U/L (ref 16–61)
ANION GAP SERPL CALC-SCNC: 11 MMOL/L (ref 3–18)
ARTERIAL PATENCY WRIST A: POSITIVE
AST SERPL-CCNC: 98 U/L (ref 10–38)
BASE DEFICIT BLD-SCNC: 0.6 MMOL/L
BASOPHILS # BLD: 0.02 K/UL (ref 0–0.1)
BASOPHILS NFR BLD: 0.2 % (ref 0–2)
BDY SITE: NORMAL
BILIRUB SERPL-MCNC: 5.2 MG/DL (ref 0.2–1)
BODY TEMPERATURE: 98
BUN SERPL-MCNC: 66 MG/DL (ref 7–18)
BUN/CREAT SERPL: 37 (ref 12–20)
CA-I SERPL-SCNC: 1.04 MMOL/L (ref 1.12–1.32)
CALCIUM SERPL-MCNC: 7.9 MG/DL (ref 8.5–10.1)
CHLORIDE SERPL-SCNC: 106 MMOL/L (ref 100–111)
CK SERPL-CCNC: 511 U/L (ref 39–308)
CO2 SERPL-SCNC: 24 MMOL/L (ref 21–32)
CREAT SERPL-MCNC: 1.79 MG/DL (ref 0.6–1.3)
DIFFERENTIAL METHOD BLD: ABNORMAL
EOSINOPHIL # BLD: 0 K/UL (ref 0–0.4)
EOSINOPHIL NFR BLD: 0 % (ref 0–5)
ERYTHROCYTE [DISTWIDTH] IN BLOOD BY AUTOMATED COUNT: 15.6 % (ref 11.6–14.5)
GAS FLOW.O2 O2 DELIVERY SYS: NORMAL
GAS FLOW.O2 SETTING OXYMISER: 20 BPM
GLOBULIN SER CALC-MCNC: 3.1 G/DL (ref 2–4)
GLUCOSE BLD STRIP.AUTO-MCNC: 260 MG/DL (ref 70–110)
GLUCOSE BLD STRIP.AUTO-MCNC: 292 MG/DL (ref 70–110)
GLUCOSE BLD STRIP.AUTO-MCNC: 309 MG/DL (ref 70–110)
GLUCOSE BLD STRIP.AUTO-MCNC: 312 MG/DL (ref 70–110)
GLUCOSE BLD STRIP.AUTO-MCNC: 327 MG/DL (ref 70–110)
GLUCOSE BLD STRIP.AUTO-MCNC: 340 MG/DL (ref 70–110)
GLUCOSE SERPL-MCNC: 338 MG/DL (ref 74–99)
HCO3 BLD-SCNC: 24.5 MMOL/L (ref 21–28)
HCT VFR BLD AUTO: 34 % (ref 36–48)
HGB BLD-MCNC: 11.3 G/DL (ref 13–16)
IMM GRANULOCYTES # BLD AUTO: 0.43 K/UL (ref 0–0.04)
IMM GRANULOCYTES NFR BLD AUTO: 3.7 % (ref 0–0.5)
INR PPP: 1.2 (ref 0.9–1.1)
LACTATE SERPL-SCNC: 2.5 MMOL/L (ref 0.4–2)
LACTATE SERPL-SCNC: 2.7 MMOL/L (ref 0.4–2)
LYMPHOCYTES # BLD: 0.3 K/UL (ref 0.9–3.3)
LYMPHOCYTES NFR BLD: 2.6 % (ref 21–52)
MAGNESIUM SERPL-MCNC: 2.5 MG/DL (ref 1.6–2.6)
MCH RBC QN AUTO: 29.7 PG (ref 24–34)
MCHC RBC AUTO-ENTMCNC: 33.2 G/DL (ref 31–37)
MCV RBC AUTO: 89.5 FL (ref 78–100)
MONOCYTES # BLD: 0.51 K/UL (ref 0.05–1.2)
MONOCYTES NFR BLD: 4.4 % (ref 3–10)
NEUTS SEG # BLD: 10.37 K/UL (ref 1.8–8)
NEUTS SEG NFR BLD: 89.1 % (ref 40–73)
NRBC # BLD: 0.03 K/UL (ref 0–0.01)
NRBC BLD-RTO: 0.3 PER 100 WBC
O2/TOTAL GAS SETTING VFR VENT: 40 %
PCO2 BLD: 40.6 MMHG (ref 35–48)
PEEP RESPIRATORY: 10 CMH2O
PH BLD: 7.39 (ref 7.35–7.45)
PHOSPHATE SERPL-MCNC: 3.4 MG/DL (ref 2.5–4.9)
PLATELET # BLD AUTO: 65 K/UL (ref 135–420)
PO2 BLD: 89 MMHG (ref 83–108)
POTASSIUM SERPL-SCNC: 4.2 MMOL/L (ref 3.5–5.5)
POTASSIUM SERPL-SCNC: 4.5 MMOL/L (ref 3.5–5.5)
PROT SERPL-MCNC: 4.6 G/DL (ref 6.4–8.2)
PROTHROMBIN TIME: 15.2 SEC (ref 11.9–14.9)
RBC # BLD AUTO: 3.8 M/UL (ref 4.35–5.65)
SAO2 % BLD: 96.9 % (ref 92–97)
SERVICE CMNT-IMP: NORMAL
SODIUM SERPL-SCNC: 141 MMOL/L (ref 136–145)
SPECIMEN TYPE: NORMAL
VENTILATION MODE VENT: NORMAL
VT SETTING VENT: 450 ML
WBC # BLD AUTO: 11.6 K/UL (ref 4.6–13.2)

## 2025-03-13 PROCEDURE — 94003 VENT MGMT INPAT SUBQ DAY: CPT

## 2025-03-13 PROCEDURE — 84100 ASSAY OF PHOSPHORUS: CPT

## 2025-03-13 PROCEDURE — 83735 ASSAY OF MAGNESIUM: CPT

## 2025-03-13 PROCEDURE — 82803 BLOOD GASES ANY COMBINATION: CPT

## 2025-03-13 PROCEDURE — 36600 WITHDRAWAL OF ARTERIAL BLOOD: CPT

## 2025-03-13 PROCEDURE — 2580000003 HC RX 258: Performed by: INTERNAL MEDICINE

## 2025-03-13 PROCEDURE — 82962 GLUCOSE BLOOD TEST: CPT

## 2025-03-13 PROCEDURE — 2500000003 HC RX 250 WO HCPCS: Performed by: HOSPITALIST

## 2025-03-13 PROCEDURE — 85610 PROTHROMBIN TIME: CPT

## 2025-03-13 PROCEDURE — 6360000002 HC RX W HCPCS: Performed by: INTERNAL MEDICINE

## 2025-03-13 PROCEDURE — 82550 ASSAY OF CK (CPK): CPT

## 2025-03-13 PROCEDURE — 71045 X-RAY EXAM CHEST 1 VIEW: CPT

## 2025-03-13 PROCEDURE — 70450 CT HEAD/BRAIN W/O DYE: CPT

## 2025-03-13 PROCEDURE — 84132 ASSAY OF SERUM POTASSIUM: CPT

## 2025-03-13 PROCEDURE — 80053 COMPREHEN METABOLIC PANEL: CPT

## 2025-03-13 PROCEDURE — 6370000000 HC RX 637 (ALT 250 FOR IP): Performed by: INTERNAL MEDICINE

## 2025-03-13 PROCEDURE — 2000000000 HC ICU R&B

## 2025-03-13 PROCEDURE — 82330 ASSAY OF CALCIUM: CPT

## 2025-03-13 PROCEDURE — 2500000003 HC RX 250 WO HCPCS: Performed by: INTERNAL MEDICINE

## 2025-03-13 PROCEDURE — 6370000000 HC RX 637 (ALT 250 FOR IP): Performed by: HOSPITALIST

## 2025-03-13 PROCEDURE — 36415 COLL VENOUS BLD VENIPUNCTURE: CPT

## 2025-03-13 PROCEDURE — 2500000003 HC RX 250 WO HCPCS: Performed by: EMERGENCY MEDICINE

## 2025-03-13 PROCEDURE — 85025 COMPLETE CBC W/AUTO DIFF WBC: CPT

## 2025-03-13 PROCEDURE — 83605 ASSAY OF LACTIC ACID: CPT

## 2025-03-13 RX ORDER — METOPROLOL TARTRATE 1 MG/ML
2.5 INJECTION, SOLUTION INTRAVENOUS EVERY 6 HOURS
Status: DISCONTINUED | OUTPATIENT
Start: 2025-03-13 | End: 2025-03-14

## 2025-03-13 RX ORDER — FENTANYL CITRATE-0.9 % NACL/PF 10 MCG/ML
25-200 PLASTIC BAG, INJECTION (ML) INTRAVENOUS CONTINUOUS
Refills: 0 | Status: DISCONTINUED | OUTPATIENT
Start: 2025-03-13 | End: 2025-03-21

## 2025-03-13 RX ORDER — MIDAZOLAM HYDROCHLORIDE 1 MG/ML
1-10 INJECTION, SOLUTION INTRAVENOUS CONTINUOUS
Status: DISCONTINUED | OUTPATIENT
Start: 2025-03-13 | End: 2025-03-21

## 2025-03-13 RX ADMIN — VASOPRESSIN 0.04 UNITS/MIN: 20 INJECTION INTRAVENOUS at 13:21

## 2025-03-13 RX ADMIN — NAFCILLIN SODIUM 2000 MG: 2 INJECTION, POWDER, LYOPHILIZED, FOR SOLUTION INTRAMUSCULAR; INTRAVENOUS at 12:20

## 2025-03-13 RX ADMIN — CHLORHEXIDINE GLUCONATE, 0.12% ORAL RINSE 15 ML: 1.2 SOLUTION DENTAL at 20:16

## 2025-03-13 RX ADMIN — SODIUM CHLORIDE, PRESERVATIVE FREE 10 ML: 5 INJECTION INTRAVENOUS at 07:57

## 2025-03-13 RX ADMIN — INSULIN LISPRO 3 UNITS: 100 INJECTION, SOLUTION INTRAVENOUS; SUBCUTANEOUS at 22:56

## 2025-03-13 RX ADMIN — INSULIN LISPRO 3 UNITS: 100 INJECTION, SOLUTION INTRAVENOUS; SUBCUTANEOUS at 12:05

## 2025-03-13 RX ADMIN — INSULIN LISPRO 2 UNITS: 100 INJECTION, SOLUTION INTRAVENOUS; SUBCUTANEOUS at 19:45

## 2025-03-13 RX ADMIN — SODIUM CHLORIDE, PRESERVATIVE FREE 40 MG: 5 INJECTION INTRAVENOUS at 07:57

## 2025-03-13 RX ADMIN — METOPROLOL TARTRATE 2.5 MG: 5 INJECTION INTRAVENOUS at 21:14

## 2025-03-13 RX ADMIN — WATER 40 MG: 1 INJECTION INTRAMUSCULAR; INTRAVENOUS; SUBCUTANEOUS at 20:16

## 2025-03-13 RX ADMIN — VASOPRESSIN 0.04 UNITS/MIN: 20 INJECTION INTRAVENOUS at 03:10

## 2025-03-13 RX ADMIN — INSULIN LISPRO 3 UNITS: 100 INJECTION, SOLUTION INTRAVENOUS; SUBCUTANEOUS at 07:30

## 2025-03-13 RX ADMIN — VASOPRESSIN 0.04 UNITS/MIN: 20 INJECTION INTRAVENOUS at 22:55

## 2025-03-13 RX ADMIN — WATER 40 MG: 1 INJECTION INTRAMUSCULAR; INTRAVENOUS; SUBCUTANEOUS at 04:17

## 2025-03-13 RX ADMIN — NOREPINEPHRINE BITARTRATE 5 MCG/MIN: 64 SOLUTION INTRAVENOUS at 09:40

## 2025-03-13 RX ADMIN — INSULIN LISPRO 3 UNITS: 100 INJECTION, SOLUTION INTRAVENOUS; SUBCUTANEOUS at 15:57

## 2025-03-13 RX ADMIN — OSELTAMIVIR PHOSPHATE 30 MG: 30 CAPSULE ORAL at 07:57

## 2025-03-13 RX ADMIN — NAFCILLIN SODIUM 2000 MG: 2 INJECTION, POWDER, LYOPHILIZED, FOR SOLUTION INTRAMUSCULAR; INTRAVENOUS at 04:17

## 2025-03-13 RX ADMIN — FENTANYL CITRATE 75 MCG/HR: 0.05 INJECTION, SOLUTION INTRAMUSCULAR; INTRAVENOUS at 06:41

## 2025-03-13 RX ADMIN — OSELTAMIVIR PHOSPHATE 30 MG: 30 CAPSULE ORAL at 20:30

## 2025-03-13 RX ADMIN — NAFCILLIN SODIUM 2000 MG: 2 INJECTION, POWDER, LYOPHILIZED, FOR SOLUTION INTRAMUSCULAR; INTRAVENOUS at 07:46

## 2025-03-13 RX ADMIN — NAFCILLIN SODIUM 2000 MG: 2 INJECTION, POWDER, LYOPHILIZED, FOR SOLUTION INTRAMUSCULAR; INTRAVENOUS at 19:46

## 2025-03-13 RX ADMIN — SODIUM CHLORIDE, PRESERVATIVE FREE 10 ML: 5 INJECTION INTRAVENOUS at 20:16

## 2025-03-13 RX ADMIN — NAFCILLIN SODIUM 2000 MG: 2 INJECTION, POWDER, LYOPHILIZED, FOR SOLUTION INTRAMUSCULAR; INTRAVENOUS at 16:04

## 2025-03-13 RX ADMIN — CHLORHEXIDINE GLUCONATE, 0.12% ORAL RINSE 15 ML: 1.2 SOLUTION DENTAL at 07:57

## 2025-03-13 RX ADMIN — INSULIN LISPRO 2 UNITS: 100 INJECTION, SOLUTION INTRAVENOUS; SUBCUTANEOUS at 03:11

## 2025-03-13 RX ADMIN — WATER 40 MG: 1 INJECTION INTRAMUSCULAR; INTRAVENOUS; SUBCUTANEOUS at 12:05

## 2025-03-13 ASSESSMENT — PAIN SCALES - GENERAL
PAINLEVEL_OUTOF10: 0

## 2025-03-13 ASSESSMENT — PULMONARY FUNCTION TESTS
PIF_VALUE: 21
PIF_VALUE: 19
PIF_VALUE: 21
PIF_VALUE: 22
PIF_VALUE: 20
PIF_VALUE: 21

## 2025-03-13 NOTE — WOUND CARE
ICU Rounding  Wound care nurse rounded on patient for skin issues.  Patient has a Aditya score of 10.  Does patient have any pressure injury?no     Skin Care & Pressure Relief Recommendations  Minimize layers of linen  Pads under patient to optimize support surface  Turn/reposition approximately every 2 hours  Use pillow wedges to maintain positioning but do not put pillow directly over wounds  Manage incontinence   Promote continence; Skin Protective lotion/cream to buttocks and sacrum daily and as needed with incontinence care  Offload heels pillows    Consult wound care if any wounds noted during admission.  Wound Care nurse will continue to follow during ICU admission.

## 2025-03-14 ENCOUNTER — APPOINTMENT (OUTPATIENT)
Facility: HOSPITAL | Age: 55
DRG: 004 | End: 2025-03-14
Payer: MEDICARE

## 2025-03-14 LAB
ALBUMIN SERPL-MCNC: 1.6 G/DL (ref 3.4–5)
ALBUMIN/GLOB SERPL: 0.5 (ref 0.8–1.7)
ALP SERPL-CCNC: 100 U/L (ref 45–117)
ALT SERPL-CCNC: 260 U/L (ref 16–61)
ANION GAP SERPL CALC-SCNC: 11 MMOL/L (ref 3–18)
ARTERIAL PATENCY WRIST A: POSITIVE
AST SERPL-CCNC: 70 U/L (ref 10–38)
BASE DEFICIT BLD-SCNC: 3.2 MMOL/L
BASOPHILS # BLD: 0.09 K/UL (ref 0–0.1)
BASOPHILS NFR BLD: 1 % (ref 0–2)
BDY SITE: ABNORMAL
BILIRUB SERPL-MCNC: 5.1 MG/DL (ref 0.2–1)
BODY TEMPERATURE: 98
BUN SERPL-MCNC: 73 MG/DL (ref 7–18)
BUN/CREAT SERPL: 39 (ref 12–20)
CALCIUM SERPL-MCNC: 7.9 MG/DL (ref 8.5–10.1)
CHLORIDE SERPL-SCNC: 109 MMOL/L (ref 100–111)
CK SERPL-CCNC: 326 U/L (ref 39–308)
CO2 SERPL-SCNC: 24 MMOL/L (ref 21–32)
CREAT SERPL-MCNC: 1.85 MG/DL (ref 0.6–1.3)
DIFFERENTIAL METHOD BLD: ABNORMAL
EOSINOPHIL # BLD: 0 K/UL (ref 0–0.4)
EOSINOPHIL NFR BLD: 0 % (ref 0–5)
ERYTHROCYTE [DISTWIDTH] IN BLOOD BY AUTOMATED COUNT: 15.8 % (ref 11.6–14.5)
GAS FLOW.O2 O2 DELIVERY SYS: ABNORMAL
GAS FLOW.O2 SETTING OXYMISER: 20 BPM
GLOBULIN SER CALC-MCNC: 3 G/DL (ref 2–4)
GLUCOSE BLD STRIP.AUTO-MCNC: 259 MG/DL (ref 70–110)
GLUCOSE BLD STRIP.AUTO-MCNC: 299 MG/DL (ref 70–110)
GLUCOSE BLD STRIP.AUTO-MCNC: 311 MG/DL (ref 70–110)
GLUCOSE BLD STRIP.AUTO-MCNC: 320 MG/DL (ref 70–110)
GLUCOSE BLD STRIP.AUTO-MCNC: 322 MG/DL (ref 70–110)
GLUCOSE BLD STRIP.AUTO-MCNC: 371 MG/DL (ref 70–110)
GLUCOSE SERPL-MCNC: 385 MG/DL (ref 74–99)
HCO3 BLD-SCNC: 19.9 MMOL/L (ref 21–28)
HCT VFR BLD AUTO: 34.6 % (ref 36–48)
HGB BLD-MCNC: 11.6 G/DL (ref 13–16)
IMM GRANULOCYTES # BLD AUTO: 0.25 K/UL (ref 0–0.04)
IMM GRANULOCYTES NFR BLD AUTO: 2.8 % (ref 0–0.5)
INR PPP: 1.2 (ref 0.9–1.1)
LYMPHOCYTES # BLD: 0.25 K/UL (ref 0.9–3.3)
LYMPHOCYTES NFR BLD: 2.8 % (ref 21–52)
MCH RBC QN AUTO: 29.7 PG (ref 24–34)
MCHC RBC AUTO-ENTMCNC: 33.5 G/DL (ref 31–37)
MCV RBC AUTO: 88.5 FL (ref 78–100)
MONOCYTES # BLD: 0.39 K/UL (ref 0.05–1.2)
MONOCYTES NFR BLD: 4.3 % (ref 3–10)
NEUTS SEG # BLD: 7.99 K/UL (ref 1.8–8)
NEUTS SEG NFR BLD: 89.1 % (ref 40–73)
NRBC # BLD: 0.12 K/UL (ref 0–0.01)
NRBC BLD-RTO: 1.3 PER 100 WBC
NT PRO BNP: ABNORMAL PG/ML (ref 0–900)
O2/TOTAL GAS SETTING VFR VENT: 40 %
PCO2 BLD: 28.8 MMHG (ref 35–48)
PEEP RESPIRATORY: 10 CMH2O
PH BLD: 7.45 (ref 7.35–7.45)
PLATELET # BLD AUTO: 103 K/UL (ref 135–420)
PMV BLD AUTO: 12.5 FL (ref 9.2–11.8)
PO2 BLD: 69 MMHG (ref 83–108)
POTASSIUM SERPL-SCNC: 4.4 MMOL/L (ref 3.5–5.5)
PROCALCITONIN SERPL-MCNC: 48.77 NG/ML
PROT SERPL-MCNC: 4.6 G/DL (ref 6.4–8.2)
PROTHROMBIN TIME: 15.4 SEC (ref 11.9–14.9)
RBC # BLD AUTO: 3.91 M/UL (ref 4.35–5.65)
SAO2 % BLD: 95 % (ref 92–97)
SERVICE CMNT-IMP: ABNORMAL
SODIUM SERPL-SCNC: 144 MMOL/L (ref 136–145)
SPECIMEN TYPE: ABNORMAL
VENTILATION MODE VENT: ABNORMAL
VT SETTING VENT: 450 ML
WBC # BLD AUTO: 9 K/UL (ref 4.6–13.2)

## 2025-03-14 PROCEDURE — 6370000000 HC RX 637 (ALT 250 FOR IP): Performed by: INTERNAL MEDICINE

## 2025-03-14 PROCEDURE — 84145 PROCALCITONIN (PCT): CPT

## 2025-03-14 PROCEDURE — 2500000003 HC RX 250 WO HCPCS: Performed by: EMERGENCY MEDICINE

## 2025-03-14 PROCEDURE — 4A00X4Z MEASUREMENT OF CENTRAL NERVOUS ELECTRICAL ACTIVITY, EXTERNAL APPROACH: ICD-10-PCS | Performed by: PSYCHIATRY & NEUROLOGY

## 2025-03-14 PROCEDURE — 2500000003 HC RX 250 WO HCPCS: Performed by: INTERNAL MEDICINE

## 2025-03-14 PROCEDURE — 85025 COMPLETE CBC W/AUTO DIFF WBC: CPT

## 2025-03-14 PROCEDURE — 6370000000 HC RX 637 (ALT 250 FOR IP): Performed by: HOSPITALIST

## 2025-03-14 PROCEDURE — 71045 X-RAY EXAM CHEST 1 VIEW: CPT

## 2025-03-14 PROCEDURE — 85610 PROTHROMBIN TIME: CPT

## 2025-03-14 PROCEDURE — 2580000003 HC RX 258: Performed by: INTERNAL MEDICINE

## 2025-03-14 PROCEDURE — 6360000002 HC RX W HCPCS: Performed by: INTERNAL MEDICINE

## 2025-03-14 PROCEDURE — 83880 ASSAY OF NATRIURETIC PEPTIDE: CPT

## 2025-03-14 PROCEDURE — 82803 BLOOD GASES ANY COMBINATION: CPT

## 2025-03-14 PROCEDURE — 95822 EEG COMA OR SLEEP ONLY: CPT

## 2025-03-14 PROCEDURE — 82962 GLUCOSE BLOOD TEST: CPT

## 2025-03-14 PROCEDURE — 82550 ASSAY OF CK (CPK): CPT

## 2025-03-14 PROCEDURE — 94003 VENT MGMT INPAT SUBQ DAY: CPT

## 2025-03-14 PROCEDURE — 2500000003 HC RX 250 WO HCPCS: Performed by: HOSPITALIST

## 2025-03-14 PROCEDURE — 36600 WITHDRAWAL OF ARTERIAL BLOOD: CPT

## 2025-03-14 PROCEDURE — 80053 COMPREHEN METABOLIC PANEL: CPT

## 2025-03-14 PROCEDURE — 2000000000 HC ICU R&B

## 2025-03-14 RX ORDER — AMIODARONE HYDROCHLORIDE 200 MG/1
200 TABLET ORAL DAILY
Status: DISCONTINUED | OUTPATIENT
Start: 2025-03-14 | End: 2025-03-14

## 2025-03-14 RX ORDER — INSULIN LISPRO 100 [IU]/ML
0-8 INJECTION, SOLUTION INTRAVENOUS; SUBCUTANEOUS
Status: DISCONTINUED | OUTPATIENT
Start: 2025-03-14 | End: 2025-03-16

## 2025-03-14 RX ORDER — GLUCAGON 1 MG/ML
1 KIT INJECTION PRN
Status: DISCONTINUED | OUTPATIENT
Start: 2025-03-14 | End: 2025-03-16 | Stop reason: SDUPTHER

## 2025-03-14 RX ORDER — OSELTAMIVIR PHOSPHATE 30 MG/1
30 CAPSULE ORAL 2 TIMES DAILY
Status: COMPLETED | OUTPATIENT
Start: 2025-03-14 | End: 2025-03-19

## 2025-03-14 RX ORDER — DEXTROSE MONOHYDRATE 100 MG/ML
INJECTION, SOLUTION INTRAVENOUS CONTINUOUS PRN
Status: DISCONTINUED | OUTPATIENT
Start: 2025-03-14 | End: 2025-03-16 | Stop reason: SDUPTHER

## 2025-03-14 RX ORDER — METOPROLOL TARTRATE 1 MG/ML
5 INJECTION, SOLUTION INTRAVENOUS EVERY 6 HOURS
Status: DISCONTINUED | OUTPATIENT
Start: 2025-03-14 | End: 2025-03-22

## 2025-03-14 RX ADMIN — INSULIN LISPRO 3 UNITS: 100 INJECTION, SOLUTION INTRAVENOUS; SUBCUTANEOUS at 03:17

## 2025-03-14 RX ADMIN — ACETAMINOPHEN 650 MG: 325 TABLET ORAL at 00:55

## 2025-03-14 RX ADMIN — ACETAMINOPHEN 650 MG: 325 TABLET ORAL at 17:52

## 2025-03-14 RX ADMIN — FENTANYL CITRATE 25 MCG: 50 INJECTION INTRAMUSCULAR; INTRAVENOUS at 17:58

## 2025-03-14 RX ADMIN — NAFCILLIN SODIUM 2000 MG: 2 INJECTION, POWDER, LYOPHILIZED, FOR SOLUTION INTRAMUSCULAR; INTRAVENOUS at 03:47

## 2025-03-14 RX ADMIN — SODIUM CHLORIDE, PRESERVATIVE FREE 10 ML: 5 INJECTION INTRAVENOUS at 08:00

## 2025-03-14 RX ADMIN — INSULIN LISPRO 6 UNITS: 100 INJECTION, SOLUTION INTRAVENOUS; SUBCUTANEOUS at 11:07

## 2025-03-14 RX ADMIN — INSULIN LISPRO 4 UNITS: 100 INJECTION, SOLUTION INTRAVENOUS; SUBCUTANEOUS at 20:19

## 2025-03-14 RX ADMIN — INSULIN LISPRO 4 UNITS: 100 INJECTION, SOLUTION INTRAVENOUS; SUBCUTANEOUS at 06:26

## 2025-03-14 RX ADMIN — AMIODARONE HYDROCHLORIDE 200 MG: 200 TABLET ORAL at 16:44

## 2025-03-14 RX ADMIN — CHLORHEXIDINE GLUCONATE, 0.12% ORAL RINSE 15 ML: 1.2 SOLUTION DENTAL at 07:59

## 2025-03-14 RX ADMIN — SODIUM CHLORIDE, PRESERVATIVE FREE 40 MG: 5 INJECTION INTRAVENOUS at 07:59

## 2025-03-14 RX ADMIN — NAFCILLIN SODIUM 2000 MG: 2 INJECTION, POWDER, LYOPHILIZED, FOR SOLUTION INTRAMUSCULAR; INTRAVENOUS at 00:20

## 2025-03-14 RX ADMIN — NOREPINEPHRINE BITARTRATE 5 MCG/MIN: 64 SOLUTION INTRAVENOUS at 03:54

## 2025-03-14 RX ADMIN — METOPROLOL TARTRATE 2.5 MG: 5 INJECTION INTRAVENOUS at 00:55

## 2025-03-14 RX ADMIN — OSELTAMIVIR PHOSPHATE 30 MG: 30 CAPSULE ORAL at 20:30

## 2025-03-14 RX ADMIN — OSELTAMIVIR PHOSPHATE 30 MG: 30 CAPSULE ORAL at 09:19

## 2025-03-14 RX ADMIN — WATER 40 MG: 1 INJECTION INTRAMUSCULAR; INTRAVENOUS; SUBCUTANEOUS at 11:07

## 2025-03-14 RX ADMIN — SODIUM CHLORIDE, PRESERVATIVE FREE 10 ML: 5 INJECTION INTRAVENOUS at 20:20

## 2025-03-14 RX ADMIN — WATER 40 MG: 1 INJECTION INTRAMUSCULAR; INTRAVENOUS; SUBCUTANEOUS at 03:47

## 2025-03-14 RX ADMIN — NAFCILLIN SODIUM 2000 MG: 2 INJECTION, POWDER, LYOPHILIZED, FOR SOLUTION INTRAMUSCULAR; INTRAVENOUS at 07:58

## 2025-03-14 RX ADMIN — METOPROLOL TARTRATE 2.5 MG: 5 INJECTION INTRAVENOUS at 09:19

## 2025-03-14 RX ADMIN — METOPROLOL TARTRATE 5 MG: 5 INJECTION INTRAVENOUS at 19:40

## 2025-03-14 RX ADMIN — NAFCILLIN SODIUM 2000 MG: 2 INJECTION, POWDER, LYOPHILIZED, FOR SOLUTION INTRAMUSCULAR; INTRAVENOUS at 19:51

## 2025-03-14 RX ADMIN — WATER 40 MG: 1 INJECTION INTRAMUSCULAR; INTRAVENOUS; SUBCUTANEOUS at 19:47

## 2025-03-14 RX ADMIN — AMIODARONE HYDROCHLORIDE 150 MG: 1.5 INJECTION, SOLUTION INTRAVENOUS at 19:43

## 2025-03-14 RX ADMIN — NAFCILLIN SODIUM 2000 MG: 2 INJECTION, POWDER, LYOPHILIZED, FOR SOLUTION INTRAMUSCULAR; INTRAVENOUS at 13:09

## 2025-03-14 RX ADMIN — NAFCILLIN SODIUM 2000 MG: 2 INJECTION, POWDER, LYOPHILIZED, FOR SOLUTION INTRAMUSCULAR; INTRAVENOUS at 16:38

## 2025-03-14 RX ADMIN — AMIODARONE HYDROCHLORIDE 1 MG/MIN: 1.8 INJECTION, SOLUTION INTRAVENOUS at 19:54

## 2025-03-14 RX ADMIN — CHLORHEXIDINE GLUCONATE, 0.12% ORAL RINSE 15 ML: 1.2 SOLUTION DENTAL at 19:44

## 2025-03-14 RX ADMIN — METOPROLOL TARTRATE 2.5 MG: 5 INJECTION INTRAVENOUS at 14:57

## 2025-03-14 RX ADMIN — INSULIN LISPRO 4 UNITS: 100 INJECTION, SOLUTION INTRAVENOUS; SUBCUTANEOUS at 18:15

## 2025-03-14 ASSESSMENT — PULMONARY FUNCTION TESTS
PIF_VALUE: 25
PIF_VALUE: 20
PIF_VALUE: 26
PIF_VALUE: 17
PIF_VALUE: 23

## 2025-03-14 ASSESSMENT — PAIN SCALES - GENERAL
PAINLEVEL_OUTOF10: 0

## 2025-03-14 NOTE — PROCEDURES
ELECTROENCEPHALOGRAPHY     Patient: Angelo Mireles MRN: [id number]  CSN: [id number]    YOB: 1970  Age: 54 y.o.  Sex: male    DOA: 3/9/2025 LOS:  LOS: 5 days        Date of Service: 03/14/2025    DICTATING: Isma Copeland MD     REFERRING PHYSICIAN: Dr. Menezes    ELECTROENCEPHALOGRAM NUMBER: [id number]    HISTORY OF PRESENT ILLNESS: This is a 54-year-old male patient who presented with cardiac arrest, currently unresponsive.    ELECTROENCEPHALOGRAM INTERPRETATION: This is a referential and bipolar EEG recorded with a 10-20 system. EEG shows low amplitude Frontal predominant 2 to 3 Hz delta activity. There is no regular pattern of wakefulness or sleep. There are no abnormal discharges. There is spontaneous variability but only minimal reactivity to external stimuli. There are no epileptiform discharges or electrographic seizures in this study.     IMPRESSION: This EEG is abnormal due to moderate generalized slowing, which is an indicator of diffuse cerebral dysfunction from any cause including -but not limited to- toxic, metabolic and infectious etiologies as well as hypoxic brain injury. Please note that this EEG recording cannot be used for prognostication. There are no ictal or interictal discharges during this study.        Signed:  Isma Copeland MD  3/14/2025  3:56 PM

## 2025-03-15 ENCOUNTER — APPOINTMENT (OUTPATIENT)
Facility: HOSPITAL | Age: 55
DRG: 004 | End: 2025-03-15
Payer: MEDICARE

## 2025-03-15 LAB
ALBUMIN SERPL-MCNC: 1.5 G/DL (ref 3.4–5)
ALBUMIN/GLOB SERPL: 0.5 (ref 0.8–1.7)
ALP SERPL-CCNC: 115 U/L (ref 45–117)
ALT SERPL-CCNC: 193 U/L (ref 16–61)
ANION GAP SERPL CALC-SCNC: 7 MMOL/L (ref 3–18)
ARTERIAL PATENCY WRIST A: POSITIVE
AST SERPL-CCNC: 72 U/L (ref 10–38)
BASE EXCESS BLD CALC-SCNC: 0.7 MMOL/L
BASOPHILS # BLD: 0.03 K/UL (ref 0–0.1)
BASOPHILS NFR BLD: 0.3 % (ref 0–2)
BDY SITE: ABNORMAL
BILIRUB SERPL-MCNC: 4.5 MG/DL (ref 0.2–1)
BUN SERPL-MCNC: 63 MG/DL (ref 7–18)
BUN/CREAT SERPL: 36 (ref 12–20)
CA-I SERPL-SCNC: 1.09 MMOL/L (ref 1.12–1.32)
CALCIUM SERPL-MCNC: 8.4 MG/DL (ref 8.5–10.1)
CHLORIDE SERPL-SCNC: 117 MMOL/L (ref 100–111)
CK SERPL-CCNC: 165 U/L (ref 39–308)
CO2 SERPL-SCNC: 25 MMOL/L (ref 21–32)
CREAT SERPL-MCNC: 1.73 MG/DL (ref 0.6–1.3)
DIFFERENTIAL METHOD BLD: ABNORMAL
EOSINOPHIL # BLD: 0 K/UL (ref 0–0.4)
EOSINOPHIL NFR BLD: 0 % (ref 0–5)
ERYTHROCYTE [DISTWIDTH] IN BLOOD BY AUTOMATED COUNT: 15.9 % (ref 11.6–14.5)
GAS FLOW.O2 O2 DELIVERY SYS: ABNORMAL
GAS FLOW.O2 SETTING OXYMISER: 8 BPM
GLOBULIN SER CALC-MCNC: 3.3 G/DL (ref 2–4)
GLUCOSE BLD STRIP.AUTO-MCNC: 227 MG/DL (ref 70–110)
GLUCOSE BLD STRIP.AUTO-MCNC: 291 MG/DL (ref 70–110)
GLUCOSE BLD STRIP.AUTO-MCNC: 301 MG/DL (ref 70–110)
GLUCOSE BLD STRIP.AUTO-MCNC: 318 MG/DL (ref 70–110)
GLUCOSE BLD STRIP.AUTO-MCNC: 372 MG/DL (ref 70–110)
GLUCOSE SERPL-MCNC: 340 MG/DL (ref 74–99)
HCO3 BLD-SCNC: 23.1 MMOL/L (ref 21–28)
HCT VFR BLD AUTO: 36.7 % (ref 36–48)
HGB BLD-MCNC: 12.3 G/DL (ref 13–16)
IMM GRANULOCYTES # BLD AUTO: 0.57 K/UL (ref 0–0.04)
IMM GRANULOCYTES NFR BLD AUTO: 5.3 % (ref 0–0.5)
INR PPP: 1.3 (ref 0.9–1.1)
LYMPHOCYTES # BLD: 0.38 K/UL (ref 0.9–3.3)
LYMPHOCYTES NFR BLD: 3.5 % (ref 21–52)
MAGNESIUM SERPL-MCNC: 2.7 MG/DL (ref 1.6–2.6)
MCH RBC QN AUTO: 29.8 PG (ref 24–34)
MCHC RBC AUTO-ENTMCNC: 33.5 G/DL (ref 31–37)
MCV RBC AUTO: 88.9 FL (ref 78–100)
MONOCYTES # BLD: 0.45 K/UL (ref 0.05–1.2)
MONOCYTES NFR BLD: 4.2 % (ref 3–10)
NEUTS SEG # BLD: 9.33 K/UL (ref 1.8–8)
NEUTS SEG NFR BLD: 86.7 % (ref 40–73)
NRBC # BLD: 0.34 K/UL (ref 0–0.01)
NRBC BLD-RTO: 3.2 PER 100 WBC
O2/TOTAL GAS SETTING VFR VENT: 40 %
PCO2 BLD: 30.1 MMHG (ref 35–48)
PEEP RESPIRATORY: 10 CMH2O
PH BLD: 7.49 (ref 7.35–7.45)
PHOSPHATE SERPL-MCNC: 2.2 MG/DL (ref 2.5–4.9)
PLATELET # BLD AUTO: 138 K/UL (ref 135–420)
PMV BLD AUTO: 13.4 FL (ref 9.2–11.8)
PO2 BLD: 78 MMHG (ref 83–108)
POTASSIUM SERPL-SCNC: 4.5 MMOL/L (ref 3.5–5.5)
PROT SERPL-MCNC: 4.8 G/DL (ref 6.4–8.2)
PROTHROMBIN TIME: 16.5 SEC (ref 11.9–14.9)
RBC # BLD AUTO: 4.13 M/UL (ref 4.35–5.65)
SAO2 % BLD: 96.7 % (ref 92–97)
SERVICE CMNT-IMP: ABNORMAL
SODIUM SERPL-SCNC: 149 MMOL/L (ref 136–145)
SPECIMEN TYPE: ABNORMAL
VENTILATION MODE VENT: ABNORMAL
VT SETTING VENT: 450 ML
WBC # BLD AUTO: 10.8 K/UL (ref 4.6–13.2)

## 2025-03-15 PROCEDURE — 6370000000 HC RX 637 (ALT 250 FOR IP): Performed by: INTERNAL MEDICINE

## 2025-03-15 PROCEDURE — 70551 MRI BRAIN STEM W/O DYE: CPT

## 2025-03-15 PROCEDURE — 85025 COMPLETE CBC W/AUTO DIFF WBC: CPT

## 2025-03-15 PROCEDURE — 82330 ASSAY OF CALCIUM: CPT

## 2025-03-15 PROCEDURE — 2500000003 HC RX 250 WO HCPCS: Performed by: INTERNAL MEDICINE

## 2025-03-15 PROCEDURE — 82550 ASSAY OF CK (CPK): CPT

## 2025-03-15 PROCEDURE — 83735 ASSAY OF MAGNESIUM: CPT

## 2025-03-15 PROCEDURE — 84100 ASSAY OF PHOSPHORUS: CPT

## 2025-03-15 PROCEDURE — 6360000002 HC RX W HCPCS: Performed by: INTERNAL MEDICINE

## 2025-03-15 PROCEDURE — 71045 X-RAY EXAM CHEST 1 VIEW: CPT

## 2025-03-15 PROCEDURE — 36600 WITHDRAWAL OF ARTERIAL BLOOD: CPT

## 2025-03-15 PROCEDURE — 2580000003 HC RX 258: Performed by: INTERNAL MEDICINE

## 2025-03-15 PROCEDURE — 82803 BLOOD GASES ANY COMBINATION: CPT

## 2025-03-15 PROCEDURE — 36415 COLL VENOUS BLD VENIPUNCTURE: CPT

## 2025-03-15 PROCEDURE — 82962 GLUCOSE BLOOD TEST: CPT

## 2025-03-15 PROCEDURE — 80053 COMPREHEN METABOLIC PANEL: CPT

## 2025-03-15 PROCEDURE — 6370000000 HC RX 637 (ALT 250 FOR IP): Performed by: HOSPITALIST

## 2025-03-15 PROCEDURE — 2500000003 HC RX 250 WO HCPCS: Performed by: HOSPITALIST

## 2025-03-15 PROCEDURE — 85610 PROTHROMBIN TIME: CPT

## 2025-03-15 PROCEDURE — 94003 VENT MGMT INPAT SUBQ DAY: CPT

## 2025-03-15 PROCEDURE — 2000000000 HC ICU R&B

## 2025-03-15 RX ORDER — CALCIUM GLUCONATE 20 MG/ML
1000 INJECTION, SOLUTION INTRAVENOUS ONCE
Status: COMPLETED | OUTPATIENT
Start: 2025-03-15 | End: 2025-03-15

## 2025-03-15 RX ORDER — HEPARIN SODIUM 5000 [USP'U]/ML
5000 INJECTION, SOLUTION INTRAVENOUS; SUBCUTANEOUS EVERY 8 HOURS SCHEDULED
Status: DISCONTINUED | OUTPATIENT
Start: 2025-03-15 | End: 2025-03-30

## 2025-03-15 RX ADMIN — NAFCILLIN SODIUM 2000 MG: 2 INJECTION, POWDER, LYOPHILIZED, FOR SOLUTION INTRAMUSCULAR; INTRAVENOUS at 04:07

## 2025-03-15 RX ADMIN — NAFCILLIN SODIUM 2000 MG: 2 INJECTION, POWDER, LYOPHILIZED, FOR SOLUTION INTRAMUSCULAR; INTRAVENOUS at 08:16

## 2025-03-15 RX ADMIN — NAFCILLIN SODIUM 2000 MG: 2 INJECTION, POWDER, LYOPHILIZED, FOR SOLUTION INTRAMUSCULAR; INTRAVENOUS at 00:12

## 2025-03-15 RX ADMIN — WATER 40 MG: 1 INJECTION INTRAMUSCULAR; INTRAVENOUS; SUBCUTANEOUS at 20:15

## 2025-03-15 RX ADMIN — AMIODARONE HYDROCHLORIDE 0.5 MG/MIN: 1.8 INJECTION, SOLUTION INTRAVENOUS at 01:47

## 2025-03-15 RX ADMIN — METOPROLOL TARTRATE 5 MG: 5 INJECTION INTRAVENOUS at 08:16

## 2025-03-15 RX ADMIN — OSELTAMIVIR PHOSPHATE 30 MG: 30 CAPSULE ORAL at 21:30

## 2025-03-15 RX ADMIN — METOPROLOL TARTRATE 5 MG: 5 INJECTION INTRAVENOUS at 02:07

## 2025-03-15 RX ADMIN — INSULIN LISPRO 4 UNITS: 100 INJECTION, SOLUTION INTRAVENOUS; SUBCUTANEOUS at 11:56

## 2025-03-15 RX ADMIN — CHLORHEXIDINE GLUCONATE, 0.12% ORAL RINSE 15 ML: 1.2 SOLUTION DENTAL at 20:19

## 2025-03-15 RX ADMIN — NAFCILLIN SODIUM 2000 MG: 2 INJECTION, POWDER, LYOPHILIZED, FOR SOLUTION INTRAMUSCULAR; INTRAVENOUS at 12:00

## 2025-03-15 RX ADMIN — SODIUM CHLORIDE, PRESERVATIVE FREE 40 MG: 5 INJECTION INTRAVENOUS at 08:17

## 2025-03-15 RX ADMIN — NAFCILLIN SODIUM 2000 MG: 2 INJECTION, POWDER, LYOPHILIZED, FOR SOLUTION INTRAMUSCULAR; INTRAVENOUS at 17:30

## 2025-03-15 RX ADMIN — INSULIN LISPRO 8 UNITS: 100 INJECTION, SOLUTION INTRAVENOUS; SUBCUTANEOUS at 20:48

## 2025-03-15 RX ADMIN — ACETAMINOPHEN 650 MG: 325 TABLET ORAL at 00:15

## 2025-03-15 RX ADMIN — CHLORHEXIDINE GLUCONATE, 0.12% ORAL RINSE 15 ML: 1.2 SOLUTION DENTAL at 08:17

## 2025-03-15 RX ADMIN — HEPARIN SODIUM 5000 UNITS: 5000 INJECTION INTRAVENOUS; SUBCUTANEOUS at 22:00

## 2025-03-15 RX ADMIN — NAFCILLIN SODIUM 2000 MG: 2 INJECTION, POWDER, LYOPHILIZED, FOR SOLUTION INTRAMUSCULAR; INTRAVENOUS at 23:52

## 2025-03-15 RX ADMIN — OSELTAMIVIR PHOSPHATE 30 MG: 30 CAPSULE ORAL at 08:32

## 2025-03-15 RX ADMIN — CALCIUM GLUCONATE 1000 MG: 20 INJECTION, SOLUTION INTRAVENOUS at 09:30

## 2025-03-15 RX ADMIN — SODIUM CHLORIDE, PRESERVATIVE FREE 10 ML: 5 INJECTION INTRAVENOUS at 08:18

## 2025-03-15 RX ADMIN — METOPROLOL TARTRATE 5 MG: 5 INJECTION INTRAVENOUS at 14:37

## 2025-03-15 RX ADMIN — SODIUM CHLORIDE, PRESERVATIVE FREE 10 ML: 5 INJECTION INTRAVENOUS at 20:19

## 2025-03-15 RX ADMIN — WATER 40 MG: 1 INJECTION INTRAMUSCULAR; INTRAVENOUS; SUBCUTANEOUS at 04:07

## 2025-03-15 RX ADMIN — HEPARIN SODIUM 5000 UNITS: 5000 INJECTION INTRAVENOUS; SUBCUTANEOUS at 14:36

## 2025-03-15 RX ADMIN — METOPROLOL TARTRATE 5 MG: 5 INJECTION INTRAVENOUS at 20:16

## 2025-03-15 RX ADMIN — AMIODARONE HYDROCHLORIDE 0.5 MG/MIN: 1.8 INJECTION, SOLUTION INTRAVENOUS at 14:38

## 2025-03-15 RX ADMIN — INSULIN LISPRO 6 UNITS: 100 INJECTION, SOLUTION INTRAVENOUS; SUBCUTANEOUS at 06:24

## 2025-03-15 RX ADMIN — WATER 40 MG: 1 INJECTION INTRAMUSCULAR; INTRAVENOUS; SUBCUTANEOUS at 11:56

## 2025-03-15 RX ADMIN — FENTANYL CITRATE 25 MCG: 50 INJECTION INTRAMUSCULAR; INTRAVENOUS at 18:13

## 2025-03-15 RX ADMIN — INSULIN LISPRO 6 UNITS: 100 INJECTION, SOLUTION INTRAVENOUS; SUBCUTANEOUS at 17:22

## 2025-03-15 RX ADMIN — NAFCILLIN SODIUM 2000 MG: 2 INJECTION, POWDER, LYOPHILIZED, FOR SOLUTION INTRAMUSCULAR; INTRAVENOUS at 20:14

## 2025-03-15 ASSESSMENT — PULMONARY FUNCTION TESTS
PIF_VALUE: 22
PIF_VALUE: 19
PIF_VALUE: 23
PIF_VALUE: 26

## 2025-03-15 ASSESSMENT — PAIN SCALES - GENERAL
PAINLEVEL_OUTOF10: 0
PAINLEVEL_OUTOF10: 0

## 2025-03-16 ENCOUNTER — APPOINTMENT (OUTPATIENT)
Facility: HOSPITAL | Age: 55
DRG: 004 | End: 2025-03-16
Payer: MEDICARE

## 2025-03-16 LAB
ALBUMIN SERPL-MCNC: 1.4 G/DL (ref 3.4–5)
ALBUMIN/GLOB SERPL: 0.4 (ref 0.8–1.7)
ALP SERPL-CCNC: 141 U/L (ref 45–117)
ALT SERPL-CCNC: 154 U/L (ref 16–61)
ANION GAP SERPL CALC-SCNC: 6 MMOL/L (ref 3–18)
APPEARANCE UR: ABNORMAL
ARTERIAL PATENCY WRIST A: POSITIVE
AST SERPL-CCNC: 71 U/L (ref 10–38)
BACTERIA URNS QL MICRO: ABNORMAL /HPF
BASE EXCESS BLD CALC-SCNC: 1.6 MMOL/L
BASOPHILS # BLD: 0.06 K/UL (ref 0–0.1)
BASOPHILS NFR BLD: 0.5 % (ref 0–2)
BDY SITE: ABNORMAL
BILIRUB SERPL-MCNC: 4.5 MG/DL (ref 0.2–1)
BILIRUB UR QL: ABNORMAL
BODY TEMPERATURE: 98
BUN SERPL-MCNC: 61 MG/DL (ref 7–18)
BUN/CREAT SERPL: 36 (ref 12–20)
CA-I SERPL-SCNC: 1.07 MMOL/L (ref 1.12–1.32)
CA-I SERPL-SCNC: 1.3 MMOL/L (ref 1.12–1.32)
CALCIUM SERPL-MCNC: 8.7 MG/DL (ref 8.5–10.1)
CHLORIDE SERPL-SCNC: 121 MMOL/L (ref 100–111)
CO2 SERPL-SCNC: 23 MMOL/L (ref 21–32)
COLOR UR: ABNORMAL
CREAT SERPL-MCNC: 1.68 MG/DL (ref 0.6–1.3)
DIFFERENTIAL METHOD BLD: ABNORMAL
EOSINOPHIL # BLD: 0 K/UL (ref 0–0.4)
EOSINOPHIL NFR BLD: 0 % (ref 0–5)
EPITH CASTS URNS QL MICRO: ABNORMAL /LPF (ref 0–5)
ERYTHROCYTE [DISTWIDTH] IN BLOOD BY AUTOMATED COUNT: 15.9 % (ref 11.6–14.5)
FLUAV RNA SPEC QL NAA+PROBE: DETECTED
FLUBV RNA SPEC QL NAA+PROBE: NOT DETECTED
GAS FLOW.O2 O2 DELIVERY SYS: ABNORMAL
GAS FLOW.O2 SETTING OXYMISER: 8 BPM
GLOBULIN SER CALC-MCNC: 3.6 G/DL (ref 2–4)
GLUCOSE BLD STRIP.AUTO-MCNC: 256 MG/DL (ref 70–110)
GLUCOSE BLD STRIP.AUTO-MCNC: 262 MG/DL (ref 70–110)
GLUCOSE BLD STRIP.AUTO-MCNC: 301 MG/DL (ref 70–110)
GLUCOSE BLD STRIP.AUTO-MCNC: 374 MG/DL (ref 70–110)
GLUCOSE BLD STRIP.AUTO-MCNC: 390 MG/DL (ref 70–110)
GLUCOSE SERPL-MCNC: 394 MG/DL (ref 74–99)
GLUCOSE UR STRIP.AUTO-MCNC: 500 MG/DL
HCO3 BLD-SCNC: 24.6 MMOL/L (ref 21–28)
HCT VFR BLD AUTO: 37 % (ref 36–48)
HGB BLD-MCNC: 12.6 G/DL (ref 13–16)
HGB UR QL STRIP: ABNORMAL
IMM GRANULOCYTES # BLD AUTO: 0.58 K/UL (ref 0–0.04)
IMM GRANULOCYTES NFR BLD AUTO: 4.9 % (ref 0–0.5)
KETONES UR QL STRIP.AUTO: ABNORMAL MG/DL
LACTATE SERPL-SCNC: 3.1 MMOL/L (ref 0.4–2)
LEUKOCYTE ESTERASE UR QL STRIP.AUTO: ABNORMAL
LYMPHOCYTES # BLD: 0.43 K/UL (ref 0.9–3.3)
LYMPHOCYTES NFR BLD: 3.6 % (ref 21–52)
MAGNESIUM SERPL-MCNC: 2.8 MG/DL (ref 1.6–2.6)
MCH RBC QN AUTO: 29.7 PG (ref 24–34)
MCHC RBC AUTO-ENTMCNC: 34.1 G/DL (ref 31–37)
MCV RBC AUTO: 87.3 FL (ref 78–100)
MONOCYTES # BLD: 0.43 K/UL (ref 0.05–1.2)
MONOCYTES NFR BLD: 3.6 % (ref 3–10)
NEUTS SEG # BLD: 10.49 K/UL (ref 1.8–8)
NEUTS SEG NFR BLD: 87.4 % (ref 40–73)
NITRITE UR QL STRIP.AUTO: NEGATIVE
NRBC # BLD: 0.45 K/UL (ref 0–0.01)
NRBC BLD-RTO: 3.8 PER 100 WBC
NT PRO BNP: 5748 PG/ML (ref 0–900)
O2/TOTAL GAS SETTING VFR VENT: 40 %
PCO2 BLD: 32.4 MMHG (ref 35–48)
PEEP RESPIRATORY: 10 CMH2O
PH BLD: 7.49 (ref 7.35–7.45)
PH UR STRIP: 5.5 (ref 5–8)
PHOSPHATE SERPL-MCNC: 2.2 MG/DL (ref 2.5–4.9)
PLATELET # BLD AUTO: 185 K/UL (ref 135–420)
PMV BLD AUTO: 12.9 FL (ref 9.2–11.8)
PO2 BLD: 81 MMHG (ref 83–108)
POTASSIUM SERPL-SCNC: 4.6 MMOL/L (ref 3.5–5.5)
PROT SERPL-MCNC: 5 G/DL (ref 6.4–8.2)
PROT UR STRIP-MCNC: 100 MG/DL
RBC # BLD AUTO: 4.24 M/UL (ref 4.35–5.65)
RBC #/AREA URNS HPF: ABNORMAL /HPF (ref 0–5)
SAO2 % BLD: 97 % (ref 92–97)
SARS-COV-2 RNA RESP QL NAA+PROBE: NOT DETECTED
SERVICE CMNT-IMP: ABNORMAL
SODIUM SERPL-SCNC: 150 MMOL/L (ref 136–145)
SOURCE: ABNORMAL
SP GR UR REFRACTOMETRY: >1.03 (ref 1–1.03)
SPECIMEN TYPE: ABNORMAL
UROBILINOGEN UR QL STRIP.AUTO: 1 EU/DL (ref 0.2–1)
VENTILATION MODE VENT: ABNORMAL
VT SETTING VENT: 450 ML
WBC # BLD AUTO: 11.9 K/UL (ref 4.6–13.2)
WBC URNS QL MICRO: ABNORMAL /HPF (ref 0–5)

## 2025-03-16 PROCEDURE — 82803 BLOOD GASES ANY COMBINATION: CPT

## 2025-03-16 PROCEDURE — 83605 ASSAY OF LACTIC ACID: CPT

## 2025-03-16 PROCEDURE — 87086 URINE CULTURE/COLONY COUNT: CPT

## 2025-03-16 PROCEDURE — 87040 BLOOD CULTURE FOR BACTERIA: CPT

## 2025-03-16 PROCEDURE — 2500000003 HC RX 250 WO HCPCS: Performed by: EMERGENCY MEDICINE

## 2025-03-16 PROCEDURE — 83880 ASSAY OF NATRIURETIC PEPTIDE: CPT

## 2025-03-16 PROCEDURE — 6370000000 HC RX 637 (ALT 250 FOR IP): Performed by: INTERNAL MEDICINE

## 2025-03-16 PROCEDURE — 6360000002 HC RX W HCPCS: Performed by: INTERNAL MEDICINE

## 2025-03-16 PROCEDURE — 6370000000 HC RX 637 (ALT 250 FOR IP): Performed by: HOSPITALIST

## 2025-03-16 PROCEDURE — 85025 COMPLETE CBC W/AUTO DIFF WBC: CPT

## 2025-03-16 PROCEDURE — 87186 SC STD MICRODIL/AGAR DIL: CPT

## 2025-03-16 PROCEDURE — 2500000003 HC RX 250 WO HCPCS: Performed by: INTERNAL MEDICINE

## 2025-03-16 PROCEDURE — 84100 ASSAY OF PHOSPHORUS: CPT

## 2025-03-16 PROCEDURE — 81001 URINALYSIS AUTO W/SCOPE: CPT

## 2025-03-16 PROCEDURE — 2000000000 HC ICU R&B

## 2025-03-16 PROCEDURE — 82330 ASSAY OF CALCIUM: CPT

## 2025-03-16 PROCEDURE — 36600 WITHDRAWAL OF ARTERIAL BLOOD: CPT

## 2025-03-16 PROCEDURE — 87636 SARSCOV2 & INF A&B AMP PRB: CPT

## 2025-03-16 PROCEDURE — 94003 VENT MGMT INPAT SUBQ DAY: CPT

## 2025-03-16 PROCEDURE — 87070 CULTURE OTHR SPECIMN AEROBIC: CPT

## 2025-03-16 PROCEDURE — 2580000003 HC RX 258: Performed by: INTERNAL MEDICINE

## 2025-03-16 PROCEDURE — 87205 SMEAR GRAM STAIN: CPT

## 2025-03-16 PROCEDURE — 87077 CULTURE AEROBIC IDENTIFY: CPT

## 2025-03-16 PROCEDURE — 87305 ASPERGILLUS AG IA: CPT

## 2025-03-16 PROCEDURE — 80053 COMPREHEN METABOLIC PANEL: CPT

## 2025-03-16 PROCEDURE — 36415 COLL VENOUS BLD VENIPUNCTURE: CPT

## 2025-03-16 PROCEDURE — 71045 X-RAY EXAM CHEST 1 VIEW: CPT

## 2025-03-16 PROCEDURE — 2500000003 HC RX 250 WO HCPCS: Performed by: HOSPITALIST

## 2025-03-16 PROCEDURE — 83735 ASSAY OF MAGNESIUM: CPT

## 2025-03-16 RX ORDER — DEXTROSE MONOHYDRATE 100 MG/ML
INJECTION, SOLUTION INTRAVENOUS CONTINUOUS PRN
Status: DISCONTINUED | OUTPATIENT
Start: 2025-03-16 | End: 2025-03-17 | Stop reason: SDUPTHER

## 2025-03-16 RX ORDER — INSULIN GLARGINE 100 [IU]/ML
10 INJECTION, SOLUTION SUBCUTANEOUS DAILY
Status: DISCONTINUED | OUTPATIENT
Start: 2025-03-16 | End: 2025-03-17

## 2025-03-16 RX ORDER — CALCIUM GLUCONATE 20 MG/ML
2000 INJECTION, SOLUTION INTRAVENOUS ONCE
Status: COMPLETED | OUTPATIENT
Start: 2025-03-16 | End: 2025-03-16

## 2025-03-16 RX ORDER — GLUCAGON 1 MG/ML
1 KIT INJECTION PRN
Status: DISCONTINUED | OUTPATIENT
Start: 2025-03-16 | End: 2025-03-17 | Stop reason: SDUPTHER

## 2025-03-16 RX ORDER — INSULIN LISPRO 100 [IU]/ML
0-16 INJECTION, SOLUTION INTRAVENOUS; SUBCUTANEOUS
Status: DISCONTINUED | OUTPATIENT
Start: 2025-03-16 | End: 2025-03-26

## 2025-03-16 RX ADMIN — MICAFUNGIN SODIUM 100 MG: 100 INJECTION, POWDER, LYOPHILIZED, FOR SOLUTION INTRAVENOUS at 15:38

## 2025-03-16 RX ADMIN — SODIUM CHLORIDE, PRESERVATIVE FREE 10 ML: 5 INJECTION INTRAVENOUS at 19:53

## 2025-03-16 RX ADMIN — METOPROLOL TARTRATE 5 MG: 5 INJECTION INTRAVENOUS at 19:53

## 2025-03-16 RX ADMIN — AMIODARONE HYDROCHLORIDE 0.5 MG/MIN: 1.8 INJECTION, SOLUTION INTRAVENOUS at 02:20

## 2025-03-16 RX ADMIN — NAFCILLIN SODIUM 2000 MG: 2 INJECTION, POWDER, LYOPHILIZED, FOR SOLUTION INTRAMUSCULAR; INTRAVENOUS at 08:48

## 2025-03-16 RX ADMIN — VANCOMYCIN HYDROCHLORIDE 2000 MG: 10 INJECTION, POWDER, LYOPHILIZED, FOR SOLUTION INTRAVENOUS at 15:19

## 2025-03-16 RX ADMIN — OSELTAMIVIR PHOSPHATE 30 MG: 30 CAPSULE ORAL at 10:46

## 2025-03-16 RX ADMIN — WATER 40 MG: 1 INJECTION INTRAMUSCULAR; INTRAVENOUS; SUBCUTANEOUS at 11:39

## 2025-03-16 RX ADMIN — INSULIN GLARGINE 10 UNITS: 100 INJECTION, SOLUTION SUBCUTANEOUS at 10:55

## 2025-03-16 RX ADMIN — INSULIN LISPRO 8 UNITS: 100 INJECTION, SOLUTION INTRAVENOUS; SUBCUTANEOUS at 05:36

## 2025-03-16 RX ADMIN — CHLORHEXIDINE GLUCONATE, 0.12% ORAL RINSE 15 ML: 1.2 SOLUTION DENTAL at 20:43

## 2025-03-16 RX ADMIN — OSELTAMIVIR PHOSPHATE 30 MG: 30 CAPSULE ORAL at 20:30

## 2025-03-16 RX ADMIN — INSULIN LISPRO 16 UNITS: 100 INJECTION, SOLUTION INTRAVENOUS; SUBCUTANEOUS at 11:12

## 2025-03-16 RX ADMIN — MEROPENEM 2000 MG: 2 INJECTION, POWDER, FOR SOLUTION INTRAVENOUS at 14:00

## 2025-03-16 RX ADMIN — ACETAMINOPHEN 650 MG: 325 TABLET ORAL at 08:52

## 2025-03-16 RX ADMIN — NAFCILLIN SODIUM 2000 MG: 2 INJECTION, POWDER, LYOPHILIZED, FOR SOLUTION INTRAMUSCULAR; INTRAVENOUS at 11:46

## 2025-03-16 RX ADMIN — HEPARIN SODIUM 5000 UNITS: 5000 INJECTION INTRAVENOUS; SUBCUTANEOUS at 13:56

## 2025-03-16 RX ADMIN — NAFCILLIN SODIUM 2000 MG: 2 INJECTION, POWDER, LYOPHILIZED, FOR SOLUTION INTRAMUSCULAR; INTRAVENOUS at 03:46

## 2025-03-16 RX ADMIN — WATER 40 MG: 1 INJECTION INTRAMUSCULAR; INTRAVENOUS; SUBCUTANEOUS at 19:53

## 2025-03-16 RX ADMIN — METOPROLOL TARTRATE 5 MG: 5 INJECTION INTRAVENOUS at 13:56

## 2025-03-16 RX ADMIN — WATER 40 MG: 1 INJECTION INTRAMUSCULAR; INTRAVENOUS; SUBCUTANEOUS at 03:47

## 2025-03-16 RX ADMIN — FENTANYL CITRATE 25 MCG: 50 INJECTION INTRAMUSCULAR; INTRAVENOUS at 16:14

## 2025-03-16 RX ADMIN — CALCIUM GLUCONATE 2000 MG: 20 INJECTION, SOLUTION INTRAVENOUS at 09:28

## 2025-03-16 RX ADMIN — MEROPENEM 2000 MG: 2 INJECTION, POWDER, FOR SOLUTION INTRAVENOUS at 22:05

## 2025-03-16 RX ADMIN — SODIUM CHLORIDE, PRESERVATIVE FREE 40 MG: 5 INJECTION INTRAVENOUS at 09:00

## 2025-03-16 RX ADMIN — AMIODARONE HYDROCHLORIDE 0.5 MG/MIN: 1.8 INJECTION, SOLUTION INTRAVENOUS at 13:55

## 2025-03-16 RX ADMIN — CHLORHEXIDINE GLUCONATE, 0.12% ORAL RINSE 15 ML: 1.2 SOLUTION DENTAL at 09:38

## 2025-03-16 RX ADMIN — INSULIN LISPRO 8 UNITS: 100 INJECTION, SOLUTION INTRAVENOUS; SUBCUTANEOUS at 20:27

## 2025-03-16 RX ADMIN — ACETAMINOPHEN 650 MG: 325 TABLET ORAL at 13:53

## 2025-03-16 RX ADMIN — INSULIN LISPRO 12 UNITS: 100 INJECTION, SOLUTION INTRAVENOUS; SUBCUTANEOUS at 17:04

## 2025-03-16 RX ADMIN — HEPARIN SODIUM 5000 UNITS: 5000 INJECTION INTRAVENOUS; SUBCUTANEOUS at 05:34

## 2025-03-16 RX ADMIN — SODIUM CHLORIDE, PRESERVATIVE FREE 10 ML: 5 INJECTION INTRAVENOUS at 09:30

## 2025-03-16 RX ADMIN — METOPROLOL TARTRATE 5 MG: 5 INJECTION INTRAVENOUS at 03:47

## 2025-03-16 RX ADMIN — HEPARIN SODIUM 5000 UNITS: 5000 INJECTION INTRAVENOUS; SUBCUTANEOUS at 22:05

## 2025-03-16 RX ADMIN — NOREPINEPHRINE BITARTRATE 5 MCG/MIN: 64 SOLUTION INTRAVENOUS at 10:42

## 2025-03-16 ASSESSMENT — PAIN SCALES - GENERAL
PAINLEVEL_OUTOF10: 0

## 2025-03-16 ASSESSMENT — PULMONARY FUNCTION TESTS
PIF_VALUE: 27
PIF_VALUE: 25
PIF_VALUE: 22
PIF_VALUE: 23

## 2025-03-17 ENCOUNTER — HOSPITAL ENCOUNTER (INPATIENT)
Facility: HOSPITAL | Age: 55
Discharge: HOME | DRG: 004 | End: 2025-03-20
Payer: MEDICARE

## 2025-03-17 ENCOUNTER — APPOINTMENT (OUTPATIENT)
Facility: HOSPITAL | Age: 55
DRG: 004 | End: 2025-03-17
Payer: MEDICARE

## 2025-03-17 LAB
ALBUMIN SERPL-MCNC: 1.4 G/DL (ref 3.4–5)
ALBUMIN/GLOB SERPL: 0.4 (ref 0.8–1.7)
ALP SERPL-CCNC: 149 U/L (ref 45–117)
ALT SERPL-CCNC: 177 U/L (ref 16–61)
ANION GAP SERPL CALC-SCNC: 5 MMOL/L (ref 3–18)
ARTERIAL PATENCY WRIST A: POSITIVE
AST SERPL-CCNC: 310 U/L (ref 10–38)
BACTERIA SPEC CULT: NORMAL
BASE EXCESS BLD CALC-SCNC: 0.3 MMOL/L
BASOPHILS # BLD: 0.08 K/UL (ref 0–0.1)
BASOPHILS NFR BLD: 0.5 % (ref 0–2)
BDY SITE: ABNORMAL
BILIRUB SERPL-MCNC: 3 MG/DL (ref 0.2–1)
BODY TEMPERATURE: 98
BUN SERPL-MCNC: 58 MG/DL (ref 7–18)
BUN/CREAT SERPL: 38 (ref 12–20)
CA-I SERPL-SCNC: 1.23 MMOL/L (ref 1.12–1.32)
CALCIUM SERPL-MCNC: 8.4 MG/DL (ref 8.5–10.1)
CHLORIDE SERPL-SCNC: 124 MMOL/L (ref 100–111)
CO2 SERPL-SCNC: 24 MMOL/L (ref 21–32)
CREAT SERPL-MCNC: 1.51 MG/DL (ref 0.6–1.3)
DIFFERENTIAL METHOD BLD: ABNORMAL
EOSINOPHIL # BLD: 0.02 K/UL (ref 0–0.4)
EOSINOPHIL NFR BLD: 0.1 % (ref 0–5)
ERYTHROCYTE [DISTWIDTH] IN BLOOD BY AUTOMATED COUNT: 16.4 % (ref 11.6–14.5)
GAS FLOW.O2 O2 DELIVERY SYS: ABNORMAL
GAS FLOW.O2 SETTING OXYMISER: 16 BPM
GLOBULIN SER CALC-MCNC: 3.4 G/DL (ref 2–4)
GLUCOSE BLD STRIP.AUTO-MCNC: 276 MG/DL (ref 70–110)
GLUCOSE BLD STRIP.AUTO-MCNC: 298 MG/DL (ref 70–110)
GLUCOSE BLD STRIP.AUTO-MCNC: 324 MG/DL (ref 70–110)
GLUCOSE BLD STRIP.AUTO-MCNC: 332 MG/DL (ref 70–110)
GLUCOSE SERPL-MCNC: 331 MG/DL (ref 74–99)
HCO3 BLD-SCNC: 24.2 MMOL/L (ref 21–28)
HCT VFR BLD AUTO: 36.8 % (ref 36–48)
HGB BLD-MCNC: 12.7 G/DL (ref 13–16)
IMM GRANULOCYTES # BLD AUTO: 0.33 K/UL (ref 0–0.04)
IMM GRANULOCYTES NFR BLD AUTO: 2.2 % (ref 0–0.5)
INR PPP: 1.5 (ref 0.9–1.1)
LACTATE SERPL-SCNC: 2.6 MMOL/L (ref 0.4–2)
LYMPHOCYTES # BLD: 0.59 K/UL (ref 0.9–3.3)
LYMPHOCYTES NFR BLD: 3.9 % (ref 21–52)
MAGNESIUM SERPL-MCNC: 2.3 MG/DL (ref 1.6–2.6)
MCH RBC QN AUTO: 30 PG (ref 24–34)
MCHC RBC AUTO-ENTMCNC: 34.5 G/DL (ref 31–37)
MCV RBC AUTO: 87 FL (ref 78–100)
MONOCYTES # BLD: 0.29 K/UL (ref 0.05–1.2)
MONOCYTES NFR BLD: 1.9 % (ref 3–10)
NEUTS SEG # BLD: 13.89 K/UL (ref 1.8–8)
NEUTS SEG NFR BLD: 91.4 % (ref 40–73)
NRBC # BLD: 0.98 K/UL (ref 0–0.01)
NRBC BLD-RTO: 6.4 PER 100 WBC
O2/TOTAL GAS SETTING VFR VENT: 40 %
PCO2 BLD: 35.4 MMHG (ref 35–48)
PEEP RESPIRATORY: 10 CMH2O
PH BLD: 7.44 (ref 7.35–7.45)
PHOSPHATE SERPL-MCNC: 3.2 MG/DL (ref 2.5–4.9)
PLATELET # BLD AUTO: 147 K/UL (ref 135–420)
PMV BLD AUTO: 12.9 FL (ref 9.2–11.8)
PO2 BLD: 122 MMHG (ref 83–108)
POTASSIUM SERPL-SCNC: 4.3 MMOL/L (ref 3.5–5.5)
PROT SERPL-MCNC: 4.8 G/DL (ref 6.4–8.2)
PROTHROMBIN TIME: 18 SEC (ref 11.9–14.9)
RBC # BLD AUTO: 4.23 M/UL (ref 4.35–5.65)
SAO2 % BLD: 98.9 % (ref 92–97)
SERVICE CMNT-IMP: ABNORMAL
SERVICE CMNT-IMP: NORMAL
SODIUM SERPL-SCNC: 153 MMOL/L (ref 136–145)
SPECIMEN TYPE: ABNORMAL
VENTILATION MODE VENT: ABNORMAL
VT SETTING VENT: 450 ML
WBC # BLD AUTO: 15.2 K/UL (ref 4.6–13.2)

## 2025-03-17 PROCEDURE — 71045 X-RAY EXAM CHEST 1 VIEW: CPT

## 2025-03-17 PROCEDURE — 2500000003 HC RX 250 WO HCPCS: Performed by: HOSPITALIST

## 2025-03-17 PROCEDURE — 2500000003 HC RX 250 WO HCPCS: Performed by: INTERNAL MEDICINE

## 2025-03-17 PROCEDURE — 82330 ASSAY OF CALCIUM: CPT

## 2025-03-17 PROCEDURE — 83735 ASSAY OF MAGNESIUM: CPT

## 2025-03-17 PROCEDURE — 51702 INSERT TEMP BLADDER CATH: CPT

## 2025-03-17 PROCEDURE — 80053 COMPREHEN METABOLIC PANEL: CPT

## 2025-03-17 PROCEDURE — 83605 ASSAY OF LACTIC ACID: CPT

## 2025-03-17 PROCEDURE — 6370000000 HC RX 637 (ALT 250 FOR IP): Performed by: INTERNAL MEDICINE

## 2025-03-17 PROCEDURE — 85025 COMPLETE CBC W/AUTO DIFF WBC: CPT

## 2025-03-17 PROCEDURE — 99231 SBSQ HOSP IP/OBS SF/LOW 25: CPT

## 2025-03-17 PROCEDURE — 84100 ASSAY OF PHOSPHORUS: CPT

## 2025-03-17 PROCEDURE — 85610 PROTHROMBIN TIME: CPT

## 2025-03-17 PROCEDURE — 76705 ECHO EXAM OF ABDOMEN: CPT

## 2025-03-17 PROCEDURE — 6360000002 HC RX W HCPCS: Performed by: INTERNAL MEDICINE

## 2025-03-17 PROCEDURE — 82962 GLUCOSE BLOOD TEST: CPT

## 2025-03-17 PROCEDURE — 82803 BLOOD GASES ANY COMBINATION: CPT

## 2025-03-17 PROCEDURE — 6370000000 HC RX 637 (ALT 250 FOR IP): Performed by: HOSPITALIST

## 2025-03-17 PROCEDURE — 2580000003 HC RX 258: Performed by: INTERNAL MEDICINE

## 2025-03-17 PROCEDURE — 94003 VENT MGMT INPAT SUBQ DAY: CPT

## 2025-03-17 PROCEDURE — 2000000000 HC ICU R&B

## 2025-03-17 PROCEDURE — 2500000003 HC RX 250 WO HCPCS: Performed by: EMERGENCY MEDICINE

## 2025-03-17 PROCEDURE — 87449 NOS EACH ORGANISM AG IA: CPT

## 2025-03-17 PROCEDURE — 36600 WITHDRAWAL OF ARTERIAL BLOOD: CPT

## 2025-03-17 RX ORDER — INSULIN GLARGINE 100 [IU]/ML
15 INJECTION, SOLUTION SUBCUTANEOUS DAILY
Status: DISCONTINUED | OUTPATIENT
Start: 2025-03-18 | End: 2025-03-18

## 2025-03-17 RX ORDER — DEXTROSE MONOHYDRATE 100 MG/ML
INJECTION, SOLUTION INTRAVENOUS CONTINUOUS PRN
Status: DISCONTINUED | OUTPATIENT
Start: 2025-03-17 | End: 2025-04-01 | Stop reason: HOSPADM

## 2025-03-17 RX ORDER — GLUCAGON 1 MG/ML
1 KIT INJECTION PRN
Status: DISCONTINUED | OUTPATIENT
Start: 2025-03-17 | End: 2025-04-01 | Stop reason: HOSPADM

## 2025-03-17 RX ORDER — INSULIN LISPRO 100 [IU]/ML
0-16 INJECTION, SOLUTION INTRAVENOUS; SUBCUTANEOUS
Status: DISCONTINUED | OUTPATIENT
Start: 2025-03-17 | End: 2025-03-17

## 2025-03-17 RX ADMIN — MEROPENEM 2000 MG: 2 INJECTION, POWDER, FOR SOLUTION INTRAVENOUS at 13:42

## 2025-03-17 RX ADMIN — NOREPINEPHRINE BITARTRATE 5 MCG/MIN: 64 SOLUTION INTRAVENOUS at 10:35

## 2025-03-17 RX ADMIN — WATER 40 MG: 1 INJECTION INTRAMUSCULAR; INTRAVENOUS; SUBCUTANEOUS at 21:19

## 2025-03-17 RX ADMIN — METOPROLOL TARTRATE 5 MG: 5 INJECTION INTRAVENOUS at 08:39

## 2025-03-17 RX ADMIN — AMIODARONE HYDROCHLORIDE 0.5 MG/MIN: 1.8 INJECTION, SOLUTION INTRAVENOUS at 02:18

## 2025-03-17 RX ADMIN — MEROPENEM 2000 MG: 2 INJECTION, POWDER, FOR SOLUTION INTRAVENOUS at 05:55

## 2025-03-17 RX ADMIN — INSULIN GLARGINE 10 UNITS: 100 INJECTION, SOLUTION SUBCUTANEOUS at 08:39

## 2025-03-17 RX ADMIN — VANCOMYCIN HYDROCHLORIDE 1750 MG: 10 INJECTION, POWDER, LYOPHILIZED, FOR SOLUTION INTRAVENOUS at 14:37

## 2025-03-17 RX ADMIN — WATER 40 MG: 1 INJECTION INTRAMUSCULAR; INTRAVENOUS; SUBCUTANEOUS at 11:38

## 2025-03-17 RX ADMIN — INSULIN LISPRO 12 UNITS: 100 INJECTION, SOLUTION INTRAVENOUS; SUBCUTANEOUS at 15:59

## 2025-03-17 RX ADMIN — OSELTAMIVIR PHOSPHATE 30 MG: 30 CAPSULE ORAL at 08:42

## 2025-03-17 RX ADMIN — SODIUM CHLORIDE, PRESERVATIVE FREE 10 ML: 5 INJECTION INTRAVENOUS at 08:40

## 2025-03-17 RX ADMIN — INSULIN LISPRO 8 UNITS: 100 INJECTION, SOLUTION INTRAVENOUS; SUBCUTANEOUS at 11:38

## 2025-03-17 RX ADMIN — SODIUM CHLORIDE, PRESERVATIVE FREE 40 MG: 5 INJECTION INTRAVENOUS at 08:39

## 2025-03-17 RX ADMIN — CHLORHEXIDINE GLUCONATE, 0.12% ORAL RINSE 15 ML: 1.2 SOLUTION DENTAL at 21:19

## 2025-03-17 RX ADMIN — INSULIN LISPRO 8 UNITS: 100 INJECTION, SOLUTION INTRAVENOUS; SUBCUTANEOUS at 05:59

## 2025-03-17 RX ADMIN — CHLORHEXIDINE GLUCONATE, 0.12% ORAL RINSE 15 ML: 1.2 SOLUTION DENTAL at 08:39

## 2025-03-17 RX ADMIN — AMIODARONE HYDROCHLORIDE 0.5 MG/MIN: 1.8 INJECTION, SOLUTION INTRAVENOUS at 13:49

## 2025-03-17 RX ADMIN — INSULIN LISPRO 12 UNITS: 100 INJECTION, SOLUTION INTRAVENOUS; SUBCUTANEOUS at 19:44

## 2025-03-17 RX ADMIN — METOPROLOL TARTRATE 5 MG: 5 INJECTION INTRAVENOUS at 02:19

## 2025-03-17 RX ADMIN — MICAFUNGIN SODIUM 100 MG: 100 INJECTION, POWDER, LYOPHILIZED, FOR SOLUTION INTRAVENOUS at 14:38

## 2025-03-17 RX ADMIN — HEPARIN SODIUM 5000 UNITS: 5000 INJECTION INTRAVENOUS; SUBCUTANEOUS at 13:43

## 2025-03-17 RX ADMIN — MEROPENEM 2000 MG: 2 INJECTION, POWDER, FOR SOLUTION INTRAVENOUS at 21:22

## 2025-03-17 RX ADMIN — HEPARIN SODIUM 5000 UNITS: 5000 INJECTION INTRAVENOUS; SUBCUTANEOUS at 21:30

## 2025-03-17 RX ADMIN — METOPROLOL TARTRATE 5 MG: 5 INJECTION INTRAVENOUS at 21:19

## 2025-03-17 RX ADMIN — WATER 40 MG: 1 INJECTION INTRAMUSCULAR; INTRAVENOUS; SUBCUTANEOUS at 02:19

## 2025-03-17 RX ADMIN — SODIUM CHLORIDE, PRESERVATIVE FREE 10 ML: 5 INJECTION INTRAVENOUS at 21:19

## 2025-03-17 RX ADMIN — HEPARIN SODIUM 5000 UNITS: 5000 INJECTION INTRAVENOUS; SUBCUTANEOUS at 06:10

## 2025-03-17 RX ADMIN — OSELTAMIVIR PHOSPHATE 30 MG: 30 CAPSULE ORAL at 21:19

## 2025-03-17 ASSESSMENT — PAIN SCALES - GENERAL
PAINLEVEL_OUTOF10: 0

## 2025-03-17 ASSESSMENT — PULMONARY FUNCTION TESTS
PIF_VALUE: 14
PIF_VALUE: 535
PIF_VALUE: 17
PIF_VALUE: 17
PIF_VALUE: 21

## 2025-03-17 NOTE — WOUND CARE
ICU Rounding  Wound care nurse rounded on patient for skin issues.  Patient has a Aditya score of 8.  Does patient have any pressure injury?no per primary nurse Ness ARAGON  Patient does have excoriation to buttocks and zinc is being applied      Skin Care & Pressure Relief Recommendations  Minimize layers of linen  Pads under patient to optimize support surface  Turn/reposition approximately every 2 hours  Use pillow wedges to maintain positioning but do not put pillow directly over wounds  Manage incontinence   Promote continence; Skin Protective lotion/cream to buttocks and sacrum daily and as needed with incontinence care  Offload heels pillows    Consult wound care if any wounds noted during admission.  Wound Care nurse will continue to follow during ICU admission.

## 2025-03-18 ENCOUNTER — APPOINTMENT (OUTPATIENT)
Facility: HOSPITAL | Age: 55
DRG: 004 | End: 2025-03-18
Payer: MEDICARE

## 2025-03-18 LAB
ALBUMIN SERPL-MCNC: 1.3 G/DL (ref 3.4–5)
ALBUMIN/GLOB SERPL: 0.4 (ref 0.8–1.7)
ALP SERPL-CCNC: 163 U/L (ref 45–117)
ALT SERPL-CCNC: 284 U/L (ref 16–61)
ANION GAP SERPL CALC-SCNC: 4 MMOL/L (ref 3–18)
AST SERPL-CCNC: 609 U/L (ref 10–38)
BACTERIA SPEC CULT: NORMAL
BASOPHILS # BLD: 0.09 K/UL (ref 0–0.1)
BASOPHILS NFR BLD: 0.5 % (ref 0–2)
BILIRUB SERPL-MCNC: 2.3 MG/DL (ref 0.2–1)
BUN SERPL-MCNC: 61 MG/DL (ref 7–18)
BUN/CREAT SERPL: 41 (ref 12–20)
CALCIUM SERPL-MCNC: 8.4 MG/DL (ref 8.5–10.1)
CHLORIDE SERPL-SCNC: 124 MMOL/L (ref 100–111)
CO2 SERPL-SCNC: 25 MMOL/L (ref 21–32)
CREAT SERPL-MCNC: 1.5 MG/DL (ref 0.6–1.3)
DIFFERENTIAL METHOD BLD: ABNORMAL
EKG ATRIAL RATE: 99 BPM
EKG DIAGNOSIS: NORMAL
EKG P AXIS: 59 DEGREES
EKG P-R INTERVAL: 166 MS
EKG Q-T INTERVAL: 368 MS
EKG QRS DURATION: 102 MS
EKG QTC CALCULATION (BAZETT): 472 MS
EKG R AXIS: -44 DEGREES
EKG T AXIS: 84 DEGREES
EKG VENTRICULAR RATE: 99 BPM
EOSINOPHIL # BLD: 0.02 K/UL (ref 0–0.4)
EOSINOPHIL NFR BLD: 0.1 % (ref 0–5)
ERYTHROCYTE [DISTWIDTH] IN BLOOD BY AUTOMATED COUNT: 16.5 % (ref 11.6–14.5)
GLOBULIN SER CALC-MCNC: 3.2 G/DL (ref 2–4)
GLUCOSE BLD STRIP.AUTO-MCNC: 290 MG/DL (ref 70–110)
GLUCOSE BLD STRIP.AUTO-MCNC: 305 MG/DL (ref 70–110)
GLUCOSE BLD STRIP.AUTO-MCNC: 324 MG/DL (ref 70–110)
GLUCOSE BLD STRIP.AUTO-MCNC: 356 MG/DL (ref 70–110)
GLUCOSE SERPL-MCNC: 385 MG/DL (ref 74–99)
HCT VFR BLD AUTO: 32.7 % (ref 36–48)
HGB BLD-MCNC: 11.4 G/DL (ref 13–16)
IMM GRANULOCYTES # BLD AUTO: 0.46 K/UL (ref 0–0.04)
IMM GRANULOCYTES NFR BLD AUTO: 2.5 % (ref 0–0.5)
LACTATE SERPL-SCNC: 2.3 MMOL/L (ref 0.4–2)
LYMPHOCYTES # BLD: 0.32 K/UL (ref 0.9–3.3)
LYMPHOCYTES NFR BLD: 1.7 % (ref 21–52)
MCH RBC QN AUTO: 30 PG (ref 24–34)
MCHC RBC AUTO-ENTMCNC: 34.9 G/DL (ref 31–37)
MCV RBC AUTO: 86.1 FL (ref 78–100)
MONOCYTES # BLD: 0.45 K/UL (ref 0.05–1.2)
MONOCYTES NFR BLD: 2.4 % (ref 3–10)
NEUTS SEG # BLD: 17.39 K/UL (ref 1.8–8)
NEUTS SEG NFR BLD: 92.8 % (ref 40–73)
NRBC # BLD: 1.19 K/UL (ref 0–0.01)
NRBC BLD-RTO: 6.4 PER 100 WBC
NT PRO BNP: 2001 PG/ML (ref 0–900)
PLATELET # BLD AUTO: 126 K/UL (ref 135–420)
PMV BLD AUTO: 12.4 FL (ref 9.2–11.8)
POTASSIUM SERPL-SCNC: 4.7 MMOL/L (ref 3.5–5.5)
PROT SERPL-MCNC: 4.5 G/DL (ref 6.4–8.2)
RBC # BLD AUTO: 3.8 M/UL (ref 4.35–5.65)
SERVICE CMNT-IMP: NORMAL
SODIUM SERPL-SCNC: 153 MMOL/L (ref 136–145)
VANCOMYCIN SERPL-MCNC: 27.2 UG/ML (ref 5–40)
WBC # BLD AUTO: 18.7 K/UL (ref 4.6–13.2)

## 2025-03-18 PROCEDURE — 2500000003 HC RX 250 WO HCPCS: Performed by: HOSPITALIST

## 2025-03-18 PROCEDURE — 2500000003 HC RX 250 WO HCPCS: Performed by: INTERNAL MEDICINE

## 2025-03-18 PROCEDURE — 6360000002 HC RX W HCPCS: Performed by: INTERNAL MEDICINE

## 2025-03-18 PROCEDURE — 6370000000 HC RX 637 (ALT 250 FOR IP): Performed by: INTERNAL MEDICINE

## 2025-03-18 PROCEDURE — 2000000000 HC ICU R&B

## 2025-03-18 PROCEDURE — 99231 SBSQ HOSP IP/OBS SF/LOW 25: CPT

## 2025-03-18 PROCEDURE — 80202 ASSAY OF VANCOMYCIN: CPT

## 2025-03-18 PROCEDURE — 82962 GLUCOSE BLOOD TEST: CPT

## 2025-03-18 PROCEDURE — 85025 COMPLETE CBC W/AUTO DIFF WBC: CPT

## 2025-03-18 PROCEDURE — 94003 VENT MGMT INPAT SUBQ DAY: CPT

## 2025-03-18 PROCEDURE — 83605 ASSAY OF LACTIC ACID: CPT

## 2025-03-18 PROCEDURE — 80053 COMPREHEN METABOLIC PANEL: CPT

## 2025-03-18 PROCEDURE — 71045 X-RAY EXAM CHEST 1 VIEW: CPT

## 2025-03-18 PROCEDURE — 83880 ASSAY OF NATRIURETIC PEPTIDE: CPT

## 2025-03-18 PROCEDURE — 6370000000 HC RX 637 (ALT 250 FOR IP): Performed by: HOSPITALIST

## 2025-03-18 PROCEDURE — 2580000003 HC RX 258: Performed by: INTERNAL MEDICINE

## 2025-03-18 RX ORDER — INSULIN GLARGINE 100 [IU]/ML
10 INJECTION, SOLUTION SUBCUTANEOUS ONCE
Status: COMPLETED | OUTPATIENT
Start: 2025-03-18 | End: 2025-03-18

## 2025-03-18 RX ORDER — INSULIN GLARGINE 100 [IU]/ML
25 INJECTION, SOLUTION SUBCUTANEOUS DAILY
Status: DISCONTINUED | OUTPATIENT
Start: 2025-03-19 | End: 2025-03-20

## 2025-03-18 RX ORDER — INSULIN LISPRO 100 [IU]/ML
10 INJECTION, SOLUTION INTRAVENOUS; SUBCUTANEOUS ONCE
Status: DISCONTINUED | OUTPATIENT
Start: 2025-03-18 | End: 2025-03-18

## 2025-03-18 RX ADMIN — SODIUM CHLORIDE, PRESERVATIVE FREE 40 MG: 5 INJECTION INTRAVENOUS at 10:34

## 2025-03-18 RX ADMIN — METOPROLOL TARTRATE 5 MG: 5 INJECTION INTRAVENOUS at 13:48

## 2025-03-18 RX ADMIN — INSULIN GLARGINE 10 UNITS: 100 INJECTION, SOLUTION SUBCUTANEOUS at 22:14

## 2025-03-18 RX ADMIN — MEROPENEM 2000 MG: 2 INJECTION, POWDER, FOR SOLUTION INTRAVENOUS at 06:02

## 2025-03-18 RX ADMIN — CHLORHEXIDINE GLUCONATE, 0.12% ORAL RINSE 15 ML: 1.2 SOLUTION DENTAL at 22:05

## 2025-03-18 RX ADMIN — MEROPENEM 2000 MG: 2 INJECTION, POWDER, FOR SOLUTION INTRAVENOUS at 13:50

## 2025-03-18 RX ADMIN — MEROPENEM 2000 MG: 2 INJECTION, POWDER, FOR SOLUTION INTRAVENOUS at 22:10

## 2025-03-18 RX ADMIN — METOPROLOL TARTRATE 5 MG: 5 INJECTION INTRAVENOUS at 21:28

## 2025-03-18 RX ADMIN — MICAFUNGIN SODIUM 100 MG: 100 INJECTION, POWDER, LYOPHILIZED, FOR SOLUTION INTRAVENOUS at 21:35

## 2025-03-18 RX ADMIN — WATER 40 MG: 1 INJECTION INTRAMUSCULAR; INTRAVENOUS; SUBCUTANEOUS at 10:34

## 2025-03-18 RX ADMIN — SODIUM CHLORIDE, PRESERVATIVE FREE 10 ML: 5 INJECTION INTRAVENOUS at 10:36

## 2025-03-18 RX ADMIN — HEPARIN SODIUM 5000 UNITS: 5000 INJECTION INTRAVENOUS; SUBCUTANEOUS at 06:02

## 2025-03-18 RX ADMIN — INSULIN GLARGINE 15 UNITS: 100 INJECTION, SOLUTION SUBCUTANEOUS at 09:00

## 2025-03-18 RX ADMIN — HEPARIN SODIUM 5000 UNITS: 5000 INJECTION INTRAVENOUS; SUBCUTANEOUS at 13:48

## 2025-03-18 RX ADMIN — SODIUM CHLORIDE, PRESERVATIVE FREE 10 ML: 5 INJECTION INTRAVENOUS at 21:28

## 2025-03-18 RX ADMIN — INSULIN LISPRO 12 UNITS: 100 INJECTION, SOLUTION INTRAVENOUS; SUBCUTANEOUS at 11:45

## 2025-03-18 RX ADMIN — WATER 40 MG: 1 INJECTION INTRAMUSCULAR; INTRAVENOUS; SUBCUTANEOUS at 21:30

## 2025-03-18 RX ADMIN — INSULIN LISPRO 12 UNITS: 100 INJECTION, SOLUTION INTRAVENOUS; SUBCUTANEOUS at 18:41

## 2025-03-18 RX ADMIN — HEPARIN SODIUM 5000 UNITS: 5000 INJECTION INTRAVENOUS; SUBCUTANEOUS at 21:28

## 2025-03-18 RX ADMIN — OSELTAMIVIR PHOSPHATE 30 MG: 30 CAPSULE ORAL at 21:30

## 2025-03-18 RX ADMIN — AMIODARONE HYDROCHLORIDE 0.5 MG/MIN: 1.8 INJECTION, SOLUTION INTRAVENOUS at 03:47

## 2025-03-18 RX ADMIN — METOPROLOL TARTRATE 5 MG: 5 INJECTION INTRAVENOUS at 02:14

## 2025-03-18 RX ADMIN — INSULIN LISPRO 16 UNITS: 100 INJECTION, SOLUTION INTRAVENOUS; SUBCUTANEOUS at 06:05

## 2025-03-18 RX ADMIN — VANCOMYCIN HYDROCHLORIDE 1250 MG: 10 INJECTION, POWDER, LYOPHILIZED, FOR SOLUTION INTRAVENOUS at 22:20

## 2025-03-18 RX ADMIN — INSULIN LISPRO 8 UNITS: 100 INJECTION, SOLUTION INTRAVENOUS; SUBCUTANEOUS at 21:28

## 2025-03-18 RX ADMIN — OSELTAMIVIR PHOSPHATE 30 MG: 30 CAPSULE ORAL at 09:00

## 2025-03-18 RX ADMIN — WATER 40 MG: 1 INJECTION INTRAMUSCULAR; INTRAVENOUS; SUBCUTANEOUS at 02:14

## 2025-03-18 RX ADMIN — CHLORHEXIDINE GLUCONATE, 0.12% ORAL RINSE 15 ML: 1.2 SOLUTION DENTAL at 09:30

## 2025-03-18 ASSESSMENT — PAIN SCALES - GENERAL
PAINLEVEL_OUTOF10: 0

## 2025-03-18 ASSESSMENT — PULMONARY FUNCTION TESTS
PIF_VALUE: 19
PIF_VALUE: 19
PIF_VALUE: 13

## 2025-03-18 NOTE — CARE COORDINATION
Ongoing GOC discussion with Palliative and pts family. Poss Trach and PEG vs Comfort based measures. CM will continue to follow to support in transition of care plan. LTACH referrals sent as a proactive measure and CM to discuss with family if Trach and PEG is agreed upon.     Marie Campos, MSW  Case Management Department

## 2025-03-19 ENCOUNTER — APPOINTMENT (OUTPATIENT)
Facility: HOSPITAL | Age: 55
DRG: 004 | End: 2025-03-19
Payer: MEDICARE

## 2025-03-19 LAB
ALBUMIN SERPL-MCNC: 1.4 G/DL (ref 3.4–5)
ANION GAP SERPL CALC-SCNC: 5 MMOL/L (ref 3–18)
BACTERIA SPEC CULT: ABNORMAL
BUN SERPL-MCNC: 62 MG/DL (ref 7–18)
BUN/CREAT SERPL: 43 (ref 12–20)
CALCIUM SERPL-MCNC: 8.4 MG/DL (ref 8.5–10.1)
CHLORIDE SERPL-SCNC: 124 MMOL/L (ref 100–111)
CO2 SERPL-SCNC: 23 MMOL/L (ref 21–32)
CREAT SERPL-MCNC: 1.43 MG/DL (ref 0.6–1.3)
GALACTOMANNAN AG SPEC IA-ACNC: 0.07 INDEX (ref 0–0.49)
GLUCOSE BLD STRIP.AUTO-MCNC: 264 MG/DL (ref 70–110)
GLUCOSE BLD STRIP.AUTO-MCNC: 298 MG/DL (ref 70–110)
GLUCOSE BLD STRIP.AUTO-MCNC: 303 MG/DL (ref 70–110)
GLUCOSE BLD STRIP.AUTO-MCNC: 310 MG/DL (ref 70–110)
GLUCOSE BLD STRIP.AUTO-MCNC: 368 MG/DL (ref 70–110)
GLUCOSE SERPL-MCNC: 380 MG/DL (ref 74–99)
GRAM STN SPEC: ABNORMAL
LACTATE SERPL-SCNC: 1.9 MMOL/L (ref 0.4–2)
PHOSPHATE SERPL-MCNC: 3.4 MG/DL (ref 2.5–4.9)
POTASSIUM SERPL-SCNC: 5.1 MMOL/L (ref 3.5–5.5)
PROCALCITONIN SERPL-MCNC: 23.47 NG/ML
SERVICE CMNT-IMP: ABNORMAL
SODIUM SERPL-SCNC: 152 MMOL/L (ref 136–145)

## 2025-03-19 PROCEDURE — 84145 PROCALCITONIN (PCT): CPT

## 2025-03-19 PROCEDURE — 82962 GLUCOSE BLOOD TEST: CPT

## 2025-03-19 PROCEDURE — 97602 WOUND(S) CARE NON-SELECTIVE: CPT

## 2025-03-19 PROCEDURE — 6370000000 HC RX 637 (ALT 250 FOR IP): Performed by: HOSPITALIST

## 2025-03-19 PROCEDURE — 6360000002 HC RX W HCPCS: Performed by: INTERNAL MEDICINE

## 2025-03-19 PROCEDURE — 2500000003 HC RX 250 WO HCPCS: Performed by: INTERNAL MEDICINE

## 2025-03-19 PROCEDURE — 36415 COLL VENOUS BLD VENIPUNCTURE: CPT

## 2025-03-19 PROCEDURE — 6370000000 HC RX 637 (ALT 250 FOR IP): Performed by: INTERNAL MEDICINE

## 2025-03-19 PROCEDURE — 2580000003 HC RX 258: Performed by: INTERNAL MEDICINE

## 2025-03-19 PROCEDURE — 71045 X-RAY EXAM CHEST 1 VIEW: CPT

## 2025-03-19 PROCEDURE — 99233 SBSQ HOSP IP/OBS HIGH 50: CPT | Performed by: INTERNAL MEDICINE

## 2025-03-19 PROCEDURE — 80069 RENAL FUNCTION PANEL: CPT

## 2025-03-19 PROCEDURE — 2000000000 HC ICU R&B

## 2025-03-19 PROCEDURE — 83605 ASSAY OF LACTIC ACID: CPT

## 2025-03-19 PROCEDURE — 2500000003 HC RX 250 WO HCPCS: Performed by: HOSPITALIST

## 2025-03-19 PROCEDURE — 94003 VENT MGMT INPAT SUBQ DAY: CPT

## 2025-03-19 RX ADMIN — HEPARIN SODIUM 5000 UNITS: 5000 INJECTION INTRAVENOUS; SUBCUTANEOUS at 21:39

## 2025-03-19 RX ADMIN — WATER 40 MG: 1 INJECTION INTRAMUSCULAR; INTRAVENOUS; SUBCUTANEOUS at 13:24

## 2025-03-19 RX ADMIN — MEROPENEM 2000 MG: 2 INJECTION, POWDER, FOR SOLUTION INTRAVENOUS at 05:22

## 2025-03-19 RX ADMIN — WATER 40 MG: 1 INJECTION INTRAMUSCULAR; INTRAVENOUS; SUBCUTANEOUS at 03:56

## 2025-03-19 RX ADMIN — VANCOMYCIN HYDROCHLORIDE 1250 MG: 10 INJECTION, POWDER, LYOPHILIZED, FOR SOLUTION INTRAVENOUS at 15:45

## 2025-03-19 RX ADMIN — HEPARIN SODIUM 5000 UNITS: 5000 INJECTION INTRAVENOUS; SUBCUTANEOUS at 13:38

## 2025-03-19 RX ADMIN — SODIUM CHLORIDE, PRESERVATIVE FREE 10 ML: 5 INJECTION INTRAVENOUS at 08:02

## 2025-03-19 RX ADMIN — AMIODARONE HYDROCHLORIDE 0.5 MG/MIN: 1.8 INJECTION, SOLUTION INTRAVENOUS at 16:19

## 2025-03-19 RX ADMIN — METOPROLOL TARTRATE 5 MG: 5 INJECTION INTRAVENOUS at 03:56

## 2025-03-19 RX ADMIN — INSULIN LISPRO 16 UNITS: 100 INJECTION, SOLUTION INTRAVENOUS; SUBCUTANEOUS at 08:01

## 2025-03-19 RX ADMIN — METOPROLOL TARTRATE 5 MG: 5 INJECTION INTRAVENOUS at 08:07

## 2025-03-19 RX ADMIN — SODIUM CHLORIDE, PRESERVATIVE FREE 10 ML: 5 INJECTION INTRAVENOUS at 20:37

## 2025-03-19 RX ADMIN — OSELTAMIVIR PHOSPHATE 30 MG: 30 CAPSULE ORAL at 08:06

## 2025-03-19 RX ADMIN — INSULIN LISPRO 8 UNITS: 100 INJECTION, SOLUTION INTRAVENOUS; SUBCUTANEOUS at 17:44

## 2025-03-19 RX ADMIN — INSULIN LISPRO 12 UNITS: 100 INJECTION, SOLUTION INTRAVENOUS; SUBCUTANEOUS at 20:48

## 2025-03-19 RX ADMIN — HEPARIN SODIUM 5000 UNITS: 5000 INJECTION INTRAVENOUS; SUBCUTANEOUS at 05:22

## 2025-03-19 RX ADMIN — MEROPENEM 2000 MG: 2 INJECTION, POWDER, FOR SOLUTION INTRAVENOUS at 21:41

## 2025-03-19 RX ADMIN — AMIODARONE HYDROCHLORIDE 0.5 MG/MIN: 1.8 INJECTION, SOLUTION INTRAVENOUS at 05:25

## 2025-03-19 RX ADMIN — CHLORHEXIDINE GLUCONATE, 0.12% ORAL RINSE 15 ML: 1.2 SOLUTION DENTAL at 08:08

## 2025-03-19 RX ADMIN — METOPROLOL TARTRATE 5 MG: 5 INJECTION INTRAVENOUS at 13:27

## 2025-03-19 RX ADMIN — INSULIN GLARGINE 25 UNITS: 100 INJECTION, SOLUTION SUBCUTANEOUS at 08:00

## 2025-03-19 RX ADMIN — WATER 40 MG: 1 INJECTION INTRAMUSCULAR; INTRAVENOUS; SUBCUTANEOUS at 20:37

## 2025-03-19 RX ADMIN — SODIUM CHLORIDE, PRESERVATIVE FREE 40 MG: 5 INJECTION INTRAVENOUS at 08:03

## 2025-03-19 RX ADMIN — INSULIN LISPRO 12 UNITS: 100 INJECTION, SOLUTION INTRAVENOUS; SUBCUTANEOUS at 11:18

## 2025-03-19 RX ADMIN — MEROPENEM 2000 MG: 2 INJECTION, POWDER, FOR SOLUTION INTRAVENOUS at 13:31

## 2025-03-19 RX ADMIN — CHLORHEXIDINE GLUCONATE, 0.12% ORAL RINSE 15 ML: 1.2 SOLUTION DENTAL at 20:37

## 2025-03-19 RX ADMIN — METOPROLOL TARTRATE 5 MG: 5 INJECTION INTRAVENOUS at 20:37

## 2025-03-19 ASSESSMENT — PAIN SCALES - GENERAL
PAINLEVEL_OUTOF10: 0

## 2025-03-19 ASSESSMENT — PULMONARY FUNCTION TESTS
PIF_VALUE: 15
PIF_VALUE: 18
PIF_VALUE: 13
PIF_VALUE: 17
PIF_VALUE: 12

## 2025-03-19 NOTE — ACP (ADVANCE CARE PLANNING)
Advance Care Planning       Palliative Team Advance Care Planning (ACP) Conversation        Date of Conversation: 03/19/25      Individuals present for the conversation: Patient's ex-sister-in-law (Yaritza Mireles) via phone, Dr. Ramin Menezes (Palliative) and this writer.      Conversation Summary:      This writer, along with Dr. Ramin Menezes, with the Palliative Care team; attempted to reach out to patient's father (Roldan Mireles, Sr.) and did not get an answer. The team then attempted to phone Roldan Sr.'s wife (Yara) and also did not get an answer. The team then phoned patient's ex-sister-in-law (Yaritza), who Roldan Sr. wanted to be notified in the first place. Yaritza answered the phone.     Yaritza reported that she has updated the entire family, regarding patient's condition. Yaritza was asked about setting up a family meeting. She agreed. The family meeting has been arranged for tomorrow, March 20, 2025 at 2PM. Yaritza will inform the entire family. Patient's father will not be able to attend, however, they plan to have him participate via phone. At this time, patient will remain a FULL CODE WITH FULL INTERVENTIONS.      Resuscitation Status:   Code Status: Full Code         Kwesi Weiss Jr., [facility]  Palliative Care   Ph#[phone]

## 2025-03-19 NOTE — CARE COORDINATION
EDWIN spoke with Palliative EDWIN Orosco, plan is for a family meeting tomorrow at 2pm with Palliative team.     JEYSON Castro  Case Management Department

## 2025-03-20 ENCOUNTER — APPOINTMENT (OUTPATIENT)
Facility: HOSPITAL | Age: 55
DRG: 004 | End: 2025-03-20
Payer: MEDICARE

## 2025-03-20 LAB
ALBUMIN SERPL-MCNC: 1.4 G/DL (ref 3.4–5)
ANION GAP SERPL CALC-SCNC: 5 MMOL/L (ref 3–18)
BUN SERPL-MCNC: 53 MG/DL (ref 7–18)
BUN/CREAT SERPL: 45 (ref 12–20)
CALCIUM SERPL-MCNC: 8.3 MG/DL (ref 8.5–10.1)
CHLORIDE SERPL-SCNC: 119 MMOL/L (ref 100–111)
CO2 SERPL-SCNC: 22 MMOL/L (ref 21–32)
CREAT SERPL-MCNC: 1.17 MG/DL (ref 0.6–1.3)
FUNGITELL INTERPRETATION: ABNORMAL
FUNGITELL: ABNORMAL PG/ML
GLUCOSE BLD STRIP.AUTO-MCNC: 251 MG/DL (ref 70–110)
GLUCOSE BLD STRIP.AUTO-MCNC: 268 MG/DL (ref 70–110)
GLUCOSE BLD STRIP.AUTO-MCNC: 293 MG/DL (ref 70–110)
GLUCOSE BLD STRIP.AUTO-MCNC: 300 MG/DL (ref 70–110)
GLUCOSE BLD STRIP.AUTO-MCNC: 302 MG/DL (ref 70–110)
GLUCOSE BLD STRIP.AUTO-MCNC: 310 MG/DL (ref 70–110)
GLUCOSE SERPL-MCNC: 321 MG/DL (ref 74–99)
INR PPP: 1 (ref 0.9–1.1)
Lab: ABNORMAL
PHOSPHATE SERPL-MCNC: 3.8 MG/DL (ref 2.5–4.9)
POTASSIUM SERPL-SCNC: 4.9 MMOL/L (ref 3.5–5.5)
PROTHROMBIN TIME: 13.9 SEC (ref 11.9–14.9)
REFERENCE VALUE: ABNORMAL
RESULT: POSITIVE
SODIUM SERPL-SCNC: 146 MMOL/L (ref 136–145)

## 2025-03-20 PROCEDURE — 6370000000 HC RX 637 (ALT 250 FOR IP): Performed by: INTERNAL MEDICINE

## 2025-03-20 PROCEDURE — 2500000003 HC RX 250 WO HCPCS: Performed by: HOSPITALIST

## 2025-03-20 PROCEDURE — 71045 X-RAY EXAM CHEST 1 VIEW: CPT

## 2025-03-20 PROCEDURE — 6360000002 HC RX W HCPCS: Performed by: INTERNAL MEDICINE

## 2025-03-20 PROCEDURE — 2000000000 HC ICU R&B

## 2025-03-20 PROCEDURE — 85610 PROTHROMBIN TIME: CPT

## 2025-03-20 PROCEDURE — 6370000000 HC RX 637 (ALT 250 FOR IP): Performed by: HOSPITALIST

## 2025-03-20 PROCEDURE — 2580000003 HC RX 258: Performed by: INTERNAL MEDICINE

## 2025-03-20 PROCEDURE — 94003 VENT MGMT INPAT SUBQ DAY: CPT

## 2025-03-20 PROCEDURE — 80069 RENAL FUNCTION PANEL: CPT

## 2025-03-20 PROCEDURE — 82962 GLUCOSE BLOOD TEST: CPT

## 2025-03-20 PROCEDURE — 2500000003 HC RX 250 WO HCPCS: Performed by: INTERNAL MEDICINE

## 2025-03-20 PROCEDURE — 99233 SBSQ HOSP IP/OBS HIGH 50: CPT

## 2025-03-20 RX ORDER — INSULIN GLARGINE 100 [IU]/ML
5 INJECTION, SOLUTION SUBCUTANEOUS NIGHTLY
Status: DISCONTINUED | OUTPATIENT
Start: 2025-03-20 | End: 2025-03-20

## 2025-03-20 RX ORDER — INSULIN GLARGINE 100 [IU]/ML
30 INJECTION, SOLUTION SUBCUTANEOUS DAILY
Status: DISCONTINUED | OUTPATIENT
Start: 2025-03-21 | End: 2025-03-26

## 2025-03-20 RX ORDER — INSULIN GLARGINE 100 [IU]/ML
5 INJECTION, SOLUTION SUBCUTANEOUS NIGHTLY
Status: DISCONTINUED | OUTPATIENT
Start: 2025-03-20 | End: 2025-03-26

## 2025-03-20 RX ADMIN — METOPROLOL TARTRATE 5 MG: 5 INJECTION INTRAVENOUS at 14:25

## 2025-03-20 RX ADMIN — MEROPENEM 2000 MG: 2 INJECTION, POWDER, FOR SOLUTION INTRAVENOUS at 06:33

## 2025-03-20 RX ADMIN — WATER 40 MG: 1 INJECTION INTRAMUSCULAR; INTRAVENOUS; SUBCUTANEOUS at 21:06

## 2025-03-20 RX ADMIN — HEPARIN SODIUM 5000 UNITS: 5000 INJECTION INTRAVENOUS; SUBCUTANEOUS at 21:31

## 2025-03-20 RX ADMIN — AMIODARONE HYDROCHLORIDE 0.5 MG/MIN: 1.8 INJECTION, SOLUTION INTRAVENOUS at 05:02

## 2025-03-20 RX ADMIN — MEROPENEM 2000 MG: 2 INJECTION, POWDER, FOR SOLUTION INTRAVENOUS at 21:32

## 2025-03-20 RX ADMIN — METOPROLOL TARTRATE 5 MG: 5 INJECTION INTRAVENOUS at 09:01

## 2025-03-20 RX ADMIN — INSULIN LISPRO 12 UNITS: 100 INJECTION, SOLUTION INTRAVENOUS; SUBCUTANEOUS at 11:17

## 2025-03-20 RX ADMIN — HEPARIN SODIUM 5000 UNITS: 5000 INJECTION INTRAVENOUS; SUBCUTANEOUS at 06:30

## 2025-03-20 RX ADMIN — SODIUM CHLORIDE, PRESERVATIVE FREE 10 ML: 5 INJECTION INTRAVENOUS at 09:05

## 2025-03-20 RX ADMIN — CHLORHEXIDINE GLUCONATE, 0.12% ORAL RINSE 15 ML: 1.2 SOLUTION DENTAL at 21:05

## 2025-03-20 RX ADMIN — INSULIN LISPRO 8 UNITS: 100 INJECTION, SOLUTION INTRAVENOUS; SUBCUTANEOUS at 21:04

## 2025-03-20 RX ADMIN — INSULIN LISPRO 12 UNITS: 100 INJECTION, SOLUTION INTRAVENOUS; SUBCUTANEOUS at 17:12

## 2025-03-20 RX ADMIN — INSULIN GLARGINE 5 UNITS: 100 INJECTION, SOLUTION SUBCUTANEOUS at 21:05

## 2025-03-20 RX ADMIN — HEPARIN SODIUM 5000 UNITS: 5000 INJECTION INTRAVENOUS; SUBCUTANEOUS at 14:12

## 2025-03-20 RX ADMIN — INSULIN LISPRO 12 UNITS: 100 INJECTION, SOLUTION INTRAVENOUS; SUBCUTANEOUS at 05:40

## 2025-03-20 RX ADMIN — SODIUM CHLORIDE, PRESERVATIVE FREE 10 ML: 5 INJECTION INTRAVENOUS at 21:06

## 2025-03-20 RX ADMIN — SODIUM CHLORIDE, PRESERVATIVE FREE 40 MG: 5 INJECTION INTRAVENOUS at 09:01

## 2025-03-20 RX ADMIN — METOPROLOL TARTRATE 5 MG: 5 INJECTION INTRAVENOUS at 21:06

## 2025-03-20 RX ADMIN — METOPROLOL TARTRATE 5 MG: 5 INJECTION INTRAVENOUS at 02:28

## 2025-03-20 RX ADMIN — WATER 40 MG: 1 INJECTION INTRAMUSCULAR; INTRAVENOUS; SUBCUTANEOUS at 02:28

## 2025-03-20 RX ADMIN — MICAFUNGIN SODIUM 100 MG: 100 INJECTION, POWDER, LYOPHILIZED, FOR SOLUTION INTRAVENOUS at 14:28

## 2025-03-20 RX ADMIN — CHLORHEXIDINE GLUCONATE, 0.12% ORAL RINSE 15 ML: 1.2 SOLUTION DENTAL at 09:01

## 2025-03-20 RX ADMIN — MEROPENEM 2000 MG: 2 INJECTION, POWDER, FOR SOLUTION INTRAVENOUS at 14:28

## 2025-03-20 RX ADMIN — WATER 40 MG: 1 INJECTION INTRAMUSCULAR; INTRAVENOUS; SUBCUTANEOUS at 11:18

## 2025-03-20 RX ADMIN — INSULIN GLARGINE 25 UNITS: 100 INJECTION, SOLUTION SUBCUTANEOUS at 09:00

## 2025-03-20 ASSESSMENT — PAIN SCALES - GENERAL
PAINLEVEL_OUTOF10: 0

## 2025-03-20 ASSESSMENT — PULMONARY FUNCTION TESTS
PIF_VALUE: 12
PIF_VALUE: 19
PIF_VALUE: 16
PIF_VALUE: 13
PIF_VALUE: 12

## 2025-03-20 NOTE — CARE COORDINATION
Family Meeting Held, several family in attendance, not all bt including;  previous dtr in law Yaritza, pts dtr Jessi and pts minor sons. Pts MPOA and father  Roldan Mireles Sr. - Parent - [phone] present by phone. Palliative team present along with the attending Intensivist and attending Neurologist.    Pts MPOA and family would like to move forward with Trach and PEG. EDWIN spoke to [place] [phone] with [facility] who has accepted pt and is following for updates.    EDWIN provided Yaritza [phone] with the LTACH list, family interested in [facility] due to its location.     Pt will require an auth once more clinically stable for LTACH transfer.    CM to follow to assist with transition of care plan.    JEYSON Castro  Case Management Department

## 2025-03-20 NOTE — CONSULTS
[facility] Specialists  Pulmonary, Critical Care, and Sleep Medicine    Name: Angelo Mireles MRN: [id number]   : 1970 Hospital: [facility]    Date: 3/9/2025  Room: LEN/LEN     PCCM Note                                              Consult requesting physician: Dr. Winters  Reason for Consult: Cardiac arrest    IMPRESSION:         Active Hospital Problems    Diagnosis Date Noted    Cardiac arrest (HCC) [I46.9] 2025     Priority: High    Acute respiratory failure (HCC) [J96.00] 2025     Priority: High    Lactic acidosis [E87.20] 2025    Influenza A [J10.1] 2025    Pneumonia [J18.9] 2025    Immunosuppressed status [D84.9] 2025    Renal transplant, status post [Z94.0] 2025    Acute kidney injury superimposed on CKD [N17.9, N18.9] 2025    HTN (hypertension) [I10] 2025    Paroxysmal A-fib (HCC) [I48.0] 2021    Type 2 diabetes with nephropathy (HCC) [E11.21] 2019    Diabetic retinopathy (HCC) [E11.319] 10/28/2014    Cardiomyopathy (HCC) [I42.9] 2012        Code status: Full Code       RECOMMENDATIONS:     Came in ER with shortness of breath; diagnosed to have influenza A, pneumonia; failed BiPAP and intubated in ER; worsening respiratory acidosis; bradycardia followed by cardiac arrest x 2 and brief episode of V. tach during second cardiac arrest; shock, likely septic or cardiogenic shock on multiple vasopressors.    Respiratory: Intubated in ER on 3/9/2025 due to respiratory distress.    CXR 3/9/2025 reviewed: ETT, left IJ in place.  OGT proximal port near the GE junction; OGT to be pushed inward by 7 cm ordered.  Bilateral alveolar infiltrates suggesting multilobar pneumonia or pulmonary edema.    CTA chest PE protocol pending (patient has abnormal result of bradycardia with multiple cardiac arrest in ER; the benefit will outweigh the risk of contrast as if patient is diagnosed with saddle PE, vascular 
       [facility]  (A Division of [facility])                                                           Date of Admission: 3/9/2025       ID Consult for antibiotic management of respiratory failure with Flu A in Transplant patient    PCP: Brooke Roland FNP    C/C: resp failure    Current Antimicrobials:    Prior Antimicrobials:  Vanco/cefepime/doxy- 3/9 to date   NA   Allergy to antibiotics: NA       Assessment:   '  Critically Ill and immunosuppressed patient with renal transplant with:    Flu A infection - H1N1( 2009)  Septic shock- on pressors, with MOF- resp/kidney/ elevated troponin/ elevated LFT, CPK  Superimposed bacterial pneumonia- Staph/Strep would be main pathogens of concerns with Flu A complication. Would cover broadly including atypical for now  Bacteremia with GPC in clusters- in anaerobic culture 2/4- biofire for ID pending,  and if S aureus- due to above.   Bandemia to 57%- improving  Acute hypoxic resp failure/ post code blue X2 on arrival- 3/9/25  JAGRUTI on CKD( Cr 1.4- 02/2025)  3/9 --blood culture 3/9- 2/4 GPC in anaerobic culture--ID pending        --UA no pyuria, urine culture- in progress        --Respiratory  viral panel for adenovirus/Corona virus- Positive for Flu A, and rest negative--COVID 19 and non COVID 19, human meta pneumovirus, rhino virus, Influenza A/B, parinflunenza 1-4, RSV negative. Bacterial PCR for bordetella, chlamydia pneumoniae and mycoplasma negative.         --Flu A positive in resp viral panel/ PCR       --CXR with bilateral opacity, CTA chest with no PE, bilateral dense infiltrate. CT AP- no evidence of intra-abdominal infection,no evidence of renal abscess    Procalcitonin 100.86- 3/9/25    Renal transplant- 07/2023--on MMF/ Prednisone/Belatecept       -- follows at [facility] transplant team. Historyof empysematous cystitis and treated by Transplant ID 04/2024     Medical History:   Diagnosis Date    Abnormal 
       [facility]  (A Division of [facility])                                                           Date of Admission: 3/9/2025       ID Consult FU for antibiotic management of respiratory failure with Flu A in Transplant patient    PCP: Brooke Roland FNP  Transplant team- [facility]    C/C: resp failure    Current Antimicrobials:    Prior Antimicrobials:  Vanco/cefepime/doxy- 3/9 to date   NA   Allergy to antibiotics: NA       Assessment:   '  Critically Ill and immunosuppressed patient with renal transplant with:    Flu A infection - H1N1( 2009)  Septic shock- on pressors, with MOF- resp/kidney/ elevated troponin/ elevated LFT, CPK, lactic acidosis at 13 on admission- numbers improving  Superimposed bacterial pneumonia with S.aureus--one of main pathogens that complicates Flu infection  High grade S.aureus bacteremia due to above-4/4--ID pending  Bandemia to 57%- resolved  Acute hypoxic resp failure/ post code blue X2 on arrival, intubated- 3/9/25  JAGRUTI on CKD( Cr 1.4- 02/2025)  3/9 --blood culture 3/9- 4/4  S.aureus, Final SS pending        --UA no pyuria, urine culture-<1K CFU        --Urine antigen for legionealla and pneumococcus is negative         --Respiratory  viral panel for adenovirus/Corona virus- Positive for Flu A, and rest negative--COVID 19 and non COVID 19, human meta pneumovirus, rhino virus, Influenza A/B, parinflunenza 1-4, RSV negative. Bacterial PCR for bordetella, chlamydia pneumoniae and mycoplasma negative.         --Flu A positive in resp viral panel/ PCR       --CXR with bilateral opacity, CTA chest with no PE, bilateral dense infiltrate, no nodules noted. CT AP- no evidence of intra-abdominal infection,no evidence of renal abscess       --TTE: EF 40-45%, no mention of IE/veg    Procalcitonin 100.86- 3/9/25    Renal transplant- 07/2023--on MMF/ Prednisone/Belatecept       -- [facility]. Historyof empysematous 
INTERVENTIONAL RADIOLOGY  Consult Note    Patient:  Angelo Mireles  :  1970  Age:  54 y.o.  MRN:  [id number]    Today's Date:  3/20/2025  Admission Date:  3/9/2025  Hospital Day:  11  Consult requested by:  Isabell Sweet MD.      CC / HPI   Angelo Mrieles is a 54 y.o. male with a history of DMII, HTN, CAD, Afib, CKD prev ESRD on HD with old RUE AVF s/p renal transplant . Presented with SOB, cardiac arrest in ED. Flu +, bilateral PNA, bacteremia. Anoxic brain injury, neurology following. Long hospital course remains intubated in ICU, off sedation and pressors, significant neurological deficit. Family wishes to move forward with trach and PEG for long term care.    IR consulted for PICC line and Gastrostomy tube.    Patient seen at bedside in ICU. Intubated, not responsive.    PAST MEDICAL HISTORY  Past Medical History:   Diagnosis Date    Abnormal nuclear cardiac imaging test 10/08/2012    Fixed anteroseptal, anteroapical defect c/w prior infarction.  No ischemia.  Mid & distal anteroseptal, anteroapical WMA.  LVE.  EF 44%.      Anemia     dr. christianson doubt amyloid or multiple myeloma. candidate for procirt if Hg <10    Anoxic brain injury (HCC) 2025    post cardial arrest    Benign hypertensive cardiomyopathy (HCC)     Blindness of right eye 2013    legally blind    CAD (coronary artery disease)     Cardiomyopathy (HCC)     CKD (chronic kidney disease), stage IV (HCC)     to see Dr. Woodward 12    Congestive heart failure ([facility])     Diabetes mellitus ([facility])     Diabetic retinopathy ([facility])     Dr. Naina frances    Heart failure ([facility])     History of echocardiogram 2014    LVE.  EF 55% (prev 40-45% on echo of 12).  No WMA.  Mild-mod conc LVH.  Gr 3 DDfx.  Marked LAE.  Mild NIGEL.  Mild MR.    Hypertension     Pulmonary hypertension (HCC)     Renal abscess 1/3/12    Right kidney isoechoic w/respect to liver. Sm left-sided pleural effusion    S/P cardiac cath 10/16/2012    LM patent.  
Inpatient Wound Care Note:     Angelo Mireles  MEDICAL RECORD NUMBER:  [id number]  AGE: 54 y.o.   GENDER: male  : 1970  TODAY'S DATE:  3/19/2025    [] Follow-up visit for   [x] New consult for assessment of skin failure  Seen in  with primary nurse and Preceptor  Patient admitted from home    PAST MEDICAL HISTORY    Past Medical History:   Diagnosis Date    Abnormal nuclear cardiac imaging test 10/08/2012    Fixed anteroseptal, anteroapical defect c/w prior infarction.  No ischemia.  Mid & distal anteroseptal, anteroapical WMA.  LVE.  EF 44%.      Anemia     dr. christianson doubt amyloid or multiple myeloma. candidate for procirt if Hg <10    Benign hypertensive cardiomyopathy (HCC)     Blindness of right eye 2013    legally blind    CAD (coronary artery disease)     Cardiomyopathy (HCC)     CKD (chronic kidney disease), stage IV (HCC)     to see Dr. Woodward 12    Congestive heart failure ([facility])     Diabetes mellitus ([facility])     Diabetic retinopathy ([facility])     Dr. Naina frances    Diabetic retinopathy ([facility])     Heart failure ([facility])     History of echocardiogram 2014    LVE.  EF 55% (prev 40-45% on echo of 12).  No WMA.  Mild-mod conc LVH.  Gr 3 DDfx.  Marked LAE.  Mild NIGEL.  Mild MR.    Hypertension     Pulmonary hypertension (HCC)     Renal abscess 1/3/12    Right kidney isoechoic w/respect to liver. Sm left-sided pleural effusion    S/P cardiac cath 10/16/2012    LM patent.  pLAD 95% (3.5 x 12-mm Benton stent, resid 0$%).  LCX mild.          PAST SURGICAL HISTORY    Past Surgical History:   Procedure Laterality Date    CARDIAC PROCEDURE N/A 2023    Left heart cath performed by Sunil Myers MD at [facility] CARDIAC CATH LAB    CARDIAC PROCEDURE N/A 2023    Coronary angiography performed by Sunil Myers MD at [facility] CARDIAC CATH LAB    CARDIAC PROCEDURE N/A 2023    Left ventriculography performed by Sunil Myers MD at [facility] CARDIAC CATH LAB    CORONARY ANGIOPLASTY WITH STENT PLACEMENT      
Otolaryngology head neck surgery    Patient seen and examined  Patient is a 54-year-old male who was had a previous history of diabetes mellitus atrial fibrillation status post kidney transplant several years prior.  Patient is well-known to me having undergone allergy immunotherapy approximately 8 years ago.  Patient however apparently was in his usual state of health until 2 to 3 weeks prior.  He was having flulike symptomatology fevers chills and marked malaise and filing went to the hospital.  During the course of the workup he became significantly hypoxic was coded x 2 and subsequently intubated.  Patient was noted to have influenza type a as well as bilateral pneumonia and multiorgan failure.  Patient has been on a number of pressors however does not appear to have much neurologic function  Patient remains on ventilator however family wished to proceed with all measures of care.    Past medical history is significant for cardiac arrest acute respiratory failure influenza history of renal failure and transplant cardiomyopathy    Past surgical history includes cardiac catheterization and angiography laparotomy inguinal hernia repair    Allergies NKDA    Medication use was reviewed    Physical exam reveals well-developed well-nourished black male largely nonresponsive  Neck exam reveals no evidence of any obvious thyromegaly trachea fairly midline  Chest distant breath sounds  Heart S1-S2 no murmur audible  Extremity exam unremarkable    Impression patient may benefit from trach unless terminal wean discussed  Apparently family is electing for aggressive intervention    Will make arrangements for tracheotomy in the near future perhaps this weekend or Monday  We will need to discuss with family as well  
Remote review    Received ID consult from Dr Payam Menezes for critically ill patient with:    Immunosuppressed patient due to renal transplant( 07/2023)--follows with transplant ID/team at [facility]  Admitted with acute resp failure/ Influenza A infection/ coded in ED and intubated- symptom onset 2 days per ED notes, ICU admission.    WBC 11.1K, bands 57%, Cr 2.7  CXR- bilateral air space disease with suspicion for multilobar PNA or alveolar pul edema  CTA chest/AP-- in process, CT head in process.  He is on amoidrone    He received one dose of cefepime then, current abx- pip tazo, vanco  He is on IV steroid-methylprednisone 40mg IV Q8hr    Suggest:  As post viral OR  co-infection with bacteria is possible- broad abx reasonable while cont with Tamiflu adjusted for CrCl via tube  Can switch from Zosyn to to cefepime/ cont Vanco for emperic abx coverage. Add doxy IV for atypical  Oral tube, tamiflu adjusted for crcl  Obtain resp viral panel- can give more data on pathogens, Obtain resp cultures/trach cultues  Transplant med management per respective team, IV steroid-per ICU team    Recommend contacting transplant ID team at [facility] for further input and he may need transfer- I don't do transplant  ID routinely.   DW ICU MD    Thanks    MD [facility]  Office: [phone] -Option #8  Office fax:  [phone]           
Weight: 87 kg (191 lb 12.8 oz)  % Weight Change (Calculated): 1.1  BMI Categories: Overweight (BMI 25.0-29.9)    Estimated Daily Nutrient Needs:  Energy Requirements Based On: Kcal/kg  Weight Used for Energy Requirements: Current  Energy (kcal/day): 1700  Weight Used for Protein Requirements: Current  Protein (g/day): 105  Method Used for Fluid Requirements: 1 ml/kcal  Fluid (ml/day): 1700    Nutrition Diagnosis:   Increased nutrient needs related to acute injury/trauma, impaired respiratory function, inadequate protein-energy intake, cardiac dysfunction, endocrine dysfunction, renal dysfunction, increase demand for energy/nutrients, catabolic illness as evidenced by NPO or clear liquid status due to medical condition, intubation, lab values    Nutrition Interventions:   Food and/or Nutrient Delivery: Continue NPO (Start TF when stable)  Nutrition Education/Counseling: Education/Counseling not indicated  Coordination of Nutrition Care: Continue to monitor while inpatient    Goals:  Goals: Meet at least 75% of estimated needs (prevent wt loss and skin breakdown)  Type of Goal: New goal  Previous Goal Met: New Goal    Nutrition Monitoring and Evaluation:   Behavioral-Environmental Outcomes: None Identified  Food/Nutrient Intake Outcomes: Progression of Nutrition  Physical Signs/Symptoms Outcomes: Biochemical Data, Hemodynamic Status, Nutrition Focused Physical Findings, Skin, Weight, Fluid Status or Edema    Discharge Planning:    Too soon to determine     Addie Ratliff MS, RD  Clinical Dietitian  E: [email or web address]  O:  [phone]  C:  [phone]  
19, height 1.778 m (5' 10\"), weight 98.8 kg (217 lb 13 oz), SpO2 97%.    PHYSICAL ASSESSMENT:   General: [] Oriented x4  [] Well appearing  [x] Intubated  []Ill appearing  [x]Other: sedated, paralyzed, RASS -5  Mental Status: [] Normal mental status exam  [] Drowsy  [] Confused  [x]Other: ALBA  Cardiovascular: [x] Regular rate/rhythm  [] Arrhythmia  [x] Other: requiring pressor support  Chest: [] Effort normal  []Lungs clear  [] Respiratory distress  []Tachypnea  [x] Other: PEEP 10, 100% O2, RDOS 0  Abdomen: [] Soft/non-tender  [x] Normal appearance  [] Distended  [] Ascites  [] Other:  Neurological: [] Normal speech  [] Normal sensation  [x]Deficits present: ALBA  Extremity: [] Normal skin color/temp  [] Clubbing/cyanosis  [] No edema  [x] Other: cooling for persistent fever post-arrest    Wt Readings from Last 15 Encounters:   03/13/25 98.8 kg (217 lb 13 oz)   06/11/24 87.1 kg (192 lb)   02/09/24 82.6 kg (182 lb)   12/06/23 82.6 kg (182 lb)   08/11/23 81.6 kg (180 lb)   08/10/23 81.6 kg (180 lb)   06/29/23 90.7 kg (200 lb)   06/27/23 90.4 kg (199 lb 6.4 oz)   06/19/23 91.2 kg (201 lb)   06/08/23 91 kg (200 lb 9.9 oz)   05/14/23 92.1 kg (203 lb)   05/10/23 92.1 kg (203 lb)   04/27/23 90.7 kg (200 lb)   04/24/23 90.7 kg (200 lb)   04/05/23 90.3 kg (199 lb)      Current Diet: Diet NPO     PSYCHOSOCIAL/SPIRITUAL SCREENING:   Palliative IDT has assessed this patient for cultural preferences / practices and a referral made as appropriate to needs (Cultural Services, Patient Advocacy, Ethics, etc.)    Spiritual Affiliation: Anabaptism    Any spiritual / Bahai concerns:  [] Yes /  [x] No   If \"Yes\" to discuss with pastoral care during IDT     Does caregiver feel burdened by caring for their loved one:   [] Yes /  [] No /  [] No Caregiver Present/Available [x] No Caregiver [] Pt Lives at Facility  If \"Yes\" to discuss with social work during IDT    Anticipatory grief assessment:   [x] Normal  / [] Maladaptive     If 
Rfl:     linagliptin (TRADJENTA) 5 MG tablet, Take 1 tablet by mouth daily, Disp: , Rfl:     PM/SHx:     Active Ambulatory Problems     Diagnosis Date Noted    Atrial fibrillation with RVR ([facility]) 01/22/2021    Cardiomyopathy ([facility]) 01/23/2012    Anemia of chronic disease 03/07/2019    Dyslipidemia 06/21/2016    Diabetic retinopathy ([facility]) 10/28/2014    Type 2 diabetes with nephropathy ([facility]) 01/04/2019    Paroxysmal A-fib ([facility]) 12/14/2021    Atrial fibrillation with rapid ventricular response ([facility]) 12/05/2018    Benign hypertensive heart disease without heart failure     Atherosclerotic heart disease of native coronary artery with other forms of angina pectoris     Iron deficiency anemia 01/23/2012    Hyperkalemia 01/22/2021    CRI (chronic renal insufficiency) 01/23/2012    Legally blind 10/28/2014    ESRD on dialysis ([facility]) 01/22/2021    NSTEMI (non-ST elevated myocardial infarction) ([facility]) 01/22/2021    Diabetes mellitus ([facility]) 01/23/2012    Hypercalcemia 01/22/2021    Arteriovenous fistula stenosis 10/04/2016    Chronic diastolic heart failure ([facility]) 04/02/2022    Hyperlipidemia 04/13/2022    Hypertension secondary to other renal disorders 12/14/2018    Hypertensive heart disease with heart failure ([facility]) 12/14/2018    Hypoalbuminemia 04/13/2022    Nausea 11/16/2020    Obesity (BMI 30-39.9) 03/21/2022    Obesity, diabetes, and hypertension syndrome ([facility]) 04/02/2022    Primary hypertension 03/21/2022    Pulmonary hypertension ([facility]) 12/08/2018    Shortness of breath 12/15/2021    Secondary hyperparathyroidism of renal origin 06/10/2019    Vitamin D deficiency 04/13/2022    Severe nonproliferative diabetic retinopathy with macular edema associated with type 2 diabetes mellitus ([facility]) 05/12/2015    Restless leg 02/25/2023    Disorder of phosphorus metabolism, unspecified 02/12/2023    Fluid overload, unspecified 02/12/2023    Secondary hypercoagulable state 06/27/2023    Cervical spondylosis 06/22/2023    Degenerative 
and went into cardiac arrest after intubation; ACLS protocol was followed, patient received CPR, chest compressions.  About 15 minutes of code.  Initially rhythm was bradycardia.  Patient had another episode of bradycardia followed by cardiac arrest and brief episode of V. tach; CPR was performed and received multiple doses of epinephrine, bicarb etc.  See ACLS CODE STATUS documentation for detail.  Patient is started on Levophed and epinephrine drip.  Due to brief V. tach, patient received amiodarone bolus and amiodarone drip being started in ER.  Creatinine elevated.  No cytosis or fever.  CXR showed bilateral pneumonia.  Influenza A positive, COVID-19 negative.  Troponin unremarkable.  Initial pH 7.39 with pCO2 13.8 on BiPAP; repeat pH 6.99 with pCO2 75 after intubation.  ETT is in good position and there is no pneumothorax on CXR.  On bicarb drip.  Propofol was stopped due to second cardiac arrest.  Patient remains unresponsive.  Left IJ central line was placed by ER.        Cardiac risk factors: Present.      Physical Exam    Visit Vitals  /75   Pulse (!) 124   Temp 98.8 °F (37.1 °C) (Oral)   Resp (!) 32   Ht 1.778 m (5' 10\")   Wt 88 kg (194 lb)   SpO2 94%   BMI 27.84 kg/m²       General Appearance:  Well developed, well nourished,alert and oriented x 3, and individual in no acute distress.   Ears/Nose/Mouth/Throat:   Hearing grossly normal.         Neck: Supple.   Chest:   Lungs clear to auscultation bilaterally.   Cardiovascular:  Regular rate and rhythm, S1, S2 normal, no murmur.   Abdomen:   Soft, non-tender, bowel sounds are active.   Extremities: No edema bilaterally.    Skin: Warm and dry.     Cardiographics    Telemetry: Sinus tachycardia  ECG: Anteroseptal MI present on EKGs in 2024 and 2023  Echocardiogram: Pending    Recent radiology, intake/output and wt reviewed    Labs:.   Lab Results   Component Value Date/Time     03/09/2025 01:04 PM    K 3.9 03/09/2025 01:04 PM    CL 98 03/09/2025 
in the stomach. SMALL BOWEL: No dilatation or wall thickening. COLON: No dilatation or wall thickening. APPENDIX: Not seen PERITONEUM: No ascites or pneumoperitoneum. RETROPERITONEUM: No lymphadenopathy or aortic aneurysm. REPRODUCTIVE ORGANS: Normal URINARY BLADDER: Dumont catheter in the decompressed bladder. BONES: Lumbar spine endplate degenerative changes. ABDOMINAL WALL: Rectus diastases without abdominal wall fluid collection. ADDITIONAL COMMENTS: N/A     No evidence of intra-abdominal infection. Bilateral renal atrophy with right lower quadrant renal transplant. Mild periportal edema in the liver. No ascites or bowel inflammation. Electronically signed by AMMON GABRIEL    CT HEAD WO CONTRAST  Result Date: 3/9/2025  EXAM: CT HEAD WO CONTRAST INDICATION:cardiac arrest TECHNIQUE: Helical CT of the head was obtained without intravenous contrast. Axial, coronal and sagittal images were created. Dose reduction technique was used including one or more of the following: automated exposure control, adjustment of mA and kV according to patient size, and/or iterative reconstruction. COMPARISON: August 2023 FINDINGS: Quality: Average.  No unusual artifacts beyond the limitations of routine CT. INTRACRANIAL: Hemorrhage: None. Mass effect: None. Brain parenchyma: Gray-white matter differentiation is maintained. White matter hypoattenuation although nonspecific can be seen with chronic microvascular ischemic changes. Ventricles: Normal for age. CRANIAL/EXTRACRANIAL: Bony structures: No depressed or displaced skull fractures. Paranasal sinuses and mastoid air cells: The imaged portions demonstrate minimal mucosal thickening and/or rentention cyst formation. Visualized orbits and globes: Redemonstration of right intraorbital high density.     1.   No acute intracranial findings. Electronically signed by Mark Pabon    XR CHEST 1 VIEW  Result Date: 3/9/2025  EXAM: XR CHEST 1 VIEW INDICATION: intubation tube placemenent

## 2025-03-20 NOTE — ACP (ADVANCE CARE PLANNING)
Palliative Medicine    CODE STATUS: FULL CODE    AMD Status: none on file. His father, Roldan, is the closest living relative.     1000 Seen today in room 104 along with Orly Ahn NP.  Lying supine with head of bed elevated.  Intubated/ventilated/sedated. Intubated 3/9/2025 FiO2 40% PEEP 8. Amiodarone gtts infusing. No family at bedside.    1400: Family meeting: Met in the conference room with multiple family members including Mateus, brother, Jessi, sister, Yaritza ex-sister-in-law, Tim, son, and some others. Roldan, father, was on the phone with his wife. Dr Madsen, neurology, gave an update. Dr Sweet, intensivist, gave an update as well. She described in length the options of trach/PEG with planned discharge to LTACH VERSUS compassionate extubation with transition to FULL COMFORT MEASURES. The father, supported by most of the family, agreed to proceed with trach/PEG. Discussed that Dr Whatley would be consulted and there would be opportunities to ask him questions. Dr Whatley is the patient's allergist.     Tim, son, offered his concerns to the family that his father had endured a great deal since his cardiac arrest and was not sure that his father would want to continue with probable long-term artifical ventilation, cognitive impairment, motor impairment, and need for long term residential care. The family listened intently and agreed to continue these discussions.    Dr Sweet also asked the family to discuss whether Angelo should receive CPR again should he sustain another cardiac arrest. Roldan, father, was off the phone by this time. Yaritza agreed to facilitate discussion with Roldan and other available family members about possible initiation of DNR with continued intubation. We will continue to follow.    Encouraged all family to reach out to palliative medicine for any questions.    Disposition plan: anticipate LTACH if trach/PEG occur    Palliative Medicine will continue to follow Angelo

## 2025-03-20 NOTE — WOUND CARE
Wound Care Note:    Patient seen for hospital wide pressure injury prevalence.     Full assessment completed, sacrum, heels and bilateral ischium intact, no pressure injury noted. Patient has either acute skin loss or end of life skin changes to ears and back    Skin Care & Pressure Relief Recommendations:  Minimize layers of linen  Pads under patient to optimize support surface and microclimate  Turn/reposition approximately every 2 hours.  Pillow Wedges  Manage incontinence  Promote continence; Skin Protective lotion to buttocks and sacrum daily and as needed with incontinence care    Offload heels with pillows or offloading boots.    Discussed above plan with patient     Transition of Care: Consult wound care if needed

## 2025-03-21 ENCOUNTER — APPOINTMENT (OUTPATIENT)
Facility: HOSPITAL | Age: 55
DRG: 004 | End: 2025-03-21
Attending: INTERNAL MEDICINE
Payer: MEDICARE

## 2025-03-21 ENCOUNTER — APPOINTMENT (OUTPATIENT)
Facility: HOSPITAL | Age: 55
DRG: 004 | End: 2025-03-21
Payer: MEDICARE

## 2025-03-21 LAB
ALBUMIN SERPL-MCNC: 1.3 G/DL (ref 3.4–5)
ALBUMIN/GLOB SERPL: 0.5 (ref 0.8–1.7)
ALP SERPL-CCNC: 174 U/L (ref 45–117)
ALT SERPL-CCNC: 270 U/L (ref 16–61)
ANION GAP SERPL CALC-SCNC: 6 MMOL/L (ref 3–18)
AST SERPL-CCNC: 143 U/L (ref 10–38)
BASOPHILS # BLD: 0.04 K/UL (ref 0–0.1)
BASOPHILS NFR BLD: 0.2 % (ref 0–2)
BILIRUB SERPL-MCNC: 1.6 MG/DL (ref 0.2–1)
BUN SERPL-MCNC: 50 MG/DL (ref 7–18)
BUN/CREAT SERPL: 48 (ref 12–20)
CALCIUM SERPL-MCNC: 8.3 MG/DL (ref 8.5–10.1)
CHLORIDE SERPL-SCNC: 115 MMOL/L (ref 100–111)
CO2 SERPL-SCNC: 22 MMOL/L (ref 21–32)
CREAT SERPL-MCNC: 1.04 MG/DL (ref 0.6–1.3)
DIFFERENTIAL METHOD BLD: ABNORMAL
EOSINOPHIL # BLD: 0 K/UL (ref 0–0.4)
EOSINOPHIL NFR BLD: 0 % (ref 0–5)
ERYTHROCYTE [DISTWIDTH] IN BLOOD BY AUTOMATED COUNT: 17.3 % (ref 11.6–14.5)
GLOBULIN SER CALC-MCNC: 2.7 G/DL (ref 2–4)
GLUCOSE BLD STRIP.AUTO-MCNC: 237 MG/DL (ref 70–110)
GLUCOSE BLD STRIP.AUTO-MCNC: 263 MG/DL (ref 70–110)
GLUCOSE BLD STRIP.AUTO-MCNC: 270 MG/DL (ref 70–110)
GLUCOSE BLD STRIP.AUTO-MCNC: 271 MG/DL (ref 70–110)
GLUCOSE BLD STRIP.AUTO-MCNC: 271 MG/DL (ref 70–110)
GLUCOSE BLD STRIP.AUTO-MCNC: 320 MG/DL (ref 70–110)
GLUCOSE SERPL-MCNC: 281 MG/DL (ref 74–99)
HCT VFR BLD AUTO: 33.1 % (ref 36–48)
HGB BLD-MCNC: 11 G/DL (ref 13–16)
IMM GRANULOCYTES # BLD AUTO: 0.3 K/UL (ref 0–0.04)
IMM GRANULOCYTES NFR BLD AUTO: 1.7 % (ref 0–0.5)
INR PPP: 1.1 (ref 0.9–1.1)
LYMPHOCYTES # BLD: 0 K/UL (ref 0.9–3.3)
LYMPHOCYTES NFR BLD: 0 % (ref 21–52)
MCH RBC QN AUTO: 30.1 PG (ref 24–34)
MCHC RBC AUTO-ENTMCNC: 33.2 G/DL (ref 31–37)
MCV RBC AUTO: 90.4 FL (ref 78–100)
MONOCYTES # BLD: 0.59 K/UL (ref 0.05–1.2)
MONOCYTES NFR BLD: 3.3 % (ref 3–10)
NEUTS SEG # BLD: 16.97 K/UL (ref 1.8–8)
NEUTS SEG NFR BLD: 94.8 % (ref 40–73)
NRBC # BLD: 2.23 K/UL (ref 0–0.01)
NRBC BLD-RTO: 12.4 PER 100 WBC
PLATELET # BLD AUTO: 153 K/UL (ref 135–420)
PLATELET COMMENT: ABNORMAL
PMV BLD AUTO: 14.7 FL (ref 9.2–11.8)
POTASSIUM SERPL-SCNC: 4.9 MMOL/L (ref 3.5–5.5)
PROT SERPL-MCNC: 4 G/DL (ref 6.4–8.2)
PROTHROMBIN TIME: 14.3 SEC (ref 11.9–14.9)
RBC # BLD AUTO: 3.66 M/UL (ref 4.35–5.65)
RBC MORPH BLD: ABNORMAL
RBC MORPH BLD: ABNORMAL
SODIUM SERPL-SCNC: 143 MMOL/L (ref 136–145)
WBC # BLD AUTO: 17.9 K/UL (ref 4.6–13.2)

## 2025-03-21 PROCEDURE — 80053 COMPREHEN METABOLIC PANEL: CPT

## 2025-03-21 PROCEDURE — 2580000003 HC RX 258: Performed by: INTERNAL MEDICINE

## 2025-03-21 PROCEDURE — 6370000000 HC RX 637 (ALT 250 FOR IP): Performed by: INTERNAL MEDICINE

## 2025-03-21 PROCEDURE — 94003 VENT MGMT INPAT SUBQ DAY: CPT

## 2025-03-21 PROCEDURE — 82962 GLUCOSE BLOOD TEST: CPT

## 2025-03-21 PROCEDURE — 2500000003 HC RX 250 WO HCPCS: Performed by: INTERNAL MEDICINE

## 2025-03-21 PROCEDURE — 71045 X-RAY EXAM CHEST 1 VIEW: CPT

## 2025-03-21 PROCEDURE — 6360000002 HC RX W HCPCS: Performed by: INTERNAL MEDICINE

## 2025-03-21 PROCEDURE — 2500000003 HC RX 250 WO HCPCS: Performed by: HOSPITALIST

## 2025-03-21 PROCEDURE — 93313 ECHO TRANSESOPHAGEAL: CPT

## 2025-03-21 PROCEDURE — 2000000000 HC ICU R&B

## 2025-03-21 PROCEDURE — 85025 COMPLETE CBC W/AUTO DIFF WBC: CPT

## 2025-03-21 PROCEDURE — 99231 SBSQ HOSP IP/OBS SF/LOW 25: CPT

## 2025-03-21 PROCEDURE — 85610 PROTHROMBIN TIME: CPT

## 2025-03-21 PROCEDURE — 6370000000 HC RX 637 (ALT 250 FOR IP): Performed by: HOSPITALIST

## 2025-03-21 PROCEDURE — 2700000000 HC OXYGEN THERAPY PER DAY

## 2025-03-21 RX ORDER — AMIODARONE HYDROCHLORIDE 200 MG/1
200 TABLET ORAL DAILY
Status: DISCONTINUED | OUTPATIENT
Start: 2025-03-21 | End: 2025-04-01 | Stop reason: HOSPADM

## 2025-03-21 RX ORDER — FENTANYL CITRATE 50 UG/ML
25 INJECTION, SOLUTION INTRAMUSCULAR; INTRAVENOUS ONCE
Status: COMPLETED | OUTPATIENT
Start: 2025-03-21 | End: 2025-03-21

## 2025-03-21 RX ORDER — MIDAZOLAM HYDROCHLORIDE 2 MG/2ML
2 INJECTION, SOLUTION INTRAMUSCULAR; INTRAVENOUS ONCE
Status: COMPLETED | OUTPATIENT
Start: 2025-03-21 | End: 2025-03-21

## 2025-03-21 RX ADMIN — AMIODARONE HYDROCHLORIDE 0.5 MG/MIN: 1.8 INJECTION, SOLUTION INTRAVENOUS at 06:27

## 2025-03-21 RX ADMIN — SODIUM CHLORIDE, PRESERVATIVE FREE 10 ML: 5 INJECTION INTRAVENOUS at 09:00

## 2025-03-21 RX ADMIN — INSULIN LISPRO 4 UNITS: 100 INJECTION, SOLUTION INTRAVENOUS; SUBCUTANEOUS at 21:01

## 2025-03-21 RX ADMIN — CEFAZOLIN 2000 MG: 10 INJECTION, POWDER, FOR SOLUTION INTRAVENOUS at 10:49

## 2025-03-21 RX ADMIN — AMIODARONE HYDROCHLORIDE 200 MG: 200 TABLET ORAL at 17:32

## 2025-03-21 RX ADMIN — INSULIN GLARGINE 5 UNITS: 100 INJECTION, SOLUTION SUBCUTANEOUS at 21:03

## 2025-03-21 RX ADMIN — SODIUM CHLORIDE, PRESERVATIVE FREE 10 ML: 5 INJECTION INTRAVENOUS at 20:58

## 2025-03-21 RX ADMIN — HEPARIN SODIUM 5000 UNITS: 5000 INJECTION INTRAVENOUS; SUBCUTANEOUS at 14:45

## 2025-03-21 RX ADMIN — METOPROLOL TARTRATE 5 MG: 5 INJECTION INTRAVENOUS at 10:49

## 2025-03-21 RX ADMIN — INSULIN LISPRO 8 UNITS: 100 INJECTION, SOLUTION INTRAVENOUS; SUBCUTANEOUS at 13:29

## 2025-03-21 RX ADMIN — MIDAZOLAM HYDROCHLORIDE 2 MG: 1 INJECTION, SOLUTION INTRAMUSCULAR; INTRAVENOUS at 17:03

## 2025-03-21 RX ADMIN — METOPROLOL TARTRATE 5 MG: 5 INJECTION INTRAVENOUS at 21:07

## 2025-03-21 RX ADMIN — HEPARIN SODIUM 5000 UNITS: 5000 INJECTION INTRAVENOUS; SUBCUTANEOUS at 05:36

## 2025-03-21 RX ADMIN — METOPROLOL TARTRATE 5 MG: 5 INJECTION INTRAVENOUS at 02:54

## 2025-03-21 RX ADMIN — CEFAZOLIN 2000 MG: 10 INJECTION, POWDER, FOR SOLUTION INTRAVENOUS at 17:29

## 2025-03-21 RX ADMIN — WATER 40 MG: 1 INJECTION INTRAMUSCULAR; INTRAVENOUS; SUBCUTANEOUS at 21:03

## 2025-03-21 RX ADMIN — MICAFUNGIN SODIUM 100 MG: 100 INJECTION, POWDER, LYOPHILIZED, FOR SOLUTION INTRAVENOUS at 12:31

## 2025-03-21 RX ADMIN — FENTANYL CITRATE 25 MCG: 50 INJECTION INTRAMUSCULAR; INTRAVENOUS at 17:14

## 2025-03-21 RX ADMIN — HEPARIN SODIUM 5000 UNITS: 5000 INJECTION INTRAVENOUS; SUBCUTANEOUS at 22:13

## 2025-03-21 RX ADMIN — INSULIN LISPRO 12 UNITS: 100 INJECTION, SOLUTION INTRAVENOUS; SUBCUTANEOUS at 18:28

## 2025-03-21 RX ADMIN — INSULIN LISPRO 8 UNITS: 100 INJECTION, SOLUTION INTRAVENOUS; SUBCUTANEOUS at 06:54

## 2025-03-21 RX ADMIN — SODIUM CHLORIDE, PRESERVATIVE FREE 40 MG: 5 INJECTION INTRAVENOUS at 10:49

## 2025-03-21 RX ADMIN — WATER 40 MG: 1 INJECTION INTRAMUSCULAR; INTRAVENOUS; SUBCUTANEOUS at 10:49

## 2025-03-21 RX ADMIN — CHLORHEXIDINE GLUCONATE, 0.12% ORAL RINSE 15 ML: 1.2 SOLUTION DENTAL at 10:50

## 2025-03-21 RX ADMIN — WATER 40 MG: 1 INJECTION INTRAMUSCULAR; INTRAVENOUS; SUBCUTANEOUS at 05:17

## 2025-03-21 RX ADMIN — INSULIN GLARGINE 30 UNITS: 100 INJECTION, SOLUTION SUBCUTANEOUS at 10:50

## 2025-03-21 RX ADMIN — CHLORHEXIDINE GLUCONATE, 0.12% ORAL RINSE 15 ML: 1.2 SOLUTION DENTAL at 20:57

## 2025-03-21 RX ADMIN — MEROPENEM 2000 MG: 2 INJECTION, POWDER, FOR SOLUTION INTRAVENOUS at 05:37

## 2025-03-21 ASSESSMENT — PAIN SCALES - GENERAL
PAINLEVEL_OUTOF10: 0

## 2025-03-21 ASSESSMENT — PULMONARY FUNCTION TESTS
PIF_VALUE: 16
PIF_VALUE: 8.4
PIF_VALUE: 16
PIF_VALUE: 14
PIF_VALUE: 9
PIF_VALUE: 15

## 2025-03-22 ENCOUNTER — APPOINTMENT (OUTPATIENT)
Facility: HOSPITAL | Age: 55
DRG: 004 | End: 2025-03-22
Attending: INTERNAL MEDICINE
Payer: MEDICARE

## 2025-03-22 ENCOUNTER — APPOINTMENT (OUTPATIENT)
Facility: HOSPITAL | Age: 55
DRG: 004 | End: 2025-03-22
Payer: MEDICARE

## 2025-03-22 LAB
ALBUMIN SERPL-MCNC: 1.4 G/DL (ref 3.4–5)
ANION GAP BLD CALC-SCNC: ABNORMAL MMOL/L (ref 10–20)
ANION GAP SERPL CALC-SCNC: 9 MMOL/L (ref 3–18)
ARTERIAL PATENCY WRIST A: ABNORMAL
BACTERIA SPEC CULT: NORMAL
BACTERIA SPEC CULT: NORMAL
BASE DEFICIT BLD-SCNC: 4.1 MMOL/L
BASOPHILS # BLD: 0.05 K/UL (ref 0–0.1)
BASOPHILS NFR BLD: 0.3 % (ref 0–2)
BDY SITE: ABNORMAL
BUN SERPL-MCNC: 45 MG/DL (ref 7–18)
BUN/CREAT SERPL: 48 (ref 12–20)
CA-I BLD-MCNC: 1.37 MMOL/L (ref 1.15–1.33)
CALCIUM SERPL-MCNC: 8.3 MG/DL (ref 8.5–10.1)
CHLORIDE BLD-SCNC: 111 MMOL/L (ref 98–107)
CHLORIDE SERPL-SCNC: 114 MMOL/L (ref 100–111)
CO2 BLD-SCNC: 19 MMOL/L (ref 22–29)
CO2 SERPL-SCNC: 19 MMOL/L (ref 21–32)
CREAT BLD-MCNC: 0.87 MG/DL (ref 0.6–1.3)
CREAT SERPL-MCNC: 0.94 MG/DL (ref 0.6–1.3)
DIFFERENTIAL METHOD BLD: ABNORMAL
ECHO AV MEAN GRADIENT: 4 MMHG
ECHO AV MEAN VELOCITY: 0.9 M/S
ECHO AV PEAK GRADIENT: 6 MMHG
ECHO AV PEAK VELOCITY: 1.2 M/S
ECHO AV VTI: 27.8 CM
ECHO BSA: 2.14 M2
ECHO EST RA PRESSURE: 3 MMHG
ECHO LV E' LATERAL VELOCITY: 6.98 CM/S
ECHO LV E' SEPTAL VELOCITY: 6 CM/S
ECHO LV EF PHYSICIAN: 55 %
ECHO LV FRACTIONAL SHORTENING: 37 % (ref 28–44)
ECHO LV INTERNAL DIMENSION DIASTOLE INDEX: 1.94 CM/M2
ECHO LV INTERNAL DIMENSION DIASTOLIC: 4.1 CM (ref 4.2–5.9)
ECHO LV INTERNAL DIMENSION SYSTOLIC INDEX: 1.23 CM/M2
ECHO LV INTERNAL DIMENSION SYSTOLIC: 2.6 CM
ECHO LV IVSD: 1.1 CM (ref 0.6–1)
ECHO LV MASS 2D: 132.1 G (ref 88–224)
ECHO LV MASS INDEX 2D: 62.6 G/M2 (ref 49–115)
ECHO LV POSTERIOR WALL DIASTOLIC: 0.9 CM (ref 0.6–1)
ECHO LV RELATIVE WALL THICKNESS RATIO: 0.44
ECHO MV A VELOCITY: 0.53 M/S
ECHO MV E DECELERATION TIME (DT): 295.7 MS
ECHO MV E VELOCITY: 0.56 M/S
ECHO MV E/A RATIO: 1.06
ECHO MV E/E' LATERAL: 8.02
ECHO MV E/E' RATIO (AVERAGED): 8.68
ECHO MV E/E' SEPTAL: 9.33
ECHO PV MAX VELOCITY: 0.8 M/S
ECHO PV MEAN GRADIENT: 2 MMHG
ECHO PV MEAN VELOCITY: 0.6 M/S
ECHO PV PEAK GRADIENT: 3 MMHG
ECHO RIGHT VENTRICULAR SYSTOLIC PRESSURE (RVSP): 23 MMHG
ECHO RVOT MEAN GRADIENT: 1 MMHG
ECHO RVOT PEAK GRADIENT: 1 MMHG
ECHO RVOT PEAK VELOCITY: 0.5 M/S
ECHO RVOT VTI: 12.5 CM
ECHO TV REGURGITANT MAX VELOCITY: 2.23 M/S
ECHO TV REGURGITANT PEAK GRADIENT: 20 MMHG
EOSINOPHIL # BLD: 0 K/UL (ref 0–0.4)
EOSINOPHIL NFR BLD: 0 % (ref 0–5)
ERYTHROCYTE [DISTWIDTH] IN BLOOD BY AUTOMATED COUNT: 20.1 % (ref 11.6–14.5)
FIO2 ON VENT: 30 %
GAS FLOW.O2 O2 DELIVERY SYS: ABNORMAL
GLUCOSE BLD STRIP.AUTO-MCNC: 194 MG/DL (ref 70–110)
GLUCOSE BLD STRIP.AUTO-MCNC: 211 MG/DL (ref 70–110)
GLUCOSE BLD STRIP.AUTO-MCNC: 213 MG/DL (ref 70–110)
GLUCOSE BLD STRIP.AUTO-MCNC: 216 MG/DL (ref 70–110)
GLUCOSE BLD STRIP.AUTO-MCNC: 253 MG/DL (ref 70–110)
GLUCOSE BLD-MCNC: 236 MG/DL (ref 74–99)
GLUCOSE SERPL-MCNC: 219 MG/DL (ref 74–99)
HCO3 BLD-SCNC: 19.5 MMOL/L (ref 21–28)
HCT VFR BLD AUTO: 35.7 % (ref 36–48)
HGB BLD-MCNC: 12 G/DL (ref 13–16)
IMM GRANULOCYTES # BLD AUTO: 0.57 K/UL (ref 0–0.04)
IMM GRANULOCYTES NFR BLD AUTO: 3 % (ref 0–0.5)
LACTATE BLD-SCNC: 1.85 MMOL/L (ref 0.4–2)
LYMPHOCYTES # BLD: 0 K/UL (ref 0.9–3.3)
LYMPHOCYTES NFR BLD: 0 % (ref 21–52)
MCH RBC QN AUTO: 30.2 PG (ref 24–34)
MCHC RBC AUTO-ENTMCNC: 33.6 G/DL (ref 31–37)
MCV RBC AUTO: 89.9 FL (ref 78–100)
MONOCYTES # BLD: 0.83 K/UL (ref 0.05–1.2)
MONOCYTES NFR BLD: 4.3 % (ref 3–10)
NEUTS SEG # BLD: 17.73 K/UL (ref 1.8–8)
NEUTS SEG NFR BLD: 92.4 % (ref 40–73)
NRBC # BLD: 2.55 K/UL (ref 0–0.01)
NRBC BLD-RTO: 13.3 PER 100 WBC
PCO2 BLDV: 31.3 MMHG (ref 41–51)
PEEP RESPIRATORY: 5
PH BLDV: 7.4 (ref 7.32–7.42)
PHOSPHATE SERPL-MCNC: 3.6 MG/DL (ref 2.5–4.9)
PLATELET # BLD AUTO: 183 K/UL (ref 135–420)
PMV BLD AUTO: 14.6 FL (ref 9.2–11.8)
PO2 BLDV: 38 MMHG (ref 25–40)
POTASSIUM BLD-SCNC: 4.2 MMOL/L (ref 3.5–5.1)
POTASSIUM SERPL-SCNC: 4.4 MMOL/L (ref 3.5–5.5)
RBC # BLD AUTO: 3.97 M/UL (ref 4.35–5.65)
SAO2 % BLD: 73 % (ref 94–98)
SERVICE CMNT-IMP: ABNORMAL
SERVICE CMNT-IMP: NORMAL
SERVICE CMNT-IMP: NORMAL
SODIUM BLD-SCNC: 144 MMOL/L (ref 136–145)
SODIUM SERPL-SCNC: 142 MMOL/L (ref 136–145)
SPECIMEN SITE: ABNORMAL
VENTILATION MODE VENT: ABNORMAL
VT SETTING VENT: 450
WBC # BLD AUTO: 19.2 K/UL (ref 4.6–13.2)

## 2025-03-22 PROCEDURE — 84132 ASSAY OF SERUM POTASSIUM: CPT

## 2025-03-22 PROCEDURE — 6360000002 HC RX W HCPCS: Performed by: INTERNAL MEDICINE

## 2025-03-22 PROCEDURE — 2500000003 HC RX 250 WO HCPCS: Performed by: INTERNAL MEDICINE

## 2025-03-22 PROCEDURE — 82330 ASSAY OF CALCIUM: CPT

## 2025-03-22 PROCEDURE — 82803 BLOOD GASES ANY COMBINATION: CPT

## 2025-03-22 PROCEDURE — 2580000003 HC RX 258: Performed by: INTERNAL MEDICINE

## 2025-03-22 PROCEDURE — 94003 VENT MGMT INPAT SUBQ DAY: CPT

## 2025-03-22 PROCEDURE — 83605 ASSAY OF LACTIC ACID: CPT

## 2025-03-22 PROCEDURE — 87798 DETECT AGENT NOS DNA AMP: CPT

## 2025-03-22 PROCEDURE — 6370000000 HC RX 637 (ALT 250 FOR IP): Performed by: HOSPITALIST

## 2025-03-22 PROCEDURE — 71045 X-RAY EXAM CHEST 1 VIEW: CPT

## 2025-03-22 PROCEDURE — 2000000000 HC ICU R&B

## 2025-03-22 PROCEDURE — 85014 HEMATOCRIT: CPT

## 2025-03-22 PROCEDURE — 6370000000 HC RX 637 (ALT 250 FOR IP): Performed by: INTERNAL MEDICINE

## 2025-03-22 PROCEDURE — 84295 ASSAY OF SERUM SODIUM: CPT

## 2025-03-22 PROCEDURE — 2500000003 HC RX 250 WO HCPCS: Performed by: HOSPITALIST

## 2025-03-22 PROCEDURE — 80069 RENAL FUNCTION PANEL: CPT

## 2025-03-22 PROCEDURE — 85025 COMPLETE CBC W/AUTO DIFF WBC: CPT

## 2025-03-22 PROCEDURE — 93321 DOPPLER ECHO F-UP/LMTD STD: CPT

## 2025-03-22 PROCEDURE — 82947 ASSAY GLUCOSE BLOOD QUANT: CPT

## 2025-03-22 PROCEDURE — 82962 GLUCOSE BLOOD TEST: CPT

## 2025-03-22 RX ORDER — MIDODRINE HYDROCHLORIDE 2.5 MG/1
2.5 TABLET ORAL
Status: DISCONTINUED | OUTPATIENT
Start: 2025-03-22 | End: 2025-03-23

## 2025-03-22 RX ORDER — METOPROLOL TARTRATE 1 MG/ML
2.5 INJECTION, SOLUTION INTRAVENOUS EVERY 6 HOURS
Status: DISCONTINUED | OUTPATIENT
Start: 2025-03-22 | End: 2025-03-28

## 2025-03-22 RX ADMIN — CHLORHEXIDINE GLUCONATE, 0.12% ORAL RINSE 15 ML: 1.2 SOLUTION DENTAL at 08:32

## 2025-03-22 RX ADMIN — CEFAZOLIN 2000 MG: 10 INJECTION, POWDER, FOR SOLUTION INTRAVENOUS at 17:18

## 2025-03-22 RX ADMIN — CEFAZOLIN 2000 MG: 10 INJECTION, POWDER, FOR SOLUTION INTRAVENOUS at 09:36

## 2025-03-22 RX ADMIN — METOPROLOL TARTRATE 5 MG: 5 INJECTION INTRAVENOUS at 07:42

## 2025-03-22 RX ADMIN — MICAFUNGIN SODIUM 100 MG: 100 INJECTION, POWDER, LYOPHILIZED, FOR SOLUTION INTRAVENOUS at 13:30

## 2025-03-22 RX ADMIN — HEPARIN SODIUM 5000 UNITS: 5000 INJECTION INTRAVENOUS; SUBCUTANEOUS at 05:30

## 2025-03-22 RX ADMIN — METOPROLOL TARTRATE 5 MG: 5 INJECTION INTRAVENOUS at 01:47

## 2025-03-22 RX ADMIN — INSULIN GLARGINE 5 UNITS: 100 INJECTION, SOLUTION SUBCUTANEOUS at 21:30

## 2025-03-22 RX ADMIN — INSULIN LISPRO 4 UNITS: 100 INJECTION, SOLUTION INTRAVENOUS; SUBCUTANEOUS at 12:15

## 2025-03-22 RX ADMIN — HEPARIN SODIUM 5000 UNITS: 5000 INJECTION INTRAVENOUS; SUBCUTANEOUS at 21:49

## 2025-03-22 RX ADMIN — INSULIN LISPRO 4 UNITS: 100 INJECTION, SOLUTION INTRAVENOUS; SUBCUTANEOUS at 08:31

## 2025-03-22 RX ADMIN — INSULIN GLARGINE 30 UNITS: 100 INJECTION, SOLUTION SUBCUTANEOUS at 08:32

## 2025-03-22 RX ADMIN — WATER 40 MG: 1 INJECTION INTRAMUSCULAR; INTRAVENOUS; SUBCUTANEOUS at 21:47

## 2025-03-22 RX ADMIN — WATER 40 MG: 1 INJECTION INTRAMUSCULAR; INTRAVENOUS; SUBCUTANEOUS at 12:56

## 2025-03-22 RX ADMIN — INSULIN LISPRO 4 UNITS: 100 INJECTION, SOLUTION INTRAVENOUS; SUBCUTANEOUS at 21:48

## 2025-03-22 RX ADMIN — MIDODRINE HYDROCHLORIDE 2.5 MG: 2.5 TABLET ORAL at 17:18

## 2025-03-22 RX ADMIN — SODIUM CHLORIDE, PRESERVATIVE FREE 40 MG: 5 INJECTION INTRAVENOUS at 08:32

## 2025-03-22 RX ADMIN — HEPARIN SODIUM 5000 UNITS: 5000 INJECTION INTRAVENOUS; SUBCUTANEOUS at 14:30

## 2025-03-22 RX ADMIN — SODIUM CHLORIDE, PRESERVATIVE FREE 10 ML: 5 INJECTION INTRAVENOUS at 08:33

## 2025-03-22 RX ADMIN — METOPROLOL TARTRATE 2.5 MG: 5 INJECTION INTRAVENOUS at 15:45

## 2025-03-22 RX ADMIN — CEFAZOLIN 2000 MG: 10 INJECTION, POWDER, FOR SOLUTION INTRAVENOUS at 01:47

## 2025-03-22 RX ADMIN — METOPROLOL TARTRATE 2.5 MG: 5 INJECTION INTRAVENOUS at 21:47

## 2025-03-22 RX ADMIN — WATER 40 MG: 1 INJECTION INTRAMUSCULAR; INTRAVENOUS; SUBCUTANEOUS at 04:04

## 2025-03-22 RX ADMIN — INSULIN LISPRO 4 UNITS: 100 INJECTION, SOLUTION INTRAVENOUS; SUBCUTANEOUS at 17:17

## 2025-03-22 RX ADMIN — AMIODARONE HYDROCHLORIDE 200 MG: 200 TABLET ORAL at 08:32

## 2025-03-22 RX ADMIN — SODIUM CHLORIDE, PRESERVATIVE FREE 10 ML: 5 INJECTION INTRAVENOUS at 21:47

## 2025-03-22 RX ADMIN — CHLORHEXIDINE GLUCONATE, 0.12% ORAL RINSE 15 ML: 1.2 SOLUTION DENTAL at 21:47

## 2025-03-22 ASSESSMENT — PAIN SCALES - GENERAL
PAINLEVEL_OUTOF10: 0

## 2025-03-22 ASSESSMENT — PULMONARY FUNCTION TESTS
PIF_VALUE: 16
PIF_VALUE: 15
PIF_VALUE: 16
PIF_VALUE: 17
PIF_VALUE: 13
PIF_VALUE: 15

## 2025-03-23 ENCOUNTER — APPOINTMENT (OUTPATIENT)
Facility: HOSPITAL | Age: 55
DRG: 004 | End: 2025-03-23
Payer: MEDICARE

## 2025-03-23 ENCOUNTER — ANESTHESIA (OUTPATIENT)
Facility: HOSPITAL | Age: 55
End: 2025-03-23
Payer: MEDICARE

## 2025-03-23 ENCOUNTER — ANESTHESIA EVENT (OUTPATIENT)
Facility: HOSPITAL | Age: 55
End: 2025-03-23
Payer: MEDICARE

## 2025-03-23 LAB
ALBUMIN SERPL-MCNC: 1.4 G/DL (ref 3.4–5)
ANION GAP SERPL CALC-SCNC: 7 MMOL/L (ref 3–18)
BASOPHILS # BLD: 0.05 K/UL (ref 0–0.1)
BASOPHILS NFR BLD: 0.3 % (ref 0–2)
BUN SERPL-MCNC: 40 MG/DL (ref 7–18)
BUN/CREAT SERPL: 44 (ref 12–20)
CALCIUM SERPL-MCNC: 8.6 MG/DL (ref 8.5–10.1)
CHLORIDE SERPL-SCNC: 115 MMOL/L (ref 100–111)
CO2 SERPL-SCNC: 19 MMOL/L (ref 21–32)
CREAT SERPL-MCNC: 0.91 MG/DL (ref 0.6–1.3)
DIFFERENTIAL METHOD BLD: ABNORMAL
EOSINOPHIL # BLD: 0 K/UL (ref 0–0.4)
EOSINOPHIL NFR BLD: 0 % (ref 0–5)
ERYTHROCYTE [DISTWIDTH] IN BLOOD BY AUTOMATED COUNT: 21.2 % (ref 11.6–14.5)
GLUCOSE BLD STRIP.AUTO-MCNC: 123 MG/DL (ref 70–110)
GLUCOSE BLD STRIP.AUTO-MCNC: 139 MG/DL (ref 70–110)
GLUCOSE BLD STRIP.AUTO-MCNC: 146 MG/DL (ref 70–110)
GLUCOSE BLD STRIP.AUTO-MCNC: 156 MG/DL (ref 70–110)
GLUCOSE SERPL-MCNC: 165 MG/DL (ref 74–99)
HCT VFR BLD AUTO: 37.5 % (ref 36–48)
HCT VFR BLD AUTO: 37.7 % (ref 36–48)
HGB BLD-MCNC: 12.2 G/DL (ref 13–16)
HGB BLD-MCNC: 12.5 G/DL (ref 13–16)
IMM GRANULOCYTES # BLD AUTO: 0.47 K/UL (ref 0–0.04)
IMM GRANULOCYTES NFR BLD AUTO: 2.9 % (ref 0–0.5)
INR PPP: 1.1 (ref 0.9–1.1)
LYMPHOCYTES # BLD: 0.31 K/UL (ref 0.9–3.3)
LYMPHOCYTES NFR BLD: 1.9 % (ref 21–52)
MCH RBC QN AUTO: 30.8 PG (ref 24–34)
MCHC RBC AUTO-ENTMCNC: 33.3 G/DL (ref 31–37)
MCV RBC AUTO: 92.4 FL (ref 78–100)
MONOCYTES # BLD: 0.81 K/UL (ref 0.05–1.2)
MONOCYTES NFR BLD: 4.9 % (ref 3–10)
NEUTS SEG # BLD: 14.74 K/UL (ref 1.8–8)
NEUTS SEG NFR BLD: 90 % (ref 40–73)
NRBC # BLD: 1.94 K/UL (ref 0–0.01)
NRBC BLD-RTO: 11.8 PER 100 WBC
PHOSPHATE SERPL-MCNC: 3.1 MG/DL (ref 2.5–4.9)
PLATELET # BLD AUTO: 200 K/UL (ref 135–420)
PMV BLD AUTO: 13.8 FL (ref 9.2–11.8)
POTASSIUM SERPL-SCNC: 4.5 MMOL/L (ref 3.5–5.5)
PROTHROMBIN TIME: 14.8 SEC (ref 11.9–14.9)
RBC # BLD AUTO: 4.06 M/UL (ref 4.35–5.65)
SODIUM SERPL-SCNC: 141 MMOL/L (ref 136–145)
WBC # BLD AUTO: 16.4 K/UL (ref 4.6–13.2)

## 2025-03-23 PROCEDURE — 6360000002 HC RX W HCPCS: Performed by: NURSE ANESTHETIST, CERTIFIED REGISTERED

## 2025-03-23 PROCEDURE — 2000000000 HC ICU R&B

## 2025-03-23 PROCEDURE — 85018 HEMOGLOBIN: CPT

## 2025-03-23 PROCEDURE — P9047 ALBUMIN (HUMAN), 25%, 50ML: HCPCS | Performed by: INTERNAL MEDICINE

## 2025-03-23 PROCEDURE — 2580000003 HC RX 258: Performed by: NURSE ANESTHETIST, CERTIFIED REGISTERED

## 2025-03-23 PROCEDURE — 6360000002 HC RX W HCPCS: Performed by: INTERNAL MEDICINE

## 2025-03-23 PROCEDURE — 2580000003 HC RX 258: Performed by: INTERNAL MEDICINE

## 2025-03-23 PROCEDURE — 74018 RADEX ABDOMEN 1 VIEW: CPT

## 2025-03-23 PROCEDURE — 3700000001 HC ADD 15 MINUTES (ANESTHESIA): Performed by: OTOLARYNGOLOGY

## 2025-03-23 PROCEDURE — 6370000000 HC RX 637 (ALT 250 FOR IP): Performed by: INTERNAL MEDICINE

## 2025-03-23 PROCEDURE — 80069 RENAL FUNCTION PANEL: CPT

## 2025-03-23 PROCEDURE — 2500000003 HC RX 250 WO HCPCS: Performed by: INTERNAL MEDICINE

## 2025-03-23 PROCEDURE — 3600000002 HC SURGERY LEVEL 2 BASE: Performed by: OTOLARYNGOLOGY

## 2025-03-23 PROCEDURE — 71045 X-RAY EXAM CHEST 1 VIEW: CPT

## 2025-03-23 PROCEDURE — 94003 VENT MGMT INPAT SUBQ DAY: CPT

## 2025-03-23 PROCEDURE — 3600000012 HC SURGERY LEVEL 2 ADDTL 15MIN: Performed by: OTOLARYNGOLOGY

## 2025-03-23 PROCEDURE — 2500000003 HC RX 250 WO HCPCS: Performed by: NURSE ANESTHETIST, CERTIFIED REGISTERED

## 2025-03-23 PROCEDURE — 82962 GLUCOSE BLOOD TEST: CPT

## 2025-03-23 PROCEDURE — 85610 PROTHROMBIN TIME: CPT

## 2025-03-23 PROCEDURE — 2700000000 HC OXYGEN THERAPY PER DAY

## 2025-03-23 PROCEDURE — 85014 HEMATOCRIT: CPT

## 2025-03-23 PROCEDURE — 2720000010 HC SURG SUPPLY STERILE: Performed by: OTOLARYNGOLOGY

## 2025-03-23 PROCEDURE — 0B110Z4 BYPASS TRACHEA TO CUTANEOUS, OPEN APPROACH: ICD-10-PCS | Performed by: OTOLARYNGOLOGY

## 2025-03-23 PROCEDURE — C1713 ANCHOR/SCREW BN/BN,TIS/BN: HCPCS | Performed by: OTOLARYNGOLOGY

## 2025-03-23 PROCEDURE — 6360000002 HC RX W HCPCS: Performed by: OTOLARYNGOLOGY

## 2025-03-23 PROCEDURE — 2500000003 HC RX 250 WO HCPCS: Performed by: HOSPITALIST

## 2025-03-23 PROCEDURE — 3700000000 HC ANESTHESIA ATTENDED CARE: Performed by: OTOLARYNGOLOGY

## 2025-03-23 PROCEDURE — 85025 COMPLETE CBC W/AUTO DIFF WBC: CPT

## 2025-03-23 PROCEDURE — 2500000003 HC RX 250 WO HCPCS: Performed by: OTOLARYNGOLOGY

## 2025-03-23 PROCEDURE — 2709999900 HC NON-CHARGEABLE SUPPLY: Performed by: OTOLARYNGOLOGY

## 2025-03-23 RX ORDER — LIDOCAINE HYDROCHLORIDE AND EPINEPHRINE 10; 10 MG/ML; UG/ML
INJECTION, SOLUTION INFILTRATION; PERINEURAL PRN
Status: DISCONTINUED | OUTPATIENT
Start: 2025-03-23 | End: 2025-03-23 | Stop reason: ALTCHOICE

## 2025-03-23 RX ORDER — NOREPINEPHRINE BITARTRATE 0.06 MG/ML
1-100 INJECTION, SOLUTION INTRAVENOUS CONTINUOUS
Status: DISCONTINUED | OUTPATIENT
Start: 2025-03-23 | End: 2025-03-23

## 2025-03-23 RX ORDER — MIDAZOLAM HYDROCHLORIDE 1 MG/ML
INJECTION, SOLUTION INTRAMUSCULAR; INTRAVENOUS
Status: DISCONTINUED | OUTPATIENT
Start: 2025-03-23 | End: 2025-03-23 | Stop reason: SDUPTHER

## 2025-03-23 RX ORDER — 0.9 % SODIUM CHLORIDE 0.9 %
1000 INTRAVENOUS SOLUTION INTRAVENOUS ONCE
Status: COMPLETED | OUTPATIENT
Start: 2025-03-23 | End: 2025-03-23

## 2025-03-23 RX ORDER — ROCURONIUM BROMIDE 10 MG/ML
INJECTION, SOLUTION INTRAVENOUS
Status: DISCONTINUED | OUTPATIENT
Start: 2025-03-23 | End: 2025-03-23 | Stop reason: SDUPTHER

## 2025-03-23 RX ORDER — NOREPINEPHRINE BITARTRATE 0.06 MG/ML
1-100 INJECTION, SOLUTION INTRAVENOUS CONTINUOUS
Status: DISCONTINUED | OUTPATIENT
Start: 2025-03-23 | End: 2025-03-24

## 2025-03-23 RX ORDER — SODIUM CHLORIDE 9 MG/ML
INJECTION, SOLUTION INTRAVENOUS
Status: DISCONTINUED | OUTPATIENT
Start: 2025-03-23 | End: 2025-03-23 | Stop reason: SDUPTHER

## 2025-03-23 RX ORDER — LIDOCAINE HYDROCHLORIDE 20 MG/ML
INJECTION, SOLUTION EPIDURAL; INFILTRATION; INTRACAUDAL; PERINEURAL
Status: DISCONTINUED | OUTPATIENT
Start: 2025-03-23 | End: 2025-03-23 | Stop reason: SDUPTHER

## 2025-03-23 RX ORDER — MIDODRINE HYDROCHLORIDE 2.5 MG/1
5 TABLET ORAL
Status: DISCONTINUED | OUTPATIENT
Start: 2025-03-23 | End: 2025-04-01 | Stop reason: HOSPADM

## 2025-03-23 RX ORDER — MIDAZOLAM HYDROCHLORIDE 2 MG/2ML
2 INJECTION, SOLUTION INTRAMUSCULAR; INTRAVENOUS
Status: DISCONTINUED | OUTPATIENT
Start: 2025-03-23 | End: 2025-04-01 | Stop reason: HOSPADM

## 2025-03-23 RX ORDER — ONDANSETRON 2 MG/ML
INJECTION INTRAMUSCULAR; INTRAVENOUS
Status: DISCONTINUED | OUTPATIENT
Start: 2025-03-23 | End: 2025-03-23 | Stop reason: SDUPTHER

## 2025-03-23 RX ORDER — FENTANYL CITRATE-0.9 % NACL/PF 10 MCG/ML
25-200 PLASTIC BAG, INJECTION (ML) INTRAVENOUS CONTINUOUS
Refills: 0 | Status: DISCONTINUED | OUTPATIENT
Start: 2025-03-23 | End: 2025-03-24

## 2025-03-23 RX ORDER — ALBUMIN (HUMAN) 12.5 G/50ML
25 SOLUTION INTRAVENOUS ONCE
Status: COMPLETED | OUTPATIENT
Start: 2025-03-23 | End: 2025-03-23

## 2025-03-23 RX ORDER — PHENYLEPHRINE HCL IN 0.9% NACL 1 MG/10 ML
SYRINGE (ML) INTRAVENOUS
Status: DISCONTINUED | OUTPATIENT
Start: 2025-03-23 | End: 2025-03-23 | Stop reason: SDUPTHER

## 2025-03-23 RX ORDER — FENTANYL CITRATE 50 UG/ML
INJECTION, SOLUTION INTRAMUSCULAR; INTRAVENOUS
Status: DISCONTINUED | OUTPATIENT
Start: 2025-03-23 | End: 2025-03-23 | Stop reason: SDUPTHER

## 2025-03-23 RX ORDER — PROPOFOL 10 MG/ML
INJECTION, EMULSION INTRAVENOUS
Status: DISCONTINUED | OUTPATIENT
Start: 2025-03-23 | End: 2025-03-23 | Stop reason: SDUPTHER

## 2025-03-23 RX ADMIN — CEFAZOLIN 2000 MG: 10 INJECTION, POWDER, FOR SOLUTION INTRAVENOUS at 02:00

## 2025-03-23 RX ADMIN — MIDODRINE HYDROCHLORIDE 5 MG: 2.5 TABLET ORAL at 15:57

## 2025-03-23 RX ADMIN — MIDODRINE HYDROCHLORIDE 2.5 MG: 2.5 TABLET ORAL at 08:08

## 2025-03-23 RX ADMIN — CHLORHEXIDINE GLUCONATE, 0.12% ORAL RINSE 15 ML: 1.2 SOLUTION DENTAL at 08:39

## 2025-03-23 RX ADMIN — SODIUM CHLORIDE, PRESERVATIVE FREE 40 MG: 5 INJECTION INTRAVENOUS at 08:37

## 2025-03-23 RX ADMIN — NOREPINEPHRINE BITARTRATE 5 MCG/MIN: 64 SOLUTION INTRAVENOUS at 18:42

## 2025-03-23 RX ADMIN — FENTANYL CITRATE 100 MCG: 50 INJECTION, SOLUTION INTRAMUSCULAR; INTRAVENOUS at 12:12

## 2025-03-23 RX ADMIN — MIDAZOLAM 2 MG: 1 INJECTION INTRAMUSCULAR; INTRAVENOUS at 11:20

## 2025-03-23 RX ADMIN — Medication 100 MCG: at 11:39

## 2025-03-23 RX ADMIN — WATER 40 MG: 1 INJECTION INTRAMUSCULAR; INTRAVENOUS; SUBCUTANEOUS at 13:25

## 2025-03-23 RX ADMIN — MICAFUNGIN SODIUM 100 MG: 100 INJECTION, POWDER, LYOPHILIZED, FOR SOLUTION INTRAVENOUS at 13:42

## 2025-03-23 RX ADMIN — FENTANYL CITRATE 100 MCG: 50 INJECTION, SOLUTION INTRAMUSCULAR; INTRAVENOUS at 11:20

## 2025-03-23 RX ADMIN — LIDOCAINE HYDROCHLORIDE 100 MG: 20 INJECTION, SOLUTION EPIDURAL; INFILTRATION; INTRACAUDAL; PERINEURAL at 11:31

## 2025-03-23 RX ADMIN — CHLORHEXIDINE GLUCONATE, 0.12% ORAL RINSE 15 ML: 1.2 SOLUTION DENTAL at 20:42

## 2025-03-23 RX ADMIN — ONDANSETRON 4 MG: 2 INJECTION, SOLUTION INTRAMUSCULAR; INTRAVENOUS at 12:13

## 2025-03-23 RX ADMIN — FENTANYL CITRATE 50 MCG/HR: 0.05 INJECTION, SOLUTION INTRAMUSCULAR; INTRAVENOUS at 10:24

## 2025-03-23 RX ADMIN — CEFAZOLIN 2000 MG: 10 INJECTION, POWDER, FOR SOLUTION INTRAVENOUS at 17:38

## 2025-03-23 RX ADMIN — METOPROLOL TARTRATE 2.5 MG: 5 INJECTION INTRAVENOUS at 02:45

## 2025-03-23 RX ADMIN — METOPROLOL TARTRATE 2.5 MG: 5 INJECTION INTRAVENOUS at 08:43

## 2025-03-23 RX ADMIN — WATER 40 MG: 1 INJECTION INTRAMUSCULAR; INTRAVENOUS; SUBCUTANEOUS at 04:07

## 2025-03-23 RX ADMIN — HEPARIN SODIUM 5000 UNITS: 5000 INJECTION INTRAVENOUS; SUBCUTANEOUS at 22:30

## 2025-03-23 RX ADMIN — AMIODARONE HYDROCHLORIDE 200 MG: 200 TABLET ORAL at 08:39

## 2025-03-23 RX ADMIN — ROCURONIUM BROMIDE 30 MG: 10 INJECTION INTRAVENOUS at 11:31

## 2025-03-23 RX ADMIN — SODIUM CHLORIDE, PRESERVATIVE FREE 10 ML: 5 INJECTION INTRAVENOUS at 20:41

## 2025-03-23 RX ADMIN — SODIUM CHLORIDE: 900 INJECTION, SOLUTION INTRAVENOUS at 11:10

## 2025-03-23 RX ADMIN — SODIUM CHLORIDE 1000 ML: 0.9 INJECTION, SOLUTION INTRAVENOUS at 13:18

## 2025-03-23 RX ADMIN — ALBUMIN (HUMAN) 25 G: 0.25 INJECTION, SOLUTION INTRAVENOUS at 13:18

## 2025-03-23 RX ADMIN — PROPOFOL 50 MG: 10 INJECTION, EMULSION INTRAVENOUS at 11:31

## 2025-03-23 RX ADMIN — ROCURONIUM BROMIDE 20 MG: 10 INJECTION INTRAVENOUS at 11:49

## 2025-03-23 RX ADMIN — Medication 100 MCG: at 11:49

## 2025-03-23 RX ADMIN — MIDODRINE HYDROCHLORIDE 2.5 MG: 2.5 TABLET ORAL at 13:25

## 2025-03-23 RX ADMIN — Medication 100 MCG: at 11:35

## 2025-03-23 RX ADMIN — CEFAZOLIN 2000 MG: 10 INJECTION, POWDER, FOR SOLUTION INTRAVENOUS at 09:44

## 2025-03-23 RX ADMIN — SODIUM CHLORIDE, PRESERVATIVE FREE 10 ML: 5 INJECTION INTRAVENOUS at 08:39

## 2025-03-23 RX ADMIN — Medication 100 MCG: at 11:32

## 2025-03-23 RX ADMIN — WATER 40 MG: 1 INJECTION INTRAMUSCULAR; INTRAVENOUS; SUBCUTANEOUS at 21:16

## 2025-03-23 ASSESSMENT — PAIN SCALES - GENERAL
PAINLEVEL_OUTOF10: 0
PAINLEVEL_OUTOF10: 0

## 2025-03-23 ASSESSMENT — PULMONARY FUNCTION TESTS
PIF_VALUE: 15
PIF_VALUE: 11
PIF_VALUE: 16

## 2025-03-23 NOTE — OP NOTE
skin flap tracheotomy.  The strap muscles are now opened in the midline.  These were taken down to the level of the thyroid isthmus.  The thyroid isthmus was now ligated  with use of the harmonic scalpel.  The pretracheal fascia was noted to be fairly adherent to the trachea this was also stripped away from the anterior surface of the trachea.  The cut edges of the thyroid isthmus were then cauterized with bipolar cautery.  The pretracheal fascia was completely cleaned and the cricoid grasped with cricoid hook and the trachea elevated superiorly.  An inferior trapdoor based incision was made in tracheal ring #2.  The left door was then sutured to the skin with 3-0 silk suture.  This allowed for skin flap tracheotomy.  The endotracheal tube itself was withdrawn just to a point above the tracheostomy hole and suctioning was performed.  Trach dilatation was performed and the above forementioned Shiley 6 CN 75H trach was then placed into the tracheostomy.  Good air exchange was noted.  Thrombin-soaked Surgicel was placed around the trach tube.  The tracheostomy tube was then sutured to the skin.  Trach ties were then used to anchor the trachea to the neck.  The wound was cleaned and a drain sponge was placed underneath the trach and the patient was subsequently transported from the operating room back to the ICU in stable condition  Family will be informed regarding success of procedure.    Electronically signed by Gera Whatley MD on 3/23/2025 at 12:21 PM

## 2025-03-24 ENCOUNTER — APPOINTMENT (OUTPATIENT)
Facility: HOSPITAL | Age: 55
DRG: 004 | End: 2025-03-24
Payer: MEDICARE

## 2025-03-24 PROBLEM — J96.01 ACUTE RESPIRATORY FAILURE WITH HYPOXIA: Status: ACTIVE | Noted: 2025-03-09

## 2025-03-24 PROBLEM — G93.1 ANOXIC ENCEPHALOPATHY (HCC): Status: ACTIVE | Noted: 2025-03-24

## 2025-03-24 LAB
ALBUMIN SERPL-MCNC: 2.1 G/DL (ref 3.4–5)
ANION GAP SERPL CALC-SCNC: 11 MMOL/L (ref 3–18)
BUN SERPL-MCNC: 35 MG/DL (ref 7–18)
BUN/CREAT SERPL: 36 (ref 12–20)
CALCIUM SERPL-MCNC: 8.8 MG/DL (ref 8.5–10.1)
CHLORIDE SERPL-SCNC: 115 MMOL/L (ref 100–111)
CO2 SERPL-SCNC: 16 MMOL/L (ref 21–32)
CREAT SERPL-MCNC: 0.96 MG/DL (ref 0.6–1.3)
DIFFERENTIAL: NORMAL
ECHO BSA: 2.14 M2
EOSINOPHIL NFR BRONCH MANUAL: 0 %
GLUCOSE BLD STRIP.AUTO-MCNC: 169 MG/DL (ref 70–110)
GLUCOSE BLD STRIP.AUTO-MCNC: 182 MG/DL (ref 70–110)
GLUCOSE BLD STRIP.AUTO-MCNC: 185 MG/DL (ref 70–110)
GLUCOSE BLD STRIP.AUTO-MCNC: 239 MG/DL (ref 70–110)
GLUCOSE BLD STRIP.AUTO-MCNC: 247 MG/DL (ref 70–110)
GLUCOSE BLD STRIP.AUTO-MCNC: 263 MG/DL (ref 70–110)
GLUCOSE BLD STRIP.AUTO-MCNC: 321 MG/DL (ref 70–110)
GLUCOSE SERPL-MCNC: 177 MG/DL (ref 74–99)
LYMPHOCYTES NFR BRONCH MANUAL: 0 %
MACROPHAGES NFR BRONCH MANUAL: 14 %
NEUTROPHILS NFR BRONCH MANUAL: 86 %
PHOSPHATE SERPL-MCNC: 3.7 MG/DL (ref 2.5–4.9)
POTASSIUM SERPL-SCNC: 4.6 MMOL/L (ref 3.5–5.5)
SODIUM SERPL-SCNC: 142 MMOL/L (ref 136–145)

## 2025-03-24 PROCEDURE — 6370000000 HC RX 637 (ALT 250 FOR IP): Performed by: INTERNAL MEDICINE

## 2025-03-24 PROCEDURE — 2580000003 HC RX 258: Performed by: INTERNAL MEDICINE

## 2025-03-24 PROCEDURE — 0DH63UZ INSERTION OF FEEDING DEVICE INTO STOMACH, PERCUTANEOUS APPROACH: ICD-10-PCS | Performed by: RADIOLOGY

## 2025-03-24 PROCEDURE — 6360000002 HC RX W HCPCS: Performed by: INTERNAL MEDICINE

## 2025-03-24 PROCEDURE — 94003 VENT MGMT INPAT SUBQ DAY: CPT

## 2025-03-24 PROCEDURE — 02HV33Z INSERTION OF INFUSION DEVICE INTO SUPERIOR VENA CAVA, PERCUTANEOUS APPROACH: ICD-10-PCS | Performed by: HOSPITALIST

## 2025-03-24 PROCEDURE — 2500000003 HC RX 250 WO HCPCS: Performed by: INTERNAL MEDICINE

## 2025-03-24 PROCEDURE — P9047 ALBUMIN (HUMAN), 25%, 50ML: HCPCS | Performed by: INTERNAL MEDICINE

## 2025-03-24 PROCEDURE — 94640 AIRWAY INHALATION TREATMENT: CPT

## 2025-03-24 PROCEDURE — 87102 FUNGUS ISOLATION CULTURE: CPT

## 2025-03-24 PROCEDURE — 6360000002 HC RX W HCPCS: Performed by: RADIOLOGY

## 2025-03-24 PROCEDURE — 6370000000 HC RX 637 (ALT 250 FOR IP): Performed by: HOSPITALIST

## 2025-03-24 PROCEDURE — 71045 X-RAY EXAM CHEST 1 VIEW: CPT

## 2025-03-24 PROCEDURE — 87070 CULTURE OTHR SPECIMN AEROBIC: CPT

## 2025-03-24 PROCEDURE — 2709999900 HC NON-CHARGEABLE SUPPLY: Performed by: INTERNAL MEDICINE

## 2025-03-24 PROCEDURE — 2500000003 HC RX 250 WO HCPCS: Performed by: HOSPITALIST

## 2025-03-24 PROCEDURE — 2000000000 HC ICU R&B

## 2025-03-24 PROCEDURE — 0B9F8ZX DRAINAGE OF RIGHT LOWER LUNG LOBE, VIA NATURAL OR ARTIFICIAL OPENING ENDOSCOPIC, DIAGNOSTIC: ICD-10-PCS | Performed by: INTERNAL MEDICINE

## 2025-03-24 PROCEDURE — 3600007512: Performed by: INTERNAL MEDICINE

## 2025-03-24 PROCEDURE — 87305 ASPERGILLUS AG IA: CPT

## 2025-03-24 PROCEDURE — 87205 SMEAR GRAM STAIN: CPT

## 2025-03-24 PROCEDURE — 82962 GLUCOSE BLOOD TEST: CPT

## 2025-03-24 PROCEDURE — 2709999900 IR FLUORO GUIDED GASTROSTOMY TUBE INSERTION PERC W CONTRAST

## 2025-03-24 PROCEDURE — 87116 MYCOBACTERIA CULTURE: CPT

## 2025-03-24 PROCEDURE — 89051 BODY FLUID CELL COUNT: CPT

## 2025-03-24 PROCEDURE — 0B9D8ZX DRAINAGE OF RIGHT MIDDLE LUNG LOBE, VIA NATURAL OR ARTIFICIAL OPENING ENDOSCOPIC, DIAGNOSTIC: ICD-10-PCS | Performed by: INTERNAL MEDICINE

## 2025-03-24 PROCEDURE — C1751 CATH, INF, PER/CENT/MIDLINE: HCPCS

## 2025-03-24 PROCEDURE — 99231 SBSQ HOSP IP/OBS SF/LOW 25: CPT

## 2025-03-24 PROCEDURE — 3600007502: Performed by: INTERNAL MEDICINE

## 2025-03-24 PROCEDURE — 87798 DETECT AGENT NOS DNA AMP: CPT

## 2025-03-24 PROCEDURE — 87206 SMEAR FLUORESCENT/ACID STAI: CPT

## 2025-03-24 PROCEDURE — 6360000004 HC RX CONTRAST MEDICATION: Performed by: RADIOLOGY

## 2025-03-24 PROCEDURE — 80069 RENAL FUNCTION PANEL: CPT

## 2025-03-24 PROCEDURE — 87252 VIRUS INOCULATION TISSUE: CPT

## 2025-03-24 RX ORDER — ALBUMIN (HUMAN) 12.5 G/50ML
25 SOLUTION INTRAVENOUS EVERY 8 HOURS
Status: COMPLETED | OUTPATIENT
Start: 2025-03-24 | End: 2025-03-26

## 2025-03-24 RX ORDER — HEPARIN SODIUM 200 [USP'U]/100ML
1000 INJECTION, SOLUTION INTRAVENOUS ONCE
Status: COMPLETED | OUTPATIENT
Start: 2025-03-24 | End: 2025-03-24

## 2025-03-24 RX ORDER — NOREPINEPHRINE BITARTRATE 0.06 MG/ML
1-100 INJECTION, SOLUTION INTRAVENOUS CONTINUOUS
Status: DISCONTINUED | OUTPATIENT
Start: 2025-03-24 | End: 2025-03-31

## 2025-03-24 RX ORDER — MIDAZOLAM HYDROCHLORIDE 2 MG/2ML
2 INJECTION, SOLUTION INTRAMUSCULAR; INTRAVENOUS ONCE
Status: COMPLETED | OUTPATIENT
Start: 2025-03-24 | End: 2025-03-24

## 2025-03-24 RX ORDER — SODIUM CHLORIDE 9 MG/ML
INJECTION, SOLUTION INTRAVENOUS ONCE
Status: DISCONTINUED | OUTPATIENT
Start: 2025-03-24 | End: 2025-04-01 | Stop reason: HOSPADM

## 2025-03-24 RX ORDER — PREDNISONE 5 MG/1
5 TABLET ORAL DAILY
Status: DISCONTINUED | OUTPATIENT
Start: 2025-03-24 | End: 2025-04-01 | Stop reason: HOSPADM

## 2025-03-24 RX ORDER — CISATRACURIUM BESYLATE 2 MG/ML
10 INJECTION, SOLUTION INTRAVENOUS
Status: DISCONTINUED | OUTPATIENT
Start: 2025-03-24 | End: 2025-03-24

## 2025-03-24 RX ORDER — SODIUM BICARBONATE 650 MG/1
1300 TABLET ORAL 2 TIMES DAILY
Status: DISCONTINUED | OUTPATIENT
Start: 2025-03-24 | End: 2025-03-25

## 2025-03-24 RX ORDER — CISATRACURIUM BESYLATE 2 MG/ML
0.15 INJECTION, SOLUTION INTRAVENOUS ONCE
Status: DISCONTINUED | OUTPATIENT
Start: 2025-03-24 | End: 2025-03-24

## 2025-03-24 RX ORDER — IOPAMIDOL 612 MG/ML
INJECTION, SOLUTION INTRAVASCULAR PRN
Status: COMPLETED | OUTPATIENT
Start: 2025-03-24 | End: 2025-03-24

## 2025-03-24 RX ORDER — LIDOCAINE HYDROCHLORIDE 10 MG/ML
INJECTION, SOLUTION EPIDURAL; INFILTRATION; INTRACAUDAL; PERINEURAL PRN
Status: COMPLETED | OUTPATIENT
Start: 2025-03-24 | End: 2025-03-24

## 2025-03-24 RX ORDER — IOPAMIDOL 612 MG/ML
50 INJECTION, SOLUTION INTRAVASCULAR
Status: COMPLETED | OUTPATIENT
Start: 2025-03-24 | End: 2025-03-26

## 2025-03-24 RX ORDER — HEPARIN SODIUM (PORCINE) LOCK FLUSH IV SOLN 100 UNIT/ML 100 UNIT/ML
500 SOLUTION INTRAVENOUS EVERY 8 HOURS PRN
Status: DISCONTINUED | OUTPATIENT
Start: 2025-03-24 | End: 2025-04-01 | Stop reason: HOSPADM

## 2025-03-24 RX ORDER — MIDAZOLAM HYDROCHLORIDE 1 MG/ML
1-10 INJECTION, SOLUTION INTRAVENOUS CONTINUOUS
Status: DISCONTINUED | OUTPATIENT
Start: 2025-03-24 | End: 2025-03-25

## 2025-03-24 RX ORDER — LIDOCAINE HYDROCHLORIDE 10 MG/ML
5 INJECTION, SOLUTION EPIDURAL; INFILTRATION; INTRACAUDAL; PERINEURAL
Status: COMPLETED | OUTPATIENT
Start: 2025-03-24 | End: 2025-03-24

## 2025-03-24 RX ORDER — MYCOPHENOLIC ACID 180 MG/1
180 TABLET, DELAYED RELEASE ORAL 2 TIMES DAILY
Status: DISCONTINUED | OUTPATIENT
Start: 2025-03-24 | End: 2025-03-25

## 2025-03-24 RX ORDER — LIDOCAINE HYDROCHLORIDE 10 MG/ML
30 INJECTION, SOLUTION EPIDURAL; INFILTRATION; INTRACAUDAL; PERINEURAL
Status: DISCONTINUED | OUTPATIENT
Start: 2025-03-24 | End: 2025-04-01 | Stop reason: HOSPADM

## 2025-03-24 RX ORDER — WATER 10 ML/10ML
10 INJECTION INTRAMUSCULAR; INTRAVENOUS; SUBCUTANEOUS
Status: DISCONTINUED | OUTPATIENT
Start: 2025-03-24 | End: 2025-04-01 | Stop reason: HOSPADM

## 2025-03-24 RX ADMIN — ALBUMIN (HUMAN) 25 G: 0.25 INJECTION, SOLUTION INTRAVENOUS at 12:10

## 2025-03-24 RX ADMIN — INSULIN LISPRO 4 UNITS: 100 INJECTION, SOLUTION INTRAVENOUS; SUBCUTANEOUS at 17:35

## 2025-03-24 RX ADMIN — HEPARIN SODIUM 5000 UNITS: 5000 INJECTION INTRAVENOUS; SUBCUTANEOUS at 22:10

## 2025-03-24 RX ADMIN — AMIODARONE HYDROCHLORIDE 200 MG: 200 TABLET ORAL at 09:02

## 2025-03-24 RX ADMIN — SODIUM CHLORIDE, PRESERVATIVE FREE 40 MG: 5 INJECTION INTRAVENOUS at 08:57

## 2025-03-24 RX ADMIN — PREDNISONE 5 MG: 5 TABLET ORAL at 10:42

## 2025-03-24 RX ADMIN — HEPARIN SODIUM 1000 UNITS: 200 INJECTION, SOLUTION INTRAVENOUS at 13:03

## 2025-03-24 RX ADMIN — CEFAZOLIN 2000 MG: 10 INJECTION, POWDER, FOR SOLUTION INTRAVENOUS at 17:35

## 2025-03-24 RX ADMIN — LIDOCAINE HYDROCHLORIDE 10 ML: 10 INJECTION, SOLUTION EPIDURAL; INFILTRATION; INTRACAUDAL; PERINEURAL at 13:54

## 2025-03-24 RX ADMIN — INSULIN LISPRO 8 UNITS: 100 INJECTION, SOLUTION INTRAVENOUS; SUBCUTANEOUS at 20:59

## 2025-03-24 RX ADMIN — CISATRACURIUM BESYLATE 10 MG: 2 INJECTION, SOLUTION INTRAVENOUS at 12:27

## 2025-03-24 RX ADMIN — CEFAZOLIN 2000 MG: 10 INJECTION, POWDER, FOR SOLUTION INTRAVENOUS at 02:00

## 2025-03-24 RX ADMIN — ALBUTEROL SULFATE 2.5 MG: 2.5 SOLUTION RESPIRATORY (INHALATION) at 02:09

## 2025-03-24 RX ADMIN — MIDAZOLAM 2 MG/HR: 5 INJECTION INTRAMUSCULAR; INTRAVENOUS at 12:19

## 2025-03-24 RX ADMIN — MIDODRINE HYDROCHLORIDE 5 MG: 2.5 TABLET ORAL at 09:03

## 2025-03-24 RX ADMIN — ALBUMIN (HUMAN) 25 G: 0.25 INJECTION, SOLUTION INTRAVENOUS at 20:49

## 2025-03-24 RX ADMIN — SODIUM CHLORIDE, PRESERVATIVE FREE 10 ML: 5 INJECTION INTRAVENOUS at 08:57

## 2025-03-24 RX ADMIN — CEFAZOLIN 2000 MG: 10 INJECTION, POWDER, FOR SOLUTION INTRAVENOUS at 09:58

## 2025-03-24 RX ADMIN — MIDODRINE HYDROCHLORIDE 5 MG: 2.5 TABLET ORAL at 16:05

## 2025-03-24 RX ADMIN — CHLORHEXIDINE GLUCONATE, 0.12% ORAL RINSE 15 ML: 1.2 SOLUTION DENTAL at 08:56

## 2025-03-24 RX ADMIN — MICAFUNGIN SODIUM 100 MG: 100 INJECTION, POWDER, LYOPHILIZED, FOR SOLUTION INTRAVENOUS at 15:57

## 2025-03-24 RX ADMIN — SODIUM BICARBONATE 650 MG TABLET 1300 MG: at 20:49

## 2025-03-24 RX ADMIN — MIDAZOLAM HYDROCHLORIDE 2 MG: 1 INJECTION, SOLUTION INTRAMUSCULAR; INTRAVENOUS at 12:10

## 2025-03-24 RX ADMIN — FENTANYL CITRATE 25 MCG: 50 INJECTION INTRAMUSCULAR; INTRAVENOUS at 07:49

## 2025-03-24 RX ADMIN — Medication 500 UNITS: at 13:03

## 2025-03-24 RX ADMIN — IOPAMIDOL 30 ML: 612 INJECTION, SOLUTION INTRAVENOUS at 13:54

## 2025-03-24 RX ADMIN — SODIUM CHLORIDE, PRESERVATIVE FREE 10 ML: 5 INJECTION INTRAVENOUS at 20:50

## 2025-03-24 RX ADMIN — CHLORHEXIDINE GLUCONATE, 0.12% ORAL RINSE 15 ML: 1.2 SOLUTION DENTAL at 20:49

## 2025-03-24 RX ADMIN — WATER 40 MG: 1 INJECTION INTRAMUSCULAR; INTRAVENOUS; SUBCUTANEOUS at 04:10

## 2025-03-24 RX ADMIN — SODIUM BICARBONATE 650 MG TABLET 1300 MG: at 10:42

## 2025-03-24 RX ADMIN — CISATRACURIUM BESYLATE 10 MG: 2 INJECTION, SOLUTION INTRAVENOUS at 13:23

## 2025-03-24 RX ADMIN — LIDOCAINE HYDROCHLORIDE 5 ML: 10 INJECTION, SOLUTION EPIDURAL; INFILTRATION; INTRACAUDAL; PERINEURAL at 13:03

## 2025-03-24 ASSESSMENT — PULMONARY FUNCTION TESTS
PIF_VALUE: 12
PIF_VALUE: 15
PIF_VALUE: 18
PIF_VALUE: 11
PIF_VALUE: 14
PIF_VALUE: 16
PIF_VALUE: 14

## 2025-03-24 ASSESSMENT — PAIN - FUNCTIONAL ASSESSMENT
PAIN_FUNCTIONAL_ASSESSMENT: ADULT NONVERBAL PAIN SCALE (NPVS)

## 2025-03-24 ASSESSMENT — PAIN SCALES - GENERAL
PAINLEVEL_OUTOF10: 0

## 2025-03-24 NOTE — OR NURSING
Pt was educated to procedure and sedation. Pt NPO. Pt does not take anticoagulation. Pt is sedated and on the ventilator.

## 2025-03-24 NOTE — H&P
ICU    Chart reviewed.  Discussed with Dr. Patrick in ID-due to positive Fungitell test, ID has discussed with transplant ID team, and have recommended bronchoscopy/BAL.    PLAN  Planned for flexible bronchoscopy at bedside; patient is already on ventilator support, and sedated for PEG tube placement today; planning for BAL; no increased risk for bleeding or pneumothorax anticipated; patient will be on 100% FiO2 on ventilator support with telemetry monitoring; sedation-midazolam infusion.  Called and discussed with sister-in-law Yaritza Mireles, and reviewed procedure in detail; verbal consent obtained [patient's father recovering from COVID infection, and not immediately available].    Lloyd Montero MD

## 2025-03-24 NOTE — CARE COORDINATION
EDWIN spoke to Kenya [phone] with [facility] who stated that pt has to have 3 days of SBTs prior to auth being started. Kenya reported that once she receives SBTs from today and tomorrow, she can start the auth as early as tomorrow afternoon. EDWIN informed the Charge RN who will update RT.      EDWIN placed call to pts father Roldan Sr however, received no answer. EDWIN then placed a call to Yaritza who is in agreement with the plan. EDWIN informed her that this writer has to speak with pts father to confirm. Per Yaritza, pts father is in a SNF and she will return call to this writer with him on 3-way once someone is there to assist  him with the phone.    Pts Trach was placed on yesterday and PEG will be placed this afternoon. EDWIN will continue to Follow.    Marie Campos, JEYSON  Case Management Department

## 2025-03-24 NOTE — PROCEDURES
[facility]  Pulmonary, Critical Care, and Sleep Medicine    Name: Angelo Mireles MRN: [id number]   : 1970 Hospital: [facility]   Date: 3/24/2025          Diagnostic Bronchoscopy, Bronchio-Alveolar Lavage (BAL)      Pre-procedure diagnosis  1.  Bilateral pulmonary infiltrates   2.  Immunosuppressed state  3.  Fungitell test positive      Post procedure diagnosis  Same    Procedures  1. Diagnostic Bronchoscopy  2. Bronchio-Alveolar Lavage (BAL) from right lung [right middle lobe, right lower lobe airways]     Consent/Treatment: Informed consent was obtained from the  patient sister-in-law Yaritza Mireles after risks, benefits and alternatives were explained. Timeout verified the correct patient and correct procedure.   Endo team and RT present for procedure.    Anesthesia:   Patient on ventilator support  Sedation-midazolam infusion  FiO2-100%    Procedure Details:     The flexible bronchoscope was passed through the oral adapter.  The scope was passed through the tracheostomy tube into the distal trachea.     Airways surveillance:   -- The distal trachea was normal, main mavis was normal.   -- The right-sided endobronchial anatomy was completely inspected and were found to be normal.   -- The left-sided endobronchial anatomy was completely inspected and were found to be normal.   -- No bleeding or endobronchial masses or nodules seen.     Specimens / Further Procedures:   Bronchio-Alveolar Lavage (BAL):  The bronchoscope was wedged in the Right middle lobe, and Right lower lobe segments and bronchoalveolar lavage was performed with 20 cc aliquots sequentially; samples were collected, and sent for aerobic culture, AFB culture, Fungus culture, Viral culture, pneumocystis, galactomannan, and cytology.  Two lavage samples collected-1 for microbiology, and smaller collection for cytology.    Complications: none  No bleeding noted post procedure.      Estimated Blood Loss:

## 2025-03-25 PROBLEM — E05.90 HYPERTHYROIDISM: Status: ACTIVE | Noted: 2025-03-25

## 2025-03-25 LAB
ALBUMIN SERPL-MCNC: 2.9 G/DL (ref 3.4–5)
ANION GAP SERPL CALC-SCNC: 8 MMOL/L (ref 3–18)
BUN SERPL-MCNC: 35 MG/DL (ref 7–18)
BUN/CREAT SERPL: 34 (ref 12–20)
CALCIUM SERPL-MCNC: 9.2 MG/DL (ref 8.5–10.1)
CHLORIDE SERPL-SCNC: 120 MMOL/L (ref 100–111)
CO2 SERPL-SCNC: 21 MMOL/L (ref 21–32)
CREAT SERPL-MCNC: 1.02 MG/DL (ref 0.6–1.3)
CRYPTOC AG SER QL IA: NEGATIVE
CYTOLOGY-NON GYN: NORMAL
GLUCOSE BLD STRIP.AUTO-MCNC: 139 MG/DL (ref 70–110)
GLUCOSE BLD STRIP.AUTO-MCNC: 152 MG/DL (ref 70–110)
GLUCOSE BLD STRIP.AUTO-MCNC: 175 MG/DL (ref 70–110)
GLUCOSE BLD STRIP.AUTO-MCNC: 98 MG/DL (ref 70–110)
GLUCOSE SERPL-MCNC: 133 MG/DL (ref 74–99)
MAGNESIUM SERPL-MCNC: 2.2 MG/DL (ref 1.6–2.6)
PHOSPHATE SERPL-MCNC: 2.8 MG/DL (ref 2.5–4.9)
POTASSIUM SERPL-SCNC: 4.2 MMOL/L (ref 3.5–5.5)
SODIUM SERPL-SCNC: 149 MMOL/L (ref 136–145)
T4 FREE SERPL-MCNC: 2.8 NG/DL (ref 0.7–1.5)
TSH SERPL DL<=0.05 MIU/L-ACNC: 0.2 UIU/ML (ref 0.36–3.74)

## 2025-03-25 PROCEDURE — 6370000000 HC RX 637 (ALT 250 FOR IP): Performed by: INTERNAL MEDICINE

## 2025-03-25 PROCEDURE — 2000000000 HC ICU R&B

## 2025-03-25 PROCEDURE — 2500000003 HC RX 250 WO HCPCS: Performed by: HOSPITALIST

## 2025-03-25 PROCEDURE — 80069 RENAL FUNCTION PANEL: CPT

## 2025-03-25 PROCEDURE — 87327 CRYPTOCOCCUS NEOFORM AG IA: CPT

## 2025-03-25 PROCEDURE — 6360000002 HC RX W HCPCS: Performed by: INTERNAL MEDICINE

## 2025-03-25 PROCEDURE — 2580000003 HC RX 258: Performed by: INTERNAL MEDICINE

## 2025-03-25 PROCEDURE — 2700000000 HC OXYGEN THERAPY PER DAY

## 2025-03-25 PROCEDURE — 94003 VENT MGMT INPAT SUBQ DAY: CPT

## 2025-03-25 PROCEDURE — P9047 ALBUMIN (HUMAN), 25%, 50ML: HCPCS | Performed by: INTERNAL MEDICINE

## 2025-03-25 PROCEDURE — 84439 ASSAY OF FREE THYROXINE: CPT

## 2025-03-25 PROCEDURE — 2500000003 HC RX 250 WO HCPCS: Performed by: INTERNAL MEDICINE

## 2025-03-25 PROCEDURE — 84443 ASSAY THYROID STIM HORMONE: CPT

## 2025-03-25 PROCEDURE — 88112 CYTOPATH CELL ENHANCE TECH: CPT

## 2025-03-25 PROCEDURE — 82962 GLUCOSE BLOOD TEST: CPT

## 2025-03-25 PROCEDURE — 88305 TISSUE EXAM BY PATHOLOGIST: CPT

## 2025-03-25 PROCEDURE — 83735 ASSAY OF MAGNESIUM: CPT

## 2025-03-25 PROCEDURE — 88312 SPECIAL STAINS GROUP 1: CPT

## 2025-03-25 PROCEDURE — 99232 SBSQ HOSP IP/OBS MODERATE 35: CPT | Performed by: INTERNAL MEDICINE

## 2025-03-25 RX ORDER — SODIUM BICARBONATE 650 MG/1
650 TABLET ORAL 3 TIMES DAILY
Status: DISCONTINUED | OUTPATIENT
Start: 2025-03-25 | End: 2025-04-01 | Stop reason: HOSPADM

## 2025-03-25 RX ORDER — DEXTROSE MONOHYDRATE 50 MG/ML
INJECTION, SOLUTION INTRAVENOUS CONTINUOUS
Status: DISCONTINUED | OUTPATIENT
Start: 2025-03-25 | End: 2025-03-25

## 2025-03-25 RX ORDER — METHIMAZOLE 5 MG/1
10 TABLET ORAL DAILY
Status: DISCONTINUED | OUTPATIENT
Start: 2025-03-25 | End: 2025-04-01 | Stop reason: HOSPADM

## 2025-03-25 RX ADMIN — SODIUM CHLORIDE, PRESERVATIVE FREE 40 MG: 5 INJECTION INTRAVENOUS at 08:31

## 2025-03-25 RX ADMIN — SODIUM CHLORIDE, PRESERVATIVE FREE 10 ML: 5 INJECTION INTRAVENOUS at 08:31

## 2025-03-25 RX ADMIN — SODIUM CHLORIDE, PRESERVATIVE FREE 10 ML: 5 INJECTION INTRAVENOUS at 21:26

## 2025-03-25 RX ADMIN — PREDNISONE 5 MG: 5 TABLET ORAL at 08:32

## 2025-03-25 RX ADMIN — CHLORHEXIDINE GLUCONATE, 0.12% ORAL RINSE 15 ML: 1.2 SOLUTION DENTAL at 20:59

## 2025-03-25 RX ADMIN — MYCOPHENOLATE MOFETIL 1000 MG: 500 INJECTION, POWDER, LYOPHILIZED, FOR SOLUTION INTRAVENOUS at 21:36

## 2025-03-25 RX ADMIN — AMIODARONE HYDROCHLORIDE 200 MG: 200 TABLET ORAL at 08:33

## 2025-03-25 RX ADMIN — CEFAZOLIN 2000 MG: 10 INJECTION, POWDER, FOR SOLUTION INTRAVENOUS at 16:46

## 2025-03-25 RX ADMIN — SODIUM BICARBONATE 650 MG TABLET 650 MG: at 21:27

## 2025-03-25 RX ADMIN — ALBUMIN (HUMAN) 25 G: 0.25 INJECTION, SOLUTION INTRAVENOUS at 21:25

## 2025-03-25 RX ADMIN — METHIMAZOLE 10 MG: 5 TABLET ORAL at 14:38

## 2025-03-25 RX ADMIN — NOREPINEPHRINE BITARTRATE 11 MCG/MIN: 64 SOLUTION INTRAVENOUS at 02:53

## 2025-03-25 RX ADMIN — SODIUM BICARBONATE 650 MG TABLET 650 MG: at 13:35

## 2025-03-25 RX ADMIN — MIDODRINE HYDROCHLORIDE 5 MG: 2.5 TABLET ORAL at 16:46

## 2025-03-25 RX ADMIN — SODIUM BICARBONATE 650 MG TABLET 1300 MG: at 08:35

## 2025-03-25 RX ADMIN — HEPARIN SODIUM 5000 UNITS: 5000 INJECTION INTRAVENOUS; SUBCUTANEOUS at 06:07

## 2025-03-25 RX ADMIN — MICAFUNGIN SODIUM 100 MG: 100 INJECTION, POWDER, LYOPHILIZED, FOR SOLUTION INTRAVENOUS at 15:55

## 2025-03-25 RX ADMIN — ALBUMIN (HUMAN) 25 G: 0.25 INJECTION, SOLUTION INTRAVENOUS at 11:37

## 2025-03-25 RX ADMIN — CHLORHEXIDINE GLUCONATE, 0.12% ORAL RINSE 15 ML: 1.2 SOLUTION DENTAL at 08:32

## 2025-03-25 RX ADMIN — MIDODRINE HYDROCHLORIDE 5 MG: 2.5 TABLET ORAL at 11:33

## 2025-03-25 RX ADMIN — MIDODRINE HYDROCHLORIDE 5 MG: 2.5 TABLET ORAL at 08:32

## 2025-03-25 RX ADMIN — CEFAZOLIN 2000 MG: 10 INJECTION, POWDER, FOR SOLUTION INTRAVENOUS at 09:00

## 2025-03-25 RX ADMIN — ALBUMIN (HUMAN) 25 G: 0.25 INJECTION, SOLUTION INTRAVENOUS at 03:19

## 2025-03-25 RX ADMIN — HEPARIN SODIUM 5000 UNITS: 5000 INJECTION INTRAVENOUS; SUBCUTANEOUS at 21:38

## 2025-03-25 RX ADMIN — CEFAZOLIN 2000 MG: 10 INJECTION, POWDER, FOR SOLUTION INTRAVENOUS at 02:06

## 2025-03-25 RX ADMIN — HEPARIN SODIUM 5000 UNITS: 5000 INJECTION INTRAVENOUS; SUBCUTANEOUS at 13:35

## 2025-03-25 ASSESSMENT — PULMONARY FUNCTION TESTS
PIF_VALUE: 16
PIF_VALUE: 13
PIF_VALUE: 18
PIF_VALUE: 14
PIF_VALUE: 14

## 2025-03-25 ASSESSMENT — PAIN SCALES - GENERAL
PAINLEVEL_OUTOF10: 0
PAINLEVEL_OUTOF10: 1
PAINLEVEL_OUTOF10: 0
PAINLEVEL_OUTOF10: 0

## 2025-03-25 NOTE — WOUND CARE
ICU Rounding  Wound care nurse rounded on patient for skin issues.  Patient has a Aditya score of 10.  Does patient have any pressure injury?no per visual inspection with primary nurse  MARGO Kimbrough.  Areas of discoloration on back has resolved.  There is a purple blanchable area with a well adhered scab to his right great toe.       Skin Care & Pressure Relief Recommendations  Minimize layers of linen  Pads under patient to optimize support surface  Turn/reposition approximately every 2 hours  Use pillow wedges to maintain positioning but do not put pillow directly over wounds  Manage incontinence   Promote continence; Skin Protective lotion/cream to buttocks and sacrum daily and as needed with incontinence care  Offload heels pillows    Consult wound care if any wounds noted during admission.  Wound Care nurse will continue to follow during ICU admission.

## 2025-03-25 NOTE — ACP (ADVANCE CARE PLANNING)
Advance Care Planning       Palliative Care Follow-Up        Date of Follow-Up: 03/25/25      Conversation Summary:      The Palliative Care team visited with patient today to offer support. Patient has been trached and pegged and is now waiting for LTACH placement. The Palliative Care team then phoned patient's ex-daughter-in-law (Yaritza) to determine if family have made a final decision regarding code status/goals of care. A voicemail was left for a return call. At this time, patient will remain a FULL CODE WITH FULL INTERVENTIONS.    Addendum: Please do not hold up a discharge for a Palliative Care consult. If patient is medically stable for discharge and a bed is available; discharge accordingly.      Kwesi Weiss Jr., MSW  Palliative Care   Ph#[phone]

## 2025-03-25 NOTE — CARE COORDINATION
3rd SBT will take place tomorrow morning, auth will then be started per Ashley with [facility].    JEYSON Castro  Case Management Department

## 2025-03-26 ENCOUNTER — APPOINTMENT (OUTPATIENT)
Facility: HOSPITAL | Age: 55
DRG: 004 | End: 2025-03-26
Payer: MEDICARE

## 2025-03-26 LAB
ABO + RH BLD: NORMAL
ACID FAST STN SPEC: NEGATIVE
ALBUMIN SERPL-MCNC: 2.8 G/DL (ref 3.4–5)
ALBUMIN SERPL-MCNC: 2.8 G/DL (ref 3.4–5)
ALBUMIN/GLOB SERPL: 1.6 (ref 0.8–1.7)
ALP SERPL-CCNC: 85 U/L (ref 45–117)
ALT SERPL-CCNC: 19 U/L (ref 16–61)
ANION GAP SERPL CALC-SCNC: 13 MMOL/L (ref 3–18)
APTT PPP: 35.1 SEC (ref 23–36.4)
AST SERPL-CCNC: 38 U/L (ref 10–38)
BASOPHILS # BLD: 0 K/UL (ref 0–0.1)
BASOPHILS # BLD: 0.01 K/UL (ref 0–0.1)
BASOPHILS NFR BLD: 0 % (ref 0–2)
BASOPHILS NFR BLD: 0.2 % (ref 0–2)
BILIRUB DIRECT SERPL-MCNC: 1.1 MG/DL (ref 0–0.2)
BILIRUB SERPL-MCNC: 2.2 MG/DL (ref 0.2–1)
BLOOD GROUP ANTIBODIES SERPL: NORMAL
BUN SERPL-MCNC: 27 MG/DL (ref 7–18)
BUN/CREAT SERPL: 30 (ref 12–20)
CALCIUM SERPL-MCNC: 8.9 MG/DL (ref 8.5–10.1)
CHLORIDE SERPL-SCNC: 112 MMOL/L (ref 100–111)
CO2 SERPL-SCNC: 17 MMOL/L (ref 21–32)
CREAT SERPL-MCNC: 0.9 MG/DL (ref 0.6–1.3)
DIFFERENTIAL METHOD BLD: ABNORMAL
DIFFERENTIAL METHOD BLD: ABNORMAL
EOSINOPHIL # BLD: 0 K/UL (ref 0–0.4)
EOSINOPHIL # BLD: 0 K/UL (ref 0–0.4)
EOSINOPHIL NFR BLD: 0 % (ref 0–5)
EOSINOPHIL NFR BLD: 0 % (ref 0–5)
ERYTHROCYTE [DISTWIDTH] IN BLOOD BY AUTOMATED COUNT: 25.1 % (ref 11.6–14.5)
ERYTHROCYTE [DISTWIDTH] IN BLOOD BY AUTOMATED COUNT: 25.3 % (ref 11.6–14.5)
GALACTOMANNAN AG SPEC IA-ACNC: 0.04 INDEX (ref 0–0.49)
GLOBULIN SER CALC-MCNC: 1.8 G/DL (ref 2–4)
GLUCOSE BLD STRIP.AUTO-MCNC: 183 MG/DL (ref 70–110)
GLUCOSE BLD STRIP.AUTO-MCNC: 199 MG/DL (ref 70–110)
GLUCOSE BLD STRIP.AUTO-MCNC: 209 MG/DL (ref 70–110)
GLUCOSE BLD STRIP.AUTO-MCNC: 254 MG/DL (ref 70–110)
GLUCOSE SERPL-MCNC: 250 MG/DL (ref 74–99)
HCT VFR BLD AUTO: 24.8 % (ref 36–48)
HCT VFR BLD AUTO: 27 % (ref 36–48)
HGB BLD-MCNC: 7.9 G/DL (ref 13–16)
HGB BLD-MCNC: 8.5 G/DL (ref 13–16)
IMM GRANULOCYTES # BLD AUTO: 0.07 K/UL (ref 0–0.04)
IMM GRANULOCYTES # BLD AUTO: 0.08 K/UL (ref 0–0.04)
IMM GRANULOCYTES NFR BLD AUTO: 1 % (ref 0–0.5)
IMM GRANULOCYTES NFR BLD AUTO: 1.3 % (ref 0–0.5)
INR PPP: 1.6 (ref 0.9–1.1)
LYMPHOCYTES # BLD: 0.05 K/UL (ref 0.9–3.3)
LYMPHOCYTES # BLD: 0.06 K/UL (ref 0.9–3.3)
LYMPHOCYTES NFR BLD: 0.8 % (ref 21–52)
LYMPHOCYTES NFR BLD: 0.8 % (ref 21–52)
MCH RBC QN AUTO: 31 PG (ref 24–34)
MCH RBC QN AUTO: 31.6 PG (ref 24–34)
MCHC RBC AUTO-ENTMCNC: 31.5 G/DL (ref 31–37)
MCHC RBC AUTO-ENTMCNC: 31.9 G/DL (ref 31–37)
MCV RBC AUTO: 98.5 FL (ref 78–100)
MCV RBC AUTO: 99.2 FL (ref 78–100)
MONOCYTES # BLD: 0.27 K/UL (ref 0.05–1.2)
MONOCYTES # BLD: 0.28 K/UL (ref 0.05–1.2)
MONOCYTES NFR BLD: 3.8 % (ref 3–10)
MONOCYTES NFR BLD: 4.5 % (ref 3–10)
MYCOBACTERIUM SPEC QL CULT: NORMAL
NEUTS SEG # BLD: 5.78 K/UL (ref 1.8–8)
NEUTS SEG # BLD: 6.78 K/UL (ref 1.8–8)
NEUTS SEG NFR BLD: 93.2 % (ref 40–73)
NEUTS SEG NFR BLD: 94.4 % (ref 40–73)
NRBC # BLD: 0.16 K/UL (ref 0–0.01)
NRBC # BLD: 0.23 K/UL (ref 0–0.01)
NRBC BLD-RTO: 2.6 PER 100 WBC
NRBC BLD-RTO: 3.2 PER 100 WBC
P JIROVECII DNA # BAL NAA+PROBE: NEGATIVE COPIES/ML
PHOSPHATE SERPL-MCNC: 2.3 MG/DL (ref 2.5–4.9)
PLATELET # BLD AUTO: 152 K/UL (ref 135–420)
PLATELET # BLD AUTO: 156 K/UL (ref 135–420)
PLATELET COMMENT: ABNORMAL
PMV BLD AUTO: 11.8 FL (ref 9.2–11.8)
PMV BLD AUTO: 12.3 FL (ref 9.2–11.8)
POTASSIUM SERPL-SCNC: 4.1 MMOL/L (ref 3.5–5.5)
PROT SERPL-MCNC: 4.6 G/DL (ref 6.4–8.2)
PROTHROMBIN TIME: 18.8 SEC (ref 11.9–14.9)
RBC # BLD AUTO: 2.5 M/UL (ref 4.35–5.65)
RBC # BLD AUTO: 2.74 M/UL (ref 4.35–5.65)
RBC MORPH BLD: ABNORMAL
SODIUM SERPL-SCNC: 142 MMOL/L (ref 136–145)
SOURCE: NORMAL
SPECIMEN EXP DATE BLD: NORMAL
SPECIMEN PREPARATION: NORMAL
SPECIMEN SOURCE: NORMAL
SPECIMEN SOURCE: NORMAL
WBC # BLD AUTO: 6.2 K/UL (ref 4.6–13.2)
WBC # BLD AUTO: 7.2 K/UL (ref 4.6–13.2)

## 2025-03-26 PROCEDURE — 6360000002 HC RX W HCPCS: Performed by: INTERNAL MEDICINE

## 2025-03-26 PROCEDURE — 6370000000 HC RX 637 (ALT 250 FOR IP): Performed by: INTERNAL MEDICINE

## 2025-03-26 PROCEDURE — 6370000000 HC RX 637 (ALT 250 FOR IP): Performed by: HOSPITALIST

## 2025-03-26 PROCEDURE — 2500000003 HC RX 250 WO HCPCS: Performed by: INTERNAL MEDICINE

## 2025-03-26 PROCEDURE — 86900 BLOOD TYPING SEROLOGIC ABO: CPT

## 2025-03-26 PROCEDURE — 2580000003 HC RX 258: Performed by: INTERNAL MEDICINE

## 2025-03-26 PROCEDURE — 85610 PROTHROMBIN TIME: CPT

## 2025-03-26 PROCEDURE — 80076 HEPATIC FUNCTION PANEL: CPT

## 2025-03-26 PROCEDURE — 94003 VENT MGMT INPAT SUBQ DAY: CPT

## 2025-03-26 PROCEDURE — 85730 THROMBOPLASTIN TIME PARTIAL: CPT

## 2025-03-26 PROCEDURE — 86901 BLOOD TYPING SEROLOGIC RH(D): CPT

## 2025-03-26 PROCEDURE — P9047 ALBUMIN (HUMAN), 25%, 50ML: HCPCS | Performed by: INTERNAL MEDICINE

## 2025-03-26 PROCEDURE — 74177 CT ABD & PELVIS W/CONTRAST: CPT

## 2025-03-26 PROCEDURE — 80069 RENAL FUNCTION PANEL: CPT

## 2025-03-26 PROCEDURE — 2500000003 HC RX 250 WO HCPCS: Performed by: HOSPITALIST

## 2025-03-26 PROCEDURE — 6360000004 HC RX CONTRAST MEDICATION: Performed by: INTERNAL MEDICINE

## 2025-03-26 PROCEDURE — 82962 GLUCOSE BLOOD TEST: CPT

## 2025-03-26 PROCEDURE — 99231 SBSQ HOSP IP/OBS SF/LOW 25: CPT

## 2025-03-26 PROCEDURE — 2000000000 HC ICU R&B

## 2025-03-26 PROCEDURE — 94640 AIRWAY INHALATION TREATMENT: CPT

## 2025-03-26 PROCEDURE — 85025 COMPLETE CBC W/AUTO DIFF WBC: CPT

## 2025-03-26 PROCEDURE — 86850 RBC ANTIBODY SCREEN: CPT

## 2025-03-26 PROCEDURE — 2700000000 HC OXYGEN THERAPY PER DAY

## 2025-03-26 PROCEDURE — 36415 COLL VENOUS BLD VENIPUNCTURE: CPT

## 2025-03-26 PROCEDURE — 71045 X-RAY EXAM CHEST 1 VIEW: CPT

## 2025-03-26 RX ORDER — INSULIN GLARGINE 100 [IU]/ML
6 INJECTION, SOLUTION SUBCUTANEOUS NIGHTLY
Status: DISCONTINUED | OUTPATIENT
Start: 2025-03-26 | End: 2025-04-01 | Stop reason: HOSPADM

## 2025-03-26 RX ORDER — INSULIN LISPRO 100 [IU]/ML
0-8 INJECTION, SOLUTION INTRAVENOUS; SUBCUTANEOUS
Status: DISCONTINUED | OUTPATIENT
Start: 2025-03-26 | End: 2025-04-01 | Stop reason: HOSPADM

## 2025-03-26 RX ADMIN — INSULIN LISPRO 2 UNITS: 100 INJECTION, SOLUTION INTRAVENOUS; SUBCUTANEOUS at 18:35

## 2025-03-26 RX ADMIN — CEFAZOLIN 2000 MG: 10 INJECTION, POWDER, FOR SOLUTION INTRAVENOUS at 18:39

## 2025-03-26 RX ADMIN — ALBUMIN (HUMAN) 25 G: 0.25 INJECTION, SOLUTION INTRAVENOUS at 06:13

## 2025-03-26 RX ADMIN — ALBUTEROL SULFATE 2.5 MG: 2.5 SOLUTION RESPIRATORY (INHALATION) at 01:44

## 2025-03-26 RX ADMIN — SODIUM BICARBONATE 650 MG TABLET 650 MG: at 07:41

## 2025-03-26 RX ADMIN — MICAFUNGIN SODIUM 100 MG: 100 INJECTION, POWDER, LYOPHILIZED, FOR SOLUTION INTRAVENOUS at 16:37

## 2025-03-26 RX ADMIN — SODIUM BICARBONATE 650 MG TABLET 650 MG: at 21:21

## 2025-03-26 RX ADMIN — HEPARIN SODIUM 5000 UNITS: 5000 INJECTION INTRAVENOUS; SUBCUTANEOUS at 12:33

## 2025-03-26 RX ADMIN — IOPAMIDOL 100 ML: 612 INJECTION, SOLUTION INTRAVENOUS at 16:19

## 2025-03-26 RX ADMIN — METHIMAZOLE 10 MG: 5 TABLET ORAL at 07:40

## 2025-03-26 RX ADMIN — MIDODRINE HYDROCHLORIDE 5 MG: 2.5 TABLET ORAL at 07:40

## 2025-03-26 RX ADMIN — SODIUM BICARBONATE 650 MG TABLET 650 MG: at 12:33

## 2025-03-26 RX ADMIN — INSULIN GLARGINE 6 UNITS: 100 INJECTION, SOLUTION SUBCUTANEOUS at 21:29

## 2025-03-26 RX ADMIN — INSULIN LISPRO 8 UNITS: 100 INJECTION, SOLUTION INTRAVENOUS; SUBCUTANEOUS at 06:57

## 2025-03-26 RX ADMIN — METOPROLOL TARTRATE 2.5 MG: 5 INJECTION INTRAVENOUS at 21:20

## 2025-03-26 RX ADMIN — CHLORHEXIDINE GLUCONATE, 0.12% ORAL RINSE 15 ML: 1.2 SOLUTION DENTAL at 21:21

## 2025-03-26 RX ADMIN — POTASSIUM PHOSPHATE, MONOBASIC POTASSIUM PHOSPHATE, DIBASIC 10 MMOL: 224; 236 INJECTION, SOLUTION, CONCENTRATE INTRAVENOUS at 13:58

## 2025-03-26 RX ADMIN — METOPROLOL TARTRATE 2.5 MG: 5 INJECTION INTRAVENOUS at 08:03

## 2025-03-26 RX ADMIN — CEFAZOLIN 2000 MG: 10 INJECTION, POWDER, FOR SOLUTION INTRAVENOUS at 08:16

## 2025-03-26 RX ADMIN — INSULIN LISPRO 4 UNITS: 100 INJECTION, SOLUTION INTRAVENOUS; SUBCUTANEOUS at 12:33

## 2025-03-26 RX ADMIN — CEFAZOLIN 2000 MG: 10 INJECTION, POWDER, FOR SOLUTION INTRAVENOUS at 00:32

## 2025-03-26 RX ADMIN — INSULIN LISPRO 2 UNITS: 100 INJECTION, SOLUTION INTRAVENOUS; SUBCUTANEOUS at 21:31

## 2025-03-26 RX ADMIN — MYCOPHENOLATE MOFETIL 1000 MG: 500 INJECTION, POWDER, LYOPHILIZED, FOR SOLUTION INTRAVENOUS at 23:35

## 2025-03-26 RX ADMIN — CHLORHEXIDINE GLUCONATE, 0.12% ORAL RINSE 15 ML: 1.2 SOLUTION DENTAL at 08:00

## 2025-03-26 RX ADMIN — AMIODARONE HYDROCHLORIDE 200 MG: 200 TABLET ORAL at 07:41

## 2025-03-26 RX ADMIN — SODIUM CHLORIDE, PRESERVATIVE FREE 10 ML: 5 INJECTION INTRAVENOUS at 07:43

## 2025-03-26 RX ADMIN — MYCOPHENOLATE MOFETIL 1000 MG: 500 INJECTION, POWDER, LYOPHILIZED, FOR SOLUTION INTRAVENOUS at 10:01

## 2025-03-26 RX ADMIN — MIDODRINE HYDROCHLORIDE 5 MG: 2.5 TABLET ORAL at 12:33

## 2025-03-26 RX ADMIN — MIDODRINE HYDROCHLORIDE 5 MG: 2.5 TABLET ORAL at 18:29

## 2025-03-26 RX ADMIN — METOPROLOL TARTRATE 2.5 MG: 5 INJECTION INTRAVENOUS at 14:30

## 2025-03-26 RX ADMIN — HEPARIN SODIUM 5000 UNITS: 5000 INJECTION INTRAVENOUS; SUBCUTANEOUS at 06:06

## 2025-03-26 RX ADMIN — SODIUM CHLORIDE, PRESERVATIVE FREE 40 MG: 5 INJECTION INTRAVENOUS at 07:57

## 2025-03-26 RX ADMIN — SODIUM CHLORIDE, PRESERVATIVE FREE 10 ML: 5 INJECTION INTRAVENOUS at 21:00

## 2025-03-26 RX ADMIN — PREDNISONE 5 MG: 5 TABLET ORAL at 07:40

## 2025-03-26 ASSESSMENT — PAIN SCALES - GENERAL
PAINLEVEL_OUTOF10: 0

## 2025-03-26 ASSESSMENT — PULMONARY FUNCTION TESTS
PIF_VALUE: 16
PIF_VALUE: 14
PIF_VALUE: 14
PIF_VALUE: 21

## 2025-03-26 NOTE — CARE COORDINATION
EDWIN uploaded the 3d SBT to [facility]. EDWIN placed call to Ashley  [phone] to confirm that auth is started however, received no answer. EDWIN left a VM requesting a call back to confirm.     JEYSON Castro  Case Management Department

## 2025-03-26 NOTE — CARE COORDINATION
SW confirmed with [facility] that pts auth was initiated today. SW uploaded MAR to reflect that pt is off of LEVO.    SW will continue to follow up.     JEYSON Castro  Case Management Department

## 2025-03-27 ENCOUNTER — APPOINTMENT (OUTPATIENT)
Facility: HOSPITAL | Age: 55
DRG: 004 | End: 2025-03-27
Payer: MEDICARE

## 2025-03-27 LAB
ALBUMIN SERPL-MCNC: 2.6 G/DL (ref 3.4–5)
ANION GAP SERPL CALC-SCNC: 8 MMOL/L (ref 3–18)
BACTERIA SPEC CULT: NORMAL
BASOPHILS # BLD: 0.01 K/UL (ref 0–0.1)
BASOPHILS NFR BLD: 0.2 % (ref 0–2)
BUN SERPL-MCNC: 23 MG/DL (ref 7–18)
BUN/CREAT SERPL: 32 (ref 12–20)
CALCIUM SERPL-MCNC: 8.8 MG/DL (ref 8.5–10.1)
CHLORIDE SERPL-SCNC: 112 MMOL/L (ref 100–111)
CO2 SERPL-SCNC: 21 MMOL/L (ref 21–32)
CREAT SERPL-MCNC: 0.72 MG/DL (ref 0.6–1.3)
DIFFERENTIAL METHOD BLD: ABNORMAL
EOSINOPHIL # BLD: 0.03 K/UL (ref 0–0.4)
EOSINOPHIL NFR BLD: 0.5 % (ref 0–5)
ERYTHROCYTE [DISTWIDTH] IN BLOOD BY AUTOMATED COUNT: 26 % (ref 11.6–14.5)
FLUAV RNA SPEC QL NAA+PROBE: DETECTED
FLUBV RNA SPEC QL NAA+PROBE: NOT DETECTED
GLUCOSE BLD STRIP.AUTO-MCNC: 191 MG/DL (ref 70–110)
GLUCOSE BLD STRIP.AUTO-MCNC: 200 MG/DL (ref 70–110)
GLUCOSE BLD STRIP.AUTO-MCNC: 209 MG/DL (ref 70–110)
GLUCOSE BLD STRIP.AUTO-MCNC: 237 MG/DL (ref 70–110)
GLUCOSE BLD STRIP.AUTO-MCNC: 273 MG/DL (ref 70–110)
GLUCOSE SERPL-MCNC: 223 MG/DL (ref 74–99)
GRAM STN SPEC: NORMAL
GRAM STN SPEC: NORMAL
HCT VFR BLD AUTO: 27.4 % (ref 36–48)
HGB BLD-MCNC: 8.5 G/DL (ref 13–16)
IMM GRANULOCYTES # BLD AUTO: 0.07 K/UL (ref 0–0.04)
IMM GRANULOCYTES NFR BLD AUTO: 1.3 % (ref 0–0.5)
LYMPHOCYTES # BLD: 0.16 K/UL (ref 0.9–3.3)
LYMPHOCYTES NFR BLD: 2.9 % (ref 21–52)
MCH RBC QN AUTO: 31.1 PG (ref 24–34)
MCHC RBC AUTO-ENTMCNC: 31 G/DL (ref 31–37)
MCV RBC AUTO: 100.4 FL (ref 78–100)
MONOCYTES # BLD: 0.28 K/UL (ref 0.05–1.2)
MONOCYTES NFR BLD: 5 % (ref 3–10)
NEUTS SEG # BLD: 5.05 K/UL (ref 1.8–8)
NEUTS SEG NFR BLD: 90.1 % (ref 40–73)
NRBC # BLD: 0.05 K/UL (ref 0–0.01)
NRBC BLD-RTO: 0.9 PER 100 WBC
PHOSPHATE SERPL-MCNC: 1.4 MG/DL (ref 2.5–4.9)
PLATELET # BLD AUTO: 139 K/UL (ref 135–420)
PMV BLD AUTO: 11.3 FL (ref 9.2–11.8)
POTASSIUM SERPL-SCNC: 4 MMOL/L (ref 3.5–5.5)
RBC # BLD AUTO: 2.73 M/UL (ref 4.35–5.65)
SARS-COV-2 RNA RESP QL NAA+PROBE: NOT DETECTED
SERVICE CMNT-IMP: NORMAL
SODIUM SERPL-SCNC: 141 MMOL/L (ref 136–145)
SOURCE: ABNORMAL
WBC # BLD AUTO: 5.6 K/UL (ref 4.6–13.2)

## 2025-03-27 PROCEDURE — 6370000000 HC RX 637 (ALT 250 FOR IP): Performed by: INTERNAL MEDICINE

## 2025-03-27 PROCEDURE — 87636 SARSCOV2 & INF A&B AMP PRB: CPT

## 2025-03-27 PROCEDURE — 2580000003 HC RX 258: Performed by: INTERNAL MEDICINE

## 2025-03-27 PROCEDURE — 71250 CT THORAX DX C-: CPT

## 2025-03-27 PROCEDURE — 6360000002 HC RX W HCPCS: Performed by: INTERNAL MEDICINE

## 2025-03-27 PROCEDURE — 2500000003 HC RX 250 WO HCPCS: Performed by: HOSPITALIST

## 2025-03-27 PROCEDURE — 94003 VENT MGMT INPAT SUBQ DAY: CPT

## 2025-03-27 PROCEDURE — 2500000003 HC RX 250 WO HCPCS: Performed by: INTERNAL MEDICINE

## 2025-03-27 PROCEDURE — 80069 RENAL FUNCTION PANEL: CPT

## 2025-03-27 PROCEDURE — 97602 WOUND(S) CARE NON-SELECTIVE: CPT

## 2025-03-27 PROCEDURE — 85025 COMPLETE CBC W/AUTO DIFF WBC: CPT

## 2025-03-27 PROCEDURE — 2000000000 HC ICU R&B

## 2025-03-27 PROCEDURE — 82962 GLUCOSE BLOOD TEST: CPT

## 2025-03-27 RX ADMIN — HEPARIN SODIUM 5000 UNITS: 5000 INJECTION INTRAVENOUS; SUBCUTANEOUS at 21:44

## 2025-03-27 RX ADMIN — INSULIN LISPRO 2 UNITS: 100 INJECTION, SOLUTION INTRAVENOUS; SUBCUTANEOUS at 21:45

## 2025-03-27 RX ADMIN — SODIUM BICARBONATE 650 MG TABLET 650 MG: at 15:50

## 2025-03-27 RX ADMIN — METHIMAZOLE 10 MG: 5 TABLET ORAL at 09:23

## 2025-03-27 RX ADMIN — MIDODRINE HYDROCHLORIDE 5 MG: 2.5 TABLET ORAL at 12:55

## 2025-03-27 RX ADMIN — CEFAZOLIN 2000 MG: 10 INJECTION, POWDER, FOR SOLUTION INTRAVENOUS at 02:21

## 2025-03-27 RX ADMIN — INSULIN LISPRO 2 UNITS: 100 INJECTION, SOLUTION INTRAVENOUS; SUBCUTANEOUS at 06:47

## 2025-03-27 RX ADMIN — CEFAZOLIN 2000 MG: 10 INJECTION, POWDER, FOR SOLUTION INTRAVENOUS at 09:23

## 2025-03-27 RX ADMIN — CHLORHEXIDINE GLUCONATE, 0.12% ORAL RINSE 15 ML: 1.2 SOLUTION DENTAL at 21:44

## 2025-03-27 RX ADMIN — CHLORHEXIDINE GLUCONATE, 0.12% ORAL RINSE 15 ML: 1.2 SOLUTION DENTAL at 09:23

## 2025-03-27 RX ADMIN — METOPROLOL TARTRATE 2.5 MG: 5 INJECTION INTRAVENOUS at 03:10

## 2025-03-27 RX ADMIN — MYCOPHENOLATE MOFETIL 1000 MG: 500 INJECTION, POWDER, LYOPHILIZED, FOR SOLUTION INTRAVENOUS at 22:15

## 2025-03-27 RX ADMIN — SODIUM BICARBONATE 650 MG TABLET 650 MG: at 09:23

## 2025-03-27 RX ADMIN — NOREPINEPHRINE BITARTRATE 5 MCG/MIN: 64 SOLUTION INTRAVENOUS at 23:24

## 2025-03-27 RX ADMIN — SODIUM CHLORIDE, PRESERVATIVE FREE 10 ML: 5 INJECTION INTRAVENOUS at 21:46

## 2025-03-27 RX ADMIN — INSULIN LISPRO 2 UNITS: 100 INJECTION, SOLUTION INTRAVENOUS; SUBCUTANEOUS at 12:55

## 2025-03-27 RX ADMIN — AMIODARONE HYDROCHLORIDE 200 MG: 200 TABLET ORAL at 09:23

## 2025-03-27 RX ADMIN — METOPROLOL TARTRATE 2.5 MG: 5 INJECTION INTRAVENOUS at 15:50

## 2025-03-27 RX ADMIN — METOPROLOL TARTRATE 2.5 MG: 5 INJECTION INTRAVENOUS at 09:23

## 2025-03-27 RX ADMIN — METOPROLOL TARTRATE 2.5 MG: 5 INJECTION INTRAVENOUS at 21:45

## 2025-03-27 RX ADMIN — MICAFUNGIN SODIUM 100 MG: 100 INJECTION, POWDER, LYOPHILIZED, FOR SOLUTION INTRAVENOUS at 17:09

## 2025-03-27 RX ADMIN — HEPARIN SODIUM 5000 UNITS: 5000 INJECTION INTRAVENOUS; SUBCUTANEOUS at 14:30

## 2025-03-27 RX ADMIN — PREDNISONE 5 MG: 5 TABLET ORAL at 09:23

## 2025-03-27 RX ADMIN — MIDODRINE HYDROCHLORIDE 5 MG: 2.5 TABLET ORAL at 09:23

## 2025-03-27 RX ADMIN — SODIUM CHLORIDE, PRESERVATIVE FREE 10 ML: 5 INJECTION INTRAVENOUS at 09:24

## 2025-03-27 RX ADMIN — POTASSIUM PHOSPHATE, MONOBASIC POTASSIUM PHOSPHATE, DIBASIC 15 MMOL: 224; 236 INJECTION, SOLUTION, CONCENTRATE INTRAVENOUS at 09:59

## 2025-03-27 RX ADMIN — INSULIN GLARGINE 6 UNITS: 100 INJECTION, SOLUTION SUBCUTANEOUS at 21:45

## 2025-03-27 RX ADMIN — SODIUM CHLORIDE, PRESERVATIVE FREE 40 MG: 5 INJECTION INTRAVENOUS at 09:22

## 2025-03-27 RX ADMIN — MIDODRINE HYDROCHLORIDE 5 MG: 2.5 TABLET ORAL at 17:11

## 2025-03-27 RX ADMIN — MYCOPHENOLATE MOFETIL 1000 MG: 500 INJECTION, POWDER, LYOPHILIZED, FOR SOLUTION INTRAVENOUS at 11:00

## 2025-03-27 RX ADMIN — SODIUM BICARBONATE 650 MG TABLET 650 MG: at 21:46

## 2025-03-27 RX ADMIN — CEFAZOLIN 2000 MG: 10 INJECTION, POWDER, FOR SOLUTION INTRAVENOUS at 17:09

## 2025-03-27 RX ADMIN — INSULIN LISPRO 4 UNITS: 100 INJECTION, SOLUTION INTRAVENOUS; SUBCUTANEOUS at 17:34

## 2025-03-27 ASSESSMENT — PULMONARY FUNCTION TESTS
PIF_VALUE: 18
PIF_VALUE: 18
PIF_VALUE: 17
PIF_VALUE: 18
PIF_VALUE: 12
PIF_VALUE: 18
PIF_VALUE: 17
PIF_VALUE: 13

## 2025-03-27 ASSESSMENT — PAIN SCALES - GENERAL
PAINLEVEL_OUTOF10: 0

## 2025-03-27 NOTE — WOUND CARE
Inpatient Wound Care Note:     Angelo Mireles  MEDICAL RECORD NUMBER:  [id number]  AGE: 54 y.o.   GENDER: male  : 1970  TODAY'S DATE:  3/27/2025    [x] Follow-up visit for assessment of skin failure  [] New consult for   Seen in  with primary nurse and Preceptor  Patient admitted from home    PAST MEDICAL HISTORY    Past Medical History:   Diagnosis Date    Abnormal nuclear cardiac imaging test 10/08/2012    Fixed anteroseptal, anteroapical defect c/w prior infarction.  No ischemia.  Mid & distal anteroseptal, anteroapical WMA.  LVE.  EF 44%.      Anemia     dr. christianson doubt amyloid or multiple myeloma. candidate for procirt if Hg <10    Anoxic brain injury ([facility]) 2025    post cardial arrest    Benign hypertensive cardiomyopathy ([facility])     Blindness of right eye 2013    legally blind    CAD (coronary artery disease)     Cardiomyopathy ([facility])     CKD (chronic kidney disease), stage IV ([facility])     to see Dr. Woodward 12    Congestive heart failure ([facility])     Diabetes mellitus ([facility])     Diabetic retinopathy ([facility])     Dr. Muhammad- injections    Heart failure ([facility])     History of echocardiogram 2014    LVE.  EF 55% (prev 40-45% on echo of 12).  No WMA.  Mild-mod conc LVH.  Gr 3 DDfx.  Marked LAE.  Mild NIGEL.  Mild MR.    Hypertension     Pulmonary hypertension ([facility])     Renal abscess 1/3/12    Right kidney isoechoic w/respect to liver. Sm left-sided pleural effusion    S/P cardiac cath 10/16/2012    LM patent.  pLAD 95% (3.5 x 12-mm Byron stent, resid 0$%).  LCX mild.          PAST SURGICAL HISTORY    Past Surgical History:   Procedure Laterality Date    BRONCHOSCOPY N/A 3/24/2025    BRONCHOSCOPY; LAVAGE; performed by Lloyd Montero MD at [facility] ENDOSCOPY    CARDIAC PROCEDURE N/A 2023    Left heart cath performed by Sunil Myers MD at [facility] CARDIAC CATH LAB    CARDIAC PROCEDURE N/A 2023    Coronary angiography performed by Sunil Myers MD at [facility] CARDIAC CATH LAB    CARDIAC

## 2025-03-27 NOTE — CARE COORDINATION
Pts auth remains in pending status, CM to make ongoing efforts to follow up. EDWIN spoke to pts father Roldan Avendano via phone with Yaritza. Pts father in agreement with the plan and in agreement with DC discussion being held with Yaritza [phone].    Marie Campos, JEYSON  Case Management Department

## 2025-03-27 NOTE — CARE COORDINATION
Pt auth remains in pending status, ongoing efforts to follow up. EDWIN placed call to pts step mother Yara [phone] however, received no answer. EDWIN then placed call to Yaritza [phone] who attempted to 3-way Roldan Keene however, received no answer.     SW will need to hear that pts MPOA pts father Roldan Keene is in agreement with the plan. Yaritza has stated that she has spoken to him and he is in agreement. EDWIN requested that they call this writer back and in the event I can't answer a VM can be left from pts father stating that he is in agreement.    EDWIN updated the palliative team, Charge RN and RN of the plan.    Dispo Plan: [facility] Auth pending insurance auth. Auth started on 3/26      JEYSON Castro  Case Management Department

## 2025-03-28 LAB
ALBUMIN SERPL-MCNC: 2.5 G/DL (ref 3.4–5)
ANION GAP SERPL CALC-SCNC: 6 MMOL/L (ref 3–18)
BASOPHILS # BLD: 0 K/UL (ref 0–0.1)
BASOPHILS NFR BLD: 0 % (ref 0–2)
BUN SERPL-MCNC: 21 MG/DL (ref 7–18)
BUN/CREAT SERPL: 33 (ref 12–20)
CALCIUM SERPL-MCNC: 8.7 MG/DL (ref 8.5–10.1)
CHLORIDE SERPL-SCNC: 110 MMOL/L (ref 100–111)
CO2 SERPL-SCNC: 23 MMOL/L (ref 21–32)
CREAT SERPL-MCNC: 0.63 MG/DL (ref 0.6–1.3)
DIFFERENTIAL METHOD BLD: ABNORMAL
EOSINOPHIL # BLD: 0.02 K/UL (ref 0–0.4)
EOSINOPHIL NFR BLD: 0.3 % (ref 0–5)
ERYTHROCYTE [DISTWIDTH] IN BLOOD BY AUTOMATED COUNT: 25.5 % (ref 11.6–14.5)
GLUCOSE BLD STRIP.AUTO-MCNC: 184 MG/DL (ref 70–110)
GLUCOSE BLD STRIP.AUTO-MCNC: 189 MG/DL (ref 70–110)
GLUCOSE BLD STRIP.AUTO-MCNC: 216 MG/DL (ref 70–110)
GLUCOSE BLD STRIP.AUTO-MCNC: 221 MG/DL (ref 70–110)
GLUCOSE BLD STRIP.AUTO-MCNC: 237 MG/DL (ref 70–110)
GLUCOSE BLD STRIP.AUTO-MCNC: 267 MG/DL (ref 70–110)
GLUCOSE SERPL-MCNC: 184 MG/DL (ref 74–99)
HCT VFR BLD AUTO: 27.6 % (ref 36–48)
HGB BLD-MCNC: 9 G/DL (ref 13–16)
IMM GRANULOCYTES # BLD AUTO: 0.09 K/UL (ref 0–0.04)
IMM GRANULOCYTES NFR BLD AUTO: 1.5 % (ref 0–0.5)
LYMPHOCYTES # BLD: 0.22 K/UL (ref 0.9–3.3)
LYMPHOCYTES NFR BLD: 3.6 % (ref 21–52)
MCH RBC QN AUTO: 31.6 PG (ref 24–34)
MCHC RBC AUTO-ENTMCNC: 32.6 G/DL (ref 31–37)
MCV RBC AUTO: 96.8 FL (ref 78–100)
MONOCYTES # BLD: 0.45 K/UL (ref 0.05–1.2)
MONOCYTES NFR BLD: 7.4 % (ref 3–10)
NEUTS SEG # BLD: 5.29 K/UL (ref 1.8–8)
NEUTS SEG NFR BLD: 87.2 % (ref 40–73)
NRBC # BLD: 0.03 K/UL (ref 0–0.01)
NRBC BLD-RTO: 0.5 PER 100 WBC
PHOSPHATE SERPL-MCNC: 1.2 MG/DL (ref 2.5–4.9)
PHOSPHATE SERPL-MCNC: 2.3 MG/DL (ref 2.5–4.9)
PLATELET # BLD AUTO: 136 K/UL (ref 135–420)
PMV BLD AUTO: 11.1 FL (ref 9.2–11.8)
POTASSIUM SERPL-SCNC: 4 MMOL/L (ref 3.5–5.5)
RBC # BLD AUTO: 2.85 M/UL (ref 4.35–5.65)
SODIUM SERPL-SCNC: 139 MMOL/L (ref 136–145)
WBC # BLD AUTO: 6.1 K/UL (ref 4.6–13.2)

## 2025-03-28 PROCEDURE — 84100 ASSAY OF PHOSPHORUS: CPT

## 2025-03-28 PROCEDURE — 2500000003 HC RX 250 WO HCPCS: Performed by: HOSPITALIST

## 2025-03-28 PROCEDURE — 2580000003 HC RX 258: Performed by: INTERNAL MEDICINE

## 2025-03-28 PROCEDURE — 6360000002 HC RX W HCPCS: Performed by: INTERNAL MEDICINE

## 2025-03-28 PROCEDURE — 6370000000 HC RX 637 (ALT 250 FOR IP): Performed by: INTERNAL MEDICINE

## 2025-03-28 PROCEDURE — 85025 COMPLETE CBC W/AUTO DIFF WBC: CPT

## 2025-03-28 PROCEDURE — 2500000003 HC RX 250 WO HCPCS: Performed by: INTERNAL MEDICINE

## 2025-03-28 PROCEDURE — 2000000000 HC ICU R&B

## 2025-03-28 PROCEDURE — 82962 GLUCOSE BLOOD TEST: CPT

## 2025-03-28 PROCEDURE — 80069 RENAL FUNCTION PANEL: CPT

## 2025-03-28 PROCEDURE — 94003 VENT MGMT INPAT SUBQ DAY: CPT

## 2025-03-28 PROCEDURE — 51702 INSERT TEMP BLADDER CATH: CPT

## 2025-03-28 RX ORDER — POTASSIUM CHLORIDE 7.45 MG/ML
10 INJECTION INTRAVENOUS PRN
Status: DISCONTINUED | OUTPATIENT
Start: 2025-03-28 | End: 2025-04-01 | Stop reason: HOSPADM

## 2025-03-28 RX ORDER — METOPROLOL TARTRATE 25 MG/1
25 TABLET, FILM COATED ORAL 2 TIMES DAILY
Status: DISCONTINUED | OUTPATIENT
Start: 2025-03-28 | End: 2025-04-01 | Stop reason: HOSPADM

## 2025-03-28 RX ORDER — 0.9 % SODIUM CHLORIDE 0.9 %
500 INTRAVENOUS SOLUTION INTRAVENOUS ONCE
Status: COMPLETED | OUTPATIENT
Start: 2025-03-28 | End: 2025-03-28

## 2025-03-28 RX ORDER — POTASSIUM CHLORIDE 1500 MG/1
40 TABLET, EXTENDED RELEASE ORAL PRN
Status: DISCONTINUED | OUTPATIENT
Start: 2025-03-28 | End: 2025-04-01 | Stop reason: HOSPADM

## 2025-03-28 RX ADMIN — CEFAZOLIN 2000 MG: 10 INJECTION, POWDER, FOR SOLUTION INTRAVENOUS at 00:43

## 2025-03-28 RX ADMIN — HEPARIN SODIUM 5000 UNITS: 5000 INJECTION INTRAVENOUS; SUBCUTANEOUS at 22:00

## 2025-03-28 RX ADMIN — INSULIN LISPRO 4 UNITS: 100 INJECTION, SOLUTION INTRAVENOUS; SUBCUTANEOUS at 20:31

## 2025-03-28 RX ADMIN — SODIUM CHLORIDE, PRESERVATIVE FREE 10 ML: 5 INJECTION INTRAVENOUS at 11:02

## 2025-03-28 RX ADMIN — SODIUM CHLORIDE 500 ML: 0.9 INJECTION, SOLUTION INTRAVENOUS at 02:48

## 2025-03-28 RX ADMIN — SODIUM BICARBONATE 650 MG TABLET 650 MG: at 20:27

## 2025-03-28 RX ADMIN — METHIMAZOLE 10 MG: 5 TABLET ORAL at 08:45

## 2025-03-28 RX ADMIN — METOPROLOL TARTRATE 2.5 MG: 5 INJECTION INTRAVENOUS at 08:44

## 2025-03-28 RX ADMIN — INSULIN GLARGINE 6 UNITS: 100 INJECTION, SOLUTION SUBCUTANEOUS at 20:31

## 2025-03-28 RX ADMIN — MICAFUNGIN SODIUM 100 MG: 100 INJECTION, POWDER, LYOPHILIZED, FOR SOLUTION INTRAVENOUS at 19:32

## 2025-03-28 RX ADMIN — MYCOPHENOLATE MOFETIL 1000 MG: 500 INJECTION, POWDER, LYOPHILIZED, FOR SOLUTION INTRAVENOUS at 09:21

## 2025-03-28 RX ADMIN — INSULIN LISPRO 2 UNITS: 100 INJECTION, SOLUTION INTRAVENOUS; SUBCUTANEOUS at 05:57

## 2025-03-28 RX ADMIN — AMIODARONE HYDROCHLORIDE 200 MG: 200 TABLET ORAL at 08:45

## 2025-03-28 RX ADMIN — MIDODRINE HYDROCHLORIDE 5 MG: 2.5 TABLET ORAL at 08:45

## 2025-03-28 RX ADMIN — SODIUM CHLORIDE, PRESERVATIVE FREE 40 MG: 5 INJECTION INTRAVENOUS at 08:44

## 2025-03-28 RX ADMIN — SODIUM BICARBONATE 650 MG TABLET 650 MG: at 08:45

## 2025-03-28 RX ADMIN — POTASSIUM PHOSPHATE, MONOBASIC POTASSIUM PHOSPHATE, DIBASIC 30 MMOL: 224; 236 INJECTION, SOLUTION, CONCENTRATE INTRAVENOUS at 06:31

## 2025-03-28 RX ADMIN — SODIUM BICARBONATE 650 MG TABLET 650 MG: at 14:16

## 2025-03-28 RX ADMIN — PREDNISONE 5 MG: 5 TABLET ORAL at 08:45

## 2025-03-28 RX ADMIN — MIDODRINE HYDROCHLORIDE 5 MG: 2.5 TABLET ORAL at 11:41

## 2025-03-28 RX ADMIN — CEFAZOLIN 2000 MG: 10 INJECTION, POWDER, FOR SOLUTION INTRAVENOUS at 08:44

## 2025-03-28 RX ADMIN — HEPARIN SODIUM 5000 UNITS: 5000 INJECTION INTRAVENOUS; SUBCUTANEOUS at 05:47

## 2025-03-28 RX ADMIN — INSULIN LISPRO 2 UNITS: 100 INJECTION, SOLUTION INTRAVENOUS; SUBCUTANEOUS at 11:41

## 2025-03-28 RX ADMIN — NOREPINEPHRINE BITARTRATE 1 MCG/MIN: 64 SOLUTION INTRAVENOUS at 02:00

## 2025-03-28 RX ADMIN — SODIUM CHLORIDE, PRESERVATIVE FREE 10 ML: 5 INJECTION INTRAVENOUS at 20:27

## 2025-03-28 RX ADMIN — MIDODRINE HYDROCHLORIDE 5 MG: 2.5 TABLET ORAL at 16:46

## 2025-03-28 RX ADMIN — CHLORHEXIDINE GLUCONATE, 0.12% ORAL RINSE 15 ML: 1.2 SOLUTION DENTAL at 08:44

## 2025-03-28 RX ADMIN — MYCOPHENOLATE MOFETIL 1000 MG: 500 INJECTION, POWDER, LYOPHILIZED, FOR SOLUTION INTRAVENOUS at 21:18

## 2025-03-28 RX ADMIN — HEPARIN SODIUM 5000 UNITS: 5000 INJECTION INTRAVENOUS; SUBCUTANEOUS at 14:16

## 2025-03-28 RX ADMIN — CHLORHEXIDINE GLUCONATE, 0.12% ORAL RINSE 15 ML: 1.2 SOLUTION DENTAL at 20:21

## 2025-03-28 RX ADMIN — CEFAZOLIN 2000 MG: 10 INJECTION, POWDER, FOR SOLUTION INTRAVENOUS at 16:45

## 2025-03-28 RX ADMIN — INSULIN LISPRO 2 UNITS: 100 INJECTION, SOLUTION INTRAVENOUS; SUBCUTANEOUS at 16:52

## 2025-03-28 ASSESSMENT — PULMONARY FUNCTION TESTS
PIF_VALUE: 18
PIF_VALUE: 13
PIF_VALUE: 19
PIF_VALUE: 16
PIF_VALUE: 13
PIF_VALUE: 18

## 2025-03-28 ASSESSMENT — PAIN SCALES - GENERAL
PAINLEVEL_OUTOF10: 0

## 2025-03-28 NOTE — CARE COORDINATION
Pt remains in pending status, ongoing efforts to follow up.    JEYSON Castro  Case Management Department

## 2025-03-29 ENCOUNTER — APPOINTMENT (OUTPATIENT)
Facility: HOSPITAL | Age: 55
DRG: 004 | End: 2025-03-29
Payer: MEDICARE

## 2025-03-29 LAB
ANION GAP SERPL CALC-SCNC: 9 MMOL/L (ref 3–18)
BASOPHILS # BLD: 0.01 K/UL (ref 0–0.1)
BASOPHILS NFR BLD: 0.2 % (ref 0–2)
BUN SERPL-MCNC: 20 MG/DL (ref 7–18)
BUN/CREAT SERPL: 29 (ref 12–20)
CALCIUM SERPL-MCNC: 8.3 MG/DL (ref 8.5–10.1)
CHLORIDE SERPL-SCNC: 108 MMOL/L (ref 100–111)
CO2 SERPL-SCNC: 20 MMOL/L (ref 21–32)
CREAT SERPL-MCNC: 0.7 MG/DL (ref 0.6–1.3)
DIFFERENTIAL METHOD BLD: ABNORMAL
EOSINOPHIL # BLD: 0.02 K/UL (ref 0–0.4)
EOSINOPHIL NFR BLD: 0.3 % (ref 0–5)
ERYTHROCYTE [DISTWIDTH] IN BLOOD BY AUTOMATED COUNT: 25.2 % (ref 11.6–14.5)
GLUCOSE BLD STRIP.AUTO-MCNC: 258 MG/DL (ref 70–110)
GLUCOSE BLD STRIP.AUTO-MCNC: 262 MG/DL (ref 70–110)
GLUCOSE BLD STRIP.AUTO-MCNC: 269 MG/DL (ref 70–110)
GLUCOSE BLD STRIP.AUTO-MCNC: 291 MG/DL (ref 70–110)
GLUCOSE SERPL-MCNC: 259 MG/DL (ref 74–99)
HCT VFR BLD AUTO: 28.3 % (ref 36–48)
HGB BLD-MCNC: 8.9 G/DL (ref 13–16)
IMM GRANULOCYTES # BLD AUTO: 0.11 K/UL (ref 0–0.04)
IMM GRANULOCYTES NFR BLD AUTO: 1.9 % (ref 0–0.5)
LYMPHOCYTES # BLD: 0.35 K/UL (ref 0.9–3.3)
LYMPHOCYTES NFR BLD: 6.1 % (ref 21–52)
MAGNESIUM SERPL-MCNC: 1.8 MG/DL (ref 1.6–2.6)
MCH RBC QN AUTO: 31.3 PG (ref 24–34)
MCHC RBC AUTO-ENTMCNC: 31.4 G/DL (ref 31–37)
MCV RBC AUTO: 99.6 FL (ref 78–100)
MONOCYTES # BLD: 0.43 K/UL (ref 0.05–1.2)
MONOCYTES NFR BLD: 7.4 % (ref 3–10)
NEUTS SEG # BLD: 4.86 K/UL (ref 1.8–8)
NEUTS SEG NFR BLD: 84.1 % (ref 40–73)
NRBC # BLD: 0 K/UL (ref 0–0.01)
NRBC BLD-RTO: 0 PER 100 WBC
P JIROVECII DNA # BAL NAA+PROBE: NEGATIVE COPIES/ML
PHOSPHATE SERPL-MCNC: 1.4 MG/DL (ref 2.5–4.9)
PHOSPHATE SERPL-MCNC: 2.6 MG/DL (ref 2.5–4.9)
PLATELET # BLD AUTO: 130 K/UL (ref 135–420)
PMV BLD AUTO: 12.2 FL (ref 9.2–11.8)
POTASSIUM SERPL-SCNC: 4.5 MMOL/L (ref 3.5–5.5)
RBC # BLD AUTO: 2.84 M/UL (ref 4.35–5.65)
SODIUM SERPL-SCNC: 137 MMOL/L (ref 136–145)
SOURCE: NORMAL
SPECIMEN SOURCE: NORMAL
WBC # BLD AUTO: 5.8 K/UL (ref 4.6–13.2)

## 2025-03-29 PROCEDURE — 2500000003 HC RX 250 WO HCPCS: Performed by: HOSPITALIST

## 2025-03-29 PROCEDURE — 6370000000 HC RX 637 (ALT 250 FOR IP): Performed by: INTERNAL MEDICINE

## 2025-03-29 PROCEDURE — 2580000003 HC RX 258: Performed by: INTERNAL MEDICINE

## 2025-03-29 PROCEDURE — 6360000002 HC RX W HCPCS: Performed by: INTERNAL MEDICINE

## 2025-03-29 PROCEDURE — 2500000003 HC RX 250 WO HCPCS: Performed by: INTERNAL MEDICINE

## 2025-03-29 PROCEDURE — 82962 GLUCOSE BLOOD TEST: CPT

## 2025-03-29 PROCEDURE — 87385 HISTOPLASMA CAPSUL AG IA: CPT

## 2025-03-29 PROCEDURE — 84100 ASSAY OF PHOSPHORUS: CPT

## 2025-03-29 PROCEDURE — 71045 X-RAY EXAM CHEST 1 VIEW: CPT

## 2025-03-29 PROCEDURE — 80048 BASIC METABOLIC PNL TOTAL CA: CPT

## 2025-03-29 PROCEDURE — 83735 ASSAY OF MAGNESIUM: CPT

## 2025-03-29 PROCEDURE — 85025 COMPLETE CBC W/AUTO DIFF WBC: CPT

## 2025-03-29 PROCEDURE — 94003 VENT MGMT INPAT SUBQ DAY: CPT

## 2025-03-29 PROCEDURE — 2000000000 HC ICU R&B

## 2025-03-29 RX ADMIN — SODIUM CHLORIDE, PRESERVATIVE FREE 10 ML: 5 INJECTION INTRAVENOUS at 22:10

## 2025-03-29 RX ADMIN — INSULIN LISPRO 4 UNITS: 100 INJECTION, SOLUTION INTRAVENOUS; SUBCUTANEOUS at 12:45

## 2025-03-29 RX ADMIN — AMIODARONE HYDROCHLORIDE 200 MG: 200 TABLET ORAL at 09:48

## 2025-03-29 RX ADMIN — INSULIN LISPRO 4 UNITS: 100 INJECTION, SOLUTION INTRAVENOUS; SUBCUTANEOUS at 06:34

## 2025-03-29 RX ADMIN — CEFAZOLIN 2000 MG: 10 INJECTION, POWDER, FOR SOLUTION INTRAVENOUS at 09:47

## 2025-03-29 RX ADMIN — INSULIN LISPRO 4 UNITS: 100 INJECTION, SOLUTION INTRAVENOUS; SUBCUTANEOUS at 22:09

## 2025-03-29 RX ADMIN — CHLORHEXIDINE GLUCONATE, 0.12% ORAL RINSE 15 ML: 1.2 SOLUTION DENTAL at 22:09

## 2025-03-29 RX ADMIN — HEPARIN SODIUM 5000 UNITS: 5000 INJECTION INTRAVENOUS; SUBCUTANEOUS at 06:30

## 2025-03-29 RX ADMIN — METOPROLOL TARTRATE 25 MG: 25 TABLET, FILM COATED ORAL at 22:10

## 2025-03-29 RX ADMIN — SODIUM BICARBONATE 650 MG TABLET 650 MG: at 09:48

## 2025-03-29 RX ADMIN — SODIUM CHLORIDE, PRESERVATIVE FREE 10 ML: 5 INJECTION INTRAVENOUS at 09:19

## 2025-03-29 RX ADMIN — MYCOPHENOLATE MOFETIL 1000 MG: 500 INJECTION, POWDER, LYOPHILIZED, FOR SOLUTION INTRAVENOUS at 11:55

## 2025-03-29 RX ADMIN — HEPARIN SODIUM 5000 UNITS: 5000 INJECTION INTRAVENOUS; SUBCUTANEOUS at 22:09

## 2025-03-29 RX ADMIN — MICAFUNGIN SODIUM 100 MG: 100 INJECTION, POWDER, LYOPHILIZED, FOR SOLUTION INTRAVENOUS at 15:23

## 2025-03-29 RX ADMIN — POTASSIUM & SODIUM PHOSPHATES POWDER PACK 280-160-250 MG 250 MG: 280-160-250 PACK at 22:09

## 2025-03-29 RX ADMIN — CHLORHEXIDINE GLUCONATE, 0.12% ORAL RINSE 15 ML: 1.2 SOLUTION DENTAL at 09:49

## 2025-03-29 RX ADMIN — INSULIN LISPRO 4 UNITS: 100 INJECTION, SOLUTION INTRAVENOUS; SUBCUTANEOUS at 16:40

## 2025-03-29 RX ADMIN — CEFAZOLIN 2000 MG: 10 INJECTION, POWDER, FOR SOLUTION INTRAVENOUS at 16:34

## 2025-03-29 RX ADMIN — PREDNISONE 5 MG: 5 TABLET ORAL at 09:48

## 2025-03-29 RX ADMIN — SODIUM CHLORIDE, PRESERVATIVE FREE 40 MG: 5 INJECTION INTRAVENOUS at 09:47

## 2025-03-29 RX ADMIN — INSULIN GLARGINE 6 UNITS: 100 INJECTION, SOLUTION SUBCUTANEOUS at 22:09

## 2025-03-29 RX ADMIN — MIDODRINE HYDROCHLORIDE 5 MG: 2.5 TABLET ORAL at 09:47

## 2025-03-29 RX ADMIN — CEFAZOLIN 2000 MG: 10 INJECTION, POWDER, FOR SOLUTION INTRAVENOUS at 01:30

## 2025-03-29 RX ADMIN — SODIUM PHOSPHATE, MONOBASIC, MONOHYDRATE AND SODIUM PHOSPHATE, DIBASIC, ANHYDROUS 30 MMOL: 142; 276 INJECTION, SOLUTION INTRAVENOUS at 09:19

## 2025-03-29 RX ADMIN — MIDODRINE HYDROCHLORIDE 5 MG: 2.5 TABLET ORAL at 16:40

## 2025-03-29 RX ADMIN — METHIMAZOLE 10 MG: 5 TABLET ORAL at 09:48

## 2025-03-29 RX ADMIN — SODIUM BICARBONATE 650 MG TABLET 650 MG: at 12:46

## 2025-03-29 RX ADMIN — MIDODRINE HYDROCHLORIDE 5 MG: 2.5 TABLET ORAL at 12:46

## 2025-03-29 RX ADMIN — SODIUM BICARBONATE 650 MG TABLET 650 MG: at 22:09

## 2025-03-29 RX ADMIN — HEPARIN SODIUM 5000 UNITS: 5000 INJECTION INTRAVENOUS; SUBCUTANEOUS at 12:46

## 2025-03-29 ASSESSMENT — PAIN SCALES - GENERAL
PAINLEVEL_OUTOF10: 0

## 2025-03-29 ASSESSMENT — PULMONARY FUNCTION TESTS
PIF_VALUE: 18
PIF_VALUE: 19
PIF_VALUE: 19
PIF_VALUE: 18
PIF_VALUE: 16
PIF_VALUE: 16

## 2025-03-30 LAB
ALBUMIN SERPL-MCNC: 1.9 G/DL (ref 3.4–5)
ANION GAP SERPL CALC-SCNC: 7 MMOL/L (ref 3–18)
BASOPHILS # BLD: 0.01 K/UL (ref 0–0.1)
BASOPHILS NFR BLD: 0.2 % (ref 0–2)
BUN SERPL-MCNC: 20 MG/DL (ref 7–18)
BUN/CREAT SERPL: 30 (ref 12–20)
CA-I SERPL-SCNC: 1.23 MMOL/L (ref 1.12–1.32)
CALCIUM SERPL-MCNC: 8.5 MG/DL (ref 8.5–10.1)
CHLORIDE SERPL-SCNC: 106 MMOL/L (ref 100–111)
CO2 SERPL-SCNC: 21 MMOL/L (ref 21–32)
CREAT SERPL-MCNC: 0.66 MG/DL (ref 0.6–1.3)
DIFFERENTIAL METHOD BLD: ABNORMAL
EOSINOPHIL # BLD: 0.02 K/UL (ref 0–0.4)
EOSINOPHIL NFR BLD: 0.3 % (ref 0–5)
ERYTHROCYTE [DISTWIDTH] IN BLOOD BY AUTOMATED COUNT: 24.8 % (ref 11.6–14.5)
GLUCOSE BLD STRIP.AUTO-MCNC: 214 MG/DL (ref 70–110)
GLUCOSE BLD STRIP.AUTO-MCNC: 234 MG/DL (ref 70–110)
GLUCOSE BLD STRIP.AUTO-MCNC: 259 MG/DL (ref 70–110)
GLUCOSE BLD STRIP.AUTO-MCNC: 260 MG/DL (ref 70–110)
GLUCOSE BLD STRIP.AUTO-MCNC: 283 MG/DL (ref 70–110)
GLUCOSE SERPL-MCNC: 268 MG/DL (ref 74–99)
HCT VFR BLD AUTO: 25.8 % (ref 36–48)
HGB BLD-MCNC: 8.2 G/DL (ref 13–16)
IMM GRANULOCYTES # BLD AUTO: 0.12 K/UL (ref 0–0.04)
IMM GRANULOCYTES NFR BLD AUTO: 2 % (ref 0–0.5)
LYMPHOCYTES # BLD: 0.26 K/UL (ref 0.9–3.3)
LYMPHOCYTES NFR BLD: 4.3 % (ref 21–52)
MCH RBC QN AUTO: 32 PG (ref 24–34)
MCHC RBC AUTO-ENTMCNC: 31.8 G/DL (ref 31–37)
MCV RBC AUTO: 100.8 FL (ref 78–100)
MONOCYTES # BLD: 0.57 K/UL (ref 0.05–1.2)
MONOCYTES NFR BLD: 9.4 % (ref 3–10)
NEUTS SEG # BLD: 5.06 K/UL (ref 1.8–8)
NEUTS SEG NFR BLD: 83.8 % (ref 40–73)
NRBC # BLD: 0 K/UL (ref 0–0.01)
NRBC BLD-RTO: 0 PER 100 WBC
PHOSPHATE SERPL-MCNC: 2 MG/DL (ref 2.5–4.9)
PLATELET # BLD AUTO: 124 K/UL (ref 135–420)
PMV BLD AUTO: 12 FL (ref 9.2–11.8)
POTASSIUM SERPL-SCNC: 4.5 MMOL/L (ref 3.5–5.5)
RBC # BLD AUTO: 2.56 M/UL (ref 4.35–5.65)
SODIUM SERPL-SCNC: 134 MMOL/L (ref 136–145)
WBC # BLD AUTO: 6 K/UL (ref 4.6–13.2)

## 2025-03-30 PROCEDURE — 87449 NOS EACH ORGANISM AG IA: CPT

## 2025-03-30 PROCEDURE — 2580000003 HC RX 258: Performed by: INTERNAL MEDICINE

## 2025-03-30 PROCEDURE — 6360000002 HC RX W HCPCS: Performed by: INTERNAL MEDICINE

## 2025-03-30 PROCEDURE — 2500000003 HC RX 250 WO HCPCS: Performed by: INTERNAL MEDICINE

## 2025-03-30 PROCEDURE — 82330 ASSAY OF CALCIUM: CPT

## 2025-03-30 PROCEDURE — 94003 VENT MGMT INPAT SUBQ DAY: CPT

## 2025-03-30 PROCEDURE — 82962 GLUCOSE BLOOD TEST: CPT

## 2025-03-30 PROCEDURE — 6370000000 HC RX 637 (ALT 250 FOR IP): Performed by: INTERNAL MEDICINE

## 2025-03-30 PROCEDURE — 86612 BLASTOMYCES ANTIBODY: CPT

## 2025-03-30 PROCEDURE — 85025 COMPLETE CBC W/AUTO DIFF WBC: CPT

## 2025-03-30 PROCEDURE — 2000000000 HC ICU R&B

## 2025-03-30 PROCEDURE — 2500000003 HC RX 250 WO HCPCS: Performed by: HOSPITALIST

## 2025-03-30 PROCEDURE — 51702 INSERT TEMP BLADDER CATH: CPT

## 2025-03-30 PROCEDURE — 80069 RENAL FUNCTION PANEL: CPT

## 2025-03-30 RX ORDER — ENOXAPARIN SODIUM 100 MG/ML
40 INJECTION SUBCUTANEOUS DAILY
Status: DISCONTINUED | OUTPATIENT
Start: 2025-03-31 | End: 2025-04-01 | Stop reason: HOSPADM

## 2025-03-30 RX ADMIN — SODIUM CHLORIDE, PRESERVATIVE FREE 10 ML: 5 INJECTION INTRAVENOUS at 11:13

## 2025-03-30 RX ADMIN — SODIUM BICARBONATE 650 MG TABLET 650 MG: at 15:27

## 2025-03-30 RX ADMIN — POTASSIUM & SODIUM PHOSPHATES POWDER PACK 280-160-250 MG 250 MG: 280-160-250 PACK at 20:30

## 2025-03-30 RX ADMIN — MIDODRINE HYDROCHLORIDE 5 MG: 2.5 TABLET ORAL at 16:48

## 2025-03-30 RX ADMIN — INSULIN LISPRO 4 UNITS: 100 INJECTION, SOLUTION INTRAVENOUS; SUBCUTANEOUS at 11:18

## 2025-03-30 RX ADMIN — SODIUM CHLORIDE, PRESERVATIVE FREE 10 ML: 5 INJECTION INTRAVENOUS at 20:31

## 2025-03-30 RX ADMIN — HEPARIN SODIUM 5000 UNITS: 5000 INJECTION INTRAVENOUS; SUBCUTANEOUS at 07:46

## 2025-03-30 RX ADMIN — CEFAZOLIN 2000 MG: 10 INJECTION, POWDER, FOR SOLUTION INTRAVENOUS at 00:40

## 2025-03-30 RX ADMIN — PREDNISONE 5 MG: 5 TABLET ORAL at 08:25

## 2025-03-30 RX ADMIN — MYCOPHENOLATE MOFETIL 1000 MG: 500 INJECTION, POWDER, LYOPHILIZED, FOR SOLUTION INTRAVENOUS at 00:11

## 2025-03-30 RX ADMIN — CEFAZOLIN 2000 MG: 10 INJECTION, POWDER, FOR SOLUTION INTRAVENOUS at 16:47

## 2025-03-30 RX ADMIN — POTASSIUM & SODIUM PHOSPHATES POWDER PACK 280-160-250 MG 250 MG: 280-160-250 PACK at 08:25

## 2025-03-30 RX ADMIN — CEFAZOLIN 2000 MG: 10 INJECTION, POWDER, FOR SOLUTION INTRAVENOUS at 08:24

## 2025-03-30 RX ADMIN — SODIUM BICARBONATE 650 MG TABLET 650 MG: at 08:25

## 2025-03-30 RX ADMIN — INSULIN LISPRO 2 UNITS: 100 INJECTION, SOLUTION INTRAVENOUS; SUBCUTANEOUS at 16:48

## 2025-03-30 RX ADMIN — CHLORHEXIDINE GLUCONATE, 0.12% ORAL RINSE 15 ML: 1.2 SOLUTION DENTAL at 20:30

## 2025-03-30 RX ADMIN — METOPROLOL TARTRATE 25 MG: 25 TABLET, FILM COATED ORAL at 20:30

## 2025-03-30 RX ADMIN — MYCOPHENOLATE MOFETIL 1000 MG: 500 INJECTION, POWDER, LYOPHILIZED, FOR SOLUTION INTRAVENOUS at 12:03

## 2025-03-30 RX ADMIN — INSULIN LISPRO 4 UNITS: 100 INJECTION, SOLUTION INTRAVENOUS; SUBCUTANEOUS at 07:46

## 2025-03-30 RX ADMIN — INSULIN LISPRO 2 UNITS: 100 INJECTION, SOLUTION INTRAVENOUS; SUBCUTANEOUS at 20:34

## 2025-03-30 RX ADMIN — METOPROLOL TARTRATE 25 MG: 25 TABLET, FILM COATED ORAL at 08:25

## 2025-03-30 RX ADMIN — AMIODARONE HYDROCHLORIDE 200 MG: 200 TABLET ORAL at 08:26

## 2025-03-30 RX ADMIN — SODIUM CHLORIDE, PRESERVATIVE FREE 40 MG: 5 INJECTION INTRAVENOUS at 08:24

## 2025-03-30 RX ADMIN — METHIMAZOLE 10 MG: 5 TABLET ORAL at 08:25

## 2025-03-30 RX ADMIN — CHLORHEXIDINE GLUCONATE, 0.12% ORAL RINSE 15 ML: 1.2 SOLUTION DENTAL at 08:25

## 2025-03-30 RX ADMIN — SODIUM BICARBONATE 650 MG TABLET 650 MG: at 20:30

## 2025-03-30 RX ADMIN — MIDODRINE HYDROCHLORIDE 5 MG: 2.5 TABLET ORAL at 12:07

## 2025-03-30 RX ADMIN — MIDODRINE HYDROCHLORIDE 5 MG: 2.5 TABLET ORAL at 08:25

## 2025-03-30 RX ADMIN — MICAFUNGIN SODIUM 100 MG: 100 INJECTION, POWDER, LYOPHILIZED, FOR SOLUTION INTRAVENOUS at 16:54

## 2025-03-30 RX ADMIN — HEPARIN SODIUM 5000 UNITS: 5000 INJECTION INTRAVENOUS; SUBCUTANEOUS at 15:27

## 2025-03-30 RX ADMIN — INSULIN GLARGINE 6 UNITS: 100 INJECTION, SOLUTION SUBCUTANEOUS at 20:35

## 2025-03-30 ASSESSMENT — PULMONARY FUNCTION TESTS
PIF_VALUE: 16
PIF_VALUE: 19
PIF_VALUE: 19
PIF_VALUE: 18
PIF_VALUE: 16
PIF_VALUE: 19

## 2025-03-30 ASSESSMENT — PAIN SCALES - GENERAL
PAINLEVEL_OUTOF10: 0

## 2025-03-30 NOTE — PLAN OF CARE
Problem: Chronic Conditions and Co-morbidities  Goal: Patient's chronic conditions and co-morbidity symptoms are monitored and maintained or improved  3/14/2025 1148 by Grisel Watson RN  Outcome: Progressing  3/14/2025 1054 by Grisel Watson RN  Outcome: Progressing  Flowsheets (Taken 3/14/2025 0800)  Care Plan - Patient's Chronic Conditions and Co-Morbidity Symptoms are Monitored and Maintained or Improved: Monitor and assess patient's chronic conditions and comorbid symptoms for stability, deterioration, or improvement     Problem: Discharge Planning  Goal: Discharge to home or other facility with appropriate resources  3/14/2025 1148 by Grisel Watson RN  Outcome: Progressing  3/14/2025 1054 by Grisel Watson RN  Outcome: Progressing  Flowsheets (Taken 3/14/2025 0800)  Discharge to home or other facility with appropriate resources: Identify barriers to discharge with patient and caregiver     Problem: Pain  Goal: Verbalizes/displays adequate comfort level or baseline comfort level  3/14/2025 1148 by Grisel Watson RN  Outcome: Progressing  3/14/2025 1054 by Grisel Watson RN  Outcome: Progressing     Problem: Safety - Adult  Goal: Free from fall injury  3/14/2025 1148 by Grisel Watson RN  Outcome: Progressing  3/14/2025 1054 by Grisel Watson RN  Outcome: Progressing     Problem: Skin/Tissue Integrity  Goal: Skin integrity remains intact  Description: 1.  Monitor for areas of redness and/or skin breakdown  2.  Assess vascular access sites hourly  3.  Every 4-6 hours minimum:  Change oxygen saturation probe site  4.  Every 4-6 hours:  If on nasal continuous positive airway pressure, respiratory therapy assess nares and determine need for appliance change or resting period  3/14/2025 1148 by Grisel Watson RN  Outcome: Progressing  3/14/2025 1054 by Grisel Watson RN  Outcome: Progressing  Flowsheets (Taken 3/14/2025 0800)  Skin Integrity Remains Intact: Monitor for areas of redness and/or skin breakdown     Problem: 
  Problem: Chronic Conditions and Co-morbidities  Goal: Patient's chronic conditions and co-morbidity symptoms are monitored and maintained or improved  3/15/2025 1914 by Page Cunha RN  Outcome: Progressing  3/15/2025 1034 by Jose Luis Marx RN  Outcome: Progressing     Problem: Discharge Planning  Goal: Discharge to home or other facility with appropriate resources  3/15/2025 1034 by Jose Luis Marx RN  Outcome: Progressing     Problem: Pain  Goal: Verbalizes/displays adequate comfort level or baseline comfort level  3/15/2025 1914 by Page Cunha, RN  Outcome: Progressing  3/15/2025 1034 by Jose Luis Marx RN  Outcome: Progressing  Flowsheets (Taken 3/15/2025 0800)  Verbalizes/displays adequate comfort level or baseline comfort level:   Assess pain using appropriate pain scale   Administer analgesics based on type and severity of pain and evaluate response   Implement non-pharmacological measures as appropriate and evaluate response   Consider cultural and social influences on pain and pain management     Problem: Safety - Adult  Goal: Free from fall injury  3/15/2025 1914 by Page Cunha, RN  Outcome: Progressing  3/15/2025 1034 by Jose Luis Marx RN  Outcome: Progressing     Problem: Skin/Tissue Integrity  Goal: Skin integrity remains intact  Description: 1.  Monitor for areas of redness and/or skin breakdown  2.  Assess vascular access sites hourly  3.  Every 4-6 hours minimum:  Change oxygen saturation probe site  4.  Every 4-6 hours:  If on nasal continuous positive airway pressure, respiratory therapy assess nares and determine need for appliance change or resting period  3/15/2025 1034 by Jose Luis Marx, RN  Outcome: Progressing  Flowsheets (Taken 3/15/2025 0800)  Skin Integrity Remains Intact:   Monitor for areas of redness and/or skin breakdown   Assess vascular access sites hourly   Every 4-6 hours minimum: Change oxygen saturation probe site   Every 4-6 
  Problem: Chronic Conditions and Co-morbidities  Goal: Patient's chronic conditions and co-morbidity symptoms are monitored and maintained or improved  3/16/2025 1653 by Emigdio Paniagua RN  Outcome: Progressing  Flowsheets (Taken 3/16/2025 0800)  Care Plan - Patient's Chronic Conditions and Co-Morbidity Symptoms are Monitored and Maintained or Improved:   Monitor and assess patient's chronic conditions and comorbid symptoms for stability, deterioration, or improvement   Update acute care plan with appropriate goals if chronic or comorbid symptoms are exacerbated and prevent overall improvement and discharge   Collaborate with multidisciplinary team to address chronic and comorbid conditions and prevent exacerbation or deterioration     Problem: Discharge Planning  Goal: Discharge to home or other facility with appropriate resources  3/16/2025 1653 by Emigdio Paniagua, RN  Outcome: Progressing  Flowsheets (Taken 3/16/2025 0800)  Discharge to home or other facility with appropriate resources:   Identify barriers to discharge with patient and caregiver   Arrange for needed discharge resources and transportation as appropriate   Identify discharge learning needs (meds, wound care, etc)   Refer to discharge planning if patient needs post-hospital services based on physician order or complex needs related to functional status, cognitive ability or social support system   Arrange for interpreters to assist at discharge as needed     Problem: Pain  Goal: Verbalizes/displays adequate comfort level or baseline comfort level  3/16/2025 1921 by Page Cunha, RN  Outcome: Progressing  3/16/2025 1653 by Emigdio Paniagau, RN  Outcome: Progressing  Flowsheets (Taken 3/16/2025 0800)  Verbalizes/displays adequate comfort level or baseline comfort level:   Encourage patient to monitor pain and request assistance   Assess pain using appropriate pain scale   Administer analgesics based on type and severity of pain and evaluate 
  Problem: Chronic Conditions and Co-morbidities  Goal: Patient's chronic conditions and co-morbidity symptoms are monitored and maintained or improved  3/17/2025 1906 by Page Cunha RN  Outcome: Progressing  3/17/2025 1103 by Ness Nolan RN  Outcome: Progressing  Flowsheets (Taken 3/17/2025 0800)  Care Plan - Patient's Chronic Conditions and Co-Morbidity Symptoms are Monitored and Maintained or Improved: Monitor and assess patient's chronic conditions and comorbid symptoms for stability, deterioration, or improvement     Problem: Discharge Planning  Goal: Discharge to home or other facility with appropriate resources  3/17/2025 1103 by Ness Nolan RN  Outcome: Progressing  Flowsheets (Taken 3/17/2025 0800)  Discharge to home or other facility with appropriate resources: Identify barriers to discharge with patient and caregiver     Problem: Pain  Goal: Verbalizes/displays adequate comfort level or baseline comfort level  3/17/2025 1906 by Page Cunha, RN  Outcome: Progressing  3/17/2025 1103 by Ness Nolan RN  Outcome: Progressing     Problem: Safety - Adult  Goal: Free from fall injury  3/17/2025 1906 by Page Cunha, RN  Outcome: Progressing  3/17/2025 1103 by Ness Nolan RN  Outcome: Progressing     
  Problem: Chronic Conditions and Co-morbidities  Goal: Patient's chronic conditions and co-morbidity symptoms are monitored and maintained or improved  3/18/2025 2222 by Taras Andrews RN  Outcome: Progressing  Flowsheets (Taken 3/17/2025 1920 by Page Cunha, RN)  Care Plan - Patient's Chronic Conditions and Co-Morbidity Symptoms are Monitored and Maintained or Improved: Monitor and assess patient's chronic conditions and comorbid symptoms for stability, deterioration, or improvement  3/18/2025 2102 by Taylor Schwartz RN  Outcome: Not Progressing     Problem: Discharge Planning  Goal: Discharge to home or other facility with appropriate resources  3/18/2025 2222 by Taras Andrews RN  Outcome: Progressing  Flowsheets (Taken 3/17/2025 1920 by Page Cunha, RN)  Discharge to home or other facility with appropriate resources: Identify barriers to discharge with patient and caregiver  3/18/2025 2102 by Taylor Schwartz RN  Outcome: Not Progressing     Problem: Pain  Goal: Verbalizes/displays adequate comfort level or baseline comfort level  3/18/2025 2222 by Taras Andrews RN  Outcome: Progressing  Flowsheets (Taken 3/18/2025 2030 by Taylor Schwartz, RN)  Verbalizes/displays adequate comfort level or baseline comfort level:   Encourage patient to monitor pain and request assistance   Assess pain using appropriate pain scale   Administer analgesics based on type and severity of pain and evaluate response  3/18/2025 2102 by Taylor Schwartz RN  Outcome: Not Progressing  Flowsheets (Taken 3/18/2025 2030)  Verbalizes/displays adequate comfort level or baseline comfort level:   Encourage patient to monitor pain and request assistance   Assess pain using appropriate pain scale   Administer analgesics based on type and severity of pain and evaluate response     Problem: Safety - Adult  Goal: Free from fall injury  3/18/2025 2222 by Taras Andrews RN  Outcome: Progressing  Flowsheets (Taken 3/18/2025 2222)  Free 
  Problem: Chronic Conditions and Co-morbidities  Goal: Patient's chronic conditions and co-morbidity symptoms are monitored and maintained or improved  Outcome: Progressing     
  Problem: Chronic Conditions and Co-morbidities  Goal: Patient's chronic conditions and co-morbidity symptoms are monitored and maintained or improved  Outcome: Progressing     Problem: Discharge Planning  Goal: Discharge to home or other facility with appropriate resources  Outcome: Progressing     Problem: Pain  Goal: Verbalizes/displays adequate comfort level or baseline comfort level  Outcome: Progressing     Problem: Safety - Adult  Goal: Free from fall injury  Outcome: Progressing     
  Problem: Chronic Conditions and Co-morbidities  Goal: Patient's chronic conditions and co-morbidity symptoms are monitored and maintained or improved  Outcome: Progressing  Flowsheets (Taken 3/11/2025 0830 by Essie Borrero RN)  Care Plan - Patient's Chronic Conditions and Co-Morbidity Symptoms are Monitored and Maintained or Improved:   Monitor and assess patient's chronic conditions and comorbid symptoms for stability, deterioration, or improvement   Collaborate with multidisciplinary team to address chronic and comorbid conditions and prevent exacerbation or deterioration   Update acute care plan with appropriate goals if chronic or comorbid symptoms are exacerbated and prevent overall improvement and discharge     Problem: Pain  Goal: Verbalizes/displays adequate comfort level or baseline comfort level  Outcome: Progressing  Flowsheets  Taken 3/11/2025 1600 by Essie Borrero RN  Verbalizes/displays adequate comfort level or baseline comfort level:   Assess pain using appropriate pain scale   Administer analgesics based on type and severity of pain and evaluate response   Implement non-pharmacological measures as appropriate and evaluate response  Taken 3/11/2025 1200 by Essie Borrero RN  Verbalizes/displays adequate comfort level or baseline comfort level:   Assess pain using appropriate pain scale   Administer analgesics based on type and severity of pain and evaluate response   Implement non-pharmacological measures as appropriate and evaluate response  Taken 3/11/2025 0800 by Essie Borrero RN  Verbalizes/displays adequate comfort level or baseline comfort level:   Encourage patient to monitor pain and request assistance   Assess pain using appropriate pain scale   Administer analgesics based on type and severity of pain and evaluate response   Implement non-pharmacological measures as appropriate and evaluate response     Problem: Safety - Adult  Goal: Free from fall injury  Outcome: 
  Problem: Chronic Conditions and Co-morbidities  Goal: Patient's chronic conditions and co-morbidity symptoms are monitored and maintained or improved  Outcome: Progressing  Flowsheets (Taken 3/13/2025 0800)  Care Plan - Patient's Chronic Conditions and Co-Morbidity Symptoms are Monitored and Maintained or Improved:   Monitor and assess patient's chronic conditions and comorbid symptoms for stability, deterioration, or improvement   Collaborate with multidisciplinary team to address chronic and comorbid conditions and prevent exacerbation or deterioration   Update acute care plan with appropriate goals if chronic or comorbid symptoms are exacerbated and prevent overall improvement and discharge     Problem: Discharge Planning  Goal: Discharge to home or other facility with appropriate resources  Outcome: Progressing  Flowsheets (Taken 3/13/2025 0800)  Discharge to home or other facility with appropriate resources:   Identify barriers to discharge with patient and caregiver   Arrange for needed discharge resources and transportation as appropriate   Identify discharge learning needs (meds, wound care, etc)   Refer to discharge planning if patient needs post-hospital services based on physician order or complex needs related to functional status, cognitive ability or social support system     Problem: Skin/Tissue Integrity  Goal: Skin integrity remains intact  Description: 1.  Monitor for areas of redness and/or skin breakdown  2.  Assess vascular access sites hourly  3.  Every 4-6 hours minimum:  Change oxygen saturation probe site  4.  Every 4-6 hours:  If on nasal continuous positive airway pressure, respiratory therapy assess nares and determine need for appliance change or resting period  Outcome: Progressing  Flowsheets (Taken 3/13/2025 0800)  Skin Integrity Remains Intact: Monitor for areas of redness and/or skin breakdown     
  Problem: Chronic Conditions and Co-morbidities  Goal: Patient's chronic conditions and co-morbidity symptoms are monitored and maintained or improved  Outcome: Progressing  Flowsheets (Taken 3/14/2025 0800)  Care Plan - Patient's Chronic Conditions and Co-Morbidity Symptoms are Monitored and Maintained or Improved: Monitor and assess patient's chronic conditions and comorbid symptoms for stability, deterioration, or improvement     Problem: Discharge Planning  Goal: Discharge to home or other facility with appropriate resources  Outcome: Progressing  Flowsheets (Taken 3/14/2025 0800)  Discharge to home or other facility with appropriate resources: Identify barriers to discharge with patient and caregiver     Problem: Pain  Goal: Verbalizes/displays adequate comfort level or baseline comfort level  Outcome: Progressing     Problem: Safety - Adult  Goal: Free from fall injury  Outcome: Progressing     Problem: Skin/Tissue Integrity  Goal: Skin integrity remains intact  Description: 1.  Monitor for areas of redness and/or skin breakdown  2.  Assess vascular access sites hourly  3.  Every 4-6 hours minimum:  Change oxygen saturation probe site  4.  Every 4-6 hours:  If on nasal continuous positive airway pressure, respiratory therapy assess nares and determine need for appliance change or resting period  Outcome: Progressing  Flowsheets (Taken 3/14/2025 0800)  Skin Integrity Remains Intact: Monitor for areas of redness and/or skin breakdown     Problem: Neurosensory - Adult  Goal: Achieves stable or improved neurological status  Outcome: Progressing  Flowsheets (Taken 3/14/2025 0800)  Achieves stable or improved neurological status: Assess for and report changes in neurological status     Problem: Respiratory - Adult  Goal: Achieves optimal ventilation and oxygenation  Outcome: Progressing     Problem: Cardiovascular - Adult  Goal: Maintains optimal cardiac output and hemodynamic stability  Outcome: 
  Problem: Chronic Conditions and Co-morbidities  Goal: Patient's chronic conditions and co-morbidity symptoms are monitored and maintained or improved  Outcome: Progressing  Flowsheets (Taken 3/16/2025 0800)  Care Plan - Patient's Chronic Conditions and Co-Morbidity Symptoms are Monitored and Maintained or Improved:   Monitor and assess patient's chronic conditions and comorbid symptoms for stability, deterioration, or improvement   Update acute care plan with appropriate goals if chronic or comorbid symptoms are exacerbated and prevent overall improvement and discharge   Collaborate with multidisciplinary team to address chronic and comorbid conditions and prevent exacerbation or deterioration     Problem: Discharge Planning  Goal: Discharge to home or other facility with appropriate resources  Outcome: Progressing  Flowsheets (Taken 3/16/2025 0800)  Discharge to home or other facility with appropriate resources:   Identify barriers to discharge with patient and caregiver   Arrange for needed discharge resources and transportation as appropriate   Identify discharge learning needs (meds, wound care, etc)   Refer to discharge planning if patient needs post-hospital services based on physician order or complex needs related to functional status, cognitive ability or social support system   Arrange for interpreters to assist at discharge as needed     Problem: Pain  Goal: Verbalizes/displays adequate comfort level or baseline comfort level  Outcome: Progressing  Flowsheets (Taken 3/16/2025 0800)  Verbalizes/displays adequate comfort level or baseline comfort level:   Encourage patient to monitor pain and request assistance   Assess pain using appropriate pain scale   Administer analgesics based on type and severity of pain and evaluate response   Consider cultural and social influences on pain and pain management   Implement non-pharmacological measures as appropriate and evaluate response   Notify Licensed 
  Problem: Chronic Conditions and Co-morbidities  Goal: Patient's chronic conditions and co-morbidity symptoms are monitored and maintained or improved  Outcome: Progressing  Flowsheets (Taken 3/19/2025 0800)  Care Plan - Patient's Chronic Conditions and Co-Morbidity Symptoms are Monitored and Maintained or Improved: Monitor and assess patient's chronic conditions and comorbid symptoms for stability, deterioration, or improvement     Problem: Discharge Planning  Goal: Discharge to home or other facility with appropriate resources  Outcome: Progressing  Flowsheets (Taken 3/19/2025 0800)  Discharge to home or other facility with appropriate resources: Identify barriers to discharge with patient and caregiver     Problem: Pain  Goal: Verbalizes/displays adequate comfort level or baseline comfort level  Outcome: Progressing     Problem: Safety - Adult  Goal: Free from fall injury  Outcome: Progressing     Problem: Skin/Tissue Integrity  Goal: Skin integrity remains intact  Description: 1.  Monitor for areas of redness and/or skin breakdown  2.  Assess vascular access sites hourly  3.  Every 4-6 hours minimum:  Change oxygen saturation probe site  4.  Every 4-6 hours:  If on nasal continuous positive airway pressure, respiratory therapy assess nares and determine need for appliance change or resting period  Outcome: Progressing  Flowsheets (Taken 3/19/2025 0800)  Skin Integrity Remains Intact: Assess vascular access sites hourly     Problem: Neurosensory - Adult  Goal: Achieves stable or improved neurological status  Outcome: Progressing     Problem: Respiratory - Adult  Goal: Achieves optimal ventilation and oxygenation  Outcome: Progressing     Problem: Cardiovascular - Adult  Goal: Maintains optimal cardiac output and hemodynamic stability  Outcome: Progressing  Goal: Absence of cardiac dysrhythmias or at baseline  Outcome: Progressing     Problem: Musculoskeletal - Adult  Goal: Return ADL status to a safe level of 
  Problem: Chronic Conditions and Co-morbidities  Goal: Patient's chronic conditions and co-morbidity symptoms are monitored and maintained or improved  Outcome: Progressing  Flowsheets (Taken 3/19/2025 0800)  Care Plan - Patient's Chronic Conditions and Co-Morbidity Symptoms are Monitored and Maintained or Improved: Monitor and assess patient's chronic conditions and comorbid symptoms for stability, deterioration, or improvement     Problem: Discharge Planning  Goal: Discharge to home or other facility with appropriate resources  Outcome: Progressing  Flowsheets (Taken 3/19/2025 0800)  Discharge to home or other facility with appropriate resources: Identify barriers to discharge with patient and caregiver     Problem: Pain  Goal: Verbalizes/displays adequate comfort level or baseline comfort level  Outcome: Progressing     Problem: Safety - Adult  Goal: Free from fall injury  Outcome: Progressing     Problem: Skin/Tissue Integrity  Goal: Skin integrity remains intact  Description: 1.  Monitor for areas of redness and/or skin breakdown  2.  Assess vascular access sites hourly  3.  Every 4-6 hours minimum:  Change oxygen saturation probe site  4.  Every 4-6 hours:  If on nasal continuous positive airway pressure, respiratory therapy assess nares and determine need for appliance change or resting period  Outcome: Progressing  Flowsheets (Taken 3/19/2025 0800)  Skin Integrity Remains Intact: Assess vascular access sites hourly     Problem: Neurosensory - Adult  Goal: Achieves stable or improved neurological status  Outcome: Progressing     Problem: Respiratory - Adult  Goal: Achieves optimal ventilation and oxygenation  Outcome: Progressing     Problem: Cardiovascular - Adult  Goal: Maintains optimal cardiac output and hemodynamic stability  Outcome: Progressing  Goal: Absence of cardiac dysrhythmias or at baseline  Outcome: Progressing     Problem: Musculoskeletal - Adult  Goal: Return ADL status to a safe level of 
  Problem: Chronic Conditions and Co-morbidities  Goal: Patient's chronic conditions and co-morbidity symptoms are monitored and maintained or improved  Outcome: Progressing  Flowsheets (Taken 3/20/2025 0800)  Care Plan - Patient's Chronic Conditions and Co-Morbidity Symptoms are Monitored and Maintained or Improved: Monitor and assess patient's chronic conditions and comorbid symptoms for stability, deterioration, or improvement     Problem: Discharge Planning  Goal: Discharge to home or other facility with appropriate resources  Outcome: Progressing  Flowsheets (Taken 3/20/2025 0800)  Discharge to home or other facility with appropriate resources: Identify barriers to discharge with patient and caregiver     Problem: Pain  Goal: Verbalizes/displays adequate comfort level or baseline comfort level  Outcome: Progressing  Flowsheets (Taken 3/20/2025 1200)  Verbalizes/displays adequate comfort level or baseline comfort level:   Encourage patient to monitor pain and request assistance   Assess pain using appropriate pain scale     Problem: Safety - Adult  Goal: Free from fall injury  Outcome: Progressing  Flowsheets (Taken 3/18/2025 2222 by Taras Andrews RN)  Free From Fall Injury:   Instruct family/caregiver on patient safety   Based on caregiver fall risk screen, instruct family/caregiver to ask for assistance with transferring infant if caregiver noted to have fall risk factors     Problem: Skin/Tissue Integrity  Goal: Skin integrity remains intact  Description: 1.  Monitor for areas of redness and/or skin breakdown  2.  Assess vascular access sites hourly  3.  Every 4-6 hours minimum:  Change oxygen saturation probe site  4.  Every 4-6 hours:  If on nasal continuous positive airway pressure, respiratory therapy assess nares and determine need for appliance change or resting period  Outcome: Progressing  Flowsheets (Taken 3/20/2025 0800)  Skin Integrity Remains Intact:   Monitor for areas of redness and/or skin 
  Problem: Chronic Conditions and Co-morbidities  Goal: Patient's chronic conditions and co-morbidity symptoms are monitored and maintained or improved  Outcome: Progressing  Flowsheets (Taken 3/26/2025 0800)  Care Plan - Patient's Chronic Conditions and Co-Morbidity Symptoms are Monitored and Maintained or Improved: Monitor and assess patient's chronic conditions and comorbid symptoms for stability, deterioration, or improvement     Problem: Discharge Planning  Goal: Discharge to home or other facility with appropriate resources  Outcome: Progressing  Flowsheets (Taken 3/26/2025 0800)  Discharge to home or other facility with appropriate resources: Identify barriers to discharge with patient and caregiver     Problem: Pain  Goal: Verbalizes/displays adequate comfort level or baseline comfort level  Outcome: Progressing  Flowsheets (Taken 3/26/2025 0800)  Verbalizes/displays adequate comfort level or baseline comfort level:   Encourage patient to monitor pain and request assistance   Assess pain using appropriate pain scale     Problem: Safety - Adult  Goal: Free from fall injury  Outcome: Progressing     Problem: Skin/Tissue Integrity  Goal: Skin integrity remains intact  Description: 1.  Monitor for areas of redness and/or skin breakdown  2.  Assess vascular access sites hourly  3.  Every 4-6 hours minimum:  Change oxygen saturation probe site  4.  Every 4-6 hours:  If on nasal continuous positive airway pressure, respiratory therapy assess nares and determine need for appliance change or resting period  Outcome: Progressing  Flowsheets (Taken 3/26/2025 0800)  Skin Integrity Remains Intact:   Monitor for areas of redness and/or skin breakdown   Assess vascular access sites hourly     Problem: Neurosensory - Adult  Goal: Achieves stable or improved neurological status  Outcome: Progressing  Flowsheets (Taken 3/26/2025 0800)  Achieves stable or improved neurological status:   Assess for and report changes in 
  Problem: Discharge Planning  Goal: Discharge to home or other facility with appropriate resources  Outcome: Progressing  Flowsheets (Taken 3/30/2025 0800)  Discharge to home or other facility with appropriate resources: Identify barriers to discharge with patient and caregiver     
  Problem: Pain  Goal: Verbalizes/displays adequate comfort level or baseline comfort level  Outcome: Progressing  Flowsheets (Taken 3/28/2025 0354)  Verbalizes/displays adequate comfort level or baseline comfort level:   Encourage patient to monitor pain and request assistance   Administer analgesics based on type and severity of pain and evaluate response   Assess pain using appropriate pain scale   Implement non-pharmacological measures as appropriate and evaluate response   Notify Licensed Independent Practitioner if interventions unsuccessful or patient reports new pain     Problem: Skin/Tissue Integrity  Goal: Skin integrity remains intact  Description: 1.  Monitor for areas of redness and/or skin breakdown  2.  Assess vascular access sites hourly  3.  Every 4-6 hours minimum:  Change oxygen saturation probe site  4.  Every 4-6 hours:  If on nasal continuous positive airway pressure, respiratory therapy assess nares and determine need for appliance change or resting period  Outcome: Progressing  Flowsheets (Taken 3/27/2025 2000)  Skin Integrity Remains Intact: Monitor for areas of redness and/or skin breakdown     Problem: Respiratory - Adult  Goal: Achieves optimal ventilation and oxygenation  Outcome: Progressing  Flowsheets (Taken 3/27/2025 2000)  Achieves optimal ventilation and oxygenation:   Assess for changes in respiratory status   Position to facilitate oxygenation and minimize respiratory effort   Oxygen supplementation based on oxygen saturation or arterial blood gases   Assess the need for suctioning and aspirate as needed     Problem: Cardiovascular - Adult  Goal: Maintains optimal cardiac output and hemodynamic stability  Outcome: Progressing  Flowsheets (Taken 3/27/2025 2000)  Maintains optimal cardiac output and hemodynamic stability:   Monitor blood pressure and heart rate   Assess for signs of decreased cardiac output   Administer fluid and/or volume expanders as ordered   Administer 
  Problem: Safety - Adult  Goal: Free from fall injury  Outcome: Progressing     
Letter has been printed, stamped, and faxed.   Shelby Tariq MA  June 29, 2018    
  Problem: Genitourinary - Adult  Goal: Absence of urinary retention  Outcome: Not Progressing  Goal: Urinary catheter remains patent  Outcome: Not Progressing     Problem: Infection - Adult  Goal: Absence of infection at discharge  Outcome: Not Progressing     Problem: Metabolic/Fluid and Electrolytes - Adult  Goal: Electrolytes maintained within normal limits  Outcome: Not Progressing  Goal: Hemodynamic stability and optimal renal function maintained  Outcome: Not Progressing  Goal: Glucose maintained within prescribed range  Outcome: Not Progressing     Problem: Hematologic - Adult  Goal: Maintains hematologic stability  Outcome: Not Progressing     Problem: Chronic Conditions and Co-morbidities  Goal: Patient's chronic conditions and co-morbidity symptoms are monitored and maintained or improved  Outcome: Not Progressing     Problem: Discharge Planning  Goal: Discharge to home or other facility with appropriate resources  Outcome: Not Progressing     Problem: Pain  Goal: Verbalizes/displays adequate comfort level or baseline comfort level  Outcome: Not Progressing  Flowsheets (Taken 3/18/2025 2030)  Verbalizes/displays adequate comfort level or baseline comfort level:   Encourage patient to monitor pain and request assistance   Assess pain using appropriate pain scale   Administer analgesics based on type and severity of pain and evaluate response     Problem: Safety - Adult  Goal: Free from fall injury  Outcome: Not Progressing     Problem: Skin/Tissue Integrity  Goal: Skin integrity remains intact  Description: 1.  Monitor for areas of redness and/or skin breakdown  2.  Assess vascular access sites hourly  3.  Every 4-6 hours minimum:  Change oxygen saturation probe site  4.  Every 4-6 hours:  If on nasal continuous positive airway pressure, respiratory therapy assess nares and determine need for appliance change or resting period  Outcome: Not Progressing  Flowsheets (Taken 3/18/2025 2030)  Skin Integrity 
3/25/2025 0800)  Maintains optimal cardiac output and hemodynamic stability:   Monitor blood pressure and heart rate   Monitor urine output and notify Licensed Independent Practitioner for values outside of normal range   Assess for signs of decreased cardiac output   Administer fluid and/or volume expanders as ordered   Administer vasoactive medications as ordered     Problem: Infection - Adult  Goal: Absence of infection at discharge  Outcome: Progressing  Flowsheets (Taken 3/25/2025 0800)  Absence of infection at discharge:   Assess and monitor for signs and symptoms of infection   Monitor lab/diagnostic results   Monitor all insertion sites i.e., indwelling lines, tubes and drains   Monitor endotracheal (as able) and nasal secretions for changes in amount and color   [place] appropriate cooling/warming therapies per order   Administer medications as ordered     Problem: Metabolic/Fluid and Electrolytes - Adult  Goal: Electrolytes maintained within normal limits  Outcome: Progressing  Flowsheets (Taken 3/25/2025 0800)  Electrolytes maintained within normal limits:   Monitor labs and assess patient for signs and symptoms of electrolyte imbalances   Administer electrolyte replacement as ordered   Monitor response to electrolyte replacements, including repeat lab results as appropriate   Fluid restriction as ordered     
catheter remains patent  Outcome: Progressing     Problem: Infection - Adult  Goal: Absence of infection at discharge  Outcome: Progressing     Problem: Metabolic/Fluid and Electrolytes - Adult  Goal: Electrolytes maintained within normal limits  Outcome: Progressing  Goal: Hemodynamic stability and optimal renal function maintained  Outcome: Progressing  Goal: Glucose maintained within prescribed range  Outcome: Progressing     Problem: Hematologic - Adult  Goal: Maintains hematologic stability  Outcome: Progressing     
Progressing  Flowsheets (Taken 3/12/2025 0801)  Absence of infection at discharge: Assess and monitor for signs and symptoms of infection     Problem: Metabolic/Fluid and Electrolytes - Adult  Goal: Electrolytes maintained within normal limits  3/12/2025 0949 by Melani Martini RN  Outcome: Progressing  Flowsheets (Taken 3/12/2025 0801)  Electrolytes maintained within normal limits: Monitor labs and assess patient for signs and symptoms of electrolyte imbalances  Goal: Hemodynamic stability and optimal renal function maintained  3/12/2025 0949 by Melani Martini RN  Outcome: Progressing  Flowsheets (Taken 3/12/2025 0801)  Hemodynamic stability and optimal renal function maintained: Monitor labs and assess for signs and symptoms of volume excess or deficit  Goal: Glucose maintained within prescribed range  3/12/2025 0949 by Melani Martini RN  Outcome: Progressing  Flowsheets (Taken 3/12/2025 0801)  Glucose maintained within prescribed range: Monitor blood glucose as ordered     Problem: Hematologic - Adult  Goal: Maintains hematologic stability  3/12/2025 0949 by Melani Martini RN  Outcome: Progressing  Flowsheets (Taken 3/12/2025 0801)  Maintains hematologic stability: Assess for signs and symptoms of bleeding or hemorrhage     
Progressing  Flowsheets (Taken 3/14/2025 0800)  Return ADL Status to a Safe Level of Function: Administer medication as ordered     Problem: Genitourinary - Adult  Goal: Absence of urinary retention  3/14/2025 1148 by Grisel Watson RN  Outcome: Progressing  3/14/2025 1054 by Grisel Watson RN  Outcome: Progressing  Flowsheets (Taken 3/14/2025 0800)  Absence of urinary retention: Monitor intake/output and perform bladder scan as needed  Goal: Urinary catheter remains patent  3/14/2025 1148 by Grisel Watson RN  Outcome: Progressing  3/14/2025 1054 by Grisel Watson RN  Outcome: Progressing  Flowsheets (Taken 3/14/2025 0800)  Urinary catheter remains patent: Assess patency of urinary catheter     Problem: Infection - Adult  Goal: Absence of infection at discharge  3/14/2025 1148 by Grisel Watson RN  Outcome: Progressing  3/14/2025 1054 by Grisel Watson RN  Outcome: Progressing  Flowsheets (Taken 3/14/2025 0800)  Absence of infection at discharge: Assess and monitor for signs and symptoms of infection     Problem: Metabolic/Fluid and Electrolytes - Adult  Goal: Electrolytes maintained within normal limits  3/14/2025 1148 by Grisel Watson RN  Outcome: Progressing  3/14/2025 1054 by Grisel Watson RN  Outcome: Progressing  Flowsheets (Taken 3/14/2025 0800)  Electrolytes maintained within normal limits: Monitor labs and assess patient for signs and symptoms of electrolyte imbalances  Goal: Hemodynamic stability and optimal renal function maintained  3/14/2025 1148 by Grisel Watson RN  Outcome: Progressing  3/14/2025 1054 by Grisel Watson RN  Outcome: Progressing  Flowsheets (Taken 3/14/2025 0800)  Hemodynamic stability and optimal renal function maintained: Monitor labs and assess for signs and symptoms of volume excess or deficit  Goal: Glucose maintained within prescribed range  3/14/2025 1148 by Grisel Watson RN  Outcome: Progressing  3/14/2025 1054 by Grisel Watson RN  Outcome: Progressing  Flowsheets (Taken 3/14/2025 
cardiac output   Administer fluid and/or volume expanders as ordered   Administer vasoactive medications as ordered  Goal: Absence of cardiac dysrhythmias or at baseline  Recent Flowsheet Documentation  Taken 3/29/2025 0800 by Emigdio Paniagua RN  Absence of cardiac dysrhythmias or at baseline:   Monitor cardiac rate and rhythm   Assess for signs of decreased cardiac output   Administer antiarrhythmia medication and electrolyte replacement as ordered  3/29/2025 0403 by Kalyn Drake RN  Outcome: Not Progressing  Flowsheets (Taken 3/28/2025 2000)  Absence of cardiac dysrhythmias or at baseline:   Monitor cardiac rate and rhythm   Assess for signs of decreased cardiac output   Administer antiarrhythmia medication and electrolyte replacement as ordered     Problem: Musculoskeletal - Adult  Goal: Return ADL status to a safe level of function  3/29/2025 0403 by Kalyn Drake RN  Outcome: Not Progressing  Flowsheets (Taken 3/28/2025 2000)  Return ADL Status to a Safe Level of Function:   Administer medication as ordered   Assess activities of daily living deficits and provide assistive devices as needed   Obtain physical therapy/occupational therapy consults as needed   Assist and instruct patient to increase activity and self care as tolerated     Problem: Genitourinary - Adult  Goal: Absence of urinary retention  Recent Flowsheet Documentation  Taken 3/29/2025 0800 by Emigdio Paniagua RN  Absence of urinary retention:   Assess patient’s ability to void and empty bladder   Monitor intake/output and perform bladder scan as needed   Place urinary catheter per Licensed Independent Practitioner order if needed   Discuss with Licensed Independent Practitioner  medications to alleviate retention as needed   Discuss catheterization for long term situations as appropriate  3/29/2025 0403 by Kalyn Drake RN  Outcome: Not Progressing  Flowsheets (Taken 3/28/2025 2000)  Absence of urinary retention:   Assess patient’s 
RN  Outcome: Progressing  Flowsheets (Taken 3/13/2025 0800)  Urinary catheter remains patent: Assess patency of urinary catheter     Problem: Infection - Adult  Goal: Absence of infection at discharge  3/13/2025 1416 by Radha Bentley RN  Outcome: Progressing  Flowsheets (Taken 3/13/2025 0800)  Absence of infection at discharge:   Assess and monitor for signs and symptoms of infection   Monitor lab/diagnostic results   Monitor all insertion sites i.e., indwelling lines, tubes and drains   Monitor endotracheal (as able) and nasal secretions for changes in amount and color   [place] appropriate cooling/warming therapies per order   Administer medications as ordered   Instruct and encourage patient and family to use good hand hygiene technique   Identify and instruct in appropriate isolation precautions for identified infection/condition     Problem: Metabolic/Fluid and Electrolytes - Adult  Goal: Electrolytes maintained within normal limits  3/13/2025 1416 by Radha Bentley RN  Outcome: Progressing  Flowsheets (Taken 3/13/2025 0800)  Electrolytes maintained within normal limits:   Monitor labs and assess patient for signs and symptoms of electrolyte imbalances   Administer electrolyte replacement as ordered   Monitor response to electrolyte replacements, including repeat lab results as appropriate   Fluid restriction as ordered  Goal: Hemodynamic stability and optimal renal function maintained  3/13/2025 1416 by Radha Bentley RN  Outcome: Progressing  Flowsheets (Taken 3/13/2025 0800)  Hemodynamic stability and optimal renal function maintained:   Monitor labs and assess for signs and symptoms of volume excess or deficit   Monitor intake, output and patient weight   Monitor response to interventions for patient's volume status, including labs, urine output, blood pressure (other measures as available)  Goal: Glucose maintained within prescribed range  3/13/2025 1416 by Radha Bentley,

## 2025-03-31 ENCOUNTER — APPOINTMENT (OUTPATIENT)
Facility: HOSPITAL | Age: 55
DRG: 004 | End: 2025-03-31
Payer: MEDICARE

## 2025-03-31 LAB
ALBUMIN SERPL-MCNC: 1.8 G/DL (ref 3.4–5)
ANION GAP SERPL CALC-SCNC: 7 MMOL/L (ref 3–18)
BACTERIA SPEC CULT: NORMAL
BASOPHILS # BLD: 0.01 K/UL (ref 0–0.1)
BASOPHILS NFR BLD: 0.2 % (ref 0–2)
BUN SERPL-MCNC: 20 MG/DL (ref 7–18)
BUN/CREAT SERPL: 31 (ref 12–20)
CALCIUM SERPL-MCNC: 8.4 MG/DL (ref 8.5–10.1)
CHLORIDE SERPL-SCNC: 105 MMOL/L (ref 100–111)
CO2 SERPL-SCNC: 22 MMOL/L (ref 21–32)
CREAT SERPL-MCNC: 0.64 MG/DL (ref 0.6–1.3)
DIFFERENTIAL METHOD BLD: ABNORMAL
EOSINOPHIL # BLD: 0.01 K/UL (ref 0–0.4)
EOSINOPHIL NFR BLD: 0.2 % (ref 0–5)
ERYTHROCYTE [DISTWIDTH] IN BLOOD BY AUTOMATED COUNT: 24.3 % (ref 11.6–14.5)
GLUCOSE BLD STRIP.AUTO-MCNC: 205 MG/DL (ref 70–110)
GLUCOSE BLD STRIP.AUTO-MCNC: 240 MG/DL (ref 70–110)
GLUCOSE BLD STRIP.AUTO-MCNC: 246 MG/DL (ref 70–110)
GLUCOSE BLD STRIP.AUTO-MCNC: 277 MG/DL (ref 70–110)
GLUCOSE BLD STRIP.AUTO-MCNC: 292 MG/DL (ref 70–110)
GLUCOSE BLD STRIP.AUTO-MCNC: 310 MG/DL (ref 70–110)
GLUCOSE SERPL-MCNC: 248 MG/DL (ref 74–99)
HCT VFR BLD AUTO: 26.1 % (ref 36–48)
HGB BLD-MCNC: 8.3 G/DL (ref 13–16)
IMM GRANULOCYTES # BLD AUTO: 0.05 K/UL (ref 0–0.04)
IMM GRANULOCYTES NFR BLD AUTO: 0.9 % (ref 0–0.5)
LYMPHOCYTES # BLD: 0.23 K/UL (ref 0.9–3.3)
LYMPHOCYTES NFR BLD: 4.2 % (ref 21–52)
MAGNESIUM SERPL-MCNC: 1.8 MG/DL (ref 1.6–2.6)
MCH RBC QN AUTO: 32.4 PG (ref 24–34)
MCHC RBC AUTO-ENTMCNC: 31.8 G/DL (ref 31–37)
MCV RBC AUTO: 102 FL (ref 78–100)
MONOCYTES # BLD: 0.43 K/UL (ref 0.05–1.2)
MONOCYTES NFR BLD: 7.9 % (ref 3–10)
NEUTS SEG # BLD: 4.72 K/UL (ref 1.8–8)
NEUTS SEG NFR BLD: 86.6 % (ref 40–73)
NRBC # BLD: 0 K/UL (ref 0–0.01)
NRBC BLD-RTO: 0 PER 100 WBC
PHOSPHATE SERPL-MCNC: 2.2 MG/DL (ref 2.5–4.9)
PLATELET # BLD AUTO: 126 K/UL (ref 135–420)
PMV BLD AUTO: 11 FL (ref 9.2–11.8)
POTASSIUM SERPL-SCNC: 4.6 MMOL/L (ref 3.5–5.5)
RBC # BLD AUTO: 2.56 M/UL (ref 4.35–5.65)
SERVICE CMNT-IMP: NORMAL
SODIUM SERPL-SCNC: 134 MMOL/L (ref 136–145)
WBC # BLD AUTO: 5.5 K/UL (ref 4.6–13.2)

## 2025-03-31 PROCEDURE — 2500000003 HC RX 250 WO HCPCS: Performed by: HOSPITALIST

## 2025-03-31 PROCEDURE — 6370000000 HC RX 637 (ALT 250 FOR IP): Performed by: INTERNAL MEDICINE

## 2025-03-31 PROCEDURE — 2000000000 HC ICU R&B

## 2025-03-31 PROCEDURE — 83735 ASSAY OF MAGNESIUM: CPT

## 2025-03-31 PROCEDURE — 6360000002 HC RX W HCPCS: Performed by: INTERNAL MEDICINE

## 2025-03-31 PROCEDURE — 80069 RENAL FUNCTION PANEL: CPT

## 2025-03-31 PROCEDURE — 2580000003 HC RX 258: Performed by: INTERNAL MEDICINE

## 2025-03-31 PROCEDURE — 82962 GLUCOSE BLOOD TEST: CPT

## 2025-03-31 PROCEDURE — 2500000003 HC RX 250 WO HCPCS: Performed by: INTERNAL MEDICINE

## 2025-03-31 PROCEDURE — 71045 X-RAY EXAM CHEST 1 VIEW: CPT

## 2025-03-31 PROCEDURE — 85025 COMPLETE CBC W/AUTO DIFF WBC: CPT

## 2025-03-31 PROCEDURE — 94003 VENT MGMT INPAT SUBQ DAY: CPT

## 2025-03-31 RX ORDER — MIDODRINE HYDROCHLORIDE 5 MG/1
5 TABLET ORAL
Qty: 90 TABLET | Refills: 3
Start: 2025-03-31

## 2025-03-31 RX ORDER — METHIMAZOLE 10 MG/1
10 TABLET ORAL DAILY
Qty: 30 TABLET | Refills: 0
Start: 2025-04-01 | End: 2025-05-01

## 2025-03-31 RX ORDER — INSULIN GLARGINE 100 [IU]/ML
6 INJECTION, SOLUTION SUBCUTANEOUS NIGHTLY
Qty: 10 ML | Refills: 0
Start: 2025-03-31

## 2025-03-31 RX ORDER — INSULIN LISPRO 100 [IU]/ML
0-8 INJECTION, SOLUTION INTRAVENOUS; SUBCUTANEOUS
Qty: 10 ML | Refills: 0
Start: 2025-03-31

## 2025-03-31 RX ORDER — SODIUM BICARBONATE 650 MG/1
650 TABLET ORAL 3 TIMES DAILY
Qty: 30 TABLET | Refills: 0
Start: 2025-03-31

## 2025-03-31 RX ORDER — METOPROLOL TARTRATE 25 MG/1
25 TABLET, FILM COATED ORAL 2 TIMES DAILY
Qty: 60 TABLET | Refills: 3
Start: 2025-03-31

## 2025-03-31 RX ADMIN — PREDNISONE 5 MG: 5 TABLET ORAL at 08:34

## 2025-03-31 RX ADMIN — CHLORHEXIDINE GLUCONATE, 0.12% ORAL RINSE 15 ML: 1.2 SOLUTION DENTAL at 21:10

## 2025-03-31 RX ADMIN — METOPROLOL TARTRATE 25 MG: 25 TABLET, FILM COATED ORAL at 08:34

## 2025-03-31 RX ADMIN — MYCOPHENOLATE MOFETIL 1000 MG: 500 INJECTION, POWDER, LYOPHILIZED, FOR SOLUTION INTRAVENOUS at 15:18

## 2025-03-31 RX ADMIN — METHIMAZOLE 10 MG: 5 TABLET ORAL at 11:20

## 2025-03-31 RX ADMIN — SODIUM BICARBONATE 650 MG TABLET 650 MG: at 08:34

## 2025-03-31 RX ADMIN — CEFAZOLIN 2000 MG: 10 INJECTION, POWDER, FOR SOLUTION INTRAVENOUS at 16:44

## 2025-03-31 RX ADMIN — INSULIN LISPRO 4 UNITS: 100 INJECTION, SOLUTION INTRAVENOUS; SUBCUTANEOUS at 07:16

## 2025-03-31 RX ADMIN — CHLORHEXIDINE GLUCONATE, 0.12% ORAL RINSE 15 ML: 1.2 SOLUTION DENTAL at 08:34

## 2025-03-31 RX ADMIN — INSULIN LISPRO 2 UNITS: 100 INJECTION, SOLUTION INTRAVENOUS; SUBCUTANEOUS at 12:10

## 2025-03-31 RX ADMIN — AMIODARONE HYDROCHLORIDE 200 MG: 200 TABLET ORAL at 08:34

## 2025-03-31 RX ADMIN — SODIUM CHLORIDE, PRESERVATIVE FREE 10 ML: 5 INJECTION INTRAVENOUS at 08:34

## 2025-03-31 RX ADMIN — SODIUM BICARBONATE 650 MG TABLET 650 MG: at 15:09

## 2025-03-31 RX ADMIN — MIDODRINE HYDROCHLORIDE 5 MG: 2.5 TABLET ORAL at 16:44

## 2025-03-31 RX ADMIN — SODIUM CHLORIDE, PRESERVATIVE FREE 40 MG: 5 INJECTION INTRAVENOUS at 08:34

## 2025-03-31 RX ADMIN — CEFAZOLIN 2000 MG: 10 INJECTION, POWDER, FOR SOLUTION INTRAVENOUS at 08:34

## 2025-03-31 RX ADMIN — SODIUM CHLORIDE, PRESERVATIVE FREE 10 ML: 5 INJECTION INTRAVENOUS at 21:10

## 2025-03-31 RX ADMIN — SODIUM BICARBONATE 650 MG TABLET 650 MG: at 21:10

## 2025-03-31 RX ADMIN — MIDODRINE HYDROCHLORIDE 5 MG: 2.5 TABLET ORAL at 12:11

## 2025-03-31 RX ADMIN — INSULIN LISPRO 6 UNITS: 100 INJECTION, SOLUTION INTRAVENOUS; SUBCUTANEOUS at 17:55

## 2025-03-31 RX ADMIN — ENOXAPARIN SODIUM 40 MG: 100 INJECTION SUBCUTANEOUS at 08:34

## 2025-03-31 RX ADMIN — MICAFUNGIN SODIUM 100 MG: 100 INJECTION, POWDER, LYOPHILIZED, FOR SOLUTION INTRAVENOUS at 16:50

## 2025-03-31 RX ADMIN — MIDODRINE HYDROCHLORIDE 5 MG: 2.5 TABLET ORAL at 08:34

## 2025-03-31 RX ADMIN — MYCOPHENOLATE MOFETIL 1000 MG: 500 INJECTION, POWDER, LYOPHILIZED, FOR SOLUTION INTRAVENOUS at 00:06

## 2025-03-31 RX ADMIN — CEFAZOLIN 2000 MG: 10 INJECTION, POWDER, FOR SOLUTION INTRAVENOUS at 01:44

## 2025-03-31 ASSESSMENT — PAIN SCALES - GENERAL
PAINLEVEL_OUTOF10: 0

## 2025-03-31 ASSESSMENT — PULMONARY FUNCTION TESTS
PIF_VALUE: 16
PIF_VALUE: 18
PIF_VALUE: 17
PIF_VALUE: 17
PIF_VALUE: 18
PIF_VALUE: 17

## 2025-03-31 NOTE — CARE COORDINATION
SW sent updated clinicals to [facility]. Pts auth remains in pending status. Ongoing efforts to follow up.    JEYOSN Castro  Case Management Department

## 2025-03-31 NOTE — CARE COORDINATION
Per Kenya Welch, Betalecelt is a once a month dose that is $5,000 a dose. The Med is  scheduled for tomorrow. Per the Nephrologist it can be given today prior to discharge. SW informed the attending MD who is clarifying all meds when completing the Doc to Doc.    JEYSON Castro  Case Management Department

## 2025-03-31 NOTE — CARE COORDINATION
Yung received and approved per Kenya [phone] with [facility]. Kenya however, requested clarification regarding pts Mycophenolate, Kenya inquiring if it can be given in Liquid form. EDWIN requested the attending MD who will be following up with ID.    Per Kenya the medication is very expensive via IV. Kenya stated that in the event the liquid form is not an option she would like to know the end date of the medication.     EDWIN will continue to follow up and once everything is clarified, SW to coordinate transport. Pt will need to be in the facility no later than 6pm.    ADDENDUM @ 1:03pm    Per the attending MD, pt can only do IV and their is no alternative. Per the attending MD, pts last dose is April 3d (Q12).     EDWIN informed Kenya who will follow up with this writer. EDWIN updated the attending RN.    ADDENDUM @ 1:22pm    EDWIN clarified with the attending MD that the med in question is mycophenolate. EDWIN pending a response.      Marie Campos, JEYSON  Case Management Department

## 2025-03-31 NOTE — DISCHARGE SUMMARY
meant to be a communication between medical professionals.  Additionally, portion of this note were created using voice recognition function, syntax and phonetic over may have escaped proofreading.      Julia Alonzo MD    CC: Brooke Roland FNP

## 2025-03-31 NOTE — CARE COORDINATION
Per the attending Nephrologist, pt is able to take the liquid form of the Mycophenolate, attending MD made aware.     RN and MD to call report to [phone].    [facility] Transport scheduled fr 8:45pm (earliest available).    SW informed Yaritza [phone] who remains in agreement with the plan and stated she will inform pts father Roldan Avendano and the rest of the family.     [facility]    Basic Information    Address:  [address] [place] [address]  [address] [place] [address]  [facility]  Phone:  [phone]    JEYSON Castro  Case Management Department

## 2025-03-31 NOTE — CARE COORDINATION
[facility] will give Betalecelt tomorrow. SW to place PCS and AVS on pts chart.  DC summary to be printed and added to envelope once finalized.    [facility] rep Kenya [phone] with access to [employer] therefore DC clinicals can be pulled on her end.    Pt with a late transport time  (8:45 pm with [phone]) therefore, SW provided Kenya with ICU number in the event any barriers arise once DC clinicals  are reviewed.     JEYSON Castro  Case Management Department

## 2025-04-01 VITALS
RESPIRATION RATE: 17 BRPM | OXYGEN SATURATION: 98 % | HEIGHT: 70 IN | TEMPERATURE: 98.9 F | SYSTOLIC BLOOD PRESSURE: 101 MMHG | WEIGHT: 207.89 LBS | BODY MASS INDEX: 29.76 KG/M2 | HEART RATE: 76 BPM | DIASTOLIC BLOOD PRESSURE: 58 MMHG

## 2025-04-01 NOTE — PROGRESS NOTES
Hospitalist Progress Note    Patient: Angelo Mireles MRN: [id number]  CSN: [id number]    YOB: 1970  Age: 54 y.o.  Sex: male    DOA: 3/9/2025 LOS:  LOS: 7 days          Chief Complain :  SOB  54 y.o. male with diabetes, hypertension, CAD, atrial fibrillation, end-stage renal disease now status post renal transplant in 2023 on transplant medications, right eye blindness presents to ER via EMS with concerns of shortness of breath.  Patient is orally intubated unresponsive.  History obtained from discussion with ER physician and chart review.  Patient started receiving treatment in ER, he was placed on BiPAP however he continued to have respiratory distress and subsequently he was intubated.  He was started on propofol for suction.  Soon after he went into bradycardia and subsequent PEA arrest.  CODE BLUE called and CPR initiated.  Code lasted for 20 minutes with ROSC.  He received epinephrine and bicarb.  He coded again briefly and this time he also had brief episode of V. tach.  He received amiodarone bolus.  Propofol stopped after second cardiac arrest.  He is now off sedation.  Not responding to painful stimuli.  Suspect anoxic brain injury.  Neurology following.  MRI brain with extensive diffusion restriction diffuse syllable or, bilateral occipital, bilateral hippocampal, bilateral temporal.  Suggestive of anoxic brain injury.  Palliative care to have goals of care discussion with family.    Subjective:   Patient orally intubated, not on any sedation, does not respond to pain stimulus.  Assessment/Plan     Active Hospital Problems    Diagnosis     Bacteremia [R78.81]     Cardiac arrest (HCC) [I46.9]     Acute 
                                                                                                                                                                                                                                                                                                  Hospitalist Progress Note    Patient: Angelo Mireles MRN: [id number]  CSN: [id number]    YOB: 1970  Age: 54 y.o.  Sex: male    DOA: 3/9/2025 LOS:  LOS: 8 days          Chief Complain :  SOB  54 y.o. male with diabetes, hypertension, CAD, atrial fibrillation, end-stage renal disease now status post renal transplant in 2023 on transplant medications, right eye blindness presents to ER via EMS with concerns of shortness of breath.  Patient is orally intubated unresponsive.  History obtained from discussion with ER physician and chart review.  Patient started receiving treatment in ER, he was placed on BiPAP however he continued to have respiratory distress and subsequently he was intubated.  He was started on propofol for suction.  Soon after he went into bradycardia and subsequent PEA arrest.  CODE BLUE called and CPR initiated.  Code lasted for 20 minutes with ROSC.  He received epinephrine and bicarb.  He coded again briefly and this time he also had brief episode of V. tach.  He received amiodarone bolus.  Propofol stopped after second cardiac arrest.  He is now off sedation.  Not responding to painful stimuli.  Suspect anoxic brain injury.  Neurology following.  MRI brain with extensive diffusion restriction diffuse syllable or, bilateral occipital, bilateral hippocampal, bilateral temporal.  Suggestive of anoxic brain injury.  Palliative care to have goals of care discussion with family.    Subjective:   Patient orally intubated, not on any sedation, does not respond to pain stimulus.  Review of systems : As above and,  Unable to obtain    Physical Exam: As above and,  General: As above  HEENT: NC, Atraumatic.  Right 
                                                                                                                                                                                                                                                                                                  Hospitalist Progress Note    Patient: Angelo Mireles MRN: [id number]  CSN: [id number]    YOB: 1970  Age: 54 y.o.  Sex: male    DOA: 3/9/2025 LOS:  LOS: 6 days          Chief Complain :  SOB  54 y.o. male with diabetes, hypertension, CAD, atrial fibrillation, end-stage renal disease now status post renal transplant in 2023 on transplant medications, right eye blindness presents to ER via EMS with concerns of shortness of breath.  Patient is orally intubated unresponsive.  History obtained from discussion with ER physician and chart review.  Patient started receiving treatment in ER, he was placed on BiPAP however he continued to have respiratory distress and subsequently he was intubated.  He was started on propofol for suction.  Soon after he went into bradycardia and subsequent PEA arrest.  CODE BLUE called and CPR initiated.  Code lasted for 20 minutes with ROSC.  He received epinephrine and bicarb.  He coded again briefly and this time he also had brief episode of V. tach.  He received amiodarone bolus.  Propofol stopped after second cardiac arrest.  He is now off sedation.  Not responding to painful stimuli.  Suspect anoxic brain injury.  Neurology following.  Await MRI.  In ER his BUNs/creatinine 38/2.47, CO2 of 9 lactic acid of 5.8.  ABG with initial pH of 7.39 pCO2 of 13.8 CO2 of 8.  Follow-up ABG with pH of 6.9, pCO2 of 75.  CT chest with no PE showed bilateral pneumonia.  EKG showed ST elevation in anteroseptal leads.  He required to be started on multiple pressors and bicarb drip.  Ideally recommend transfer to tertiary care center however patient is too unstable to transfer.  Subjective:   Patient orally 
                                                                                                                                                                                                                                                                                                  Hospitalist Progress Note    Patient: Angelo Mireles MRN: [id number]  CSN: [id number]    YOB: 1970  Age: 54 y.o.  Sex: male    DOA: 3/9/2025 LOS:  LOS: 1 day          Chief Complain :  SOB  54 y.o. male with diabetes, hypertension, CAD, atrial fibrillation, end-stage renal disease now status post renal transplant in 2023 on transplant medications, right eye blindness presents to ER via EMS with concerns of shortness of breath.  Patient is orally intubated unresponsive.  History obtained from discussion with ER physician and chart review.  Patient started receiving treatment in ER, he was placed on BiPAP however he continued to have respiratory distress and subsequently he was intubated.  He was started on propofol for suction.  Soon after he went into bradycardia and subsequent PEA arrest.  CODE BLUE called and CPR initiated.  Code lasted for 20 minutes with ROSC.  He received epinephrine and bicarb.  He coded again briefly and this time he also had brief episode of V. tach.  He received amiodarone bolus.  Propofol stopped after second cardiac arrest.  In ER his BUNs/creatinine 38/2.47, CO2 of 9 lactic acid of 5.8.  ABG with initial pH of 7.39 pCO2 of 13.8 CO2 of 8.  Follow-up ABG with pH of 6.9, pCO2 of 75.  CT chest with no PE showed bilateral pneumonia.  EKG showed ST elevation in anteroseptal leads.  He required to be started on multiple pressors and bicarb drip.  Ideally recommend transfer to tertiary care center however patient is too unstable to transfer.  Subjective:   Patient orally intubated, sedated and paralyzed on nimbus.    Assessment/Plan     Active Hospital Problems    Diagnosis     Cardiac arrest (HCC) 
                                                                                                                                                                                                                                                                                                  Hospitalist Progress Note    Patient: Angelo Mireles MRN: [id number]  CSN: [id number]    YOB: 1970  Age: 54 y.o.  Sex: male    DOA: 3/9/2025 LOS:  LOS: 3 days          Chief Complain :  SOB  54 y.o. male with diabetes, hypertension, CAD, atrial fibrillation, end-stage renal disease now status post renal transplant in 2023 on transplant medications, right eye blindness presents to ER via EMS with concerns of shortness of breath.  Patient is orally intubated unresponsive.  History obtained from discussion with ER physician and chart review.  Patient started receiving treatment in ER, he was placed on BiPAP however he continued to have respiratory distress and subsequently he was intubated.  He was started on propofol for suction.  Soon after he went into bradycardia and subsequent PEA arrest.  CODE BLUE called and CPR initiated.  Code lasted for 20 minutes with ROSC.  He received epinephrine and bicarb.  He coded again briefly and this time he also had brief episode of V. tach.  He received amiodarone bolus.  Propofol stopped after second cardiac arrest.  In ER his BUNs/creatinine 38/2.47, CO2 of 9 lactic acid of 5.8.  ABG with initial pH of 7.39 pCO2 of 13.8 CO2 of 8.  Follow-up ABG with pH of 6.9, pCO2 of 75.  CT chest with no PE showed bilateral pneumonia.  EKG showed ST elevation in anteroseptal leads.  He required to be started on multiple pressors and bicarb drip.  Ideally recommend transfer to tertiary care center however patient is too unstable to transfer.  Subjective:   Patient orally intubated, sedated and paralyzed on nimbus.    Assessment/Plan     Active Hospital Problems    Diagnosis     Bacteremia [R78.81]  
                                                                                                                                                                                                                                                                                                  Hospitalist Progress Note    Patient: Angelo Mireles MRN: [id number]  CSN: [id number]    YOB: 1970  Age: 54 y.o.  Sex: male    DOA: 3/9/2025 LOS:  LOS: 5 days          Chief Complain :  SOB  54 y.o. male with diabetes, hypertension, CAD, atrial fibrillation, end-stage renal disease now status post renal transplant in 2023 on transplant medications, right eye blindness presents to ER via EMS with concerns of shortness of breath.  Patient is orally intubated unresponsive.  History obtained from discussion with ER physician and chart review.  Patient started receiving treatment in ER, he was placed on BiPAP however he continued to have respiratory distress and subsequently he was intubated.  He was started on propofol for suction.  Soon after he went into bradycardia and subsequent PEA arrest.  CODE BLUE called and CPR initiated.  Code lasted for 20 minutes with ROSC.  He received epinephrine and bicarb.  He coded again briefly and this time he also had brief episode of V. tach.  He received amiodarone bolus.  Propofol stopped after second cardiac arrest.  In ER his BUNs/creatinine 38/2.47, CO2 of 9 lactic acid of 5.8.  ABG with initial pH of 7.39 pCO2 of 13.8 CO2 of 8.  Follow-up ABG with pH of 6.9, pCO2 of 75.  CT chest with no PE showed bilateral pneumonia.  EKG showed ST elevation in anteroseptal leads.  He required to be started on multiple pressors and bicarb drip.  Ideally recommend transfer to tertiary care center however patient is too unstable to transfer.  Subjective:   Patient orally intubated, not on any sedation, does not respond to pain stimulus.  Assessment/Plan     Active Hospital Problems    Diagnosis     
                            Cardiology Progress Note        Patient: Angelo Mireles        Sex: male          DOA: 3/9/2025  YOB: 1970      Age:  54 y.o.        LOS:  LOS: 21 days      Patient seen and examined, chart reviewed    Assessment/Plan     Patient Active Problem List   Diagnosis    Atrial fibrillation with RVR (HCC)    Cardiomyopathy (HCC)    Anemia of chronic disease    Dyslipidemia    Diabetic retinopathy (HCC)    Type 2 diabetes with nephropathy (HCC)    Paroxysmal A-fib (HCC)    Atrial fibrillation with rapid ventricular response (HCC)    Benign hypertensive heart disease without heart failure    Atherosclerotic heart disease of native coronary artery with other forms of angina pectoris    Iron deficiency anemia    Hyperkalemia    CRI (chronic renal insufficiency)    Legally blind    ESRD on dialysis (HCC)    NSTEMI (non-ST elevated myocardial infarction) (HCC)    Diabetes mellitus (HCC)    Hypercalcemia    Arteriovenous fistula stenosis    Chronic diastolic heart failure (HCC)    Hyperlipidemia    Hypertension secondary to other renal disorders    Hypertensive heart disease with heart failure (HCC)    Hypoalbuminemia    Nausea    Obesity (BMI 30-39.9)    Obesity, diabetes, and hypertension syndrome (HCC)    Primary hypertension    Pulmonary hypertension (HCC)    Shortness of breath    Secondary hyperparathyroidism of renal origin    Vitamin D deficiency    Severe nonproliferative diabetic retinopathy with macular edema associated with type 2 diabetes mellitus (HCC)    Restless leg    Disorder of phosphorus metabolism, unspecified    Fluid overload, unspecified    Secondary hypercoagulable state    Cervical spondylosis    Degenerative cervical spinal stenosis    Cardiac arrest (HCC)    Acute respiratory failure with hypoxia (HCC)    Lactic acidosis    Influenza A    Pneumonia    Immunosuppressed status    Renal transplant, status post    Acute kidney injury superimposed on CKD    HTN 
                     [facility]  Pulmonary, Critical Care, and Sleep Medicine    Name: Angelo Mireles MRN: [id number]   : 1970 Hospital: [facility]    Date: 3/18/2025  Room: [address] [place] [address] Note                                              Consult requesting physician: Dr. Winters  Reason for Consult: Cardiac arrest    IMPRESSION:       Active Hospital Problems    Diagnosis Date Noted    Bacteremia [R78.81] 2025    Cardiac arrest (HCC) [I46.9] 2025    Acute respiratory failure (HCC) [J96.00] 2025    Lactic acidosis [E87.20] 2025    Influenza A [J10.1] 2025    Pneumonia [J18.9] 2025    Immunosuppressed status [D84.9] 2025    Renal transplant, status post [Z94.0] 2025    Acute kidney injury superimposed on CKD [N17.9, N18.9] 2025    HTN (hypertension) [I10] 2025    Paroxysmal A-fib (HCC) [I48.0] 2021    Type 2 diabetes with nephropathy (HCC) [E11.21] 2019    Diabetic retinopathy (HCC) [E11.319] 10/28/2014    Cardiomyopathy (HCC) [I42.9] 2012   Staph aureus bacteremia  Thrombocytopenia  Hypoalbuminemia   Elevated LFTs  Code status: Full Code       RECOMMENDATIONS:     Came in ER with shortness of breath; diagnosed to have influenza A, pneumonia; failed BiPAP and intubated in ER; worsened respiratory acidosis; bradycardia followed by cardiac arrest x 2 and brief episode of V. tach during second cardiac arrest; shock, likely septic or cardiogenic shock, on multiple vasopressors.    Respiratory: Intubated in ER on 3/9/2025 due to respiratory distress.  CTA chest PE protocol 3/9/2025: No evidence of pulmonary embolism and severe multifocal pneumonia worse in the lower lungs bilaterally  CXR 3/16/2025 preliminary: ETT, left IJ CVC, OGT in place. Bilateral alveolar infiltrates stable compared a day earlier exam.    On AC/VC 20/450/40%/10+; peak airway pressure 21, plateau pressure 19 CWP  ABG on 3/17 
                     [facility]  Pulmonary, Critical Care, and Sleep Medicine    Name: Angelo Mireles MRN: [id number]   : 1970 Hospital: [facility]    Date: 3/10/2025  Room: [address] [place] [address] Note                                              Consult requesting physician: Dr. Winters  Reason for Consult: Cardiac arrest    IMPRESSION:       Active Hospital Problems    Diagnosis Date Noted    Cardiac arrest (HCC) [I46.9] 2025    Acute respiratory failure (HCC) [J96.00] 2025    Lactic acidosis [E87.20] 2025    Influenza A [J10.1] 2025    Pneumonia [J18.9] 2025    Immunosuppressed status [D84.9] 2025    Renal transplant, status post [Z94.0] 2025    Acute kidney injury superimposed on CKD [N17.9, N18.9] 2025    HTN (hypertension) [I10] 2025    Paroxysmal A-fib (HCC) [I48.0] 2021    Type 2 diabetes with nephropathy (HCC) [E11.21] 2019    Diabetic retinopathy (HCC) [E11.319] 10/28/2014    Cardiomyopathy (HCC) [I42.9] 2012   Thrombocytopenia  Hypoalbuminemia   Elevated LFTs  Code status: Full Code       RECOMMENDATIONS:     Came in ER with shortness of breath; diagnosed to have influenza A, pneumonia; failed BiPAP and intubated in ER; worsening respiratory acidosis; bradycardia followed by cardiac arrest x 2 and brief episode of V. tach during second cardiac arrest; shock, likely septic or cardiogenic shock on multiple vasopressors.    Respiratory: Intubated in ER on 3/9/2025 due to respiratory distress.    CXR 3/10/2025 reviewed [preliminary]: ETT, left IJ in place.  OGT proximal port not visible in the filed.  Bilateral alveolar infiltrates suggesting multilobar pneumonia or pulmonary edema unchanged from day prior.    CTA chest PE protocol 3/9/2025: No evidence of pulmonary embolism and severe multifocal pneumonia worse in the lower lungs bilaterally    On AC/VC 20/510/100%/10+; peak airway pressure 24, plateau pressure 
                     [facility]  Pulmonary, Critical Care, and Sleep Medicine    Name: Angelo Mireles MRN: [id number]   : 1970 Hospital: [facility]    Date: 3/20/2025  Room: [address] [place] [address] Note                                              Consult requesting physician: Dr. Winters  Reason for Consult: Cardiac arrest    IMPRESSION:       Active Hospital Problems    Diagnosis Date Noted    Bacteremia [R78.81] 2025    Cardiac arrest (HCC) [I46.9] 2025    Acute respiratory failure (HCC) [J96.00] 2025    Lactic acidosis [E87.20] 2025    Influenza A [J10.1] 2025    Pneumonia [J18.9] 2025    Immunosuppressed status [D84.9] 2025    Renal transplant, status post [Z94.0] 2025    Acute kidney injury superimposed on CKD [N17.9, N18.9] 2025    HTN (hypertension) [I10] 2025    Paroxysmal A-fib (HCC) [I48.0] 2021    Type 2 diabetes with nephropathy (HCC) [E11.21] 2019    Diabetic retinopathy (HCC) [E11.319] 10/28/2014    Cardiomyopathy (HCC) [I42.9] 2012   Staph aureus bacteremia  Thrombocytopenia  Hypoalbuminemia   Elevated LFTs  Code status: Full Code       RECOMMENDATIONS:     Came in ER with shortness of breath; diagnosed to have influenza A, pneumonia; failed BiPAP and intubated in ER; worsened respiratory acidosis; bradycardia followed by cardiac arrest x 2 and brief episode of V. tach during second cardiac arrest; shock, likely septic or cardiogenic shock, on multiple vasopressors.    Respiratory: Intubated in ER on 3/9/2025 due to respiratory distress.  CTA chest PE protocol 3/9/2025: No evidence of pulmonary embolism and severe multifocal pneumonia worse in the lower lungs bilaterally  CXR 3/16/2025 preliminary: ETT, left IJ CVC, OGT in place. Bilateral alveolar infiltrates stable compared a day earlier exam.    AC 16/450/40/8  ABG on 3/17 7.44/35/122/98     unable to obtain ABG  We will obtain VBG.  
                     [facility]  Pulmonary, Critical Care, and Sleep Medicine    Name: Angelo Mireles MRN: [id number]   : 1970 Hospital: [facility]    Date: 3/23/2025  Room: [address] [place] [address] Note                                              Consult requesting physician: Dr. Winters  Reason for Consult: Cardiac arrest    IMPRESSION:   Acute respiratory failure     J90 600  Cardiac arrest                      I46.  9  Anoxic brain injury               G 93.1  Immunol suppressed status  Atrial fibrillation  Cardiomyopathy  Renal transplant  Pneumonia      Active Hospital Problems    Diagnosis Date Noted    Bacteremia [R78.81] 2025    Cardiac arrest (HCC) [I46.9] 2025    Acute respiratory failure (HCC) [J96.00] 2025    Lactic acidosis [E87.20] 2025    Influenza A [J10.1] 2025    Pneumonia [J18.9] 2025    Immunosuppressed status [D84.9] 2025    Renal transplant, status post [Z94.0] 2025    Acute kidney injury superimposed on CKD [N17.9, N18.9] 2025    HTN (hypertension) [I10] 2025    Paroxysmal A-fib (HCC) [I48.0] 2021    Type 2 diabetes with nephropathy (HCC) [E11.21] 2019    Diabetic retinopathy (HCC) [E11.319] 10/28/2014    Cardiomyopathy (HCC) [I42.9] 2012   Staph aureus bacteremia  Thrombocytopenia  Hypoalbuminemia   Elevated LFTs  Code status: Full Code       RECOMMENDATIONS:     Came in ER with shortness of breath; diagnosed to have influenza A, pneumonia; failed BiPAP and intubated in ER; worsened respiratory acidosis; bradycardia followed by cardiac arrest x 2 and brief episode of V. tach during second cardiac arrest; shock, likely septic or cardiogenic shock, on multiple vasopressors.    Respiratory:   Intubated in ER on 3/9/2025 due to respiratory distress.  CTA chest PE protocol 3/9/2025: No evidence of pulmonary embolism and severe multifocal pneumonia worse in the lower lungs bilaterally  CXR 
                     [facility]  Pulmonary, Critical Care, and Sleep Medicine    Name: Angelo Mireles MRN: [id number]   : 1970 Hospital: [facility]    Date: 3/29/2025  Room: [address] [place] [address] Note                                              Consult requesting physician: Dr. Winters  Reason for Consult: Cardiac arrest    IMPRESSION:   Acute respiratory failure with hypoxia  Cardiac arrest                       Anoxic encephalopathy               Immuno-suppressed status  Atrial fibrillation  Cardiomyopathy  Renal transplant  Pneumonia  Hyperthyroidism      Active Hospital Problems    Diagnosis Date Noted    Hyperthyroidism [E05.90] 2025    Anoxic encephalopathy (HCC) [G93.1] 2025    Bacteremia [R78.81] 2025    Cardiac arrest (HCC) [I46.9] 2025    Acute respiratory failure with hypoxia (HCC) [J96.01] 2025    Lactic acidosis [E87.20] 2025    Influenza A [J10.1] 2025    Pneumonia [J18.9] 2025    Immunosuppressed status [D84.9] 2025    Renal transplant, status post [Z94.0] 2025    Acute kidney injury superimposed on CKD [N17.9, N18.9] 2025    HTN (hypertension) [I10] 2025    Paroxysmal A-fib (HCC) [I48.0] 2021    Type 2 diabetes with nephropathy (HCC) [E11.21] 2019    Diabetic retinopathy (HCC) [E11.319] 10/28/2014    Cardiomyopathy (HCC) [I42.9] 2012     Code status: Full Code       RECOMMENDATIONS:     Came in ER with shortness of breath; diagnosed to have influenza A, pneumonia; failed BiPAP and intubated in ER; worsened respiratory acidosis; bradycardia followed by cardiac arrest x 2 and brief episode of V. tach during second cardiac arrest; shock, likely septic or cardiogenic shock, on multiple vasopressors.    Respiratory: Patient remains on ventilator support.  FiO2-30%.  Status post tracheostomy tube placement 3/23.  Daily weaning trials as tolerated.  CTA chest PE protocol 3/9/2025: No 
                     [facility]  Pulmonary, Critical Care, and Sleep Medicine    Name: Angelo Mireles MRN: [id number]   : 1970 Hospital: [facility]    Date: 3/14/2025  Room: [address] [place] [address] Note                                              Consult requesting physician: Dr. Winters  Reason for Consult: Cardiac arrest    IMPRESSION:       Active Hospital Problems    Diagnosis Date Noted    Bacteremia [R78.81] 2025    Cardiac arrest (HCC) [I46.9] 2025    Acute respiratory failure (HCC) [J96.00] 2025    Lactic acidosis [E87.20] 2025    Influenza A [J10.1] 2025    Pneumonia [J18.9] 2025    Immunosuppressed status [D84.9] 2025    Renal transplant, status post [Z94.0] 2025    Acute kidney injury superimposed on CKD [N17.9, N18.9] 2025    HTN (hypertension) [I10] 2025    Paroxysmal A-fib (HCC) [I48.0] 2021    Type 2 diabetes with nephropathy (HCC) [E11.21] 2019    Diabetic retinopathy (HCC) [E11.319] 10/28/2014    Cardiomyopathy (HCC) [I42.9] 2012   Staph aureus bacteremia  Thrombocytopenia  Hypoalbuminemia   Elevated LFTs  Code status: Full Code       RECOMMENDATIONS:     Came in ER with shortness of breath; diagnosed to have influenza A, pneumonia; failed BiPAP and intubated in ER; worsened respiratory acidosis; bradycardia followed by cardiac arrest x 2 and brief episode of V. tach during second cardiac arrest; shock, likely septic or cardiogenic shock, on multiple vasopressors.    Respiratory: Intubated in ER on 3/9/2025 due to respiratory distress.  CTA chest PE protocol 3/9/2025: No evidence of pulmonary embolism and severe multifocal pneumonia worse in the lower lungs bilaterally  CXR 3/14/2025 reviewed: ETT, left IJ CVC, OGT in place. Bilateral alveolar infiltrates unchanged from day prior.    On AC/VC 20/450/40%/10+; peak airway pressure 21, plateau pressure 19 CWP  ABG this a.m. pH 7.45, pCO2 28.8, PaO2 69 
       [facility]  (A Division of [facility])                                                           Date of Admission: 3/9/2025       ID Consult FU for antibiotic management of respiratory failure with Flu A in Transplant patient    PCP: Brooke Roland FNP           Transplant team- [facility]    C/C: resp failure    NB: rounded before noon, charted late    Current Antimicrobials:    Prior Antimicrobials:      Cefazolin 2 gm IV Q8 hours 3/20 to date    Micafungin 3/20 to date Doxycyline 3/9 to 3/11  Cefepime 3/9 to 3/12  Vanco 3/9 to 3/12    Naficillin 2gm IV Q 4 hours 3/12 to 3/16  Vancomycin 3/16 to 3/19  meropenem- 3/16 to 3/20   Allergy to antibiotics: NA       Assessment:   '  Critically Ill and immunosuppressed patient with renal transplant with:    Possible fungal infection -- Fungitell is elevated of unclear source  S/P Bronch with BAL-3/24/25-- bacterial/fungal/AFB and PCP pcr and galactomannan - in progress    Superimposed  Kleb pneumo resp infection 3/16, rest of cultures remain negative. + GNR and S.aureus from resp cutlure, no bacteremia from 3/16    Flu A infection - H1N1( 2009)    Acute hypoxic resp failure-ARDS/ post code blue X2 on arrival, intubated- 3/9/25       ==Post trach 3/23- Dr Whatley       ==Post PEG 3/24    Septic shock- with MOF- resp/kidney/ elevated troponin/ elevated LFT, CPK, lactic acidosis at 13 on admission- better  High grade MSSA bacteremia due to above-4/4 on 3/9 from BL pneumonia ---complicating Flu A infection.  Bandemia to 57% on admission-improved  JAGRUTI on CKD( Cr 1.4- 02/2025)- resolved    3/9 --UA no pyuria, urine culture-<1K CFU        --Urine antigen for legionealla and pneumococcus is negative         --Respiratory  viral panel for adenovirus/Corona virus- Positive for Flu A, and rest negative--COVID 19 and non COVID 19, human meta pneumovirus, rhino virus, Influenza A/B, parinflunenza 1-4, RSV negative. Bacterial PCR for 
       [facility]  (A Division of [facility])                                                           Date of Admission: 3/9/2025       ID Consult FU for antibiotic management of respiratory failure with Flu A in Transplant patient    PCP: Brooke Roland FNP           Transplant team- [facility]    C/C: resp failure    NB: rounded before noon, charted late    Current Antimicrobials:    Prior Antimicrobials:  meropenem- 3/16 to date   Doxycyline 3/9 to 3/11  Cefepime 3/9 to 3/12  Vanco 3/9 to 3/12    Naficillin 2gm IV Q 4 hours 3/12 to 3/16  Micafungin /Vancomycin 3/16 to 3/19   Allergy to antibiotics: NA       Assessment:   '  Critically Ill and immunosuppressed patient with renal transplant with:    Possible fungal infection -- Fungitell is elevated of unclear source-- will check PCP pcr as well though his clinical picture is not fitting for that.     High fever to 104-105 with tachycardia/hypotension over weekend- improved.   Superimposed F.pneumonia resp infection 3/16, rest of cultures remain negative. GNR and S.aureus from resp cutlure, no bacteremia from 3/16    Flu A infection - H1N1( 2009)    Septic shock- with MOF- resp/kidney/ elevated troponin/ elevated LFT, CPK, lactic acidosis at 13 on admission-   High grade MSSA bacteremia due to above-4/4 on 3/9 from BL pneumonia ---complicating Flu A infection.  Bandemia to 57% on admission-improved  Acute hypoxic resp failure-ARDS/ post code blue X2 on arrival, intubated- 3/9/25  JAGRUTI on CKD( Cr 1.4- 02/2025)- Improving  3/9 --UA no pyuria, urine culture-<1K CFU        --Urine antigen for legionealla and pneumococcus is negative         --Respiratory  viral panel for adenovirus/Corona virus- Positive for Flu A, and rest negative--COVID 19 and non COVID 19, human meta pneumovirus, rhino virus, Influenza A/B, parinflunenza 1-4, RSV negative. Bacterial PCR for bordetella, chlamydia pneumoniae and mycoplasma negative.         
       [facility]  (A Division of [facility])                                                           Date of Admission: 3/9/2025       ID Consult FU for antibiotic management of respiratory failure with Flu A in Transplant patient    PCP: Brooke Roland FNP           Transplant team- [facility]    C/C: resp failure    Current Antimicrobials:    Prior Antimicrobials:  Vanco/cefepime/doxy- 3/9 to date   NA   Allergy to antibiotics: NA       Assessment:   '  Critically Ill and immunosuppressed patient with renal transplant with:    Flu A infection - H1N1( 2009)  Septic shock- on pressors, with MOF- resp/kidney/ elevated troponin/ elevated LFT, CPK, lactic acidosis at 13 on admission- numbers improving  Superimposed bacterial pneumonia with S.aureus--one of main pathogens that complicates Flu infection  High grade S.aureus bacteremia due to above-4/4--ID pending  Bandemia to 57% on admission-fluctuating  Acute hypoxic resp failure-ARDS/ post code blue X2 on arrival, intubated- 3/9/25  JAGRUTI on CKD( Cr 1.4- 02/2025)  3/9 --UA no pyuria, urine culture-<1K CFU        --Urine antigen for legionealla and pneumococcus is negative         --Respiratory  viral panel for adenovirus/Corona virus- Positive for Flu A, and rest negative--COVID 19 and non COVID 19, human meta pneumovirus, rhino virus, Influenza A/B, parinflunenza 1-4, RSV negative. Bacterial PCR for bordetella, chlamydia pneumoniae and mycoplasma negative.         --Flu A positive in resp viral panel/ PCR       --MRSA scree: negative       --CXR with bilateral opacity, CTA chest with no PE, bilateral dense infiltrate, no nodules noted. CT AP- no evidence of intra-abdominal infection,no evidence of renal abscess        --TTE: EF 40-45%, no mention of IE/veg         --blood culture X2 3/9- 4/4  S.aureus, Final SS pending  3/11--Blood culture X1- [facility]    Procalcitonin 100.86- 3/9/25    Renal transplant- 07/2023--on MMF/ 
       [facility]  (A Division of [facility])                                                           Date of Admission: 3/9/2025       ID FU for antibiotic management of respiratory failure with Flu A in Transplant patient    PCP: Brooke Roland FNP           Transplant team- at [facility]    C/C: resp failure    Current Antimicrobials:    Prior Antimicrobials:      Cefazolin 2 gm IV Q8 hours 3/20 to date    Micafungin 3/20 to date Doxycyline 3/9 to 3/11  Cefepime 3/9 to 3/12  Vanco 3/9 to 3/12    Naficillin 2gm IV Q 4 hours 3/12 to 3/16  Vancomycin 3/16 to 3/19  meropenem- 3/16 to 3/20   Allergy to antibiotics: NA       Assessment:   '  Critically Ill and immunosuppressed patient with renal transplant with:    Flu A infection - H1N1( 2009)- with severe complication as below. Treated X10 days with Tamiflu- still + by PCR-3/27-- non viable virus likley. + 3/9, 3/16 and 3/27    High grade MSSA acteremia - on 3/9 due to below.   MSSA bilateral dense Pneumonia, superimposed Flu A infection. That later progressed to BL cavitary lesion on FU CT from MSSA pneumonia  Superimposed  Kleb pneumo resp infection 3/16, rest of cultures remain negative. + K pneumonae and S.aureus from resp cutlure, no bacteremia from 3/16-both covered with Cefazolin    -S/P BAL 3/24/25-bacterial cutlure-neg so far, Cytology neg for Pneumocystis, galactomannan-negative, PCP PCR, fungal/AFB smear negative, cultures in progress  - Quantiferon TB last checked in [facility] 10/2023- negative  -Elevated Fungitell- on Micafungin    Acute respiratory failure- intubated from admission/trached/PEG--3/23 and 24  Severe anoxic brain injury- suffered cardaic arrest few times in ED.                  -- Bilateral cerebellar infarct--Hypoxic brain injury                  -- 3/15:  MRI brain: Extensive diffusion restriction: Diffuse cerebellar, bilateral occipital, bilateral hippocampal, bilateral  
       [facility]  (A Division of [facility])                                                           Date of Admission: 3/9/2025       ID Consult FU for antibiotic management of respiratory failure with Flu A in Transplant patient    PCP: Brooke Roland FNP           Transplant team- [facility]    C/C: resp failure    NB: rounded before noon, charted late    Current Antimicrobials:    Prior Antimicrobials:      Cefazolin 2 gm IV Q8 hours 3/20 to date   Doxycyline 3/9 to 3/11  Cefepime 3/9 to 3/12  Vanco 3/9 to 3/12    Naficillin 2gm IV Q 4 hours 3/12 to 3/16  Micafungin /Vancomycin 3/16 to 3/19  meropenem- 3/16 to 3/20   Allergy to antibiotics: NA       Assessment:   '  Critically Ill and immunosuppressed patient with renal transplant with:    Possible fungal infection -- Fungitell is elevated of unclear source  S/P Bronch with BAL-3/24/25-- bacterial/fungal/AFB and PCP pcr and galactomannan sent.     Superimposed  Kleb pneumo resp infection 3/16, rest of cultures remain negative. + GNR and S.aureus from resp cutlure, no bacteremia from 3/16    Flu A infection - H1N1( 2009)    Acute hypoxic resp failure-ARDS/ post code blue X2 on arrival, intubated- 3/9/25       ==Post trach 3/23- Dr Whatley       ==Post PEG 3/24    Septic shock- with MOF- resp/kidney/ elevated troponin/ elevated LFT, CPK, lactic acidosis at 13 on admission- better  High grade MSSA bacteremia due to above-4/4 on 3/9 from BL pneumonia ---complicating Flu A infection.  Bandemia to 57% on admission-improved  JAGRUTI on CKD( Cr 1.4- 02/2025)- Improving  3/9 --UA no pyuria, urine culture-<1K CFU        --Urine antigen for legionealla and pneumococcus is negative         --Respiratory  viral panel for adenovirus/Corona virus- Positive for Flu A, and rest negative--COVID 19 and non COVID 19, human meta pneumovirus, rhino virus, Influenza A/B, parinflunenza 1-4, RSV negative. Bacterial PCR for bordetella, chlamydia 
       [facility]  (A Division of [facility])                                                           Date of Admission: 3/9/2025       ID Consult FU for antibiotic management of respiratory failure with Flu A in Transplant patient    PCP: Brooke Roland FNP           Transplant team- [facility]    C/C: resp failure    Current Antimicrobials:    Prior Antimicrobials:    Vanco/meropenem/micafungin- 3/16 to date   Doxycyline 3/9 to 3/11  Cefepime 3/9 to 3/12  Vanco 3/9 to 3/12    Naficillin 2gm IV Q 4 hours 3/12 to 3/16   Allergy to antibiotics: NA       Assessment:   '  Critically Ill and immunosuppressed patient with renal transplant with:    High fever to 104-105 with tachycardia/hypotension over weekend- improved. Gram negative from resp cutlure 3/16, rest of cultures remain negative.    Flu A infection - H1N1( 2009)  Septic shock- with MOF- resp/kidney/ elevated troponin/ elevated LFT, CPK, lactic acidosis at 13 on admission- numbers improving. Intermittently on pressors  Superimposed bacterial pneumonia with S.aureus--complicating Flu A infection.no pleural effusion on CXR  High grade MSSA bacteremia due to above-4/4 on 3/9  Bandemia to 57% on admission-improved  Acute hypoxic resp failure-ARDS/ post code blue X2 on arrival, intubated- 3/9/25  JAGRUTI on CKD( Cr 1.4- 02/2025)- Improving  3/9 --UA no pyuria, urine culture-<1K CFU        --Urine antigen for legionealla and pneumococcus is negative         --Respiratory  viral panel for adenovirus/Corona virus- Positive for Flu A, and rest negative--COVID 19 and non COVID 19, human meta pneumovirus, rhino virus, Influenza A/B, parinflunenza 1-4, RSV negative. Bacterial PCR for bordetella, chlamydia pneumoniae and mycoplasma negative.         --Flu A positive in resp viral panel/ PCR       --MRSA scree: negative       --CXR with bilateral opacity       --CTA chest with no PE, bilateral dense infiltrate, no nodules noted. CT AP- 
       [facility]  (A Division of [facility])                                                           Date of Admission: 3/9/2025       ID Consult FU for antibiotic management of respiratory failure with Flu A in Transplant patient    PCP: Brooke Roland FNP           Transplant team- [facility]    C/C: resp failure    Current Antimicrobials:    Prior Antimicrobials:    Naficillin 2gm IV Q 4 hours 3/12 to date Doxycyline 3/9 to 3/11  Cefepime 3/9 to 3/12  Vanco 3/9 to 3/12   Allergy to antibiotics: NA       Assessment:   '  Critically Ill and immunosuppressed patient with renal transplant with:    High fever to 104-105 with tachycardia/hypotension over weekend- worsening status. Etiology could be many including superimposed sepsis/related to Flu or central fever given extensive brain changes.    Flu A infection - H1N1( 2009)  Septic shock- with MOF- resp/kidney/ elevated troponin/ elevated LFT, CPK, lactic acidosis at 13 on admission- numbers improving. Intermittently on pressors  Superimposed bacterial pneumonia with S.aureus--one of main pathogens that complicates Flu infection  High grade MSSA bacteremia due to above-4/4 on 3/9  Bandemia to 57% on admission-improved  Acute hypoxic resp failure-ARDS/ post code blue X2 on arrival, intubated- 3/9/25  JAGRUTI on CKD( Cr 1.4- 02/2025)- Improving  3/9 --UA no pyuria, urine culture-<1K CFU        --Urine antigen for legionealla and pneumococcus is negative         --Respiratory  viral panel for adenovirus/Corona virus- Positive for Flu A, and rest negative--COVID 19 and non COVID 19, human meta pneumovirus, rhino virus, Influenza A/B, parinflunenza 1-4, RSV negative. Bacterial PCR for bordetella, chlamydia pneumoniae and mycoplasma negative.         --Flu A positive in resp viral panel/ PCR       --MRSA scree: negative       --CXR with bilateral opacity       --CTA chest with no PE, bilateral dense infiltrate, no nodules noted. CT 
       [facility]  (A Division of [facility])                                                           Date of Admission: 3/9/2025       ID Consult FU for antibiotic management of respiratory failure with Flu A in Transplant patient    PCP: Brooke Roland FNP           Transplant team- [facility]    C/C: resp failure    NB: rounded before noon, charted late    Current Antimicrobials:    Prior Antimicrobials:    Vanco/meropenem/micafungin- 3/16 to date   Doxycyline 3/9 to 3/11  Cefepime 3/9 to 3/12  Vanco 3/9 to 3/12    Naficillin 2gm IV Q 4 hours 3/12 to 3/16   Allergy to antibiotics: NA       Assessment:   '  Critically Ill and immunosuppressed patient with renal transplant with:    High fever to 104-105 with tachycardia/hypotension over weekend- improved. Gram negative from resp cutlure 3/16, rest of cultures remain negative. GNR and S.aureus from resp cutlure, no bacteremia from 3/16    Flu A infection - H1N1( 2009)    Septic shock- with MOF- resp/kidney/ elevated troponin/ elevated LFT, CPK, lactic acidosis at 13 on admission-   High grade MSSA bacteremia due to above-4/4 on 3/9 from BL pneumonia ---complicating Flu A infection.  Bandemia to 57% on admission-improved  Acute hypoxic resp failure-ARDS/ post code blue X2 on arrival, intubated- 3/9/25  JAGRUTI on CKD( Cr 1.4- 02/2025)- Improving  3/9 --UA no pyuria, urine culture-<1K CFU        --Urine antigen for legionealla and pneumococcus is negative         --Respiratory  viral panel for adenovirus/Corona virus- Positive for Flu A, and rest negative--COVID 19 and non COVID 19, human meta pneumovirus, rhino virus, Influenza A/B, parinflunenza 1-4, RSV negative. Bacterial PCR for bordetella, chlamydia pneumoniae and mycoplasma negative.         --Flu A positive in resp viral panel/ PCR       --MRSA scree: negative       --CXR with bilateral opacity       --CTA chest with no PE, bilateral dense infiltrate, no nodules noted. CT 
    Hospitalist Progress Note      Patient name: Angelo Mireles.  MRN: [id number]          PCP: Brooke Roland FNP     Summary:      Angelo Mireles is a 54 y.o. old male with PMHx of DM 2, HTN, CAD,AFib, ESRD now s/p renal transplant in 2023 on transplant medications, R eye blindness presents to ER 3/09/25 via EMS with concerns of shortness of breath.   Found to have influenza A, pneumonia.  He failed trial of BiPAP and was intubated in the ER.  Then had worsening respiratory acidosis; and suffered bradycardia followed by PEA cardiac arrest x 2 and brief episode of VTach during second cardiac arrest.  CPR initiated, lasted 20 mins w/ ROSC. He coded again briefly, this time noted to have episode of Vtach. Received amiodarone bolus.  Propofol stopped after second cardiac arrest.     Developed shock, septic Vs cardiogenic shock- requiring multiple vasopressors, along with Abx therapy.  Found to have Staph Aureus bacteremia, no vegetation seen on ECHO.  Repeat blood CX's were negative.     Once weaned off sedation, found to not be  responding to painful stimuli and determined to have anoxic brain injury.  Neurology followed.  EEG showed diffuse slowing.  MRI brain showed extensive diffusion restriction diffuse syllable or, bilateral occipital, bilateral hippocampal, bilateral temporal- suggestive of anoxic brain injury.  Palliative care consulted for goals of care discussion with family.           Subjective:    Lying on the bed comfortable   Assessment/Plan:  Medical Complexity: High-1 acute medical problem with high risk threat, at least 3 follow up items      Post cardiac arrest Anoxic Brain Injury/  Unresponsive    On vent   trach, supportive    # Bradycardia and PEA Cardiac arrest x 2  (3/09).  Episode of V. tach during 2nd episode of cardiac arrest.   Wide-complex tachycardia  ECHO: mildly reduced LV systolic function, EF of 40 to 45%, grade 2 DD  Cardiology & Neurology followed  Formerly was AOx3 prior to arrest;   
    Hospitalist Progress Note      Patient name: Angelo Mireles.  MRN: [id number]          PCP: Brooke Roland FNP     Summary:      Angelo Mireles is a 54 y.o. old male with PMHx of DM 2, HTN, CAD,AFib, ESRD now s/p renal transplant in 2023 on transplant medications, R eye blindness presents to ER 3/09/25 via EMS with concerns of shortness of breath.   Found to have influenza A, pneumonia.  He failed trial of BiPAP and was intubated in the ER.  Then had worsening respiratory acidosis; and suffered bradycardia followed by PEA cardiac arrest x 2 and brief episode of VTach during second cardiac arrest.  CPR initiated, lasted 20 mins w/ ROSC. He coded again briefly, this time noted to have episode of Vtach. Received amiodarone bolus.  Propofol stopped after second cardiac arrest.     Developed shock, septic Vs cardiogenic shock- requiring multiple vasopressors, along with Abx therapy.  Found to have Staph Aureus bacteremia, no vegetation seen on ECHO.  Repeat blood CX's were negative.     Once weaned off sedation, found to not be  responding to painful stimuli and determined to have anoxic brain injury.  Neurology followed.  EEG showed diffuse slowing.  MRI brain showed extensive diffusion restriction diffuse syllable or, bilateral occipital, bilateral hippocampal, bilateral temporal- suggestive of anoxic brain injury.  Palliative care consulted for goals of care discussion with family.       Assessment/Plan:  Medical Complexity: High-1 acute medical problem with high risk threat, at least 3 follow up items      # Post cardiac arrest Anoxic Brain Injury/  Unresponsive    # Bradycardia and PEA Cardiac arrest x 2  (3/09).  Episode of V. tach during 2nd episode of cardiac arrest.   Wide-complex tachycardia  ECHO: mildly reduced LV systolic function, EF of 40 to 45%, grade 2 DD  Cardiology & Neurology followed  Formerly was AOx3 prior to arrest;     CT 3/13.  Subtotal diffuse loss of gray-white matter differentiation, bilateral 
    Hospitalist Progress Note      Patient name: Angelo Mireles.  MRN: [id number]          PCP: Brooke Roland FNP     Summary:      Angelo Mireles is a 54 y.o. old male with PMHx of DM 2, HTN, CAD,AFib, ESRD now s/p renal transplant in 2023 on transplant medications, R eye blindness presents to ER 3/09/25 via EMS with concerns of shortness of breath.   Found to have influenza A, pneumonia.  He failed trial of BiPAP and was intubated in the ER.  Then had worsening respiratory acidosis; and suffered bradycardia followed by PEA cardiac arrest x 2 and brief episode of VTach during second cardiac arrest.  CPR initiated, lasted 20 mins w/ ROSC. He coded again briefly, this time noted to have episode of Vtach. Received amiodarone bolus.  Propofol stopped after second cardiac arrest.     Developed shock, septic Vs cardiogenic shock- requiring multiple vasopressors, along with Abx therapy.  Found to have Staph Aureus bacteremia, no vegetation seen on ECHO.  Repeat blood CX's were negative.     Once weaned off sedation, found to not be  responding to painful stimuli and determined to have anoxic brain injury.  Neurology followed.  EEG showed diffuse slowing.  MRI brain showed extensive diffusion restriction diffuse syllable or, bilateral occipital, bilateral hippocampal, bilateral temporal- suggestive of anoxic brain injury.  Palliative care consulted for goals of care discussion with family.       Assessment/Plan:  Medical Complexity: High-1 acute medical problem with high risk threat, at least 3 follow up items      # Post cardiac arrest Anoxic Brain Injury/  Unresponsive    # Bradycardia and PEA Cardiac arrest x 2  (3/09).  Episode of V. tach during 2nd episode of cardiac arrest.   Wide-complex tachycardia  ECHO: mildly reduced LV systolic function, EF of 40 to 45%, grade 2 DD  Cardiology & Neurology followed  Formerly was AOx3 prior to arrest;     CT 3/13.  Subtotal diffuse loss of gray-white matter differentiation, bilateral 
    Hospitalist Progress Note      Patient name: Angelo Mireles.  MRN: [id number]          PCP: Brooke Roland FNP     Summary:      Angelo Mireles is a 54 y.o. old male with PMHx of DM 2, HTN, CAD,AFib, ESRD now s/p renal transplant in 2023 on transplant medications, R eye blindness presents to ER 3/09/25 via EMS with concerns of shortness of breath.   Found to have influenza A, pneumonia.  He failed trial of BiPAP and was intubated in the ER.  Then had worsening respiratory acidosis; and suffered bradycardia followed by PEA cardiac arrest x 2 and brief episode of VTach during second cardiac arrest.  CPR initiated, lasted 20 mins w/ ROSC. He coded again briefly, this time noted to have episode of Vtach. Received amiodarone bolus.  Propofol stopped after second cardiac arrest.     Developed shock, septic Vs cardiogenic shock- requiring multiple vasopressors, along with Abx therapy.  Found to have Staph Aureus bacteremia, no vegetation seen on ECHO.  Repeat blood CX's were negative.     Once weaned off sedation, found to not be  responding to painful stimuli and determined to have anoxic brain injury.  Neurology followed.  EEG showed diffuse slowing.  MRI brain showed extensive diffusion restriction diffuse syllable or, bilateral occipital, bilateral hippocampal, bilateral temporal- suggestive of anoxic brain injury.  Palliative care consulted for goals of care discussion with family.           Subjective:    On vent , limited due to intubation , stable on vent overnight , waiting for ltac placement and waiting for authorization     Assessment/Plan:  Medical Complexity: High-1 acute medical problem with high risk threat, at least 3 follow up items  Pt has been stable overnight and no change from his neurological part       Post cardiac arrest Anoxic Brain Injury/  Unresponsive    On vent   trach on 3/23 per Dr. Whatley    supportive,    PEG 3/24/2025 per IR        Bradycardia and PEA Cardiac arrest x 2  (3/09).  Episode of 
    Hospitalist Progress Note      Patient name: Angelo Mireles.  MRN: [id number]          PCP: Brooke Roland FNP     Summary:      Angelo Mireles is a 54 y.o. old male with PMHx of DM 2, HTN, CAD,AFib, ESRD now s/p renal transplant in 2023 on transplant medications, R eye blindness presents to ER 3/09/25 via EMS with concerns of shortness of breath.   Found to have influenza A, pneumonia.  He failed trial of BiPAP and was intubated in the ER.  Then had worsening respiratory acidosis; and suffered bradycardia followed by PEA cardiac arrest x 2 and brief episode of VTach during second cardiac arrest.  CPR initiated, lasted 20 mins w/ ROSC. He coded again briefly, this time noted to have episode of Vtach. Received amiodarone bolus.  Propofol stopped after second cardiac arrest.     Developed shock, septic Vs cardiogenic shock- requiring multiple vasopressors, along with Abx therapy.  Found to have Staph Aureus bacteremia, no vegetation seen on ECHO.  Repeat blood CX's were negative.     Once weaned off sedation, found to not be  responding to painful stimuli and determined to have anoxic brain injury.  Neurology followed.  EEG showed diffuse slowing.  MRI brain showed extensive diffusion restriction diffuse syllable or, bilateral occipital, bilateral hippocampal, bilateral temporal- suggestive of anoxic brain injury.  Palliative care consulted for goals of care discussion with family.           Subjective:    On vent , limited due to intubation , stable on vent overnight ,   Assessment/Plan:  Medical Complexity: High-1 acute medical problem with high risk threat, at least 3 follow up items      Post cardiac arrest Anoxic Brain Injury/  Unresponsive    On vent   trach, supportive, weaning as tolerated     # Bradycardia and PEA Cardiac arrest x 2  (3/09).  Episode of V. tach during 2nd episode of cardiac arrest.   Wide-complex tachycardia  ECHO: mildly reduced LV systolic function, EF of 40 to 45%, grade 2 DD  Cardiology & 
    Hospitalist Progress Note      Patient name: Angelo Mireles.  MRN: [id number]          PCP: Brooke Roland FNP     Summary:      Angelo Mireles is a 54 y.o. old male with PMHx of DM 2, HTN, CAD,AFib, ESRD now s/p renal transplant in 2023 on transplant medications, R eye blindness presents to ER 3/09/25 via EMS with concerns of shortness of breath.   Found to have influenza A, pneumonia.  He failed trial of BiPAP and was intubated in the ER.  Then had worsening respiratory acidosis; and suffered bradycardia followed by PEA cardiac arrest x 2 and brief episode of VTach during second cardiac arrest.  CPR initiated, lasted 20 mins w/ ROSC. He coded again briefly, this time noted to have episode of Vtach. Received amiodarone bolus.  Propofol stopped after second cardiac arrest.     Developed shock, septic Vs cardiogenic shock- requiring multiple vasopressors, along with Abx therapy.  Found to have Staph Aureus bacteremia, no vegetation seen on ECHO.  Repeat blood CX's were negative.     Once weaned off sedation, found to not be  responding to painful stimuli and determined to have anoxic brain injury.  Neurology followed.  EEG showed diffuse slowing.  MRI brain showed extensive diffusion restriction diffuse syllable or, bilateral occipital, bilateral hippocampal, bilateral temporal- suggestive of anoxic brain injury.  Palliative care consulted for goals of care discussion with family.       Assessment/Plan:  Medical Complexity: High-1 acute medical problem with high risk threat, at least 3 follow up items      # Post cardiac arrest Anoxic Brain Injury/  Unresponsive    # Bradycardia and PEA Cardiac arrest x 2  (3/09).  Episode of V. tach during 2nd episode of cardiac arrest.   Wide-complex tachycardia  ECHO: mildly reduced LV systolic function, EF of 40 to 45%, grade 2 DD  Cardiology & Neurology followed  Formerly was AOx3 prior to arrest;     CT 3/13.  Subtotal diffuse loss of gray-white matter differentiation, bilateral 
    Hospitalist Progress Note      Patient name: Angelo Mireles.  MRN: [id number]          PCP: Brooke Roland FNP     Summary:      Angelo Mireles is a 54 y.o. old male with PMHx of DM 2, HTN, CAD,AFib, ESRD now s/p renal transplant in 2023 on transplant medications, R eye blindness presents to ER 3/09/25 via EMS with concerns of shortness of breath.   Found to have influenza A, pneumonia.  He failed trial of BiPAP and was intubated in the ER.  Then had worsening respiratory acidosis; and suffered bradycardia followed by PEA cardiac arrest x 2 and brief episode of VTach during second cardiac arrest.  CPR initiated, lasted 20 mins w/ ROSC. He coded again briefly, this time noted to have episode of Vtach. Received amiodarone bolus.  Propofol stopped after second cardiac arrest.     Developed shock, septic Vs cardiogenic shock- requiring multiple vasopressors, along with Abx therapy.  Found to have Staph Aureus bacteremia, no vegetation seen on ECHO.  Repeat blood CX's were negative.     Once weaned off sedation, found to not be  responding to painful stimuli and determined to have anoxic brain injury.  Neurology followed.  EEG showed diffuse slowing.  MRI brain showed extensive diffusion restriction diffuse syllable or, bilateral occipital, bilateral hippocampal, bilateral temporal- suggestive of anoxic brain injury.  Palliative care consulted for goals of care discussion with family.       Assessment/Plan:  Medical Complexity: High-1 acute medical problem with high risk threat, at least 3 follow up items      # Post cardiac arrest Anoxic Brain Injury/  Unresponsive    # Bradycardia and PEA Cardiac arrest x 2  (3/09).  Episode of V. tach during 2nd episode of cardiac arrest.   Wide-complex tachycardia  ECHO: mildly reduced LV systolic function, EF of 40 to 45%, grade 2 DD  Cardiology & Neurology followed  Formerly was AOx3 prior to arrest;     CT 3/13.  Subtotal diffuse loss of gray-white matter differentiation, bilateral 
 called Yaritza Mireles on the phone for family care. Yaritza is the family member that is getting any information from medical staff.     Yaritza thanked  for the support for the patient and family members.     provided support for family members.    Chaplains will provide follow-up care for patient and family as needed.    Spiritual Health History and Assessment/Progress Note  [facility]    Family Care, Family Care,  ,      Name: Angelo Mireles MRN: [id number]    Age: 54 y.o.     Sex: male   Language: English   Buddhist: Jewish   Cardiac arrest     Date: 3/28/2025            Total Time Calculated: 5 min              Spiritual Assessment continued in [facility] 1 INTENSIVE CARE        Referral/Consult From: Palliative Care   Encounter Overview/Reason: Family Care  Service Provided For: Family    [place], Belief, Meaning:   Patient identifies as spiritual, is connected with a xiomara tradition or spiritual practice, and has beliefs or practices that help with coping during difficult times  Family/Friends identify as spiritual, are connected with a xiomara tradition or spiritual practice, and have beliefs or practices that help with coping during difficult times      Importance and Influence:  Patient has spiritual/personal beliefs that influence decisions regarding their health  Family/Friends have spiritual/personal beliefs that influence decisions regarding the patient's health    Community:  Patient is connected with a spiritual community and feels well-supported. Support system includes: Parent/s, [place] Community, and Extended family  Family/Friends are connected with a spiritual community: and feel well-supported. Support system includes: [place] Community and Extended family    Assessment and Plan of Care:     Patient Interventions include: Other: Unable to assess.  Family/Friends Interventions include: Facilitated expression of thoughts and feelings, Explored spiritual 
 met nurse in ICU for a follow-up visit.    Nurse said patient was about the same.  asked about calling family member. Nurse said patient's father is on the list and primary decision maker but his health is not good. Nurse suggested  begin with the patient's sister-in-law. She will recommend family members that need a call from . Nurse thanked  for the visit.      provided presence and support for nurse.    Chaplains will provide follow-up care for patient, family, and staff as needed.    Spiritual Health History and Assessment/Progress Note  [facility]    Follow-up, Palliative Care, Follow up,  ,      Name: Angelo Mireles MRN: [id number]    Age: 54 y.o.     Sex: male   Language: English   Buddhist: Restorationist   Cardiac arrest     Date: 3/25/2025            Total Time Calculated: 5 min              Spiritual Assessment continued in [facility] 1 INTENSIVE CARE        Referral/Consult From: Palliative Care   Encounter Overview/Reason: Follow-up, Palliative Care  Service Provided For: Patient    [place], Belief, Meaning:   Patient identifies as spiritual, is connected with a xiomara tradition or spiritual practice, and has beliefs or practices that help with coping during difficult times  Family/Friends No family/friends present      Importance and Influence:  Patient unable to assess at this time  Family/Friends No family/friends present    Community:  Patient feels well-supported. Support system includes: Parent/s and Extended family  Family/Friends No family/friends present    Assessment and Plan of Care:     Patient Interventions include: Other: Unable to assess.  Family/Friends Interventions include: No family/friends present    Patient Plan of Care: No spiritual needs identified for follow-up  Family/Friends Plan of Care: No family/friends present    Electronically signed by Chaplain Sherry on 3/25/2025 at 11:34 AM   
 met patient and nurse at bedside for a follow-up visit.     Patient was unable to communicate at the time of the visit. Nurse said patient was in cardiac arrest yesterday. Patient's family members were not at bedside at the time of the visit. Family members are supposed to come in today to speak with Palliative Care staff members. Nurse thanked  for the visit.     provided presence and support for staff.    Chaplains will provide follow-up care for patient. Family,. And staff as needed.    Spiritual Health History and Assessment/Progress Note  [facility]    Palliative Care, Follow-up, Follow up,  ,      Name: Angelo Mireles MRN: [id number]    Age: 54 y.o.     Sex: male   Language: English   Scientology: Faith   Cardiac arrest (HCC)     Date: 3/10/2025            Total Time Calculated: 10 min              Spiritual Assessment continued in [facility] 1 INTENSIVE CARE        Referral/Consult From: Nurse   Encounter Overview/Reason: Palliative Care, Follow-up  Service Provided For: Patient    Xiomara, Belief, Meaning:   Patient identifies as spiritual, is connected with a xiomara tradition or spiritual practice, and has beliefs or practices that help with coping during difficult times  Family/Friends No family/friends present      Importance and Influence:  Patient unable to assess at this time  Family/Friends No family/friends present    Community:  Patient feels well-supported. Support system includes: Extended family  Family/Friends No family/friends present    Assessment and Plan of Care:     Patient Interventions include: Other: Unable to assess.  Family/Friends Interventions include: No family/friends present    Patient Plan of Care: No spiritual needs identified for follow-up  Family/Friends Plan of Care: No family/friends present    Electronically signed by Chaplain Sherry on 3/10/2025 at 11:13 AM       
 met patient's nurse in the ICU.    Patient has a trach and is unable to communicate. Family members came in yesterday. Nurse thanked  for the visit.     provided presence and support for staff.    Chaplains will provide follow-up care for patient, family, and staff as needed.    Spiritual Health History and Assessment/Progress Note  [facility]    Spiritual/Emotional Needs, Follow up,  ,      Name: Angelo Mireles MRN: [id number]    Age: 54 y.o.     Sex: male   Language: English   Congregation: Voodoo   Cardiac arrest     Date: 3/21/2025            Total Time Calculated: 5 min              Spiritual Assessment continued in [facility] 1 INTENSIVE CARE        Referral/Consult From: Nurse   Encounter Overview/Reason: Spiritual/Emotional Needs  Service Provided For: Patient    Xiomara, Belief, Meaning:   Patient identifies as spiritual, is connected with a xiomara tradition or spiritual practice, and has beliefs or practices that help with coping during difficult times  Family/Friends No family/friends present      Importance and Influence:  Patient unable to assess at this time  Family/Friends No family/friends present    Community:  Patient is connected with a spiritual community and feels well-supported. Support system includes: Parent/s, [place] Community, and Extended family  Family/Friends No family/friends present    Assessment and Plan of Care:     Patient Interventions include: Other: Unable to assess.  Family/Friends Interventions include: No family/friends present    Patient Plan of Care: No spiritual needs identified for follow-up  Family/Friends Plan of Care: No family/friends present    Electronically signed by Chaplain Sherry on 3/21/2025 at 3:16 PM   
1024:Fent infusion order and started for surgery     1113:  Pt transported to the OR at this time with OR staff and CRNA on cardiac monitor.    1240:  Pt transported back to ICU with OR RN CRNA on cardiac monitor with tracheostomy being bagged. Critical care continues.    
1150- Received pt from the ED at this time.     1200- Assessment complete, see Flowsheets.     1230- Dr. Dominguez at bedside, repeat EKG ordered.     1345- Per Dr. Menezes, advanced OGT, repeat KUB ordered.     1537- Pt tachypneic 35-40s. Dr Menezes made aware, Fentanyl 25 mcg ordered.     1611- Remains tachypneic, versed ordered, will start continuous infusion per order.    1613- Oral temp 100.4, esophageal temperature probe placed. PRN Tylenol given, MD aware.     1651- Pt remains tachypneic and asynchronous with ventilator, Dr. Menezes made aware, Nimbex infusion ordered.     1856- Pt remains febrile, T 103. Dr. Menezes notified, new orders noted for cooling blanke, goal of 37 celsius.      Shift report given to LESTER Suazo RN Report included the following information SBAR, Kardex, ED Summary, Procedure Summary, Intake/Output, MAR, and Recent Results    
1244 patient transport to IR and back to ICU @ 1357. Pt required NMBA for ventilator synchrony.    1450 Bronchoscopy assist.  Pt tolerated well with no complications.  
1300 Patient spiking fevers per rectal probe, order for cooling pads initiated, awaiting equpiment from  to initiate cooling. Tylenol given while awaiting.   
1511:  NGT advanced 10 cm to 70 (previous measurement) per KUB on 3/23  
1650- Left CVC removed at this time. Sutures and catheter intact. Pressure held for 6 minutes. No bleeding or hematoma present. Occlusive dressing applied.   
22:34    Bcx gram + cocci in clusters.  Staph?  Currently on Vancomycin.  Should be covered.      
80 ml versed wasted with primary RN, Hannah TRIANA   
@0000 tube feeds off , CHG bath completed , Nursing care continues.      @0455 Dr Morris was contacted  and updated order received to hold heparin in preparation for tracheostomy today same done.    @3006 Yaritza Mireles was called and she confirmed that she spoke with surgeon who explained procedure and that she and her family agrees to Tracheostomy this was witnessed  by this nurse and on coming nurse Jose Luis Rudolph via telephone. Care and observation continues.    
ABG in ICU showed mild improvement in pH and pCO2 but still remains severe respiratory acidosis.  pH7.03, pCO2 71.1, pO2 83, SpO2 88.9%.  Ventilator adjusted with increased minute ventilation by increasing respiratory rate to 24, increase tidal volume to 550, FiO2 100% and PEEP of 5.  Repeat ABG in 2-hour; discussed with RT.  After adjusting OGT, KUB was performed; the proximal part still remains at the GE junction and so OGT was reinserted; the port is below the diaphragm; discussed with RN to further push down OGT by 5 to 7 cm.  CT head nil acute.  CT abdomen/pelvis: No intra-abdominal infection.  Bilateral renal atrophy with right lower quadrant renal transplant.  Mild peripheral edema in liver.  No ascites or bowel inflammation.  CTA chest 3/9/2025: No PE.  Severe multifocal pneumonia, worse in lower lobes bilaterally.  Patient's family members came at bedside including Mateus brother that I spoke with him.  Updated him with all above again.  Multiorgan failure discussed.  Possible MI as well discussed.  Also discussed that patient may not survive.  He will let other family members know.  Will consult palliative care team for tomorrow.    Payam Menezes MD 3/9/2025 2:05 PM   
ABG significantly improved pH and pCO2.  Patient is breathing in 30s but still remains unresponsive.  Respiratory rate on the ventilator will be reduced; discussed with RT.  Will give fentanyl 25 mg bolus now and see if that helps improving the breathing; if so we may consider starting him on sedation with fentanyl drip.    Payam Menezes MD 3/9/2025 3:38 PM     
Addendum 6:23 pm  As per family's request I reached out Ms. Yaritza Mireles since Mr. Roldan Bowens remained hospitalized to provide update through her and to the rest of the family.  Update provided including CT head findings and possible need for neurology evaluation.  Ms. Yaritza Mireles verbalized understanding regarding the finding and possibility of anoxic changes while said that she and family believe in taking there to time and remain hopeful for patient's recovery.  Overall poor long-term prognosis, increased risk for prolonged hospitalization, need for prolonged ventilator support, nosocomial infection, risk for bleeding, morbidity, mortality, other discussed.  Will continue to provide family update through Ms. Yaritza Mireles request by family.    Chin Menezes MD   
Am abg attempted x2 without success.  
Belatacept (NULOJIX) 5 mg/kg IV once was ordered for 3/31/25 @ 0900    Product is non-formulary ==> too late today to place order   Pharmacy will try to order medication tomorrow for Tuesday delivery    Tried to contact Dr. Mendez without success to let him know product unavailable at this time.  Will change due time to Tuesday at noon    Pharmacy will contact MD if any issues arise in obtaining the medication    Amy Pryor [facility], BC[facility]           
Blood culture +MSSA on final ID  Placed on naficillin  DC vanco IV      Divya Patrick MD  [facility])  Office: [phone] -Option #8  Office fax:  [phone]   
CT abdomen study reported by radiologist–consolidations and cavitary lesions in the lungs; no hematoma in the abdomen.  Patient with MSSA bacteremia, immunosuppressed prior to admission.  On IV cefazolin and micafungin.  Called and discussed with ID–continue same for now; likely sequelae of Staph aureus pneumonia.    JAZLYN Montero MD
Called  [phone]-try to physician to physician call- they are current busy -will call back   
Cardiology Progress Note        Patient: Angelo Mireles        Sex: male          DOA: 3/9/2025  YOB: 1970      Age:  54 y.o.        LOS:  LOS: 1 day   Assessment/Plan     Principal Problem:    Cardiac arrest (HCC)  Active Problems:    Cardiomyopathy (HCC)    Diabetic retinopathy (HCC)    Type 2 diabetes with nephropathy (HCC)    Paroxysmal A-fib (HCC)    Acute respiratory failure (HCC)    Lactic acidosis    Influenza A    Pneumonia    Immunosuppressed status    Renal transplant, status post    Acute kidney injury superimposed on CKD    HTN (hypertension)  Resolved Problems:    * No resolved hospital problems. *      Plan:  Respiratory failure  Cardiac arrest PEA arrest x 2 with VT on second arrest requiring shock treatment  Renal transplant  Chronic CAD  Paroxysmal atrial fibrillation  History of diastolic heart failure    Minimal troponin elevation in the setting of PEA cardiac arrest  No evidence of STEMI or non-STEMI, mild troponin elevation probably due to cardiac arrest/respiratory failure/kidney disease  Continue ventilatory support  Continue pressor support  Discussed with RN  Discussed with Dr. Menezes  INR is 2.2  Will recheck tomorrow                Subjective:    cc:  Shortness of breath  Cardiac arrest        REVIEW OF SYSTEMS:     General: No fevers or chills.  Cardiovascular: No chest pain or pressure. No palpitations. No ankle swelling  Pulmonary: No SOB, orthopnea, PND  Gastrointestinal: No nausea, vomiting or diarrhea      Objective:      Visit Vitals  /69   Pulse (!) 103   Temp 99.5 °F (37.5 °C)   Resp 21   Ht 1.778 m (5' 10\")   Wt 88 kg (194 lb)   SpO2 96%   BMI 27.84 kg/m²     Body mass index is 27.84 kg/m².    Physical Exam:  General Appearance: Intubated but comfortable  NECK: No JVD, no thyroidomeglay.   LUNGS:  bilateral air entry positive   HEART: S1+S2 audible,    ABD: Non-tender, BS Audible    EXT: No edema, and no cysnosis.  VASCULAR EXAM: Pulses are 
Cardiology Progress Note        Patient: Angelo Mireles        Sex: male          DOA: 3/9/2025  YOB: 1970      Age:  54 y.o.        LOS:  LOS: 11 days   Assessment/Plan     Principal Problem:    Cardiac arrest (HCC)  Active Problems:    Cardiomyopathy (HCC)    Diabetic retinopathy (HCC)    Type 2 diabetes with nephropathy (HCC)    Paroxysmal A-fib (HCC)    Acute respiratory failure (HCC)    Lactic acidosis    Influenza A    Pneumonia    Immunosuppressed status    Renal transplant, status post    Acute kidney injury superimposed on CKD    HTN (hypertension)    Bacteremia  Resolved Problems:    * No resolved hospital problems. *      Plan:  3/20/2025  No change from cardiac standpoint  Sinus rhythm cardiac telemetry   Family meeting today to decide  goal of care  Discussed with RN                              3/19/2025  Patient remained intubated  Maintaining sinus rhythm  Off Levophed  Family meeting tomorrow to discuss goals of care  Continue with current meds  Discussed with RN  Discussed with Dr. Sweet                      3/18/2025  No change from cardiac standpoint  Patient remained responsive and on ventilator  Cardiac telemetry stable  Palliative care is communicating with patient's father and other family member  I have discussed with patient's sister Jessi Mireles  Continue with supportive treatment  Discussed with RN  Discussed with Dr. Sweet                      3/16/2025  Patient is started on Levophed due to low blood pressure  Maintaining sinus rhythm  Blood pressure improved on Levophed  Will continue with amiodarone and metoprolol with holding parameter  Brain MRI is done concerning for anoxic brain injury,  Fever ID is following  Continue supportive treatment  Discussed with RN                3/15/2025  Patient was started on amiodarone yesterday due to wide-complex tachycardia/VT responded well  Maintaining sinus rhythm  Continue IV amiodarone as per protocol  Continue IV 
Cardiology Progress Note        Patient: Angelo Mireles        Sex: male          DOA: 3/9/2025  YOB: 1970      Age:  54 y.o.        LOS:  LOS: 12 days   Assessment/Plan     Principal Problem:    Cardiac arrest (HCC)  Active Problems:    Cardiomyopathy (HCC)    Diabetic retinopathy (HCC)    Type 2 diabetes with nephropathy (HCC)    Paroxysmal A-fib (HCC)    Acute respiratory failure (HCC)    Lactic acidosis    Influenza A    Pneumonia    Immunosuppressed status    Renal transplant, status post    Acute kidney injury superimposed on CKD    HTN (hypertension)    Bacteremia  Resolved Problems:    * No resolved hospital problems. *      Plan:  Patient is being scheduled for possible tracheostomy and PEG tube  Case discussed with Dr. Samuel she preferred to consider transesophageal echocardiogram  Discussed with Dr. Gimenez  I have called patient's brother Mateus Mireles who did not answer then I called his Sister Autumn Mireles since his father was admitted in the hospital and in the rehab not available  PRASHANT procedure explained to patient Sister Jessi and risk-benefit explained at RN witnessed she consented for the percent  Continue with the rest of the treatment      5:26  Addendum  Attempted PRASHANT  Patient have macroglossia/intubated unable to advance PRASHANT probe beyond posterior part of the tongue.   Patient is also trying to bite the probe, bite block was placed and patient was given fentanyl/Versed  Procedure terminated without any complication and patient remained hemodynamically stable  I have notified Dr. Patrick.  Will reorder regular limited transthoracic echocardiogram for assessment of valves.  Nurse will update the family.        3/20/2025  No change from cardiac standpoint  Sinus rhythm cardiac telemetry   Family meeting today to decide  goal of care  Discussed with RN                              3/19/2025  Patient remained intubated  Maintaining sinus rhythm  Off Levophed  Family meeting tomorrow 
Cardiology Progress Note        Patient: Angelo Mireles        Sex: male          DOA: 3/9/2025  YOB: 1970      Age:  54 y.o.        LOS:  LOS: 13 days   Assessment/Plan     Principal Problem:    Cardiac arrest (HCC)  Active Problems:    Cardiomyopathy (HCC)    Diabetic retinopathy (HCC)    Type 2 diabetes with nephropathy (HCC)    Paroxysmal A-fib (HCC)    Acute respiratory failure (HCC)    Lactic acidosis    Influenza A    Pneumonia    Immunosuppressed status    Renal transplant, status post    Acute kidney injury superimposed on CKD    HTN (hypertension)    Bacteremia  Resolved Problems:    * No resolved hospital problems. *      Plan:  Maintaining sinus rhythm  IV amiodarone was stopped yesterday  Continue amiodarone p.o. 200 mg daily  Transient mild hypotension with 5 mg of IV metoprolol every 6 6 hourly  I will reduce the metoprolol from 5 mg to 2.5 mg IV every 6 hourly  Discussed with RN  Discussed with Dr. Sweet                            Patient is being scheduled for possible tracheostomy and PEG tube  Case discussed with Dr. Samuel she preferred to consider transesophageal echocardiogram  Discussed with Dr. Gimenez  I have called patient's brother Mateus Mireles who did not answer then I called his Sister Autumn Mireles since his father was admitted in the hospital and in the rehab not available  PRASHANT procedure explained to patient Sister Jessi and risk-benefit explained at RN witnessed she consented for the percent  Continue with the rest of the treatment      5:26  Addendum  Attempted PRASHANT  Patient have macroglossia/intubated unable to advance PRASHANT probe beyond posterior part of the tongue.   Patient is also trying to bite the probe, bite block was placed and patient was given fentanyl/Versed  Procedure terminated without any complication and patient remained hemodynamically stable  I have notified Dr. Patrick.  Will reorder regular limited transthoracic echocardiogram for assessment of 
Cardiology Progress Note        Patient: Angelo Mireles        Sex: male          DOA: 3/9/2025  YOB: 1970      Age:  54 y.o.        LOS:  LOS: 14 days   Assessment/Plan     Principal Problem:    Cardiac arrest (HCC)  Active Problems:    Cardiomyopathy (HCC)    Diabetic retinopathy (HCC)    Type 2 diabetes with nephropathy (HCC)    Paroxysmal A-fib (HCC)    Acute respiratory failure (HCC)    Lactic acidosis    Influenza A    Pneumonia    Immunosuppressed status    Renal transplant, status post    Acute kidney injury superimposed on CKD    HTN (hypertension)    Bacteremia  Resolved Problems:    * No resolved hospital problems. *      Plan:  3/23/2025  Patient is status post tracheostomy  Maintaining normal sinus rhythm  Low normal blood pressure  Echocardiogram have shown no obvious vegetation on the transthoracic echocardiogram  Continue with current medical regimen  Will consider therapeutic anticoagulation with Eliquis after tomorrow procedure of Eliquis  Discussed with patient  Discussed with Dr. Gimenez                          Maintaining sinus rhythm  IV amiodarone was stopped yesterday  Continue amiodarone p.o. 200 mg daily  Transient mild hypotension with 5 mg of IV metoprolol every 6 6 hourly  I will reduce the metoprolol from 5 mg to 2.5 mg IV every 6 hourly  Discussed with RN  Discussed with Dr. Sweet                            Patient is being scheduled for possible tracheostomy and PEG tube  Case discussed with Dr. Samuel she preferred to consider transesophageal echocardiogram  Discussed with Dr. Gimenez  I have called patient's brother Mateus Mireles who did not answer then I called his Sister Autumn Mireles since his father was admitted in the hospital and in the rehab not available  PRASHANT procedure explained to patient Sister Jessi and risk-benefit explained at RN witnessed she consented for the percent  Continue with the rest of the treatment      5:26  Addendum  Attempted 
Cardiology Progress Note        Patient: Angelo Mireles        Sex: male          DOA: 3/9/2025  YOB: 1970      Age:  54 y.o.        LOS:  LOS: 16 days   Assessment/Plan     Principal Problem:    Cardiac arrest (HCC)  Active Problems:    Cardiomyopathy (HCC)    Diabetic retinopathy (HCC)    Type 2 diabetes with nephropathy (HCC)    Paroxysmal A-fib (HCC)    Acute respiratory failure with hypoxia (HCC)    Lactic acidosis    Influenza A    Pneumonia    Immunosuppressed status    Renal transplant, status post    Acute kidney injury superimposed on CKD    HTN (hypertension)    Bacteremia    Anoxic encephalopathy (HCC)    Hyperthyroidism  Resolved Problems:    * No resolved hospital problems. *      Plan:  3/25/2025  Cardiac tele stable   Levophed is being weaned off  Continue amiodarone 200 mg tablet daily   Continue with IV Lopressor 2.5 mg daily  Consider switching to therapeutic anticoagulation once stable and cleared from tracheostomy standpoint  Discussed with Dr. Montero  Discussed with Dr. Marcano  Discussed with RN                      3/24/2025  Patient is status post PEG tube today   Patient also underwent bronchoscopy   Cardiac telemetry remained stable   Patient is requiring low-dose Levophed otherwise normal rhythm   Continue with current cardiac meds   Discussed with RN                          3/23/2025  Patient is status post tracheostomy  Maintaining normal sinus rhythm  Low normal blood pressure  Echocardiogram have shown no obvious vegetation on the transthoracic echocardiogram  Continue with current medical regimen  Will consider therapeutic anticoagulation with Eliquis after tomorrow procedure of Eliquis  Discussed with patient  Discussed with Dr. Gimenez                          Maintaining sinus rhythm  IV amiodarone was stopped yesterday  Continue amiodarone p.o. 200 mg daily  Transient mild hypotension with 5 mg of IV metoprolol every 6 6 hourly  I will reduce the metoprolol from 5 
Cardiology Progress Note        Patient: Angelo Mireles        Sex: male          DOA: 3/9/2025  YOB: 1970      Age:  54 y.o.        LOS:  LOS: 17 days   Assessment/Plan     Principal Problem:    Cardiac arrest (HCC)  Active Problems:    Cardiomyopathy (HCC)    Diabetic retinopathy (HCC)    Type 2 diabetes with nephropathy (HCC)    Paroxysmal A-fib (HCC)    Acute respiratory failure with hypoxia (HCC)    Lactic acidosis    Influenza A    Pneumonia    Immunosuppressed status    Renal transplant, status post    Acute kidney injury superimposed on CKD    HTN (hypertension)    Bacteremia    Anoxic encephalopathy (HCC)    Hyperthyroidism  Resolved Problems:    * No resolved hospital problems. *      Plan:  Remained on ventilator   Status post tracheostomy and PEG tube  There is a drop in hemoglobin   Continue to monitor H&H   Cardiac telemetry remained sinus rhythm   Intermittent requirement of Levophed   Overall no change in cardiac status                      3/25/2025  Cardiac tele stable   Levophed is being weaned off  Continue amiodarone 200 mg tablet daily   Continue with IV Lopressor 2.5 mg daily  Consider switching to therapeutic anticoagulation once stable and cleared from tracheostomy standpoint  Discussed with Dr. Montero  Discussed with Dr. Marcano  Discussed with RN                      3/24/2025  Patient is status post PEG tube today   Patient also underwent bronchoscopy   Cardiac telemetry remained stable   Patient is requiring low-dose Levophed otherwise normal rhythm   Continue with current cardiac meds   Discussed with RN                          3/23/2025  Patient is status post tracheostomy  Maintaining normal sinus rhythm  Low normal blood pressure  Echocardiogram have shown no obvious vegetation on the transthoracic echocardiogram  Continue with current medical regimen  Will consider therapeutic anticoagulation with Eliquis after tomorrow procedure of Eliquis  Discussed with 
Cardiology Progress Note        Patient: Angelo Mireles        Sex: male          DOA: 3/9/2025  YOB: 1970      Age:  54 y.o.        LOS:  LOS: 18 days   Assessment/Plan     Principal Problem:    Cardiac arrest (HCC)  Active Problems:    Cardiomyopathy (HCC)    Diabetic retinopathy (HCC)    Type 2 diabetes with nephropathy (HCC)    Paroxysmal A-fib (HCC)    Acute respiratory failure with hypoxia (HCC)    Lactic acidosis    Influenza A    Pneumonia    Immunosuppressed status    Renal transplant, status post    Acute kidney injury superimposed on CKD    HTN (hypertension)    Bacteremia    Anoxic encephalopathy (HCC)    Hyperthyroidism  Resolved Problems:    * No resolved hospital problems. *      Plan:  Patient remained in the ICU  Hemodynamically stable   Maintaining sinus rhythm  Off Levophed   SBT for ventilatory support   Monitor hemoglobin and platelets count   Consider starting Eliquis 5 mg twice daily once H&H and platelet counts are stable   Discussed with RN   Discussed with Dr. Montero  Discussed with patient sister Jessi in the room                      Remained on ventilator   Status post tracheostomy and PEG tube  There is a drop in hemoglobin   Continue to monitor H&H   Cardiac telemetry remained sinus rhythm   Intermittent requirement of Levophed   Overall no change in cardiac status                      3/25/2025  Cardiac tele stable   Levophed is being weaned off  Continue amiodarone 200 mg tablet daily   Continue with IV Lopressor 2.5 mg daily  Consider switching to therapeutic anticoagulation once stable and cleared from tracheostomy standpoint  Discussed with Dr. Montero  Discussed with Dr. Marcano  Discussed with RN                      3/24/2025  Patient is status post PEG tube today   Patient also underwent bronchoscopy   Cardiac telemetry remained stable   Patient is requiring low-dose Levophed otherwise normal rhythm   Continue with current cardiac meds   Discussed with 
Cardiology Progress Note        Patient: Angelo Mireles        Sex: male          DOA: 3/9/2025  YOB: 1970      Age:  54 y.o.        LOS:  LOS: 22 days   Assessment/Plan     Principal Problem:    Cardiac arrest (HCC)  Active Problems:    Cardiomyopathy (HCC)    Diabetic retinopathy (HCC)    Type 2 diabetes with nephropathy (HCC)    Paroxysmal A-fib (HCC)    Acute respiratory failure with hypoxia (HCC)    Lactic acidosis    Influenza A    Pneumonia    Immunosuppressed status    Renal transplant, status post    Acute kidney injury superimposed on CKD    HTN (hypertension)    Bacteremia    Anoxic encephalopathy (HCC)    Hyperthyroidism  Resolved Problems:    * No resolved hospital problems. *      Plan:  3/31/2025  Patient is maintaining sinus rhythm  Patient remained in ICU  Nonverbal due to anoxic brain injury  Continue with amiodarone and metoprolol   Continue with midodrine   Continue with the Lovenox DVT prophylaxis  Consider switching to Eliquis once again H/H stable, stable INR/ platelet count  Continue supportive treatment discussed with RN   Discussed with Dr. Payam Menezes      3/28/2025  Patient remained in the ICU  Maintaining normal sinus rhythm  Mild intermittent requirement of levo overall stable blood pressure and heart rate  Off Levophed  DC IV metoprolol   Start metoprolol tartrate 25 mg twice daily with holding parameter       Patient remained in the ICU  Hemodynamically stable   Maintaining sinus rhythm  Off Levophed   SBT for ventilatory support   Monitor hemoglobin and platelets count   Consider starting Eliquis 5 mg twice daily once H&H and platelet counts are stable   Discussed with RN   Discussed with Dr. Montero  Discussed with patient sister Jessi in the room                      Remained on ventilator   Status post tracheostomy and PEG tube  There is a drop in hemoglobin   Continue to monitor H&H   Cardiac telemetry remained sinus rhythm   Intermittent requirement of Levophed 
Cardiology Progress Note        Patient: Angelo Mireles        Sex: male          DOA: 3/9/2025  YOB: 1970      Age:  54 y.o.        LOS:  LOS: 3 days   Assessment/Plan     Principal Problem:    Cardiac arrest (HCC)  Active Problems:    Cardiomyopathy (HCC)    Diabetic retinopathy (HCC)    Type 2 diabetes with nephropathy (HCC)    Paroxysmal A-fib (HCC)    Acute respiratory failure (HCC)    Lactic acidosis    Influenza A    Pneumonia    Immunosuppressed status    Renal transplant, status post    Acute kidney injury superimposed on CKD    HTN (hypertension)    Bacteremia  Resolved Problems:    * No resolved hospital problems. *      Plan:    3/12/2025  Patient remained intubated  Cardiac telemetry remains stable  Still on low-dose Levophed and vasopressin  Platelet count is 60s    Discussed with Dr. Darnell ROSA she wants me to consider nonurgent PRASHANT before extubation when patient is stable from a platelet count standpoint and from influenza standpoint                Cardiac telemetry maintaining sinus rhythm  No significant arrhythmia  Patient is on low-dose Levophed and vasopressin  Platelet count is 64,000  Continue supportive treatment  Will consider anticoagulation once stable  Discussed with RN                          Respiratory failure  Cardiac arrest PEA arrest x 2 with VT on second arrest requiring shock treatment  Renal transplant  Chronic CAD  Paroxysmal atrial fibrillation  History of diastolic heart failure    Minimal troponin elevation in the setting of PEA cardiac arrest  No evidence of STEMI or non-STEMI, mild troponin elevation probably due to cardiac arrest/respiratory failure/kidney disease  Continue ventilatory support  Continue pressor support  Discussed with RN  Discussed with Dr. Menezes  INR is 2.2  Will recheck tomorrow                Subjective:    cc:  Shortness of breath  Cardiac arrest        REVIEW OF SYSTEMS:     General: No fevers or chills.  Cardiovascular: No chest pain or 
Cardiology Progress Note        Patient: Angelo Mireles        Sex: male          DOA: 3/9/2025  YOB: 1970      Age:  54 y.o.        LOS:  LOS: 5 days   Assessment/Plan     Principal Problem:    Cardiac arrest (HCC)  Active Problems:    Cardiomyopathy (HCC)    Diabetic retinopathy (HCC)    Type 2 diabetes with nephropathy (HCC)    Paroxysmal A-fib (HCC)    Acute respiratory failure (HCC)    Lactic acidosis    Influenza A    Pneumonia    Immunosuppressed status    Renal transplant, status post    Acute kidney injury superimposed on CKD    HTN (hypertension)    Bacteremia  Resolved Problems:    * No resolved hospital problems. *      Plan:    3/14/2025  Last night have wide-complex tachycardia possible VT  I will resume amiodarone 200 mg daily  Increase metoprolol from 2.5 mg to 5 mg every 6 hourly  Patient is off vasopressin  Continue with Levophed  Discussed with RN  Will discuss with Dr. Menezes                          3/13/2025  Patient remains intubated  Cardiac telemetry stable  On vasopressin  INR 1.2  Platelet count 65 K  Continue with current supportive treatment  Will assess tomorrow for possible PRASHANT if stable from platelet count standpoint            3/12/2025  Patient remained intubated  Cardiac telemetry remains stable  Still on low-dose Levophed and vasopressin  Platelet count is 60s    Discussed with Dr. Darnell ROSA she wants me to consider nonurgent PRASHANT before extubation when patient is stable from a platelet count standpoint and from influenza standpoint                Cardiac telemetry maintaining sinus rhythm  No significant arrhythmia  Patient is on low-dose Levophed and vasopressin  Platelet count is 64,000  Continue supportive treatment  Will consider anticoagulation once stable  Discussed with RN                          Respiratory failure  Cardiac arrest PEA arrest x 2 with VT on second arrest requiring shock treatment  Renal transplant  Chronic CAD  Paroxysmal atrial 
Cardiology Progress Note        Patient: Angelo Mireles        Sex: male          DOA: 3/9/2025  YOB: 1970      Age:  54 y.o.        LOS:  LOS: 6 days   Assessment/Plan     Principal Problem:    Cardiac arrest (HCC)  Active Problems:    Cardiomyopathy (HCC)    Diabetic retinopathy (HCC)    Type 2 diabetes with nephropathy (HCC)    Paroxysmal A-fib (HCC)    Acute respiratory failure (HCC)    Lactic acidosis    Influenza A    Pneumonia    Immunosuppressed status    Renal transplant, status post    Acute kidney injury superimposed on CKD    HTN (hypertension)    Bacteremia  Resolved Problems:    * No resolved hospital problems. *      Plan:  3/15/2025  Patient was started on amiodarone yesterday due to wide-complex tachycardia/VT responded well  Maintaining sinus rhythm  Continue IV amiodarone as per protocol  Continue IV metoprolol 5 mg Q6 hourly  Platelets are improving  May consider PRASHANT on Monday or Tuesday depending upon on clinical course and labs, this was discussed with Dr. Patrick yesterday  Discussed with RN  Discussed with Dr. Menezes, will consider starting DVT prophylaxis heparin.  Patient is being scheduled for brain MRI.  After brain MRI if cleared by neurologist may start therapeutic anticoagulation            3/14/2025  Last night have wide-complex tachycardia possible VT  I will resume amiodarone 200 mg daily  Increase metoprolol from 2.5 mg to 5 mg every 6 hourly  Patient is off vasopressin  Continue with Levophed  Discussed with RN  Will discuss with Dr. Menezes                          3/13/2025  Patient remains intubated  Cardiac telemetry stable  On vasopressin  INR 1.2  Platelet count 65 K  Continue with current supportive treatment  Will assess tomorrow for possible PRASHANT if stable from platelet count standpoint            3/12/2025  Patient remained intubated  Cardiac telemetry remains stable  Still on low-dose Levophed and vasopressin  Platelet count is 60s    Discussed with Dr. Patrick ID 
Cardiology Progress Note        Patient: Angelo Mireles        Sex: male          DOA: 3/9/2025  YOB: 1970      Age:  54 y.o.        LOS:  LOS: 7 days   Assessment/Plan     Principal Problem:    Cardiac arrest (HCC)  Active Problems:    Cardiomyopathy (HCC)    Diabetic retinopathy (HCC)    Type 2 diabetes with nephropathy (HCC)    Paroxysmal A-fib (HCC)    Acute respiratory failure (HCC)    Lactic acidosis    Influenza A    Pneumonia    Immunosuppressed status    Renal transplant, status post    Acute kidney injury superimposed on CKD    HTN (hypertension)    Bacteremia  Resolved Problems:    * No resolved hospital problems. *      Plan:  3/16/2025  Patient is started on Levophed due to low blood pressure  Maintaining sinus rhythm  Blood pressure improved on Levophed  Will continue with amiodarone and metoprolol with holding parameter  Brain MRI is done concerning for anoxic brain injury,  Fever ID is following  Continue supportive treatment  Discussed with RN                3/15/2025  Patient was started on amiodarone yesterday due to wide-complex tachycardia/VT responded well  Maintaining sinus rhythm  Continue IV amiodarone as per protocol  Continue IV metoprolol 5 mg Q6 hourly  Platelets are improving  May consider PRASHANT on Monday or Tuesday depending upon on clinical course and labs, this was discussed with Dr. Patrick yesterday  Discussed with RN  Discussed with Dr. Menezes, will consider starting DVT prophylaxis heparin.  Patient is being scheduled for brain MRI.  After brain MRI if cleared by neurologist may start therapeutic anticoagulation            3/14/2025  Last night have wide-complex tachycardia possible VT  I will resume amiodarone 200 mg daily  Increase metoprolol from 2.5 mg to 5 mg every 6 hourly  Patient is off vasopressin  Continue with Levophed  Discussed with RN  Will discuss with Dr. Menezes                          3/13/2025  Patient remains intubated  Cardiac telemetry stable  On 
Cardiology Progress Note        Patient: Angelo Mireles        Sex: male          DOA: 3/9/2025  YOB: 1970      Age:  54 y.o.        LOS:  LOS: 8 days   Assessment/Plan     Principal Problem:    Cardiac arrest (HCC)  Active Problems:    Cardiomyopathy (HCC)    Diabetic retinopathy (HCC)    Type 2 diabetes with nephropathy (HCC)    Paroxysmal A-fib (HCC)    Acute respiratory failure (HCC)    Lactic acidosis    Influenza A    Pneumonia    Immunosuppressed status    Renal transplant, status post    Acute kidney injury superimposed on CKD    HTN (hypertension)    Bacteremia  Resolved Problems:    * No resolved hospital problems. *      Plan:  Patient remained unresponsive  Intubated  Maintaining sinus rhythm on amiodarone  Intermittent need of Levophed  Pending palliative meeting  Continue supportive treatment  I will wait for PRASHANT until further neurological assessment  Discussed with RN  Discussed with Dr. Sweet                        3/16/2025  Patient is started on Levophed due to low blood pressure  Maintaining sinus rhythm  Blood pressure improved on Levophed  Will continue with amiodarone and metoprolol with holding parameter  Brain MRI is done concerning for anoxic brain injury,  Fever ID is following  Continue supportive treatment  Discussed with RN                3/15/2025  Patient was started on amiodarone yesterday due to wide-complex tachycardia/VT responded well  Maintaining sinus rhythm  Continue IV amiodarone as per protocol  Continue IV metoprolol 5 mg Q6 hourly  Platelets are improving  May consider PRASHANT on Monday or Tuesday depending upon on clinical course and labs, this was discussed with Dr. Patrick yesterday  Discussed with RN  Discussed with Dr. Menezes, will consider starting DVT prophylaxis heparin.  Patient is being scheduled for brain MRI.  After brain MRI if cleared by neurologist may start therapeutic anticoagulation            3/14/2025  Last night have wide-complex tachycardia 
Cardiology Progress Note        Patient: Angelo Mireles        Sex: male          DOA: 3/9/2025  YOB: 1970      Age:  54 y.o.        LOS:  LOS: 9 days   Assessment/Plan     Principal Problem:    Cardiac arrest (HCC)  Active Problems:    Cardiomyopathy (HCC)    Diabetic retinopathy (HCC)    Type 2 diabetes with nephropathy (HCC)    Paroxysmal A-fib (HCC)    Acute respiratory failure (HCC)    Lactic acidosis    Influenza A    Pneumonia    Immunosuppressed status    Renal transplant, status post    Acute kidney injury superimposed on CKD    HTN (hypertension)    Bacteremia  Resolved Problems:    * No resolved hospital problems. *      Plan:  3/18/2025  No change from cardiac standpoint  Patient remained responsive and on ventilator  Cardiac telemetry stable  Palliative care is communicating with patient's father and other family member  I have discussed with patient's sister Jessi Mireles  Continue with supportive treatment  Discussed with RN  Discussed with Dr. Sweet                      3/16/2025  Patient is started on Levophed due to low blood pressure  Maintaining sinus rhythm  Blood pressure improved on Levophed  Will continue with amiodarone and metoprolol with holding parameter  Brain MRI is done concerning for anoxic brain injury,  Fever ID is following  Continue supportive treatment  Discussed with RN                3/15/2025  Patient was started on amiodarone yesterday due to wide-complex tachycardia/VT responded well  Maintaining sinus rhythm  Continue IV amiodarone as per protocol  Continue IV metoprolol 5 mg Q6 hourly  Platelets are improving  May consider PRASHANT on Monday or Tuesday depending upon on clinical course and labs, this was discussed with Dr. Patrick yesterday  Discussed with RN  Discussed with Dr. Menezes, will consider starting DVT prophylaxis heparin.  Patient is being scheduled for brain MRI.  After brain MRI if cleared by neurologist may start therapeutic 
[facility]   Pharmacy Pharmacokinetic Monitoring Service - Vancomycin    Consulting Provider: Dr. Hernandez   Indication: Empiric Treatment  Target Concentration: Goal AUC/ISSAC 400-600 mg*hr/L  Day of Therapy: 3  Additional Antimicrobials: Cefepime, Tamiflu, Doxycycline    Pertinent Laboratory Values:   Wt Readings from Last 1 Encounters:   03/09/25 88 kg (194 lb)     Temp Readings from Last 1 Encounters:   03/11/25 99.5 °F (37.5 °C)     Estimated Creatinine Clearance: 49 mL/min (A) (based on SCr of 1.99 mg/dL (H)).  Recent Labs     03/10/25  0641 03/10/25  1145 03/10/25  2029 03/11/25  0702   CREATININE 2.46*   < > 2.06* 1.99*   BUN 39*   < > 38* 42*   WBC 5.4  --   --  11.9    < > = values in this interval not displayed.         Pertinent Cultures:  Culture Date Source Results   3/9 Blood GPC   MRSA Nasal Swab: N/A. Non-respiratory infection.    Recent vancomycin administrations                     vancomycin (VANCOCIN) 1,000 mg in sodium chloride 0.9 % 250 mL (addEASE) IVPB (mg) 1,000 mg Given 03/11/25 0906     1,000 mg Given 03/10/25 1025    vancomycin (VANCOCIN) 2000 mg in 0.9% sodium chloride 500 mL IVPB (mg) 2,000 mg New Bag 03/09/25 0915                    Assessment:  Date/Time Current Dose Concentration Timing of Concentration (h) AUC   3/10 @ 0702 Vanc 1 gm q24h 6.1 mg/L 20h 37m 306   Note: Serum concentrations collected for AUC dosing may appear elevated if collected in close proximity to the dose administered, this is not necessarily an indication of toxicity    Plan:  Current dosing regimen is sub-therapeutic  Increase dose to Vancomycin 1,000 mg IV every 12 hours  Estimated AUC/Tr= 560 mg/L.hr and 18.1 mg/L at Steady State  Repeat vancomycin concentration not ordered at this time   Pharmacy will continue to monitor patient and adjust therapy as indicated    Thank you for the consult,  RENÉ CARNES RPH  3/11/2025 9:21 AM    
[facility]   Pharmacy Pharmacokinetic Monitoring Service - Vancomycin    Consulting Provider: Dr. Patrick   Indication: Sepsis of Unknown Etiology - 7 day tx  Target Concentration: Goal AUC/ISSAC 400-600 mg*hr/L  Day of Therapy: 2  Additional Antimicrobials: Meropenem, Mycafungin    Pertinent Laboratory Values:   Wt Readings from Last 1 Encounters:   03/17/25 94.7 kg (208 lb 12.4 oz)     Temp Readings from Last 1 Encounters:   03/17/25 98.6 °F (37 °C) (Oral)     Estimated Creatinine Clearance: 66 mL/min (A) (based on SCr of 1.51 mg/dL (H)).  Recent Labs     03/16/25  0320 03/16/25  0350 03/17/25  0356   CREATININE 1.68*  --  1.51*   BUN 61*  --  58*   WBC  --  11.9 15.2*     Recent vancomycin administrations                     vancomycin (VANCOCIN) 2000 mg in 0.9% sodium chloride 500 mL IVPB (mg) 2,000 mg New Bag 03/16/25 9274             Plan:  Updated  mg/l.hr  Trough 12 mg/l  Random with am labs 3/18/2025  Pharmacy will continue to monitor patient and adjust therapy as indicated    LATRICE VELAZQUEZ RPH, BCPS  3/17/2025 12:31 PM   [phone]  
[facility]   Pharmacy Pharmacokinetic Monitoring Service - Vancomycin     Angelo Mireles is a 54 y.o. male starting on Vancomycin therapy for Empiric Treatment. Pharmacy consulted by  for monitoring and adjustment.    Target Concentration: Goal AUC/ISSAC 400-600 mg*hr/L    Additional Antimicrobials: Zosyn    Pertinent Laboratory Values:   Wt Readings from Last 1 Encounters:   03/09/25 88 kg (194 lb)     Temp Readings from Last 1 Encounters:   03/09/25 99.5 °F (37.5 °C) (Oral)     Estimated Creatinine Clearance: 45 mL/min (A) (based on SCr of 2.16 mg/dL (H)).  Recent Labs     03/09/25  0720 03/09/25  0723   CREATININE 2.47* 2.16*   BUN 38*  --    WBC 11.1  --      Plan:  Dosing recommendations based on Bayesian software  Patient received Vancomycin 2000 mg IV at 09:15 3/9/2025  Will continue with Vancomycin 1000 mg IV q24hrs  Anticipated AUC of 544 mg/l.hr and Trough concentration of 16.9 mg/l at steady state  Renal labs as indicated   Pharmacy will continue to monitor patient and adjust therapy as indicated    LATRICE VELAZQUEZ RPH, BSCP  3/9/2025 11:37 AM   [phone]  
[facility]   Pharmacy Pharmacokinetic Monitoring Service - Vancomycin     Angelo Mireles is a 54 y.o. male starting on vancomycin therapy for Sepsis (7 days). Pharmacy consulted by Dr. Patrick for monitoring and adjustment.    Target Concentration: Goal AUC/ISSAC 400-600 mg*hr/L    Additional Antimicrobials: meropenem / micafungin    Pertinent Laboratory Values:   Wt Readings from Last 1 Encounters:   03/16/25 94.2 kg (207 lb 10.8 oz)     Temp Readings from Last 1 Encounters:   03/16/25 (!) 104 °F (40 °C) (Rectal)     Estimated Creatinine Clearance: 59 mL/min (A) (based on SCr of 1.68 mg/dL (H)).  Recent Labs     03/15/25  0408 03/16/25  0320 03/16/25  0350   CREATININE 1.73* 1.68*  --    BUN 63* 61*  --    WBC 10.8  --  11.9     Procalcitonin: 48.77 (3/14/25)     Pertinent Cultures:  Culture Date Source Results   3/16/25 Blood / respiratory In process   MRSA Nasal Swab: negative on 3/9/25    Plan:  Concentration-guided dosing due to renal impairment/insufficiency  Start Vancomycin 2000 mg IV once, given on 3/16 at 15:19        Maintenance dose:  Vancomycin 1750 mg IV q24h, scheduled for 3/17 at 15:00  Anticipated AUC of 540 mg/L.hr and trough concentration of 14 mg/L at steady state  Renal labs as indicated   Pharmacy will continue to monitor patient and adjust therapy as indicated    Thank you for the consult,  Shirley East, PharmD  3/16/2025 8:05 PM   
[facility]   Pharmacy Pharmacokinetic Monitoring Service - Vancomycin      Consulting Provider: Dr. Patrick   Indication: Sepsis of Unknown Etiology - 7 day tx  Target Concentration: Goal AUC/ISSAC 400-600 mg*hr/L  Day of Therapy: 4  Additional Antimicrobials: Meropenem, Mycafungin    Pertinent Laboratory Values:   Wt Readings from Last 1 Encounters:   03/18/25 93.2 kg (205 lb 7.5 oz)     Temp Readings from Last 1 Encounters:   03/19/25 99.1 °F (37.3 °C) (Oral)     Estimated Creatinine Clearance: 69 mL/min (A) (based on SCr of 1.43 mg/dL (H)).  Recent Labs     03/17/25  0356 03/18/25  0327 03/19/25  0509   CREATININE 1.51* 1.50* 1.43*   BUN 58* 61* 62*   WBC 15.2* 18.7*  --        Recent vancomycin administrations                     vancomycin (VANCOCIN) 1250 mg in sodium chloride 0.9% 250 mL IVPB (mg) 1,250 mg New Bag 03/18/25 2220    vancomycin (VANCOCIN) 1750 mg in sodium chloride 0.9 % 500 mL IVPB (mg) 1,750 mg New Bag 03/17/25 1437    vancomycin (VANCOCIN) 2000 mg in 0.9% sodium chloride 500 mL IVPB (mg) 2,000 mg New Bag 03/16/25 1519            Plan:  Current dosing regimen is therapeutic  Continue current dose Vancomycin 1250mg IV q24h  Estimated GCUix=336al/L.hr and Trss=13mg/L  Pharmacy will continue to monitor patient and adjust therapy as indicated    Thank you for the consult,  Declan Taylor RPH  3/19/2025 10:30 AM  
[facility]   Pharmacy Pharmacokinetic Monitoring Service - Vancomycin    Consulting Provider: Dr. Hernandez   Indication: Empiric Treatment  Target Concentration: Goal AUC/ISSAC 400-600 mg*hr/L  Day of Therapy: 2  Additional Antimicrobials: Tamiflu, Cefepime, doxycycline    Pertinent Laboratory Values:   Wt Readings from Last 1 Encounters:   03/09/25 88 kg (194 lb)     Temp Readings from Last 1 Encounters:   03/10/25 100.4 °F (38 °C)     Estimated Creatinine Clearance: 39 mL/min (A) (based on SCr of 2.46 mg/dL (H)).  Recent Labs     03/09/25  1204 03/09/25  1803 03/10/25  0421 03/10/25  0641   CREATININE 3.52*   < > 2.48* 2.46*   BUN 44*   < > 40* 39*   WBC 7.5  --   --  5.4    < > = values in this interval not displayed.       Pertinent Cultures:  Culture Date Source Results   3/9 Nares Influenza (+)   MRSA Nasal Swab: was ordered by provider, awaiting results.    Recent vancomycin administrations                     vancomycin (VANCOCIN) 2000 mg in 0.9% sodium chloride 500 mL IVPB (mg) 2,000 mg New Bag 03/09/25 0915                    Plan:  Current dosing regimen is therapeutic  Continue current dose of Vancomycin 1,000 mg IV every 24 hours  Estimated AUC/Tr= 548 mg/L.hr and 17 mg/L at Steady State  Repeat vancomycin concentration ordered for 3/11 @ 0600   Pharmacy will continue to monitor patient and adjust therapy as indicated    Thank you for the consult,  RENÉ CARNES RPH  3/10/2025 10:00 AM    
[facility]   Pharmacy Pharmacokinetic Monitoring Service - Vancomycin    Consulting Provider: Dr. Patrick   Indication: Sepsis of Unknown Etiology - 7 day tx  Target Concentration: Goal AUC/ISSAC 400-600 mg*hr/L  Day of Therapy: 3  Additional Antimicrobials: Meropenem, Mycafungin    Pertinent Laboratory Values:   Wt Readings from Last 1 Encounters:   03/18/25 93.8 kg (206 lb 12.7 oz)     Temp Readings from Last 1 Encounters:   03/18/25 98 °F (36.7 °C) (Axillary)     Estimated Creatinine Clearance: 66 mL/min (A) (based on SCr of 1.5 mg/dL (H)).  Recent Labs     03/17/25  0356 03/18/25  0327   CREATININE 1.51* 1.50*   BUN 58* 61*   WBC 15.2* 18.7*       Recent vancomycin administrations                     vancomycin (VANCOCIN) 1750 mg in sodium chloride 0.9 % 500 mL IVPB (mg) 1,750 mg New Bag 03/17/25 1437    vancomycin (VANCOCIN) 2000 mg in 0.9% sodium chloride 500 mL IVPB (mg) 2,000 mg New Bag 03/16/25 1519            Assessment:  Date/Time Current Dose Concentration Timing of Concentration (h) AUC   03/18/25 Vancomycin 1750mg 27.2mg/L ~13hrs from last dose 739mg/L.hr   Note: Serum concentrations collected for AUC dosing may appear elevated if collected in close proximity to the dose administered, this is not necessarily an indication of toxicity    Plan:  Current dosing regimen is supra-therapeutic.   Decrease dose to Vancomycin 1250mg IV q24h  Estimated JQEpi=761ay/L.hr and Trss=14mg/L  Pharmacy will continue to monitor patient and adjust therapy as indicated    Thank you for the consult,  Declan Taylor RPH  3/18/2025 9:29 AM  
[facility]   Pharmacy Pharmacokinetic Monitoring Service - Vancomycin    Consulting Provider: Dr. Hernandez   Indication: Empiric Treatment  Target Concentration: Goal AUC/ISSAC 400-600 mg*hr/L  Day of Therapy: 4  Additional Antimicrobials: Cefepime, Tamiflu, Doxycycline, nafcillin    Pertinent Laboratory Values:   Wt Readings from Last 1 Encounters:   03/12/25 94.1 kg (207 lb 7.3 oz)     Temp Readings from Last 1 Encounters:   03/12/25 (!) 101.5 °F (38.6 °C)     Estimated Creatinine Clearance: 45 mL/min (A) (based on SCr of 2.23 mg/dL (H)).  Recent Labs     03/11/25  0702 03/12/25  0520   CREATININE 1.99* 2.23*   BUN 42* 57*   WBC 11.9 12.6       Pertinent Cultures:  Culture Date Source Results   3/9 Blood GPC   MRSA Nasal Swab: N/A. Non-respiratory infection.    Recent vancomycin administrations                     vancomycin (VANCOCIN) 1,000 mg in sodium chloride 0.9 % 250 mL (addEASE) IVPB (mg) 1,000 mg Given 03/12/25 0827     1,000 mg Given 03/11/25 2036    vancomycin (VANCOCIN) 1,000 mg in sodium chloride 0.9 % 250 mL (addEASE) IVPB (mg) 1,000 mg Given 03/11/25 0906     1,000 mg Given 03/10/25 1025                      Plan:  Current dosing regimen is supra-therapeutic  \"Decrease dose to Vancomycin 1,500 mg IV every 24 hours  Estimated AUC/Tr = 529 mg/L.hr and 15.6 mg/L  Repeat vancomycin concentration not ordered at this time   Pharmacy will continue to monitor patient and adjust therapy as indicated    Thank you for the consult,  RENÉ CARNES RPH  3/12/2025 9:53 AM    
Comprehensive High Risk Nutrition Assessment Follow-Up    Type and Reason for Visit:  Reassess    Nutrition Recommendations/Plan:   +OGT 3/9, inadequate nutrition since admit on vent was too unstable now off pressors, no TF ordered - to discuss w/MD  Rec begin TF Diabetic/Glucerna 1.5 @ 10ml/hr adv 10 ml/hr Q6hrs to 50ml/hr goal rate via OGT as shai = 1800kcal, 100g pro, 910ml FW  Defer free water to MD given diuresing; if w/o IVF suggest ~120ml Q6hrs  Will likely only receive ~80% TF r/t frequent interruptions  Cont to check Phos, Mg, and K and replete as needed  Tight BG control >70 <180 ; on steroids, A1c 8.0 (3/10/25)  Na improved and elevated LFTs down trending   If no BM add bowel regimen  When able consider add liq centrum/certa-giuseppe via OGT as shai  Cont to monitor POC, wt trends, renal fx, lytes, UOP, bowel fx, skin integrity/wound healing and adjust recs as needed     Malnutrition Assessment:  Malnutrition Status:  At risk for malnutrition (03/13/25 1506)      Nutrition Assessment:    53yo M remains in ICU remains sedated on vent w/fentanyl, versed, off Nimbex, off levo and vaso, +OGT already placed 3/9, CT head today, doesn't follow commands, fair UOP noted; admitted with +flu, PNA, JAGRUTI likely prerenal/ATN r/t shock vs cardiogenic coded in ER; 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 +OGT 3/9. cont inadequate nutrition on vent no TF NPO x 5 days. wt is up since admit ?fluid vs accuracy.  on admit wt appeared pretty stable per wt hx if correct.    Nutrition Related Findings:      Wound Type: None       Current Nutrition Intake & Therapies:    Average Meal Intake: NPO (vent)  Average Supplements Intake: NPO  Diet NPO    Anthropometric Measures:  Height: 177.8 cm (5' 10\")  Ideal Body Weight (IBW): 166 lbs (75 kg)    Admission Body Weight: 88 kg (194 lb 0.1 oz)  Current Body Weight: 88 kg (194 lb 0.1 oz), 116.9 % IBW. Weight Source: 
Comprehensive High Risk Nutrition Assessment Follow-Up    Type and Reason for Visit:  Reassess    Nutrition Recommendations/Plan:   +OGT started TF 3/15  Cont TF Diabetic/Glucerna 1.5 @ 50ml/hr goal rate via OGT as shai = 1800kcal, 100g pro, 910ml FW  Defer free water to MD increased to 300ml Q6hr noted Na 153  Will likely only receive ~80% TF r/t frequent interruptions  Cont to check Phos, Mg, and K and replete as needed  Tight BG control >70 <180 ; on steroids, A1c 8.0 (3/10/25)  If no BM add bowel regimen  When able consider add Kathie-giuseppe vs liq MVI w/min via OGT as shai  Cont to monitor POC, wt trends, renal fx, lytes, UOP, bowel fx, skin integrity/wound healing and adjust recs as needed     Malnutrition Assessment:  Malnutrition Status:  At risk for malnutrition (03/17/25 1211)      Nutrition Assessment:    55yo M remains in ICU intubated, off pressors and sedation, remains unresponsive, Cr down to 1.51, non-oliguric per MD. 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 +OGT 3/9. now on TF at 40ml/hr, noted shai TF. was w/ongoing inadequate nutrition on vent no TF since admit. ?stated admit wt accuracy wt now trends ~94-95kg since admit  on admit wt appeared pretty stable per wt hx if correct.    Nutrition Related Findings:      Wound Type: None       Current Nutrition Intake & Therapies:    Average Meal Intake: NPO (vent now on TF @ 40ml/hr)  Average Supplements Intake: NPO  Diet NPO  ADULT TUBE FEEDING; Orogastric; Diabetic; Continuous; 10; Yes; 10; Q 24 hours; 50; 300; Q 6 hours    Anthropometric Measures:  Height: 177.8 cm (5' 10\")  Ideal Body Weight (IBW): 166 lbs (75 kg)    Admission Body Weight: 88 kg (194 lb 0.1 oz)  Current Body Weight: 95 kg (209 lb 7 oz), 116.9 % IBW. Weight Source: Stated  Current BMI (kg/m2): 30.1  Usual Body Weight: 87 kg (191 lb 12.8 oz)  % 
Comprehensive High Risk Nutrition Assessment Follow-Up    Type and Reason for Visit:  Reassess    Nutrition Recommendations/Plan:   +OGT started TF 3/15 mostly meeting needs since   Cont TF Diabetic/Glucerna 1.5 @ 50ml/hr goal rate via OGT as shai  provides 1800kcal, 100g pro, 910ml FW  Defer free water to MD increased to 500ml Q3hr noted Na 143  ?decreasing free water to suggest ~215ml Q4hr -adjust defer to MD  Will likely only receive ~80% TF r/t frequent interruptions  Optimize BG control >70 <180 ; on steroids, A1c 8.0 (3/10/25)  If no BM add bowel regimen  When able consider add liq MVI w/min via OGT as shai  Cont to monitor POC, wt trends, renal fx, lytes, UOP, bowel fx, skin integrity/wound healing and adjust recs as needed     Malnutrition Assessment:  Malnutrition Status:  At risk for malnutrition (03/17/25 1211)      Nutrition Assessment:    53yo M remains in ICU post cardiac arrest anoxic brain injury/unresponsive, fam wants trach/peg, remains on vent off pressors and sedation, JAGRUTI, PNA, flu, Pmhx DDKTx (7/2024) HTN, DM, Afib, CAD; plan trach/peg Mon, poss PRASHANT w/cardiology per MDs. +OGT 3/9. mostly meeting needs past ~4 days and shai TF at goal. wt trends relatively stable since admit 93-95kg. no wt loss noted PTA. no PI per WOCN. good ~2830ml UOP 3/20. Amio gtt.    Nutrition Related Findings:      Wound Type: None       Current Nutrition Intake & Therapies:    Average Meal Intake: NPO (vent now on TF @ 40ml/hr) now TF at goal 50ml/hr  Average Supplements Intake: NPO  Diet NPO  ADULT TUBE FEEDING; Orogastric; Diabetic; Continuous; 10; Yes; 10; Q 24 hours; 50; 500; Q 3 hours  Current Tube Feeding (TF) Orders:  Continuous TF Diabetic/Glucerna 1.5 @ 50ml/hr goal rate via OGT as shai  provides 1800kcal, 100g pro, 910ml FW  Defer free water to MD increased to 500ml Q3hr noted Na 143    Anthropometric Measures:  Height: 177.8 cm (5' 10\")  Ideal Body Weight (IBW): 166 lbs (75 kg)    Admission Body Weight: 88 kg (194 
Comprehensive High Risk Nutrition Assessment Follow-Up    Type and Reason for Visit:  Reassess    Nutrition Recommendations/Plan:   S/p trach 3/23 and Peg 3/24  Increase TF Diabetic/Glucerna 1.5 @ 20ml/hr adv 10ml/hr Q4hr back to 50ml/hr goal rate via Peg as shai provides 1800kcal, 100g pro, 910ml FW  FWF 200ml Q4hr ordered noted Na 142- cont as shai defer/adjust per MD  Will likely only receive ~80% TF r/t frequent interruptions  On IV Cellcept suggest if/when transition to liq Cellcept via Peg can give with or without feeds, flush before and after give slowly - discussed w/Pharmacy  Optimize BG control >70 <180 ; on steroids, A1c 8.0 (3/10/25)  If no BM add bowel regimen  When able consider add liq centrum/certa-giuseppe w/min via Peg as shai  Cont to monitor POC, wt trends, renal fx, lytes, UOP, bowel fx, skin integrity/wound healing and adjust recs as needed     Malnutrition Assessment:  Malnutrition Status:  At risk for malnutrition (03/26/25 1110)      Nutrition Assessment:    53yo M s/p cardiac arrest in the ER poss ACS w/ hx of CAD, afib, remains in ICU remains on vent off sedation, appears off levo, allograft JAGRUTI likely prerenal/ATN r/t shock and cardiac arrest, also IV dye, Cr trends down, h/o S/P DDKTx (7/2023), no PE, multifocal PNA, abnormal LFTs, DM A1c 8.0%, anoxic/Hypoxic Encephalopathy per MDs. s/p trach 3/23 and s/p Peg 3/24 and TF re-started @ 10ml/hr 3/25 discussed w/nursing adv back to goal. mostly meeting needs past week and shia TF. wt trends relatively stable since admit 93-95kg. no wt loss noted PTA. no PI per WOCN. good UOP. On Cellcept IV, also on prednisone, na bicarb, midodrine, amio, micafungin.  Last Weight Metrics:      3/24/2025     4:03 PM 3/22/2025     1:43 PM 3/21/2025     5:32 PM 3/18/2025     3:48 PM 3/18/2025     3:51 AM 3/17/2025     8:00 AM 3/16/2025     5:15 AM   Weight Loss Metrics   Height 5' 10\" 5' 10\" 5' 10\"       Weight - Scale  205 lbs 205 lbs 205 lbs 8 oz 206 lbs 13 oz 208 
Dicussed with Dr. Mendez with nephrology concerning central line removal and PICC placement. Patient has approval for nephrology clearance to have central line removed and a PICC placed if necessary. Notified ICU MD.   
Double lumen PICC line ready for immediate use. Locked with 2.5cc heparin lock flush per lumen.   
EEG done and Dr. Copeland was notified.   
Extra size 7 trach in the room as well as a Size 6  
Family Members Present  Patient's brother (decision maker)  Patient's niece  Note: Patient's 87-year-old father currently in rehabilitation facility  Note: Patient has two minor children (not present)  Spiritual/Emotional Assessment  Family members displayed signs of distress and anxiety following the [employer] event. Brother has assumed decision-making responsibilities while awaiting contact with patient's father for confirmation. Family remained at bedside throughout the  visit, expressing concern for patient's condition and seeking spiritual support during this critical time.  Spiritual Care Provided  Offered compassionate presence and active listening  Provided emotional support during medical crisis  Led prayer focused on healing and peace for the patient  Addressed immediate comfort needs by obtaining water for family members  Created space for family to express concerns and questions    Rev. Jalil Perez Mdiv, [facility]    Spiritual Care   Office [phone]  Pager :[phone]  
Follow up blood culture X2 ordered, patient with 4/4 bottles Positive for S.aureus bacteremia    Divya Patrick MD  [facility]  Office: [phone] -Option #8  Office fax:  [phone]   
Follow-up family meeting.  Neurology present discussed in detail fashion anoxic brain injury and current medical condition.  I have updated patient on all other medical problems and current status and options to proceed.  Family so far decided trach and PEG.  I will consult IR for PEG tube, possible PICC line if nephrology gives us okay  Consult Dr. Whatley for trach.  Continue current care  LEONID Sweet MD  
Have discussed cavitary lesion finding with MARYELLEN DIAZ, found in CT AP    In the setting of his post flu severe BL pneumonia and bacteremia, likely mssa cavitary pna.  Given his immunosuppressed status, other etiologies possible- but less likely  We have CT chest done- will review  BAL labs to follow    [facility] on prior serology. And add further testing.  Will round in him later today    Thanks  
Hypoxia worsened. Vent adjusted. Peep increased to 9 and fio2 remains 100%. Spo2 in mid 80's. Severe Tachypnea in 30's and tachycardia sever in 170's. No response to lod dose fentanyl 25 mcg and versed 2 mg. Will start versed and fentanyl drip then nimbex for vent synchrony. ABG in 30 min after sedation/paralyzed. Family at bedside.   D/w RN, Dr. Hernandez.     Payam Menezes MD 3/9/2025  4:56 PM   
ICU    DC droplet precautions.  Status post bronc/lavage, but tuberculosis not considered.    Lloyd Montero MD    
ICU    Okay for PICC line-discussed with Dr. Márquez in nephrology      Lloyd Montero MD'    
Icu    Hemoglobin 7.9-check CT abdomen/pelvis to rule out intra-abdominal hematoma; status post PEG tube placement  Stool for occult blood    Lloyd Montero MD    
MD Ureña notified about pertinent electrolytes sodium 150 increase flush to 250 q 6, replace phos per protocol and ionized calcium w 2 grams.   
MD order to replace alarcon and send urine for testing.  Ok to pull blood from central line and from next abg   Levophed started due to systolic pressures in 70's/80's  
NEUROLOGY PROGRESS NOTE        Patient: Angelo Mireles        Sex: male          DOA: 3/9/2025  YOB: 1970      Age:  54 y.o.         LOS: 6 days     Identification:  54 y.o. male with history of HTN, HLD, CAD, DM, diabetic retinopathy, glaucoma, right eye blindness, diabetic nephropathy, paroxysmal atrial fibrillation, CMP, renal transplant in 2023, currently on immunosuppressive medications, who presented with cardiac arrest           SUBJECTIVE:   The patient continues to be intubated.  Vital signs are more stable now.  He does not withdrawal to noxious stimuli.  Brainstem reflexes are intact.    REVIEW OF SYSTEMS: I am unable to review the systems.  The patient is comatose.    OBJECTIVE:    Visit Vitals  /64   Pulse 96   Temp 100 °F (37.8 °C) (Oral)   Resp 26   Ht 1.778 m (5' 10\")   Wt 93.9 kg (207 lb 0.2 oz)   SpO2 98%   BMI 29.70 kg/m²     Physical Exam:  GEN: Comatose, intubated.  EYES: conjunctiva normal, lids with out lesions.  Right pupil not visible, cataract?,  No other ocular lesions.  HEENT: MMM.  ET tube is in.  HEART: RRR +S1 +S2  LUNGS: Clear to auscultation anteriorly, no rales or rhonchi.  ABDOMEN: Soft, non-tender.  EXTREMITIES: No edema cyanosis  SKIN: no rashes or skin breakdown, no nodules  NEURO: The patient is comatose.  He does not withdrawal to noxious stimuli on any of the extremities.  He blinks his eyes to photic stimuli.  Corneals, left pupillary reflex, gag/cough positive.    Current Facility-Administered Medications   Medication Dose Route Frequency    heparin (porcine) injection 5,000 Units  5,000 Units SubCUTAneous 3 times per day    oseltamivir (TAMIFLU) capsule 30 mg  30 mg Oral BID    insulin lispro (HUMALOG,ADMELOG) injection vial 0-8 Units  0-8 Units SubCUTAneous 4x Daily AC & HS    glucose chewable tablet 16 g  4 tablet Oral PRN    glucagon injection 1 mg  1 mg SubCUTAneous PRN    dextrose 10 % infusion   IntraVENous Continuous PRN    metoprolol (LOPRESSOR) 
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 He was hypotensive, febrile, tachycardiac and hypoxic on initial eval. Initial labs revealed elevated Cr at 2.4, and CO2 9. Recent baseline Cr was 1.3-1.4. He is +ve for Influenza A. Pt coded in the ER and hada to be intubated. CxR showed pneumonia. Pt was transplant in July 2023 at [facility] and currently FU by Dr Duque. He is on MMF, Pred and IV monthly Belatecept (he did not tolerate Tacrolimus). Pt currently is intubated, on IV pressors, IV bicarb and abx.     -Pt remains intubated now down to 1 IV pressor Levophed. Cr down to 1.7-1.8, non oliguric. Pt not responsive.     Impression:   -Acute allograft kidney Injury: Likely prerenal/ATN sec to shock and cardiac arrest. Also had IV dye. Cr trending down 3.5=>=>2.4=>1.7-1.8 today, non oliguric.  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 Recent baseline Cr was 1.3-1.4.   -Septic shock, staph A: On IV pressor, IVF and abx.   -AG met acidosis: Lactic acidosis, improved on IV bicarb  -Resp failure: on MV. No PE  -Influenza +ve w/ pneumonia  -S/P Cardiac arrest: in the ER  -Possible ACS w/ hx of CAD  -DM  -Hx of Afib  -AMS    Plan:   -Cont to wean IV pressors as tolerated  -Cont current high dose IV steroid  -MMF on hold for now  -Abx, Tamiflu   -? Tube feeding  -No urgent indication for RRT currently  -Monitor UOP and labs  -ID on board  -Neurology evaluating, MRI today      Medications:     Current Facility-Administered Medications:     heparin (porcine) injection 5,000 Units, 5,000 Units, SubCUTAneous, 3 times per day, Chin Menezes MD, 5,000 Units at 03/15/25 1436    oseltamivir (TAMIFLU) capsule 30 mg, 30 mg, Oral, BID, Divya Patrick MD, 30 mg at 03/15/25 0832    insulin lispro 
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 He was hypotensive, febrile, tachycardiac and hypoxic on initial eval. Initial labs revealed elevated Cr at 2.4, and CO2 9. Recent baseline Cr was 1.3-1.4. He is +ve for Influenza A. Pt coded in the ER and hada to be intubated. CxR showed pneumonia. Pt was transplant in July 2023 at [facility] and currently FU by Dr Duque. He is on MMF, Pred and IV monthly Belatecept (he did not tolerate Tacrolimus). Pt currently is intubated, on IV pressors, IV bicarb and abx.     -Pt remains intubated now down to 1 IV pressor Levophed. Cr down to 1.7-1.8, non oliguric. Pt not responsive.   MRI head- Extensive diffusion restriction: Diffuse cerebellar, bilateral  occipital, bilateral hippocampal, bilateral temporal  Cr 1.6, Na 150  Impression:   -Acute allograft kidney Injury: Likely prerenal/ATN sec to shock and cardiac arrest. Also had IV dye. Cr trending down 3.5=>=>2.4=>1.7-1.8 today, non oliguric.  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 Recent baseline Cr was 1.3-1.4.   -Septic shock, staph A: On IV pressor, IVF and abx.   -AG met acidosis: Lactic acidosis, improved on IV bicarb  -Resp failure: on MV. No PE  -Influenza +ve w/ pneumonia  -S/P Cardiac arrest: in the ER  -Possible ACS w/ hx of CAD  -DM  -Hx of Afib  -AMS- anoxic brain injury    Plan:   -Cont to wean IV pressors as tolerated  -Cont current high dose IV steroid  -MMF on hold for now  -Abx, Tamiflu   -Tube feeding, increase free water  -Monitor UOP and labs  -ID on board  -Neurology evaluating  - palliative care to see      Medications:     Current Facility-Administered Medications:     insulin glargine (LANTUS) injection vial 10 Units, 10 Units, SubCUTAneous, Daily, Chin Menezes MD, 10 
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 He was hypotensive, febrile, tachycardiac and hypoxic on initial eval. Initial labs revealed elevated Cr at 2.4, and CO2 9. Recent baseline Cr was 1.3-1.4. He is +ve for Influenza A. Pt coded in the ER and hada to be intubated. CxR showed pneumonia. Pt was transplant in July 2023 at [facility] and currently FU by Dr Duque. He is on MMF, Pred and IV monthly Belatecept (he did not tolerate Tacrolimus). Pt currently is intubated, on IV pressors, IV bicarb and abx.     -Pt remains intubated and on 2 IV pressors Levophed and Vasopressin. BC +ve for Staph aureus. Cr slightly up again, non oliguric.     Impression:   -Acute allograft kidney Injury: Likely prerenal/ATN sec to shock and cardiac arrest. Also had IV dye. Cr trending down 3.5=>=>2.4=>1.9=>2.2 today, non oliguric.  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 Recent baseline Cr was 1.3-1.4.   -Septic shock, staph A: On IV pressors, IVF and abx.   -AG met acidosis: Lactic acidosis, improved on IV bicarb  -Resp failure: on MV. No PE  -Influenza +ve w/ pneumonia  -S/P Cardiac arrest: in the ER  -Possible ACS w/ hx of CAD  -DM  -Hx of AFib    Plan:   -D/C IVF due to pulm edema and rising BNP  -Cont resuscitation w/ IV pressors   -Cont current high dose IV steroid  -MMF on hold for now  -Abx   -Supplement Ca per ICU protocol   -No urgent indication for RRT currently  -Monitor UOP and labs  -ID on board  -Discussed with PCCM       Medications:     Current Facility-Administered Medications:     nafcillin 2,000 mg in sodium chloride 0.9 % 100 mL IVPB (addEASE), 2,000 mg, IntraVENous, Q4H, Divya Patrick MD, Last Rate: 100 mL/hr at 03/12/25 0823, 2,000 mg at 03/12/25 0823    potassium chloride (KLOR-CON M) 
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 He was hypotensive, febrile, tachycardiac and hypoxic on initial eval. Initial labs revealed elevated Cr at 2.4, and CO2 9. Recent baseline Cr was 1.3-1.4. He is +ve for Influenza A. Pt coded in the ER and hada to be intubated. CxR showed pneumonia. Pt was transplant in July 2023 at [facility] and currently FU by Dr Duque. He is on MMF, Pred and IV monthly Belatecept (he did not tolerate Tacrolimus). Pt currently is intubated, on IV pressors, IV bicarb and abx.     -Pt remains intubated and on 2 IV pressors. BC +ve for GPC. Cr down, non oliguric.     Impression:   -Acute allograft kidney Injury: Likely prerenal/ATN sec to shock and cardiac arrest. Also had IV dye. Cr trending down 3.5=>=>2.4, non oliguric.  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 Recent baseline Cr was 1.3-1.4.   -Septic shock, GPC: On IV pressor, IVF and abx.   -AG met acidosis: Lactic acidosis, improved on IV bicarb  -Resp failure: on MV. No PE  -Influenza +ve w/ pneumonia  -S/P Cardiac arrest: in the ER  -Possible ACS w/ hx of CAD  -DM  -Hx of AFib    Plan:   -Cont resuscitation w/ IV pressors and IVF, switch IV bicrab to 0.45% NS)  -Cont current high dose IV steroid  -MMF on hold for now  -Abx   -No urgent indication for RRT currently  -Monitor UOP ans labs  -ID on board      Medications:     Current Facility-Administered Medications:     calcium gluconate 2,000 mg in sodium chloride 100 mL, 2,000 mg, IntraVENous, Once, Chin Menezes MD, Last Rate: 50 mL/hr at 03/10/25 0923, 2,000 mg at 03/10/25 0923    phytonadione (ADULT) (VITAMIN K) 10 mg in sodium chloride 0.9 % 100 mL IVPB, 10 mg, IntraVENous, Daily, Chin Menezes MD    norepinephrine (LEVOPHED) 16 mg in 
Nephrology Progress note    Angelo Mireles  MRN: [id number]  : 1970    Admit Date: 3/9/2025        Subjective:     Angelo Mireles is a 54 y.o. male with  a PMHx significant for DDKTx (2024), HTN, DM, A Fib and CAD who was brought to ED with SOB and chest discomfort. He was hypotensive, febrile, tachycardiac and hypoxic on initial eval. Initial labs revealed elevated Cr at 2.4, and CO2 9. Recent baseline Cr was 1.3-1.4. He is +ve for Influenza A. Pt coded in the ER and hada to be intubated. CxR showed pneumonia. Pt was transplant in 2023 at [facility] and currently FU by Dr Duque. He is on MMF, Pred and IV monthly Belatecept (he did not tolerate Tacrolimus). Pt was intubated, placed on IV pressors, IV bicarb and abx.      Remains intubated in ICU/s/p trach and PEG. Off IV pressor since last night.  Cr down to 0.7.      Impression:     -Acute allograft kidney Injury: Likely prerenal/ATN sec to shock and cardiac arrest. Also had IV dye. Resolved, Cr down 3.5=>=>0.7 now  -S/P DDKTx (2023): On MMF, Pred and IV monthly Belatecept as outpt (?last dose). Recent out pt baseline Cr was 1.3-1.4.   -Hypernatremia, resolved   -Septic shock, BC: GPC, UC Klebsiella: on abx. Improved, off IV pressor   -AG met acidosis: Lactic acidosis, improving.  -Resp failure: No PE. On MV thru trach    -Influenza +ve w/ Multifocal Pneumonia  -S/P Cardiac arrest: in the ER  -Possible ACS w/ hx of CAD  -Hypoalbuminemia  -Abnormal LFT's: improving   -DM  HgbA1c 8.0%  -Hx of Afib  -Anoxic/Hypoxic Encephalopathy    Plan:   -Cont  po prednisone and IV MMF. PO MMF not compatible with tube feeding.  -Need to discuss with his transplant team in regards to need for IV monthly Belatecept   -Adjust po bicarb as needed  -Cont vent management per PCCM  -D/w ICU team, will need help from pharmacist to make -Midodrine for BP  -Monitor I/Os      Principal Problem:    Cardiac arrest (HCC)  Active Problems:    Cardiomyopathy (HCC)    Diabetic 
Nephrology Progress note    Angelo Mireles  MRN: [id number]  : 1970    Admit Date: 3/9/2025        Impression:     -Acute allograft kidney Injury: Likely prerenal/ATN sec to shock and cardiac arrest. Also had IV dye. Cr trending down 3.5=>=>2.4=> 1.9, non oliguric.  -S/P DDKTx (2023): On MMF, Pred and IV monthly Belatecept (?last dose). Recent baseline Cr was 1.3-1.4.   -Septic shock, BC: GPC, UC Klebsiella: On IV pressor, IVF and abx.   -AG met acidosis: Lactic acidosis, improved on IV bicarb.  -Resp failure: on MV. No PE  -Influenza +ve w/ Multifocal Pneumonia  -S/P Cardiac arrest: in the ER  -Possible ACS w/ hx of CAD  Hypoalbuminemia  Abnormal LFT's  -DM  HgbA1c 8.0%  -Hx of AFib    Plan:     -Cont resuscitation w/ IV pressors and IVF 0.45% NS at 75ml/hr  -Cont current high dose IV steroid  -MMF on hold for now  -Abx   -Supplement Mg, Ca per ICU protocol  -No urgent indication for RRT currently  -Monitor UOP and labs  -ID on board  -Discussed with PCCM        Subjective:     Angelo Mireles is a 54 y.o. male with  PMHx significant for DDKTx (2024), HTN, DM, A Fib and CAD who was brought to ED with SOB and chest discomfort. He was hypotensive, febrile, tachycardiac and hypoxic on initial eval. Initial labs revealed elevated Cr at 2.4, and CO2 9. Recent baseline Cr was 1.3-1.4. He is +ve for Influenza A. Pt coded in the ER and hada to be intubated. CxR showed pneumonia. [facility] Dr Duque. He is on MMF, Pred and IV monthly Belatecept (he did not tolerate Tacrolimus). Pt currently is intubated, on IV pressors, IV bicarb and abx.      -Pt remains intubated and on 2 IV pressors Levophed and Vasopressin. BC +ve for GPC prob Staph aureus.   Cr down to 1.9, non-oliguric.          Principal Problem:    Cardiac arrest (HCC)  Active Problems:    Cardiomyopathy (HCC)    Diabetic retinopathy (HCC)    Type 2 diabetes with nephropathy (HCC)    Paroxysmal A-fib 
Nephrology Progress note    Angelo Mireles  MRN: [id number]  : 1970    Admit Date: 3/9/2025        Impression:     -Acute allograft kidney Injury: Likely prerenal/ATN sec to shock and cardiac arrest. Also had IV dye. Cr trending down 3.5=>=>1.1, at baseline.    -S/P DDKTx (2023): On MMF, Pred and IV monthly Belatecept as outpt (?last dose). Recent baseline Cr was 1.3-1.4.   -Hypernatremia S Na down to 142 now  -Septic shock, BC: GPC, UC Klebsiella: on abx.   -AG met acidosis: Lactic acidosis, improved off IV bicarb.  -Resp failure: on MV. No PE  -Influenza +ve w/ Multifocal Pneumonia  -S/P Cardiac arrest: in the ER  -Possible ACS w/ hx of CAD  -Hypoalbuminemia   -Abnormal LFT's  -DM  HgbA1c 8.0%  -Hx of Afib  -Anoxic/Hypoxic Encephalopathy    Plan:     -Keep Free water via Tube feeding  -Cont vent management per PCCM  -Cont current high dose IV steroid  -MMF on hold for now  -ID following  -Monitor I/Os  -Neurology on board  -Pending Peg/trach  -AM renal panel        Subjective:     Angelo Mireles is a 54 y.o. male with  a PMHx significant for DDKTx (2024), HTN, DM, A Fib and CAD who was brought to ED with SOB and chest discomfort. He was hypotensive, febrile, tachycardiac and hypoxic on initial eval. Initial labs revealed elevated Cr at 2.4, and CO2 9. Recent baseline Cr was 1.3-1.4. He is +ve for Influenza A. Pt coded in the ER and hada to be intubated. CxR showed pneumonia. Pt was transplant in 2023 at [facility] and currently FU by Dr Duque. He is on MMF, Pred and IV monthly Belatecept (he did not tolerate Tacrolimus). Pt was intubated, placed on IV pressors, IV bicarb and abx.        -Pt remains intubated in ICU.  Off IV pressor and off sedation presently however still unresponsive. Cr back to baseline.  S/p family meeting, opts for PEG/trach    Principal Problem:    Cardiac arrest (HCC)  Active Problems:    Cardiomyopathy (HCC)    Diabetic retinopathy (HCC)    Type 2 diabetes with nephropathy 
Nephrology Progress note    Angelo Mireles  MRN: [id number]  : 1970    Admit Date: 3/9/2025        Impression:     -Acute allograft kidney Injury: Likely prerenal/ATN sec to shock and cardiac arrest. Also had IV dye. Cr trending down 3.5=>=>2.4=> 1.79, non oliguric.  -S/P DDKTx (2023): On MMF, Pred and IV monthly Belatecept (?last dose). Recent baseline Cr was 1.3-1.4.   -Septic shock, BC: GPC, UC Klebsiella: On IV pressor and abx.   -AG met acidosis: Lactic acidosis, improved on IV bicarb.  -Resp failure: on MV. No PE  -Influenza +ve w/ Multifocal Pneumonia  -S/P Cardiac arrest: in the ER  -Possible ACS w/ hx of CAD  Hypoalbuminemia  Abnormal LFT's  -DM  HgbA1c 8.0%  -Hx of AFib    Plan:     -Cont to wean IV pressors as tolerated  -Cont current high dose IV steroid  -MMF on hold for now  -Abx, Tamiflu   -? Tube feeding  -F/u on CT Head  -No urgent indication for RRT currently  -Monitor UOP and labs  -ID on board  -Discussed with ICU Staff and patient's family member at bedside        Subjective:     Angelo Mireles is a 54 y.o. male with  PMHx significant for DDKTx (2024), HTN, DM, A Fib and CAD who was brought to ED with SOB and chest discomfort. He was hypotensive, febrile, tachycardiac and hypoxic on initial eval. Initial labs revealed elevated Cr at 2.4, and CO2 9. Recent baseline Cr was 1.3-1.4. He is +ve for Influenza A. Pt coded in the ER and hada to be intubated. CxR showed pneumonia. Pt was transplant in 2023 at [facility] and currently FU by Dr Duque. He is on MMF, Pred and IV monthly Belatecept (he did not tolerate Tacrolimus). Pt currently is intubated, on IV pressors, IV bicarb and abx.      -Pt remains intubated and now  on single IV pressors   Cr down to 1.79, non-oliguric.   Sedation being decreased.  FiO2 down to 40%         Principal Problem:    Cardiac arrest (HCC)  Active Problems:    Cardiomyopathy (HCC)    Diabetic retinopathy (HCC)    Type 2 diabetes with nephropathy (HCC)    
Nephrology Progress note    Angelo Mireles  MRN: [id number]  : 1970    Admit Date: 3/9/2025        Subjective:     Angelo Mireles is a 54 y.o. male with  a PMHx significant for DDKTx (2024), HTN, DM, A Fib and CAD who was brought to ED with SOB and chest discomfort. He was hypotensive, febrile, tachycardiac and hypoxic on initial eval. Initial labs revealed elevated Cr at 2.4, and CO2 9. Recent baseline Cr was 1.3-1.4. He is +ve for Influenza A. Pt coded in the ER and hada to be intubated. CxR showed pneumonia. Pt was transplant in 2023 at [facility] and currently FU by Dr Duque. He is on MMF, Pred and IV monthly Belatecept (he did not tolerate Tacrolimus). Pt was intubated, placed on IV pressors, IV bicarb and abx.      Remains intubated in ICU.  On low dose pressor currently, and s/p trach and now PEG.  Cr down to 0.9.      Impression:     -Acute allograft kidney Injury: Likely prerenal/ATN sec to shock and cardiac arrest. Also had IV dye. Cr trending down 3.5=>=>1.0 now  -S/P DDKTx (2023): On MMF, Pred and IV monthly Belatecept as outpt (?last dose). Recent baseline Cr was 1.3-1.4.   -Hypernatremia, Na 149  -Septic shock, BC: GPC, UC Klebsiella: on abx.   -AG met acidosis: Lactic acidosis, improving.  -Resp failure: on MV. No PE  -Influenza +ve w/ Multifocal Pneumonia  -S/P Cardiac arrest: in the ER  -Possible ACS w/ hx of CAD  -Hypoalbuminemia   -Abnormal LFT's  -DM  HgbA1c 8.0%  -Hx of Afib  -Anoxic/Hypoxic Encephalopathy    Plan:     -Adjust po bicarb frequency  -Cont vent management per PCCM  -Cont  po prednisone  -D/w ICU team, will need help from pharmacist to make MMF compatible with tube feeding.  -Midodrine for BP  -ID following  -Monitor I/Os  -Consider free water through feeding tube  -AM renal panel and cbc      Principal Problem:    Cardiac arrest (HCC)  Active Problems:    Cardiomyopathy (HCC)    Diabetic retinopathy (HCC)    Type 2 diabetes with nephropathy (HCC)    Paroxysmal 
Nephrology Progress note    Angelo Mireles  MRN: [id number]  : 1970    Admit Date: 3/9/2025        Subjective:     Angelo Mireles is a 54 y.o. male with  a PMHx significant for DDKTx (2024), HTN, DM, A Fib and CAD who was brought to ED with SOB and chest discomfort. He was hypotensive, febrile, tachycardiac and hypoxic on initial eval. Initial labs revealed elevated Cr at 2.4, and CO2 9. Recent baseline Cr was 1.3-1.4. He is +ve for Influenza A. Pt coded in the ER and hada to be intubated. CxR showed pneumonia. Pt was transplant in 2023 at [facility] and currently FU by Dr Duque. He is on MMF, Pred and IV monthly Belatecept (he did not tolerate Tacrolimus). Pt was intubated, placed on IV pressors, IV bicarb and abx.      Remains intubated in ICU.  Off IV pressor and off sedation presently and s/p trach.  Cr down to 0.9.  Pending for PEG soon.    Impression:     -Acute allograft kidney Injury: Likely prerenal/ATN sec to shock and cardiac arrest. Also had IV dye. Cr trending down 3.5=>=>1.1 then 0.9 now, resolved.  -S/P DDKTx (2023): On MMF, Pred and IV monthly Belatecept as outpt (?last dose). Recent baseline Cr was 1.3-1.4.   -Hypernatremia, resolved  -Septic shock, BC: GPC, UC Klebsiella: on abx.   -AG met acidosis: Lactic acidosis, improving.  -Resp failure: on MV. No PE  -Influenza +ve w/ Multifocal Pneumonia  -S/P Cardiac arrest: in the ER  -Possible ACS w/ hx of CAD  -Hypoalbuminemia   -Abnormal LFT's  -DM  HgbA1c 8.0%  -Hx of Afib  -Anoxic/Hypoxic Encephalopathy    Plan:     -start po bicarb  -Cont vent management per PCCM  -on high dose IV steroid, consider to resume home po prednisone  -Consider to resume MMF po  -Midodrine for BP  -ID following  -Monitor I/Os  -Neurology on board  -s/p trach  -AM renal panel and cbc  -d/w PCCM      Principal Problem:    Cardiac arrest (HCC)  Active Problems:    Cardiomyopathy (HCC)    Diabetic retinopathy (HCC)    Type 2 diabetes with nephropathy (HCC)    
Nephrology Progress note    Angelo Mireles  MRN: [id number]  : 1970    Admit Date: 3/9/2025        Subjective:     Angelo Mireles is a 54 y.o. male with  a PMHx significant for DDKTx (2024), HTN, DM, A Fib and CAD who was brought to ED with SOB and chest discomfort. He was hypotensive, febrile, tachycardiac and hypoxic on initial eval. Initial labs revealed elevated Cr at 2.4, and CO2 9. Recent baseline Cr was 1.3-1.4. He is +ve for Influenza A. Pt coded in the ER and hada to be intubated. CxR showed pneumonia. Pt was transplant in 2023 at [facility] and currently FU by Dr Duque. He is on MMF, Pred and IV monthly Belatecept (he did not tolerate Tacrolimus). Pt was intubated, placed on IV pressors, IV bicarb and abx.      Remains intubated in ICU/s/p trach and PEG. Was back on IV pressor last night but now off.  Cr down to 0.6.      Impression:     -Acute allograft kidney Injury: Likely prerenal/ATN sec to shock and cardiac arrest. Also had IV dye. Resolved, Cr down 3.5=>=>0.6 now  -S/P DDKTx (2023): On MMF, Pred and IV monthly Belatecept as outpt (?last dose). Recent out pt baseline Cr was 1.3-1.4.   -Hypernatremia, resolved   -Septic shock, BC: GPC, UC Klebsiella: on abx. Improved, off IV pressor   -AG met acidosis: Lactic acidosis, improving.  -Resp failure: No PE. On MV thru trach    -Influenza +ve w/ Multifocal Pneumonia  -S/P Cardiac arrest: in the ER  -Possible ACS w/ hx of CAD  -Hypoalbuminemia  -Abnormal LFT's: improving   -DM  HgbA1c 8.0%  -Hx of Afib  -Anoxic/Hypoxic Encephalopathy    Plan:   -Cont  po prednisone and IV MMF. PO MMF not compatible with tube feeding.  -Need to discuss with his transplant team in regards to need for IV monthly Belatecept   -Adjust po bicarb as needed  -Cont vent management per PCCM  -D/w ICU team, will need help from pharmacist to make -Midodrine for BP  -Monitor I/Os      Principal Problem:    Cardiac arrest (HCC)  Active Problems:    Cardiomyopathy (HCC)   
Nephrology Progress note    Angelo Mireles  MRN: [id number]  : 1970    Admit Date: 3/9/2025        Subjective:     Angelo Mireles is a 54 y.o. male with  a PMHx significant for DDKTx (2024), HTN, DM, A Fib and CAD who was brought to ED with SOB and chest discomfort. He was hypotensive, febrile, tachycardiac and hypoxic on initial eval. Initial labs revealed elevated Cr at 2.4, and CO2 9. Recent baseline Cr was 1.3-1.4. He is +ve for Influenza A. Pt coded in the ER and hada to be intubated. CxR showed pneumonia. Pt was transplant in 2023 at [facility] and currently FU by Dr Duque. He is on MMF, Pred and IV monthly Belatecept (he did not tolerate Tacrolimus). Pt was intubated, placed on IV pressors, IV bicarb and abx.      Remains intubated in ICU/s/p trach and PEG.   Cr down to 0.66.      Impression:     -Acute allograft kidney Injury: Likely prerenal/ATN sec to shock and cardiac arrest. Also had IV dye. Resolved, Cr down 3.5=>=>0.66  -S/P DDKTx (2023): On MMF, Pred and IV monthly Belatecept as outpt (?last dose). Recent out pt baseline Cr was 1.3-1.4.   -Hypernatremia, resolved   -Septic shock, BC: GPC, UC Klebsiella: on abx. Improved, off IV pressor   -AG met acidosis: Lactic acidosis, improving.  -Resp failure: No PE. On MV thru trach    -Influenza +ve w/ Multifocal Pneumonia  -S/P Cardiac arrest: in the ER  -Possible ACS w/ hx of CAD  -Hypoalbuminemia  -Abnormal LFT's: improving   -DM  HgbA1c 8.0%  -Hx of Afib  -Anoxic/Hypoxic Encephalopathy    Plan:   -Cont  po prednisone and IV MMF (PO MMF not compatible with tube feeding).  -R  A review of records of Encounters on EPIC reveals that the last Belatacept dose was received at [facility] on 25 prior to current admission.  Appears he missed March dose. Now that Sepsis is resolved, we would resume IV Belatacept and continue Q 28 days  -Cont vent management per PCCM  -Midodrine for BP  -Monitor I/Os      Principal Problem:   
Nephrology Progress note    Angelo Mireles  MRN: [id number]  : 1970    Admit Date: 3/9/2025        Impression:     -Acute allograft kidney Injury: Likely prerenal/ATN sec to shock and cardiac arrest. Also had IV dye. Cr trending down 3.5=>=>1.1, at baseline.    -S/P DDKTx (2023): On MMF, Pred and IV monthly Belatecept as outpt (?last dose). Recent baseline Cr was 1.3-1.4.   -Hypernatremia S Na 152-153  -Septic shock, BC: GPC, UC Klebsiella: on abx.   -AG met acidosis: Lactic acidosis, improved off IV bicarb.  -Resp failure: on MV. No PE  -Influenza +ve w/ Multifocal Pneumonia  -S/P Cardiac arrest: in the ER  -Possible ACS w/ hx of CAD  -Hypoalbuminemia   -Abnormal LFT's  -DM  HgbA1c 8.0%  -Hx of Afib  -Anoxic/Hypoxic Encephalopathy    Plan:     -Free water via Tube feeding increased  -Cont vent management per PCCM  -Cont current high dose IV steroid  -MMF on hold for now  -ID following  -Monitor I/Os  -Neurology on board  Family opts for PEG/trach        Subjective:     Angelo Mireles is a 54 y.o. male with  a PMHx significant for DDKTx (2024), HTN, DM, A Fib and CAD who was brought to ED with SOB and chest discomfort. He was hypotensive, febrile, tachycardiac and hypoxic on initial eval. Initial labs revealed elevated Cr at 2.4, and CO2 9. Recent baseline Cr was 1.3-1.4. He is +ve for Influenza A. Pt coded in the ER and hada to be intubated. CxR showed pneumonia. Pt was transplant in 2023 at [facility] and currently FU by Dr Duque. He is on MMF, Pred and IV monthly Belatecept (he did not tolerate Tacrolimus). Pt was intubated, placed on IV pressors, IV bicarb and abx.      CXR showed:    1. ET tube is in satisfactory position.     2. No interval change in bilateral multifocal pneumonia.       -Pt remains intubated in ICU.  Off IV pressor and off sedation presently however still unresponsive. Cr back to baseline of 1.4=>1.1.  Has good urine output.  Family meeting yest- opts for 
Nephrology Progress note    Angelo Mireles  MRN: [id number]  : 1970    Admit Date: 3/9/2025        Impression:     -Acute allograft kidney Injury: Likely prerenal/ATN sec to shock and cardiac arrest. Also had IV dye. Cr trending down 3.5=>=>1.4, at baseline.    -S/P DDKTx (2023): On MMF, Pred and IV monthly Belatecept as outpt (?last dose). Recent baseline Cr was 1.3-1.4.   -Hypernatremia S Na 152-153  -Septic shock, BC: GPC, UC Klebsiella: on abx.   -AG met acidosis: Lactic acidosis, improved off IV bicarb.  -Resp failure: on MV. No PE  -Influenza +ve w/ Multifocal Pneumonia  -S/P Cardiac arrest: in the ER  -Possible ACS w/ hx of CAD  -Hypoalbuminemia   -Abnormal LFT's  -DM  HgbA1c 8.0%  -Hx of Afib  -Anoxic/Hypoxic Encephalopathy    Plan:     -Free water via Tube feeding increased  -Cont vent management per PCCM  -Cont current high dose IV steroid  -MMF on hold for now  -ID following  -Monitor I/Os  -Neurology on board  -AM renal panel/cbc        Subjective:     Angelo Mireles is a 54 y.o. male with  a PMHx significant for DDKTx (2024), HTN, DM, A Fib and CAD who was brought to ED with SOB and chest discomfort. He was hypotensive, febrile, tachycardiac and hypoxic on initial eval. Initial labs revealed elevated Cr at 2.4, and CO2 9. Recent baseline Cr was 1.3-1.4. He is +ve for Influenza A. Pt coded in the ER and hada to be intubated. CxR showed pneumonia. Pt was transplant in 2023 at [facility] and currently FU by Dr Duque. He is on MMF, Pred and IV monthly Belatecept (he did not tolerate Tacrolimus). Pt was intubated, placed on IV pressors, IV bicarb and abx.      CXR showed:    1. ET tube is in satisfactory position.     2. No interval change in bilateral multifocal pneumonia.       -Pt remains intubated in ICU.  Off IV pressor and off sedation presently however still unresponsive. Cr back to baseline of 1.4.  Has good urine output.    Principal Problem:    Cardiac arrest (HCC)  Active 
Nephrology Progress note    Angelo Mireles  MRN: [id number]  : 1970    Admit Date: 3/9/2025        Impression:     -Acute allograft kidney Injury: Likely prerenal/ATN sec to shock and cardiac arrest. Also had IV dye. Cr trending down 3.5=>=>2.4=> 1.79=>1.5 currently, near baseline.    -S/P DDKTx (2023): On MMF, Pred and IV monthly Belatecept as outpt (?last dose). Recent baseline Cr was 1.3-1.4.   -Septic shock, BC: GPC, UC Klebsiella: on abx.   -AG met acidosis: Lactic acidosis, improved off IV bicarb.  -Resp failure: on MV. No PE  -Influenza +ve w/ Multifocal Pneumonia  -S/P Cardiac arrest: in the ER  -Possible ACS w/ hx of CAD  -Hypoalbuminemia   -Abnormal LFT's  -DM  HgbA1c 8.0%  -Hx of Afib  -Hypernatremia S Na 153, unchanged  -Anoxic/Hypoxic Encephalopathy    Plan:     -Cont vent management per PCCM  -Cont current high dose IV steroid  -MMF on hold for now  -Cont Tamiflu and abx, ID following  -Cont Free water via Tube feeding to 300ml Q6H  -Monitor I/Os  -Neurology on board  -AM renal panel/cbc        Subjective:     Angelo Mireles is a 54 y.o. male with  a PMHx significant for DDKTx (2024), HTN, DM, A Fib and CAD who was brought to ED with SOB and chest discomfort. He was hypotensive, febrile, tachycardiac and hypoxic on initial eval. Initial labs revealed elevated Cr at 2.4, and CO2 9. Recent baseline Cr was 1.3-1.4. He is +ve for Influenza A. Pt coded in the ER and hada to be intubated. CxR showed pneumonia. Pt was transplant in 2023 at [facility] and currently FU by Dr Duque. He is on MMF, Pred and IV monthly Belatecept (he did not tolerate Tacrolimus). Pt was intubated, placed on IV pressors, IV bicarb and abx.      -Pt remains intubated in ICU.  Off IV pressor and off sedation presently however still unresponsive. Cr unchanged at 1.5.  Has good urine output.    CXR showed:    1. ET tube is in satisfactory position.     2. No interval change in bilateral multifocal pneumonia.        
Nephrology Progress note    Angelo Mireles  MRN: [id number]  : 1970    Admit Date: 3/9/2025        Impression:     -Acute allograft kidney Injury: Likely prerenal/ATN sec to shock and cardiac arrest. Also had IV dye. Cr trending down 3.5=>=>1.1 then 0.9 now  -S/P DDKTx (2023): On MMF, Pred and IV monthly Belatecept as outpt (?last dose). Recent baseline Cr was 1.3-1.4.   -Hypernatremia S Na down to 141, resolved  -Septic shock, BC: GPC, UC Klebsiella: on abx.   -AG met acidosis: Lactic acidosis, improved off IV bicarb.  -Resp failure: on MV. No PE  -Influenza +ve w/ Multifocal Pneumonia  -S/P Cardiac arrest: in the ER  -Possible ACS w/ hx of CAD  -Hypoalbuminemia   -Abnormal LFT's  -DM  HgbA1c 8.0%  -Hx of Afib  -Anoxic/Hypoxic Encephalopathy    Plan:     -Cont free water via Tube feeding  -Cont vent management per PCCM  -Cont current high dose IV steroid  -MMF on hold for now  -Midodrine for BP  -ID following  -Monitor I/Os  -Neurology on board  -s/p trach  -AM renal panel  -d/w PCCM        Subjective:     Angelo Mireles is a 54 y.o. male with  a PMHx significant for DDKTx (2024), HTN, DM, A Fib and CAD who was brought to ED with SOB and chest discomfort. He was hypotensive, febrile, tachycardiac and hypoxic on initial eval. Initial labs revealed elevated Cr at 2.4, and CO2 9. Recent baseline Cr was 1.3-1.4. He is +ve for Influenza A. Pt coded in the ER and hada to be intubated. CxR showed pneumonia. Pt was transplant in 2023 at [facility] and currently FU by Dr Duque. He is on MMF, Pred and IV monthly Belatecept (he did not tolerate Tacrolimus). Pt was intubated, placed on IV pressors, IV bicarb and abx.        -Pt remains intubated in ICU.  Off IV pressor and off sedation presently and s/p trach.  Cr down to 0.9.    Principal Problem:    Cardiac arrest (HCC)  Active Problems:    Cardiomyopathy (HCC)    Diabetic retinopathy (HCC)    Type 2 diabetes with nephropathy (HCC)    Paroxysmal A-fib 
Nephrology Progress note    Angelo Mireles  MRN: [id number]  : 1970    Admit Date: 3/9/2025        Impression:     -Acute allograft kidney Injury: Likely prerenal/ATN sec to shock and cardiac arrest. Also had IV dye. Cr trending down 3.5=>=>2.4=> 1.79=>1.51, non oliguric.  -S/P DDKTx (2023): On MMF, Pred and IV monthly Belatecept (?last dose). Recent baseline Cr was 1.3-1.4.   -Septic shock, BC: GPC, UC Klebsiella: on abx.   -AG met acidosis: Lactic acidosis, improved on IV bicarb.  -Resp failure: on MV. No PE  -Influenza +ve w/ Multifocal Pneumonia  -S/P Cardiac arrest: in the ER  -Possible ACS w/ hx of CAD  Hypoalbuminemia  Abnormal LFT's  -DM  HgbA1c 8.0%  -Hx of Afib  -Hypernatremia S Na 153  -Anoxic/Hypoxic Encephalopathy, remains unresponsive off sedation    Plan:     -Cont supportive care: vent  -Cont current high dose IV steroid  -MMF on hold for now  -Abx, Tamiflu   -Increase Free water via Tube feeding to 300ml Q6H  -Monitor UOP and labs  -ID on board  -Neurology input noted  -PICC or Mid line access ok in this setting.        Subjective:     Angelo Mireles is a 54 y.o. male with  PMHx significant for DDKTx (2024), HTN, DM, A Fib and CAD who was brought to ED with SOB and chest discomfort. He was hypotensive, febrile, tachycardiac and hypoxic on initial eval. Initial labs revealed elevated Cr at 2.4, and CO2 9. Recent baseline Cr was 1.3-1.4. He is +ve for Influenza A. Pt coded in the ER and hada to be intubated. CxR showed pneumonia. Pt was transplant in 2023 at [facility] and currently FU by Dr Duque. He is on MMF, Pred and IV monthly Belatecept (he did not tolerate Tacrolimus). Pt was intubated, placed on IV pressors, IV bicarb and abx.      -Pt remains intubated.  Off IV pressor and off sedation presently.  Remains unresponsive.   Cr down to 1.51, non-oliguric.            Principal Problem:    Cardiac arrest (HCC)  Active Problems:    Cardiomyopathy (HCC)    Diabetic retinopathy 
Nephrology Progress note    Angelo Mireles  MRN: [id number]  : 1970    Admit Date: 3/9/2025        Subjective:     Angelo Mireles is a 54 y.o. male with  a PMHx significant for DDKTx (2024), HTN, DM, A Fib and CAD who was brought to ED with SOB and chest discomfort. He was hypotensive, febrile, tachycardiac and hypoxic on initial eval. Initial labs revealed elevated Cr at 2.4, and CO2 9. Recent baseline Cr was 1.3-1.4. He is +ve for Influenza A. Pt coded in the ER and hada to be intubated. CxR showed pneumonia. Pt was transplant in 2023 at [facility] and currently FU by Dr Duque. He is on MMF, Pred and IV monthly Belatecept (he did not tolerate Tacrolimus). Pt was intubated, placed on IV pressors, IV bicarb and abx.      Remains intubated in ICU/s/p trach and PEG.   Cr down to 0.7.      Impression:     -Acute allograft kidney Injury: Likely prerenal/ATN sec to shock and cardiac arrest. Also had IV dye. Resolved, Cr down 3.5=>=>0.7  -S/P DDKTx (2023): On MMF, Pred and IV monthly Belatecept as outpt (?last dose). Recent out pt baseline Cr was 1.3-1.4.   -Hypernatremia, resolved   -Septic shock, BC: GPC, UC Klebsiella: on abx. Improved, off IV pressor   -AG met acidosis: Lactic acidosis, improving.  -Resp failure: No PE. On MV thru trach    -Influenza +ve w/ Multifocal Pneumonia  -S/P Cardiac arrest: in the ER  -Possible ACS w/ hx of CAD  -Hypoalbuminemia  -Abnormal LFT's: improving   -DM  HgbA1c 8.0%  -Hx of Afib  -Anoxic/Hypoxic Encephalopathy    Plan:   -Cont  po prednisone and IV MMF. PO MMF not compatible with tube feeding.  -Need to discuss with his transplant team in regards to need for IV monthly Belatecept , when is next  dose?  -Adjust po bicarb as needed  -Cont vent management per PCCM  -D/w ICU team, will need help from pharmacist to make -Midodrine for BP  -Monitor I/Os      Principal Problem:    Cardiac arrest (HCC)  Active Problems:    Cardiomyopathy (HCC)    Diabetic retinopathy 
Nephrology Progress note    Angelo Mireles  MRN: [id number]  : 1970    Admit Date: 3/9/2025        Subjective:     Angelo Mireles is a 54 y.o. male with  a PMHx significant for DDKTx (2024), HTN, DM, A Fib and CAD who was brought to ED with SOB and chest discomfort. He was hypotensive, febrile, tachycardiac and hypoxic on initial eval. Initial labs revealed elevated Cr at 2.4, and CO2 9. Recent baseline Cr was 1.3-1.4. He is +ve for Influenza A. Pt coded in the ER and hada to be intubated. CxR showed pneumonia. Pt was transplant in 2023 at [facility] and currently FU by Dr Duque. He is on MMF, Pred and IV monthly Belatecept (he did not tolerate Tacrolimus). Pt was intubated, placed on IV pressors, IV bicarb and abx.     A review of records of Encounters on EPIC reveals that the last Belatacept dose was received at [facility] on 25 prior to current admission.    Remains intubated in ICU/s/p trach and PEG. Cr stable at 0.66.      Impression:     -Acute allograft kidney Injury: Likely prerenal/ATN sec to shock and cardiac arrest. Also had IV dye. Resolved, Cr down 3.5=>=>0.66  -S/P DDKTx (2023): On MMF, Pred and IV monthly Belatecept as outpt (last 25).    -Hypernatremia, resolved   -Septic shock, BC: GPC, UC Klebsiella: on abx. Improved, off IV pressor. Now on Midodrine.   -AG met acidosis: Lactic acidosis, improving.  -Resp failure: No PE. On MV thru trach    -Influenza +ve w/ Multifocal Pneumonia  -S/P Cardiac arrest: in the ER  -Possible ACS w/ hx of CAD  -Hypoalbuminemia  -Abnormal LFT's: improving   -DM  HgbA1c 8.0%  -Hx of Afib  -Anoxic/Hypoxic Encephalopathy    Plan:   -Cont po prednisone and IV MMF (PO MMF not compatible with tube feeding).  - Now that Sepsis is resolved, we would resume IV Belatacept and continue Q 28 days. Next dose tomorrow ()  -Lower 200cc free water to Q6H  -Cont Midodrine for BP  -Monitor I/Os  -Cont vent management per 
Nephrology Progress note    Angelo Mireles  MRN: [id number]  : 1970    Admit Date: 3/9/2025        Impression:     -Acute allograft kidney Injury: Likely prerenal/ATN sec to shock and cardiac arrest. Also had IV dye. Cr trending down 3.5=>=>1.1, at baseline.    -S/P DDKTx (2023): On MMF, Pred and IV monthly Belatecept as outpt (?last dose). Recent baseline Cr was 1.3-1.4.   -Hypernatremia S Na 152-153  -Septic shock, BC: GPC, UC Klebsiella: on abx.   -AG met acidosis: Lactic acidosis, improved off IV bicarb.  -Resp failure: on MV. No PE  -Influenza +ve w/ Multifocal Pneumonia  -S/P Cardiac arrest: in the ER  -Possible ACS w/ hx of CAD  -Hypoalbuminemia   -Abnormal LFT's  -DM  HgbA1c 8.0%  -Hx of Afib  -Anoxic/Hypoxic Encephalopathy    Plan:     -Free water via Tube feeding increased  -Cont vent management per PCCM  -Cont current high dose IV steroid  -MMF on hold for now  -ID following  -Monitor I/Os  -Neurology on board  Family meeting today, prognosis poor        Subjective:     Angelo Mireles is a 54 y.o. male with  a PMHx significant for DDKTx (2024), HTN, DM, A Fib and CAD who was brought to ED with SOB and chest discomfort. He was hypotensive, febrile, tachycardiac and hypoxic on initial eval. Initial labs revealed elevated Cr at 2.4, and CO2 9. Recent baseline Cr was 1.3-1.4. He is +ve for Influenza A. Pt coded in the ER and hada to be intubated. CxR showed pneumonia. Pt was transplant in 2023 at [facility] and currently FU by Dr Duque. He is on MMF, Pred and IV monthly Belatecept (he did not tolerate Tacrolimus). Pt was intubated, placed on IV pressors, IV bicarb and abx.      CXR showed:    1. ET tube is in satisfactory position.     2. No interval change in bilateral multifocal pneumonia.       -Pt remains intubated in ICU.  Off IV pressor and off sedation presently however still unresponsive. Cr back to baseline of 1.4.  Has good urine output.    Principal Problem:    Cardiac arrest 
Nimbex pushed in IR with physican at bedside   
Nurse called for episode of ventricular tachycardia for 5 minutes heart rate was 120-130 beats per minute.  Advised to keep potassium level around 4 and magnesium level around 2.    12 lead EKG revealed sinus rhythm, left axis, anteroseptal infarct age undetermined, incomplete right bundle-branch block  Blood pressure is elevated.  Advised to give 1 dose of IV metoprolol 2.5 mg   Continue ventilator support.  Continue monitoring.  
Occupational Therapy  OT orders received and chart reviewed. RN stating that pt is not medically appropriate to participate in occupational therapy services at this time. Please re-order therapy services when/if pt is medically appropriate.     -Allie Walker OTR/L  
Otolaryngology head neck surgery    Have discussed situation with sister-in-law Yaritza Mireles who has been designated POA  She is agreeable to proceeding with tracheotomy.  She will contact nursing regarding signing consent via phone.  Will make n.p.o.  Patient scheduled for tracheotomy at 10 AM  Wrist benefits complications as well as the rationale for proceeding with tracheotomy discussed with daughter-in-law who concurs and agrees to proceed    Gera Whatley  
Otolaryngology head neck surgery  Situation largely unchanged  We will try to arrange for scheduling of tracheotomy tomorrow  We will need to discuss with family regarding obtaining consent    Gera Whatley  
POC glucose 121 and 132 while off of insulin gtt. K+ 4.8 after 30 out of 40 mEq repletion on BMP lab result. Dr. Menezes updated via telephone. Orders received to discontinue insulin gtt, monitor POC glucose every 4 hours and correct with low dose SSI Humalog, and to decrease metabolic panel monitoring to daily. Electrolytes can be replaced per regular ICU protocol per MD.   
PT order received and chart reviewed. Pt en route from ED to ICU, intubated, not appropriate for skilled PT services at this time. Will defer to medical team for management before initiation of PT evaluation. Please reorder when pt stabilized and medically appropriate for PT intervention. PT will sign off.  Juana Lewis, PT, DPT    
Palliative Medicine    CODE STATUS: FULL CODE     AMD Status: none on file. His father, Roldan, is his closest living relative.      0855 Seen today in room 104. Lying supine with eyes closed. Did not attempt to stimulate.  Trach'd 3/23/2025. PEG placed 3/24/2025,    SBT attempted earlier this morning but stopped 2/2 tachypnea. Plans to try again later today.     No family at bedside.     Disposition plan: anticipate LTACH pending authorization    Palliative Medicine will continue to follow Angelo Mireles  and his family during his hospitalization and support them as they make healthcare decisions and define goals of care.    Carmina Connolly, RN, MSN  Palliative Medicine  P: [phone]    
Palliative Medicine    CODE STATUS: FULL CODE    AMD Status: NONE ON FILE     0920 Seen today in room 104 along with Carmina Connolly RN. No family at bedside.  Lying supine with head of bed elevated.    Remains on ventilator. FiO2 40%, PEEP 10. Remains on IV Versed and IV Fentanyl. Nimbex D/C.   Currently on Levo and Vasopressin.   Pain: Does not appear to be in any pain or discomfort.     Plan to have Head CT today. Possible PRASHANT planned before extubation. Started on Naficillin after +MSSA culture. Plan to evaluate for tube feeding.       Disposition plan: Pending response to treatment.     Palliative Medicine will continue to follow patient and his family during his hospitalization and support them as they make healthcare decisions and define goals of care.    Criss Almonte RN  Palliative Medicine  P: [phone]   
Palliative Medicine    CODE STATUS: FULL CODE    AMD Status: none on file. His father, Roldan, is his MPOA     1040 Seen today in room 104.  Lying supine with eyes closed. No response to verbal or tactile stimulation. Trach present. PEG present. Vent FiO2 30% PEEP 5. No family at bedside.     No significant change in status.     Disposition plan: anticipate LTACH pending authorization    Palliative Medicine will continue to follow Angelo Mireles  and his family during his hospitalization and support them as they make healthcare decisions and define goals of care.    Carmina Connolly RN, MSN  Palliative Medicine  P: [phone]    
Palliative Medicine    CODE STATUS: FULL CODE    AMD Status: none on file. His father, Roldan, is his closest living relative.      0935 Seen today in room 104 along with Orly Ahn NP.  Lying supine with eyes closed. SBT ongoing. Respirations eupneic without distress.  Trach'd 3/23/2025. PEG placed 3/24/2025--tolerating tube feeds per clinical nurse.     Reviewed with CM team. Awaiting LTACH authorization.    Disposition plan: anticipate LTACH    Palliative Medicine will continue to follow Angelo Mireles  and his family during his hospitalization and support them as they make healthcare decisions and define goals of care.    Carmina Connolly RN, MSN  Palliative Medicine  P: [phone]    
Palliative Medicine    CODE STATUS: FULL CODE    AMD Status: none on file. His father, Roldan, is his closest living relative.      1030 Seen today in room 104 along with Orly Ahn NP.  Lying supine with head of bed elevated. Intubated/ventilated/sedated. Intubated 3/9/2025 FiO2 40% PEEP 8. Intermittent levophed use for BP management.    Attempted to call Roldan at his facility. He asked that we call Jessi or Yaritza. Message left for Jessi. Yaritza was bale to speak to us.    We asked her to explain to Roldan that Angelo is at a point where we should consider whether to proceed with trach/peg or consider withdrawal of care and transitioning to comfort based care. Reviewed neurology description of anoxic brain injury. Angelo's brother, Roldan Moy. had a massive cardiac arrest while hospitalized and was intubated. He was deemed brain dead so the discussion of tracheostomy never came up. Offered opportunity for questions for Yaritza and she can share our number with other family. Asked Yaritza to update the palliative team about how her family conversation went--she agreed.     Disposition plan: to be determined based on response to medical treatment, medical recommendations,  and patient/family decisions    Palliative Medicine will continue to follow Angelo Mireles  and his family during his hospitalization and support them as they make healthcare decisions and define goals of care.    Carmina Connolly RN, MSN  Palliative Medicine  P: [phone]    
Palliative Medicine  Patient Name: Angelo Mireles  YOB: 1970  MRN: [id number]  Age: 54 y.o.  Gender: male    Date of Initial Consult: 3/9/2025  Date of Service: 3/21/2025  Time: 12:30 PM  Provider: BRANDON Heaton CNP  Admit Date: 3/9/2025  Referring Provider: Payam Menezes MD      Reasons for Consultation:  Other: cardiac arrest    HISTORY OF PRESENT ILLNESS (HPI):   Angelo Mireles is a 54 y.o. male with a past medical history of kidney transplant in  who was admitted on 3/9/2025 from home with a diagnosis of sepsis. He suffered a cardiac arrest x2 and a third event requiring amiodarone and cardioversion, and required cooling. He is positive for influenza A and has positive blood cultures and multifocal pneumonia. He is off sedation and paralytics, but minimally responsive (sternal rub only). Per neurology, patient has anoxic brain injury and \"neurologically favorable cognitive outcome after this clinical picture is extremely guarded.\"    Psychosocial: Patient is  and has two minor children, ages 13 and 17. His default medical decision-maker is his father, Roldan Mireles Sr. Patient also has three living siblings: Jessi, Mateus, and Ishmael. His mother is  and his brother, Roldan Moy,  in  after a critical illness as well. Family is therefore familiar with this situation but also very scared of another loss.    Functional: Prior to admission patient was independent and doing well s/p kidney transplant. Family reports he had a few days of respiratory illness and had been sick in bed prior to admission.    PALLIATIVE DIAGNOSES:    Encounter for palliative care  Goals of care  Anoxic brain injury  Cardiac arrest  SIRS  Multifocal pneumonia  Influenza A  Immunosuppressed    PLAN:     See goals of care notes below.  PLAN: Continue current interventions as FULL CODE. Palliative Medicine continues to assist family with evolving medical decisions.  Please call with any palliative 
Palliative Medicine  Patient Name: Angelo Mireles  YOB: 1970  MRN: [id number]  Age: 54 y.o.  Gender: male    Date of Initial Consult: 3/9/2025  Date of Service: 3/26/2025  Time: 10:15 AM  Provider: BRANDON Heaton CNP  Admit Date: 3/9/2025  Referring Provider: Payam Menezes MD      Reasons for Consultation:  Other: cardiac arrest    HISTORY OF PRESENT ILLNESS (HPI):   Angelo Mireles is a 54 y.o. male with a past medical history of kidney transplant in  who was admitted on 3/9/2025 from home with a diagnosis of sepsis. He suffered a cardiac arrest x2 and a third event requiring amiodarone and cardioversion, and required cooling. He is positive for influenza A and has positive blood cultures and multifocal pneumonia. He is off sedation and paralytics, but minimally responsive (sternal rub only). Per neurology, patient has anoxic brain injury and \"neurologically favorable cognitive outcome after this clinical picture is extremely guarded.\"    Psychosocial: Patient is  and has two minor children, ages 13 and 17. His default medical decision-maker is his father, Roldan Mireles Sr., who is currently undergoing medical rehab. Patient also has three living siblings: Jessi, Mateus, and Ishmael. His mother is  and his brother, Roldan Moy,  in  after a critical illness as well. Family is therefore familiar with this situation but also very scared of another loss.    Functional: Prior to admission patient was independent and doing well s/p kidney transplant. Family reports he had a few days of respiratory illness and had been sick in bed prior to admission.    PALLIATIVE DIAGNOSES:    Encounter for palliative care  Goals of care  Anoxic brain injury  Cardiac arrest  SIRS  Multifocal pneumonia  Influenza A  Immunosuppressed    PLAN:     See goals of care notes below.  PLAN: Continue current interventions as FULL CODE. Code status confirmed with family spokesperson who has discussed it 
Palliative Medicine  Patient Name: Angelo Mireles  YOB: 1970  MRN: [id number]  Age: 54 y.o.  Gender: male    Date of Initial Consult: 3/9/2025  Date of Service: 3/18/2025  Time: 2:20 PM  Provider: BRANDON Heaton CNP  Admit Date: 3/9/2025  Referring Provider: Payam Menezes MD      Reasons for Consultation:  Other: cardiac arrest    HISTORY OF PRESENT ILLNESS (HPI):   Angelo Mireles is a 54 y.o. male with a past medical history of kidney transplant in  who was admitted on 3/9/2025 from home with a diagnosis of sepsis. He suffered a cardiac arrest x2 and a third event requiring amiodarone and cardioversion, and required cooling. He is positive for influenza A and has positive blood cultures and multifocal pneumonia. He is off sedation and paralytics, but minimally responsive (sternal rub only). Per neurology, patient has anoxic brain injury and \"neurologically favorable cognitive outcome after this clinical picture is extremely guarded.\"    Psychosocial: Patient is  and has two minor children, ages 13 and 17. His default medical decision-maker is his father, Roldan Mireles Sr. Patient also has three living siblings: Jessi, Mateus, and Ishmael. His mother is  and his brother, Roldan Moy,  in  after a critical illness as well. Family is therefore familiar with this situation but also very scared of another loss.    Functional: Prior to admission patient was independent and doing well s/p kidney transplant. Family reports he had a few days of respiratory illness and had been sick in bed prior to admission.    PALLIATIVE DIAGNOSES:    Encounter for palliative care  Goals of care  Anoxic brain injury  Cardiac arrest  SIRS  Multifocal pneumonia  Influenza A  Immunosuppressed    PLAN:     See goals of care notes below.  PLAN: Continue current interventions as FULL CODE. Palliative Medicine continues to assist family with evolving medical decisions.  Please call with any palliative 
Palliative Medicine  Patient Name: Angelo Mireles  YOB: 1970  MRN: [id number]  Age: 54 y.o.  Gender: male    Date of Initial Consult: 3/9/2025  Date of Service: 3/17/2025  Time: 10:30 AM  Provider: BRANDON Heaton CNP  Admit Date: 3/9/2025  Referring Provider: Payam Menezes MD      Reasons for Consultation:  Other: cardiac arrest    HISTORY OF PRESENT ILLNESS (HPI):   Angelo Mireles is a 54 y.o. male with a past medical history of kidney transplant in  who was admitted on 3/9/2025 from home with a diagnosis of sepsis. He suffered a cardiac arrest x2 and a third event requiring amiodarone and cardioversion, and required cooling. He is positive for influenza A and has positive blood cultures and multifocal pneumonia. He is off sedation and paralytics, but minimally responsive (sternal rub only). Per neurology, patient has anoxic brain injury and \"neurologically favorable cognitive outcome after this clinical picture is extremely guarded.\"    Psychosocial: Patient is  and has two minor children, ages 13 and 17. His default medical decision-maker is his father, Roldan Mireles Sr. Patient also has three living siblings: Jessi, Mateus, and Ishmael. His mother is  and his brother, Roldan Moy,  in  after a critical illness as well. Family is therefore familiar with this situation but also very scared of another loss.    Functional: Prior to admission patient was independent and doing well s/p kidney transplant. Family reports he had a few days of respiratory illness and had been sick in bed prior to admission.    PALLIATIVE DIAGNOSES:    Encounter for palliative care  Goals of care  Anoxic brain injury  Cardiac arrest  SIRS  Multifocal pneumonia  Influenza A  Immunosuppressed    ASSESSMENT AND PLAN:   Patient seen in ICU-04 at 10:30 by Carmina Connolly RN and Orly TAYLOR. He remains intubated with PEEP 8, 40% O2. Today is intubation Day 8. He is no longer sedated or 
Palliative Medicine  Patient Name: Angelo Mireles  YOB: 1970  MRN: [id number]  Age: 54 y.o.  Gender: male    Date of Initial Consult: 3/9/2025  Date of Service: 3/20/2025  Time: 2:30 PM  Provider: BRANDON Heaton CNP  Admit Date: 3/9/2025  Referring Provider: Payam Menezes MD      Reasons for Consultation:  Other: cardiac arrest    HISTORY OF PRESENT ILLNESS (HPI):   Angelo Mireles is a 54 y.o. male with a past medical history of kidney transplant in  who was admitted on 3/9/2025 from home with a diagnosis of sepsis. He suffered a cardiac arrest x2 and a third event requiring amiodarone and cardioversion, and required cooling. He is positive for influenza A and has positive blood cultures and multifocal pneumonia. He is off sedation and paralytics, but minimally responsive (sternal rub only). Per neurology, patient has anoxic brain injury and \"neurologically favorable cognitive outcome after this clinical picture is extremely guarded.\"    Psychosocial: Patient is  and has two minor children, ages 13 and 17. His default medical decision-maker is his father, Roldan Mireles Sr. Patient also has three living siblings: Jessi, Mateus, and Ishmael. His mother is  and his brother, Roldan Moy,  in  after a critical illness as well. Family is therefore familiar with this situation but also very scared of another loss.    Functional: Prior to admission patient was independent and doing well s/p kidney transplant. Family reports he had a few days of respiratory illness and had been sick in bed prior to admission.    PALLIATIVE DIAGNOSES:    Encounter for palliative care  Goals of care  Anoxic brain injury  Cardiac arrest  SIRS  Multifocal pneumonia  Influenza A  Immunosuppressed    PLAN:     See goals of care notes below.  PLAN: Continue current interventions as FULL CODE. Palliative Medicine continues to assist family with evolving medical decisions.  Please call with any palliative 
Palliative Medicine  Patient Name: Angelo Mireles  YOB: 1970  MRN: [id number]  Age: 54 y.o.  Gender: male    Date of Initial Consult: 3/9/2025  Date of Service: 3/24/2025  Time: 8:30 AM  Provider: BRANDON Heaton CNP  Admit Date: 3/9/2025  Referring Provider: Payam Menezes MD      Reasons for Consultation:  Other: cardiac arrest    HISTORY OF PRESENT ILLNESS (HPI):   Angelo Mireles is a 54 y.o. male with a past medical history of kidney transplant in  who was admitted on 3/9/2025 from home with a diagnosis of sepsis. He suffered a cardiac arrest x2 and a third event requiring amiodarone and cardioversion, and required cooling. He is positive for influenza A and has positive blood cultures and multifocal pneumonia. He is off sedation and paralytics, but minimally responsive (sternal rub only). Per neurology, patient has anoxic brain injury and \"neurologically favorable cognitive outcome after this clinical picture is extremely guarded.\"    Psychosocial: Patient is  and has two minor children, ages 13 and 17. His default medical decision-maker is his father, Roldan Mireles Sr., who is currently undergoing medical rehab. Patient also has three living siblings: Jessi, Mateus, and Ishmael. His mother is  and his brother, Roldan Moy,  in  after a critical illness as well. Family is therefore familiar with this situation but also very scared of another loss.    Functional: Prior to admission patient was independent and doing well s/p kidney transplant. Family reports he had a few days of respiratory illness and had been sick in bed prior to admission.    PALLIATIVE DIAGNOSES:    Encounter for palliative care  Goals of care  Anoxic brain injury  Cardiac arrest  SIRS  Multifocal pneumonia  Influenza A  Immunosuppressed    PLAN:     See goals of care notes below.  PLAN: Continue current interventions as FULL CODE. Palliative Medicine continues to assist family with evolving medical 
Palliative Medicine  Patient Name: Angelo Mirlees  YOB: 1970  MRN: [id number]  Age: 54 y.o.  Gender: male    Date of Initial Consult: 3/9/2025  Date of Service: 3/11/2025  Time: 1:30 PM  Provider: BRANDON Heaotn CNP  Admit Date: 3/9/2025  Referring Provider: Payam Menezes MD      Reasons for Consultation:  Other: cardiac arrest    HISTORY OF PRESENT ILLNESS (HPI):   Angelo Mireles is a 54 y.o. male with a past medical history of kidney transplant in  who was admitted on 3/9/2025 from home with a diagnosis of sepsis. He suffered a cardiac arrest x2 and a third event requiring amiodarone and cardioversion, and required cooling. He is positive for influenza A and has positive blood cultures and multifocal pneumonia. He is sedated and paralyzed due to ventilator asynchrony.    Psychosocial: Patient is  and has two minor children, ages 13 and 17. His default medical decision-maker is his father, Roldan Mireles Sr. Patient also has three living siblings: Jessi, Mateus, and Ishmael. His mother is  and his brother, Roldan Moy,  in  after a critical illness as well. Family is therefore familiar with this situation but also very scared of another loss.    Functional: Prior to admission patient was independent and doing well s/p kidney transplant. Family reports he had a few days of respiratory illness and had been sick in bed prior to admission.    PALLIATIVE DIAGNOSES:    Encounter for palliative care  Goals of care  Cardiac arrest  SIRS  Multifocal pneumonia  Influenza A  Immunosuppressed    ASSESSMENT AND PLAN:   Patient seen in ICU-04 at 10:25 by Criss Almonte RN and Orly TAYLOR. He remains intubated with PEEP 10, down to 70% O2. He remains fully sedated and paralyzed and on pressors. He is off cooling. NVPS and RDOS are 0. No family present at bedside. Bedside RN Jayme updates team that patient's father has been notified of his status and family permits the Palliative 
Palliative Medicine follow-up progress note  Patient Name: Angelo Mireles  YOB: 1970  MRN: [id number]  Age: 54 y.o.  Gender: male    Date of Initial Consult: 3/9/2025  Date of Service: 3/19/2025  Provider: Ramin Menezes MD  Admit Date: 3/9/2025  Referring Provider: Payam Menezes MD      Reasons for Consultation:  Other: cardiac arrest    HISTORY OF PRESENT ILLNESS (HPI):   Angelo Mireles is a 54 y.o. male with a past medical history of kidney transplant in  who was admitted on 3/9/2025 from home with a diagnosis of sepsis. He suffered a cardiac arrest x2 and a third event requiring amiodarone and cardioversion, and required cooling. He is positive for influenza A and has positive blood cultures and multifocal pneumonia. He is off sedation and paralytics, but minimally responsive (sternal rub only). Per neurology, patient has anoxic brain injury and \"neurologically favorable cognitive outcome after this clinical picture is extremely guarded.\"    Psychosocial: Patient is  and has two minor children, ages 13 and 17. His default medical decision-maker is his father, Roldan Mireles Sr. Patient also has three living siblings: Jessi, Mateus, and Ishmael. His mother is  and his brother, Roldan Moy,  in  after a critical illness as well. Family is therefore familiar with this situation but also very scared of another loss.    Functional: Prior to admission patient was independent and doing well s/p kidney transplant. Family reports he had a few days of respiratory illness and had been sick in bed prior to admission.    3/19/25: Patient was seen in presence of palliative care staff member.  Patient remains unresponsive to verbal and tactile commands currently.  Patient remains on ventilator with FiO2 of 40% and PEEP of 8.  He is off sedation and off IV pressor.    Clinical examination:    General: Intubated, unresponsive to verbal and tactile commands  Neck: Supple, no JVD  Chest: CTA 
Patient back in ICU Post Trach Procedure and placed on same ventilator settings, doing well.  
Patient changed to Spont breathing mode with PS of 7, PEEP 5.  All vitals are very good.  Will remain on this mode if his WOB and VS remain good.  Will attempt an ABG this afternoon.  
Patient in sustained VTAC from approximately 4744-7088. -130 during episode. EKG obtained. Primary nurse states that manual BP is 160/90, vasopressin off. Spoke with Dr. Goncalves, order obtained for lopressor 2.5 mg IV x1. Order relayed to primary nurse, Page.   
Patient temperature 103.3 despite cooling efforts/medication. Ok to use cooling device to manage patient fever per MD Hare  
Pharmacy Consult:     Pharmacy consulted by Bienvenido Harrington MD for mycophenolate PO to IV conversion for patient Angelo Mireles d/t current NPO status    Home regimen: Mycophenolate sodium (Myfortic)  mg PO BID (total daily dose 1440 mg)    Mycophenolate mofetil (IV) and Mycophenolate DR (PO) are not equivalent.    IV:PO ratio is 500 mg IV : 360 mg PO    Order placed for Mycophenolate 1000 mg IVPB twice daily    Call pharmacy with questions regarding product and dose changes.    Jasen Doe, VeroD  [phone]    
Pharmacy Dosing Services:     Pharmacist Renal Dosing  Note for Tamiflu   Physician Dr. Hernandez    Indication: Influenza (+)  Previous Regimen Tamiflu 75 mg Twice Daily   Serum Creatinine No results found for: \"CASTRO\", \"CREAPOC\"   Creatinine Clearance Estimated Creatinine Clearance: 45 mL/min (A) (based on SCr of 2.16 mg/dL (H)).   BUN Lab Results   Component Value Date/Time    BUN 38 03/09/2025 07:20 AM       New Regimen:   Tamiflu 75 mg once, followed by Tamiflu 30 mg Twice Daily x 9 doses    Pharmacy will continue to monitor for renal dose adjustments.    RENÉ CARNES RPH , PharmD  [phone]    
Placed Pt on SBT 7 PS peep 5 fio2 30%  
Placed back on previous vent settings  AC 16 peep 5 fio2 30%   
Placed back on previous vent settings  rate 16 peep 5 SBT trial for 2 1/2 hours  
Placed on SBT 7 PS peep 5 fio2 30%  
Placed patient on spontaneous breathing trial this morning at 0826 with a initial pressure support of 7 but increased to 10 due to tachypnea consistently in the 30s.   Patient consistently tachypneic post 20 minutes into the trial with pressure support of 10.   Trial ended and placed back on previous full support mode and immediately the respiratory rate decreased with higher tidal volumes.   Nurse and provider notified. Will continue to monitor.  
Plan for pt to be discharged today.    Ok to switch Mycophenolate to suspension thru NGT after discharge     Pt also needs to get his monthly Belatacept today before discharge     Discussed with Dr Alonzo and care coordinator     Evi Aguilera MD
Postop day 2  Patient remains on vent  Having some success with weaning  Trach site clean  Suspect the patient should be weaned from vent without much difficulty.  At the time of surgery patient's ventilatory support was minimal  Will need trach change postop 7-10  
Pt extubated to 2L NC at 1300. Pt tolerated well. Will cont to monitor  
Pt placed on spontaneous breathing trial at 1330.  Pt placed back on full support at 0551 due to increased work of breathing.  Pt tolerated well overall x16 hours.       03/24/25 0551   Patient Observation   Pulse 94   Respirations 28   SpO2 100 %   Breath Sounds   Respiratory Pattern Tachypneic   Breath Sounds Bilateral Other (comment)  (coarse)   Vent Information   Ventilator Day(s) 16   Ventilator ID [id number]   Vent Mode (S)  AC/VC  (mode change)   Ventilator Settings   Vt (Set, mL) 450 mL   Resp Rate (Set) 16 bpm   PEEP/CPAP (cmH2O) 5   FiO2  30 %   Insp Time (sec) 0.9 sec   Vent Patient Data (Readings)   Vt (Measured) 567 mL   Peak Inspiratory Pressure (cmH2O) 12 cmH2O   Rate Measured 28 br/min   Minute Volume (L/min) 14 Liters   Mean Airway Pressure (cmH2O) 6.7 cmH20   Plateau Pressure (cm H2O) 14 cm H2O   Driving Pressure 9   I:E Ratio 1:1.4   Static Compliance (L/cm H2O) 42   Insp Rise Time (%) 70 %   Backup Apnea On   Backup Rate 16 Breaths Per Minute   Backup Vt 450   Backup I Time   (0.9)   Vent Alarm Settings   Low Pressure (cmH2O) 8.5 cmH2O   High Pressure (cmH2O) 35 cmH2O   Low Minute Volume (lpm) 3 L/min   High Minute Volume (lpm) 20 L/min   Low Exhaled Vt (ml) 200 mL   High Exhaled Vt (ml) 1000 mL   RR Low (bpm) 16   RR High (bpm) 40 br/min   Apnea (secs) 20 secs   Additional Respiratoray Assessments   Humidification Source Heated wire   Humidification Temp 37   Circuit Condensation Drained   Ambu Bag With Mask At Bedside Yes   Backup Trachs Available (Size) 7.0;6.0   Surgical Airway  03/23/25 Ana Maria Cuffed   Placement Date/Time: 03/23/25 1210   Present on Admission/Arrival: No  Placement Verified By: Capnometry  Surgical Airway Type: Tracheostomy  Brand: Ana Maria  Style: Cuffed  Size: 7.5  Discharged with Line/Drain/Airway: Yes   Cuff Pressure 30 cm H2O   Spare Trach at Bedside Yes   Spare Trach Tube One Size Smaller at Bedside Yes   Ambu Bag With Mask at Bedside Yes   Ventilator Associated Pneumonia 
Pt remains orally intubated with size 8.0 mm et tube secured with tube seth @ 23 cm kindra at the lip; breath sounds were decreased throughout with pt suctioned for scant amount secretions; all alarms on & functioning with ambu bag @ hob  
Pt transport arrived. Transport given report on pt, all questions/concerns addressed appropriately. Pt taken off ICU monitor and placed on transport monitor without event. MARGO Maddox updated at receiving facility.   
Pt. Placed on spontaneous breathing trial per ICU provider. Placed on previous SBT settings of pressure support of 7 and peep of 5. Patient resting in bed comfortably with no respiratory distress with ambu bag by bedside. Will continue to monitor.  
RN staff/ Lab staff unable to retrieve labs due to poor vein access in patient.   
Radiologist called and requested to read STAT CT ABD. Per  -- will relay to radiologist.       
Remote chart review    Cuauhtemoc of fever to 102 and tachycardia  Repeat blood culture ordered X2  Fungitell/galactomannan ordered    WBC/CPK/Platelet- normalized  Cr is improving  Procal is declining  Lactic acid remains elevated >3    XR CHEST PORTABLE  Result Date: 3/16/2025   FINDINGS: AP portable chest radiograph. ET tube is in satisfactory position. There is a left IJ catheter in satisfactory position. NG tube courses to the abdomen. The heart size is normal. The lungs are hyperinflated. Bilateral lung airspace infiltrates persist without significant change. No effusion or pneumothorax is seen.       MRI BRAIN WO CONTRAST  Result Date: 3/15/2025  BRAIN MRI WITHOUT CONTRAST: 3/15/2025  FINDINGS: Diffusion restriction is diffuse in the cerebellum, symmetrically involves the bilateral occipital lobes, the bilateral lateral hippocampi, and the bilateral temporal lobe. Scattered periventricular and subcortical white matter signal abnormalities are consistent with chronic small vessel ischemic disease. The ventricles and sulci are appropriate for age. The main intracranial arterial flow-voids are normal. No hemorrhage. 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 Paranasal sinus mucosal disease is moderate, and there are bilateral mastoid effusions. An oropharyngeal tube is coiled in the nasopharynx.     Extensive diffusion restriction: Diffuse cerebellar, bilateral occipital, bilateral hippocampal, bilateral temporal.    Suggest:    Blood cultures X2 ordered   Need to change alarcon and UA with microscopy and urine culture needed  If resp secretions-> send of culturs    Fever could be due to underlying current infection Vs superimposed new infection in critically ill patient/ cns fever    Cont Nafcillin IV, cont antiviral to 10 days  Please call via [employer] for questions/concerns over the weekend, I will be rounding from Monday. Thanks.       Divya Patrick MD  [facility] 
Renal Dosing Adjustment     Meropenem was automatically dose-adjusted per [facility] P&T approved Protocols, with respect to renal function.    Pt Weight:   Wt Readings from Last 1 Encounters:   03/15/25 93.9 kg (207 lb 0.2 oz)       Previous Regimen                                            Meropenem 1 gram IV q8h x 7 days   Creatinine Clearance Estimated Creatinine Clearance: 59 mL/min (A) (based on SCr of 1.68 mg/dL (H)).   BUN Lab Results   Component Value Date/Time    BUN 61 03/16/2025 03:20 AM         Assessment/Plan    Indication: Septic shock, immunosuppressed pt  Dose adjusted to Meropenem 2 gram IV once (over 30 minutes),  followed by Meropenem 2 grams IV q8h  (each dose to infuse over 180 minutes) x 20 doses.   Pharmacy to continue to monitor patient daily.   Will make dosage adjustments based upon changing renal function.    Signed Shirley [place] JENNIFER  Contact: [phone]  
SBT initiated @ 0800.  PS 10 PEEP 5:  RR 27 , Vt 482, RSBI 56.                            1000                              RR 34, Vt  470, RSBI 72  Pt transitioned back to Previous AC settings.    
Shift report received from LESTER Suazo RN Report included the following information SBAR, Kardex, ED Summary, Procedure Summary, Intake/Output, MAR, and Recent Results    0730- Shift assessment complete. Pt remains sedated and paralyzed, intubated on the ventilator. See MAR and Flowsheets for more details.     1636- Hourly BG completed, initial reading 27, immediate subsequent readings 54, 50. PRN D10% bolus started at this time.     1654- BG reading after PRN bolus complete, 67, PRN D10% bolus started at this time.     1736- Repeat blood glucose 80.     1728- Spoke with Dr. Menezes via phone regarding hypoglycemia, new orders noted to hold insulin gtt. Monitor glucose every 2 hours for the next 6 hours. Increase continues fluid rate.     Shift report given to LESTER Suazo RN Report included the following information SBAR, Kardex, ED Summary, Procedure Summary, Intake/Output, MAR, and Recent Results    
Sister Jessi updated about the critical condition.  She was crying and will speak to me tomorrow again.  Family requested not to call dad who is health care proxy so I did not call him ( he is in hospital )  I wanted to discuss goals of care and will try again tomorrow  LEONID Sweet MD    
Spoke to ICU Charge RN, plan still for PEG today.   Tube feed held at midnight and subQ heparin held.   
Spontaneous breathing trial ended per provider's request. Pt. Tolerated trial well with no distress. Total trial, 5 hours. Placed on previous settings. Resting comfortably in bed, ambu bag by bedside. Will continue to monitor.  
TRANSFER - OUT REPORT:    Verbal report given to juan jose(name) on Angelo Mireles being transferred to icu(unit) for ordered procedure       Report consisted of patient's Situation, Background, Assessment and   Recommendations(SBAR).     Information from the following report(s) Nurse Handoff Report and Event Log was reviewed with the receiving nurse.    Opportunity for questions and clarification was provided.      Patient transported with:   Registered Nurse    
Talked with Dulce gonzalez to send pt today. belatacept will be given tomorrow per [facility].   
Talked with pharmacist belatacept no available today,  possible tomorrow    
Talked with pharmacist for cellcept 720 mg bid -liquid form on dc . Talked with Dulce-she will call back after discuss with her pharmacist about belatacept   
Transported Pt to CT scan on vent and back to Icu without incident  
Unable to draw ABG. Patient is only able to get drawn in left brachial artery . Attempted X3.   
WASTED 85cc of Fentanyl with second RN Francisco J  
Wasted 80 cc of nimbex with Jose Luis Madrid RN.  0915: Pt has fever applied ice packs to patient. Too soon for tylenol.  
Wasted 80 mL of Versed with LALITA Nolan RN   
Wasted 80cc of Nimbex with MARGO Kan  
Wasted 85cc of Fentanyl W/ S.Gissell RN  
While providing hygiene care after bowel movement, a small hole was noted in the perineum. Small amount of bleeding noted. Appears to have two areas underneath this opening that appear as though they may try to open. The area appears as though it could have been a small abscess.   
A-fib (HCC)    Acute respiratory failure with hypoxia (HCC)    Lactic acidosis    Influenza A    Pneumonia    Immunosuppressed status    Renal transplant, status post    Acute kidney injury superimposed on CKD    HTN (hypertension)    Bacteremia    Anoxic encephalopathy (HCC)    Hyperthyroidism  Resolved Problems:    * No resolved hospital problems. *    Current Facility-Administered Medications   Medication Dose Route Frequency    sodium bicarbonate tablet 650 mg  650 mg Oral TID    methIMAzole (TAPAZOLE) tablet 10 mg  10 mg Oral Daily    mycophenolate (CELLCEPT) 1,000 mg in dextrose 5 % 166.7 mL IVPB  1,000 mg IntraVENous Q12H    norepinephrine (LEVOPHED) 16 mg in sodium chloride 0.9 % 250 mL infusion (premix)  1-100 mcg/min IntraVENous Continuous    predniSONE (DELTASONE) tablet 5 mg  5 mg Oral Daily    0.9 % sodium chloride infusion   IntraCATHeter Once    iopamidol (ISOVUE-300) 61 % injection 50 mL  50 mL Other ONCE PRN    lidocaine PF 1 % injection 30 mL  30 mL SubCUTAneous ONCE PRN    sterile water injection 10 mL  10 mL PEG Tube ONCE PRN    heparin 100 UNIT/ML injection 500 Units  500 Units IntraCATHeter Q8H PRN    midazolam PF (VERSED) injection 2 mg  2 mg IntraVENous Q2H PRN    midodrine (PROAMATINE) tablet 5 mg  5 mg Oral TID WC    metoprolol (LOPRESSOR) injection 2.5 mg  2.5 mg IntraVENous Q6H    ceFAZolin (ANCEF) 2000 mg in sterile water 20 mL IV syringe  2,000 mg IntraVENous Q8H    amiodarone (CORDARONE) tablet 200 mg  200 mg Oral Daily    micafungin (MYCAMINE) 100 mg in sodium chloride 0.9 % 100 mL IVPB (addEASE)  100 mg IntraVENous Daily    [Held by provider] insulin glargine (LANTUS) injection vial 30 Units  30 Units SubCUTAneous Daily    [Held by provider] insulin glargine (LANTUS) injection vial 5 Units  5 Units SubCUTAneous Nightly    glucose chewable tablet 16 g  4 tablet Oral PRN    dextrose bolus 10% 125 mL  125 mL IntraVENous PRN    Or    dextrose bolus 10% 250 mL  250 mL IntraVENous PRN    
called his Sister Autumn Mireles since his father was admitted in the hospital and in the rehab not available  PRASHANT procedure explained to patient Sister Jessi and risk-benefit explained at RN witnessed she consented for the percent  Continue with the rest of the treatment      5:26  Addendum  Attempted PRASHANT  Patient have macroglossia/intubated unable to advance PRASHANT probe beyond posterior part of the tongue.   Patient is also trying to bite the probe, bite block was placed and patient was given fentanyl/Versed  Procedure terminated without any complication and patient remained hemodynamically stable  I have notified Dr. Patrick.  Will reorder regular limited transthoracic echocardiogram for assessment of valves.  Nurse will update the family.        3/20/2025  No change from cardiac standpoint  Sinus rhythm cardiac telemetry   Family meeting today to decide  goal of care  Discussed with RN                              3/19/2025  Patient remained intubated  Maintaining sinus rhythm  Off Levophed  Family meeting tomorrow to discuss goals of care  Continue with current meds  Discussed with RN  Discussed with Dr. Sweet                      3/18/2025  No change from cardiac standpoint  Patient remained responsive and on ventilator  Cardiac telemetry stable  Palliative care is communicating with patient's father and other family member  I have discussed with patient's sister Jessi Mireles  Continue with supportive treatment  Discussed with RN  Discussed with Dr. Sweet                      3/16/2025  Patient is started on Levophed due to low blood pressure  Maintaining sinus rhythm  Blood pressure improved on Levophed  Will continue with amiodarone and metoprolol with holding parameter  Brain MRI is done concerning for anoxic brain injury,  Fever ID is following  Continue supportive treatment  Discussed with RN                3/15/2025  Patient was started on amiodarone yesterday due to wide-complex tachycardia/VT 
kg (194 lb 0.1 oz)  Current Body Weight: 94 kg (207 lb 3.7 oz), 116.9 % IBW. Weight Source: Bed scale  Current BMI (kg/m2): 29.7  Usual Body Weight: 87 kg (191 lb 12.8 oz)  % Weight Change (Calculated): 1.1  BMI Categories: Overweight (BMI 25.0-29.9)    Estimated Daily Nutrient Needs:  Energy Requirements Based On: Kcal/kg  Weight Used for Energy Requirements: Current  Energy (kcal/day): 1700  Weight Used for Protein Requirements: Current  Protein (g/day): 105  Method Used for Fluid Requirements: 1 ml/kcal  Fluid (ml/day): 1700    Nutrition Diagnosis:   Increased nutrient needs, Altered nutrition-related lab values related to cognitive or neurological impairment, catabolic illness, inadequate protein-energy intake, increase demand for energy/nutrients, renal dysfunction, cardiac dysfunction, endocrine dysfunction as evidenced by intubation, nutrition support - enteral nutrition, lab values    Nutrition Interventions:   Food and/or Nutrient Delivery: Continue Current Tube Feeding, Vitamin Supplement  Nutrition Education/Counseling: Education/Counseling not indicated  Coordination of Nutrition Care: Continue to monitor while inpatient    Goals:  Goals: Meet at least 75% of estimated needs (prevent wt loss and skin breakdown)  Type of Goal: Continue current goal  Previous Goal Met: Goal(s) Achieved    Nutrition Monitoring and Evaluation:   Behavioral-Environmental Outcomes: None Identified  Food/Nutrient Intake Outcomes: Enteral Nutrition Intake/Tolerance, Vitamin/Mineral Intake  Physical Signs/Symptoms Outcomes: Biochemical Data, Skin, Weight, Hemodynamic Status, Fluid Status or Edema    Discharge Planning:    Enteral Nutrition     Addie Ratliff MS, RD  Clinical Dietitian  E: [email or web address]  O:  [phone]  C:  [phone]  
panel/ PCR       --MRSA scree: negative       --CXR with bilateral opacity       --CTA chest with no PE, bilateral dense infiltrate, no nodules noted. CT AP- no evidence of intra-abdominal infection,no evidence of renal abscess        --TTE: EF 40-45%, no mention of IE/veg  3/09--blood culture X2 3/9- 4/4  MSSA  3/11--Blood culture X1- NG  3/16- blood culture X2- NG         -Viral test /PCR for COVID 19/Influenza B -negative.  Flu A PCR +         -resp culture: MSSA and K.pneumo -SS to cefazolin         -UA with 4-10 WBC, hematuria,     Elevated procalcitonin:  41-> 100.86- 3/9/25-> 130 (3/12)-> 48.77(3/14)-> 23( 3/19)    Anoxic encephalopathy: Bilateral cerebellar infarct--Hypoxic brain injury on CT head 3/13- ? Septic emboli                                3/15:  MRI brain: Extensive diffusion restriction: Diffuse cerebellar, bilateral occipital, bilateral hippocampal, bilateral  temporal.    Renal transplant- 07/2023--on MMF/ Prednisone/Belatecept       -- follows at [facility] transplant team. Historyof empysematous cystitis and treated by Transplant ID [id number]     Medical History:   Diagnosis Date    Abnormal nuclear cardiac imaging test 10/08/2012    Fixed anteroseptal, anteroapical defect c/w prior infarction.  No ischemia.  Mid & distal anteroseptal, anteroapical WMA.  LVE.  EF 44%.      Anemia     dr. christianson doubt amyloid or multiple myeloma. candidate for procirt if Hg <10    Benign hypertensive cardiomyopathy (HCC)     Blindness of right eye-dense cataract 01/2013    legally blind    CAD (coronary artery disease)     Cardiomyopathy ([facility])     CKD (chronic kidney disease), stage IV ([facility])     to see Dr. Woodwadr 12/1/12    Congestive heart failure ([facility])     Diabetes mellitus ([facility])     Diabetic retinopathy ([facility])     Dr. Muhammad- injections    Diabetic retinopathy ([facility])     Heart failure ([facility])     History of echocardiogram 04/22/2014    LVE.  EF 55% (prev 40-45% on echo of 1/27/12).  No WMA.  Mild-mod conc 
yesterday  Discussed with RN  Discussed with Dr. Menezes, will consider starting DVT prophylaxis heparin.  Patient is being scheduled for brain MRI.  After brain MRI if cleared by neurologist may start therapeutic anticoagulation            3/14/2025  Last night have wide-complex tachycardia possible VT  I will resume amiodarone 200 mg daily  Increase metoprolol from 2.5 mg to 5 mg every 6 hourly  Patient is off vasopressin  Continue with Levophed  Discussed with RN  Will discuss with Dr. Menezes                          3/13/2025  Patient remains intubated  Cardiac telemetry stable  On vasopressin  INR 1.2  Platelet count 65 K  Continue with current supportive treatment  Will assess tomorrow for possible PRASHANT if stable from platelet count standpoint            3/12/2025  Patient remained intubated  Cardiac telemetry remains stable  Still on low-dose Levophed and vasopressin  Platelet count is 60s    Discussed with Dr. Darnell ROSA she wants me to consider nonurgent PRASHANT before extubation when patient is stable from a platelet count standpoint and from influenza standpoint                Cardiac telemetry maintaining sinus rhythm  No significant arrhythmia  Patient is on low-dose Levophed and vasopressin  Platelet count is 64,000  Continue supportive treatment  Will consider anticoagulation once stable  Discussed with RN                          Respiratory failure  Cardiac arrest PEA arrest x 2 with VT on second arrest requiring shock treatment  Renal transplant  Chronic CAD  Paroxysmal atrial fibrillation  History of diastolic heart failure    Minimal troponin elevation in the setting of PEA cardiac arrest  No evidence of STEMI or non-STEMI, mild troponin elevation probably due to cardiac arrest/respiratory failure/kidney disease  Continue ventilatory support  Continue pressor support  Discussed with RN  Discussed with Dr. Menezes  INR is 2.2  Will recheck tomorrow                Subjective:    cc:  Shortness of 
    History of A-fib per EMR.  Brief episode of V. tach during second cardiac arrest in ER.  Historically on amiodarone, Eliquis at home-unclear when patient took these medications  Received amiodarone bolus and did not require amiodarone drip.  Limited in starting anticoagulation secondary to thrombocytopenia    Dr. Murdock following.    Echo 3/9/2025: LVEF 40 to 45%, hypokinesis of anteroseptal, apical anterior and apical septal wall, grade 2 diastolic dysfunction, RVS/PAS pressure 41 mmHg  LHC 6/8/2023: Mild to moderate CAD.  LVEF 40 to 45%.  Echo 10/30/2022: LVEF 55 to 60%.    IV line in RUE infiltrates; swelling of RT UE > LT UE. Distal pulse and capillary refill present at bedside. Neuro/vascular  checks discussed with staff    ID: Influenza A H1 positive.  Multifocal pneumonia, elevated procalcitonin with bacterial pneumonia from Staph aureus likely.  Immunosuppressed on immunosuppressive medications for renal transplant.  Lactic acid gradually improving    COVID19 3/9/2025: Negative.  Rapid influenza 3/9/2025: Influenza A positive.  Blood culture 3/9/2021: Staph aureus, preliminary  Repeat blood culture 3/11/2025: NGTD  MRSA PCR 3/9/2025: No MRSA, final  Endotracheal aspirate sputum culture ordered-no sample available  Urine culture 3/9/2025: No growth, final  Respiratory viral panel 3/9/2025: Influenza AB H1 otherwise negative.  Procalcitonin elevated.  Monitor serial as per ID provider  Urine Legionella/pneumococcal antigen not detected.    Elevated lactic acid; monitor serial lactic acid.    Antibiotics: Nafcillin, vancomycin.  Tamiflu.  Pharmacy to dose for renal dysfunction.  Off of cefepime and doxycycline  ID following    Sepsis bundle and protocol followed. Follow serial lactic acid, serial BMP check  Somewhat limited in fluid resuscitation secondary to known chronic systolic cardiomyopathy.  Follow cultures. Deescalate antibiotic when appropriate as per ID following    Hematology/Oncology: Monitor 
Medical Directive on file. According to [facility], patient's closest living relative is his father Roldan Mireles Sr.    Roldan Avendano is aware of patient's condition and his role as decision-maker. He looks to previous daughter-in-law Yaritza Mireles and daughter Jessi for help in making complex medical decisions. He understands that ultimate decisions such as consent will need to come from him, but he makes these decisions with the support of Yaritza and Jessi.    Yaritza is accepting of daily phone calls to check the status of decision-making. At this time family requests that updates NOT be shared directly with Roldan Avendano unless someone is with him to provide support. He is 89 years old, hard of hearing, and currently in and out of the hospital.    Thursday 3/20 there was a family meeting in the ICU waiting room.   Family in attendance: Yaritza; Jessi and her son; Jason Li; patient's two minor sons and their mother Marcella.   By phone: Roldan Avendano and his wife Reddy Quiñones; the late Roldan Pride's son   Hospital staff: Marie Campos (CM), Dr. Sweet (ICU attending), Dr. Copeland (neurology), Palliative Medicine staff Criss Almonte, Carmina Connolly, and Orly Ahn.    Dr. Copeland provided update regarding neurological status. Family would like to pursue tracheostomy and PEG and understand that surgical consent will need to be obtained from Roldan Avendano. Family understands that long-term care will most likely be needed including LTACH and SNF.    Patient's son spoke to the son of the late Roldan Moy, who offered support and stated he knows from experience how hard these situations are.    Dr. Sweet asked about code status moving forward. The family will discuss amongst themselves.     The Palliative Medicine team followed-up again today but family has not had an opportunity to discuss code status with Roldan Avendano, as he remains in medical rehab. Per Yaritza the family will discuss today. 
immunosuppression.  Okay to hold immunosuppressive medications for now per nephrology as long as patient is on Solu-Medrol.  Severe metabolic acidosis: Resolved with bicarb drip.  Follow BMP.     Endocrine   DKA  Of insulin drip, on sliding scale insulin     CNS  Versed/fentanyl. Paralyzed with nimbus.     GI   NPO for now.  OG tube.  CT abdomen pelvis no evidence of intra-abdominal infection.  Bilateral renal atrophy with right lower quadrant renal transplant.      DVT Prophylaxis:  [x]Lovenox SQ  [] Heparin SQ  [] SCDs  [] Coumadin, Eliquis, Xarelto,   [] On Heparin gtt or therapeutic Lovenox. [] Patient refused.     [id number]  45 minutes of critical care time spent in the direct evaluation and treatment of this high risk patient. The reason for providing this level of medical care for this critically ill patient was due a critical illness that impaired one or more vital organ systems such that there was a high probability of imminent or life threatening deterioration in the patients condition. This care involved high complexity decision making to assess, manipulate, and support vital system functions, discuss with specialists to treat this degreee vital organ system failure and to prevent further life threatening deterioration of the patient’s condition. This time does not include any procedures.    Case discussed with:  []Patient  []Family [x] Consultants  [x]Nursing  []Case Management   [] Others      Discharge to;  [] Home  [] Home Health  [x] SNF/Rehab  [x] LTAC. In TBD days    Review of systems : As above and,  Unable to obtain    Physical Exam: As above and,  General: As above  HEENT: NC, Atraumatic.  Right eye hazy  Lungs: CTA Bilaterally. No Wheezing/Rhonchi/Rales.  Heart:  S1 S2,  No murmur, No Rubs, No Gallops  Abdomen: Soft, Non distended, Non tender.  +Bowel sounds,   Extremities: No c/c/e         Vital signs/Intake and Output:  Visit Vitals  BP (!) 91/57   Pulse 89   Temp 100 °F (37.8 °C) 
infusion   IntraVENous PRN    magnesium sulfate 2000 mg in 50 mL IVPB premix  2,000 mg IntraVENous PRN    ondansetron (ZOFRAN-ODT) disintegrating tablet 4 mg  4 mg Oral Q8H PRN    Or    ondansetron (ZOFRAN) injection 4 mg  4 mg IntraVENous Q6H PRN    polyethylene glycol (GLYCOLAX) packet 17 g  17 g Oral Daily PRN    acetaminophen (TYLENOL) tablet 650 mg  650 mg Oral Q6H PRN    Or    acetaminophen (TYLENOL) suppository 650 mg  650 mg Rectal Q6H PRN    VASOpressin 20 Units in sodium chloride 0.9 % 100 mL infusion  0.01-0.04 Units/min IntraVENous Continuous    methylPREDNISolone sodium succ (SOLU-MEDROL) 40 mg in sterile water 1 mL injection  40 mg IntraVENous Q8H    albuterol (PROVENTIL) (2.5 MG/3ML) 0.083% nebulizer solution 2.5 mg  2.5 mg Nebulization Q4H PRN    chlorhexidine (PERIDEX) 0.12 % solution 15 mL  15 mL Mouth/Throat BID    pantoprazole (PROTONIX) 40 mg in sodium chloride (PF) 0.9 % 10 mL injection  40 mg IntraVENous Daily    dextrose bolus 10% 125 mL  125 mL IntraVENous PRN    Or    dextrose bolus 10% 250 mL  250 mL IntraVENous PRN    sodium phosphate 15 mmol in sodium chloride 0.9 % 250 mL IVPB  15 mmol IntraVENous PRN    dextrose 5 % and 0.45 % sodium chloride infusion   IntraVENous Continuous PRN    oseltamivir (TAMIFLU) capsule 30 mg  30 mg Oral BID    [Held by provider] enoxaparin (LOVENOX) injection 40 mg  40 mg SubCUTAneous Daily    fentaNYL (SUBLIMAZE) injection 25 mcg  25 mcg IntraVENous Q2H PRN               Lab/Data Reviewed:  Procedures/imaging: see electronic medical records for all procedures/Xrays   and details which were not copied into this note but were reviewed prior to creation of Plan        Recent Labs     03/11/25  0702 03/12/25  0520 03/13/25  0500   WBC 11.9 12.6 11.6   HGB 12.4* 11.3* 11.3*   HCT 36.7 33.9* 34.0*   PLT 65* 67* 65*     Recent Labs     03/11/25  0702 03/12/25  0520 03/13/25  0500    138 141   K 4.8 4.1 4.5    105 106   CO2 27 26 24   BUN 42* 57* 66* 
144 141 137   K 4.3 4.8 4.8    104 102   CO2 33* 31 27   BUN 36* 38* 42*       RADIOLOGY:  XR CHEST PORTABLE  Result Date: 3/10/2025  1. No change bilateral pneumonia. Electronically signed by SUSY CASTILLO    XR ABDOMEN (KUB) (SINGLE AP VIEW)  Result Date: 3/9/2025  The enteric tube terminates in the stomach. Electronically signed by Kayden Granado    XR CHEST PORTABLE  Result Date: 3/9/2025  Interval advancement of endotracheal tube. Otherwise, no short-interval change in the chest. Electronically signed by Mark Pabon    CTA CHEST W WO CONTRAST  Result Date: 3/9/2025  No evidence of pulmonary embolism. Severe multifocal pneumonia worst in the lower lungs bilaterally. Electronically signed by AMMON GABRIEL    CT ABDOMEN PELVIS W IV CONTRAST Additional Contrast? None  Result Date: 3/9/2025  No evidence of intra-abdominal infection. Bilateral renal atrophy with right lower quadrant renal transplant. Mild periportal edema in the liver. No ascites or bowel inflammation. Electronically signed by AMMON GABRIEL    CT HEAD WO CONTRAST  Result Date: 3/9/2025  1.   No acute intracranial findings. Electronically signed by Mark Pabon    XR CHEST 1 VIEW  Result Date: 3/9/2025  1. Support apparatus as above. 2. Similar bilateral airspace disease. Electronically signed by Augustine Leal    XR CHEST PORTABLE  Result Date: 3/9/2025  Bilateral airspace opacities raise suspicion for multi lobar pneumonia or alveolar pulmonary edema. Electronically signed by Augustine Leal          Cardiology Procedures:   No results found. However, due to the size of the patient record, not all encounters were searched. Please check Results Review for a complete set of results. 03/09/25    ECHO (TTE) COMPLETE (PRN CONTRAST/BUBBLE/STRAIN/3D) 03/09/2025  9:15 PM (Final)    Interpretation Summary    Left Ventricle: Mildly reduced left ventricular systolic function with a visually estimated EF of 40 - 45%. EF by 2D Simpsons Biplane is 
6 Units  6 Units SubCUTAneous Nightly    insulin lispro (HUMALOG,ADMELOG) injection vial 0-8 Units  0-8 Units SubCUTAneous 4x Daily AC & HS    sodium bicarbonate tablet 650 mg  650 mg Oral TID    methIMAzole (TAPAZOLE) tablet 10 mg  10 mg Oral Daily    mycophenolate (CELLCEPT) 1,000 mg in dextrose 5 % 166.7 mL IVPB  1,000 mg IntraVENous Q12H    norepinephrine (LEVOPHED) 16 mg in sodium chloride 0.9 % 250 mL infusion (premix)  1-100 mcg/min IntraVENous Continuous    predniSONE (DELTASONE) tablet 5 mg  5 mg Oral Daily    0.9 % sodium chloride infusion   IntraCATHeter Once    lidocaine PF 1 % injection 30 mL  30 mL SubCUTAneous ONCE PRN    sterile water injection 10 mL  10 mL PEG Tube ONCE PRN    heparin 100 UNIT/ML injection 500 Units  500 Units IntraCATHeter Q8H PRN    midazolam PF (VERSED) injection 2 mg  2 mg IntraVENous Q2H PRN    midodrine (PROAMATINE) tablet 5 mg  5 mg Oral TID WC    ceFAZolin (ANCEF) 2000 mg in sterile water 20 mL IV syringe  2,000 mg IntraVENous Q8H    amiodarone (CORDARONE) tablet 200 mg  200 mg Oral Daily    micafungin (MYCAMINE) 100 mg in sodium chloride 0.9 % 100 mL IVPB (addEASE)  100 mg IntraVENous Daily    glucose chewable tablet 16 g  4 tablet Oral PRN    dextrose bolus 10% 125 mL  125 mL IntraVENous PRN    Or    dextrose bolus 10% 250 mL  250 mL IntraVENous PRN    glucagon injection 1 mg  1 mg SubCUTAneous PRN    dextrose 10 % infusion   IntraVENous Continuous PRN    heparin (porcine) injection 5,000 Units  5,000 Units SubCUTAneous 3 times per day    potassium chloride (KLOR-CON M) extended release tablet 40 mEq  40 mEq Oral PRN    Or    potassium bicarb-citric acid (EFFER-K) effervescent tablet 40 mEq  40 mEq Oral PRN    Or    potassium chloride 10 mEq/100 mL IVPB (Peripheral Line)  10 mEq IntraVENous PRN    sodium chloride flush 0.9 % injection 5-40 mL  5-40 mL IntraVENous 2 times per day    sodium chloride flush 0.9 % injection 5-40 mL  5-40 mL IntraVENous PRN    0.9 % sodium 
Value Ref Range    Magnesium 2.8 (H) 1.6 - 2.6 mg/dL   Phosphorus    Collection Time: 03/16/25  3:20 AM   Result Value Ref Range    Phosphorus 2.2 (L) 2.5 - 4.9 MG/DL   Calcium, Ionized    Collection Time: 03/16/25  3:20 AM   Result Value Ref Range    Calcium, Ionized 1.07 (L) 1.12 - 1.32 MMOL/L   Lactic Acid    Collection Time: 03/16/25  3:50 AM   Result Value Ref Range    Lactic Acid, Plasma 3.1 (HH) 0.4 - 2.0 MMOL/L   CBC with Auto Differential    Collection Time: 03/16/25  3:50 AM   Result Value Ref Range    WBC 11.9 4.6 - 13.2 K/uL    RBC 4.24 (L) 4.35 - 5.65 M/uL    Hemoglobin 12.6 (L) 13.0 - 16.0 g/dL    Hematocrit 37.0 36.0 - 48.0 %    MCV 87.3 78.0 - 100.0 FL    MCH 29.7 24.0 - 34.0 PG    MCHC 34.1 31.0 - 37.0 g/dL    RDW 15.9 (H) 11.6 - 14.5 %    Platelets 185 135 - 420 K/uL    MPV 12.9 (H) 9.2 - 11.8 FL    Nucleated RBCs 3.8 (H) 0  WBC    nRBC 0.45 (H) 0.00 - 0.01 K/uL    Neutrophils % 87.4 (H) 40.0 - 73.0 %    Lymphocytes % 3.6 (L) 21.0 - 52.0 %    Monocytes % 3.6 3.0 - 10.0 %    Eosinophils % 0.0 0.0 - 5.0 %    Basophils % 0.5 0.0 - 2.0 %    Immature Granulocytes % 4.9 (H) 0.0 - 0.5 %    Neutrophils Absolute 10.49 (H) 1.80 - 8.00 K/UL    Lymphocytes Absolute 0.43 (L) 0.90 - 3.30 K/UL    Monocytes Absolute 0.43 0.05 - 1.20 K/UL    Eosinophils Absolute 0.00 0.00 - 0.40 K/UL    Basophils Absolute 0.06 0.00 - 0.10 K/UL    Immature Granulocytes Absolute 0.58 (H) 0.00 - 0.04 K/UL    Differential Type AUTOMATED     POC G3: BLOOD GASES INCLUDES CALC. TCO2, HCO3, BASE EXCESS, SO2    Collection Time: 03/16/25  4:48 AM   Result Value Ref Range    DEVICE ADULT VENT      FIO2 40 %    POC pH 7.49 (H) 7.35 - 7.45      POC pCO2 32.4 (L) 35.0 - 48.0 MMHG    POC PO2 81 (L) 83 - 108 MMHG    POC HCO3 24.6 21 - 28 MMOL/L    POC O2 SAT 97.0 92 - 97 %    Base Excess 1.6 mmol/L    Mode ASSIST CONTROL      POC TIDAL VOLUME 450 ml    Sed Rate 8 bpm    POC PEEP 10 cmH2O    POC Dilan's Test Positive      Site LEFT RADIAL   
hourly  Patient is off vasopressin  Continue with Levophed  Discussed with RN  Will discuss with Dr. Menezes                          3/13/2025  Patient remains intubated  Cardiac telemetry stable  On vasopressin  INR 1.2  Platelet count 65 K  Continue with current supportive treatment  Will assess tomorrow for possible PRASHANT if stable from platelet count standpoint            3/12/2025  Patient remained intubated  Cardiac telemetry remains stable  Still on low-dose Levophed and vasopressin  Platelet count is 60s    Discussed with Dr. Darnell ROSA she wants me to consider nonurgent PRASHANT before extubation when patient is stable from a platelet count standpoint and from influenza standpoint                Cardiac telemetry maintaining sinus rhythm  No significant arrhythmia  Patient is on low-dose Levophed and vasopressin  Platelet count is 64,000  Continue supportive treatment  Will consider anticoagulation once stable  Discussed with RN                          Respiratory failure  Cardiac arrest PEA arrest x 2 with VT on second arrest requiring shock treatment  Renal transplant  Chronic CAD  Paroxysmal atrial fibrillation  History of diastolic heart failure    Minimal troponin elevation in the setting of PEA cardiac arrest  No evidence of STEMI or non-STEMI, mild troponin elevation probably due to cardiac arrest/respiratory failure/kidney disease  Continue ventilatory support  Continue pressor support  Discussed with RN  Discussed with Dr. Menezes  INR is 2.2  Will recheck tomorrow                Subjective:    cc:  Shortness of breath  Cardiac arrest        REVIEW OF SYSTEMS:     General: No fevers or chills.  Cardiovascular: No chest pain or pressure. No palpitations. No ankle swelling  Pulmonary: No SOB, orthopnea, PND  Gastrointestinal: No nausea, vomiting or diarrhea      Objective:      Visit Vitals  BP (!) 110/28   Pulse 80   Temp 98.3 °F (36.8 °C) (Axillary)   Resp (!) 7   Ht 1.778 m (5' 10\")   Wt 92.5 kg (203 lb 14.8 
  Procedures/imaging: see electronic medical records for all procedures/Xrays and details which were not copied into this note but were reviewed prior to creation of Plan    TIME: E/M Time spent with patient and patient care issues: 55 mins.     This time also includes physician non-face-to-face service time visit on the date of service such as  Preparing to see the patient (eg, review of tests)  Obtaining and/or reviewing separately obtained history  Performing a medically necessary appropriate examination and/or evaluation  Counseling and educating the patient/family/caregiver  Ordering medications, tests, or procedures  Referring and communicating with other health care professionals as needed  Documenting clinical information in the electronic or other health record  Independently interpreting results (not reported separately) and communicating results to the patient/family/caregiver  Care coordination and discharge planning with Case Management.    Dear patient or family member or Caretaker, if you are reviewing this note and have a question regarding the medical terminology, please bring it with you to your next PCP visit.  Medical notes are meant to be a communication between medical professionals.  Additionally, portion of this note were created using voice recognition function, syntax and phonetic over may have escaped proofreading.    
malnutrition 02/12/2023     Past Medical History:   Diagnosis Date    Abnormal nuclear cardiac imaging test 10/08/2012    Anemia     Benign hypertensive cardiomyopathy (HCC)     Blindness of right eye 01/2013    CAD (coronary artery disease)     CKD (chronic kidney disease), stage IV (HCC)     Congestive heart failure (HCC)     Heart failure (HCC)     History of echocardiogram 04/22/2014    Hypertension     Renal abscess 1/3/12    S/P cardiac cath 10/16/2012       Physical Assessment:     Vitals:    03/14/25 1100   BP: (!) 100/59   Pulse: 98   Resp: (!) 32   Temp:    SpO2: 97%       Intake/Output Summary (Last 24 hours) at 3/14/2025 1250  Last data filed at 3/14/2025 0800  Gross per 24 hour   Intake 1545.19 ml   Output 2045 ml   Net -499.81 ml       General Appearance: Intubated and sedated  HEENT: no jaundice, moist oral mucosa, +ET tube   Chest: LS clear to auscultation bilaterally  Heart: S1/S2, no murmur, RRR  Abdomen: Old abd surgical scar, RLQ kidney tx   : no flank tenderness, + alarcon catheter  Skin: intact, no rash  Extremity: no edema  Neuro: Sedated         Evi Aguilera MD  [facility]  Work Number: [id number]    
0.9 % 250 mL infusion (premix)  1-100 mcg/min IntraVENous Continuous Minh Morris DO   Stopped at 03/26/25 0920    predniSONE (DELTASONE) tablet 5 mg  5 mg Oral Daily Bienvenido Márquez MD   5 mg at 03/27/25 0923    0.9 % sodium chloride infusion   IntraCATHeter Once Isabell Sweet MD        lidocaine PF 1 % injection 30 mL  30 mL SubCUTAneous ONCE PRN Isabell Sweet MD        sterile water injection 10 mL  10 mL PEG Tube ONCE PRN Isabell Sweet MD        heparin 100 UNIT/ML injection 500 Units  500 Units IntraCATHeter Q8H PRN Lloyd Montero MD   500 Units at 03/24/25 1303    midazolam PF (VERSED) injection 2 mg  2 mg IntraVENous Q2H PRN Isabell Sweet MD        midodrine (PROAMATINE) tablet 5 mg  5 mg Oral TID  Isabell Sweet MD   5 mg at 03/27/25 1711    metoprolol (LOPRESSOR) injection 2.5 mg  2.5 mg IntraVENous Q6H Issa Murdock MD   2.5 mg at 03/27/25 1550    ceFAZolin (ANCEF) 2000 mg in sterile water 20 mL IV syringe  2,000 mg IntraVENous Q8H Divya Patrick MD   2,000 mg at 03/27/25 1709    amiodarone (CORDARONE) tablet 200 mg  200 mg Oral Daily Issa Murdock MD   200 mg at 03/27/25 0923    micafungin (MYCAMINE) 100 mg in sodium chloride 0.9 % 100 mL IVPB (addEASE)  100 mg IntraVENous Daily Divya Patrick MD   Stopped at 03/27/25 1809    glucose chewable tablet 16 g  4 tablet Oral PRN Isabell Sweet MD        dextrose bolus 10% 125 mL  125 mL IntraVENous PRN Isabell Sweet MD        Or    dextrose bolus 10% 250 mL  250 mL IntraVENous PRN Isabell Sweet MD        glucagon injection 1 mg  1 mg SubCUTAneous PRN Isabell Sweet MD        dextrose 10 % infusion   IntraVENous Continuous PRN Isabell Sweet MD        heparin (porcine) injection 5,000 Units  5,000 Units SubCUTAneous 3 times per day Lloyd Montero MD   5,000 Units at 03/27/25 1430    potassium chloride (KLOR-CON M) extended release tablet 40 mEq  40 mEq Oral PRN 
transplant status.  The benefit outweighs the risk.  He agreed for Solu-Medrol 40 mg IV every 8 hours and holding off other immunosuppressive medications for now due to severe sepsis and septic shock.    Discussed with ER RN: Patient had received 1 L of fluid bolus and receiving second liter of fluid bolus.  On Levophed and epinephrine drip at max.  On bicarb drip.  Amiodarone drip to be started.    RN in ER reported that before intubation, patient stated: Mateus Mireles as a family.  I called and spoke with Mateus on the phone who provided me some other history: Patient was sick since 3 to 4 days and just lying in the bed.  He does not recall any fever.  Patient was not eating anything or drinking anything.  He was just drinking glucose water.  He was sleeping all day.  He took only 2 crackers orally today.  Father was diagnosed with COVID-19 but patient was wearing mask.  Patient refused to go to hospital on 3/8/2025.  Patient had shortness of breath and some cough.  He did not have fever but when EMS came, patient was sweating and short of breath.  Brother provided me history that he is diagnosed with HTN, DM, right eye blindness, glaucoma, renal transplant.  Brother reported that he is renal functions were normal and he was taking immunosuppressive medications.    Other review of system is not available since patient is intubated and unresponsive.    3/13/2025 :   Patient seen at bedside in room #104  RN gradually started lowering sedations while remains off of Nimbex  Scheduled for CT head today with further plan for sedation lowering and a RASS score  Does not follow any commands  Remains on FiO2 40% and PEEP 10 with stable airway pressures  Came off of Levophed and remains on vasopressin  No reported arrhythmia  Episode of fever yesterday night  Fair urine output.  Remains n.p.o.  No bleeding episode reported anywhere.  Stable thrombocytopenia  No hypoglycemia episode.  Events noted from overnight and 
Almita    XR CHEST PORTABLE  Result Date: 3/16/2025  EXAM: XR CHEST PORTABLE ACC#: [id number]  INDICATION: Intubated, []  COMPARISON: Chest radiograph March 14, 2025 FINDINGS: AP portable chest radiograph. ET tube is in satisfactory position. There is a left IJ catheter in satisfactory position. NG tube courses to the abdomen. The heart size is normal. The lungs are hyperinflated. Bilateral lung airspace infiltrates persist without significant change. No effusion or pneumothorax is seen.     1. The ET tube is in satisfactory position. 2. No interval change in bilateral lung infiltrates. Electronically signed by RENETTA LEWIS    MRI BRAIN WO CONTRAST  Result Date: 3/15/2025  BRAIN MRI WITHOUT CONTRAST: 3/15/2025 4:25 PM INDICATION:anoxic brain injury? Strokes? COMPARISON: None. TECHNIQUE: Images were obtained in multiple planes and sequences to emphasize T1, T2, and T2* information. Diffusion-weighted images and ADC maps were also obtained. FINDINGS: Diffusion restriction is diffuse in the cerebellum, symmetrically involves the bilateral occipital lobes, the bilateral lateral hippocampi, and the bilateral temporal lobe. Scattered periventricular and subcortical white matter signal abnormalities are consistent with chronic small vessel ischemic disease. The ventricles and sulci are appropriate for age. The main intracranial arterial flow-voids are normal. No hemorrhage. 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 Paranasal sinus mucosal disease is moderate, and there are bilateral mastoid effusions. An oropharyngeal tube is coiled in the nasopharynx.     Extensive diffusion restriction: Diffuse cerebellar, bilateral occipital, bilateral hippocampal, bilateral temporal. The findings were conveyed via secure electronic text message ([email or web address]) to Dr. Lucy Hernandez on 3/15/2025 6:49 PM by Dr. Vignesh Gale.  [id number] Electronically signed by Vignesh Gale    XR CHEST PORTABLE  Result Date: 
infiltrates persist without interval change. No effusion or pneumothorax is seen.     1. ET tube is in satisfactory position. 2. No interval change in bilateral multifocal pneumonia. Electronically signed by RENETTA LEWIS    US LIVER  Result Date: 3/17/2025  INDICATION: Elevated LFTs COMPARISON: None TECHNIQUE: Routine ultrasound images of the abdomen were obtained. Limited study secondary to patient condition FINDINGS: LIVER: No significant enlargement or evidence of parenchymal lesion. Trace perihepatic fluid. MAIN PORTAL VEIN: Patent with appropriate hepatopetal flow direction.  GALLBLADDER: Cholelithiasis. COMMON BILE DUCT: 3 mm in diameter. No significant dilatation.  PANCREAS: Not well visualized due to bowel gas though visualized portions are unremarkable. RIGHT KIDNEY: 7.9 cm in length. No hydronephrosis or nephrolithiasis. Right lower quadrant transplant kidney is noted OTHER: N/A.     Cholelithiasis. Evaluation for acute cholecystitis is limited. Gallbladder is relatively contracted. Trace. Paddock ascites Electronically signed by Cortez Recio    XR CHEST PORTABLE  Result Date: 3/17/2025  EXAM: XR CHEST PORTABLE INDICATION: Intubated COMPARISON: 3/16/2025 FINDINGS: A portable AP radiograph of the chest was obtained at 457 hours. Cardia monitoring leads.. Bilateral interstitial and airspace opacities unchanged.. Heart size is normal..  The bones and soft tissues are grossly within normal limits.     Unchanged. Electronically signed by Cortez Recio    XR CHEST PORTABLE  Result Date: 3/16/2025  EXAM: XR CHEST PORTABLE INDICATION: Intubated COMPARISON: 3/15/2025 FINDINGS: A portable AP radiograph of the chest was obtained at 600 hours. Cardiac monitoring leads. Tubes and lines are stable. Bilateral interstitial and airspace opacities without significant change.. Heart size is normal.  The bones and soft tissues are grossly within normal limits.     No significant change. Electronically signed by Cortez 
sagittal images were created. Dose reduction technique was used including one or more of the following: automated exposure control, adjustment of mA and kV according to patient size, and/or iterative reconstruction. COMPARISON: August 2023 FINDINGS: Quality: Average.  No unusual artifacts beyond the limitations of routine CT. INTRACRANIAL: Hemorrhage: None. Mass effect: None. Brain parenchyma: Gray-white matter differentiation is maintained. White matter hypoattenuation although nonspecific can be seen with chronic microvascular ischemic changes. Ventricles: Normal for age. CRANIAL/EXTRACRANIAL: Bony structures: No depressed or displaced skull fractures. Paranasal sinuses and mastoid air cells: The imaged portions demonstrate minimal mucosal thickening and/or rentention cyst formation. Visualized orbits and globes: Redemonstration of right intraorbital high density.     1.   No acute intracranial findings. Electronically signed by Mark Pabon    XR CHEST 1 VIEW  Result Date: 3/9/2025  EXAM: XR CHEST 1 VIEW INDICATION: intubation tube placemenent COMPARISON: Chest radiograph March 9, 2025 TECHNIQUE: Single view of the chest. FINDINGS: Cardiomediastinal silhouette: Endotracheal tube tip overlies the midthoracic trachea. Left internal jugular approach central venous catheter tip overlies the superior cavoatrial junction. Gastric decompression tube proximal sidehole overlies the distal esophagus. Advancement by at least 5 cm is recommended. Lungs: Multifocal airspace opacities bilaterally are similar. Pleura: No pleural effusion. No pneumothorax. Bones: No acute displaced fracture. Soft tissues: Within normal limits.     1. Support apparatus as above. 2. Similar bilateral airspace disease. Electronically signed by Augustine Leal    XR CHEST PORTABLE  Result Date: 3/9/2025  EXAM: XR CHEST PORTABLE INDICATION: Shortness of Breath COMPARISON: Chest radiograph August 10, 2023 TECHNIQUE: Single view of the chest. 
Function Panel    Collection Time: 03/27/25  6:49 AM   Result Value Ref Range    Sodium 141 136 - 145 mmol/L    Potassium 4.0 3.5 - 5.5 mmol/L    Chloride 112 (H) 100 - 111 mmol/L    CO2 21 21 - 32 mmol/L    Anion Gap 8 3.0 - 18 mmol/L    Glucose 223 (H) 74 - 99 mg/dL    BUN 23 (H) 7.0 - 18 MG/DL    Creatinine 0.72 0.6 - 1.3 MG/DL    BUN/Creatinine Ratio 32 (H) 12 - 20      Est, Glom Filt Rate >90 >60 ml/min/1.73m2    Calcium 8.8 8.5 - 10.1 MG/DL    Phosphorus 1.4 (L) 2.5 - 4.9 MG/DL    Albumin 2.6 (L) 3.4 - 5.0 g/dL   CBC with Auto Differential    Collection Time: 03/27/25  6:49 AM   Result Value Ref Range    WBC 5.6 4.6 - 13.2 K/uL    RBC 2.73 (L) 4.35 - 5.65 M/uL    Hemoglobin 8.5 (L) 13.0 - 16.0 g/dL    Hematocrit 27.4 (L) 36.0 - 48.0 %    .4 (H) 78.0 - 100.0 FL    MCH 31.1 24.0 - 34.0 PG    MCHC 31.0 31.0 - 37.0 g/dL    RDW 26.0 (H) 11.6 - 14.5 %    Platelets 139 135 - 420 K/uL    MPV 11.3 9.2 - 11.8 FL    Nucleated RBCs 0.9 (H) 0  WBC    nRBC 0.05 (H) 0.00 - 0.01 K/uL    Neutrophils % 90.1 (H) 40.0 - 73.0 %    Lymphocytes % 2.9 (L) 21.0 - 52.0 %    Monocytes % 5.0 3.0 - 10.0 %    Eosinophils % 0.5 0.0 - 5.0 %    Basophils % 0.2 0.0 - 2.0 %    Immature Granulocytes % 1.3 (H) 0.0 - 0.5 %    Neutrophils Absolute 5.05 1.80 - 8.00 K/UL    Lymphocytes Absolute 0.16 (L) 0.90 - 3.30 K/UL    Monocytes Absolute 0.28 0.05 - 1.20 K/UL    Eosinophils Absolute 0.03 0.00 - 0.40 K/UL    Basophils Absolute 0.01 0.00 - 0.10 K/UL    Immature Granulocytes Absolute 0.07 (H) 0.00 - 0.04 K/UL    Differential Type AUTOMATED         CMP Recent Labs     03/25/25  0345 03/26/25  0610 03/26/25  1349 03/27/25  0649   * 142  --  141   K 4.2 4.1  --  4.0   * 112*  --  112*   CO2 21 17*  --  21   BUN 35* 27*  --  23*   ANIONGAP 8 13  --  8   CREATININE 1.02 0.90  --  0.72   GLUCOSE 133* 250*  --  223*   CALCIUM 9.2 8.9  --  8.8   GLOB  --   --  1.8*  --    BILITOT  --   --  2.2*  --    ALKPHOS  --   --  85  --  
Portable chest AP view FINDINGS: Unchanged position of an endotracheal tube, which terminates 6 cm above the mavis. A left IJ central venous catheter terminates in the mid SVC. An enteric tube traverses the field-of-view, and terminates below the diaphragm. The cardiac silhouette is within normal limits. Multifocal airspace opacities, right greater than left are stable compared to radiograph from 3/13/2025, but improved compared to more remote radiographs such as from 3/9/2025. No pneumothorax or large pleural effusion. The visualized bones and upper abdomen are age-appropriate.     Stable multifocal pneumonia. Electronically signed by Evy Larson    CT HEAD WO CONTRAST  Result Date: 3/13/2025  EXAM: CT HEAD WO CONTRAST INDICATION: post-cardiac arrest COMPARISON: CT head 3/9/2025. CONTRAST: None. TECHNIQUE: Unenhanced CT of the head was performed using 5 mm images. Brain and bone windows were generated. Coronal and sagittal reformats. CT dose reduction was achieved through use of a standardized protocol tailored for this examination and automatic exposure control for dose modulation.  FINDINGS: Generalized volume loss. White matter hypodensities may reflect chronic microangiopathic change. There is no intracranial hemorrhage, extra-axial collection, or mass effect. The basilar cisterns are open. Subtle diffuse loss of gray-white matter differentiation. 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 The bone windows demonstrate no abnormalities. Paranasal sinus disease. Mastoid air cells are clear. Right globe dystrophic calcifications and/or prosthesis. Left lens replacement. Partially visualized tube in the nasopharynx.     Subtle diffuse loss of gray-white matter differentiation, could reflect hypoxic-ischemic brain injury. 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 Correlate clinically. Electronically signed by MONIKA WARREN    XR CHEST PORTABLE  Result Date: 3/13/2025  EXAM:  XR CHEST PORTABLE INDICATION: Intubated 
RADIATION VALUE: AIR KERMA: 56 mGy COMPLICATIONS: None immediate. Procedure and findings: After obtaining informed consent, patient was brought the angiographic suite and placed supine angiographic table. An 5 Vincentian catheter was positioned in the stomach under fluoroscopy. The upper abdomen was prepped and draped in maximal sterile barrier technique. Approximately 600 cc of air was used to insufflate the stomach. The puncture site was identified and local anesthesia was achieved with 1% lidocaine injection. Antegrade puncture into the stomach was performed under fluoroscopic guidance. 2 T-tacks were delivered and traction was applied to appose the anterior stomach wall against the anterior abdomen. A needle was then advanced into the stomach lumen and contrast injection was performed to confirm intraluminal location. An Amplatz wire was then advanced through the needle and the tract was serially dilated up to 18 Vincentian. A 18 Vincentian gastrostomy tube was advanced over the wire into the stomach. The balloon was inflated with 8 cc of dilute contrast. Final intraluminal position of the gastrostomy tube was confirmed with gentle contrast injection under fluoroscopy. The patient tolerated the procedure well and there were no immediate complications.  A dry sterile dressing was applied.     Successful placement of 18 Vincentian gastrostomy tube. Electronically signed by Maria De Jesus ESPINAL CHEST PORTABLE  Result Date: 3/26/2025  INDICATION: Trach tube, PICC line COMPARISON: 3/25/2025 FINDINGS: Single AP portable view of the chest demonstrates interval removal of nasogastric tube. Tracheostomy device unchanged. Removal of left IJ line and placement of left PICC line with tip over the distal SVC. The cardiomediastinal silhouette is unchanged. Patchy bilateral airspace disease. Gastrostomy tube partly visualized.     Unchanged tracheostomy device. Left PICC line tip over the distal SVC. Removal of nasogastric tube and partly 
    Gram Stain 3+ WBCS SEEN               OCCASIONAL Gram negative rods                  OCCASIONAL Gram positive cocci                  OCCASIONAL EPITHELIAL CELLS SEEN           Culture       LIGHT Klebsiella pneumoniae                  LIGHT Staphylococcus aureus                  NO NORMAL RESPIRATORY MARCIA ISOLATED          Susceptibility        Klebsiella pneumoniae      BACTERIAL SUSCEPTIBILITY PANEL ISSAC      amikacin <=2 ug/mL Sensitive      ampicillin 16 ug/mL Resistant      ampicillin-sulbactam 4 ug/mL Sensitive      ceFAZolin <=4 ug/mL Sensitive      cefepime <=1 ug/mL Sensitive      cefOXitin <=4 ug/mL Sensitive      cefTAZidime <=1 ug/mL Sensitive      cefTRIAXone <=1 ug/mL Sensitive      ciprofloxacin <=0.25 ug/mL Sensitive      gentamicin <=1 ug/mL Sensitive      levofloxacin <=0.12 ug/mL Sensitive      meropenem <=0.25 ug/mL Sensitive      piperacillin-tazobactam <=4 ug/mL Sensitive      tobramycin <=1 ug/mL Sensitive      trimethoprim-sulfamethoxazole <=20 ug/mL Sensitive                       Susceptibility        Staphylococcus aureus      BACTERIAL SUSCEPTIBILITY PANEL ISSAC      ciprofloxacin <=0.5 ug/mL Sensitive      clindamycin 0.25 ug/mL Sensitive      doxycycline <=0.5 ug/mL Sensitive      erythromycin <=0.25 ug/mL Sensitive      gentamicin <=0.5 ug/mL Sensitive      levofloxacin 0.25 ug/mL Sensitive      linezolid 2 ug/mL Sensitive      moxifloxacin <=0.25 ug/mL Sensitive      oxacillin 0.5 ug/mL Sensitive      rifampin <=0.5 ug/mL Sensitive      tetracycline <=1 ug/mL Sensitive      trimethoprim-sulfamethoxazole <=10 ug/mL Sensitive      vancomycin <=0.5 ug/mL Sensitive                           Culture, Blood 2 [[id number]] Collected: 03/16/25 1122    Order Status: Completed Specimen: Blood Updated: 03/22/25 0743     Special Requests NO SPECIAL REQUESTS        Culture NO GROWTH 6 DAYS       Culture, Blood 1 [[id number]] Collected: 03/16/25 1040    Order Status: Completed Specimen: Blood 
8.9* 8.2*   HCT 27.6* 28.3* 25.8*   MCV 96.8 99.6 100.8*   MCH 31.6 31.3 32.0   MCHC 32.6 31.4 31.8   RDW 25.5* 25.2* 24.8*    130* 124*   MPV 11.1 12.2* 12.0*           Micro  Results       Procedure Component Value Units Date/Time    COVID-19 & Influenza Combo [[id number]]  (Abnormal) Collected: 03/27/25 1057    Order Status: Completed Specimen: Nasopharyngeal Updated: 03/27/25 1253     Source Nasopharyngeal        SARS-CoV-2, PCR Not detected        Comment: Not Detected results do not preclude SARS-CoV-2 infection and should not be used as the sole basis for patient management decisions.Results must be combined with clinical observations, patient history, and epidemiological information.        Rapid Influenza A By PCR Detected        Rapid Influenza B By PCR Not detected        Comment: Testing was performed using antwan Kailee SARS-CoV-2 and Influenza A/B nucleic acid assay.  This test is a multiplex Real-Time Reverse Transcriptase Polymerase Chain Reaction (RT-PCR) based in vitro diagnostic test intended for the qualitative detection of nucleic acids from SARS-CoV-2, Influenza A, and Influenza B in nasopharyngeal specimens.         Culture, Respiratory [[id number]] Collected: 03/24/25 1515    Order Status: Completed Specimen: BAL- Bronch. Lavage Updated: 03/27/25 0643     Special Requests NO SPECIAL REQUESTS        Gram Stain 1+ WBCS SEEN         NO ORGANISMS SEEN        Culture       RARE NORMAL RESPIRATORY MARCIA          Culture with Smear, Acid Fast Bacillius [[id number]] Collected: 03/24/25 1515    Order Status: Completed Specimen: Miscellaneous sample Updated: 03/26/25 2235     Sample Site BRONCHIAL LAVAGE        Comment: RIGHT BRONCHIAL WASHING        AFB SPECIMEN PROCESSING Concentration     AFB Smear Negative        Comment: (NOTE)  Performed At: [address] [id number]  Gage Celeste MD Ph:[phone]          Acid Fast Culture PENDING    Culture, Fungus 
Augustine Leal    XR CHEST PORTABLE  Result Date: 3/9/2025  EXAM: XR CHEST PORTABLE INDICATION: Shortness of Breath COMPARISON: Chest radiograph August 10, 2023 TECHNIQUE: Single view of the chest. FINDINGS: Cardiomediastinal silhouette: Within normal limits. Lungs: Multifocal patchy and confluent airspace opacities bilaterally. Pleura: No pleural effusion. The tip of the right costophrenic angle is excluded by collimation. No pneumothorax. Bones: No acute displaced fracture. Soft tissues: Within normal limits.     Bilateral airspace opacities raise suspicion for multi lobar pneumonia or alveolar pulmonary edema. Electronically signed by Augustine Patrick MD  [facility])  Office #:     [phone]  [phone]- Option #8   Office Fax: [phone]      Note:  Medical notes are meant to be a communication between medical professionals.  Additionally, portion of this note were created using voice recognition function, syntax and phonetic over may have escaped proofreading.   If you are patient or family reviewing this note and have questions, please ask during rounds or bring it with you to your next doctors appointment.          
MD   4 Units at 03/22/25 1215    heparin (porcine) injection 5,000 Units  5,000 Units SubCUTAneous 3 times per day Chin Menezes MD   5,000 Units at 03/22/25 0530    potassium chloride (KLOR-CON M) extended release tablet 40 mEq  40 mEq Oral PRN Chin Menezes MD        Or    potassium bicarb-citric acid (EFFER-K) effervescent tablet 40 mEq  40 mEq Oral PRN Chin Menezes MD        Or    potassium chloride 10 mEq/100 mL IVPB (Peripheral Line)  10 mEq IntraVENous PRN Chin Menezes MD        [Held by provider] norepinephrine (LEVOPHED) 16 mg in sodium chloride 0.9 % 250 mL infusion (premix)  1-100 mcg/min IntraVENous Continuous Isabell Sweet MD   Stopped at 03/17/25 1351    sodium chloride flush 0.9 % injection 5-40 mL  5-40 mL IntraVENous 2 times per day Lucy Hernandez MD   10 mL at 03/22/25 0833    sodium chloride flush 0.9 % injection 5-40 mL  5-40 mL IntraVENous PRN Lucy Hernandez MD        0.9 % sodium chloride infusion   IntraVENous PRN Lucy Hernandez MD        magnesium sulfate 2000 mg in 50 mL IVPB premix  2,000 mg IntraVENous PRN Lucy Hernandez MD        ondansetron (ZOFRAN-ODT) disintegrating tablet 4 mg  4 mg Oral Q8H PRN Lucy Hernandez MD        Or    ondansetron (ZOFRAN) injection 4 mg  4 mg IntraVENous Q6H PRN Lucy Hernandez MD        polyethylene glycol (GLYCOLAX) packet 17 g  17 g Oral Daily PRN Lucy Hernandez MD        acetaminophen (TYLENOL) tablet 650 mg  650 mg Oral Q6H PRN Lucy Hernandez MD   650 mg at 03/16/25 1353    Or    acetaminophen (TYLENOL) suppository 650 mg  650 mg Rectal Q6H PRN Lucy Hernandez MD        albuterol (PROVENTIL) (2.5 MG/3ML) 0.083% nebulizer solution 2.5 mg  2.5 mg Nebulization Q4H PRN Payam Menezes MD        chlorhexidine (PERIDEX) 0.12 % solution 15 mL  15 mL Mouth/Throat BID Payam Menezes MD   15 mL at 03/22/25 0832 
Settings:  Lab Results   Component Value Date/Time    MODE ASSIST CONTROL 03/15/2025 07:13 AM       VENT SETTINGS (Comprehensive)  Vent Information  Ventilator Day(s): 7  Ventilator ID: [id number]  Ventilator Initiate: Yes  Vent Mode: AC/VC  Additional Respiratory Assessments  Pulse: 96  Respirations: 26  SpO2: 98 %  Position: Semi-Ruiz's  Humidification Source: Heated wire  Humidification Temp: 37  Circuit Condensation: Drained  Cuff Pressure (cm H2O): 30 cm H2O  Swallow: Other (Comment) (intubated, no swallow reflex)      Physical Exam:     General/Neurology: Unresponsive, does not follow commands, corneal and pupillary reflexes present in left eye, breathing over the vent, no withdrawal to pain, no facial asymmetry, no involuntary movements, no respiratory distress, chronically ill looking, exam limitation on ventilator  Head:   Normocephalic, without obvious abnormality, atraumatic.  Eye:   Bilateral scleral icterus  Nose:   No sinus tenderness  Throat:  Visible lips, mucosa, and tongue normal. No oral thrush.  Neck:   Supple, symmetric. No lymphadenopathy. Trachea midline  Lung:   Moderate air entry bilateral equal.  Bilateral stable scattered rhonchi. No wheezing. No prolongded expiration. No accessory muscle use.   Heart:   Regular rate & rhythm. S1 S2 present. No murmur.  No JVD.  Abdomen:  Soft. NT. ND. +BS. No masses.  No guarding, rigidity or rebound  Extremities:  1-2+ RT > LT UE and trace BL pedal edema. No cyanosis.  Pulses: 2+ and symmetric in DP. Capillary refill: normal bilateral  Skin:   Color, texture, turgor unchanged    Data:     ABG Recent Labs     03/15/25  0713   MODE ASSIST CONTROL   POCTV 450   POCFIO2 40          CMP Recent Labs     03/13/25  0500 03/13/25  1859 03/14/25  0318 03/15/25  0408     --  144 149*   K 4.5 4.2 4.4 4.5     --  109 117*   CO2 24  --  24 25   BUN 66*  --  73* 63*   ANIONGAP 11  --  11 7   CREATININE 1.79*  --  1.85* 1.73*   GLUCOSE 338*  --  385* 340* 
bowel. Stool and gas are present in the colon. No pathologic calcification. Osseous structures are age appropriate. NG tube tip in stomach but the proximal port is near the diaphragm.     Recommend advancing NG tube by at least 4 cm. Electronically signed by Page Walker    XR CHEST PORTABLE  Result Date: 3/23/2025  INDICATION:   trach placemnt COMPARISON: 3/23/2025 FINDINGS: AP portable upright view of the chest demonstrates a stable  cardiopericardial silhouette.minimal increased interstitial opacities are stable. Central venous access catheter on the left is unchanged. NG tube is stable. Interval tracheostomy placement which is in appropriate position. There is no pneumothorax.     Tracheostomy tube in upper position. No other interval change. Electronically signed by DAISY KAUFMANIB    Echo (TTE) limited (PRN contrast/bubble/strain/3D)  Result Date: 3/22/2025    Left Ventricle: Normal left ventricular systolic function with a visually estimated EF of 55 %. Left ventricle size is normal. Mildly increased wall thickness. Normal wall motion. Normal diastolic function. Average E/e' ratio is 8.68.   Left Ventricle: Diastolic dysfunction present with normal LV EF. Average E/e' ratio is 8.68.   Aortic Valve: Trileaflet valve. There is no vegetation present.   Mitral Valve: Valve structure is normal. Trace regurgitation. There is no vegetation present.   Tricuspid Valve: Valve structure is normal. The estimated RVSP is 23 mmHg. No vegetation present.   Pulmonic Valve: Valve structure is normal. Trace regurgitation. No vegetation present.   Compared to prior study overall EF has improved.  No obvious wall motion abnormalities on today's exam.     XR CHEST PORTABLE  Result Date: 3/22/2025  EXAM:  XR CHEST PORTABLE INDICATION: Intubated Comparison to 3/21/2025. Portable exam obtained at 610 demonstrates little change in the position of the life-support lines and tubes compared to prior examination. Increasing interstitial 
view FINDINGS: ET tube is in satisfactory position. NG tube courses into the stomach. Left IJ catheter overlies the SVC. Bilateral airspace disease consistent with pneumonia right greater than left is unchanged.     1. No change in bilateral airspace opacities right greater than left. This is consistent with pneumonia. Electronically signed by SUSY CASTILLO     XR CHEST PORTABLE  Result Date: 3/14/2025  EXAM:  XR CHEST PORTABLE INDICATION: Intubated COMPARISON: Chest radiograph 3/13/2025 and multiple priors TECHNIQUE: Portable chest AP view FINDINGS: Unchanged position of an endotracheal tube, which terminates 6 cm above the mavis. A left IJ central venous catheter terminates in the mid SVC. An enteric tube traverses the field-of-view, and terminates below the diaphragm. The cardiac silhouette is within normal limits. Multifocal airspace opacities, right greater than left are stable compared to radiograph from 3/13/2025, but improved compared to more remote radiographs such as from 3/9/2025. No pneumothorax or large pleural effusion. The visualized bones and upper abdomen are age-appropriate.     Stable multifocal pneumonia. Electronically signed by Evy Patrick MD  [facility])  Office #:     [phone]- Option #8   Office Fax: [phone]      Note:  Medical notes are meant to be a communication between medical professionals.  Additionally, portion of this note were created using voice recognition function, syntax and phonetic over may have escaped proofreading.   If you are patient or family reviewing this note and have questions, please ask during rounds or bring it with you to your next doctors appointment.          
IntraVENous Q8H Payam Menezes MD   40 mg at 03/17/25 1138    albuterol (PROVENTIL) (2.5 MG/3ML) 0.083% nebulizer solution 2.5 mg  2.5 mg Nebulization Q4H PRN Payam Menezes MD        chlorhexidine (PERIDEX) 0.12 % solution 15 mL  15 mL Mouth/Throat BID Payam Menezes MD   15 mL at 03/17/25 0839    pantoprazole (PROTONIX) 40 mg in sodium chloride (PF) 0.9 % 10 mL injection  40 mg IntraVENous Daily Payam Menezes MD   40 mg at 03/17/25 0839    dextrose 5 % and 0.45 % sodium chloride infusion   IntraVENous Continuous PRN Payam Menezes MD        fentaNYL (SUBLIMAZE) injection 25 mcg  25 mcg IntraVENous Q2H PRN Payam Menezes MD   25 mcg at 03/16/25 1614           Objective:   Intake/Output:     Intake/Output Summary (Last 24 hours) at 3/17/2025 1257  Last data filed at 3/17/2025 1153  Gross per 24 hour   Intake 1928.02 ml   Output 2250 ml   Net -321.98 ml       Vital Signs:    /77   Pulse 68   Temp 98.6 °F (37 °C) (Oral)   Resp 17   Ht 1.778 m (5' 10\")   Wt 94.7 kg (208 lb 12.4 oz)   SpO2 98%   BMI 29.96 kg/m²     Weight:  Wt Readings from Last 3 Encounters:   03/17/25 94.7 kg (208 lb 12.4 oz)   06/11/24 87.1 kg (192 lb)   02/09/24 82.6 kg (182 lb)          Ventilator Settings:  Lab Results   Component Value Date/Time    MODE ASSIST CONTROL 03/17/2025 04:44 AM       VENT SETTINGS (Comprehensive)  Vent Information  Ventilator Day(s): 9  Ventilator ID: [id number]  Ventilator Initiate: Yes  Vent Mode: AC/VC  Additional Respiratory Assessments  Pulse: 68  Respirations: 17  SpO2: 98 %  Position: Semi-Ruiz's  Humidification Source: Heated wire  Humidification Temp: 36.9  Circuit Condensation: Drained  Cuff Pressure (cm H2O): 30 cm H2O  Swallow: Other (Comment) (intubated, no swallow reflex)      Physical Exam:     General: in no respiratory distress, appears older than stated age, chronically ill looking, on respirator  Some response to painful stimuli periodically opening eyes, does not follow commands.  Right pupil 
have questions, please ask during rounds or bring it with you to your next doctors appointment.      
PM           Documentation Time:     I have spent 60 minutes on the comprehensive care of this critically ill patient in ICU, excluding any procedures.  Half of this time was dedicated to counseling the patient and family, as well as coordinating care on the floor. The critical care time was performed to assess and manage the high probability of eminent, life-threatening deterioration that could result in multiorgan failure and possible death.     Preparing to see the patient (eg, review of tests)  Obtaining and/or reviewing separately obtained history  Performing a medically necessary appropriate examination and/or evaluation  Counseling and educating the patient/family/caregiver  Ordering medications, tests, or procedures  Referring and communicating with other health care professionals as needed  Documenting clinical information in the electronic or other health record  Independently interpreting results (not reported separately) and communicating results to the patient/family/caregiver  Care coordination and discharge planning with Case Management.        Dear patient Angelo Mireles, if you are reviewing this note and have a question regarding the medical terminology, please bring it with you to your next PCP visit.  Medical notes are meant to be a communication between medical professionals.        Electronically signed by: MARY [facility]  03/20/25        
proximal sidehole overlies the distal esophagus. Advancement by at least 5 cm is recommended. Lungs: Multifocal airspace opacities bilaterally are similar. Pleura: No pleural effusion. No pneumothorax. Bones: No acute displaced fracture. Soft tissues: Within normal limits.     1. Support apparatus as above. 2. Similar bilateral airspace disease. Electronically signed by Augustine Leal    XR CHEST PORTABLE  Result Date: 3/9/2025  EXAM: XR CHEST PORTABLE INDICATION: Shortness of Breath COMPARISON: Chest radiograph August 10, 2023 TECHNIQUE: Single view of the chest. FINDINGS: Cardiomediastinal silhouette: Within normal limits. Lungs: Multifocal patchy and confluent airspace opacities bilaterally. Pleura: No pleural effusion. The tip of the right costophrenic angle is excluded by collimation. No pneumothorax. Bones: No acute displaced fracture. Soft tissues: Within normal limits.     Bilateral airspace opacities raise suspicion for multi lobar pneumonia or alveolar pulmonary edema. Electronically signed by Augustine Leal        03/09/25    ECHO (TTE) COMPLETE (PRN CONTRAST/BUBBLE/STRAIN/3D) 03/09/2025  9:15 PM (Final)    Interpretation Summary    Left Ventricle: Mildly reduced left ventricular systolic function with a visually estimated EF of 40 - 45%. EF by 2D Simpsons Biplane is 46%. Left ventricle size is normal. Normal wall thickness. There are regional wall motion abnormalities. Hypokinesis of the following segments: mid anteroseptal, apical anterior and apical septal. Grade II diastolic dysfunction with increased LAP. Average E/e' ratio is 12.35.    Aortic Valve: Trileaflet valve. Mildly thickened cusps.    Mitral Valve: Mild regurgitation.    Tricuspid Valve: Mild to moderate regurgitation. The estimated RVS/PAS pressure is 41 mmHg.    Right Atrium: Right atrium is mildly dilated.    Aorta: Mildly dilated aortic root. Ao root diameter is 3.9 cm.    IVC/SVC: IVC diameter is greater than 21 mm and decreases less 
overlies the cavoatrial junction. Esophageal temperature probe projects over the cervical esophagus. Severe bilateral airspace consolidation right greater than left is unchanged.     1. No change bilateral pneumonia. Electronically signed by SUSY CASTILLO    Echo (TTE) complete (PRN contrast/bubble/strain/3D)  Result Date: 3/9/2025    Left Ventricle: Mildly reduced left ventricular systolic function with a visually estimated EF of 40 - 45%. EF by 2D Simpsons Biplane is 46%. Left ventricle size is normal. Normal wall thickness. There are regional wall motion abnormalities. Hypokinesis of the following segments: mid anteroseptal, apical anterior and apical septal. Grade II diastolic dysfunction with increased LAP. Average E/e' ratio is 12.35.   Aortic Valve: Trileaflet valve. Mildly thickened cusps.   Mitral Valve: Mild regurgitation.   Tricuspid Valve: Mild to moderate regurgitation. The estimated RVS/PAS pressure is 41 mmHg.   Right Atrium: Right atrium is mildly dilated.   Aorta: Mildly dilated aortic root. Ao root diameter is 3.9 cm.   IVC/SVC: IVC diameter is greater than 21 mm and decreases less than 50% during inspiration; therefore the estimated right atrial pressure is elevated (~15 mmHg).   Image quality is fair.     XR ABDOMEN (KUB) (SINGLE AP VIEW)  Result Date: 3/9/2025  EXAM:  XR ABDOMEN (KUB) (SINGLE AP VIEW) INDICATION: Enteric tube placement COMPARISON: 3/9/2025. TECHNIQUE: Supine frontal abdomen (KUB). FINDINGS: The enteric tube terminates in the stomach. There are no dilated bowel loops. The bones are stable.     The enteric tube terminates in the stomach. Electronically signed by Kayden Granado    XR CHEST PORTABLE  Result Date: 3/9/2025  EXAM: Single view chest radiograph INDICATION: repeat COMPARISON: 5 hours prior FINDINGS: Interval advancement of endotracheal tube with tip now terminating off the field-of-view. Other lines and tubes in stable position. The cardiac silhouette is stable. No 
structures are age appropriate. NG tube tip in stomach but the proximal port is near the diaphragm.     Recommend advancing NG tube by at least 4 cm. Electronically signed by Page Walker    XR CHEST PORTABLE  Result Date: 3/23/2025  INDICATION:   trach placemnt COMPARISON: 3/23/2025 FINDINGS: AP portable upright view of the chest demonstrates a stable  cardiopericardial silhouette.minimal increased interstitial opacities are stable. Central venous access catheter on the left is unchanged. NG tube is stable. Interval tracheostomy placement which is in appropriate position. There is no pneumothorax.     Tracheostomy tube in upper position. No other interval change. Electronically signed by DAISY SLAUGHTER    Echo (TTE) limited (PRN contrast/bubble/strain/3D)  Result Date: 3/22/2025    Left Ventricle: Normal left ventricular systolic function with a visually estimated EF of 55 %. Left ventricle size is normal. Mildly increased wall thickness. Normal wall motion. Normal diastolic function. Average E/e' ratio is 8.68.   Left Ventricle: Diastolic dysfunction present with normal LV EF. Average E/e' ratio is 8.68.   Aortic Valve: Trileaflet valve. There is no vegetation present.   Mitral Valve: Valve structure is normal. Trace regurgitation. There is no vegetation present.   Tricuspid Valve: Valve structure is normal. The estimated RVSP is 23 mmHg. No vegetation present.   Pulmonic Valve: Valve structure is normal. Trace regurgitation. No vegetation present.   Compared to prior study overall EF has improved.  No obvious wall motion abnormalities on today's exam.     XR CHEST PORTABLE  Result Date: 3/22/2025  EXAM:  XR CHEST PORTABLE INDICATION: Intubated Comparison to 3/21/2025. Portable exam obtained at 610 demonstrates little change in the position of the life-support lines and tubes compared to prior examination. Increasing interstitial infiltrate throughout the right lung, stable left perihilar infiltrate.     Increasing 
remote radiographs such as from 3/9/2025. No pneumothorax or large pleural effusion. The visualized bones and upper abdomen are age-appropriate.     Stable multifocal pneumonia. Electronically signed by Evy Larson    CT HEAD WO CONTRAST  Result Date: 3/13/2025  EXAM: CT HEAD WO CONTRAST INDICATION: post-cardiac arrest COMPARISON: CT head 3/9/2025. CONTRAST: None. TECHNIQUE: Unenhanced CT of the head was performed using 5 mm images. Brain and bone windows were generated. Coronal and sagittal reformats. CT dose reduction was achieved through use of a standardized protocol tailored for this examination and automatic exposure control for dose modulation.  FINDINGS: Generalized volume loss. White matter hypodensities may reflect chronic microangiopathic change. There is no intracranial hemorrhage, extra-axial collection, or mass effect. The basilar cisterns are open. Subtle diffuse loss of gray-white matter differentiation. 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 The bone windows demonstrate no abnormalities. Paranasal sinus disease. Mastoid air cells are clear. Right globe dystrophic calcifications and/or prosthesis. Left lens replacement. Partially visualized tube in the nasopharynx.     Subtle diffuse loss of gray-white matter differentiation, could reflect hypoxic-ischemic brain injury. 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 Correlate clinically. Electronically signed by MONIKA WARREN    XR CHEST PORTABLE  Result Date: 3/13/2025  EXAM:  XR CHEST PORTABLE INDICATION: Intubated COMPARISON: 3/12/2025 TECHNIQUE: 0519 hours portable chest AP view FINDINGS: ET tube is in satisfactory position. NG tube courses into the stomach. Left IJ catheter overlies the SVC. Bilateral airspace disease consistent with pneumonia right greater than left is unchanged.     1. No change in bilateral airspace opacities right greater than left. This is consistent with pneumonia. Electronically signed by SUSY CASTILLO    XR CHEST 
excluded by collimation. No pneumothorax. Bones: No acute displaced fracture. Soft tissues: Within normal limits.     Bilateral airspace opacities raise suspicion for multi lobar pneumonia or alveolar pulmonary edema. Electronically signed by Augustine Leal        03/09/25    ECHO (TTE) COMPLETE (PRN CONTRAST/BUBBLE/STRAIN/3D) 03/09/2025  9:15 PM (Final)    Interpretation Summary    Left Ventricle: Mildly reduced left ventricular systolic function with a visually estimated EF of 40 - 45%. EF by 2D Simpsons Biplane is 46%. Left ventricle size is normal. Normal wall thickness. There are regional wall motion abnormalities. Hypokinesis of the following segments: mid anteroseptal, apical anterior and apical septal. Grade II diastolic dysfunction with increased LAP. Average E/e' ratio is 12.35.    Aortic Valve: Trileaflet valve. Mildly thickened cusps.    Mitral Valve: Mild regurgitation.    Tricuspid Valve: Mild to moderate regurgitation. The estimated RVS/PAS pressure is 41 mmHg.    Right Atrium: Right atrium is mildly dilated.    Aorta: Mildly dilated aortic root. Ao root diameter is 3.9 cm.    IVC/SVC: IVC diameter is greater than 21 mm and decreases less than 50% during inspiration; therefore the estimated right atrial pressure is elevated (~15 mmHg).    Image quality is fair.    Signed by: Isai Dominguez on 3/9/2025  9:15 PM           Documentation Time:    Total time greater than 35 minutes    Preparing to see the patient (eg, review of tests)  Obtaining and/or reviewing separately obtained history  Performing a medically necessary appropriate examination and/or evaluation  Counseling and educating the patient/family/caregiver  Ordering medications, tests, or procedures  Referring and communicating with other health care professionals as needed  Documenting clinical information in the electronic or other health record  Independently interpreting results (not reported separately) and communicating results to the 
laboratory findings           Comment: Please see [employer] Interpretation Guide in EPIC Links                   ECHO  12/05/18 12/08/2018 12:02 PM, 12/08/2018 12:00 AM (Final)    Narrative  This is a summary report. The complete report is available in the patient's medical record. If you cannot access the medical record, please contact the sending organization for a detailed fax or copy.    · Left ventricular mild-to-moderately decreased systolic function. Calculated left ventricular ejection fraction is 40%. Visually measured ejection fraction. Left ventricular moderate concentric hypertrophy. Left ventricular global hypokinesis.  · Right ventricular cavity size is mildly dilated.  · Pulmonary arterial systolic pressure is 50 mmHg. Mild pulmonary hypertension.    Signed by: Buck Ventura on 12/8/2018 12:02 PM, Signed by: Unknown Provider Result on 12/8/2018 12:00 AM               XR CHEST PORTABLE  Result Date: 3/19/2025  EXAM: XR CHEST PORTABLE ACC#: [id number]  INDICATION: Intubated, []  COMPARISON: Chest radiograph March 18, 2025 FINDINGS: AP portable chest radiograph. The ET tube tip is low lying. It can be retracted 2-3 cm for improved positioning. The NG tube tip is in the stomach. The heart size is normal. Bilateral lung airspace infiltrates persist without interval change. No effusion or pneumothorax is identified. There is a left IJ central venous catheter in satisfactory position.     1. The ET tube tip is close to the mavis. We can be retracted 2-3 cm for improved positioning. 2. No interval change in bilateral, multifocal pneumonia. Electronically signed by RENETTA LEWIS    XR CHEST PORTABLE  Result Date: 3/18/2025  EXAM: XR CHEST PORTABLE ACC#: [id number]  INDICATION: Intubated, []  COMPARISON: Chest radiograph March 17, 2025 FINDINGS: AP portable chest radiograph. The ET tube is in satisfactory position. The NG tube courses to the stomach. There is a left IJ central venous catheter with the tip 
reduction was achieved through use of a standardized protocol tailored for this examination and automatic exposure control for dose modulation. FINDINGS: LIVER: Periportal edema throughout the liver. No liver lesion or fluid collection. BILIARY TREE: Small volume cholelithiasis. CBD is not dilated. SPLEEN: within normal limits. PANCREAS: No mass or ductal dilatation. ADRENALS: Unremarkable. KIDNEYS: Renal atrophy bilaterally. Right lower quadrant renal transplant without hydronephrosis. STOMACH: Enteric tube in the stomach. SMALL BOWEL: No dilatation or wall thickening. COLON: No dilatation or wall thickening. APPENDIX: Not seen PERITONEUM: No ascites or pneumoperitoneum. RETROPERITONEUM: No lymphadenopathy or aortic aneurysm. REPRODUCTIVE ORGANS: Normal URINARY BLADDER: Dumont catheter in the decompressed bladder. BONES: Lumbar spine endplate degenerative changes. ABDOMINAL WALL: Rectus diastases without abdominal wall fluid collection. ADDITIONAL COMMENTS: N/A     No evidence of intra-abdominal infection. Bilateral renal atrophy with right lower quadrant renal transplant. Mild periportal edema in the liver. No ascites or bowel inflammation. Electronically signed by AMMON GABRIEL    CT HEAD WO CONTRAST  Result Date: 3/9/2025  EXAM: CT HEAD WO CONTRAST INDICATION:cardiac arrest TECHNIQUE: Helical CT of the head was obtained without intravenous contrast. Axial, coronal and sagittal images were created. Dose reduction technique was used including one or more of the following: automated exposure control, adjustment of mA and kV according to patient size, and/or iterative reconstruction. COMPARISON: August 2023 FINDINGS: Quality: Average.  No unusual artifacts beyond the limitations of routine CT. INTRACRANIAL: Hemorrhage: None. Mass effect: None. Brain parenchyma: Gray-white matter differentiation is maintained. White matter hypoattenuation although nonspecific can be seen with chronic microvascular ischemic changes. 
Similar bilateral airspace disease. Electronically signed by Augustine Leal    XR CHEST PORTABLE  Result Date: 3/9/2025  EXAM: XR CHEST PORTABLE INDICATION: Shortness of Breath COMPARISON: Chest radiograph August 10, 2023 TECHNIQUE: Single view of the chest. FINDINGS: Cardiomediastinal silhouette: Within normal limits. Lungs: Multifocal patchy and confluent airspace opacities bilaterally. Pleura: No pleural effusion. The tip of the right costophrenic angle is excluded by collimation. No pneumothorax. Bones: No acute displaced fracture. Soft tissues: Within normal limits.     Bilateral airspace opacities raise suspicion for multi lobar pneumonia or alveolar pulmonary edema. Electronically signed by Augustine Leal         PCCM will follow the patient in ICU with primary and other medical team. PCCM will manage the hemodynamics, ventilator, and pulmonary problems. PCCM will defer other respective systems problem management to primary team attending Julia Alonzo MD and other respective consultants. The appropriate providers are aware of patient and they will follow up regarding results of tests and/or lab work and/or pathology results and/or imaging studies and/or cardiovascular testing etc. as they are received. PCCM will defer other respective systems problem management to primary team and other respective consultants. Any abnormalities on the tests, labs, pathology results, radiologic imaging studies, cardiovascular testing etc. that are not addressed during the hospital course, and if any pending results, should be conveyed to the patient’s PCP and respecrive consultants at the time of discharge. This is deferred to the primary team. Please call if any questions.    Further recommendations will be based on the patient's response to recommended treatment and results of the investigation ordered.       Please note: Voice-recognition software may have been used to generate this report, which may have resulted in some 
Brain parenchyma: Gray-white matter differentiation is maintained. White matter hypoattenuation although nonspecific can be seen with chronic microvascular ischemic changes. Ventricles: Normal for age. CRANIAL/EXTRACRANIAL: Bony structures: No depressed or displaced skull fractures. Paranasal sinuses and mastoid air cells: The imaged portions demonstrate minimal mucosal thickening and/or rentention cyst formation. Visualized orbits and globes: Redemonstration of right intraorbital high density.     1.   No acute intracranial findings. Electronically signed by Mark Pabon    XR CHEST 1 VIEW  Result Date: 3/9/2025  EXAM: XR CHEST 1 VIEW INDICATION: intubation tube placemenent COMPARISON: Chest radiograph March 9, 2025 TECHNIQUE: Single view of the chest. FINDINGS: Cardiomediastinal silhouette: Endotracheal tube tip overlies the midthoracic trachea. Left internal jugular approach central venous catheter tip overlies the superior cavoatrial junction. Gastric decompression tube proximal sidehole overlies the distal esophagus. Advancement by at least 5 cm is recommended. Lungs: Multifocal airspace opacities bilaterally are similar. Pleura: No pleural effusion. No pneumothorax. Bones: No acute displaced fracture. Soft tissues: Within normal limits.     1. Support apparatus as above. 2. Similar bilateral airspace disease. Electronically signed by Augustine Leal    XR CHEST PORTABLE  Result Date: 3/9/2025  EXAM: XR CHEST PORTABLE INDICATION: Shortness of Breath COMPARISON: Chest radiograph August 10, 2023 TECHNIQUE: Single view of the chest. FINDINGS: Cardiomediastinal silhouette: Within normal limits. Lungs: Multifocal patchy and confluent airspace opacities bilaterally. Pleura: No pleural effusion. The tip of the right costophrenic angle is excluded by collimation. No pneumothorax. Bones: No acute displaced fracture. Soft tissues: Within normal limits.     Bilateral airspace opacities raise suspicion for multi lobar

## 2025-04-02 LAB
FUNGITELL INTERPRETATION: ABNORMAL
FUNGITELL: ABNORMAL PG/ML
Lab: ABNORMAL
REFERENCE VALUE: ABNORMAL
RESULT: POSITIVE
SPECIMEN SOURCE: NORMAL
VIRUS SPEC CULT: NORMAL

## 2025-04-03 LAB — B DERMAT AB TITR SER: NEGATIVE

## 2025-04-07 LAB
BACTERIA SPEC CULT: NORMAL
SERVICE CMNT-IMP: NORMAL

## 2025-04-08 LAB — HISTOPLASMA AG: NEGATIVE NG/ML

## 2025-04-14 LAB
BACTERIA SPEC CULT: NORMAL
SERVICE CMNT-IMP: NORMAL

## 2025-04-21 LAB
BACTERIA SPEC CULT: NORMAL
SERVICE CMNT-IMP: NORMAL

## 2025-04-28 LAB
BACTERIA SPEC CULT: NORMAL
SERVICE CMNT-IMP: NORMAL

## 2025-05-08 LAB
ACID FAST STN SPEC: NEGATIVE
MYCOBACTERIUM SPEC QL CULT: NEGATIVE
SPECIMEN PREPARATION: NORMAL
SPECIMEN SOURCE: NORMAL
